# Patient Record
Sex: FEMALE | Race: WHITE | Employment: OTHER | URBAN - METROPOLITAN AREA
[De-identification: names, ages, dates, MRNs, and addresses within clinical notes are randomized per-mention and may not be internally consistent; named-entity substitution may affect disease eponyms.]

---

## 2017-01-05 ENCOUNTER — HOSPITAL ENCOUNTER (OUTPATIENT)
Dept: LAB | Age: 82
Discharge: HOME | End: 2017-01-05
Payer: MEDICARE

## 2017-01-05 ENCOUNTER — OFFICE VISIT (OUTPATIENT)
Dept: INTERNAL MEDICINE CLINIC | Age: 82
End: 2017-01-05

## 2017-01-05 VITALS
TEMPERATURE: 97.4 F | BODY MASS INDEX: 42.49 KG/M2 | DIASTOLIC BLOOD PRESSURE: 62 MMHG | WEIGHT: 255 LBS | RESPIRATION RATE: 20 BRPM | OXYGEN SATURATION: 98 % | HEART RATE: 60 BPM | HEIGHT: 65 IN | SYSTOLIC BLOOD PRESSURE: 144 MMHG

## 2017-01-05 DIAGNOSIS — E03.9 ACQUIRED HYPOTHYROIDISM: ICD-10-CM

## 2017-01-05 DIAGNOSIS — Z13.39 SCREENING FOR ALCOHOLISM: ICD-10-CM

## 2017-01-05 DIAGNOSIS — Z00.00 ROUTINE GENERAL MEDICAL EXAMINATION AT A HEALTH CARE FACILITY: ICD-10-CM

## 2017-01-05 DIAGNOSIS — G89.4 CHRONIC PAIN SYNDROME: ICD-10-CM

## 2017-01-05 LAB
ALBUMIN SERPL BCP-MCNC: 3.8 G/DL (ref 3.4–5)
ALBUMIN/GLOB SERPL: 1 {RATIO} (ref 0.8–1.7)
ALP SERPL-CCNC: 70 U/L (ref 45–117)
ALT SERPL-CCNC: 12 U/L (ref 13–56)
ANION GAP BLD CALC-SCNC: 7 MMOL/L (ref 3–18)
APPEARANCE UR: CLEAR
AST SERPL W P-5'-P-CCNC: 11 U/L (ref 15–37)
BACTERIA URNS QL MICRO: NEGATIVE /HPF
BASOPHILS # BLD AUTO: 0.1 K/UL (ref 0–0.06)
BASOPHILS # BLD: 1 % (ref 0–2)
BILIRUB SERPL-MCNC: 0.2 MG/DL (ref 0.2–1)
BILIRUB UR QL: NEGATIVE
BUN SERPL-MCNC: 19 MG/DL (ref 7–18)
BUN/CREAT SERPL: 20 (ref 12–20)
CALCIUM SERPL-MCNC: 9.1 MG/DL (ref 8.5–10.1)
CHLORIDE SERPL-SCNC: 103 MMOL/L (ref 100–108)
CO2 SERPL-SCNC: 29 MMOL/L (ref 21–32)
COLOR UR: YELLOW
CREAT SERPL-MCNC: 0.93 MG/DL (ref 0.6–1.3)
DIFFERENTIAL METHOD BLD: ABNORMAL
EOSINOPHIL # BLD: 0.3 K/UL (ref 0–0.4)
EOSINOPHIL NFR BLD: 3 % (ref 0–5)
EPITH CASTS URNS QL MICRO: ABNORMAL /LPF (ref 0–5)
ERYTHROCYTE [DISTWIDTH] IN BLOOD BY AUTOMATED COUNT: 14.3 % (ref 11.6–14.5)
GLOBULIN SER CALC-MCNC: 3.8 G/DL (ref 2–4)
GLUCOSE SERPL-MCNC: 225 MG/DL (ref 74–99)
GLUCOSE UR STRIP.AUTO-MCNC: >1000 MG/DL
HBA1C MFR BLD: 11.2 % (ref 4.2–5.6)
HCT VFR BLD AUTO: 41.8 % (ref 35–45)
HGB BLD-MCNC: 13 G/DL (ref 12–16)
HGB UR QL STRIP: NEGATIVE
KETONES UR QL STRIP.AUTO: NEGATIVE MG/DL
LEUKOCYTE ESTERASE UR QL STRIP.AUTO: NEGATIVE
LYMPHOCYTES # BLD AUTO: 30 % (ref 21–52)
LYMPHOCYTES # BLD: 3 K/UL (ref 0.9–3.6)
MCH RBC QN AUTO: 27 PG (ref 24–34)
MCHC RBC AUTO-ENTMCNC: 31.1 G/DL (ref 31–37)
MCV RBC AUTO: 86.7 FL (ref 74–97)
MONOCYTES # BLD: 0.6 K/UL (ref 0.05–1.2)
MONOCYTES NFR BLD AUTO: 6 % (ref 3–10)
NEUTS SEG # BLD: 5.9 K/UL (ref 1.8–8)
NEUTS SEG NFR BLD AUTO: 60 % (ref 40–73)
NITRITE UR QL STRIP.AUTO: NEGATIVE
PH UR STRIP: 6 [PH] (ref 5–8)
PLATELET # BLD AUTO: 237 K/UL (ref 135–420)
PMV BLD AUTO: 12.8 FL (ref 9.2–11.8)
POTASSIUM SERPL-SCNC: 5.1 MMOL/L (ref 3.5–5.5)
PROT SERPL-MCNC: 7.6 G/DL (ref 6.4–8.2)
PROT UR STRIP-MCNC: 100 MG/DL
RBC # BLD AUTO: 4.82 M/UL (ref 4.2–5.3)
RBC #/AREA URNS HPF: 0 /HPF (ref 0–5)
SODIUM SERPL-SCNC: 139 MMOL/L (ref 136–145)
SP GR UR REFRACTOMETRY: 1.02 (ref 1–1.03)
UROBILINOGEN UR QL STRIP.AUTO: 0.2 EU/DL (ref 0.2–1)
WBC # BLD AUTO: 9.9 K/UL (ref 4.6–13.2)
WBC URNS QL MICRO: ABNORMAL /HPF (ref 0–4)
YEAST URNS QL MICRO: ABNORMAL

## 2017-01-05 PROCEDURE — 85025 COMPLETE CBC W/AUTO DIFF WBC: CPT | Performed by: INTERNAL MEDICINE

## 2017-01-05 PROCEDURE — 81001 URINALYSIS AUTO W/SCOPE: CPT | Performed by: INTERNAL MEDICINE

## 2017-01-05 PROCEDURE — 82043 UR ALBUMIN QUANTITATIVE: CPT | Performed by: INTERNAL MEDICINE

## 2017-01-05 PROCEDURE — 80053 COMPREHEN METABOLIC PANEL: CPT | Performed by: INTERNAL MEDICINE

## 2017-01-05 PROCEDURE — 83036 HEMOGLOBIN GLYCOSYLATED A1C: CPT | Performed by: INTERNAL MEDICINE

## 2017-01-05 RX ORDER — BACLOFEN 20 MG
1 TABLET ORAL DAILY
COMMUNITY
End: 2021-11-09

## 2017-01-05 RX ORDER — ASCORBIC ACID 250 MG
100 TABLET ORAL DAILY
COMMUNITY
End: 2018-03-22 | Stop reason: DRUGHIGH

## 2017-01-05 RX ORDER — CEPHALEXIN 500 MG/1
CAPSULE ORAL
Qty: 30 CAP | Refills: 0 | Status: SHIPPED | OUTPATIENT
Start: 2017-01-05 | End: 2017-03-23 | Stop reason: SDUPTHER

## 2017-01-05 RX ORDER — LANOLIN ALCOHOL/MO/W.PET/CERES
500 CREAM (GRAM) TOPICAL DAILY
COMMUNITY
End: 2018-03-22

## 2017-01-05 NOTE — ACP (ADVANCE CARE PLANNING)
The patient was advised and counseled regarding advanced directives.   The patient was provided with an information packet

## 2017-01-05 NOTE — PATIENT INSTRUCTIONS
Health Maintenance Due   Topic Date Due    Cholesterol Test   1935    Diabetic Foot Care  03/19/1947    Albumin Urine Test  03/19/1947    Eye Exam  03/19/1947    DTaP/Tdap/Td  (1 - Tdap) 03/19/1958    Shingles Vaccine  03/19/1997    Glaucoma Screening   03/19/2002    Bone Density Screening  03/19/2002    Annual Well Visit  03/19/2002       Medicare Part B Preventive Services Limitations Recommendation Scheduled   Bone Mass Measurement  (age 72 & older, biennial) Requires diagnosis related to osteoporosis or estrogen deficiency. Biennial benefit unless patient has history of long-term glucocorticoid tx or baseline is needed because initial test was by other method     Cardiovascular Screening Blood Tests (every 5 years)  Total cholesterol, HDL, Triglycerides Order as a panel if possible     Colorectal Cancer Screening  -Fecal occult blood test (annual)  -Flexible sigmoidoscopy (5y)  -Screening colonoscopy (10y)  -Barium Enema      Counseling to Prevent Tobacco Use (up to 8 sessions per year)  - Counseling greater than 3 and up to 10 minutes  - Counseling greater than 10 minutes Patients must be asymptomatic of tobacco-related conditions to receive as preventive service     Diabetes Screening Tests (at least every 3 years, Medicare covers annually or at 6-month intervals for prediabetic patients)    Fasting blood sugar (FBS) or glucose tolerance test (GTT) Patient must be diagnosed with one of the following:  -Hypertension, Dyslipidemia, obesity, previous impaired FBS or GTT  Or any two of the following: overweight, FH of diabetes, age ? 72, history of gestational diabetes, birth of baby weighing more than 9 pounds     Diabetes Self-Management Training (DSMT) (no USPSTF recommendation) Requires referral by treating physician for patient with diabetes or renal disease. 10 hours of initial DSMT session of no less than 30 minutes each in a continuous 12-month period.   2 hours of follow-up DSMT in subsequent years. Glaucoma Screening (no USPSTF recommendation) Diabetes mellitus, family history, , age 48 or over,  American, age 72 or over     Human Immunodeficiency Virus (HIV) Screening (annually for increased risk patients)  HIV-1 and HIV-2 by EIA, ESPERANZA, rapid antibody test, or oral mucosa transudate Patient must be at increased risk for HIV infection per USPSTF guidelines or pregnant. Tests covered annually for patients at increased risk. Pregnant patients may receive up to 3 test during pregnancy. Medical Nutrition Therapy (MNT) (for diabetes or renal disease not recommended schedule) Requires referral by treating physician for patient with diabetes or renal disease. Can be provided in same year as diabetes self-management training (DSMT), and CMS recommends medical nutrition therapy take place after DSMT. Up to 3 hours for initial year and 2 hours in subsequent years. Shingles Vaccination A shingles vaccine is also recommended once in a lifetime after age 61     Seasonal Influenza Vaccination (annually)      Pneumococcal Vaccination (once after 72)      Hepatitis B Vaccinations (if medium/high risk) Medium/high risk factors:  End-stage renal disease,  Hemophiliacs who received Factor VIII or IX concentrates, Clients of institutions for the mentally retarded, Persons who live in the same house as a HepB virus carrier, Homosexual men, Illicit injectable drug abusers. Screening Mammography (biennial age 54-69) Annually (age 36 or over)     Screening Pap Tests and Pelvic Examination (up to age 79 and after 79 if unknown history or abnormal study last 10 years) Every 25 months except high risk     Ultrasound Screening for Abdominal Aortic Aneurysm (AAA) (once) Patient must be referred through [facility] and not have had a screening for abdominal aortic aneurysm before under Medicare.   Limited to patients who meet one of the following criteria:  - Men who are 65-75 years old and have smoked more than 100 cigarettes in their lifetime.  -Anyone with a FH of AAA  -Anyone recommended for screening by USPSTF

## 2017-01-05 NOTE — PROGRESS NOTES
1. Have you been to the ER, urgent care clinic since your last visit? Hospitalized since your last visit? No    2. Have you seen or consulted any other health care providers outside of the [address] [place] [address] since your last visit? Include any pap smears or colon screening.  No

## 2017-01-05 NOTE — MR AVS SNAPSHOT
Visit Information Date & Time Provider Department Dept. Phone Encounter #  
 1/5/2017  9:00 AM David Mirza MD [facility] INTERNAL MEDICINE OF [address] [place] [address] Drive [phone] [id number] Follow-up Instructions Return in about 3 months (around 4/17/2017). Your Appointments 1/23/2017 12:00 PM  
Office Visit with Alyx Walker MD  
[facility] [address] [place] [address]  
[phone] [facility] [id number] Upcoming Health Maintenance Date Due  
 LIPID PANEL Q1 1935 FOOT EXAM Q1 3/19/1947 MICROALBUMIN Q1 3/19/1947 EYE EXAM RETINAL OR DILATED Q1 3/19/1947 DTaP/Tdap/Td series (1 - Tdap) 3/19/1958 ZOSTER VACCINE AGE 60> 3/19/1997 GLAUCOMA SCREENING Q2Y 3/19/2002 OSTEOPOROSIS SCREENING (DEXA) 3/19/2002 MEDICARE YEARLY EXAM 3/19/2002 HEMOGLOBIN A1C Q6M 5/2/2017 Pneumococcal 65+ Low/Medium Risk (2 of 2 - PPSV23) 10/18/2017 Allergies as of 1/5/2017  Review Complete On: 1/5/2017 By: David Mirza MD  
 No Known Allergies Current Immunizations  Never Reviewed No immunizations on file. Not reviewed this visit You Were Diagnosed With   
  
 Codes Comments Uncontrolled type 2 diabetes mellitus without complication, with long-term current use of insulin ([facility]    -  Primary ICD-10-CM: E11.65, Z79.4 ICD-9-CM: 250.02, V58.67 Acquired hypothyroidism     ICD-10-CM: E03.9 ICD-9-CM: 523. 9 Chronic pain syndrome     ICD-10-CM: G89.4 ICD-9-CM: 338.4 Routine general medical examination at a health care facility     ICD-10-CM: Z00.00 ICD-9-CM: V70.0 Screening for alcoholism     ICD-10-CM: Z13.89 ICD-9-CM: V79.1 Vitals BP Pulse Temp Resp Height(growth percentile) 144/62 (BP 1 Location: Right arm, BP Patient Position: Sitting) 60 97.4 °F (36.3 °C) (Tympanic) 20 5' 5\" (1.651 m) Weight(growth percentile) SpO2 BMI OB Status Smoking Status 255 lb (115.7 kg) 98% 42.43 kg/m2 Hysterectomy Former Smoker BMI and BSA Data Body Mass Index Body Surface Area  
 42.43 kg/m 2 2.3 m 2 Preferred Pharmacy Pharmacy Name Phone [address], [facility] [address] [phone] Your Updated Medication List  
  
   
This list is accurate as of: 1/5/17 10:52 AM.  Always use your most recent med list.  
  
  
  
  
 acetaminophen 500 mg tablet Commonly known as:  TYLENOL Take 1 Tab by mouth every six (6) hours as needed for Pain. allopurinol 100 mg tablet Commonly known as:  Gonzella Cotta Take 1 Tab by mouth daily. amLODIPine 10 mg tablet Commonly known as:  Tad Sofy Take 0.5 Tabs by mouth daily. aspirin delayed-release 81 mg tablet Take  by mouth daily. atorvastatin 40 mg tablet Commonly known as:  LIPITOR Take  by mouth daily. bumetanide 0.5 mg tablet Commonly known as:  Royal Hutching Take 2 mg by mouth two (2) times a day. carvedilol 12.5 mg tablet Commonly known as:  Melvi Rufina Take  by mouth two (2) times daily (with meals). cephALEXin 500 mg capsule Commonly known as:  KEFLEX  
1 capsule three times per day  
  
 esomeprazole 40 mg capsule Commonly known as:  Allena Clas Take 1 Cap by mouth daily. gabapentin 300 mg capsule Commonly known as:  NEURONTIN Take 1 Cap by mouth three (3) times daily. glucose blood VI test strips strip Commonly known as:  FREESTYLE TEST Use to check blood glucose twice daily DX:E11.9  
  
 insulin aspart 100 unit/mL Inpn Commonly known as:  Libra Barrow 16 units before each meal  
  
 insulin glargine 100 unit/mL injection Commonly known as:  LANTUS  
82 units daily  
  
 levothyroxine 200 mcg tablet Commonly known as:  SYNTHROID  
1 tablet daily (take on an empty stomach) (stop \"old\" synthroid)  
  
 magnesium oxide 500 mg Tab Take  by mouth.  
  
 morphine IR 15 mg tablet Commonly known as:  MS IR Take 1 Tab by mouth every six (6) hours as needed for Pain for up to 30 days. Max Daily Amount: 60 mg.  
  
 triamcinolone acetonide 0.1 % topical cream  
Commonly known as:  KENALOG Apply twice per day as needed VITAMIN B-12 500 mcg tablet Generic drug:  cyanocobalamin Take 500 mcg by mouth daily. VITAMIN C 250 mg tablet Generic drug:  ascorbic acid (vitamin C) Take 100 mg by mouth daily. Prescriptions Sent to Pharmacy Refills  
 cephALEXin (KEFLEX) 500 mg capsule 0 Si capsule three times per day Class: Normal  
 Pharmacy: [address] [place] [address] Ph #: [phone] Follow-up Instructions Return in about 3 months (around 2017). Patient Instructions Health Maintenance Due Topic Date Due  Cholesterol Test   1935 [facility] Diabetic Foot Care  1947  Albumin Urine Test  1947  Eye Exam  1945  
 DTaP/Tdap/Td  (1 - Tdap) 1958  Shingles Vaccine  1997  Glaucoma Screening   2002  Bone Density Screening  2002 [facility] Annual Well Visit  2002 Medicare Part B Preventive Services Limitations Recommendation Scheduled Bone Mass Measurement 
(age 72 & older, biennial) Requires diagnosis related to osteoporosis or estrogen deficiency. Biennial benefit unless patient has history of long-term glucocorticoid tx or baseline is needed because initial test was by other method Cardiovascular Screening Blood Tests (every 5 years) Total cholesterol, HDL, Triglycerides Order as a panel if possible Colorectal Cancer Screening 
-Fecal occult blood test (annual) -Flexible sigmoidoscopy (5y) 
-Screening colonoscopy (10y) -Barium Enema Counseling to Prevent Tobacco Use (up to 8 sessions per year) - Counseling greater than 3 and up to 10 minutes - Counseling greater than 10 minutes Patients must be asymptomatic of tobacco-related conditions to receive as preventive service Diabetes Screening Tests (at least every 3 years, Medicare covers annually or at 6-month intervals for prediabetic patients) Fasting blood sugar (FBS) or glucose tolerance test (GTT) Patient must be diagnosed with one of the following: 
-Hypertension, Dyslipidemia, obesity, previous impaired FBS or GTT 
Or any two of the following: overweight, FH of diabetes, age ? 72, history of gestational diabetes, birth of baby weighing more than 9 pounds Diabetes Self-Management Training (DSMT) (no USPSTF recommendation) Requires referral by treating physician for patient with diabetes or renal disease. 10 hours of initial DSMT session of no less than 30 minutes each in a continuous 12-month period. 2 hours of follow-up DSMT in subsequent years. Glaucoma Screening (no USPSTF recommendation) Diabetes mellitus, family history, , age 48 or over,  American, age 72 or over Human Immunodeficiency Virus (HIV) Screening (annually for increased risk patients) HIV-1 and HIV-2 by EIA, ESPERANZA, rapid antibody test, or oral mucosa transudate Patient must be at increased risk for HIV infection per USPSTF guidelines or pregnant. Tests covered annually for patients at increased risk. Pregnant patients may receive up to 3 test during pregnancy. Medical Nutrition Therapy (MNT) (for diabetes or renal disease not recommended schedule) Requires referral by treating physician for patient with diabetes or renal disease. Can be provided in same year as diabetes self-management training (DSMT), and CMS recommends medical nutrition therapy take place after DSMT. Up to 3 hours for initial year and 2 hours in subsequent years. Shingles Vaccination A shingles vaccine is also recommended once in a lifetime after age 61 Seasonal Influenza Vaccination (annually) Pneumococcal Vaccination (once after 65) Hepatitis B Vaccinations (if medium/high risk) Medium/high risk factors:  End-stage renal disease, Hemophiliacs who received Factor VIII or IX concentrates, Clients of institutions for the mentally retarded, Persons who live in the same house as a HepB virus carrier, Homosexual men, Illicit injectable drug abusers. Screening Mammography (biennial age 54-69) Annually (age 36 or over) Screening Pap Tests and Pelvic Examination (up to age 79 and after 79 if unknown history or abnormal study last 10 years) Every 24 months except high risk Ultrasound Screening for Abdominal Aortic Aneurysm (AAA) (once) Patient must be referred through [facility] and not have had a screening for abdominal aortic aneurysm before under Medicare. Limited to patients who meet one of the following criteria: 
- Men who are 73-68 years old and have smoked more than 100 cigarettes in their lifetime. 
-Anyone with a FH of AAA 
-Anyone recommended for screening by USPSTF Introducing [facility]! [facility] introduces digitalbox patient portal. Now you can access parts of your medical record, email your doctor's office, and request medication refills online. 1. In your internet browser, go to [email or web address] 2. Click on the First Time User? Click Here link in the Sign In box. You will see the New Member Sign Up page. 3. Enter your digitalbox Access Code exactly as it appears below. You will not need to use this code after youve completed the sign-up process. If you do not sign up before the expiration date, you must request a new code. · digitalbox Access Code: [email or web address] Expires: 1/31/2017 10:54 AM 
 
4. Enter the last four digits of your Social Security Number (xxxx) and Date of Birth (mm/dd/yyyy) as indicated and click Submit. You will be taken to the next sign-up page. 5. Create a digitalbox ID.  This will be your digitalbox login ID and cannot be changed, so think of one that is secure and easy to remember. 6. Create a [employer] password. You can change your password at any time. 7. Enter your Password Reset Question and Answer. This can be used at a later time if you forget your password. 8. Enter your e-mail address. You will receive e-mail notification when new information is available in [address] 9. Click Sign Up. You can now view and download portions of your medical record. 10. Click the Download Summary menu link to download a portable copy of your medical information. If you have questions, please visit the Frequently Asked Questions section of the [employer] website. Remember, [employer] is NOT to be used for urgent needs. For medical emergencies, dial 911. Now available from your iPhone and Android! Please provide this summary of care documentation to your next provider. Your primary care clinician is listed as Ann Greenberg. If you have any questions after today's visit, please call [phone].

## 2017-01-05 NOTE — PROGRESS NOTES
This is an Initial Medicare Annual Wellness Exam (AWV) (Performed 12 months after IPPE or effective date of Medicare Part B enrollment, Once in a lifetime)    I have reviewed the patient's medical history in detail and updated the computerized patient record. History   The patient complains of swelling in her feet - the right foot worse than the left foot. Associated with erythema of her left lower extremity. The patient persists with chronic pain. She remains on Insulin for type II diabetes mellitus and hypertension. She states that her sugars are doing ok. Past Medical History   Diagnosis Date    Asthma     Chronic venous insufficiency     Diabetes (HCC)     Hypertension     Peripheral neuropathy (HCC)     Rheumatoid arthritis (HCC)     Vertigo       History reviewed. No pertinent past surgical history. Current Outpatient Prescriptions   Medication Sig Dispense Refill    magnesium oxide 500 mg tab Take  by mouth.  cyanocobalamin (VITAMIN B-12) 500 mcg tablet Take 500 mcg by mouth daily.  ascorbic acid, vitamin C, (VITAMIN C) 250 mg tablet Take 100 mg by mouth daily.  cephALEXin (KEFLEX) 500 mg capsule 1 capsule three times per day 30 Cap 0    morphine IR (MS IR) 15 mg tablet Take 1 Tab by mouth every six (6) hours as needed for Pain for up to 30 days. Max Daily Amount: 60 mg. 120 Tab 0    insulin aspart (NOVOLOG) 100 unit/mL inpn 16 units before each meal 30 mL 3    glucose blood VI test strips (FREESTYLE TEST) strip Use to check blood glucose twice daily DX:E11.9 300 Strip 3    esomeprazole (NEXIUM) 40 mg capsule Take 1 Cap by mouth daily. 90 Cap 3    levothyroxine (SYNTHROID) 200 mcg tablet 1 tablet daily (take on an empty stomach) (stop \"old\" synthroid) 90 Tab 3    amLODIPine (NORVASC) 10 mg tablet Take 0.5 Tabs by mouth daily. 90 Tab 3    acetaminophen (TYLENOL) 500 mg tablet Take 1 Tab by mouth every six (6) hours as needed for Pain.  270 Tab 3    triamcinolone acetonide (KENALOG) 0.1 % topical cream Apply twice per day as needed 120 g 3    insulin glargine (LANTUS) 100 unit/mL injection 82 units daily (Patient taking differently: 84 units daily) 1 Vial 3    aspirin delayed-release 81 mg tablet Take  by mouth daily.  gabapentin (NEURONTIN) 300 mg capsule Take 1 Cap by mouth three (3) times daily. 270 Cap 3    allopurinol (ZYLOPRIM) 100 mg tablet Take 1 Tab by mouth daily. 90 Tab 3    atorvastatin (LIPITOR) 40 mg tablet Take  by mouth daily.  carvedilol (COREG) 12.5 mg tablet Take  by mouth two (2) times daily (with meals).  bumetanide (BUMEX) 0.5 mg tablet Take 2 mg by mouth two (2) times a day. No Known Allergies  Family History   Problem Relation Age of Onset    Heart Attack Mother     Heart Attack Father      Social History   Substance Use Topics    Smoking status: Former Smoker    Smokeless tobacco: Never Used    Alcohol use No     Patient Active Problem List   Diagnosis Code    Chronic right shoulder pain M25.511, G89.29    Encounter for long-term (current) use of medications Z79.899    Chronic pain syndrome G89.4    Arthritis of right shoulder region M19.90    Shoulder impingement M75.40    Tear of right rotator cuff M75.101    Skin lesions, generalized L98.9    Type II diabetes mellitus, uncontrolled (HCC) E11.65    Acquired hypothyroidism E03.9    Hypoglycemia E16.2    Reflux esophagitis K21.0         Depression Risk Factor Screening:     PHQ 2 / 9, over the last two weeks 12/22/2015   Little interest or pleasure in doing things Not at all   Feeling down, depressed or hopeless Not at all   Total Score PHQ 2 0     Alcohol Risk Factor Screening: On any occasion during the past 3 months, have you had more than 3 drinks containing alcohol? No    Do you average more than 7 drinks per week? No    Functional Ability and Level of Safety:     Hearing Loss   moderate-to-severe    Activities of Daily Living   Self-care.    Requires assistance with: no ADLs    Fall Risk     Fall Risk Assessment, last 12 mths 7/11/2016   Able to walk? Yes   Fall in past 12 months? Yes   Fall with injury? Yes   Number of falls in past 12 months 1   Fall Risk Score 2     Abuse Screen   Patient is not abused    Review of Systems   A comprehensive review of systems was negative except for that written in the HPI. Physical Examination       Evaluation of Cognitive Function:  Mood/affect:  neutral  Appearance: age appropriate  Family member/caregiver input: None    Visit Vitals    /62 (BP 1 Location: Right arm, BP Patient Position: Sitting)    Pulse 60    Temp 97.4 °F (36.3 °C) (Tympanic)    Resp 20    Ht 5' 5\" (1.651 m)    Wt 255 lb (115.7 kg)    SpO2 98%    BMI 42.43 kg/m2     General appearance: alert  Neck: supple, symmetrical, trachea midline, no adenopathy, thyroid: not enlarged, symmetric, no tenderness/mass/nodules, no carotid bruit and no JVD  Back: symmetric, no curvature. ROM normal. No CVA tenderness. Lungs: clear to auscultation bilaterally  Heart: regular rate and rhythm, S1, S2 normal, no murmur, click, rub or gallop  Abdomen: soft, non-tender.  Bowel sounds normal. No masses,  no organomegaly  Extremities: 1+ woody edema in her right ankle  Pulses: 2+ and symmetric  Skin: Skin color, texture, turgor normal. No rashes or lesions  Lymph nodes: Cervical, supraclavicular, and axillary nodes normal.  Diabetic foot exam:     Left: Reflexes 2+     Vibratory sensation absent    Proprioception normal   Sharp/dull discrimination diminished    Filament test 1/6   Pulse DP: 2+ (normal)   Pulse PT: 2+ (normal)   Deformities: Mild - Abnormalities of chronic stasis  Right: Reflexes 2+   Vibratory sensation absent   Proprioception normal   Sharp/dull discrimination diminished   Filament test 1/6   Pulse DP: 2+ (normal)   Pulse PT: 2+ (normal)   Deformities: Mild - stasis dermatitis      Patient Care Team:  Edgardo Arias MD as PCP - General (Internal Medicine)    Advice/Referrals/Counseling   Education and counseling provided:  Are appropriate based on today's review and evaluation  The patient was advised and counseled regarding advanced directives. The patient was provided with an information packet    Assessment/Plan       ICD-10-CM ICD-9-CM    1. Uncontrolled type 2 diabetes mellitus without complication, with long-term current use of insulin ([facility]) E11.65 250.02 magnesium oxide 500 mg tab    Z79.4 V58.67 cyanocobalamin (VITAMIN B-12) 500 mcg tablet      ascorbic acid, vitamin C, (VITAMIN C) 250 mg tablet      CBC WITH AUTOMATED DIFF      METABOLIC PANEL, COMPREHENSIVE      HEMOGLOBIN A1C W/O EAG      MICROALBUMIN, UR, RAND W/ MICROALBUMIN/CREA RATIO      URINALYSIS W/MICROSCOPIC      HM DIABETES FOOT EXAM      GA COLLECTION VENOUS BLOOD,VENIPUNCTURE   2. Acquired hypothyroidism E03.9 244.9 magnesium oxide 500 mg tab      cyanocobalamin (VITAMIN B-12) 500 mcg tablet      ascorbic acid, vitamin C, (VITAMIN C) 250 mg tablet      CBC WITH AUTOMATED DIFF      METABOLIC PANEL, COMPREHENSIVE      HEMOGLOBIN A1C W/O EAG      MICROALBUMIN, UR, RAND W/ MICROALBUMIN/CREA RATIO      URINALYSIS W/MICROSCOPIC      GA COLLECTION VENOUS BLOOD,VENIPUNCTURE   3. Chronic pain syndrome G89.4 338.4 magnesium oxide 500 mg tab      cyanocobalamin (VITAMIN B-12) 500 mcg tablet      ascorbic acid, vitamin C, (VITAMIN C) 250 mg tablet      CBC WITH AUTOMATED DIFF      METABOLIC PANEL, COMPREHENSIVE      HEMOGLOBIN A1C W/O EAG      MICROALBUMIN, UR, RAND W/ MICROALBUMIN/CREA RATIO      URINALYSIS W/MICROSCOPIC      GA COLLECTION VENOUS BLOOD,VENIPUNCTURE   4. Routine general medical examination at a health care facility Z00.00 V70.0 GA COLLECTION VENOUS BLOOD,VENIPUNCTURE   5. Screening for alcoholism Z13.89 V79.1 GA COLLECTION VENOUS BLOOD,VENIPUNCTURE   .   Labs:  Ordered  Keflex  she was advised to continue her maintenance medications  Discussed the patient's above normal BMI with her.  I have recommended the following interventions: dietary management education, guidance, and counseling . The BMI follow up plan is as follows: BMI is out of normal parameters and plan is as follows: I have counseled this patient on diet and exercise regimens    I asked Mary Amado if she has any questions and I answered the questions.   Mary Amado states that she understands the treatment plan and agrees with the treatment plan

## 2017-01-06 LAB
CREAT UR-MCNC: 71.81 MG/DL (ref 30–125)
MICROALBUMIN UR-MCNC: 50.9 MG/DL (ref 0–3)
MICROALBUMIN/CREAT UR-RTO: 709 MG/G (ref 0–30)

## 2017-01-23 ENCOUNTER — OFFICE VISIT (OUTPATIENT)
Dept: PAIN MANAGEMENT | Age: 82
End: 2017-01-23

## 2017-01-23 VITALS
WEIGHT: 255 LBS | DIASTOLIC BLOOD PRESSURE: 74 MMHG | HEART RATE: 86 BPM | BODY MASS INDEX: 42.43 KG/M2 | SYSTOLIC BLOOD PRESSURE: 172 MMHG

## 2017-01-23 DIAGNOSIS — G89.4 CHRONIC PAIN SYNDROME: ICD-10-CM

## 2017-01-23 DIAGNOSIS — M19.011 ARTHRITIS OF RIGHT SHOULDER REGION: Primary | ICD-10-CM

## 2017-01-23 DIAGNOSIS — G89.29 CHRONIC RIGHT SHOULDER PAIN: ICD-10-CM

## 2017-01-23 DIAGNOSIS — M25.511 CHRONIC RIGHT SHOULDER PAIN: ICD-10-CM

## 2017-01-23 PROBLEM — Z86.59 HISTORY OF DEPRESSION: Status: ACTIVE | Noted: 2017-01-23

## 2017-01-23 RX ORDER — MORPHINE SULFATE 15 MG/1
15 TABLET ORAL
Qty: 120 TAB | Refills: 0 | Status: SHIPPED | OUTPATIENT
Start: 2017-02-18 | End: 2017-04-19 | Stop reason: SDUPTHER

## 2017-01-23 RX ORDER — MORPHINE SULFATE 15 MG/1
15 TABLET ORAL
Qty: 120 TAB | Refills: 0 | Status: SHIPPED | OUTPATIENT
Start: 2017-03-20 | End: 2017-04-05 | Stop reason: ALTCHOICE

## 2017-01-23 NOTE — PATIENT INSTRUCTIONS
1. Continue current plan with no evidence of addiction or diversion. Stable on current medication without adverse events. 2. Refill morphine IR 15 mg up to 4 times daily as needed. 3. Discussed risks of addiction, dependency, and opioid induced hyperalgesia.    4. Return to clinic in 2 months

## 2017-01-23 NOTE — MR AVS SNAPSHOT
Visit Information Date & Time Provider Department Dept. Phone Encounter #  
 1/23/2017 12:40 PM Elicia Miranda, [address] [place] [address] for Pain Management [phone] Follow-up Instructions Return in about 2 months (around 3/23/2017). Your Appointments 4/5/2017  9:15 AM  
Follow Up with Rose Thrasher MD  
[facility] INTERNAL MEDICINE OF [facility]) Appt Note: 3 MO F/U  
 [address], [address]  
  
   
 [address], [address] [place] [address] Upcoming Health Maintenance Date Due  
 LIPID PANEL Q1 1935 EYE EXAM RETINAL OR DILATED Q1 3/19/1947 DTaP/Tdap/Td series (1 - Tdap) 3/19/1958 ZOSTER VACCINE AGE 60> 3/19/1997 GLAUCOMA SCREENING Q2Y 3/19/2002 OSTEOPOROSIS SCREENING (DEXA) 3/19/2002 HEMOGLOBIN A1C Q6M 7/5/2017 Pneumococcal 65+ Low/Medium Risk (2 of 2 - PPSV23) 10/18/2017 FOOT EXAM Q1 1/5/2018 MICROALBUMIN Q1 1/5/2018 MEDICARE YEARLY EXAM 1/6/2018 Allergies as of 1/23/2017  Review Complete On: 1/23/2017 By: IGNACIO Delgado No Known Allergies Current Immunizations  Never Reviewed No immunizations on file. Not reviewed this visit You Were Diagnosed With   
  
 Codes Comments Arthritis of right shoulder region    -  Primary ICD-10-CM: M19.90 ICD-9-CM: 716.91 Chronic pain syndrome     ICD-10-CM: G89.4 ICD-9-CM: 338. 4 Chronic right shoulder pain     ICD-10-CM: M25.511, G89.29 ICD-9-CM: 719.41, 338.29 Vitals BP Pulse Weight(growth percentile) BMI OB Status Smoking Status 172/74 (BP 1 Location: Left arm, BP Patient Position: Sitting) 86 255 lb (115.7 kg) 42.43 kg/m2 Hysterectomy Former Smoker Vitals History BMI and BSA Data Body Mass Index Body Surface Area  
 42.43 kg/m 2 2.3 m 2 Preferred Pharmacy Pharmacy Name Phone [address] [place] [address], [facility]. [facility] [address] [phone] Your Updated Medication List  
  
   
This list is accurate as of: 1/23/17  1:31 PM.  Always use your most recent med list.  
  
  
  
  
 acetaminophen 500 mg tablet Commonly known as:  TYLENOL Take 1 Tab by mouth every six (6) hours as needed for Pain. allopurinol 100 mg tablet Commonly known as:  Zahra Bibber Take 1 Tab by mouth daily. amLODIPine 10 mg tablet Commonly known as:  Omega Ruths Take 0.5 Tabs by mouth daily. aspirin delayed-release 81 mg tablet Take  by mouth daily. atorvastatin 40 mg tablet Commonly known as:  LIPITOR Take  by mouth daily. bumetanide 0.5 mg tablet Commonly known as:  Underwood [place]an Take 2 mg by mouth two (2) times a day. carvedilol 12.5 mg tablet Commonly known as:  Geno Locket Take  by mouth two (2) times daily (with meals). cephALEXin 500 mg capsule Commonly known as:  KEFLEX  
1 capsule three times per day  
  
 esomeprazole 40 mg capsule Commonly known as:  Ekaterina Soria Take 1 Cap by mouth daily. gabapentin 300 mg capsule Commonly known as:  NEURONTIN Take 1 Cap by mouth three (3) times daily. glucose blood VI test strips strip Commonly known as:  FREESTYLE TEST Use to check blood glucose twice daily DX:E11.9  
  
 insulin aspart 100 unit/mL Inpn Commonly known as:  Genevive Sport 16 units before each meal  
  
 insulin glargine 100 unit/mL injection Commonly known as:  LANTUS  
82 units daily  
  
 levothyroxine 200 mcg tablet Commonly known as:  SYNTHROID  
1 tablet daily (take on an empty stomach) (stop \"old\" synthroid)  
  
 magnesium oxide 500 mg Tab Take  by mouth. * morphine IR 15 mg tablet Commonly known as:  MS IR Take 1 Tab by mouth four (4) times daily as needed for Pain for up to 30 days. Max Daily Amount: 60 mg. Start taking on:  2/18/2017 * morphine IR 15 mg tablet Commonly known as:  MS IR Take 1 Tab by mouth every six (6) hours as needed for Pain for up to 30 days. Max Daily Amount: 60 mg. Start taking on:  3/20/2017  
  
 triamcinolone acetonide 0.1 % topical cream  
Commonly known as:  KENALOG Apply twice per day as needed VITAMIN B-12 500 mcg tablet Generic drug:  cyanocobalamin Take 500 mcg by mouth daily. VITAMIN C 250 mg tablet Generic drug:  ascorbic acid (vitamin C) Take 100 mg by mouth daily. * Notice: This list has 2 medication(s) that are the same as other medications prescribed for you. Read the directions carefully, and ask your doctor or other care provider to review them with you. Prescriptions Printed Refills  
 morphine IR (MS IR) 15 mg tablet 0 Starting on: 2/18/2017 Sig: Take 1 Tab by mouth four (4) times daily as needed for Pain for up to 30 days. Max Daily Amount: 60 mg.  
 Class: Print Route: Oral  
 morphine IR (MS IR) 15 mg tablet 0 Starting on: 3/20/2017 Sig: Take 1 Tab by mouth every six (6) hours as needed for Pain for up to 30 days. Max Daily Amount: 60 mg.  
 Class: Print Route: Oral  
  
Follow-up Instructions Return in about 2 months (around 3/23/2017). Patient Instructions 1. Continue current plan with no evidence of addiction or diversion. Stable on current medication without adverse events. 2. Refill morphine IR 15 mg up to 4 times daily as needed. 3. Discussed risks of addiction, dependency, and opioid induced hyperalgesia. 4. Return to clinic in 2 months Introducing [facility]! Shasta Briscoe introduces [employer] patient portal. Now you can access parts of your medical record, email your doctor's office, and request medication refills online. 1. In your internet browser, go to [email or web address] 2. Click on the First Time User? Click Here link in the Sign In box. You will see the New Member Sign Up page. 3. Enter your ViClone Access Code exactly as it appears below. You will not need to use this code after youve completed the sign-up process. If you do not sign up before the expiration date, you must request a new code. · Vi[employer] Access Code: [email or web address] Expires: 1/31/2017 10:54 AM 
 
4. Enter the last four digits of your Social Security Number (xxxx) and Date of Birth (mm/dd/yyyy) as indicated and click Submit. You will be taken to the next sign-up page. 5. Create a ViClone ID. This will be your ViClone login ID and cannot be changed, so think of one that is secure and easy to remember. 6. Create a ViClone password. You can change your password at any time. 7. Enter your Password Reset Question and Answer. This can be used at a later time if you forget your password. 8. Enter your e-mail address. You will receive e-mail notification when new information is available in [address] 9. Click Sign Up. You can now view and download portions of your medical record. 10. Click the Download Summary menu link to download a portable copy of your medical information. If you have questions, please visit the Frequently Asked Questions section of the ViClone website. Remember, ViClone is NOT to be used for urgent needs. For medical emergencies, dial 911. Now available from your iPhone and Android! Please provide this summary of care documentation to your next provider. Your primary care clinician is listed as Lali De La Garza. If you have any questions after today's visit, please call [phone].

## 2017-01-23 NOTE — PROGRESS NOTES
Nursing Notes    Patient presents to the office today in follow-up. Patient rates her pain at 8/10 on the numerical pain scale. Reviewed medications with counts as follows:    Rx Date filled Qty Dispensed Pill Count Last Dose Short   Morphine sulfate 15mg 01/19/2017 120 108 This am  No                                             Comments:     POC UDS was not performed in office today    Any new labs or imaging since last appointment? YES; labwork with pcp     Have you been to an emergency room (ER) or urgent care clinic since your last visit? NO            Have you been hospitalized since your last visit? NO     If yes, where, when, and reason for visit? Have you seen or consulted any other health care providers outside of the [facility]  since your last visit? YES     If yes, where, when, and reason for visit? pcp       HM deferred to pcp.

## 2017-01-23 NOTE — PROGRESS NOTES
HISTORY OF PRESENT ILLNESS  Renee Angela is a 80 y.o. female    HPI: Ms. Roberth Chavez  returns today for f/u of chronic right shoulder pain. No h/o shoulder surgery. Surgery has been decline to to her heart condition. Prior injection with some improvement but temporary. PT with minimal imropvment. Today is my first visit with Ms. Roberth Chavez. She continues unchanged since last visit. We discussed her current condition and medications in detail today. She is doing well with her current treatment plan. We discussed her history of depression in detail. she denies any current or past suicidal ideations or plans. She reports that her depression is minimal associated mostly with her pain and condition. She is otherwise doing well with no other issues today. She does report that she is currently taking Tylenol 500 mg from another provider. I have strongly encouraged her to use this medication on an as-needed basis only. She also reports that she has been using her morphine IR on a regular schedule. I have encouraged her to use this medication on an as-needed basis as well. We did discuss possibly adding long-acting medication later if needed. Currently she reports that her pain is frequent but not constant. Current medication management consists of Morphine IR 15 mg 4 times a day as needed. Gabapentin by another provider. Medications are helping with pain control and quality of life. Her pain is 7/10 with medication and 10/10 without. Pt describes pain as aching, burning, and stabbing. Aggravating factors include most ROM activity. Relieved with rest, medication, and avoiding painful activities. Current treatment is helping to improve general activity, mood, walking, sleep, enjoyment of life    In the past 30 days, the patient reports approximately 30% pain relief with current treatment/medications. She  is otherwise doing well with no other complaints today.  She denies any adverse events including nausea, vomiting, dizziness, constipation, hallucinations, or seizures. No Known Allergies    History reviewed. No pertinent past surgical history. Review of Systems   Constitutional: Negative for chills, fever and weight loss. HENT: Negative for congestion and sore throat. Eyes: Negative for blurred vision and double vision. Respiratory: Negative for cough, shortness of breath and wheezing. Cardiovascular: Negative for chest pain and palpitations. Gastrointestinal: Positive for heartburn. Negative for constipation, diarrhea, nausea and vomiting. Genitourinary: Negative. Musculoskeletal: Positive for back pain, falls, joint pain and neck pain. Neurological: Negative for dizziness, seizures, loss of consciousness and headaches. Endo/Heme/Allergies: Does not bruise/bleed easily. Psychiatric/Behavioral: Positive for depression. Negative for suicidal ideas. The patient is not nervous/anxious and does not have insomnia. Physical Exam   Constitutional: She is oriented to person, place, and time and well-developed, well-nourished, and in no distress. No distress. HENT:   Head: Normocephalic and atraumatic. Eyes: EOM are normal.   Pulmonary/Chest: Effort normal.   Musculoskeletal:        Right shoulder: She exhibits decreased range of motion and tenderness. Neurological: She is alert and oriented to person, place, and time. Gait (using a walker) abnormal.   Skin: Skin is warm and dry. No rash noted. She is not diaphoretic. No erythema. Psychiatric: Mood, memory, affect and judgment normal.   Nursing note and vitals reviewed. ASSESSMENT:    1. Arthritis of right shoulder region    2. Chronic pain syndrome    3. Chronic right shoulder pain         [place] Prescription Monitoring Program was reviewed which does not demonstrate aberrancies and/or inconsistencies with regard to the historical prescribing of controlled medications to this patient by other providers.     PLAN / Pt Instructions:  1. Continue current plan with no evidence of addiction or diversion. Stable on current medication without adverse events. 2. Refill morphine IR 15 mg up to 4 times daily as needed. 3. Discussed risks of addiction, dependency, and opioid induced hyperalgesia. 4. Return to clinic in 2 months    Medications Ordered Today   Medications    morphine IR (MS IR) 15 mg tablet     Sig: Take 1 Tab by mouth four (4) times daily as needed for Pain for up to 30 days. Max Daily Amount: 60 mg. Dispense:  120 Tab     Refill:  0    morphine IR (MS IR) 15 mg tablet     Sig: Take 1 Tab by mouth every six (6) hours as needed for Pain for up to 30 days. Max Daily Amount: 60 mg. Dispense:  120 Tab     Refill:  0       Pain medications prescribed with the objective of pain relief and improved physical and psychosocial function in this patient. Spent 25 minutes with patient today reviewing the treatment plan, goals of treatment plan, and limitations of the treatment plan, to include the potential for side effects from medications and procedures. Carrington Ospina, [address] 1/23/2017      Note: Please excuse any typographical errors. Voice recognition software was used for this note and may cause mistakes.

## 2017-02-21 DIAGNOSIS — L98.9 SKIN LESIONS, GENERALIZED: ICD-10-CM

## 2017-02-21 RX ORDER — INSULIN GLARGINE 100 [IU]/ML
INJECTION, SOLUTION SUBCUTANEOUS
Qty: 3 VIAL | Refills: 3
Start: 2017-02-21 | End: 2017-02-27 | Stop reason: SDUPTHER

## 2017-02-21 RX ORDER — ALLOPURINOL 100 MG/1
100 TABLET ORAL DAILY
Qty: 90 TAB | Refills: 3 | Status: SHIPPED | OUTPATIENT
Start: 2017-02-21 | End: 2021-07-06

## 2017-02-21 RX ORDER — PEN NEEDLE, DIABETIC 30 GX3/16"
NEEDLE, DISPOSABLE MISCELLANEOUS
Qty: 3 PACKAGE | Refills: 3 | Status: SHIPPED | OUTPATIENT
Start: 2017-02-21 | End: 2017-07-05 | Stop reason: SDUPTHER

## 2017-02-21 RX ORDER — INSULIN ASPART 100 [IU]/ML
INJECTION, SOLUTION INTRAVENOUS; SUBCUTANEOUS
Qty: 30 ML | Refills: 3 | Status: SHIPPED | OUTPATIENT
Start: 2017-02-21 | End: 2018-03-14 | Stop reason: SDUPTHER

## 2017-02-21 RX ORDER — GABAPENTIN 300 MG/1
300 CAPSULE ORAL 3 TIMES DAILY
Qty: 270 CAP | Refills: 3 | Status: SHIPPED | OUTPATIENT
Start: 2017-02-21 | End: 2018-03-14 | Stop reason: SDUPTHER

## 2017-02-27 RX ORDER — INSULIN GLARGINE 100 [IU]/ML
INJECTION, SOLUTION SUBCUTANEOUS
Qty: 3 VIAL | Refills: 3 | Status: SHIPPED | OUTPATIENT
Start: 2017-02-27 | End: 2017-09-25 | Stop reason: SDUPTHER

## 2017-03-23 ENCOUNTER — OFFICE VISIT (OUTPATIENT)
Dept: PAIN MANAGEMENT | Age: 82
End: 2017-03-23

## 2017-03-23 VITALS
HEART RATE: 69 BPM | WEIGHT: 255 LBS | BODY MASS INDEX: 42.43 KG/M2 | DIASTOLIC BLOOD PRESSURE: 71 MMHG | SYSTOLIC BLOOD PRESSURE: 152 MMHG

## 2017-03-23 DIAGNOSIS — M25.511 CHRONIC RIGHT SHOULDER PAIN: ICD-10-CM

## 2017-03-23 DIAGNOSIS — G89.4 CHRONIC PAIN SYNDROME: ICD-10-CM

## 2017-03-23 DIAGNOSIS — G89.29 CHRONIC RIGHT SHOULDER PAIN: ICD-10-CM

## 2017-03-23 DIAGNOSIS — M75.41 SHOULDER IMPINGEMENT, RIGHT: ICD-10-CM

## 2017-03-23 RX ORDER — MORPHINE SULFATE 15 MG/1
15 TABLET, FILM COATED, EXTENDED RELEASE ORAL EVERY 12 HOURS
Qty: 60 TAB | Refills: 0 | Status: SHIPPED | OUTPATIENT
Start: 2017-03-23 | End: 2017-04-19 | Stop reason: SDUPTHER

## 2017-03-23 NOTE — MR AVS SNAPSHOT
Visit Information Date & Time Provider Department Dept. Phone Encounter #  
 3/23/2017  9:20 AM Nicole Fung, [address] [facility] for [facility] Management [phone] [id number] Follow-up Instructions Return in about 2 months (around 5/23/2017). Your Appointments 4/5/2017  9:15 AM  
Follow Up with David Mirza MD  
[facility] INTERNAL MEDICINE OF [place] [address] [place] [address] Road) Appt Note: 3 MO F/U  
 [address], [address]  
  
   
 [address] [facility], [address] [facility] [address] Upcoming Health Maintenance Date Due  
 LIPID PANEL Q1 1935 EYE EXAM RETINAL OR DILATED Q1 3/19/1945 DTaP/Tdap/Td series (1 - Tdap) 3/19/1958 ZOSTER VACCINE AGE 60> 3/19/1997 GLAUCOMA SCREENING Q2Y 3/19/2002 OSTEOPOROSIS SCREENING (DEXA) 3/19/2002 HEMOGLOBIN A1C Q6M 7/5/2017 Pneumococcal 65+ Low/Medium Risk (2 of 2 - PPSV23) 10/18/2017 FOOT EXAM Q1 1/5/2018 MICROALBUMIN Q1 1/5/2018 MEDICARE YEARLY EXAM 1/6/2018 Allergies as of 3/23/2017  Review Complete On: 3/23/2017 By: IGNACIO Diane No Known Allergies Current Immunizations  Never Reviewed No immunizations on file. Not reviewed this visit You Were Diagnosed With   
  
 Codes Comments Chronic right shoulder pain     ICD-10-CM: M25.511, G89.29 ICD-9-CM: 719.41, 338.29 Chronic pain syndrome     ICD-10-CM: G89.4 ICD-9-CM: 338. 4 Shoulder impingement, right     ICD-10-CM: M75.41 
ICD-9-CM: 726.2 Vitals BP Pulse Weight(growth percentile) BMI OB Status Smoking Status 152/71 69 255 lb (115.7 kg) 42.43 kg/m2 Hysterectomy Former Smoker BMI and BSA Data Body Mass Index Body Surface Area  
 42.43 kg/m 2 2.3 m 2 Preferred Pharmacy Pharmacy Name Phone [address] [place] [address], [address] [place] [address] Road [phone] Your Updated Medication List  
  
   
 This list is accurate as of: 3/23/17 10:01 AM.  Always use your most recent med list.  
  
  
  
  
 acetaminophen 500 mg tablet Commonly known as:  TYLENOL Take 1 Tab by mouth every six (6) hours as needed for Pain. allopurinol 100 mg tablet Commonly known as:  Jaunita Phenes Take 1 Tab by mouth daily. amLODIPine 10 mg tablet Commonly known as:  Villafana Fanti Take 0.5 Tabs by mouth daily. aspirin delayed-release 81 mg tablet Take  by mouth daily. bumetanide 0.5 mg tablet Commonly known as:  Mane Aziza Take 2 mg by mouth two (2) times a day. carvedilol 12.5 mg tablet Commonly known as:  Dat Kelly Take  by mouth two (2) times daily (with meals). gabapentin 300 mg capsule Commonly known as:  NEURONTIN Take 1 Cap by mouth three (3) times daily. glucose blood VI test strips strip Commonly known as:  FREESTYLE TEST Use to check blood glucose twice daily DX:E11.9  
  
 insulin aspart 100 unit/mL Inpn Commonly known as:  Hallowell Dub 16 units before each meal  
  
 insulin glargine 100 unit/mL injection Commonly known as:  LANTUS  
82 units daily Insulin Needles (Disposable) 31 gauge x 5/16\" Ndle Use to inject insulin once daily Dx:E11.9  
  
 levothyroxine 200 mcg tablet Commonly known as:  SYNTHROID  
1 tablet daily (take on an empty stomach) (stop \"old\" synthroid)  
  
 magnesium oxide 500 mg Tab Take  by mouth. * morphine IR 15 mg tablet Commonly known as:  MS IR Take 1 Tab by mouth every six (6) hours as needed for Pain for up to 30 days. Max Daily Amount: 60 mg.  
  
 * morphine CR 15 mg CR tablet Commonly known as:  MS CONTIN Take 1 Tab by mouth every twelve (12) hours for 30 days. Max Daily Amount: 30 mg.  
  
 triamcinolone acetonide 0.1 % topical cream  
Commonly known as:  KENALOG Apply twice per day as needed VITAMIN B-12 500 mcg tablet Generic drug:  cyanocobalamin Take 500 mcg by mouth daily. VITAMIN C 250 mg tablet Generic drug:  ascorbic acid (vitamin C) Take 100 mg by mouth daily. * Notice: This list has 2 medication(s) that are the same as other medications prescribed for you. Read the directions carefully, and ask your doctor or other care provider to review them with you. Prescriptions Printed Refills  
 morphine CR (MS CONTIN) 15 mg CR tablet 0 Sig: Take 1 Tab by mouth every twelve (12) hours for 30 days. Max Daily Amount: 30 mg.  
 Class: Print Route: Oral  
  
Follow-up Instructions Return in about 2 months (around 5/23/2017). Patient Instructions 1. Continue current plan with no evidence of addiction or diversion. Stable on current medication without adverse events. 2. Continue morphine IR 15 mg. Currently has full prescription left. We will discuss tapering down next visit depending on her progress with long-acting morphine 3. Add morphine ER 15 mg every 12 hours. 4. Discussed risks of addiction, dependency, and opioid induced hyperalgesia. 5. Return to clinic in 1 month Introducing [facility]! Yoana Bustillo introduces [email or web address] patient portal. Now you can access parts of your medical record, email your doctor's office, and request medication refills online. 1. In your internet browser, go to [email or web address] 2. Click on the First Time User? Click Here link in the Sign In box. You will see the New Member Sign Up page. 3. Enter your [email or web address] Access Code exactly as it appears below. You will not need to use this code after youve completed the sign-up process. If you do not sign up before the expiration date, you must request a new code. · [email or web address] Access Code: [email or web address] Expires: 6/21/2017 10:01 AM 
 
4. Enter the last four digits of your Social Security Number (xxxx) and Date of Birth (mm/dd/yyyy) as indicated and click Submit. You will be taken to the next sign-up page. 5. Create a Liftopia ID. This will be your Liftopia login ID and cannot be changed, so think of one that is secure and easy to remember. 6. Create a Liftopia password. You can change your password at any time. 7. Enter your Password Reset Question and Answer. This can be used at a later time if you forget your password. 8. Enter your e-mail address. You will receive e-mail notification when new information is available in [address] 9. Click Sign Up. You can now view and download portions of your medical record. 10. Click the Download Summary menu link to download a portable copy of your medical information. If you have questions, please visit the Frequently Asked Questions section of the Liftopia website. Remember, Liftopia is NOT to be used for urgent needs. For medical emergencies, dial 911. Now available from your iPhone and Android! Please provide this summary of care documentation to your next provider. Your primary care clinician is listed as Earl November. If you have any questions after today's visit, please call [phone].

## 2017-03-23 NOTE — PROGRESS NOTES
HISTORY OF PRESENT ILLNESS  Renee Galo is a [de-identified] y.o. female    HPI: Ms. Tariq Goff  returns today for f/u of chronic right shoulder pain. No h/o shoulder surgery. Surgery has been decline to to her heart condition. Prior injection with some improvement but temporary. PT with minimal imropvment. Ms. Tariq Goff continues with right shoulder pain unchanged since last visit. She has been using her morphine IR on an as-needed basis since her last visit. She has a full bottle left that was filled on 3/13/2017. She does note that the medication does not last long enough and does not seem to be as effective as it was in the past.  She does report that she was on high doses of morphine in the past and does not wish to go that route again. We had a very lengthy conversation today about long-acting versus short-acting medication. I have recommended that we avoid increasing short acting medication at this time. We will transition to long-acting medication. We will start with morphine ER 15 mg to use every 12 hours. She was counseled to continue with her morphine IR on an as-needed basis only. We will taper this medication down for her next refill. She does report some vertigo and low back pain. She has been following up with her PCP regarding this. I will have her follow-up in 1 month for reassessment. Current medication management consists of Morphine IR 15 mg 4 times a day as needed. Gabapentin by another provider. Medications are helping with pain control and quality of life. Her pain is 7/10 with medication and 10/10 without. Pt describes pain as aching, burning, and stabbing. Aggravating factors include most ROM activity. Relieved with rest, medication, and avoiding painful activities. Current treatment is helping to improve general activity, mood, walking, sleep, enjoyment of life    In the past 30 days, the patient reports approximately 30% pain relief with current treatment/medications.     She is otherwise doing well with no other complaints today. She denies any adverse events including nausea, vomiting, dizziness, constipation, hallucinations, or seizures. No Known Allergies    History reviewed. No pertinent surgical history. Review of Systems   Constitutional: Negative for chills, fever and weight loss. HENT: Negative for congestion and sore throat. Eyes: Negative for blurred vision and double vision. Respiratory: Negative for cough, shortness of breath and wheezing. Cardiovascular: Negative for chest pain and palpitations. Gastrointestinal: Positive for heartburn. Negative for constipation, diarrhea, nausea and vomiting. Genitourinary: Negative. Musculoskeletal: Positive for back pain, falls, joint pain and neck pain. Neurological: Negative for dizziness, seizures, loss of consciousness and headaches. Endo/Heme/Allergies: Does not bruise/bleed easily. Psychiatric/Behavioral: Positive for depression. Negative for suicidal ideas. The patient is not nervous/anxious and does not have insomnia. Physical Exam   Constitutional: She is oriented to person, place, and time and well-developed, well-nourished, and in no distress. No distress. HENT:   Head: Normocephalic and atraumatic. Eyes: EOM are normal.   Pulmonary/Chest: Effort normal.   Musculoskeletal:        Right shoulder: She exhibits decreased range of motion and tenderness. Neurological: She is alert and oriented to person, place, and time. Gait (using a walker) abnormal.   Skin: Skin is warm and dry. No rash noted. She is not diaphoretic. No erythema. Psychiatric: Mood, memory, affect and judgment normal.   Nursing note and vitals reviewed. ASSESSMENT:    1. Chronic right shoulder pain    2. Chronic pain syndrome    3. Shoulder impingement, right         [place] Prescription Monitoring Program was NOT reviewed today as the system was down. PLAN / Pt Instructions:  1.  Continue current plan with no evidence of addiction or diversion. Stable on current medication without adverse events. 2. Continue morphine IR 15 mg. Currently has full prescription left. We will discuss tapering down next visit depending on her progress with long-acting morphine  3. Add morphine ER 15 mg every 12 hours. 4. Discussed risks of addiction, dependency, and opioid induced hyperalgesia. 5. Return to clinic in 1 month    Medications Ordered Today   Medications    morphine CR (MS CONTIN) 15 mg CR tablet     Sig: Take 1 Tab by mouth every twelve (12) hours for 30 days. Max Daily Amount: 30 mg. Dispense:  60 Tab     Refill:  0       Pain medications prescribed with the objective of pain relief and improved physical and psychosocial function in this patient. Spent 25 minutes with patient today reviewing the treatment plan, goals of treatment plan, and limitations of the treatment plan, to include the potential for side effects from medications and procedures. Vivek Grider 3/23/2017      Note: Please excuse any typographical errors. Voice recognition software was used for this note and may cause mistakes.

## 2017-03-23 NOTE — PATIENT INSTRUCTIONS
1. Continue current plan with no evidence of addiction or diversion. Stable on current medication without adverse events. 2. Continue morphine IR 15 mg. Currently has full prescription left. We will discuss tapering down next visit depending on her progress with long-acting morphine  3. Add morphine ER 15 mg every 12 hours. 4. Discussed risks of addiction, dependency, and opioid induced hyperalgesia.    5. Return to clinic in 1 month

## 2017-03-23 NOTE — PROGRESS NOTES
Nursing Notes    Patient presents to the office today in follow-up. Patient rates her pain at 8/10 on the numerical pain scale. Reviewed medications with counts as follows:    Rx Date filled Qty Dispensed Pill Count Last Dose Short   MS IR 15 3/13/17 120 120 3/23/17 no         Comments:     POC UDS was not performed in office today    Any new labs or imaging since last appointment? NO    Have you been to an emergency room (ER) or urgent care clinic since your last visit? NO            Have you been hospitalized since your last visit? NO     If yes, where, when, and reason for visit? Have you seen or consulted any other health care providers outside of the [address] [place] [address]  since your last visit? NO     If yes, where, when, and reason for visit?

## 2017-04-04 ENCOUNTER — OFFICE VISIT (OUTPATIENT)
Dept: INTERNAL MEDICINE CLINIC | Age: 82
End: 2017-04-04

## 2017-04-04 ENCOUNTER — HOSPITAL ENCOUNTER (OUTPATIENT)
Dept: LAB | Age: 82
Discharge: HOME | End: 2017-04-04
Payer: MEDICARE

## 2017-04-04 VITALS
HEART RATE: 78 BPM | TEMPERATURE: 98 F | SYSTOLIC BLOOD PRESSURE: 138 MMHG | WEIGHT: 258.5 LBS | RESPIRATION RATE: 16 BRPM | HEIGHT: 65 IN | OXYGEN SATURATION: 99 % | DIASTOLIC BLOOD PRESSURE: 80 MMHG | BODY MASS INDEX: 43.07 KG/M2

## 2017-04-04 DIAGNOSIS — E78.00 PURE HYPERCHOLESTEROLEMIA: ICD-10-CM

## 2017-04-04 DIAGNOSIS — G89.4 CHRONIC PAIN SYNDROME: ICD-10-CM

## 2017-04-04 LAB
ALBUMIN SERPL BCP-MCNC: 3.5 G/DL (ref 3.4–5)
ALBUMIN/GLOB SERPL: 0.9 {RATIO} (ref 0.8–1.7)
ALP SERPL-CCNC: 76 U/L (ref 45–117)
ALT SERPL-CCNC: 11 U/L (ref 13–56)
ANION GAP BLD CALC-SCNC: 11 MMOL/L (ref 3–18)
AST SERPL W P-5'-P-CCNC: 19 U/L (ref 15–37)
BASOPHILS # BLD AUTO: 0.1 K/UL (ref 0–0.06)
BASOPHILS # BLD: 1 % (ref 0–2)
BILIRUB SERPL-MCNC: 0.2 MG/DL (ref 0.2–1)
BUN SERPL-MCNC: 20 MG/DL (ref 7–18)
BUN/CREAT SERPL: 19 (ref 12–20)
CALCIUM SERPL-MCNC: 8.6 MG/DL (ref 8.5–10.1)
CHLORIDE SERPL-SCNC: 103 MMOL/L (ref 100–108)
CO2 SERPL-SCNC: 25 MMOL/L (ref 21–32)
CREAT SERPL-MCNC: 1.03 MG/DL (ref 0.6–1.3)
DIFFERENTIAL METHOD BLD: ABNORMAL
EOSINOPHIL # BLD: 0.2 K/UL (ref 0–0.4)
EOSINOPHIL NFR BLD: 2 % (ref 0–5)
ERYTHROCYTE [DISTWIDTH] IN BLOOD BY AUTOMATED COUNT: 14 % (ref 11.6–14.5)
EST. AVERAGE GLUCOSE BLD GHB EST-MCNC: 200 MG/DL
GLOBULIN SER CALC-MCNC: 4.1 G/DL (ref 2–4)
GLUCOSE SERPL-MCNC: 284 MG/DL (ref 74–99)
HBA1C MFR BLD: 8.6 % (ref 4.2–5.6)
HCT VFR BLD AUTO: 41 % (ref 35–45)
HGB BLD-MCNC: 13.1 G/DL (ref 12–16)
LYMPHOCYTES # BLD AUTO: 28 % (ref 21–52)
LYMPHOCYTES # BLD: 3.3 K/UL (ref 0.9–3.6)
MCH RBC QN AUTO: 28.4 PG (ref 24–34)
MCHC RBC AUTO-ENTMCNC: 32 G/DL (ref 31–37)
MCV RBC AUTO: 88.7 FL (ref 74–97)
MONOCYTES # BLD: 0.7 K/UL (ref 0.05–1.2)
MONOCYTES NFR BLD AUTO: 6 % (ref 3–10)
NEUTS SEG # BLD: 7.4 K/UL (ref 1.8–8)
NEUTS SEG NFR BLD AUTO: 63 % (ref 40–73)
PLATELET # BLD AUTO: 244 K/UL (ref 135–420)
PLATELET COMMENTS,PCOM: ABNORMAL
PMV BLD AUTO: 12.9 FL (ref 9.2–11.8)
POTASSIUM SERPL-SCNC: 4.7 MMOL/L (ref 3.5–5.5)
PROT SERPL-MCNC: 7.6 G/DL (ref 6.4–8.2)
RBC # BLD AUTO: 4.62 M/UL (ref 4.2–5.3)
RBC MORPH BLD: ABNORMAL
SODIUM SERPL-SCNC: 139 MMOL/L (ref 136–145)
WBC # BLD AUTO: 11.7 K/UL (ref 4.6–13.2)

## 2017-04-04 PROCEDURE — 83036 HEMOGLOBIN GLYCOSYLATED A1C: CPT | Performed by: INTERNAL MEDICINE

## 2017-04-04 PROCEDURE — 82043 UR ALBUMIN QUANTITATIVE: CPT | Performed by: INTERNAL MEDICINE

## 2017-04-04 PROCEDURE — 80053 COMPREHEN METABOLIC PANEL: CPT | Performed by: INTERNAL MEDICINE

## 2017-04-04 PROCEDURE — 85025 COMPLETE CBC W/AUTO DIFF WBC: CPT | Performed by: INTERNAL MEDICINE

## 2017-04-04 RX ORDER — CELECOXIB 200 MG/1
CAPSULE ORAL
Qty: 180 CAP | Refills: 0 | Status: SHIPPED | OUTPATIENT
Start: 2017-04-04 | End: 2018-03-22

## 2017-04-04 NOTE — MR AVS SNAPSHOT
Visit Information Date & Time Provider Department Dept. Phone Encounter #  
 4/4/2017 11:45 AM Eddie Lara MD [facility] [phone] [id number] Follow-up Instructions Return in about 3 months (around 7/4/2017). Your Appointments 4/4/2017 11:45 AM  
Office Visit with Eddie Lara MD  
[facility] [place] [address]) Appt Note: ov  
 [address], [address] [facility] [address]  
  
   
 [address], [address] [facility] [address] 4/19/2017  9:20 AM  
Follow Up with Merline Maas, PA [facility] (JANET SCHEDULING) Appt Note: return in  1  
 [address] [facility] [id number]  
[phone] [facility] [phone] Upcoming Health Maintenance Date Due  
 LIPID PANEL Q1 1935 EYE EXAM RETINAL OR DILATED Q1 3/19/1945 DTaP/Tdap/Td series (1 - Tdap) 3/19/1958 ZOSTER VACCINE AGE 60> 3/19/1997 GLAUCOMA SCREENING Q2Y 3/19/2002 OSTEOPOROSIS SCREENING (DEXA) 3/19/2002 HEMOGLOBIN A1C Q6M 7/5/2017 Pneumococcal 65+ Low/Medium Risk (2 of 2 - PPSV23) 10/18/2017 FOOT EXAM Q1 1/5/2018 MICROALBUMIN Q1 1/5/2018 MEDICARE YEARLY EXAM 1/6/2018 Allergies as of 4/4/2017  Review Complete On: 4/4/2017 By: Eddie Lara MD  
 No Known Allergies Current Immunizations  Never Reviewed No immunizations on file. Not reviewed this visit You Were Diagnosed With   
  
 Codes Comments Uncontrolled type 2 diabetes mellitus without complication, with long-term current use of insulin ([facility] [address]    -  Primary ICD-10-CM: E11.65, Z79.4 ICD-9-CM: 250.02, V58.67 Pure hypercholesterolemia     ICD-10-CM: E78.00 ICD-9-CM: 272.0 Chronic pain syndrome     ICD-10-CM: G89.4 ICD-9-CM: 338. 4 Vitals BP Pulse Temp Resp Height(growth percentile) Weight(growth percentile) 138/80 (BP 1 Location: Right arm, BP Patient Position: Sitting) 78 98 °F (36.7 °C) (Tympanic) 16 5' 5\" (1.651 m) 258 lb 8 oz (117.3 kg) SpO2 BMI OB Status Smoking Status 99% 43.02 kg/m2 Hysterectomy Former Smoker Vitals History BMI and BSA Data Body Mass Index Body Surface Area 43.02 kg/m 2 2.32 m 2 Preferred Pharmacy Pharmacy Name Phone [address] [place] [address], [address] [place] [address] [phone] Your Updated Medication List  
  
   
This list is accurate as of: 4/4/17 10:23 AM.  Always use your most recent med list.  
  
  
  
  
 acetaminophen 500 mg tablet Commonly known as:  TYLENOL Take 1 Tab by mouth every six (6) hours as needed for Pain. allopurinol 100 mg tablet Commonly known as:  Chyrel Alken Take 1 Tab by mouth daily. amLODIPine 10 mg tablet Commonly known as:  Zelda Marsh Take 0.5 Tabs by mouth daily. aspirin delayed-release 81 mg tablet Take  by mouth daily. bumetanide 0.5 mg tablet Commonly known as:  Judi Hove Take 2 mg by mouth two (2) times a day. carvedilol 12.5 mg tablet Commonly known as:  Llana Milo Take  by mouth two (2) times daily (with meals). celecoxib 200 mg capsule Commonly known as:  CELEBREX  
1 capsule twice per day with food  
  
 gabapentin 300 mg capsule Commonly known as:  NEURONTIN Take 1 Cap by mouth three (3) times daily. glucose blood VI test strips strip Commonly known as:  FREESTYLE TEST Use to check blood glucose twice daily DX:E11.9  
  
 insulin aspart 100 unit/mL Inpn Commonly known as:  [place] 16 units before each meal  
  
 insulin glargine 100 unit/mL injection Commonly known as:  LANTUS  
82 units daily Insulin Needles (Disposable) 31 gauge x 5/16\" Ndle Use to inject insulin once daily Dx:E11.9  
  
 levothyroxine 200 mcg tablet Commonly known as:  SYNTHROID  
 1 tablet daily (take on an empty stomach) (stop \"old\" synthroid)  
  
 magnesium oxide 500 mg Tab Take  by mouth. * morphine IR 15 mg tablet Commonly known as:  MS IR Take 1 Tab by mouth every six (6) hours as needed for Pain for up to 30 days. Max Daily Amount: 60 mg.  
  
 * morphine CR 15 mg CR tablet Commonly known as:  MS CONTIN Take 1 Tab by mouth every twelve (12) hours for 30 days. Max Daily Amount: 30 mg.  
  
 triamcinolone acetonide 0.1 % topical cream  
Commonly known as:  KENALOG Apply twice per day as needed VITAMIN B-12 500 mcg tablet Generic drug:  cyanocobalamin Take 500 mcg by mouth daily. VITAMIN C 250 mg tablet Generic drug:  ascorbic acid (vitamin C) Take 100 mg by mouth daily. * Notice: This list has 2 medication(s) that are the same as other medications prescribed for you. Read the directions carefully, and ask your doctor or other care provider to review them with you. Prescriptions Sent to Pharmacy Refills  
 celecoxib (CELEBREX) 200 mg capsule 0 Si capsule twice per day with food Class: Normal  
 Pharmacy: [address] [place] [address] #: [phone] We Performed the Following  DIABETES FOOT EXAM [[facility]] Follow-up Instructions Return in about 3 months (around 2017). To-Do List   
 2017 Lab:  HEMOGLOBIN A1C WITH EAG   
  
 2017 Lab:  METABOLIC PANEL, COMPREHENSIVE   
  
 2017 Lab:  MICROALBUMIN, UR, RAND W/ MICROALBUMIN/CREA RATIO   
  
 2017 Lab:  CBC WITH AUTOMATED DIFF Patient Instructions Health Maintenance Due Topic Date Due  Cholesterol Test   1935  Eye Exam  1945  
 DTaP/Tdap/Td  (1 - Tdap) 1958  Shingles Vaccine  1997  Glaucoma Screening   2002  Bone Density Screening  2002 Introducing [facility]! Nir Valenzuela introduces [employer] patient portal. Now you can access parts of your medical record, email your doctor's office, and request medication refills online. 1. In your internet browser, go to [email or web address] 2. Click on the First Time User? Click Here link in the Sign In box. You will see the New Member Sign Up page. 3. Enter your Linio Access Code exactly as it appears below. You will not need to use this code after youve completed the sign-up process. If you do not sign up before the expiration date, you must request a new code. · Linio Access Code: [email or web address] Expires: 6/21/2017 10:01 AM 
 
4. Enter the last four digits of your Social Security Number (xxxx) and Date of Birth (mm/dd/yyyy) as indicated and click Submit. You will be taken to the next sign-up page. 5. Create a Linio ID. This will be your Linio login ID and cannot be changed, so think of one that is secure and easy to remember. 6. Create a Linio password. You can change your password at any time. 7. Enter your Password Reset Question and Answer. This can be used at a later time if you forget your password. 8. Enter your e-mail address. You will receive e-mail notification when new information is available in [address] 9. Click Sign Up. You can now view and download portions of your medical record. 10. Click the Download Summary menu link to download a portable copy of your medical information. If you have questions, please visit the Frequently Asked Questions section of the Linio website. Remember, Linio is NOT to be used for urgent needs. For medical emergencies, dial 911. Now available from your iPhone and Android! Please provide this summary of care documentation to your next provider. Your primary care clinician is listed as Kylee Vieira. If you have any questions after today's visit, please call [phone].

## 2017-04-04 NOTE — PROGRESS NOTES
The patient presents to the office today with the chief complaint of Diabetes Mellitus    HPI    The patient remains on Insulin for type II diabetes mellitus. she does not check blood sugars at home. The patient has not had any low sugars. The patient remains on medications for hypertension. she is tolerating the medications well. The patient remains on medications for chronic pain      Review of Systems   Respiratory: Negative for shortness of breath. Cardiovascular: Negative for chest pain and leg swelling. Musculoskeletal: Positive for joint pain. No Known Allergies    Current Outpatient Prescriptions   Medication Sig Dispense Refill    celecoxib (CELEBREX) 200 mg capsule 1 capsule twice per day with food 180 Cap 0    morphine CR (MS CONTIN) 15 mg CR tablet Take 1 Tab by mouth every twelve (12) hours for 30 days. Max Daily Amount: 30 mg. 60 Tab 0    insulin glargine (LANTUS) 100 unit/mL injection 82 units daily 3 Vial 3    gabapentin (NEURONTIN) 300 mg capsule Take 1 Cap by mouth three (3) times daily. 270 Cap 3    allopurinol (ZYLOPRIM) 100 mg tablet Take 1 Tab by mouth daily. 90 Tab 3    insulin aspart (NOVOLOG) 100 unit/mL inpn 16 units before each meal 30 mL 3    Insulin Needles, Disposable, 31 gauge x 5/16\" ndle Use to inject insulin once daily Dx:E11.9 3 Package 3    magnesium oxide 500 mg tab Take  by mouth.  cyanocobalamin (VITAMIN B-12) 500 mcg tablet Take 500 mcg by mouth daily.  ascorbic acid, vitamin C, (VITAMIN C) 250 mg tablet Take 100 mg by mouth daily.  glucose blood VI test strips (FREESTYLE TEST) strip Use to check blood glucose twice daily DX:E11.9 300 Strip 3    levothyroxine (SYNTHROID) 200 mcg tablet 1 tablet daily (take on an empty stomach) (stop \"old\" synthroid) 90 Tab 3    amLODIPine (NORVASC) 10 mg tablet Take 0.5 Tabs by mouth daily. 90 Tab 3    acetaminophen (TYLENOL) 500 mg tablet Take 1 Tab by mouth every six (6) hours as needed for Pain. 270 Tab 3    aspirin delayed-release 81 mg tablet Take  by mouth daily.  carvedilol (COREG) 12.5 mg tablet Take  by mouth two (2) times daily (with meals).  bumetanide (BUMEX) 0.5 mg tablet Take 2 mg by mouth two (2) times a day. Past Medical History:   Diagnosis Date    Asthma     Chronic venous insufficiency     Diabetes (HCC)     Hypertension     Peripheral neuropathy (HCC)     Rheumatoid arthritis (HCC)     Vertigo        History reviewed. No pertinent surgical history. Social History     Social History    Marital status: LEGALLY      Spouse name: N/A    Number of children: N/A    Years of education: N/A     Occupational History    Not on file. Social History Main Topics    Smoking status: Former Smoker    Smokeless tobacco: Never Used    Alcohol use No    Drug use: No    Sexual activity: No     Other Topics Concern    Not on file     Social History Narrative       Patient does not have an advanced directive on file    Visit Vitals    /80 (BP 1 Location: Right arm, BP Patient Position: Sitting)    Pulse 78    Temp 98 °F (36.7 °C) (Tympanic)    Resp 16    Ht 5' 5\" (1.651 m)    Wt 258 lb 8 oz (117.3 kg)    SpO2 99%    BMI 43.02 kg/m2       Physical Exam   No Cervical Lymphadenopathy  No Supraclavicular Lymphadenopathy  Thyroid is Normal  Lungs are clear to ausculation and percussion  Heart:  S1 S2 are normal, No gallops, No mummers  No Carotid Bruits  Abdomen:  Normal Bowel Sounds. No tenderness. No masses. No Hepatomegaly or Splenomegly  LE:  Strong Pedal Pulses.   No Edema    DM Foot:  Diabetic foot exam:     Left: Reflexes 2+     Vibratory sensation normal    Proprioception normal   Sharp/dull discrimination normal    Filament test normal sensation with micro filament   Pulse DP: 2+ (normal)   Pulse PT: 2+ (normal)   Deformities: None  Right: Reflexes 2+   Vibratory sensation normal   Proprioception normal   Sharp/dull discrimination normal   Filament test normal sensation with micro filament   Pulse DP: 2+ (normal)   Pulse PT: 2+ (normal)   Deformities: None      BMI:  I have reviewed/discussed the above normal BMI with the patient. I have recommended the following interventions: dietary management education, guidance, and counseling . [facility] Outpatient Visit on 04/04/2017   Component Date Value Ref Range Status    WBC 04/04/2017 11.7  4.6 - 13.2 K/uL Final    RBC 04/04/2017 4.62  4.20 - 5.30 M/uL Final    HGB 04/04/2017 13.1  12.0 - 16.0 g/dL Final    HCT 04/04/2017 41.0  35.0 - 45.0 % Final    MCV 04/04/2017 88.7  74.0 - 97.0 FL Final    MCH 04/04/2017 28.4  24.0 - 34.0 PG Final    MCHC 04/04/2017 32.0  31.0 - 37.0 g/dL Final    RDW 04/04/2017 14.0  11.6 - 14.5 % Final    PLATELET 98/91/1202 408  135 - 420 K/uL Final    MPV 04/04/2017 12.9* 9.2 - 11.8 FL Final    NEUTROPHILS 04/04/2017 63  40 - 73 % Final    LYMPHOCYTES 04/04/2017 28  21 - 52 % Final    MONOCYTES 04/04/2017 6  3 - 10 % Final    EOSINOPHILS 04/04/2017 2  0 - 5 % Final    BASOPHILS 04/04/2017 1  0 - 2 % Final    ABS. NEUTROPHILS 04/04/2017 7.4  1.8 - 8.0 K/UL Final    ABS. LYMPHOCYTES 04/04/2017 3.3  0.9 - 3.6 K/UL Final    ABS. MONOCYTES 04/04/2017 0.7  0.05 - 1.2 K/UL Final    ABS. EOSINOPHILS 04/04/2017 0.2  0.0 - 0.4 K/UL Final    ABS.  BASOPHILS 04/04/2017 0.1* 0.0 - 0.06 K/UL Final    PLATELET COMMENTS 62/86/7109 ADEQUATE PLATELETS    Final    RBC COMMENTS 04/04/2017 NORMOCYTIC, NORMOCHROMIC    Final    DF 04/04/2017 MANUAL    Final    Sodium 04/04/2017 139  136 - 145 mmol/L Final    Potassium 04/04/2017 4.7  3.5 - 5.5 mmol/L Final    Chloride 04/04/2017 103  100 - 108 mmol/L Final    CO2 04/04/2017 25  21 - 32 mmol/L Final    Anion gap 04/04/2017 11  3.0 - 18 mmol/L Final    Glucose 04/04/2017 284* 74 - 99 mg/dL Final    BUN 04/04/2017 20* 7.0 - 18 MG/DL Final    Creatinine 04/04/2017 1.03  0.6 - 1.3 MG/DL Final    BUN/Creatinine ratio 04/04/2017 19  12 - 20   Final    GFR est AA 04/04/2017 >60  >60 ml/min/1.73m2 Final    GFR est non-AA 04/04/2017 52* >60 ml/min/1.73m2 Final    Comment: (NOTE)  Estimated GFR is calculated using the Modification of Diet in Renal   Disease (MDRD) Study equation, reported for both  Americans   (GFRAA) and non- Americans (GFRNA), and normalized to 1.73m2   body surface area. The physician must decide which value applies to   the patient. The MDRD study equation should only be used in   individuals age 25 or older. It has not been validated for the   following: pregnant women, patients with serious comorbid conditions,   or on certain medications, or persons with extremes of body size,   muscle mass, or nutritional status.  Calcium 04/04/2017 8.6  8.5 - 10.1 MG/DL Final    Bilirubin, total 04/04/2017 0.2  0.2 - 1.0 MG/DL Final    ALT (SGPT) 04/04/2017 11* 13 - 56 U/L Final    AST (SGOT) 04/04/2017 19  15 - 37 U/L Final    Alk. phosphatase 04/04/2017 76  45 - 117 U/L Final    Protein, total 04/04/2017 7.6  6.4 - 8.2 g/dL Final    Albumin 04/04/2017 3.5  3.4 - 5.0 g/dL Final    Globulin 04/04/2017 4.1* 2.0 - 4.0 g/dL Final    A-G Ratio 04/04/2017 0.9  0.8 - 1.7   Final    Microalbumin,urine random 04/04/2017 30.50* 0 - 3.0 MG/DL Final    Creatinine, urine 04/04/2017 113.00  30 - 125 mg/dL Final    Microalbumin/Creat ratio (mg/g cre* 04/04/2017 270* 0 - 30 mg/g Final    Hemoglobin A1c 04/04/2017 8.6* 4.2 - 5.6 % Final    Comment: (NOTE)  HbA1C Interpretive Ranges  <5.7              Normal  5.7 - 6.4         Consider Prediabetes  >6.5              Consider Diabetes      Est. average glucose 04/04/2017 200  mg/dL Final    Comment: (NOTE)  The eAG should be interpreted with patient characteristics in mind   since ethnicity, interindividual differences, red cell lifespan,   variation in rates of glycation, etc. may affect the validity of the   calculation.      Hospital Outpatient Visit on 01/05/2017   Component Date Value Ref Range Status    WBC 01/05/2017 9.9  4.6 - 13.2 K/uL Final    RBC 01/05/2017 4.82  4.20 - 5.30 M/uL Final    HGB 01/05/2017 13.0  12.0 - 16.0 g/dL Final    HCT 01/05/2017 41.8  35.0 - 45.0 % Final    MCV 01/05/2017 86.7  74.0 - 97.0 FL Final    MCH 01/05/2017 27.0  24.0 - 34.0 PG Final    MCHC 01/05/2017 31.1  31.0 - 37.0 g/dL Final    RDW 01/05/2017 14.3  11.6 - 14.5 % Final    PLATELET 79/56/2805 766  135 - 420 K/uL Final    MPV 01/05/2017 12.8* 9.2 - 11.8 FL Final    NEUTROPHILS 01/05/2017 60  40 - 73 % Final    LYMPHOCYTES 01/05/2017 30  21 - 52 % Final    MONOCYTES 01/05/2017 6  3 - 10 % Final    EOSINOPHILS 01/05/2017 3  0 - 5 % Final    BASOPHILS 01/05/2017 1  0 - 2 % Final    ABS. NEUTROPHILS 01/05/2017 5.9  1.8 - 8.0 K/UL Final    ABS. LYMPHOCYTES 01/05/2017 3.0  0.9 - 3.6 K/UL Final    ABS. MONOCYTES 01/05/2017 0.6  0.05 - 1.2 K/UL Final    ABS. EOSINOPHILS 01/05/2017 0.3  0.0 - 0.4 K/UL Final    ABS. BASOPHILS 01/05/2017 0.1* 0.0 - 0.06 K/UL Final    DF 01/05/2017 AUTOMATED    Final    Sodium 01/05/2017 139  136 - 145 mmol/L Final    Potassium 01/05/2017 5.1  3.5 - 5.5 mmol/L Final    Chloride 01/05/2017 103  100 - 108 mmol/L Final    CO2 01/05/2017 29  21 - 32 mmol/L Final    Anion gap 01/05/2017 7  3.0 - 18 mmol/L Final    Glucose 01/05/2017 225* 74 - 99 mg/dL Final    BUN 01/05/2017 19* 7.0 - 18 MG/DL Final    Creatinine 01/05/2017 0.93  0.6 - 1.3 MG/DL Final    BUN/Creatinine ratio 01/05/2017 20  12 - 20   Final    GFR est AA 01/05/2017 >60  >60 ml/min/1.73m2 Final    GFR est non-AA 01/05/2017 58* >60 ml/min/1.73m2 Final    Comment: (NOTE)  Estimated GFR is calculated using the Modification of Diet in Renal   Disease (MDRD) Study equation, reported for both  Americans   (GFRAA) and non- Americans (GFRNA), and normalized to 1.73m2   body surface area.  The physician must decide which value applies to the patient. The MDRD study equation should only be used in   individuals age 25 or older. It has not been validated for the   following: pregnant women, patients with serious comorbid conditions,   or on certain medications, or persons with extremes of body size,   muscle mass, or nutritional status.  Calcium 01/05/2017 9.1  8.5 - 10.1 MG/DL Final    Bilirubin, total 01/05/2017 0.2  0.2 - 1.0 MG/DL Final    ALT (SGPT) 01/05/2017 12* 13 - 56 U/L Final    AST (SGOT) 01/05/2017 11* 15 - 37 U/L Final    Alk.  phosphatase 01/05/2017 70  45 - 117 U/L Final    Protein, total 01/05/2017 7.6  6.4 - 8.2 g/dL Final    Albumin 01/05/2017 3.8  3.4 - 5.0 g/dL Final    Globulin 01/05/2017 3.8  2.0 - 4.0 g/dL Final    A-G Ratio 01/05/2017 1.0  0.8 - 1.7   Final    Hemoglobin A1c 01/05/2017 11.2* 4.2 - 5.6 % Final    Comment: (NOTE)  HbA1C Interpretive Ranges  <5.7              Normal  5.7 - 6.4         Consider Prediabetes  >6.5              Consider Diabetes      Microalbumin,urine random 01/05/2017 50.90* 0 - 3.0 MG/DL Final    Creatinine, urine 01/05/2017 71.81  30 - 125 mg/dL Final    Microalbumin/Creat ratio (mg/g cre* 01/05/2017 709* 0 - 30 mg/g Final    Color 01/05/2017 YELLOW    Final    Appearance 01/05/2017 CLEAR    Final    Specific gravity 01/05/2017 1.019  1.005 - 1.030   Final    pH (UA) 01/05/2017 6.0  5.0 - 8.0   Final    Protein 01/05/2017 100* NEG mg/dL Final    Glucose 01/05/2017 >1000* NEG mg/dL Final    Ketone 01/05/2017 NEGATIVE   NEG mg/dL Final    Bilirubin 01/05/2017 NEGATIVE   NEG   Final    Blood 01/05/2017 NEGATIVE   NEG   Final    Urobilinogen 01/05/2017 0.2  0.2 - 1.0 EU/dL Final    Nitrites 01/05/2017 NEGATIVE   NEG   Final    Leukocyte Esterase 01/05/2017 NEGATIVE   NEG   Final    WBC 01/05/2017 0 to 3  0 - 4 /hpf Final    RBC 01/05/2017 0  0 - 5 /hpf Final    Epithelial cells 01/05/2017 FEW  0 - 5 /lpf Final    Bacteria 01/05/2017 NEGATIVE   NEG /hpf Final    Yeast 01/05/2017 FEW* NEG   Final       .  Results for orders placed or performed during the hospital encounter of 04/04/17   CBC WITH AUTOMATED DIFF   Result Value Ref Range    WBC 11.7 4.6 - 13.2 K/uL    RBC 4.62 4.20 - 5.30 M/uL    HGB 13.1 12.0 - 16.0 g/dL    HCT 41.0 35.0 - 45.0 %    MCV 88.7 74.0 - 97.0 FL    MCH 28.4 24.0 - 34.0 PG    MCHC 32.0 31.0 - 37.0 g/dL    RDW 14.0 11.6 - 14.5 %    PLATELET 134 754 - 649 K/uL    MPV 12.9 (H) 9.2 - 11.8 FL    NEUTROPHILS 63 40 - 73 %    LYMPHOCYTES 28 21 - 52 %    MONOCYTES 6 3 - 10 %    EOSINOPHILS 2 0 - 5 %    BASOPHILS 1 0 - 2 %    ABS. NEUTROPHILS 7.4 1.8 - 8.0 K/UL    ABS. LYMPHOCYTES 3.3 0.9 - 3.6 K/UL    ABS. MONOCYTES 0.7 0.05 - 1.2 K/UL    ABS. EOSINOPHILS 0.2 0.0 - 0.4 K/UL    ABS. BASOPHILS 0.1 (H) 0.0 - 0.06 K/UL    PLATELET COMMENTS ADEQUATE PLATELETS      RBC COMMENTS NORMOCYTIC, NORMOCHROMIC      DF MANUAL     METABOLIC PANEL, COMPREHENSIVE   Result Value Ref Range    Sodium 139 136 - 145 mmol/L    Potassium 4.7 3.5 - 5.5 mmol/L    Chloride 103 100 - 108 mmol/L    CO2 25 21 - 32 mmol/L    Anion gap 11 3.0 - 18 mmol/L    Glucose 284 (H) 74 - 99 mg/dL    BUN 20 (H) 7.0 - 18 MG/DL    Creatinine 1.03 0.6 - 1.3 MG/DL    BUN/Creatinine ratio 19 12 - 20      GFR est AA >60 >60 ml/min/1.73m2    GFR est non-AA 52 (L) >60 ml/min/1.73m2    Calcium 8.6 8.5 - 10.1 MG/DL    Bilirubin, total 0.2 0.2 - 1.0 MG/DL    ALT (SGPT) 11 (L) 13 - 56 U/L    AST (SGOT) 19 15 - 37 U/L    Alk.  phosphatase 76 45 - 117 U/L    Protein, total 7.6 6.4 - 8.2 g/dL    Albumin 3.5 3.4 - 5.0 g/dL    Globulin 4.1 (H) 2.0 - 4.0 g/dL    A-G Ratio 0.9 0.8 - 1.7     MICROALBUMIN, UR, RAND W/ MICROALBUMIN/CREA RATIO   Result Value Ref Range    Microalbumin,urine random 30.50 (H) 0 - 3.0 MG/DL    Creatinine, urine 113.00 30 - 125 mg/dL    Microalbumin/Creat ratio (mg/g creat) 270 (H) 0 - 30 mg/g   HEMOGLOBIN A1C WITH EAG   Result Value Ref Range    Hemoglobin A1c 8.6 (H) 4.2 - 5.6 %    Est. average glucose 200 mg/dL       Assessment / Plan      ICD-10-CM ICD-9-CM    1. Uncontrolled type 2 diabetes mellitus without complication, with long-term current use of insulin (HCC) E11.65 250.02 CBC WITH AUTOMATED DIFF    C02.6 M15.27 METABOLIC PANEL, COMPREHENSIVE      MICROALBUMIN, UR, RAND W/ MICROALBUMIN/CREA RATIO      HEMOGLOBIN A1C WITH EAG       DIABETES FOOT EXAM      RI COLLECTION VENOUS BLOOD,VENIPUNCTURE   2. Pure hypercholesterolemia E78.00 272.0 CBC WITH AUTOMATED DIFF      METABOLIC PANEL, COMPREHENSIVE      MICROALBUMIN, UR, RAND W/ MICROALBUMIN/CREA RATIO      HEMOGLOBIN A1C WITH EAG       DIABETES FOOT EXAM      RI COLLECTION VENOUS BLOOD,VENIPUNCTURE   3. Chronic pain syndrome G89.4 338.4 CBC WITH AUTOMATED DIFF      METABOLIC PANEL, COMPREHENSIVE      MICROALBUMIN, UR, RAND W/ MICROALBUMIN/CREA RATIO      HEMOGLOBIN A1C WITH EAG       DIABETES FOOT EXAM      RI COLLECTION VENOUS BLOOD,VENIPUNCTURE     Labs  Add Celebrex  she was advised to continue her maintenance medications    Follow-up Disposition:  Return in about 3 months (around 7/4/2017). I asked Marlin Carmona if she has any questions and I answered the questions.   Marlin Carmona states that she understands the treatment plan and agrees with the treatment plan

## 2017-04-04 NOTE — PROGRESS NOTES
1. Have you been to the ER, urgent care clinic since your last visit? Hospitalized since your last visit? No    2. Have you seen or consulted any other health care providers outside of the [address] [place] [address] since your last visit? Include any pap smears or colon screening.  No    Patient would also like to discuss the possibility of Voltaren gel

## 2017-04-04 NOTE — PATIENT INSTRUCTIONS
Health Maintenance Due   Topic Date Due    Cholesterol Test   1935    Eye Exam  03/19/1945    DTaP/Tdap/Td  (1 - Tdap) 03/19/1958    Shingles Vaccine  03/19/1997    Glaucoma Screening   03/19/2002    Bone Density Screening  03/19/2002

## 2017-04-05 LAB
CREAT UR-MCNC: 113 MG/DL (ref 30–125)
MICROALBUMIN UR-MCNC: 30.5 MG/DL (ref 0–3)
MICROALBUMIN/CREAT UR-RTO: 270 MG/G (ref 0–30)

## 2017-04-19 ENCOUNTER — OFFICE VISIT (OUTPATIENT)
Dept: PAIN MANAGEMENT | Age: 82
End: 2017-04-19

## 2017-04-19 VITALS
DIASTOLIC BLOOD PRESSURE: 79 MMHG | HEART RATE: 96 BPM | BODY MASS INDEX: 42.99 KG/M2 | HEIGHT: 65 IN | WEIGHT: 258 LBS | SYSTOLIC BLOOD PRESSURE: 156 MMHG

## 2017-04-19 DIAGNOSIS — M19.011 ARTHRITIS OF RIGHT SHOULDER REGION: ICD-10-CM

## 2017-04-19 DIAGNOSIS — M75.101 TEAR OF RIGHT ROTATOR CUFF, UNSPECIFIED TEAR EXTENT: ICD-10-CM

## 2017-04-19 DIAGNOSIS — M75.41 SHOULDER IMPINGEMENT, RIGHT: ICD-10-CM

## 2017-04-19 DIAGNOSIS — M25.511 CHRONIC RIGHT SHOULDER PAIN: ICD-10-CM

## 2017-04-19 DIAGNOSIS — G89.4 CHRONIC PAIN SYNDROME: ICD-10-CM

## 2017-04-19 DIAGNOSIS — Z79.899 ENCOUNTER FOR LONG-TERM (CURRENT) USE OF HIGH-RISK MEDICATION: Primary | ICD-10-CM

## 2017-04-19 DIAGNOSIS — G89.29 CHRONIC RIGHT SHOULDER PAIN: ICD-10-CM

## 2017-04-19 LAB
ALCOHOL UR POC: NORMAL
AMPHETAMINES UR POC: NORMAL
BARBITURATES UR POC: NORMAL
BENZODIAZEPINES UR POC: NORMAL
BUPRENORPHINE UR POC: NORMAL
CANNABINOIDS UR POC: NORMAL
CARISOPRODOL UR POC: NORMAL
COCAINE UR POC: NORMAL
FENTANYL UR POC: NORMAL
MDMA/ECSTASY UR POC: NORMAL
METHADONE UR POC: NORMAL
METHAMPHETAMINE UR POC: NORMAL
METHYLPHENIDATE UR POC: NORMAL
OPIATES UR POC: NORMAL
OXYCODONE UR POC: NORMAL
PHENCYCLIDINE UR POC: NORMAL
PROPOXYPHENE UR POC: NORMAL
TRAMADOL UR POC: NORMAL
TRICYCLICS UR POC: NORMAL

## 2017-04-19 RX ORDER — MORPHINE SULFATE 15 MG/1
15 TABLET, FILM COATED, EXTENDED RELEASE ORAL EVERY 8 HOURS
Qty: 90 TAB | Refills: 0 | Status: SHIPPED | OUTPATIENT
Start: 2017-05-21 | End: 2017-06-12 | Stop reason: SDUPTHER

## 2017-04-19 RX ORDER — NALOXONE HYDROCHLORIDE 4 MG/.1ML
4 SPRAY NASAL AS NEEDED
Qty: 1 PACKAGE | Refills: 0 | Status: SHIPPED | OUTPATIENT
Start: 2017-04-19 | End: 2018-01-13 | Stop reason: ALTCHOICE

## 2017-04-19 RX ORDER — MORPHINE SULFATE 15 MG/1
15 TABLET, FILM COATED, EXTENDED RELEASE ORAL EVERY 8 HOURS
Qty: 90 TAB | Refills: 0 | Status: SHIPPED | OUTPATIENT
Start: 2017-04-21 | End: 2017-06-12 | Stop reason: SDUPTHER

## 2017-04-19 RX ORDER — MORPHINE SULFATE 15 MG/1
15 TABLET ORAL
Qty: 90 TAB | Refills: 0 | Status: SHIPPED | OUTPATIENT
Start: 2017-05-19 | End: 2017-06-12 | Stop reason: SDUPTHER

## 2017-04-19 NOTE — PATIENT INSTRUCTIONS
1. Continue current plan with no evidence of addiction or diversion. Stable on current medication without adverse events. 2. Refill morphine IR 15 mg. Currently has plenty left over that should last her until next month. 3. Refill and adjust morphine ER 15 mg every 12 hours up to every 8 hours. 4. Add naloxone 4 mg nasal spray for opioid induced respiratory depression emergency only. Extensive counseling was provided regarding this medication. Instructions were given to take home  5. Discussed risks of addiction, dependency, and opioid induced hyperalgesia. 6. Return to clinic in 2 months or sooner if needed.

## 2017-04-19 NOTE — MR AVS SNAPSHOT
Visit Information Date & Time Provider Department Dept. Phone Encounter #  
 4/19/2017  9:20 AM Shelly Melvin [address] for [facility] [phone] [id number] Follow-up Instructions Return in about 2 months (around 6/19/2017). Your Appointments 4/19/2017  9:20 AM  
Follow Up with IGNACIO Melvin [address] for Pain [facility] (JANET SCHEDULING) Appt Note: return in  1  
 30 [facility] [address]  
[phone]  [facility] [id number] 6/12/2017  9:40 AM  
Follow Up with IGNACIO Melvin [address] for Pain Management (JANET SCHEDULING) Appt Note: 2 mon f/u per rc. Gevena Isaac Gevena Isaac Gevena Isaac 4/19/17. ..to  
 [address] [place] [phone]  
  
    
 7/5/2017  8:45 AM  
Follow Up with Benson Holbrook MD  
[facility] MEDICINE OF [place] [address] [facility]) Appt Note: 3 mo f/u  
 [address] [place], [address]  
  
   
 340 [facility], [address] [facility] [address] Upcoming Health Maintenance Date Due  
 LIPID PANEL Q1 1937 EYE EXAM RETINAL OR DILATED Q1 3/19/1947 DTaP/Tdap/Td series (1 - Tdap) 3/19/1958 ZOSTER VACCINE AGE 60> 3/19/1997 GLAUCOMA SCREENING Q2Y 3/19/2002 OSTEOPOROSIS SCREENING (DEXA) 3/19/2002 HEMOGLOBIN A1C Q6M 10/4/2017 Pneumococcal 65+ Low/Medium Risk (2 of 2 - PPSV23) 10/18/2017 MEDICARE YEARLY EXAM 1/6/2018 FOOT EXAM Q1 4/4/2018 MICROALBUMIN Q1 4/4/2018 Allergies as of 4/19/2017  Review Complete On: 4/19/2017 By: IGNACIO Melvin No Known Allergies Current Immunizations  Never Reviewed No immunizations on file. Not reviewed this visit You Were Diagnosed With   
  
 Codes Comments Encounter for long-term (current) use of high-risk medication    -  Primary ICD-10-CM: H84.068 ICD-9-CM: V58.69 Chronic right shoulder pain     ICD-10-CM: M25.511, G89.29 ICD-9-CM: 719.41, 338.29 Chronic pain syndrome     ICD-10-CM: G89.4 ICD-9-CM: 338.4 Arthritis of right shoulder region     ICD-10-CM: M19.011 
ICD-9-CM: 716.91 Shoulder impingement, right     ICD-10-CM: M75.41 
ICD-9-CM: 726.2 Tear of right rotator cuff, unspecified tear extent     ICD-10-CM: M75.101 ICD-9-CM: 840.4 Vitals BP Pulse Height(growth percentile) Weight(growth percentile) BMI OB Status 156/79 96 5' 5\" (1.651 m) 258 lb (117 kg) 42.93 kg/m2 Hysterectomy Smoking Status Former Smoker BMI and BSA Data Body Mass Index Body Surface Area 42.93 kg/m 2 2.32 m 2 Preferred Pharmacy Pharmacy Name Phone 7114  . [address] [place] [address], [address] [place] [address] Road [phone] Your Updated Medication List  
  
   
This list is accurate as of: 4/19/17  9:03 AM.  Always use your most recent med list.  
  
  
  
  
 acetaminophen 500 mg tablet Commonly known as:  TYLENOL Take 1 Tab by mouth every six (6) hours as needed for Pain. allopurinol 100 mg tablet Commonly known as:  Ritta Popper Take 1 Tab by mouth daily. amLODIPine 10 mg tablet Commonly known as:  [place] Take 0.5 Tabs by mouth daily. aspirin delayed-release 81 mg tablet Take  by mouth daily. bumetanide 0.5 mg tablet Commonly known as:  Velton Sandifer Take 2 mg by mouth two (2) times a day. carvedilol 12.5 mg tablet Commonly known as:  Johnnette  Take  by mouth two (2) times daily (with meals). celecoxib 200 mg capsule Commonly known as:  CELEBREX  
1 capsule twice per day with food  
  
 gabapentin 300 mg capsule Commonly known as:  NEURONTIN Take 1 Cap by mouth three (3) times daily. glucose blood VI test strips strip Commonly known as:  FREESTYLE TEST Use to check blood glucose twice daily DX:E11.9  
  
 insulin aspart 100 unit/mL Inpn Commonly known as:  Kellie Hall 16 units before each meal  
  
 insulin glargine 100 unit/mL injection Commonly known as:  LANTUS  
82 units daily Insulin Needles (Disposable) 31 gauge x 5/16\" Ndle Use to inject insulin once daily Dx:E11.9  
  
 levothyroxine 200 mcg tablet Commonly known as:  SYNTHROID  
1 tablet daily (take on an empty stomach) (stop \"old\" synthroid)  
  
 magnesium oxide 500 mg Tab Take  by mouth. * morphine CR 15 mg CR tablet Commonly known as:  MS CONTIN Take 1 Tab by mouth every eight (8) hours for 30 days. Max Daily Amount: 45 mg. Start taking on:  4/21/2017 * morphine IR 15 mg tablet Commonly known as:  MS IR Take 1 Tab by mouth three (3) times daily as needed for Pain for up to 30 days. Max Daily Amount: 45 mg. Start taking on:  5/19/2017 * morphine CR 15 mg CR tablet Commonly known as:  MS CONTIN Take 1 Tab by mouth every eight (8) hours. Max Daily Amount: 45 mg. Start taking on:  5/21/2017  
  
 naloxone 4 mg/actuation Spry 4 mg by Nasal route as needed. For emergency use only  Indications: OPIATE-INDUCED RESPIRATORY DEPRESSION  
  
 VITAMIN B-12 500 mcg tablet Generic drug:  cyanocobalamin Take 500 mcg by mouth daily. VITAMIN C 250 mg tablet Generic drug:  ascorbic acid (vitamin C) Take 100 mg by mouth daily. * Notice: This list has 3 medication(s) that are the same as other medications prescribed for you. Read the directions carefully, and ask your doctor or other care provider to review them with you. Prescriptions Printed Refills  
 morphine IR (MS IR) 15 mg tablet 0 Starting on: 5/19/2017 Sig: Take 1 Tab by mouth three (3) times daily as needed for Pain for up to 30 days. Max Daily Amount: 45 mg.  
 Class: Print Route: Oral  
 morphine CR (MS CONTIN) 15 mg CR tablet 0 Starting on: 4/21/2017 Sig: Take 1 Tab by mouth every eight (8) hours for 30 days. Max Daily Amount: 45 mg. Class: Print Route: Oral  
 morphine CR (MS CONTIN) 15 mg CR tablet 0 Starting on: 2017 Sig: Take 1 Tab by mouth every eight (8) hours. Max Daily Amount: 45 mg.  
 Class: Print Route: Oral  
  
Prescriptions Sent to Pharmacy Refills  
 naloxone 4 mg/actuation spry 0 Si mg by Nasal route as needed. For emergency use only  Indications: OPIATE-INDUCED RESPIRATORY DEPRESSION Class: Normal  
 Pharmacy: [address] [place] [address] [address] #: [phone] Route: Nasal  
  
We Performed the Following AMB POC DRUG SCREEN () [ [facility]] DRUG SCREEN [[id number] Custom] Follow-up Instructions Return in about 2 months (around 2017). Patient Instructions 1. Continue current plan with no evidence of addiction or diversion. Stable on current medication without adverse events. 2. Refill morphine IR 15 mg. Currently has plenty left over that should last her until next month. 3. Refill and adjust morphine ER 15 mg every 12 hours up to every 8 hours. 4. Add naloxone 4 mg nasal spray for opioid induced respiratory depression emergency only. Extensive counseling was provided regarding this medication. Instructions were given to take home 5. Discussed risks of addiction, dependency, and opioid induced hyperalgesia. 6. Return to clinic in 2 months or sooner if needed. Introducing \[facility]\"" & HEALTH SERVICES! Tanis Epley introduces [employer] patient portal. Now you can access parts of your medical record, email your doctor's office, and request medication refills online. 1. In your internet browser, go to [email or web address] [email or web address] 2. Click on the First Time User? Click Here link in the Sign In box. You will see the New Member Sign Up page. 3. Enter your [employer] Access Code exactly as it appears below. You will not need to use this code after youve completed the sign-up process.  If you do not sign up before the expiration date, you must request a new code. · [employer] Access Code: [email or web address] Expires: 6/21/2017 10:01 AM 
 
4. Enter the last four digits of your Social Security Number (xxxx) and Date of Birth (mm/dd/yyyy) as indicated and click Submit. You will be taken to the next sign-up page. 5. Create a [employer] ID. This will be your [employer] login ID and cannot be changed, so think of one that is secure and easy to remember. 6. Create a [employer] password. You can change your password at any time. 7. Enter your Password Reset Question and Answer. This can be used at a later time if you forget your password. 8. Enter your e-mail address. You will receive e-mail notification when new information is available in [address] 9. Click Sign Up. You can now view and download portions of your medical record. 10. Click the Download Summary menu link to download a portable copy of your medical information. If you have questions, please visit the Frequently Asked Questions section of the [employer] website. Remember, [employer] is NOT to be used for urgent needs. For medical emergencies, dial 911. Now available from your iPhone and Android! Please provide this summary of care documentation to your next provider. Your primary care clinician is listed as Matthieu Pate. If you have any questions after today's visit, please call [phone].

## 2017-04-19 NOTE — PROGRESS NOTES
Nursing Notes    Patient presents to the office today in follow-up. Patient rates her pain at 9/10 on the numerical pain scale. Reviewed medications with counts as follows:    Rx Date filled Qty Dispensed Pill Count Last Dose Short   Morphine sulfate IR 15 mg 03/13/17 120 101 yesterday no   Morphine sulfate ER 15 mg  03/23/17 60 8 This a.m. no                          POC UDS was performed in office today. Any new labs or imaging since last appointment? NO    Have you been to an emergency room (ER) or urgent care clinic since your last visit? NO            Have you been hospitalized since your last visit? NO     If yes, where, when, and reason for visit? Have you seen or consulted any other health care providers outside of the [address] [place] [address]  since your last visit? YES     If yes, where, when, and reason for visit? pcp    HM deferred to pcp.

## 2017-05-24 ENCOUNTER — OFFICE VISIT (OUTPATIENT)
Dept: ORTHOPEDIC SURGERY | Facility: CLINIC | Age: 82
End: 2017-05-24

## 2017-05-24 VITALS
HEART RATE: 73 BPM | HEIGHT: 65 IN | WEIGHT: 259 LBS | BODY MASS INDEX: 43.15 KG/M2 | SYSTOLIC BLOOD PRESSURE: 166 MMHG | DIASTOLIC BLOOD PRESSURE: 82 MMHG

## 2017-05-24 DIAGNOSIS — G89.29 CHRONIC LEFT SHOULDER PAIN: Primary | ICD-10-CM

## 2017-05-24 DIAGNOSIS — M19.012 PRIMARY OSTEOARTHRITIS OF LEFT SHOULDER: ICD-10-CM

## 2017-05-24 DIAGNOSIS — M25.512 CHRONIC LEFT SHOULDER PAIN: Primary | ICD-10-CM

## 2017-05-24 RX ORDER — BETAMETHASONE SODIUM PHOSPHATE AND BETAMETHASONE ACETATE 3; 3 MG/ML; MG/ML
6 INJECTION, SUSPENSION INTRA-ARTICULAR; INTRALESIONAL; INTRAMUSCULAR; SOFT TISSUE ONCE
Qty: 1 ML | Refills: 0
Start: 2017-05-24 | End: 2017-05-24

## 2017-05-24 NOTE — MR AVS SNAPSHOT
Visit Information Date & Time Provider Department Dept. Phone Encounter #  
 5/24/2017  9:45 AM Luis Alberto Jenkins MD [facility] [phone] [id number] Your Appointments 6/12/2017  9:40 AM  
Follow Up with IGNACIO Castro [facility] for Pain Management (JANET SCHEDULING) Appt Note: 2 mon f/u per rc. Lorenso Frankel Lorenso Frankel Lorenso Frankel 4/19/17. ..to  
 30 [facility] [address]  
[phone] [address]  
  
    
 7/5/2017  8:45 AM  
Follow Up with July Trent MD  
[facility] INTERNAL MEDICINE OF [facility]) Appt Note: 3 mo f/u  
 [address] [place], [address] [place] [address]  
  
   
 [address] [place], [address] [facility] [address] Upcoming Health Maintenance Date Due  
 LIPID PANEL Q1 1937 EYE EXAM RETINAL OR DILATED Q1 3/19/1947 DTaP/Tdap/Td series (1 - Tdap) 3/19/1958 ZOSTER VACCINE AGE 60> 3/19/1997 GLAUCOMA SCREENING Q2Y 3/19/2002 OSTEOPOROSIS SCREENING (DEXA) 3/19/2002 INFLUENZA AGE 9 TO ADULT 8/1/2017 HEMOGLOBIN A1C Q6M 10/4/2017 Pneumococcal 65+ Low/Medium Risk (2 of 2 - PPSV23) 10/18/2017 MEDICARE YEARLY EXAM 1/6/2018 FOOT EXAM Q1 4/4/2018 MICROALBUMIN Q1 4/4/2018 Allergies as of 5/24/2017  Review Complete On: 5/24/2017 By: Luis Alberto Jenkins MD  
 No Known Allergies Current Immunizations  Never Reviewed No immunizations on file. Not reviewed this visit You Were Diagnosed With   
  
 Codes Comments Chronic left shoulder pain    -  Primary ICD-10-CM: M25.512, I00.66 ICD-9-CM: 719.41, 338.29 Primary osteoarthritis of left shoulder     ICD-10-CM: M19.012 
ICD-9-CM: 715.11 Vitals BP Pulse Height(growth percentile) Weight(growth percentile) BMI OB Status 166/82 73 5' 5\" (1.651 m) 259 lb (117.5 kg) 43.1 kg/m2 Hysterectomy Smoking Status Former Smoker Vitals History BMI and BSA Data Body Mass Index Body Surface Area  
 43.1 kg/m 2 2.32 m 2 Preferred Pharmacy Pharmacy Name Phone [address] [facility] [address], [facility] [address] [facility] [address] [phone] Your Updated Medication List  
  
   
This list is accurate as of: 5/24/17 10:14 AM.  Always use your most recent med list.  
  
  
  
  
 acetaminophen 500 mg tablet Commonly known as:  TYLENOL Take 1 Tab by mouth every six (6) hours as needed for Pain. allopurinol 100 mg tablet Commonly known as:  Chyrel Alken Take 1 Tab by mouth daily. amLODIPine 10 mg tablet Commonly known as:  Zelda Marsh Take 0.5 Tabs by mouth daily. aspirin delayed-release 81 mg tablet Take  by mouth daily. bumetanide 0.5 mg tablet Commonly known as:  Judi Hove Take 2 mg by mouth two (2) times a day. carvedilol 12.5 mg tablet Commonly known as:  Llana Milo Take  by mouth two (2) times daily (with meals). celecoxib 200 mg capsule Commonly known as:  CELEBREX  
1 capsule twice per day with food  
  
 gabapentin 300 mg capsule Commonly known as:  NEURONTIN Take 1 Cap by mouth three (3) times daily. glucose blood VI test strips strip Commonly known as:  FREESTYLE TEST Use to check blood glucose twice daily DX:E11.9  
  
 insulin aspart 100 unit/mL Inpn Commonly known as:  Ashley [place] 16 units before each meal  
  
 insulin glargine 100 unit/mL injection Commonly known as:  LANTUS  
82 units daily Insulin Needles (Disposable) 31 gauge x 5/16\" Ndle Use to inject insulin once daily Dx:E11.9  
  
 levothyroxine 200 mcg tablet Commonly known as:  SYNTHROID  
1 tablet daily (take on an empty stomach) (stop \"old\" synthroid)  
  
 magnesium oxide 500 mg Tab Take  by mouth. * morphine IR 15 mg tablet Commonly known as:  MS IR Take 1 Tab by mouth three (3) times daily as needed for Pain for up to 30 days. Max Daily Amount: 45 mg. * morphine CR 15 mg CR tablet Commonly known as:  MS CONTIN Take 1 Tab by mouth every eight (8) hours. Max Daily Amount: 45 mg.  
  
 naloxone 4 mg/actuation Spry 4 mg by Nasal route as needed. For emergency use only  Indications: OPIATE-INDUCED RESPIRATORY DEPRESSION  
  
 VITAMIN B-12 500 mcg tablet Generic drug:  cyanocobalamin Take 500 mcg by mouth daily. VITAMIN C 250 mg tablet Generic drug:  ascorbic acid (vitamin C) Take 100 mg by mouth daily. * Notice: This list has 2 medication(s) that are the same as other medications prescribed for you. Read the directions carefully, and ask your doctor or other care provider to review them with you. We Performed the Following AMB POC XRAY, SHOULDER; COMPLETE, 2+ [57882 CPT(R)] Patient Instructions Joint Injections: Care Instructions Your Care Instructions Joint injections are shots into a joint, such as the knee. They may be used to put in medicines, such as pain relievers. Or they can be used to take out fluid. Sometimes the fluid is tested in a lab. This can help find the cause of a joint problem. A corticosteroid, or steroid, shot is used to reduce inflammation in tendons or joints. It is often used to treat problems such as arthritis, tendinitis, and bursitis. Steroids can be injected directly into a painful, inflamed joint. They can also help reduce inflammation of a bursa. A bursa is a sac of fluid. It cushions and lubricates areas where tendons, ligaments, skin, muscles, or bones rub against each other. A steroid shot can sometimes help with short-term pain relief when other treatments haven't worked. If steroid shots help, pain may improve for weeks or months. Follow-up care is a key part of your treatment and safety. Be sure to make and go to all appointments, and call your doctor if you are having problems. It's also a good idea to know your test results and keep a list of the medicines you take. How can you care for yourself at home? · Put ice or a cold pack on the area for 10 to 20 minutes at a time. Put a thin cloth between the ice and your skin. · Take anti-inflammatory medicines to reduce pain, swelling, or inflammation. These include ibuprofen (Advil, Motrin) and naproxen (Aleve). Read and follow all instructions on the label. · Avoid strenuous activities for several days, especially those that put stress on the area where you got the shot. · If you have dressings over the area, keep them clean and dry. You may remove them when your doctor tells you to. When should you call for help? Call your doctor now or seek immediate medical care if: 
· You have signs of infection, such as: 
¨ Increased pain, swelling, warmth, or redness. ¨ Red streaks leading from the site. ¨ Pus draining from the site. ¨ A fever. Watch closely for changes in your health, and be sure to contact your doctor if you have any problems. Where can you learn more? Go to http://braden-estephanie.info/. Enter N616 in the search box to learn more about \"Joint Injections: Care Instructions. \" Current as of: May 23, 2016 Content Version: 11.2 © 6979-2420 TinyTap. Care instructions adapted under license by [employer] (which disclaims liability or warranty for this information). If you have questions about a medical condition or this instruction, always ask your healthcare professional. [place] [address] any warranty or liability for your use of this information. MRI of the Shoulder: About This Test 
What is it? MRI (magnetic resonance imaging) is a test that uses a magnetic field and pulses of radio wave energy to make pictures of the organs and structures inside the body. An MRI can give your doctor information about your shoulder, the bones around it, and the tissues around it, such as cartilage, ligaments, and tendons. When you have an MRI, you lie on a table and the table moves into the MRI machine. Why is this test done? An MRI of the shoulder can find problems such as damage to the cartilage, tendons, and ligaments around the shoulder. It can also look for the cause of shoulder pain and find rotator cuff problems. How can you prepare for the test? 
Talk to your doctor about all your health conditions before the test. For example, tell your doctor if: 
· You are allergic to any medicines. · You are or might be pregnant. · You have a pacemaker, an artificial limb, any metal pins or metal parts in your body, metal heart valves, metal clips in your brain, metal implants in your ears, or any other implanted or prosthetic medical device. · You have an intrauterine device (IUD) in place. · You get nervous in confined spaces. You may need medicine to help you relax. · You wear a patch that contains medicine. · You have kidney disease. What happens before the test? 
· You will remove all metal objects, such as hearing aids, dentures, jewelry, watches, and hairpins. · You will take off all or most of your clothes and change into a gown. If you do leave some clothes on, make sure you take everything out of your pockets. · You may have contrast material (dye) put into your arm through a tube called an IV or directly into your shoulder joint. Contrast material helps doctors see specific organs, blood vessels, and most tumors. What happens during the test? 
· You will lie on your back on a table that is part of the MRI scanner. Your head, chest, and arms may be held with straps to help you remain still. · The table will slide into the space that contains the magnet. A device called a coil may be placed over or wrapped around your shoulder. · Inside the scanner you will hear a fan and feel air moving. You may hear tapping, thumping, or snapping noises. You may be given earplugs or headphones to reduce the noise. · You will be asked to hold still during the scan. You may be asked to hold your breath for short periods. · You may be alone in the scanning room, but a technologist will be watching you through a window and talking with you during the test. 
What else should you know about the test? 
· An MRI does not hurt. · If a dye is used, you may feel a quick sting or pinch and some coolness when the IV is started. The dye may give you a metallic taste in your mouth. Some people feel sick to their stomach or get a headache. · If you breastfeed and are concerned about whether the dye used in this test is safe, talk to your doctor. Most experts believe that very little dye passes into breast milk and even less is passed on to the baby. But if you prefer, you can store some of your breast milk ahead of time and use it for a day or two after the test. 
· You may feel warmth in the area being examined. This is normal. 
How long does the test take? · The test usually takes 30 to 60 minutes but can take as long as 2 hours. What happens after the test? 
· You will probably be able to go home right away, depending on the reason for the test. 
· You can go back to your usual activities right away. Follow-up care is a key part of your treatment and safety. Be sure to make and go to all appointments, and call your doctor if you are having problems. It's also a good idea to keep a list of the medicines you take. Ask your doctor when you can expect to have your test results. Where can you learn more? Go to http://braden-estephanie.info/. Enter U035 in the search box to learn more about \"MRI of the Shoulder: About This Test.\" Current as of: October 14, 2016 Content Version: 11.2 © 7486-8536 Clean Energy Systems. Care instructions adapted under license by [employer] (which disclaims liability or warranty for this information).  If you have questions about a medical condition or this instruction, always ask your healthcare professional. [address] any warranty or liability for your use of this information. Introducing [facility] & HEALTH SERVICES! Adolfo Cardenas introduces [employer] patient portal. Now you can access parts of your medical record, email your doctor's office, and request medication refills online. 1. In your internet browser, go to [email or web address] It [email or web address] Paymentst 2. Click on the First Time User? Click Here link in the Sign In box. You will see the New Member Sign Up page. 3. Enter your Br[employer] Access Code exactly as it appears below. You will not need to use this code after youve completed the sign-up process. If you do not sign up before the expiration date, you must request a new code. · [employer] Access Code: [email or web address] Expires: 6/21/2017 10:01 AM 
 
4. Enter the last four digits of your Social Security Number (xxxx) and Date of Birth (mm/dd/yyyy) as indicated and click Submit. You will be taken to the next sign-up page. 5. Create a Bragster ID. This will be your Br[employer]ster login ID and cannot be changed, so think of one that is secure and easy to remember. 6. Create a [employer] password. You can change your password at any time. 7. Enter your Password Reset Question and Answer. This can be used at a later time if you forget your password. 8. Enter your e-mail address. You will receive e-mail notification when new information is available in [address] 9. Click Sign Up. You can now view and download portions of your medical record. 10. Click the Download Summary menu link to download a portable copy of your medical information. If you have questions, please visit the Frequently Asked Questions section of the [employer] website. Remember, [employer] is NOT to be used for urgent needs. For medical emergencies, dial 911. Now available from your iPhone and Android! Please provide this summary of care documentation to your next provider. Your primary care clinician is listed as Katy Jane. If you have any questions after today's visit, please call [phone].

## 2017-05-24 NOTE — PROGRESS NOTES
Patient: Leticia Dumont                MRN: [id number]       SSN: [id number]  YOB: 1935        AGE: [de-identified] y.o. SEX: female  Body mass index is 43.1 kg/(m^2). PCP: July Trent MD  05/24/17    HISTORY: Ms. Fawn King was in the hospital for a couple of days with chest pain. They worked her up for cardiac problems and ruled out pulmonary emboli. She reports having fairly acute onset of lumbar spine pain. The pain can be deltoid and somewhat radicular. She denies fevers or chills. She has noticed that her biceps muscle is a little more prominent, i.e. a Krishna muscle. It hurts her to sleep at night or to roll over on it. She has difficulty moving the shoulder. She has known severe arthritis involving the right shoulder. She does not really recall any specific trauma for the left shoulder. PHYSICAL EXAMINATION:  On examination today, she is a very nice lady. She is 82. She appears her stated age. She moves the head and neck adequately. Sensation is grossly intact to the hand at C5-6. Good  strength. She can hitchhike. She has a prominent biceps muscle a little more distal on the left than on the right. The biceps tendon is a little tender proximally. Montes sign is positive. Her range of motion of the shoulder is somewhat restricted at about 90° of forward elevation and abduction, and the external rotation strength is mildly diminished as well. The shoulder is not hot or red. The skin is normal.  The AC joint is mildly tender but not prominent. There is no evidence for infection or DVT. She has good  strength. There is no significant lymphadenopathy. There is no scleral icterus or JVD. There is no chest pain or shortness of breath noted and no indrawing noted. RADIOGRAPHS:  Three views of the shoulder confirm a type 2.5-3 hooked acromion and some mild arthritis involving the AC joint. The glenohumeral joint has mild arthritis.   The shoulder is well reduced. PROCEDURE:  Under aseptic conditions and after informed, written consent with a time out, the left shoulder was injected with 1 cc of the Celestone preparation, i.e. 6 mg, which was well tolerated. PLAN:  At this point, I think she has had a biceps tendon rupture, proximal, and also a partial rotator cuff tear at the minimum if not more, along with some impingement, arthritis, and bursitis. Hopefully, the injection will settle things down. We can always obtain an MRI, but hopefully, we can manage her nonoperatively at this point. REVIEW OF SYSTEMS:      CON: negative for weight loss, fever  EYE: negative for double vision  ENT: negative for hoarseness  RS:   negative for Tb  GI:    negative for blood in stool  :  negative for blood in urine  Other systems reviewed and noted below. Past Medical History:   Diagnosis Date    Asthma     Chronic venous insufficiency     Diabetes (HCC)     Hypertension     Peripheral neuropathy (HCC)     Rheumatoid arthritis (HCC)     Vertigo        Family History   Problem Relation Age of Onset    Heart Attack Mother     Heart Attack Father        Current Outpatient Prescriptions   Medication Sig Dispense Refill    morphine IR (MS IR) 15 mg tablet Take 1 Tab by mouth three (3) times daily as needed for Pain for up to 30 days. Max Daily Amount: 45 mg. 90 Tab 0    morphine CR (MS CONTIN) 15 mg CR tablet Take 1 Tab by mouth every eight (8) hours. Max Daily Amount: 45 mg. 90 Tab 0    insulin glargine (LANTUS) 100 unit/mL injection 82 units daily 3 Vial 3    gabapentin (NEURONTIN) 300 mg capsule Take 1 Cap by mouth three (3) times daily. 270 Cap 3    allopurinol (ZYLOPRIM) 100 mg tablet Take 1 Tab by mouth daily.  90 Tab 3    insulin aspart (NOVOLOG) 100 unit/mL inpn 16 units before each meal 30 mL 3    Insulin Needles, Disposable, 31 gauge x 5/16\" ndle Use to inject insulin once daily Dx:E11.9 3 Package 3    magnesium oxide 500 mg tab Take  by mouth.  glucose blood VI test strips (FREESTYLE TEST) strip Use to check blood glucose twice daily DX:E11.9 300 Strip 3    levothyroxine (SYNTHROID) 200 mcg tablet 1 tablet daily (take on an empty stomach) (stop \"old\" synthroid) 90 Tab 3    amLODIPine (NORVASC) 10 mg tablet Take 0.5 Tabs by mouth daily. 90 Tab 3    acetaminophen (TYLENOL) 500 mg tablet Take 1 Tab by mouth every six (6) hours as needed for Pain. 270 Tab 3    aspirin delayed-release 81 mg tablet Take  by mouth daily.  carvedilol (COREG) 12.5 mg tablet Take  by mouth two (2) times daily (with meals).  bumetanide (BUMEX) 0.5 mg tablet Take 2 mg by mouth two (2) times a day.  naloxone 4 mg/actuation spry 4 mg by Nasal route as needed. For emergency use only  Indications: OPIATE-INDUCED RESPIRATORY DEPRESSION 1 Package 0    celecoxib (CELEBREX) 200 mg capsule 1 capsule twice per day with food 180 Cap 0    cyanocobalamin (VITAMIN B-12) 500 mcg tablet Take 500 mcg by mouth daily.  ascorbic acid, vitamin C, (VITAMIN C) 250 mg tablet Take 100 mg by mouth daily. No Known Allergies    History reviewed. No pertinent surgical history. Social History     Social History    Marital status: LEGALLY      Spouse name: N/A    Number of children: N/A    Years of education: N/A     Occupational History    Not on file. Social History Main Topics    Smoking status: Former Smoker    Smokeless tobacco: Never Used    Alcohol use No    Drug use: No    Sexual activity: No     Other Topics Concern    Not on file     Social History Narrative       Visit Vitals    /82    Pulse 73    Ht 5' 5\" (1.651 m)    Wt 259 lb (117.5 kg)    BMI 43.1 kg/m2         PHYSICAL EXAMINATION:  GENERAL: Alert and oriented x3, in no acute distress, well-developed, well-nourished, afebrile. HEART: No JVD.   EYES: No scleral icterus   NECK: No significant lymphadenopathy   LUNGS: No respiratory compromise or indrawing  ABDOMEN: Soft, non-tender, non-distended. Electronically signed by:  Janet Meehan MD

## 2017-05-24 NOTE — PATIENT INSTRUCTIONS
Joint Injections: Care Instructions  Your Care Instructions  Joint injections are shots into a joint, such as the knee. They may be used to put in medicines, such as pain relievers. Or they can be used to take out fluid. Sometimes the fluid is tested in a lab. This can help find the cause of a joint problem. A corticosteroid, or steroid, shot is used to reduce inflammation in tendons or joints. It is often used to treat problems such as arthritis, tendinitis, and bursitis. Steroids can be injected directly into a painful, inflamed joint. They can also help reduce inflammation of a bursa. A bursa is a sac of fluid. It cushions and lubricates areas where tendons, ligaments, skin, muscles, or bones rub against each other. A steroid shot can sometimes help with short-term pain relief when other treatments haven't worked. If steroid shots help, pain may improve for weeks or months. Follow-up care is a key part of your treatment and safety. Be sure to make and go to all appointments, and call your doctor if you are having problems. It's also a good idea to know your test results and keep a list of the medicines you take. How can you care for yourself at home? · Put ice or a cold pack on the area for 10 to 20 minutes at a time. Put a thin cloth between the ice and your skin. · Take anti-inflammatory medicines to reduce pain, swelling, or inflammation. These include ibuprofen (Advil, Motrin) and naproxen (Aleve). Read and follow all instructions on the label. · Avoid strenuous activities for several days, especially those that put stress on the area where you got the shot. · If you have dressings over the area, keep them clean and dry. You may remove them when your doctor tells you to. When should you call for help? Call your doctor now or seek immediate medical care if:  · You have signs of infection, such as:  ¨ Increased pain, swelling, warmth, or redness. ¨ Red streaks leading from the site.   ¨ Pus draining from the site. ¨ A fever. Watch closely for changes in your health, and be sure to contact your doctor if you have any problems. Where can you learn more? Go to http://braden-estephanie.info/. Enter N616 in the search box to learn more about \"Joint Injections: Care Instructions. \"  Current as of: May 23, 2016  Content Version: 11.2  © 6044-4431 [email or web address]. Care instructions adapted under license by [employer] (which disclaims liability or warranty for this information). If you have questions about a medical condition or this instruction, always ask your healthcare professional. [place] [address] any warranty or liability for your use of this information. MRI of the Shoulder: About This Test  What is it? MRI (magnetic resonance imaging) is a test that uses a magnetic field and pulses of radio wave energy to make pictures of the organs and structures inside the body. An MRI can give your doctor information about your shoulder, the bones around it, and the tissues around it, such as cartilage, ligaments, and tendons. When you have an MRI, you lie on a table and the table moves into the MRI machine. Why is this test done? An MRI of the shoulder can find problems such as damage to the cartilage, tendons, and ligaments around the shoulder. It can also look for the cause of shoulder pain and find rotator cuff problems. How can you prepare for the test?  Talk to your doctor about all your health conditions before the test. For example, tell your doctor if:  · You are allergic to any medicines. · You are or might be pregnant. · You have a pacemaker, an artificial limb, any metal pins or metal parts in your body, metal heart valves, metal clips in your brain, metal implants in your ears, or any other implanted or prosthetic medical device. · You have an intrauterine device (IUD) in place. · You get nervous in confined spaces.  You may need medicine to help you relax. · You wear a patch that contains medicine. · You have kidney disease. What happens before the test?  · You will remove all metal objects, such as hearing aids, dentures, jewelry, watches, and hairpins. · You will take off all or most of your clothes and change into a gown. If you do leave some clothes on, make sure you take everything out of your pockets. · You may have contrast material (dye) put into your arm through a tube called an IV or directly into your shoulder joint. Contrast material helps doctors see specific organs, blood vessels, and most tumors. What happens during the test?  · You will lie on your back on a table that is part of the MRI scanner. Your head, chest, and arms may be held with straps to help you remain still. · The table will slide into the space that contains the magnet. A device called a coil may be placed over or wrapped around your shoulder. · Inside the scanner you will hear a fan and feel air moving. You may hear tapping, thumping, or snapping noises. You may be given earplugs or headphones to reduce the noise. · You will be asked to hold still during the scan. You may be asked to hold your breath for short periods. · You may be alone in the scanning room, but a technologist will be watching you through a window and talking with you during the test.  What else should you know about the test?  · An MRI does not hurt. · If a dye is used, you may feel a quick sting or pinch and some coolness when the IV is started. The dye may give you a metallic taste in your mouth. Some people feel sick to their stomach or get a headache. · If you breastfeed and are concerned about whether the dye used in this test is safe, talk to your doctor. Most experts believe that very little dye passes into breast milk and even less is passed on to the baby.  But if you prefer, you can store some of your breast milk ahead of time and use it for a day or two after the test.  · You may feel warmth in the area being examined. This is normal.  How long does the test take? · The test usually takes 30 to 60 minutes but can take as long as 2 hours. What happens after the test?  · You will probably be able to go home right away, depending on the reason for the test.  · You can go back to your usual activities right away. Follow-up care is a key part of your treatment and safety. Be sure to make and go to all appointments, and call your doctor if you are having problems. It's also a good idea to keep a list of the medicines you take. Ask your doctor when you can expect to have your test results. Where can you learn more? Go to http://braden-estephanie.info/. Enter I257 in the search box to learn more about \"MRI of the Shoulder: About This Test.\"  Current as of: October 14, 2016  Content Version: 11.2  © 6994-8464 [employer]. Care instructions adapted under license by [employer] (which disclaims liability or warranty for this information). If you have questions about a medical condition or this instruction, always ask your healthcare professional. [place] [address] any warranty or liability for your use of this information.

## 2017-06-12 ENCOUNTER — OFFICE VISIT (OUTPATIENT)
Dept: PAIN MANAGEMENT | Age: 82
End: 2017-06-12

## 2017-06-12 VITALS
HEIGHT: 65 IN | DIASTOLIC BLOOD PRESSURE: 78 MMHG | HEART RATE: 86 BPM | SYSTOLIC BLOOD PRESSURE: 141 MMHG | WEIGHT: 259 LBS | BODY MASS INDEX: 43.15 KG/M2

## 2017-06-12 DIAGNOSIS — G89.29 CHRONIC LEFT SHOULDER PAIN: Primary | ICD-10-CM

## 2017-06-12 DIAGNOSIS — M75.101 TEAR OF RIGHT ROTATOR CUFF, UNSPECIFIED TEAR EXTENT: ICD-10-CM

## 2017-06-12 DIAGNOSIS — G89.29 CHRONIC RIGHT SHOULDER PAIN: ICD-10-CM

## 2017-06-12 DIAGNOSIS — M25.511 CHRONIC RIGHT SHOULDER PAIN: ICD-10-CM

## 2017-06-12 DIAGNOSIS — M19.011 ARTHRITIS OF RIGHT SHOULDER REGION: ICD-10-CM

## 2017-06-12 DIAGNOSIS — M25.512 CHRONIC LEFT SHOULDER PAIN: Primary | ICD-10-CM

## 2017-06-12 DIAGNOSIS — G89.4 CHRONIC PAIN SYNDROME: ICD-10-CM

## 2017-06-12 DIAGNOSIS — M75.41 SHOULDER IMPINGEMENT, RIGHT: ICD-10-CM

## 2017-06-12 RX ORDER — MORPHINE SULFATE 15 MG/1
15 TABLET ORAL
Qty: 90 TAB | Refills: 0 | Status: SHIPPED | OUTPATIENT
Start: 2017-07-31 | End: 2017-08-08 | Stop reason: SDUPTHER

## 2017-06-12 RX ORDER — MORPHINE SULFATE 15 MG/1
15 TABLET, FILM COATED, EXTENDED RELEASE ORAL EVERY 8 HOURS
Qty: 90 TAB | Refills: 0 | Status: SHIPPED | OUTPATIENT
Start: 2017-07-20 | End: 2017-07-05 | Stop reason: SDUPTHER

## 2017-06-12 RX ORDER — MORPHINE SULFATE 15 MG/1
15 TABLET, FILM COATED, EXTENDED RELEASE ORAL EVERY 8 HOURS
Qty: 90 TAB | Refills: 0 | Status: SHIPPED | OUTPATIENT
Start: 2017-06-21 | End: 2017-08-08 | Stop reason: SDUPTHER

## 2017-06-12 NOTE — MR AVS SNAPSHOT
Visit Information Date & Time Provider Department Dept. Phone Encounter #  
 6/12/2017  9:40 AM IGNACIO [facility] [phone] [id number] Follow-up Instructions Return in about 2 months (around 8/12/2017). Your Appointments 6/22/2017 10:00 AM  
Follow Up with Monae Esquivel PA-C  
[facility] [place] [address] [place] [address] MED [facility]) Appt Note: 1 M FU LT SHOULDER  
 [address] [facility], [address]  
[phone]  
  
   
 [facility] [address] [address]  
  
    
 7/5/2017  8:45 AM  
Follow Up with Marlene Murdock MD  
[facility]) Appt Note: 3 mo f/u  
 [address] [facility] [address]  
  
   
 [address] [facility], [address] [facility] [address] Upcoming Health Maintenance Date Due  
 LIPID PANEL Q1 1935 EYE EXAM RETINAL OR DILATED Q1 3/19/1945 DTaP/Tdap/Td series (1 - Tdap) 3/19/1958 ZOSTER VACCINE AGE 60> 3/19/1997 GLAUCOMA SCREENING Q2Y 3/19/2002 OSTEOPOROSIS SCREENING (DEXA) 3/19/2002 INFLUENZA AGE 9 TO ADULT 8/1/2017 HEMOGLOBIN A1C Q6M 10/4/2017 Pneumococcal 65+ Low/Medium Risk (2 of 2 - PPSV23) 10/18/2017 MEDICARE YEARLY EXAM 1/6/2018 FOOT EXAM Q1 4/4/2018 MICROALBUMIN Q1 4/4/2018 Allergies as of 6/12/2017  Review Complete On: 6/12/2017 By: IGNACIO Hughes No Known Allergies Current Immunizations  Never Reviewed No immunizations on file. Not reviewed this visit You Were Diagnosed With   
  
 Codes Comments Chronic left shoulder pain    -  Primary ICD-10-CM: M25.512, D61.78 ICD-9-CM: 719.41, 338.29 Chronic right shoulder pain     ICD-10-CM: M25.511, G89.29 ICD-9-CM: 719.41, 338.29 Chronic pain syndrome     ICD-10-CM: G89.4 ICD-9-CM: 338.4  Arthritis of right shoulder region     ICD-10-CM: M19.011 
 ICD-9-CM: 716.91 Shoulder impingement, right     ICD-10-CM: M75.41 
ICD-9-CM: 726.2 Tear of right rotator cuff, unspecified tear extent     ICD-10-CM: M75.101 ICD-9-CM: 840.4 Vitals BP Pulse Height(growth percentile) Weight(growth percentile) BMI OB Status 141/78 86 5' 5\" (1.651 m) 259 lb (117.5 kg) 43.1 kg/m2 Hysterectomy Smoking Status Former Smoker BMI and BSA Data Body Mass Index Body Surface Area  
 43.1 kg/m 2 2.32 m 2 Preferred Pharmacy Pharmacy Name Phone [address], [address] [place] [address] Road [phone] Your Updated Medication List  
  
   
This list is accurate as of: 6/12/17 10:18 AM.  Always use your most recent med list.  
  
  
  
  
 acetaminophen 500 mg tablet Commonly known as:  TYLENOL Take 1 Tab by mouth every six (6) hours as needed for Pain. allopurinol 100 mg tablet Commonly known as:  Lance Cons Take 1 Tab by mouth daily. amLODIPine 10 mg tablet Commonly known as:  Vamshi Monticello Take 0.5 Tabs by mouth daily. aspirin delayed-release 81 mg tablet Take  by mouth daily. bumetanide 0.5 mg tablet Commonly known as:  Mayra Last Take 2 mg by mouth two (2) times a day. carvedilol 12.5 mg tablet Commonly known as:  Darrick Gonzalez Take  by mouth two (2) times daily (with meals). celecoxib 200 mg capsule Commonly known as:  CELEBREX  
1 capsule twice per day with food  
  
 gabapentin 300 mg capsule Commonly known as:  NEURONTIN Take 1 Cap by mouth three (3) times daily. glucose blood VI test strips strip Commonly known as:  FREESTYLE TEST Use to check blood glucose twice daily DX:E11.9  
  
 insulin aspart 100 unit/mL Inpn Commonly known as:  Wonda Poor 16 units before each meal  
  
 insulin glargine 100 unit/mL injection Commonly known as:  LANTUS  
82 units daily Insulin Needles (Disposable) 31 gauge x 5/16\" Ndle Use to inject insulin once daily Dx:E11.9  
  
 levothyroxine 200 mcg tablet Commonly known as:  SYNTHROID  
1 tablet daily (take on an empty stomach) (stop \"old\" synthroid)  
  
 magnesium oxide 500 mg Tab Take  by mouth. * morphine CR 15 mg CR tablet Commonly known as:  MS CONTIN Take 1 Tab by mouth every eight (8) hours. Max Daily Amount: 45 mg. Start taking on:  6/21/2017 * morphine CR 15 mg CR tablet Commonly known as:  MS CONTIN Take 1 Tab by mouth every eight (8) hours for 30 days. Max Daily Amount: 45 mg. Start taking on:  7/20/2017 * morphine IR 15 mg tablet Commonly known as:  MS IR Take 1 Tab by mouth three (3) times daily as needed for Pain for up to 30 days. Max Daily Amount: 45 mg. Start taking on:  7/31/2017  
  
 naloxone 4 mg/actuation Spry 4 mg by Nasal route as needed. For emergency use only  Indications: OPIATE-INDUCED RESPIRATORY DEPRESSION  
  
 VITAMIN B-12 500 mcg tablet Generic drug:  cyanocobalamin Take 500 mcg by mouth daily. VITAMIN C 250 mg tablet Generic drug:  ascorbic acid (vitamin C) Take 100 mg by mouth daily. * Notice: This list has 3 medication(s) that are the same as other medications prescribed for you. Read the directions carefully, and ask your doctor or other care provider to review them with you. Prescriptions Printed Refills  
 morphine CR (MS CONTIN) 15 mg CR tablet 0 Starting on: 6/21/2017 Sig: Take 1 Tab by mouth every eight (8) hours. Max Daily Amount: 45 mg.  
 Class: Print Route: Oral  
 morphine CR (MS CONTIN) 15 mg CR tablet 0 Starting on: 7/20/2017 Sig: Take 1 Tab by mouth every eight (8) hours for 30 days. Max Daily Amount: 45 mg.  
 Class: Print Route: Oral  
 morphine IR (MS IR) 15 mg tablet 0 Starting on: 7/31/2017 Sig: Take 1 Tab by mouth three (3) times daily as needed for Pain for up to 30 days. Max Daily Amount: 45 mg.  
 Class: Print  Route: Oral  
  
 Follow-up Instructions Return in about 2 months (around 8/12/2017). Patient Instructions 1. Continue current plan with no evidence of addiction or diversion. Stable on current medication without adverse events. 2. Refill morphine IR 15 mg.  
3. Refill morphine ER 15 mg every 12 hours up to every 8 hours. 4. Naloxone 4 mg nasal spray for opioid induced respiratory depression emergency only. 5. Discussed risks of addiction, dependency, and opioid induced hyperalgesia. 6. Return to clinic in 2 months Introducing [facility]! Cornel Siu introduces [employer] patient portal. Now you can access parts of your medical record, email your doctor's office, and request medication refills online. 1. In your internet browser, go to [email or web address] 2. Click on the First Time User? Click Here link in the Sign In box. You will see the New Member Sign Up page. 3. Enter your [employer] Access Code exactly as it appears below. You will not need to use this code after youve completed the sign-up process. If you do not sign up before the expiration date, you must request a new code. · [employer] Access Code: [email or web address] Expires: 6/21/2017 10:01 AM 
 
4. Enter the last four digits of your Social Security Number (xxxx) and Date of Birth (mm/dd/yyyy) as indicated and click Submit. You will be taken to the next sign-up page. 5. Create a [employer] ID. This will be your [employer] login ID and cannot be changed, so think of one that is secure and easy to remember. 6. Create a [employer] password. You can change your password at any time. 7. Enter your Password Reset Question and Answer. This can be used at a later time if you forget your password. 8. Enter your e-mail address. You will receive e-mail notification when new information is available in [address] 9. Click Sign Up. You can now view and download portions of your medical record. 10. Click the Download Summary menu link to download a portable copy of your medical information. If you have questions, please visit the Frequently Asked Questions section of the [employer] website. Remember, [employer] is NOT to be used for urgent needs. For medical emergencies, dial 911. Now available from your iPhone and Android! Please provide this summary of care documentation to your next provider. Your primary care clinician is listed as Janelle Berumen. If you have any questions after today's visit, please call [phone].

## 2017-06-12 NOTE — PATIENT INSTRUCTIONS
1. Continue current plan with no evidence of addiction or diversion. Stable on current medication without adverse events. 2. Morphine IR 15 mg 3 times daily as needed. 3. Morphine ER 15 mg every 8 hours. 4. Naloxone 4 mg nasal spray for opioid induced respiratory depression emergency only. 5. Discussed risks of addiction, dependency, and opioid induced hyperalgesia.    6. Return to clinic in 2 months

## 2017-06-12 NOTE — PROGRESS NOTES
HISTORY OF PRESENT ILLNESS  Renee Leblanc is a [de-identified] y.o. female    HPI: Ms. Miryam Yee  returns today for f/u of chronic right shoulder pain. No h/o shoulder surgery. Surgery has been decline to to her heart condition. Prior injection with some improvement but temporary. PT with minimal imropvment. Ms. Miryam Yee reports new shoulder pain in her left shoulder. She went to the ER about 2-3 weeks ago. She reports that she woke up extreme pain. No h/o of acute injury or fall. She thought she may be having a heart attack. Full work up was done at the ER and unremarkable. She f/u with Dr. Nunu Molina who ordered left shoulder xray and told that she has a torn ligament in her left shoulder. Sugery is not an option for her due to her heart condition. She is doing well with her current treatment which is offering her moderate pain control. Current medication management consists of morphine ER 15 mg every 8 hours and morphine IR 15 mg 3 times a day as needed. Gabapentin by another provider. Medications are helping with pain control and quality of life. Her pain is 7/10 with medication and 10/10 without. Pt describes pain as aching, burning, and stabbing. Aggravating factors include most ROM activity. Relieved with rest, medication, and avoiding painful activities. Current treatment is helping to improve general activity, mood, walking, sleep, enjoyment of life    In the past 30 days, the patient reports approximately 30% pain relief with current treatment/medications. She  is otherwise doing well with no other complaints today. She denies any adverse events including nausea, vomiting, dizziness, constipation, hallucinations, or seizures. Because the patient's current regimen places him/her at increased risk for possible overdose, a prescription for naloxone nasal spray has been provided.   The patient understands that this medication is only to be used in the setting of a possible overdose and that inadvertent use of this medication could precipitate overt withdrawal.       No Known Allergies    History reviewed. No pertinent surgical history. Review of Systems   Constitutional: Negative for chills, fever and weight loss. HENT: Negative for congestion and sore throat. Eyes: Negative for blurred vision and double vision. Respiratory: Negative for cough, shortness of breath and wheezing. Cardiovascular: Negative for chest pain and palpitations. Gastrointestinal: Positive for heartburn. Negative for constipation, diarrhea, nausea and vomiting. Genitourinary: Negative. Musculoskeletal: Positive for back pain, falls, joint pain and neck pain. Neurological: Negative for dizziness, seizures, loss of consciousness and headaches. Endo/Heme/Allergies: Does not bruise/bleed easily. Psychiatric/Behavioral: Positive for depression. Negative for suicidal ideas. The patient is not nervous/anxious and does not have insomnia. Physical Exam   Constitutional: She is oriented to person, place, and time and well-developed, well-nourished, and in no distress. No distress. HENT:   Head: Normocephalic and atraumatic. Eyes: EOM are normal.   Pulmonary/Chest: Effort normal.   Musculoskeletal:        Right shoulder: She exhibits decreased range of motion and tenderness. Neurological: She is alert and oriented to person, place, and time. Gait (using a walker) abnormal.   Skin: Skin is warm and dry. No rash noted. She is not diaphoretic. No erythema. Psychiatric: Mood, memory, affect and judgment normal.   Nursing note and vitals reviewed. ASSESSMENT:    1. left shoulder pain    2. Chronic right shoulder pain    3. Chronic pain syndrome    4. Arthritis of right shoulder region    5. Shoulder impingement, right    6.  Tear of right rotator cuff, unspecified tear extent         [place] Prescription Monitoring Program was reviewed which does not demonstrate aberrancies and/or inconsistencies with regard to the historical prescribing of controlled medications to this patient by other providers. PLAN / Pt Instructions:  1. Continue current plan with no evidence of addiction or diversion. Stable on current medication without adverse events. 2. Morphine IR 15 mg 3 times daily as needed. 3. Morphine ER 15 mg every 8 hours. 4. Naloxone 4 mg nasal spray for opioid induced respiratory depression emergency only. 5. Discussed risks of addiction, dependency, and opioid induced hyperalgesia. 6. Return to clinic in 2 months       Pain medications prescribed with the objective of pain relief and improved physical and psychosocial function in this patient. Spent 25 minutes with patient today reviewing the treatment plan, goals of treatment plan, and limitations of the treatment plan, to include the potential for side effects from medications and procedures. Vivek Hunter 6/12/2017      Note: Please excuse any typographical errors. Voice recognition software was used for this note and may cause mistakes.

## 2017-06-12 NOTE — PROGRESS NOTES
Nursing Notes    Patient presents to the office today in follow-up. Patient rates her pain at 10/10 on the numerical pain scale. Reviewed medications with counts as follows:    Rx Date filled Qty Dispensed Pill Count Last Dose Short   morphone sulfate 15mg 03/13017 120 24 today no   Morphine EXT 15mg 05/22/17 90 90 0 no   Morphine IR 15mg  05/22/17 90 90 0 no                          Comments:     POC UDS was not performed in office today    Any new labs or imaging since last appointment? YES, CT scan [facility]      Have you been to an emergency room (ER) or urgent care clinic since your last visit? YES , [facility] for left shoulder           Have you been hospitalized since your last visit? NO     If yes, where, when, and reason for visit? Have you seen or consulted any other health care providers outside of the [address] [place] [address]  since your last visit? YES , orthopedic    If yes, where, when, and reason for visit? HM deferred to pcp.

## 2017-06-22 ENCOUNTER — OFFICE VISIT (OUTPATIENT)
Dept: ORTHOPEDIC SURGERY | Facility: CLINIC | Age: 82
End: 2017-06-22

## 2017-06-22 DIAGNOSIS — M75.101 TEAR OF RIGHT ROTATOR CUFF, UNSPECIFIED TEAR EXTENT: Primary | ICD-10-CM

## 2017-06-22 DIAGNOSIS — M75.102 TEAR OF LEFT ROTATOR CUFF, UNSPECIFIED TEAR EXTENT: ICD-10-CM

## 2017-06-22 NOTE — PROGRESS NOTES
[address] [facility], [address] [facility]  [phone]           Patient: Mark Hagan                MRN: [id number]       SSN: [id number]  YOB: 1935        AGE: [de-identified] y.o. SEX: female  There is no height or weight on file to calculate BMI. PCP: Yoel Sosa MD  06/22/17      This office note has been dictated. REVIEW OF SYSTEMS:  Constitutional: Negative for fever, chills, weight loss and malaise/fatigue. HENT: Negative. Eyes: Negative. Respiratory: Negative. Cardiovascular: Negative. Gastrointestinal: No bowel incontinence or constipation. Genitourinary: No bladder incontinence or saddle anesthesia. Skin: Negative. Neurological: Negative. Endo/Heme/Allergies: Negative. Psychiatric/Behavioral: Negative. Musculoskeletal: As per HPI above. Past Medical History:   Diagnosis Date    Asthma     Chronic venous insufficiency     Diabetes (HCC)     Hypertension     Peripheral neuropathy (HCC)     Rheumatoid arthritis (HCC)     Vertigo          Current Outpatient Prescriptions:     morphine CR (MS CONTIN) 15 mg CR tablet, Take 1 Tab by mouth every eight (8) hours. Max Daily Amount: 45 mg., Disp: 90 Tab, Rfl: 0    [START ON 7/20/2017] morphine CR (MS CONTIN) 15 mg CR tablet, Take 1 Tab by mouth every eight (8) hours for 30 days. Max Daily Amount: 45 mg., Disp: 90 Tab, Rfl: 0    [START ON 7/31/2017] morphine IR (MS IR) 15 mg tablet, Take 1 Tab by mouth three (3) times daily as needed for Pain for up to 30 days. Max Daily Amount: 45 mg., Disp: 90 Tab, Rfl: 0    naloxone 4 mg/actuation spry, 4 mg by Nasal route as needed.  For emergency use only  Indications: OPIATE-INDUCED RESPIRATORY DEPRESSION, Disp: 1 Package, Rfl: 0    celecoxib (CELEBREX) 200 mg capsule, 1 capsule twice per day with food, Disp: 180 Cap, Rfl: 0    insulin glargine (LANTUS) 100 unit/mL injection, 82 units daily, Disp: 3 Vial, Rfl: 3    gabapentin (NEURONTIN) 300 mg capsule, Take 1 Cap by mouth three (3) times daily. , Disp: 270 Cap, Rfl: 3    allopurinol (ZYLOPRIM) 100 mg tablet, Take 1 Tab by mouth daily. , Disp: 90 Tab, Rfl: 3    insulin aspart (NOVOLOG) 100 unit/mL inpn, 16 units before each meal, Disp: 30 mL, Rfl: 3    Insulin Needles, Disposable, 31 gauge x 5/16\" ndle, Use to inject insulin once daily Dx:E11.9, Disp: 3 Package, Rfl: 3    magnesium oxide 500 mg tab, Take  by mouth., Disp: , Rfl:     cyanocobalamin (VITAMIN B-12) 500 mcg tablet, Take 500 mcg by mouth daily. , Disp: , Rfl:     ascorbic acid, vitamin C, (VITAMIN C) 250 mg tablet, Take 100 mg by mouth daily. , Disp: , Rfl:     glucose blood VI test strips (FREESTYLE TEST) strip, Use to check blood glucose twice daily DX:E11.9, Disp: 300 Strip, Rfl: 3    levothyroxine (SYNTHROID) 200 mcg tablet, 1 tablet daily (take on an empty stomach) (stop \"old\" synthroid), Disp: 90 Tab, Rfl: 3    amLODIPine (NORVASC) 10 mg tablet, Take 0.5 Tabs by mouth daily. , Disp: 90 Tab, Rfl: 3    acetaminophen (TYLENOL) 500 mg tablet, Take 1 Tab by mouth every six (6) hours as needed for Pain., Disp: 270 Tab, Rfl: 3    aspirin delayed-release 81 mg tablet, Take  by mouth daily. , Disp: , Rfl:     carvedilol (COREG) 12.5 mg tablet, Take  by mouth two (2) times daily (with meals). , Disp: , Rfl:     bumetanide (BUMEX) 0.5 mg tablet, Take 2 mg by mouth two (2) times a day., Disp: , Rfl:     No Known Allergies    Social History     Social History    Marital status:      Spouse name: N/A    Number of children: N/A    Years of education: N/A     Occupational History    Not on file. Social History Main Topics    Smoking status: Former Smoker    Smokeless tobacco: Never Used    Alcohol use No    Drug use: No    Sexual activity: No     Other Topics Concern    Not on file     Social History Narrative       No past surgical history on file.           We did see MsGary Ledy Whalen for followup with regards to her bilateral shoulders. The patient does have known discomfort of each shoulder and had an injection by Dr. Leanne Jacobson about one month ago to the left shoulder, which helped her for a short period of time. She is still having discomfort in her shoulder, worse at night radiating into her deltoid. She denies any neck pain. She denies any chest pain or shortness of breath. She also has the same trouble with the right shoulder, which is not as severe. PHYSICAL EXAMINATION: In general, the patient is alert and oriented x 3 and is in no acute distress. The patient is well-developed and well-nourished with a normal affect. The patient is afebrile. HEENT:  Head is normocephalic and atraumatic. Pupils are equally round and reactive to light and accommodation. Extraocular eye movements are intact. Neck is supple. Trachea is midline. No JVD is present. Breathing is nonlabored. Examination of the neck reveals good range of motion of the cervical spine without reproduction of symptoms. Each shoulder, the skin is intact. There is no ecchymosis, no warmth, and no signs of infection or cellulitis present. Range of motion is about 80° of abduction, 80° of forward flexion of the left shoulder, and 90° forward flexion and 90° of abduction of the right shoulder. She has decreased strength with external rotation bilaterally. Radial pulse is 2+. Capillary refill is within normal limits. She has a positive Montes and negative drop arm, Speeds, and OTcs. She has decreased strength with empty can. ASSESSMENT:  Bilateral shoulder rotator cuff pathology. PLAN:  At this point, we are going to move forward with an MRI of each shoulders for further evaluation. We will see her back afterwards for review. She will continue with medicines from Pain Management.                   JR Alphonso DOSS, PAeBrthaC, ATC

## 2017-06-22 NOTE — COMMUNICATION BODY
[address] [facility], [address] [facility]  [phone]           Patient: Madison Raines                MRN: [id number]       SSN: [id number]  YOB: 1935        AGE: [de-identified] y.o. SEX: female  There is no height or weight on file to calculate BMI. PCP: Jamie Raines MD  06/22/17      This office note has been dictated. REVIEW OF SYSTEMS:  Constitutional: Negative for fever, chills, weight loss and malaise/fatigue. HENT: Negative. Eyes: Negative. Respiratory: Negative. Cardiovascular: Negative. Gastrointestinal: No bowel incontinence or constipation. Genitourinary: No bladder incontinence or saddle anesthesia. Skin: Negative. Neurological: Negative. Endo/Heme/Allergies: Negative. Psychiatric/Behavioral: Negative. Musculoskeletal: As per HPI above. Past Medical History:   Diagnosis Date    Asthma     Chronic venous insufficiency     Diabetes (HCC)     Hypertension     Peripheral neuropathy (HCC)     Rheumatoid arthritis (HCC)     Vertigo          Current Outpatient Prescriptions:     morphine CR (MS CONTIN) 15 mg CR tablet, Take 1 Tab by mouth every eight (8) hours. Max Daily Amount: 45 mg., Disp: 90 Tab, Rfl: 0    [START ON 7/20/2017] morphine CR (MS CONTIN) 15 mg CR tablet, Take 1 Tab by mouth every eight (8) hours for 30 days. Max Daily Amount: 45 mg., Disp: 90 Tab, Rfl: 0    [START ON 7/31/2017] morphine IR (MS IR) 15 mg tablet, Take 1 Tab by mouth three (3) times daily as needed for Pain for up to 30 days. Max Daily Amount: 45 mg., Disp: 90 Tab, Rfl: 0    naloxone 4 mg/actuation spry, 4 mg by Nasal route as needed.  For emergency use only  Indications: OPIATE-INDUCED RESPIRATORY DEPRESSION, Disp: 1 Package, Rfl: 0    celecoxib (CELEBREX) 200 mg capsule, 1 capsule twice per day with food, Disp: 180 Cap, Rfl: 0    insulin glargine (LANTUS) 100 unit/mL injection, 82 units daily, Disp: 3 Vial, Rfl: 3    gabapentin (NEURONTIN) 300 mg capsule, Take 1 Cap by mouth three (3) times daily. , Disp: 270 Cap, Rfl: 3    allopurinol (ZYLOPRIM) 100 mg tablet, Take 1 Tab by mouth daily. , Disp: 90 Tab, Rfl: 3    insulin aspart (NOVOLOG) 100 unit/mL inpn, 16 units before each meal, Disp: 30 mL, Rfl: 3    Insulin Needles, Disposable, 31 gauge x 5/16\" ndle, Use to inject insulin once daily Dx:E11.9, Disp: 3 Package, Rfl: 3    magnesium oxide 500 mg tab, Take  by mouth., Disp: , Rfl:     cyanocobalamin (VITAMIN B-12) 500 mcg tablet, Take 500 mcg by mouth daily. , Disp: , Rfl:     ascorbic acid, vitamin C, (VITAMIN C) 250 mg tablet, Take 100 mg by mouth daily. , Disp: , Rfl:     glucose blood VI test strips (FREESTYLE TEST) strip, Use to check blood glucose twice daily DX:E11.9, Disp: 300 Strip, Rfl: 3    levothyroxine (SYNTHROID) 200 mcg tablet, 1 tablet daily (take on an empty stomach) (stop \"old\" synthroid), Disp: 90 Tab, Rfl: 3    amLODIPine (NORVASC) 10 mg tablet, Take 0.5 Tabs by mouth daily. , Disp: 90 Tab, Rfl: 3    acetaminophen (TYLENOL) 500 mg tablet, Take 1 Tab by mouth every six (6) hours as needed for Pain., Disp: 270 Tab, Rfl: 3    aspirin delayed-release 81 mg tablet, Take  by mouth daily. , Disp: , Rfl:     carvedilol (COREG) 12.5 mg tablet, Take  by mouth two (2) times daily (with meals). , Disp: , Rfl:     bumetanide (BUMEX) 0.5 mg tablet, Take 2 mg by mouth two (2) times a day., Disp: , Rfl:     No Known Allergies    Social History     Social History    Marital status:      Spouse name: N/A    Number of children: N/A    Years of education: N/A     Occupational History    Not on file. Social History Main Topics    Smoking status: Former Smoker    Smokeless tobacco: Never Used    Alcohol use No    Drug use: No    Sexual activity: No     Other Topics Concern    Not on file     Social History Narrative       No past surgical history on file.                     Sonia Tobar, PA-C, ATC

## 2017-07-05 ENCOUNTER — OFFICE VISIT (OUTPATIENT)
Dept: INTERNAL MEDICINE CLINIC | Age: 82
End: 2017-07-05

## 2017-07-05 ENCOUNTER — HOSPITAL ENCOUNTER (OUTPATIENT)
Dept: LAB | Age: 82
Discharge: HOME | End: 2017-07-05
Payer: MEDICARE

## 2017-07-05 VITALS
WEIGHT: 261 LBS | RESPIRATION RATE: 16 BRPM | HEART RATE: 76 BPM | HEIGHT: 65 IN | OXYGEN SATURATION: 98 % | SYSTOLIC BLOOD PRESSURE: 168 MMHG | DIASTOLIC BLOOD PRESSURE: 80 MMHG | BODY MASS INDEX: 43.49 KG/M2 | TEMPERATURE: 98 F

## 2017-07-05 DIAGNOSIS — M54.2 NECK PAIN: ICD-10-CM

## 2017-07-05 DIAGNOSIS — M75.42 SHOULDER IMPINGEMENT, LEFT: ICD-10-CM

## 2017-07-05 PROCEDURE — 86140 C-REACTIVE PROTEIN: CPT | Performed by: INTERNAL MEDICINE

## 2017-07-05 PROCEDURE — 80053 COMPREHEN METABOLIC PANEL: CPT | Performed by: INTERNAL MEDICINE

## 2017-07-05 RX ORDER — PEN NEEDLE, DIABETIC 30 GX3/16"
NEEDLE, DISPOSABLE MISCELLANEOUS
Qty: 3 PACKAGE | Refills: 3 | Status: SHIPPED | OUTPATIENT
Start: 2017-07-05 | End: 2018-03-22 | Stop reason: SDUPTHER

## 2017-07-05 NOTE — LETTER
17 RE:  Bailey Doing : 1935 To whom it may concern: 
 
 I am Renee Ledesma's primary care physician. Ms. Nickie Landry has several arthritic and pain issues to which she needs more assistance in her daily activities of living. In my opinion it would be best for her to move in with her son who is more equipped to care for her. Due to these circumstances please release Ms. Nickie Landry from her lease. Sincerely, Devi Williamson M.D.   ELLEP

## 2017-07-05 NOTE — PROGRESS NOTES
1. Have you been to the ER, urgent care clinic since your last visit? Hospitalized since your last visit? [address] [facility] for blood clot - negative    2. Have you seen or consulted any other health care providers outside of the [address] [place] [address] since your last visit? Include any pap smears or colon screening.  No

## 2017-07-05 NOTE — ACP (ADVANCE CARE PLANNING)
The patient has made an advanced directive. she was advised to bring a copy into the office for us to scan into the system. Son has a copy. Next time she comes, she will bring a copy.

## 2017-07-05 NOTE — PATIENT INSTRUCTIONS
Health Maintenance Due   Topic    LIPID PANEL Q1     EYE EXAM RETINAL OR DILATED Q1     DTaP/Tdap/Td series (1 - Tdap)    ZOSTER VACCINE AGE 60>     GLAUCOMA SCREENING Q2Y     OSTEOPOROSIS SCREENING (DEXA)

## 2017-07-05 NOTE — MR AVS SNAPSHOT
Visit Information Date & Time Provider Department Dept. Phone Encounter #  
 7/5/2017  8:45 AM Farzana Holman MD [facility] INTERNAL MEDICINE OF [place]ana [place] [phone] [id number] Follow-up Instructions Return in about 4 weeks (around 8/2/2017). Your Appointments 8/8/2017 10:00 AM  
Follow Up with [facility]NA[facility] (JANET SCHEDULING) Appt Note: return in 2 months [address] [facility] [id number] [phone] Ila Lund [id number] Upcoming Health Maintenance Date Due  
 LIPID PANEL Q1 1935 EYE EXAM RETINAL OR DILATED Q1 3/19/1947 DTaP/Tdap/Td series (1 - Tdap) 3/19/1958 ZOSTER VACCINE AGE 60> 3/19/1997 GLAUCOMA SCREENING Q2Y 3/19/2002 OSTEOPOROSIS SCREENING (DEXA) 3/19/2002 INFLUENZA AGE 9 TO ADULT 8/1/2017 HEMOGLOBIN A1C Q6M 10/4/2017 Pneumococcal 65+ Low/Medium Risk (2 of 2 - PPSV23) 10/18/2017 MEDICARE YEARLY EXAM 1/6/2018 FOOT EXAM Q1 4/4/2018 MICROALBUMIN Q1 4/4/2018 Allergies as of 7/5/2017  Review Complete On: 7/5/2017 By: Farzana Holman MD  
 No Known Allergies Current Immunizations  Never Reviewed No immunizations on file. Not reviewed this visit You Were Diagnosed With   
  
 Codes Comments Uncontrolled type 2 diabetes mellitus without complication, with long-term current use of insulin ([facility] [place] [facility]    -  Primary ICD-10-CM: E11.65, Z79.4 ICD-9-CM: 250.02, V58.67 Shoulder impingement, left     ICD-10-CM: M75.42 
ICD-9-CM: 726.2 Vitals BP Pulse Temp Resp Height(growth percentile) Weight(growth percentile) 168/80 (BP 1 Location: Left arm, BP Patient Position: Sitting) 76 98 °F (36.7 °C) (Tympanic) 16 5' 5\" (1.651 m) 261 lb (118.4 kg) SpO2 BMI OB Status Smoking Status 98% 43.43 kg/m2 Hysterectomy Former Smoker Vitals History BMI and BSA Data Body Mass Index Body Surface Area  
 43.43 kg/m 2 2.33 m 2 Preferred Pharmacy Pharmacy Name Phone [facility] [address], [facility] [address] [phone] Your Updated Medication List  
  
   
This list is accurate as of: 7/5/17 11:02 AM.  Always use your most recent med list.  
  
  
  
  
 acetaminophen 500 mg tablet Commonly known as:  TYLENOL Take 1 Tab by mouth every six (6) hours as needed for Pain. allopurinol 100 mg tablet Commonly known as:  Lori Saman Take 1 Tab by mouth daily. amLODIPine 10 mg tablet Commonly known as:  [place] Nuha Take 0.5 Tabs by mouth daily. aspirin delayed-release 81 mg tablet Take  by mouth daily. bumetanide 0.5 mg tablet Commonly known as:  Shellia Goad Take 2 mg by mouth two (2) times a day. carvedilol 12.5 mg tablet Commonly known as:  Raynette Hy Take  by mouth two (2) times daily (with meals). celecoxib 200 mg capsule Commonly known as:  CELEBREX  
1 capsule twice per day with food  
  
 gabapentin 300 mg capsule Commonly known as:  NEURONTIN Take 1 Cap by mouth three (3) times daily. glucose blood VI test strips strip Commonly known as:  FREESTYLE TEST Use to check blood glucose twice daily DX:E11.9  
  
 insulin aspart 100 unit/mL Inpn Commonly known as:  Jennifer Kanu 16 units before each meal  
  
 insulin glargine 100 unit/mL injection Commonly known as:  LANTUS  
82 units daily Insulin Needles (Disposable) 31 gauge x 5/16\" Ndle Use to inject insulin once daily Dx:E11.9  
  
 levothyroxine 200 mcg tablet Commonly known as:  SYNTHROID  
1 tablet daily (take on an empty stomach) (stop \"old\" synthroid)  
  
 magnesium oxide 500 mg Tab Take  by mouth. * morphine CR 15 mg CR tablet Commonly known as:  MS CONTIN Take 1 Tab by mouth every eight (8) hours. Max Daily Amount: 45 mg.  
  
 * morphine IR 15 mg tablet Commonly known as:  MS IR  
 Take 1 Tab by mouth three (3) times daily as needed for Pain for up to 30 days. Max Daily Amount: 45 mg. Start taking on:  7/31/2017  
  
 naloxone 4 mg/actuation Spry 4 mg by Nasal route as needed. For emergency use only  Indications: OPIATE-INDUCED RESPIRATORY DEPRESSION  
  
 VITAMIN B-12 500 mcg tablet Generic drug:  cyanocobalamin Take 500 mcg by mouth daily. VITAMIN C 250 mg tablet Generic drug:  ascorbic acid (vitamin C) Take 100 mg by mouth daily. * Notice: This list has 2 medication(s) that are the same as other medications prescribed for you. Read the directions carefully, and ask your doctor or other care provider to review them with you. Prescriptions Sent to Pharmacy Refills Insulin Needles, Disposable, 31 gauge x 5/16\" ndle 3 Sig: Use to inject insulin once daily Dx:E11.9 Class: Normal  
 Pharmacy: [address] [place] [address] Ph #: [phone]  
 glucose blood VI test strips (FREESTYLE TEST) strip 3 Sig: Use to check blood glucose twice daily DX:E11.9 Class: Normal  
 Pharmacy: [address] [place] [address] Ph #: [phone] Follow-up Instructions Return in about 4 weeks (around 8/2/2017). To-Do List   
 07/06/2017 10:00 AM  
  Appointment with [facility] MRI RM 1 at [address] [place] [address] ([phone]) GENERAL INSTRUCTIONS  Bring information (ID card) if you have any medically implanted devices. You will be required to lie still while the procedure is being performed. Remove any jewelry (including body piercing, hairpins) prior to MRI. If you have had a creatinine level drawn within the past 30 days, please bring most recent results to your appt. Bring any films, CD's, and reports related to your study with you on the day of your exam.  This only includes studies done outside of [address] [place] [address], [place] [address], [place], and [facility].   Bring a complete list of all medications you are currently taking to include prescriptions, over-the-counter meds, herbals, vitamins & any dietary supplements. If you were given medications for claustrophobia or anxiety, please arrange to have someone drive you to your appointment. QUESTIONS  Notify the MRI Department if you have any questions concerning your study. Bill Diaz [phone]  
  
 07/06/2017 11:00 AM  
  Appointment with [facility] MRI RM 1 at [address] [place] [address] ([phone]) GENERAL INSTRUCTIONS  Bring information (ID card) if you have any medically implanted devices. You will be required to lie still while the procedure is being performed. Remove any jewelry (including body piercing, hairpins) prior to MRI. If you have had a creatinine level drawn within the past 30 days, please bring most recent results to your appt. Bring any films, CD's, and reports related to your study with you on the day of your exam.  This only includes studies done outside of [address] [place] [address], [facility], [facility], and [facility]. Bring a complete list of all medications you are currently taking to include prescriptions, over-the-counter meds, herbals, vitamins & any dietary supplements. If you were given medications for claustrophobia or anxiety, please arrange to have someone drive you to your appointment. QUESTIONS  Notify the MRI Department if you have any questions concerning your study. Bill Leung - [phone] [facility] [address] [place] [address] [place] - [phone] Patient Instructions Health Maintenance Due Topic  LIPID PANEL Q1   
 EYE EXAM RETINAL OR DILATED Q1   
 DTaP/Tdap/Td series (1 - Tdap)  ZOSTER VACCINE AGE 60>   
 GLAUCOMA SCREENING Q2Y   
 OSTEOPOROSIS SCREENING (DEXA) Introducing hospitals & HEALTH SERVICES!    
 Elizabeth Barahona introduces [employer] patient portal. Now you can access parts of your medical record, email your doctor's office, and request medication refills online. 1. In your internet browser, go to [email or web address] 2. Click on the First Time User? Click Here link in the Sign In box. You will see the New Member Sign Up page. 3. Enter your Picture[employer] Access Code exactly as it appears below. You will not need to use this code after youve completed the sign-up process. If you do not sign up before the expiration date, you must request a new code. · Picture[employer] Access Code: [email or web address] Expires: 9/20/2017  9:57 AM 
 
4. Enter the last four digits of your Social Security Number (xxxx) and Date of Birth (mm/dd/yyyy) as indicated and click Submit. You will be taken to the next sign-up page. 5. Create a PictureMenu ID. This will be your PictureMenu login ID and cannot be changed, so think of one that is secure and easy to remember. 6. Create a PictureMenu password. You can change your password at any time. 7. Enter your Password Reset Question and Answer. This can be used at a later time if you forget your password. 8. Enter your e-mail address. You will receive e-mail notification when new information is available in [address] 9. Click Sign Up. You can now view and download portions of your medical record. 10. Click the Download Summary menu link to download a portable copy of your medical information. If you have questions, please visit the Frequently Asked Questions section of the Picture[employer] website. Remember, PictureMenu is NOT to be used for urgent needs. For medical emergencies, dial 911. Now available from your iPhone and Android! Please provide this summary of care documentation to your next provider. Your primary care clinician is listed as Tigre Mckee. If you have any questions after today's visit, please call [phone].

## 2017-07-06 ENCOUNTER — HOSPITAL ENCOUNTER (OUTPATIENT)
Age: 82
Discharge: HOME | End: 2017-07-06
Attending: PHYSICIAN ASSISTANT
Payer: MEDICARE

## 2017-07-06 DIAGNOSIS — M75.101 TEAR OF RIGHT ROTATOR CUFF, UNSPECIFIED TEAR EXTENT: ICD-10-CM

## 2017-07-06 DIAGNOSIS — M75.102 TEAR OF LEFT ROTATOR CUFF, UNSPECIFIED TEAR EXTENT: ICD-10-CM

## 2017-07-06 LAB
ALBUMIN SERPL BCP-MCNC: 3.5 G/DL (ref 3.4–5)
ALBUMIN/GLOB SERPL: 0.9 {RATIO} (ref 0.8–1.7)
ALP SERPL-CCNC: 76 U/L (ref 45–117)
ALT SERPL-CCNC: 11 U/L (ref 13–56)
ANION GAP BLD CALC-SCNC: 9 MMOL/L (ref 3–18)
AST SERPL W P-5'-P-CCNC: 12 U/L (ref 15–37)
BILIRUB SERPL-MCNC: 0.3 MG/DL (ref 0.2–1)
BUN SERPL-MCNC: 19 MG/DL (ref 7–18)
BUN/CREAT SERPL: 18 (ref 12–20)
CALCIUM SERPL-MCNC: 8.8 MG/DL (ref 8.5–10.1)
CHLORIDE SERPL-SCNC: 101 MMOL/L (ref 100–108)
CO2 SERPL-SCNC: 29 MMOL/L (ref 21–32)
CREAT SERPL-MCNC: 1.06 MG/DL (ref 0.6–1.3)
CRP SERPL-MCNC: 2.4 MG/DL (ref 0–0.3)
GLOBULIN SER CALC-MCNC: 4.1 G/DL (ref 2–4)
GLUCOSE SERPL-MCNC: 250 MG/DL (ref 74–99)
POTASSIUM SERPL-SCNC: 4.6 MMOL/L (ref 3.5–5.5)
PROT SERPL-MCNC: 7.6 G/DL (ref 6.4–8.2)
SODIUM SERPL-SCNC: 139 MMOL/L (ref 136–145)

## 2017-07-06 PROCEDURE — 73221 MRI JOINT UPR EXTREM W/O DYE: CPT

## 2017-07-06 NOTE — PROGRESS NOTES
The patient presents to the office today with the chief complaint of left shoulder pain    HPI    The patient complains of severe left shoulder pain  The pain began one month ago. It began at night when she was in bed. The patient finds that she cannot raise her shoulder above her shoulder. The patient finds that the pain radiates down her left arm to her hand. The patient is status post an injection of her shoulder with no response. An MRI of her shoulder is ordered. The patient has neck pain and stiffness with movement. Review of Systems   Respiratory: Negative for shortness of breath. Cardiovascular: Negative for chest pain and leg swelling. Musculoskeletal: Positive for joint pain and neck pain. No Known Allergies    Current Outpatient Prescriptions   Medication Sig Dispense Refill    Insulin Needles, Disposable, 31 gauge x 5/16\" ndle Use to inject insulin once daily Dx:E11.9 3 Package 3    glucose blood VI test strips (FREESTYLE TEST) strip Use to check blood glucose twice daily DX:E11.9 300 Strip 3    morphine CR (MS CONTIN) 15 mg CR tablet Take 1 Tab by mouth every eight (8) hours. Max Daily Amount: 45 mg. 90 Tab 0    [START ON 7/31/2017] morphine IR (MS IR) 15 mg tablet Take 1 Tab by mouth three (3) times daily as needed for Pain for up to 30 days. Max Daily Amount: 45 mg. 90 Tab 0    naloxone 4 mg/actuation spry 4 mg by Nasal route as needed. For emergency use only  Indications: OPIATE-INDUCED RESPIRATORY DEPRESSION 1 Package 0    celecoxib (CELEBREX) 200 mg capsule 1 capsule twice per day with food 180 Cap 0    insulin glargine (LANTUS) 100 unit/mL injection 82 units daily 3 Vial 3    gabapentin (NEURONTIN) 300 mg capsule Take 1 Cap by mouth three (3) times daily. 270 Cap 3    allopurinol (ZYLOPRIM) 100 mg tablet Take 1 Tab by mouth daily. 90 Tab 3    insulin aspart (NOVOLOG) 100 unit/mL inpn 16 units before each meal 30 mL 3    magnesium oxide 500 mg tab Take  by mouth.  cyanocobalamin (VITAMIN B-12) 500 mcg tablet Take 500 mcg by mouth daily.  ascorbic acid, vitamin C, (VITAMIN C) 250 mg tablet Take 100 mg by mouth daily.  levothyroxine (SYNTHROID) 200 mcg tablet 1 tablet daily (take on an empty stomach) (stop \"old\" synthroid) 90 Tab 3    amLODIPine (NORVASC) 10 mg tablet Take 0.5 Tabs by mouth daily. 90 Tab 3    acetaminophen (TYLENOL) 500 mg tablet Take 1 Tab by mouth every six (6) hours as needed for Pain. 270 Tab 3    aspirin delayed-release 81 mg tablet Take  by mouth daily.  carvedilol (COREG) 12.5 mg tablet Take  by mouth two (2) times daily (with meals).  bumetanide (BUMEX) 0.5 mg tablet Take 2 mg by mouth two (2) times a day. Past Medical History:   Diagnosis Date    Asthma     Chronic venous insufficiency     Diabetes (HCC)     Hypertension     Peripheral neuropathy (HCC)     Rheumatoid arthritis (HCC)     Vertigo        History reviewed. No pertinent surgical history. Social History     Social History    Marital status:      Spouse name: N/A    Number of children: N/A    Years of education: N/A     Occupational History    Not on file. Social History Main Topics    Smoking status: Former Smoker    Smokeless tobacco: Never Used    Alcohol use No    Drug use: No    Sexual activity: No     Other Topics Concern    Not on file     Social History Narrative       Patient does not have an advanced directive on file    Visit Vitals    /80 (BP 1 Location: Left arm, BP Patient Position: Sitting)    Pulse 76    Temp 98 °F (36.7 °C) (Tympanic)    Resp 16    Ht 5' 5\" (1.651 m)    Wt 261 lb (118.4 kg)    SpO2 98%    BMI 43.43 kg/m2       Physical Exam    BMI:  I have reviewed/discussed the above normal BMI with the patient. I have recommended the following interventions: dietary management education, guidance, and counseling . Obed Stovall         Office Visit on 04/19/2017   Component Date Value Ref Range Status    OPIATES UR POC 04/19/2017 Presumptive Positive   Final       .No results found for any visits on 07/05/17. Assessment / Plan      ICD-10-CM ICD-9-CM    1. Uncontrolled type 2 diabetes mellitus without complication, with long-term current use of insulin ([facility]) X09.03 877.14 METABOLIC PANEL, COMPREHENSIVE    Z79.4 V58.67    2. Shoulder impingement, left M75.42 726.2 C REACTIVE PROTEIN, QT   3. Neck pain M54.2 723.1      Labs  Await MRI results  she was advised to continue her maintenance medications  Adjust insulin if labs warrent    Follow-up Disposition:  Return in about 4 weeks (around 8/2/2017). I asked Brandee López if she has any questions and I answered the questions.   Brandee López states that she understands the treatment plan and agrees with the treatment plan

## 2017-07-13 ENCOUNTER — OFFICE VISIT (OUTPATIENT)
Dept: ORTHOPEDIC SURGERY | Facility: CLINIC | Age: 82
End: 2017-07-13

## 2017-07-13 ENCOUNTER — TELEPHONE (OUTPATIENT)
Dept: ORTHOPEDIC SURGERY | Facility: CLINIC | Age: 82
End: 2017-07-13

## 2017-07-13 VITALS
WEIGHT: 265 LBS | RESPIRATION RATE: 15 BRPM | BODY MASS INDEX: 44.15 KG/M2 | TEMPERATURE: 97.4 F | SYSTOLIC BLOOD PRESSURE: 147 MMHG | HEART RATE: 86 BPM | DIASTOLIC BLOOD PRESSURE: 69 MMHG | HEIGHT: 65 IN

## 2017-07-13 DIAGNOSIS — R60.0 BILATERAL EDEMA OF LOWER EXTREMITY: ICD-10-CM

## 2017-07-13 DIAGNOSIS — L08.9 SUPERFICIAL SKIN INFECTION: Primary | ICD-10-CM

## 2017-07-13 RX ORDER — CEPHALEXIN 500 MG/1
500 CAPSULE ORAL 4 TIMES DAILY
Qty: 28 CAP | Refills: 0 | Status: SHIPPED | OUTPATIENT
Start: 2017-07-13 | End: 2017-08-23 | Stop reason: ALTCHOICE

## 2017-07-13 RX ORDER — CHOLECALCIFEROL (VITAMIN D3) 25 MCG
2000 TABLET ORAL DAILY
COMMUNITY
End: 2021-11-09

## 2017-07-13 NOTE — LETTER
NOTIFICATION RETURN TO WORK / SCHOOL 
 
7/13/2017 10:49 AM 
 
Ms. Andrade Hoffmann [address] [place] [phone] To Whom It May Concern: 
 
Andrade Hoffmann is currently under the care of [address] [place] [address]. Patient is under our care. Patient has severe osteoarthritis of several joints. She needs assistance in her active daily living and must move in with her son for safety reasons. If there are questions or concerns please have the patient contact our office.  
 
 
 
Sincerely, 
 
 
Kathleen Scanlon PA-C

## 2017-07-13 NOTE — TELEPHONE ENCOUNTER
Pt called in states that she has a missed call from our office from today right after her appt time and was here today 07/13/17. Pt is not sure if it is because she left her Rx or something else.  Please advise pt at [phone]

## 2017-07-13 NOTE — TELEPHONE ENCOUNTER
Tried calling patient but no answer and her voicemail isnt set up.  If she calls back, please let her know that she forgot her order for the compression stockings at  but she can have the order re printed from any of our offices

## 2017-07-13 NOTE — PROGRESS NOTES
[address] [facility], [address] [facility]  [phone]           Patient: Malik Simon                MRN: [id number]       SSN: [id number]  YOB: 1935        AGE: [de-identified] y.o. SEX: female  Body mass index is 44.1 kg/(m^2). PCP: Farzana Holman MD  07/13/17      This office note has been dictated. REVIEW OF SYSTEMS:  Constitutional: Negative for fever, chills, weight loss and malaise/fatigue. HENT: Negative. Eyes: Negative. Respiratory: Negative. Cardiovascular: Negative. Gastrointestinal: No bowel incontinence or constipation. Genitourinary: No bladder incontinence or saddle anesthesia. Skin: Negative. Neurological: Negative. Endo/Heme/Allergies: Negative. Psychiatric/Behavioral: Negative. Musculoskeletal: As per HPI above. Past Medical History:   Diagnosis Date    Asthma     Chronic venous insufficiency     Diabetes (HCC)     Hypertension     Peripheral neuropathy (HCC)     Rheumatoid arthritis (HCC)     Vertigo          Current Outpatient Prescriptions:     cholecalciferol (VITAMIN D3) 1,000 unit tablet, Take  by mouth daily. , Disp: , Rfl:     Insulin Needles, Disposable, 31 gauge x 5/16\" ndle, Use to inject insulin once daily Dx:E11.9, Disp: 3 Package, Rfl: 3    glucose blood VI test strips (FREESTYLE TEST) strip, Use to check blood glucose twice daily DX:E11.9, Disp: 300 Strip, Rfl: 3    morphine CR (MS CONTIN) 15 mg CR tablet, Take 1 Tab by mouth every eight (8) hours. Max Daily Amount: 45 mg., Disp: 90 Tab, Rfl: 0    [START ON 7/31/2017] morphine IR (MS IR) 15 mg tablet, Take 1 Tab by mouth three (3) times daily as needed for Pain for up to 30 days. Max Daily Amount: 45 mg., Disp: 90 Tab, Rfl: 0    naloxone 4 mg/actuation spry, 4 mg by Nasal route as needed.  For emergency use only  Indications: OPIATE-INDUCED RESPIRATORY DEPRESSION, Disp: 1 Package, Rfl: 0    insulin glargine (LANTUS) 100 unit/mL injection, 82 units daily, Disp: 3 Vial, Rfl: 3    gabapentin (NEURONTIN) 300 mg capsule, Take 1 Cap by mouth three (3) times daily. , Disp: 270 Cap, Rfl: 3    allopurinol (ZYLOPRIM) 100 mg tablet, Take 1 Tab by mouth daily. , Disp: 90 Tab, Rfl: 3    insulin aspart (NOVOLOG) 100 unit/mL inpn, 16 units before each meal, Disp: 30 mL, Rfl: 3    magnesium oxide 500 mg tab, Take  by mouth., Disp: , Rfl:     cyanocobalamin (VITAMIN B-12) 500 mcg tablet, Take 500 mcg by mouth daily. , Disp: , Rfl:     ascorbic acid, vitamin C, (VITAMIN C) 250 mg tablet, Take 100 mg by mouth daily. , Disp: , Rfl:     levothyroxine (SYNTHROID) 200 mcg tablet, 1 tablet daily (take on an empty stomach) (stop \"old\" synthroid), Disp: 90 Tab, Rfl: 3    amLODIPine (NORVASC) 10 mg tablet, Take 0.5 Tabs by mouth daily. , Disp: 90 Tab, Rfl: 3    acetaminophen (TYLENOL) 500 mg tablet, Take 1 Tab by mouth every six (6) hours as needed for Pain., Disp: 270 Tab, Rfl: 3    aspirin delayed-release 81 mg tablet, Take  by mouth daily. , Disp: , Rfl:     carvedilol (COREG) 12.5 mg tablet, Take  by mouth two (2) times daily (with meals). , Disp: , Rfl:     bumetanide (BUMEX) 0.5 mg tablet, Take 2 mg by mouth two (2) times a day., Disp: , Rfl:     celecoxib (CELEBREX) 200 mg capsule, 1 capsule twice per day with food, Disp: 180 Cap, Rfl: 0    No Known Allergies    Social History     Social History    Marital status:      Spouse name: N/A    Number of children: N/A    Years of education: N/A     Occupational History    Not on file. Social History Main Topics    Smoking status: Former Smoker    Smokeless tobacco: Never Used    Alcohol use No    Drug use: No    Sexual activity: No     Other Topics Concern    Not on file     Social History Narrative       History reviewed. No pertinent surgical history. We did see Ms. Charisse Crockett for followup with regards to her bilateral shoulders.   She was sent for an MRI of her shoulders. She returns today for reevaluation. The right is the worse of the two shoulders, however, each of them bother her. She has had ___:12 which has helped slightly. However, recently, it has not done much for her. The patient states she is having trouble in her activities of daily living due to her shoulder discomfort, as well as her lower extremities. She does continue to develop some swelling at times. She does continue on her fluid pill. She denies any chest pain or shortness of breath. PHYSICAL EXAMINATION: In general, the patient is alert and oriented x 3 and is in no acute distress. The patient is well-developed and well-nourished with a normal affect. The patient is afebrile. HEENT:  Head is normocephalic and atraumatic. Pupils are equally round and reactive to light and accommodation. Extraocular eye movements are intact. Neck is supple. Trachea is midline. No JVD is present. Breathing is nonlabored. Examination of the neck reveals good range of motion of the cervical spine without reproduction of symptoms. She has a negative Spurlings. Each shoulder reveals the skin is intact. There is no erythema, ecchymosis, no warmth, and no signs of infection or cellulitis present. Range of motion of the shoulder is about 80° forward flexion, 60° abduction, and 20° external rotation. She has decreased strength with external rotation bilaterally. She has a positive Montes and negative drop arm, Speeds, and OTcs. Radial pulses are 2+ and capillary refill is within normal limits. Examination of the lower extremities, reveals pain-free range of motion of the hips. She has negative straight leg raise, negative calf tenderness. There is mild edema to the bilateral lower extremities. There are some slight skin changes to the bilateral lower extremities without warmth noted. There is a negative calf tenderness, and negative Robertas sign.   There are no signs of DVT present. RADIOGRAPHS:  Review of the MRI of the right shoulder shows a full-thickness tear to the subscapularis, as well as a complete rupture of the supraspinatus and infraspinatus tendons. There are severe degenerative changes of the [facility] joint, as well as the glenohumeral joint. MRI of the lumbar spine shows a full-thickness rupture of the supraspinatus tendon and a superior labral tear. ASSESSMENT:      1. Bilateral shoulder rotator cuff pathology. 2. 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 3. Questionable mild cellulitis of the bilateral lower extremities. 4. Edema of the lower extremities. PLAN:  At this point, we are going to move forward with starting her on Keflex 500 mg qid times seven days. A prescription for BEN stockings was given, 30 mg of Mercury of compression. We did discuss treatment for the shoulders, including a reverse shoulder, as well as rotator cuff repair. The patient will continue with pain management. We did provide her letter today stating that she is unable to live on her own doing her activities of daily living. She needs to move in with family for safety reasons. We will see her back as-needed.                    JR Alphonso DOSS, PABerthaC, ATC

## 2017-07-14 NOTE — TELEPHONE ENCOUNTER
Called patient back and made her aware that the rx was sent to her pharmacy already.  She understands and will pick it up later today
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
## 2017-08-07 RX ORDER — PEN NEEDLE, DIABETIC 30 GX3/16"
NEEDLE, DISPOSABLE MISCELLANEOUS
Qty: 3 PACKAGE | Refills: 3 | Status: CANCELLED | OUTPATIENT
Start: 2017-08-07

## 2017-08-07 RX ORDER — CARVEDILOL 12.5 MG/1
12.5 TABLET ORAL 2 TIMES DAILY WITH MEALS
Qty: 180 TAB | Refills: 3 | Status: SHIPPED | OUTPATIENT
Start: 2017-08-07 | End: 2021-07-06 | Stop reason: SDUPTHER

## 2017-08-08 ENCOUNTER — OFFICE VISIT (OUTPATIENT)
Dept: PAIN MANAGEMENT | Age: 82
End: 2017-08-08

## 2017-08-08 VITALS
TEMPERATURE: 97 F | HEART RATE: 75 BPM | BODY MASS INDEX: 44.1 KG/M2 | DIASTOLIC BLOOD PRESSURE: 69 MMHG | WEIGHT: 265 LBS | SYSTOLIC BLOOD PRESSURE: 148 MMHG

## 2017-08-08 DIAGNOSIS — G89.29 CHRONIC LEFT SHOULDER PAIN: ICD-10-CM

## 2017-08-08 DIAGNOSIS — G89.29 CHRONIC RIGHT SHOULDER PAIN: ICD-10-CM

## 2017-08-08 DIAGNOSIS — M25.511 CHRONIC RIGHT SHOULDER PAIN: ICD-10-CM

## 2017-08-08 DIAGNOSIS — G89.4 CHRONIC PAIN SYNDROME: Primary | ICD-10-CM

## 2017-08-08 DIAGNOSIS — M75.42 SHOULDER IMPINGEMENT, LEFT: ICD-10-CM

## 2017-08-08 DIAGNOSIS — M19.011 ARTHRITIS OF RIGHT SHOULDER REGION: ICD-10-CM

## 2017-08-08 DIAGNOSIS — M75.101 TEAR OF RIGHT ROTATOR CUFF, UNSPECIFIED TEAR EXTENT: ICD-10-CM

## 2017-08-08 DIAGNOSIS — M25.512 CHRONIC LEFT SHOULDER PAIN: ICD-10-CM

## 2017-08-08 RX ORDER — MORPHINE SULFATE 30 MG/1
30 TABLET, FILM COATED, EXTENDED RELEASE ORAL EVERY 12 HOURS
Qty: 60 TAB | Refills: 0 | Status: SHIPPED | OUTPATIENT
Start: 2017-08-19 | End: 2017-08-08 | Stop reason: SDUPTHER

## 2017-08-08 RX ORDER — MORPHINE SULFATE 15 MG/1
15 TABLET ORAL
Qty: 90 TAB | Refills: 0 | Status: SHIPPED | OUTPATIENT
Start: 2017-09-26 | End: 2017-08-08 | Stop reason: SDUPTHER

## 2017-08-08 RX ORDER — MORPHINE SULFATE 30 MG/1
30 TABLET, FILM COATED, EXTENDED RELEASE ORAL EVERY 12 HOURS
Qty: 60 TAB | Refills: 0 | Status: SHIPPED | OUTPATIENT
Start: 2017-08-19 | End: 2017-09-20 | Stop reason: SDUPTHER

## 2017-08-08 RX ORDER — DICLOFENAC SODIUM 10 MG/G
4 GEL TOPICAL 4 TIMES DAILY
Qty: 5 EACH | Refills: 2 | Status: SHIPPED | OUTPATIENT
Start: 2017-08-08 | End: 2017-09-07

## 2017-08-08 RX ORDER — MORPHINE SULFATE 15 MG/1
15 TABLET ORAL
Qty: 90 TAB | Refills: 0 | Status: SHIPPED | OUTPATIENT
Start: 2017-08-27 | End: 2017-11-01 | Stop reason: SDUPTHER

## 2017-08-08 RX ORDER — MORPHINE SULFATE 30 MG/1
30 TABLET, FILM COATED, EXTENDED RELEASE ORAL EVERY 12 HOURS
Qty: 90 TAB | Refills: 0 | Status: SHIPPED | OUTPATIENT
Start: 2017-09-18 | End: 2017-08-08 | Stop reason: SDUPTHER

## 2017-08-08 RX ORDER — MORPHINE SULFATE 15 MG/1
15 TABLET ORAL
Qty: 90 TAB | Refills: 0 | Status: SHIPPED | OUTPATIENT
Start: 2017-09-26 | End: 2017-11-01 | Stop reason: SDUPTHER

## 2017-08-08 RX ORDER — MORPHINE SULFATE 30 MG/1
30 TABLET, FILM COATED, EXTENDED RELEASE ORAL EVERY 12 HOURS
Qty: 90 TAB | Refills: 0 | Status: SHIPPED | OUTPATIENT
Start: 2017-09-18 | End: 2017-11-01 | Stop reason: SDUPTHER

## 2017-08-08 RX ORDER — MORPHINE SULFATE 15 MG/1
15 TABLET ORAL
Qty: 90 TAB | Refills: 0 | Status: SHIPPED | OUTPATIENT
Start: 2017-08-27 | End: 2017-08-08 | Stop reason: SDUPTHER

## 2017-08-08 NOTE — PROGRESS NOTES
HISTORY OF PRESENT ILLNESS  Renee Monet is a [de-identified] y.o. female    HPI: Ms. Nickie Landry  returns today for f/u of chronic right shoulder pain. No h/o shoulder surgery. Surgery has been decline to to her heart condition. Prior injection with some improvement but temporary. PT with minimal imropvment. Ms. Nickie Landry reports mildly worsening pain since her last visit. During her last visit she reported new pain in her left shoulder. She has followed up with her orthopedic specialist who has told her that her left shoulder is just as bad if not worse than her right shoulder. Any surgery has been declined at this time due to her heart condition. She was given a shoulder injection during her visit with minimal to no improvement. She continues to complain of pain minimally controlled with her current treatment plan. We discussed several different options today. I have agreed to adjust her morphine ER up to 30 mg but would like to back this back down to every 12 hours. I encouraged her to use her morphine IR only as needed and this will remain up to 3 times daily. We will also start her back on Voltaren gel as well. She reports minimal improvement with this in the past but does not recall using the medication regularly. I have encouraged her to use this medication 3-4 times daily and she will also discuss this medication with her PCP as well. I will have her follow-up in 2 months for reassessment. Current medication management consists of morphine ER 15 mg every 12 hours and morphine IR 15 mg 4 times a day as needed. Gabapentin by another provider. Medications are helping with pain control and quality of life. Her pain is 7/10 with medication and 10/10 without. Pt describes pain as aching, burning, and stabbing. Aggravating factors include most ROM activity. Relieved with rest, medication, and avoiding painful activities.  Current treatment is helping to improve general activity, mood, walking, sleep, enjoyment of life    In the past 30 days, the patient reports approximately 30% pain relief with current treatment/medications. She  is otherwise doing well with no other complaints today. She denies any adverse events including nausea, vomiting, dizziness, constipation, hallucinations, or seizures. Because the patient's current regimen places him/her at increased risk for possible overdose, a prescription for naloxone nasal spray has been provided. The patient understands that this medication is only to be used in the setting of a possible overdose and that inadvertent use of this medication could precipitate overt withdrawal.       No Known Allergies    History reviewed. No pertinent surgical history. Review of Systems   Constitutional: Negative for chills, fever and weight loss. HENT: Negative for congestion and sore throat. Eyes: Negative for blurred vision and double vision. Respiratory: Negative for cough, shortness of breath and wheezing. Cardiovascular: Negative for chest pain and palpitations. Gastrointestinal: Positive for heartburn. Negative for constipation, diarrhea, nausea and vomiting. Genitourinary: Negative. Musculoskeletal: Positive for back pain, falls, joint pain and neck pain. Neurological: Negative for dizziness, seizures, loss of consciousness and headaches. Endo/Heme/Allergies: Does not bruise/bleed easily. Psychiatric/Behavioral: Positive for depression. Negative for suicidal ideas. The patient is not nervous/anxious and does not have insomnia. Physical Exam   Constitutional: She is oriented to person, place, and time and well-developed, well-nourished, and in no distress. No distress. HENT:   Head: Normocephalic and atraumatic. Eyes: EOM are normal.   Pulmonary/Chest: Effort normal.   Musculoskeletal:        Right shoulder: She exhibits decreased range of motion and tenderness.    Neurological: She is alert and oriented to person, place, and time. Gait (using a walker) abnormal.   Skin: Skin is warm and dry. No rash noted. She is not diaphoretic. No erythema. Psychiatric: Mood, memory, affect and judgment normal.   Nursing note and vitals reviewed. ASSESSMENT:    1. Chronic pain syndrome    2. Chronic right shoulder pain    3. Arthritis of right shoulder region    4. Shoulder impingement, left    5. Tear of right rotator cuff, unspecified tear extent    6. Chronic left shoulder pain         [place] Prescription Monitoring Program was reviewed which does not demonstrate aberrancies and/or inconsistencies with regard to the historical prescribing of controlled medications to this patient by other providers. PLAN / Pt Instructions:  1. Continue current plan with no evidence of addiction or diversion. Stable on current medication without adverse events. 2. Refill morphine IR 15 mg up to 3 times daily as needed. Plan to taper down next visit depending on her progress  3. Refill and adjust morphine ER 15 mg up to 30 mg and adjust back down to every 12 hours. 4. Add Voltaren gel 1%. Apply 3-4 times daily to each shoulder  5. Naloxone 4 mg nasal spray for opioid induced respiratory depression emergency only. 6. Discussed risks of addiction, dependency, and opioid induced hyperalgesia. 7. Return to clinic in 2 months     Medications Ordered Today   Medications    morphine CR (MS CONTIN) 30 mg CR tablet     Sig: Take 1 Tab by mouth every twelve (12) hours for 30 days. Max Daily Amount: 60 mg. Dispense:  90 Tab     Refill:  0    morphine CR (MS CONTIN) 30 mg CR tablet     Sig: Take 1 Tab by mouth every twelve (12) hours for 30 days. Max Daily Amount: 60 mg. Dispense:  60 Tab     Refill:  0    morphine IR (MS IR) 15 mg tablet     Sig: Take 1 Tab by mouth three (3) times daily as needed for Pain for up to 30 days. Max Daily Amount: 45 mg.      Dispense:  90 Tab     Refill:  0    morphine IR (MS IR) 15 mg tablet     Sig: Take 1 Tab by mouth three (3) times daily as needed for Pain for up to 30 days. Max Daily Amount: 45 mg. Dispense:  90 Tab     Refill:  0       Pain medications prescribed with the objective of pain relief and improved physical and psychosocial function in this patient. Spent 25 minutes with patient today reviewing the treatment plan, goals of treatment plan, and limitations of the treatment plan, to include the potential for side effects from medications and procedures. Vivek Leonard 8/8/2017      Note: Please excuse any typographical errors. Voice recognition software was used for this note and may cause mistakes.

## 2017-08-08 NOTE — PROGRESS NOTES
Nursing Notes    Patient presents to the office today in follow-up. Patient rates her pain at 9/10 on the numerical pain scale. Reviewed medications with counts as follows:    Rx Date filled Qty Dispensed Pill Count Last Dose Short     Morphine sulfate 15mg ER 07/20/17 90 59 This am  No    Morphine sulfate 15mg IR 07/28/17 90 65 This am  No                                     Comments:     POC UDS was not performed in office today    Any new labs or imaging since last appointment? YES; MRI of left shoulder     Have you been to an emergency room (ER) or urgent care clinic since your last visit? NO            Have you been hospitalized since your last visit? NO     If yes, where, when, and reason for visit? Have you seen or consulted any other health care providers outside of the [address] [place] [address]  since your last visit? YES     If yes, where, when, and reason for visit? HM deferred to pcp.

## 2017-08-08 NOTE — PATIENT INSTRUCTIONS
1. Continue current plan with no evidence of addiction or diversion. Stable on current medication without adverse events. 2. Refill morphine IR 15 mg up to 3 times daily as needed. Plan to taper down next visit depending on her progress  3. Refill and adjust morphine ER 15 mg up to 30 mg and adjust back down to every 12 hours. 4. Add Voltaren gel 1%. Apply 3-4 times daily to each shoulder  5. Naloxone 4 mg nasal spray for opioid induced respiratory depression emergency only. 6. Discussed risks of addiction, dependency, and opioid induced hyperalgesia. 7. Return to clinic in 2 months   8.

## 2017-08-08 NOTE — MR AVS SNAPSHOT
Visit Information Date & Time Provider Department Dept. Phone Encounter #  
 8/8/2017 10:00 AM [facility] [phone] [id number] Follow-up Instructions Return in about 2 months (around 10/8/2017). Upcoming Health Maintenance Date Due  
 LIPID PANEL Q1 1937 EYE EXAM RETINAL OR DILATED Q1 3/19/1947 DTaP/Tdap/Td series (1 - Tdap) 3/19/1958 ZOSTER VACCINE AGE 60> 1/19/1997 GLAUCOMA SCREENING Q2Y 3/19/2002 OSTEOPOROSIS SCREENING (DEXA) 3/19/2002 INFLUENZA AGE 9 TO ADULT 8/1/2017 HEMOGLOBIN A1C Q6M 10/4/2017 Pneumococcal 65+ Low/Medium Risk (2 of 2 - PPSV23) 10/18/2017 MEDICARE YEARLY EXAM 1/6/2018 FOOT EXAM Q1 4/4/2018 MICROALBUMIN Q1 4/4/2018 Allergies as of 8/8/2017  Review Complete On: 8/8/2017 By: IGNACIO Caruso No Known Allergies Current Immunizations  Never Reviewed No immunizations on file. Not reviewed this visit Vitals BP Pulse Temp Weight(growth percentile) BMI OB Status 148/69 (BP 1 Location: Left arm, BP Patient Position: Sitting) 75 97 °F (36.1 °C) (Oral) 265 lb (120.2 kg) 44.1 kg/m2 Hysterectomy Smoking Status Former Smoker BMI and BSA Data Body Mass Index Body Surface Area  
 44.1 kg/m 2 2.35 m 2 Preferred Pharmacy Pharmacy Name Phone [address] [place] [address], [address] [place] [address] Road [phone] Your Updated Medication List  
  
   
This list is accurate as of: 8/8/17 10:30 AM.  Always use your most recent med list.  
  
  
  
  
 acetaminophen 500 mg tablet Commonly known as:  TYLENOL Take 1 Tab by mouth every six (6) hours as needed for Pain. allopurinol 100 mg tablet Commonly known as:  Merry Kahn Take 1 Tab by mouth daily. amLODIPine 10 mg tablet Commonly known as:  Anuj Henriquez Take 0.5 Tabs by mouth daily. aspirin delayed-release 81 mg tablet Take  by mouth daily. bumetanide 0.5 mg tablet Commonly known as:  Devin Michaelmalawrence Take 2 mg by mouth two (2) times a day. carvedilol 12.5 mg tablet Commonly known as:  Karley Finder Take 1 Tab by mouth two (2) times daily (with meals). celecoxib 200 mg capsule Commonly known as:  CELEBREX  
1 capsule twice per day with food  
  
 cephALEXin 500 mg capsule Commonly known as:  Junius Alpers Take 1 Cap by mouth four (4) times daily. diclofenac 1 % Gel Commonly known as:  VOLTAREN Apply 4 g to affected area four (4) times daily for 30 days. gabapentin 300 mg capsule Commonly known as:  NEURONTIN Take 1 Cap by mouth three (3) times daily. glucose blood VI test strips strip Commonly known as:  FREESTYLE TEST Use to check blood glucose twice daily DX:E11.9  
  
 insulin aspart 100 unit/mL Inpn Commonly known as:  Suhas Piles 16 units before each meal  
  
 insulin glargine 100 unit/mL injection Commonly known as:  LANTUS  
82 units daily Insulin Needles (Disposable) 31 gauge x 5/16\" Ndle Use to inject insulin once daily Dx:E11.9  
  
 insulin syringe,safetyneedle 1 mL 31 gauge x 5/16\" Syrg 1 Each by Does Not Apply route daily. levothyroxine 200 mcg tablet Commonly known as:  SYNTHROID  
1 tablet daily (take on an empty stomach) (stop \"old\" synthroid)  
  
 magnesium oxide 500 mg Tab Take  by mouth. * morphine CR 30 mg CR tablet Commonly known as:  MS CONTIN Take 1 Tab by mouth every twelve (12) hours for 30 days. Max Daily Amount: 60 mg. Start taking on:  8/19/2017 * morphine IR 15 mg tablet Commonly known as:  MS IR Take 1 Tab by mouth three (3) times daily as needed for Pain for up to 30 days. Max Daily Amount: 45 mg. Start taking on:  8/27/2017 * morphine CR 30 mg CR tablet Commonly known as:  MS CONTIN Take 1 Tab by mouth every twelve (12) hours for 30 days. Max Daily Amount: 60 mg. Start taking on:  9/18/2017 * morphine IR 15 mg tablet Commonly known as:  MS IR Take 1 Tab by mouth three (3) times daily as needed for Pain for up to 30 days. Max Daily Amount: 45 mg. Start taking on:  9/26/2017  
  
 naloxone 4 mg/actuation Spry 4 mg by Nasal route as needed. For emergency use only  Indications: OPIATE-INDUCED RESPIRATORY DEPRESSION  
  
 VITAMIN B-12 500 mcg tablet Generic drug:  cyanocobalamin Take 500 mcg by mouth daily. VITAMIN C 250 mg tablet Generic drug:  ascorbic acid (vitamin C) Take 100 mg by mouth daily. VITAMIN D3 1,000 unit tablet Generic drug:  cholecalciferol Take  by mouth daily. * Notice: This list has 4 medication(s) that are the same as other medications prescribed for you. Read the directions carefully, and ask your doctor or other care provider to review them with you. Prescriptions Printed Refills  
 morphine CR (MS CONTIN) 30 mg CR tablet 0 Starting on: 9/18/2017 Sig: Take 1 Tab by mouth every twelve (12) hours for 30 days. Max Daily Amount: 60 mg.  
 Class: Print Route: Oral  
 morphine CR (MS CONTIN) 30 mg CR tablet 0 Starting on: 8/19/2017 Sig: Take 1 Tab by mouth every twelve (12) hours for 30 days. Max Daily Amount: 60 mg.  
 Class: Print Route: Oral  
 morphine IR (MS IR) 15 mg tablet 0 Starting on: 9/26/2017 Sig: Take 1 Tab by mouth three (3) times daily as needed for Pain for up to 30 days. Max Daily Amount: 45 mg.  
 Class: Print Route: Oral  
 morphine IR (MS IR) 15 mg tablet 0 Starting on: 8/27/2017 Sig: Take 1 Tab by mouth three (3) times daily as needed for Pain for up to 30 days. Max Daily Amount: 45 mg.  
 Class: Print Route: Oral  
  
Follow-up Instructions Return in about 2 months (around 10/8/2017). Patient Instructions 1. Continue current plan with no evidence of addiction or diversion. Stable on current medication without adverse events. 2. Refill morphine IR 15 mg up to 3 times daily as needed. Plan to taper down next visit depending on her progress 3. Refill and adjust morphine ER 15 mg up to 30 mg and adjust back down to every 12 hours. 4. Naloxone 4 mg nasal spray for opioid induced respiratory depression emergency only. 5. Discussed risks of addiction, dependency, and opioid induced hyperalgesia. 6. Return to clinic in 2 months Introducing [facility]! Dear Suzanne Calero: Thank you for requesting a [employer]ure account. Our records indicate that you already have an active amcure account. You can access your account anytime at [email or web address] Did you know that you can access your hospital and ER discharge instructions at any time in amcure? You can also review all of your test results from your hospital stay or ER visit. Additional Information If you have questions, please visit the Frequently Asked Questions section of the amcure website at [email or web address] Remember, amcure is NOT to be used for urgent needs. For medical emergencies, dial 911. Now available from your iPhone and Android! Please provide this summary of care documentation to your next provider. Your primary care clinician is listed as Rigo Coburn. If you have any questions after today's visit, please call [phone].

## 2017-08-23 ENCOUNTER — OFFICE VISIT (OUTPATIENT)
Dept: INTERNAL MEDICINE CLINIC | Age: 82
End: 2017-08-23

## 2017-08-23 VITALS
SYSTOLIC BLOOD PRESSURE: 138 MMHG | OXYGEN SATURATION: 97 % | TEMPERATURE: 97.2 F | HEART RATE: 92 BPM | HEIGHT: 65 IN | DIASTOLIC BLOOD PRESSURE: 60 MMHG | RESPIRATION RATE: 16 BRPM

## 2017-08-23 DIAGNOSIS — M25.512 CHRONIC LEFT SHOULDER PAIN: ICD-10-CM

## 2017-08-23 DIAGNOSIS — L30.9 DERMATITIS: ICD-10-CM

## 2017-08-23 DIAGNOSIS — G89.29 CHRONIC LEFT SHOULDER PAIN: ICD-10-CM

## 2017-08-23 NOTE — PROGRESS NOTES
1. Have you been to the ER, urgent care clinic since your last visit? Hospitalized since your last visit? No    2. Have you seen or consulted any other health care providers outside of the [address] [place] [address] since your last visit? Include any pap smears or colon screening.  No

## 2017-08-23 NOTE — PATIENT INSTRUCTIONS
Health Maintenance Due   Topic Date Due    Cholesterol Test   1935    Eye Exam  03/19/1947    DTaP/Tdap/Td  (1 - Tdap) 03/19/1958    Shingles Vaccine  01/19/1997    Glaucoma Screening   03/19/2002    Bone Density Screening  03/19/2002    Flu Vaccine  08/01/2017

## 2017-08-23 NOTE — MR AVS SNAPSHOT
Visit Information Date & Time Provider Department Dept. Phone Encounter #  
 8/23/2017  9:15 AM Sana Disla MD [facility] Giovanna Purvis [phone] [id number] Follow-up Instructions Return in about 6 months (around 2/23/2018). Your Appointments 9/13/2017  8:30 AM  
Follow Up with Lashonda Vazquez PA-C  
[facility]) Appt Note: lt shoulder f/u  
 [address], [address] Liliana Gallardo  
[phone]  
  
   
 [address], [address] [facility] [address]  
  
    
 10/4/2017 10:20 AM  
Follow Up with IGNACIO [facility] (JANET SCHEDULING) Appt Note: return in 2 months [address] [facility] [address] [phone] [facility] [id number] Upcoming Health Maintenance Date Due  
 LIPID PANEL Q1 1937 EYE EXAM RETINAL OR DILATED Q1 3/19/1947 DTaP/Tdap/Td series (1 - Tdap) 3/19/1958 ZOSTER VACCINE AGE 60> 1/19/1997 GLAUCOMA SCREENING Q2Y 3/19/2002 OSTEOPOROSIS SCREENING (DEXA) 3/19/2002 INFLUENZA AGE 9 TO ADULT 8/1/2017 HEMOGLOBIN A1C Q6M 10/4/2017 Pneumococcal 65+ Low/Medium Risk (2 of 2 - PPSV23) 10/18/2017 MEDICARE YEARLY EXAM 1/6/2018 FOOT EXAM Q1 4/4/2018 MICROALBUMIN Q1 4/4/2018 Allergies as of 8/23/2017  Review Complete On: 8/23/2017 By: Sana Disla MD  
 No Known Allergies Current Immunizations  Never Reviewed No immunizations on file. Not reviewed this visit You Were Diagnosed With   
  
 Codes Comments Uncontrolled type 2 diabetes mellitus without complication, with long-term current use of insulin ([facility]    -  Primary ICD-10-CM: E11.65, Z79.4 ICD-9-CM: 250.02, V58.67 Chronic left shoulder pain     ICD-10-CM: M25.512, G89.29 ICD-9-CM: 719.41, 338.29 Dermatitis     ICD-10-CM: L30.9 ICD-9-CM: 692.9 Vitals BP Pulse Temp Resp Height(growth percentile) SpO2  
 138/60 (BP 1 Location: Right arm, BP Patient Position: Sitting) 92 97.2 °F (36.2 °C) (Tympanic) 16 5' 5\" (1.651 m) 97% OB Status Smoking Status Hysterectomy Former Smoker Preferred Pharmacy Pharmacy Name Phone [address] [place] [address], [address] [place] [address] Road [phone] Your Updated Medication List  
  
   
This list is accurate as of: 8/23/17 10:57 AM.  Always use your most recent med list.  
  
  
  
  
 acetaminophen 500 mg tablet Commonly known as:  TYLENOL Take 1 Tab by mouth every six (6) hours as needed for Pain. allopurinol 100 mg tablet Commonly known as:  Deanna Carmen Take 1 Tab by mouth daily. amLODIPine 10 mg tablet Commonly known as:  Erenest Joon Take 0.5 Tabs by mouth daily. aspirin delayed-release 81 mg tablet Take  by mouth daily. bumetanide 0.5 mg tablet Commonly known as:  Ruthie Noun Take 2 mg by mouth two (2) times a day. carvedilol 12.5 mg tablet Commonly known as:  [place] Wilye Take 1 Tab by mouth two (2) times daily (with meals). celecoxib 200 mg capsule Commonly known as:  CELEBREX  
1 capsule twice per day with food  
  
 diclofenac 1 % Gel Commonly known as:  VOLTAREN Apply 4 g to affected area four (4) times daily for 30 days. gabapentin 300 mg capsule Commonly known as:  NEURONTIN Take 1 Cap by mouth three (3) times daily. glucose blood VI test strips strip Commonly known as:  FREESTYLE TEST Use to check blood glucose twice daily DX:E11.9  
  
 insulin aspart 100 unit/mL Inpn Commonly known as:  Alradha Will 16 units before each meal  
  
 insulin glargine 100 unit/mL injection Commonly known as:  LANTUS  
82 units daily Insulin Needles (Disposable) 31 gauge x 5/16\" Ndle Use to inject insulin once daily Dx:E11.9  
  
 insulin syringe,safetyneedle 1 mL 31 gauge x 5/16\" Syrg 1 Each by Does Not Apply route daily. levothyroxine 200 mcg tablet Commonly known as:  SYNTHROID  
1 tablet daily (take on an empty stomach) (stop \"old\" synthroid)  
  
 magnesium oxide 500 mg Tab Take  by mouth. * morphine CR 30 mg CR tablet Commonly known as:  MS CONTIN Take 1 Tab by mouth every twelve (12) hours for 30 days. Max Daily Amount: 60 mg.  
  
 * morphine IR 15 mg tablet Commonly known as:  MS IR Take 1 Tab by mouth three (3) times daily as needed for Pain for up to 30 days. Max Daily Amount: 45 mg. Start taking on:  8/27/2017 * morphine CR 30 mg CR tablet Commonly known as:  MS CONTIN Take 1 Tab by mouth every twelve (12) hours for 30 days. Max Daily Amount: 60 mg. Start taking on:  9/18/2017 * morphine IR 15 mg tablet Commonly known as:  MS IR Take 1 Tab by mouth three (3) times daily as needed for Pain for up to 30 days. Max Daily Amount: 45 mg. Start taking on:  9/26/2017  
  
 naloxone 4 mg/actuation Spry 4 mg by Nasal route as needed. For emergency use only  Indications: OPIATE-INDUCED RESPIRATORY DEPRESSION  
  
 VITAMIN B-12 500 mcg tablet Generic drug:  cyanocobalamin Take 500 mcg by mouth daily. VITAMIN C 250 mg tablet Generic drug:  ascorbic acid (vitamin C) Take 100 mg by mouth daily. VITAMIN D3 1,000 unit tablet Generic drug:  cholecalciferol Take  by mouth daily. * Notice: This list has 4 medication(s) that are the same as other medications prescribed for you. Read the directions carefully, and ask your doctor or other care provider to review them with you. We Performed the Following  DIABETES FOOT EXAM [7 Custom] Follow-up Instructions Return in about 6 months (around 2/23/2018). Patient Instructions Health Maintenance Due Topic Date Due  Cholesterol Test   1935  Eye Exam  03[birth year]19/1945  
 DTaP/Tdap/Td  (1 - Tdap) 03/19/1958  Shingles Vaccine  01/19/1997  Glaucoma Screening   03/19/2002  Bone Density Screening  03/19/2002  Flu Vaccine  08/01/2017 Introducing [address]! Dear Catia Sutton: Thank you for requesting a [employer] account. Our records indicate that you already have an active [employer] account. You can access your account anytime at [email or web address] Did you know that you can access your hospital and ER discharge instructions at any time in [employer]? You can also review all of your test results from your hospital stay or ER visit. Additional Information If you have questions, please visit the Frequently Asked Questions section of the [employer] website at [email or web address] Remember, [employer] is NOT to be used for urgent needs. For medical emergencies, dial 911. Now available from your iPhone and Android! Please provide this summary of care documentation to your next provider. Your primary care clinician is listed as Colton Stevens. If you have any questions after today's visit, please call [phone].

## 2017-08-23 NOTE — PROGRESS NOTES
The patient presents to the office today with the chief complaint of Diabetes Mellitus    HPI    The patient remains on insulin for type II diabetes mellitus. she checks blood sugars at home daily. Her sugars are still running a bit high. The patient has not had any low sugars. The patient remains on medications for coronary artery disease. she is tolerating the medications well. The patient has chronic pain in multiple joints. The worse area is her left shoulder where she has severe pain with greatly limited range of motion. A shoulder replacement has been considered but it felt that the patient cannot physically get through the surgery and necessary physical therapy. The patient persists with sores on her abdomen that she states are from bug bites. There is also an open are at the top of the incision where the patient had a hysterectomy      Review of Systems   Respiratory: Negative for shortness of breath. Cardiovascular: Negative for chest pain and leg swelling. Musculoskeletal: Positive for joint pain. No Known Allergies    Current Outpatient Prescriptions   Medication Sig Dispense Refill    diclofenac (VOLTAREN) 1 % gel Apply 4 g to affected area four (4) times daily for 30 days. 5 Each 2    [START ON 9/18/2017] morphine CR (MS CONTIN) 30 mg CR tablet Take 1 Tab by mouth every twelve (12) hours for 30 days. Max Daily Amount: 60 mg. 90 Tab 0    morphine CR (MS CONTIN) 30 mg CR tablet Take 1 Tab by mouth every twelve (12) hours for 30 days. Max Daily Amount: 60 mg. 60 Tab 0    [START ON 9/26/2017] morphine IR (MS IR) 15 mg tablet Take 1 Tab by mouth three (3) times daily as needed for Pain for up to 30 days. Max Daily Amount: 45 mg. 90 Tab 0    [START ON 8/27/2017] morphine IR (MS IR) 15 mg tablet Take 1 Tab by mouth three (3) times daily as needed for Pain for up to 30 days.  Max Daily Amount: 45 mg. 90 Tab 0    carvedilol (COREG) 12.5 mg tablet Take 1 Tab by mouth two (2) times daily (with meals). 180 Tab 3    insulin syringe,safetyneedle 1 mL 31 gauge x 5/16\" syrg 1 Each by Does Not Apply route daily. 300 Each 3    cholecalciferol (VITAMIN D3) 1,000 unit tablet Take  by mouth daily.  Insulin Needles, Disposable, 31 gauge x 5/16\" ndle Use to inject insulin once daily Dx:E11.9 3 Package 3    glucose blood VI test strips (FREESTYLE TEST) strip Use to check blood glucose twice daily DX:E11.9 300 Strip 3    naloxone 4 mg/actuation spry 4 mg by Nasal route as needed. For emergency use only  Indications: OPIATE-INDUCED RESPIRATORY DEPRESSION 1 Package 0    celecoxib (CELEBREX) 200 mg capsule 1 capsule twice per day with food 180 Cap 0    insulin glargine (LANTUS) 100 unit/mL injection 82 units daily 3 Vial 3    gabapentin (NEURONTIN) 300 mg capsule Take 1 Cap by mouth three (3) times daily. 270 Cap 3    allopurinol (ZYLOPRIM) 100 mg tablet Take 1 Tab by mouth daily. 90 Tab 3    insulin aspart (NOVOLOG) 100 unit/mL inpn 16 units before each meal 30 mL 3    magnesium oxide 500 mg tab Take  by mouth.  cyanocobalamin (VITAMIN B-12) 500 mcg tablet Take 500 mcg by mouth daily.  ascorbic acid, vitamin C, (VITAMIN C) 250 mg tablet Take 100 mg by mouth daily.  levothyroxine (SYNTHROID) 200 mcg tablet 1 tablet daily (take on an empty stomach) (stop \"old\" synthroid) 90 Tab 3    amLODIPine (NORVASC) 10 mg tablet Take 0.5 Tabs by mouth daily. 90 Tab 3    acetaminophen (TYLENOL) 500 mg tablet Take 1 Tab by mouth every six (6) hours as needed for Pain. 270 Tab 3    aspirin delayed-release 81 mg tablet Take  by mouth daily.  bumetanide (BUMEX) 0.5 mg tablet Take 2 mg by mouth two (2) times a day. Past Medical History:   Diagnosis Date    Asthma     Chronic venous insufficiency     Diabetes (HCC)     Hypertension     Peripheral neuropathy (HCC)     Rheumatoid arthritis (HCC)     Vertigo        History reviewed. No pertinent surgical history.     Social History Social History    Marital status:      Spouse name: N/A    Number of children: N/A    Years of education: N/A     Occupational History    Not on file. Social History Main Topics    Smoking status: Former Smoker    Smokeless tobacco: Never Used    Alcohol use No    Drug use: No    Sexual activity: No     Other Topics Concern    Not on file     Social History Narrative       Patient does not have an advanced directive on file    Visit Vitals    /60 (BP 1 Location: Right arm, BP Patient Position: Sitting)    Pulse 92    Temp 97.2 °F (36.2 °C) (Tympanic)    Resp 16    Ht 5' 5\" (1.651 m)    SpO2 97%       Physical Exam   Neck: Carotid bruit is not present. No thyromegaly present. Cardiovascular: Normal rate and regular rhythm. Exam reveals no gallop. No murmur heard. Pulmonary/Chest: No respiratory distress. She has no wheezes. She exhibits no tenderness. Abdominal: Soft. Bowel sounds are normal. She exhibits no distension and no mass. There is no tenderness. Musculoskeletal: She exhibits no edema. Lymphadenopathy:     She has no cervical adenopathy. Skin:   Several small shallow ulcer on her abdomen with an open area at the top of the scar from a pervious hysterectomy       DM Foot:  Diabetic foot exam:     Left: Reflexes 2+     Vibratory sensation diminished    Proprioception normal   Sharp/dull discrimination normal    Filament test 3/6   Pulse DP: 2+ (normal)   Pulse PT: 2+ (normal)   Deformities: None  Right: Reflexes 2+   Vibratory sensation absent   Proprioception normal   Sharp/dull discrimination normal   Filament test 3/6   Pulse DP: 2+ (normal)   Pulse PT: 2+ (normal)   Deformities: None      BMI:  I have reviewed/discussed the above normal BMI with the patient. I have recommended the following interventions: dietary management education, guidance, and counseling . [facility] Outpatient Visit on 07/05/2017   Component Date Value Ref Range Status    Sodium 07/05/2017 139  136 - 145 mmol/L Final    Potassium 07/05/2017 4.6  3.5 - 5.5 mmol/L Final    Chloride 07/05/2017 101  100 - 108 mmol/L Final    CO2 07/05/2017 29  21 - 32 mmol/L Final    Anion gap 07/05/2017 9  3.0 - 18 mmol/L Final    Glucose 07/05/2017 250* 74 - 99 mg/dL Final    BUN 07/05/2017 19* 7.0 - 18 MG/DL Final    Creatinine 07/05/2017 1.06  0.6 - 1.3 MG/DL Final    BUN/Creatinine ratio 07/05/2017 18  12 - 20   Final    GFR est AA 07/05/2017 >60  >60 ml/min/1.73m2 Final    GFR est non-AA 07/05/2017 50* >60 ml/min/1.73m2 Final    Comment: (NOTE)  Estimated GFR is calculated using the Modification of Diet in Renal   Disease (MDRD) Study equation, reported for both  Americans   (GFRAA) and non- Americans (GFRNA), and normalized to 1.73m2   body surface area. The physician must decide which value applies to   the patient. The MDRD study equation should only be used in   individuals age 25 or older. It has not been validated for the   following: pregnant women, patients with serious comorbid conditions,   or on certain medications, or persons with extremes of body size,   muscle mass, or nutritional status.  Calcium 07/05/2017 8.8  8.5 - 10.1 MG/DL Final    Bilirubin, total 07/05/2017 0.3  0.2 - 1.0 MG/DL Final    ALT (SGPT) 07/05/2017 11* 13 - 56 U/L Final    AST (SGOT) 07/05/2017 12* 15 - 37 U/L Final    Alk. phosphatase 07/05/2017 76  45 - 117 U/L Final    Protein, total 07/05/2017 7.6  6.4 - 8.2 g/dL Final    Albumin 07/05/2017 3.5  3.4 - 5.0 g/dL Final    Globulin 07/05/2017 4.1* 2.0 - 4.0 g/dL Final    A-G Ratio 07/05/2017 0.9  0.8 - 1.7   Final    C-Reactive protein 07/05/2017 2.4* 0 - 0.3 mg/dL Final       .No results found for any visits on 08/23/17. Assessment / Plan      ICD-10-CM ICD-9-CM    1.  Uncontrolled type 2 diabetes mellitus without complication, with long-term current use of insulin ([facility]) E11.65 250.02  DIABETES FOOT EXAM    Z79.4 V58.67 AMB POC GLUCOSE BLOOD, BY GLUCOSE MONITORING DEVICE   2. Chronic left shoulder pain M25.512 719.41 AMB POC GLUCOSE BLOOD, BY GLUCOSE MONITORING DEVICE    G89.29 338.29    3. Dermatitis L30.9 692.9 AMB POC GLUCOSE BLOOD, BY GLUCOSE MONITORING DEVICE     Open area at incision was cleaned and dressed with Betadine  Increase Novolog to 24 units AC  she was advised to continue her maintenance medications    Follow-up Disposition:  Return in about 6 months (around 2/23/2018). I asked Bernard Longo if she has any questions and I answered the questions.   Bernard Longo states that she understands the treatment plan and agrees with the treatment plan

## 2017-09-13 ENCOUNTER — OFFICE VISIT (OUTPATIENT)
Dept: ORTHOPEDIC SURGERY | Facility: CLINIC | Age: 82
End: 2017-09-13

## 2017-09-13 VITALS
WEIGHT: 263.6 LBS | RESPIRATION RATE: 20 BRPM | SYSTOLIC BLOOD PRESSURE: 155 MMHG | OXYGEN SATURATION: 94 % | BODY MASS INDEX: 43.92 KG/M2 | HEIGHT: 65 IN | TEMPERATURE: 97.8 F | DIASTOLIC BLOOD PRESSURE: 55 MMHG | HEART RATE: 80 BPM

## 2017-09-13 DIAGNOSIS — G89.29 CHRONIC LEFT SHOULDER PAIN: Primary | ICD-10-CM

## 2017-09-13 DIAGNOSIS — M19.012 PRIMARY OSTEOARTHRITIS, LEFT SHOULDER: ICD-10-CM

## 2017-09-13 DIAGNOSIS — M25.512 CHRONIC LEFT SHOULDER PAIN: Primary | ICD-10-CM

## 2017-09-13 RX ORDER — TRIAMCINOLONE ACETONIDE 40 MG/ML
80 INJECTION, SUSPENSION INTRA-ARTICULAR; INTRAMUSCULAR ONCE
Qty: 2 ML | Refills: 0
Start: 2017-09-13 | End: 2017-09-13

## 2017-09-13 RX ORDER — LIDOCAINE HYDROCHLORIDE 10 MG/ML
6 INJECTION INFILTRATION; PERINEURAL ONCE
Qty: 6 ML | Refills: 0
Start: 2017-09-13 | End: 2017-09-13

## 2017-09-13 NOTE — PROGRESS NOTES
[address] [facility], [address] [facility]  [phone]           Patient: Frances Escudero                MRN: [id number]       SSN: [id number]  YOB: 1935        AGE: [de-identified] y.o. SEX: female  Body mass index is 43.87 kg/(m^2). PCP: Jimbo Rivera MD  09/13/17      This office note has been dictated. REVIEW OF SYSTEMS:  Constitutional: Negative for fever, chills, weight loss and malaise/fatigue. HENT: Negative. Eyes: Negative. Respiratory: Negative. Cardiovascular: Negative. Gastrointestinal: No bowel incontinence or constipation. Genitourinary: No bladder incontinence or saddle anesthesia. Skin: Negative. Neurological: Negative. Endo/Heme/Allergies: Negative. Psychiatric/Behavioral: Negative. Musculoskeletal: As per HPI above. Past Medical History:   Diagnosis Date    Asthma     Chronic venous insufficiency     Diabetes (HCC)     Hypertension     Peripheral neuropathy (HCC)     Rheumatoid arthritis (HCC)     Vertigo          Current Outpatient Prescriptions:     triamcinolone acetonide (KENALOG) 40 mg/mL injection, 2 mL by Intra artICUlar route once for 1 dose., Disp: 2 mL, Rfl: 0    lidocaine (XYLOCAINE) 10 mg/mL (1 %) injection, 6 mL by Intra artICUlar route once for 1 dose., Disp: 6 mL, Rfl: 0    [START ON 9/18/2017] morphine CR (MS CONTIN) 30 mg CR tablet, Take 1 Tab by mouth every twelve (12) hours for 30 days. Max Daily Amount: 60 mg., Disp: 90 Tab, Rfl: 0    [START ON 9/26/2017] morphine IR (MS IR) 15 mg tablet, Take 1 Tab by mouth three (3) times daily as needed for Pain for up to 30 days. Max Daily Amount: 45 mg., Disp: 90 Tab, Rfl: 0    carvedilol (COREG) 12.5 mg tablet, Take 1 Tab by mouth two (2) times daily (with meals). , Disp: 180 Tab, Rfl: 3    insulin syringe,safetyneedle 1 mL 31 gauge x 5/16\" syrg, 1 Each by Does Not Apply route daily. , Disp: 300 Each, Rfl: 3   cholecalciferol (VITAMIN D3) 1,000 unit tablet, Take  by mouth daily. , Disp: , Rfl:     Insulin Needles, Disposable, 31 gauge x 5/16\" ndle, Use to inject insulin once daily Dx:E11.9, Disp: 3 Package, Rfl: 3    glucose blood VI test strips (FREESTYLE TEST) strip, Use to check blood glucose twice daily DX:E11.9, Disp: 300 Strip, Rfl: 3    naloxone 4 mg/actuation spry, 4 mg by Nasal route as needed. For emergency use only  Indications: OPIATE-INDUCED RESPIRATORY DEPRESSION, Disp: 1 Package, Rfl: 0    insulin glargine (LANTUS) 100 unit/mL injection, 82 units daily, Disp: 3 Vial, Rfl: 3    gabapentin (NEURONTIN) 300 mg capsule, Take 1 Cap by mouth three (3) times daily. , Disp: 270 Cap, Rfl: 3    allopurinol (ZYLOPRIM) 100 mg tablet, Take 1 Tab by mouth daily. , Disp: 90 Tab, Rfl: 3    insulin aspart (NOVOLOG) 100 unit/mL inpn, 16 units before each meal, Disp: 30 mL, Rfl: 3    magnesium oxide 500 mg tab, Take  by mouth., Disp: , Rfl:     cyanocobalamin (VITAMIN B-12) 500 mcg tablet, Take 500 mcg by mouth daily. , Disp: , Rfl:     ascorbic acid, vitamin C, (VITAMIN C) 250 mg tablet, Take 100 mg by mouth daily. , Disp: , Rfl:     levothyroxine (SYNTHROID) 200 mcg tablet, 1 tablet daily (take on an empty stomach) (stop \"old\" synthroid), Disp: 90 Tab, Rfl: 3    amLODIPine (NORVASC) 10 mg tablet, Take 0.5 Tabs by mouth daily. , Disp: 90 Tab, Rfl: 3    acetaminophen (TYLENOL) 500 mg tablet, Take 1 Tab by mouth every six (6) hours as needed for Pain., Disp: 270 Tab, Rfl: 3    aspirin delayed-release 81 mg tablet, Take  by mouth daily. , Disp: , Rfl:     bumetanide (BUMEX) 0.5 mg tablet, Take 2 mg by mouth two (2) times a day., Disp: , Rfl:     morphine CR (MS CONTIN) 30 mg CR tablet, Take 1 Tab by mouth every twelve (12) hours for 30 days. Max Daily Amount: 60 mg., Disp: 60 Tab, Rfl: 0    morphine IR (MS IR) 15 mg tablet, Take 1 Tab by mouth three (3) times daily as needed for Pain for up to 30 days.  Max Daily Amount: 45 mg., Disp: 90 Tab, Rfl: 0    celecoxib (CELEBREX) 200 mg capsule, 1 capsule twice per day with food, Disp: 180 Cap, Rfl: 0    No Known Allergies    Social History     Social History    Marital status:      Spouse name: N/A    Number of children: N/A    Years of education: N/A     Occupational History    Not on file. Social History Main Topics    Smoking status: Former Smoker    Smokeless tobacco: Never Used    Alcohol use No    Drug use: No    Sexual activity: No     Other Topics Concern    Not on file     Social History Narrative       History reviewed. No pertinent surgical history. * Patient was identified by name and date of birth   * Agreement on procedure being performed was verified  * Risks and Benefits explained to the patient  * Procedure site verified and marked as necessary  * Patient was positioned for comfort  * Consent was signed and verified  8:51 AM    The patient was instructed on post injection care. We did see Ms. Maria Isabel Young for followup with regards to left shoulder. The patient does have known advanced arthritis of the left shoulder with rotator cuff pathology. She is unable to have surgical intervention for the shoulder. It does continue to cause her troubles, especially at night. She gets radiating pain in her upper extremity. She has decreased range of motion. She states she is doing exercises on her own at home. She has had no recent fevers, chills, systemic changes, and no injuries to report. The patient does report continuation of pain management. She is currently on Morphine 30 mg q 12 hours extended release. PHYSICAL EXAMINATION:  ________ positive Montes, negative drop arm, Speeds, and OTcs. Radial pulse is 2+ and capillary refill if within normal limits. ASSESSMENT:  Left shoulder subacromial bursitis, impingement syndrome, rotator cuff pathology, osteoarthritis.      PLAN:  We will continue conservative treatments. I did discuss physical therapy. She declines. She will continue on a home exercise program.  We are going to move forward with a cortisone injection for the left shoulder today. Under aseptic conditions, and after informed and written consent, the left shoulder was prepped with Betadine and 6 mg of Celestone was injected without complications. The patient tolerated the injection well. The patient was instructed on post injection care. We will see her back in the office in about three months time for evaluation. She will continue with pain management.                     JR Alphonso DOSS, PABerthaC, ATC

## 2017-09-20 RX ORDER — MORPHINE SULFATE 30 MG/1
30 TABLET, FILM COATED, EXTENDED RELEASE ORAL EVERY 12 HOURS
Qty: 60 TAB | Refills: 0 | Status: SHIPPED | OUTPATIENT
Start: 2017-09-20 | End: 2017-10-04

## 2017-09-20 NOTE — TELEPHONE ENCOUNTER
The pt brought back her September morphine sulfate ER prescription. The directions say to take 1 tab every 12 hrs but the quantity is for 90. The pt's August prescription was done for 60 and the correct instructions. The incorrect prescription will be voided and placed in the shred box and she will be issued a new one per a discussion with the issuing provider.

## 2017-09-25 RX ORDER — INSULIN GLARGINE 100 [IU]/ML
INJECTION, SOLUTION SUBCUTANEOUS
Qty: 6 VIAL | Refills: 3
Start: 2017-09-25 | End: 2017-10-04 | Stop reason: SDUPTHER

## 2017-09-25 RX ORDER — LEVOTHYROXINE SODIUM 200 UG/1
TABLET ORAL
Qty: 90 TAB | Refills: 3 | Status: SHIPPED | OUTPATIENT
Start: 2017-09-25 | End: 2021-07-06

## 2017-09-25 NOTE — TELEPHONE ENCOUNTER
Requested Prescriptions     Pending Prescriptions Disp Refills    insulin glargine (LANTUS) 100 unit/mL injection 6 Vial 3     Si units daily    levothyroxine (SYNTHROID) 200 mcg tablet 90 Tab 3     Si tablet daily (take on an empty stomach) (stop \"old\" synthroid)

## 2017-10-04 ENCOUNTER — OFFICE VISIT (OUTPATIENT)
Dept: PAIN MANAGEMENT | Age: 82
End: 2017-10-04

## 2017-10-04 VITALS
HEIGHT: 65 IN | TEMPERATURE: 97.3 F | HEART RATE: 78 BPM | SYSTOLIC BLOOD PRESSURE: 157 MMHG | DIASTOLIC BLOOD PRESSURE: 80 MMHG | WEIGHT: 256 LBS | BODY MASS INDEX: 42.65 KG/M2 | RESPIRATION RATE: 14 BRPM

## 2017-10-04 DIAGNOSIS — G89.29 CHRONIC LEFT SHOULDER PAIN: ICD-10-CM

## 2017-10-04 DIAGNOSIS — G89.29 CHRONIC RIGHT SHOULDER PAIN: ICD-10-CM

## 2017-10-04 DIAGNOSIS — Z79.899 ENCOUNTER FOR LONG-TERM (CURRENT) USE OF HIGH-RISK MEDICATION: Primary | ICD-10-CM

## 2017-10-04 DIAGNOSIS — M25.512 CHRONIC LEFT SHOULDER PAIN: ICD-10-CM

## 2017-10-04 DIAGNOSIS — M25.511 CHRONIC RIGHT SHOULDER PAIN: ICD-10-CM

## 2017-10-04 DIAGNOSIS — M19.011 ARTHRITIS OF RIGHT SHOULDER REGION: ICD-10-CM

## 2017-10-04 DIAGNOSIS — G89.4 CHRONIC PAIN SYNDROME: ICD-10-CM

## 2017-10-04 DIAGNOSIS — M75.101 TEAR OF RIGHT ROTATOR CUFF, UNSPECIFIED TEAR EXTENT: ICD-10-CM

## 2017-10-04 RX ORDER — INSULIN GLARGINE 100 [IU]/ML
INJECTION, SOLUTION SUBCUTANEOUS
Qty: 6 VIAL | Refills: 3 | Status: SHIPPED | OUTPATIENT
Start: 2017-10-04 | End: 2018-03-22 | Stop reason: DRUGHIGH

## 2017-10-04 RX ORDER — MORPHINE SULFATE 30 MG/1
30 TABLET, FILM COATED, EXTENDED RELEASE ORAL EVERY 8 HOURS
Qty: 90 TAB | Refills: 0 | Status: SHIPPED | OUTPATIENT
Start: 2017-10-25 | End: 2017-11-01 | Stop reason: SDUPTHER

## 2017-10-04 NOTE — PROGRESS NOTES
Nursing Notes    Patient presents to the office today in follow-up. Patient rates her pain at 10/10 on the numerical pain scale. Reviewed medications with counts as follows:    Rx Date filled Qty Dispensed Pill Count Last Dose Short   Morphine IR 15mg 09/05/17 30 28+rx today no   MS Contin CR30mg 09/20/17 60 46 today no                                  Comments:     POC UDS was performed in office today    Any new labs or imaging since last appointment? NO    Have you been to an emergency room (ER) or urgent care clinic since your last visit? NO            Have you been hospitalized since your last visit? NO     If yes, where, when, and reason for visit? Have you seen or consulted any other health care providers outside of the [address] [place] [address]  since your last visit? YES     If yes, where, when, and reason for visit? HM deferred to pcp.

## 2017-10-04 NOTE — PATIENT INSTRUCTIONS
1. Continue current plan with no evidence of addiction or diversion. Stable on current medication without adverse events. 2. Continue morphine IR 15 mg up to 3 times daily as needed. Currently has unfilled prescription and plenty of her medication left over. Plan to taper down next visit depending on her progress  3. Refill and adjust morphine ER 30 mg up to every 8 hours. 4. Continue Voltaren gel 1%. Apply 3-4 times daily to each shoulder  5. Naloxone 4 mg nasal spray for opioid induced respiratory depression emergency only. 6. Discussed risks of addiction, dependency, and opioid induced hyperalgesia.    7. Return to clinic in  1 month

## 2017-10-04 NOTE — MR AVS SNAPSHOT
Visit Information Date & Time Provider Department Dept. Phone Encounter #  
 10/4/2017 10:20 AM [facility] [phone] Follow-up Instructions Return in about 1 month (around 11/4/2017). Your Appointments 10/25/2017  9:45 AM  
Follow Up with Jamari Concepcion MD  
[facility]) Appt Note: 2 mo f/u  
 [address] [facility] [address] [facility], [address] [place] [id number]  
  
   
 [address], [address] [place] [address]  
  
    
 11/1/2017 12:40 PM  
Follow Up with IGNACIO [facility] (JANET SCHEDULING) Appt Note: return in 1 month [address] [facility] [id number] [phone]  [place] [phone] Upcoming Health Maintenance Date Due  
 LIPID PANEL Q1 1935 EYE EXAM RETINAL OR DILATED Q1 3/19/1947 DTaP/Tdap/Td series (1 - Tdap) 3/19/1958 ZOSTER VACCINE AGE 60> 1/19/1997 GLAUCOMA SCREENING Q2Y 3/19/2002 OSTEOPOROSIS SCREENING (DEXA) 3/19/2002 INFLUENZA AGE 9 TO ADULT 8/1/2017 HEMOGLOBIN A1C Q6M 10/4/2017 Pneumococcal 65+ Low/Medium Risk (2 of 2 - PPSV23) 10/18/2017 MEDICARE YEARLY EXAM 1/6/2018 MICROALBUMIN Q1 4/4/2018 FOOT EXAM Q1 8/23/2018 Allergies as of 10/4/2017  Review Complete On: 10/4/2017 By: IGNACIO Encinas No Known Allergies Current Immunizations  Never Reviewed No immunizations on file. Not reviewed this visit You Were Diagnosed With   
  
 Codes Comments Encounter for long-term (current) use of high-risk medication    -  Primary ICD-10-CM: G52.483 ICD-9-CM: V58.69 Chronic right shoulder pain     ICD-10-CM: M25.511, G89.29 ICD-9-CM: 719.41, 338.29 Chronic pain syndrome     ICD-10-CM: G89.4 ICD-9-CM: 338.4  Arthritis of right shoulder region     ICD-10-CM: M19.011 
 ICD-9-CM: 716.91 Tear of right rotator cuff, unspecified tear extent     ICD-10-CM: M75.101 ICD-9-CM: 154. 4 Chronic left shoulder pain     ICD-10-CM: M25.512, G89.29 ICD-9-CM: 719.41, 338.29 Vitals BP Pulse Temp Resp Height(growth percentile) Weight(growth percentile) 157/80 78 97.3 °F (36.3 °C) 14 5' 5\" (1.651 m) 256 lb (116.1 kg) BMI OB Status Smoking Status 42.6 kg/m2 Hysterectomy Former Smoker BMI and BSA Data Body Mass Index Body Surface Area  
 42.6 kg/m 2 2.31 m 2 Preferred Pharmacy Pharmacy Name Phone [address] [address] [facility] [address], [facility] [address] [facility] [facility] [address] [phone] Your Updated Medication List  
  
   
This list is accurate as of: 10/4/17 11:01 AM.  Always use your most recent med list.  
  
  
  
  
 acetaminophen 500 mg tablet Commonly known as:  TYLENOL Take 1 Tab by mouth every six (6) hours as needed for Pain. allopurinol 100 mg tablet Commonly known as:  Marin Richardson Take 1 Tab by mouth daily. amLODIPine 10 mg tablet Commonly known as:  Cali Jameson Take 0.5 Tabs by mouth daily. aspirin delayed-release 81 mg tablet Take  by mouth daily. bumetanide 0.5 mg tablet Commonly known as:  Travon Hy Take 2 mg by mouth two (2) times a day. carvedilol 12.5 mg tablet Commonly known as:  Jammie Dome Take 1 Tab by mouth two (2) times daily (with meals). celecoxib 200 mg capsule Commonly known as:  CELEBREX  
1 capsule twice per day with food  
  
 gabapentin 300 mg capsule Commonly known as:  NEURONTIN Take 1 Cap by mouth three (3) times daily. glucose blood VI test strips strip Commonly known as:  FREESTYLE TEST Use to check blood glucose twice daily DX:E11.9  
  
 insulin aspart 100 unit/mL Inpn Commonly known as:  Akila Mena 16 units before each meal  
  
 insulin glargine 100 unit/mL injection Commonly known as:  LANTUS  
82 units daily Insulin Needles (Disposable) 31 gauge x 5/16\" Ndle Use to inject insulin once daily Dx:E11.9  
  
 insulin syringe,safetyneedle 1 mL 31 gauge x 5/16\" Syrg 1 Each by Does Not Apply route daily. levothyroxine 200 mcg tablet Commonly known as:  SYNTHROID  
1 tablet daily (take on an empty stomach) (stop \"old\" synthroid)  
  
 magnesium oxide 500 mg Tab Take  by mouth. * morphine CR 30 mg CR tablet Commonly known as:  MS CONTIN Take 1 Tab by mouth every twelve (12) hours for 30 days. Max Daily Amount: 60 mg.  
  
 * morphine IR 15 mg tablet Commonly known as:  MS IR Take 1 Tab by mouth three (3) times daily as needed for Pain for up to 30 days. Max Daily Amount: 45 mg.  
  
 * morphine CR 30 mg CR tablet Commonly known as:  MS CONTIN Take 1 Tab by mouth every eight (8) hours for 30 days. Max Daily Amount: 90 mg. Start taking on:  10/25/2017  
  
 naloxone 4 mg/actuation nasal spray Commonly known as:  NARCAN  
4 mg by Nasal route as needed. For emergency use only  Indications: OPIATE-INDUCED RESPIRATORY DEPRESSION  
  
 VITAMIN B-12 500 mcg tablet Generic drug:  cyanocobalamin Take 500 mcg by mouth daily. VITAMIN C 250 mg tablet Generic drug:  ascorbic acid (vitamin C) Take 100 mg by mouth daily. VITAMIN D3 1,000 unit tablet Generic drug:  cholecalciferol Take  by mouth daily. * Notice: This list has 3 medication(s) that are the same as other medications prescribed for you. Read the directions carefully, and ask your doctor or other care provider to review them with you. Prescriptions Printed Refills  
 morphine CR (MS CONTIN) 30 mg CR tablet 0 Starting on: 10/25/2017 Sig: Take 1 Tab by mouth every eight (8) hours for 30 days. Max Daily Amount: 90 mg.  
 Class: Print Route: Oral  
  
We Performed the Following AMB POC DRUG SCREEN () [ [facility]] DRUG SCREEN [[id number] Custom] Follow-up Instructions Return in about 1 month (around 11/4/2017). Patient Instructions 1. Continue current plan with no evidence of addiction or diversion. Stable on current medication without adverse events. 2. Continue morphine IR 15 mg up to 3 times daily as needed. Currently has unfilled prescription and plenty of her medication left over. Plan to taper down next visit depending on her progress 3. Refill and adjust morphine ER 30 mg up to every 8 hours. 4. Continue Voltaren gel 1%. Apply 3-4 times daily to each shoulder 5. Naloxone 4 mg nasal spray for opioid induced respiratory depression emergency only. 6. Discussed risks of addiction, dependency, and opioid induced hyperalgesia. 7. Return to clinic in  1 month Introducing [facility]! Dear Randy Elias: Thank you for requesting a [employer] account. Our records indicate that you already have an active Savv[employer] account. You can access your account anytime at [email or web address] Did you know that you can access your hospital and ER discharge instructions at any time in [employer]? You can also review all of your test results from your hospital stay or ER visit. Additional Information If you have questions, please visit the Frequently Asked Questions section of the [employer] website at [email or web address] Remember, Sav[employer] is NOT to be used for urgent needs. For medical emergencies, dial 911. Now available from your iPhone and Android! Please provide this summary of care documentation to your next provider. Your primary care clinician is listed as Garcia Lopez. If you have any questions after today's visit, please call [phone].

## 2017-10-04 NOTE — PROGRESS NOTES
HISTORY OF PRESENT ILLNESS  Renee Del Angel July is a [de-identified] y.o. female    HPI: Ms. Corey Brown  returns today for f/u of chronic right shoulder pain. No h/o shoulder surgery. Surgery has been decline to to her heart condition. Prior injection with some improvement but temporary. PT with minimal imropvment. Ms. Corey Brown continues unchanged since last visit. We increased her morphine up to 30 mg and back down to every 12 hours. She does report some improvement with the adjustment but says the medication just does not last more than 8 hours. She has decreased her morphine IR quite significantly and has only used 2 pills from her last prescription. She has an unfilled prescription with her today that she will have filled when needed. I have agreed to adjust her morphine ER up to every 8 hours. She will continue with her morphine IR. We will plan to taper this down on her next refill. I will have her follow-up in 1 month for further evaluation and recommendation. Current medication management consists of morphine ER 30 mg every 12 hours and morphine IR 15 mg 4 times a day as needed. Gabapentin by another provider. Medications are helping with pain control and quality of life. Her pain is 7/10 with medication and 10/10 without. Pt describes pain as aching, burning, and stabbing. Aggravating factors include most ROM activity. Relieved with rest, medication, and avoiding painful activities. Current treatment is helping to improve general activity, mood, walking, sleep, enjoyment of life    In the past 30 days, the patient reports approximately 30% pain relief with current treatment/medications. She  is otherwise doing well with no other complaints today. She denies any adverse events including nausea, vomiting, dizziness, constipation, hallucinations, or seizures.      Because the patient's current regimen places him/her at increased risk for possible overdose, a prescription for naloxone nasal spray has been provided. The patient understands that this medication is only to be used in the setting of a possible overdose and that inadvertent use of this medication could precipitate overt withdrawal.    POC UDS today. Confirmation pending. No Known Allergies    History reviewed. No pertinent surgical history. Review of Systems   Constitutional: Negative for chills, fever and weight loss. HENT: Negative for congestion and sore throat. Eyes: Negative for blurred vision and double vision. Respiratory: Negative for cough, shortness of breath and wheezing. Cardiovascular: Negative for chest pain and palpitations. Gastrointestinal: Positive for heartburn. Negative for constipation, diarrhea, nausea and vomiting. Genitourinary: Negative. Musculoskeletal: Positive for back pain, falls, joint pain and neck pain. Neurological: Negative for dizziness, seizures, loss of consciousness and headaches. Endo/Heme/Allergies: Does not bruise/bleed easily. Psychiatric/Behavioral: Positive for depression. Negative for suicidal ideas. The patient is not nervous/anxious and does not have insomnia. Physical Exam   Constitutional: She is oriented to person, place, and time and well-developed, well-nourished, and in no distress. No distress. HENT:   Head: Normocephalic and atraumatic. Eyes: EOM are normal.   Pulmonary/Chest: Effort normal.   Musculoskeletal:        Right shoulder: She exhibits decreased range of motion and tenderness. Neurological: She is alert and oriented to person, place, and time. Gait abnormal.   Skin: Skin is warm and dry. No rash noted. She is not diaphoretic. No erythema. Psychiatric: Mood, memory, affect and judgment normal.   Nursing note and vitals reviewed. ASSESSMENT:    1. Encounter for long-term (current) use of high-risk medication    2. Chronic right shoulder pain    3. Chronic pain syndrome    4. Arthritis of right shoulder region    5.  Tear of right rotator cuff, unspecified tear extent    6. Chronic left shoulder pain         [place] Prescription Monitoring Program was reviewed which does not demonstrate aberrancies and/or inconsistencies with regard to the historical prescribing of controlled medications to this patient by other providers. PLAN / Pt Instructions:  1. Continue current plan with no evidence of addiction or diversion. Stable on current medication without adverse events. 2. Continue morphine IR 15 mg up to 3 times daily as needed. Currently has unfilled prescription and plenty of her medication left over. Plan to taper down next visit depending on her progress  3. Refill and adjust morphine ER 30 mg up to every 8 hours. 4. Continue Voltaren gel 1%. Apply 3-4 times daily to each shoulder  5. Naloxone 4 mg nasal spray for opioid induced respiratory depression emergency only. 6. Discussed risks of addiction, dependency, and opioid induced hyperalgesia. 7. Return to clinic in  1 month     Medications Ordered Today   Medications    morphine CR (MS CONTIN) 30 mg CR tablet     Sig: Take 1 Tab by mouth every eight (8) hours for 30 days. Max Daily Amount: 90 mg. Dispense:  90 Tab     Refill:  0       Pain medications prescribed with the objective of pain relief and improved physical and psychosocial function in this patient. Spent 25 minutes with patient today reviewing the treatment plan, goals of treatment plan, and limitations of the treatment plan, to include the potential for side effects from medications and procedures. Vivek Barnes 10/4/2017      Note: Please excuse any typographical errors. Voice recognition software was used for this note and may cause mistakes.

## 2017-11-01 ENCOUNTER — OFFICE VISIT (OUTPATIENT)
Dept: PAIN MANAGEMENT | Age: 82
End: 2017-11-01

## 2017-11-01 VITALS
SYSTOLIC BLOOD PRESSURE: 141 MMHG | BODY MASS INDEX: 42.32 KG/M2 | TEMPERATURE: 97 F | RESPIRATION RATE: 14 BRPM | WEIGHT: 254 LBS | HEART RATE: 78 BPM | DIASTOLIC BLOOD PRESSURE: 74 MMHG | HEIGHT: 65 IN

## 2017-11-01 DIAGNOSIS — G89.29 CHRONIC RIGHT SHOULDER PAIN: ICD-10-CM

## 2017-11-01 DIAGNOSIS — M75.101 TEAR OF RIGHT ROTATOR CUFF, UNSPECIFIED TEAR EXTENT: ICD-10-CM

## 2017-11-01 DIAGNOSIS — M19.011 ARTHRITIS OF RIGHT SHOULDER REGION: ICD-10-CM

## 2017-11-01 DIAGNOSIS — M75.42 IMPINGEMENT SYNDROME OF LEFT SHOULDER: ICD-10-CM

## 2017-11-01 DIAGNOSIS — M25.511 CHRONIC RIGHT SHOULDER PAIN: ICD-10-CM

## 2017-11-01 DIAGNOSIS — G89.4 CHRONIC PAIN SYNDROME: ICD-10-CM

## 2017-11-01 RX ORDER — MORPHINE SULFATE 15 MG/1
15 TABLET ORAL
Qty: 60 TAB | Refills: 0 | Status: SHIPPED | OUTPATIENT
Start: 2017-11-04 | End: 2018-01-25 | Stop reason: SDUPTHER

## 2017-11-01 RX ORDER — MORPHINE SULFATE 30 MG/1
30 TABLET, FILM COATED, EXTENDED RELEASE ORAL EVERY 8 HOURS
Qty: 90 TAB | Refills: 0 | Status: SHIPPED | OUTPATIENT
Start: 2017-11-25 | End: 2017-12-25

## 2017-11-01 RX ORDER — MORPHINE SULFATE 15 MG/1
15 TABLET ORAL
Qty: 60 TAB | Refills: 0 | Status: SHIPPED | OUTPATIENT
Start: 2018-01-02 | End: 2018-01-25 | Stop reason: SDUPTHER

## 2017-11-01 RX ORDER — MORPHINE SULFATE 30 MG/1
30 TABLET, FILM COATED, EXTENDED RELEASE ORAL EVERY 12 HOURS
Qty: 60 TAB | Refills: 0 | Status: SHIPPED | OUTPATIENT
Start: 2017-12-24 | End: 2017-11-02 | Stop reason: CLARIF

## 2017-11-01 RX ORDER — MORPHINE SULFATE 15 MG/1
15 TABLET ORAL
Qty: 60 TAB | Refills: 0 | Status: SHIPPED | OUTPATIENT
Start: 2017-12-03 | End: 2018-01-25 | Stop reason: SDUPTHER

## 2017-11-01 RX ORDER — MORPHINE SULFATE 30 MG/1
30 TABLET, FILM COATED, EXTENDED RELEASE ORAL EVERY 12 HOURS
Qty: 90 TAB | Refills: 0 | Status: SHIPPED | OUTPATIENT
Start: 2018-01-23 | End: 2017-11-02 | Stop reason: CLARIF

## 2017-11-01 NOTE — PROGRESS NOTES
Nursing Notes    Patient presents to the office today in follow-up. Patient rates her pain at 9/10 on the numerical pain scale. Reviewed medications with counts as follows:    Rx Date filled Qty Dispensed Pill Count Last Dose Short   Morphine sulfate ER 30 mg 10/26/17 90 76 today no   Morphine sulfate IR 15 mg 10/05/17 90 46 today no                           POC UDS was not performed in office today    Any new labs or imaging since last appointment? NO    Have you been to an emergency room (ER) or urgent care clinic since your last visit? NO            Have you been hospitalized since your last visit? NO     If yes, where, when, and reason for visit? Have you seen or consulted any other health care providers outside of the [address] [place] [address]  since your last visit? NO     If yes, where, when, and reason for visit? HM deferred to pcp.

## 2017-11-01 NOTE — PROGRESS NOTES
HISTORY OF PRESENT ILLNESS  Lida Adolphus Gottron is a [de-identified] y.o. female    HPI: Ms. Mateusz Guaman  returns today for f/u of chronic right shoulder pain. No h/o shoulder surgery. Surgery has been decline to to her heart condition. Prior injection with some improvement but temporary. PT with minimal imropvment. Ms. Mateusz Guaman continues unchanged since last visit. We recently adjusted her morphine up to 30 mg but adjusted back down to every 12 hours. She reported at that time that her medication was not lasting more than 8 hours. During her last visit we adjust her morphine back up to every 8 hours. She reports that this has been an improvement. She has been using much less of her morphine IR and still has medication left over today. I will adjust her morphine IR prescription down to 2 times daily as needed. She is in agreement with this plan. I will have her follow-up in 3 months or sooner if needed. Current medication management consists of morphine ER 30 mg every 8 hours and morphine IR 15 mg 4 times a day as needed. Gabapentin by another provider. Medications are helping with pain control and quality of life. Her pain is 7/10 with medication and 10/10 without. Pt describes pain as aching, burning, and stabbing. Aggravating factors include most ROM activity. Relieved with rest, medication, and avoiding painful activities. Current treatment is helping to improve general activity, mood, walking, sleep, enjoyment of life    In the past 30 days, the patient reports approximately 30% pain relief with current treatment/medications. She  is otherwise doing well with no other complaints today. She denies any adverse events including nausea, vomiting, dizziness, constipation, hallucinations, or seizures. Because the patient's current regimen places him/her at increased risk for possible overdose, a prescription for naloxone nasal spray has been provided.   The patient understands that this medication is only to be used in the setting of a possible overdose and that inadvertent use of this medication could precipitate overt withdrawal.    POC UDS today. Confirmation pending. No Known Allergies    History reviewed. No pertinent surgical history. Review of Systems   Constitutional: Negative for chills, fever and weight loss. HENT: Negative for congestion and sore throat. Eyes: Negative for blurred vision and double vision. Respiratory: Negative for cough, shortness of breath and wheezing. Cardiovascular: Negative for chest pain and palpitations. Gastrointestinal: Positive for heartburn. Negative for constipation, diarrhea, nausea and vomiting. Genitourinary: Negative. Musculoskeletal: Positive for back pain, falls, joint pain and neck pain. Neurological: Negative for dizziness, seizures, loss of consciousness and headaches. Endo/Heme/Allergies: Does not bruise/bleed easily. Psychiatric/Behavioral: Positive for depression. Negative for suicidal ideas. The patient is not nervous/anxious and does not have insomnia. Physical Exam   Constitutional: She is oriented to person, place, and time and well-developed, well-nourished, and in no distress. No distress. HENT:   Head: Normocephalic and atraumatic. Eyes: EOM are normal.   Pulmonary/Chest: Effort normal.   Musculoskeletal:        Right shoulder: She exhibits decreased range of motion and tenderness. Neurological: She is alert and oriented to person, place, and time. Gait abnormal.   Skin: Skin is warm and dry. No rash noted. She is not diaphoretic. No erythema. Psychiatric: Mood, memory, affect and judgment normal.   Nursing note and vitals reviewed. ASSESSMENT:    1. Chronic right shoulder pain    2. Chronic pain syndrome    3. Arthritis of right shoulder region    4. Impingement syndrome of left shoulder    5.  Tear of right rotator cuff, unspecified tear extent         [place] Prescription Monitoring Program was reviewed which does not demonstrate aberrancies and/or inconsistencies with regard to the historical prescribing of controlled medications to this patient by other providers. PLAN / Pt Instructions:  1. Continue current plan with no evidence of addiction or diversion. Stable on current medication without adverse events. 2. Refill and adjust morphine IR 15 minute down to 2 times daily as needed. 3. Refill  morphine ER 30 mg  every 8 hours. 4. Continue Voltaren gel 1%. Apply 3-4 times daily to each shoulder  5. Naloxone 4 mg nasal spray for opioid induced respiratory depression emergency only. 6. Discussed risks of addiction, dependency, and opioid induced hyperalgesia. 7. Return to clinic in 3 months    Medications Ordered Today   Medications    morphine CR (MS CONTIN) 30 mg CR tablet     Sig: Take 1 Tab by mouth every eight (8) hours for 30 days. Max Daily Amount: 90 mg. Dispense:  90 Tab     Refill:  0    DISCONTD: morphine CR (MS CONTIN) 30 mg CR tablet     Sig: Take 1 Tab by mouth every twelve (12) hours for 30 days. Max Daily Amount: 60 mg. Dispense:  60 Tab     Refill:  0    DISCONTD: morphine CR (MS CONTIN) 30 mg CR tablet     Sig: Take 1 Tab by mouth every twelve (12) hours for 30 days. Max Daily Amount: 60 mg. Dispense:  90 Tab     Refill:  0    morphine IR (MS IR) 15 mg tablet     Sig: Take 1 Tab by mouth two (2) times daily as needed for Pain for up to 30 days. Max Daily Amount: 30 mg. Dispense:  60 Tab     Refill:  0    morphine IR (MS IR) 15 mg tablet     Sig: Take 1 Tab by mouth two (2) times daily as needed for Pain for up to 30 days. Max Daily Amount: 30 mg. Dispense:  60 Tab     Refill:  0    morphine IR (MS IR) 15 mg tablet     Sig: Take 1 Tab by mouth two (2) times daily as needed for Pain for up to 30 days. Max Daily Amount: 30 mg.      Dispense:  60 Tab     Refill:  0    morphine CR (MS CONTIN) 30 mg CR tablet     Sig: Take 1 Tab by mouth every eight (8) hours for 30 days. Max Daily Amount: 90 mg. Dispense:  90 Tab     Refill:  0    morphine CR (MS CONTIN) 30 mg CR tablet     Sig: Take 1 Tab by mouth every eight (8) hours for 30 days. Max Daily Amount: 90 mg. Dispense:  90 Tab     Refill:  0       Pain medications prescribed with the objective of pain relief and improved physical and psychosocial function in this patient. Spent 25 minutes with patient today reviewing the treatment plan, goals of treatment plan, and limitations of the treatment plan, to include the potential for side effects from medications and procedures. Stacy Phillips, [address] 11/1/2017      Note: Please excuse any typographical errors. Voice recognition software was used for this note and may cause mistakes.

## 2017-11-01 NOTE — MR AVS SNAPSHOT
Visit Information Date & Time Provider Department Dept. Phone Encounter #  
 11/1/2017 12:40 PM Magdi Dickson, [facility] [phone] Upcoming Health Maintenance Date Due  
 LIPID PANEL Q1 1937 EYE EXAM RETINAL OR DILATED Q1 3/19/1947 DTaP/Tdap/Td series (1 - Tdap) 3/19/1958 ZOSTER VACCINE AGE 60> 1/19/1997 GLAUCOMA SCREENING Q2Y 3/19/2002 OSTEOPOROSIS SCREENING (DEXA) 3/19/2002 INFLUENZA AGE 9 TO ADULT 8/1/2017 HEMOGLOBIN A1C Q6M 10/4/2017 Pneumococcal 65+ Low/Medium Risk (2 of 2 - PPSV23) 10/18/2017 MEDICARE YEARLY EXAM 1/6/2018 MICROALBUMIN Q1 4/4/2018 FOOT EXAM Q1 8/23/2018 Allergies as of 11/1/2017  Review Complete On: 11/1/2017 By: IGNACIO Monsalve No Known Allergies Current Immunizations  Never Reviewed No immunizations on file. Not reviewed this visit You Were Diagnosed With   
  
 Codes Comments Chronic right shoulder pain     ICD-10-CM: M25.511, G89.29 ICD-9-CM: 719.41, 338.29 Chronic pain syndrome     ICD-10-CM: G89.4 ICD-9-CM: 338.4 Arthritis of right shoulder region     ICD-10-CM: M19.011 
ICD-9-CM: 716.91 Impingement syndrome of left shoulder     ICD-10-CM: M75.42 
ICD-9-CM: 726.2 Tear of right rotator cuff, unspecified tear extent     ICD-10-CM: M75.101 ICD-9-CM: 840.4 Vitals BP Pulse Temp Resp Height(growth percentile) Weight(growth percentile) 141/74 78 97 °F (36.1 °C) 14 5' 5\" (1.651 m) 254 lb (115.2 kg) BMI OB Status Smoking Status 42.27 kg/m2 Hysterectomy Former Smoker Vitals History BMI and BSA Data Body Mass Index Body Surface Area  
 42.27 kg/m 2 2.3 m 2 Preferred Pharmacy Pharmacy Name Phone [address] [place] [address], [address] [place] [address] Road [phone] Your Updated Medication List  
  
   
 This list is accurate as of: 11/1/17 12:46 PM.  Always use your most recent med list.  
  
  
  
  
 acetaminophen 500 mg tablet Commonly known as:  TYLENOL Take 1 Tab by mouth every six (6) hours as needed for Pain. allopurinol 100 mg tablet Commonly known as:  Gonzella Cotta Take 1 Tab by mouth daily. amLODIPine 10 mg tablet Commonly known as:  Harsha Goetz Take 0.5 Tabs by mouth daily. aspirin delayed-release 81 mg tablet Take  by mouth daily. bumetanide 0.5 mg tablet Commonly known as:  Royal Hutching Take 2 mg by mouth two (2) times a day. carvedilol 12.5 mg tablet Commonly known as:  Melvi Rufina Take 1 Tab by mouth two (2) times daily (with meals). celecoxib 200 mg capsule Commonly known as:  CELEBREX  
1 capsule twice per day with food  
  
 gabapentin 300 mg capsule Commonly known as:  NEURONTIN Take 1 Cap by mouth three (3) times daily. glucose blood VI test strips strip Commonly known as:  FREESTYLE TEST Use to check blood glucose twice daily DX:E11.9  
  
 insulin aspart 100 unit/mL Inpn Commonly known as:  Curvin Husky 16 units before each meal  
  
 insulin glargine 100 unit/mL injection Commonly known as:  LANTUS  
82 units daily Insulin Needles (Disposable) 31 gauge x 5/16\" Ndle Use to inject insulin once daily Dx:E11.9  
  
 insulin syringe,safetyneedle 1 mL 31 gauge x 5/16\" Syrg 1 Each by Does Not Apply route daily. levothyroxine 200 mcg tablet Commonly known as:  SYNTHROID  
1 tablet daily (take on an empty stomach) (stop \"old\" synthroid)  
  
 magnesium oxide 500 mg Tab Take  by mouth. * morphine IR 15 mg tablet Commonly known as:  MS IR Take 1 Tab by mouth two (2) times daily as needed for Pain for up to 30 days. Max Daily Amount: 30 mg. Start taking on:  11/4/2017 * morphine CR 30 mg CR tablet Commonly known as:  MS CONTIN Take 1 Tab by mouth every eight (8) hours for 30 days. Max Daily Amount: 90 mg. Start taking on:  11/25/2017 * morphine IR 15 mg tablet Commonly known as:  MS IR Take 1 Tab by mouth two (2) times daily as needed for Pain for up to 30 days. Max Daily Amount: 30 mg. Start taking on:  12/3/2017 * morphine CR 30 mg CR tablet Commonly known as:  MS CONTIN Take 1 Tab by mouth every twelve (12) hours for 30 days. Max Daily Amount: 60 mg. Start taking on:  12/24/2017 * morphine IR 15 mg tablet Commonly known as:  MS IR Take 1 Tab by mouth two (2) times daily as needed for Pain for up to 30 days. Max Daily Amount: 30 mg. Start taking on:  1/2/2018 * morphine CR 30 mg CR tablet Commonly known as:  MS CONTIN Take 1 Tab by mouth every twelve (12) hours for 30 days. Max Daily Amount: 60 mg. Start taking on:  1/23/2018  
  
 naloxone 4 mg/actuation nasal spray Commonly known as:  NARCAN  
4 mg by Nasal route as needed. For emergency use only  Indications: OPIATE-INDUCED RESPIRATORY DEPRESSION  
  
 VITAMIN B-12 500 mcg tablet Generic drug:  cyanocobalamin Take 500 mcg by mouth daily. VITAMIN C 250 mg tablet Generic drug:  ascorbic acid (vitamin C) Take 100 mg by mouth daily. VITAMIN D3 1,000 unit tablet Generic drug:  cholecalciferol Take  by mouth daily. * Notice: This list has 6 medication(s) that are the same as other medications prescribed for you. Read the directions carefully, and ask your doctor or other care provider to review them with you. Prescriptions Printed Refills  
 morphine CR (MS CONTIN) 30 mg CR tablet 0 Starting on: 11/25/2017 Sig: Take 1 Tab by mouth every eight (8) hours for 30 days. Max Daily Amount: 90 mg.  
 Class: Print Route: Oral  
 morphine CR (MS CONTIN) 30 mg CR tablet 0 Starting on: 12/24/2017 Sig: Take 1 Tab by mouth every twelve (12) hours for 30 days. Max Daily Amount: 60 mg.  
 Class: Print  Route: Oral  
 morphine CR (MS CONTIN) 30 mg CR tablet 0  
 Starting on: 1/23/2018 Sig: Take 1 Tab by mouth every twelve (12) hours for 30 days. Max Daily Amount: 60 mg.  
 Class: Print Route: Oral  
 morphine IR (MS IR) 15 mg tablet 0 Starting on: 11/4/2017 Sig: Take 1 Tab by mouth two (2) times daily as needed for Pain for up to 30 days. Max Daily Amount: 30 mg.  
 Class: Print Route: Oral  
 morphine IR (MS IR) 15 mg tablet 0 Starting on: 12/3/2017 Sig: Take 1 Tab by mouth two (2) times daily as needed for Pain for up to 30 days. Max Daily Amount: 30 mg.  
 Class: Print Route: Oral  
 morphine IR (MS IR) 15 mg tablet 0 Starting on: 1/2/2018 Sig: Take 1 Tab by mouth two (2) times daily as needed for Pain for up to 30 days. Max Daily Amount: 30 mg.  
 Class: Print Route: Oral  
  
Introducing [facility] SERVICES! Dear Xiomara Campos: Thank you for requesting a [employer] account. Our records indicate that you already have an active Big [employer] account. You can access your account anytime at [email or web address] Did you know that you can access your hospital and ER discharge instructions at any time in Big Screen [employer]? You can also review all of your test results from your hospital stay or ER visit. Additional Information If you have questions, please visit the Frequently Asked Questions section of the Big [employer] website at [email or web address] Remember, Big Screen Tools is NOT to be used for urgent needs. For medical emergencies, dial 911. Now available from your iPhone and Android! Please provide this summary of care documentation to your next provider. Your primary care clinician is listed as Luis Alberto Nichols. If you have any questions after today's visit, please call [phone].

## 2017-11-01 NOTE — PATIENT INSTRUCTIONS
1. Continue current plan with no evidence of addiction or diversion. Stable on current medication without adverse events. 2. Refill and adjust morphine IR 15 minute down to 2 times daily as needed. 3. Refill  morphine ER 30 mg  every 8 hours. 4. Continue Voltaren gel 1%. Apply 3-4 times daily to each shoulder  5. Naloxone 4 mg nasal spray for opioid induced respiratory depression emergency only. 6. Discussed risks of addiction, dependency, and opioid induced hyperalgesia.    7. Return to clinic in 3 months

## 2017-11-02 RX ORDER — AMLODIPINE BESYLATE 10 MG/1
5 TABLET ORAL DAILY
Qty: 90 TAB | Refills: 3 | Status: SHIPPED | OUTPATIENT
Start: 2017-11-02 | End: 2021-07-06 | Stop reason: SDUPTHER

## 2017-11-02 RX ORDER — MORPHINE SULFATE 30 MG/1
30 TABLET, FILM COATED, EXTENDED RELEASE ORAL EVERY 8 HOURS
Qty: 90 TAB | Refills: 0 | Status: SHIPPED | OUTPATIENT
Start: 2017-12-24 | End: 2018-01-25 | Stop reason: SDUPTHER

## 2017-11-02 RX ORDER — MORPHINE SULFATE 30 MG/1
30 TABLET, FILM COATED, EXTENDED RELEASE ORAL EVERY 8 HOURS
Qty: 90 TAB | Refills: 0 | Status: SHIPPED | OUTPATIENT
Start: 2018-01-23 | End: 2018-01-12 | Stop reason: SDUPTHER

## 2017-11-02 NOTE — TELEPHONE ENCOUNTER
Requested Prescriptions     Pending Prescriptions Disp Refills    amLODIPine (NORVASC) 10 mg tablet 90 Tab 3     Sig: Take 0.5 Tabs by mouth daily.

## 2017-12-15 ENCOUNTER — HOSPITAL ENCOUNTER (OUTPATIENT)
Dept: LAB | Age: 82
Discharge: HOME | End: 2017-12-15
Payer: MEDICARE

## 2017-12-15 ENCOUNTER — OFFICE VISIT (OUTPATIENT)
Dept: INTERNAL MEDICINE CLINIC | Age: 82
End: 2017-12-15

## 2017-12-15 VITALS
RESPIRATION RATE: 20 BRPM | HEIGHT: 65 IN | SYSTOLIC BLOOD PRESSURE: 132 MMHG | OXYGEN SATURATION: 97 % | DIASTOLIC BLOOD PRESSURE: 60 MMHG | TEMPERATURE: 98.2 F | HEART RATE: 89 BPM

## 2017-12-15 DIAGNOSIS — L97.919 ULCER OF RIGHT LOWER LEG, WITH UNSPECIFIED SEVERITY (HCC): ICD-10-CM

## 2017-12-15 DIAGNOSIS — E11.21 TYPE 2 DIABETES MELLITUS WITH NEPHROPATHY (HCC): ICD-10-CM

## 2017-12-15 DIAGNOSIS — L03.115 CELLULITIS OF RIGHT LOWER EXTREMITY: ICD-10-CM

## 2017-12-15 DIAGNOSIS — L97.919 ULCER OF RIGHT LOWER LEG, WITH UNSPECIFIED SEVERITY (HCC): Primary | ICD-10-CM

## 2017-12-15 PROBLEM — E66.01 OBESITY, MORBID (HCC): Status: ACTIVE | Noted: 2017-12-15

## 2017-12-15 PROCEDURE — 87186 SC STD MICRODIL/AGAR DIL: CPT | Performed by: NURSE PRACTITIONER

## 2017-12-15 PROCEDURE — 87077 CULTURE AEROBIC IDENTIFY: CPT | Performed by: NURSE PRACTITIONER

## 2017-12-15 PROCEDURE — 87070 CULTURE OTHR SPECIMN AEROBIC: CPT | Performed by: NURSE PRACTITIONER

## 2017-12-15 RX ORDER — INSULIN PUMP SYRINGE, 3 ML
EACH MISCELLANEOUS
Qty: 1 KIT | Refills: 0 | Status: SHIPPED | OUTPATIENT
Start: 2017-12-15

## 2017-12-15 NOTE — PROGRESS NOTES
Bradley Dutta is a [de-identified] y.o. female presenting today for Hospital Follow Up (cellulitis right leg)  . HPI:  Bradley Dutta presents to the office today for hospital follow-up. Patient was admitted to the hospital for cellulitis and r/lo DVT right lower extremity. Patient noted the duplex of the RLE was negative but she has superficial thrombophlebitis. She continues to complain of pain at 10/10. Review of Systems   Constitutional: Negative for fever. Respiratory: Negative for cough. Cardiovascular: Positive for leg swelling (trace). Negative for chest pain and palpitations. No Known Allergies    Current Outpatient Prescriptions   Medication Sig Dispense Refill    Blood-Glucose Meter (FREESTYLE LITE METER) monitoring kit Test twice blood glucose daily; ICD-10 E11.9; Quantity 1 1 Kit 0    [START ON 1/23/2018] morphine CR (MS CONTIN) 30 mg CR tablet Take 1 Tab by mouth every eight (8) hours for 30 days. Max Daily Amount: 90 mg. (Patient taking differently: Take 15 mg by mouth every eight (8) hours.) 90 Tab 0    amLODIPine (NORVASC) 10 mg tablet Take 0.5 Tabs by mouth daily. 90 Tab 3    [START ON 1/2/2018] morphine IR (MS IR) 15 mg tablet Take 1 Tab by mouth two (2) times daily as needed for Pain for up to 30 days. Max Daily Amount: 30 mg. 60 Tab 0    insulin glargine (LANTUS) 100 unit/mL injection 82 units daily (Patient taking differently: 50 units daily) 6 Vial 3    levothyroxine (SYNTHROID) 200 mcg tablet 1 tablet daily (take on an empty stomach) (stop \"old\" synthroid) 90 Tab 3    carvedilol (COREG) 12.5 mg tablet Take 1 Tab by mouth two (2) times daily (with meals). 180 Tab 3    insulin syringe,safetyneedle 1 mL 31 gauge x 5/16\" syrg 1 Each by Does Not Apply route daily. 300 Each 3    cholecalciferol (VITAMIN D3) 1,000 unit tablet Take  by mouth daily.       Insulin Needles, Disposable, 31 gauge x 5/16\" ndle Use to inject insulin once daily Dx:E11.9 3 Package 3    glucose blood VI test strips (FREESTYLE TEST) strip Use to check blood glucose twice daily DX:E11.9 300 Strip 3    naloxone 4 mg/actuation spry 4 mg by Nasal route as needed. For emergency use only  Indications: OPIATE-INDUCED RESPIRATORY DEPRESSION 1 Package 0    gabapentin (NEURONTIN) 300 mg capsule Take 1 Cap by mouth three (3) times daily. 270 Cap 3    allopurinol (ZYLOPRIM) 100 mg tablet Take 1 Tab by mouth daily. 90 Tab 3    insulin aspart (NOVOLOG) 100 unit/mL inpn 16 units before each meal 30 mL 3    magnesium oxide 500 mg tab Take  by mouth.  cyanocobalamin (VITAMIN B-12) 500 mcg tablet Take 500 mcg by mouth daily.  ascorbic acid, vitamin C, (VITAMIN C) 250 mg tablet Take 100 mg by mouth daily.  acetaminophen (TYLENOL) 500 mg tablet Take 1 Tab by mouth every six (6) hours as needed for Pain. 270 Tab 3    aspirin delayed-release 81 mg tablet Take  by mouth daily.  bumetanide (BUMEX) 0.5 mg tablet Take 2 mg by mouth two (2) times a day.  [START ON 12/24/2017] morphine CR (MS CONTIN) 30 mg CR tablet Take 1 Tab by mouth every eight (8) hours for 30 days. Max Daily Amount: 90 mg. 90 Tab 0    morphine CR (MS CONTIN) 30 mg CR tablet Take 1 Tab by mouth every eight (8) hours for 30 days. Max Daily Amount: 90 mg. 90 Tab 0    morphine IR (MS IR) 15 mg tablet Take 1 Tab by mouth two (2) times daily as needed for Pain for up to 30 days. Max Daily Amount: 30 mg. 60 Tab 0    celecoxib (CELEBREX) 200 mg capsule 1 capsule twice per day with food 180 Cap 0       Past Medical History:   Diagnosis Date    Asthma     Chronic venous insufficiency     Diabetes (HCC)     Hypertension     Peripheral neuropathy     Rheumatoid arthritis (HCC)     Vertigo        History reviewed. No pertinent surgical history. Social History     Social History    Marital status:      Spouse name: N/A    Number of children: N/A    Years of education: N/A     Occupational History    Not on file.      Social History Main Topics    Smoking status: Former Smoker    Smokeless tobacco: Never Used    Alcohol use No    Drug use: No    Sexual activity: No     Other Topics Concern    Not on file     Social History Narrative       Patient does not have an advanced directive on file    Vitals:    12/15/17 0943   BP: 132/60   Pulse: 89   Resp: 20   Temp: 98.2 °F (36.8 °C)   TempSrc: Tympanic   SpO2: 97%   Height: 5' 5\" (1.651 m)   PainSc:  10 - Worst pain ever   PainLoc: Leg       Physical Exam   Constitutional: No distress. Cardiovascular: Normal rate and regular rhythm. Pulmonary/Chest: Effort normal.   Musculoskeletal: She exhibits edema (trace RLE) and tenderness (lateral RLE tenderness). Neurological: She is alert. Skin:        Nursing note and vitals reviewed.       Hospital Outpatient Visit on 12/15/2017   Component Date Value Ref Range Status    Special Requests: 12/15/2017 NO SPECIAL REQUESTS    Final    GRAM STAIN 12/15/2017 RARE WBC'S    Final    GRAM STAIN 12/15/2017 FEW GRAM NEGATIVE RODS    Final    Culture result: 12/15/2017 MANY KLEBSIELLA PNEUMONIAE*   Final   Office Visit on 10/04/2017   Component Date Value Ref Range Status    OPIATES UR POC 10/04/2017 Presumptive Positive   Final       .  Results for orders placed or performed during the hospital encounter of 12/15/17   CULTURE, WOUND W GRAM STAIN   Result Value Ref Range    Special Requests: NO SPECIAL REQUESTS      GRAM STAIN RARE WBC'S      GRAM STAIN FEW GRAM NEGATIVE RODS      Culture result: MANY KLEBSIELLA PNEUMONIAE (A)         Susceptibility    Klebsiella pneumoniae - [place]     Ampicillin ($) >=32 Resistant ug/mL     Ampicillin/sulbactam ($) 4 Susceptible ug/mL     Cefazolin ($) <=4 Susceptible ug/mL     Cefepime ($$) <=1 Susceptible ug/mL     Ceftazidime ($) <=1 Susceptible ug/mL     Ceftriaxone ($) <=1 Susceptible ug/mL     Ciprofloxacin ($) <=0.25 Susceptible ug/mL     Gentamicin ($) <=1 Susceptible ug/mL     Imipenem <=0.25 Susceptible ug/mL     Levofloxacin ($) <=0.12 Susceptible ug/mL     Piperacillin/Tazobac ($) <=4 Susceptible ug/mL     Tobramycin ($) <=1 Susceptible ug/mL     Trimeth-Sulfamethoxa <=20 Susceptible ug/mL       Assessment / Plan:      ICD-10-CM ICD-9-CM    1. Ulcer of right lower leg, with unspecified severity ([facility] L97.919 707.19 CULTURE, WOUND W GRAM STAIN      REFERRAL TO WOUND CARE   2. Type 2 diabetes mellitus with nephropathy (HCC) E11.21 250.40 Blood-Glucose Meter (FREESTYLE LITE METER) monitoring kit     583.81 REFERRAL TO WOUND CARE   3. Cellulitis of right lower extremity L03.115 682.6      Continue current treatment plan  Right lower extremity ulcer  Referral to wound care  F/u prn      Follow-up Disposition:  Return if symptoms worsen or fail to improve. I asked the patient if she  had any questions and answered her  questions.   The patient stated that she understands the treatment plan and agrees with the treatment plan

## 2017-12-15 NOTE — PROGRESS NOTES
Patient presents for   Chief Complaint   Patient presents with   [facility] Follow Up     cellulitis right leg     Fall risk assessment was not indicated. Depression screening was not indicated Follow up questions were not indicated. 1. Have you been to the ER, urgent care clinic since your last visit? Hospitalized since your last visit? Yes Where: [facility] ED Reason for visit: cellulitis     2. Have you seen or consulted any other health care providers outside of the [address] [place] [address] since your last visit? Include any pap smears or colon screening. No    Right shin wound dressed with betadine and nonadherent dressing per provider verbal order order read back and confirmed.

## 2017-12-15 NOTE — MR AVS SNAPSHOT
Visit Information Date & Time Provider Department Dept. Phone Encounter #  
 12/15/2017 10:30 AM Samantha Donohue NP [facility] INTER[facility]ICINE OF [place] [phone] [id number] Your Appointments 12/22/2017  9:50 AM  
PROBLEM VISIT with Eulalia Collazo PA-C  
[facility] ([address]) Appt Note: R HIP NOX INS  [facility], [address] [facility]  
[phone] [address], [address]  
  
    
 1/2/2018 10:30 AM  
Follow Up with Samantha Donohue NP  
[facility] INTERNAL MEDICINE OF [place] ([address]) Appt Note: 2 weeks [address] [facility], [address] [facility] [address]  
  
   
 [address] [facility], [address] [facility] [address]  
  
    
 1/25/2018 10:00 AM  
Follow Up with IGNACIO Low [address] [facility] [address] for Pain Management (JANET SCHEDULING) Appt Note: 3 mon f/u per rc. ..to  
 [address] [facility] [address] [phone] [facility] [phone] [id number] Upcoming Health Maintenance Date Due  
 LIPID PANEL Q1 1937 EYE EXAM RETINAL OR DILATED Q1 3/19/1947 DTaP/Tdap/Td series (1 - Tdap) 3/19/1958 ZOSTER VACCINE AGE 60> 1/19/1997 GLAUCOMA SCREENING Q2Y 3/19/2002 OSTEOPOROSIS SCREENING (DEXA) 3/19/2002 Influenza Age 5 to Adult 8/1/2017 HEMOGLOBIN A1C Q6M 10/4/2017 Pneumococcal 65+ Low/Medium Risk (2 of 2 - PPSV23) 10/18/2017 MEDICARE YEARLY EXAM 1/6/2018 MICROALBUMIN Q1 4/4/2018 FOOT EXAM Q1 8/23/2018 Allergies as of 12/15/2017  Review Complete On: 12/15/2017 By: Samantha Donohue NP No Known Allergies Current Immunizations  Never Reviewed No immunizations on file. Not reviewed this visit You Were Diagnosed With   
  
 Codes Comments  Ulcer of right lower leg, with unspecified severity ([facility] [address]    -  Primary ICD-10-CM: Y24.702 ICD-9-CM: 707.19 Type 2 diabetes mellitus with nephropathy (HCC)     ICD-10-CM: E11.21 
ICD-9-CM: 250.40, 583.81 Cellulitis of right lower extremity     ICD-10-CM: L03.115 ICD-9-CM: 295. 6 Vitals BP Pulse Temp Resp Height(growth percentile) SpO2  
 132/60 (BP 1 Location: Left arm, BP Patient Position: Sitting) 89 98.2 °F (36.8 °C) (Tympanic) 20 5' 5\" (1.651 m) 97% OB Status Smoking Status Hysterectomy Former Smoker Preferred Pharmacy Pharmacy Name Phone [facility] [address] [phone] Your Updated Medication List  
  
   
This list is accurate as of: 12/15/17 11:06 AM.  Always use your most recent med list.  
  
  
  
  
 acetaminophen 500 mg tablet Commonly known as:  TYLENOL Take 1 Tab by mouth every six (6) hours as needed for Pain. allopurinol 100 mg tablet Commonly known as:  Cyntha Dicker Take 1 Tab by mouth daily. amLODIPine 10 mg tablet Commonly known as:  Juan Alstrom Take 0.5 Tabs by mouth daily. aspirin delayed-release 81 mg tablet Take  by mouth daily. Blood-Glucose Meter monitoring kit Commonly known as:  FREESTYLE LITE METER Test twice blood glucose daily; ICD-10 E11.9; Quantity 1  
  
 bumetanide 0.5 mg tablet Commonly known as:  Merla Goods Take 2 mg by mouth two (2) times a day. carvedilol 12.5 mg tablet Commonly known as:  Jestine Pellet Take 1 Tab by mouth two (2) times daily (with meals). celecoxib 200 mg capsule Commonly known as:  CELEBREX  
1 capsule twice per day with food  
  
 gabapentin 300 mg capsule Commonly known as:  NEURONTIN Take 1 Cap by mouth three (3) times daily. glucose blood VI test strips strip Commonly known as:  FREESTYLE TEST Use to check blood glucose twice daily DX:E11.9  
  
 insulin aspart 100 unit/mL Inpn Commonly known as:  Cherelle Faribault 16 units before each meal  
  
 insulin glargine 100 unit/mL injection Commonly known as:  LANTUS  
82 units daily Insulin Needles (Disposable) 31 gauge x 5/16\" Ndle Use to inject insulin once daily Dx:E11.9  
  
 insulin syringe,safetyneedle 1 mL 31 gauge x 5/16\" Syrg 1 Each by Does Not Apply route daily. levothyroxine 200 mcg tablet Commonly known as:  SYNTHROID  
1 tablet daily (take on an empty stomach) (stop \"old\" synthroid)  
  
 magnesium oxide 500 mg Tab Take  by mouth. * morphine CR 30 mg CR tablet Commonly known as:  MS CONTIN Take 1 Tab by mouth every eight (8) hours for 30 days. Max Daily Amount: 90 mg.  
  
 * morphine IR 15 mg tablet Commonly known as:  MS IR Take 1 Tab by mouth two (2) times daily as needed for Pain for up to 30 days. Max Daily Amount: 30 mg.  
  
 * morphine CR 30 mg CR tablet Commonly known as:  MS CONTIN Take 1 Tab by mouth every eight (8) hours for 30 days. Max Daily Amount: 90 mg. Start taking on:  12/24/2017 * morphine IR 15 mg tablet Commonly known as:  MS IR Take 1 Tab by mouth two (2) times daily as needed for Pain for up to 30 days. Max Daily Amount: 30 mg. Start taking on:  1/2/2018 * morphine CR 30 mg CR tablet Commonly known as:  MS CONTIN Take 1 Tab by mouth every eight (8) hours for 30 days. Max Daily Amount: 90 mg. Start taking on:  1/23/2018  
  
 naloxone 4 mg/actuation nasal spray Commonly known as:  NARCAN  
4 mg by Nasal route as needed. For emergency use only  Indications: OPIATE-INDUCED RESPIRATORY DEPRESSION  
  
 VITAMIN B-12 500 mcg tablet Generic drug:  cyanocobalamin Take 500 mcg by mouth daily. VITAMIN C 250 mg tablet Generic drug:  ascorbic acid (vitamin C) Take 100 mg by mouth daily. VITAMIN D3 1,000 unit tablet Generic drug:  cholecalciferol Take  by mouth daily. * Notice:   This list has 5 medication(s) that are the same as other medications prescribed for you. Read the directions carefully, and ask your doctor or other care provider to review them with you. Prescriptions Sent to Pharmacy Refills Blood-Glucose Meter (FREESTYLE LITE METER) monitoring kit 0 Sig: Test twice blood glucose daily; ICD-10 E11.9; Quantity 1 Class: Normal  
 Pharmacy: [address] Liss[address].  #: [phone] We Performed the Following REFERRAL TO WOUND CARE [ZLD954 Custom] To-Do List   
 12/15/2017 Microbiology:  CULTURE, WOUND W GRAM STAIN Referral Information Referral ID Referred By Referred To  
  
 [id number] Blair DESAI Not Available Visits Status Start Date End Date 1 New Request 12/15/17 12/15/18 If your referral has a status of pending review or denied, additional information will be sent to support the outcome of this decision. Introducing [facility] & HEALTH SERVICES! Dear Kailey Lugo: Thank you for requesting a GOOM account. Our records indicate that you already have an active GOOM account. You can access your account anytime at [email or web address] Did you know that you can access your hospital and ER discharge instructions at any time in GOOM? You can also review all of your test results from your hospital stay or ER visit. Additional Information If you have questions, please visit the Frequently Asked Questions section of the GOOM website at [email or web address] Remember, GOOM is NOT to be used for urgent needs. For medical emergencies, dial 911. Now available from your iPhone and Android! Please provide this summary of care documentation to your next provider. Your primary care clinician is listed as Harleen Bell. If you have any questions after today's visit, please call [phone].

## 2017-12-17 LAB
BACTERIA SPEC CULT: ABNORMAL
GRAM STN SPEC: ABNORMAL
GRAM STN SPEC: ABNORMAL
SERVICE CMNT-IMP: ABNORMAL

## 2018-01-12 ENCOUNTER — OFFICE VISIT (OUTPATIENT)
Dept: INTERNAL MEDICINE CLINIC | Age: 83
End: 2018-01-12

## 2018-01-12 ENCOUNTER — HOSPITAL ENCOUNTER (OUTPATIENT)
Dept: LAB | Age: 83
Discharge: HOME | End: 2018-01-12
Payer: MEDICARE

## 2018-01-12 ENCOUNTER — TELEPHONE (OUTPATIENT)
Dept: INTERNAL MEDICINE CLINIC | Age: 83
End: 2018-01-12

## 2018-01-12 VITALS
TEMPERATURE: 98 F | HEART RATE: 75 BPM | BODY MASS INDEX: 39.32 KG/M2 | WEIGHT: 236 LBS | OXYGEN SATURATION: 97 % | HEIGHT: 65 IN | SYSTOLIC BLOOD PRESSURE: 118 MMHG | RESPIRATION RATE: 16 BRPM | DIASTOLIC BLOOD PRESSURE: 52 MMHG

## 2018-01-12 DIAGNOSIS — G89.4 CHRONIC PAIN SYNDROME: ICD-10-CM

## 2018-01-12 DIAGNOSIS — E11.21 TYPE 2 DIABETES MELLITUS WITH NEPHROPATHY (HCC): Primary | ICD-10-CM

## 2018-01-12 DIAGNOSIS — E03.9 ACQUIRED HYPOTHYROIDISM: ICD-10-CM

## 2018-01-12 DIAGNOSIS — E11.21 TYPE 2 DIABETES MELLITUS WITH NEPHROPATHY (HCC): ICD-10-CM

## 2018-01-12 LAB
ALBUMIN SERPL-MCNC: 3.6 G/DL (ref 3.4–5)
ALBUMIN/GLOB SERPL: 0.9 {RATIO} (ref 0.8–1.7)
ALP SERPL-CCNC: 72 U/L (ref 45–117)
ALT SERPL-CCNC: 13 U/L (ref 13–56)
ANION GAP SERPL CALC-SCNC: 10 MMOL/L (ref 3–18)
AST SERPL-CCNC: 15 U/L (ref 15–37)
BASOPHILS # BLD: 0.1 K/UL (ref 0–0.06)
BASOPHILS NFR BLD: 1 % (ref 0–2)
BILIRUB SERPL-MCNC: 0.5 MG/DL (ref 0.2–1)
BUN SERPL-MCNC: 21 MG/DL (ref 7–18)
BUN/CREAT SERPL: 18 (ref 12–20)
CALCIUM SERPL-MCNC: 8.8 MG/DL (ref 8.5–10.1)
CHLORIDE SERPL-SCNC: 97 MMOL/L (ref 100–108)
CO2 SERPL-SCNC: 28 MMOL/L (ref 21–32)
CREAT SERPL-MCNC: 1.16 MG/DL (ref 0.6–1.3)
DIFFERENTIAL METHOD BLD: ABNORMAL
EOSINOPHIL # BLD: 0.3 K/UL (ref 0–0.4)
EOSINOPHIL NFR BLD: 3 % (ref 0–5)
ERYTHROCYTE [DISTWIDTH] IN BLOOD BY AUTOMATED COUNT: 13.7 % (ref 11.6–14.5)
EST. AVERAGE GLUCOSE BLD GHB EST-MCNC: 232 MG/DL
GLOBULIN SER CALC-MCNC: 4 G/DL (ref 2–4)
GLUCOSE SERPL-MCNC: 427 MG/DL (ref 74–99)
HBA1C MFR BLD: 9.7 % (ref 4.2–5.6)
HCT VFR BLD AUTO: 42 % (ref 35–45)
HGB BLD-MCNC: 13.3 G/DL (ref 12–16)
LYMPHOCYTES # BLD: 2.4 K/UL (ref 0.9–3.6)
LYMPHOCYTES NFR BLD: 25 % (ref 21–52)
MCH RBC QN AUTO: 28.4 PG (ref 24–34)
MCHC RBC AUTO-ENTMCNC: 31.7 G/DL (ref 31–37)
MCV RBC AUTO: 89.6 FL (ref 74–97)
MONOCYTES # BLD: 0.4 K/UL (ref 0.05–1.2)
MONOCYTES NFR BLD: 5 % (ref 3–10)
NEUTS SEG # BLD: 6.5 K/UL (ref 1.8–8)
NEUTS SEG NFR BLD: 66 % (ref 40–73)
PLATELET # BLD AUTO: 221 K/UL (ref 135–420)
PMV BLD AUTO: 13.4 FL (ref 9.2–11.8)
POTASSIUM SERPL-SCNC: 4.9 MMOL/L (ref 3.5–5.5)
PROT SERPL-MCNC: 7.6 G/DL (ref 6.4–8.2)
RBC # BLD AUTO: 4.69 M/UL (ref 4.2–5.3)
SODIUM SERPL-SCNC: 135 MMOL/L (ref 136–145)
TSH SERPL DL<=0.05 MIU/L-ACNC: 0.3 UIU/ML (ref 0.36–3.74)
WBC # BLD AUTO: 9.6 K/UL (ref 4.6–13.2)

## 2018-01-12 PROCEDURE — 84443 ASSAY THYROID STIM HORMONE: CPT | Performed by: INTERNAL MEDICINE

## 2018-01-12 PROCEDURE — 80053 COMPREHEN METABOLIC PANEL: CPT | Performed by: INTERNAL MEDICINE

## 2018-01-12 PROCEDURE — 83036 HEMOGLOBIN GLYCOSYLATED A1C: CPT | Performed by: INTERNAL MEDICINE

## 2018-01-12 PROCEDURE — 85025 COMPLETE CBC W/AUTO DIFF WBC: CPT | Performed by: INTERNAL MEDICINE

## 2018-01-12 RX ORDER — CEFUROXIME AXETIL 500 MG/1
TABLET ORAL
Qty: 14 TAB | Refills: 0 | Status: SHIPPED | OUTPATIENT
Start: 2018-01-12 | End: 2018-03-18 | Stop reason: ALTCHOICE

## 2018-01-12 NOTE — MR AVS SNAPSHOT
Visit Information Date & Time Provider Department Dept. Phone Encounter #  
 1/12/2018  9:00 AM Bandar Ramirez MD [facility] [place] [phone] [id number] Follow-up Instructions Return in about 7 weeks (around 3/1/2018). Your Appointments 1/25/2018 10:00 AM  
Follow Up with IGNACIO Rhodes [address] [place] [address] for Pain Management (JANET SCHEDULING) Appt Note: 3 mon f/u per rc. ..to  
 [address] [place] [address]  
[phone]  [id number] Upcoming Health Maintenance Date Due  
 LIPID PANEL Q1 1935 EYE EXAM RETINAL OR DILATED Q1 3/19/1945 DTaP/Tdap/Td series (1 - Tdap) 3/19/1958 ZOSTER VACCINE AGE 60> 1/19/1997 GLAUCOMA SCREENING Q2Y 3/19/2002 OSTEOPOROSIS SCREENING (DEXA) 3/19/2002 Influenza Age 5 to Adult 8/1/2017 HEMOGLOBIN A1C Q6M 10/4/2017 Pneumococcal 65+ Low/Medium Risk (2 of 2 - PPSV23) 10/18/2017 MEDICARE YEARLY EXAM 1/6/2018 MICROALBUMIN Q1 4/4/2018 FOOT EXAM Q1 8/23/2018 Allergies as of 1/12/2018  Review Complete On: 1/12/2018 By: Bandar Ramirez MD  
 No Known Allergies Current Immunizations  Never Reviewed No immunizations on file. Not reviewed this visit You Were Diagnosed With   
  
 Codes Comments Type 2 diabetes mellitus with nephropathy ([facility] [address]    -  Primary ICD-10-CM: E11.21 
ICD-9-CM: 250.40, 583.81 Chronic pain syndrome     ICD-10-CM: G89.4 ICD-9-CM: 338.4 Acquired hypothyroidism     ICD-10-CM: E03.9 ICD-9-CM: 771. 9 Vitals BP Pulse Temp Resp Height(growth percentile) Weight(growth percentile) 118/52 (BP 1 Location: Left arm, BP Patient Position: Sitting) 75 98 °F (36.7 °C) (Tympanic) 16 5' 5\" (1.651 m) 236 lb (107 kg) SpO2 BMI OB Status Smoking Status 97% 39.27 kg/m2 Hysterectomy Former Smoker Vitals History BMI and BSA Data Body Mass Index Body Surface Area  
 39.27 kg/m 2 2.22 m 2 Preferred Pharmacy Pharmacy Name Phone [address] [place] [address]. [phone] Your Updated Medication List  
  
   
This list is accurate as of: 1/12/18 10:18 AM.  Always use your most recent med list.  
  
  
  
  
 acetaminophen 500 mg tablet Commonly known as:  TYLENOL Take 1 Tab by mouth every six (6) hours as needed for Pain. allopurinol 100 mg tablet Commonly known as:  Darroll Amanda Take 1 Tab by mouth daily. amLODIPine 10 mg tablet Commonly known as:  Alvarez Nay Take 0.5 Tabs by mouth daily. aspirin delayed-release 81 mg tablet Take  by mouth daily. Blood-Glucose Meter monitoring kit Commonly known as:  FREESTYLE LITE METER Test twice blood glucose daily; ICD-10 E11.9; Quantity 1  
  
 bumetanide 0.5 mg tablet Commonly known as:  Raúl Mendez Take 2 mg by mouth two (2) times a day. carvedilol 12.5 mg tablet Commonly known as:  [place] Sloop Take 1 Tab by mouth two (2) times daily (with meals). cefUROXime 500 mg tablet Commonly known as:  CEFTIN  
1 tablet twice per day  
  
 celecoxib 200 mg capsule Commonly known as:  CELEBREX  
1 capsule twice per day with food  
  
 gabapentin 300 mg capsule Commonly known as:  NEURONTIN Take 1 Cap by mouth three (3) times daily. glucose blood VI test strips strip Commonly known as:  FREESTYLE TEST Use to check blood glucose twice daily DX:E11.9  
  
 insulin aspart 100 unit/mL Inpn Commonly known as:  Mitchell Caller 16 units before each meal  
  
 insulin glargine 100 unit/mL injection Commonly known as:  LANTUS  
82 units daily Insulin Needles (Disposable) 31 gauge x 5/16\" Ndle Use to inject insulin once daily Dx:E11.9  
  
 insulin syringe,safetyneedle 1 mL 31 gauge x 5/16\" Syrg 1 Each by Does Not Apply route daily. levothyroxine 200 mcg tablet Commonly known as:  SYNTHROID  
 1 tablet daily (take on an empty stomach) (stop \"old\" synthroid)  
  
 magnesium oxide 500 mg Tab Take  by mouth. * morphine CR 30 mg CR tablet Commonly known as:  MS CONTIN Take 1 Tab by mouth every eight (8) hours for 30 days. Max Daily Amount: 90 mg.  
  
 * morphine IR 15 mg tablet Commonly known as:  MS IR Take 1 Tab by mouth two (2) times daily as needed for Pain for up to 30 days. Max Daily Amount: 30 mg.  
  
 naloxone 4 mg/actuation nasal spray Commonly known as:  NARCAN  
4 mg by Nasal route as needed. For emergency use only  Indications: OPIATE-INDUCED RESPIRATORY DEPRESSION  
  
 VITAMIN B-12 500 mcg tablet Generic drug:  cyanocobalamin Take 500 mcg by mouth daily. VITAMIN C 250 mg tablet Generic drug:  ascorbic acid (vitamin C) Take 100 mg by mouth daily. VITAMIN D3 1,000 unit tablet Generic drug:  cholecalciferol Take  by mouth daily. * Notice: This list has 2 medication(s) that are the same as other medications prescribed for you. Read the directions carefully, and ask your doctor or other care provider to review them with you. Prescriptions Printed Refills  
 cefUROXime (CEFTIN) 500 mg tablet 0 Si tablet twice per day Class: Print Follow-up Instructions Return in about 7 weeks (around 3/1/2018). Patient Instructions Health Maintenance Due Topic Date Due  Cholesterol Test   1935  Eye Exam  1945  
 DTaP/Tdap/Td  (1 - Tdap) 1958  Shingles Vaccine  1997  Glaucoma Screening   2002  Bone Density Screening  2002  Flu Vaccine  2017  Hemoglobin A1C    10/04/2017  Pneumococcal Vaccine (2 of 2 - PPSV23) 10/18/2017 [address] [place] [address] Annual Well Visit  2018 Introducing [facility]! Dear Huma Ramires: Thank you for requesting a [employer] account. Our records indicate that you already have an active [employer] account.   You can access your account anytime at [email or web address] "[email or web address] Did you know that you can access your hospital and ER discharge instructions at any time in Codesion? You can also review all of your test results from your hospital stay or ER visit. Additional Information If you have questions, please visit the Frequently Asked Questions section of the Codesion website at [email or web address] Remember, Codesion is NOT to be used for urgent needs. For medical emergencies, dial 911. Now available from your iPhone and Android! Please provide this summary of care documentation to your next provider. Your primary care clinician is listed as Celia Quinones. If you have any questions after today's visit, please call [phone].

## 2018-01-12 NOTE — PATIENT INSTRUCTIONS
Health Maintenance Due   Topic Date Due    Cholesterol Test   1935    Eye Exam  [birth year]19/1945    DTaP/Tdap/Td  (1 - Tdap) 03/19/1958    Shingles Vaccine  01/19/1997    Glaucoma Screening   03/19/2002    Bone Density Screening  03/19/2002    Flu Vaccine  08/01/2017    Hemoglobin A1C    10/04/2017    Pneumococcal Vaccine (2 of 2 - PPSV23) 10/18/2017    Annual Well Visit  01/06/2018

## 2018-01-12 NOTE — PROGRESS NOTES
1. Have you been to the ER, urgent care clinic since your last visit? Hospitalized since your last visit? No    2. Have you seen or consulted any other health care providers outside of the [address] [place] [address] since your last visit? Include any pap smears or colon screening.  [facility] clinic

## 2018-01-13 NOTE — TELEPHONE ENCOUNTER
Received a TC call from the lab at [phone] regarding a critical glucose of 427. Patient answered the phone, I introduced myself and told her iI was calling from Dr. Mejia Ear office regarding her lab work. Patient stated, \"i cant talk right now, you woke me up! Then she abruptly hung the phone up.

## 2018-01-14 NOTE — PROGRESS NOTES
The patient presents to the office today with the chief complaint of Sinus Congestion    HPI    The patient complains of sinus congestion. she denies fever. The patient remains on medications for a chronic pain syndrome. The patient remains on thyroid replacement      Review of Systems   Respiratory: Negative for shortness of breath. Cardiovascular: Negative for chest pain and leg swelling. No Known Allergies    Current Outpatient Prescriptions   Medication Sig Dispense Refill    cefUROXime (CEFTIN) 500 mg tablet 1 tablet twice per day 14 Tab 0    Blood-Glucose Meter (FREESTYLE LITE METER) monitoring kit Test twice blood glucose daily; ICD-10 E11.9; Quantity 1 1 Kit 0    morphine CR (MS CONTIN) 30 mg CR tablet Take 1 Tab by mouth every eight (8) hours for 30 days. Max Daily Amount: 90 mg. 90 Tab 0    amLODIPine (NORVASC) 10 mg tablet Take 0.5 Tabs by mouth daily. 90 Tab 3    morphine IR (MS IR) 15 mg tablet Take 1 Tab by mouth two (2) times daily as needed for Pain for up to 30 days. Max Daily Amount: 30 mg. 60 Tab 0    insulin glargine (LANTUS) 100 unit/mL injection 82 units daily (Patient taking differently: 50 units daily) 6 Vial 3    levothyroxine (SYNTHROID) 200 mcg tablet 1 tablet daily (take on an empty stomach) (stop \"old\" synthroid) 90 Tab 3    carvedilol (COREG) 12.5 mg tablet Take 1 Tab by mouth two (2) times daily (with meals). 180 Tab 3    insulin syringe,safetyneedle 1 mL 31 gauge x 5/16\" syrg 1 Each by Does Not Apply route daily. 300 Each 3    cholecalciferol (VITAMIN D3) 1,000 unit tablet Take  by mouth daily.       Insulin Needles, Disposable, 31 gauge x 5/16\" ndle Use to inject insulin once daily Dx:E11.9 3 Package 3    glucose blood VI test strips (FREESTYLE TEST) strip Use to check blood glucose twice daily DX:E11.9 300 Strip 3    celecoxib (CELEBREX) 200 mg capsule 1 capsule twice per day with food 180 Cap 0    gabapentin (NEURONTIN) 300 mg capsule Take 1 Cap by mouth three (3) times daily. 270 Cap 3    allopurinol (ZYLOPRIM) 100 mg tablet Take 1 Tab by mouth daily. 90 Tab 3    insulin aspart (NOVOLOG) 100 unit/mL inpn 16 units before each meal 30 mL 3    magnesium oxide 500 mg tab Take  by mouth.  cyanocobalamin (VITAMIN B-12) 500 mcg tablet Take 500 mcg by mouth daily.  ascorbic acid, vitamin C, (VITAMIN C) 250 mg tablet Take 100 mg by mouth daily.  acetaminophen (TYLENOL) 500 mg tablet Take 1 Tab by mouth every six (6) hours as needed for Pain. 270 Tab 3    aspirin delayed-release 81 mg tablet Take  by mouth daily.  bumetanide (BUMEX) 0.5 mg tablet Take 2 mg by mouth two (2) times a day. Past Medical History:   Diagnosis Date    Asthma     Chronic venous insufficiency     Diabetes (HCC)     Hypertension     Peripheral neuropathy     Rheumatoid arthritis ([facility] [address]     Vertigo        History reviewed. No pertinent surgical history. Social History     Social History    Marital status:      Spouse name: N/A    Number of children: N/A    Years of education: N/A     Occupational History    Not on file. Social History Main Topics    Smoking status: Former Smoker    Smokeless tobacco: Never Used    Alcohol use No    Drug use: No    Sexual activity: No     Other Topics Concern    Not on file     Social History Narrative       Patient does not have an advanced directive on file    Visit Vitals    /52 (BP 1 Location: Left arm, BP Patient Position: Sitting)    Pulse 75    Temp 98 °F (36.7 °C) (Tympanic)    Resp 16    Ht 5' 5\" (1.651 m)    Wt 236 lb (107 kg)    SpO2 97%    BMI 39.27 kg/m2       Physical Exam   HENT:   Mouth/Throat: No oropharyngeal exudate. Ears:  Normal bilaterally   Cardiovascular: Exam reveals no gallop. No murmur heard. Pulmonary/Chest: She has no wheezes. She has no rales. Lymphadenopathy:     She has no cervical adenopathy.        BMI:  BMI is high but it was not addressed during this visit due to an acute illness      Hospital Outpatient Visit on 01/12/2018   Component Date Value Ref Range Status    WBC 01/12/2018 9.6  4.6 - 13.2 K/uL Final    RBC 01/12/2018 4.69  4.20 - 5.30 M/uL Final    HGB 01/12/2018 13.3  12.0 - 16.0 g/dL Final    HCT 01/12/2018 42.0  35.0 - 45.0 % Final    MCV 01/12/2018 89.6  74.0 - 97.0 FL Final    MCH 01/12/2018 28.4  24.0 - 34.0 PG Final    MCHC 01/12/2018 31.7  31.0 - 37.0 g/dL Final    RDW 01/12/2018 13.7  11.6 - 14.5 % Final    PLATELET 82/24/7226 076  135 - 420 K/uL Final    MPV 01/12/2018 13.4* 9.2 - 11.8 FL Final    NEUTROPHILS 01/12/2018 66  40 - 73 % Final    LYMPHOCYTES 01/12/2018 25  21 - 52 % Final    MONOCYTES 01/12/2018 5  3 - 10 % Final    EOSINOPHILS 01/12/2018 3  0 - 5 % Final    BASOPHILS 01/12/2018 1  0 - 2 % Final    ABS. NEUTROPHILS 01/12/2018 6.5  1.8 - 8.0 K/UL Final    ABS. LYMPHOCYTES 01/12/2018 2.4  0.9 - 3.6 K/UL Final    ABS. MONOCYTES 01/12/2018 0.4  0.05 - 1.2 K/UL Final    ABS. EOSINOPHILS 01/12/2018 0.3  0.0 - 0.4 K/UL Final    ABS.  BASOPHILS 01/12/2018 0.1* 0.0 - 0.06 K/UL Final    DF 01/12/2018 AUTOMATED    Final    Sodium 01/12/2018 135* 136 - 145 mmol/L Final    Potassium 01/12/2018 4.9  3.5 - 5.5 mmol/L Final    Chloride 01/12/2018 97* 100 - 108 mmol/L Final    CO2 01/12/2018 28  21 - 32 mmol/L Final    Anion gap 01/12/2018 10  3.0 - 18 mmol/L Final    Glucose 01/12/2018 427* 74 - 99 mg/dL Final    Comment: CALLED TO AND CORRECTLY REPEATED BY:  DR. Ruby Kanner. DONNA ACEVEDO (ON CALL) 1/12/18 AT 2237 TO [facility]      BUN 01/12/2018 21* 7.0 - 18 MG/DL Final    Creatinine 01/12/2018 1.16  0.6 - 1.3 MG/DL Final    BUN/Creatinine ratio 01/12/2018 18  12 - 20   Final    GFR est AA 01/12/2018 54* >60 ml/min/1.73m2 Final    GFR est non-AA 01/12/2018 45* >60 ml/min/1.73m2 Final    Comment: (NOTE)  Estimated GFR is calculated using the Modification of Diet in Renal   Disease (MDRD) Study equation, reported for both  Americans   (GFRAA) and non- Americans (GFRNA), and normalized to 1.73m2   body surface area. The physician must decide which value applies to   the patient. The MDRD study equation should only be used in   individuals age 25 or older. It has not been validated for the   following: pregnant women, patients with serious comorbid conditions,   or on certain medications, or persons with extremes of body size,   muscle mass, or nutritional status.  Calcium 01/12/2018 8.8  8.5 - 10.1 MG/DL Final    Bilirubin, total 01/12/2018 0.5  0.2 - 1.0 MG/DL Final    ALT (SGPT) 01/12/2018 13  13 - 56 U/L Final    AST (SGOT) 01/12/2018 15  15 - 37 U/L Final    Alk. phosphatase 01/12/2018 72  45 - 117 U/L Final    Protein, total 01/12/2018 7.6  6.4 - 8.2 g/dL Final    Albumin 01/12/2018 3.6  3.4 - 5.0 g/dL Final    Globulin 01/12/2018 4.0  2.0 - 4.0 g/dL Final    A-G Ratio 01/12/2018 0.9  0.8 - 1.7   Final    Hemoglobin A1c 01/12/2018 9.7* 4.2 - 5.6 % Final    Comment: (NOTE)  HbA1C Interpretive Ranges  <5.7              Normal  5.7 - 6.4         Consider Prediabetes  >6.5              Consider Diabetes      Est. average glucose 01/12/2018 232  mg/dL Final    Comment: (NOTE)  The eAG should be interpreted with patient characteristics in mind   since ethnicity, interindividual differences, red cell lifespan,   variation in rates of glycation, etc. may affect the validity of the   calculation.       TSH 01/12/2018 0.30* 0.36 - 3.74 uIU/mL Final   Hospital Outpatient Visit on 12/15/2017   Component Date Value Ref Range Status    Special Requests: 12/15/2017 NO SPECIAL REQUESTS    Final    GRAM STAIN 12/15/2017 RARE WBC'S    Final    GRAM STAIN 12/15/2017 FEW GRAM NEGATIVE RODS    Final    Culture result: 12/15/2017 MANY KLEBSIELLA PNEUMONIAE*   Final       .  Results for orders placed or performed during the hospital encounter of 01/12/18   CBC WITH AUTOMATED DIFF   Result Value Ref Range    WBC 9.6 4.6 - 13.2 K/uL    RBC 4.69 4.20 - 5.30 M/uL    HGB 13.3 12.0 - 16.0 g/dL    HCT 42.0 35.0 - 45.0 %    MCV 89.6 74.0 - 97.0 FL    MCH 28.4 24.0 - 34.0 PG    MCHC 31.7 31.0 - 37.0 g/dL    RDW 13.7 11.6 - 14.5 %    PLATELET 713 877 - 732 K/uL    MPV 13.4 (H) 9.2 - 11.8 FL    NEUTROPHILS 66 40 - 73 %    LYMPHOCYTES 25 21 - 52 %    MONOCYTES 5 3 - 10 %    EOSINOPHILS 3 0 - 5 %    BASOPHILS 1 0 - 2 %    ABS. NEUTROPHILS 6.5 1.8 - 8.0 K/UL    ABS. LYMPHOCYTES 2.4 0.9 - 3.6 K/UL    ABS. MONOCYTES 0.4 0.05 - 1.2 K/UL    ABS. EOSINOPHILS 0.3 0.0 - 0.4 K/UL    ABS. BASOPHILS 0.1 (H) 0.0 - 0.06 K/UL    DF AUTOMATED     METABOLIC PANEL, COMPREHENSIVE   Result Value Ref Range    Sodium 135 (L) 136 - 145 mmol/L    Potassium 4.9 3.5 - 5.5 mmol/L    Chloride 97 (L) 100 - 108 mmol/L    CO2 28 21 - 32 mmol/L    Anion gap 10 3.0 - 18 mmol/L    Glucose 427 (HH) 74 - 99 mg/dL    BUN 21 (H) 7.0 - 18 MG/DL    Creatinine 1.16 0.6 - 1.3 MG/DL    BUN/Creatinine ratio 18 12 - 20      GFR est AA 54 (L) >60 ml/min/1.73m2    GFR est non-AA 45 (L) >60 ml/min/1.73m2    Calcium 8.8 8.5 - 10.1 MG/DL    Bilirubin, total 0.5 0.2 - 1.0 MG/DL    ALT (SGPT) 13 13 - 56 U/L    AST (SGOT) 15 15 - 37 U/L    Alk. phosphatase 72 45 - 117 U/L    Protein, total 7.6 6.4 - 8.2 g/dL    Albumin 3.6 3.4 - 5.0 g/dL    Globulin 4.0 2.0 - 4.0 g/dL    A-G Ratio 0.9 0.8 - 1.7     HEMOGLOBIN A1C WITH EAG   Result Value Ref Range    Hemoglobin A1c 9.7 (H) 4.2 - 5.6 %    Est. average glucose 232 mg/dL   TSH 3RD GENERATION   Result Value Ref Range    TSH 0.30 (L) 0.36 - 3.74 uIU/mL       Assessment / Plan      ICD-10-CM ICD-9-CM    1. Type 2 diabetes mellitus with nephropathy (HCC) E11.21 250.40 CBC WITH AUTOMATED DIFF     961.64 METABOLIC PANEL, COMPREHENSIVE      HEMOGLOBIN A1C WITH EAG      TSH 3RD GENERATION   2. Chronic pain syndrome G89.4 338.4 CBC WITH AUTOMATED DIFF      METABOLIC PANEL, COMPREHENSIVE      HEMOGLOBIN A1C WITH EAG      TSH 3RD GENERATION   3.  Acquired hypothyroidism E03.9 244.9 CBC WITH AUTOMATED DIFF      METABOLIC PANEL, COMPREHENSIVE      HEMOGLOBIN A1C WITH EAG      TSH 3RD GENERATION       Ceftin  she was advised to continue her maintenance medications  Labs ordered    Follow-up Disposition:  Return in about 6 months (around 7/12/2018). I asked Brent Johnson if she has any questions and I answered the questions.   Brent Johnson states that she understands the treatment plan and agrees with the treatment plan

## 2018-01-25 ENCOUNTER — OFFICE VISIT (OUTPATIENT)
Dept: PAIN MANAGEMENT | Age: 83
End: 2018-01-25

## 2018-01-25 VITALS
SYSTOLIC BLOOD PRESSURE: 126 MMHG | WEIGHT: 236 LBS | BODY MASS INDEX: 39.27 KG/M2 | TEMPERATURE: 97.5 F | DIASTOLIC BLOOD PRESSURE: 63 MMHG | HEART RATE: 85 BPM | RESPIRATION RATE: 14 BRPM

## 2018-01-25 DIAGNOSIS — G89.29 CHRONIC RIGHT SHOULDER PAIN: ICD-10-CM

## 2018-01-25 DIAGNOSIS — G89.4 CHRONIC PAIN SYNDROME: ICD-10-CM

## 2018-01-25 DIAGNOSIS — M75.42 IMPINGEMENT SYNDROME OF LEFT SHOULDER: ICD-10-CM

## 2018-01-25 DIAGNOSIS — G89.29 CHRONIC LEFT SHOULDER PAIN: ICD-10-CM

## 2018-01-25 DIAGNOSIS — M54.2 NECK PAIN: ICD-10-CM

## 2018-01-25 DIAGNOSIS — M25.512 CHRONIC LEFT SHOULDER PAIN: ICD-10-CM

## 2018-01-25 DIAGNOSIS — M25.511 CHRONIC RIGHT SHOULDER PAIN: ICD-10-CM

## 2018-01-25 DIAGNOSIS — M19.011 ARTHRITIS OF RIGHT SHOULDER REGION: ICD-10-CM

## 2018-01-25 RX ORDER — MORPHINE SULFATE 15 MG/1
15 TABLET ORAL
Qty: 60 TAB | Refills: 0 | Status: SHIPPED | OUTPATIENT
Start: 2018-03-18 | End: 2018-03-18 | Stop reason: ALTCHOICE

## 2018-01-25 RX ORDER — MORPHINE SULFATE 30 MG/1
30 TABLET, FILM COATED, EXTENDED RELEASE ORAL EVERY 8 HOURS
Qty: 90 TAB | Refills: 0 | Status: SHIPPED | OUTPATIENT
Start: 2018-04-22 | End: 2018-04-26 | Stop reason: SDUPTHER

## 2018-01-25 RX ORDER — MORPHINE SULFATE 30 MG/1
30 TABLET, FILM COATED, EXTENDED RELEASE ORAL EVERY 8 HOURS
Qty: 90 TAB | Refills: 0 | Status: SHIPPED | OUTPATIENT
Start: 2018-03-23 | End: 2018-03-18 | Stop reason: ALTCHOICE

## 2018-01-25 RX ORDER — MORPHINE SULFATE 15 MG/1
15 TABLET ORAL
Qty: 60 TAB | Refills: 0 | Status: SHIPPED | OUTPATIENT
Start: 2018-02-17 | End: 2018-03-18 | Stop reason: ALTCHOICE

## 2018-01-25 RX ORDER — MORPHINE SULFATE 15 MG/1
15 TABLET ORAL
Qty: 60 TAB | Refills: 0 | Status: SHIPPED | OUTPATIENT
Start: 2018-04-17 | End: 2018-04-26 | Stop reason: SDUPTHER

## 2018-01-25 RX ORDER — MORPHINE SULFATE 30 MG/1
30 TABLET, FILM COATED, EXTENDED RELEASE ORAL EVERY 8 HOURS
Qty: 90 TAB | Refills: 0 | Status: SHIPPED | OUTPATIENT
Start: 2018-02-22 | End: 2018-03-18 | Stop reason: ALTCHOICE

## 2018-01-25 NOTE — PATIENT INSTRUCTIONS
1. Continue current plan with no evidence of addiction or diversion. Stable on current medication without adverse events. 2. Refill  morphine IR 15 mg 2 times daily as needed. 3. Refill  morphine ER 30 mg  every 8 hours. 4. Continue Voltaren gel 1%. Apply 3-4 times daily to each shoulder  5. Naloxone 4 mg nasal spray for opioid induced respiratory depression emergency only. 6. Discussed risks of addiction, dependency, and opioid induced hyperalgesia.    7. Return to clinic in 3 months

## 2018-01-25 NOTE — PROGRESS NOTES
HISTORY OF PRESENT ILLNESS  Renee Fernandes is a [de-identified] y.o. female    HPI: Ms. Matt Sow  returns today for f/u of chronic right shoulder pain. No h/o shoulder surgery. Surgery has been decline to to her heart condition. Prior injection with some improvement but temporary. PT with minimal imropvment. Ms. Matt Sow has been doing very well with her current treatment plan which has been offering moderate pain control. Unfortunately she reports that she has been very ill recently. She has followed up with her PCP who prescribed her antibiotics. She says that she became even more ill after starting the antibiotics. She followed up with another doctor who has diagnosed her with the flu. She will continue to follow-up with her PCP regarding her current illness. She is otherwise doing well with no other complaints today. I will have her follow-up in 3 months or sooner if needed    Current medication management consists of morphine ER 30 mg every 8 hours and morphine IR 15 mg 4 times a day as needed. Gabapentin by another provider. Medications are helping with pain control and quality of life. Her pain is 7/10 with medication and 10/10 without. Pt describes pain as aching, burning, and stabbing. Aggravating factors include most ROM activity. Relieved with rest, medication, and avoiding painful activities. Current treatment is helping to improve general activity, mood, walking, sleep, enjoyment of life    In the past 30 days, the patient reports approximately 30% pain relief with current treatment/medications. She  is otherwise doing well with no other complaints today. She denies any adverse events including nausea, vomiting, dizziness, constipation, hallucinations, or seizures. Because the patient's current regimen places him/her at increased risk for possible overdose, a prescription for naloxone nasal spray has been provided.   The patient understands that this medication is only to be used in the setting of a possible overdose and that inadvertent use of this medication could precipitate overt withdrawal.         No Known Allergies    History reviewed. No pertinent surgical history. Review of Systems   Constitutional: Positive for chills and fever. HENT: Positive for congestion. Negative for sore throat. Eyes: Negative for blurred vision and double vision. Respiratory: Negative for cough, shortness of breath and wheezing. Cardiovascular: Negative for chest pain and palpitations. Gastrointestinal: Positive for heartburn. Negative for constipation, diarrhea, nausea and vomiting. Genitourinary: Negative. Musculoskeletal: Positive for back pain, falls, joint pain and neck pain. Neurological: Negative for dizziness, seizures, loss of consciousness and headaches. Endo/Heme/Allergies: Does not bruise/bleed easily. Psychiatric/Behavioral: Positive for depression. Negative for suicidal ideas. The patient is not nervous/anxious and does not have insomnia. Physical Exam   Constitutional: She is oriented to person, place, and time and well-developed, well-nourished, and in no distress. No distress. HENT:   Head: Normocephalic and atraumatic. Eyes: EOM are normal.   Pulmonary/Chest: Effort normal.   Musculoskeletal:        Right shoulder: She exhibits decreased range of motion and tenderness. Neurological: She is alert and oriented to person, place, and time. Gait abnormal.   Skin: Skin is warm and dry. No rash noted. She is not diaphoretic. No erythema. Psychiatric: Mood, memory, affect and judgment normal.   Nursing note and vitals reviewed. ASSESSMENT:    1. Chronic left shoulder pain    2. Neck pain    3. Chronic right shoulder pain    4. Chronic pain syndrome    5. Arthritis of right shoulder region    6.  Impingement syndrome of left shoulder         [place] Prescription Monitoring Program was reviewed which does not demonstrate aberrancies and/or inconsistencies with regard to the historical prescribing of controlled medications to this patient by other providers. PLAN / Pt Instructions:  1. Continue current plan with no evidence of addiction or diversion. Stable on current medication without adverse events. 2. Refill  morphine IR 15 mg 2 times daily as needed. 3. Refill  morphine ER 30 mg  every 8 hours. 4. Continue Voltaren gel 1%. Apply 3-4 times daily to each shoulder  5. Naloxone 4 mg nasal spray for opioid induced respiratory depression emergency only. 6. Discussed risks of addiction, dependency, and opioid induced hyperalgesia. 7. Return to clinic in 3 months    Medications Ordered Today   Medications    morphine CR (MS CONTIN) 30 mg CR tablet     Sig: Take 1 Tab by mouth every eight (8) hours for 30 days. Max Daily Amount: 90 mg. Dispense:  90 Tab     Refill:  0    morphine CR (MS CONTIN) 30 mg CR tablet     Sig: Take 1 Tab by mouth every eight (8) hours for 30 days. Max Daily Amount: 90 mg. Dispense:  90 Tab     Refill:  0    morphine CR (MS CONTIN) 30 mg CR tablet     Sig: Take 1 Tab by mouth every eight (8) hours for 30 days. Max Daily Amount: 90 mg. Dispense:  90 Tab     Refill:  0    morphine IR (MS IR) 15 mg tablet     Sig: Take 1 Tab by mouth two (2) times daily as needed for Pain for up to 30 days. Max Daily Amount: 30 mg. Dispense:  60 Tab     Refill:  0    morphine IR (MS IR) 15 mg tablet     Sig: Take 1 Tab by mouth two (2) times daily as needed for Pain for up to 30 days. Max Daily Amount: 30 mg. Dispense:  60 Tab     Refill:  0    morphine IR (MS IR) 15 mg tablet     Sig: Take 1 Tab by mouth two (2) times daily as needed for Pain for up to 30 days. Max Daily Amount: 30 mg. Dispense:  60 Tab     Refill:  0       Pain medications prescribed with the objective of pain relief and improved physical and psychosocial function in this patient.     Spent 25 minutes with patient today reviewing the treatment plan, goals of treatment plan, and limitations of the treatment plan, to include the potential for side effects from medications and procedures. Vivek Lai 1/25/2018      Note: Please excuse any typographical errors. Voice recognition software was used for this note and may cause mistakes.

## 2018-01-25 NOTE — PROGRESS NOTES
Nursing Notes    Patient presents to the office today in follow-up. Patient rates her pain at 8/10 on the numerical pain scale. Reviewed medications with counts as follows:    Rx Date filled Qty Dispensed Pill Count Last Dose Short   Morphine 30 mg ER 01/23/18 90 85 This am no   Morphine 15 mgER 01/18/18 60 55 This am  no       Comments: Patient is here today for a follow up appt toady she states her pain level today is a 8  She states she thinks she was seen by her PCM and another MD she states she has the flu. She states she has chest congestion   She was given abx and realized it did not work she was later given mucinex DM    POC UDS was not performed in office today    Any new labs or imaging since last appointment? YES labs taken at [facility]  Have you been to an emergency room (ER) or urgent care clinic since your last visit? YES            Have you been hospitalized since your last visit? NO     If yes, where, when, and reason for visit? Have you seen or consulted any other health care providers outside of the [address] [place] [address]  since your last visit? YES PCM     If yes, where, when, and reason for visit? HM deferred to pcp. Ms. Albert Wade has a reminder for a \"due or due soon\" health maintenance. I have asked that she contact her primary care provider for follow-up on this health maintenance.

## 2018-01-25 NOTE — MR AVS SNAPSHOT
[address] [address] [phone] Patient: Feng Lora MRN: [id number] DOA:[birth year] Visit Information Date & Time Provider Department Dept. Phone Encounter #  
 1/25/2018 10:00 AM Jed Mansfield, [address] [place] [address] for Pain Management [id number] Follow-up Instructions Return in about 3 months (around 4/25/2018). Follow-up and Disposition History Your Appointments 3/14/2018 12:30 PM  
Follow Up with Dominique Banks MD  
[address] [place] [address]) Appt Note: 2mo  
 340 [facility], [address] [place] [address]  
[phone]  
  
   
 340 [facility] [address]  
  
    
 4/26/2018 12:20 PM  
Follow Up with IGNACIO Berkowitz [address] [place] [address] for Pain Management (JAENT SCHEDULING) Appt Note: return in 3 months 30 [facility] [address] [phone] [facility] [phone] Upcoming Health Maintenance Date Due  
 LIPID PANEL Q1 1937 EYE EXAM RETINAL OR DILATED Q1 3/19/1947 DTaP/Tdap/Td series (1 - Tdap) 3/19/1958 ZOSTER VACCINE AGE 60> 1/19/1997 GLAUCOMA SCREENING Q2Y 3/19/2002 OSTEOPOROSIS SCREENING (DEXA) 3/19/2002 Influenza Age 5 to Adult 8/1/2017 Pneumococcal 65+ Low/Medium Risk (2 of 2 - PPSV23) 10/18/2017 MEDICARE YEARLY EXAM 1/6/2018 MICROALBUMIN Q1 4/4/2018 HEMOGLOBIN A1C Q6M 7/12/2018 FOOT EXAM Q1 8/23/2018 Allergies as of 1/25/2018  Review Complete On: 1/25/2018 By: IGNACIO Berkowitz No Known Allergies Current Immunizations  Never Reviewed No immunizations on file. Not reviewed this visit You Were Diagnosed With   
  
 Codes Comments Chronic left shoulder pain     ICD-10-CM: M25.512, G89.29 ICD-9-CM: 719.41, 338.29 Neck pain     ICD-10-CM: M54.2 ICD-9-CM: 723.1 Chronic right shoulder pain     ICD-10-CM: M25.511, G89.29 ICD-9-CM: 719.41, 338.29 Chronic pain syndrome     ICD-10-CM: G89.4 ICD-9-CM: 338.4 Arthritis of right shoulder region     ICD-10-CM: M19.011 
ICD-9-CM: 716.91 Impingement syndrome of left shoulder     ICD-10-CM: M75.42 
ICD-9-CM: 726.2 Vitals BP Pulse Temp Resp Weight(growth percentile) BMI  
 126/63 (BP 1 Location: Right arm, BP Patient Position: Sitting) 85 97.5 °F (36.4 °C) 14 236 lb (107 kg) 39.27 kg/m2 OB Status Smoking Status Hysterectomy Former Smoker BMI and BSA Data Body Mass Index Body Surface Area  
 39.27 kg/m 2 2.22 m 2 Preferred Pharmacy Pharmacy Name Phone [address] [phone] Your Updated Medication List  
  
   
This list is accurate as of: 1/25/18 10:34 AM.  Always use your most recent med list.  
  
  
  
  
 acetaminophen 500 mg tablet Commonly known as:  TYLENOL Take 1 Tab by mouth every six (6) hours as needed for Pain. allopurinol 100 mg tablet Commonly known as:  Alison Kalama Take 1 Tab by mouth daily. amLODIPine 10 mg tablet Commonly known as:  Allena Parul Take 0.5 Tabs by mouth daily. aspirin delayed-release 81 mg tablet Take  by mouth daily. Blood-Glucose Meter monitoring kit Commonly known as:  FREESTYLE LITE METER Test twice blood glucose daily; ICD-10 E11.9; Quantity 1  
  
 bumetanide 0.5 mg tablet Commonly known as:  Epimenio Hominy Take 2 mg by mouth two (2) times a day. carvedilol 12.5 mg tablet Commonly known as:  Jing Abad Take 1 Tab by mouth two (2) times daily (with meals). cefUROXime 500 mg tablet Commonly known as:  CEFTIN  
1 tablet twice per day  
  
 celecoxib 200 mg capsule Commonly known as:  CELEBREX  
1 capsule twice per day with food  
  
 gabapentin 300 mg capsule Commonly known as:  NEURONTIN  
 Take 1 Cap by mouth three (3) times daily. glucose blood VI test strips strip Commonly known as:  FREESTYLE TEST Use to check blood glucose twice daily DX:E11.9  
  
 insulin aspart 100 unit/mL Inpn Commonly known as:  Lin Skelton 16 units before each meal  
  
 insulin glargine 100 unit/mL injection Commonly known as:  LANTUS  
82 units daily Insulin Needles (Disposable) 31 gauge x 5/16\" Ndle Use to inject insulin once daily Dx:E11.9  
  
 insulin syringe,safetyneedle 1 mL 31 gauge x 5/16\" Syrg 1 Each by Does Not Apply route daily. levothyroxine 200 mcg tablet Commonly known as:  SYNTHROID  
1 tablet daily (take on an empty stomach) (stop \"old\" synthroid)  
  
 magnesium oxide 500 mg Tab Take  by mouth. * morphine IR 15 mg tablet Commonly known as:  MS IR Take 1 Tab by mouth two (2) times daily as needed for Pain for up to 30 days. Max Daily Amount: 30 mg. Start taking on:  2/17/2018 * morphine CR 30 mg CR tablet Commonly known as:  MS CONTIN Take 1 Tab by mouth every eight (8) hours for 30 days. Max Daily Amount: 90 mg. Start taking on:  2/22/2018 * morphine IR 15 mg tablet Commonly known as:  MS IR Take 1 Tab by mouth two (2) times daily as needed for Pain for up to 30 days. Max Daily Amount: 30 mg. Start taking on:  3/18/2018 * morphine CR 30 mg CR tablet Commonly known as:  MS CONTIN Take 1 Tab by mouth every eight (8) hours for 30 days. Max Daily Amount: 90 mg. Start taking on:  3/23/2018 * morphine IR 15 mg tablet Commonly known as:  MS IR Take 1 Tab by mouth two (2) times daily as needed for Pain for up to 30 days. Max Daily Amount: 30 mg. Start taking on:  4/17/2018 * morphine CR 30 mg CR tablet Commonly known as:  MS CONTIN Take 1 Tab by mouth every eight (8) hours for 30 days. Max Daily Amount: 90 mg. Start taking on:  4/22/2018 VITAMIN B-12 500 mcg tablet Generic drug:  cyanocobalamin Take 500 mcg by mouth daily. VITAMIN C 250 mg tablet Generic drug:  ascorbic acid (vitamin C) Take 100 mg by mouth daily. VITAMIN D3 1,000 unit tablet Generic drug:  cholecalciferol Take  by mouth daily. * Notice: This list has 6 medication(s) that are the same as other medications prescribed for you. Read the directions carefully, and ask your doctor or other care provider to review them with you. Prescriptions Printed Refills  
 morphine CR (MS CONTIN) 30 mg CR tablet 0 Starting on: 2/22/2018 Sig: Take 1 Tab by mouth every eight (8) hours for 30 days. Max Daily Amount: 90 mg.  
 Class: Print Route: Oral  
 morphine CR (MS CONTIN) 30 mg CR tablet 0 Starting on: 3/23/2018 Sig: Take 1 Tab by mouth every eight (8) hours for 30 days. Max Daily Amount: 90 mg.  
 Class: Print Route: Oral  
 morphine CR (MS CONTIN) 30 mg CR tablet 0 Starting on: 4/22/2018 Sig: Take 1 Tab by mouth every eight (8) hours for 30 days. Max Daily Amount: 90 mg.  
 Class: Print Route: Oral  
 morphine IR (MS IR) 15 mg tablet 0 Starting on: 2/17/2018 Sig: Take 1 Tab by mouth two (2) times daily as needed for Pain for up to 30 days. Max Daily Amount: 30 mg.  
 Class: Print Route: Oral  
 morphine IR (MS IR) 15 mg tablet 0 Starting on: 3/18/2018 Sig: Take 1 Tab by mouth two (2) times daily as needed for Pain for up to 30 days. Max Daily Amount: 30 mg.  
 Class: Print Route: Oral  
 morphine IR (MS IR) 15 mg tablet 0 Starting on: 4/17/2018 Sig: Take 1 Tab by mouth two (2) times daily as needed for Pain for up to 30 days. Max Daily Amount: 30 mg.  
 Class: Print Route: Oral  
  
Follow-up Instructions Return in about 3 months (around 4/25/2018). Patient Instructions 1. Continue current plan with no evidence of addiction or diversion. Stable on current medication without adverse events. 2. Refill  morphine IR 15 mg 2 times daily as needed. 3. Refill  morphine ER 30 mg  every 8 hours. 4. Continue Voltaren gel 1%. Apply 3-4 times daily to each shoulder 5. Naloxone 4 mg nasal spray for opioid induced respiratory depression emergency only. 6. Discussed risks of addiction, dependency, and opioid induced hyperalgesia. 7. Return to clinic in 3 months Introducing [facility]! Dear Marlena Oglesby: Thank you for requesting a [employer] account. Our records indicate that you already have an active [facility] account. You can access your account anytime at [email or web address] Did you know that you can access your hospital and ER discharge instructions at any time in [employer] [facility]? You can also review all of your test results from your hospital stay or ER visit. Additional Information If you have questions, please visit the Frequently Asked Questions section of the [employer] website at [email or web address] Remember, [facility] is NOT to be used for urgent needs. For medical emergencies, dial 911. Now available from your iPhone and Android! Please provide this summary of care documentation to your next provider. Your primary care clinician is listed as Daren Henning. If you have any questions after today's visit, please call [phone].

## 2018-03-14 ENCOUNTER — OFFICE VISIT (OUTPATIENT)
Dept: INTERNAL MEDICINE CLINIC | Age: 83
End: 2018-03-14

## 2018-03-14 VITALS
TEMPERATURE: 98.3 F | SYSTOLIC BLOOD PRESSURE: 134 MMHG | RESPIRATION RATE: 18 BRPM | BODY MASS INDEX: 37.99 KG/M2 | OXYGEN SATURATION: 98 % | HEART RATE: 83 BPM | DIASTOLIC BLOOD PRESSURE: 74 MMHG | WEIGHT: 228 LBS | HEIGHT: 65 IN

## 2018-03-14 DIAGNOSIS — L98.9 SKIN LESIONS, GENERALIZED: ICD-10-CM

## 2018-03-14 DIAGNOSIS — E11.21 TYPE 2 DIABETES MELLITUS WITH NEPHROPATHY (HCC): Primary | ICD-10-CM

## 2018-03-14 DIAGNOSIS — E03.9 ACQUIRED HYPOTHYROIDISM: ICD-10-CM

## 2018-03-14 RX ORDER — METFORMIN HYDROCHLORIDE 500 MG/1
TABLET, EXTENDED RELEASE ORAL
Qty: 30 TAB | Refills: 3 | Status: SHIPPED | OUTPATIENT
Start: 2018-03-14 | End: 2018-03-22

## 2018-03-14 RX ORDER — GABAPENTIN 300 MG/1
300 CAPSULE ORAL 3 TIMES DAILY
Qty: 270 CAP | Refills: 3 | Status: SHIPPED | OUTPATIENT
Start: 2018-03-14 | End: 2021-07-06 | Stop reason: DRUGHIGH

## 2018-03-14 RX ORDER — GABAPENTIN 300 MG/1
300 CAPSULE ORAL 3 TIMES DAILY
Qty: 270 CAP | Refills: 3 | Status: SHIPPED | OUTPATIENT
Start: 2018-03-14 | End: 2018-03-14 | Stop reason: SDUPTHER

## 2018-03-14 RX ORDER — INSULIN ASPART 100 [IU]/ML
INJECTION, SOLUTION INTRAVENOUS; SUBCUTANEOUS
Qty: 30 ML | Refills: 3 | Status: SHIPPED | OUTPATIENT
Start: 2018-03-14 | End: 2019-08-07

## 2018-03-14 RX ORDER — METFORMIN HYDROCHLORIDE 500 MG/1
TABLET, EXTENDED RELEASE ORAL
Qty: 30 TAB | Refills: 3 | Status: SHIPPED | OUTPATIENT
Start: 2018-03-14 | End: 2018-03-14 | Stop reason: SDUPTHER

## 2018-03-14 RX ORDER — INSULIN ASPART 100 [IU]/ML
INJECTION, SOLUTION INTRAVENOUS; SUBCUTANEOUS
Qty: 30 ML | Refills: 3 | Status: SHIPPED | OUTPATIENT
Start: 2018-03-14 | End: 2018-03-14 | Stop reason: SDUPTHER

## 2018-03-14 NOTE — MR AVS SNAPSHOT
[address], [address] [facility] [address] [phone] Patient: Tess Sharma MRN: [id number] XSV:8/09/1948 Visit Information Date & Time Provider Department Dept. Phone Encounter #  
 3/14/2018 12:30 PM Cesar Johnston MD [facility] INTERNAL MEDICINE OF [place] [phone] Follow-up Instructions Return in about 3 weeks (around 4/5/2018). Your Appointments 4/26/2018 12:20 PM  
Follow Up with IGNACIO [place] [address] for Pain Management (JANET SCHEDULING) Appt Note: return in 3 months 30 [facility] [address]  
[phone] [facility] [phone] Upcoming Health Maintenance Date Due  
 LIPID PANEL Q1 1937 EYE EXAM RETINAL OR DILATED Q1 3/19/1947 DTaP/Tdap/Td series (1 - Tdap) 3/19/1958 ZOSTER VACCINE AGE 60> 1/19/1997 GLAUCOMA SCREENING Q2Y 3/19/2002 Bone Densitometry (Dexa) Screening 3/19/2002 Influenza Age 5 to Adult 8/1/2017 Pneumococcal 65+ Low/Medium Risk (2 of 2 - PPSV23) 10/18/2017 MEDICARE YEARLY EXAM 1/6/2018 MICROALBUMIN Q1 4/4/2018 HEMOGLOBIN A1C Q6M 7/12/2018 FOOT EXAM Q1 8/23/2018 Allergies as of 3/14/2018  Review Complete On: 3/14/2018 By: Mikayla Bruce LPN No Known Allergies Current Immunizations  Never Reviewed No immunizations on file. Not reviewed this visit You Were Diagnosed With   
  
 Codes Comments Skin lesions, generalized     ICD-10-CM: L98.9 ICD-9-CM: 709.9 Vitals BP Pulse Temp Resp Height(growth percentile) 134/74 (BP 1 Location: Left arm, BP Patient Position: Sitting) 83 98.3 °F (36.8 °C) (Tympanic) 18 5' 5\" (1.651 m) Weight(growth percentile) SpO2 BMI OB Status Smoking Status 228 lb (103.4 kg) 98% 37.94 kg/m2 Hysterectomy Former Smoker BMI and BSA Data Body Mass Index Body Surface Area 37.94 kg/m 2 2.18 m 2 Preferred Pharmacy Pharmacy Name Phone [address] [place] [address]. [phone] Your Updated Medication List  
  
   
This list is accurate as of 3/14/18  1:46 PM.  Always use your most recent med list.  
  
  
  
  
 acetaminophen 500 mg tablet Commonly known as:  TYLENOL Take 1 Tab by mouth every six (6) hours as needed for Pain. allopurinol 100 mg tablet Commonly known as:  Delona Gauze Take 1 Tab by mouth daily. amLODIPine 10 mg tablet Commonly known as:  Mica Peach Take 0.5 Tabs by mouth daily. aspirin delayed-release 81 mg tablet Take  by mouth daily. Blood-Glucose Meter monitoring kit Commonly known as:  FREESTYLE LITE METER Test twice blood glucose daily; ICD-10 E11.9; Quantity 1  
  
 bumetanide 0.5 mg tablet Commonly known as:  Charan Barnes Take 2 mg by mouth two (2) times a day. carvedilol 12.5 mg tablet Commonly known as:  Feng Si Take 1 Tab by mouth two (2) times daily (with meals). cefUROXime 500 mg tablet Commonly known as:  CEFTIN  
1 tablet twice per day  
  
 celecoxib 200 mg capsule Commonly known as:  CELEBREX  
1 capsule twice per day with food  
  
 gabapentin 300 mg capsule Commonly known as:  NEURONTIN Take 1 Cap by mouth three (3) times daily. glucose blood VI test strips strip Commonly known as:  FREESTYLE TEST Use to check blood glucose twice daily DX:E11.9  
  
 insulin aspart U-100 100 unit/mL Inpn Commonly known as:  Marko Going 16 units before each meal  
  
 insulin glargine 100 unit/mL injection Commonly known as:  LANTUS  
82 units daily Insulin Needles (Disposable) 31 gauge x 5/16\" Ndle Use to inject insulin once daily Dx:E11.9  
  
 insulin syringe,safetyneedle 1 mL 31 gauge x 5/16\" Syrg 1 Each by Does Not Apply route daily. levothyroxine 200 mcg tablet Commonly known as:  SYNTHROID  
 1 tablet daily (take on an empty stomach) (stop \"old\" synthroid)  
  
 magnesium oxide 500 mg Tab Take  by mouth.  
  
 metFORMIN  mg tablet Commonly known as:  GLUCOPHAGE XR  
1 tablet daily with food * morphine IR 15 mg tablet Commonly known as:  MS IR Take 1 Tab by mouth two (2) times daily as needed for Pain for up to 30 days. Max Daily Amount: 30 mg.  
  
 * morphine CR 30 mg CR tablet Commonly known as:  MS CONTIN Take 1 Tab by mouth every eight (8) hours for 30 days. Max Daily Amount: 90 mg.  
  
 * morphine IR 15 mg tablet Commonly known as:  MS IR Take 1 Tab by mouth two (2) times daily as needed for Pain for up to 30 days. Max Daily Amount: 30 mg. Start taking on:  3/18/2018 * morphine CR 30 mg CR tablet Commonly known as:  MS CONTIN Take 1 Tab by mouth every eight (8) hours for 30 days. Max Daily Amount: 90 mg. Start taking on:  3/23/2018 * morphine IR 15 mg tablet Commonly known as:  MS IR Take 1 Tab by mouth two (2) times daily as needed for Pain for up to 30 days. Max Daily Amount: 30 mg. Start taking on:  2018 * morphine CR 30 mg CR tablet Commonly known as:  MS CONTIN Take 1 Tab by mouth every eight (8) hours for 30 days. Max Daily Amount: 90 mg. Start taking on:  2018 VITAMIN B-12 500 mcg tablet Generic drug:  cyanocobalamin Take 500 mcg by mouth daily. VITAMIN C 250 mg tablet Generic drug:  ascorbic acid (vitamin C) Take 100 mg by mouth daily. VITAMIN D3 1,000 unit tablet Generic drug:  cholecalciferol Take  by mouth daily. * Notice: This list has 6 medication(s) that are the same as other medications prescribed for you. Read the directions carefully, and ask your doctor or other care provider to review them with you. Prescriptions Sent to Pharmacy Refills  
 insulin aspart U-100 (NOVOLOG) 100 unit/mL inpn 3  Si units before each meal  
 Class: Normal  
 Pharmacy: [facility] [address] CHERYL FARIAS [address] [facility] 23. Ph #: [phone]  
 gabapentin (NEURONTIN) 300 mg capsule 3 Sig: Take 1 Cap by mouth three (3) times daily. Class: Normal  
 Pharmacy: [facility] [address] CHERYL FARIAS [address] [facility] 23. Ph #: [phone] Route: Oral  
 metFORMIN ER (GLUCOPHAGE XR) 500 mg tablet 3 Si tablet daily with food Class: Normal  
 Pharmacy: [facility] [address] CHERYL FARIAS [address] [facility] 23. Ph #: [phone] Follow-up Instructions Return in about 3 weeks (around 2018). Introducing [facility]! Dear Pushpa Daniels: Thank you for requesting a [employer] account. Our records indicate that you already have an active [employer] account. You can access your account anytime at [email or web address] [email or web address]/"[email or web address] Did you know that you can access your hospital and ER discharge instructions at any time in [employer]? You can also review all of your test results from your hospital stay or ER visit. Additional Information If you have questions, please visit the Frequently Asked Questions section of the [employer] website at [email or web address]. [employer]/"[employer]/. Remember, [employer] is NOT to be used for urgent needs. For medical emergencies, dial 911. Now available from your iPhone and Android! Please provide this summary of care documentation to your next provider. Your primary care clinician is listed as [email or web address]. If you have any questions after today's visit, please call [phone].

## 2018-03-14 NOTE — TELEPHONE ENCOUNTER
Requested Prescriptions     Pending Prescriptions Disp Refills    gabapentin (NEURONTIN) 300 mg capsule 270 Cap 3     Sig: Take 1 Cap by mouth three (3) times daily.     insulin aspart U-100 (NOVOLOG) 100 unit/mL inpn 30 mL 3     Si units before each meal    metFORMIN ER (GLUCOPHAGE XR) 500 mg tablet 30 Tab 3     Si tablet daily with food

## 2018-03-19 ENCOUNTER — TELEPHONE (OUTPATIENT)
Dept: INTERNAL MEDICINE CLINIC | Age: 83
End: 2018-03-19

## 2018-03-19 NOTE — TELEPHONE ENCOUNTER
Patient called regarding the Metformin Dr Gonzalo Lazaro just prescribed her. She took the 1 pill the day she left appointment with him and it made her so dizzy. She fell in her bedroom and she hasn't taken another one since. Please call her back at [phone].

## 2018-03-19 NOTE — PROGRESS NOTES
The patient presents to the office today with the chief complaint of diabetes mellitus    HPI    The patient remains on Insulin for type II diabetes mellitus. Her sugars are running high. The patient remains on thyroid replacement. She remains on medications for thyroid replacement. The patient remains medications for chronic pain. The patient has questions of several skin lesions      Review of Systems   Respiratory: Negative for shortness of breath. Cardiovascular: Negative for chest pain and leg swelling. No Known Allergies    Current Outpatient Prescriptions   Medication Sig Dispense Refill    insulin aspart U-100 (NOVOLOG) 100 unit/mL inpn 16 units before each meal 30 mL 3    metFORMIN ER (GLUCOPHAGE XR) 500 mg tablet 1 tablet daily with food 30 Tab 3    [START ON 4/22/2018] morphine CR (MS CONTIN) 30 mg CR tablet Take 1 Tab by mouth every eight (8) hours for 30 days. Max Daily Amount: 90 mg. 90 Tab 0    [START ON 4/17/2018] morphine IR (MS IR) 15 mg tablet Take 1 Tab by mouth two (2) times daily as needed for Pain for up to 30 days. Max Daily Amount: 30 mg. 60 Tab 0    Blood-Glucose Meter (FREESTYLE LITE METER) monitoring kit Test twice blood glucose daily; ICD-10 E11.9; Quantity 1 1 Kit 0    amLODIPine (NORVASC) 10 mg tablet Take 0.5 Tabs by mouth daily. 90 Tab 3    insulin glargine (LANTUS) 100 unit/mL injection 82 units daily (Patient taking differently: 50 units daily) 6 Vial 3    levothyroxine (SYNTHROID) 200 mcg tablet 1 tablet daily (take on an empty stomach) (stop \"old\" synthroid) 90 Tab 3    carvedilol (COREG) 12.5 mg tablet Take 1 Tab by mouth two (2) times daily (with meals). 180 Tab 3    insulin syringe,safetyneedle 1 mL 31 gauge x 5/16\" syrg 1 Each by Does Not Apply route daily. 300 Each 3    cholecalciferol (VITAMIN D3) 1,000 unit tablet Take  by mouth daily.       Insulin Needles, Disposable, 31 gauge x 5/16\" ndle Use to inject insulin once daily Dx:E11.9 3 Package 3  glucose blood VI test strips (FREESTYLE TEST) strip Use to check blood glucose twice daily DX:E11.9 300 Strip 3    celecoxib (CELEBREX) 200 mg capsule 1 capsule twice per day with food 180 Cap 0    allopurinol (ZYLOPRIM) 100 mg tablet Take 1 Tab by mouth daily. 90 Tab 3    magnesium oxide 500 mg tab Take  by mouth.  cyanocobalamin (VITAMIN B-12) 500 mcg tablet Take 500 mcg by mouth daily.  ascorbic acid, vitamin C, (VITAMIN C) 250 mg tablet Take 100 mg by mouth daily.  acetaminophen (TYLENOL) 500 mg tablet Take 1 Tab by mouth every six (6) hours as needed for Pain. 270 Tab 3    aspirin delayed-release 81 mg tablet Take  by mouth daily.  bumetanide (BUMEX) 0.5 mg tablet Take 2 mg by mouth two (2) times a day.  gabapentin (NEURONTIN) 300 mg capsule Take 1 Cap by mouth three (3) times daily. 270 Cap 3       Past Medical History:   Diagnosis Date    Asthma     Chronic venous insufficiency     Diabetes (HCC)     Hypertension     Peripheral neuropathy     Rheumatoid arthritis ([facility] [address]     Vertigo        No past surgical history on file. Social History     Social History    Marital status:      Spouse name: N/A    Number of children: N/A    Years of education: N/A     Occupational History    Not on file. Social History Main Topics    Smoking status: Former Smoker    Smokeless tobacco: Never Used    Alcohol use No    Drug use: No    Sexual activity: No     Other Topics Concern    Not on file     Social History Narrative       Patient does not have an advanced directive on file    Visit Vitals    /74 (BP 1 Location: Left arm, BP Patient Position: Sitting)    Pulse 83    Temp 98.3 °F (36.8 °C) (Tympanic)    Resp 18    Ht 5' 5\" (1.651 m)    Wt 228 lb (103.4 kg)    SpO2 98%    BMI 37.94 kg/m2       Physical Exam   Neck: Carotid bruit is not present. No thyromegaly present. Cardiovascular: Normal rate and regular rhythm. Exam reveals no gallop.     No murmur heard. Pulmonary/Chest: She has no wheezes. She has no rales. Abdominal: Soft. Bowel sounds are normal. She exhibits no distension and no mass. There is no tenderness. Musculoskeletal: She exhibits no edema. Skin:   Seborrheic keratosis numerous areas       BMI:  The patient was advised to limit calories to 1800 ricardo and carbohydrates to 100 grams daily      Hospital Outpatient Visit on 01/12/2018   Component Date Value Ref Range Status    WBC 01/12/2018 9.6  4.6 - 13.2 K/uL Final    RBC 01/12/2018 4.69  4.20 - 5.30 M/uL Final    HGB 01/12/2018 13.3  12.0 - 16.0 g/dL Final    HCT 01/12/2018 42.0  35.0 - 45.0 % Final    MCV 01/12/2018 89.6  74.0 - 97.0 FL Final    MCH 01/12/2018 28.4  24.0 - 34.0 PG Final    MCHC 01/12/2018 31.7  31.0 - 37.0 g/dL Final    RDW 01/12/2018 13.7  11.6 - 14.5 % Final    PLATELET 62/75/2754 257  135 - 420 K/uL Final    MPV 01/12/2018 13.4* 9.2 - 11.8 FL Final    NEUTROPHILS 01/12/2018 66  40 - 73 % Final    LYMPHOCYTES 01/12/2018 25  21 - 52 % Final    MONOCYTES 01/12/2018 5  3 - 10 % Final    EOSINOPHILS 01/12/2018 3  0 - 5 % Final    BASOPHILS 01/12/2018 1  0 - 2 % Final    ABS. NEUTROPHILS 01/12/2018 6.5  1.8 - 8.0 K/UL Final    ABS. LYMPHOCYTES 01/12/2018 2.4  0.9 - 3.6 K/UL Final    ABS. MONOCYTES 01/12/2018 0.4  0.05 - 1.2 K/UL Final    ABS. EOSINOPHILS 01/12/2018 0.3  0.0 - 0.4 K/UL Final    ABS.  BASOPHILS 01/12/2018 0.1* 0.0 - 0.06 K/UL Final    DF 01/12/2018 AUTOMATED    Final    Sodium 01/12/2018 135* 136 - 145 mmol/L Final    Potassium 01/12/2018 4.9  3.5 - 5.5 mmol/L Final    Chloride 01/12/2018 97* 100 - 108 mmol/L Final    CO2 01/12/2018 28  21 - 32 mmol/L Final    Anion gap 01/12/2018 10  3.0 - 18 mmol/L Final    Glucose 01/12/2018 427* 74 - 99 mg/dL Final    Comment: CALLED TO AND CORRECTLY REPEATED BY:  DR. Delbert Arambula. DONNA ACEVEDO (ON CALL) 1/12/18 AT 2237 TO [facility]      BUN 01/12/2018 21* 7.0 - 18 MG/DL Final    Creatinine 01/12/2018 1.16 0.6 - 1.3 MG/DL Final    BUN/Creatinine ratio 01/12/2018 18  12 - 20   Final    GFR est AA 01/12/2018 54* >60 ml/min/1.73m2 Final    GFR est non-AA 01/12/2018 45* >60 ml/min/1.73m2 Final    Comment: (NOTE)  Estimated GFR is calculated using the Modification of Diet in Renal   Disease (MDRD) Study equation, reported for both  Americans   (GFRAA) and non- Americans (GFRNA), and normalized to 1.73m2   body surface area. The physician must decide which value applies to   the patient. The MDRD study equation should only be used in   individuals age 25 or older. It has not been validated for the   following: pregnant women, patients with serious comorbid conditions,   or on certain medications, or persons with extremes of body size,   muscle mass, or nutritional status.  Calcium 01/12/2018 8.8  8.5 - 10.1 MG/DL Final    Bilirubin, total 01/12/2018 0.5  0.2 - 1.0 MG/DL Final    ALT (SGPT) 01/12/2018 13  13 - 56 U/L Final    AST (SGOT) 01/12/2018 15  15 - 37 U/L Final    Alk. phosphatase 01/12/2018 72  45 - 117 U/L Final    Protein, total 01/12/2018 7.6  6.4 - 8.2 g/dL Final    Albumin 01/12/2018 3.6  3.4 - 5.0 g/dL Final    Globulin 01/12/2018 4.0  2.0 - 4.0 g/dL Final    A-G Ratio 01/12/2018 0.9  0.8 - 1.7   Final    Hemoglobin A1c 01/12/2018 9.7* 4.2 - 5.6 % Final    Comment: (NOTE)  HbA1C Interpretive Ranges  <5.7              Normal  5.7 - 6.4         Consider Prediabetes  >6.5              Consider Diabetes      Est. average glucose 01/12/2018 232  mg/dL Final    Comment: (NOTE)  The eAG should be interpreted with patient characteristics in mind   since ethnicity, interindividual differences, red cell lifespan,   variation in rates of glycation, etc. may affect the validity of the   calculation.       TSH 01/12/2018 0.30* 0.36 - 3.74 uIU/mL Final   Hospital Outpatient Visit on 12/15/2017   Component Date Value Ref Range Status    Special Requests: 12/15/2017 NO SPECIAL REQUESTS Final   [place] Gabrieleery STAIN 12/15/2017 RARE WBC'S    Final    GRAM STAIN 12/15/2017 FEW GRAM NEGATIVE RODS    Final    Culture result: 12/15/2017 MANY KLEBSIELLA PNEUMONIAE*   Final       .No results found for any visits on 03/14/18. Assessment / Plan      ICD-10-CM ICD-9-CM    1. Type 2 diabetes mellitus with nephropathy (HCC) E11.21 250.40      583.81    2. Skin lesions, generalized L98.9 709.9 DISCONTINUED: gabapentin (NEURONTIN) 300 mg capsule   3. Acquired hypothyroidism E03.9 244.9        she was advised to continue her maintenance medications  Due to the complexities of the patient's medications and question of compliance - I have asked the patient to return next Thursday to see our clinical pharmist Dr. Renata Drew    Follow-up Disposition:  Return in about 6 months (around 9/14/2018). I asked Jeannetteeusebio Keo if she has any questions and I answered the questions.   Aubrey Crowley states that she understands the treatment plan and agrees with the treatment plan

## 2018-03-20 NOTE — TELEPHONE ENCOUNTER
Dr Leon Reddy want her to continue to hold until thursdays appointment, Ms Kayli Campbell expressed understanding

## 2018-03-22 ENCOUNTER — OFFICE VISIT (OUTPATIENT)
Dept: INTERNAL MEDICINE CLINIC | Age: 83
End: 2018-03-22

## 2018-03-22 VITALS
RESPIRATION RATE: 16 BRPM | DIASTOLIC BLOOD PRESSURE: 78 MMHG | OXYGEN SATURATION: 99 % | SYSTOLIC BLOOD PRESSURE: 140 MMHG | BODY MASS INDEX: 37.99 KG/M2 | WEIGHT: 228 LBS | HEART RATE: 88 BPM | TEMPERATURE: 98.3 F | HEIGHT: 65 IN

## 2018-03-22 DIAGNOSIS — Z13.31 SCREENING FOR DEPRESSION: ICD-10-CM

## 2018-03-22 DIAGNOSIS — Z00.00 MEDICARE ANNUAL WELLNESS VISIT, SUBSEQUENT: ICD-10-CM

## 2018-03-22 DIAGNOSIS — E11.21 TYPE 2 DIABETES MELLITUS WITH NEPHROPATHY (HCC): Primary | ICD-10-CM

## 2018-03-22 LAB — GLUCOSE POC: 157 MG/DL

## 2018-03-22 RX ORDER — INSULIN GLARGINE 100 [IU]/ML
40 INJECTION, SOLUTION SUBCUTANEOUS
COMMUNITY
End: 2021-07-06

## 2018-03-22 RX ORDER — ASCORBIC ACID 500 MG
500 TABLET ORAL DAILY
COMMUNITY
End: 2021-11-09

## 2018-03-22 RX ORDER — BUMETANIDE 2 MG/1
1 TABLET ORAL
COMMUNITY
End: 2021-07-06 | Stop reason: SDUPTHER

## 2018-03-22 NOTE — PROGRESS NOTES
Dr. Huma Huff  referred [facility] (1935) a 82 y.o. female for a Raymond Visit (Vipul Segura). This is a Subsequent Medicare Annual Wellness Visit providing Personalized Prevention Plan Services (PPPS) (Performed 12 months after initial AWV and PPPS )    I have reviewed the patient's medical history in detail and updated the computerized patient record. History     Past Medical History:   Diagnosis Date    Asthma     Chronic venous insufficiency     Diabetes (HCC)     Hypertension     Peripheral neuropathy     Rheumatoid arthritis ([facility] [address]     Vertigo       History reviewed. No pertinent surgical history. Current Outpatient Prescriptions   Medication Sig Dispense Refill    insulin glargine (LANTUS U-100 INSULIN) 100 unit/mL injection 82 Units by SubCUTAneous route nightly.  ascorbic acid, vitamin C, (VITAMIN C) 500 mg tablet Take 500 mg by mouth daily.  bumetanide (BUMEX) 2 mg tablet Take 2 mg by mouth two (2) times daily as needed.  gabapentin (NEURONTIN) 300 mg capsule Take 1 Cap by mouth three (3) times daily. 270 Cap 3    insulin aspart U-100 (NOVOLOG) 100 unit/mL inpn 16 units before each meal 30 mL 3    [START ON 4/22/2018] morphine CR (MS CONTIN) 30 mg CR tablet Take 1 Tab by mouth every eight (8) hours for 30 days. Max Daily Amount: 90 mg. 90 Tab 0    [START ON 4/17/2018] morphine IR (MS IR) 15 mg tablet Take 1 Tab by mouth two (2) times daily as needed for Pain for up to 30 days. Max Daily Amount: 30 mg. 60 Tab 0    Blood-Glucose Meter (FREESTYLE LITE METER) monitoring kit Test twice blood glucose daily; ICD-10 E11.9; Quantity 1 1 Kit 0    amLODIPine (NORVASC) 10 mg tablet Take 0.5 Tabs by mouth daily. 90 Tab 3    levothyroxine (SYNTHROID) 200 mcg tablet 1 tablet daily (take on an empty stomach) (stop \"old\" synthroid) 90 Tab 3    carvedilol (COREG) 12.5 mg tablet Take 1 Tab by mouth two (2) times daily (with meals).  180 Tab 3    insulin syringe,safetyneedle 1 mL 31 gauge x 5/16\" syrg 1 Each by Does Not Apply route daily. 300 Each 3    cholecalciferol (VITAMIN D3) 1,000 unit tablet Take 1,000 Units by mouth daily.  glucose blood VI test strips (FREESTYLE TEST) strip Use to check blood glucose twice daily DX:E11.9 300 Strip 3    allopurinol (ZYLOPRIM) 100 mg tablet Take 1 Tab by mouth daily. 90 Tab 3    magnesium oxide 500 mg tab Take 1 Tab by mouth daily.  acetaminophen (TYLENOL) 500 mg tablet Take 1 Tab by mouth every six (6) hours as needed for Pain. 270 Tab 3    aspirin delayed-release 81 mg tablet Take 81 mg by mouth daily.        No Known Allergies  Family History   Problem Relation Age of Onset    Heart Attack Mother     Heart Attack Father      Social History   Substance Use Topics    Smoking status: Former Smoker    Smokeless tobacco: Never Used    Alcohol use No     Patient Active Problem List   Diagnosis Code    Chronic right shoulder pain M25.511, G89.29    Encounter for long-term (current) use of medications Z79.899    Chronic pain syndrome G89.4    Arthritis of right shoulder region M19.011    Shoulder impingement M75.40    Tear of right rotator cuff M75.101    Skin lesions, generalized L98.9    Acquired hypothyroidism E03.9    Reflux esophagitis K21.0    History of depression Z86.59    Neck pain M54.2    Chronic left shoulder pain M25.512, G89.29    Dermatitis L30.9    Obesity, morbid (HCC) E66.01    Type 2 diabetes mellitus with nephropathy (HCC) E11.21       Depression Risk Factor Screening:     PHQ over the last two weeks 3/22/2018   Little interest or pleasure in doing things Not at all   Feeling down, depressed or hopeless Not at all   Total Score PHQ 2 0   Trouble falling or staying asleep, or sleeping too much -   Feeling tired or having little energy -   Poor appetite or overeating -   Feeling bad about yourself - or that you are a failure or have let yourself or your family down -   Trouble concentrating on things such as school, work, reading or watching TV -   Moving or speaking so slowly that other people could have noticed; or the opposite being so fidgety that others notice -   Thoughts of being better off dead, or hurting yourself in some way -   PHQ 9 Score -   How difficult have these problems made it for you to do your work, take care of your home and get along with others -       Alcohol Risk Factor Screening: You do not drink alcohol or very rarely. Functional Ability and Level of Safety:     Hearing Loss   The patient usually wears a hearing aid in left ear but she lost it at her son's house and hasn't had a chance to replace it. Activities of Daily Living   The home contains: handrails and grab bars, patient recently moved to senior living - loves her new apartment, all handicapped accessible. Uses canes when needed but is otherwise self sufficient/independent. ADL Assessment 3/22/2018   Feeding yourself No Help Needed   Getting from bed to chair No Help Needed   Getting dressed No Help Needed   Bathing or showering No Help Needed   Walk across the room (includes cane/walker) Help Needed   Using the telphone No Help Needed   Taking your medications No Help Needed   Preparing meals No Help Needed   Managing money (expenses/bills) No Help Needed   Moderately strenuous housework (laundry) No Help Needed   Shopping for personal items (toiletries/medicines) No Help Needed   Shopping for groceries No Help Needed   Driving No Help Needed   Climbing a flight of stairs Help Needed   Getting to places beyond walking distances No Help Needed       Fall Risk     Fall Risk Assessment, last 12 mths 3/22/2018   Able to walk? Yes   Fall in past 12 months? No   Fall with injury? -   Number of falls in past 12 months -   Fall Risk Score -       Abuse Screen   Patient is not abused    Abuse Screening Questionnaire 3/22/2018   Do you ever feel afraid of your partner?  N   Are you in a relationship with someone who physically or mentally threatens you? N   Is it safe for you to go home? Y         Cognitive Screening   Evaluation of Cognitive Function:  Has your family/caregiver stated any concerns about your memory: no  Abnormal, Mini Cog test , scored 3/5 - able to draw clock/time but only able to recall 1 word - Dr. Cody Callejas notified and will assess during his visit with patient  Mood/affect:  Happy, likes new senior living apartment  Appearance: age appropriate, overweight and well dressed  Family member/caregiver input: N/A    Physical Examination     No exam data present  Visit Vitals    /78 (BP 1 Location: Right arm, BP Patient Position: Sitting)    Pulse 88    Temp 98.3 °F (36.8 °C) (Oral)    Resp 16    Ht 5' 5\" (1.651 m)    Wt 228 lb (103.4 kg)    SpO2 99%    BMI 37.94 kg/m2       No exam performed by pharmacist today, not required for Medicare Annual Wellness Visit. Patient to be seen by Dr. Ramirez separately. Patient Care Team     Patient Care Team:  Cesar Johnston MD as PCP - General (Internal Medicine)  IGNACIO York as Physician Assistant (Physician Assistant)    Assessment/Plan     Education and counseling provided:  Are appropriate based on today's review and evaluation  End-of-Life planning (with patient's consent)  Pneumococcal Vaccine  Cardiovascular screening blood test  Bone mass measurement (DEXA)  Screening for glaucoma  Tdap / Zoster vaccination recommendations    Diagnoses and all orders for this visit:    1. Type 2 diabetes mellitus with nephropathy (HCC)  -     AMB POC GLUCOSE BLOOD, BY GLUCOSE MONITORING DEVICE    2. Medicare annual wellness visit, subsequent  -     Depression Screen Annual    3. Screening for depression  -     Depression Screen Annual       4. Medication Reconciliation: Performed today. Patient  did not bring her medications to the visit.   During the patient interview, the following changes were made:    Discontinued: celecoxib, duplicate needles, E55, metformin XR (discontine per Flakito Croft due to intolerance)   Additions: none   Changes / other discrepancies: clarified dosing of Lantus, vitamin C, Bumex, vitamin D3, magnesium, aspirin     Patient reports adherence to Lantus insulin although thinks dose might be 81 units daily (uses vials and odd numbers are difficult to measure accurately on an insulin syringe so confirmed 82 units daily as correct dose), patient reports holding Novolog at mealtime for BG < 120 mg/dL - notified Dr. Dina Croft as patient reports not routinely using Novolog TID AC meals (she held today for BG of 149 mg/dL). Of note, BG on labs back in January was 427 mg/dL. Patient reported being in the middle of moving - was living with her son and not always eating well/taking medications. She moved to senior living last month and \"loves\" the place and her neighbors. Reports increased activity, better diet, adherence to medications other than holding Novolog as noted above. Dr. Dina Croft notified. She reports being able to previously tolerate metformin but took one dose of metformin XR earlier this week and reported significant dizziness lasting over an hour and does not want to take until talking with PCP (as noted above Dr. Dina Croft deciding to dc metformin XR for now). Medications Discontinued During This Encounter   Medication Reason    celecoxib (CELEBREX) 200 mg capsule Not A Current Medication    insulin glargine (LANTUS) 100 unit/mL injection Dose Adjustment    Insulin Needles, Disposable, 31 gauge x 0/91\" ndle Duplicate Order    ascorbic acid, vitamin C, (VITAMIN C) 250 mg tablet Dose Adjustment    bumetanide (BUMEX) 0.5 mg tablet Dose Adjustment    cyanocobalamin (VITAMIN B-12) 500 mcg tablet Not A Current Medication    metFORMIN ER (GLUCOPHAGE XR) 500 mg tablet Paradoxical Response       5.  Screenings and Immunizations (see patient instructions for chart/information):  Mammogram: no longer indicated/desired by patient  Pap: No longer routinely indicated by age   DEXA scan: per patient - never done, advised patient to discuss with Dr. Teresa Ramesh   Colonoscopy: No longer routinely indicated by age  Glaucoma screening/Eye exam: Up to date per patient at [facility], due for repeat annually - requested patient obtain [facility] records  Lipid panel: None on file but patient believes it is drawn at [facility] - requested patient obtain [facility] recordsl, due for repeat   Diabetes monitoring: Last A1C 1/12/2018 9.7%, due for repeat in April  SMBG: patient reports AM fasting rages 140's to 200 mg/dL, 157 mg/dL in clinic today random     Immunizations: Patient confirmed the following records of vaccinations are correct and current. Immunization History   Administered Date(s) Administered    Influenza High Dose Vaccine PF 10/06/2017    Td 11/21/2017       Pneumococcal:  Recommended that patient receive [id number] first and /PPSV23 one year later if not previously done at [facility] - patient to obtain records. Influenza:  Complete fro this season. Zoster:  Patient states Zostavax given previously. Discussed new recommendations for Shingrix revaccination once available. Recommended that patient receive at her pharmacy or [facility] and have records faxed to the office. Tdap:  Recommended that patient receive at the office with a signed waiver, at the [facility], at her pharmacy or at the [facility] if nt previously done - patient to obtain [employer] records. 6. Advanced Care Planning: The patient has advanced directives completed at home. Advised patient to bring a copy to the office to be scanned into the chart. Patient verbalized understanding of information presented. Answered all of the patient's questions. AVS information was reviewed with patient and will be printed on checkout.     José Antonio Avila, PharmD, BCACP    Health Maintenance Due   Topic Date Due  LIPID PANEL Q1  1935    EYE EXAM RETINAL OR DILATED Q1  03/19/1947    ZOSTER VACCINE AGE 60>  01/19/1997    GLAUCOMA SCREENING Q2Y  03/19/2002    Bone Densitometry (Dexa) Screening  03/19/2002    Pneumococcal 65+ Low/Medium Risk (2 of 2 - PPSV23) 10/18/2017    DTaP/Tdap/Td series (1 - Tdap) 11/22/2017    MICROALBUMIN Q1  04/04/2018

## 2018-03-22 NOTE — PROGRESS NOTES
Identified pt with two pt identifiers(name and ). Reviewed record in preparation for visit and have obtained necessary documentation. Chief Complaint   Patient presents with    Medication Evaluation    Annual Wellness Visit        Health Maintenance Due   Topic    LIPID PANEL Q1     EYE EXAM RETINAL OR DILATED Q1     ZOSTER VACCINE AGE 60>     GLAUCOMA SCREENING Q2Y     Bone Densitometry (Dexa) Screening     Pneumococcal 65+ Low/Medium Risk (2 of 2 - PPSV23)    DTaP/Tdap/Td series (1 - Tdap)    MEDICARE YEARLY EXAM     MICROALBUMIN Q1    HM reviewed w/patient.     Depression Screening:  PHQ over the last two weeks 3/14/2018 2018 10/4/2017 2017 2017 2015   Little interest or pleasure in doing things Not at all Not at all Not at all Not at all Not at all Not at all   Feeling down, depressed or hopeless Not at all Not at all Not at all Several days Nearly every day Not at all   Total Score PHQ 2 0 0 0 1 3 0   Trouble falling or staying asleep, or sleeping too much - - - - More than half the days -   Feeling tired or having little energy - - - - Several days -   Poor appetite or overeating - - - - Not at all -   Feeling bad about yourself - or that you are a failure or have let yourself or your family down - - - - Not at all -   Trouble concentrating on things such as school, work, reading or watching TV - - - - Not at all -   Moving or speaking so slowly that other people could have noticed; or the opposite being so fidgety that others notice - - - - Not at all -   Thoughts of being better off dead, or hurting yourself in some way - - - - Not at all -   PHQ 9 Score - - - - 6 -   How difficult have these problems made it for you to do your work, take care of your home and get along with others - - - - Not difficult at all -       Learning Assessment:  Learning Assessment 10/4/2017 2016 2016   PRIMARY LEARNER Patient Patient Patient   HIGHEST LEVEL OF EDUCATION - PRIMARY LEARNER  - GRADUATED HIGH SCHOOL OR GED GRADUATED HIGH SCHOOL OR GED   PRIMARY LANGUAGE ENGLISH ENGLISH ENGLISH   LEARNER PREFERENCE PRIMARY READING DEMONSTRATION OTHER (COMMENT)   ANSWERED BY patient Patient patient   RELATIONSHIP SELF SELF SELF       Abuse Screening:  Abuse Screening Questionnaire 10/4/2017 8/1/2016 7/11/2016   Do you ever feel afraid of your partner? N N N   Are you in a relationship with someone who physically or mentally threatens you? N N N   Is it safe for you to go home? Aleksandar Guadalupe       Fall Risk  Fall Risk Assessment, last 12 mths 3/14/2018 1/25/2018 10/4/2017 9/13/2017 5/24/2017 7/11/2016 12/22/2015   Able to walk? Yes Yes Yes Yes Yes Yes Yes   Fall in past 12 months? No Yes Yes Yes Yes Yes No   Fall with injury? - Yes No Yes Yes Yes -   Number of falls in past 12 months - 3 5 4 3 1 -   Fall Risk Score - 4 5 5 4 2 -       Coordination of Care Questionnaire:  :   1) Have you been to an emergency room, urgent care clinic since your last visit? no   Hospitalized since your last visit? no             2. Have seen or consulted any other health care provider since your last visit? NO  If yes, where when, and reason for visit? 3) Do you have an Advanced Directive/ Living Will in place?  Yes  If yes, do we have a copy on file NO      Patient is accompanied by self

## 2018-03-22 NOTE — PATIENT INSTRUCTIONS
Medicare Wellness Visit, Female    The best way to improve and maintain good health is to have a healthy lifestyle by eating a well-balanced diet, exercising regularly, limiting alcohol and stopping smoking. Regular physical exams and screening tests are another way to keep healthy. Preventive exams provided by your health care provider can find health problems before they become diseases or illnesses. Preventive services including immunizations, screening tests, monitoring and exams can help you take care of your own health. Preventive services such as immunizations prevent serious infections. All people over age 72 should have a Pneumovax and a Prevnar-13 shot to prevent potentially life threatening infections with the pneumococcus bacteria, a common cause of pneumonia. These are once in a lifetime unless you and your provider decide differently. Next due: Prevnar-13 and then Pneumovax one year later - may have been [facility] at [facility] (please ask the [facility] for your vaccine/immunization records to bring to Dr. Pepper Ballard so we can update our records)    All people over 72 should have a yearly influenza vaccine or \"flu\" shot. This does not prevent infection with cold viruses but has been proven to prevent hospitalization and death from influenza. Next due: in Fall    Although Medicare part B \"regular Medicare\" currently only covers tetanus vaccination in the context of an injury, a tetanus vaccine (Tdap or Td) is recommended every 10 years. Tdap is generally given once in a lifetime for older adults. Next due: Tdap (may have been given at [facility] - please get copy of records)    A shingles vaccine is recommended as you get older. Zostavax is a once in a lifetime vaccine given over age 61years of age. There is also a new shingles vaccine, Shingrix, that is now preferred over Zostavax.   Shingrix (a 2-dose series) is recommended if you are over age 48years of age and you've never received a shingles vaccine. It is also recommended for revaccination if you've previously received Zostavax. The Shingles vaccines are not covered by Medicare part B. Next due: Shingrix once available - please check with [facility] or your pharmacy    Note, however, that both the Shingles vaccine and Tdap/Td are generally covered by secondary carriers. Please check your coverage and out of pocket expenses. Your pharmacy benefits may cover these vaccines so please check with your pharmacist.  Also consider contacting your local health department because it may stock these vaccines for a reasonable charge. We currently have documentation of the following immunization history for you:  Immunization History   Administered Date(s) Administered    Influenza High Dose Vaccine PF 10/06/2017    Td 11/21/2017       A bone mass density test (DEXA) to screen for osteoporosis or thinning of the bones should be done at least once after age 72 and may be done up to every 2 years as determined by you and your health care provider. The most recent DEXA we have on file for you is:  DEXA Results (most recent):  none  Next due: please discuss screening with Dr. Caterina Wynne  No results found for this or any previous visit. Screening for diabetes mellitus with a blood sugar test (glucose) should be done every year. If you have diabetes, this monitoring will be done more frequently (usually every 3-6 months) and will include A1C testing. The most recent blood glucose we have on file for you is:   Lab Results   Component Value Date/Time    Glucose 427 (HH) 01/12/2018 10:03 AM    Glucose (POC) 114 (H) 11/06/2009 07:44 AM   Dr. Caterina Wynne is monitoring your A1C for diabetes control    Glaucoma is a disease of the eye due to increased ocular pressure that can lead to blindness.  People with risk factors for glaucoma ( race,  American race, diabetes, family history) should be screened every year by an eye professional.   Last done: 2017 Next due: 2018, please follow-up with your eye doctor at the [facility] and please bring a copy of the report to Dr. Filipe Del Angel    Cardiovascular screening tests that check for elevated lipids or cholesterol (fatty part of blood) which can lead to heart disease and strokes should be done every 5 years. The most recent lipid panel we have on file for you is:   No results found for: CHOL, CHOLPOCT, CHOLX, CHLST, CHOLV, HDL, HDLPOC, LDL, LDLCPOC, LDLC, DLDLP, VLDLC, VLDL, TGLX, TRIGL, TRIGP, TGLPOCT, CHHD, CHHDX  Next due: can be done with fasting labs    Colorectal cancer screening that evaluates for blood or polyps in your colon for people with average risk should be done yearly as a stool test, every five years as a flexible sigmoidoscope or every 10 years as a colonoscopy up to age 76. You and your health care provider may decide whether to continue screening after age 76 or if you need to be screened more frequently. Routine screening no longer routinely recommended by age    Breast cancer screening with a mammogram is recommended at least once every 2 years  for women age 54-69. You and your health care provider may decide whether to continue screening after age 76. The most recent mammogram we have on file for you is:   Routine screening no longer routinely recommended by age  No results found for this or any previous visit. Screening for cervical cancer with a pap smear and pelvic exam is recommended for all women with a cervix until age 72. The frequency of this test is based on the details of your prior pap smear testing (usually every 1 or 2 years - more often with increased risk of cervical cancer or positive family history). You and your health care provider may decide whether to continue screening after age 72. Next due: Routine screening no longer routinely recommended by age    Screening for infection with Hepatitis C is recommended for anyone born between [birth year] through [address].     People ages 54 to [de-identified] years who have smoked the equivalent of 1 pack per day for 30 years or more may benefit from screening for lung cancer with a yearly low dose CT scan until they have been non smokers for 15 years. Please ask your health care provider if you have any questions. Your Medicare Wellness Exam is recommended annually. If you do not have Advanced Directives on file with our office and you either have the completed document at home or you fill out the forms provided, please bring a copy to the office to be scanned into your record.     Here is a list of your current Health Maintenance items with a due date:  Health Maintenance Due   Topic Date Due    Cholesterol Test   1935    Eye Exam  03/19/1945    Shingles Vaccine  01/19/1997    Glaucoma Screening   03/19/2002    Bone Mineral Density   03/19/2002    Pneumococcal Vaccine (2 of 2 - PPSV23) 10/18/2017    DTaP/Tdap/Td  (1 - Tdap) 11/22/2017    Annual Well Visit  03/14/2018    Albumin Urine Test  04/04/2018

## 2018-03-22 NOTE — MR AVS SNAPSHOT
[facility] 
 
 
 [address], [address] [facility] [address] [phone] Patient: Liana Virgen MRN: [id number] CBC:4/12/8693 Visit Information Date & Time Provider Department Dept. Phone Encounter #  
 3/22/2018  1:00 PM Christopher Weathers MD [facility]INE OF [place] [address] [phone] [id number] Your Appointments 4/24/2018 12:15 PM  
Follow Up with Christopher Weathers MD  
[address] [facility] [address] [facility]) Appt Note: 1 mo f/u; 1 mo f/u  
 [address] [facility] [facility]  
[phone]  
  
   
 [address] [facility], [address] [facility] [address]  
  
    
 4/26/2018 12:20 PM  
Follow Up with IGNACIO Wiggins [address] for Pain Management (JANET SCHEDULING) Appt Note: return in 3 months [address] [facility] [id number] [phone] [facility] [id number] Upcoming Health Maintenance Date Due  
 LIPID PANEL Q1 1935 EYE EXAM RETINAL OR DILATED Q1 3/19/1945 ZOSTER VACCINE AGE 60> 1/19/1997 GLAUCOMA SCREENING Q2Y 3/19/2002 Bone Densitometry (Dexa) Screening 3/19/2002 Pneumococcal 65+ Low/Medium Risk (2 of 2 - PPSV23) 10/18/2017 DTaP/Tdap/Td series (1 - Tdap) 11/22/2017 MEDICARE YEARLY EXAM 3/14/2018 MICROALBUMIN Q1 4/4/2018 HEMOGLOBIN A1C Q6M 7/12/2018 FOOT EXAM Q1 8/23/2018 Allergies as of 3/22/2018  Review Complete On: 3/22/2018 By: Love Dubin, PHARMD  
 No Known Allergies Current Immunizations  Reviewed on 3/22/2018 Name Date Influenza High Dose Vaccine PF 10/6/2017 Td 11/21/2017 Reviewed by Kimberly Ayala LPN on 6/27/9231 at  1:05 PM  
You Were Diagnosed With   
  
 Codes Comments Type 2 diabetes mellitus with nephropathy ([facility] [address]    -  Primary ICD-10-CM: E11.21 
ICD-9-CM: 250.40, 583.81 Medicare annual wellness visit, subsequent     ICD-10-CM: Z00.00 ICD-9-CM: V70.0 Screening for depression     ICD-10-CM: Z13.89 ICD-9-CM: V79.0 Vitals BP Pulse Temp Resp Height(growth percentile) Weight(growth percentile) 140/78 (BP 1 Location: Right arm, BP Patient Position: Sitting) 88 98.3 °F (36.8 °C) (Oral) 16 5' 5\" (1.651 m) 228 lb (103.4 kg) SpO2 BMI OB Status Smoking Status 99% 37.94 kg/m2 Hysterectomy Former Smoker Vitals History BMI and BSA Data Body Mass Index Body Surface Area  
 37.94 kg/m 2 2.18 m 2 Preferred Pharmacy Pharmacy Name Phone [address] [facility] [phone] Your Updated Medication List  
  
   
This list is accurate as of 3/22/18  2:40 PM.  Always use your most recent med list.  
  
  
  
  
 acetaminophen 500 mg tablet Commonly known as:  TYLENOL Take 1 Tab by mouth every six (6) hours as needed for Pain. allopurinol 100 mg tablet Commonly known as:  Pleasant Ruffini Take 1 Tab by mouth daily. amLODIPine 10 mg tablet Commonly known as:  Huong Prow Take 0.5 Tabs by mouth daily. ascorbic acid (vitamin C) 500 mg tablet Commonly known as:  VITAMIN C Take 500 mg by mouth daily. aspirin delayed-release 81 mg tablet Take 81 mg by mouth daily. Blood-Glucose Meter monitoring kit Commonly known as:  FREESTYLE LITE METER Test twice blood glucose daily; ICD-10 E11.9; Quantity 1  
  
 bumetanide 2 mg tablet Commonly known as:  Melody Cranker Take 2 mg by mouth two (2) times daily as needed. carvedilol 12.5 mg tablet Commonly known as:  Mozelle Shaggy Take 1 Tab by mouth two (2) times daily (with meals). gabapentin 300 mg capsule Commonly known as:  NEURONTIN Take 1 Cap by mouth three (3) times daily. glucose blood VI test strips strip Commonly known as:  FREESTYLE TEST Use to check blood glucose twice daily DX:E11.9  
  
 insulin aspart U-100 100 unit/mL Inpn Commonly known as:  Osman Carey 16 units before each meal  
  
 insulin syringe,safetyneedle 1 mL 31 gauge x 5/16\" Syrg 1 Each by Does Not Apply route daily. LANTUS U-100 INSULIN 100 unit/mL injection Generic drug:  insulin glargine 82 Units by SubCUTAneous route nightly. levothyroxine 200 mcg tablet Commonly known as:  SYNTHROID  
1 tablet daily (take on an empty stomach) (stop \"old\" synthroid)  
  
 magnesium oxide 500 mg Tab Take 1 Tab by mouth daily. metFORMIN  mg tablet Commonly known as:  GLUCOPHAGE XR  
1 tablet daily with food * morphine IR 15 mg tablet Commonly known as:  MS IR Take 1 Tab by mouth two (2) times daily as needed for Pain for up to 30 days. Max Daily Amount: 30 mg. Start taking on:  4/17/2018 * morphine CR 30 mg CR tablet Commonly known as:  MS CONTIN Take 1 Tab by mouth every eight (8) hours for 30 days. Max Daily Amount: 90 mg. Start taking on:  4/22/2018 VITAMIN D3 1,000 unit tablet Generic drug:  cholecalciferol Take 1,000 Units by mouth daily. * Notice: This list has 2 medication(s) that are the same as other medications prescribed for you. Read the directions carefully, and ask your doctor or other care provider to review them with you. We Performed the Following AMB POC GLUCOSE BLOOD, BY GLUCOSE MONITORING DEVICE [24799 CPT(R)] [address] [[id number] [facility] [place]] Patient Instructions Medicare Wellness Visit, Female The best way to improve and maintain good health is to have a healthy lifestyle by eating a well-balanced diet, exercising regularly, limiting alcohol and stopping smoking. Regular physical exams and screening tests are another way to keep healthy. Preventive exams provided by your health care provider can find health problems before they become diseases or illnesses. Preventive services including immunizations, screening tests, monitoring and exams can help you take care of your own health. Preventive services such as immunizations prevent serious infections. All people over age 72 should have a Pneumovax and a Prevnar-13 shot to prevent potentially life threatening infections with the pneumococcus bacteria, a common cause of pneumonia. These are once in a lifetime unless you and your provider decide differently. Next due: Prevnar-13 and then Pneumovax one year later - may have been given at the [facility] (please ask the [facility] for your vaccine/immunization records to bring to Dr. Leandra Gandhi so we can update our records) All people over 65 should have a yearly influenza vaccine or \"flu\" shot. This does not prevent infection with cold viruses but has been proven to prevent hospitalization and death from influenza. Next due: in Fall Although Medicare part B \"regular Medicare\" currently only covers tetanus vaccination in the context of an injury, a tetanus vaccine (Tdap or Td) is recommended every 10 years. Tdap is generally given once in a lifetime for older adults. Next due: Tdap (may have been given at [facility] - please get copy of records) A shingles vaccine is recommended as you get older. Zostavax is a once in a lifetime vaccine given over age 61years of age. There is also a new shingles vaccine, Shingrix, that is now preferred over Zostavax. Shingrix (a 2-dose series) is recommended if you are over age 48years of age and you've never received a shingles vaccine. It is also recommended for revaccination if you've previously received Zostavax. The Shingles vaccines are not covered by Medicare part B. Next due: Shingrix once available - please check with [facility] or your pharmacy Note, however, that both the Shingles vaccine and Tdap/Td are generally covered by secondary carriers. Please check your coverage and out of pocket expenses.  Your pharmacy benefits may cover these vaccines so please check with your pharmacist.  Also consider contacting your local health department because it may stock these vaccines for a reasonable charge. We currently have documentation of the following immunization history for you: 
Immunization History Administered Date(s) Administered  Influenza High Dose Vaccine PF 10/06/2017  Td 11/21/2017 A bone mass density test (DEXA) to screen for osteoporosis or thinning of the bones should be done at least once after age 72 and may be done up to every 2 years as determined by you and your health care provider. The most recent DEXA we have on file for you is: DEXA Results (most recent):  none  Next due: please discuss screening with Dr. Heidi Khan No results found for this or any previous visit. Screening for diabetes mellitus with a blood sugar test (glucose) should be done every year. If you have diabetes, this monitoring will be done more frequently (usually every 3-6 months) and will include A1C testing. The most recent blood glucose we have on file for you is:  
Lab Results Component Value Date/Time Glucose 427 (HH) 01/12/2018 10:03 AM  
 Glucose (POC) 114 (H) 11/06/2009 07:44 AM  
Dr. Heidi Khan is monitoring your A1C for diabetes control Glaucoma is a disease of the eye due to increased ocular pressure that can lead to blindness. People with risk factors for glaucoma ( race,  American race, diabetes, family history) should be screened every year by an eye professional.  
Last done: 2017 Next due: 2018, please follow-up with your eye doctor at the [facility] and please bring a copy of the report to Dr. Heidi Khan Cardiovascular screening tests that check for elevated lipids or cholesterol (fatty part of blood) which can lead to heart disease and strokes should be done every 5 years. The most recent lipid panel we have on file for you is: No results found for: CHOL, CHOLPOCT, CHOLX, CHLST, CHOLV, HDL, HDLPOC, LDL, LDLCPOC, LDLC, DLDLP, VLDLC, VLDL, TGLX, TRIGL, TRIGP, TGLPOCT, CHHD, CHHDX Next due: can be done with fasting labs Colorectal cancer screening that evaluates for blood or polyps in your colon for people with average risk should be done yearly as a stool test, every five years as a flexible sigmoidoscope or every 10 years as a colonoscopy up to age 76. You and your health care provider may decide whether to continue screening after age 76 or if you need to be screened more frequently. Routine screening no longer routinely recommended by age Breast cancer screening with a mammogram is recommended at least once every 2 years  for women age 54-69. You and your health care provider may decide whether to continue screening after age 76. The most recent mammogram we have on file for you is:  
Routine screening no longer routinely recommended by age No results found for this or any previous visit. Screening for cervical cancer with a pap smear and pelvic exam is recommended for all women with a cervix until age 72. The frequency of this test is based on the details of your prior pap smear testing (usually every 1 or 2 years - more often with increased risk of cervical cancer or positive family history). You and your health care provider may decide whether to continue screening after age 72. Next due: Routine screening no longer routinely recommended by age Screening for infection with Hepatitis C is recommended for anyone born between [birth year] through [address]. People ages 54 to [de-identified] years who have smoked the equivalent of 1 pack per day for 30 years or more may benefit from screening for lung cancer with a yearly low dose CT scan until they have been non smokers for 15 years. Please ask your health care provider if you have any questions. Your Medicare Wellness Exam is recommended annually.  
 
If you do not have Advanced Directives on file with our office and you either have the completed document at home or you fill out the forms provided, please bring a copy to the office to be scanned into your record. Here is a list of your current Health Maintenance items with a due date: 
Health Maintenance Due Topic Date Due  Cholesterol Test   1935 [facility] Eye Exam  03/19/1945  Shingles Vaccine  01/19/1997  Glaucoma Screening   03/19/2002  Bone Mineral Density   03/19/2002  Pneumococcal Vaccine (2 of 2 - PPSV23) 10/18/2017  
 DTaP/Tdap/Td  (1 - Tdap) 11/22/2017 [facility] Annual Well Visit  03/14/2018  Albumin Urine Test  04/04/2018 [facility]! Dear Paulette Lopes: Thank you for requesting a [employer] account. Our records indicate that you already have an active [employer] account. You can access your account anytime at [email or web address] Did you know that you can access your hospital and ER discharge instructions at any time in [employer]? You can also review all of your test results from your hospital stay or ER visit. Additional Information If you have questions, please visit the Frequently Asked Questions section of the [employer] website at [email or web address] Remember, [employer] is NOT to be used for urgent needs. For medical emergencies, dial 911. Now available from your iPhone and Android! Please provide this summary of care documentation to your next provider. Your primary care clinician is listed as Sarah Post. If you have any questions after today's visit, please call [phone].

## 2018-03-23 NOTE — ACP (ADVANCE CARE PLANNING)
The patient was advised and counseled regarding advanced directives during Medicare AWV. Patient has completed at home - was advised to bring a copy to the office to be scanned into chart.

## 2018-04-18 ENCOUNTER — APPOINTMENT (OUTPATIENT)
Dept: GENERAL RADIOLOGY | Age: 83
End: 2018-04-18
Attending: EMERGENCY MEDICINE
Payer: MEDICARE

## 2018-04-18 ENCOUNTER — HOSPITAL ENCOUNTER (EMERGENCY)
Age: 83
Discharge: HOME | End: 2018-04-18
Attending: EMERGENCY MEDICINE
Payer: MEDICARE

## 2018-04-18 ENCOUNTER — APPOINTMENT (OUTPATIENT)
Dept: CT IMAGING | Age: 83
End: 2018-04-18
Attending: EMERGENCY MEDICINE
Payer: MEDICARE

## 2018-04-18 VITALS
WEIGHT: 247 LBS | SYSTOLIC BLOOD PRESSURE: 154 MMHG | RESPIRATION RATE: 21 BRPM | HEART RATE: 88 BPM | OXYGEN SATURATION: 98 % | HEIGHT: 64 IN | DIASTOLIC BLOOD PRESSURE: 119 MMHG | BODY MASS INDEX: 42.17 KG/M2 | TEMPERATURE: 97.3 F

## 2018-04-18 DIAGNOSIS — E16.2 HYPOGLYCEMIA: Primary | ICD-10-CM

## 2018-04-18 LAB
ALBUMIN SERPL-MCNC: 3.6 G/DL (ref 3.4–5)
ALBUMIN/GLOB SERPL: 0.7 {RATIO} (ref 0.8–1.7)
ALP SERPL-CCNC: 76 U/L (ref 45–117)
ALT SERPL-CCNC: 15 U/L (ref 13–56)
ANION GAP SERPL CALC-SCNC: 6 MMOL/L (ref 3–18)
APPEARANCE UR: CLEAR
AST SERPL-CCNC: 20 U/L (ref 15–37)
BASOPHILS # BLD: 0.1 K/UL (ref 0–0.1)
BASOPHILS NFR BLD: 1 % (ref 0–2)
BILIRUB SERPL-MCNC: 0.3 MG/DL (ref 0.2–1)
BILIRUB UR QL: NEGATIVE
BNP SERPL-MCNC: 482 PG/ML (ref 0–1800)
BUN SERPL-MCNC: 24 MG/DL (ref 7–18)
BUN/CREAT SERPL: 22 (ref 12–20)
CALCIUM SERPL-MCNC: 9.3 MG/DL (ref 8.5–10.1)
CHLORIDE SERPL-SCNC: 102 MMOL/L (ref 100–108)
CK MB CFR SERPL CALC: 2.5 % (ref 0–4)
CK MB SERPL-MCNC: 2.2 NG/ML (ref 5–25)
CK SERPL-CCNC: 89 U/L (ref 26–192)
CO2 SERPL-SCNC: 33 MMOL/L (ref 21–32)
COLOR UR: YELLOW
CREAT SERPL-MCNC: 1.07 MG/DL (ref 0.6–1.3)
DIFFERENTIAL METHOD BLD: NORMAL
EOSINOPHIL # BLD: 0.4 K/UL (ref 0–0.4)
EOSINOPHIL NFR BLD: 4 % (ref 0–5)
ERYTHROCYTE [DISTWIDTH] IN BLOOD BY AUTOMATED COUNT: 13.3 % (ref 11.6–14.5)
GLOBULIN SER CALC-MCNC: 5.1 G/DL (ref 2–4)
GLUCOSE BLD STRIP.AUTO-MCNC: 70 MG/DL (ref 70–110)
GLUCOSE SERPL-MCNC: 44 MG/DL (ref 74–99)
GLUCOSE UR STRIP.AUTO-MCNC: NEGATIVE MG/DL
HCT VFR BLD AUTO: 40.3 % (ref 35–45)
HGB BLD-MCNC: 13.4 G/DL (ref 12–16)
HGB UR QL STRIP: NEGATIVE
KETONES UR QL STRIP.AUTO: NEGATIVE MG/DL
LEUKOCYTE ESTERASE UR QL STRIP.AUTO: NEGATIVE
LYMPHOCYTES # BLD: 3.5 K/UL (ref 0.9–3.6)
LYMPHOCYTES NFR BLD: 31 % (ref 21–52)
MCH RBC QN AUTO: 28.5 PG (ref 24–34)
MCHC RBC AUTO-ENTMCNC: 33.3 G/DL (ref 31–37)
MCV RBC AUTO: 85.7 FL (ref 74–97)
MONOCYTES # BLD: 0.8 K/UL (ref 0.05–1.2)
MONOCYTES NFR BLD: 7 % (ref 3–10)
NEUTS SEG # BLD: 6.6 K/UL (ref 1.8–8)
NEUTS SEG NFR BLD: 57 % (ref 40–73)
NITRITE UR QL STRIP.AUTO: NEGATIVE
PH UR STRIP: 7 [PH] (ref 5–8)
PLATELET # BLD AUTO: 269 K/UL (ref 135–420)
PMV BLD AUTO: 11.8 FL (ref 9.2–11.8)
POTASSIUM SERPL-SCNC: 3.6 MMOL/L (ref 3.5–5.5)
PROT SERPL-MCNC: 8.7 G/DL (ref 6.4–8.2)
PROT UR STRIP-MCNC: NEGATIVE MG/DL
RBC # BLD AUTO: 4.7 M/UL (ref 4.2–5.3)
SODIUM SERPL-SCNC: 141 MMOL/L (ref 136–145)
SP GR UR REFRACTOMETRY: 1.01 (ref 1–1.03)
TROPONIN I SERPL-MCNC: <0.02 NG/ML (ref 0–0.04)
UROBILINOGEN UR QL STRIP.AUTO: 0.2 EU/DL (ref 0.2–1)
WBC # BLD AUTO: 11.3 K/UL (ref 4.6–13.2)

## 2018-04-18 PROCEDURE — 82550 ASSAY OF CK (CPK): CPT | Performed by: EMERGENCY MEDICINE

## 2018-04-18 PROCEDURE — 82962 GLUCOSE BLOOD TEST: CPT

## 2018-04-18 PROCEDURE — 83880 ASSAY OF NATRIURETIC PEPTIDE: CPT | Performed by: EMERGENCY MEDICINE

## 2018-04-18 PROCEDURE — 80053 COMPREHEN METABOLIC PANEL: CPT | Performed by: EMERGENCY MEDICINE

## 2018-04-18 PROCEDURE — 71045 X-RAY EXAM CHEST 1 VIEW: CPT

## 2018-04-18 PROCEDURE — 70450 CT HEAD/BRAIN W/O DYE: CPT

## 2018-04-18 PROCEDURE — 81003 URINALYSIS AUTO W/O SCOPE: CPT | Performed by: EMERGENCY MEDICINE

## 2018-04-18 PROCEDURE — 99285 EMERGENCY DEPT VISIT HI MDM: CPT

## 2018-04-18 PROCEDURE — 85025 COMPLETE CBC W/AUTO DIFF WBC: CPT | Performed by: EMERGENCY MEDICINE

## 2018-04-18 PROCEDURE — 74011000250 HC RX REV CODE- 250

## 2018-04-18 RX ORDER — DEXTROSE 50 % IN WATER (D50W) INTRAVENOUS SYRINGE
Status: COMPLETED
Start: 2018-04-18 | End: 2018-04-18

## 2018-04-18 RX ADMIN — DEXTROSE MONOHYDRATE 12.5 G: 25 INJECTION, SOLUTION INTRAVENOUS at 16:29

## 2018-04-18 NOTE — ED NOTES
After eating patient has shown improvement in status She is alert and oriented x 4.  Patient has no additional wants or needs noted call bell within reach, Patient currently on the phone speaking with family

## 2018-04-18 NOTE — DISCHARGE INSTRUCTIONS
Hypoglycemia: Care Instructions  Your Care Instructions    Hypoglycemia means that your blood sugar is low and your body is not getting enough fuel. Some people get low blood sugar from not eating often enough. Some medicines to treat diabetes can cause low blood sugar. People who have had surgery on their stomachs or intestines may get hypoglycemia. Problems with the pancreas, kidneys, or liver also can cause low blood sugar. A snack or drink with sugar in it will raise your blood sugar and should ease your symptoms right away. Your doctor may recommend that you change or stop your medicines until you can get your blood sugar levels under control. In the long run, you may need to change your diet and eating habits so that you get enough fuel for your body throughout the day. Follow-up care is a key part of your treatment and safety. Be sure to make and go to all appointments, and call your doctor if you are having problems. It's also a good idea to know your test results and keep a list of the medicines you take. How can you care for yourself at home? · Learn to recognize the early signs of low blood sugar. Signs include:  ¨ Nausea. ¨ Hunger. ¨ Feeling nervous, irritable, or shaky. ¨ Cold, clammy, wet skin. ¨ Sweating (when you are not exercising). ¨ A fast heartbeat. ¨ Numbness or tingling of the fingertips or lips. · If you feel an episode of low blood sugar coming on, drink fruit juice or sugared (not diet) soda, or eat sugar in the form of candy, cubes, or tablets. [employer] are another [employer]. · Eat small, frequent meals so that you do not get too hungry between meals. · Balance extra exercise with eating more. · Keep a written record of your low blood sugar episodes, including when you last ate and what you ate, so that you can learn what causes your blood sugar to drop.   · Make sure your family, friends, and coworkers know the symptoms of low blood sugar and know what to do to get your sugar level up. · Wear medical alert jewelry that lists your condition. You can buy this at most drugstores. When should you call for help? Call 911 anytime you think you may need emergency care. For example, call if:  ? · You passed out (lost consciousness). ? · You are confused or cannot think clearly. ? · Your blood sugar is very high or very low. ? Watch closely for changes in your health, and be sure to contact your doctor if:  ? · Your blood sugar stays outside the level your doctor set for you. ? · You have any problems. Where can you learn more? Go to http://braden-estephanie.info/. Enter J886 in the search box to learn more about \"Hypoglycemia: Care Instructions. \"  Current as of: March 13, 2017  Content Version: 11.4  © 1354-9444 [employer]. Care instructions adapted under license by [employer] (which disclaims liability or warranty for this information). If you have questions about a medical condition or this instruction, always ask your healthcare professional. [place] [address] any warranty or liability for your use of this information. Learning About Low Blood Sugar (Hypoglycemia) in Diabetes  What is low blood sugar (hypoglycemia)? Hypoglycemia means that your blood sugar is low and your body (especially your brain) is not getting enough fuel. If you have diabetes, your blood sugar can go too low if you take too much of some diabetes medicines. It can also go too low if you miss a meal. And it can happen if you exercise too hard without eating enough food. Some medicines used to treat other health problems can cause low blood sugar too. What are the symptoms? Symptoms of low blood sugar can start quickly. It may take just 10 to 15 minutes. If you have had diabetes for many years, you may not realize that your blood sugar is low until it drops very low.   · If your blood sugar level drops below 70 (mild low blood sugar), you may feel tired, anxious, dizzy, weak, shaky, or sweaty. You may have a fast heartbeat or blurry vision. · If your blood sugar level continues to drop (usually below 40), your behavior may change. You may feel more irritable. You may find it hard to concentrate or talk. And you may feel unsteady when you stand or walk. You may become too weak or confused to eat something with sugar to raise your blood sugar level. · If your blood sugar level drops very low (usually below 20), you may pass out (lose consciousness). Or you may have a seizure or stroke. If you have symptoms of severe low blood sugar, you need to get medical care right away. If you had a low blood sugar level during the night, you may wake up tired or with a headache. Or you may sweat so much during the night that your pajamas or sheets are damp when you wake up. How is low blood sugar treated? You can treat low blood sugar by eating or drinking something that has 15 grams of carbohydrate. These should be quick-sugar foods. Check your blood sugar level again 15 minutes after having a quick-sugar food to make sure your level is getting back to your target range. Here are examples of quick-sugar foods that have 15 grams of carbohydrate:  · 3 to 4 glucose tablets  · 1 tube of glucose gel  · Hard candy (such as 3 Jolly Ranchers or 5 to 7 Life Savers)  · 1 tablespoon honey  · 2 tablespoons of raisins  · ½ cup to ¾ cup (4 to 6 ounces) of fruit juice or regular (not diet) soda  · 1 tablespoon of sugar  · 1 cup of fat-free milk  If you have problems with severe low blood sugar, someone else may have to give you a shot of glucagon. This is a hormone that raises blood sugar levels quickly. How can you prevent low blood sugar? You can take steps to prevent low blood sugar. · Follow your treatment plan. Take your insulin or other diabetes medicine exactly as your doctor prescribed it. Talk with your doctor if you're having low blood sugar often. Your medicine may need to be adjusted if it's causing your low blood sugar. · Check your blood sugar levels often. This helps you find early changes before an emergency happens. · Keep a quick-sugar food with you in case your blood sugar level drops low. · Eat small meals more often so that you don't get too hungry between meals. Don't skip meals. · Balance extra exercise with eating more. Check your blood sugar and learn how it changes after exercise. If your blood sugar stays at a normal level, you may not need to eat after you exercise. · Limit how much alcohol you drink. Alcohol can make low blood sugar go even lower. Don't drink alcohol if you have problems recognizing the early signs of low blood sugar. · Keep a diary of your symptoms. This helps you learn when changes in your body may signal low blood sugar. And keep track of how often you have low blood sugar, including when you last ate and what you ate. This will help you learn what causes your blood sugar to drop. · Learn about diabetes and low blood sugar. Support groups or a diabetes education center can help you understand how medicines, diet, and exercise affect your blood sugar levels. Since low blood sugar levels can quickly become an emergency, be sure to wear medical alert jewelry, such as a medical alert bracelet. This is to let people know you have diabetes so they can get help for you. You can buy this at most drugstores. And make sure your family, friends, and coworkers know the symptoms of low blood sugar. Teach them what to do to get your sugar level up. Follow-up care is a key part of your treatment and safety. Be sure to make and go to all appointments, and call your doctor if you are having problems. It's also a good idea to know your test results and keep a list of the medicines you take. Where can you learn more? Go to http://braden-estephanie.info/.   Enter U530 in the search box to learn more about \"Learning About Low Blood Sugar (Hypoglycemia) in Diabetes. \"  Current as of: March 13, 2017  Content Version: 11.4  © 5807-6177 [employer]. Care instructions adapted under license by [employer] (which disclaims liability or warranty for this information). If you have questions about a medical condition or this instruction, always ask your healthcare professional. [address] any warranty or liability for your use of this information.

## 2018-04-18 NOTE — ED NOTES
patient over in ct patient critical result noted will return patient to ER 1/2 AMP of d50 provided per mrs macias

## 2018-04-18 NOTE — ED PROVIDER NOTES
EMERGENCY DEPARTMENT HISTORY AND PHYSICAL EXAM    3:28 PM      Date: 4/18/2018  Patient Name: Judi Sharpe    History of Presenting Illness     Chief Complaint   Patient presents with    Low Blood Sugar         History Provided By: Patient and EMS    Chief Complaint: Low blood sugar, accidental OD on DM medication  Duration: Hours  Timing:  Acute  Location: N/A  Quality: EMS reports a blood sugar of 73 on scene  Severity: N/A  Modifying Factors: No alleviating or exacerbating factors reported  Associated Symptoms: HA, mild SOB      Additional History (Context):     Judi Sharpe is a 82 y.o. female with a pertinent history of DM, HTN, arthritis, asthma, presenting to the ED via EMS from home c/o for low blood sugar today. Pt states her blood sugar was 189 when she woke up this morning. She started feeling \"weird\" as the day progressed, checked her blood sugar, and it was 114. Says \"then it went down in the 70's. \" So, she took another dose of insulin, attempting to elevate her blood sugar. Per EMS, pt \"took 18 on top of the 16 she normally takes. \" Pt called EMS because \"I couldn't get it back up. \" EMS reports a blood sugar of 73 on scene. States pt c/o HA and \"face felt like it was on fire\" on scene. Pt also reports mild SOB now in the ED. Pt denies CP, abd pain, dysuria, fever, cough. Pt reports she had a similar episode yesterday where she felt like her blood sugar was low. Explains she went to  her meds, but she ended up somewhere she did not intend to be. States she felt \"weird\" and must've had a syncopal episode because she only remembers \"3 men picking me up off the floor and gave me cookies and orange juice,\" which made her feel better. Notes she had blurred vision last night. Pt notes she was bloated yesterday and took Bumex, also took it this AM. Also notes she was evaluated in the ED about 3-4 weeks ago for bronchitis, but she has been fine since then.  No other acute symptoms or complaints were noted. PCP: Chrystal Hay MD    Current Outpatient Prescriptions   Medication Sig Dispense Refill    insulin glargine (LANTUS U-100 INSULIN) 100 unit/mL injection 82 Units by SubCUTAneous route nightly.  ascorbic acid, vitamin C, (VITAMIN C) 500 mg tablet Take 500 mg by mouth daily.  bumetanide (BUMEX) 2 mg tablet Take 2 mg by mouth two (2) times daily as needed.  gabapentin (NEURONTIN) 300 mg capsule Take 1 Cap by mouth three (3) times daily. 270 Cap 3    insulin aspart U-100 (NOVOLOG) 100 unit/mL inpn 16 units before each meal 30 mL 3    [START ON 4/22/2018] morphine CR (MS CONTIN) 30 mg CR tablet Take 1 Tab by mouth every eight (8) hours for 30 days. Max Daily Amount: 90 mg. 90 Tab 0    morphine IR (MS IR) 15 mg tablet Take 1 Tab by mouth two (2) times daily as needed for Pain for up to 30 days. Max Daily Amount: 30 mg. 60 Tab 0    Blood-Glucose Meter (FREESTYLE LITE METER) monitoring kit Test twice blood glucose daily; ICD-10 E11.9; Quantity 1 1 Kit 0    amLODIPine (NORVASC) 10 mg tablet Take 0.5 Tabs by mouth daily. 90 Tab 3    levothyroxine (SYNTHROID) 200 mcg tablet 1 tablet daily (take on an empty stomach) (stop \"old\" synthroid) 90 Tab 3    carvedilol (COREG) 12.5 mg tablet Take 1 Tab by mouth two (2) times daily (with meals). 180 Tab 3    insulin syringe,safetyneedle 1 mL 31 gauge x 5/16\" syrg 1 Each by Does Not Apply route daily. 300 Each 3    cholecalciferol (VITAMIN D3) 1,000 unit tablet Take 1,000 Units by mouth daily.  glucose blood VI test strips (FREESTYLE TEST) strip Use to check blood glucose twice daily DX:E11.9 300 Strip 3    allopurinol (ZYLOPRIM) 100 mg tablet Take 1 Tab by mouth daily. 90 Tab 3    magnesium oxide 500 mg tab Take 1 Tab by mouth daily.  acetaminophen (TYLENOL) 500 mg tablet Take 1 Tab by mouth every six (6) hours as needed for Pain. 270 Tab 3    aspirin delayed-release 81 mg tablet Take 81 mg by mouth daily. Past History     Past Medical History:  Past Medical History:   Diagnosis Date    Asthma     Chronic venous insufficiency     Diabetes (HCC)     Hypertension     Peripheral neuropathy     Rheumatoid arthritis ([address]     Vertigo        Past Surgical History:  No past surgical history on file. Family History:  Family History   Problem Relation Age of Onset    Heart Attack Mother     Heart Attack Father        Social History:  Social History   Substance Use Topics    Smoking status: Former Smoker    Smokeless tobacco: Never Used    Alcohol use No       Allergies:  No Known Allergies      Review of Systems       Review of Systems   Constitutional: Negative for chills and fever. Low blood sugar   Eyes: Positive for visual disturbance. Respiratory: Positive for shortness of breath (mild). Negative for cough. Cardiovascular: Negative for chest pain. Gastrointestinal: Negative for diarrhea, nausea and vomiting. Genitourinary: Negative for dysuria. Neurological: Positive for headaches. All other systems reviewed and are negative. Physical Exam     Visit Vitals    /81 (BP 1 Location: Right arm, BP Patient Position: At rest)    Pulse 87    Temp 97.3 °F (36.3 °C)    Resp 18    Ht 5' 4\" (1.626 m)    Wt 112 kg (247 lb)    SpO2 99%    BMI 42.4 kg/m2         Physical Exam   Constitutional: She is oriented to person, place, and time. She appears well-developed and well-nourished. No distress. HENT:   Head: Normocephalic and atraumatic. Eyes: Conjunctivae and EOM are normal. Right eye exhibits no discharge. Left eye exhibits no discharge. No scleral icterus. Neck: Normal range of motion. Neck supple. No tracheal deviation present. Cardiovascular: Normal rate, regular rhythm and normal heart sounds. No murmur heard. Pulmonary/Chest: Effort normal and breath sounds normal. No respiratory distress. She has no wheezes. She has no rales. Abdominal: Soft.  She exhibits no distension. There is no tenderness. There is no rebound and no guarding. Musculoskeletal: Normal range of motion. She exhibits no deformity. Neurological: She is alert and oriented to person, place, and time. No cranial nerve deficit. Skin: Skin is warm and dry. She is not diaphoretic. Psychiatric: She has a normal mood and affect. Her behavior is normal. Judgment and thought content normal.         Diagnostic Study Results     Labs -  Recent Results (from the past 12 hour(s))   CBC WITH AUTOMATED DIFF    Collection Time: 04/18/18  3:48 PM   Result Value Ref Range    WBC 11.3 4.6 - 13.2 K/uL    RBC 4.70 4.20 - 5.30 M/uL    HGB 13.4 12.0 - 16.0 g/dL    HCT 40.3 35.0 - 45.0 %    MCV 85.7 74.0 - 97.0 FL    MCH 28.5 24.0 - 34.0 PG    MCHC 33.3 31.0 - 37.0 g/dL    RDW 13.3 11.6 - 14.5 %    PLATELET 185 378 - 623 K/uL    MPV 11.8 9.2 - 11.8 FL    NEUTROPHILS 57 40 - 73 %    LYMPHOCYTES 31 21 - 52 %    MONOCYTES 7 3 - 10 %    EOSINOPHILS 4 0 - 5 %    BASOPHILS 1 0 - 2 %    ABS. NEUTROPHILS 6.6 1.8 - 8.0 K/UL    ABS. LYMPHOCYTES 3.5 0.9 - 3.6 K/UL    ABS. MONOCYTES 0.8 0.05 - 1.2 K/UL    ABS. EOSINOPHILS 0.4 0.0 - 0.4 K/UL    ABS. BASOPHILS 0.1 0.0 - 0.1 K/UL    DF AUTOMATED     METABOLIC PANEL, COMPREHENSIVE    Collection Time: 04/18/18  3:48 PM   Result Value Ref Range    Sodium 141 136 - 145 mmol/L    Potassium 3.6 3.5 - 5.5 mmol/L    Chloride 102 100 - 108 mmol/L    CO2 33 (H) 21 - 32 mmol/L    Anion gap 6 3.0 - 18 mmol/L    Glucose 44 (LL) 74 - 99 mg/dL    BUN 24 (H) 7.0 - 18 MG/DL    Creatinine 1.07 0.6 - 1.3 MG/DL    BUN/Creatinine ratio 22 (H) 12 - 20      GFR est AA 60 (L) >60 ml/min/1.73m2    GFR est non-AA 49 (L) >60 ml/min/1.73m2    Calcium 9.3 8.5 - 10.1 MG/DL    Bilirubin, total 0.3 0.2 - 1.0 MG/DL    ALT (SGPT) 15 13 - 56 U/L    AST (SGOT) 20 15 - 37 U/L    Alk.  phosphatase 76 45 - 117 U/L    Protein, total 8.7 (H) 6.4 - 8.2 g/dL    Albumin 3.6 3.4 - 5.0 g/dL    Globulin 5.1 (H) 2.0 - 4.0 g/dL    A-G Ratio 0.7 (L) 0.8 - 1.7     URINALYSIS W/ RFLX MICROSCOPIC    Collection Time: 04/18/18  3:48 PM   Result Value Ref Range    Color YELLOW      Appearance CLEAR      Specific gravity 1.007 1.005 - 1.030      pH (UA) 7.0 5.0 - 8.0      Protein NEGATIVE  NEG mg/dL    Glucose NEGATIVE  NEG mg/dL    Ketone NEGATIVE  NEG mg/dL    Bilirubin NEGATIVE  NEG      Blood NEGATIVE  NEG      Urobilinogen 0.2 0.2 - 1.0 EU/dL    Nitrites NEGATIVE  NEG      Leukocyte Esterase NEGATIVE  NEG     CARDIAC PANEL,(CK, CKMB & TROPONIN)    Collection Time: 04/18/18  3:48 PM   Result Value Ref Range    CK 89 26 - 192 U/L    CK - MB 2.2 <3.6 ng/ml    CK-MB Index 2.5 0.0 - 4.0 %    Troponin-I, Qt. <0.02 0.0 - 0.045 NG/ML   NT-PRO BNP    Collection Time: 04/18/18  3:48 PM   Result Value Ref Range    NT pro- 0 - 1800 PG/ML   GLUCOSE, POC    Collection Time: 04/18/18  5:06 PM   Result Value Ref Range    Glucose (POC) 70 70 - 110 mg/dL       Radiologic Studies -   CT HEAD WO CONT   Final Result      XR CHEST SNGL V    (Results Pending)     CT HEAD WO CONT   IMPRESSION:  No convincing CT evidence for acute intracranial process. Susana Hernandez Postsurgical changes at the right calvarium with underlying encephalomalacia in  the right temporal lobe and right frontal lobe. Mild white matter disease, presumed chronic microvascular ischemic changes. As read by the radiologist.    Medical Decision Making   I am the first provider for this patient. I reviewed the vital signs, available nursing notes, past medical history, past surgical history, family history and social history. Vital Signs-Reviewed the patient's vital signs. Pulse Oximetry Analysis -  99% on room air (Interpretation)    Records Reviewed: Nursing Notes (Time of Review: 3:28 PM)    Provider Notes (Medical Decision Making): Pt with low blood sugar due to extra insulin use. Now feeling better. Labs with no evidence of acute precipitation of hypoglycemia.  Educated pt on proper use of insulin and gave her resources to enroll in [place] diabetic education class. Pt discharged with PCP f/u. Diagnosis     Clinical Impression:   1. Hypoglycemia        Disposition: Discharged     Follow-up Information     Follow up With Details Comments Contact Info    Sherri Westfall MD Schedule an appointment as soon as possible for a visit  [address]  Nora Rao [facility]. [address] [facility] EMERGENCY DEPT  If symptoms worsen [address] [facility] [address]  [phone]           Patient's Medications   Start Taking    No medications on file   Continue Taking    ACETAMINOPHEN (TYLENOL) 500 MG TABLET    Take 1 Tab by mouth every six (6) hours as needed for Pain. ALLOPURINOL (ZYLOPRIM) 100 MG TABLET    Take 1 Tab by mouth daily. AMLODIPINE (NORVASC) 10 MG TABLET    Take 0.5 Tabs by mouth daily. ASCORBIC ACID, VITAMIN C, (VITAMIN C) 500 MG TABLET    Take 500 mg by mouth daily. ASPIRIN DELAYED-RELEASE 81 MG TABLET    Take 81 mg by mouth daily. BLOOD-GLUCOSE METER (FREESTYLE LITE METER) MONITORING KIT    Test twice blood glucose daily; ICD-10 E11.9; Quantity 1    BUMETANIDE (BUMEX) 2 MG TABLET    Take 2 mg by mouth two (2) times daily as needed. CARVEDILOL (COREG) 12.5 MG TABLET    Take 1 Tab by mouth two (2) times daily (with meals). CHOLECALCIFEROL (VITAMIN D3) 1,000 UNIT TABLET    Take 1,000 Units by mouth daily. GABAPENTIN (NEURONTIN) 300 MG CAPSULE    Take 1 Cap by mouth three (3) times daily. GLUCOSE BLOOD VI TEST STRIPS (FREESTYLE TEST) STRIP    Use to check blood glucose twice daily DX:E11.9    INSULIN ASPART U-100 (NOVOLOG) 100 UNIT/ML INPN    16 units before each meal    INSULIN GLARGINE (LANTUS U-100 INSULIN) 100 UNIT/ML INJECTION    82 Units by SubCUTAneous route nightly. INSULIN SYRINGE,SAFETYNEEDLE 1 ML 31 GAUGE X 5/16\" SYRG    1 Each by Does Not Apply route daily.     LEVOTHYROXINE (SYNTHROID) 200 MCG TABLET    1 tablet daily (take on an empty stomach) (stop \"old\" synthroid)    MAGNESIUM OXIDE 500 MG TAB    Take 1 Tab by mouth daily. MORPHINE CR (MS CONTIN) 30 MG CR TABLET    Take 1 Tab by mouth every eight (8) hours for 30 days. Max Daily Amount: 90 mg. MORPHINE IR (MS IR) 15 MG TABLET    Take 1 Tab by mouth two (2) times daily as needed for Pain for up to 30 days. Max Daily Amount: 30 mg. These Medications have changed    No medications on file   Stop Taking    No medications on file     _______________________________    Attestations:  Scribe Attestation     Enoc Fletcher acting as a scribe for and in the presence of Renee Casey MD      April 18, 2018 at 3:28 PM       Provider Attestation:      I personally performed the services described in the documentation, reviewed the documentation, as recorded by the scribe in my presence, and it accurately and completely records my words and actions.  April 18, 2018 at 3:28 PM - Renee Casey MD    _______________________________

## 2018-04-18 NOTE — ED TRIAGE NOTES
Patient arrived via medic with c/o low blood sugar. Per patient she noticed that her BGL was trending down so she gave herself another dose of insulin. She states that she has not been feeling right. She c/o head pain and confusion. Patient is alert and oriented X 4 at this time call bel within reach no additional wants or needs noted at this time.

## 2018-04-19 ENCOUNTER — PATIENT OUTREACH (OUTPATIENT)
Dept: INTERNAL MEDICINE CLINIC | Age: 83
End: 2018-04-19

## 2018-04-19 NOTE — PROGRESS NOTES
ED Discharge Follow-Up    Date/Time:  2018 4:05 PM    Patient was admitted to [facility] ED ED on 18 and discharged on 18 for hypoglycemia. Presenting symptoms: hypoglycemia    Nurse Navigator(NN) contacted the patient  by telephone to perform post ED discharge assessment. Verified name and  with patient as identifiers. Provided introduction to self, and explanation of the Nurse Navigator role. Patient contacted within one business day(s) of discharge. Subjective data: Stated she noticed her blood sugar was going down so she gave herself more insulin to bring her blood sugar up. Complaining of HA and feeling \"weird. \"    Medication:   New medications at discharge include:  none  Taking medication(s) prescribed at discharge: Yes. Medication reconciliation was performed with patient, who verbalizes understanding of administration of home medications. There were no barriers to obtaining medications identified at this time. Does the patient have new prescription(s) to last until follow up with prescribing provider: NA  Pharmacy consult for polypharm needed?: no   Medication changes (dose adjustments or discontinued meds): patient advised to not take extra insulin after meals    Reviewed discharge instructions and red flags with  patient who provided teach back. Patient given an opportunity to ask questions and does not have any further questions or concerns at this time. The patient agrees to contact the PCP office for questions related to their healthcare. Education done re effect of insulin to lower blood sugar. Insulin lowers blood sugar and does not raise blood sugar. Patient agreed to not take any extra insulin and would like to discuss with Dr. Emilie Baker during appointment on 18. Patient reminded that there are physicians on call 24 hours a day / 7 days a week (M-F 5pm to 8am and from Friday 5pm until Monday 8a for the weekend) should the patient have questions or concerns.  [facility] provided contact information for future reference. Offered follow up appointment with PCP: no.  Appointment already scheduled.     Future Appointments  Date Time Provider Srinivas Wandy   4/24/2018 12:15 PM Derian Davison  [address] [place]   4/26/2018 12:20 PM IGNACIO Lee [facility] JANET SCHED        Goals        Diabetes     Knowledge and adherence of medication (ie. action, side effects, missed dose, etc.)            Plan:  Educate patient to take no extra insulin after meals to prevent hypoglycemia

## 2018-04-23 ENCOUNTER — TELEPHONE (OUTPATIENT)
Dept: INTERNAL MEDICINE CLINIC | Age: 83
End: 2018-04-23

## 2018-04-23 NOTE — TELEPHONE ENCOUNTER
Dr. Snehal Olivo requested patient to come in today for evaluation, bring all of her medications with her. Patient declined appointment. She also cancelled her appointment for Tuesday, 4/24. She says she is tired and has been to ER and the [facility] over the weekend and will not come in to see Dr. Snehal Olivo this week. I told her to call if she changed her mind and we would give her an appointment that would be convenient for her.

## 2018-04-24 ENCOUNTER — PATIENT OUTREACH (OUTPATIENT)
Dept: INTERNAL MEDICINE CLINIC | Age: 83
End: 2018-04-24

## 2018-04-26 ENCOUNTER — OFFICE VISIT (OUTPATIENT)
Dept: PAIN MANAGEMENT | Age: 83
End: 2018-04-26

## 2018-04-26 VITALS
BODY MASS INDEX: 40.12 KG/M2 | WEIGHT: 235 LBS | DIASTOLIC BLOOD PRESSURE: 70 MMHG | SYSTOLIC BLOOD PRESSURE: 155 MMHG | RESPIRATION RATE: 16 BRPM | HEIGHT: 64 IN | HEART RATE: 74 BPM | TEMPERATURE: 97 F

## 2018-04-26 DIAGNOSIS — M25.512 CHRONIC LEFT SHOULDER PAIN: Primary | ICD-10-CM

## 2018-04-26 DIAGNOSIS — M75.42 IMPINGEMENT SYNDROME OF LEFT SHOULDER: ICD-10-CM

## 2018-04-26 DIAGNOSIS — G89.29 CHRONIC RIGHT SHOULDER PAIN: ICD-10-CM

## 2018-04-26 DIAGNOSIS — M25.511 CHRONIC RIGHT SHOULDER PAIN: ICD-10-CM

## 2018-04-26 DIAGNOSIS — G89.29 CHRONIC LEFT SHOULDER PAIN: Primary | ICD-10-CM

## 2018-04-26 DIAGNOSIS — Z79.899 ENCOUNTER FOR LONG-TERM (CURRENT) USE OF HIGH-RISK MEDICATION: ICD-10-CM

## 2018-04-26 DIAGNOSIS — M75.101 TEAR OF RIGHT ROTATOR CUFF, UNSPECIFIED TEAR EXTENT: ICD-10-CM

## 2018-04-26 DIAGNOSIS — M19.011 ARTHRITIS OF RIGHT SHOULDER REGION: ICD-10-CM

## 2018-04-26 DIAGNOSIS — G89.4 CHRONIC PAIN SYNDROME: ICD-10-CM

## 2018-04-26 LAB
ALCOHOL UR POC: NORMAL
AMPHETAMINES UR POC: NEGATIVE
BARBITURATES UR POC: NORMAL
BENZODIAZEPINES UR POC: NEGATIVE
BUPRENORPHINE UR POC: NEGATIVE
CANNABINOIDS UR POC: NEGATIVE
CARISOPRODOL UR POC: NORMAL
COCAINE UR POC: NEGATIVE
FENTANYL UR POC: NORMAL
MDMA/ECSTASY UR POC: NORMAL
METHADONE UR POC: NEGATIVE
METHAMPHETAMINE UR POC: NORMAL
METHYLPHENIDATE UR POC: NORMAL
OPIATES UR POC: NORMAL
OXYCODONE UR POC: NEGATIVE
PHENCYCLIDINE UR POC: NORMAL
PROPOXYPHENE UR POC: NORMAL
TRAMADOL UR POC: NORMAL
TRICYCLICS UR POC: NORMAL

## 2018-04-26 RX ORDER — MORPHINE SULFATE 30 MG/1
30 TABLET, FILM COATED, EXTENDED RELEASE ORAL EVERY 8 HOURS
Qty: 90 TAB | Refills: 0 | Status: SHIPPED | OUTPATIENT
Start: 2018-05-24 | End: 2018-06-23

## 2018-04-26 RX ORDER — MORPHINE SULFATE 15 MG/1
15 TABLET ORAL
Qty: 60 TAB | Refills: 0 | Status: SHIPPED | OUTPATIENT
Start: 2018-05-16 | End: 2018-06-26 | Stop reason: SDUPTHER

## 2018-04-26 NOTE — PROGRESS NOTES
Nursing Notes    Patient presents to the office today in follow-up. Patient rates her pain at 7/10 on the numerical pain scale. Reviewed medications with counts as follows:    Rx Date filled Qty Dispensed Pill Count Last Dose Short   Morphine IR 15 mg 04/17/18 60 46 This am  no   Morphine  30 MG ER 04/25/18 90 151 This am  no         Comments: Patient is here today for a follow up appt today she states her pain level today is a 7  She states she was in the store and had an episode of low blood sugar. She was at [facility] and imaging was taken. They found nothing   It was all the low blood sugar     POC UDS was performed in office today per verbal order per [facility]    Any new labs or imaging since last appointment? YES labs taken at     Have you been to an emergency room (ER) or urgent care clinic since your last visit? YES        [facility]     Have you been hospitalized since your last visit? YES   2 days   If yes, where, when, and reason for visit? Have you seen or consulted any other health care providers outside of the [facility]  since your last visit? YES [facility]     If yes, where, when, and reason for visit? Ms. Diogenes Warren has a reminder for a \"due or due soon\" health maintenance. I have asked that she contact her primary care provider for follow-up on this health maintenance.

## 2018-04-26 NOTE — MR AVS SNAPSHOT
[address] [facility] [address] [phone] Patient: Arias Ibanez MRN: [id number] EKL:[id number] Visit Information Date & Time Provider Department Dept. Phone Encounter #  
 4/26/2018 12:20 PM Jackson Escobar, [address] [facility] [phone] [id number] Follow-up Instructions Return in about 3 months (around 7/26/2018). Follow-up and Disposition History Upcoming Health Maintenance Date Due  
 LIPID PANEL Q1 1935 EYE EXAM RETINAL OR DILATED Q1 3/19/1947 ZOSTER VACCINE AGE 60> 1/19/1997 GLAUCOMA SCREENING Q2Y 3/19/2002 Bone Densitometry (Dexa) Screening 3/19/2002 Pneumococcal 65+ Low/Medium Risk (2 of 2 - PPSV23) 10/18/2017 DTaP/Tdap/Td series (1 - Tdap) 11/22/2017 MICROALBUMIN Q1 4/4/2018 HEMOGLOBIN A1C Q6M 7/12/2018 FOOT EXAM Q1 8/23/2018 MEDICARE YEARLY EXAM 3/23/2019 Allergies as of 4/26/2018  Review Complete On: 4/26/2018 By: IGNACIO Becker No Known Allergies Current Immunizations  Reviewed on 3/22/2018 Name Date Influenza High Dose Vaccine PF 10/6/2017 Td 11/21/2017 Not reviewed this visit You Were Diagnosed With   
  
 Codes Comments Chronic left shoulder pain    -  Primary ICD-10-CM: M25.512, F00.88 ICD-9-CM: 719.41, 338.29 Tear of right rotator cuff, unspecified tear extent     ICD-10-CM: M75.101 ICD-9-CM: 918. 4 Chronic right shoulder pain     ICD-10-CM: M25.511, G89.29 ICD-9-CM: 719.41, 338.29 Chronic pain syndrome     ICD-10-CM: G89.4 ICD-9-CM: 338.4 Encounter for long-term (current) use of high-risk medication     ICD-10-CM: Z79.899 ICD-9-CM: V58.69 Arthritis of right shoulder region     ICD-10-CM: M19.011 
ICD-9-CM: 716.91 Impingement syndrome of left shoulder     ICD-10-CM: M75.42 
ICD-9-CM: 726.2 Vitals BP Pulse Temp Resp Height(growth percentile) Weight(growth percentile) 155/70 (BP 1 Location: Left arm, BP Patient Position: Sitting) 74 97 °F (36.1 °C) 16 5' 4\" (1.626 m) 235 lb (106.6 kg) BMI OB Status Smoking Status 40.34 kg/m2 Hysterectomy Former Smoker BMI and BSA Data Body Mass Index Body Surface Area  
 40.34 kg/m 2 2.19 m 2 Preferred Pharmacy Pharmacy Name Phone [address] [facility] [phone] Your Updated Medication List  
  
   
This list is accurate as of 4/26/18  1:15 PM.  Always use your most recent med list.  
  
  
  
  
 acetaminophen 500 mg tablet Commonly known as:  TYLENOL Take 1 Tab by mouth every six (6) hours as needed for Pain. allopurinol 100 mg tablet Commonly known as:  Shelvmargarita Beulah Take 1 Tab by mouth daily. amLODIPine 10 mg tablet Commonly known as:  Sherrye Acron Take 0.5 Tabs by mouth daily. ascorbic acid (vitamin C) 500 mg tablet Commonly known as:  VITAMIN C Take 500 mg by mouth daily. aspirin delayed-release 81 mg tablet Take 81 mg by mouth daily. Blood-Glucose Meter monitoring kit Commonly known as:  FREESTYLE LITE METER Test twice blood glucose daily; ICD-10 E11.9; Quantity 1  
  
 bumetanide 2 mg tablet Commonly known as:  [place] Graves Take 2 mg by mouth two (2) times daily as needed. carvedilol 12.5 mg tablet Commonly known as:  Mark Arizmendi Take 1 Tab by mouth two (2) times daily (with meals). gabapentin 300 mg capsule Commonly known as:  NEURONTIN Take 1 Cap by mouth three (3) times daily. glucose blood VI test strips strip Commonly known as:  FREESTYLE TEST Use to check blood glucose twice daily DX:E11.9  
  
 insulin aspart U-100 100 unit/mL Inpn Commonly known as:  Alvin Arriaza 16 units before each meal  
  
 insulin syringe,safetyneedle 1 mL 31 gauge x 5/16\" Syrg 1 Each by Does Not Apply route daily. LANTUS U-100 INSULIN 100 unit/mL injection Generic drug:  insulin glargine 82 Units by SubCUTAneous route nightly. levothyroxine 200 mcg tablet Commonly known as:  SYNTHROID  
1 tablet daily (take on an empty stomach) (stop \"old\" synthroid)  
  
 magnesium oxide 500 mg Tab Take 1 Tab by mouth daily. * morphine IR 15 mg tablet Commonly known as:  MS IR Take 1 Tab by mouth two (2) times daily as needed for Pain for up to 30 days. Max Daily Amount: 30 mg. Start taking on:  5/16/2018 * morphine CR 30 mg CR tablet Commonly known as:  MS CONTIN Take 1 Tab by mouth every eight (8) hours for 30 days. Max Daily Amount: 90 mg. Start taking on:  5/24/2018 VITAMIN D3 1,000 unit tablet Generic drug:  cholecalciferol Take 1,000 Units by mouth daily. * Notice: This list has 2 medication(s) that are the same as other medications prescribed for you. Read the directions carefully, and ask your doctor or other care provider to review them with you. Prescriptions Printed Refills  
 morphine CR (MS CONTIN) 30 mg CR tablet 0 Starting on: 5/24/2018 Sig: Take 1 Tab by mouth every eight (8) hours for 30 days. Max Daily Amount: 90 mg.  
 Class: Print Route: Oral  
 morphine IR (MS IR) 15 mg tablet 0 Starting on: 5/16/2018 Sig: Take 1 Tab by mouth two (2) times daily as needed for Pain for up to 30 days. Max Daily Amount: 30 mg.  
 Class: Print Route: Oral  
  
We Performed the Following AMB POC DRUG SCREEN () [ [facility]] DRUG SCREEN [BDF70781 Custom] Follow-up Instructions Return in about 3 months (around 7/26/2018). Patient Instructions 1. Continue current plan with no evidence of addiction or diversion. Stable on current medication without adverse events. 2. Refill  morphine IR 15 mg 2 times daily as needed. 3. Refill  morphine ER 30 mg  every 8 hours. 4. Continue Voltaren gel 1%. Apply 3-4 times daily to each shoulder 5. Naloxone 4 mg nasal spray for opioid induced respiratory depression emergency only. 6. Discussed risks of addiction, dependency, and opioid induced hyperalgesia. 7. Please remember to call at least 3-4 days prior to medication refill. 8. Return to clinic in 3 months. Please remember to call and cancel your appointment and your pain management agreement if you do decide to transition her care. Introducing [facility]! Dear Noe Banks: Thank you for requesting a cdream network account. Our records indicate that you already have an active cdream network account. You can access your account anytime at [email or web address] Did you know that you can access your hospital and ER discharge instructions at any time in cdream network? You can also review all of your test results from your hospital stay or ER visit. Additional Information If you have questions, please visit the Frequently Asked Questions section of the cdream network website at [email or web address] Remember, cdream network is NOT to be used for urgent needs. For medical emergencies, dial 911. Now available from your iPhone and Android! Please provide this summary of care documentation to your next provider. Your primary care clinician is listed as Bekah Meadows. If you have any questions after today's visit, please call [phone].

## 2018-04-26 NOTE — PROGRESS NOTES
HISTORY OF PRESENT ILLNESS  Renee Lima is a 82 y.o. female    HPI: Ms. Brian Sharma  returns today for f/u of chronic right shoulder pain. No h/o shoulder surgery. Surgery has been decline to to her heart condition. Prior injection with some improvement but temporary. Further injections were not recommended. PT with minimal imropvment. Ms. Brian Sharma continues unchanged since last visit. She continues to do well with her current treatment plan which has been offering moderate pain control. We had a lengthy conversation about the departure of her previous providers are no longer with our practice. I explained to her that moving forward our practice will be focusing on a more conservative and non-opioid plan of care. She is very worried about these changes as her pain is been under fair control and she is worried about worsening pain. We discussed options at length today. Previous injections were helpful but only temporary and no further injections were recommended by orthopedic specialist.  We will discuss further options moving forward. I have agreed to make no changes today but will plan just begin reducing her medications next visit. She is quite concerned and has expressed interest in transitioning her care. I have asked her to follow-up with Dr. Liberty Ortiz in 2 months for further evaluation and recommendation. If she still decides to transition her care I have asked her to call and cancel her appointment and pain management agreement. She verbally agrees. Current medication management consists of morphine ER 30 mg every 8 hours and morphine IR 15 mg 4 times a day as needed. No concurrent benzodiazepines. gabapentin by another provider. Medications are helping with pain control and quality of life. Her pain is 7/10 with medication and 10/10 without. Pt describes pain as aching, burning, and stabbing. Aggravating factors include most ROM activity.  Relieved with rest, medication, and avoiding painful activities. Current treatment is helping to improve general activity, mood, walking, sleep, enjoyment of life    In the past 30 days, the patient reports approximately 30% pain relief with current treatment/medications. She  is otherwise doing well with no other complaints today. She denies any adverse events including nausea, vomiting, dizziness, constipation, hallucinations, or seizures. Because the patient's current regimen places him/her at increased risk for possible overdose, a prescription for naloxone nasal spray has been provided. The patient understands that this medication is only to be used in the setting of a possible overdose and that inadvertent use of this medication could precipitate overt withdrawal.    POC UDS today. Confirmation pending. No Known Allergies    History reviewed. No pertinent surgical history. Review of Systems   Constitutional: Positive for chills and fever. HENT: Positive for congestion. Negative for sore throat. Eyes: Negative for blurred vision and double vision. Respiratory: Negative for cough, shortness of breath and wheezing. Cardiovascular: Negative for chest pain and palpitations. Gastrointestinal: Positive for heartburn. Negative for constipation, diarrhea, nausea and vomiting. Genitourinary: Negative. Musculoskeletal: Positive for back pain, falls, joint pain and neck pain. Neurological: Negative for dizziness, seizures, loss of consciousness and headaches. Endo/Heme/Allergies: Does not bruise/bleed easily. Psychiatric/Behavioral: Positive for depression. Negative for suicidal ideas. The patient is not nervous/anxious and does not have insomnia. Physical Exam   Constitutional: She is oriented to person, place, and time and well-developed, well-nourished, and in no distress. No distress. HENT:   Head: Normocephalic and atraumatic.    Eyes: EOM are normal.   Pulmonary/Chest: Effort normal.   Musculoskeletal: Right shoulder: She exhibits decreased range of motion and tenderness. Neurological: She is alert and oriented to person, place, and time. Gait abnormal.   Skin: Skin is warm and dry. No rash noted. She is not diaphoretic. No erythema. Psychiatric: Mood, memory, affect and judgment normal.   Nursing note and vitals reviewed. ASSESSMENT:    1. Chronic left shoulder pain    2. Tear of right rotator cuff, unspecified tear extent    3. Chronic right shoulder pain    4. Chronic pain syndrome    5. Encounter for long-term (current) use of high-risk medication    6. Arthritis of right shoulder region    7. Impingement syndrome of left shoulder      COMM: [facility] Prescription Monitoring Program was reviewed which does not demonstrate aberrancies and/or inconsistencies with regard to the historical prescribing of controlled medications to this patient by other providers. PLAN / Pt Instructions:  1. Continue current plan with no evidence of addiction or diversion. Stable on current medication without adverse events. 2. Refill  morphine IR 15 mg 2 times daily as needed. 3. Refill  morphine ER 30 mg  every 8 hours. 4. Continue Voltaren gel 1%. Apply 3-4 times daily to each shoulder  5. Naloxone 4 mg nasal spray for opioid induced respiratory depression emergency only. 6. Discussed risks of addiction, dependency, and opioid induced hyperalgesia. 7. Please remember to call at least 3-4 days prior to medication refill. 8. Return to clinic in 2 months with Dr. Oliverio Vázquez. Please remember to call and cancel your appointment and your pain management agreement if you do decide to transition her care. Medications Ordered Today   Medications    morphine CR (MS CONTIN) 30 mg CR tablet     Sig: Take 1 Tab by mouth every eight (8) hours for 30 days. Max Daily Amount: 90 mg.      Dispense:  90 Tab     Refill:  0    morphine IR (MS IR) 15 mg tablet     Sig: Take 1 Tab by mouth two (2) times daily as needed for Pain for up to 30 days. Max Daily Amount: 30 mg. Dispense:  60 Tab     Refill:  0       Pain medications prescribed with the objective of pain relief and improved physical and psychosocial function in this patient. Spent 25 minutes with patient today reviewing the treatment plan, goals of treatment plan, and limitations of the treatment plan, to include the potential for side effects from medications and procedures. Vivek Clements 4/26/2018      Note: Please excuse any typographical errors. Voice recognition software was used for this note and may cause mistakes.

## 2018-05-21 ENCOUNTER — PATIENT OUTREACH (OUTPATIENT)
Dept: INTERNAL MEDICINE CLINIC | Age: 83
End: 2018-05-21

## 2018-05-21 NOTE — PROGRESS NOTES
Hospital Discharge Follow Up    Patient was admitted to [facility] ED on 4/18/18 and discharged on 4/18/18 for hypoglycemia.      Patient has graduated from the Transitions of Care Coordination  program on 5/21/18. Patient's symptoms are stable at this time. Patient/family has the ability to self-manage. Care management goals have been completed at this time. No further nurse navigator follow up scheduled. Pt has nurse navigator's contact information for any further questions, concerns, or needs. Patients upcoming visits:  No future appointments.

## 2018-06-05 ENCOUNTER — HOSPITAL ENCOUNTER (OUTPATIENT)
Dept: GENERAL RADIOLOGY | Age: 83
Discharge: HOME | End: 2018-06-05
Payer: MEDICARE

## 2018-06-05 DIAGNOSIS — R50.9 FEVER: ICD-10-CM

## 2018-06-05 PROCEDURE — 71046 X-RAY EXAM CHEST 2 VIEWS: CPT

## 2018-06-11 ENCOUNTER — HOSPITAL ENCOUNTER (OUTPATIENT)
Dept: VASCULAR SURGERY | Age: 83
Discharge: HOME | End: 2018-06-11
Attending: FAMILY MEDICINE
Payer: MEDICARE

## 2018-06-11 DIAGNOSIS — I87.2 CHRONIC VENOUS STASIS DERMATITIS: ICD-10-CM

## 2018-06-11 PROCEDURE — 93970 EXTREMITY STUDY: CPT

## 2018-06-11 NOTE — PROCEDURES
[facility]  *** FINAL REPORT ***    Name: Anu Mena  MRN: [id number]    Outpatient  : 19 Mar 1935  HIS Order #: [id number]  [address] [facility] Visit #: [id number]  Date: 2018    TYPE OF TEST: Peripheral Venous Testing    REASON FOR TEST    Right Leg:-  Deep venous thrombosis:           No  Superficial venous thrombosis:    No  Deep venous insufficiency:        Not examined  Superficial venous insufficiency: Not examined    Left Leg:-  Deep venous thrombosis:           No  Superficial venous thrombosis:    Not examined  Deep venous insufficiency:        Not examined  Superficial venous insufficiency: Not examined      INTERPRETATION/FINDINGS  Duplex images were obtained using 2-D gray scale, color flow, and  spectral Doppler analysis. Right le. No evidence of deep venous thrombosis detected in the veins  visualized. 2. Deep veins visualized include the common femoral, femoral,  popliteal, posterior tibial and peroneal veins. 3. Acute non-occlusive superficial thrombosis identified in a  varicosity of the great saphenous vein. 4. Superficial veins visualized include the great saphenous at the  sapheno-femoral junction and the proximal calf levels. 1. No evidence of deep venous thrombosis detected in the veins  visualized. 2. Deep veins visualized include the common femoral, femoral,  popliteal, posterior tibial and peroneal veins. 3. No evidence of superficial thrombosis detected. 4. Superficial veins visualized include the proximal great saphenous  vein. ADDITIONAL COMMENTS  Limitations: Body habitus  Unable to perform adequate compression analysis of the left peroneal  veins. Patency of these vessels is demonstrated by color flow and  Doppler signal. Cannot rule out non-occlusive thrombus in this  segments. Compared to the previous exams on 10/13/09 and 09 the superficial   thrombus is a new finding.     I have personally reviewed the data relevant to the interpretation of  this  study. TECHNOLOGIST: Ymuiko Vasquez RVT  Signed: 06/11/2018 04:07 PM    PHYSICIAN: John Valerio MD  Signed: 06/13/2018 11:49 AM

## 2018-06-21 DIAGNOSIS — G89.4 CHRONIC PAIN SYNDROME: Primary | ICD-10-CM

## 2018-06-21 NOTE — TELEPHONE ENCOUNTER
Francesco Powell has called requesting a refill of their controlled medication Morphine  for the management of Chronic shoulder pain . Last office visit date: 04/26/18    Date last  was pulled and reviewed : 06/21/18    Was the patient compliant when the above report was pulled? yes    Analgesia: The patient states she has 70% pain relief from the medications     Aberrancies: The patient does not have any aberrancies     ADL's: The patient states she does what she can. She says she is able to cook and get around . She also states she has a nurse coming to her house to take her vitals and and listen to her lungs     Adverse Reaction: The patient states that she does not have any adverse reactions from the medications     Provider's last note and plan of care reviewed? yes  Request forwarded to provider for review.

## 2018-06-22 ENCOUNTER — TELEPHONE (OUTPATIENT)
Dept: PAIN MANAGEMENT | Age: 83
End: 2018-06-22

## 2018-06-22 RX ORDER — MORPHINE SULFATE 15 MG/1
15 TABLET, FILM COATED, EXTENDED RELEASE ORAL EVERY 12 HOURS
Qty: 60 TAB | Refills: 0 | Status: SHIPPED | OUTPATIENT
Start: 2018-06-22 | End: 2018-09-21

## 2018-06-22 NOTE — TELEPHONE ENCOUNTER
Attempted to call patient back regarding refill request. Provider, Neli Albarran PA-C stated he needed to see the patient first before providing a Rx refill, since she does not have a f/u appointment scheduled and was last seen on 4/26/18. I was trying to offer her an appoint on 6/27/18 at 0730 with . Patient did not answer the phone and voicemail was not set up and could not leave a message.

## 2018-06-25 ENCOUNTER — TELEPHONE (OUTPATIENT)
Dept: PAIN MANAGEMENT | Age: 83
End: 2018-06-25

## 2018-06-26 DIAGNOSIS — M25.512 CHRONIC LEFT SHOULDER PAIN: ICD-10-CM

## 2018-06-26 DIAGNOSIS — M25.511 CHRONIC RIGHT SHOULDER PAIN: ICD-10-CM

## 2018-06-26 DIAGNOSIS — G89.29 CHRONIC RIGHT SHOULDER PAIN: ICD-10-CM

## 2018-06-26 DIAGNOSIS — G89.29 CHRONIC LEFT SHOULDER PAIN: ICD-10-CM

## 2018-06-26 RX ORDER — MORPHINE SULFATE 15 MG/1
15 TABLET ORAL
Qty: 60 TAB | Refills: 0 | Status: SHIPPED | OUTPATIENT
Start: 2018-06-26 | End: 2018-07-24 | Stop reason: SDUPTHER

## 2018-07-24 ENCOUNTER — OFFICE VISIT (OUTPATIENT)
Dept: PAIN MANAGEMENT | Age: 83
End: 2018-07-24

## 2018-07-24 VITALS
BODY MASS INDEX: 40.12 KG/M2 | RESPIRATION RATE: 16 BRPM | HEIGHT: 64 IN | SYSTOLIC BLOOD PRESSURE: 175 MMHG | WEIGHT: 235 LBS | TEMPERATURE: 97.9 F | DIASTOLIC BLOOD PRESSURE: 72 MMHG | HEART RATE: 51 BPM

## 2018-07-24 DIAGNOSIS — G89.4 CHRONIC PAIN SYNDROME: ICD-10-CM

## 2018-07-24 DIAGNOSIS — G89.29 CHRONIC LEFT SHOULDER PAIN: ICD-10-CM

## 2018-07-24 DIAGNOSIS — M54.2 NECK PAIN: ICD-10-CM

## 2018-07-24 DIAGNOSIS — M75.101 TEAR OF RIGHT ROTATOR CUFF, UNSPECIFIED TEAR EXTENT: ICD-10-CM

## 2018-07-24 DIAGNOSIS — M25.512 CHRONIC LEFT SHOULDER PAIN: ICD-10-CM

## 2018-07-24 DIAGNOSIS — M25.511 CHRONIC RIGHT SHOULDER PAIN: ICD-10-CM

## 2018-07-24 DIAGNOSIS — G89.29 CHRONIC RIGHT SHOULDER PAIN: ICD-10-CM

## 2018-07-24 DIAGNOSIS — M19.011 ARTHRITIS OF RIGHT SHOULDER REGION: ICD-10-CM

## 2018-07-24 RX ORDER — MORPHINE SULFATE 15 MG/1
15 TABLET ORAL
Qty: 90 TAB | Refills: 0 | Status: SHIPPED | OUTPATIENT
Start: 2018-08-23 | End: 2018-09-21 | Stop reason: SDUPTHER

## 2018-07-24 RX ORDER — MORPHINE SULFATE 15 MG/1
15 TABLET ORAL
Qty: 90 TAB | Refills: 0 | Status: SHIPPED | OUTPATIENT
Start: 2018-07-24 | End: 2018-08-23

## 2018-07-24 RX ORDER — ATORVASTATIN CALCIUM 40 MG/1
40 TABLET, FILM COATED ORAL DAILY
COMMUNITY
End: 2019-08-07

## 2018-07-24 NOTE — MR AVS SNAPSHOT
[address] [id number] 
[phone] Patient: Toni Andrews MRN: [id number] QHQ:[id number] Visit Information Date & Time Provider Department Dept. Phone Encounter #  
 7/24/2018  3:20 PM Mily Umana [facility] [phone] [id number] Follow-up Instructions Return in about 2 months (around 9/24/2018). Upcoming Health Maintenance Date Due  
 LIPID PANEL Q1 1935 EYE EXAM RETINAL OR DILATED Q1 3/19/1945 ZOSTER VACCINE AGE 60> 1/19/1997 GLAUCOMA SCREENING Q2Y 3/19/2002 Bone Densitometry (Dexa) Screening 3/19/2002 Pneumococcal 65+ Low/Medium Risk (2 of 2 - PPSV23) 10/18/2017 DTaP/Tdap/Td series (1 - Tdap) 11/22/2017 MICROALBUMIN Q1 4/4/2018 HEMOGLOBIN A1C Q6M 7/12/2018 FOOT EXAM Q1 8/23/2018 Influenza Age 5 to Adult 8/1/2018 MEDICARE YEARLY EXAM 3/23/2019 Allergies as of 7/24/2018  Review Complete On: 7/24/2018 By: IGNACIO Curtis No Known Allergies Current Immunizations  Reviewed on 3/22/2018 Name Date Influenza High Dose Vaccine PF 10/6/2017 Td 11/21/2017 Not reviewed this visit You Were Diagnosed With   
  
 Codes Comments Chronic left shoulder pain     ICD-10-CM: M25.512, G89.29 ICD-9-CM: 719.41, 338.29 Chronic right shoulder pain     ICD-10-CM: M25.511, G89.29 ICD-9-CM: 719.41, 338.29 Chronic pain syndrome     ICD-10-CM: G89.4 ICD-9-CM: 338.4 Arthritis of right shoulder region     ICD-10-CM: M19.011 
ICD-9-CM: 716.91 Tear of right rotator cuff, unspecified tear extent     ICD-10-CM: M75.101 ICD-9-CM: 840.4 Neck pain     ICD-10-CM: M54.2 ICD-9-CM: 723.1 Vitals BP Pulse Temp Resp Height(growth percentile) Weight(growth percentile) 175/72 (BP 1 Location: Left arm, BP Patient Position: Sitting) (!) 51 97.9 °F (36.6 °C) 16 5' 4\" (1.626 m) 235 lb (106.6 kg) BMI OB Status Smoking Status 40.34 kg/m2 Hysterectomy Former Smoker Vitals History BMI and BSA Data Body Mass Index Body Surface Area  
 40.34 kg/m 2 2.19 m 2 Preferred Pharmacy Pharmacy Name Phone [address] [facility] [phone] Your Updated Medication List  
  
   
This list is accurate as of 7/24/18  5:11 PM.  Always use your most recent med list.  
  
  
  
  
 acetaminophen 500 mg tablet Commonly known as:  TYLENOL Take 1 Tab by mouth every six (6) hours as needed for Pain. allopurinol 100 mg tablet Commonly known as:  Helane Sizer Take 1 Tab by mouth daily. amLODIPine 10 mg tablet Commonly known as:  [place] Blade Take 0.5 Tabs by mouth daily. ascorbic acid (vitamin C) 500 mg tablet Commonly known as:  VITAMIN C Take 500 mg by mouth daily. aspirin delayed-release 81 mg tablet Take 81 mg by mouth daily. Blood-Glucose Meter monitoring kit Commonly known as:  FREESTYLE LITE METER Test twice blood glucose daily; ICD-10 E11.9; Quantity 1  
  
 bumetanide 2 mg tablet Commonly known as:  Shila Oas Take 2 mg by mouth two (2) times daily as needed. carvedilol 12.5 mg tablet Commonly known as:  Sharolyn Rojas Take 1 Tab by mouth two (2) times daily (with meals). gabapentin 300 mg capsule Commonly known as:  NEURONTIN Take 1 Cap by mouth three (3) times daily. glucose blood VI test strips strip Commonly known as:  FREESTYLE TEST Use to check blood glucose twice daily DX:E11.9  
  
 insulin aspart U-100 100 unit/mL Inpn Commonly known as:  Jesus Calvillo 16 units before each meal  
  
 insulin syringe,safetyneedle 1 mL 31 gauge x 5/16\" Syrg 1 Each by Does Not Apply route daily. LANTUS U-100 INSULIN 100 unit/mL injection Generic drug:  insulin glargine 82 Units by SubCUTAneous route nightly. levothyroxine 200 mcg tablet Commonly known as:  SYNTHROID  
 1 tablet daily (take on an empty stomach) (stop \"old\" synthroid) LIPITOR 40 mg tablet Generic drug:  atorvastatin Take  by mouth daily. magnesium oxide 500 mg Tab Take 1 Tab by mouth daily. * morphine CR 15 mg CR tablet Commonly known as:  MS CONTIN Take 1 Tab by mouth every twelve (12) hours. Max Daily Amount: 30 mg.  
  
 * morphine IR 15 mg tablet Commonly known as:  MS IR Take 1 Tab by mouth three (3) times daily as needed for Pain for up to 30 days. Max Daily Amount: 45 mg.  
  
 * morphine IR 15 mg tablet Commonly known as:  MS IR Take 1 Tab by mouth three (3) times daily as needed for Pain for up to 30 days. Max Daily Amount: 45 mg. Start taking on:  8/23/2018 VITAMIN D3 1,000 unit tablet Generic drug:  cholecalciferol Take 1,000 Units by mouth daily. * Notice: This list has 3 medication(s) that are the same as other medications prescribed for you. Read the directions carefully, and ask your doctor or other care provider to review them with you. Prescriptions Printed Refills  
 morphine IR (MS IR) 15 mg tablet 0 Sig: Take 1 Tab by mouth three (3) times daily as needed for Pain for up to 30 days. Max Daily Amount: 45 mg.  
 Class: Print Route: Oral  
 morphine IR (MS IR) 15 mg tablet 0 Starting on: 8/23/2018 Sig: Take 1 Tab by mouth three (3) times daily as needed for Pain for up to 30 days. Max Daily Amount: 45 mg.  
 Class: Print Route: Oral  
  
Follow-up Instructions Return in about 2 months (around 9/24/2018). Patient Instructions 1. Modify current plan with no evidence of addiction or diversion. Stable on current medication without adverse events. 2. Refill and adjust  morphine IR 15 mg up to 3 times daily as needed. 3. Discontinue morphine ER 30 mg   
4. Continue Voltaren gel 1%. Apply 3-4 times daily to each shoulder 5.  Naloxone 4 mg nasal spray for opioid induced respiratory depression emergency only. 6. Discussed risks of addiction, dependency, and opioid induced hyperalgesia. 7. Please remember to call at least 7 days prior to medication refill. 8. Return to clinic in 2 months with Dr. Lisa Nova. Please remember to call and cancel your appointment and your pain management agreement if you do decide to transition her care. Introducing [facility] SERVICES! Dear Richard Stein: Thank you for requesting a "[employer] account. Our records indicate that you already have an active "[employer] account. You can access your account anytime at [email or web address] Did you know that you can access your hospital and ER discharge instructions at any time in "[employer]? You can also review all of your test results from your hospital stay or ER visit. Additional Information If you have questions, please visit the Frequently Asked Questions section of the "[employer] website at [email or web address] Remember, "[employer] is NOT to be used for urgent needs. For medical emergencies, dial 911. Now available from your iPhone and Android! Please provide this summary of care documentation to your next provider. Your primary care clinician is listed as Maco Hernandez. If you have any questions after today's visit, please call [phone].

## 2018-07-24 NOTE — PATIENT INSTRUCTIONS
1. Modify current plan with no evidence of addiction or diversion. Stable on current medication without adverse events. 2. Refill and adjust  morphine IR 15 mg up to 3 times daily as needed. 3. Discontinue morphine ER 30 mg    4. Continue Voltaren gel 1%. Apply 3-4 times daily to each shoulder  5. Naloxone 4 mg nasal spray for opioid induced respiratory depression emergency only. 6. Discussed risks of addiction, dependency, and opioid induced hyperalgesia. 7. Please remember to call at least 7 days prior to medication refill. 8. Return to clinic in 2 months with Dr. Simon Almaraz. Please remember to call and cancel your appointment and your pain management agreement if you do decide to transition her care.

## 2018-07-24 NOTE — PROGRESS NOTES
HISTORY OF PRESENT ILLNESS  Renee Del Angel July is a 82 y.o. female    HPI: Ms. oCrey Brown  returns today for f/u of chronic right shoulder pain. No h/o shoulder surgery. Surgery has been decline to to her heart condition. Prior injection with some improvement but temporary. Further injections were not recommended. PT with minimal imropvment. Ms. Corey Brown reports mild worsening pain since her last visit. We had a lengthy conversation last visit about changes to our practice. We are now transitioning to a more conservative non-opioid plan of care. She has been using morphine ER 30 mg every 8 hours for quite some time. She reports that she has been out of this medication for over a week due to issues with scheduling and medication refill. After lengthy conversation we have decided to go ahead and discontinue her morphine ER. She will continue with her morphine IR 15 mg and I will adjust this up to 3 times daily as needed. She was previously using morphine IR up to 4 times daily before adjusting down to 2 times daily when she was established on long-acting morphine. I have strongly recommended that she follow with Dr. Maria Esther Arthur next visit for further evaluation and recommendation. I will plan to see her following her visit with Dr. Maria Esther Arthur. She has expressed interest in transferring her care as well. I have asked her to call and cancel her appointment and pain management agreement if she does decide to transfer her care      Current medication management consists of morphine ER 30 mg every 8 hours and morphine IR 15 mg 4 times a day as needed. No concurrent benzodiazepines. gabapentin by another provider. Medications are helping with pain control and quality of life. Her pain is 7/10 with medication and 10/10 without. Pt describes pain as aching, burning, and stabbing. Aggravating factors include most ROM activity. Relieved with rest, medication, and avoiding painful activities.  Current treatment is helping to improve general activity, mood, walking, sleep, enjoyment of life    Ms. Oren Grey is tolerating medications well, with no side effects noted. She is able to stay more active with less discomfort with these current doses. In the past 30 days, the patient reports an average of 30% pain relief with current treatment/medications. She is informed of side effects, risks, and benefits of this regimen, and emphasizes that she derives a significant improvement in functionality and quality of life, and notes that non-opioid medications and therapies in the past have not offered significant benefit. She  is otherwise doing well with no other complaints today. She denies any adverse events including nausea, vomiting, dizziness, constipation, hallucinations, or seizures. Because the patient's current regimen places him/her at increased risk for possible overdose, a prescription for naloxone nasal spray has been provided. The patient understands that this medication is only to be used in the setting of a possible overdose and that inadvertent use of this medication could precipitate overt withdrawal.    MME: 30  COMM: 1  OSWESTRY: 32 %  Last UDS reviewed         No Known Allergies    History reviewed. No pertinent surgical history. Review of Systems   Constitutional: Positive for chills and fever. HENT: Positive for congestion. Negative for sore throat. Eyes: Negative for blurred vision and double vision. Respiratory: Negative for cough, shortness of breath and wheezing. Cardiovascular: Negative for chest pain and palpitations. Gastrointestinal: Positive for heartburn. Negative for constipation, diarrhea, nausea and vomiting. Genitourinary: Negative. Musculoskeletal: Positive for back pain, falls, joint pain and neck pain. Neurological: Negative for dizziness, seizures, loss of consciousness and headaches. Endo/Heme/Allergies: Does not bruise/bleed easily.    Psychiatric/Behavioral: Positive for depression. Negative for suicidal ideas. The patient is not nervous/anxious and does not have insomnia. Physical Exam   Constitutional: She is oriented to person, place, and time and well-developed, well-nourished, and in no distress. No distress. HENT:   Head: Normocephalic and atraumatic. Eyes: EOM are normal.   Pulmonary/Chest: Effort normal.   Musculoskeletal:        Right shoulder: She exhibits decreased range of motion and tenderness. Neurological: She is alert and oriented to person, place, and time. Gait abnormal.   Skin: Skin is warm and dry. No rash noted. She is not diaphoretic. No erythema. Psychiatric: Mood, memory, affect and judgment normal.   Nursing note and vitals reviewed. ASSESSMENT:    1. Chronic left shoulder pain    2. Chronic right shoulder pain    3. Chronic pain syndrome    4. Arthritis of right shoulder region    5. Tear of right rotator cuff, unspecified tear extent    6. Neck pain         [place] Prescription Monitoring Program was reviewed which does not demonstrate aberrancies and/or inconsistencies with regard to the historical prescribing of controlled medications to this patient by other providers. PLAN / Pt Instructions:  1. Modify current plan with no evidence of addiction or diversion. Stable on current medication without adverse events. 2. Refill and adjust  morphine IR 15 mg up to 3 times daily as needed. 3. Discontinue morphine ER 30 mg    4. Continue Voltaren gel 1%. Apply 3-4 times daily to each shoulder  5. Naloxone 4 mg nasal spray for opioid induced respiratory depression emergency only. 6. Discussed risks of addiction, dependency, and opioid induced hyperalgesia. 7. Please remember to call at least 7 days prior to medication refill. 8. Return to clinic in 2 months with Dr. Jocelyn Guidry. Please remember to call and cancel your appointment and your pain management agreement if you do decide to transition her care.        Medications Ordered Today   Medications    morphine IR (MS IR) 15 mg tablet     Sig: Take 1 Tab by mouth three (3) times daily as needed for Pain for up to 30 days. Max Daily Amount: 45 mg. Dispense:  90 Tab     Refill:  0    morphine IR (MS IR) 15 mg tablet     Sig: Take 1 Tab by mouth three (3) times daily as needed for Pain for up to 30 days. Max Daily Amount: 45 mg. Dispense:  90 Tab     Refill:  0       DISPOSITION   Pain medications are prescribed with the objective of pain relief and improved physical and psychosocial function in this patient.  Patient has been counseled on proper use of prescribed medications.  Patient has been counseled about chronic medical conditions and their relationship to anxiety and depression and recommended mental health support as needed.  Reviewed with patient self-help tools, home exercise, and lifestyle changes to assist the patient in self-management of symptoms.  Reviewed with patient the treatment plan, goals of treatment plan, and limitations of treatment plan, to include the potential for side effects from medications and procedures. If side effects occur, it is the responsibility of the patient to inform the clinic so that a change in the treatment plan can be made in a safe manner. The patient is advised that stopping prescribed medication may cause an increase in symptoms and possible medication withdrawal symptoms. The patient is informed an emergency room evaluation may be necessary if this occurs. Spent 25 minutes with patient today which more than 50% of that time was spent on counseling and coordination of care. Carrington Ospina, [address] 7/24/2018      Note: Please excuse any typographical errors. Voice recognition software was used for this note and may cause mistakes.

## 2018-07-24 NOTE — PROGRESS NOTES
Nursing Notes    Patient presents to the office today in follow-up. Patient rates her pain at 9/10 on the numerical pain scale. Reviewed medications with counts as follows:    Rx Date filled Qty Dispensed Pill Count Last Dose Short   Morphine 30 mg ER 05/29/18 90 0 Week ago no   Morphine IR 15 mg 06/27/18 60 2 This am  no         Comments: Patient is here today for a follow up appt today she states she has a pain level today is a 9  PHQ 9 was done she states she is not depressed. She states she has been to the ER since her last. She states she was at [facility] and the [facility]   Patient has increased bp today she states her pain level today is a 9 that is the cause of her bp issues       POC UDS was not performed in office today    Any new labs or imaging since last appointment? YES    Have you been to an emergency room (ER) or urgent care clinic since your last visit? [facility]        Have you been hospitalized since your last visit? YES     If yes, where, when, and reason for visit? Have you seen or consulted any other health care providers outside of the [address] [place] [address]  since your last visit? YES [facility]     If yes, where, when, and reason for visit? Ms. Kennedy Ma has a reminder for a \"due or due soon\" health maintenance. I have asked that she contact her primary care provider for follow-up on this health maintenance.

## 2018-08-11 NOTE — PROGRESS NOTES
HISTORY OF PRESENT ILLNESS  Renee Navarrete is a [de-identified] y.o. female    HPI: Ms. Whitney Garcia  returns today for f/u of chronic right shoulder pain. No h/o shoulder surgery. Surgery has been decline to to her heart condition. Prior injection with some improvement but temporary. PT with minimal imropvment. Ms. Whitney Garcia continues with right shoulder pain unchanged since last visit. She also complains of new left shoulder pain times approximately 1 week. She says that she has radiating pain into her left shoulder. She is planning to follow-up with Dr. Rhonda Hernandez who has been treating her for her right shoulder as well. During her last visit we started transitioning her to long-acting morphine. She reports better improvement and has been using much less of her morphine IR but does report that the morphine ER is not lasting more than 8 hours. She reports that it has been a very difficult month trying to stretch during that time and so she can take another one of her morphine ER. I have recommended that we adjust this up to every 8 hours. She will continue with her morphine IR on an as-needed basis only. We will also adjust the morphine IR down to no more than 3 times daily as needed. She currently has enough of this medication to last her for 1 month. I will give her a prescription to fill on 5/19. I will have her follow-up in 2 months or sooner if needed. Because the patient's current regimen places him/her at increased risk for possible overdose, a prescription for naloxone nasal spray is being provided. The patient understands that this medication is only to be used in the setting of a possible overdose and that inadvertent use of this medication could precipitate overt withdrawal.    Current medication management consists of morphine ER 15 mg every 12 hours and morphine IR 15 mg 4 times a day as needed. Gabapentin by another provider. Medications are helping with pain control and quality of life.  Her pain is 7/10 with medication and 10/10 without. Pt describes pain as aching, burning, and stabbing. Aggravating factors include most ROM activity. Relieved with rest, medication, and avoiding painful activities. Current treatment is helping to improve general activity, mood, walking, sleep, enjoyment of life    In the past 30 days, the patient reports approximately 30% pain relief with current treatment/medications. She  is otherwise doing well with no other complaints today. She denies any adverse events including nausea, vomiting, dizziness, constipation, hallucinations, or seizures. No Known Allergies    History reviewed. No pertinent surgical history. Review of Systems   Constitutional: Negative for chills, fever and weight loss. HENT: Negative for congestion and sore throat. Eyes: Negative for blurred vision and double vision. Respiratory: Negative for cough, shortness of breath and wheezing. Cardiovascular: Negative for chest pain and palpitations. Gastrointestinal: Positive for heartburn. Negative for constipation, diarrhea, nausea and vomiting. Genitourinary: Negative. Musculoskeletal: Positive for back pain, falls, joint pain and neck pain. Neurological: Negative for dizziness, seizures, loss of consciousness and headaches. Endo/Heme/Allergies: Does not bruise/bleed easily. Psychiatric/Behavioral: Positive for depression. Negative for suicidal ideas. The patient is not nervous/anxious and does not have insomnia. Physical Exam   Constitutional: She is oriented to person, place, and time and well-developed, well-nourished, and in no distress. No distress. HENT:   Head: Normocephalic and atraumatic. Eyes: EOM are normal.   Pulmonary/Chest: Effort normal.   Musculoskeletal:        Right shoulder: She exhibits decreased range of motion and tenderness. Neurological: She is alert and oriented to person, place, and time.  Gait (using a walker) abnormal.   Skin: Skin is warm and dry. No rash noted. She is not diaphoretic. No erythema. Psychiatric: Mood, memory, affect and judgment normal.   Nursing note and vitals reviewed. ASSESSMENT:    1. Encounter for long-term (current) use of high-risk medication    2. Chronic right shoulder pain    3. Chronic pain syndrome    4. Arthritis of right shoulder region    5. Shoulder impingement, right    6. Tear of right rotator cuff, unspecified tear extent         [place] Prescription Monitoring Program was reviewed which does not demonstrate aberrancies and/or inconsistencies with regard to the historical prescribing of controlled medications to this patient by other providers. PLAN / Pt Instructions:  1. Continue current plan with no evidence of addiction or diversion. Stable on current medication without adverse events. 2. Refill morphine IR 15 mg. Currently has plenty left over that should last her until next month. 3. Refill and adjust morphine ER 15 mg every 12 hours up to every 8 hours. 4. Add naloxone 4 mg nasal spray for opioid induced respiratory depression emergency only. Extensive counseling was provided regarding this medication. Instructions were given to take home  5. Discussed risks of addiction, dependency, and opioid induced hyperalgesia. 6. Return to clinic in 2 months or sooner if needed. Medications Ordered Today   Medications    morphine IR (MS IR) 15 mg tablet     Sig: Take 1 Tab by mouth three (3) times daily as needed for Pain for up to 30 days. Max Daily Amount: 45 mg. Dispense:  90 Tab     Refill:  0    morphine CR (MS CONTIN) 15 mg CR tablet     Sig: Take 1 Tab by mouth every eight (8) hours for 30 days. Max Daily Amount: 45 mg. Dispense:  90 Tab     Refill:  0    morphine CR (MS CONTIN) 15 mg CR tablet     Sig: Take 1 Tab by mouth every eight (8) hours. Max Daily Amount: 45 mg.      Dispense:  90 Tab     Refill:  0    naloxone 4 mg/actuation spry     Si mg by Nasal route as needed. For emergency use only  Indications: OPIATE-INDUCED RESPIRATORY DEPRESSION     Dispense:  1 Package     Refill:  0       Pain medications prescribed with the objective of pain relief and improved physical and psychosocial function in this patient. Spent 25 minutes with patient today reviewing the treatment plan, goals of treatment plan, and limitations of the treatment plan, to include the potential for side effects from medications and procedures. Vivek Shepherd 4/19/2017      Note: Please excuse any typographical errors. Voice recognition software was used for this note and may cause mistakes. pt A&OX4. no SOB. no c/o of pain or distress Pt asymptomatic, BP 92/62 Pt asymptomatic, BP 95/61, HR 98, oral temp 98.9F Pt asymptomatic, BP 96/63, HR 92 pt asymptomatic, BP 92/60, HR 95

## 2018-08-16 ENCOUNTER — DOCUMENTATION ONLY (OUTPATIENT)
Dept: PAIN MANAGEMENT | Age: 83
End: 2018-08-16

## 2018-08-16 ENCOUNTER — TELEPHONE (OUTPATIENT)
Dept: PAIN MANAGEMENT | Age: 83
End: 2018-08-16

## 2018-08-16 NOTE — TELEPHONE ENCOUNTER
Jayshree Malin has called requesting a refill of their controlled medication, Morphine 15 mg, for the management of Chronic left shoulder pain. Last office visit date: 7/24/18    Date last  was pulled and reviewed : 8/16/18 and compliant. Last filled 7/27/18    Was the patient compliant when the above report was pulled? yes    Analgesia: Patient report 70% of pain relief with current medication regimen    Aberrancies: No aberrancies in the last 30 days. ADL's: Patient report she is able to do basic ADL's at home. Adverse Reaction:Patient report no adverse reaction. Provider's last note and plan of care reviewed? yes  Request forwarded to provider for review. Provider was made aware.

## 2018-08-21 ENCOUNTER — TELEPHONE (OUTPATIENT)
Dept: PAIN MANAGEMENT | Age: 83
End: 2018-08-21

## 2018-08-22 ENCOUNTER — TELEPHONE (OUTPATIENT)
Dept: PAIN MANAGEMENT | Age: 83
End: 2018-08-22

## 2018-09-17 ENCOUNTER — TELEPHONE (OUTPATIENT)
Dept: PAIN MANAGEMENT | Age: 83
End: 2018-09-17

## 2018-09-17 NOTE — TELEPHONE ENCOUNTER
Received message from patient requesting medication refill; upon chart and  review , it is noted that patient filled prescription on 08/23/18, and therefore have enough medications to last until appt scheduled for 09/21/18 ; attempted to contact patient to advise of same; unable to contact patient at number given due to no voicemail.

## 2018-09-21 ENCOUNTER — OFFICE VISIT (OUTPATIENT)
Dept: PAIN MANAGEMENT | Age: 83
End: 2018-09-21

## 2018-09-21 VITALS
BODY MASS INDEX: 43.54 KG/M2 | OXYGEN SATURATION: 96 % | RESPIRATION RATE: 14 BRPM | HEIGHT: 64 IN | DIASTOLIC BLOOD PRESSURE: 66 MMHG | HEART RATE: 87 BPM | TEMPERATURE: 98.2 F | SYSTOLIC BLOOD PRESSURE: 152 MMHG | WEIGHT: 255 LBS

## 2018-09-21 DIAGNOSIS — G89.29 CHRONIC LEFT SHOULDER PAIN: ICD-10-CM

## 2018-09-21 DIAGNOSIS — M75.101 TEAR OF RIGHT ROTATOR CUFF, UNSPECIFIED TEAR EXTENT: Primary | ICD-10-CM

## 2018-09-21 DIAGNOSIS — M19.011 ARTHRITIS OF RIGHT ACROMIOCLAVICULAR JOINT: ICD-10-CM

## 2018-09-21 DIAGNOSIS — Z79.899 ENCOUNTER FOR LONG-TERM (CURRENT) USE OF MEDICATIONS: ICD-10-CM

## 2018-09-21 DIAGNOSIS — R29.3 POSTURE ABNORMALITY: ICD-10-CM

## 2018-09-21 DIAGNOSIS — M62.511 ATROPHY OF MUSCLE OF RIGHT SHOULDER: ICD-10-CM

## 2018-09-21 DIAGNOSIS — M25.511 CHRONIC RIGHT SHOULDER PAIN: ICD-10-CM

## 2018-09-21 DIAGNOSIS — M25.512 CHRONIC LEFT SHOULDER PAIN: ICD-10-CM

## 2018-09-21 DIAGNOSIS — G89.29 CHRONIC RIGHT SHOULDER PAIN: ICD-10-CM

## 2018-09-21 RX ORDER — MORPHINE SULFATE 15 MG/1
15 TABLET ORAL
Qty: 90 TAB | Refills: 0 | Status: SHIPPED | OUTPATIENT
Start: 2018-09-27 | End: 2018-10-19 | Stop reason: SDUPTHER

## 2018-09-21 NOTE — PATIENT INSTRUCTIONS
Safe Use of Opioid Pain Medicine: Care Instructions Your Care Instructions Pain is your body's way of warning you that something is wrong. Pain feels different for everybody. Only you can describe your pain. A doctor can suggest or prescribe many types of medicines for pain. These range from over-the-counter medicines like acetaminophen (Tylenol) to powerful medicines called opioids. Examples of opioids are fentanyl, hydrocodone, morphine, and oxycodone. Heroin is an illegal opioid Opioids are strong medicines. They can help you manage pain when you use them the right way. But if you misuse them, they can cause serious harm and even death. For these reasons, doctors are very careful about how they prescribe opioids. If you decide to take opioids, here are some things to remember. · Keep your doctor informed. You can get addicted to opioids. The risk is higher if you have a history of substance use. Your doctor will monitor you closely for signs of misuse and addiction and to figure out when you no longer need to take opioids. · Make a treatment plan. The goal of your plan is to be able to function and do the things you need to do, even if you still have some pain. You might be able to manage your pain with other non-opioid options like physical therapy, relaxation, or over-the-counter pain medicines. · Be aware of the side effects. Opioids can cause serious side effects, such as constipation, dry mouth, and nausea. And over time, you may need a higher dose to get pain relief. This is called tolerance. Your body also gets used to opioids. This is called physical dependence. If you suddenly stop taking them, you may have withdrawal symptoms. The doctor carefully considered what pain medicine is right for you. You may not have received opioids if your doctor was concerned about drug interactions or your safety, or if he or she had other concerns. It is best to have one doctor or clinic treat your pain. This way you will get the pain medicine that will help you the most. And a doctor will be able to watch for any problems that the medicine might cause. The doctor has checked you carefully, but problems can develop later. If you notice any problems or new symptoms,  get medical treatment right away. Follow-up care is a key part of your treatment and safety. Be sure to make and go to all appointments, and call your doctor if you are having problems. It's also a good idea to know your test results and keep a list of the medicines you take. How can you care for yourself at home? · If you need to take opioids to manage your pain, remember these safety tips. ¨ Follow directions carefully. It's easy to misuse opioids if you take a dose other than what's prescribed by your doctor. This can lead to overdose and even death. Even sharing them with someone they weren't meant for is misuse. ¨ Be cautious. Opioids may affect your judgment and decision making. Do not drive or operate machinery until you can think clearly. Talk with your doctor about when it is safe to drive. ¨ Reduce the risk of drug interactions. Opioids can be dangerous if you take them with alcohol or with certain drugs like sleeping pills and muscle relaxers. Make sure your doctor knows about all the other medicines you take, including over-the-counter medicines. Don't start any new medicines before you talk to your doctor or pharmacist. 
[facility] Keep others safe. Store opioids in a safe and secure place. Make sure that pets, children, friends, and family can't get to them. When you're done using opioids, make sure to properly dispose of them. You can either use a community drug take-back program or your drugstore's mail-back program. If one of these programs isn't available, you can flush opioid skin patches and unused opioid pills down the toilet. ¨ Reduce the risk of overdose. Misuse of opioids can be very dangerous. Protect yourself by asking your doctor about a naloxone rescue kit. It can help you-and even save your life-if you take too much of an opioid. · Try other ways to reduce pain. ¨ Relax, and reduce stress. Relaxation techniques such as deep breathing or meditation can help. ¨ Keep moving. Gentle, daily exercise can help reduce pain over the long run. Try low- or no-impact exercises such as walking, swimming, and stationary biking. Do stretches to stay flexible. ¨ Try heat, cold packs, and massage. ¨ Get enough sleep. Pain can make you tired and drain your energy. Talk with your doctor if you have trouble sleeping because of pain. ¨ Think positive. Your thoughts can affect your pain level. Do things that you enjoy to distract yourself when you have pain instead of focusing on the pain. See a movie, read a book, listen to music, or spend time with a friend. · If you are not taking a prescription pain medicine, ask your doctor if you can take an over-the-counter medicine. When should you call for help? Call your doctor now or seek immediate medical care if: 
  · You have a new kind of pain.  
  · You have new symptoms, such as a fever or rash, along with the pain.  
 Watch closely for changes in your health, and be sure to contact your doctor if: 
  · You think you might be using too much pain medicine, and you need help to use less or stop.  
  · Your pain gets worse.  
  · You would like a referral to a doctor or clinic that specializes in pain management. Where can you learn more? Go to http://braden-estephanie.info/. Enter R108 in the search box to learn more about \"Safe Use of Opioid Pain Medicine: Care Instructions. \" Current as of: September 10, 2017 Content Version: 11.7 © 2447-6477 [employer].  Care instructions adapted under license by [employer] (which disclaims liability or warranty for this information). If you have questions about a medical condition or this instruction, always ask your healthcare professional. [place] [address] any warranty or liability for your use of this information.

## 2018-09-21 NOTE — MR AVS SNAPSHOT
[address] [facility] [address] [phone] Patient: Misty Flor MRN: [id number] EWD:[birth year] Visit Information Date & Time Provider Department Dept. Phone Encounter #  
 9/21/2018  9:30 AM Kaushal Valdes [address] [place] [address] for Pain Management [phone] Follow-up Instructions Return for 30 min. Your Appointments 11/16/2018  9:00 AM  
Follow Up with IGNACIO Mercado [address] [place] [address] for Pain Management (JANET SCHEDULING) Appt Note: F/u with anyone for the office visit in 60 days [address] [place] [id number] [phone] [facility] [id number] Upcoming Health Maintenance Date Due  
 LIPID PANEL Q1 1935 EYE EXAM RETINAL OR DILATED Q1 3/19/1945 ZOSTER VACCINE AGE 60> 1/19/1997 GLAUCOMA SCREENING Q2Y 3/19/2002 Bone Densitometry (Dexa) Screening 3/19/2002 Pneumococcal 65+ Low/Medium Risk (2 of 2 - PPSV23) 10/18/2017 DTaP/Tdap/Td series (1 - Tdap) 11/22/2017 MICROALBUMIN Q1 4/4/2018 HEMOGLOBIN A1C Q6M 7/12/2018 Influenza Age 5 to Adult 8/1/2018 FOOT EXAM Q1 8/23/2018 MEDICARE YEARLY EXAM 3/23/2019 Allergies as of 9/21/2018  Review Complete On: 9/21/2018 By: Kaushal Valdes, DO No Known Allergies Current Immunizations  Reviewed on 3/22/2018 Name Date Influenza High Dose Vaccine PF 10/6/2017 Td 11/21/2017 Not reviewed this visit You Were Diagnosed With   
  
 Codes Comments Tear of right rotator cuff, unspecified tear extent    -  Primary ICD-10-CM: M75.101 ICD-9-CM: 840.4 Encounter for long-term (current) use of medications     ICD-10-CM: Z79.899 ICD-9-CM: V58.69 Arthritis of right acromioclavicular joint     ICD-10-CM: M19.011 
ICD-9-CM: 716.91 Posture abnormality     ICD-10-CM: R29.3 ICD-9-CM: 781.92   
 Chronic left shoulder pain     ICD-10-CM: M25.512, G89.29 ICD-9-CM: 719.41, 338.29 Chronic right shoulder pain     ICD-10-CM: M25.511, G89.29 ICD-9-CM: 719.41, 338.29 Atrophy of muscle of right shoulder     ICD-10-CM: M62.511 ICD-9-CM: 728.2 Vitals BP Pulse Temp Resp Height(growth percentile) Weight(growth percentile) 152/66 (BP 1 Location: Left arm, BP Patient Position: Sitting) 87 98.2 °F (36.8 °C) (Oral) 14 5' 4\" (1.626 m) 255 lb (115.7 kg) SpO2 BMI OB Status Smoking Status 96% 43.77 kg/m2 Hysterectomy Former Smoker Vitals History BMI and BSA Data Body Mass Index Body Surface Area 43.77 kg/m 2 2.29 m 2 Preferred Pharmacy Pharmacy Name Phone [address] [facility] [phone] Your Updated Medication List  
  
   
This list is accurate as of 9/21/18 10:40 AM.  Always use your most recent med list.  
  
  
  
  
 acetaminophen 500 mg tablet Commonly known as:  TYLENOL Take 1 Tab by mouth every six (6) hours as needed for Pain. allopurinol 100 mg tablet Commonly known as:  Ann Abdalla Take 1 Tab by mouth daily. amLODIPine 10 mg tablet Commonly known as:  Arron Lo Take 0.5 Tabs by mouth daily. ascorbic acid (vitamin C) 500 mg tablet Commonly known as:  VITAMIN C Take 500 mg by mouth daily. aspirin delayed-release 81 mg tablet Take 81 mg by mouth daily. Blood-Glucose Meter monitoring kit Commonly known as:  FREESTYLE LITE METER Test twice blood glucose daily; ICD-10 E11.9; Quantity 1  
  
 bumetanide 2 mg tablet Commonly known as:  Ledon Lights Take 2 mg by mouth two (2) times daily as needed. carvedilol 12.5 mg tablet Commonly known as:  Vergia Kenton Take 1 Tab by mouth two (2) times daily (with meals). gabapentin 300 mg capsule Commonly known as:  NEURONTIN Take 1 Cap by mouth three (3) times daily. glucose blood VI test strips strip Commonly known as:  FREESTYLE TEST Use to check blood glucose twice daily DX:E11.9  
  
 insulin aspart U-100 100 unit/mL Inpn Commonly known as:  Alicia Ramos 16 units before each meal  
  
 insulin syringe,safetyneedle 1 mL 31 gauge x 5/16\" Syrg 1 Each by Does Not Apply route daily. LANTUS U-100 INSULIN 100 unit/mL injection Generic drug:  insulin glargine 20 Units by SubCUTAneous route nightly. levothyroxine 200 mcg tablet Commonly known as:  SYNTHROID  
1 tablet daily (take on an empty stomach) (stop \"old\" synthroid) LIPITOR 40 mg tablet Generic drug:  atorvastatin Take 40 mg by mouth daily. magnesium oxide 500 mg Tab Take 1 Tab by mouth daily. morphine IR 15 mg tablet Commonly known as:  MS IR Take 1 Tab by mouth three (3) times daily as needed for Pain for up to 30 days. Max Daily Amount: 45 mg. Start taking on:  9/27/2018 TENS Units Brittany Morning Commonly known as:  TENS 504 Please provide patient with a TENS unit to help improve function, improve quality of life, reduce medication use, improve ROM. 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 VITAMIN D3 1,000 unit tablet Generic drug:  cholecalciferol Take 1,000 Units by mouth daily. Prescriptions Printed Refills  
 morphine IR (MS IR) 15 mg tablet 0 Starting on: 9/27/2018 Sig: Take 1 Tab by mouth three (3) times daily as needed for Pain for up to 30 days. Max Daily Amount: 45 mg.  
 Class: Print Route: Oral  
 TENS Units (TENS 504) mikal 0 Sig: Please provide patient with a TENS unit to help improve function, improve quality of life, reduce medication use, improve ROM. 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 Class: Print We Performed the Following AMB POC DRUG SCREEN () [ \[facility]\""] DRUG SCREEN [[id number] Custom] Follow-up Instructions Return for 30 min. Patient Instructions Safe Use of Opioid Pain Medicine: Care Instructions Your Care Instructions Pain is your body's way of warning you that something is wrong. Pain feels different for everybody. Only you can describe your pain. A doctor can suggest or prescribe many types of medicines for pain. These range from over-the-counter medicines like acetaminophen (Tylenol) to powerful medicines called opioids. Examples of opioids are fentanyl, hydrocodone, morphine, and oxycodone. Heroin is an illegal opioid Opioids are strong medicines. They can help you manage pain when you use them the right way. But if you misuse them, they can cause serious harm and even death. For these reasons, doctors are very careful about how they prescribe opioids. If you decide to take opioids, here are some things to remember. · Keep your doctor informed. You can get addicted to opioids. The risk is higher if you have a history of substance use. Your doctor will monitor you closely for signs of misuse and addiction and to figure out when you no longer need to take opioids. · Make a treatment plan. The goal of your plan is to be able to function and do the things you need to do, even if you still have some pain. You might be able to manage your pain with other non-opioid options like physical therapy, relaxation, or over-the-counter pain medicines. · Be aware of the side effects. Opioids can cause serious side effects, such as constipation, dry mouth, and nausea. And over time, you may need a higher dose to get pain relief. This is called tolerance. Your body also gets used to opioids. This is called physical dependence. If you suddenly stop taking them, you may have withdrawal symptoms. The doctor carefully considered what pain medicine is right for you. You may not have received opioids if your doctor was concerned about drug interactions or your safety, or if he or she had other concerns. It is best to have one doctor or clinic treat your pain. This way you will get the pain medicine that will help you the most. And a doctor will be able to watch for any problems that the medicine might cause. The doctor has checked you carefully, but problems can develop later. If you notice any problems or new symptoms,  get medical treatment right away. Follow-up care is a key part of your treatment and safety. Be sure to make and go to all appointments, and call your doctor if you are having problems. It's also a good idea to know your test results and keep a list of the medicines you take. How can you care for yourself at home? · If you need to take opioids to manage your pain, remember these safety tips. ¨ Follow directions carefully. It's easy to misuse opioids if you take a dose other than what's prescribed by your doctor. This can lead to overdose and even death. Even sharing them with someone they weren't meant for is misuse. ¨ Be cautious. Opioids may affect your judgment and decision making. Do not drive or operate machinery until you can think clearly. Talk with your doctor about when it is safe to drive. ¨ Reduce the risk of drug interactions. Opioids can be dangerous if you take them with alcohol or with certain drugs like sleeping pills and muscle relaxers. Make sure your doctor knows about all the other medicines you take, including over-the-counter medicines. Don't start any new medicines before you talk to your doctor or pharmacist. 
Shellie Rios Keep others safe. Store opioids in a safe and secure place. Make sure that pets, children, friends, and family can't get to them. When you're done using opioids, make sure to properly dispose of them.  You can either use a community drug take-back program or your drugstore's mail-back program. If one of these programs isn't available, you can flush opioid skin patches and unused opioid pills down the toilet. ¨ Reduce the risk of overdose. Misuse of opioids can be very dangerous. Protect yourself by asking your doctor about a naloxone rescue kit. It can help you-and even save your life-if you take too much of an opioid. · Try other ways to reduce pain. ¨ Relax, and reduce stress. Relaxation techniques such as deep breathing or meditation can help. ¨ Keep moving. Gentle, daily exercise can help reduce pain over the long run. Try low- or no-impact exercises such as walking, swimming, and stationary biking. Do stretches to stay flexible. ¨ Try heat, cold packs, and massage. ¨ Get enough sleep. Pain can make you tired and drain your energy. Talk with your doctor if you have trouble sleeping because of pain. ¨ Think positive. Your thoughts can affect your pain level. Do things that you enjoy to distract yourself when you have pain instead of focusing on the pain. See a movie, read a book, listen to music, or spend time with a friend. · If you are not taking a prescription pain medicine, ask your doctor if you can take an over-the-counter medicine. When should you call for help? Call your doctor now or seek immediate medical care if: 
  · You have a new kind of pain.  
  · You have new symptoms, such as a fever or rash, along with the pain.  
 Watch closely for changes in your health, and be sure to contact your doctor if: 
  · You think you might be using too much pain medicine, and you need help to use less or stop.  
  · Your pain gets worse.  
  · You would like a referral to a doctor or clinic that specializes in pain management. Where can you learn more? Go to http://braden-estephanie.info/. Enter R108 in the search box to learn more about \"Safe Use of Opioid Pain Medicine: Care Instructions. \" 
 Current as of: September 10, 2017 Content Version: 11.7 © 5620-4965 [employer]. Care instructions adapted under license by [employer] (which disclaims liability or warranty for this information). If you have questions about a medical condition or this instruction, always ask your healthcare professional. [address] any warranty or liability for your use of this information. Introducing [facility]! Dear Myron Handy: Thank you for requesting a Movatu account. Our records indicate that you already have an active Movatu account. You can access your account anytime at [email or web address] Did you know that you can access your hospital and ER discharge instructions at any time in Movatu? You can also review all of your test results from your hospital stay or ER visit. Additional Information If you have questions, please visit the Frequently Asked Questions section of the Movatu website at [email or web address] Remember, Movatu is NOT to be used for urgent needs. For medical emergencies, dial 911. Now available from your iPhone and Android! Please provide this summary of care documentation to your next provider. Your primary care clinician is listed as Robert Bring. If you have any questions after today's visit, please call [phone].

## 2018-09-21 NOTE — PROGRESS NOTES
Nursing Notes Patient presents to the office today in follow-up. Patient rates her pain at 8/10 on the numerical pain scale. Reviewed medications with counts as follows:   
Rx Date filled Qty Dispensed Pill Count Last Dose Short Morphine sulfate IR 15 mg  08/27/18 90 20 This a.m. no  
       
       
       
          
Last opioid agreement 01/25/18 Last urine drug screen 04/26/18 Comments: POC UDS was performed in office today Any new labs or imaging since last appointment? YES. Pt states she had multiple labs and imaging done while she was in the ER Have you been to an emergency room (ER) or urgent care clinic since your last visit? YES. Pt went to the ER for chest pain at the [facility]         
 
Have you been hospitalized since your last visit? NO If yes, where, when, and reason for visit? Have you seen or consulted any other health care providers outside of the [facility]  since your last visit? NO If yes, where, when, and reason for visit? Ms. Tariq Goff has a reminder for a \"due or due soon\" health maintenance. I have asked that she contact her primary care provider for follow-up on this health maintenance. PHQ over the last two weeks 9/21/2018 Little interest or pleasure in doing things Not at all Feeling down, depressed, irritable, or hopeless Not at all Total Score PHQ 2 0 Trouble falling or staying asleep, or sleeping too much - Feeling tired or having little energy - Poor appetite, weight loss, or overeating - Feeling bad about yourself - or that you are a failure or have let yourself or your family down - Trouble concentrating on things such as school, work, reading, or watching TV - Moving or speaking so slowly that other people could have noticed; or the opposite being so fidgety that others notice - Thoughts of being better off dead, or hurting yourself in some way -  
PHQ 9 Score -  
 How difficult have these problems made it for you to do your work, take care of your home and get along with others -

## 2018-09-21 NOTE — PROGRESS NOTES
Referral date around 2016, source orthopedics for shoulder pain. Social History Social History  Marital status:  Spouse name: N/A  
 Number of children: N/A  
 Years of education: N/A Occupational History  Not on file. Social History Main Topics  Smoking status: Former Smoker  Smokeless tobacco: Never Used  Alcohol use No  
 Drug use: No  
 Sexual activity: No  
 
Other Topics Concern  Not on file Social History Narrative Family History Problem Relation Age of Onset  Heart Attack Mother  Heart Attack Father No Known Allergies Past Medical History:  
Diagnosis Date  Asthma  Chronic venous insufficiency  Diabetes ([address]  Hypertension  Peripheral neuropathy  Rheumatoid arthritis ([address]  Vertigo Past Surgical History:  
Procedure Laterality Date  HX CHOLECYSTECTOMY  HX GYN    
 TAHOophorectomy due to infection  HX HEENT    
 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  HX ORTHOPAEDIC    
 bilat TKA Current Outpatient Prescriptions on File Prior to Visit Medication Sig  
 atorvastatin (LIPITOR) 40 mg tablet Take 40 mg by mouth daily.  insulin glargine (LANTUS U-100 INSULIN) 100 unit/mL injection 20 Units by SubCUTAneous route nightly.  ascorbic acid, vitamin C, (VITAMIN C) 500 mg tablet Take 500 mg by mouth daily.  bumetanide (BUMEX) 2 mg tablet Take 2 mg by mouth two (2) times daily as needed.  gabapentin (NEURONTIN) 300 mg capsule Take 1 Cap by mouth three (3) times daily.  insulin aspart U-100 (NOVOLOG) 100 unit/mL inpn 16 units before each meal  
 Blood-Glucose Meter (FREESTYLE LITE METER) monitoring kit Test twice blood glucose daily; ICD-10 E11.9; Quantity 1  
 amLODIPine (NORVASC) 10 mg tablet Take 0.5 Tabs by mouth daily. (Patient taking differently: Take 10 mg by mouth daily.)  levothyroxine (SYNTHROID) 200 mcg tablet 1 tablet daily (take on an empty stomach) (stop \"old\" synthroid)  carvedilol (COREG) 12.5 mg tablet Take 1 Tab by mouth two (2) times daily (with meals).  insulin syringe,safetyneedle 1 mL 31 gauge x 5/16\" syrg 1 Each by Does Not Apply route daily.  cholecalciferol (VITAMIN D3) 1,000 unit tablet Take 1,000 Units by mouth daily.  glucose blood VI test strips (FREESTYLE TEST) strip Use to check blood glucose twice daily DX:E11.9  
 allopurinol (ZYLOPRIM) 100 mg tablet Take 1 Tab by mouth daily.  magnesium oxide 500 mg tab Take 1 Tab by mouth daily.  acetaminophen (TYLENOL) 500 mg tablet Take 1 Tab by mouth every six (6) hours as needed for Pain.  aspirin delayed-release 81 mg tablet Take 81 mg by mouth daily. No current facility-administered medications on file prior to visit. HPI: 
Lawanda Koyanagi is a 82 y.o. female here for f/u visit for ongoing evaluation of right shoulder pain. Pt was last seen here on July 24, 2018. Pt denies interval changes in the character or distribution of pain. Right shoulder pain with acute onset from a fall. Pain is located wrapping around from anteriorly to posteriorly at the right shoulder around the humeral head and also some point tenderness at the right acromioclavicular joint. The pain is described as sharp and burning pain that ranges from 7-9/10. She is also reporting left shoulder pain from a rotator cuff tear that occurred during another fall about a year ago for which she treats with orthopedics. Right shoulder Worse with GH flex/abd, overhead activity, combing hair. Right shoulder pain improves with Morphine, Pulleys, wall walk, GH injections, ice pack. She has not had R AC joint injections. No help from heat, Physical Therapy. She complains of persistent decreased of range of motion of the right shoulder with increased pain with range of motion. --Never had TENS unit for her shoulders. --Tylenol provides mild relief of pain. --Gabapentin 300 mg twice daily-She is unsure if this is helpful for pain. MS Contin 15 mg every 12 hours from that she reports partial but incomplete analgesia with improvement in her activity tolerance. She denies adverse effects or aberrant behaviors with her opioid regimen. ROS: Negative for fever, chills, nausea, vomiting, diarrhea, constipation, abdominal pain, weakness, difficulty swallowing, acute changes in vision, acute changes in hearing, falls, dizziness, bladder incontinence, bowel incontinence, depression, anxiety, suicidal ideation, homicidal ideation, alcohol use. Review of systems is positive for recent chest pain and shortness of breath which has since resolved and for which she initiated a workup in the emergency department. Opioid specific risk: Diabetes, advanced age, polypharmacy, asthma, vertigo, GERD, history of depression, obesity, hx of falls but none in the last 8 months. Vitals:  
 09/21/18 0850 BP: 152/66 Pulse: 87 Resp: 14 Temp: 98.2 °F (36.8 °C) TempSrc: Oral  
SpO2: 96% Weight: 115.7 kg (255 lb) Height: 5' 4\" (1.626 m) PainSc:   8 PainLoc: Shoulder Imaging: Mri report for right shoulder done 7/6/17,\"\"\"\"\"\"\"\"\"Hypertrophy at the acromioclavicular joint as before with mild inflammation but 
no fluid collection or significant erosion. 
  
IMPRESSION IMPRESSION: Limited study due to motion. As previously described, complete 
rupture of the supraspinatus, infraspinatus tendons with more muscular atrophy 
of the supraspinatus muscle since last study. Full-thickness tear of the 
subscapularis tendon suspected. Severe degenerative joint disease. AC joint 
hypertrophy and inflammation \"\"\"\"\"\"\"\"\"\"\" PE: 
AFVSS except elevated blood pressure. , no acute distress, endomorphic body habitus. A&OXs 3. Depressed right frontal sinus and multiple craniotomy portholes palpated on the right side of the calvarium. Conjugate gaze, clear sclerae. Speech is clear and appropriate. Mood is appropriate and pleasant. Patient is cooperative. There is tenderness to palpation along the entire right deltoid region and over the right humeral head which seems to be riding quite high obliterating subacromial space. There is also exquisite tenderness to palpation over the right acromioclavicular joint. She has bilateral rounded shoulders worse on the right than on the left. Decreased active range of motion for right glenohumeral joint in all planes but patient is able to actively reach the contralateral shoulder with the contralateral hand. Calculated MEQ -45 Naloxone rescue -no Prophylactic bowel program -yes Date of last OCA January 2018. Last UDS April 2018 UDS repeated today, point-of-care result was consistent , date checked today, findings consistent Primary Care Physician Karen Partida 
[address] [facility] [address] [phone] GIC-2 and 4 
 
MACKENZIE -58% COMM- 6 
 
PHQ -- . PHQ over the last two weeks 9/21/2018 Little interest or pleasure in doing things Not at all Feeling down, depressed, irritable, or hopeless Not at all Total Score PHQ 2 0 Trouble falling or staying asleep, or sleeping too much - Feeling tired or having little energy - Poor appetite, weight loss, or overeating - Feeling bad about yourself - or that you are a failure or have let yourself or your family down - Trouble concentrating on things such as school, work, reading, or watching TV - Moving or speaking so slowly that other people could have noticed; or the opposite being so fidgety that others notice - Thoughts of being better off dead, or hurting yourself in some way -  
PHQ 9 Score - How difficult have these problems made it for you to do your work, take care of your home and get along with others -  
 
 
 
DSM V-OUD Screen--negative to mild Assessment/Plan: ICD-10-CM ICD-9-CM 1. Tear of right rotator cuff, unspecified tear extent M75.101 840.4 morphine IR (MS IR) 15 mg tablet TENS Units (TENS 504) mikal 2. Encounter for long-term (current) use of medications Z79.899 V58.69 DRUG SCREEN  
   AMB POC DRUG SCREEN () 3. Arthritis of right acromioclavicular joint M19.011 716.91 morphine IR (MS IR) 15 mg tablet TENS Units (TENS 504) mikal 4. Posture abnormality R29.3 781.92   
5. Chronic left shoulder pain M25.512 719.41   
 G89.29 338.29   
6. Chronic right shoulder pain M25.511 719.41   
 G89.29 338.29   
7. Atrophy of muscle of right shoulder M62.511 728.2 TENS Units (TENS 504) mikal  
  
 
--MRI of right shoulder showed worsening right supraspinatus muscle atrophy since previous study. --We will obtain a TENS unit for her use over the right shoulder pain, muscle atrophy to help reduce pain and improve function and improve range of motion and strength and decrease pharmaceutical intake. --Patient was shown scapular adduction exercises to help with postural correction 
--Patient was shown pendulum exercises for the right shoulder and also glenohumeral distraction exercises. She should continue her existing home exercise activities such as the door pulleys and the wall finger walking. --I do not recommend long-term. However we will maintain her current opioid regimen as she has been poorly tolerating the recent reduction in opioids. We will refill morphine immediate release 15 mg to be taken up to 3 times daily as needed with 90 tablets given for 30 days. --Patient may benefit from a right acromioclavicular joint injection. He can try this at next available appointment. GOALS: 
To establish complementary and integrative plan of care to address chronic pain issues while minimizing pharmaceuticals to maximize patient's function improve quality of life.  
 
Education: 
Patient again educated on the importance of strict compliance with the opioid care agreement while on opioid therapy. Patient also again educated that they should avoid driving while on chronic opioid therapy. Also advised to avoid alcohol and to avoid benzodiazepines while on opioid therapy. Patient Homework: 
Pendulum exercises and glenohumeral distraction exercises and postural correction exercises. F/u:. Follow-up Disposition: 
Return for 30 min.

## 2018-10-19 ENCOUNTER — TELEPHONE (OUTPATIENT)
Dept: PAIN MANAGEMENT | Age: 83
End: 2018-10-19

## 2018-10-19 DIAGNOSIS — M19.011 ARTHRITIS OF RIGHT ACROMIOCLAVICULAR JOINT: ICD-10-CM

## 2018-10-19 DIAGNOSIS — M75.101 TEAR OF RIGHT ROTATOR CUFF, UNSPECIFIED TEAR EXTENT: ICD-10-CM

## 2018-10-19 RX ORDER — MORPHINE SULFATE 15 MG/1
15 TABLET ORAL
Qty: 90 TAB | Refills: 0 | Status: SHIPPED | OUTPATIENT
Start: 2018-10-28 | End: 2018-11-16 | Stop reason: SDUPTHER

## 2018-10-19 NOTE — TELEPHONE ENCOUNTER
Medicine refill requested [id number]  9:01am    1. Verified  2. Medicine - morphine IR  3.    4. Analgesia - 60-70%  5. ADL - yes  6.   Adverse reaction to medicine - none

## 2018-10-23 ENCOUNTER — OFFICE VISIT (OUTPATIENT)
Dept: PAIN MANAGEMENT | Age: 83
End: 2018-10-23

## 2018-10-23 VITALS
SYSTOLIC BLOOD PRESSURE: 190 MMHG | HEART RATE: 91 BPM | TEMPERATURE: 99.1 F | RESPIRATION RATE: 14 BRPM | HEIGHT: 64 IN | BODY MASS INDEX: 43.54 KG/M2 | DIASTOLIC BLOOD PRESSURE: 75 MMHG | WEIGHT: 255 LBS

## 2018-10-23 DIAGNOSIS — M19.011 ARTHRITIS OF RIGHT ACROMIOCLAVICULAR JOINT: Primary | ICD-10-CM

## 2018-10-23 RX ORDER — TRIAMCINOLONE ACETONIDE 40 MG/ML
20 INJECTION, SUSPENSION INTRA-ARTICULAR; INTRAMUSCULAR ONCE
Qty: 1 VIAL | Refills: 0
Start: 2018-10-23 | End: 2018-10-23

## 2018-10-23 NOTE — TELEPHONE ENCOUNTER
Attempted to contact patient regarding prescriptions ready for pick-up ; unable to leave vm due to no mailbox set up.

## 2018-10-23 NOTE — PROGRESS NOTES
Nursing Notes Patient presents to the office today in follow-up. Pt's pre-pain score is 9/10 Patient rates her pain at 9/10 on the numerical pain scale. Reviewed medications with counts as follows:   
Rx Date filled Qty Dispensed Pill Count Last Dose Short Pt is here today to have a right shoulder injection. Last opioid agreement 01/25/18 Last urine drug screen 09/21/18 Comments: POC UDS was not performed in office today Any new labs or imaging since last appointment? NO Have you been to an emergency room (ER) or urgent care clinic since your last visit? NO Have you been hospitalized since your last visit? NO If yes, where, when, and reason for visit? Have you seen or consulted any other health care providers outside of the [address] [place] [address]  since your last visit? NO If yes, where, when, and reason for visit? Ms. Haley Boles has a reminder for a \"due or due soon\" health maintenance. I have asked that she contact her primary care provider for follow-up on this health maintenance. PHQ over the last two weeks 10/23/2018 Little interest or pleasure in doing things Not at all Feeling down, depressed, irritable, or hopeless Not at all Total Score PHQ 2 0 Trouble falling or staying asleep, or sleeping too much - Feeling tired or having little energy - Poor appetite, weight loss, or overeating - Feeling bad about yourself - or that you are a failure or have let yourself or your family down - Trouble concentrating on things such as school, work, reading, or watching TV - Moving or speaking so slowly that other people could have noticed; or the opposite being so fidgety that others notice - Thoughts of being better off dead, or hurting yourself in some way -  
PHQ 9 Score - How difficult have these problems made it for you to do your work, take care of your home and get along with others -

## 2018-10-23 NOTE — PROGRESS NOTES
HPI: 
Nayeli White is a 82 y.o. female here for right intra-articular acromioclavicular joint injection. She denies any changes in the character or distribution of her pain since last visit. Pain still described as a burning and stabbing pain along the top of the shoulder that ranges from 6-8/10. No Known Allergies Current Outpatient Medications on File Prior to Visit Medication Sig  [START ON 10/28/2018] morphine IR (MS IR) 15 mg tablet Take 1 Tab by mouth three (3) times daily as needed for Pain for up to 30 days. Max Daily Amount: 45 mg.  
 atorvastatin (LIPITOR) 40 mg tablet Take 40 mg by mouth daily.  insulin glargine (LANTUS U-100 INSULIN) 100 unit/mL injection 20 Units by SubCUTAneous route nightly.  ascorbic acid, vitamin C, (VITAMIN C) 500 mg tablet Take 500 mg by mouth daily.  bumetanide (BUMEX) 2 mg tablet Take 2 mg by mouth two (2) times daily as needed.  gabapentin (NEURONTIN) 300 mg capsule Take 1 Cap by mouth three (3) times daily.  insulin aspart U-100 (NOVOLOG) 100 unit/mL inpn 16 units before each meal  
 Blood-Glucose Meter (FREESTYLE LITE METER) monitoring kit Test twice blood glucose daily; ICD-10 E11.9; Quantity 1  
 amLODIPine (NORVASC) 10 mg tablet Take 0.5 Tabs by mouth daily. (Patient taking differently: Take 10 mg by mouth daily.)  levothyroxine (SYNTHROID) 200 mcg tablet 1 tablet daily (take on an empty stomach) (stop \"old\" synthroid)  carvedilol (COREG) 12.5 mg tablet Take 1 Tab by mouth two (2) times daily (with meals).  insulin syringe,safetyneedle 1 mL 31 gauge x 5/16\" syrg 1 Each by Does Not Apply route daily.  cholecalciferol (VITAMIN D3) 1,000 unit tablet Take 1,000 Units by mouth daily.  glucose blood VI test strips (FREESTYLE TEST) strip Use to check blood glucose twice daily DX:E11.9  
 magnesium oxide 500 mg tab Take 1 Tab by mouth daily.   
 acetaminophen (TYLENOL) 500 mg tablet Take 1 Tab by mouth every six (6) hours as needed for Pain.  aspirin delayed-release 81 mg tablet Take 81 mg by mouth daily.  TENS Units (TENS 504) mikal Please provide patient with a TENS unit to help improve function, improve quality of life, reduce medication use, improve ROM. 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  allopurinol (ZYLOPRIM) 100 mg tablet Take 1 Tab by mouth daily. No current facility-administered medications on file prior to visit. ROS: 
Review of systems is negative for fever, chills, nausea, vomiting, diarrhea, constipation, chest pain, shortness of breath, abdominal pain, weakness, trouble swallowing, acute changes in vision, acute changes in hearing, falls, dizziness, bladder incontinence, bowel incontinence, depression, anxiety, suicidal ideation, homicidal ideation, alcohol use. Review of systems positive for right shoulder pain Vitals:  
 10/23/18 1123 BP: 190/75 Pulse: 91  
Resp: 14 Temp: 99.1 °F (37.3 °C) TempSrc: Oral  
Weight: 115.7 kg (255 lb) Height: 5' 4\" (1.626 m) PainSc:   9 PainLoc: Shoulder PE: 
Alert and oriented x3. Elevated blood pressure but otherwise afebrile with vital signs stable. Speech is clear and appropriate, mood is appropriate, patient is cooperative. There are no signs of infection, erythema, edema or increased temperature over the right acromioclavicular joint. There is nodularity palpated at the right acromioclavicular joint. Tenderness to palpation over the right AC joint. Primary Care Physician 
Bart Lomax [address] [facility] [address] [phone] Assessment/Plan: ICD-10-CM ICD-9-CM 1. Arthritis of right acromioclavicular joint M19.011 716.91 Patient presents today for right acromioclavicular corticosteroid injection.   This injection will be diagnostic and therapeutic to assist in determining how much of her overall right shoulder pain is originating at the [facility] joint versus subacromial space versus the glenohumeral joint. Risks, benefits, alternatives were discussed and all questions were answered. Patient agreed to proceed with the above-stated intervention. F/u:. Follow-up Disposition: Not on File Procedure Note [id number] [facility] PROCEDURE PROGRESS NOTE Chart reviewed for the following: 
 Jose FULLER DO, have reviewed the History, Physical and updated the Allergic reactions for [employer] TIME OUT performed immediately prior to start of procedure: 
 Jose FULLER DO, have performed the following reviews on Renee Ledesma prior to the start of the procedure: 
         
* Patient was identified by name and date of birth * Agreement on procedure being performed was verified * Risks and Benefits explained to the patient * Procedure site verified and marked as necessary * Patient was positioned for comfort * Consent was signed and verified Time: [id number] Date of procedure: 10/23/2018 Procedure performed by:  Jose Heaton DO 
 
Provider assisted by: Darnell Nuno LPN Patient assisted by: self How tolerated by patient: tolerated the procedure well with no complications Post Procedural Pain Scale: See procedure note. Comments: none, See note for details. Procedure Note Right acromioclavicular joint injection Area was marked and cleaned in a sterile fashion. Timeout was performed. Injection site was pretreated with vapo coolant spray. Following negative aspiration 20 mg of Kenalog with 1.5 mL of 1% lidocaine was injected u so so for these things sing a 25-gauge 1 inch needle. Needle was withdrawn and there was minimal to no bleeding. Sterile bandage was applied. There were no immediate complications and patient tolerated the procedure without complaints. Postprocedure pain relief with provocation was 100%   over the acromioclavicular joint. The patient was also reporting dramatically improved ease with range of motion of the entire shoulder complex.

## 2018-10-23 NOTE — PATIENT INSTRUCTIONS
High Blood Pressure: Care Instructions Your Care Instructions If your blood pressure is usually above 130/80, you have high blood pressure, or hypertension. That means the top number is 130 or higher or the bottom number is 80 or higher, or both. Despite what a lot of people think, high blood pressure usually doesn't cause headaches or make you feel dizzy or lightheaded. It usually has no symptoms. But it does increase your risk for heart attack, stroke, and kidney or eye damage. The higher your blood pressure, the more your risk increases. Your doctor will give you a goal for your blood pressure. Your goal will be based on your health and your age. Lifestyle changes, such as eating healthy and being active, are always important to help lower blood pressure. You might also take medicine to reach your blood pressure goal. 
Follow-up care is a key part of your treatment and safety. Be sure to make and go to all appointments, and call your doctor if you are having problems. It's also a good idea to know your test results and keep a list of the medicines you take. How can you care for yourself at home? Medical treatment · If you stop taking your medicine, your blood pressure will go back up. You may take one or more types of medicine to lower your blood pressure. Be safe with medicines. Take your medicine exactly as prescribed. Call your doctor if you think you are having a problem with your medicine. · Talk to your doctor before you start taking aspirin every day. Aspirin can help certain people lower their risk of a heart attack or stroke. But taking aspirin isn't right for everyone, because it can cause serious bleeding. · See your doctor regularly. You may need to see the doctor more often at first or until your blood pressure comes down. · If you are taking blood pressure medicine, talk to your doctor before you take decongestants or anti-inflammatory medicine, such as ibuprofen. Some of these medicines can raise blood pressure. · Learn how to check your blood pressure at home. Lifestyle changes · Stay at a healthy weight. This is especially important if you put on weight around the waist. Losing even 10 pounds can help you lower your blood pressure. · If your doctor recommends it, get more exercise. Walking is a good choice. Bit by bit, increase the amount you walk every day. Try for at least 30 minutes on most days of the week. You also may want to swim, bike, or do other activities. · Avoid or limit alcohol. Talk to your doctor about whether you can drink any alcohol. · Try to limit how much sodium you eat to less than 2,300 milligrams (mg) a day. Your doctor may ask you to try to eat less than 1,500 mg a day. · Eat plenty of fruits (such as bananas and oranges), vegetables, legumes, whole grains, and low-fat dairy products. · Lower the amount of saturated fat in your diet. Saturated fat is found in animal products such as milk, cheese, and meat. Limiting these foods may help you lose weight and also lower your risk for heart disease. · Do not smoke. Smoking increases your risk for heart attack and stroke. If you need help quitting, talk to your doctor about stop-smoking programs and medicines. These can increase your chances of quitting for good. When should you call for help? Call 911 anytime you think you may need emergency care. This may mean having symptoms that suggest that your blood pressure is causing a serious heart or blood vessel problem. Your blood pressure may be over 180/120. 
 For example, call 911 if: 
  · You have symptoms of a heart attack. These may include: 
? Chest pain or pressure, or a strange feeling in the chest. 
? Sweating. ? Shortness of breath. ? Nausea or vomiting. ? Pain, pressure, or a strange feeling in the back, neck, jaw, or upper belly or in one or both shoulders or arms. ? Lightheadedness or sudden weakness. ? A fast or irregular heartbeat.  
  · You have symptoms of a stroke. These may include: 
? Sudden numbness, tingling, weakness, or loss of movement in your face, arm, or leg, especially on only one side of your body. ? Sudden vision changes. ? Sudden trouble speaking. ? Sudden confusion or trouble understanding simple statements. ? Sudden problems with walking or balance. ? A sudden, severe headache that is different from past headaches.  
  · You have severe back or belly pain.  
 Do not wait until your blood pressure comes down on its own. Get help right away. 
 Call your doctor now or seek immediate care if: 
  · Your blood pressure is much higher than normal (such as 180/120 or higher), but you don't have symptoms.  
  · You think high blood pressure is causing symptoms, such as: 
? Severe headache. 
? Blurry vision.  
 Watch closely for changes in your health, and be sure to contact your doctor if: 
  · Your blood pressure measures higher than your doctor recommends at least 2 times. That means the top number is higher or the bottom number is higher, or both.  
  · You think you may be having side effects from your blood pressure medicine. Where can you learn more? Go to http://braden-estephanie.info/. Enter X620 in the search box to learn more about \"High Blood Pressure: Care Instructions. \" Current as of: December 6, 2017 Content Version: 11.8 © 1463-0787 [employer]. Care instructions adapted under license by [employer] (which disclaims liability or warranty for this information). If you have questions about a medical condition or this instruction, always ask your healthcare professional. [place] [address] any warranty or liability for your use of this information. Post Injection Instructions If you develop any abnormal symptoms, such as itching, swelling, redness, rash, or shortness of breath, please call our office at [phone]. Normally, these are temporary symptoms, which resolve within several hours to a day, but our office is more than happy to answer any questions you may have. The provider would like you to observe the injection area for redness, swelling, or increased heat. If any or all of these reactions occur, please call our office as soon as possible. This reaction may indicate the first signs of infection. Although very rare, it is  best if caught early. I have reviewed these instructions and the patient verbalizes understanding. Vishnu Glass

## 2018-11-16 ENCOUNTER — OFFICE VISIT (OUTPATIENT)
Dept: PAIN MANAGEMENT | Age: 83
End: 2018-11-16

## 2018-11-16 VITALS
HEIGHT: 64 IN | HEART RATE: 71 BPM | DIASTOLIC BLOOD PRESSURE: 64 MMHG | WEIGHT: 255 LBS | SYSTOLIC BLOOD PRESSURE: 166 MMHG | TEMPERATURE: 97 F | OXYGEN SATURATION: 96 % | BODY MASS INDEX: 43.54 KG/M2 | RESPIRATION RATE: 16 BRPM

## 2018-11-16 DIAGNOSIS — Z79.899 ENCOUNTER FOR LONG-TERM (CURRENT) USE OF MEDICATIONS: ICD-10-CM

## 2018-11-16 DIAGNOSIS — M62.511 ATROPHY OF MUSCLE OF RIGHT SHOULDER: ICD-10-CM

## 2018-11-16 DIAGNOSIS — M19.011 ARTHRITIS OF RIGHT SHOULDER REGION: ICD-10-CM

## 2018-11-16 DIAGNOSIS — M75.101 TEAR OF RIGHT ROTATOR CUFF, UNSPECIFIED TEAR EXTENT: ICD-10-CM

## 2018-11-16 DIAGNOSIS — M25.512 CHRONIC LEFT SHOULDER PAIN: ICD-10-CM

## 2018-11-16 DIAGNOSIS — G89.29 CHRONIC LEFT SHOULDER PAIN: ICD-10-CM

## 2018-11-16 DIAGNOSIS — M25.511 CHRONIC RIGHT SHOULDER PAIN: ICD-10-CM

## 2018-11-16 DIAGNOSIS — M19.011 ARTHRITIS OF RIGHT ACROMIOCLAVICULAR JOINT: Primary | ICD-10-CM

## 2018-11-16 DIAGNOSIS — G89.29 CHRONIC RIGHT SHOULDER PAIN: ICD-10-CM

## 2018-11-16 DIAGNOSIS — G89.4 CHRONIC PAIN SYNDROME: ICD-10-CM

## 2018-11-16 RX ORDER — CAPSAICIN 0.1 %
CREAM (GRAM) TOPICAL
Qty: 42.5 G | Refills: 2 | Status: SHIPPED | OUTPATIENT
Start: 2018-11-16 | End: 2019-08-07 | Stop reason: SDUPTHER

## 2018-11-16 RX ORDER — MORPHINE SULFATE 15 MG/1
15 TABLET ORAL
Qty: 90 TAB | Refills: 0 | Status: SHIPPED | OUTPATIENT
Start: 2018-11-28 | End: 2018-12-21 | Stop reason: SDUPTHER

## 2018-11-16 NOTE — PATIENT INSTRUCTIONS
Safe Use of Opioid Pain Medicine: Care Instructions  Your Care Instructions  Pain is your body's way of warning you that something is wrong. Pain feels different for everybody. Only you can describe your pain. A doctor can suggest or prescribe many types of medicines for pain. These range from over-the-counter medicines like acetaminophen (Tylenol) to powerful medicines called opioids. Examples of opioids are fentanyl, hydrocodone, morphine, and oxycodone. Heroin is an illegal opioid  Opioids are strong medicines. They can help you manage pain when you use them the right way. But if you misuse them, they can cause serious harm and even death. For these reasons, doctors are very careful about how they prescribe opioids. If you decide to take opioids, here are some things to remember. · Keep your doctor informed. You can get addicted to opioids. The risk is higher if you have a history of substance use. Your doctor will monitor you closely for signs of misuse and addiction and to figure out when you no longer need to take opioids. · Make a treatment plan. The goal of your plan is to be able to function and do the things you need to do, even if you still have some pain. You might be able to manage your pain with other non-opioid options like physical therapy, relaxation, or over-the-counter pain medicines. · Be aware of the side effects. Opioids can cause serious side effects, such as constipation, dry mouth, and nausea. And over time, you may need a higher dose to get pain relief. This is called tolerance. Your body also gets used to opioids. This is called physical dependence. If you suddenly stop taking them, you may have withdrawal symptoms. The doctor carefully considered what pain medicine is right for you. You may not have received opioids if your doctor was concerned about drug interactions or your safety, or if he or she had other concerns. It is best to have one doctor or clinic treat your pain. This way you will get the pain medicine that will help you the most. And a doctor will be able to watch for any problems that the medicine might cause. The doctor has checked you carefully, but problems can develop later. If you notice any problems or new symptoms,  get medical treatment right away. Follow-up care is a key part of your treatment and safety. Be sure to make and go to all appointments, and call your doctor if you are having problems. It's also a good idea to know your test results and keep a list of the medicines you take. How can you care for yourself at home? · If you need to take opioids to manage your pain, remember these safety tips. ? Follow directions carefully. It's easy to misuse opioids if you take a dose other than what's prescribed by your doctor. This can lead to overdose and even death. Even sharing them with someone they weren't meant for is misuse. ? Be cautious. Opioids may affect your judgment and decision making. Do not drive or operate machinery until you can think clearly. Talk with your doctor about when it is safe to drive. ? Reduce the risk of drug interactions. Opioids can be dangerous if you take them with alcohol or with certain drugs like sleeping pills and muscle relaxers. Make sure your doctor knows about all the other medicines you take, including over-the-counter medicines. Don't start any new medicines before you talk to your doctor or pharmacist.  ? Keep others safe. Store opioids in a safe and secure place. Make sure that pets, children, friends, and family can't get to them. When you're done using opioids, make sure to properly dispose of them. You can either use a community drug take-back program or your drugstore's mail-back program. If one of these programs isn't available, you can flush opioid skin patches and unused opioid pills down the toilet. ? Reduce the risk of overdose. Misuse of opioids can be very dangerous.  Protect yourself by asking your doctor about a naloxone rescue kit. It can help you--and even save your life--if you take too much of an opioid. · Try other ways to reduce pain. ? Relax, and reduce stress. Relaxation techniques such as deep breathing or meditation can help. ? Keep moving. Gentle, daily exercise can help reduce pain over the long run. Try low- or no-impact exercises such as walking, swimming, and stationary biking. Do stretches to stay flexible. ? Try heat, cold packs, and massage. ? Get enough sleep. Pain can make you tired and drain your energy. Talk with your doctor if you have trouble sleeping because of pain. ? Think positive. Your thoughts can affect your pain level. Do things that you enjoy to distract yourself when you have pain instead of focusing on the pain. See a movie, read a book, listen to music, or spend time with a friend. · If you are not taking a prescription pain medicine, ask your doctor if you can take an over-the-counter medicine. When should you call for help? Call your doctor now or seek immediate medical care if:    · You have a new kind of pain.     · You have new symptoms, such as a fever or rash, along with the pain.    Watch closely for changes in your health, and be sure to contact your doctor if:    · You think you might be using too much pain medicine, and you need help to use less or stop.     · Your pain gets worse.     · You would like a referral to a doctor or clinic that specializes in pain management. Where can you learn more? Go to http://braden-estephanie.info/. Enter R108 in the search box to learn more about \"Safe Use of Opioid Pain Medicine: Care Instructions. \"  Current as of: September 10, 2017  Content Version: 11.8  © 5703-1094 [employer]. Care instructions adapted under license by [employer] (which disclaims liability or warranty for this information).  If you have questions about a medical condition or this instruction, always ask your healthcare professional. [place] [address] any warranty or liability for your use of this information.

## 2018-11-16 NOTE — PROGRESS NOTES
Referral date around 2016, source orthopedics for shoulder pain. HPI:  Danii Thornton is a 82 y.o. female here for f/u visit for ongoing evaluation of right shoulder pain. Pt was last seen here on 9/21/18. Pt denies interval changes on the character or distribution of pain. Pain is located wrapping around from anteriorly to posteriorly at the right shoulder around humeral head with some point tenderness at the right [facility] joint. The pain is described as sharp and burning. Pain at its best is 6/10. Pain at its worse is 8/10. The pain is worsened by glenohumeral flexion and abduction, overhead activity and combing her hair. Symptoms are improved by Morphine, Pulleys, wall walk, glenohumeral injections and ice pack. Since last visit, she reports significant improvement from the [facility] [place] joint injection she received with Dr Tamia Segal on 10/23/18. Her range of motion has improved and her pain is much better. She never received the TENS unit as ordered but was able to order one on her own; she states this is helping some. She has been doing the home exercises as previously recommended which are helping. She is also reporting left shoulder pain from a rotator cuff tear that occurred during another fall about a year ago for which she treats with orthopedics. She is interested in getting an injection with Dr Tamia Segal. She also reports lower back pain; discussed with patient that new pain or pain that has changed distribution or character needs full workup by her PCP or orthopedics. Pt states there is a new spine doctor at [facility] that she is planning on making an appointment with.        Social History     Socioeconomic History    Marital status:      Spouse name: Not on file    Number of children: Not on file    Years of education: Not on file    Highest education level: Not on file   Social Needs    Financial resource strain: Not on file    Food insecurity - worry: Not on file    Food insecurity - inability: Not on file   atVenu needs - medical: Not on file   at[employer]enu needs - non-medical: Not on file   Occupational History    Not on file   Tobacco Use    Smoking status: Former Smoker    Smokeless tobacco: Never Used   Substance and Sexual Activity    Alcohol use: No     Alcohol/week: 0.0 oz    Drug use: No    Sexual activity: No   Other Topics Concern    Not on file   Social History Narrative    Not on file     Family History   Problem Relation Age of Onset    Heart Attack Mother     Heart Attack Father      No Known Allergies  Past Medical History:   Diagnosis Date    Asthma     Chronic venous insufficiency     Diabetes [facility] [address]     Hypertension     Peripheral neuropathy     Rheumatoid arthritis ([facility] [address] [facility]     Vertigo      Past Surgical History:   Procedure Laterality Date    HX CHOLECYSTECTOMY      HX GYN      TAHOophorectomy due to infection    HX HEENT      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  HX ORTHOPAEDIC      bilat TKA     Current Outpatient Medications on File Prior to Visit   Medication Sig    TENS Units (TENS 504) mikal Please provide patient with a TENS unit to help improve function, improve quality of life, reduce medication use, improve ROM. 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  insulin glargine (LANTUS U-100 INSULIN) 100 unit/mL injection 20 Units by SubCUTAneous route nightly.  ascorbic acid, vitamin C, (VITAMIN C) 500 mg tablet Take 500 mg by mouth daily.  bumetanide (BUMEX) 2 mg tablet Take 2 mg by mouth two (2) times daily as needed.  gabapentin (NEURONTIN) 300 mg capsule Take 1 Cap by mouth three (3) times daily.     insulin aspart U-100 (NOVOLOG) 100 unit/mL inpn 16 units before each meal    Blood-Glucose Meter (FREESTYLE LITE METER) monitoring kit Test twice blood glucose daily; ICD-10 E11.9; Quantity 1    amLODIPine (NORVASC) 10 mg tablet Take 0.5 Tabs by mouth daily. (Patient taking differently: Take 10 mg by mouth daily.)    levothyroxine (SYNTHROID) 200 mcg tablet 1 tablet daily (take on an empty stomach) (stop \"old\" synthroid)    carvedilol (COREG) 12.5 mg tablet Take 1 Tab by mouth two (2) times daily (with meals).  insulin syringe,safetyneedle 1 mL 31 gauge x 5/16\" syrg 1 Each by Does Not Apply route daily.  cholecalciferol (VITAMIN D3) 1,000 unit tablet Take 1,000 Units by mouth daily.  glucose blood VI test strips (FREESTYLE TEST) strip Use to check blood glucose twice daily DX:E11.9    allopurinol (ZYLOPRIM) 100 mg tablet Take 1 Tab by mouth daily.  magnesium oxide 500 mg tab Take 1 Tab by mouth daily.  acetaminophen (TYLENOL) 500 mg tablet Take 1 Tab by mouth every six (6) hours as needed for Pain.  aspirin delayed-release 81 mg tablet Take 81 mg by mouth daily.  atorvastatin (LIPITOR) 40 mg tablet Take 40 mg by mouth daily. No current facility-administered medications on file prior to visit. ROS:  Denies fever, chills, nausea, vomiting, diarrhea, constipation, abdominal pain, chest pain, shortness or breath/trouble breathing, weakness, trouble swallowing, changes in vision, changes in hearing, falls, dizziness, bladder incontinence, bowel incontinence, depression, anxiety, suicidal ideations, homicidal ideations or alcohol use. Opioid specific risk: advanced age, polypharmacy, asthma, vertigo, GERD, history of depression, obesity, hx of falls but none in the last 8 months. Vitals:    11/16/18 0905 11/16/18 0909   BP: 165/66 166/64   Pulse: 72 71   Resp: 16    Temp: 97 °F (36.1 °C)    SpO2: 96%    Weight: 115.7 kg (255 lb)    Height: 5' 4\" (1.626 m)    PainSc:   7    PainLoc: Shoulder         Physical exam:  AFVSS with elevated blood pressure, no acute distress, normal body habitus. A&OXs 3. Normocephalic, atraumatic. Conjugate gaze, clear sclerae. Speech is clear and appropriate. Mood is appropriate and patient is cooperative. Gait and balance are within functional limits. Non-labored breathing. Bilateral rounded shoulders. Right shoulder AROM decreased by 25% and left shoulder AROM decreased by 50% with reproduction of primary pain noted. Calculated MEQ - 45  Naloxone rescue - yes  Prophylactic bowel program - yese  Date of last OCA 1/2018  Last UDS 9/21/18, consistent POC, pending confirmatory testing   date checked today, findings consistent    Primary Care Physician  Matilda Alcocerbetzaida Nelson [address] [place] [id number]  [phone]    Today   Last Visit  PGIC - 5 & 5  2 & 4  MACKENZIE - 53%  58%  COMM - 0  6    PHQ -- . PHQ over the last two weeks 11/16/2018   Little interest or pleasure in doing things Not at all   Feeling down, depressed, irritable, or hopeless Not at all   Total Score PHQ 2 0   Trouble falling or staying asleep, or sleeping too much -   Feeling tired or having little energy -   Poor appetite, weight loss, or overeating -   Feeling bad about yourself - or that you are a failure or have let yourself or your family down -   Trouble concentrating on things such as school, work, reading, or watching TV -   Moving or speaking so slowly that other people could have noticed; or the opposite being so fidgety that others notice -   Thoughts of being better off dead, or hurting yourself in some way -   PHQ 9 Score -   How difficult have these problems made it for you to do your work, take care of your home and get along with others -       DSM V-OUD Screen - negative to mild    Assessment/Plan:     ICD-10-CM ICD-9-CM    1. Arthritis of right acromioclavicular joint M19.011 716.91 morphine IR (MS IR) 15 mg tablet      capsaicin (CAPZASIN-HP) 0.1 % topical cream   2. Tear of right rotator cuff, unspecified tear extent M75.101 840.4 morphine IR (MS IR) 15 mg tablet      capsaicin (CAPZASIN-HP) 0.1 % topical cream   3.  Encounter for long-term (current) use of medications Z79.899 V58.69    4. Chronic left shoulder pain M25.512 719.41 capsaicin (CAPZASIN-HP) 0.1 % topical cream    G89.29 338.29    5. Chronic right shoulder pain M25.511 719.41 capsaicin (CAPZASIN-HP) 0.1 % topical cream    G89.29 338.29    6. Atrophy of muscle of right shoulder M62.511 728.2 capsaicin (CAPZASIN-HP) 0.1 % topical cream   7. Chronic pain syndrome G89.4 338.4 capsaicin (CAPZASIN-HP) 0.1 % topical cream   8. Arthritis of right shoulder region M19.011 716.91 capsaicin (CAPZASIN-HP) 0.1 % topical cream        Do not recommend long term opioid therapy for this patient at this time. Pt currently taking morphine IR 15mg up to 3 times a day as needed with a total of 90 tabs to be used over 30 days. Their MME is 39. Today, we will continue with her current dosing and continue the weaning of patients opioid medication with a goal of being opioid free, pending safety and compliance at next office visit. Pt instructed to call if they experience any signs of withdrawal (diarrhea, nausea, vomiting, sweating or chills, agitation, itching). Pt instructed to call 5-7 days before they run out of their medications for refill. At next office visit, the plan is to provide patient with Morphine IR 15mg up to 3 times a day as needed with a total of 80 tabs to be budgeted over 30 days. If patient has difficulty with the wean or difficulty with cravings we will consider referral to mental health for ongoing assessment and treatment for opioid use disorder. Continue using your TENS unit as previously recommended. Continue home exercises as previously recommended. Continue OTC Tylenol as previously recommended. Pt may benefit from left shoulder injection with Dr Libia Tena; plan for her to follow up with him in 2 months to discuss this. Topical capsaicin cream ordered today. Pt may benefit from aquatic PT in the future.      Follow up ongoing assessment and ongoing development of integrative and comprehensive plan of care for chronic pain. Goals: To establish complementary and integrative plan of care to address chronic pain issues while minimizing pharmaceuticals to maximize patient's function improve quality of life. Education:  Patient again educated on the importance of strict compliance with the opioid care agreement while on opioid therapy. Patient also again educated that they should avoid driving while on chronic opioid therapy. Also advised to avoid alcohol and to avoid benzodiazepines while on opioid therapy. Handouts given regarding opioid safety. Follow-up Disposition:  Return in about 2 months (around 1/16/2019) for 30 min. [address] was used for portions of this report. Unintended errors may occur.

## 2018-11-16 NOTE — PROGRESS NOTES
Nursing Notes    Patient presents to the office today in follow-up. Patient rates her pain at 7/10 on the numerical pain scale. Reviewed medications with counts as follows:    Rx Date filled Qty Dispensed Pill Count Last Dose Short   Morphine 15 mg  10/29/18 90 41 This am  no       Last opioid agreement 01/25/18  Last urine drug screen 09/21/18    Comments:  Patient is here today for a follow up appt today she states her pain level today is a 7  PHQ 2 was done patient denies any depression. She states her right shoulder is feeling better since the injection with Harland Grow     POC UDS was not performed in office today    Any new labs or imaging since last appointment? NO    Have you been to an emergency room (ER) or urgent care clinic since your last visit? NO            Have you been hospitalized since your last visit? NO     If yes, where, when, and reason for visit? Have you seen or consulted any other health care providers outside of the [address] [place] [address]  since your last visit? NO     If yes, where, when, and reason for visit? Ms. Petty Souza has a reminder for a \"due or due soon\" health maintenance. I have asked that she contact her primary care provider for follow-up on this health maintenance.

## 2018-12-21 DIAGNOSIS — M75.101 TEAR OF RIGHT ROTATOR CUFF, UNSPECIFIED TEAR EXTENT: ICD-10-CM

## 2018-12-21 DIAGNOSIS — M19.011 ARTHRITIS OF RIGHT ACROMIOCLAVICULAR JOINT: ICD-10-CM

## 2018-12-21 NOTE — TELEPHONE ENCOUNTER
[facility] has called requesting a refill of their controlled medication Morphine  for the management of right shoulder pain     Last office visit date: 11/16/18  Last opioid care agreement 01/25/18  Last UDS was done 09/21/18    Date last  was pulled and reviewed : 12/21/18    Was the patient compliant when the above report was pulled? yes    Analgesia: The patient states that she receives 60% of pain relief from the medication    Aberrancies: There are no recent aberrancies noted at this time     ADL's: The  Maximino Abts states that she is able to perform her adls while on the medication     Adverse Reaction: There are no adverse reactions noted at this time     Provider's last note and plan of care reviewed? yes  Request forwarded to provider for review.

## 2018-12-21 NOTE — TELEPHONE ENCOUNTER
Patient LVM on nurse triage line requesting refill of Morphire IR, which provides 60% relief of pain, allows her to perform her daily activities, and does not cause any side effects. Phone number confirmed. Last OV 11/16/18, Last UDS 09/21/18,   Last OCA 01/2018. No recent FYIs   Note: Patient will be in office for procedure consult with Libia Tena on 12/26/18. Next Med OV 01/15/19.

## 2018-12-24 RX ORDER — MORPHINE SULFATE 15 MG/1
15 TABLET ORAL
Qty: 90 TAB | Refills: 0 | Status: SHIPPED | OUTPATIENT
Start: 2018-12-29 | End: 2018-12-26 | Stop reason: SDUPTHER

## 2018-12-24 NOTE — TELEPHONE ENCOUNTER
Attempted to contact patient regarding prescription pick-up ; no answer noted at number given; unable to leave a message due to her vm not being set up

## 2018-12-26 ENCOUNTER — OFFICE VISIT (OUTPATIENT)
Dept: PAIN MANAGEMENT | Age: 83
End: 2018-12-26

## 2018-12-26 VITALS
RESPIRATION RATE: 22 BRPM | OXYGEN SATURATION: 96 % | BODY MASS INDEX: 43.54 KG/M2 | WEIGHT: 255 LBS | HEART RATE: 98 BPM | SYSTOLIC BLOOD PRESSURE: 189 MMHG | DIASTOLIC BLOOD PRESSURE: 74 MMHG | TEMPERATURE: 96.5 F | HEIGHT: 64 IN

## 2018-12-26 DIAGNOSIS — M75.101 TEAR OF RIGHT ROTATOR CUFF, UNSPECIFIED TEAR EXTENT: ICD-10-CM

## 2018-12-26 DIAGNOSIS — Z79.899 ENCOUNTER FOR LONG-TERM (CURRENT) USE OF MEDICATIONS: ICD-10-CM

## 2018-12-26 DIAGNOSIS — M25.511 CHRONIC RIGHT SHOULDER PAIN: ICD-10-CM

## 2018-12-26 DIAGNOSIS — M79.10 MYALGIA: ICD-10-CM

## 2018-12-26 DIAGNOSIS — M19.011 ARTHRITIS OF RIGHT ACROMIOCLAVICULAR JOINT: Primary | ICD-10-CM

## 2018-12-26 DIAGNOSIS — G89.29 CHRONIC RIGHT SHOULDER PAIN: ICD-10-CM

## 2018-12-26 RX ORDER — MORPHINE SULFATE 15 MG/1
15 TABLET ORAL
Qty: 90 TAB | Refills: 0 | Status: SHIPPED | OUTPATIENT
Start: 2018-12-29 | End: 2019-01-22 | Stop reason: SDUPTHER

## 2018-12-26 RX ORDER — NALOXONE HYDROCHLORIDE 4 MG/.1ML
SPRAY NASAL
Qty: 1 EACH | Refills: 0 | Status: SHIPPED | OUTPATIENT
Start: 2018-12-26 | End: 2021-07-06

## 2018-12-26 NOTE — PROGRESS NOTES
Nursing Notes    Patient presents to the office today in follow-up. Patient rates her pain at 7/10 on the numerical pain scale. Patient in office today for left shoulder injection consult. Last opioid agreement today   Last urine drug screen 09/2018    Comments: b/p elevated; patient asymptomatic; education given; provider aware. POC UDS was not performed in office today    Any new labs or imaging since last appointment? NO    Have you been to an emergency room (ER) or urgent care clinic since your last visit? NO            Have you been hospitalized since your last visit? NO     If yes, where, when, and reason for visit? Have you seen or consulted any other health care providers outside of the [address] [place] [address]  since your last visit? NO     If yes, where, when, and reason for visit? Ms. Marly Juarez has a reminder for a \"due or due soon\" health maintenance. I have asked that she contact her primary care provider for follow-up on this health maintenance.     PHQ over the last two weeks 12/26/2018   Little interest or pleasure in doing things Not at all   Feeling down, depressed, irritable, or hopeless Not at all   Total Score PHQ 2 0   Trouble falling or staying asleep, or sleeping too much -   Feeling tired or having little energy -   Poor appetite, weight loss, or overeating -   Feeling bad about yourself - or that you are a failure or have let yourself or your family down -   Trouble concentrating on things such as school, work, reading, or watching TV -   Moving or speaking so slowly that other people could have noticed; or the opposite being so fidgety that others notice -   Thoughts of being better off dead, or hurting yourself in some way -   PHQ 9 Score -   How difficult have these problems made it for you to do your work, take care of your home and get along with others -

## 2018-12-26 NOTE — PROGRESS NOTES
Social History     Socioeconomic History    Marital status:      Spouse name: Not on file    Number of children: Not on file    Years of education: Not on file    Highest education level: Not on file   Social Needs    Financial resource strain: Not on file    Food insecurity - worry: Not on file    Food insecurity - inability: Not on file    Transportation needs - medical: Not on file   [employer] needs - non-medical: Not on file   Occupational History    Not on file   Tobacco Use    Smoking status: Former Smoker    Smokeless tobacco: Never Used   Substance and Sexual Activity    Alcohol use: No     Alcohol/week: 0.0 oz    Drug use: No    Sexual activity: No   Other Topics Concern    Not on file   Social History Narrative    Not on file     Family History   Problem Relation Age of Onset    Heart Attack Mother     Heart Attack Father      No Known Allergies  Past Medical History:   Diagnosis Date    Asthma     Chronic venous insufficiency     Diabetes ([facility] [address]     Hypertension     Peripheral neuropathy     Rheumatoid arthritis ([facility] [address]     Vertigo      Past Surgical History:   Procedure Laterality Date    HX CHOLECYSTECTOMY      HX GYN      TAHOophorectomy due to infection    HX HEENT      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  HX ORTHOPAEDIC      bilat TKA     Current Outpatient Medications on File Prior to Visit   Medication Sig    capsaicin (CAPZASIN-HP) 0.1 % topical cream Apply cream to bilateral shoulders TID. Avoid use on damaged, broken or irritated skin. Avoid occlusive dressings or heat while using this medication.  TENS Units (TENS 504) mikal Please provide patient with a TENS unit to help improve function, improve quality of life, reduce medication use, improve ROM. 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     atorvastatin (LIPITOR) 40 mg tablet Take 40 mg by mouth daily.  insulin glargine (LANTUS U-100 INSULIN) 100 unit/mL injection 40 Units by SubCUTAneous route nightly.  ascorbic acid, vitamin C, (VITAMIN C) 500 mg tablet Take 500 mg by mouth daily.  bumetanide (BUMEX) 2 mg tablet Take 2 mg by mouth two (2) times daily as needed.  gabapentin (NEURONTIN) 300 mg capsule Take 1 Cap by mouth three (3) times daily.  insulin aspart U-100 (NOVOLOG) 100 unit/mL inpn 16 units before each meal    Blood-Glucose Meter (FREESTYLE LITE METER) monitoring kit Test twice blood glucose daily; ICD-10 E11.9; Quantity 1    amLODIPine (NORVASC) 10 mg tablet Take 0.5 Tabs by mouth daily. (Patient taking differently: Take 10 mg by mouth daily.)    levothyroxine (SYNTHROID) 200 mcg tablet 1 tablet daily (take on an empty stomach) (stop \"old\" synthroid)    carvedilol (COREG) 12.5 mg tablet Take 1 Tab by mouth two (2) times daily (with meals).  insulin syringe,safetyneedle 1 mL 31 gauge x 5/16\" syrg 1 Each by Does Not Apply route daily.  cholecalciferol (VITAMIN D3) 1,000 unit tablet Take 1,000 Units by mouth daily.  glucose blood VI test strips (FREESTYLE TEST) strip Use to check blood glucose twice daily DX:E11.9    allopurinol (ZYLOPRIM) 100 mg tablet Take 1 Tab by mouth daily.  magnesium oxide 500 mg tab Take 1 Tab by mouth daily.  acetaminophen (TYLENOL) 500 mg tablet Take 1 Tab by mouth every six (6) hours as needed for Pain.  aspirin delayed-release 81 mg tablet Take 81 mg by mouth daily. No current facility-administered medications on file prior to visit. Referred 2016 from orthopedics for right shoulder pain   Pt was last seen here on November 16, 2018  Calculated MEQ -45  Naloxone rescue -yes  Prophylactic bowel program -yes  Date of last OCA January 2018, renewed today.    Last UDS September 21, 2018  , date checked today, findings consistent    GIC-4 and 4  MACKENZIE -6%  COMM-0    HPI:  Tereso Barr is a 82 y.o. female here for f/u visit for ongoing evaluation of left shoulder pain. Pt denies interval changes in the character or distribution of pain. She had right intra-articular acromioclavicular joint injection on Oct 23, 2018. She reports significant benefit from the [facility] corticosteroid injection which was done in October with more than 4 weeks of reduced pain and improved function. She states that around December 10 she woke up with an exacerbation of aching pain beginning in the upper trapezius region that traveled to the deep scapular region on the right side. She describes that as a aching pain that ranges from 6-8/10. She also correlates a return of the right acromioclavicular joint pain to this muscular pain. The pain worsens with cervical extension, cervical rotation to the right, shoulder abduction. Pain improves with moist heat and TENS unit. ROS:Review of systems is negative for fever, chills, nausea, vomiting, diarrhea, constipation, chest pain, shortness of breath, abdominal pain, weakness, trouble swallowing, acute changes in vision, acute changes in hearing, falls, dizziness, bladder incontinence, bowel incontinence, depression, anxiety, suicidal ideation, homicidal ideation, alcohol use. Review of systems positive for right shoulder pain    Opioid specific risk: advanced age, polypharmacy, asthma, vertigo, GERD, history of depression, obesity, hx of falls but none in the last 8 months. Vitals:    12/26/18 1445   BP: 189/74   Pulse: 98   Resp: 22   Temp: 96.5 °F (35.8 °C)   TempSrc: Oral   SpO2: 96%   Weight: 115.7 kg (255 lb)   Height: 5' 4\" (1.626 m)   PainSc:   7   PainLoc: Back        PE:  AFVSS, no acute distress, normal body habitus. A&OXs 3. Conjugate gaze, clear sclerae. Speech is clear and appropriate. Mood is pleasant and appropriate. Patient is cooperative. Decreased active range of motion for right glenohumeral abduction by at least 60 degrees.   Tender to palpation at the right [facility] joint. Tender to palpation at the left upper trapezius and levator scapula with active trigger points palpated in each of these muscles which refer pain to her primary complaints. Gait is modified independent with use of a 4 wheeled rolling walker with decreased [place]. .    Balance is within functional limits with use of a walker. Primary Care Physician  [address], [address] [facility] [address]  [phone]      PHQ -- . PHQ over the last two weeks 12/26/2018   Little interest or pleasure in doing things Not at all   Feeling down, depressed, irritable, or hopeless Not at all   Total Score PHQ 2 0   Trouble falling or staying asleep, or sleeping too much -   Feeling tired or having little energy -   Poor appetite, weight loss, or overeating -   Feeling bad about yourself - or that you are a failure or have let yourself or your family down -   Trouble concentrating on things such as school, work, reading, or watching TV -   Moving or speaking so slowly that other people could have noticed; or the opposite being so fidgety that others notice -   Thoughts of being better off dead, or hurting yourself in some way -   PHQ 9 Score -   How difficult have these problems made it for you to do your work, take care of your home and get along with others -       Assessment/Plan:     ICD-10-CM ICD-9-CM    1. Arthritis of right acromioclavicular joint M19.011 716.91 morphine IR (MS IR) 15 mg tablet   2. Myalgia M79.10 729.1    3. Encounter for long-term (current) use of medications Z79.899 V58.69 naloxone (NARCAN) 4 mg/actuation nasal spray   4. Chronic right shoulder pain M25.511 719.41 morphine IR (MS IR) 15 mg tablet    G89.29 338.29    5. Tear of right rotator cuff, unspecified tear extent M75.101 840.4         --I do not recommend long-term opioid therapy for this person's chronic pain. I believe the risks outweigh any potential benefits.   We will progress with a gradual opioid wean. Patient was educated on signs and symptoms of opioid withdrawal and advised to call the clinic should these symptoms arise so that we may provide support as needed. --Will provide a refill at the current rate of morphine immediate release 15 mg up to 3 times daily with  90 tablets for 30 days. At time of refill consider reduction of the total number of tablets to 75 tablets budgeted over 30 days. At this time she will hopefully have improved pain control around the right shoulder.  -Patient will return for next available trigger point injections to the right upper trapezius and right levator left. We will also offer patient repeat right intra-articular acromioclavicular joint injection at that time if needed. Capsaicin,continue TENS unit, Tylenol. GOALS:  To establish complementary and integrative plan of care to address chronic pain issues while minimizing pharmaceuticals to maximize patient's function improve quality of life. Education:  Patient again educated on the importance of strict compliance with the opioid care agreement while on opioid therapy. Patient also again educated that they should avoid driving while on chronic opioid therapy. Also advised to avoid alcohol and to avoid benzodiazepines while on opioid therapy. F/u:. Follow-up Disposition:  Return in about 2 months (around 2/26/2019) for 30 min.

## 2018-12-26 NOTE — PATIENT INSTRUCTIONS
High Blood Pressure: Care Instructions  Your Care Instructions    If your blood pressure is usually above 130/80, you have high blood pressure, or hypertension. That means the top number is 130 or higher or the bottom number is 80 or higher, or both. Despite what a lot of people think, high blood pressure usually doesn't cause headaches or make you feel dizzy or lightheaded. It usually has no symptoms. But it does increase your risk for heart attack, stroke, and kidney or eye damage. The higher your blood pressure, the more your risk increases. Your doctor will give you a goal for your blood pressure. Your goal will be based on your health and your age. Lifestyle changes, such as eating healthy and being active, are always important to help lower blood pressure. You might also take medicine to reach your blood pressure goal.  Follow-up care is a key part of your treatment and safety. Be sure to make and go to all appointments, and call your doctor if you are having problems. It's also a good idea to know your test results and keep a list of the medicines you take. How can you care for yourself at home? Medical treatment  · If you stop taking your medicine, your blood pressure will go back up. You may take one or more types of medicine to lower your blood pressure. Be safe with medicines. Take your medicine exactly as prescribed. Call your doctor if you think you are having a problem with your medicine. · Talk to your doctor before you start taking aspirin every day. Aspirin can help certain people lower their risk of a heart attack or stroke. But taking aspirin isn't right for everyone, because it can cause serious bleeding. · See your doctor regularly. You may need to see the doctor more often at first or until your blood pressure comes down. · If you are taking blood pressure medicine, talk to your doctor before you take decongestants or anti-inflammatory medicine, such as ibuprofen.  Some of these medicines can raise blood pressure. · Learn how to check your blood pressure at home. Lifestyle changes  · Stay at a healthy weight. This is especially important if you put on weight around the waist. Losing even 10 pounds can help you lower your blood pressure. · If your doctor recommends it, get more exercise. Walking is a good choice. Bit by bit, increase the amount you walk every day. Try for at least 30 minutes on most days of the week. You also may want to swim, bike, or do other activities. · Avoid or limit alcohol. Talk to your doctor about whether you can drink any alcohol. · Try to limit how much sodium you eat to less than 2,300 milligrams (mg) a day. Your doctor may ask you to try to eat less than 1,500 mg a day. · Eat plenty of fruits (such as bananas and oranges), vegetables, legumes, whole grains, and low-fat dairy products. · Lower the amount of saturated fat in your diet. Saturated fat is found in animal products such as milk, cheese, and meat. Limiting these foods may help you lose weight and also lower your risk for heart disease. · Do not smoke. Smoking increases your risk for heart attack and stroke. If you need help quitting, talk to your doctor about stop-smoking programs and medicines. These can increase your chances of quitting for good. When should you call for help? Call 911 anytime you think you may need emergency care. This may mean having symptoms that suggest that your blood pressure is causing a serious heart or blood vessel problem. Your blood pressure may be over 180/120.   For example, call 911 if:    · You have symptoms of a heart attack. These may include:  ? Chest pain or pressure, or a strange feeling in the chest.  ? Sweating. ? Shortness of breath. ? Nausea or vomiting. ? Pain, pressure, or a strange feeling in the back, neck, jaw, or upper belly or in one or both shoulders or arms. ? Lightheadedness or sudden weakness.   ? A fast or irregular heartbeat.     · You have symptoms of a stroke. These may include:  ? Sudden numbness, tingling, weakness, or loss of movement in your face, arm, or leg, especially on only one side of your body. ? Sudden vision changes. ? Sudden trouble speaking. ? Sudden confusion or trouble understanding simple statements. ? Sudden problems with walking or balance. ? A sudden, severe headache that is different from past headaches.     · You have severe back or belly pain.    Do not wait until your blood pressure comes down on its own. Get help right away.   Call your doctor now or seek immediate care if:    · Your blood pressure is much higher than normal (such as 180/120 or higher), but you don't have symptoms.     · You think high blood pressure is causing symptoms, such as:  ? Severe headache.  ? Blurry vision.    Watch closely for changes in your health, and be sure to contact your doctor if:    · Your blood pressure measures higher than your doctor recommends at least 2 times. That means the top number is higher or the bottom number is higher, or both.     · You think you may be having side effects from your blood pressure medicine. Where can you learn more? Go to http://braden-estephanie.info/. Enter F582 in the search box to learn more about \"High Blood Pressure: Care Instructions. \"  Current as of: December 6, 2017  Content Version: 11.8  © 9871-6310 [employer]. Care instructions adapted under license by [employer] (which disclaims liability or warranty for this information). If you have questions about a medical condition or this instruction, always ask your healthcare professional. [place] [address] any warranty or liability for your use of this information. Safe Use of Opioid Pain Medicine: Care Instructions  Your Care Instructions  Pain is your body's way of warning you that something is wrong. Pain feels different for everybody.  Only you can describe your pain.  A doctor can suggest or prescribe many types of medicines for pain. These range from over-the-counter medicines like acetaminophen (Tylenol) to powerful medicines called opioids. Examples of opioids are fentanyl, hydrocodone, morphine, and oxycodone. Heroin is an illegal opioid  Opioids are strong medicines. They can help you manage pain when you use them the right way. But if you misuse them, they can cause serious harm and even death. For these reasons, doctors are very careful about how they prescribe opioids. If you decide to take opioids, here are some things to remember. · Keep your doctor informed. You can get addicted to opioids. The risk is higher if you have a history of substance use. Your doctor will monitor you closely for signs of misuse and addiction and to figure out when you no longer need to take opioids. · Make a treatment plan. The goal of your plan is to be able to function and do the things you need to do, even if you still have some pain. You might be able to manage your pain with other non-opioid options like physical therapy, relaxation, or over-the-counter pain medicines. · Be aware of the side effects. Opioids can cause serious side effects, such as constipation, dry mouth, and nausea. And over time, you may need a higher dose to get pain relief. This is called tolerance. Your body also gets used to opioids. This is called physical dependence. If you suddenly stop taking them, you may have withdrawal symptoms. The doctor carefully considered what pain medicine is right for you. You may not have received opioids if your doctor was concerned about drug interactions or your safety, or if he or she had other concerns. It is best to have one doctor or clinic treat your pain. This way you will get the pain medicine that will help you the most. And a doctor will be able to watch for any problems that the medicine might cause.   The doctor has checked you carefully, but problems can develop later. If you notice any problems or new symptoms,  get medical treatment right away. Follow-up care is a key part of your treatment and safety. Be sure to make and go to all appointments, and call your doctor if you are having problems. It's also a good idea to know your test results and keep a list of the medicines you take. How can you care for yourself at home? · If you need to take opioids to manage your pain, remember these safety tips. ? Follow directions carefully. It's easy to misuse opioids if you take a dose other than what's prescribed by your doctor. This can lead to overdose and even death. Even sharing them with someone they weren't meant for is misuse. ? Be cautious. Opioids may affect your judgment and decision making. Do not drive or operate machinery until you can think clearly. Talk with your doctor about when it is safe to drive. ? Reduce the risk of drug interactions. Opioids can be dangerous if you take them with alcohol or with certain drugs like sleeping pills and muscle relaxers. Make sure your doctor knows about all the other medicines you take, including over-the-counter medicines. Don't start any new medicines before you talk to your doctor or pharmacist.  ? Keep others safe. Store opioids in a safe and secure place. Make sure that pets, children, friends, and family can't get to them. When you're done using opioids, make sure to properly dispose of them. You can either use a community drug take-back program or your drugstore's mail-back program. If one of these programs isn't available, you can flush opioid skin patches and unused opioid pills down the toilet. ? Reduce the risk of overdose. Misuse of opioids can be very dangerous. Protect yourself by asking your doctor about a naloxone rescue kit. It can help youand even save your lifeif you take too much of an opioid. · Try other ways to reduce pain. ? Relax, and reduce stress.  Relaxation techniques such as deep breathing or meditation can help. ? Keep moving. Gentle, daily exercise can help reduce pain over the long run. Try low- or no-impact exercises such as walking, swimming, and stationary biking. Do stretches to stay flexible. ? Try heat, cold packs, and massage. ? Get enough sleep. Pain can make you tired and drain your energy. Talk with your doctor if you have trouble sleeping because of pain. ? Think positive. Your thoughts can affect your pain level. Do things that you enjoy to distract yourself when you have pain instead of focusing on the pain. See a movie, read a book, listen to music, or spend time with a friend. · If you are not taking a prescription pain medicine, ask your doctor if you can take an over-the-counter medicine. When should you call for help? Call your doctor now or seek immediate medical care if:    · You have a new kind of pain.     · You have new symptoms, such as a fever or rash, along with the pain.    Watch closely for changes in your health, and be sure to contact your doctor if:    · You think you might be using too much pain medicine, and you need help to use less or stop.     · Your pain gets worse.     · You would like a referral to a doctor or clinic that specializes in pain management. Where can you learn more? Go to http://braden-estephanie.info/. Enter R108 in the search box to learn more about \"Safe Use of Opioid Pain Medicine: Care Instructions. \"  Current as of: September 10, 2017  Content Version: 11.8  © 9618-2252 [employer]. Care instructions adapted under license by [employer] (which disclaims liability or warranty for this information). If you have questions about a medical condition or this instruction, always ask your healthcare professional. [address] any warranty or liability for your use of this information.

## 2019-01-22 DIAGNOSIS — G89.29 CHRONIC RIGHT SHOULDER PAIN: ICD-10-CM

## 2019-01-22 DIAGNOSIS — M25.511 CHRONIC RIGHT SHOULDER PAIN: ICD-10-CM

## 2019-01-22 DIAGNOSIS — M19.011 ARTHRITIS OF RIGHT ACROMIOCLAVICULAR JOINT: ICD-10-CM

## 2019-01-23 RX ORDER — MORPHINE SULFATE 15 MG/1
15 TABLET ORAL
Qty: 75 TAB | Refills: 0 | Status: SHIPPED | OUTPATIENT
Start: 2019-01-27 | End: 2019-02-19 | Stop reason: SDUPTHER

## 2019-01-29 ENCOUNTER — TELEPHONE (OUTPATIENT)
Dept: PAIN MANAGEMENT | Age: 84
End: 2019-01-29

## 2019-01-29 NOTE — TELEPHONE ENCOUNTER
The pt called the office to ask about the status of her refill request. She states that she has not heard anything. A chart review was done and the prescription is ready. Someone has tried to contact the pt already and she was not able to be reached. A message could not be left because the voicemail has not been set up. I attempted to contact the pt and was not able to reach her. No voicemail has been set up. Not able to leave message.

## 2019-02-19 ENCOUNTER — OFFICE VISIT (OUTPATIENT)
Dept: PAIN MANAGEMENT | Age: 84
End: 2019-02-19

## 2019-02-19 VITALS
HEIGHT: 64 IN | WEIGHT: 255 LBS | TEMPERATURE: 97.1 F | OXYGEN SATURATION: 97 % | SYSTOLIC BLOOD PRESSURE: 146 MMHG | HEART RATE: 72 BPM | RESPIRATION RATE: 14 BRPM | BODY MASS INDEX: 43.54 KG/M2 | DIASTOLIC BLOOD PRESSURE: 65 MMHG

## 2019-02-19 DIAGNOSIS — M25.511 CHRONIC RIGHT SHOULDER PAIN: ICD-10-CM

## 2019-02-19 DIAGNOSIS — Z79.899 ENCOUNTER FOR LONG-TERM (CURRENT) USE OF HIGH-RISK MEDICATION: ICD-10-CM

## 2019-02-19 DIAGNOSIS — M62.511 ATROPHY OF MUSCLE OF RIGHT SHOULDER: ICD-10-CM

## 2019-02-19 DIAGNOSIS — G89.29 CHRONIC RIGHT SHOULDER PAIN: ICD-10-CM

## 2019-02-19 DIAGNOSIS — M75.101 TEAR OF RIGHT ROTATOR CUFF, UNSPECIFIED TEAR EXTENT: ICD-10-CM

## 2019-02-19 DIAGNOSIS — M79.10 MYALGIA: ICD-10-CM

## 2019-02-19 DIAGNOSIS — M19.011 ARTHRITIS OF RIGHT ACROMIOCLAVICULAR JOINT: ICD-10-CM

## 2019-02-19 DIAGNOSIS — G89.4 CHRONIC PAIN SYNDROME: Primary | ICD-10-CM

## 2019-02-19 RX ORDER — MORPHINE SULFATE 15 MG/1
15 TABLET ORAL
Qty: 75 TAB | Refills: 0 | Status: SHIPPED | OUTPATIENT
Start: 2019-02-27 | End: 2019-03-25 | Stop reason: SDUPTHER

## 2019-02-19 NOTE — PROGRESS NOTES
Nursing Notes    Patient presents to the office today in follow-up. Patient rates her pain at 8/10 on the numerical pain scale. Reviewed medications with counts as follows:    Rx Date filled Qty Dispensed Pill Count Last Dose Short   Morphine 15 mg 01/29/19 75 14 This am  no       Last opioid agreement 12/26/18  Last urine drug screen 02/19/19    Comments:  Patient is here today for a follow up appt today for her chronic pain. She states her pain level today is an 8  PHQ 2 was done patient denies any depression. POC UDS was performed in office today per verbal order per [place]    Any new labs or imaging since last appointment? NO    Have you been to an emergency room (ER) or urgent care clinic since your last visit? NO            Have you been hospitalized since your last visit? NO     If yes, where, when, and reason for visit? Have you seen or consulted any other health care providers outside of the [address] [place] [address]  since your last visit? NO     If yes, where, when, and reason for visit? Ms. Delroy Koyanagi has a reminder for a \"due or due soon\" health maintenance. I have asked that she contact her primary care provider for follow-up on this health maintenance.

## 2019-02-19 NOTE — PATIENT INSTRUCTIONS

## 2019-02-21 NOTE — PROGRESS NOTES
Referred 2016 from orthopedics for right shoulder pain        HPI:  Magnolia Nicholas is a 82 y.o. female here for f/u visit for ongoing evaluation of chronic left shoulder pain. Pt was last seen here on 12/26/18. Pt denies interval changes on the character or distribution of pain. Pain is located to bilateral shoulders and described as aching and burning. Pain at its best is 6/10. Pain at its worse is 10/10. The pain is worsened by cervical extension, cervical rotation to the right and shoulder abduction. Symptoms are improved by moist heat and TENS unit use. Pt has tried PT and massage ~1 year ago with no perceived benefit. Pt has never tried aquatic PT, acupuncture or Cymbalta. Per Dr Juan C Weathers note on 12/26/18 \"\"she reports significant benefit from the right [facility] joint corticosteroid injection which was done in October with more than 4 weeks of reduced pain and improved function. She states that around December 10 she woke up with an exacerbation of aching pain beginning in the upper trapezius region that traveled to the deep scapular region on the right side\"\". Since last visit, pt reports her back pain is worse and she has been taking more morphine for this; discussed with her that we are not treating her back pain and she needs to have this pain or any new pain evaluated by her PCP. Pt states she is frustrated because she doesn't see the same doctor at her PCP at [facility] and she \"sees students\". She has not discussed increasing Gabapentin with them as previously recommended.       Social History     Socioeconomic History    Marital status:      Spouse name: Not on file    Number of children: Not on file    Years of education: Not on file    Highest education level: Not on file   Social Needs    Financial resource strain: Not on file    Food insecurity - worry: Not on file    Food insecurity - inability: Not on file    Transportation needs - medical: Not on file   [employer] needs - non-medical: Not on file   Occupational History    Not on file   Tobacco Use    Smoking status: Former Smoker    Smokeless tobacco: Never Used   Substance and Sexual Activity    Alcohol use: No     Alcohol/week: 0.0 oz    Drug use: No    Sexual activity: No   Other Topics Concern    Not on file   Social History Narrative    Not on file     Family History   Problem Relation Age of Onset    Heart Attack Mother     Heart Attack Father      No Known Allergies  Past Medical History:   Diagnosis Date    Asthma     Chronic venous insufficiency     [facility] [address]     Hypertension     Peripheral neuropathy     Rheumatoid arthritis ([facility] [address]     Vertigo      Past Surgical History:   Procedure Laterality Date    HX CHOLECYSTECTOMY      HX GYN      TAHOophorectomy due to infection    HX HEENT      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  HX ORTHOPAEDIC      bilat TKA     Current Outpatient Medications on File Prior to Visit   Medication Sig    naloxone (NARCAN) 4 mg/actuation nasal spray Use 1 spray intranasally, then discard. Repeat with new spray every 2 min as needed for opioid overdose symptoms, alternating nostrils.  capsaicin (CAPZASIN-HP) 0.1 % topical cream Apply cream to bilateral shoulders TID. Avoid use on damaged, broken or irritated skin. Avoid occlusive dressings or heat while using this medication.  TENS Units (TENS 504) mikal Please provide patient with a TENS unit to help improve function, improve quality of life, reduce medication use, improve ROM. 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  atorvastatin (LIPITOR) 40 mg tablet Take 40 mg by mouth daily.  insulin glargine (LANTUS U-100 INSULIN) 100 unit/mL injection 40 Units by SubCUTAneous route nightly.  ascorbic acid, vitamin C, (VITAMIN C) 500 mg tablet Take 500 mg by mouth daily.     bumetanide (BUMEX) 2 mg tablet Take 2 mg by mouth two (2) times daily as needed.  gabapentin (NEURONTIN) 300 mg capsule Take 1 Cap by mouth three (3) times daily.  insulin aspart U-100 (NOVOLOG) 100 unit/mL inpn 16 units before each meal    Blood-Glucose Meter (FREESTYLE LITE METER) monitoring kit Test twice blood glucose daily; ICD-10 E11.9; Quantity 1    amLODIPine (NORVASC) 10 mg tablet Take 0.5 Tabs by mouth daily. (Patient taking differently: Take 10 mg by mouth daily.)    levothyroxine (SYNTHROID) 200 mcg tablet 1 tablet daily (take on an empty stomach) (stop \"old\" synthroid)    carvedilol (COREG) 12.5 mg tablet Take 1 Tab by mouth two (2) times daily (with meals).  insulin syringe,safetyneedle 1 mL 31 gauge x 5/16\" syrg 1 Each by Does Not Apply route daily.  cholecalciferol (VITAMIN D3) 1,000 unit tablet Take 1,000 Units by mouth daily.  glucose blood VI test strips (FREESTYLE TEST) strip Use to check blood glucose twice daily DX:E11.9    allopurinol (ZYLOPRIM) 100 mg tablet Take 1 Tab by mouth daily.  magnesium oxide 500 mg tab Take 1 Tab by mouth daily.  acetaminophen (TYLENOL) 500 mg tablet Take 1 Tab by mouth every six (6) hours as needed for Pain.  aspirin delayed-release 81 mg tablet Take 81 mg by mouth daily. No current facility-administered medications on file prior to visit. ROS:  Reports shortness of breath and changes in hearing. Denies fever, chills, nausea, vomiting, diarrhea, constipation, abdominal pain, chest pain, weakness, trouble swallowing, changes in vision, falls, dizziness, bladder incontinence, bowel incontinence, depression, anxiety, suicidal ideations, homicidal ideations or alcohol use. Opioid specific risk: advanced age, polypharmacy, asthma, vertigo, GERD, diabetes, history of depression, obesity, hx of falls but none in the last 8 months.       Vitals:    02/19/19 1058   BP: 146/65   Pulse: 72   Resp: 14   Temp: 97.1 °F (36.2 °C)   SpO2: 97%   Weight: 115.7 kg (255 lb)   Height: 5' 4\" (1.626 m)   PainSc:   8   PainLoc: Shoulder          Physical exam:  AFVSS, no acute distress, normal body habitus. A&OXs 3. Normocephalic, atraumatic. Conjugate gaze, clear sclerae. Speech is clear and appropriate. Mood is appropriate and patient is cooperative. Gait is modified independent with use of a 4 wheeled rolling walker with decreased modesta. Balance is within functional limits with use of a 4 wheeled rolling walker. Non-labored breathing. Decreased AROM right shoulder abduction by at least 50% with reproduction of primary pain.         Calculated MEQ - 45  Naloxone rescue - yes  Prophylactic bowel program - yes  Date of last OCA 12/26/18  Last UDS today, consistent POC, pending confirmatory testing  Prior UDS 9/21/18, consistent   date checked today, findings consistent    Primary Care Physician  [address], [address]  [phone]    Today   Last Visit  Prior Visit  [facility] - 2 & 2  6 & 4   5 & 5  MACKENZIE - 49%  unknown  53%  COMM - 1  0   0    PHQ -- .  3 most recent PHQ Screens 2/19/2019   Little interest or pleasure in doing things Not at all   Feeling down, depressed, irritable, or hopeless Not at all   Total Score PHQ 2 0   Trouble falling or staying asleep, or sleeping too much -   Feeling tired or having little energy -   Poor appetite, weight loss, or overeating -   Feeling bad about yourself - or that you are a failure or have let yourself or your family down -   Trouble concentrating on things such as school, work, reading, or watching TV -   Moving or speaking so slowly that other people could have noticed; or the opposite being so fidgety that others notice -   Thoughts of being better off dead, or hurting yourself in some way -   PHQ 9 Score -   How difficult have these problems made it for you to do your work, take care of your home and get along with others -       DSM V-OUD Screen - negative to mild    Assessment/Plan:     ICD-10-CM ICD-9-CM    1. Chronic pain syndrome G89.4 338.4    2. Arthritis of right acromioclavicular joint M19.011 716.91 morphine IR (MS IR) 15 mg tablet   3. Chronic right shoulder pain M25.511 719.41 morphine IR (MS IR) 15 mg tablet    G89.29 338.29    4. Myalgia M79.10 729.1    5. Tear of right rotator cuff, unspecified tear extent M75.101 840.4    6. Atrophy of muscle of right shoulder M62.511 728.2    7. Encounter for long-term (current) use of high-risk medication Z79.899 V58.69 DRUG SCREEN      AMB POC DRUG SCREEN ()        Do not recommend long term opioid therapy for this patient at this time for their chronic pain; the risks outweigh the potential benefits. Pt currently taking Morphine 15mg up to 3 times a day as needed with a total of 75 tabs to be budgeted over 30 days. Their MME is 39. Today, we will continue with this dosing and continue the weaning of patients opioid medication with a goal of being opioid free, pending safety and compliance at next office visit. Pt instructed to call if they experience any signs of withdrawal (diarrhea, nausea, vomiting, sweating or chills, agitation, itching). Pt instructed to call 5-7 days before they run out of their medications for refill. At next office visit, the plan is to provide patient with Morphine 15mg up to 2 times a day as needed with a total of 60 tabs to be used over 30 days. Their new MME will be 30. If patient has difficulty with the wean or difficulty with cravings we will consider referral to mental health for ongoing assessment and treatment for opioid use disorder. Pt has appointment scheduled with Dr Luisa Triplett on 3/7/19 for trigger point injections to her right upper trapezius and right levator muscles. Repeat right intra-articular acromioclavicular joint injection when needed. Continue using TENS unit, tylenol and capsaicin as needed.   Continue Gabapentin as previously recommended; discuss with your PCP regarding slow titration of this mediation as tolerated in the future. Follow up ongoing assessment and ongoing development of integrative and comprehensive plan of care for chronic pain. Goals: To establish complementary and integrative plan of care to address chronic pain issues while minimizing pharmaceuticals to maximize patient's function improve quality of life. Education:  Patient again educated on the importance of strict compliance with the opioid care agreement while on opioid therapy. Patient also again educated that they should avoid driving while on chronic opioid therapy. Also advised to avoid alcohol and to avoid benzodiazepines while on opioid therapy. Handouts given regarding opioid safety. Follow-up Disposition:  Return in about 3 months (around 5/19/2019) for 30 min. With Dr Hailey Morfin was used for portions of this report. Unintended errors may occur.

## 2019-03-07 ENCOUNTER — OFFICE VISIT (OUTPATIENT)
Dept: PAIN MANAGEMENT | Age: 84
End: 2019-03-07

## 2019-03-07 VITALS
BODY MASS INDEX: 43.54 KG/M2 | WEIGHT: 255 LBS | HEIGHT: 64 IN | OXYGEN SATURATION: 97 % | HEART RATE: 84 BPM | RESPIRATION RATE: 24 BRPM | TEMPERATURE: 97.8 F | DIASTOLIC BLOOD PRESSURE: 89 MMHG | SYSTOLIC BLOOD PRESSURE: 171 MMHG

## 2019-03-07 DIAGNOSIS — M19.042 LOCALIZED OSTEOARTHRITIS OF HAND, LEFT: ICD-10-CM

## 2019-03-07 DIAGNOSIS — M19.011 ARTHRITIS OF RIGHT ACROMIOCLAVICULAR JOINT: Primary | ICD-10-CM

## 2019-03-07 DIAGNOSIS — M79.10 MYALGIA: ICD-10-CM

## 2019-03-07 RX ORDER — LIDOCAINE HYDROCHLORIDE 10 MG/ML
2 INJECTION INFILTRATION; PERINEURAL ONCE
Qty: 3 ML | Refills: 0
Start: 2019-03-07 | End: 2019-03-07

## 2019-03-07 NOTE — PROGRESS NOTES
Nursing Notes    Patient presents to the office today for an injection in her right shoulder for her chronic shoulder pain. Patient rates her pre-injection pain score at 9/10 on the numerical pain scale.  reviewed NO  Any aberrancies noted on  NO  Last opioid agreement 12/26/18  Last urine drug screen 02/19/19    Comments:     POC UDS was not performed in office today    Any new labs or imaging since last appointment? NO    Have you been to an emergency room (ER) or urgent care clinic since your last visit? NO            Have you been hospitalized since your last visit? NO     If yes, where, when, and reason for visit? Have you seen or consulted any other health care providers outside of the [address] [place] [address]  since your last visit? YES     If yes, where, when, and reason for visit? pcp  Ms. Ledesma has a reminder for a \"due or due soon\" health maintenance. I have asked that she contact her primary care provider for follow-up on this health maintenance.     3 most recent PHQ Screens 3/7/2019   Little interest or pleasure in doing things Not at all   Feeling down, depressed, irritable, or hopeless Several days   Total Score PHQ 2 1   Trouble falling or staying asleep, or sleeping too much -   Feeling tired or having little energy -   Poor appetite, weight loss, or overeating -   Feeling bad about yourself - or that you are a failure or have let yourself or your family down -   Trouble concentrating on things such as school, work, reading, or watching TV -   Moving or speaking so slowly that other people could have noticed; or the opposite being so fidgety that others notice -   Thoughts of being better off dead, or hurting yourself in some way -   PHQ 9 Score -   How difficult have these problems made it for you to do your work, take care of your home and get along with others -

## 2019-03-07 NOTE — PATIENT INSTRUCTIONS
Post Injection Instructions    If you develop any abnormal symptoms, such as itching, swelling, redness, rash, or shortness of breath, please call our office at [phone]. Normally, these are temporary symptoms, which resolve within several hours to a day, but our office is more than happy to answer any questions you may have. The provider would like you to observe the injection area for redness, swelling, or increased heat. If any or all of these reactions occur, please call our office as soon as possible. This reaction may indicate the first signs of infection. Although very rare, it is  best if caught early. I have reviewed these instructions and the patient verbalizes understanding. High Blood Pressure: Care Instructions  Overview    It's normal for blood pressure to go up and down throughout the day. But if it stays up, you have high blood pressure. Another name for high blood pressure is hypertension. Despite what a lot of people think, high blood pressure usually doesn't cause headaches or make you feel dizzy or lightheaded. It usually has no symptoms. But it does increase your risk of stroke, heart attack, and other problems. You and your doctor will talk about your risks of these problems based on your blood pressure. Your doctor will give you a goal for your blood pressure. Your goal will be based on your health and your age. Lifestyle changes, such as eating healthy and being active, are always important to help lower blood pressure. You might also take medicine to reach your blood pressure goal.  Follow-up care is a key part of your treatment and safety. Be sure to make and go to all appointments, and call your doctor if you are having problems. It's also a good idea to know your test results and keep a list of the medicines you take. How can you care for yourself at home? Medical treatment  · If you stop taking your medicine, your blood pressure will go back up.  You may take one or more types of medicine to lower your blood pressure. Be safe with medicines. Take your medicine exactly as prescribed. Call your doctor if you think you are having a problem with your medicine. · Talk to your doctor before you start taking aspirin every day. Aspirin can help certain people lower their risk of a heart attack or stroke. But taking aspirin isn't right for everyone, because it can cause serious bleeding. · See your doctor regularly. You may need to see the doctor more often at first or until your blood pressure comes down. · If you are taking blood pressure medicine, talk to your doctor before you take decongestants or anti-inflammatory medicine, such as ibuprofen. Some of these medicines can raise blood pressure. · Learn how to check your blood pressure at home. Lifestyle changes  · Stay at a healthy weight. This is especially important if you put on weight around the waist. Losing even 10 pounds can help you lower your blood pressure. · If your doctor recommends it, get more exercise. Walking is a good choice. Bit by bit, increase the amount you walk every day. Try for at least 30 minutes on most days of the week. You also may want to swim, bike, or do other activities. · Avoid or limit alcohol. Talk to your doctor about whether you can drink any alcohol. · Try to limit how much sodium you eat to less than 2,300 milligrams (mg) a day. Your doctor may ask you to try to eat less than 1,500 mg a day. · Eat plenty of fruits (such as bananas and oranges), vegetables, legumes, whole grains, and low-fat dairy products. · Lower the amount of saturated fat in your diet. Saturated fat is found in animal products such as milk, cheese, and meat. Limiting these foods may help you lose weight and also lower your risk for heart disease. · Do not smoke. Smoking increases your risk for heart attack and stroke. If you need help quitting, talk to your doctor about stop-smoking programs and medicines.  These can increase your chances of quitting for good. When should you call for help? Call 911 anytime you think you may need emergency care. This may mean having symptoms that suggest that your blood pressure is causing a serious heart or blood vessel problem. Your blood pressure may be over 180/120.   For example, call 911 if:    · You have symptoms of a heart attack. These may include:  ? Chest pain or pressure, or a strange feeling in the chest.  ? Sweating. ? Shortness of breath. ? Nausea or vomiting. ? Pain, pressure, or a strange feeling in the back, neck, jaw, or upper belly or in one or both shoulders or arms. ? Lightheadedness or sudden weakness. ? A fast or irregular heartbeat.     · You have symptoms of a stroke. These may include:  ? Sudden numbness, tingling, weakness, or loss of movement in your face, arm, or leg, especially on only one side of your body. ? Sudden vision changes. ? Sudden trouble speaking. ? Sudden confusion or trouble understanding simple statements. ? Sudden problems with walking or balance. ? A sudden, severe headache that is different from past headaches.     · You have severe back or belly pain.    Do not wait until your blood pressure comes down on its own. Get help right away.   Call your doctor now or seek immediate care if:    · Your blood pressure is much higher than normal (such as 180/120 or higher), but you don't have symptoms.     · You think high blood pressure is causing symptoms, such as:  ? Severe headache.  ? Blurry vision.    Watch closely for changes in your health, and be sure to contact your doctor if:    · Your blood pressure measures higher than your doctor recommends at least 2 times. That means the top number is higher or the bottom number is higher, or both.     · You think you may be having side effects from your blood pressure medicine. Where can you learn more? Go to http://braden-estephanie.info/.   Enter L316 in the search box to learn more about \"High Blood Pressure: Care Instructions. \"  Current as of: July 22, 2018  Content Version: 11.9  © 3418-2051 [employer]. Care instructions adapted under license by [employer] (which disclaims liability or warranty for this information). If you have questions about a medical condition or this instruction, always ask your healthcare professional. [address] any warranty or liability for your use of this information.

## 2019-03-07 NOTE — PROGRESS NOTES
HPI:  Krysten Nazario is a 82 y.o. female here for trigger point injections and acromioclavicular joint injection. She denies interval changes in the character or distribution of her symptoms. She continues to have aching to stabbing pain in the left upper trapezius region that ranges from 79/10 and is worse with any left upper extremity activity. She had previous right AC joint injection from which she reported 75% relief for 8-12 weeks. This helped to improve her functioning with all ADLs as well as decreasing her pain. The pain has returned to baseline. [facility] joint pain is described as aching to stabbing pain that ranges from 7-9/10. GIC-2 and 6  MACKENZIE-46% (8 questions answered)    No Known Allergies  Current Outpatient Medications on File Prior to Visit   Medication Sig    morphine IR (MS IR) 15 mg tablet Take 1 Tab by mouth three (3) times daily as needed for Pain for up to 30 days. Max Daily Amount: 45 mg. #75 tablets to be budgeted over 30 days.  capsaicin (CAPZASIN-HP) 0.1 % topical cream Apply cream to bilateral shoulders TID. Avoid use on damaged, broken or irritated skin. Avoid occlusive dressings or heat while using this medication.  atorvastatin (LIPITOR) 40 mg tablet Take 40 mg by mouth daily.  insulin glargine (LANTUS U-100 INSULIN) 100 unit/mL injection 40 Units by SubCUTAneous route nightly.  ascorbic acid, vitamin C, (VITAMIN C) 500 mg tablet Take 500 mg by mouth daily.  bumetanide (BUMEX) 2 mg tablet Take 2 mg by mouth two (2) times daily as needed.  gabapentin (NEURONTIN) 300 mg capsule Take 1 Cap by mouth three (3) times daily.  insulin aspart U-100 (NOVOLOG) 100 unit/mL inpn 16 units before each meal    Blood-Glucose Meter (FREESTYLE LITE METER) monitoring kit Test twice blood glucose daily; ICD-10 E11.9; Quantity 1    amLODIPine (NORVASC) 10 mg tablet Take 0.5 Tabs by mouth daily.  (Patient taking differently: Take 10 mg by mouth daily.)    levothyroxine (SYNTHROID) 200 mcg tablet 1 tablet daily (take on an empty stomach) (stop \"old\" synthroid)    carvedilol (COREG) 12.5 mg tablet Take 1 Tab by mouth two (2) times daily (with meals).  insulin syringe,safetyneedle 1 mL 31 gauge x 5/16\" syrg 1 Each by Does Not Apply route daily.  cholecalciferol (VITAMIN D3) 1,000 unit tablet Take 1,000 Units by mouth daily.  glucose blood VI test strips (FREESTYLE TEST) strip Use to check blood glucose twice daily DX:E11.9    allopurinol (ZYLOPRIM) 100 mg tablet Take 1 Tab by mouth daily.  magnesium oxide 500 mg tab Take 1 Tab by mouth daily.  acetaminophen (TYLENOL) 500 mg tablet Take 1 Tab by mouth every six (6) hours as needed for Pain.  aspirin delayed-release 81 mg tablet Take 81 mg by mouth daily.  naloxone (NARCAN) 4 mg/actuation nasal spray Use 1 spray intranasally, then discard. Repeat with new spray every 2 min as needed for opioid overdose symptoms, alternating nostrils.  TENS Units (TENS 504) mikal Please provide patient with a TENS unit to help improve function, improve quality of life, reduce medication use, improve ROM. 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 No current facility-administered medications on file prior to visit. ROS:Review of systems is negative for fever, chills, nausea, vomiting, diarrhea, constipation, chest pain, abdominal pain, weakness, trouble swallowing, acute changes in vision, acute changes in hearing, falls, dizziness, bladder incontinence, bowel incontinence, depression, anxiety, suicidal ideation, homicidal ideation, alcohol use.   Review of systems positive for chronic shortness of breath    Vitals:    03/07/19 0953 03/07/19 1044   BP: 198/71 171/89   Pulse: 90 84   Resp: 24    Temp: 97.8 °F (36.6 °C)    TempSrc: Oral    SpO2: 97%    Weight: 115.7 kg (255 lb)    Height: 5' 4\" (1.626 m)    PainSc:   9           PE:  AFVSSwith elevated blood pressure, no acute distress, normal body habitus. A&OXs 3.  normocephalic, atraumatic. Conjugate gaze, clear sclerae. Lungs are clear to auscultation bilaterally and respirations are equal.  Tenderness to palpation at the right acromioclavicular joint. Active trigger points palpated at the left upper trapezius, left levator scapula. Primary Care Physician  Other, Phys  Patient can only remember the practice name and not the physician  None    Assessment/Plan:     ICD-10-CM ICD-9-CM    1. Arthritis of right acromioclavicular joint M19.011 716.91 NC DRAIN/INJECT INTERMEDIATE JOINT/BURSA      TRIAMCINOLONE ACETONIDE INJ      triamcinolone acetonide (KENALOG) 10 mg/mL injection      lidocaine (XYLOCAINE) 10 mg/mL (1 %) injection      LIDOCAINE INJECTION   2. Myalgia M79.10 729.1 NC INJECT TRIGGER POINT, 1 OR 2      lidocaine (XYLOCAINE) 10 mg/mL (1 %) injection      LIDOCAINE INJECTION   3. Localized osteoarthritis of hand, left M19.042 715.34 REFERRAL TO ORTHOPEDICS      Patient presents today for 2 procedures, right acromioclavicular intra-articular injection and left-sided myofascial trigger point injections. Risks, benefits, alternatives were discussed and all questions were answered. Patient agrees to proceed with both interventions. She tolerated the procedures without complaints. There were no immediate complications. Follow-up as needed for procedure related concerns. Maintain regularly scheduled office visit. F/u:. Follow-up Disposition:  Return if symptoms worsen or fail to improve.      Procedure Note  [facility]  OFFICE PROCEDURE PROGRESS NOTE        Chart reviewed for the following:   Kendra FULLER DO, have reviewed the History, Physical and updated the Allergic reactions for Renee Kaya Ledesma     TIME OUT performed immediately prior to start of procedure:   Kendra FULLER DO, have performed the following reviews on Renee Ledesma prior to the start of the procedure:            * Patient was identified by name and date of birth   * Agreement on procedure being performed was verified  * Risks and Benefits explained to the patient  * Procedure site verified and marked as necessary  * Patient was positioned for comfort  * Consent was signed and verified     Time: 1050    Date of procedure: 3/7/2019    Procedure performed by:  James Green DO    Provider assisted by: Leah Lesch LPN    Patient assisted by: self    How tolerated by patient: tolerated the procedure well with no complications    Post Procedural Pain Scale: See procedure note. Comments: none, See note for details. Procedure Note  Trigger point injections    Left upper trapezius, levator scapulae. Consent was obtained. Timeout was performed. Patient was positioned seated with her head resting on the exam table. Sites were marked. Each injection site was pretreated with vapo coolant spray. Each injection site was injected with approximately 0.5 mL of 1% lidocaine following negative aspiration using a 25-gauge 1 inch needle. Patient tolerated procedure without complaints. There were no immediate complications. Postprocedure pain relief was about 50% with palpation of the previously tender muscles. Lungs were clear bilaterally to auscultation post procedure. Procedure note:  Right intra-articular acromioclavicular injection. Patient was observed for 10 minutes post procedure and discharged home in stable condition. Procedure note  Right acromioclavicular intra-articular corticosteroid injection. Consent was obtained. Timeout was performed. Patient was positioned in a seated position. Site was marked and cleaned in the usual sterile fashion. Injection site was pretreated with vapo coolant spray for anesthesia.   The right acromioclavicular joint was injected with 20 mg of Kenalog and 1 mL of 1% lidocaine following negative aspiration using a 25-gauge 1 inch needle. Patient tolerated the procedure without complaints. Needle was removed intact. Injection site was thoroughly cleaned and covered with a sterile bandage. There were no immediate complications. Post procedure pain relief was greater than 50% with active range of motion and with palpation over the joint.

## 2019-03-25 DIAGNOSIS — M19.011 ARTHRITIS OF RIGHT ACROMIOCLAVICULAR JOINT: ICD-10-CM

## 2019-03-25 DIAGNOSIS — M25.511 CHRONIC RIGHT SHOULDER PAIN: ICD-10-CM

## 2019-03-25 DIAGNOSIS — G89.29 CHRONIC RIGHT SHOULDER PAIN: ICD-10-CM

## 2019-03-25 NOTE — TELEPHONE ENCOUNTER
Tiffany Dunham has called requesting a refill of their controlled medication, Morphine, for the management of chronic pain. Last office visit date: 2/19/19  Last opioid care agreement 12/26/18  Last UDS was done 2/19/19    Date last  was pulled and reviewed : 3/25/19  Last fill date for medication was 2/28/19    Was the patient compliant when the above report was pulled? yes    Analgesia: Patient reports 60% pain relief on current regimen. Aberrancies: No aberrancies noted in the last 30 days. ADL's: Patient states they are able to perform ADL's on current regimen. Adverse Reaction: Patient reports no adverse reactions at this time. Provider's last note and plan of care reviewed? yes  Request forwarded to provider for review.

## 2019-03-26 RX ORDER — MORPHINE SULFATE 15 MG/1
15 TABLET ORAL
Qty: 75 TAB | Refills: 0 | Status: SHIPPED | OUTPATIENT
Start: 2019-03-29 | End: 2019-04-24 | Stop reason: SDUPTHER

## 2019-03-27 ENCOUNTER — OFFICE VISIT (OUTPATIENT)
Dept: ORTHOPEDIC SURGERY | Facility: CLINIC | Age: 84
End: 2019-03-27

## 2019-03-27 VITALS
HEART RATE: 94 BPM | OXYGEN SATURATION: 95 % | SYSTOLIC BLOOD PRESSURE: 160 MMHG | WEIGHT: 269.2 LBS | HEIGHT: 64 IN | DIASTOLIC BLOOD PRESSURE: 72 MMHG | RESPIRATION RATE: 16 BRPM | BODY MASS INDEX: 45.96 KG/M2 | TEMPERATURE: 95.7 F

## 2019-03-27 DIAGNOSIS — M05.742 RHEUMATOID ARTHRITIS INVOLVING LEFT HAND WITH POSITIVE RHEUMATOID FACTOR (HCC): ICD-10-CM

## 2019-03-27 DIAGNOSIS — M18.12 ARTHRITIS OF CARPOMETACARPAL (CMC) JOINT OF LEFT THUMB: ICD-10-CM

## 2019-03-27 DIAGNOSIS — M79.642 LEFT HAND PAIN: ICD-10-CM

## 2019-03-27 DIAGNOSIS — M18.12 DEGENERATIVE ARTHRITIS OF THUMB, LEFT: Primary | ICD-10-CM

## 2019-03-27 NOTE — PATIENT INSTRUCTIONS
Learning About Arthritis at the [facility] of the Thumb What is it? Arthritis at the base of the thumb joint is wear and tear on the cartilage. Cartilage is a firm, thick, slippery tissue. It covers and protects the ends of bones where they meet to form a joint. When you have arthritis, there are changes in the cartilage that cause it to break down. The bones rub together and cause joint damage and pain. What causes it? Experts don't know what causes arthritis at the base of the thumb. But aging, a lot of use, an injury, or family history may play a part. What are the symptoms? Symptoms of arthritis at the base of the thumb include aching in your joint. Or the pain may feel burning or sharp. You may feel clicking, creaking, or catching in the joint. It may get stiff. You may have more pain and less strength when you pinch or  things. Symptoms may come and go, stay the same, or get worse over time. How is it diagnosed? Your doctor can often diagnose arthritis by asking you questions about your joint pain and other symptoms and examining you. You may also have X-rays and blood tests. Blood tests can help make sure another disease isn't causing your symptoms. How is it treated? Arthritis at the base of your thumb may be treated with rest, pain relievers, steroid medicines, using a brace or splint, andin some casessurgery. To help relieve pain in the joint, rest your sore hand. Switch hands for some activities. You can try heat and cold therapy, such as hot compresses, paraffin wax, cold packs, or ice massage. Your doctor may give you a splint to wear during some activities or when pain flares up. You can often manage mild or moderate arthritis pain with over-the-counter pain relievers. These include medicines that reduce swelling, such as ibuprofen or naproxen. You can also use acetaminophen. Sometimes these medicines are in creams that you can rub on your thumb and hand.  Your doctor may also prescribe other medicine for your pain. For some people, steroid shots may be an option. If none of the treatments work, your doctor may discuss surgery with you. Follow-up care is a key part of your treatment and safety. Be sure to make and go to all appointments, and call your doctor if you are having problems. It's also a good idea to know your test results and keep a list of the medicines you take. Where can you learn more? Go to http://braden-estephanie.info/. Enter T110 in the search box to learn more about \"Learning About Arthritis at the [facility] of the Thumb. \" Current as of: Roxy 10, 2018 Content Version: 11.9 © 2299-7952 [employer]. Care instructions adapted under license by [employer] (which disclaims liability or warranty for this information). If you have questions about a medical condition or this instruction, always ask your healthcare professional. [address] any warranty or liability for your use of this information.

## 2019-03-27 NOTE — PROGRESS NOTES
Krysten Nazario is a 82 y.o. female right handed retiree. Worker's Compensation and legal considerations: not known. Vitals:  
 03/27/19 1200 BP: 160/72 Pulse: 94 Resp: 16 Temp: 95.7 °F (35.4 °C) TempSrc: Oral  
SpO2: 95% Weight: 269 lb 3.2 oz (122.1 kg) Height: 5' 4\" (1.626 m) PainSc:   8 Chief Complaint Patient presents with  
 Hand Pain Left hand pain; Pt states trunk closed on hand 1 year ago HPI: Patient comes in today with complaints of left thumb pain ever since she slammed her hand in a trunk in February 2018. She reports initial bruising that went away but there is still some redness around the back of the hand. She denies any new injuries since the original one. She reports she has had achiness in the hand ever since this original injury. She also reports a history of rheumatoid arthritis and was referred to a rheumatologist however she did not go to this rheumatologist due to scheduling issues. Date of onset: February 2019 Injury: Yes: Comment: Slammed thumb in car trunk in 2018 Prior Treatment:  No 
 
Numbness/ Tingling: No 
 
ROS: Review of Systems - General ROS: negative Respiratory ROS: no cough, shortness of breath, or wheezing Cardiovascular ROS: no chest pain or dyspnea on exertion Musculoskeletal ROS: positive for - pain in thumb - left and wrist - left Neurological ROS: negative Dermatological ROS: negative Past Medical History:  
Diagnosis Date  Asthma  Chronic venous insufficiency  Diabetes ([address]  Hypertension  Peripheral neuropathy  Rheumatoid arthritis ([address]  Vertigo Past Surgical History:  
Procedure Laterality Date  HX CHOLECYSTECTOMY  HX GYN    
 TAHOophorectomy due to infection  HX HEENT    
 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  HX ORTHOPAEDIC    
 bilat TKA Current Outpatient Medications Medication Sig Dispense Refill  triamcinolone acetonide (KENALOG) 10 mg/mL injection 1 mL by IntraMUSCular route once for 1 dose. 1 Vial 0  
 triamcinolone acetonide (KENALOG) 10 mg/mL injection 1 mL by IntraMUSCular route once for 1 dose. 1 Vial 0  
 [START ON 3/29/2019] morphine IR (MS IR) 15 mg tablet Take 1 Tab by mouth every eight to twelve (8-12) hours as needed for Pain for up to 30 days. Max Daily Amount: 45 mg. #75 tablets to be budgeted over 30 days. 75 Tab 0  
 naloxone (NARCAN) 4 mg/actuation nasal spray Use 1 spray intranasally, then discard. Repeat with new spray every 2 min as needed for opioid overdose symptoms, alternating nostrils. 1 Each 0  
 capsaicin (CAPZASIN-HP) 0.1 % topical cream Apply cream to bilateral shoulders TID. Avoid use on damaged, broken or irritated skin. Avoid occlusive dressings or heat while using this medication. 42.5 g 2  
 TENS Units (TENS 504) mikal Please provide patient with a TENS unit to help improve function, improve quality of life, reduce medication use, improve ROM. 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 1 Device 0  
 atorvastatin (LIPITOR) 40 mg tablet Take 40 mg by mouth daily.  insulin glargine (LANTUS U-100 INSULIN) 100 unit/mL injection 40 Units by SubCUTAneous route nightly.  ascorbic acid, vitamin C, (VITAMIN C) 500 mg tablet Take 500 mg by mouth daily.  bumetanide (BUMEX) 2 mg tablet Take 2 mg by mouth two (2) times daily as needed.  gabapentin (NEURONTIN) 300 mg capsule Take 1 Cap by mouth three (3) times daily. 270 Cap 3  
 insulin aspart U-100 (NOVOLOG) 100 unit/mL inpn 16 units before each meal 30 mL 3  Blood-Glucose Meter (FREESTYLE LITE METER) monitoring kit Test twice blood glucose daily; ICD-10 E11.9; Quantity 1 1 Kit 0  
 amLODIPine (NORVASC) 10 mg tablet Take 0.5 Tabs by mouth daily.  (Patient taking differently: Take 10 mg by mouth daily.) 90 Tab 3  
 levothyroxine (SYNTHROID) 200 mcg tablet 1 tablet daily (take on an empty stomach) (stop \"old\" synthroid) 90 Tab 3  carvedilol (COREG) 12.5 mg tablet Take 1 Tab by mouth two (2) times daily (with meals). 180 Tab 3  
 insulin syringe,safetyneedle 1 mL 31 gauge x 5/16\" syrg 1 Each by Does Not Apply route daily. 300 Each 3  
 cholecalciferol (VITAMIN D3) 1,000 unit tablet Take 1,000 Units by mouth daily.  glucose blood VI test strips (FREESTYLE TEST) strip Use to check blood glucose twice daily DX:E11.9 300 Strip 3  
 allopurinol (ZYLOPRIM) 100 mg tablet Take 1 Tab by mouth daily. 90 Tab 3  
 magnesium oxide 500 mg tab Take 1 Tab by mouth daily.  acetaminophen (TYLENOL) 500 mg tablet Take 1 Tab by mouth every six (6) hours as needed for Pain. 270 Tab 3  
 aspirin delayed-release 81 mg tablet Take 81 mg by mouth daily. No Known Allergies PE:  
 
Hand: Left-sided pain is localized to the thumb at the level of the MCP joint as well as the [facility] joint. There is significant tenderness to palpation of the joints as well as pain with range of motion. There is a prominence of the MCP joint however it is similar to the contralateral side. There is no gross instability about the MCP joint or the [facility] joint. Examination L Digit(s) R Digit(s) 1st CMC Tenderness +  -   
1st CMC Grind +  - Rajani Nodes -  -   
[place] Nodes -  -   
A1 Pulley Tenderness -  - Triggering -  -   
UCL Instability -  -   
RCL Instability -  - Lateral Stress Pain -  -   
Palmar Cords -  - Tabletop test -  -   
Garrod's Pads -  -   
 Strength Pinch Strength      
 
ROM: Full Imaging: Plain films of the left hand shows moderate degenerative changes of the thumb CMC joint [place] stage II-III as well as moderate degenerative changes of the thumb MCP joint with MCP subluxation. ICD-10-CM ICD-9-CM 1. Degenerative arthritis of thumb, left M18.12 715.34 TRIAMCINOLONE ACETONIDE INJ  
   triamcinolone acetonide (KENALOG) 10 mg/mL injection DRAIN/INJECT SMALL JOINT/BURSA 2. Arthritis of carpometacarpal [place]) joint of left thumb M18.12 716.94 TRIAMCINOLONE ACETONIDE INJ  
   triamcinolone acetonide (KENALOG) 10 mg/mL injection DRAIN/INJECT SMALL JOINT/BURSA 3. Left hand pain M79.642 729.5 AMB POC XRAY, HAND; 3+ VIEWS  
   REFERRAL TO RHEUMATOLOGY 4. Rheumatoid arthritis involving left hand with positive rheumatoid factor (HCC) M05.742 714.0 REFERRAL TO RHEUMATOLOGY Plan:  
 
Left thumb MCP and CMC joint injections today Left cool comfort brace Referral to rheumatology for known rheumatoid arthritis not on medications Follow-up PRN Plan was reviewed with patient, who verbalized agreement and understanding of the plan [facility] PROCEDURE PROGRESS NOTE Chart reviewed for the following: 
 Bernard FULLER DO, have reviewed the History, Physical and updated the Allergic reactions for [employer] TIME OUT performed immediately prior to start of procedure: 
 Bernard FULLER DO, have performed the following reviews on Renee Ledesma prior to the start of the procedure: 
         
* Patient was identified by name and date of birth * Agreement on procedure being performed was verified * Risks and Benefits explained to the patient * Procedure site verified and marked as necessary * Patient was positioned for comfort * Consent was signed and verified Time: 08:40 AM 
 
 
Date of procedure: 3/27/2019 Procedure performed by: Gia Salazar DO 
 
Provider assisted by: Liliya Hermosillo LPN Patient assisted by: self How tolerated by patient: tolerated the procedure well with no complications Post Procedural Pain Scale: 0 - No Hurt Comments: none Procedure: After consent was obtained, using sterile technique the joint was prepped. Local anesthetic used: 1% lidocaine. Kenalog 5 mg X2 and was then injected and the needle withdrawn. The procedure was well tolerated. The patient is asked to continue to rest the area for a few more days before resuming regular activities. It may be more painful for the first 1-2 days. Watch for fever, or increased swelling or persistent pain in the joint. Call or return to clinic prn if such symptoms occur or there is failure to improve as anticipated.

## 2019-04-24 DIAGNOSIS — G89.29 CHRONIC RIGHT SHOULDER PAIN: ICD-10-CM

## 2019-04-24 DIAGNOSIS — M19.011 ARTHRITIS OF RIGHT ACROMIOCLAVICULAR JOINT: ICD-10-CM

## 2019-04-24 DIAGNOSIS — M25.511 CHRONIC RIGHT SHOULDER PAIN: ICD-10-CM

## 2019-04-24 NOTE — TELEPHONE ENCOUNTER
Misty Flor has called requesting a refill of their controlled medication, MS IR 15 mg tab, for the management of chronic shoulder pain. Last office visit date: 2/19/19 with Jose Luis, and has a f/u appt on 5/21/19 with Macey Dumont. Last opioid care agreement 12/26/18  Last UDS was done 2/19/19    Date last  was pulled and reviewed : 4/24/19  Last fill date for medication was 3/29/19    Was the patient compliant when the above report was pulled? yes    Analgesia: Patient reports 60% pain relief on current regimen. Aberrancies: No aberrancies noted in the last 30 days. ADL's: Patient states they are able to perform ADL's on current regimen. Adverse Reaction: Patient reports no adverse reactions at this time. Provider's last note and plan of care reviewed? yes  Request forwarded to provider for review.

## 2019-04-25 RX ORDER — MORPHINE SULFATE 15 MG/1
15 TABLET ORAL
Qty: 75 TAB | Refills: 0 | Status: SHIPPED | OUTPATIENT
Start: 2019-04-27 | End: 2019-05-27

## 2019-05-21 ENCOUNTER — OFFICE VISIT (OUTPATIENT)
Dept: PAIN MANAGEMENT | Age: 84
End: 2019-05-21

## 2019-05-21 VITALS
RESPIRATION RATE: 16 BRPM | OXYGEN SATURATION: 97 % | DIASTOLIC BLOOD PRESSURE: 78 MMHG | TEMPERATURE: 97.1 F | HEART RATE: 89 BPM | HEIGHT: 64 IN | SYSTOLIC BLOOD PRESSURE: 181 MMHG | BODY MASS INDEX: 45.93 KG/M2 | WEIGHT: 269 LBS

## 2019-05-21 DIAGNOSIS — Z79.899 ENCOUNTER FOR LONG-TERM (CURRENT) USE OF HIGH-RISK MEDICATION: ICD-10-CM

## 2019-05-21 DIAGNOSIS — M79.10 MYALGIA: ICD-10-CM

## 2019-05-21 DIAGNOSIS — M25.511 CHRONIC RIGHT SHOULDER PAIN: ICD-10-CM

## 2019-05-21 DIAGNOSIS — G89.29 CHRONIC RIGHT SHOULDER PAIN: ICD-10-CM

## 2019-05-21 DIAGNOSIS — S46.311A TRICEPS STRAIN, RIGHT, INITIAL ENCOUNTER: ICD-10-CM

## 2019-05-21 DIAGNOSIS — M19.011 ARTHRITIS OF RIGHT ACROMIOCLAVICULAR JOINT: Primary | ICD-10-CM

## 2019-05-21 RX ORDER — HYDROCODONE BITARTRATE AND ACETAMINOPHEN 10; 325 MG/1; MG/1
1 TABLET ORAL
Qty: 90 TAB | Refills: 0 | Status: SHIPPED | OUTPATIENT
Start: 2019-05-29 | End: 2019-06-11 | Stop reason: SINTOL

## 2019-05-21 RX ORDER — DOCUSATE SODIUM 100 MG/1
100 CAPSULE, LIQUID FILLED ORAL
Qty: 60 CAP | Refills: 2 | Status: SHIPPED | OUTPATIENT
Start: 2019-05-21 | End: 2019-08-19

## 2019-05-21 NOTE — PROGRESS NOTES
Referred 2016 from orthopedics for right shoulder pain  Pt was last seen here on  March 7, 2019  Calculated MEQ - up to 45  Naloxone rescue -yes  Prophylactic bowel program -yes  Date of last OCA  December 2018  Last UDS  February 19, 2019,findings consistent   checked today and findings were consistent. GIC-3 and 7  MACKENZIE -52%  COMM- 0     HPI:  Gay Lazo  Is a 82 y.o. female here for f/u visit for ongoing evaluation of bilateral shoulder pain, . Pt denies interval changes in the character or distribution of pain. Pain is located at bilateral shoulders overlying the acromion and lateral and anterior to the acromion. The pain is described as aching and burning and ranges from 7 to 10/10. She had trigger point injections and right AC joint injection injection done on March 7, 2019. The [facility] joint injection gave some relief for a couple of weeks but the duration was not as long as the one done in October. From the left-sided trigger point injections done last visit she reports ongoing benefit with reduce pain and improve range of motion of the shoulder. She had a right AC joint injection done in October 2018 with significant prolonged relief. She is reporting acute onset of some stabbing pain near the lateral head of the right triceps which is been present for 2 to 3 weeks. She does not recall a mechanism but notes tenderness in the area with some increased muscle mass just posterior to the deltoid tubercle. --Tylenol 500 mg tablets as needed continues to be helpful  --Capsaicin topical cream mildly helpful.  Gabapentin 300 mg 3 times daily, hleps for the feet but not for the shoulders.  TENS unit was obtained and this is helpful in many locations. --Morphine IR 15 mg up to 3 times daily with 75 tablets budgeted for 30 days  Pt reports partial but incomplete analgesia with the current opioid regimen which helps to improve activity tolerance and function.  Pt denies aberrant behaviors or any adverse effects. ROS:Review of systems is negative for fever, chills, nausea, vomiting, diarrhea, constipation, chest pain, shortness of breath, abdominal pain, focal weakness, trouble swallowing, acute changes in vision, acute changes in hearing, dizziness, depression, anxiety, suicidal ideation, homicidal ideation, alcohol use. Review of systems positive for falls. Opioid specific risk:advanced age, polypharmacy, asthma, vertigo, GERD, history of depression, obesity, hx of falls    Visit Vitals  /78 (BP 1 Location: Left arm, BP Patient Position: Sitting)   Pulse 89   Temp 97.1 °F (36.2 °C) (Oral)   Resp 16   Ht 5' 4\" (1.626 m)   Wt 122 kg (269 lb)   SpO2 97%   BMI 46.17 kg/m²        PE:  AFVSS, no acute distress, endomorphic body habitus. A&OXs 3. Speech is clear and appropriate. Mood is pleasant and appropriate. Patient is cooperative. Breathing is nonlabored. Tenderness to palpation at the [facility]. No significant tenderness to palpation at the left shoulder. No tenderness to palpation directly over the right deltoid bursa. Tenderness to palpation with increased soft tissue mass near the right lateral head of triceps. Right triceps strength is 5/5 but with tenderness upon testing. Gait is within functional limits. Balance is within functional limits on smooth even surfaces.           Primary Care Physician  Other, MD Octavio  Patient can only remember the practice name and not the physician  None        PHQ -- .  3 most recent PHQ Screens 5/21/2019   Little interest or pleasure in doing things Not at all   Feeling down, depressed, irritable, or hopeless Not at all   Total Score PHQ 2 0   Trouble falling or staying asleep, or sleeping too much -   Feeling tired or having little energy -   Poor appetite, weight loss, or overeating -   Feeling bad about yourself - or that you are a failure or have let yourself or your family down -   Trouble concentrating on things such as school, work, reading, or watching TV -   Moving or speaking so slowly that other people could have noticed; or the opposite being so fidgety that others notice -   Thoughts of being better off dead, or hurting yourself in some way -   PHQ 9 Score -   How difficult have these problems made it for you to do your work, take care of your home and get along with others -            Assessment/Plan:   Encounter Diagnoses     ICD-10-CM ICD-9-CM   1. Arthritis of right acromioclavicular joint M19.011 716.91   2. Chronic right shoulder pain M25.511 719.41    G89.29 338.29   3. Myalgia M79.10 729.1   4. Triceps strain, right, initial encounter S46.311A 840.8   5. Encounter for long-term (current) use of high-risk medication Z79.899 V58.69        Is     --I do not recommend long-term opioid therapy for this person's chronic pain. I believe the risks outweigh any potential benefits. We will progress with a gradual opioid wean. Patient was educated on signs and symptoms of opioid withdrawal and advised to call the clinic should these symptoms arise so that we may provide support as needed. --We will provide the patient with an opioid rotation from morphine to [address] [place] [address],[address].   Patient provided with Norco 10/325 up to 3 times daily with 90 tablets provided for 30 days. No morphine equivalent now at 30 maximum per day. Consider maintaining this rate until next office visit. --Patient may call as needed to reschedule the left shoulder region trigger point injections or the right [facility] joint injection. --We will continue to monitor the apparent right triceps strain. Patient advised to seek care with primary care provider or emergency department if symptoms in the right upper extremity suddenly worsen. She was advised to continue intermittent use of ice over the painful triceps region.     --Follow-up  in 3 months with Jun/Ac    GOALS:  To establish complementary and integrative plan of care to address chronic pain issues while minimizing pharmaceuticals to maximize patient's function improve quality of life. Education:  Patient again educated on the importance of strict compliance with the opioid care agreement while on opioid therapy. Patient also again educated that they should avoid driving while on chronic opioid therapy. Also advised to avoid alcohol and to avoid benzodiazepines while on opioid therapy. A total of 24 minutes were spent with the patient, of which more than half of the time was spent counseling and/or coordinating care. F/u:. Follow-up Disposition:  Follow-up and Dispositions    · Return in about 3 months (around 8/21/2019) for 30 min.

## 2019-05-21 NOTE — PATIENT INSTRUCTIONS
Safe Use of Opioid Pain Medicine: Care Instructions Your Care Instructions Pain is your body's way of warning you that something is wrong. Pain feels different for everybody. Only you can describe your pain. A doctor can suggest or prescribe many types of medicines for pain. These range from over-the-counter medicines like acetaminophen (Tylenol) to powerful medicines called opioids. Examples of opioids are fentanyl, hydrocodone, morphine, and oxycodone. Heroin is an illegal opioid Opioids are strong medicines. They can help you manage pain when you use them the right way. But if you misuse them, they can cause serious harm and even death. For these reasons, doctors are very careful about how they prescribe opioids. If you decide to take opioids, here are some things to remember. · Keep your doctor informed. You can get addicted to opioids. The risk is higher if you have a history of substance use. Your doctor will monitor you closely for signs of misuse and addiction and to figure out when you no longer need to take opioids. · Make a treatment plan. The goal of your plan is to be able to function and do the things you need to do, even if you still have some pain. You might be able to manage your pain with other non-opioid options like physical therapy, relaxation, or over-the-counter pain medicines. · Be aware of the side effects. Opioids can cause serious side effects, such as constipation, dry mouth, and nausea. And over time, you may need a higher dose to get pain relief. This is called tolerance. Your body also gets used to opioids. This is called physical dependence. If you suddenly stop taking them, you may have withdrawal symptoms. The doctor carefully considered what pain medicine is right for you. You may not have received opioids if your doctor was concerned about drug interactions or your safety, or if he or she had other concerns. It is best to have one doctor or clinic treat your pain. This way you will get the pain medicine that will help you the most. And a doctor will be able to watch for any problems that the medicine might cause. The doctor has checked you carefully, but problems can develop later. If you notice any problems or new symptoms,  get medical treatment right away. Follow-up care is a key part of your treatment and safety. Be sure to make and go to all appointments, and call your doctor if you are having problems. It's also a good idea to know your test results and keep a list of the medicines you take. How can you care for yourself at home? If you need to take opioids to manage your pain, remember these safety tips. · Follow directions carefully. It's easy to misuse opioids if you take a dose other than what's prescribed by your doctor. This can lead to overdose and even death. Even sharing them with someone they weren't meant for is misuse. · Be cautious. Opioids may affect your judgment and decision making. Do not drive or operate machinery until you can think clearly. Talk with your doctor about when it is safe to drive. · Reduce the risk of drug interactions. Opioids can be dangerous if you take them with alcohol or with certain drugs like sleeping pills and muscle relaxers. Make sure your doctor knows about all the other medicines you take, including over-the-counter medicines. Don't start any new medicines before you talk to your doctor or pharmacist. 
· Safely store and dispose of opioids. Store opioids in a safe and secure place. Make sure that pets, children, friends, and family can't get to them. When you're done using opioids, make sure to dispose of them safely and as quickly as possible. The U.S. Food and Drug Administration (FDA) recommends these disposal options.  
? The best option is to take your medicine to a drop-off box or take-back program that is authorized by the [employer] [address] [employer] ([employer]). ? If these programs aren't available in your area and your medicine doesn't have specific disposal instructions (such as flushing), you can throw them into your household trash if you follow the FDA's instructions. Visit fda.gov and search for \"unused medicine disposal.\" 
? If you have opioid patches (used or unused), your options are to take them to a [employer]-authorized site or flush them down the toilet. Do not throw them in the trash. ? Only flush your medicine down the toilet if you can't get to a [employer]-approved site or your medicine instructions state clearly to flush them. · Reduce the risk of overdose. Misuse of opioids can be very dangerous. Protect yourself by asking your doctor about a naloxone rescue kit. It can help youand even save your lifeif you take too much of an opioid. Try other ways to reduce pain. · Relax, and reduce stress. Relaxation techniques such as deep breathing or meditation can help. · Keep moving. Gentle, daily exercise can help reduce pain over the long run. Try low- or no-impact exercises such as walking, swimming, and stationary biking. Do stretches to stay flexible. · Try heat, cold packs, and massage. · Get enough sleep. Pain can make you tired and drain your energy. Talk with your doctor if you have trouble sleeping because of pain. · Think positive. Your thoughts can affect your pain level. Do things that you enjoy to distract yourself when you have pain instead of focusing on the pain. See a movie, read a book, listen to music, or spend time with a friend. If you are not taking a prescription pain medicine, ask your doctor if you can take an over-the-counter medicine. When should you call for help? Call your doctor now or seek immediate medical care if: 
  · You have a new kind of pain.  
  · You have new symptoms, such as a fever or rash, along with the pain.  Watch closely for changes in your health, and be sure to contact your doctor if: 
  · You think you might be using too much pain medicine, and you need help to use less or stop.  
  · Your pain gets worse.  
  · You would like a referral to a doctor or clinic that specializes in pain management. Where can you learn more? Go to http://braden-estephanie.info/. Enter R108 in the search box to learn more about \"Safe Use of Opioid Pain Medicine: Care Instructions. \" Current as of: Roxy 3, 2018 Content Version: 11.9 © 1741-7917 [employer]. Care instructions adapted under license by [employer] (which disclaims liability or warranty for this information). If you have questions about a medical condition or this instruction, always ask your healthcare professional. [address] any warranty or liability for your use of this information. Learning About Sleeping Well What does sleeping well mean? Sleeping well means getting enough sleep. How much sleep is enough varies among people. The number of hours you sleep is not as important as how you feel when you wake up. If you do not feel refreshed, you probably need more sleep. Another sign of not getting enough sleep is feeling tired during the day. The average total nightly sleep time is 7½ to 8 hours. Healthy adults may need a little more or a little less than this. Why is getting enough sleep important? Getting enough quality sleep is a basic part of good health. When your sleep suffers, your mood and your thoughts can suffer too. You may find yourself feeling more grumpy or stressed. Not getting enough sleep also can lead to serious problems, including injury, accidents, anxiety, and depression. What might cause poor sleeping? Many things can cause sleep problems, including: · Stress.  Stress can be caused by fear about a single event, such as giving a speech. Or you may have ongoing stress, such as worry about work or school. · Depression, anxiety, and other mental or emotional conditions. · Changes in your sleep habits or surroundings. This includes changes that happen where you sleep, such as noise, light, or sleeping in a different bed. It also includes changes in your sleep pattern, such as having jet lag or working a late shift. · Health problems, such as pain, breathing problems, and restless legs syndrome. · Lack of regular exercise. How can you help yourself? Here are some tips that may help you sleep more soundly and wake up feeling more refreshed. Your sleeping area · Use your bedroom only for sleeping and sex. A bit of light reading may help you fall asleep. But if it doesn't, do your reading elsewhere in the house. Don't watch TV in bed. · Be sure your bed is big enough to stretch out comfortably, especially if you have a sleep partner. · Keep your bedroom quiet, dark, and cool. Use curtains, blinds, or a sleep mask to block out light. To block out noise, use earplugs, soothing music, or a \"white noise\" machine. Your evening and bedtime routine · Create a relaxing bedtime routine. You might want to take a warm shower or bath, listen to soothing music, or drink a cup of noncaffeinated tea. · Go to bed at the same time every night. And get up at the same time every morning, even if you feel tired. What to avoid · Limit caffeine (coffee, tea, caffeinated sodas) during the day, and don't have any for at least 4 to 6 hours before bedtime. · Don't drink alcohol before bedtime. Alcohol can cause you to wake up more often during the night. · Don't smoke or use tobacco, especially in the evening. Nicotine can keep you awake. · Don't take naps during the day, especially close to bedtime. · Don't lie in bed awake for too long.  If you can't fall asleep, or if you wake up in the middle of the night and can't get back to sleep within 15 minutes or so, get out of bed and go to another room until you feel sleepy. · Don't take medicine right before bed that may keep you awake or make you feel hyper or energized. Your doctor can tell you if your medicine may do this and if you can take it earlier in the day. If you can't sleep · Imagine yourself in a peaceful, pleasant scene. Focus on the details and feelings of being in a place that is relaxing. · Get up and do a quiet or boring activity until you feel sleepy. · Don't drink any liquids after 6 p.m. if you wake up often because you have to go to the bathroom. Where can you learn more? Go to http://braden-estephanie.info/. Enter P079 in the search box to learn more about \"Learning About Sleeping Well. \" Current as of: September 11, 2018 Content Version: 11.9 © 4909-0072 [employer]. Care instructions adapted under license by [employer] (which disclaims liability or warranty for this information). If you have questions about a medical condition or this instruction, always ask your healthcare professional. [place] [address] any warranty or liability for your use of this information.

## 2019-05-21 NOTE — PROGRESS NOTES
Nursing Notes    Patient presents to the office today in follow-up. Patient rates her pain at 10/10 on the numerical pain scale. Reviewed medications with counts as follows:    Rx Date filled Qty Dispensed Pill Count Last Dose Short   Morphine sulfate IR 15 mg 04/29/19 75 25 yesterday no                                       reviewed YES  Any aberrancies noted on  NO  Last opioid agreement 12/26/18  Last urine drug screen 02/19/19    Comments:     POC UDS was not performed in office today. Any new labs or imaging since last appointment? NO    Have you been to an emergency room (ER) or urgent care clinic since your last visit? NO            Have you been hospitalized since your last visit? NO     If yes, where, when, and reason for visit? Have you seen or consulted any other health care providers outside of the [address] [place] [address]  since your last visit? YES  If yes, where, when, and reason for visit? orthopedic  Ms. Ledesma has a reminder for a \"due or due soon\" health maintenance. I have asked that she contact her primary care provider for follow-up on this health maintenance.     3 most recent PHQ Screens 5/21/2019   Little interest or pleasure in doing things Not at all   Feeling down, depressed, irritable, or hopeless Not at all   Total Score PHQ 2 0   Trouble falling or staying asleep, or sleeping too much -   Feeling tired or having little energy -   Poor appetite, weight loss, or overeating -   Feeling bad about yourself - or that you are a failure or have let yourself or your family down -   Trouble concentrating on things such as school, work, reading, or watching TV -   Moving or speaking so slowly that other people could have noticed; or the opposite being so fidgety that others notice -   Thoughts of being better off dead, or hurting yourself in some way -   PHQ 9 Score -   How difficult have these problems made it for you to do your work, take care of your home and get along with others -     Abuse Screening Questionnaire 5/21/2019   Do you ever feel afraid of your partner? N   Are you in a relationship with someone who physically or mentally threatens you? N   Is it safe for you to go home? Y     The pt does not have an advanced medical directive, POA, or living will.

## 2019-06-11 DIAGNOSIS — M25.511 CHRONIC RIGHT SHOULDER PAIN: ICD-10-CM

## 2019-06-11 DIAGNOSIS — G89.29 CHRONIC RIGHT SHOULDER PAIN: ICD-10-CM

## 2019-06-11 DIAGNOSIS — M19.011 ARTHRITIS OF RIGHT ACROMIOCLAVICULAR JOINT: Primary | ICD-10-CM

## 2019-06-11 RX ORDER — MORPHINE SULFATE 15 MG/1
15 TABLET ORAL
Qty: 60 TAB | Refills: 0 | Status: SHIPPED | OUTPATIENT
Start: 2019-06-12 | End: 2019-07-08 | Stop reason: SDUPTHER

## 2019-06-28 ENCOUNTER — HOSPITAL ENCOUNTER (OUTPATIENT)
Dept: PHYSICAL THERAPY | Age: 84
Discharge: HOME | End: 2019-06-28
Payer: MEDICARE

## 2019-06-28 PROCEDURE — 97110 THERAPEUTIC EXERCISES: CPT

## 2019-06-28 PROCEDURE — 97162 PT EVAL MOD COMPLEX 30 MIN: CPT

## 2019-06-28 NOTE — PROGRESS NOTES
[facility]  [place]  [address] [place] [address]  [phone] fax    Plan of Care/ Statement of Necessity for Physical Therapy Services    Patient name: Amee Everett Start of Care: 2019   Referral source: Deshawn Mora MD : 1935    Medical Diagnosis: Ataxia, unspecified [R27.0]  Weakness [R53.1]  Unspecified osteoarthritis, unspecified site [M19.90]  Payor: [place] MED[place]ARE / Plan: [place] MEDICARE PART A & B / Product Type: Medicare /  Onset Date: years ago, most recent fall was one month ago    Treatment Diagnosis: difficulty with ambulation    Prior Hospitalization: see medical history Provider#: [id number]   Medications: Verified on Patient summary List    Comorbidities:  CHF, diabetes, HTN, arthritis, thyroid, back pain, right ankle pain, B shoulder pain   Prior Level of Function: ambulation with SPC or walker, lives alone, Ind with ADLs     The Plan of Care and following information is based on the information from the initial evaluation. Assessment/ key information:  Pt. Is an 82year old female c/o decreased strength and balance. She reports most recent fall was a month ago when she fell backwards carrying laundry. She denies feeling dizzy or light headed. She also reports having difficulty getting back up after falling. She presents with decreased B hip strength. She has decreased balance with Rhomberg stance with eyes open and LOB with eyes closed. TUG test was 23 seconds with SPC. 10m walk test was 0.48m/s with SPC. Skilled PT is medically necessary in order to improve LE strength and balance for increased ease of ambulation and improved safety at home.      Evaluation Complexity History HIGH Complexity :3+ comorbidities / personal factors will impact the outcome/ POC ; Examination MEDIUM Complexity : 3 Standardized tests and measures addressing body structure, function, activity limitation and / or participation in recreation  ;Presentation MEDIUM Complexity : Evolving with changing characteristics  ; Clinical Decision Making MEDIUM Complexity : FOTO score of 26-74  Overall Complexity Rating: MEDIUM  Problem List: pain affecting function, decrease ROM, decrease strength, edema affecting function, impaired gait/ balance, decrease ADL/ functional abilitiies, decrease activity tolerance, decrease flexibility/ joint mobility and decrease transfer abilities   Treatment Plan may include any combination of the following: Therapeutic exercise, Therapeutic activities, Neuromuscular re-education, Physical agent/modality, Gait/balance training, Manual therapy, Patient education, Self Care training, Functional mobility training and Home safety training  Patient / Family readiness to learn indicated by: asking questions  Persons(s) to be included in education: patient (P)  Barriers to Learning/Limitations: None  Patient Goal (s): to walk better  Patient Self Reported Health Status: fair  Rehabilitation Potential: good    Short Term Goals: To be accomplished in 1 weeks:  1. Patient will demonstrate compliance with HEP in order to improve LE strength for increased ease of ambulation     Long Term Goals: To be accomplished in 8 weeks:  1. Patient will improve FOTO score by 43 points in order to demonstrate a significant improvement in function. 2. Patient will improve TUG test time to 15 seconds in order to demonstrate improving functional mobility at home. 3. Patient will improve gait speed during 10m walk test to 0.6m/s in order to increase ease of ambulation in community. 4. Patient will be Ind with floor to stand transfer in order to increase safety at home. Frequency / Duration: Patient to be seen 2 times per week for 8 weeks.     Patient/ CarPatient/ Caregiver education and instruction: Diagnosis, prognosis, exercises   [x]  Plan of care has been reviewed with BRANDI Dnig, PT 6/28/2019 02:69 AM    Re-certification period: 6/28/19-8/27/19    ________________________________________________________________________    I certify that the above Therapy Services are being furnished while the patient is under my care. I agree with the treatment plan and certify that this therapy is necessary.     Physician's Signature:____________Date:_________TIME:________    ** Signature, Date and Time must be completed for valid certification **    Please sign and return to [facility]  [address] [place] [address]  [phone] fax

## 2019-06-28 NOTE — PROGRESS NOTES
PT DAILY TREATMENT NOTE/NEURO EVAL 10-18    Patient Name: Angelika Hall  Date:2019  : 1935  [x]  Patient  Verified  Payor: Mounika Hummel / Plan: [place] MEDICARE PART A & B / Product Type: Medicare /    In time: 9:30  Out time: 10:18  Total Treatment Time (min): 48  Visit #: 1 of 16    Medicare/BCBS Only   Total Timed Codes (min):  8 1:1 Treatment Time:  48     Treatment Area: Ataxia, unspecified [R27.0]  Weakness [R53.1]  Unspecified osteoarthritis, unspecified site [M19.90]    SUBJECTIVE  Pain Level (0-10 scale):  8/10  []constant []intermittent []improving []worsening []no change since onset    Any medication changes, allergies to medications, adverse drug reactions, diagnosis change, or new procedure performed?: [x] No    [] Yes (see summary sheet for update)  Subjective functional status/changes:     Mechanism of Injury:  Reports difficulty with walking and balance. Reports having bad back and shoulder problems. About a month ago had a fall. Was coming in with laundry and groceries, lost balance falling backwards. Has been falling for years. Ind with showering and getting dressed. Denies dizziness and light headedness. Reports right leg and foot gets swollen. Reports she inspects her feet regulator. Uses cane and walker. Living Situation:  Lives by herself.    Pt Goals: to get to walking better    OBJECTIVE/EXAMINATION    8 min Therapeutic Exercise:  [] See flow sheet : HEP   Rationale: increase ROM, increase strength and improve coordination to improve the patients ability to increase ease of ADLs          With   [x] TE   [] TA   [] neuro   [] other: Patient Education: [x] Review HEP    [] Progressed/Changed HEP based on:   [] positioning   [] body mechanics   [] transfers   [] heat/ice application    [] other:      Physical Therapy Evaluation  Neurologic    Gait: [] Normal    [x] Abnormal    Device: SPC      Describe: wide CAITIE, decreased step length    ROM: Strength (MMT):                                          Hip L (1-5) R (1-5)   Hip Flexion 3 3   Hip Ext     Hip ABD     Hip ADD     Hip ER     Hip IR       Knee L (1-5) R (1-5)   Knee Flexion 4+ 4+   Knee Extension 4+ 4+   Ankle PF 4 4   Ankle DF 4 4   Other       Sensation: poor sensation in B feet up to knees    Balance/ Equilibrium:                    Sitting Balance: Static:  [x] Good    [] Fair    [] Poor     Dynamic:   [] Good    [] Fair    [] Poor        Standing Balance: Static:   [] Good    [x] Fair    [] Poor     Dynamic:   [] Good    [] Fair    [x] Poor        Protective Extension:  [] Present    [] Delayed    [x] Absent         Behavior: [x] Cooperative    [] Impulsive    [] Agitated    [] Perseverative    [] Confused   Oriented x:    Cognition: [] One Step Commands   [x] Multiple Commands   [] Displays Neglect [] R  [] L    Other test /comments:  TUG test: 23 seconds  10m walk test: 0.48m/s       Pain Level (0-10 scale) post treatment:  8/10    ASSESSMENT/Changes in Function:      [x]  See Plan of Care  []  See progress note/recertification  []  See Discharge Summary         Progress towards goals / Updated goals:  See POC    PLAN  []  Upgrade activities as tolerated     [x]  Continue plan of care  []  Update interventions per flow sheet       []  Discharge due to:_  []  Other:_      Hiren Trammell PT 6/28/2019  9:37 AM

## 2019-07-01 ENCOUNTER — HOSPITAL ENCOUNTER (OUTPATIENT)
Dept: PHYSICAL THERAPY | Age: 84
Discharge: HOME | End: 2019-07-01
Payer: MEDICARE

## 2019-07-01 PROCEDURE — 97110 THERAPEUTIC EXERCISES: CPT

## 2019-07-01 PROCEDURE — 97112 NEUROMUSCULAR REEDUCATION: CPT

## 2019-07-01 NOTE — PROGRESS NOTES
PT DAILY TREATMENT NOTE 10-18    Patient Name: Tessy Malave  Date:2019  : 1935  [x]  Patient  Verified  Payor: [place] MEDICARE / Plan: [place] MEDICARE PART A & B / Product Type: Medicare /    In time:1:23  Out time:1:57  Total Treatment Time (min): 34  Visit #: 2 of 16    Medicare/BCBS Only   Total Timed Codes (min):  34 1:1 Treatment Time:  34       Treatment Area: Weakness [R53.1]  Unspecified osteoarthritis, unspecified site [M19.90]  Difficulty in walking, not elsewhere classified [R26.2]    SUBJECTIVE  Pain Level (0-10 scale): 0/10  Any medication changes, allergies to medications, adverse drug reactions, diagnosis change, or new procedure performed?: [x] No    [] Yes (see summary sheet for update)  Subjective functional status/changes:   [] No changes reported  Pt reports she has neuropathy in both of her feet and she just found out she has macular degeneration. Pt reports she woke up with a headache and diarrhea this morning. She had a piece of pizza last night for the first time in a long time, and she's wondering if that's what upset her stomach. She insists on staying for therapy today. She is going to call her MD after her therapy session to inform him of her sx. Pt reports her lower legs have been red since she was a little girl. Her MD is aware of the swelling in both legs and is monitoring. She has had swelling in her lower legs as long as she can remember. She was just fitted for compression stockings recently.       OBJECTIVE      24 min Therapeutic Exercise:  [x] See flow sheet :   Rationale: increase strength, improve coordination, improve balance and increase proprioception to improve the patients ability to improve ease/safety with ADLs and daily tasks      10 min Neuromuscular Re-education:  [x]  See flow sheet :   Rationale: increase strength, improve coordination, improve balance and increase proprioception  to improve the patients ability to reduce fall risk and improve ease/safety with daily tasks        With   [] TE   [] TA   [] neuro   [] other: Patient Education: [x] Review HEP    [] Progressed/Changed HEP based on:   [] positioning   [] body mechanics   [] transfers   [] heat/ice application    [] other:      Other Objective/Functional Measures:     Cues to kick slightly posterior with standing hip abduction to increase glute activation    Hands hovering over parallel bars with intermittent hand-held assist with Romberg EC on floor and Romberg EO on foam    Girth 6\" distal to patella: Right 46.9cm, Left 46.2cm  Redness B shins  Mild tenderness B calves     Well's Clinical Prediction Rules  (+) Alternative diagnosis as likely as or more likely than DVT. (Points: -2 )   Total points: -2 POINTS  Analysis:  Category: Low Risk (3%). Risk score interpretation: Low probability of DVT. /88, , OT 98% bpm taken electronically following session when pt informed therapist she had a headache. Pain Level (0-10 scale) post treatment: 0/10    ASSESSMENT/Changes in Function:     Initiated treatment per POC. Pt was motivated in therapy and put forth good effort with interventions. Performed light interventions secondary to patient's upset stomach this visit, and pt was advised to cancel therapy sessions in the future if she was sick. Pt reported her stomach felt better following session, but she is going to call MD today to inform him of her sx. Will continue to address strength and static/dynamic balance deficits in order to reduce fall risk and improve ease/safety with daily tasks.       Patient will continue to benefit from skilled PT services to modify and progress therapeutic interventions, address functional mobility deficits, address ROM deficits, address strength deficits, analyze and address soft tissue restrictions, analyze and cue movement patterns, assess and modify postural abnormalities, address imbalance/dizziness and instruct in home and community integration to attain remaining goals. Progress towards goals / Updated goals:  Short Term Goals: To be accomplished in 1 weeks:  1. Patient will demonstrate compliance with HEP in order to improve LE strength for increased ease of ambulation      Long Term Goals: To be accomplished in 8 weeks:  1. Patient will improve FOTO score by 43 points in order to demonstrate a significant improvement in function. 2. Patient will improve TUG test time to 15 seconds in order to demonstrate improving functional mobility at home. 3. Patient will improve gait speed during 10m walk test to 0.6m/s in order to increase ease of ambulation in community.    4. Patient will be Ind with floor to stand transfer in order to increase safety at home.      PLAN  []  Upgrade activities as tolerated     [x]  Continue plan of care  []  Update interventions per flow sheet       []  Discharge due to:_  []  Other:_      Young Donovan, ADRIANA 7/1/2019  1:26 PM    Future Appointments   Date Time Provider Srinivas Saba   7/1/2019  1:30 PM Teri Jones, PT [facility] [address]   7/8/2019 12:30 PM Gisell Tran, PT [facility] [address]   7/11/2019 10:30 AM Dima MCKEON [facility] [address]   7/15/2019  2:00 PM Leigh Ojeda, PT [facility] [address]   7/18/2019 11:30 AM Durand Closs, PT [facility] [address]   7/24/2019  2:00 PM Oneda Rusty, PT [facility] [address]   7/26/2019 10:30 AM Gisell Tran PT [facility] [address]   7/31/2019 12:00 PM Durand Closs, PT [facility] [address]   8/2/2019 11:00 AM Kenada Rusty, PT [facility] [address]   8/5/2019 11:30 AM Leigh Ojeda, PT [facility] [address]   8/7/2019  9:30 AM Kunal Barahona PA [facility]   8/7/2019  2:00 PM Leigh Ojeda, PT [facility] [address]

## 2019-07-08 ENCOUNTER — APPOINTMENT (OUTPATIENT)
Dept: PHYSICAL THERAPY | Age: 84
End: 2019-07-08
Payer: MEDICARE

## 2019-07-08 DIAGNOSIS — G89.29 CHRONIC RIGHT SHOULDER PAIN: ICD-10-CM

## 2019-07-08 DIAGNOSIS — M25.511 CHRONIC RIGHT SHOULDER PAIN: ICD-10-CM

## 2019-07-08 DIAGNOSIS — M19.011 ARTHRITIS OF RIGHT ACROMIOCLAVICULAR JOINT: ICD-10-CM

## 2019-07-08 NOTE — TELEPHONE ENCOUNTER
[facility] has called requesting a refill of their controlled medication, MS IR 15 mg tab, for the management of chronic shoulder pain.     Last office visit date: 5/21/19 with Justino Cai, and has a f/u appt with FLAKO Amaya on 8/7/19.     Last opioid care agreement 12/26/18  Last UDS was done 2/19/19     Date last  was pulled and reviewed : 7/8/19  Last fill date for medication was 6/12/19     Was the patient compliant when the above report was pulled? yes     Analgesia: Patient reports 50-60% pain relief on current regimen.     Aberrancies: No aberrancies noted in the last 30 days.     ADL's: Patient states they are able to perform ADL's on current regimen.     Adverse Reaction: Patient reports no adverse reactions at this time.     Provider's last note and plan of care reviewed? yes  Request forwarded to provider for review. D/t provider, Justino Cai being out of the office, will route refill request to AMARILIS Barahona PA-C for review.

## 2019-07-09 RX ORDER — MORPHINE SULFATE 15 MG/1
15 TABLET ORAL
Qty: 60 TAB | Refills: 0 | Status: SHIPPED | OUTPATIENT
Start: 2019-07-12 | End: 2019-08-07 | Stop reason: SDUPTHER

## 2019-07-09 NOTE — TELEPHONE ENCOUNTER
Spoke with patient after getting 2 points of identity and informed her that we have a prescription ready to pickup at the office, she needs to be here no later than 3:45pm Monday thru Friday and bring a valid ID. Patient stated due to having PT this morning, she will come tomorrow.

## 2019-07-11 ENCOUNTER — HOSPITAL ENCOUNTER (OUTPATIENT)
Dept: PHYSICAL THERAPY | Age: 84
Discharge: HOME | End: 2019-07-11
Payer: MEDICARE

## 2019-07-11 PROCEDURE — 97112 NEUROMUSCULAR REEDUCATION: CPT

## 2019-07-11 PROCEDURE — 97110 THERAPEUTIC EXERCISES: CPT

## 2019-07-11 NOTE — PROGRESS NOTES
PT DAILY TREATMENT NOTE 10-18    Patient Name: Kaur Ledesma  Date:2019  : 1935  [x]  Patient  Verified  Payor: Magda Giraldo / Plan: [place] MEDICARE PART A & B / Product Type: Medicare /    In time: 10:35  Out time:11:05  Total Treatment Time (min): 30  Visit #: 3 of 16    Medicare/BCBS Only   Total Timed Codes (min):  30 1:1 Treatment Time:  25       Treatment Area: Weakness [R53.1]  Unspecified osteoarthritis, unspecified site [M19.90]  Difficulty in walking, not elsewhere classified [R26.2]    SUBJECTIVE  Pain Level (0-10 scale): 6/10  Any medication changes, allergies to medications, adverse drug reactions, diagnosis change, or new procedure performed?: [x] No    [] Yes (see summary sheet for update)  Subjective functional status/changes:   [] No changes reported  Pt reports having pain along her back and B legs today; she didn't notices any change in activity    OBJECTIVE    20 min Therapeutic Exercise:  [x] See flow sheet :   Rationale: increase strength, improve coordination, improve balance and increase proprioception to improve the patients ability to improve ease/safety with ADLs and daily tasks     10 min Neuromuscular Re-education:  [x]  See flow sheet :   Rationale: increase strength, improve coordination, improve balance and increase proprioception  to improve the patients ability to reduce fall risk and improve ease/safety with daily tasks         With   [] TE   [] TA   [] neuro   [] other: Patient Education: [x] Review HEP    [] Progressed/Changed HEP based on:   [] positioning   [] body mechanics   [] transfers   [] heat/ice application    [] other:      Other Objective/Functional Measures:    LOB multiple times during standing with EC on firm floor, fairly good balance with standing on foam   No significant fatigued with therex    Poor form with mini squat, required mod VCs      Pain Level (0-10 scale) post treatment:-5/10    ASSESSMENT/Changes in Function: pt present with mod pain of back but denied modalities. She tolerated therex and balance training with no significant fatigue or pain. Pt demonstrates good motivation; will progress to dynamic balance activities and increase reps for therex as tolerated. Patient will continue to benefit from skilled PT services to modify and progress therapeutic interventions, address functional mobility deficits, address ROM deficits, address strength deficits, analyze and address soft tissue restrictions, analyze and cue movement patterns, assess and modify postural abnormalities, address imbalance/dizziness and instruct in home and community integration to attain remaining goals.     Progress towards goals / Updated goals:  Short Term Goals: To be accomplished in 1 weeks:  1. Patient will demonstrate compliance with HEP in order to improve LE strength for increased ease of ambulation      Long Term Goals: To be accomplished in 8 weeks:  1. Patient will improve FOTO score by 43 points in order to demonstrate a significant improvement in function. 2. Patient will improve TUG test time to 15 seconds in order to demonstrate improving functional mobility at home. 3. Patient will improve gait speed during 10m walk test to 0.6m/s in order to increase ease of ambulation in community.    4. Patient will be Ind with floor to stand transfer in order to increase safety at home. max challenged with mini lunges due to weakness and coordination 7-11-19     PLAN  []  Upgrade activities as tolerated     [x]  Continue plan of care  []  Update interventions per flow sheet       []  Discharge due to:_  []  Other:_      Fanny Jansen PT 7/11/2019  9:48 AM    Future Appointments   Date Time Provider Srinivas Saba   7/11/2019 10:30 AM Abbie Solid [facility]   7/15/2019  2:00 PM Leigh Ojeda PT [facility]   7/18/2019 11:30 AM Durand Closs, PT [facility]   7/24/2019  2:00 PM Leigh Ojeda PT [facility]   7/26/2019 10:30 AM Kaiden Samaniego Deisi Galvez, PT [facility]   7/31/2019 12:00 PM Karly Calix [facility]   8/2/2019 11:00 AM Dorota Norman, PT [facility]   8/5/2019 11:30 AM Dorota Norman, PT [facility]   8/7/2019  9:30 AM Souder, Katina Cranker, [facility]   8/7/2019  2:00 PM Dorota Norman, PT [facility]

## 2019-07-15 ENCOUNTER — HOSPITAL ENCOUNTER (OUTPATIENT)
Dept: PHYSICAL THERAPY | Age: 84
Discharge: HOME | End: 2019-07-15
Payer: MEDICARE

## 2019-07-15 PROCEDURE — 97112 NEUROMUSCULAR REEDUCATION: CPT

## 2019-07-15 PROCEDURE — 97110 THERAPEUTIC EXERCISES: CPT

## 2019-07-15 NOTE — PROGRESS NOTES
PT DAILY TREATMENT NOTE 10-18    Patient Name: Tyshawn Ledesma  Date:7/15/2019  : 1935  [x]  Patient  Verified  Payor: Chinedu Moore / Plan: [place] MEDICARE PART A & B / Product Type: Medicare /    In time: 1:55  Out time: 2:30  Total Treatment Time (min): 35  Visit #: 4 of 16    Medicare/BCBS Only   Total Timed Codes (min):  35 1:1 Treatment Time:  35       Treatment Area: Weakness [R53.1]  Unspecified osteoarthritis, unspecified site [M19.90]  Difficulty in walking, not elsewhere classified [R26.2]    SUBJECTIVE  Pain Level (0-10 scale):  10/10  Any medication changes, allergies to medications, adverse drug reactions, diagnosis change, or new procedure performed?: [x] No    [] Yes (see summary sheet for update)  Subjective functional status/changes:   [] No changes reported  Pt. Reports having a lot of left foot pain today. She reports she has seen the doctor about it already and infection was ruled out.      OBJECTIVE    15 min Therapeutic Exercise:  [x] See flow sheet :   Rationale: increase ROM and increase strength to improve the patients ability to increase ease of ADLs     20 min Neuromuscular Re-education:  []  See flow sheet : step response training, fwd/backward ambulation in parallel bars, standing balance activities   Rationale: increase strength, improve coordination and improve balance  to improve the patients ability to increase ease of ambulation           With   [x] TE   [] TA   [] neuro   [] other: Patient Education: [x] Review HEP    [] Progressed/Changed HEP based on:   [] positioning   [] body mechanics   [] transfers   [] heat/ice application    [] other:      Other Objective/Functional Measures:   Multiple LOB with eyes closed Rhomberg  Excessive use of UE response for LOB  She was challenged with take a step with step response training  Difficulty with step taps without UE support with more difficulty with left compared to right     Pain Level (0-10 scale) post treatment: 6/10    ASSESSMENT/Changes in Function:  Pt. Is progressing slowly towards goals. She continues to have decreased balance, especially with eyes closed. She also continues to have poor step response to LOB. She continues to be compliant with her HEP. Patient will continue to benefit from skilled PT services to modify and progress therapeutic interventions, address functional mobility deficits, address ROM deficits, address strength deficits, analyze and address soft tissue restrictions, analyze and cue movement patterns, analyze and modify body mechanics/ergonomics and assess and modify postural abnormalities to attain remaining goals. Progress towards goals / Updated goals:  Short Term Goals: To be accomplished in 1 weeks:  1. Patient will demonstrate compliance with HEP in order to improve LE strength for increased ease of ambulation   Met (7/15/19)     Long Term Goals: To be accomplished in 8 weeks:  1. Patient will improve FOTO score by 43 points in order to demonstrate a significant improvement in function. 2. Patient will improve TUG test time to 15 seconds in order to demonstrate improving functional mobility at home. 3. Patient will improve gait speed during 10m walk test to 0.6m/s in order to increase ease of ambulation in community.    4. Patient will be Ind with floor to stand transfer in order to increase safety at home. max challenged with mini lunges due to weakness and coordination 7-11-19      PLAN  []  Upgrade activities as tolerated     [x]  Continue plan of care  []  Update interventions per flow sheet       []  Discharge due to:_  []  Other:_      Nai Cho, PT 7/15/2019  1:15 PM    Future Appointments   Date Time Provider Srinivas Saba   7/15/2019  2:00 PM Osei Jama, PT [facility]   7/18/2019 11:30 AM Elijah Barbour, PT [facility]   7/24/2019  2:00 PM Osei Jama, PT [facility]   7/26/2019 10:30 AM Kinsey Presley, PT [id number]   7/31/2019 12:00 PM Ban Erlinda [facility]   8/2/2019 11:00 AM Roberto Mccain, PT [facility]   8/5/2019 11:30 AM Roberto Mccain, PT [facility]   8/7/2019  9:30 AM [facility]   8/7/2019  2:00 PM Roberto Mccain, PT [facility]

## 2019-07-18 ENCOUNTER — HOSPITAL ENCOUNTER (OUTPATIENT)
Dept: PHYSICAL THERAPY | Age: 84
Discharge: HOME | End: 2019-07-18
Payer: MEDICARE

## 2019-07-18 PROCEDURE — 97110 THERAPEUTIC EXERCISES: CPT

## 2019-07-18 NOTE — PROGRESS NOTES
PT DAILY TREATMENT NOTE 10-18    Patient Name: Ryne Ledesma  Date:2019  : 1935  [x]  Patient  Verified  Payor: [place] MEDICARE / Plan: [place] MEDICARE PART A & B / Product Type: Medicare /    In [id number]  Out time:1210  Total Treatment Time (min): 38  Visit #: 5 of 16    Medicare/BCBS Only   Total Timed Codes (min):  38 1:1 Treatment Time:  38       Treatment Area: Weakness [R53.1]  Unspecified osteoarthritis, unspecified site [M19.90]  Difficulty in walking, not elsewhere classified [R26.2]    SUBJECTIVE  Pain Level (0-10 scale): 5/10  Any medication changes, allergies to medications, adverse drug reactions, diagnosis change, or new procedure performed?: [x] No    [] Yes (see summary sheet for update)  Subjective functional status/changes:   [] No changes reported  Reports she is getting a new adjustable bed today. She has been up since 3 am this morning moving furniture. OBJECTIVE      38 min Therapeutic Exercise:  [] See flow sheet :   Rationale: increase ROM, increase strength, improve coordination and improve balance to improve the patients ability to ease with ADL's          With   [] TE   [] TA   [] neuro   [] other: Patient Education: [x] Review HEP    [] Progressed/Changed HEP based on:   [] positioning   [] body mechanics   [] transfers   [] heat/ice application    [] other:      Other Objective/Functional Measures: Step up on 6 inch with finger touch on parallel bars. TM x 5 mins, . 7mph   Static balance with EO on foam  improved today>1 min. Worked on trying to decrease use of UE in parallel bars. Pain Level (0-10 scale) post treatment: 5/10    ASSESSMENT/Changes in Function: Progressing well. Pt requested to ambulate on TM every visit.   ~~ Pt requires min assist/CGA to get onto TM.  ~~    Patient will continue to benefit from skilled PT services to modify and progress therapeutic interventions, address functional mobility deficits, address ROM deficits, address strength deficits, analyze and address soft tissue restrictions, analyze and cue movement patterns, analyze and modify body mechanics/ergonomics, assess and modify postural abnormalities and address imbalance/dizziness to attain remaining goals. []  See Plan of Care  [x]  See progress note/recertification  []  See Discharge Summary         Progress towards goals / Updated goals:  1. Patient will demonstrate compliance with HEP in order to improve LE strength for increased ease of ambulation   Met (7/15/19)     Long Term Goals: To be accomplished in 8 weeks:  1. Patient will improve FOTO score by 43 points in order to demonstrate a significant improvement in function. 2. Patient will improve TUG test time to 15 seconds in order to demonstrate improving functional mobility at home. 3. Patient will improve gait speed during 10m walk test to 0.6m/s in order to increase ease of ambulation in community.    4. Patient will be Ind with floor to stand transfer in order to increase safety at home. max challenged with mini lunges due to weakness and coordination 7-11-19    PLAN  [x]  Upgrade activities as tolerated     [x]  Continue plan of care  []  Update interventions per flow sheet       []  Discharge due to:_  []  Other:_      Anabel Neville PT 7/18/2019  11:26 AM    Future Appointments   Date Time Provider Srinivas Saba   7/18/2019 11:30 AM Twanna Goldmann, PT [facility]   7/24/2019  2:00 PM Estephania Ohara PT [facility]   7/26/2019 10:30 AM Cameron Henson PT [facility]   7/31/2019 12:00 PM Merlinda Clare HPBUYLS [facility]   8/2/2019 11:00 AM Estephania Ohara PT [facility]   8/5/2019 11:30 AM Estephania Ohara PT [facility]   8/7/2019  9:30 AM Allison Barahona PA [facility]   8/7/2019  2:00 PM Estephania Ohara PT [facility]

## 2019-07-24 ENCOUNTER — HOSPITAL ENCOUNTER (OUTPATIENT)
Dept: PHYSICAL THERAPY | Age: 84
Discharge: HOME | End: 2019-07-24
Payer: MEDICARE

## 2019-07-24 PROCEDURE — 97110 THERAPEUTIC EXERCISES: CPT

## 2019-07-24 PROCEDURE — 97112 NEUROMUSCULAR REEDUCATION: CPT

## 2019-07-24 NOTE — PROGRESS NOTES
PT DAILY TREATMENT NOTE 10-18    Patient Name: Evelin Rapp  Date:2019  : 1935  [x]  Patient  Verified  Payor: Jermaine Muhammad / Plan: [place] MED[place]ARE PART A & B / Product Type: Medicare /    In time: 1:56  Out time:2:34  Total Treatment Time (min): 38  Visit #: 6 of 16    Medicare/BCBS Only   Total Timed Codes (min):  38 1:1 Treatment Time:  38       Treatment Area: Weakness [R53.1]  Unspecified osteoarthritis, unspecified site [M19.90]  Difficulty in walking, not elsewhere classified [R26.2]    SUBJECTIVE  Pain Level (0-10 scale): 4/10  Any medication changes, allergies to medications, adverse drug reactions, diagnosis change, or new procedure performed?: [x] No    [] Yes (see summary sheet for update)  Subjective functional status/changes:   [] No changes reported  Pt reports less pain than usual. Pt reports increased fatigue being on feet all day yesterday. Pt under a lot of mental stress due to med changes and stressful dr. visit yesterday. OBJECTIVE    23 min Therapeutic Exercise:  [x] See flow sheet :   Rationale: increase ROM, increase strength and improve coordination to improve the patients ability to perform ADL's with ease and increase functional mobility. 8 min Neuromuscular Re-education:  []  See flow sheet : Romberg on foam EO/EC, MSR dynamic reaching/catching   Rationale: improve coordination, improve balance and increase proprioception  to improve the patients ability to perform ADL's with ease, increase functional mobility, and decrease falls risk.           With   [x] TE   [] TA   [] neuro   [] other: Patient Education: [x] Review HEP    [] Progressed/Changed HEP based on:   [] positioning   [] body mechanics   [] transfers   [] heat/ice application    [] other:      Other Objective/Functional Measures:   TU sec  10 m walk test: 9 seconds, speed 0.67 m/s  3x loss of balance with romberg EC on foam between 2 sets x 30 seconds  Pt displays trunk lean during toe taps  Displayed good sit to stand today  Needed 3 breaks during the session today. Pain Level (0-10 scale) post treatment: 2/10    ASSESSMENT/Changes in Function: Pt progressing well with therapy and making strides towards established goals. Pt is increasing leg strength and endurance however overall activity tolerance remains decreased requiring breaks during therapy. Pt overall balance is improving with eyes closed and dynamic balance still remaining an impairment. Pt has increased gait speed and stability during ambulation but continues to need increased endurance for more functional distances. Patient will continue to benefit from skilled PT services to modify and progress therapeutic interventions, address functional mobility deficits, address ROM deficits, address strength deficits, analyze and address soft tissue restrictions, analyze and cue movement patterns and analyze and modify body mechanics/ergonomics to attain remaining goals. Progress towards goals / Updated goals:  1. Patient will demonstrate compliance with HEP in order to improve LE strength for increased ease of ambulation   Met (7/15/19)     Long Term Goals: To be accomplished in 8 weeks:  1. Patient will improve FOTO score by 43 points in order to demonstrate a significant improvement in function. 2. Patient will improve TUG test time to 15 seconds in order to demonstrate improving functional mobility at home. Progressing 17 seconds (7/24/19)  3. Patient will improve gait speed during 10m walk test to 0.6m/s in order to increase ease of ambulation in community. Met: 0.67m/s  (7/24/19)  4.  Patient will be Ind with floor to stand transfer in order to increase safety at home. max challenged with mini lunges due to weakness and coordination 7-11-19    PLAN  []  Upgrade activities as tolerated     [x]  Continue plan of care  []  Update interventions per flow sheet       []  Discharge due to:_  []  Other:_      Chayo Flannery Gregg 7/24/2019  1:59 PM    I was present during the entire treatment, directing and participating in the treatment.    Vinny Jerry DPT      Future Appointments   Date Time Provider Srinivas Wandy   7/24/2019  2:00 PM Ragini Edwards [facility]   7/26/2019 10:30 AM Alessandra Pope PT [facility]   7/31/2019 12:00 PM Alina Hernandez [facility]   8/2/2019 11:00 AM April Samuels PT [facility]   8/5/2019 11:30 AM April Samuels PT [facility]   8/7/2019  9:30 AM Ivy Barahona PA [facility]   8/7/2019  2:00 PM April Samuels PT [facility]

## 2019-07-26 ENCOUNTER — HOSPITAL ENCOUNTER (OUTPATIENT)
Dept: PHYSICAL THERAPY | Age: 84
Discharge: HOME | End: 2019-07-26
Payer: MEDICARE

## 2019-07-26 NOTE — PROGRESS NOTES
PT DAILY TREATMENT NOTE 10-18    Patient Name: Debbi Ledesma  Date:2019  : 1935  [x]  Patient  Verified  Payor: [place] MEDICARE / Plan: [place] MEDICARE PART A & B / Product Type: Medicare /    In time:***  Out time:***  Total Treatment Time (min): ***  Visit #: *** of ***    Medicare/BCBS Only   Total Timed Codes (min):  *** 1:1 Treatment Time:  ***       Treatment Area: Weakness [R53.1]  Unspecified osteoarthritis, unspecified site [M19.90]  Difficulty in walking, not elsewhere classified [R26.2]    SUBJECTIVE  Pain Level (0-10 scale): ***  Any medication changes, allergies to medications, adverse drug reactions, diagnosis change, or new procedure performed?: [x] No    [] Yes (see summary sheet for update)  Subjective functional status/changes:   [] No changes reported  ***    OBJECTIVE    Modality rationale: {BS[facility]SI INMOTION MODALITIES:03324} to improve the patients ability to ***   Min Type Additional Details    [] Estim:  []Unatt       []IFC  []Premod                        []Other:  []w/ice   []w/heat  Position:  Location:    [] Estim: []Att    []TENS instruct  []NMES                    []Other:  []w/US   []w/ice   []w/heat  Position:  Location:    []  Traction: [] Cervical       []Lumbar                       [] Prone          []Supine                       []Intermittent   []Continuous Lbs:  [] before manual  [] after manual    []  Ultrasound: []Continuous   [] Pulsed                           []1MHz   []3MHz W/cm2:  Location:    []  Iontophoresis with dexamethasone         Location: [] Take home patch   [] In clinic    []  Ice     []  heat  []  Ice massage  []  Laser   []  Anodyne Position:  Location:    []  Laser with stim  []  Other:  Position:  Location:    []  Vasopneumatic Device Pressure:       [] lo [] med [] hi   Temperature: [] lo [] med [] hi   [] Skin assessment post-treatment:  []intact []redness- no adverse reaction    []redness  adverse reaction:     *** min []Eval []Re-Eval       *** min Therapeutic Exercise:  [] See flow sheet :   Rationale: {BSHSI IMMOTION THER EX:89369} to improve the patients ability to ***    *** min Therapeutic Activity:  []  See flow sheet :   Rationale: {BSHSI IMMOTION THER EX:64445}  to improve the patients ability to ***     *** min Neuromuscular Re-education:  []  See flow sheet :   Rationale: {BSHSI IMMOTION THER EX:72707}  to improve the patients ability to ***    *** min Manual Therapy:  ***   Rationale: {BSHSI IMMOTION MANUAL THERAPY:96183} to ***    *** min Gait Training:  ___ feet with ___ device on level surfaces with ___ level of assist   Rationale: With   [] TE   [] TA   [] neuro   [] other: Patient Education: [x] Review HEP    [] Progressed/Changed HEP based on:   [] positioning   [] body mechanics   [] transfers   [] heat/ice application    [] other:      Other Objective/Functional Measures: ***     Pain Level (0-10 scale) post treatment: ***    ASSESSMENT/Changes in Function: ***    Patient will continue to benefit from skilled PT services to {[facility] INMOTION ASSESSMENT STATEMENTS:20159} to attain remaining goals. []  See Plan of Care  []  See progress note/recertification  []  See Discharge Summary         Progress towards goals / Updated goals:  1. Patient will demonstrate compliance with HEP in order to improve LE strength for increased ease of ambulation   Met (7/15/19)     Long Term Goals: To be accomplished in 8 weeks:  1. Patient will improve FOTO score by 43 points in order to demonstrate a significant improvement in function. 2. Patient will improve TUG test time to 15 seconds in order to demonstrate improving functional mobility at home. Progressing 17 seconds (7/24/19)  3. Patient will improve gait speed during 10m walk test to 0.6m/s in order to increase ease of ambulation in community. Met: 0.67m/s  (7/24/19)  4.  Patient will be Ind with floor to stand transfer in order to increase safety at [facility] challenged with mini lunges due to weakness and coordination 19       PLAN  []  Upgrade activities as tolerated     []  Continue plan of care  []  Update interventions per flow sheet       []  Discharge due to:_  []  Other:_      Golden Cash, PT 2019  7:57 AM    Future Appointments   Date Time Provider Srinivas Saba   2019 10:30 AM Hector Moralez, PT [id number]   2019 12:00 PM Richard Hamm [id number]   2019 11:00 AM Ting Bueno, PT [facility]   2019 11:30 AM Ting Bueno, PT [facility]   2019  9:30 AM [facility]   2019  2:00 PM Ting Bueno, PT [facility]

## 2019-07-31 ENCOUNTER — HOSPITAL ENCOUNTER (OUTPATIENT)
Dept: PHYSICAL THERAPY | Age: 84
Discharge: HOME | End: 2019-07-31
Payer: MEDICARE

## 2019-07-31 PROCEDURE — 97110 THERAPEUTIC EXERCISES: CPT

## 2019-07-31 NOTE — PROGRESS NOTES
PT DAILY TREATMENT NOTE 10-18    Patient Name: Michael Ledesma  Date:2019  : 1935  [x]  Patient  Verified  Payor: Samara Tan / Plan: [place] MEDICARE PART A & B / Product Type: Medicare /    In time: 12:00  Out time: 12:38  Total Treatment Time (min): 38  Visit #: 7 of 16    Medicare/BCBS Only   Total Timed Codes (min):  38 1:1 Treatment Time: 33       Treatment Area: Weakness [R53.1]  Unspecified osteoarthritis, unspecified site [M19.90]  Difficulty in walking, not elsewhere classified [R26.2]    SUBJECTIVE  Pain Level (0-10 scale): 6/10 lower back and left foot  Any medication changes, allergies to medications, adverse drug reactions, diagnosis change, or new procedure performed?: [x] No    [] Yes (see summary sheet for update)  Subjective functional status/changes:   [] No changes reported  Pt reports 75-80% overall improvement with functional ADL's since beginning PT. Pt's pain range 0-10/10, some days she cannot walk, the pain is mostly in the feet and sometimes her lower back. Patient reports the following functional improvements in therapy: improved mobility getting in and out of chair and car. Patient continues to need work on: decreasing swelling in LEs, decreasing pain, improving standing and walking tolerance, improving stair negotiation, squatting. Patient goals: continue to strength hips and knees, decrease back pain    OBJECTIVE    38/33 min Therapeutic Exercise:  [x] See flow sheet :   Rationale: increase ROM, increase strength and improve coordination to improve the patients ability to perform ADL's with ease and increase functional mobility.           With   [x] TE   [] TA   [] neuro   [] other: Patient Education: [x] Review HEP    [] Progressed/Changed HEP based on:   [] positioning   [] body mechanics   [] transfers   [] heat/ice application    [] other:      Other Objective/Functional Measures:   TU sec with BUEs use and walking stick  10 m walk test: 9 seconds, speed 0.67 m/s (on 7/24/19)    Romberg EO: 30 sec minimal sway  Romberg EC: 30 sec moderate sway   MSR EO: 30 sec bilaterally min-mod sway      Strength (MMT):                                          Hip L (1-5) R (1-5)   Hip Flexion 4 4   Hip Ext       Hip ABD       Hip ADD       Hip ER       Hip IR          Knee L (1-5) R (1-5)   Knee Flexion 5 5   Knee Extension 5 5   Ankle PF 4+ 4+   Ankle DF 4+ 4+   Other           Pain Level (0-10 scale) post treatment: 7/10 lower back and (B) feet    ASSESSMENT/Changes in Function: SEE PROGRESS NOTE    Patient will continue to benefit from skilled PT services to modify and progress therapeutic interventions, address functional mobility deficits, address ROM deficits, address strength deficits, analyze and address soft tissue restrictions, analyze and cue movement patterns and analyze and modify body mechanics/ergonomics to attain remaining goals. Progress towards goals / Updated goals:  1. Patient will demonstrate compliance with HEP in order to improve LE strength for increased ease of ambulation   Met (7/15/19)     Long Term Goals: To be accomplished in 8 weeks:  1. Patient will improve FOTO score by 43 points in order to demonstrate a significant improvement in function. NOT MET, 42/100  2. Patient will improve TUG test time to 15 seconds in order to demonstrate improving functional mobility at home. NOT MET, 22 sec  3. Patient will improve gait speed during 10m walk test to 0.6m/s in order to increase ease of ambulation in community. Met: 0.67m/s  (7/24/19)   4.  Patient will be Ind with floor to stand transfer in order to increase safety at home.   Progressing, 10 reps on mini lunges with about 50% ROM    PLAN  []  Upgrade activities as tolerated     [x]  Continue plan of care  []  Update interventions per flow sheet       []  Discharge due to:_  []  Other:_      CHRISTIANO Hayward 7/31/2019  12:38 PM      Future Appointments   Date Time Provider Department [place]   7/31/2019 12:00 PM Cephus Mean [facility]   8/2/2019 11:00 AM Marla Muro, PT [facility]   8/5/2019 11:30 AM Marla Muro, PT [facility]   8/7/2019  9:30 AM [facility]   8/7/2019  2:00 PM Rachael Ramsey, ADRIANA [facility]

## 2019-07-31 NOTE — PROGRESS NOTES
[facility] David Moreno  [address]  [phone] fax    Medicare Progress Report    Patient name: John Mccain Start of Care:  19   Referral source: Angel Ding MD : 1935   Medical/Treatment Diagnosis: Weakness [R53.1]  Unspecified osteoarthritis, unspecified site [M19.90]  Difficulty in walking, not elsewhere classified [R26.2]  Payor: Jesús Milian / Plan: VA MEDICARE PART A & B / Product Type: Medicare /  Onset Date: years ago, most recent fall was one month ago                  Prior Hospitalization: see medical history Provider#: [id number]   Medications: Verified on Patient Summary List    Comorbidities:  CHF, diabetes, HTN, arthritis, thyroid, back pain, right ankle pain, B shoulder pain   Prior Level of Function: ambulation with SPC or walker, lives alone, Ind with ADLs             Visits from Start of Care: 7    Missed Visits: 2    Reporting Period: 19 to 19    Subjective Reports:  Pt. Reports she continues to have good and bad days but is having an easier time performing her daily activities. Current Status/ treatment goals Objective measures   1. Patient will improve FOTO score by 43 points in order to demonstrate a significant improvement in function. [] met                 [x] not met  [] progressing  eval: 43    Current: 42   2. Patient will improve TUG test time to 15 seconds in order to demonstrate improving functional mobility at home. [] met                 [] not met  [x] progressing Eval: 23 seconds     Current: 17 seconds   3. Patient will improve gait speed during 10m walk test to 0.6m/s in order to increase ease of ambulation in community. [x] met                 [] not met  [] progressing Eval: 0.48m/s    Current: 0.67m/s   4. Patient will be Ind with floor to stand transfer in order to increase safety at home.     [] met                 [] not met  [x] progressing Eval: unable Current: can perform 10 reps of mini lunges     Key functional changes:  Improving gait speed      Problems/ barriers to goal attainment: none     Assessment / Recommendations: pt. Is progressing well with physical therapy. She reports a 75-80% improvement in symptoms since [facility] despite no significant change in her FOTO score. Gait speed improved to 0.67m/s. TUG test also improved to 17 seconds. She continues to have difficulty lunging down to floor. She also continues to have decreased ambulation tolerance. Skilled PT is medically necessary in order to improve balance and LE strength for increased ease of transfers and ambulation for improved quality of life. Problem List: pain affecting function, decrease ROM, decrease strength, impaired gait/ balance, decrease ADL/ functional abilitiies, decrease activity tolerance, decrease flexibility/ joint mobility and decrease transfer abilities   Treatment Plan: Therapeutic exercise, Therapeutic activities, Neuromuscular re-education, Physical agent/modality, Gait/balance training, Manual therapy, Patient education, Self Care training and Functional mobility training    Patient Goal (s) has been updated and includes: to have less pain     Updated Goals to be accomplished in 9 treatments:  1. Patient will improve FOTO score by 43 points in order to demonstrate a significant improvement in function. 2. Patient will improve TUG test time to 15 seconds in order to demonstrate improving functional mobility at home. 3. Patient will improve gait speed during 10m walk test to 0.8m/s in order to increase ease of ambulation in community. 4. Patient will be Ind with floor to stand transfer in order to increase safety at home.      Frequency / Duration: Patient to be seen 2 times per week for 9 treatments      Reina Ding, PT 7/31/2019 2:24 PM

## 2019-08-02 ENCOUNTER — HOSPITAL ENCOUNTER (OUTPATIENT)
Dept: PHYSICAL THERAPY | Age: 84
Discharge: HOME | End: 2019-08-02
Payer: MEDICARE

## 2019-08-02 PROCEDURE — 97110 THERAPEUTIC EXERCISES: CPT

## 2019-08-02 NOTE — PROGRESS NOTES
PT DAILY TREATMENT NOTE 10-18    Patient Name: Armando Garcia  Date:2019  : 1935  [x]  Patient  Verified  Payor: VA MEDICARE / Plan: VA MEDICARE PART A & B / Product Type: Medicare /    In time: 11:00  Out time: 11:31  Total Treatment Time (min): 31  Visit #: 8 of 16    Medicare/BCBS Only   Total Timed Codes (min):  31 1:1 Treatment Time:  31       Treatment Area: Weakness [R53.1]  Unspecified osteoarthritis, unspecified site [M19.90]  Difficulty in walking, not elsewhere classified [R26.2]    SUBJECTIVE  Pain Level (0-10 scale): 6/10  Any medication changes, allergies to medications, adverse drug reactions, diagnosis change, or new procedure performed?: [x] No    [] Yes (see summary sheet for update)  Subjective functional status/changes:   [] No changes reported  Pt reports increased dizziness and imbalance today. Pt reports lack of sleep waking up at 3:30 AM and feeling restless. Pt is having symptoms including a headache and minor light headedness that has been constant for the last few days. Pt reports increased watery of right eye. Pt is extremely frustrated with PCP and it is causing her a lot of stress. OBJECTIVE    31 min Therapeutic Exercise:  [x] See flow sheet :   Rationale: increase ROM and increase strength to improve the patients ability to perform ADL's with ease. With   [x] TE   [] TA   [] neuro   [] other: Patient Education: [x] Review HEP    [] Progressed/Changed HEP based on:   [] positioning   [] body mechanics   [] transfers   [] heat/ice application    [] other:      Other Objective/Functional Measures:  Pt has increased difficulty with 3-way hip on right  Loss of balance x1 romberg on foam EO  Loss of balance x3 romberg on foam EC    Pain Level (0-10 scale) post treatment: 5/10    ASSESSMENT/Changes in Function: Pt is progressing slowly towards goals and advancing therapeutic exercises. Pt continues to have deficits with LE strength and balance.  Pt displays LE deficits with proximal hip musculature and continues to need strengthening for increased stabilization of the pelvis during ambulation and steps. Pt overall balance is progressing but was decreased today continuing to need increased ability to maintain CAITIE on unstable surfaces and without visual feedback. Patient will continue to benefit from skilled PT services to modify and progress therapeutic interventions, address functional mobility deficits, address ROM deficits, address strength deficits, analyze and cue movement patterns, analyze and modify body mechanics/ergonomics and address imbalance/dizziness to attain remaining goals. [x]  See Plan of Care  []  See progress note/recertification  []  See Discharge Summary         Progress towards goals / Updated goals:  1. Patient will improve FOTO score by 43 points in order to demonstrate a significant improvement in function. 2. Patient will improve TUG test time to 15 seconds in order to demonstrate improving functional mobility at home. 3. Patient will improve gait speed during 10m walk test to 0.8m/s in order to increase ease of ambulation in community.    4. Patient will be Ind with floor to stand transfer in order to increase safety at home.     PLAN  [x]  Upgrade activities as tolerated     [x]  Continue plan of care  []  Update interventions per flow sheet       []  Discharge due to:_  []  Other:_      Stefano Carter 8/2/2019  9:46 AM    Future Appointments   Date Time Provider Srinivas Saba   8/2/2019 11:00 AM Jeannine Bowens PT [facility]   8/5/2019 11:30 AM Jeannine Bowens PT [facility]   8/7/2019  9:30 AM Marcellus Barahona PA [facility]   8/7/2019  2:00 PM Rupinder Palomo PT [facility]

## 2019-08-05 ENCOUNTER — HOSPITAL ENCOUNTER (OUTPATIENT)
Dept: PHYSICAL THERAPY | Age: 84
Discharge: HOME | End: 2019-08-05
Payer: MEDICARE

## 2019-08-05 PROCEDURE — 97140 MANUAL THERAPY 1/> REGIONS: CPT

## 2019-08-05 PROCEDURE — 97110 THERAPEUTIC EXERCISES: CPT

## 2019-08-05 NOTE — PROGRESS NOTES
PT DAILY TREATMENT NOTE 10-18    Patient Name: Mikie Matias  Date:2019  : 1935  [x]  Patient  Verified  Payor: [place] MED[place]ARE / Plan: [place] MEDICARE PART A & B / Product Type: Medicare /    In time: 11:25  Out time: 12:05  Total Treatment Time (min): 40  Visit #: 9 of 16    Medicare/BCBS Only   Total Timed Codes (min):  40 1:1 Treatment Time:  40       Treatment Area: Weakness [R53.1]  Unspecified osteoarthritis, unspecified site [M19.90]  Difficulty in walking, not elsewhere classified [R26.2]    SUBJECTIVE  Pain Level (0-10 scale):  4-510  Any medication changes, allergies to medications, adverse drug reactions, diagnosis change, or new procedure performed?: [x] No    [] Yes (see summary sheet for update)  Subjective functional status/changes:   [] No changes reported  Pt. Reports she is stumbling less at home. OBJECTIVE    30 min Therapeutic Exercise:  [x] See flow sheet :   Rationale: increase ROM and increase strength to improve the patients ability to increase ease of ADLs    10 min Neuromuscular Re-education:  [x]  See flow sheet : standing balance activities, side stepping, backward ambulation    Rationale: increase strength, improve coordination and improve balance  to improve the patients ability to increase ease of ADLs          With   [x] TE   [] TA   [] neuro   [] other: Patient Education: [x] Review HEP    [] Progressed/Changed HEP based on:   [] positioning   [] body mechanics   [] transfers   [] heat/ice application    [] other:      Other Objective/Functional Measures:   After cues for correct form pt. Performed lunges with improved depth  She was challenged with taking bigger steps during backward ambulation  Pt. Was challenged with performing head turns with ambulation     Pain Level (0-10 scale) post treatment: 4-510    ASSESSMENT/Changes in Function:  Pt. Is progressing slowly towards goals. She demonstrates improving LE strength/control during lunges.  She also demonstrates improving balance. Patient will continue to benefit from skilled PT services to modify and progress therapeutic interventions, address functional mobility deficits, address ROM deficits, address strength deficits, analyze and address soft tissue restrictions, analyze and cue movement patterns, analyze and modify body mechanics/ergonomics and assess and modify postural abnormalities to attain remaining goals. Progress towards goals / Updated goals:  1. Patient will improve FOTO score by 43 points in order to demonstrate a significant improvement in function. 2. Patient will improve TUG test time to 15 seconds in order to demonstrate improving functional mobility at home. 3. Patient will improve gait speed during 10m walk test to 0.8m/s in order to increase ease of ambulation in community.    4. Patient will be Ind with floor to stand transfer in order to increase safety at home.   Progressing: improving depth with lunges (8/5/19)      PLAN  []  Upgrade activities as tolerated     [x]  Continue plan of care  []  Update interventions per flow sheet       []  Discharge due to:_  []  Other:_      Carol Cabrera PT 8/5/2019  7:41 AM    Future Appointments   Date Time Provider Srinivas Saba   8/5/2019 11:30 AM Charan Plata, PT [facility]   8/7/2019  9:30 AM Brittney Barahona PA [facility] SCH[facility]   8/7/2019  2:00 PM Salty Vasquez, PT [facility]

## 2019-08-07 ENCOUNTER — OFFICE VISIT (OUTPATIENT)
Dept: PAIN MANAGEMENT | Age: 84
End: 2019-08-07

## 2019-08-07 ENCOUNTER — HOSPITAL ENCOUNTER (OUTPATIENT)
Dept: PHYSICAL THERAPY | Age: 84
Discharge: HOME | End: 2019-08-07
Payer: MEDICARE

## 2019-08-07 VITALS
RESPIRATION RATE: 16 BRPM | TEMPERATURE: 98.1 F | BODY MASS INDEX: 45.93 KG/M2 | HEIGHT: 64 IN | WEIGHT: 269 LBS | OXYGEN SATURATION: 98 % | DIASTOLIC BLOOD PRESSURE: 75 MMHG | SYSTOLIC BLOOD PRESSURE: 150 MMHG | HEART RATE: 95 BPM

## 2019-08-07 DIAGNOSIS — G89.29 CHRONIC LEFT SHOULDER PAIN: ICD-10-CM

## 2019-08-07 DIAGNOSIS — G89.4 CHRONIC PAIN SYNDROME: ICD-10-CM

## 2019-08-07 DIAGNOSIS — M25.512 CHRONIC LEFT SHOULDER PAIN: ICD-10-CM

## 2019-08-07 DIAGNOSIS — Z79.899 ENCOUNTER FOR LONG-TERM (CURRENT) USE OF HIGH-RISK MEDICATION: ICD-10-CM

## 2019-08-07 DIAGNOSIS — M19.011 ARTHRITIS OF RIGHT ACROMIOCLAVICULAR JOINT: ICD-10-CM

## 2019-08-07 DIAGNOSIS — M25.511 CHRONIC RIGHT SHOULDER PAIN: Primary | ICD-10-CM

## 2019-08-07 DIAGNOSIS — M19.011 ARTHRITIS OF RIGHT SHOULDER REGION: ICD-10-CM

## 2019-08-07 DIAGNOSIS — M62.511 ATROPHY OF MUSCLE OF RIGHT SHOULDER: ICD-10-CM

## 2019-08-07 DIAGNOSIS — M75.101 TEAR OF RIGHT ROTATOR CUFF: ICD-10-CM

## 2019-08-07 DIAGNOSIS — G89.29 CHRONIC RIGHT SHOULDER PAIN: Primary | ICD-10-CM

## 2019-08-07 PROCEDURE — 97112 NEUROMUSCULAR REEDUCATION: CPT

## 2019-08-07 PROCEDURE — 97110 THERAPEUTIC EXERCISES: CPT

## 2019-08-07 RX ORDER — CAPSAICIN 0.1 %
CREAM (GRAM) TOPICAL
Qty: 42.5 G | Refills: 2 | Status: SHIPPED | OUTPATIENT
Start: 2019-08-07 | End: 2019-11-01

## 2019-08-07 RX ORDER — EXENATIDE 2 MG/.85ML
2 INJECTION, SUSPENSION, EXTENDED RELEASE SUBCUTANEOUS
COMMUNITY
Start: 2019-07-08 | End: 2021-03-10

## 2019-08-07 RX ORDER — MORPHINE SULFATE 15 MG/1
15 TABLET ORAL
Qty: 60 TAB | Refills: 0 | Status: SHIPPED | OUTPATIENT
Start: 2019-08-11 | End: 2019-09-05 | Stop reason: SDUPTHER

## 2019-08-07 NOTE — PROGRESS NOTES
Referred 2016 from orthopedics for right shoulder pain      Calculated MEQ - 30  Naloxone rescue - yes  Prophylactic bowel program - yes  Date of last OCA 12/26/18  Last UDS today, consistent POC, pending confirmatory testing  Prior UDS 2/19/19, consistent   date checked today, findings consistent      Today   Last Visit  Prior Visit(s)  [facility] - 2 & 4  3 & 7   2 & 6  [facility] - 51%  52%   at least 46%  [facility] - 3  0   n/a      HPI:  Astrid Putnam is a 82 y.o. female here for f/u visit for ongoing evaluation of bilateral shoulder pain. Pt was last seen here on 5/21/19. Pt denies interval changes on the character or distribution of pain. Pain is located bilateral shoulders described as stabbing to aching with burning. Pain at its best is 5/10. Pain at its worse is 9/10. The pain is worsened by  cervical extension, cervical rotation to the right and shoulder abduction. Symptoms are improved by Tylenol, Capsaicin cream, TENS unit and Morphine. Gabapentin helps feet but not shoulders. Pt has tried PT and massage therapy with no perceived benefit. Since last visit, she has been put back on Morphine after experiencing burping, nausea, hot feeling and restless when prescribed Norco for her opiate rotation. She continues to report right acute triceps muscle pain. She is not interested in any procedures at this time and stating \"I only want cortisone from Dr Anthony Segura at orthopedics\". She is currently in PT ordered by her POP for her LEs. Interventional Pain Procedures:  3/7/19 - right AC intraarticular injection with Dr Damon May  3/7/19 - left myofascial TPI with Dr Damon May    ROS:  Reports chronic shortness of breath. Denies fever, chills, nausea, vomiting, diarrhea, constipation, abdominal pain, chest pain, trouble swallowing, changes in vision, changes in hearing, falls, dizziness, bladder incontinence, bowel incontinence, depression, anxiety, suicidal ideations, homicidal ideations or alcohol use.     Opioid specific risk: advanced age, polypharmacy, asthma, vertigo, GERD, history of depression, obesity, hx of falls. Vitals:    08/07/19 0952   BP: 150/75   Pulse: 95   Resp: 16   Temp: 98.1 °F (36.7 °C)   SpO2: 98%   Weight: 122 kg (269 lb)   Height: 5' 4\" (1.626 m)   PainSc:   5   PainLoc: Shoulder        Physical exam:  Constitutional  -  AFVSS, no acute distress, endomoprhic body habitus. A&OXs 3. Poor and difficult historian. Speech is clear and appropriate. HEENT -  Normocephalic, atraumatic. Conjugate gaze, clear sclerae. EOM intact. Neuro/Psych -  Mood is appropriate and patient is cooperative. Gait is within functional limits. Balance is within functional limits. Respiratory -  Non-labored breathing. Even rise of chest wall. Primary Care Physician  Other, Phys  Patient can only remember the practice name and not the physician      PHQ -- .  3 most recent PHQ Screens 8/7/2019   Little interest or pleasure in doing things Not at all   Feeling down, depressed, irritable, or hopeless Not at all   Total Score PHQ 2 0   Trouble falling or staying asleep, or sleeping too much -   Feeling tired or having little energy -   Poor appetite, weight loss, or overeating -   Feeling bad about yourself - or that you are a failure or have let yourself or your family down -   Trouble concentrating on things such as school, work, reading, or watching TV -   Moving or speaking so slowly that other people could have noticed; or the opposite being so fidgety that others notice -   Thoughts of being better off dead, or hurting yourself in some way -   PHQ 9 Score -   How difficult have these problems made it for you to do your work, take care of your home and get along with others -         Assessment/Plan:     ICD-10-CM ICD-9-CM    1. Chronic right shoulder pain M25.511 719.41 morphine IR (MS IR) 15 mg tablet    G89.29 338.29 capsaicin (CAPZASIN-HP) 0.1 % topical cream   2.  Arthritis of right acromioclavicular joint M19.011 716.91 morphine IR (MS IR) 15 mg tablet      capsaicin (CAPZASIN-HP) 0.1 % topical cream   3. Tear of right rotator cuff M75.101 840.4 capsaicin (CAPZASIN-HP) 0.1 % topical cream   4. Atrophy of muscle of right shoulder M62.511 728.2 capsaicin (CAPZASIN-HP) 0.1 % topical cream   5. Arthritis of right shoulder region M19.011 716.91 capsaicin (CAPZASIN-HP) 0.1 % topical cream   6. Chronic left shoulder pain M25.512 719.41 capsaicin (CAPZASIN-HP) 0.1 % topical cream    G89.29 338.29    7. Chronic pain syndrome G89.4 338.4 capsaicin (CAPZASIN-HP) 0.1 % topical cream   8. Encounter for long-term (current) use of high-risk medication Z79.899 V58.69 DRUG SCREEN      AMB POC DRUG SCREEN ()        1) Medications (opiod and non-opiod)  --I do not recommend long term opioid therapy for this patient at this time for their chronic pain; the risks outweigh the potential benefits. Pt currently taking Morphine IR 15mg up to 2 times a day as needed with a total of 60 tabs to be used over 30 days. Their MME is 30. --Today, we will pause the weaning of patients opioid medication with a goal of being opioid free, pending safety and compliance. Pt was educated on signs and symptoms of opioid withdrawal and advised to call the clinic should these symptoms arise so that we may provide support as needed. --Pt instructed to call 5-7 days before they run out of their medications for refill. --At next office visit, the plan is to provide patient with Morphine IR 15mg up to 2 times a day as needed with at total of 55 tabs to be budgeted over 30 days If patient has difficulty with the wean or difficulty with cravings we will consider referral to mental health for ongoing assessment and treatment for opioid use disorder. --Continue Gabapentin. --Continue capsaicin cream; refill provided today. --Continue Tylenol prn; she may take a total of 3000mg a day as needed from all sources.     2) Restorative Therapies  --Continue using TENS unit as needed. 3) Interventional Procedures  --Pt would benefit from repeat left shoulder region TPI and right AC joint injection with Dr Ena Marcano but she declines this at this time. 4) Behavorial Health Approaches  --Pt would benefit from referral to pain psychology for training and cognitive behavorial therapy, acceptance commitment therapy, biofeedback and mindfulness mediation and other modalities as indicated for treatment of chronic pain once this service is available at our clinic or with referral with Dr July Teran. Consider ordering this at her next office visit. 5) Complementary & Integrative Health  --Close follow up with your PCP. Per Dr Shyam Steward care with primary care provider or emergency department if symptoms in the right upper extremity suddenly worsen. She was advised to continue intermittent use of ice over the painful triceps region. \"\" This was discussed with her today. --Follow up ongoing assessment and ongoing development of integrative and comprehensive plan of care for chronic pain. Goals: To establish complementary and integrative plan of care to address chronic pain issues while minimizing pharmaceuticals to maximize patient's function improve quality of life. Education:  Patient again educated on the importance of strict compliance with the opioid care agreement while on opioid therapy. Patient also again educated that they should avoid driving while on chronic opioid therapy. Also advised to avoid alcohol and to avoid benzodiazepines while on opioid therapy. Handouts given regarding opioid safety. Follow-up and Dispositions    · Return in about 3 months (around 11/7/2019) for 30 min.               Social History     Socioeconomic History    Marital status:      Spouse name: Not on file    Number of children: Not on file    Years of education: Not on file    Highest education level: Not on file   Occupational History    Not on file   Social Needs    Financial resource strain: Not on file    Food insecurity:     Worry: Not on file     Inability: Not on file    Transportation needs:     Medical: Not on file     Non-medical: Not on file   Tobacco Use    Smoking status: Former Smoker    Smokeless tobacco: Never Used   Substance and Sexual Activity    Alcohol use: No     Alcohol/week: 0.0 standard drinks    Drug use: No    Sexual activity: Never   Lifestyle    Physical activity:     Days per week: Not on file     Minutes per session: Not on file    Stress: Not on file   Relationships    Social connections:     Talks on phone: Not on file     Gets together: Not on file     Attends Yarsani service: Not on file     Active member of club or organization: Not on file     Attends meetings of clubs or organizations: Not on file     Relationship status: Not on file    Intimate partner violence:     Fear of current or ex partner: Not on file     Emotionally abused: Not on file     Physically abused: Not on file     Forced sexual activity: Not on file   Other Topics Concern    Not on file   Social History Narrative    Not on file     Family History   Problem Relation Age of Onset    Heart Attack Mother     Heart Attack Father      No Known Allergies  Past Medical History:   Diagnosis Date    Asthma     Chronic venous insufficiency     Diabetes ([facility] [address] [facility]     Hypertension     Peripheral neuropathy     Rheumatoid arthritis ([facility] [facility]     Vertigo      Past Surgical History:   Procedure Laterality Date    HX CHOLECYSTECTOMY      HX GYN      TAHOophorectomy due to infection    HX HEENT      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  HX ORTHOPAEDIC      bilat TKA     Current Outpatient Medications on File Prior to Visit   Medication Sig    docusate sodium (COLACE) 100 mg capsule Take 1 Cap by mouth two (2) times daily as needed for Constipation for up to 90 days.  naloxone (NARCAN) 4 mg/actuation nasal spray Use 1 spray intranasally, then discard.  Repeat with new spray every 2 min as needed for opioid overdose symptoms, alternating nostrils.  TENS Units (TENS 504) mikal Please provide patient with a TENS unit to help improve function, improve quality of life, reduce medication use, improve ROM. 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  insulin glargine (LANTUS U-100 INSULIN) 100 unit/mL injection 40 Units by SubCUTAneous route nightly.  ascorbic acid, vitamin C, (VITAMIN C) 500 mg tablet Take 500 mg by mouth daily.  bumetanide (BUMEX) 2 mg tablet Take 2 mg by mouth two (2) times daily as needed.  gabapentin (NEURONTIN) 300 mg capsule Take 1 Cap by mouth three (3) times daily.  Blood-Glucose Meter (FREESTYLE LITE METER) monitoring kit Test twice blood glucose daily; ICD-10 E11.9; Quantity 1    amLODIPine (NORVASC) 10 mg tablet Take 0.5 Tabs by mouth daily. (Patient taking differently: Take 10 mg by mouth daily.)    levothyroxine (SYNTHROID) 200 mcg tablet 1 tablet daily (take on an empty stomach) (stop \"old\" synthroid)    carvedilol (COREG) 12.5 mg tablet Take 1 Tab by mouth two (2) times daily (with meals).  insulin syringe,safetyneedle 1 mL 31 gauge x 5/16\" syrg 1 Each by Does Not Apply route daily.  cholecalciferol (VITAMIN D3) 1,000 unit tablet Take 1,000 Units by mouth daily.  glucose blood VI test strips (FREESTYLE TEST) strip Use to check blood glucose twice daily DX:E11.9    allopurinol (ZYLOPRIM) 100 mg tablet Take 1 Tab by mouth daily.  magnesium oxide 500 mg tab Take 1 Tab by mouth daily.  acetaminophen (TYLENOL) 500 mg tablet Take 1 Tab by mouth every six (6) hours as needed for Pain.  aspirin delayed-release 81 mg tablet Take 81 mg by mouth daily.  BYDUREON BCISE 2 mg/0.85 mL atIn 2 mg by SubCUTAneous route every seven (7) days.      No current facility-administered medications on file prior to visit.             [address] was used for portions of this report. Unintended errors may occur.

## 2019-08-07 NOTE — PROGRESS NOTES
Nursing Notes    Patient presents to the office today in follow-up. Patient rates her pain at 5/10 on the numerical pain scale. Reviewed medications with counts as follows:    Rx Date filled Qty Dispensed Pill Count Last Dose Short   Morphine 15 mg IR 07/12/19 60 11 This am  no        reviewed YES  Any aberrancies noted on  NO  Last opioid agreement 12/26/18  Last urine drug screen 02/19/19    Comments:  Patient is here today for a follow up appt today for her chronic shoulder pain. She states her pain level today is a 5  PHQ 2 was done patient denies any depression. She states she was seen in the ER at [facility] for a bug bite and had it drained. She has seen her Endocrine MD for her Diabetes     POC UDS was performed in office today per verbal order per Per [place]    Any new labs or imaging since last appointment? YES ER and Endocrine MD     Have you been to an emergency room (ER) or urgent care clinic since your last visit? YES            Have you been hospitalized since your last visit? NO     If yes, where, when, and reason for visit? Have you seen or consulted any other health care providers outside of the [address] [place] [address]  since your last visit? YES   [facility] and Endocrine MD  If yes, where, when, and reason for visit? Ms. Luba Enrique has a reminder for a \"due or due soon\" health maintenance. I have asked that she contact her primary care provider for follow-up on this health maintenance.

## 2019-08-07 NOTE — PROGRESS NOTES
PT DAILY TREATMENT NOTE 10-18    Patient Name: Brandon Wilder  Date:2019  : 1935  [x]  Patient  Verified  Payor: [place] MEDICARE / Plan: [place] MEDICARE PART A & B / Product Type: Medicare /    In time: 200  Out time: 240  Total Treatment Time (min): 40  Visit #: 9 of 16    Medicare/BCBS Only   Total Timed Codes (min):  40 1:1 Treatment Time:  38       Treatment Area: Weakness [R53.1]  Unspecified osteoarthritis, unspecified site [M19.90]  Difficulty in walking, not elsewhere classified [R26.2]    SUBJECTIVE  Pain Level (0-10 scale): 5/10  Any medication changes, allergies to medications, adverse drug reactions, diagnosis change, or new procedure performed?: [x] No    [] Yes (see summary sheet for update)  Subjective functional status/changes:   [] No changes reported  Pt without c/o dizziness this session or c/o BP issues. She said she can lift her leg into the tub and car easier    OBJECTIVE    10 min Therapeutic Exercise:  [x] See flow sheet :   Rationale: increase ROM and increase strength to improve the patients ability to perform ADL's with ease. 28 min NM Re-ed:  [x] See flow sheet : braiding; lateral high knees; for/cack ambulation with eyes closed and unilateral UE support; REO/REC on/off foam   Rationale: increase ROM and increase strength to improve the patients ability to perform ADL's with ease. With   [x] TE   [] TA   [] neuro   [] other: Patient Education: [x] Review HEP    [] Progressed/Changed HEP based on:   [] positioning   [] body mechanics   [] transfers   [] heat/ice application    [] other:      Other Objective/Functional Measures:  Pt with LOB x 3 with for/back ambulation with EC  Unable to hold REC on foam for >10sec  BP at start of session 140/74mmHg Large cuff    Pain Level (0-10 scale) post treatment: 5/10    ASSESSMENT/Changes in Function:   Pt with LOB with any activity with visual field occlusion.  Pt highly challenged with ambulating with EC and required CGA throughout and min A on 3 occasions to prevent LOB. Pt with + dependent rubor Bilaterally and said she sees a cardiologist every 3 months when asked. Patient will continue to benefit from skilled PT services to modify and progress therapeutic interventions, address functional mobility deficits, address ROM deficits, address strength deficits, analyze and cue movement patterns, analyze and modify body mechanics/ergonomics and address imbalance/dizziness to attain remaining goals. [x]  See Plan of Care  []  See progress note/recertification  []  See Discharge Summary         Progress towards goals / Updated goals:  1. Patient will improve FOTO score by 43 points in order to demonstrate a significant improvement in function. 2. Patient will improve TUG test time to 15 seconds in order to demonstrate improving functional mobility at home. 3. Patient will improve gait speed during 10m walk test to 0.8m/s in order to increase ease of ambulation in community.    4. Patient will be Ind with floor to stand transfer in order to increase safety at home.     PLAN  [x]  Upgrade activities as tolerated     [x]  Continue plan of care  []  Update interventions per flow sheet       []  Discharge due to:_  [x]  Other:_  Assess  TUG NV    Daisy Angelucci, PT 8/7/2019  9:46 AM    Future Appointments   Date Time Provider Srinivas Saba   8/12/2019  9:00 AM Feliz Aguillon, PT [facility]   8/14/2019  2:30 PM David Reddy, PT [facility]   8/19/2019  8:00 AM Yamilex Gilbert, PT [facility]   8/22/2019  4:00 PM Yamilex Gilbert, PT [facility]   11/1/2019  9:30 AM Danyelle Barahona [facility]

## 2019-08-12 ENCOUNTER — HOSPITAL ENCOUNTER (OUTPATIENT)
Dept: PHYSICAL THERAPY | Age: 84
Discharge: HOME | End: 2019-08-12
Payer: MEDICARE

## 2019-08-12 PROCEDURE — 97112 NEUROMUSCULAR REEDUCATION: CPT

## 2019-08-12 PROCEDURE — 97110 THERAPEUTIC EXERCISES: CPT

## 2019-08-12 NOTE — PROGRESS NOTES
PT DAILY TREATMENT NOTE 10-18    Patient Name: Grace James  Date:2019  : 1935  [x]  Patient  Verified  Payor: Shaun Members / Plan: [place] MED[place] & B / Product Type: Medicare /    In time:903  Out time:944  Total Treatment Time (min): 41  Visit #: 10 of 16    Medicare/BCBS Only   Total Timed Codes (min):  41 1:1 Treatment Time:  41       Treatment Area: Weakness [R53.1]  Unspecified osteoarthritis, unspecified site [M19.90]  Difficulty in walking, not elsewhere classified [R26.2]    SUBJECTIVE  Pain Level (0-10 scale): 6/10  Any medication changes, allergies to medications, adverse drug reactions, diagnosis change, or new procedure performed?: [x] No    [] Yes (see summary sheet for update)  Subjective functional status/changes:   [] No changes reported  Reports her back hurts a lot today. She is still trying to get used to her new bed. OBJECTIVE      13 min Therapeutic Exercise:  [] See flow sheet :   Rationale: increase ROM, increase strength, improve coordination and improve balance to improve the patients ability to ease with ADL's             28 min NM Re-ed:  [x] See flow sheet  lateral high knees; for/back ambulation with eyes closed and bilateral UE finger support; EO/EC on/off foam >30 sec   Rationale: increase ROM and increase strength to improve the patients ability to perform ADL's with ease. With   [] TE   [] TA   [] neuro   [] other: Patient Education: [x] Review HEP    [] Progressed/Changed HEP based on:   [] positioning   [] body mechanics   [] transfers   [] heat/ice application    [] other:      Other Objective/Functional Measures: 2 fingers on parallel bars during EC x 45 secAmbulated in parallel bars with EC x 2 fingers. Romberg on foam EC x 45 sec with intermittent finger touch and CGA.     TM x 6 mins @ 1.0 mph  Pain Level (0-10 scale) post treatment: 6/10    ASSESSMENT/Changes in Function: Pt continues with instability with EC on a compliant surface. She initially declined due to mostly fear of falling but was able to accomplish task with finger cueing. Patient will continue to benefit from skilled PT services to modify and progress therapeutic interventions, address functional mobility deficits, address ROM deficits, address strength deficits, analyze and address soft tissue restrictions, analyze and cue movement patterns, analyze and modify body mechanics/ergonomics, assess and modify postural abnormalities and address imbalance/dizziness to attain remaining goals. [x]  See Plan of Care  []  See progress note/recertification  []  See Discharge Summary         Progress towards goals / Updated goals:  1. Patient will improve FOTO score by 43 points in order to demonstrate a significant improvement in function. 2. Patient will improve TUG test time to 15 seconds in order to demonstrate improving functional mobility at home. 3. Patient will improve gait speed during 10m walk test to 0.8m/s in order to increase ease of ambulation in community.    4. Patient will be Ind with floor to stand transfer in order to increase safety at home.        PLAN  [x]  Upgrade activities as tolerated     [x]  Continue plan of care  []  Update interventions per flow sheet       []  Discharge due to:_  []  Other:_      Tammie Butler, PT 8/12/2019  9:19 AM    Future Appointments   Date Time Provider Srinivas Saba   8/14/2019  2:30 PM [facility] [address]   8/19/2019  8:00 AM Pop Arnold, PT [facility] [address]   8/22/2019  4:00 PM [facility] [address]   11/1/2019  9:30 AM Sarwat Barahona [place]

## 2019-08-14 ENCOUNTER — HOSPITAL ENCOUNTER (OUTPATIENT)
Dept: PHYSICAL THERAPY | Age: 84
Discharge: HOME | End: 2019-08-14
Payer: MEDICARE

## 2019-08-14 PROCEDURE — 97110 THERAPEUTIC EXERCISES: CPT

## 2019-08-14 NOTE — PROGRESS NOTES
PT DAILY TREATMENT NOTE 10-18    Patient Name: Haley Mcdaniel  Date:2019  : 1935  [x]  Patient  Verified  Payor: Luciano Garcia / Plan: [place] MEDICARE PART A & B / Product Type: Medicare /    In time:2:30  Out time:3:12  Total Treatment Time (min): 42  Visit #: 11 of 16    Medicare/BCBS Only   Total Timed Codes (min):  42 1:1 Treatment Time:  22       Treatment Area: Weakness [R53.1]  Unspecified osteoarthritis, unspecified site [M19.90]  Difficulty in walking, not elsewhere classified [R26.2]    SUBJECTIVE  Pain Level (0-10 scale): 6  Any medication changes, allergies to medications, adverse drug reactions, diagnosis change, or new procedure performed?: [x] No    [] Yes (see summary sheet for update)  Subjective functional status/changes:   [] No changes reported  Pt reports feeling tired after last visit    OBJECTIVE    32 min Therapeutic Exercise:  [x] See flow sheet :   Rationale: increase ROM, increase strength and improve coordination to improve the patients ability to perform ADLs    10 min Neuromuscular Re-education:  [x]  See flow sheet :   Rationale: increase strength, improve balance and increase proprioception  to improve the patients ability to perform functional tasks            With   [] TE   [] TA   [] neuro   [] other: Patient Education: [x] Review HEP    [] Progressed/Changed HEP based on:   [] positioning   [] body mechanics   [] transfers   [] heat/ice application    [] other:      Other Objective/Functional Measures: Added leg press per flow sheet     Pain Level (0-10 scale) post treatment: 5    ASSESSMENT/Changes in Function: Pt performed well with added leg press today, notes fatigue but no incr'd LBP. Pt tolerated 3:30 min on TM before requiring seated rest break. Cont progressing strength and activity tolerance as tolerated.      Patient will continue to benefit from skilled PT services to modify and progress therapeutic interventions, address functional mobility deficits, address ROM deficits, address strength deficits, analyze and address soft tissue restrictions, analyze and cue movement patterns and analyze and modify body mechanics/ergonomics to attain remaining goals. []  See Plan of Care  []  See progress note/recertification  []  See Discharge Summary         Progress towards goals / Updated goals:  1. Patient will improve FOTO score by 43 points in order to demonstrate a significant improvement in function. 2. Patient will improve TUG test time to 15 seconds in order to demonstrate improving functional mobility at home. 3. Patient will improve gait speed during 10m walk test to 0.8m/s in order to increase ease of ambulation in community.    4. Patient will be Ind with floor to stand transfer in order to increase safety at home.     PLAN  []  Upgrade activities as tolerated     [x]  Continue plan of care  []  Update interventions per flow sheet       []  Discharge due to:_  []  Other:_      Ivanna Farrell, BRANDI 8/14/2019  2:43 PM    Future Appointments   Date Time Provider Srinivas Saba   8/19/2019  8:00 AM Estephania Ohara PT [facility]   8/22/2019  4:00 PM Estephania Ohara [facility]   11/1/2019  9:30 AM Allison Barahona [place]

## 2019-08-19 ENCOUNTER — HOSPITAL ENCOUNTER (OUTPATIENT)
Dept: PHYSICAL THERAPY | Age: 84
Discharge: HOME | End: 2019-08-19
Payer: MEDICARE

## 2019-08-19 PROCEDURE — 97110 THERAPEUTIC EXERCISES: CPT

## 2019-08-19 PROCEDURE — 97112 NEUROMUSCULAR REEDUCATION: CPT

## 2019-08-19 NOTE — PROGRESS NOTES
PT DAILY TREATMENT NOTE 10-18    Patient Name: Josey Ledesma  Date:2019  : 1935  [x]  Patient  Verified  Payor: Tamar Gustafson / Plan: [place] MEDICARE PART A & B / Product Type: Medicare /    In time: 8:00  Out time: 8:30  Total Treatment Time (min): 30  Visit #: 12 of 16    Medicare/BCBS Only   Total Timed Codes (min):  30 1:1 Treatment Time:  30       Treatment Area: Weakness [R53.1]  Unspecified osteoarthritis, unspecified site [M19.90]  Difficulty in walking, not elsewhere classified [R26.2]    SUBJECTIVE  Pain Level (0-10 scale): 6/10 LBP  Any medication changes, allergies to medications, adverse drug reactions, diagnosis change, or new procedure performed?: [x] No    [] Yes (see summary sheet for update)  Subjective functional status/changes:   [] No changes reported  Pt reports she had a busy weekend moving things in her house and was very active cleaning. Reports she had increased fatigue and all of the activity increased symptoms. OBJECTIVE    15 min Therapeutic Exercise:  [x] See flow sheet :   Rationale: increase ROM, increase strength and improve coordination to improve the patients ability to perform ADL's with ease and increase mobility. 15 min Neuromuscular Re-education:  []  See flow sheet : romberg, MSR, foam, EC ambulating, tandem walking, karaoke steps   Rationale: improve coordination, improve balance and increase proprioception  to improve the patients ability to perform ADL's with ease and increase mobility.           With   [x] TE   [] TA   [] neuro   [] other: Patient Education: [x] Review HEP    [] Progressed/Changed HEP based on:   [] positioning   [] body mechanics   [] transfers   [] heat/ice application    [] other:      Other Objective/Functional Measures:   TU seconds  10 m walk 8 seconds : 0.75 m/s  Pt tolerated all ther-ex     Pain Level (0-10 scale) post treatment: 5/10    ASSESSMENT/Changes in Function: Pt progressing well with therapy and progressing towards goals. Pt gait speed increased and pt has met TUG time goal. Pt continues to improve LE strength and displays increased activity tolerance. Pt continues to difficulty with ambulating with narrow CAITIE and demonstrates decreased balance with eyes closed activities. Patient will continue to benefit from skilled PT services to modify and progress therapeutic interventions, address functional mobility deficits, address ROM deficits, address strength deficits, analyze and address soft tissue restrictions, analyze and cue movement patterns, analyze and modify body mechanics/ergonomics, assess and modify postural abnormalities and address imbalance/dizziness to attain remaining goals. [x]  See Plan of Care  []  See progress note/recertification  []  See Discharge Summary         Progress towards goals / Updated goals:  1. Patient will improve FOTO score by 43 points in order to demonstrate a significant improvement in function. 2. Patient will improve TUG test time to 15 seconds in order to demonstrate improving functional mobility at home. Met (8/19/19)  3. Patient will improve gait speed during 10m walk test to 0.8m/s in order to increase ease of ambulation in community. Progressing (8/19/19)  4. Patient will be Ind with floor to stand transfer in order to increase safety at home. PLAN  []  Upgrade activities as tolerated     []  Continue plan of care  []  Update interventions per flow sheet       [x]  Discharge due to: NEXT VISIT  []  Other:_      [facility] [place] 8/19/2019  7:56 AM    I was present during the entire treatment, directing and participating in the treatment.    Phylis Carrel DPT      Future Appointments   Date Time Provider Srinivas Saba   8/19/2019  8:00 AM Trang Sanchez PT [facility]   8/22/2019  4:00 PM Trang Sanchez PT [facility]   11/1/2019  9:30 AM Chris Barahona [place]

## 2019-08-22 ENCOUNTER — HOSPITAL ENCOUNTER (OUTPATIENT)
Dept: PHYSICAL THERAPY | Age: 84
Discharge: HOME | End: 2019-08-22
Payer: MEDICARE

## 2019-08-22 PROCEDURE — 97112 NEUROMUSCULAR REEDUCATION: CPT

## 2019-08-22 PROCEDURE — 97110 THERAPEUTIC EXERCISES: CPT

## 2019-08-22 PROCEDURE — 97140 MANUAL THERAPY 1/> REGIONS: CPT

## 2019-08-22 NOTE — PROGRESS NOTES
[facility] [address]  [address]  [phone] fax    Physical Therapy Discharge Summary    Patient name: Nimco Diop Start of Care: 19   Referral source: Toi Chung MD : 1935   Medical/Treatment Diagnosis: Weakness [R53.1]  Unspecified osteoarthritis, unspecified site [M19.90]  Difficulty in walking, not elsewhere classified [R26.2]  Payor: [place] MEDICARE / Plan: [place] MEDICARE PART A & B / Product Type: Medicare /  Onset Date:Years ago, most recent fall was one month ago     Prior Hospitalization: see medical history Provider#: [id number]   Medications: Verified on Patient Summary List    Comorbidities: CHF, diabetes, HTN, arthritis, thyroid, back pain, right ankle pain, B shoulder pain  Prior Level of Function:ambulation with SPC or walker, lives alone, Ind with ADLs    Visits from Start of Care: 14    Missed Visits: 2    Reporting Period : 19 to 19    Summary of Care:  Goal: Patient will improve FOTO score to 52 points in order to demonstrate a significant improvement in function. Status at last note/certification: Not Met  Status at discharge: not met    Goal: Patient will improve TUG test time to 15 seconds in order to demonstrate improving functional mobility at home.   Status at last note/certification: Not Met  Status at discharge: met    Goal: Patient will improve gait speed during 10m walk test to 0.8m/s in order to increase ease of ambulation in community. Status at last note/certification: Not Met  Status at discharge: not met    Goal: Patient will be Ind with floor to stand transfer in order to increase safety at home. Status at last note/certification: Not Met  Status at discharge: not met      ASSESSMENT/RECOMMENDATIONS: Pt has progressed well with therapy. Reported symptoms and imbalance has improved consistently.  Pt has improved by decreased TUG time to 13 seconds, increasing gait speed to 0.75 m/s, and increasing FOTO score to 50/100 demonstrating increased perceived level of function. Pt has increased balance on compliant surfaces and continues to progress with balance activities without visual feedback. Pt activity tolerance is increased from initial evaluation as well. Pt was issued instruction on continuing balance and conditioning HEP and given TB for continuation of strength exercises at home.     [x]Discontinue therapy: [x]Patient has reached or is progressing toward set goals      []Patient is non-compliant or has abdicated      []Due to lack of appreciable progress towards set goals    Jose Blair 8/22/2019 4:44 PM

## 2019-08-22 NOTE — PROGRESS NOTES
Physical Therapy Discharge Instructions      [facility] [address]  [address] [facility] ([place], [place])  [phone] fax    Patient: John Mccain  : 1935      Continue Home Exercise Program 1-2 times per day for 6 weeks, then decrease to 3-5 times per week      Continue with    [] Ice  as needed      [] Heat         Follow up with MD:     [] Upon completion of therapy     [] As needed    Recommendations:     [x]   Return to activity with home program    []   Return to activity with the following modifications:       []Post Rehab Program    []Join Independent aquatic program     []Return to/join local gym    Additional Comments: Keep up the great work at home!     Bozena Cortes, PT 2019 4:00 PM

## 2019-08-22 NOTE — PROGRESS NOTES
PT DAILY TREATMENT NOTE 10-18    Patient Name: Ralph Salazar  Date:2019  : 1935  [x]  Patient  Verified  Payor: Magda Giraldo / Plan: [place] MEDICARE PART A & B / Product Type: Medicare /    In time: 4:00  Out time: 4:30  Total Treatment Time (min): 30  Visit #: 14 of 16    Medicare/BCBS Only   Total Timed Codes (min):  30 1:1 Treatment Time:  30       Treatment Area: Weakness [R53.1]  Unspecified osteoarthritis, unspecified site [M19.90]  Difficulty in walking, not elsewhere classified [R26.2]    SUBJECTIVE  Pain Level (0-10 scale): 5/10 feet  Any medication changes, allergies to medications, adverse drug reactions, diagnosis change, or new procedure performed?: [x] No    [] Yes (see summary sheet for update)  Subjective functional status/changes:   [] No changes reported  Pt reports feet are hurting her this morning causing her to wake up at 1:00 AM and she couldn't fall back asleep. OBJECTIVE    15 min Therapeutic Exercise:  [x] See flow sheet :   Rationale: increase ROM and increase strength to improve the patients ability to perform ADL's with ease and increase mobility. 15 min Neuromuscular Re-education:  []  See flow sheet : romberg, MSR, foam, EC/EO, tandem walk, phuong step-over   Rationale: improve coordination, improve balance and increase proprioception  to improve the patients ability to perform ADL's with ease and increase safety with ambulation.           With   [x] TE   [] TA   [] neuro   [] other: Patient Education: [x] Review HEP    [] Progressed/Changed HEP based on:   [] positioning   [] body mechanics   [] transfers   [] heat/ice application    [] other:      Other Objective/Functional Measures:   LOB x2 EC ambulation  Tolerated all the-ex and balance activities     Pain Level (0-10 scale) post treatment: 4/10    ASSESSMENT/Changes in Function: See discharge note    Patient will continue to benefit from skilled PT services to modify and progress therapeutic interventions, address functional mobility deficits, address ROM deficits, address strength deficits, analyze and address soft tissue restrictions, analyze and cue movement patterns, analyze and modify body mechanics/ergonomics and address imbalance/dizziness to attain remaining goals. []  See Plan of Care  []  See progress note/recertification  [x]  See Discharge Summary         Progress towards goals / Updated goals:  1. Patient will improve FOTO score by 43 points in order to demonstrate a significant improvement in function. Progressing (8/22/19)  2. Patient will improve TUG test time to 15 seconds in order to demonstrate improving functional mobility at home. Met (8/19/19)  3. Patient will improve gait speed during 10m walk test to 0.8m/s in order to increase ease of ambulation in community. Progressing (8/19/19)  4. Patient will be Ind with floor to stand transfer in order to increase safety at home. PLAN  []  Upgrade activities as tolerated     []  Continue plan of care  []  Update interventions per flow sheet       [x]  Discharge due to: completion and progression towards goals. []  Other:_      Ky Norman 8/22/2019  3:55 PM  I was present during the entire treatment, directing and participating in the treatment.    Chapincito Garcia DPT      Future Appointments   Date Time Provider Srinivas Saba   8/22/2019  4:00 PM Jarrett Oakes [place] [facility]   11/1/2019  9:30 AM Regina Barahona PA [place]

## 2019-09-05 DIAGNOSIS — M25.511 CHRONIC RIGHT SHOULDER PAIN: ICD-10-CM

## 2019-09-05 DIAGNOSIS — G89.29 CHRONIC RIGHT SHOULDER PAIN: ICD-10-CM

## 2019-09-05 DIAGNOSIS — M19.011 ARTHRITIS OF RIGHT ACROMIOCLAVICULAR JOINT: ICD-10-CM

## 2019-09-05 NOTE — TELEPHONE ENCOUNTER
Denice Samuel has called requesting a refill of their controlled medication Morphine  for the management of chronic pain syndrome . Last office visit date: 08/07/19  Last opioid care agreement 12/26/18  Last UDS was done 08/07/19    Date last  was pulled and reviewed : 09/05/19  Last fill date for medication was 08/12/19    Was the patient compliant when the above report was pulled? yes    Analgesia: 60%    Aberrancies: no    ADL's: yes    Adverse Reaction: no    Provider's last note and plan of care reviewed? yes  Request forwarded to provider for review.

## 2019-09-06 RX ORDER — MORPHINE SULFATE 15 MG/1
15 TABLET ORAL
Qty: 60 TAB | Refills: 0 | Status: SHIPPED | OUTPATIENT
Start: 2019-09-11 | End: 2019-10-08 | Stop reason: SDUPTHER
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
## 2019-10-08 DIAGNOSIS — G89.29 CHRONIC RIGHT SHOULDER PAIN: ICD-10-CM

## 2019-10-08 DIAGNOSIS — M25.511 CHRONIC RIGHT SHOULDER PAIN: ICD-10-CM

## 2019-10-08 DIAGNOSIS — M19.011 ARTHRITIS OF RIGHT ACROMIOCLAVICULAR JOINT: ICD-10-CM

## 2019-10-08 RX ORDER — MORPHINE SULFATE 15 MG/1
15 TABLET ORAL
Qty: 60 TAB | Refills: 0 | Status: SHIPPED | OUTPATIENT
Start: 2019-10-12 | End: 2019-11-01 | Stop reason: SDUPTHER

## 2019-10-08 NOTE — TELEPHONE ENCOUNTER
Oscar Meyer has called requesting a refill of their controlled medication Morphine for the management of chronic pain . Last office visit date: 08/07/19  Last opioid care agreement 12/16/18  Last UDS was done 08/07/19    Date last  was pulled and reviewed : 10/08/19  Last fill date for medication was 09/12/19    Was the patient compliant when the above report was pulled? yes    Analgesia: 60%    Aberrancies: no    ADL's: yes    Adverse Reaction: no    Provider's last note and plan of care reviewed? yes  Request forwarded to provider for review.

## 2019-10-09 NOTE — TELEPHONE ENCOUNTER
Spoke with patient after getting 2 points of identity informing patient she has a script at the  ready to pickup at the office. She needs to be here by 3:45pm with a valid picture ID>  Patient stated she will come tomorrow.

## 2019-11-01 ENCOUNTER — OFFICE VISIT (OUTPATIENT)
Dept: PAIN MANAGEMENT | Age: 84
End: 2019-11-01

## 2019-11-01 ENCOUNTER — DOCUMENTATION ONLY (OUTPATIENT)
Dept: PAIN MANAGEMENT | Age: 84
End: 2019-11-01

## 2019-11-01 VITALS
SYSTOLIC BLOOD PRESSURE: 148 MMHG | BODY MASS INDEX: 45.93 KG/M2 | WEIGHT: 269 LBS | OXYGEN SATURATION: 96 % | TEMPERATURE: 97 F | DIASTOLIC BLOOD PRESSURE: 72 MMHG | HEART RATE: 80 BPM | HEIGHT: 64 IN | RESPIRATION RATE: 16 BRPM

## 2019-11-01 DIAGNOSIS — M25.511 CHRONIC RIGHT SHOULDER PAIN: ICD-10-CM

## 2019-11-01 DIAGNOSIS — M19.011 ARTHRITIS OF RIGHT ACROMIOCLAVICULAR JOINT: Primary | ICD-10-CM

## 2019-11-01 DIAGNOSIS — M19.011 ARTHRITIS OF RIGHT SHOULDER REGION: ICD-10-CM

## 2019-11-01 DIAGNOSIS — G89.29 CHRONIC RIGHT SHOULDER PAIN: ICD-10-CM

## 2019-11-01 DIAGNOSIS — M62.511 ATROPHY OF MUSCLE OF RIGHT SHOULDER: ICD-10-CM

## 2019-11-01 DIAGNOSIS — G89.4 CHRONIC PAIN SYNDROME: ICD-10-CM

## 2019-11-01 DIAGNOSIS — M25.512 CHRONIC LEFT SHOULDER PAIN: ICD-10-CM

## 2019-11-01 DIAGNOSIS — Z79.899 ENCOUNTER FOR LONG-TERM (CURRENT) USE OF HIGH-RISK MEDICATION: ICD-10-CM

## 2019-11-01 DIAGNOSIS — G89.29 CHRONIC LEFT SHOULDER PAIN: ICD-10-CM

## 2019-11-01 DIAGNOSIS — M75.101 TEAR OF RIGHT ROTATOR CUFF, UNSPECIFIED TEAR EXTENT, UNSPECIFIED WHETHER TRAUMATIC: ICD-10-CM

## 2019-11-01 RX ORDER — ATORVASTATIN CALCIUM 40 MG/1
40 TABLET, FILM COATED ORAL DAILY
COMMUNITY
Start: 2019-10-30 | End: 2020-09-02

## 2019-11-01 RX ORDER — MORPHINE SULFATE 15 MG/1
7.5-15 TABLET ORAL
Qty: 55 TAB | Refills: 0 | Status: SHIPPED | OUTPATIENT
Start: 2019-11-11 | End: 2019-12-11

## 2019-11-01 RX ORDER — LISINOPRIL 2.5 MG/1
40 TABLET ORAL DAILY
COMMUNITY
Start: 2019-10-30 | End: 2021-07-06 | Stop reason: DRUGHIGH

## 2019-11-01 NOTE — PROGRESS NOTES
Referred 2016 from orthopedics for right shoulder pain      Calculated MEQ - 30  Naloxone rescue - yes  Prophylactic bowel program - yes  Date of last OCA 12/26/18  Last UDS 8/7/19, consistent  Prior UDS 2/19/19, consistent   date checked today, findings consistent      Today   Last Visit Prior Visit(s)  [facility] - 2 & 8  2 & 4  3 & 7  2 & 6  [facility] - 58%  51%  52%  at least 46%  [facility] - 0  3  0  n/a      HPI:  Yulissa Rivera is a 82 y.o. female here for f/u visit for ongoing evaluation of bilateral shoulder pain. Pt was last seen here on 8/7/19. Pt denies interval changes on the character or distribution of pain but reports increased intensity reporting due to age. Pain is located bilateral shoulders described as stabbing to aching with burning. Pain at its best is 6/10. Pain at its worse is 8/10. The pain is worsened by cervical extension, cervical rotation to the right and shoulder abduction. Symptoms are improved by Tylenol, TENS unit and Morphine. Gabapentin helps feet but not shoulders. Pt has tried PT and massage therapy with no perceived benefit. Pt reports today no perceived benefit from Capsaicin cream.    Since last visit, she has finished 8 week session of PT for LE ordered by her PCP. She continues to perform HEP learned from PT. She is not interested in any procedure to either shoulder at this time. Interventional Pain Procedures:  3/7/19 - right AC intraarticular injection with Dr Miky Menjivar  3/7/19 - left myofascial TPI with Dr Miky Menjivar    ROS:  Reports chronic shortness of breath. Denies fever, chills, nausea, vomiting, diarrhea, constipation, abdominal pain, chest pain, trouble swallowing, changes in vision, changes in hearing, falls, dizziness, bladder incontinence, bowel incontinence, depression, anxiety, suicidal ideations, homicidal ideations or alcohol use. Opioid specific risk: advanced age, polypharmacy, asthma, vertigo, GERD, history of depression, obesity, hx of falls.     Vitals:    11/01/19 7695 11/01/19 0910   BP: 161/72 148/72   Pulse: 86 80   Resp: 16    Temp: 97 °F (36.1 °C)    SpO2: 96%    Weight: 122 kg (269 lb)    Height: 5' 4\" (1.626 m)    PainSc:   8    PainLoc: Shoulder         Physical exam:  Constitutional  -  AFVSS with elevated blood pressure, no acute distress, endomoprhic body habitus. A&OXs 3. Poor and difficult historian. Speech is clear and appropriate. HEENT -  Normocephalic, atraumatic. Conjugate gaze, clear sclerae. EOM intact. Neuro/Psych -  Mood is appropriate and patient is cooperative. Gait is within functional limits. Balance is within functional limits. Respiratory -  Non-labored breathing. Even rise of chest wall. Primary Care Physician  Edwar Orta      PHQ -- .  3 most recent PHQ Screens 11/1/2019   Little interest or pleasure in doing things Not at all   Feeling down, depressed, irritable, or hopeless Not at all   Total Score PHQ 2 0   Trouble falling or staying asleep, or sleeping too much -   Feeling tired or having little energy -   Poor appetite, weight loss, or overeating -   Feeling bad about yourself - or that you are a failure or have let yourself or your family down -   Trouble concentrating on things such as school, work, reading, or watching TV -   Moving or speaking so slowly that other people could have noticed; or the opposite being so fidgety that others notice -   Thoughts of being better off dead, or hurting yourself in some way -   PHQ 9 Score -   How difficult have these problems made it for you to do your work, take care of your home and get along with others -         Assessment/Plan:     ICD-10-CM ICD-9-CM    1. Arthritis of right acromioclavicular joint M19.011 716.91 AMB SUPPLY ORDER      morphine IR (MS IR) 15 mg tablet   2. Chronic right shoulder pain M25.511 719.41 AMB SUPPLY ORDER    G89.29 338.29 morphine IR (MS IR) 15 mg tablet   3.  Tear of right rotator cuff, unspecified tear extent, unspecified whether traumatic M75.101 840.4 AMB SUPPLY ORDER   4. Atrophy of muscle of right shoulder M62.511 728.2 AMB SUPPLY ORDER   5. Arthritis of right shoulder region M19.011 716.91 AMB SUPPLY ORDER   6. Chronic left shoulder pain M25.512 719.41 AMB SUPPLY ORDER    G89.29 338.29    7. Chronic pain syndrome G89.4 338.4 AMB SUPPLY ORDER   8. Encounter for long-term (current) use of high-risk medication Z79.899 V58.69         1) Medications (opiod and non-opiod)  --I do not recommend long term opioid therapy for this patient at this time for their chronic pain; the risks outweigh the potential benefits. Pt currently taking Morphine IR 15mg up to 2 times a day as needed with a total of 60 tabs to be used over 30 days. Their MME is 30. --Today, we will continue the weaning of patients opioid medication with a goal of being opioid free, pending safety and compliance. Pt was educated on signs and symptoms of opioid withdrawal and advised to call the clinic should these symptoms arise so that we may provide support as needed. She is provided with Morphine IR 15mg, 0.5-1 tab up to 2 times a day as needed with a total of 55 tabs to be budgeted over 30 days. --Pt instructed to call 5-7 days before they run out of their medications for refill. At December refill, she will be provided with Morphine IR 15mg, 0.5-1 tab up to 2 times a day as needed with a total of 55 tabs to be budgeted over 30 days, pending safety and compliance. --At January refill, she will be provided with Morphine IR 15mg, 0.5-1 tab up to 2 times a day as needed with a total of 50 tabs to be budgeted over 30 days, pending safety and compliance.   --At next office visit, the plan is to provide patient with Morphine IR 15mg up to 2 times a day as needed with at total of 45 tabs to be budgeted over 30 days If patient has difficulty with the wean or difficulty with cravings we will consider referral to mental health for ongoing assessment and treatment for opioid use disorder. --Continue Gabapentin from outside provider. --Has stopped using capsaicin due to no perceived benefit. Will order topical compounded cream (10% ketoprofen, 2% baclofen, 6% gabapentin and 5% lidocaine) to be used as needed from [facility]. --Continue Tylenol prn; she may take a total of 3000mg a day as needed from all sources. 2) Restorative Therapies  --Continue using TENS unit as needed. --Continue HEP.  --Continue use of ice and heat as needed. --Trial of H-wave therapy ordered today as I think she would significantly benefit from this. --I recommend aquatic PT for her shoulder as she has never had aquatic PT before but she declines referral stating she is unable to afford it at this time. 3) Interventional Procedures  --Pt would benefit from repeat left shoulder region TPI and right AC joint injection with Dr Estrellita Dewitt but she declines this at this time. 4) Behavorial Health Approaches  --Pt would benefit from referral to pain psychology for training and cognitive behavorial therapy, acceptance commitment therapy, biofeedback and mindfulness mediation and other modalities as indicated for treatment of chronic pain once this service is available at our clinic or with referral with Dr Flako Grant. She declines referral today. 5) Complementary & Integrative Health  --Close follow up with your PCP. --Follow up ongoing assessment and ongoing development of integrative and comprehensive plan of care for chronic pain. Goals: To establish complementary and integrative plan of care to address chronic pain issues while minimizing pharmaceuticals to maximize patient's function improve quality of life. Education:  Patient again educated on the importance of strict compliance with the opioid care agreement while on opioid therapy. Patient also again educated that they should avoid driving while on chronic opioid therapy.   Also advised to avoid alcohol and to avoid benzodiazepines while on opioid therapy. Handouts given regarding opioid safety. Follow-up and Dispositions    · Return in about 3 months (around 2/1/2020) for 30 min with Dr Judah Hallman.               Social History     Socioeconomic History    Marital status:      Spouse name: Not on file    Number of children: Not on file    Years of education: Not on file    Highest education level: Not on file   Occupational History    Not on file   Social Needs    Financial resource strain: Not on file    Food insecurity:     Worry: Not on file     Inability: Not on file    Transportation needs:     Medical: Not on file     Non-medical: Not on file   Tobacco Use    Smoking status: Former Smoker    Smokeless tobacco: Never Used   Substance and Sexual Activity    Alcohol use: No     Alcohol/week: 0.0 standard drinks    Drug use: No    Sexual activity: Never   Lifestyle    Physical activity:     Days per week: Not on file     Minutes per session: Not on file    Stress: Not on file   Relationships    Social connections:     Talks on phone: Not on file     Gets together: Not on file     Attends Muslim service: Not on file     Active member of club or organization: Not on file     Attends meetings of clubs or organizations: Not on file     Relationship status: Not on file    Intimate partner violence:     Fear of current or ex partner: Not on file     Emotionally abused: Not on file     Physically abused: Not on file     Forced sexual activity: Not on file   Other Topics Concern    Not on file   Social History Narrative    Not on file     Family History   Problem Relation Age of Onset    Heart Attack Mother     Heart Attack Father      No Known Allergies  Past Medical History:   Diagnosis Date    Asthma     Chronic venous insufficiency     [facility] [address]     Hypertension     Peripheral neuropathy     Rheumatoid arthritis ([facility] [address]     Vertigo      Past Surgical History:   Procedure Laterality Date    HX CHOLECYSTECTOMY      HX GYN      TAHOophorectomy due to infection    HX HEENT      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  HX ORTHOPAEDIC      bilat TKA     Current Outpatient Medications on File Prior to Visit   Medication Sig    atorvastatin (LIPITOR) 40 mg tablet Take 40 mg by mouth daily.  lisinopril (PRINIVIL, ZESTRIL) 2.5 mg tablet Take 2.5 mg by mouth daily.  BYDUREON BCISE 2 mg/0.85 mL atIn 2 mg by SubCUTAneous route every seven (7) days.  naloxone (NARCAN) 4 mg/actuation nasal spray Use 1 spray intranasally, then discard. Repeat with new spray every 2 min as needed for opioid overdose symptoms, alternating nostrils.  insulin glargine (LANTUS U-100 INSULIN) 100 unit/mL injection 40 Units by SubCUTAneous route nightly.  ascorbic acid, vitamin C, (VITAMIN C) 500 mg tablet Take 500 mg by mouth daily.  bumetanide (BUMEX) 2 mg tablet Take 2 mg by mouth two (2) times daily as needed.  gabapentin (NEURONTIN) 300 mg capsule Take 1 Cap by mouth three (3) times daily.  Blood-Glucose Meter (FREESTYLE LITE METER) monitoring kit Test twice blood glucose daily; ICD-10 E11.9; Quantity 1    amLODIPine (NORVASC) 10 mg tablet Take 0.5 Tabs by mouth daily. (Patient taking differently: Take 10 mg by mouth daily.)    levothyroxine (SYNTHROID) 200 mcg tablet 1 tablet daily (take on an empty stomach) (stop \"old\" synthroid)    carvedilol (COREG) 12.5 mg tablet Take 1 Tab by mouth two (2) times daily (with meals).  insulin syringe,safetyneedle 1 mL 31 gauge x 5/16\" syrg 1 Each by Does Not Apply route daily.  cholecalciferol (VITAMIN D3) 1,000 unit tablet Take 1,000 Units by mouth daily.  glucose blood VI test strips (FREESTYLE TEST) strip Use to check blood glucose twice daily DX:E11.9    allopurinol (ZYLOPRIM) 100 mg tablet Take 1 Tab by mouth daily.  magnesium oxide 500 mg tab Take 1 Tab by mouth daily.     acetaminophen (TYLENOL) 500 mg tablet Take 1 Tab by mouth every six (6) hours as needed for Pain.  [DISCONTINUED] morphine IR (MS IR) 15 mg tablet Take 1 Tab by mouth two (2) times daily as needed for Pain for up to 30 days. Max Daily Amount: 30 mg. Indications: pain    [DISCONTINUED] capsaicin (CAPZASIN-HP) 0.1 % topical cream Apply cream to bilateral shoulders TID. Avoid use on damaged, broken or irritated skin. Avoid occlusive dressings or heat while using this medication.  TENS Units (TENS 504) mikal Please provide patient with a TENS unit to help improve function, improve quality of life, reduce medication use, improve ROM. 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  aspirin delayed-release 81 mg tablet Take 81 mg by mouth daily. No current facility-administered medications on file prior to visit. [address] was used for portions of this report. Unintended errors may occur.

## 2019-11-01 NOTE — PATIENT INSTRUCTIONS
Safe Use of Opioid Pain Medicine: Care Instructions  Your Care Instructions  Pain is your body's way of warning you that something is wrong. Pain feels different for everybody. Only you can describe your pain. A doctor can suggest or prescribe many types of medicines for pain. These range from over-the-counter medicines like acetaminophen (Tylenol) to powerful medicines called opioids. Examples of opioids are fentanyl, hydrocodone, morphine, and oxycodone. Heroin is an illegal opioid  Opioids are strong medicines. They can help you manage pain when you use them the right way. But if you misuse them, they can cause serious harm and even death. For these reasons, doctors are very careful about how they prescribe opioids. If you decide to take opioids, here are some things to remember. · Keep your doctor informed. You can develop opioid use disorder. Moderate to severe opioid use disorder is sometimes called addiction. The risk is higher if you have a history of substance use. Your doctor will monitor you closely for signs of opioid use disorder and to figure out when you no longer need to take opioids. · Make a treatment plan. The goal of your plan is to be able to function and do the things you need to do, even if you still have some pain. You might be able to manage your pain with other non-opioid options like physical therapy, relaxation, or over-the-counter pain medicines. · Be aware of the side effects. Opioids can cause serious side effects, such as constipation, dry mouth, and nausea. And over time, you may need a higher dose to get pain relief. This is called tolerance. Your body also gets used to opioids. This is called physical dependence. If you suddenly stop taking them, you may have withdrawal symptoms. The doctor carefully considered what pain medicine is right for you.  You may not have received opioids if your doctor was concerned about drug interactions or your safety, or if he or she had other concerns. It is best to have one doctor or clinic treat your pain. This way you will get the pain medicine that will help you the most. And a doctor will be able to watch for any problems that the medicine might cause. The doctor has checked you carefully, but problems can develop later. If you notice any problems or new symptoms,  get medical treatment right away. Follow-up care is a key part of your treatment and safety. Be sure to make and go to all appointments, and call your doctor if you are having problems. It's also a good idea to know your test results and keep a list of the medicines you take. How can you care for yourself at home? If you need to take opioids to manage your pain, remember these safety tips. · Follow directions carefully. It's easy to misuse opioids if you take a dose other than what's prescribed by your doctor. This can lead to overdose and even death. Even sharing them with someone they weren't meant for is misuse. · Be cautious. Opioids may affect your judgment and decision making. Do not drive or operate machinery until you can think clearly. Talk with your doctor about when it is safe to drive. · Reduce the risk of drug interactions. Opioids can be dangerous if you take them with alcohol or with certain drugs like sleeping pills and muscle relaxers. Make sure your doctor knows about all the other medicines you take, including over-the-counter medicines. Don't start any new medicines before you talk to your doctor or pharmacist.  · Safely store and dispose of opioids. Store opioids in a safe and secure place. Make sure that pets, children, friends, and family can't get to them. When you're done using opioids, make sure to dispose of them safely and as quickly as possible. The U.S. Food and Drug Administration (FDA) recommends these disposal options.   ? The best option is to take your medicine to a drop-off box or take-back program that is authorized by the U.S. Drug Enforcement Administration (TOREY). ? If these programs aren't available in your area and your medicine doesn't have specific disposal instructions (such as flushing), you can throw them into your household trash if you follow the FDA's instructions. Visit fda.gov and search for \"unused medicine disposal.\"  ? If you have opioid patches (used or unused), your options are to take them to a TOREY-authorized site or flush them down the toilet. Do not throw them in the trash. ? Only flush your medicine down the toilet if you can't get to a TOREY-approved site or your medicine instructions state clearly to flush them. · Reduce the risk of overdose. Misuse of opioids can be very dangerous. Protect yourself by asking your doctor about a naloxone rescue kit. It can help you--and even save your life--if you take too much of an opioid. Try other ways to reduce pain. · Relax, and reduce stress. Relaxation techniques such as deep breathing or meditation can help. · Keep moving. Gentle, daily exercise can help reduce pain over the long run. Try low- or no-impact exercises such as walking, swimming, and stationary biking. Do stretches to stay flexible. · Try heat, cold packs, and massage. · Get enough sleep. Pain can make you tired and drain your energy. Talk with your doctor if you have trouble sleeping because of pain. · Think positive. Your thoughts can affect your pain level. Do things that you enjoy to distract yourself when you have pain instead of focusing on the pain. See a movie, read a book, listen to music, or spend time with a friend. If you are not taking a prescription pain medicine, ask your doctor if you can take an over-the-counter medicine. When should you call for help?   Call your doctor now or seek immediate medical care if:    · You have a new kind of pain.     · You have new symptoms, such as a fever or rash, along with the pain.    Watch closely for changes in your health, and be sure to contact your doctor if:    · You think you might be using too much pain medicine, and you need help to use less or stop.     · Your pain gets worse.     · You would like a referral to a doctor or clinic that specializes in pain management. Where can you learn more? Go to http://braden-estephanie.info/. Enter R108 in the search box to learn more about \"Safe Use of Opioid Pain Medicine: Care Instructions. \"  Current as of: March 28, 2019  Content Version: 12.2  © 3847-1521 [employer]. Care instructions adapted under license by [employer] (which disclaims liability or warranty for this information). If you have questions about a medical condition or this instruction, always ask your healthcare professional. [address] any warranty or liability for your use of this information.

## 2019-12-09 ENCOUNTER — OFFICE VISIT (OUTPATIENT)
Dept: PAIN MANAGEMENT | Age: 84
End: 2019-12-09

## 2019-12-09 VITALS
HEART RATE: 97 BPM | RESPIRATION RATE: 20 BRPM | HEIGHT: 64 IN | SYSTOLIC BLOOD PRESSURE: 187 MMHG | WEIGHT: 269 LBS | TEMPERATURE: 96.6 F | BODY MASS INDEX: 45.93 KG/M2 | DIASTOLIC BLOOD PRESSURE: 65 MMHG | OXYGEN SATURATION: 96 %

## 2019-12-09 DIAGNOSIS — M25.511 CHRONIC RIGHT SHOULDER PAIN: Primary | ICD-10-CM

## 2019-12-09 DIAGNOSIS — M19.011 ARTHRITIS OF RIGHT ACROMIOCLAVICULAR JOINT: ICD-10-CM

## 2019-12-09 DIAGNOSIS — G89.29 CHRONIC RIGHT SHOULDER PAIN: Primary | ICD-10-CM

## 2019-12-09 DIAGNOSIS — M75.101 TEAR OF RIGHT ROTATOR CUFF, UNSPECIFIED TEAR EXTENT, UNSPECIFIED WHETHER TRAUMATIC: ICD-10-CM

## 2019-12-09 DIAGNOSIS — Z79.899 ENCOUNTER FOR LONG-TERM (CURRENT) USE OF HIGH-RISK MEDICATION: ICD-10-CM

## 2019-12-09 RX ORDER — MORPHINE SULFATE 15 MG/1
15 TABLET ORAL
Qty: 60 TAB | Refills: 0 | Status: SHIPPED | OUTPATIENT
Start: 2019-12-13 | End: 2020-01-12

## 2019-12-09 RX ORDER — DOCUSATE SODIUM 100 MG/1
100 CAPSULE, LIQUID FILLED ORAL
Qty: 60 CAP | Refills: 2 | Status: SHIPPED | OUTPATIENT
Start: 2019-12-09 | End: 2020-03-08

## 2019-12-09 RX ORDER — MUPIROCIN 20 MG/G
OINTMENT TOPICAL
COMMUNITY
Start: 2019-11-01 | End: 2020-07-10

## 2019-12-09 RX ORDER — PROMETHAZINE HYDROCHLORIDE 12.5 MG/1
12.5 TABLET ORAL
Qty: 12 TAB | Refills: 0 | Status: SHIPPED | OUTPATIENT
Start: 2019-12-09 | End: 2020-09-02

## 2019-12-09 RX ORDER — HYDROXYZINE 25 MG/1
25 TABLET, FILM COATED ORAL
Qty: 12 TAB | Refills: 0 | Status: SHIPPED | OUTPATIENT
Start: 2019-12-09 | End: 2020-07-10

## 2019-12-09 RX ORDER — CLONIDINE HYDROCHLORIDE 0.1 MG/1
0.1 TABLET ORAL
Qty: 12 TAB | Refills: 0 | Status: SHIPPED | OUTPATIENT
Start: 2019-12-09 | End: 2020-07-10

## 2019-12-09 NOTE — PROGRESS NOTES
Nursing Notes    Patient presents to the office today in follow-up. Patient rates her pain at 7/10 on the numerical pain scale. Reviewed medications with counts as follows:    Rx Date filled Qty Dispensed Pill Count Last Dose Short   Morphine sulfate IR 15 mg 11/13/19 55 9 today no                                       reviewed YES  Any aberrancies noted on  NO  Last opioid agreement 12/27/18  Last urine drug screen 08/07/19    Comments:     POC UDS was not performed in office today. Any new labs or imaging since last appointment? NO    Have you been to an emergency room (ER) or urgent care clinic since your last visit? NO            Have you been hospitalized since your last visit? NO     If yes, where, when, and reason for visit? Have you seen or consulted any other health care providers outside of the [address] [place] [address]  since your last visit? NO     If yes, where, when, and reason for visit? Ms. Adrian Montaño has a reminder for a \"due or due soon\" health maintenance. I have asked that she contact her primary care provider for follow-up on this health maintenance.

## 2019-12-09 NOTE — PROGRESS NOTES
Referred 2016 from orthopedics for right shoulder pain  Pt was last seen here on  11/1/2019  Calculated MEQ - 30  Naloxone rescue -yes  Prophylactic bowel program -yes  Date of last OCA  12/27/2018  Last UDS  8/7/2019,findings consistent.  checked today and findings were consistent. GIC-2 and 5  MACKENZIE -66%  COMM- 0     HPI:  Kel Mejia  Is a 82 y.o. female here for f/u visit for ongoing evaluation of bilateral shoulder pain. Pt denies interval changes in the character or distribution of pain. The pain is currently 7/10. Pain is located at bilateral shoulders, left arm, bilateral feet. The pain is described as aching burning and stabbing in bilateral shoulders and stabbing pain in the left thumb. The ranges from 6-9/10. She continues to treat with podiatry for bilateral foot pain. Current/recent pain related medications include:  Tylenol 500 mg up to 4 times daily as needed  Gabapentin 300 mg 3 times daily   Morphine IR 15 mg tablets up to twice daily with 55 tablets budgeted for 30 days. Pt reports partial but incomplete analgesia with the current opioid regimen which helps to improve activity tolerance and function. Pt denies aberrant behaviors or any adverse effects. ROS: Review of systems is negative for fever, chills, nausea, vomiting, diarrhea, chest pain,  abdominal pain, focal weakness, trouble swallowing, acute changes in vision, acute changes in hearing, dizziness, falls, depression, anxiety, suicidal ideation, homicidal ideation, alcohol use, bowel incontinence, bladder incontinence. Review of systems positive for constipation, shortness of breath     Opioid specific risk: advanced age, polypharmacy, asthma, vertigo, GERD, history of depression, obesity, hx of falls.     Visit Vitals  /65 (BP 1 Location: Right arm, BP Patient Position: Sitting)   Pulse 97   Temp 96.6 °F (35.9 °C) (Oral)   Resp 20   Ht 5' 4\" (1.626 m)   Wt 122 kg (269 lb)   SpO2 96%   BMI 46.17 kg/m² PE: AFVSS with elevated blood pressure, no acute distress,   endomorphic body habitus. A&OXs 3. Speech is clear and appropriate. Mood is pleasant and appropriate. Patient is cooperative. Breathing is nonlabored. Conjugate gaze. Gait is within functional limits with antalgia and use of AD. Balance is within functional limits with use of AD. Primary Care Physician  Shanon Lawrence MD  No address on file  None          Assessment/Plan:   Encounter Diagnoses     ICD-10-CM ICD-9-CM   1. Chronic right shoulder pain M25.511 719.41    G89.29 338.29   2. Arthritis of right acromioclavicular joint M19.011 716.91   3. Tear of right rotator cuff, unspecified tear extent, unspecified whether traumatic M75.101 840.4   4. Encounter for long-term (current) use of high-risk medication Z79.899 V58.69       Today patient is provided with MorphineIR 15mg up to twice daily with 60 tabs for 30 days. They were informed of the planned clinic closure of 12/12/19 and that this will be the last prescription of narcotic provided from this clinic. Patient was educated on signs and symptoms of opioid withdrawal and a withdrawal cocktail was provided to be used if needed. They were also advised they can go to the nearest ED for further withdrawal support if needed. They were referred to [address] [place] [address] and pain for continuing pain care. They were advised to call their primary care provider today to inform them of the planned clinic closure so that they may anticipate providing support for pain while awaiting establishment at the new clinic. Withdrawal cocktail:  Phenergan 25mg, one PO Q8hrs PRN nausea, vomiting #12  Atarax 25mg, one PO Q8hrs PRN anxiety, pruritus, irritation #12  Clonidine 0.1mg, one PO Q12hrs PRN sweating, restlessness, hot flashes #8    Continue colace as needed. Pt has Narcan rescue spray available if needed. Patient expresses understanding and agreement with the plan.        --Follow-up  With PCP.    GOALS:  To establish complementary and integrative plan of care to address chronic pain issues while minimizing pharmaceuticals to maximize patient's function improve quality of life. Education:  Patient again educated on the importance of strict compliance with the opioid care agreement while on opioid therapy. Patient also again educated that they should avoid driving while on chronic opioid therapy. Also advised to avoid alcohol and to avoid benzodiazepines while on opioid therapy. A total of 32 minutes were spent with the patient, of which more than half of the time was spent counseling and/or coordinating care.

## 2019-12-23 ENCOUNTER — HOSPITAL ENCOUNTER (OUTPATIENT)
Dept: VASCULAR SURGERY | Age: 84
Discharge: HOME | End: 2019-12-23
Attending: PODIATRIST
Payer: MEDICARE

## 2019-12-23 DIAGNOSIS — E11.52 DIABETIC GANGRENE (HCC): ICD-10-CM

## 2019-12-23 LAB
LEFT ABI: 1.05
LEFT ANTERIOR TIBIAL: 150 MMHG
LEFT ARM BP: 154 MMHG
LEFT CALF PRESSURE: 163 MMHG
LEFT POSTERIOR TIBIAL: 162 MMHG
RIGHT ABI: 1.01
RIGHT ANTERIOR TIBIAL: 147 MMHG
RIGHT ARM BP: 154 MMHG
RIGHT CALF PRESSURE: 151 MMHG
RIGHT POSTERIOR TIBIAL: 156 MMHG

## 2019-12-23 PROCEDURE — 93923 UPR/LXTR ART STDY 3+ LVLS: CPT

## 2020-07-10 ENCOUNTER — OFFICE VISIT (OUTPATIENT)
Dept: CARDIOLOGY CLINIC | Age: 85
End: 2020-07-10

## 2020-07-10 VITALS
HEIGHT: 64 IN | WEIGHT: 226.4 LBS | BODY MASS INDEX: 38.65 KG/M2 | TEMPERATURE: 98.1 F | HEART RATE: 78 BPM | SYSTOLIC BLOOD PRESSURE: 143 MMHG | DIASTOLIC BLOOD PRESSURE: 59 MMHG

## 2020-07-10 DIAGNOSIS — I10 ESSENTIAL HYPERTENSION: ICD-10-CM

## 2020-07-10 DIAGNOSIS — I70.0 ATHEROSCLEROSIS OF AORTA (HCC): ICD-10-CM

## 2020-07-10 DIAGNOSIS — E78.5 HYPERLIPIDEMIA, UNSPECIFIED HYPERLIPIDEMIA TYPE: ICD-10-CM

## 2020-07-10 DIAGNOSIS — I50.9 CONGESTIVE HEART FAILURE, UNSPECIFIED HF CHRONICITY, UNSPECIFIED HEART FAILURE TYPE (HCC): ICD-10-CM

## 2020-07-10 DIAGNOSIS — Z79.4 TYPE 2 DIABETES MELLITUS WITHOUT COMPLICATION, WITH LONG-TERM CURRENT USE OF INSULIN (HCC): ICD-10-CM

## 2020-07-10 DIAGNOSIS — E11.9 TYPE 2 DIABETES MELLITUS WITHOUT COMPLICATION, WITH LONG-TERM CURRENT USE OF INSULIN (HCC): ICD-10-CM

## 2020-07-10 RX ORDER — MORPHINE SULFATE 15 MG/1
TABLET ORAL
COMMUNITY
Start: 2020-06-05 | End: 2020-12-10 | Stop reason: CLARIF

## 2020-07-10 NOTE — PROGRESS NOTES
HISTORY OF PRESENT ILLNESS  Renee Garcia is a 82 y.o. female. 7/10/2020  Patient seen today for new patient evaluation. She is referred here for evaluation of CHF. She has been previously followed at [facility] cardiology. Patient does not have any significant complaint of chest pain, shortness of breath orthopnea. She has chronic peripheral edema she uses Bumex. They are slowly improving. She denies any history of prior cardiac issues including MI. New Patient   The history is provided by the patient and medical records. Pertinent negatives include no chest pain, no abdominal pain, no headaches and no shortness of breath. Hypertension   The history is provided by the patient and medical records. This is a chronic problem. The problem occurs constantly. The problem has not changed since onset. Pertinent negatives include no chest pain, no abdominal pain, no headaches and no shortness of breath. Review of Systems   Constitutional: Negative for chills and fever. HENT: Negative for nosebleeds. Eyes: Negative for blurred vision and double vision. Respiratory: Negative for cough, hemoptysis, sputum production, shortness of breath and wheezing. Cardiovascular: Positive for leg swelling. Negative for chest pain, palpitations, orthopnea, claudication and PND. Gastrointestinal: Negative for abdominal pain, heartburn, nausea and vomiting. Musculoskeletal: Negative for myalgias. Skin: Negative for rash. Neurological: Negative for dizziness, weakness and headaches. Endo/Heme/Allergies: Does not bruise/bleed easily.      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  HX ORTHOPAEDIC      bilat TKA       Social History     Tobacco Use    Smoking status: Former Smoker    Smokeless tobacco: Never Used   Substance Use Topics    Alcohol use: No     Alcohol/week: 0.0 standard drinks       No Known Allergies    Prior to Admission medications    Medication Sig Start Date End Date Taking? Authorizing Provider   morphine IR (MS IR) 15 mg tablet  6/5/20  Yes Provider, Historical   beta-carotene,A,-vits C,E/mins (OCUVITE PO) Take 1 Tab by mouth daily. Yes Provider, Historical   promethazine (PHENERGAN) 12.5 mg tablet Take 1 Tab by mouth every eight (8) hours as needed for Nausea (nausea, vomiting - opiate withdrawl PRN) for up to 12 doses. 12/9/19  Yes Sherry SANTOS, DO   atorvastatin (LIPITOR) 40 mg tablet Take 40 mg by mouth daily. 10/30/19  Yes Provider, Historical   lisinopril (PRINIVIL, ZESTRIL) 2.5 mg tablet Take 2.5 mg by mouth daily. 10/30/19  Yes Provider, Historical   BYDUREON BCISE 2 mg/0.85 mL atIn 2 mg by SubCUTAneous route every seven (7) days. 7/8/19  Yes Provider, Historical   naloxone (NARCAN) 4 mg/actuation nasal spray Use 1 spray intranasally, then discard. Repeat with new spray every 2 min as needed for opioid overdose symptoms, alternating nostrils. 12/26/18  Yes hSerry SANTOS, DO   TENS Units (TENS 504) mikal Please provide patient with a TENS unit to help improve function, improve quality of life, reduce medication use, improve ROM. 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 9/21/18  Yes Sherry SANTOS, DO   insulin glargine (LANTUS U-100 INSULIN) 100 unit/mL injection 40 Units by SubCUTAneous route nightly. Yes Provider, Historical   ascorbic acid, vitamin C, (VITAMIN C) 500 mg tablet Take 500 mg by mouth daily. Yes Provider, Historical   bumetanide (BUMEX) 2 mg tablet Take 1 mg by mouth two (2) times daily as needed. Yes Provider, Historical   gabapentin (NEURONTIN) 300 mg capsule Take 1 Cap by mouth three (3) times daily. 3/14/18  Yes Bridgett Stewart MD   Blood-Glucose Meter (FREESTYLE LITE METER) monitoring kit Test twice blood glucose daily; ICD-10 E11.9; Quantity 1 12/15/17  Yes Kenia Montemayor NP   amLODIPine (NORVASC) 10 mg tablet Take 0.5 Tabs by mouth daily. Patient taking differently: Take 10 mg by mouth daily. 11/2/17  Yes Bridgett Stewatr MD   levothyroxine (SYNTHROID) 200 mcg tablet 1 tablet daily (take on an empty stomach) (stop \"old\" synthroid)  Patient taking differently: 175 mcg. 1 tablet daily (take on an empty stomach) (stop \"old\" synthroid) 9/25/17  Yes Bridgett Stewart MD   carvedilol (COREG) 12.5 mg tablet Take 1 Tab by mouth two (2) times daily (with meals). 8/7/17  Yes Bridgett Stewart MD   insulin syringe,safetyneedle 1 mL 31 gauge x 5/16\" syrg 1 Each by Does Not Apply route daily. 8/7/17  Yes Bridgett Stewart MD   cholecalciferol (VITAMIN D3) 1,000 unit tablet Take 2,000 Units by mouth daily. Yes Provider, Historical   glucose blood VI test strips (FREESTYLE TEST) strip Use to check blood glucose twice daily DX:E11.9 7/5/17  Yes Bridgett Stewart MD   allopurinol (ZYLOPRIM) 100 mg tablet Take 1 Tab by mouth daily. 2/21/17  Yes Bridgett Stewart MD   magnesium oxide 500 mg tab Take 1 Tab by mouth daily. Yes Provider, Historical   acetaminophen (TYLENOL) 500 mg tablet Take 1 Tab by mouth every six (6) hours as needed for Pain. 11/2/16  Yes Bridgett Stewart MD   aspirin delayed-release 81 mg tablet Take 81 mg by mouth daily.    Yes Provider, Historical         Visit Vitals  /59 (BP 1 Location: Left arm, BP Patient Position: Sitting)   Pulse 78   Temp 98.1 °F (36.7 °C) (Temporal)   Ht 5' 4\" (1.626 m)   Wt 102.7 kg (226 lb 6.4 oz)   BMI 38.86 kg/m²       Physical Exam   Constitutional: She is oriented to person, place, and time. She appears well-developed and well-nourished. HENT:   Head: Normocephalic and atraumatic. Eyes: Conjunctivae are normal.   Neck: Neck supple. No JVD present. No tracheal deviation present. No thyromegaly present. Cardiovascular: Normal rate and regular rhythm. PMI is not displaced. Exam reveals no gallop, no S3 and no decreased pulses. No murmur heard. Pulmonary/Chest: No respiratory distress. She has no wheezes. She has no rales. She exhibits no tenderness. Abdominal: Soft. There is no abdominal tenderness. Musculoskeletal:         General: Edema (1+ BLE edema) present. Neurological: She is alert and oriented to person, place, and time. Skin: Skin is warm. Psychiatric: She has a normal mood and affect. Ms. Devora Appiah has a reminder for a \"due or due soon\" health maintenance. I have asked that she contact her primary care provider for follow-up on this health maintenance. No flowsheet data found. I have personally reviewed patient's records available from hospital and other providers and incorporated findings in patient care. Notes, labs, vascular study, chest x-ray    Assessment         ICD-10-CM ICD-9-CM    1. Congestive heart failure, unspecified HF chronicity, unspecified heart failure type ([facility])  I50.9 428.0 AMB POC EKG ROUTINE W/ 12 LEADS, INTER & REP      NUCLEAR CARDIAC STRESS TEST      ECHO ADULT COMPLETE    Will evaluate with stress test and echo   2. Essential hypertension  I10 401.9     Controlled with current medications   3. Hyperlipidemia, unspecified hyperlipidemia type  E78.5 272.4     Continue statin, labs with PCP   4. Type 2 diabetes mellitus without complication, with long-term current use of insulin ([facility])  E11.9 250.00     Z79.4 V58.67     Continue treatment, labs with PCP   5. Atherosclerosis of aorta ([facility])  I70.0 440.0     Evaluate for CAD with stress test     7/2020 Patient referred for CHF.   She has been previously followed with [facility]. She denies chest pain or SOB. She reports chronic BLE edema and takes Bumex PRN. Will evaluate CHF with echo. EKG SR with inferior Q wave. Suggestive of inferior wall infarct, will evaluate for CAD with stress test. Continue current treatment - f/u post testing. Medications Discontinued During This Encounter   Medication Reason    cloNIDine HCl (CATAPRES) 0.1 mg tablet     hydrOXYzine HCl (ATARAX) 25 mg tablet     mupirocin (BACTROBAN) 2 % ointment        Orders Placed This Encounter    AMB POC EKG ROUTINE W/ 12 LEADS, INTER & REP     Order Specific Question:   Reason for Exam:     Answer:   New patient, CHF    ECHO ADULT COMPLETE     Standing Status:   Future     Standing Expiration Date:   7/10/2021     Order Specific Question:   Contrast Enhancement (Bubble Study, Definity, Optison) may be used if criteria listed in established evidence-based protocol has been identified. Answer:   Yes     Order Specific Question:   Enhanced Imaging (Myocardial Strain, 3D post-processing) may be used if criteria listed in established evidence-based protocol has been identified. Answer:   Yes       Follow-up and Dispositions    · Return in about 1 month (around 8/10/2020), or if symptoms worsen or fail to improve, for Post testing. I have independently evaluated taken history and examined the patient. All relevant labs and testing data's are reviewed. Care plan discussed and updated after review.   Jennifer Dougherty MD

## 2020-07-10 NOTE — PATIENT INSTRUCTIONS
Heart-Healthy Diet: Care Instructions Your Care Instructions A heart-healthy diet has lots of vegetables, fruits, nuts, beans, and whole grains, and is low in salt. It limits foods that are high in saturated fat, such as meats, cheeses, and fried foods. It may be hard to change your diet, but even small changes can lower your risk of heart attack and heart disease. Follow-up care is a key part of your treatment and safety. Be sure to make and go to all appointments, and call your doctor if you are having problems. It's also a good idea to know your test results and keep a list of the medicines you take. How can you care for yourself at home? Watch your portions · Learn what a serving is. A \"serving\" and a \"portion\" are not always the same thing. Make sure that you are not eating larger portions than are recommended. For example, a serving of pasta is ½ cup. A serving size of meat is 2 to 3 ounces. A 3-ounce serving is about the size of a deck of cards. Measure serving sizes until you are good at [place]" them. Keep in mind that restaurants often serve portions that are 2 or 3 times the size of one serving. · To keep your energy level up and keep you from feeling hungry, eat often but in smaller portions. · Eat only the number of calories you need to stay at a healthy weight. If you need to lose weight, eat fewer calories than your body burns (through exercise and other physical activity). Eat more fruits and vegetables · Eat a variety of fruit and vegetables every day. Dark green, deep orange, red, or yellow fruits and vegetables are especially good for you. Examples include spinach, carrots, peaches, and berries. · Keep carrots, celery, and other veggies handy for snacks. Buy fruit that is in season and store it where you can see it so that you will be tempted to eat it. · Cook dishes that have a lot of veggies in them, such as stir-fries and soups. Limit saturated and trans fat · Read food labels, and try to avoid saturated and trans fats. They increase your risk of heart disease. · Use olive or canola oil when you cook. · Bake, broil, grill, or steam foods instead of frying them. · Choose lean meats instead of high-fat meats such as hot dogs and sausages. Cut off all visible fat when you prepare meat. · Eat fish, skinless poultry, and meat alternatives such as soy products instead of high-fat meats. Soy products, such as tofu, may be especially good for your heart. · Choose low-fat or fat-free milk and dairy products. Eat foods high in fiber · Eat a variety of grain products every day. Include whole-grain foods that have lots of fiber and nutrients. Examples of whole-grain foods include oats, whole wheat bread, and brown rice. · Buy whole-grain breads and cereals, instead of white bread or pastries. Limit salt and sodium · Limit how much salt and sodium you eat to help lower your blood pressure. · Taste food before you salt it. Add only a little salt when you think you need it. With time, your taste buds will adjust to less salt. · Eat fewer snack items, fast foods, and other high-salt, processed foods. Check food labels for the amount of sodium in packaged foods. · Choose low-sodium versions of canned goods (such as soups, vegetables, and beans). Limit sugar · Limit drinks and foods with added sugar. These include candy, desserts, and soda pop. Limit alcohol · Limit alcohol to no more than 2 drinks a day for men and 1 drink a day for women. Too much alcohol can cause health problems. When should you call for help? Watch closely for changes in your health, and be sure to contact your doctor if: 
· You would like help planning heart-healthy meals. Where can you learn more? Go to http://braden-estephanie.info/ Enter V137 in the search box to learn more about \"Heart-Healthy Diet: Care Instructions. \" 
 Current as of: August 22, 2019               Content Version: 12.5 © 0856-7566 [employer]. Care instructions adapted under license by [employer] (which disclaims liability or warranty for this information). If you have questions about a medical condition or this instruction, always ask your healthcare professional. [address] any warranty or liability for your use of this information.

## 2020-09-02 ENCOUNTER — OFFICE VISIT (OUTPATIENT)
Dept: CARDIOLOGY CLINIC | Age: 85
End: 2020-09-02

## 2020-09-02 VITALS
DIASTOLIC BLOOD PRESSURE: 54 MMHG | WEIGHT: 233.6 LBS | HEIGHT: 64 IN | HEART RATE: 85 BPM | TEMPERATURE: 98.1 F | BODY MASS INDEX: 39.88 KG/M2 | OXYGEN SATURATION: 97 % | SYSTOLIC BLOOD PRESSURE: 133 MMHG

## 2020-09-02 DIAGNOSIS — I50.9 CONGESTIVE HEART FAILURE, UNSPECIFIED HF CHRONICITY, UNSPECIFIED HEART FAILURE TYPE (HCC): Primary | ICD-10-CM

## 2020-09-02 DIAGNOSIS — Z79.4 TYPE 2 DIABETES MELLITUS WITHOUT COMPLICATION, WITH LONG-TERM CURRENT USE OF INSULIN (HCC): ICD-10-CM

## 2020-09-02 DIAGNOSIS — E78.5 HYPERLIPIDEMIA, UNSPECIFIED HYPERLIPIDEMIA TYPE: ICD-10-CM

## 2020-09-02 DIAGNOSIS — E11.9 TYPE 2 DIABETES MELLITUS WITHOUT COMPLICATION, WITH LONG-TERM CURRENT USE OF INSULIN (HCC): ICD-10-CM

## 2020-09-02 DIAGNOSIS — I10 ESSENTIAL HYPERTENSION: ICD-10-CM

## 2020-09-02 RX ORDER — SERTRALINE HYDROCHLORIDE 50 MG/1
TABLET, FILM COATED ORAL DAILY
COMMUNITY
End: 2020-12-15

## 2020-09-02 NOTE — PROGRESS NOTES
1. Have you been to the ER, urgent care clinic since your last visit? Hospitalized since your last visit? No    2. Have you seen or consulted any other health care providers outside of the [address] [place] [address] since your last visit? Include any pap smears or colon screening.  No

## 2020-09-02 NOTE — PROGRESS NOTES
HISTORY OF PRESENT ILLNESS  Renee Leblanc is a 82 y.o. female. 7/10/2020  Patient seen today for new patient evaluation. She is referred here for evaluation of CHF. She has been previously followed at [facility] cardiology. Patient does not have any significant complaint of chest pain, shortness of breath orthopnea. She has chronic peripheral edema she uses Bumex. They are slowly improving. She denies any history of prior cardiac issues including MI. New Patient   The history is provided by the patient and medical records. Pertinent negatives include no chest pain, no abdominal pain, no headaches and no shortness of breath. Hypertension   The history is provided by the patient and medical records. This is a chronic problem. The problem occurs constantly. The problem has not changed since onset. Pertinent negatives include no chest pain, no abdominal pain, no headaches and no shortness of breath. CHF   The history is provided by the patient and medical records. Pertinent negatives include no chest pain, no abdominal pain, no headaches and no shortness of breath. Review of Systems   Constitutional: Negative for chills and fever. HENT: Negative for nosebleeds. Eyes: Negative for blurred vision and double vision. Respiratory: Negative for cough, hemoptysis, sputum production, shortness of breath and wheezing. Cardiovascular: Positive for leg swelling. Negative for chest pain, palpitations, orthopnea, claudication and PND. Gastrointestinal: Negative for abdominal pain, heartburn, nausea and vomiting. Musculoskeletal: Negative for myalgias. Skin: Negative for rash. Neurological: Negative for dizziness, weakness and headaches. Endo/Heme/Allergies: Does not bruise/bleed easily.      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  HX ORTHOPAEDIC      bilat TKA       Social History     Tobacco Use    Smoking status: Former Smoker    Smokeless tobacco: Never Used   Substance Use Topics    Alcohol use: No     Alcohol/week: 0.0 standard drinks       No Known Allergies    Prior to Admission medications    Medication Sig Start Date End Date Taking? Authorizing Provider   sertraline (ZOLOFT) 50 mg tablet Take  by mouth daily. Yes Provider, Historical   CYANOCOBALAMIN, VITAMIN B-12, PO Take  by mouth. Yes Provider, Historical   vit A/vit C/vit E/zinc/copper (PRESERVISION AREDS PO) Take  by mouth. Yes Provider, Historical   morphine IR (MS IR) 15 mg tablet  6/5/20  Yes Provider, Historical   beta-carotene,A,-vits C,E/mins (OCUVITE PO) Take 1 Tab by mouth daily. Yes Provider, Historical   lisinopril (PRINIVIL, ZESTRIL) 2.5 mg tablet Take 20 mg by mouth daily. 10/30/19  Yes Provider, Historical   BYDUREON BCISE 2 mg/0.85 mL atIn 2 mg by SubCUTAneous route every seven (7) days. 7/8/19  Yes Provider, Historical   naloxone (NARCAN) 4 mg/actuation nasal spray Use 1 spray intranasally, then discard. Repeat with new spray every 2 min as needed for opioid overdose symptoms, alternating nostrils. 12/26/18  Yes Tamera Lefort D, DO   insulin glargine (LANTUS U-100 INSULIN) 100 unit/mL injection 40 Units by SubCUTAneous route nightly. Yes Provider, Historical   ascorbic acid, vitamin C, (VITAMIN C) 500 mg tablet Take 500 mg by mouth daily. Yes Provider, Historical   bumetanide (BUMEX) 2 mg tablet Take 1 mg by mouth two (2) times daily as needed. Yes Provider, Historical   gabapentin (NEURONTIN) 300 mg capsule Take 1 Cap by mouth three (3) times daily. 3/14/18  Yes Jeffrey De La Cruz MD   Blood-Glucose Meter (FREESTYLE LITE METER) monitoring kit Test twice blood glucose daily; ICD-10 E11.9; Quantity 1 12/15/17  Yes Kenya Johnson NP   amLODIPine (NORVASC) 10 mg tablet Take 0.5 Tabs by mouth daily. Patient taking differently: Take 10 mg by mouth daily. 11/2/17  Yes Jeffrey De La Cruz MD   levothyroxine (SYNTHROID) 200 mcg tablet 1 tablet daily (take on an empty stomach) (stop \"old\" synthroid)  Patient taking differently: 150 mcg. 1 tablet daily (take on an empty stomach) (stop \"old\" synthroid) 9/25/17  Yes Jeffrey De La Cruz MD   carvedilol (COREG) 12.5 mg tablet Take 1 Tab by mouth two (2) times daily (with meals). 8/7/17  Yes Jeffrey De La Cruz MD   insulin syringe,safetyneedle 1 mL 31 gauge x 5/16\" syrg 1 Each by Does Not Apply route daily. 8/7/17  Yes Jeffrey De La Cruz MD   cholecalciferol (VITAMIN D3) 1,000 unit tablet Take 2,000 Units by mouth daily. Yes Provider, Historical   glucose blood VI test strips (FREESTYLE TEST) strip Use to check blood glucose twice daily DX:E11.9 7/5/17  Yes Jeffrey De La Cruz MD   allopurinol (ZYLOPRIM) 100 mg tablet Take 1 Tab by mouth daily. 2/21/17  Yes Jeffrey De La Cruz MD   magnesium oxide 500 mg tab Take 1 Tab by mouth daily. Yes Provider, Historical   acetaminophen (TYLENOL) 500 mg tablet Take 1 Tab by mouth every six (6) hours as needed for Pain. 11/2/16  Yes Jeffrey De La Cruz MD   promethazine (PHENERGAN) 12.5 mg tablet Take 1 Tab by mouth every eight (8) hours as needed for Nausea (nausea, vomiting - opiate withdrawl PRN) for up to 12 doses. 12/9/19 9/2/20  Calista SANTOS, DO   atorvastatin (LIPITOR) 40 mg tablet Take 40 mg by mouth daily. 10/30/19 9/2/20  Provider, Historical   TENS Units (TENS 504) mikal Please provide patient with a TENS unit to help improve function, improve quality of life, reduce medication use, improve ROM.  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 9/21/18 9/2/20  Jesús Abad TOM, DO   aspirin delayed-release 81 mg tablet Take 81 mg by mouth daily. 9/2/20  Provider, Historical         Visit Vitals  /54 (BP 1 Location: Left arm, BP Patient Position: Sitting)   Pulse 85   Temp 98.1 °F (36.7 °C) (Temporal)   Ht 5' 4\" (1.626 m)   Wt 106 kg (233 lb 9.6 oz)   SpO2 97%   BMI 40.10 kg/m²       Physical Exam   Constitutional: She is oriented to person, place, and time. She appears well-developed and well-nourished. HENT:   Head: Normocephalic and atraumatic. Eyes: Conjunctivae are normal.   Neck: Neck supple. No JVD present. No tracheal deviation present. No thyromegaly present. Cardiovascular: Normal rate and regular rhythm. PMI is not displaced. Exam reveals no gallop, no S3 and no decreased pulses. No murmur heard. Pulmonary/Chest: No respiratory distress. She has no wheezes. She has no rales. She exhibits no tenderness. Abdominal: Soft. There is no abdominal tenderness. Musculoskeletal:         General: Edema (2+ BLE edema Right > left) present. Neurological: She is alert and oriented to person, place, and time. Skin: Skin is warm. Psychiatric: She has a normal mood and affect. Ms. Christophe Barraza has a reminder for a \"due or due soon\" health maintenance. I have asked that she contact her primary care provider for follow-up on this health maintenance. No flowsheet data found. I have personally reviewed patient's records available from hospital and other providers and incorporated findings in patient care. Notes, labs, vascular study, chest x-ray    Echo 8/21/2020  · LV: Estimated LVEF is 60 - 65%. Normal cavity size and systolic function (ejection fraction normal). Moderate concentric hypertrophy. Wall motion: normal. Moderate (grade 2) left ventricular diastolic dysfunction. · LA: Mildly dilated left atrium. Left Atrium volume index is 37.31 mL/m2.   · RV: Mildly dilated right ventricle. · RA: Mildly dilated right atrium. · AV: Mild aortic valve regurgitation is present. · MV: Mitral valve thickening. Mild mitral valve regurgitation is present. · PA: Pulmonary arterial systolic pressure is 36 mmHg. 8/21/2020  Nuclear Stress Test   Nuclear Cardiac Spect Rest then Gated Stress study. Xenia Cadet was used as the stressing method and agent. (Xenia Cadet given via a 10 - 20 sec injection.)   One day myocardial perfusion study. Date: 8/21/2020. Negative myocardial perfusion imaging. Myocardial perfusion imaging supports a low risk stress test.   There is no prior study available for comparison. .     Assessment         ICD-10-CM ICD-9-CM    1. Congestive heart failure, unspecified HF chronicity, unspecified heart failure type ([facility] [address]  I50.9 428.0    2. Essential hypertension  I10 401.9     Controlled with current medications   3. Hyperlipidemia, unspecified hyperlipidemia type  E78.5 272.4     Continue statin, labs with PCP   4. Type 2 diabetes mellitus without complication, with long-term current use of insulin ([facility])  E11.9 250.00     Z79.4 V58.67     Continue treatment, labs with PCP     7/2020 Patient referred for CHF. She has been previously followed with [facility]. She denies chest pain or SOB. She reports chronic BLE edema and takes Bumex PRN. Will evaluate CHF with echo. EKG SR with inferior Q wave. Suggestive of inferior wall infarct, will evaluate for CAD with stress test. Continue current treatment - f/u post testing.    9/2020  Patient seen for CHF f/u. Echo with 60-65%, with grade II DD, negative Stress test. She has BLE edema and rarely takes Bumex. Encouraged to take regularly. Discussed low sodium diet. She reports PCP advised her to stop taking Atorvastatin. Advised to f/u with PCP tomorrow regarding Atorvastatin.     Medications Discontinued During This Encounter   Medication Reason    promethazine (PHENERGAN) 12.5 mg tablet  TENS Units (TENS 504) mikal     aspirin delayed-release 81 mg tablet     atorvastatin (LIPITOR) 40 mg tablet        No orders of the defined types were placed in this encounter. Follow-up and Dispositions    · Return in about 6 months (around 3/2/2021), or if symptoms worsen or fail to improve. I have independently evaluated taken history and examined the patient. All relevant labs and testing data's are reviewed. Care plan discussed and updated after review.   Angeles Aponte MD

## 2020-09-02 NOTE — PATIENT INSTRUCTIONS
Follow up with PCP regarding Atorvastatin Low Sodium Diet (2,000 Milligram): Care Instructions Your Care Instructions Too much sodium causes your body to hold on to extra water. This can raise your blood pressure and force your heart and kidneys to work harder. In very serious cases, this could cause you to be put in the hospital. It might even be life-threatening. By limiting sodium, you will feel better and lower your risk of serious problems. The most common source of sodium is salt. People get most of the salt in their diet from canned, prepared, and packaged foods. Fast food and restaurant meals also are very high in sodium. Your doctor will probably limit your sodium to less than 2,000 milligrams (mg) a day. This limit counts all the sodium in prepared and packaged foods and any salt you add to your food. Follow-up care is a key part of your treatment and safety. Be sure to make and go to all appointments, and call your doctor if you are having problems. It's also a good idea to know your test results and keep a list of the medicines you take. How can you care for yourself at home? Read food labels · Read labels on cans and food packages. The labels tell you how much sodium is in each serving. Make sure that you look at the serving size. If you eat more than the serving size, you have eaten more sodium. · Food labels also tell you the Percent Daily Value for sodium. Choose products with low Percent Daily Values for sodium. · Be aware that sodium can come in forms other than salt, including monosodium glutamate (MSG), sodium citrate, and sodium bicarbonate (baking soda). MSG is often added to Asian food. When you eat out, you can sometimes ask for food without MSG or added salt. Buy low-sodium foods · Buy foods that are labeled \"unsalted\" (no salt added), \"sodium-free\" (less than 5 mg of sodium per serving), or \"low-sodium\" (less than 140 mg of sodium per serving). Foods labeled \"reduced-sodium\" and \"light sodium\" may still have too much sodium. Be sure to read the label to see how much sodium you are getting. · Buy fresh vegetables, or frozen vegetables without added sauces. Buy low-sodium versions of canned vegetables, soups, and other canned goods. Prepare low-sodium meals · Cut back on the amount of salt you use in cooking. This will help you adjust to the taste. Do not add salt after cooking. One teaspoon of salt has about 2,300 mg of sodium. · Take the salt shaker off the table. · Flavor your food with garlic, lemon juice, onion, vinegar, herbs, and spices. Do not use soy sauce, lite soy sauce, steak sauce, onion salt, garlic salt, celery salt, mustard, or ketchup on your food. · Use low-sodium salad dressings, sauces, and ketchup. Or make your own salad dressings and sauces without adding salt. · Use less salt (or none) when recipes call for it. You can often use half the salt a recipe calls for without losing flavor. Other foods such as rice, pasta, and grains do not need added salt. · Rinse canned vegetables, and cook them in fresh water. This removes somebut not allof the salt. · Avoid water that is naturally high in sodium or that has been treated with water softeners, which add sodium. Call your local water company to find out the sodium content of your water supply. If you buy bottled water, read the label and choose a sodium-free brand. Avoid high-sodium foods · Avoid eating: 
? Smoked, cured, salted, and canned meat, fish, and poultry. ? Ham, bernal, hot dogs, and luncheon meats. ? Regular, hard, and processed cheese and regular peanut butter. ? Crackers with salted tops, and other salted snack foods such as pretzels, chips, and salted popcorn. ? Frozen prepared meals, unless labeled low-sodium. ? Canned and dried soups, broths, and bouillon, unless labeled sodium-free or low-sodium. ? Canned vegetables, unless labeled sodium-free or low-sodium. ? Western Karyn fries, pizza, tacos, and other fast foods. ? Pickles, olives, ketchup, and other condiments, especially soy sauce, unless labeled sodium-free or low-sodium. Where can you learn more? Go to http://braden-estephanie.info/ Enter X610 in the search box to learn more about \"Low Sodium Diet (2,000 Milligram): Care Instructions. \" Current as of: August 22, 2019               Content Version: 12.5 © 3180-5155 [employer]. Care instructions adapted under license by [employer] (which disclaims liability or warranty for this information). If you have questions about a medical condition or this instruction, always ask your healthcare professional. [address] any warranty or liability for your use of this information.

## 2020-11-05 ENCOUNTER — TRANSCRIBE ORDER (OUTPATIENT)
Dept: SCHEDULING | Age: 85
End: 2020-11-05

## 2020-11-05 DIAGNOSIS — M86.171 ACUTE OSTEOMYELITIS OF RIGHT ANKLE OR FOOT (HCC): Primary | ICD-10-CM

## 2020-11-19 ENCOUNTER — HOSPITAL ENCOUNTER (OUTPATIENT)
Age: 85
Discharge: HOME | End: 2020-11-19
Attending: PODIATRIST
Payer: MEDICARE

## 2020-11-19 ENCOUNTER — HOSPITAL ENCOUNTER (OUTPATIENT)
Dept: GENERAL RADIOLOGY | Age: 85
Discharge: HOME | End: 2020-11-19
Attending: PODIATRIST
Payer: MEDICARE

## 2020-11-19 DIAGNOSIS — M86.171 ACUTE OSTEOMYELITIS OF RIGHT ANKLE OR FOOT (HCC): ICD-10-CM

## 2020-11-19 DIAGNOSIS — M86.171 ACUTE OSTEOMYELITIS INVOLVING ANKLE AND FOOT, RIGHT (HCC): ICD-10-CM

## 2020-11-19 PROCEDURE — 73630 X-RAY EXAM OF FOOT: CPT

## 2020-11-19 PROCEDURE — 73718 MRI LOWER EXTREMITY W/O DYE: CPT

## 2020-12-07 ENCOUNTER — HOSPITAL ENCOUNTER (OUTPATIENT)
Dept: GENERAL RADIOLOGY | Age: 85
Discharge: HOME | End: 2020-12-07
Payer: MEDICARE

## 2020-12-07 ENCOUNTER — TRANSCRIBE ORDER (OUTPATIENT)
Dept: REGISTRATION | Age: 85
End: 2020-12-07

## 2020-12-07 DIAGNOSIS — Z01.818 OTHER SPECIFIED PRE-OPERATIVE EXAMINATION: ICD-10-CM

## 2020-12-07 DIAGNOSIS — Z01.818 OTHER SPECIFIED PRE-OPERATIVE EXAMINATION: Primary | ICD-10-CM

## 2020-12-07 PROCEDURE — 71046 X-RAY EXAM CHEST 2 VIEWS: CPT

## 2020-12-09 ENCOUNTER — HOSPITAL ENCOUNTER (OUTPATIENT)
Dept: PREADMISSION TESTING | Age: 85
Discharge: HOME | End: 2020-12-09
Payer: MEDICARE

## 2020-12-09 ENCOUNTER — TRANSCRIBE ORDER (OUTPATIENT)
Dept: REGISTRATION | Age: 85
End: 2020-12-09

## 2020-12-09 DIAGNOSIS — Z01.818 PRE-OP TESTING: ICD-10-CM

## 2020-12-09 DIAGNOSIS — Z01.818 PRE-OP TESTING: Primary | ICD-10-CM

## 2020-12-09 PROCEDURE — 87635 SARS-COV-2 COVID-19 AMP PRB: CPT

## 2020-12-10 LAB — SARS-COV-2, COV2NT: NOT DETECTED

## 2020-12-12 ENCOUNTER — ANESTHESIA EVENT (OUTPATIENT)
Dept: SURGERY | Age: 85
DRG: 638 | End: 2020-12-12
Payer: MEDICARE

## 2020-12-14 ENCOUNTER — ANESTHESIA (OUTPATIENT)
Dept: SURGERY | Age: 85
DRG: 638 | End: 2020-12-14
Payer: MEDICARE

## 2020-12-14 ENCOUNTER — HOSPITAL ENCOUNTER (INPATIENT)
Age: 85
LOS: 1 days | Discharge: HOME | DRG: 638 | End: 2020-12-15
Attending: PODIATRIST | Admitting: FAMILY MEDICINE
Payer: MEDICARE

## 2020-12-14 DIAGNOSIS — L97.512 CHRONIC TOE ULCER, RIGHT, WITH FAT LAYER EXPOSED (HCC): Primary | ICD-10-CM

## 2020-12-14 LAB
BASOPHILS # BLD: 0.1 K/UL (ref 0–0.1)
BASOPHILS NFR BLD: 1 % (ref 0–2)
DIFFERENTIAL METHOD BLD: ABNORMAL
EOSINOPHIL # BLD: 0.3 K/UL (ref 0–0.4)
EOSINOPHIL NFR BLD: 2 % (ref 0–5)
ERYTHROCYTE [DISTWIDTH] IN BLOOD BY AUTOMATED COUNT: 12.8 % (ref 11.6–14.5)
GLUCOSE BLD STRIP.AUTO-MCNC: 107 MG/DL (ref 70–110)
GLUCOSE BLD STRIP.AUTO-MCNC: 203 MG/DL (ref 70–110)
GLUCOSE BLD STRIP.AUTO-MCNC: 230 MG/DL (ref 70–110)
GLUCOSE BLD STRIP.AUTO-MCNC: 303 MG/DL (ref 70–110)
GLUCOSE BLD STRIP.AUTO-MCNC: 396 MG/DL (ref 70–110)
GLUCOSE BLD STRIP.AUTO-MCNC: 410 MG/DL (ref 70–110)
GLUCOSE BLD STRIP.AUTO-MCNC: 422 MG/DL (ref 70–110)
HCT VFR BLD AUTO: 37.9 % (ref 35–45)
HGB BLD-MCNC: 12.6 G/DL (ref 12–16)
LYMPHOCYTES # BLD: 3.3 K/UL (ref 0.9–3.6)
LYMPHOCYTES NFR BLD: 25 % (ref 21–52)
MCH RBC QN AUTO: 29.5 PG (ref 24–34)
MCHC RBC AUTO-ENTMCNC: 33.2 G/DL (ref 31–37)
MCV RBC AUTO: 88.8 FL (ref 74–97)
MONOCYTES # BLD: 1 K/UL (ref 0.05–1.2)
MONOCYTES NFR BLD: 7 % (ref 3–10)
NEUTS SEG # BLD: 8.5 K/UL (ref 1.8–8)
NEUTS SEG NFR BLD: 65 % (ref 40–73)
PLATELET # BLD AUTO: 191 K/UL (ref 135–420)
PMV BLD AUTO: 12.9 FL (ref 9.2–11.8)
RBC # BLD AUTO: 4.27 M/UL (ref 4.2–5.3)
WBC # BLD AUTO: 13.1 K/UL (ref 4.6–13.2)

## 2020-12-14 PROCEDURE — 85025 COMPLETE CBC W/AUTO DIFF WBC: CPT

## 2020-12-14 PROCEDURE — 74011250636 HC RX REV CODE- 250/636: Performed by: STUDENT IN AN ORGANIZED HEALTH CARE EDUCATION/TRAINING PROGRAM

## 2020-12-14 PROCEDURE — 2709999900 HC NON-CHARGEABLE SUPPLY

## 2020-12-14 PROCEDURE — 74011250636 HC RX REV CODE- 250/636: Performed by: NURSE ANESTHETIST, CERTIFIED REGISTERED

## 2020-12-14 PROCEDURE — 74011250637 HC RX REV CODE- 250/637: Performed by: STUDENT IN AN ORGANIZED HEALTH CARE EDUCATION/TRAINING PROGRAM

## 2020-12-14 PROCEDURE — 74011636637 HC RX REV CODE- 636/637: Performed by: STUDENT IN AN ORGANIZED HEALTH CARE EDUCATION/TRAINING PROGRAM

## 2020-12-14 PROCEDURE — 82962 GLUCOSE BLOOD TEST: CPT

## 2020-12-14 PROCEDURE — 65660000000 HC RM CCU STEPDOWN

## 2020-12-14 PROCEDURE — 36415 COLL VENOUS BLD VENIPUNCTURE: CPT

## 2020-12-14 PROCEDURE — 74011636637 HC RX REV CODE- 636/637: Performed by: NURSE ANESTHETIST, CERTIFIED REGISTERED

## 2020-12-14 RX ORDER — LISINOPRIL 20 MG/1
20 TABLET ORAL DAILY
Status: DISCONTINUED | OUTPATIENT
Start: 2020-12-15 | End: 2020-12-15 | Stop reason: HOSPADM

## 2020-12-14 RX ORDER — INSULIN LISPRO 100 [IU]/ML
INJECTION, SOLUTION INTRAVENOUS; SUBCUTANEOUS
Status: DISCONTINUED | OUTPATIENT
Start: 2020-12-14 | End: 2020-12-15 | Stop reason: HOSPADM

## 2020-12-14 RX ORDER — ACETAMINOPHEN 325 MG/1
650 TABLET ORAL
Status: DISCONTINUED | OUTPATIENT
Start: 2020-12-14 | End: 2020-12-15 | Stop reason: HOSPADM

## 2020-12-14 RX ORDER — GABAPENTIN 300 MG/1
300 CAPSULE ORAL 3 TIMES DAILY
Status: DISCONTINUED | OUTPATIENT
Start: 2020-12-14 | End: 2020-12-14

## 2020-12-14 RX ORDER — DEXTROSE 50 % IN WATER (D50W) INTRAVENOUS SYRINGE
25-50 AS NEEDED
Status: DISCONTINUED | OUTPATIENT
Start: 2020-12-14 | End: 2020-12-15 | Stop reason: HOSPADM

## 2020-12-14 RX ORDER — BUMETANIDE 1 MG/1
1 TABLET ORAL 2 TIMES DAILY
Status: DISCONTINUED | OUTPATIENT
Start: 2020-12-14 | End: 2020-12-15 | Stop reason: HOSPADM

## 2020-12-14 RX ORDER — SODIUM CHLORIDE 0.9 % (FLUSH) 0.9 %
5-40 SYRINGE (ML) INJECTION EVERY 8 HOURS
Status: DISCONTINUED | OUTPATIENT
Start: 2020-12-14 | End: 2020-12-15 | Stop reason: HOSPADM

## 2020-12-14 RX ORDER — MULTIVITAMIN/IRON/FOLIC ACID 18MG-0.4MG
1 TABLET ORAL DAILY
Status: COMPLETED | OUTPATIENT
Start: 2020-12-14 | End: 2020-12-14

## 2020-12-14 RX ORDER — INSULIN LISPRO 100 [IU]/ML
INJECTION, SOLUTION INTRAVENOUS; SUBCUTANEOUS ONCE
Status: COMPLETED | OUTPATIENT
Start: 2020-12-14 | End: 2020-12-14

## 2020-12-14 RX ORDER — SODIUM CHLORIDE 0.9 % (FLUSH) 0.9 %
5-40 SYRINGE (ML) INJECTION AS NEEDED
Status: DISCONTINUED | OUTPATIENT
Start: 2020-12-14 | End: 2020-12-15 | Stop reason: HOSPADM

## 2020-12-14 RX ORDER — INSULIN GLARGINE 100 [IU]/ML
40 INJECTION, SOLUTION SUBCUTANEOUS
Status: DISCONTINUED | OUTPATIENT
Start: 2020-12-14 | End: 2020-12-14

## 2020-12-14 RX ORDER — PROMETHAZINE HYDROCHLORIDE 25 MG/1
12.5 TABLET ORAL
Status: DISCONTINUED | OUTPATIENT
Start: 2020-12-14 | End: 2020-12-15 | Stop reason: HOSPADM

## 2020-12-14 RX ORDER — POLYETHYLENE GLYCOL 3350 17 G/17G
17 POWDER, FOR SOLUTION ORAL DAILY PRN
Status: DISCONTINUED | OUTPATIENT
Start: 2020-12-14 | End: 2020-12-15 | Stop reason: HOSPADM

## 2020-12-14 RX ORDER — ACETAMINOPHEN 650 MG/1
650 SUPPOSITORY RECTAL
Status: DISCONTINUED | OUTPATIENT
Start: 2020-12-14 | End: 2020-12-15 | Stop reason: HOSPADM

## 2020-12-14 RX ORDER — LEVOTHYROXINE SODIUM 75 UG/1
150 TABLET ORAL
Status: DISCONTINUED | OUTPATIENT
Start: 2020-12-15 | End: 2020-12-15 | Stop reason: HOSPADM

## 2020-12-14 RX ORDER — GABAPENTIN 300 MG/1
300 CAPSULE ORAL 2 TIMES DAILY
Status: DISCONTINUED | OUTPATIENT
Start: 2020-12-14 | End: 2020-12-15 | Stop reason: HOSPADM

## 2020-12-14 RX ORDER — METFORMIN HYDROCHLORIDE 500 MG/1
TABLET ORAL
COMMUNITY
End: 2021-07-06

## 2020-12-14 RX ORDER — ALLOPURINOL 100 MG/1
100 TABLET ORAL DAILY
Status: DISCONTINUED | OUTPATIENT
Start: 2020-12-15 | End: 2020-12-15 | Stop reason: HOSPADM

## 2020-12-14 RX ORDER — GABAPENTIN 300 MG/1
600 CAPSULE ORAL
Status: DISCONTINUED | OUTPATIENT
Start: 2020-12-14 | End: 2020-12-15 | Stop reason: HOSPADM

## 2020-12-14 RX ORDER — ONDANSETRON 2 MG/ML
4 INJECTION INTRAMUSCULAR; INTRAVENOUS
Status: DISCONTINUED | OUTPATIENT
Start: 2020-12-14 | End: 2020-12-15 | Stop reason: HOSPADM

## 2020-12-14 RX ORDER — MAGNESIUM SULFATE 100 %
4 CRYSTALS MISCELLANEOUS AS NEEDED
Status: DISCONTINUED | OUTPATIENT
Start: 2020-12-14 | End: 2020-12-15 | Stop reason: HOSPADM

## 2020-12-14 RX ORDER — INSULIN GLARGINE 100 [IU]/ML
30 INJECTION, SOLUTION SUBCUTANEOUS
Status: DISCONTINUED | OUTPATIENT
Start: 2020-12-14 | End: 2020-12-15 | Stop reason: HOSPADM

## 2020-12-14 RX ORDER — HEPARIN SODIUM 5000 [USP'U]/ML
5000 INJECTION, SOLUTION INTRAVENOUS; SUBCUTANEOUS EVERY 12 HOURS
Status: DISCONTINUED | OUTPATIENT
Start: 2020-12-14 | End: 2020-12-15 | Stop reason: HOSPADM

## 2020-12-14 RX ORDER — CARVEDILOL 12.5 MG/1
12.5 TABLET ORAL 2 TIMES DAILY WITH MEALS
Status: DISCONTINUED | OUTPATIENT
Start: 2020-12-14 | End: 2020-12-15 | Stop reason: HOSPADM

## 2020-12-14 RX ORDER — CEFAZOLIN SODIUM 2 G/50ML
2 SOLUTION INTRAVENOUS ONCE
Status: ACTIVE | OUTPATIENT
Start: 2020-12-14 | End: 2020-12-14

## 2020-12-14 RX ORDER — INSULIN GLARGINE 100 [IU]/ML
35 INJECTION, SOLUTION SUBCUTANEOUS
Status: DISCONTINUED | OUTPATIENT
Start: 2020-12-14 | End: 2020-12-14 | Stop reason: SDUPTHER

## 2020-12-14 RX ORDER — MELATONIN
2000 DAILY
Status: DISCONTINUED | OUTPATIENT
Start: 2020-12-15 | End: 2020-12-15 | Stop reason: HOSPADM

## 2020-12-14 RX ORDER — ASCORBIC ACID 250 MG
500 TABLET ORAL DAILY
Status: DISCONTINUED | OUTPATIENT
Start: 2020-12-15 | End: 2020-12-15 | Stop reason: HOSPADM

## 2020-12-14 RX ORDER — SODIUM CHLORIDE, SODIUM LACTATE, POTASSIUM CHLORIDE, CALCIUM CHLORIDE 600; 310; 30; 20 MG/100ML; MG/100ML; MG/100ML; MG/100ML
75 INJECTION, SOLUTION INTRAVENOUS CONTINUOUS
Status: DISPENSED | OUTPATIENT
Start: 2020-12-14 | End: 2020-12-15

## 2020-12-14 RX ORDER — AMLODIPINE BESYLATE 10 MG/1
10 TABLET ORAL DAILY
Status: DISCONTINUED | OUTPATIENT
Start: 2020-12-15 | End: 2020-12-15 | Stop reason: HOSPADM

## 2020-12-14 RX ADMIN — SODIUM CHLORIDE, SODIUM LACTATE, POTASSIUM CHLORIDE, AND CALCIUM CHLORIDE 75 ML/HR: 600; 310; 30; 20 INJECTION, SOLUTION INTRAVENOUS at 11:49

## 2020-12-14 RX ADMIN — GABAPENTIN 300 MG: 300 CAPSULE ORAL at 19:32

## 2020-12-14 RX ADMIN — HEPARIN SODIUM 5000 UNITS: 5000 INJECTION INTRAVENOUS; SUBCUTANEOUS at 14:48

## 2020-12-14 RX ADMIN — INSULIN LISPRO 15 UNITS: 100 INJECTION, SOLUTION INTRAVENOUS; SUBCUTANEOUS at 08:48

## 2020-12-14 RX ADMIN — SODIUM CHLORIDE, SODIUM LACTATE, POTASSIUM CHLORIDE, AND CALCIUM CHLORIDE 75 ML/HR: 600; 310; 30; 20 INJECTION, SOLUTION INTRAVENOUS at 17:51

## 2020-12-14 RX ADMIN — GABAPENTIN 600 MG: 300 CAPSULE ORAL at 20:43

## 2020-12-14 RX ADMIN — INSULIN GLARGINE 30 UNITS: 100 INJECTION, SOLUTION SUBCUTANEOUS at 22:21

## 2020-12-14 RX ADMIN — Medication 1 TABLET: at 19:32

## 2020-12-14 RX ADMIN — Medication 10 ML: at 22:22

## 2020-12-14 RX ADMIN — ACETAMINOPHEN 650 MG: 325 TABLET ORAL at 15:20

## 2020-12-14 RX ADMIN — HEPARIN SODIUM 5000 UNITS: 5000 INJECTION INTRAVENOUS; SUBCUTANEOUS at 22:21

## 2020-12-14 RX ADMIN — INSULIN LISPRO 12 UNITS: 100 INJECTION, SOLUTION INTRAVENOUS; SUBCUTANEOUS at 22:20

## 2020-12-14 RX ADMIN — INSULIN LISPRO 6 UNITS: 100 INJECTION, SOLUTION INTRAVENOUS; SUBCUTANEOUS at 14:48

## 2020-12-14 RX ADMIN — Medication 10 ML: at 22:24

## 2020-12-14 RX ADMIN — CARVEDILOL 12.5 MG: 12.5 TABLET, FILM COATED ORAL at 17:51

## 2020-12-14 RX ADMIN — BUMETANIDE 1 MG: 1 TABLET ORAL at 17:51

## 2020-12-14 NOTE — PROGRESS NOTES
PT orders received, chart reviewed. Pt unable to participate with PT due to:    Pt is scheduled for surgery possible tomorrow. Pt will need bedrest removed and WB status after surgery prior to PT>     Will f/u as patient's schedule allows.  Thank you for this referral.  Ruth Brittle, PT, DPT

## 2020-12-14 NOTE — DIABETES MGMT
Glycemic Control Plan of Care    Recommend lower dose of lantus, 80% = 32 units tonight   Confirmed with patient she takes 40 units lantus nightly at home     Assessment: admitted with right chronic toe ulcer - scheduled for OR  She confirms her home lantus dose, no longer takes bydureon and is on a daily dose of metformin 500 mg. She monitors BG twice daily -  States her last A1c with PCP was 8%.   New A1c pending  States her appetite is good - consumes 3 meals/day at home and \" I like to bake a lot\"   Will continue to monitor     Most recent blood glucose values:    Current A1C: pending     Current hospital diabetes medications:  Lantus 35 units nightly -   Corrective lispro, very insulin resistant, 4 times daily     TTD previous day: admitted today - BG on admission 422 mg/dl     Home diabetes medications:  Lantus 40 units nightly - last dose evening 12/13/2020  Metformin 500 mg daily     Goals:  Blood glucose will be within target range of  mg/dL by 12/16/2020    Education: will continue to monitor and f/u after surgery       Joanna Lange MPH RN CDE  Pager [phone]

## 2020-12-14 NOTE — PROGRESS NOTES
Reason for Admission:  Chronic toe ulcer, right, with fat layer exposed ([facility] [address] [L97.512]                 RUR Score:   10%           Plan for utilizing home health:   Patient has no home health orders, in place, at this time. This writer will continue to monitor for potential home health needs and orders. Likelihood of Readmission:   LOW                         Transition of Care Plan:    Patient will return home with help from her family and her friend. Her family will transport her home, upon discharge. Initial assessment completed with patient. Cognitive status of patient: Alert and oriented. Face sheet information confirmed:  yes. The patient designates her son/POA Lore Hutchison to participate in her discharge plan and to receive any needed information. This patient lives in an apartment, with her close friend. Patient is not able to navigate steps as needed. Prior to hospitalization, patient was considered to be independent with ADLs/IADLS : yes. Patient has a current ACP document on file: no. Patient reports that she has a POA Lore Hutchison, already in place. This writer informed patient that [facility] would need a copy of that POA. Patient's family will be available to transport patient home upon discharge. The patient already has a walker, a cane, a transport chair and a transfer bench, for her shower; available in the home. Patient is not currently active with home health. Patient has stayed in a skilled nursing facility or rehab. Was  stay within last 60 days : no. This patient is on dialysis :no    Currently, the discharge plan is for patient to return home with help from her family and her friend. Her family will transport her home, upon discharge. Patient's son/POA is (NIGEL Moulton#[phone]). Patient's PCP is Dr. Pierre Lucas. Patient is insured through Medicare [employer] and she also has [employer].      The patient states that she can obtain her medications from the [facility] [facility]), and take her medications as directed. This writer will continue to monitor for discharge planning to ensure a safe discharge home from [place]. Care Management Interventions  PCP Verified by CM: Yes(Dr. rGey Toledo)  Palliative Care Criteria Met (RRAT>21 & CHF Dx)?: No  Mode of Transport at Discharge: Other (see comment)(Family will transport her home.)  Transition of Care Consult (CM Consult): Discharge Planning  Current Support Network: Own Home, Family Lives Nearby(A close friend lives with her.)  Confirm Follow Up Transport: Family  Discharge Location  Discharge Placement: Home with family assistance      Robert Reno MSW  Care Manager  Pager#: [phone]

## 2020-12-14 NOTE — ACP (ADVANCE CARE PLANNING)
Advance Care Planning     Advance Care Planning Clinical Specialist  Conversation Note      Date of ACP Conversation: 12/14/20    Conversation Conducted with:  Patient with Decision Making Capacity    ACP Clinical Specialist: Araceli Arreaga, [address]: YES    Current Designated Health Care Decision Maker: POA (Not on file), Livia Zafar, [phone]. A copy of the POA was requested. Care Preferences    Hospitalization: \"If your health worsens and it becomes clear that your chance of recovery is unlikely, what would your preference be regarding hospitalization? \"    Choice:  [x]  The patient wants hospitalization  []  The patient prefers comfort-focused treatment without hospitalization. [] Yes  [x] No   Educated Patient / Bridgeton Hides regarding differences between Advance Directives and portable DNR orders. Length of ACP Conversation in minutes:      Conversation Outcomes:  [x] ACP discussion completed, but patient declined due to having a POA already. [] Existing advance directive reviewed with patient; no changes to patient's previously recorded wishes   [] New Advance Directive completed   [] Portable Do Not Resuscitate prepared for Provider review and signature  [] POLST/POST/MOLST/MOST prepared for Provider review and signature      Follow-up plan:    [] Schedule follow-up conversation to continue planning  [] Referred individual to Provider for additional questions/concerns   [] Advised patient/agent/surrogate to review completed ACP document and update if needed with changes in condition, patient preferences or care setting     [x] This note routed to one or more involved healthcare providers      Robert Bravo MSW  Care Manager  Pager#: [phone]

## 2020-12-14 NOTE — PROGRESS NOTES
OT orders received, chart reviewed. Pt unable to participate with OT as Pt is scheduled for surgery possible tomorrow. Pt will need bedrest removed and WB status after surgery prior to OT. Will f/u as patient's schedule allows.        Thank you for this referral  Shimon Fan MS, OTR/L

## 2020-12-14 NOTE — H&P
Admission History and Physical  [facility]      Patient: Supriya Velasquez MRN: [id number]  CSN: [id number]    YOB: 1935  Age: 82 y.o. Sex: female      Admission Date: 12/14/2020       HPI:     Supriya Velasquez is a 82 y.o. female with PMH T2DM c/b peripheral neuropathy, HTN, HLD, gout, hypothyroidism, admitted for a planned R great toe partial amputation by podiatry for nonhealing ulcer. Podiatry noted severe hyperglycemia in preop and asked that PFM manage pt's hyperglycemia. POC  at 0800. Dr. Alison Gates requested BG < 200 if possible before proceeding with surgery. Saw pt in preop. She reported excellent compliance with home DM medications; held her meds yesterday. She takes fastidious logs of AM BG which usually are ; latest  yesterday morning. Regarding her R great toe, she has had a deep ulcer on the planter aspect x June. Denies TTP, redness, swelling, drainage, odor. She does have neuropathic pain and numbness in both feet and hands. Denies F/C, NVD, HA, lightheadedness, dizziness, SOB, cough, CP, palpitations, ABD pain, bowel complaints, urinary complaints, edema. Past Medical History:   Diagnosis Date    Asthma     Chronic venous insufficiency     Diabetes ([address]     Hypertension     MI (myocardial infarction) ([address]     Peripheral neuropathy     Rheumatoid arthritis ([address]     Vertigo        Past Surgical History:   Procedure Laterality Date    HX BACK SURGERY      HX CHOLECYSTECTOMY      HX GYN      TAHOophorectomy due to infection    HX HEENT      resection of memegioma in the 1980's.     HX KNEE REPLACEMENT Bilateral        Family History   Problem Relation Age of Onset    Heart Attack Mother     Heart Attack Father        Social History     Socioeconomic History    Marital status:      Spouse name: Not on file    Number of children: Not on file    Years of education: Not on file    Highest education level: Not on file   Tobacco Use    Smoking status: Former Smoker    Smokeless tobacco: Never Used   Substance and Sexual Activity    Alcohol use: No     Alcohol/week: 0.0 standard drinks    Drug use: No    Sexual activity: Not Currently       No Known Allergies    Prior to Admission Medications   Prescriptions Last Dose Informant Patient Reported? Taking? BYDUREON BCISE 2 mg/0.85 mL atIn 2020  Yes Yes   Si mg by SubCUTAneous route every seven (7) days. Blood-Glucose Meter (FREESTYLE LITE METER) monitoring kit 2020  No Yes   Sig: Test twice blood glucose daily; ICD-10 E11.9; Quantity 1   CYANOCOBALAMIN, VITAMIN B-12, PO 2020  Yes Yes   Sig: Take  by mouth. acetaminophen (TYLENOL) 500 mg tablet 2020  No Yes   Sig: Take 1 Tab by mouth every six (6) hours as needed for Pain. allopurinol (ZYLOPRIM) 100 mg tablet 2020  No Yes   Sig: Take 1 Tab by mouth daily. amLODIPine (NORVASC) 10 mg tablet 2020  No Yes   Sig: Take 0.5 Tabs by mouth daily. Patient taking differently: Take 10 mg by mouth daily. ascorbic acid, vitamin C, (VITAMIN C) 500 mg tablet 2020  Yes Yes   Sig: Take 500 mg by mouth daily. beta-carotene,A,-vits C,E/mins (OCUVITE PO) 2020  Yes Yes   Sig: Take 1 Tab by mouth daily. bumetanide (BUMEX) 2 mg tablet 2020  Yes Yes   Sig: Take 1 mg by mouth two (2) times daily as needed. carvedilol (COREG) 12.5 mg tablet 2020  No Yes   Sig: Take 1 Tab by mouth two (2) times daily (with meals). cholecalciferol (VITAMIN D3) 1,000 unit tablet 2020  Yes Yes   Sig: Take 2,000 Units by mouth daily. gabapentin (NEURONTIN) 300 mg capsule 2020  No Yes   Sig: Take 1 Cap by mouth three (3) times daily.    glucose blood VI test strips (FREESTYLE TEST) strip 2020  No Yes   Sig: Use to check blood glucose twice daily DX:E11.9   insulin glargine (LANTUS U-100 INSULIN) 100 unit/mL injection 2020  Yes Yes   Si Units by SubCUTAneous route nightly. insulin syringe,safetyneedle 1 mL 31 gauge x /16\" syrg 2020  No Yes   Si Each by Does Not Apply route daily. levothyroxine (SYNTHROID) 200 mcg tablet 2020  No Yes   Si tablet daily (take on an empty stomach) (stop \"old\" synthroid)   Patient taking differently: 150 mcg. 1 tablet daily (take on an empty stomach) (stop \"old\" synthroid)   lisinopril (PRINIVIL, ZESTRIL) 2.5 mg tablet 2020  Yes Yes   Sig: Take 20 mg by mouth daily. magnesium oxide 500 mg tab 2020  Yes Yes   Sig: Take 1 Tab by mouth daily. naloxone (NARCAN) 4 mg/actuation nasal spray Not Taking at Unknown time  No No   Sig: Use 1 spray intranasally, then discard. Repeat with new spray every 2 min as needed for opioid overdose symptoms, alternating nostrils. sertraline (ZOLOFT) 50 mg tablet 2020  Yes Yes   Sig: Take  by mouth daily. vit A/vit C/vit E/zinc/copper (PRESERVISION AREDS PO) 2020  Yes Yes   Sig: Take  by mouth. Facility-Administered Medications: None       Physical Exam:     Patient Vitals for the past 24 hrs:   Temp Pulse Resp BP SpO2   20 1228 99.7 °F (37.6 °C) 86 17 (!) 162/65 98 %   20 0835 97.8 °F (36.6 °C) 78 20 (!) 149/61 97 %       Physical Exam:   General:  AAOx3, NAD   HEENT: Conjunctiva pink, sclera anicteric. Moist mucous membranes. Thyroid not enlarged, no nodules. No cervical, supraclavicular, occipital or submandibular lymphadenopathy. CV:  RRR, no murmurs. No visible pulsations or thrills. RESP:  Unlabored breathing. Lungs clear to auscultation without adventitious breath sounds. Equal expansion bilaterally. ABD:  Soft, nontender, nondistended. BS (+). No hepatosplenomegaly. No suprapubic tenderness. MS:  No joint deformity or instability. No atrophy. Neuro:  CN II-XII grossly intact. 5/5 strength bilateral upper extremities and lower extremities. Decreased sensation in bilateral hands and feet. Ext:  No edema. 2+ radial and dp pulses bilaterally. Skin:  No rashes, lesions, or ulcers. Good turgor. Chemistry No results for input(s): GLU, NA, K, CL, CO2, BUN, CREA, CA, MG, PHOS, AGAP, BUCR, TBIL, AP, TP, ALB, GLOB, AGRAT in the last 72 hours. No lab exists for component: GPT     CBC w/Diff No results for input(s): WBC, RBC, HGB, HCT, PLT, GRANS, LYMPH, EOS, HGBEXT, HCTEXT, PLTEXT in the last 72 hours. Liver Enzymes Protein, total   Date Value Ref Range Status   04/18/2018 8.7 (H) 6.4 - 8.2 g/dL Final     Albumin   Date Value Ref Range Status   04/18/2018 3.6 3.4 - 5.0 g/dL Final     Globulin   Date Value Ref Range Status   04/18/2018 5.1 (H) 2.0 - 4.0 g/dL Final     A-G Ratio   Date Value Ref Range Status   04/18/2018 0.7 (L) 0.8 - 1.7   Final     Alk. phosphatase   Date Value Ref Range Status   04/18/2018 76 45 - 117 U/L Final     No results for input(s): TP, ALB, GLOB, AGRAT, AP, TBIL in the last 72 hours. No lab exists for component: SGOT, GPT, DBIL     Lactic Acid No results found for: LAC  No results for input(s): LAC in the last 72 hours. BNP No results found for: BNP, BNPP, XBNPT     Cardiac Enzymes No results found for: CPK, CK, CKMMB, CKMB, RCK3, CKMBT, CKNDX, CKND1, GABRIEL, TROPT, TROIQ, KRUPA, TROPT, TNIPOC, BNP, BNPP     Coagulation No results for input(s): PTP, INR, APTT, INREXT in the last 72 hours. Thyroid  Lab Results   Component Value Date/Time    TSH 0.30 (L) 01/12/2018 10:03 AM          Lipid Panel No results found for: CHOL, CHOLPOCT, CHOLX, CHLST, CHOLV, 277784, HDL, HDLP, LDL, LDLC, DLDLP, 043924, VLDLC, VLDL, TGLX, TRIGL, TRIGP, TGLPOCT, CHHD, CHHDX     ABG No results for input(s): PHI, PHI, POC2, PCO2I, PO2, PO2I, HCO3, HCO3I, FIO2, FIO2I in the last 72 hours.      Urinalysis Lab Results   Component Value Date/Time    Color YELLOW 04/18/2018 03:48 PM    Appearance CLEAR 04/18/2018 03:48 PM    Specific gravity 1.007 04/18/2018 03:48 PM    pH (UA) 7.0 04/18/2018 03:48 PM    Protein NEGATIVE  04/18/2018 03:48 PM    Glucose NEGATIVE  04/18/2018 03:48 PM    Ketone NEGATIVE  04/18/2018 03:48 PM    Bilirubin NEGATIVE  04/18/2018 03:48 PM    Urobilinogen 0.2 04/18/2018 03:48 PM    Nitrites NEGATIVE  04/18/2018 03:48 PM    Leukocyte Esterase NEGATIVE  04/18/2018 03:48 PM    Epithelial cells FEW 01/05/2017 10:34 AM    Bacteria NEGATIVE  01/05/2017 10:34 AM    WBC 0 to 3 01/05/2017 10:34 AM    RBC 0 01/05/2017 10:34 AM        Micro No results for input(s): SDES, CULT in the last 72 hours. No results for input(s): CULT in the last 72 hours. Imaging:  XR (Most Recent). Results from [place] encounter on 12/07/20   XR CHEST PA LAT    Narrative PA and lateral chest:    2 views submitted    COMPARISON: 4/18/2018    FINDINGS:    Lung fields: Clear    Cardiac silhouette and mediastinum: Negative    Bone and soft tissues: Mild scoliotic curvature thoracolumbar junction convexity  to the left stable    Tubes and lines, none inserted    Additional findings:    Degenerative changes of the right shoulder suggesting impingement syndrome      Impression IMPRESSION:    Heart size normal, lungs are clear        CT (Most Recent) Results from Hospital Encounter encounter on 04/18/18   CT HEAD WO CONT    Narrative CT HEAD WITHOUT IV CONTRAST    INDICATION: Confusion/delirium; headache. Accidental overdose. Hypoglycemia. .    COMPARISON: None. TECHNIQUE: Serial axial CT images were obtained from the skull vertex to foramen  magnum without IV contrast. All CT scans at this facility are performed using  dose optimization technique as appropriate to a performed exam, to include  automated exposure control, adjustment of the mA and/or kV according to  patient's size (including appropriate matching for site-specific examinations),  or use of iterative reconstruction technique. FINDINGS:  Prior right pterional craniectomy/craniotomy. Underlying encephalomalacia at the  right temporal lobe.  Lesser degree of mild encephalomalacia at the right frontal  lobe. Prominent streak artifact limits evaluation of the wendi. Gray-white matter  differentiation is otherwise preserved. Mild periventricular/cerebral white  matter hypoattenuation. Mild cerebral atrophy. .No evidence for acute  intracranial hemorrhage, acute infarct, or mass lesion. No evidence for  hydrocephalus. . The calvarium appears intact. Visualized paranasal sinuses are  free from significant mucosal disease. Impression IMPRESSION:  No convincing CT evidence for acute intracranial process. Othelia Carlos Postsurgical changes at the right calvarium with underlying encephalomalacia in  the right temporal lobe and right frontal lobe. Mild white matter disease, presumed chronic microvascular ischemic changes. ECHO No results found for this or any previous visit. EKG No results found for this or any previous visit. Recent Results (from the past 12 hour(s))   GLUCOSE, POC    Collection Time: 12/14/20  8:29 AM   Result Value Ref Range    Glucose (POC) 422 (HH) 70 - 110 mg/dL   GLUCOSE, POC    Collection Time: 12/14/20  8:32 AM   Result Value Ref Range    Glucose (POC) 410 (HH) 70 - 110 mg/dL   GLUCOSE, POC    Collection Time: 12/14/20  9:36 AM   Result Value Ref Range    Glucose (POC) 396 (H) 70 - 110 mg/dL   GLUCOSE, POC    Collection Time: 12/14/20 11:43 AM   Result Value Ref Range    Glucose (POC) 230 (H) 70 - 110 mg/dL   GLUCOSE, POC    Collection Time: 12/14/20 12:22 PM   Result Value Ref Range    Glucose (POC) 203 (H) 70 - 110 mg/dL       Assessment/Plan:   82 y.o. female with PMH T2DM c/b peripheral neuropathy, HTN, HLD, gout, hypothyroidism, admitted for a planned R great toe partial amputation delayed by severe hyperglycemia. BG 400s. Pt seems to be very compliant with medications and accurate/consistent with home BG measurements. Her home regimen should control her BG for a procedure tomorrow.     T2DM/ diabetic neuropathy  - admit to tele  - CBC, CMP now  - daily CBC, BMP  - VS per unit routine  - home lantus 30u QHS  - SSI  - accuchecks ACHS  - hypoglycemia protocol  - home gabapentin 300mg BID and 600mg QHS  - b12 and thiamine  - B supplement x 1  - hgb A1C    R great toe ulcer  - partial amputation by podiatry when BG under control  - NPO at MN    HTN/HLD  - home amlodipine 10mg every day, carvedilol 12.5mg BID,  bumetanide 1mg BID, lisinopril 20mg every day   - home atorvastatin 10mg QD    Hypothyroidism  - home levothyroxine 150mcg QD    Gout  - home allopurinol 100mg QD    Diet diabetic   DVT Prophylaxis SQH   GI Prophylaxis none   Code status Full   Disposition >2MN     Point of Contact EMILIE  Relationship: son  [phone]      Catherine Araiza MD , PGY-1   [address]   Intern Pager: [phone]   December 14, 2020, 2:20 PM         Senior Resident History and Physical  [facility]    HPI:     [place] is a 82 y.o. female with PMH of T2DM w/Diabetic Neuropathy, CHF, CKD, HTN, Hypothyroidism, Osteoporosis, now with complaint of right great toe pain. Patient was brought in for partial right toe amputation by Dr. José Gallardo today, but was found to be hyperglycemic 422. So, the surgery was postponed. Patient states that she has had pain in her right great toe since June 2020 after her love seat landed on it. She did not feel it at first given her neuropathy. Since then, she has had worsening pain, but it is relieved by Tylenol. She previously used Morphine for this pain and her back pain but was able to wean herself off of it. She also reports small amounts of bloody drainage but no purulent discharge. She keeps her toe wrapped most of the time and has not been on any antibiotics outpatient for an infection. For her diabetes, she states that she is compliant with her medications and that she checks her sugars a few times a day.  Her morning sugars range from  and her meal time sugars are all less than 200. She states that she is upset that her sugars are elevated now because she has never had this issue before. ED course, ROS, PMH, medications, labs and imaging per intern note above. PMH, PSH, Family Hx, Social Hx, Home medications, and allergies as above in intern H&P. Physical Exam:      Visit Vitals  BP (!) 149/61   Pulse 78   Temp 97.8 °F (36.6 °C)   Resp 20   Ht 5' 5\" (1.651 m)   Wt 108.4 kg (239 lb)   SpO2 97%   BMI 39.77 kg/m²       Physical Exam:  Physical Exam  Constitutional:       General: She is not in acute distress. Appearance: She is not ill-appearing, toxic-appearing or diaphoretic. HENT:      Head: Atraumatic. Cardiovascular:      Rate and Rhythm: Regular rhythm. Heart sounds: No murmur. No gallop. Pulmonary:      Effort: Pulmonary effort is normal. No respiratory distress. Breath sounds: Normal breath sounds. No stridor. No wheezing, rhonchi or rales. Chest:      Chest wall: No tenderness. Abdominal:      General: There is no distension. Palpations: Abdomen is soft. Tenderness: There is no abdominal tenderness. There is no guarding or rebound. Musculoskeletal:      Right lower leg: Edema present. Left lower leg: Edema present. Skin:     General: Skin is warm and dry. Findings: Lesion present. Comments: [id number] stage 3 ulcer on plantar surface with no active drainage, bleeding, or foul smell but TTP. Red bug bites over right and left abdomen without signs of infection   Neurological:      Mental Status: She is alert. Sensory: Sensory deficit present. Labs Reviewed    Assessment/Plan:   82 y.o. female with PMH of T2DM w/Diabetic Neuropathy, CHF, CKD, HTN, Hypothyroidism, Osteoporosis, now admitted with Hyperglycemia and Chronic Right Toe Ulceration. Poor Healing Chronic Right Great Toe Ulceration: No signs of infection. Afebrile, no tachycardia awaiting cbc to check for leukocytosis.  Patient looks well overall.    - Admit to telemetry with cardiac monitoring   - Podiatry following    - Tentative partial right great toe amputation on 12/15 pending glucose trend    - No antibiotics recommended by podiatry at this time    - Plans to culture during procedure   - Continue Tylenol 650mg q6h prn   - NPO at midnight   - Check CBC and CMP today   - Daily CBC and BMP   - CM/PT/OT Consult     Hyperglycemia in Setting of T2DM w/Neuropathy: Improving. Initially at 9416 5007, now 203. A1c of 8.8 in 9/2020. Blood sugars usually well controlled. Ulceration and possible infection likely increasing sugars. - Continue home Lantus 30U qhs   - SSI   - Check A1c   - Check B12 and thiamine levels   - Start on Vitamin B Supplements    HTN/CHF: Elevated on admission to high 140s-160s/60s.    - Continue home Amlodipine 10mg daily, Coreg 12.5mg BID, Lisinopril 20mg daily      Remainder of plan and management of chronic conditions per intern note above     Meaghan Ahumada.  Zack Vaca MD, PGY-3  [facility]  12/14/20 9:48 AM

## 2020-12-14 NOTE — PROGRESS NOTES
Problem: Hypertension  Goal: *Blood pressure within specified parameters  Outcome: Not Progressing Towards Goal  Goal: *Fluid volume balance  Outcome: Not Progressing Towards Goal  Goal: *Labs within defined limits  Outcome: Not Progressing Towards Goal     Problem: General Medical Care Plan  Goal: *Absence of infection signs and symptoms  Outcome: Not Progressing Towards Goal  Goal: *Optimal pain control at patient's stated goal  Outcome: Not Progressing Towards Goal  Goal: *Skin integrity maintained  Outcome: Not Progressing Towards Goal  Goal: *Fluid volume balance  Outcome: Not Progressing Towards Goal  Goal: *Optimize nutritional status  Outcome: Not Progressing Towards Goal  Goal: *Anxiety reduced or absent  Outcome: Not Progressing Towards Goal  Goal: *Progressive mobility and function (eg: ADL's)  Outcome: Not Progressing Towards Goal     Problem: Hypertension  Goal: *Blood pressure within specified parameters  Outcome: Not Progressing Towards Goal  Goal: *Fluid volume balance  Outcome: Not Progressing Towards Goal  Goal: *Labs within defined limits  Outcome: Not Progressing Towards Goal     Problem: General Medical Care Plan  Goal: *Absence of infection signs and symptoms  Outcome: Not Progressing Towards Goal  Goal: *Optimal pain control at patient's stated goal  Outcome: Not Progressing Towards Goal  Goal: *Skin integrity maintained  Outcome: Not Progressing Towards Goal  Goal: *Fluid volume balance  Outcome: Not Progressing Towards Goal  Goal: *Optimize nutritional status  Outcome: Not Progressing Towards Goal  Goal: *Anxiety reduced or absent  Outcome: Not Progressing Towards Goal  Goal: *Progressive mobility and function (eg: ADL's)  Outcome: Not Progressing Towards Goal

## 2020-12-14 NOTE — ROUTINE PROCESS
TRANSFER - OUT REPORT:    Verbal report given to [facility] RN(name) on [employer]  being transferred to [address] [place] [address](unit) for ordered procedure       Report consisted of patients Situation, Background, Assessment and   Recommendations(SBAR). Information from the following report(s) SBAR, Kardex and MAR was reviewed with the receiving nurse. Lines:       Opportunity for questions and clarification was provided.       Patient transported with:   [employer]

## 2020-12-14 NOTE — ANESTHESIA PREPROCEDURE EVALUATION
Relevant Problems   No relevant active problems       Anesthetic History   No history of anesthetic complications            Review of Systems / Medical History  Patient summary reviewed and pertinent labs reviewed    Pulmonary            Asthma : well controlled       Neuro/Psych             Comments: neuropathy Cardiovascular    Hypertension          Past MI and CAD         GI/Hepatic/Renal     GERD           Endo/Other    Diabetes: type 2, using insulin    Morbid obesity and arthritis     Other Findings   Comments: Vertigo           Physical Exam    Airway    TM Distance: 4 - 6 cm  Neck ROM: normal range of motion   Mouth opening: Normal     Cardiovascular    Rhythm: regular  Rate: normal         Dental    Dentition: Edentulous and Full upper dentures     Pulmonary                Comments: Non labored Abdominal  GI exam deferred       Other Findings            Anesthetic Plan    ASA: 3  Anesthesia type: MAC            Anesthetic plan and risks discussed with: Patient Abdominal Pain, N/V/D

## 2020-12-15 VITALS
DIASTOLIC BLOOD PRESSURE: 58 MMHG | HEART RATE: 74 BPM | TEMPERATURE: 98 F | RESPIRATION RATE: 21 BRPM | OXYGEN SATURATION: 97 % | BODY MASS INDEX: 39.82 KG/M2 | HEIGHT: 65 IN | WEIGHT: 239 LBS | SYSTOLIC BLOOD PRESSURE: 137 MMHG

## 2020-12-15 LAB
ANION GAP SERPL CALC-SCNC: 5 MMOL/L (ref 3–18)
BASOPHILS # BLD: 0.1 K/UL (ref 0–0.1)
BASOPHILS NFR BLD: 1 % (ref 0–2)
BUN SERPL-MCNC: 23 MG/DL (ref 7–18)
BUN/CREAT SERPL: 27 (ref 12–20)
CALCIUM SERPL-MCNC: 9.2 MG/DL (ref 8.5–10.1)
CHLORIDE SERPL-SCNC: 108 MMOL/L (ref 100–111)
CO2 SERPL-SCNC: 30 MMOL/L (ref 21–32)
CREAT SERPL-MCNC: 0.85 MG/DL (ref 0.6–1.3)
DIFFERENTIAL METHOD BLD: ABNORMAL
EOSINOPHIL # BLD: 0.3 K/UL (ref 0–0.4)
EOSINOPHIL NFR BLD: 2 % (ref 0–5)
ERYTHROCYTE [DISTWIDTH] IN BLOOD BY AUTOMATED COUNT: 12.9 % (ref 11.6–14.5)
EST. AVERAGE GLUCOSE BLD GHB EST-MCNC: 240 MG/DL
FOLATE SERPL-MCNC: >20 NG/ML (ref 3.1–17.5)
GLUCOSE BLD STRIP.AUTO-MCNC: 164 MG/DL (ref 70–110)
GLUCOSE SERPL-MCNC: 132 MG/DL (ref 74–99)
HBA1C MFR BLD: 10 % (ref 4.2–5.6)
HCT VFR BLD AUTO: 36.5 % (ref 35–45)
HGB BLD-MCNC: 12.2 G/DL (ref 12–16)
LYMPHOCYTES # BLD: 3.3 K/UL (ref 0.9–3.6)
LYMPHOCYTES NFR BLD: 30 % (ref 21–52)
MAGNESIUM SERPL-MCNC: 1.5 MG/DL (ref 1.6–2.6)
MCH RBC QN AUTO: 29.7 PG (ref 24–34)
MCHC RBC AUTO-ENTMCNC: 33.4 G/DL (ref 31–37)
MCV RBC AUTO: 88.8 FL (ref 74–97)
MONOCYTES # BLD: 0.9 K/UL (ref 0.05–1.2)
MONOCYTES NFR BLD: 8 % (ref 3–10)
NEUTS SEG # BLD: 6.4 K/UL (ref 1.8–8)
NEUTS SEG NFR BLD: 59 % (ref 40–73)
PLATELET # BLD AUTO: 170 K/UL (ref 135–420)
PMV BLD AUTO: 12.4 FL (ref 9.2–11.8)
POTASSIUM SERPL-SCNC: 3.9 MMOL/L (ref 3.5–5.5)
RBC # BLD AUTO: 4.11 M/UL (ref 4.2–5.3)
SODIUM SERPL-SCNC: 143 MMOL/L (ref 136–145)
VIT B12 SERPL-MCNC: 1399 PG/ML (ref 211–911)
WBC # BLD AUTO: 10.8 K/UL (ref 4.6–13.2)

## 2020-12-15 PROCEDURE — 82607 VITAMIN B-12: CPT

## 2020-12-15 PROCEDURE — 84425 ASSAY OF VITAMIN B-1: CPT

## 2020-12-15 PROCEDURE — 74011250636 HC RX REV CODE- 250/636: Performed by: STUDENT IN AN ORGANIZED HEALTH CARE EDUCATION/TRAINING PROGRAM

## 2020-12-15 PROCEDURE — 83735 ASSAY OF MAGNESIUM: CPT

## 2020-12-15 PROCEDURE — 2709999900 HC NON-CHARGEABLE SUPPLY

## 2020-12-15 PROCEDURE — 36415 COLL VENOUS BLD VENIPUNCTURE: CPT

## 2020-12-15 PROCEDURE — 97165 OT EVAL LOW COMPLEX 30 MIN: CPT

## 2020-12-15 PROCEDURE — 97161 PT EVAL LOW COMPLEX 20 MIN: CPT

## 2020-12-15 PROCEDURE — 85025 COMPLETE CBC W/AUTO DIFF WBC: CPT

## 2020-12-15 PROCEDURE — 82962 GLUCOSE BLOOD TEST: CPT

## 2020-12-15 PROCEDURE — 74011636637 HC RX REV CODE- 636/637: Performed by: STUDENT IN AN ORGANIZED HEALTH CARE EDUCATION/TRAINING PROGRAM

## 2020-12-15 PROCEDURE — 74011250636 HC RX REV CODE- 250/636: Performed by: NURSE ANESTHETIST, CERTIFIED REGISTERED

## 2020-12-15 PROCEDURE — 80048 BASIC METABOLIC PNL TOTAL CA: CPT

## 2020-12-15 PROCEDURE — 74011250637 HC RX REV CODE- 250/637: Performed by: STUDENT IN AN ORGANIZED HEALTH CARE EDUCATION/TRAINING PROGRAM

## 2020-12-15 PROCEDURE — 97535 SELF CARE MNGMENT TRAINING: CPT

## 2020-12-15 PROCEDURE — 97116 GAIT TRAINING THERAPY: CPT

## 2020-12-15 PROCEDURE — 83036 HEMOGLOBIN GLYCOSYLATED A1C: CPT

## 2020-12-15 RX ORDER — MAGNESIUM SULFATE HEPTAHYDRATE 40 MG/ML
2 INJECTION, SOLUTION INTRAVENOUS ONCE
Status: COMPLETED | OUTPATIENT
Start: 2020-12-15 | End: 2020-12-15

## 2020-12-15 RX ORDER — AMOXICILLIN AND CLAVULANATE POTASSIUM 875; 125 MG/1; MG/1
1 TABLET, FILM COATED ORAL EVERY 12 HOURS
Qty: 28 TAB | Refills: 0 | Status: SHIPPED | OUTPATIENT
Start: 2020-12-15 | End: 2020-12-29

## 2020-12-15 RX ORDER — TRAMADOL HYDROCHLORIDE 50 MG/1
50 TABLET ORAL
Qty: 12 TAB | Refills: 0 | Status: SHIPPED | OUTPATIENT
Start: 2020-12-15 | End: 2020-12-28

## 2020-12-15 RX ADMIN — CARVEDILOL 12.5 MG: 12.5 TABLET, FILM COATED ORAL at 09:55

## 2020-12-15 RX ADMIN — LEVOTHYROXINE SODIUM 150 MCG: 75 TABLET ORAL at 06:21

## 2020-12-15 RX ADMIN — BUMETANIDE 1 MG: 1 TABLET ORAL at 09:55

## 2020-12-15 RX ADMIN — Medication 10 ML: at 06:23

## 2020-12-15 RX ADMIN — ALLOPURINOL 100 MG: 100 TABLET ORAL at 09:00

## 2020-12-15 RX ADMIN — GABAPENTIN 300 MG: 300 CAPSULE ORAL at 09:54

## 2020-12-15 RX ADMIN — INSULIN LISPRO 3 UNITS: 100 INJECTION, SOLUTION INTRAVENOUS; SUBCUTANEOUS at 09:54

## 2020-12-15 RX ADMIN — Medication 2 TABLET: at 09:54

## 2020-12-15 RX ADMIN — Medication 10 ML: at 06:22

## 2020-12-15 RX ADMIN — SODIUM CHLORIDE, SODIUM LACTATE, POTASSIUM CHLORIDE, AND CALCIUM CHLORIDE 75 ML/HR: 600; 310; 30; 20 INJECTION, SOLUTION INTRAVENOUS at 06:22

## 2020-12-15 RX ADMIN — LISINOPRIL 20 MG: 20 TABLET ORAL at 09:55

## 2020-12-15 RX ADMIN — AMLODIPINE BESYLATE 10 MG: 10 TABLET ORAL at 09:54

## 2020-12-15 RX ADMIN — Medication 500 MG: at 09:54

## 2020-12-15 RX ADMIN — HEPARIN SODIUM 5000 UNITS: 5000 INJECTION INTRAVENOUS; SUBCUTANEOUS at 12:48

## 2020-12-15 RX ADMIN — MAGNESIUM SULFATE HEPTAHYDRATE 2 G: 40 INJECTION, SOLUTION INTRAVENOUS at 12:47

## 2020-12-15 NOTE — PROGRESS NOTES
Problem: Hypertension  Goal: *Blood pressure within specified parameters  12/15/2020 1504 by Penelope Curran RN  Outcome: Resolved/Met  12/15/2020 1503 by Penelope Curran RN  Outcome: Progressing Towards Goal  Goal: *Fluid volume balance  12/15/2020 1504 by Penelope Curran RN  Outcome: Resolved/Met  12/15/2020 1503 by Penelope Curran RN  Outcome: Progressing Towards Goal  Goal: *Labs within defined limits  12/15/2020 1504 by Penelope Curran RN  Outcome: Resolved/Met  12/15/2020 1503 by Penelope Curran RN  Outcome: Progressing Towards Goal     Problem: General Medical Care Plan  Goal: *Absence of infection signs and symptoms  12/15/2020 1504 by Penelope Curran RN  Outcome: Resolved/Met  12/15/2020 1503 by Penelope Curran RN  Outcome: Progressing Towards Goal  Goal: *Optimal pain control at patient's stated goal  12/15/2020 1504 by Penelope Curran RN  Outcome: Resolved/Met  12/15/2020 1503 by Penelope Curran RN  Outcome: Progressing Towards Goal  Goal: *Skin integrity maintained  12/15/2020 1504 by Peneolpe Curran, RN  Outcome: Resolved/Met  12/15/2020 1503 by Penelope Curran RN  Outcome: Progressing Towards Goal  Goal: *Fluid volume balance  12/15/2020 1504 by Penelope Curran RN  Outcome: Resolved/Met  12/15/2020 1503 by Penelope Curran RN  Outcome: Progressing Towards Goal  Goal: *Optimize nutritional status  12/15/2020 1504 by Penelope Curran RN  Outcome: Resolved/Met  12/15/2020 1503 by Penelope Curran RN  Outcome: Progressing Towards Goal  Goal: *Anxiety reduced or absent  12/15/2020 1504 by Penelope Curran, RN  Outcome: Resolved/Met  12/15/2020 1503 by Penelope Curran RN  Outcome: Progressing Towards Goal  Goal: *Progressive mobility and function (eg: ADL's)  12/15/2020 1504 by Penelope Curran, RN  Outcome: Resolved/Met  12/15/2020 1503 by Penelope Curran RN  Outcome: Progressing Towards Goal     Problem: Falls - Risk of  Goal: *Absence of Falls  Description: Document Timmy Reaves Fall Risk and appropriate interventions in the flowsheet.   12/15/2020 1504 by Stacey Agee RN  Outcome: Resolved/Met  12/15/2020 1503 by Stacey Agee RN  Outcome: Progressing Towards Goal  Note: Fall Risk Interventions:  Mobility Interventions: Bed/chair exit alarm, Patient to call before getting OOB, Utilize walker, cane, or other assistive device    Mentation Interventions: Bed/chair exit alarm, Door open when patient unattended, Toileting rounds, Update white board    Medication Interventions: Bed/chair exit alarm, Patient to call before getting OOB, Teach patient to arise slowly                   Problem: Patient Education: Go to Patient Education Activity  Goal: Patient/Family Education  12/15/2020 1504 by Stacey Agee RN  Outcome: Resolved/Met  12/15/2020 1503 by Stacey Agee RN  Outcome: Progressing Towards Goal     Problem: Pain  Goal: *Control of Pain  12/15/2020 1504 by Stacey Agee RN  Outcome: Resolved/Met  12/15/2020 1503 by Stacey Agee RN  Outcome: Progressing Towards Goal  Goal: *PALLIATIVE CARE:  Alleviation of Pain  12/15/2020 1504 by Stacey Agee RN  Outcome: Resolved/Met  12/15/2020 1503 by Stacey Agee RN  Outcome: Progressing Towards Goal     Problem: Patient Education: Go to Patient Education Activity  Goal: Patient/Family Education  12/15/2020 1504 by Stacey Agee RN  Outcome: Resolved/Met  12/15/2020 1503 by Stacey Agee RN  Outcome: Progressing Towards Goal     Problem: Depressed Mood (Adult/Pediatric)  Goal: *STG: Participates in treatment plan  12/15/2020 1504 by Stacey Agee RN  Outcome: Resolved/Met  12/15/2020 1503 by Stacey Agee RN  Outcome: Progressing Towards Goal  Goal: *STG: Participates in 1:1 therapy sessions  12/15/2020 1504 by Stacey Agee RN  Outcome: Resolved/Met  12/15/2020 1503 by Stacey Agee RN  Outcome: Progressing Towards Goal  Goal: *Kindra Conde anger, guilt, and other feelings in a constructive manor  12/15/2020 1504 by Ricardo So RN  Outcome: Resolved/Met  12/15/2020 1503 by Ricardo So RN  Outcome: Progressing Towards Goal  Goal: *STG: Attends activities and groups  12/15/2020 1504 by Ricardo So RN  Outcome: Resolved/Met  12/15/2020 1503 by Ricardo So RN  Outcome: Progressing Towards Goal  Goal: *STG: Demonstrates reduction in symptoms and increase in insight into coping skills/future focused  12/15/2020 1504 by Ricardo So RN  Outcome: Resolved/Met  12/15/2020 1503 by Ricardo So RN  Outcome: Progressing Towards Goal  Goal: *STG: Remains safe in hospital  12/15/2020 1504 by Ricardo So RN  Outcome: Resolved/Met  12/15/2020 1503 by Ricardo So RN  Outcome: Progressing Towards Goal  Goal: *STG: Complies with medication therapy  12/15/2020 1504 by Ricardo So RN  Outcome: Resolved/Met  12/15/2020 1503 by Ricardo So RN  Outcome: Progressing Towards Goal  Goal: *LTG: Returns to previous level of functioning and participates with after care plan  12/15/2020 1504 by Ricardo So RN  Outcome: Resolved/Met  12/15/2020 1503 by Ricardo So RN  Outcome: Progressing Towards Goal  Goal: *LTG: Understands illness and can identify signs of relapse  12/15/2020 1504 by Ricardo So RN  Outcome: Resolved/Met  12/15/2020 1503 by Ricardo So RN  Outcome: Progressing Towards Goal  Goal: Interventions  12/15/2020 1504 by Ricardo So RN  Outcome: Resolved/Met  12/15/2020 1503 by Ricardo So RN  Outcome: Progressing Towards Goal     Problem: Patient Education: Go to Patient Education Activity  Goal: Patient/Family Education  12/15/2020 1504 by Ricardo So RN  Outcome: Resolved/Met  12/15/2020 1503 by Ricardo So RN  Outcome: Progressing Towards Goal     Problem: Diabetes Self-Management  Goal: *Disease process and treatment process  Description: Define diabetes and identify own type of diabetes; list 3 options for treating diabetes. 12/15/2020 1504 by Janes Moss RN  Outcome: Resolved/Met  12/15/2020 1503 by Janes Moss RN  Outcome: Progressing Towards Goal  Goal: *Incorporating nutritional management into lifestyle  Description: Describe effect of type, amount and timing of food on blood glucose; list 3 methods for planning meals. 12/15/2020 1504 by Janes Moss RN  Outcome: Resolved/Met  12/15/2020 1503 by Janes Moss RN  Outcome: Progressing Towards Goal  Goal: *Incorporating physical activity into lifestyle  Description: State effect of exercise on blood glucose levels. 12/15/2020 1504 by Janes Moss RN  Outcome: Resolved/Met  12/15/2020 1503 by Janes Moss RN  Outcome: Progressing Towards Goal  Goal: *Developing strategies to promote health/change behavior  Description: Define the ABC's of diabetes; identify appropriate screenings, schedule and personal plan for screenings. 12/15/2020 1504 by Janes Moss RN  Outcome: Resolved/Met  12/15/2020 1503 by Janes Moss RN  Outcome: Progressing Towards Goal  Goal: *Using medications safely  Description: State effect of diabetes medications on diabetes; name diabetes medication taking, action and side effects. 12/15/2020 1504 by Janes Moss RN  Outcome: Resolved/Met  12/15/2020 1503 by Janes Moss RN  Outcome: Progressing Towards Goal  Goal: *Monitoring blood glucose, interpreting and using results  Description: Identify recommended blood glucose targets  and personal targets. 12/15/2020 1504 by Janes Moss RN  Outcome: Resolved/Met  12/15/2020 1503 by Janes Moss RN  Outcome: Progressing Towards Goal  Goal: *Prevention, detection, treatment of acute complications  Description: List symptoms of hyper- and hypoglycemia; describe how to treat low blood sugar and actions for lowering  high blood glucose level.   12/15/2020 1504 by Swapna Li RN  Outcome: Resolved/Met  12/15/2020 1503 by Swapna Li RN  Outcome: Progressing Towards Goal  Goal: *Prevention, detection and treatment of chronic complications  Description: Define the natural course of diabetes and describe the relationship of blood glucose levels to long term complications of diabetes.   12/15/2020 1504 by Swapna Li RN  Outcome: Resolved/Met  12/15/2020 1503 by Swapna Li RN  Outcome: Progressing Towards Goal  Goal: *Developing strategies to address psychosocial issues  Description: Describe feelings about living with diabetes; identify support needed and support network  12/15/2020 1504 by Swapna Li RN  Outcome: Resolved/Met  12/15/2020 1503 by Swapna Li RN  Outcome: Progressing Towards Goal  Goal: *Insulin pump training  12/15/2020 1504 by Swapna Li RN  Outcome: Resolved/Met  12/15/2020 1503 by Swapna Li RN  Outcome: Progressing Towards Goal  Goal: *Sick day guidelines  12/15/2020 1504 by Swapna Li RN  Outcome: Resolved/Met  12/15/2020 1503 by Swapna Li RN  Outcome: Progressing Towards Goal  Goal: *Patient Specific Goal (EDIT GOAL, INSERT TEXT)  12/15/2020 1504 by Swapna Li RN  Outcome: Resolved/Met  12/15/2020 1503 by Swapna Li RN  Outcome: Progressing Towards Goal     Problem: Patient Education: Go to Patient Education Activity  Goal: Patient/Family Education  12/15/2020 1504 by Swapna Li RN  Outcome: Resolved/Met  12/15/2020 1503 by Swapna Li RN  Outcome: Progressing Towards Goal     Problem: Patient Education: Go to Patient Education Activity  Goal: Patient/Family Education  12/15/2020 1504 by Swapna Li RN  Outcome: Resolved/Met  12/15/2020 1503 by Swapna Li RN  Outcome: Progressing Towards Goal     Problem: Patient Education: Go to Patient Education Activity  Goal: Patient/Family Education  12/15/2020 1504 by Swapna Li RN  Outcome: Resolved/Met  12/15/2020 1503 by Tara Beard RN  Outcome: Progressing Towards Goal

## 2020-12-15 NOTE — CONSULTS
Seen at bedside awake and alert. Blood sugar stabilized. Agree with discharge on wound care and oral antibiotic  Declines surgery on 12/21. Will follow in my clinic. Kelvin Jefferson

## 2020-12-15 NOTE — PROGRESS NOTES
conducted an initial consultation and Spiritual Assessment for Mattie, who is a 82 y.o.,female. Patient's Primary Language is: [place]. According to the patient's EMR Alevism Affiliation is: [facility].     The reason the Patient came to the hospital is:   Patient Active Problem List    Diagnosis Date Noted    Chronic toe ulcer, right, with fat layer exposed ([address] 12/14/2020    Obesity, morbid ([address] 12/15/2017    Type 2 diabetes mellitus with nephropathy ([facility] 12/15/2017    Dermatitis 08/23/2017    Chronic left shoulder pain 08/08/2017    Neck pain 07/05/2017    History of depression 01/23/2017    Reflux esophagitis 11/06/2016    Acquired hypothyroidism 08/01/2016    Skin lesions, generalized 07/11/2016    Tear of right rotator cuff 05/16/2016    Chronic right shoulder pain 04/21/2016    Encounter for long-term (current) use of medications 04/21/2016    Chronic pain syndrome 04/21/2016    Arthritis of right shoulder region 04/21/2016    Shoulder impingement 04/21/2016        The  provided the following Interventions:  Visited Ms. Lynch during my daily rounds. Initiated a relationship of care and support. Explored issues of trinity, belief, spirituality and Holiness/ritual needs while hospitalized. Listened empathically as she shared some family history and legacy. Provided information about Spiritual Care Services. Offered prayer and assurance of continued prayers on patient's behalf. Chart reviewed. The following outcomes where achieved:  Patient shared limited information about both their medical narrative and spiritual journey/beliefs. Patient processed feeling about current hospitalization. Patient expressed gratitude for 's visit. Assessment:  Patient does not have any Holiness/cultural needs that will affect patient's preferences in health care. There are no spiritual or Holiness issues which require intervention at this time. Plan:  Chaplains will continue to follow and will provide pastoral care on an as needed/requested basis.  recommends bedside caregivers page  on duty if patient shows signs of acute spiritual or emotional distress.     [address] [place] [address]   [phone]

## 2020-12-15 NOTE — PROGRESS NOTES
Problem: Mobility Impaired (Adult and Pediatric)  Goal: *Acute Goals and Plan of Care (Insert Text)  Outcome: Resolved/Met     PHYSICAL THERAPY EVALUATION AND DISCHARGE    Patient: Jesus Robins (82 y.o. female)  Date: 12/15/2020  Primary Diagnosis: Chronic toe ulcer, right, with fat layer exposed ([facility] [address] [L97.512]  Procedure(s) (LRB):  AMPUTATION RIGHT GREAT TOE (N/A) 1 Day Post-Op   Precautions:   (standard)  WBAT  PLOF: Pt was ind PTA, uses a cane and has a walker, transport chair at home. Lives with a friend in an apartment with 0 SAIGE. ASSESSMENT :  Based on the objective data described below, the patient presents with baseline functional mobility level. Pt is limited by R great toe pain but is still able to mobilize safely. Pt is able to perform bed mobility, transfers, and gait with her SPC at mod ind level. She demonstrates good standing balance and was able to get dressed in the bathroom with no assistance, increased time taken for safety. Per pt she will be discharging and returning for a rescheduled amputation surgery. Pt was left sitting up in chair with all needs met and call bell within reach. Pt does not warrant skilled PT at the acute level and will sign-off at this time. Patient does not require further skilled intervention at this level of care. PLAN :  Recommendations and Planned Interventions:   No formal PT needs identified at this time. Discharge Recommendations: None  Further Equipment Recommendations for Discharge: N/A     SUBJECTIVE:   Patient stated I am disappointed I couldn't have my surgery. 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 HX KNEE REPLACEMENT Bilateral      Barriers to Learning/Limitations: None  Compensate with: N/A  Home Situation:   Home Situation  Home Environment: Apartment  # Steps to Enter: 0  One/Two Story Residence: One story  Living Alone: No  Support Systems: Friends \ neighbors  Patient Expects to be Discharged to[de-identified] Apartment  Current DME Used/Available at Home: [place], straight, Walker, rolling, Wheelchair, Transfer bench  Tub or Shower Type: Tub/Shower combination  Critical Behavior:  Neurologic State: Alert  Orientation Level: Oriented X4  Cognition: Follows commands  Safety/Judgement: Fall prevention  Psychosocial  Patient Behaviors: Calm; Cooperative                 Strength:    WFL     Tone & Sensation:   Tone: Normal    Sensation: Impaired    Range Of Motion:  AROM: Within functional limits    PROM: Within functional limits    Functional Mobility:  Bed Mobility:     Supine to Sit: Modified independent  Sit to Supine: Modified independent  Scooting: Modified independent  Transfers:  Sit to Stand: Modified independent  Stand to Sit: Modified independent    Balance:   Sitting: Intact  Standing: Intact; With support    Ambulation/Gait Training:    Gait Description (WDL): Within defined limits(x30 ft within room with SPC and mod ind)      Pain:  Pain level pre-treatment: not rated, R great pain/10   Pain level post-treatment: \"   \" /10  Pain Intervention(s): Medication (see MAR); Rest, Repositioning   Response to intervention: Nurse notified    Activity Tolerance:   Good    Please refer to the flowsheet for vital signs taken during this treatment.   After treatment:   [x]         Patient left in no apparent distress sitting up in chair  []         Patient left in no apparent distress in bed  [x]         Call bell left within reach  [x]         Nursing notified  []         Caregiver present  []         Bed alarm activated  [] SCDs applied    COMMUNICATION/EDUCATION:   [x]         Role of Physical Therapy in the acute care setting. [x]         Fall prevention education was provided and the patient/caregiver indicated understanding. [x]         Patient/family have participated as able in goal setting and plan of care. [x]         Patient/family agree to work toward stated goals and plan of care. []         Patient understands intent and goals of therapy, but is neutral about his/her participation. []         Patient is unable to participate in goal setting/plan of care: ongoing with therapy staff.  []         Other:     Thank you for this referral.  Myranda Phillipss   Time Calculation: 23 mins      Eval Complexity: History: MEDIUM  Complexity : 1-2 comorbidities / personal factors will impact the outcome/ POC Exam:MEDIUM Complexity : 3 Standardized tests and measures addressing body structure, function, activity limitation and / or participation in recreation  Presentation: LOW Complexity : Stable, uncomplicated  Clinical Decision Making:Low Complexity    Overall Complexity:LOW

## 2020-12-15 NOTE — PROGRESS NOTES
OT order received and chart reviewed. Patient currently has an active bedrest order. Please discontinue bedrest order for full participation in skilled OT evaluation/treatment.           Thank you for this referral,    Doug Herron MS, OTR/L

## 2020-12-15 NOTE — PROGRESS NOTES
PT order received and chart reviewed. Patient currently has an active bedrest order. Please discontinue bedrest order for full participation in skilled PT evaluation/treatment. Will follow up as patient schedule allows.      Thank you for the referral.    Jeana Rx, PT, DPT

## 2020-12-15 NOTE — PROGRESS NOTES
Problem: Self Care Deficits Care Plan (Adult)  Goal: *Acute Goals and Plan of Care (Insert Text)  Outcome: Resolved/Met     OCCUPATIONAL THERAPY EVALUATION/DISCHARGE    Patient: Jb Hanley (82 y.o. female)  Date: 12/15/2020  Primary Diagnosis: Chronic toe ulcer, right, with fat layer exposed ([facility] [address] [L97.512]  Procedure(s) (LRB):  AMPUTATION RIGHT GREAT TOE (N/A) 1 Day Post-Op (Cancelled)  Precautions: (standard)  PLOF: Patient was independent with self-care and functional mobility PTA. ASSESSMENT AND RECOMMENDATIONS:  Upon entering the room, patient was supine in bed, alert, and agreeable to participate in OT evaluation. Patient educated on the role of OT, evaluation process, and safety during this admission with patient verbalizing understanding. Patient was seen with PT to maximize patient safety, participation, and functional mobility in preparation for self-care tasks. Patient expressing disappointment regarding cancelled surgery but reports no self-care concerns for home. Patient able to dress self for d/c home. Patient is independent - modified independent with basic self-care, modified independent with bed mobility and modified independent with functional transfers using straight cane. Based on the objective data described below, the patient presents with no deficits that impede pt function with ADLs, functional transfers, and functional mobility. OT to d/c from caseload at this time. Skilled occupational therapy is not indicated at this time.   Discharge Recommendations: Home   Further Equipment Recommendations for Discharge: Patient has all DME     SUBJECTIVE:   Patient stated I have everything that you can think of from the raised toilet to the tub extender    OBJECTIVE DATA SUMMARY:     Past Medical History:   Diagnosis Date    Asthma     Chronic venous insufficiency     Diabetes ([facility] [address]     Hypertension     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 HX KNEE REPLACEMENT Bilateral      Barriers to Learning/Limitations: None  Compensate with: visual, verbal, tactile, kinesthetic cues/model    Home Situation:   Home Situation  Home Environment: Apartment  # Steps to Enter: 0  One/Two Story Residence: One story  Living Alone: No  Support Systems: Friends \ neighbors  Patient Expects to be Discharged to[de-identified] Apartment  Current DME Used/Available at Home: Grab bars, Walker, rolling, Tub transfer bench, Safety frame toliet, Cane, straight  Tub or Shower Type: Tub/Shower combination  [x]     Right hand dominant   []     Left hand dominant    Cognitive/Behavioral Status:  Neurologic State: Alert  Orientation Level: Oriented X4  Cognition: Follows commands  Safety/Judgement: Fall prevention    Skin: Intact  Edema: None noted    Vision/Perceptual:    Acuity: Within Defined Limits      Coordination: BUE  Fine Motor Skills-Upper: Left Intact; Right Intact    Gross Motor Skills-Upper: Left Intact; Right Intact    Balance:  Sitting: Intact  Standing: Intact; With support    Strength: BUE  Strength:  Within Functional Limits    Tone & Sensation: BUE  Tone: Normal  Sensation: Intact    Range of Motion: BUE  AROM: Generally decreased, functional(R shoulder 2/2 PMHx shots in arm)  PROM: Within functional limits    Functional Mobility and Transfers for ADLs:  Bed Mobility:  Supine to Sit: Modified independent  Sit to Supine: Modified independent  Scooting: Modified independent    Transfers:  Sit to Stand: Modified independent  Stand to Sit: Modified independent   Toilet Transfer : Modified independent    ADL Assessment:  Feeding: Independent    Oral Facial Hygiene/Grooming: Independent    Bathing: Modified independent    Upper Body Dressing: Independent    Lower Body Dressing: Modified independent    Toileting: Modified independent    ADL Intervention:  Upper Body Dressing Assistance  Dressing Assistance: Independent  Bra: Independent  Pullover Shirt: Independent    Lower Body Dressing Assistance  Dressing Assistance: Modified independent  Underpants: Modified independent  Pants With Elastic Waist: Modified independent  Socks: Modified independent  Slip on Shoes with Back: Modified independent  Position Performed: Seated in chair;Standing    Cognitive Retraining  Safety/Judgement: Fall prevention    Pain:  Pain level pre-treatment: 0/10, at rest   Pain level post-treatment: -/10  Pain Intervention(s): Medication (see MAR); Response to intervention: Nurse notified, See doc flow    Activity Tolerance:   Good    Please refer to the flowsheet for vital signs taken during this treatment. After treatment:   [x]  Patient left in no apparent distress sitting up in chair  []  Patient left in no apparent distress in bed  [x]  Call bell left within reach  [x]  Nursing notified  []  Caregiver present  []  Bed alarm activated    COMMUNICATION/EDUCATION:   [x]      Role of Occupational Therapy in the acute care setting  [x]      Home safety education was provided and the patient/caregiver indicated understanding. [x]      Patient/family have participated as able and agree with findings and recommendations. []      Patient is unable to participate in plan of care at this time. Thank you for this referral.  Erin Joe OTR/L  Time Calculation: 26 mins      Eval Complexity: History: MEDIUM Complexity : Expanded review of history including physical, cognitive and psychosocial  history ; Examination: LOW Complexity : 1-3 performance deficits relating to physical, cognitive , or psychosocial skils that result in activity limitations and / or participation restrictions ;    Decision Making:LOW Complexity : No comorbidities that affect functional and no verbal or physical assistance needed to complete eval tasks

## 2020-12-15 NOTE — DISCHARGE SUMMARY
[address] Medicine  Discharge Summary    Patient: Andrews Wright MRN: [id number]  CSN: [id number]    YOB: 1935  Age: 82 y.o. Sex: female      Admission Date: 12/14/2020 Discharge Date: 12/15/20   Attending: No att. providers found PCP: Sundeep Dunham DO     ===================================================================    Reason for Admission: [facility] toe ulcer, right, with [facility] [L97.512]    Discharge Diagnoses:   T2DM  Diabetic ulcer, R great toe  Daibetic neuropathy  HTN/HLD  Hypothyroidism  Gout    Important notes to PCP/ follow-up studies and evaluations   Ms. Tammie Costello seems unclear about her DM medications. HgbA1C 10.0. Please address any confusion she may have and review DM meds. Mag required repletion. Please recheck. She was admitted for a R great toe partial amputation, but the postponement of the procedure meant that it would be rescheduled for Monday 12/21. Pt refused procedure and planned to f/u with Dr. Boston Mitchell in his clinic on 12/23. She was discharged with Augmentin 875 BID x 2 weeks. Tramadol was offered for pain, but she declined. We discussed ER precautions for s/s of infected foot ulcer. Pending labs and studies:  none    Operative Procedures:   none    Discharge Medications:     Discharge Medication List as of 12/15/2020  3:34 PM      START taking these medications    Details   amoxicillin-clavulanate (Augmentin) 875-125 mg per tablet Take 1 Tab by mouth every twelve (12) hours for 14 days. Indications: diabetic foot infection, Normal, Disp-28 Tab,R-0      traMADoL (Ultram) 50 mg tablet Take 1 Tab by mouth every six (6) hours as needed for Pain for up to 12 days. Max Daily Amount: 200 mg. Indications: pain, Print, Disp-12 Tab,R-0         CONTINUE these medications which have NOT CHANGED    Details   metFORMIN (GLUCOPHAGE) 500 mg tablet Take  by mouth daily (with breakfast).  Indications: type 2 diabetes mellitus, Historical Med      CYANOCOBALAMIN, VITAMIN B-12, PO Take  by mouth., Historical Med      vit A/vit C/vit E/zinc/copper (PRESERVISION AREDS PO) Take  by mouth., Historical Med      beta-carotene,A,-vits C,E/mins (OCUVITE PO) Take 1 Tab by mouth daily. , Historical Med      lisinopril (PRINIVIL, ZESTRIL) 2.5 mg tablet Take 20 mg by mouth daily. , Historical Med      insulin glargine (LANTUS U-100 INSULIN) 100 unit/mL injection 40 Units by SubCUTAneous route nightly., Historical Med      ascorbic acid, vitamin C, (VITAMIN C) 500 mg tablet Take 500 mg by mouth daily. , Historical Med      bumetanide (BUMEX) 2 mg tablet Take 1 mg by mouth two (2) times daily as needed., Historical Med      gabapentin (NEURONTIN) 300 mg capsule Take 1 Cap by mouth three (3) times daily. , Print, Disp-270 Cap, R-3      Blood-Glucose Meter (FREESTYLE LITE METER) monitoring kit Test twice blood glucose daily; ICD-10 E11.9; Quantity 1, Normal, Disp-1 Kit, R-0      amLODIPine (NORVASC) 10 mg tablet Take 0.5 Tabs by mouth daily. , Normal, Disp-90 Tab, R-3      levothyroxine (SYNTHROID) 200 mcg tablet 1 tablet daily (take on an empty stomach) (stop \"old\" synthroid), Normal, Disp-90 Tab, R-3      carvedilol (COREG) 12.5 mg tablet Take 1 Tab by mouth two (2) times daily (with meals). , Normal, Disp-180 Tab, R-3      insulin syringe,safetyneedle 1 mL 31 gauge x 5/16\" syrg 1 Each by Does Not Apply route daily. , NormalDx e11.9Disp-300 Each, R-3      cholecalciferol (VITAMIN D3) 1,000 unit tablet Take 2,000 Units by mouth daily. , Historical Med      glucose blood VI test strips (FREESTYLE TEST) strip Use to check blood glucose twice daily DX:E11.9, Normal, Disp-300 Strip, R-3      allopurinol (ZYLOPRIM) 100 mg tablet Take 1 Tab by mouth daily. , Normal, Disp-90 Tab, R-3      magnesium oxide 500 mg tab Take 1 Tab by mouth daily. , Historical Med      acetaminophen (TYLENOL) 500 mg tablet Take 1 Tab by mouth every six (6) hours as needed for Pain., Print, Disp-270 Tab, R-3      BYDUREON BCISE 2 mg/0.85 mL atIn 2 mg by SubCUTAneous route every seven (7) days. , Historical Med, GRUPO      naloxone (NARCAN) 4 mg/actuation nasal spray Use 1 spray intranasally, then discard. Repeat with new spray every 2 min as needed for opioid overdose symptoms, alternating nostrils. , Print, Disp-1 Each, R-0         STOP taking these medications       sertraline (ZOLOFT) 50 mg tablet Comments:   Reason for Stopping:               Disposition: Home    Consultants:    podiatry    [address] Course (including pertinent history and physical findings)  T2DM c/b diabetic neuropathy  Initial  @ 0830. BG quickly stabilized with SSI;  @ 1630. Continued home lantus 40u QHS and SSI. Home gabapentin 300mg BID and 600mg QHS continued.     R great toe ulcer  She was admitted for a R great toe partial amputation, but the postponement of the procedure meant that it would be rescheduled for Monday 12/21. Pt refused procedure and planned to f/u with Dr. Amandeep Sanon in his clinic on 12/23. She was discharged with Augmentin 875 BID x 2 weeks. Tramadol was offered for pain, but she declined. We discussed ER precautions for s/s of infected foot ulcer.       HTN/HLD  Continued home amlodipine 10mg every day, carvedilol 12.5mg BID,  bumetanide 1mg BID, lisinopril 20mg every day. Continued home atorvastatin 10mg every day. No issues encountered.     Hypothyroidism  Continued home levothyroxine 150mcg every day. No issues encountered.     Gout  Continued home allopurinol 100mg every day. No issues encountered. CURRENT ADMISSION IMAGING RESULTS   No results found. Cardiology Procedures/Testing:  MODALITY RESULTS   EKG No results found for this or any previous visit. ECHO 08/21/20   ECHO ADULT COMPLETE 08/21/2020 8/21/2020    Narrative · LV: Estimated LVEF is 60 - 65%. Normal cavity size and systolic function   (ejection fraction normal). Moderate concentric hypertrophy.  Wall motion: normal. Moderate (grade 2) left ventricular diastolic dysfunction. · LA: Mildly dilated left atrium. Left Atrium volume index is 37.31 mL/m2. · RV: Mildly dilated right ventricle. · RA: Mildly dilated right atrium. · AV: Mild aortic valve regurgitation is present. · MV: Mitral valve thickening. Mild mitral valve regurgitation is present. · PA: Pulmonary arterial systolic pressure is 36 mmHg. Signed by: Ananda Montes MD      Nuclear Medicine No results found for this or any previous visit. IR No results found for this or any previous visit. CATH       Special Testing/Procedures:  MODALITY RESULTS   MICRO All Micro Results     None         ABG No results found for: PH, PHI, PCO2, PCO2I, PO2, PO2I, HCO3, HCO3I, FIO2, FIO2I   UA No results found for this or any previous visit. ENDO [unfilled]   [unfilled]    PATH none     Laboratory Results:  LABORATORY RESULTS   HEMATOLOGY Lab Results   Component Value Date/Time    WBC 10.8 12/15/2020 04:26 AM    HGB 12.2 12/15/2020 04:26 AM    HCT 36.5 12/15/2020 04:26 AM    PLATELET 059 78/81/1766 04:26 AM    MCV 88.8 12/15/2020 04:26 AM       CHEMISTRIES Lab Results   Component Value Date/Time    Sodium 143 12/15/2020 04:26 AM    Potassium 3.9 12/15/2020 04:26 AM    Chloride 108 12/15/2020 04:26 AM    CO2 30 12/15/2020 04:26 AM    Anion gap 5 12/15/2020 04:26 AM    Glucose 132 (H) 12/15/2020 04:26 AM    BUN 23 (H) 12/15/2020 04:26 AM    Creatinine 0.85 12/15/2020 04:26 AM    BUN/Creatinine ratio 27 (H) 12/15/2020 04:26 AM    GFR est AA >60 12/15/2020 04:26 AM    GFR est non-AA >60 12/15/2020 04:26 AM    Calcium 9.2 12/15/2020 04:26 AM      HEPATIC FUNCTION Lab Results   Component Value Date/Time    Albumin 3.6 04/18/2018 03:48 PM    Bilirubin, total 0.3 04/18/2018 03:48 PM    Protein, total 8.7 (H) 04/18/2018 03:48 PM    Globulin 5.1 (H) 04/18/2018 03:48 PM    A-G Ratio 0.7 (L) 04/18/2018 03:48 PM    ALT (SGPT) 15 04/18/2018 03:48 PM    Alk.  phosphatase 76 04/18/2018 03:48 PM       LACTIC ACID No results found for: [facility]   CARDIAC PANEL Lab Results   Component Value Date/Time    CK 89 04/18/2018 03:48 PM    CK - MB 2.2 04/18/2018 03:48 PM    CK-MB Index 2.5 04/18/2018 03:48 PM    Troponin-I, QT <0.02 04/18/2018 03:48 PM      NT-proBNP Lab Results   Component Value Date/Time    NT pro- 04/18/2018 03:48 PM      THYROID Lab Results   Component Value Date/Time    TSH 0.30 (L) 01/12/2018 10:03 AM      LIPID PANEL No results found for: CHOL, CHOLPOCT, CHOLX, CHLST, CHOLV, HDL, HDLPOC, HDLP, LDL, LDLCPOC, LDLC, DLDLP, VLDLC, VLDL, TGLX, TRIGL, TRIGP, TGLPOCT, CHHD, CHHDX      RISK CALCULATORS:  SCORE RESULT   ASCVD The ASCVD Risk score (Araceli Dunn, et al., 2013) failed to calculate for the following reasons:    ASCVD risk score not calculated    [id number]     HAS-BLED    READMISSION RISK SCORE Low Risk            5       Total Score        5 Pt. Coverage (Medicare=5 , Medicaid, or Self-Pay=4)        Criteria that do not apply:    Has Seen PCP in Last 6 Months (Yes=3, No=0)    . Living with Significant Other. Assisted Living. LTAC. SNF. or   Rehab    Patient Length of Stay (>5 days = 3)    IP Visits Last 12 Months (1-3=4, 4=9, >4=11)    Charlson Comorbidity Score (Age + Comorbid Conditions)           Functional status and cognitive function:    Ambulates with: Cane  Status: alert, cooperative, no distress, appears stated age  Condition: STABLE  Disposition: home    Diet: Diabetic    Code status and advanced care plan: Full    Point of Contact Margerman Romouise  Relationship: son  [phone]     Patient Education:  Patient was educated on the following topics prior to discharge: DM diet guidelines. Diabetic foot care.     Follow-up:   Follow-up Information     Follow up With Specialties Details Why Contact Ivone Turner DO  On 1/6/2021 @ 9:00am [id number]      Petra Arevalo DPM Podiatry On 12/23/2020 @10:00am [address]  [phone]            =================================================================  Myranda Dove MD, PGY-1   [address] Medicine   Intern Pager: [phone]   December 16, 2020, 2:02 PM

## 2020-12-15 NOTE — DIABETES MGMT
Diabetes Patient/Family Education Record  Factors That  May Influence Patients Ability  to Learn or  Comply with Recommendations   []   Language barrier    []   Cultural needs   [x]   Motivation - with diet    []   Cognitive limitation    []   Physical   []   Education    []   Physiological factors   []   Hearing/vision/speaking impairment   []   Confucianism beliefs    []   Financial factors   []  Other:   []  No factors identified at this time.      Person Instructed:   [x]   Patient   []   Family   []  Other     Preference for Learning:   [x]   Verbal   [x]   Written   []  Demonstration     Level of Comprehension & Competence:    [x]  Good                                      [] Fair                                     []  Poor                             [x]  Needs Reinforcement - with diet and more frequent BG monitoring   [x]  Teachback completed    Education Component:   See DM note    [x]  Medication management - states compliance with DM medications   [x]  Nutritional management - - 3 meals/day - likes to bake - not following DM meal planning  Provided printed materials for DM meal planning and portions    [x]  Exercise   [x]  Signs, symptoms, and treatment of hyperglycemia and hypoglycemia   [x] Prevention, recognition and treatment of hyperglycemia and hypoglycemia   [x]  Importance of blood glucose monitoring - instructed in more frequent BG monitoring    []  Instruction on use of the blood glucose meter   [x]  Discuss the importance of HbA1C monitoring   10% for average  mg/dl   []  Sick day guidelines   []  Proper use and disposal of lancets, needles, syringes or insulin pens (if appropriate)   [x]  Potential long-term complications (retinopathy, kidney disease, neuropathy, foot care) - reviewed BG control for wound healing    [x] Information about whom to contact in case of emergency or for more information   To f/u with podiatry and PCP   []  Goal:  Patient/family will demonstrate understanding of Diabetes Self Management Skills by: (date) _______  Plan for post-discharge education or self-management support:    [] Outpatient class schedule provided            [] Patient Declined    [] Scheduled for outpatient classes (date) _______  Verify:  Does patient understand how diabetes medications work? ___yes_________________________  Does patient know what their most recent A1c is? _____yes 10%_____________________________  Does patient monitor glucose at home? _____yes______________________________________  Does patient have difficulty obtaining diabetes medications or testing supplies? __no_______________       Lincoln Perkins MPH RN CDE  Pager [phone]

## 2020-12-15 NOTE — PROGRESS NOTES
Problem: Hypertension  Goal: *Blood pressure within specified parameters  Outcome: Progressing Towards Goal  Goal: *Fluid volume balance  Outcome: Progressing Towards Goal  Goal: *Labs within defined limits  Outcome: Progressing Towards Goal     Problem: General Medical Care Plan  Goal: *Absence of infection signs and symptoms  Outcome: Progressing Towards Goal  Goal: *Optimal pain control at patient's stated goal  Outcome: Progressing Towards Goal  Goal: *Skin integrity maintained  Outcome: Progressing Towards Goal  Goal: *Fluid volume balance  Outcome: Progressing Towards Goal  Goal: *Optimize nutritional status  Outcome: Progressing Towards Goal  Goal: *Anxiety reduced or absent  Outcome: Progressing Towards Goal  Goal: *Progressive mobility and function (eg: ADL's)  Outcome: Progressing Towards Goal     Problem: Falls - Risk of  Goal: *Absence of Falls  Description: Document Obdulio Fall Risk and appropriate interventions in the flowsheet.   Outcome: Progressing Towards Goal  Note: Fall Risk Interventions:  Mobility Interventions: Bed/chair exit alarm, Patient to call before getting OOB, Utilize walker, cane, or other assistive device    Mentation Interventions: Bed/chair exit alarm, Door open when patient unattended, Toileting rounds, Update white board    Medication Interventions: Bed/chair exit alarm, Patient to call before getting OOB, Teach patient to arise slowly                   Problem: Patient Education: Go to Patient Education Activity  Goal: Patient/Family Education  Outcome: Progressing Towards Goal     Problem: Pain  Goal: *Control of Pain  Outcome: Progressing Towards Goal  Goal: *PALLIATIVE CARE:  Alleviation of Pain  Outcome: Progressing Towards Goal     Problem: Patient Education: Go to Patient Education Activity  Goal: Patient/Family Education  Outcome: Progressing Towards Goal     Problem: Depressed Mood (Adult/Pediatric)  Goal: *STG: Participates in treatment plan  Outcome: Progressing Towards Goal  Goal: *STG: Participates in 1:1 therapy sessions  Outcome: Progressing Towards Goal  Goal: *STG: Verbalizes anger, guilt, and other feelings in a constructive manor  Outcome: Progressing Towards Goal  Goal: *STG: Attends activities and groups  Outcome: Progressing Towards Goal  Goal: *STG: Demonstrates reduction in symptoms and increase in insight into coping skills/future focused  Outcome: Progressing Towards Goal  Goal: *STG: Remains safe in hospital  Outcome: Progressing Towards Goal  Goal: *STG: Complies with medication therapy  Outcome: Progressing Towards Goal  Goal: *LTG: Returns to previous level of functioning and participates with after care plan  Outcome: Progressing Towards Goal  Goal: *LTG: Understands illness and can identify signs of relapse  Outcome: Progressing Towards Goal  Goal: Interventions  Outcome: Progressing Towards Goal     Problem: Patient Education: Go to Patient Education Activity  Goal: Patient/Family Education  Outcome: Progressing Towards Goal

## 2020-12-15 NOTE — PROGRESS NOTES
[facility]  Progress Note    Patient: Janet Franco MRN: [id number]   SSN: [id number]  YOB: 1935   Age: 82 y.o. Sex: female      Admit Date: 12/14/2020    LOS: 1 day   No chief complaint on file. Subjective:     \"I feel great\". Toe pain improved. Asking about procedure today. Denies F/C, NVD, HA, lightheadedness, dizziness, SOB, cough, CP, palpitations, ABD pain, bowel complaints, urinary complaints, edema, numbness, or tingling. ROS    Objective:     Visit Vitals  BP (!) 137/58 (BP 1 Location: Right arm, BP Patient Position: At rest)   Pulse 74   Temp 98 °F (36.7 °C)   Resp 21   Ht 5' 5\" (1.651 m)   Wt 108.4 kg (239 lb)   SpO2 97%   Breastfeeding No   BMI 39.77 kg/m²       Physical Exam:  General:  AAOx3, NAD   HEENT: Conjunctiva pink, sclera anicteric. Moist mucous membranes. CV:  RRR, no murmurs. No visible pulsations or thrills. RESP:  Unlabored breathing. Lungs clear to auscultation without adventitious breath sounds. Equal expansion bilaterally. ABD:  Soft, nontender, nondistended. BS (+). No hepatosplenomegaly. No suprapubic tenderness. MS:  No joint deformity or instability. No atrophy. Neuro:   5/5 strength bilateral upper extremities and lower extremities. Decreased sensation in bilateral hands and feet. Ext:  No edema. 2+ radial and dp pulses bilaterally. Skin:  2cm circular ulcer on plantar surface of R great toe down to subcutaneous fat, no surrounding erythema, edema, odor, or drainage    Intake and Output:  Current Shift: No intake/output data recorded.   Last three shifts: 12/13 1901 - 12/15 0700  In: 1437.5 [I.V.:1437.5]  Out: -     Lab/Data Review:  Recent Results (from the past 12 hour(s))   GLUCOSE, POC    Collection Time: 12/14/20 10:17 PM   Result Value Ref Range    Glucose (POC) 303 (H) 70 - 110 mg/dL   HEMOGLOBIN A1C WITH EAG    Collection Time: 12/15/20  4:26 AM   Result Value Ref Range    Hemoglobin A1c 10.0 (H) 4.2 - 5.6 % Est. average glucose 240 mg/dL   MAGNESIUM    Collection Time: 12/15/20  4:26 AM   Result Value Ref Range    Magnesium 1.5 (L) 1.6 - 2.6 mg/dL   CBC WITH AUTOMATED DIFF    Collection Time: 12/15/20  4:26 AM   Result Value Ref Range    WBC 10.8 4.6 - 13.2 K/uL    RBC 4.11 (L) 4.20 - 5.30 M/uL    HGB 12.2 12.0 - 16.0 g/dL    HCT 36.5 35.0 - 45.0 %    MCV 88.8 74.0 - 97.0 FL    MCH 29.7 24.0 - 34.0 PG    MCHC 33.4 31.0 - 37.0 g/dL    RDW 12.9 11.6 - 14.5 %    PLATELET 621 163 - 180 K/uL    MPV 12.4 (H) 9.2 - 11.8 FL    NEUTROPHILS 59 40 - 73 %    LYMPHOCYTES 30 21 - 52 %    MONOCYTES 8 3 - 10 %    EOSINOPHILS 2 0 - 5 %    BASOPHILS 1 0 - 2 %    ABS. NEUTROPHILS 6.4 1.8 - 8.0 K/UL    ABS. LYMPHOCYTES 3.3 0.9 - 3.6 K/UL    ABS. MONOCYTES 0.9 0.05 - 1.2 K/UL    ABS. EOSINOPHILS 0.3 0.0 - 0.4 K/UL    ABS.  BASOPHILS 0.1 0.0 - 0.1 K/UL    DF AUTOMATED     METABOLIC PANEL, BASIC    Collection Time: 12/15/20  4:26 AM   Result Value Ref Range    Sodium 143 136 - 145 mmol/L    Potassium 3.9 3.5 - 5.5 mmol/L    Chloride 108 100 - 111 mmol/L    CO2 30 21 - 32 mmol/L    Anion gap 5 3.0 - 18 mmol/L    Glucose 132 (H) 74 - 99 mg/dL    BUN 23 (H) 7.0 - 18 MG/DL    Creatinine 0.85 0.6 - 1.3 MG/DL    BUN/Creatinine ratio 27 (H) 12 - 20      GFR est AA >60 >60 ml/min/1.73m2    GFR est non-AA >60 >60 ml/min/1.73m2    Calcium 9.2 8.5 - 10.1 MG/DL   VITAMIN B12 & FOLATE    Collection Time: 12/15/20  4:26 AM   Result Value Ref Range    Vitamin B12 1,399 (H) 211 - 911 pg/mL    Folate >20.0 (H) 3.10 - 17.50 ng/mL   GLUCOSE, POC    Collection Time: 12/15/20  7:49 AM   Result Value Ref Range    Glucose (POC) 164 (H) 70 - 110 mg/dL       RECENT RESULTS  MODALITY IMPRESSION   XR Results from [place] encounter on 12/07/20   XR CHEST PA LAT    Narrative PA and lateral chest:    2 views submitted    COMPARISON: 4/18/2018    FINDINGS:    Lung fields: Clear    Cardiac silhouette and mediastinum: Negative    Bone and soft tissues: Mild scoliotic curvature thoracolumbar junction convexity  to the left stable    Tubes and lines, none inserted    Additional findings:    Degenerative changes of the right shoulder suggesting impingement syndrome      Impression IMPRESSION:    Heart size normal, lungs are clear      CT Results from Hospital Encounter encounter on 04/18/18   CT HEAD WO CONT    Narrative CT HEAD WITHOUT IV CONTRAST    INDICATION: Confusion/delirium; headache. Accidental overdose. Hypoglycemia. .    COMPARISON: None. TECHNIQUE: Serial axial CT images were obtained from the skull vertex to foramen  magnum without IV contrast. All CT scans at this facility are performed using  dose optimization technique as appropriate to a performed exam, to include  automated exposure control, adjustment of the mA and/or kV according to  patient's size (including appropriate matching for site-specific examinations),  or use of iterative reconstruction technique. FINDINGS:  Prior right pterional craniectomy/craniotomy. Underlying encephalomalacia at the  right temporal lobe. Lesser degree of mild encephalomalacia at the right frontal  lobe. Prominent streak artifact limits evaluation of the wendi. Gray-white matter  differentiation is otherwise preserved. Mild periventricular/cerebral white  matter hypoattenuation. Mild cerebral atrophy. .No evidence for acute  intracranial hemorrhage, acute infarct, or mass lesion. No evidence for  hydrocephalus. . The calvarium appears intact. Visualized paranasal sinuses are  free from significant mucosal disease. Impression IMPRESSION:  No convincing CT evidence for acute intracranial process. Delwyn Scarce Postsurgical changes at the right calvarium with underlying encephalomalacia in  the right temporal lobe and right frontal lobe. Mild white matter disease, presumed chronic microvascular ischemic changes.       MRI Results from [place] encounter on 11/19/20   MRI FOOT RT WO CONT    Narrative Multisequence multiplanar MR images of the right foot were obtained. HISTORY: Osteomyelitis. Peripheral neuropathy. Venous insufficiency. Diabetes. Very limited study due to severe motion artifacts throughout. No acute fracture. Soft tissue inflammation, with ulcer at the medial and plantar aspect of the  first toe. Questionable minimal edema with mild cortical fenestration at the  distal phalanx of the first toe. Interphalangeal joint is intact. No joint  effusion. Proximal phalanx is intact. First MTP joint shows hypertrophy,  corticated erosion. No joint effusion. Remaining toes show no fracture or dislocation. Erosion at the tarso-metatarsal articulation of the third toe. Fairly corticated  erosion. Mild erosion in the medial cuneiform and navicula. Hyperflexion at the MTP joints throughout. Mild subcutaneous edema along the  midfoot dorsum. Impression IMPRESSION:  1. Cellulitis or ulcer at the first toe, with potential early or mild  osteomyelitis of the distal phalanx. Interphalangeal joint remains intact with  no erosion or joint effusion. 2. Erosion at the first MTP joint, third tarsometatarsal joint and in the other  smaller bones can be compatible with gout or treated rheumatoid arthritis. No  fracture. ULTRASOUND No results found for this or any previous visit. Cardiology Procedures/Testing:  MODALITY RESULTS   EKG No results found for this or any previous visit. ECHO 08/21/20   ECHO ADULT COMPLETE 08/21/2020 8/21/2020    Narrative · LV: Estimated LVEF is 60 - 65%. Normal cavity size and systolic function   (ejection fraction normal). Moderate concentric hypertrophy. Wall motion:   normal. Moderate (grade 2) left ventricular diastolic dysfunction. · LA: Mildly dilated left atrium. Left Atrium volume index is 37.31 mL/m2. · RV: Mildly dilated right ventricle. · RA: Mildly dilated right atrium. · AV: Mild aortic valve regurgitation is present. · MV: Mitral valve thickening. Mild mitral valve regurgitation is present. · PA: Pulmonary arterial systolic pressure is 36 mmHg. Signed by: Rosa Maria Aviles MD        Special Testing/Procedures:  MODALITY RESULTS   MICRO All Micro Results     None         UA No results found for this or any previous visit. PATH none     Telemetry yes   Oxygen NONE     Assessment and Plan:     82 y.o. female with PMH T2DM c/b peripheral neuropathy, HTN, HLD, gout, hypothyroidism, admitted for a planned R great toe partial amputation delayed by severe hyperglycemia. BG 400s. BG under control. Per podiatry, no OR availability until Monday, ok for discharge with ABX, pain control, and planned procedure for Monday, 12/21. Will likely discharge today.     T2DM/ diabetic neuropathy:  HgbA1C increased to 10.0  - continue home lantus 40u QHS  - SSI  - accuchecks ACHS  - hypoglycemia protocol  - home gabapentin 300mg BID and 600mg QHS  - b12 and thiamine levels pending     R great toe ulcer: Per podiatry, no OR availability until Monday, ok for discharge with ABX (bactrim vs augmentin), pain control (tramadol if pt requests), and planned procedure for Monday, 12/21.   Requested pt arrive at 0930.  - Bactrim vs Augmentin  - Tramadol if pt requests     HTN/HLD  - home amlodipine 10mg every day, carvedilol 12.5mg BID,  bumetanide 1mg BID, lisinopril 20mg every day   - home atorvastatin 10mg QD     Hypothyroidism  - home levothyroxine 150mcg QD     Gout  - home allopurinol 100mg QD     Diet diabetic   DVT Prophylaxis SQH   GI Prophylaxis none   Code status Full   Disposition >2MN      Point of Contact EMILIE  Relationship: son  [phone]       Elliot Galarza MD, PGY1   [address]   Intern Pager: [phone]   December 15, 2020, 8:28 AM

## 2020-12-15 NOTE — DIABETES MGMT
Glycemic Control Plan of Care    Follow up - reviewed with patient her A1c of 10% and target of 7%  She understands relationship b/w hyperglycemia and wound healing. Provided education and printed material for DM meal planning and portions. Reviewed target BG and provided printed material for BG monitoring  Instructed to continue FBG daily and add post-prandial BG monitoring daily and utilize these results to adjust meals/portions  TDD previous day = 63 units  30 units lantus  33 units lispro   mg/dl.         Your A1C  was for average blood glucose of 240 mg/dl for previous 2-3 months  Lab Results   Component Value Date/Time    Hemoglobin A1c 10.0 (H) 12/15/2020 04:26 AM     Current hospital diabetes medications:  Lantus 30 units nightly -   Corrective lispro, very insulin resistant, 4 times daily   Home diabetes medications:  Lantus 40 units nightly -   Metformin 500 mg daily       Vadim Castle MPH RN CDE  Pager [phone]

## 2020-12-15 NOTE — ROUTINE PROCESS
Bedside and Verbal shift change report given to Elise Bain (oncoming nurse) by Katty Odonnell (offgoing nurse). Report included the following information SBAR, Kardex, MAR and Recent Results.     SITUATION:  Code Status: Full Code  Reason for Admission: Chronic toe ulcer, right, with fat layer exposed ([facility] [address] day: 1  Problem List:       Hospital Problems  Date Reviewed: 7/10/2020          Codes Class Noted POA    * (Principal) Chronic toe ulcer, right, with fat layer exposed [facility]) ICD-10-CM: O46.543  ICD-9-CM: 707.15  12/14/2020 Unknown              BACKGROUND:   Past Medical History:   Past Medical History:   Diagnosis Date    Asthma     Chronic venous insufficiency     Diabetes ([facility] [address]     Hypertension     MI (myocardial infarction) ([facility] [address]     Peripheral neuropathy     Rheumatoid arthritis ([facility] [address]     Vertigo       Patient taking anticoagulants yes    Patient has a defibrillator: no    History of shots YES for example, flu, pneumonia, tetanus   Isolation History NO for example, MRSA, CDiff    ASSESSMENT:  Changes in Assessment Throughout Shift: None  Significant Changes in 24 hours (for example, RR/code, fall)  Patient has Central Line: yes   Patient has Ramirez Cath: no   Mobility Issues  PT  IV Patency  OR Checklist  Pending Tests    Last Vitals:  Vitals w/ MEWS Score (last day)     Date/Time MEWS Score Pulse Resp Temp BP Level of Consciousness SpO2    12/15/20 0412  1  75  19  97.5 °F (36.4 °C)  (!) 156/76  Alert  96 %    12/14/20 2346  1  78  19  98 °F (36.7 °C)  (!) 157/74  Alert  95 %    12/14/20 1943  1  84  19  96.9 °F (36.1 °C)  (!) 140/66  Alert  97 %    12/14/20 1542  1  72  19  99.2 °F (37.3 °C)  135/67  Alert  98 %    12/14/20 1228  1  86  17  99.7 °F (37.6 °C)  (!) 162/65  Alert  98 %    12/14/20 0835  1  78  20  97.8 °F (36.6 °C)  (!) 149/61  Alert  97 %            PAIN    Pain Assessment    Pain Intensity 1: 0 (12/15/20 0413)    Pain Location 1: Foot    Pain Intervention(s) 1: Medication (see MAR)    Patient Stated Pain Goal: 0  Intervention effective: N/A  Time of last intervention: N/A Reassessment Completed: yes   Other actions taken for pain: Distraction    Last 3 Weights:  Last 3 Recorded Weights in this Encounter    12/10/20 1440 12/14/20 0835   Weight: 105.7 kg (233 lb) 108.4 kg (239 lb)   Weight change:     INTAKE/OUPUT    Current Shift: No intake/output data recorded. Last three shifts: 12/13 1901 - 12/15 0700  In: 1437.5 [I.V.:1437.5]  Out: -     RECOMMENDATIONS AND DISCHARGE PLANNING  Patient needs and requests: Educational    Pending tests/procedures: Labs     Discharge plan for patient: Home    Discharge planning Needs or Barriers: None    Estimated Discharge Date: 12/18 Posted on Whiteboard in Patients Room: yes       \"HEALS\" SAFETY CHECK  A safety check occurred in the patient's room between off going nurse and oncoming nurse listed above. The safety check included the below items:    H  High Alert Medications Verify all high alert medication drips (heparin, PCA, etc.)  E  Equipment Suction is set up for ALL patients (with jonathan)  Red plugs utilized for all equipment (IV pumps, etc.)  WOWs wiped down at end of shift. Room stocked with oxygen, suction, and other unit-specific supplies  A  Alarms Bed alarm is set for fall risk patients  Ensure chair alarm is in place and activated if patient is up in a chair  L  Lines Check IV for any infiltration  Ramirez bag is empty if patient has a Ramirez   Tubing and IV bags are labeled  S  Safety  Room is clean, patient is clean, and equipment is clean. Hallways are clear from equipment besides carts. Fall bracelet on for fall risk patients  Ensure room is clear and free of clutter  Suction is set up for ALL patients (with jonathan)  Hallways are clear from equipment besides carts.    Isolation precautions followed, supplies available outside room, sign posted    Lockie Idol

## 2020-12-15 NOTE — PROGRESS NOTES
Problem: Hypertension  Goal: *Blood pressure within specified parameters  Outcome: Progressing Towards Goal  Goal: *Fluid volume balance  Outcome: Progressing Towards Goal  Goal: *Labs within defined limits  Outcome: Progressing Towards Goal     Problem: General Medical Care Plan  Goal: *Absence of infection signs and symptoms  Outcome: Progressing Towards Goal  Goal: *Optimal pain control at patient's stated goal  Outcome: Progressing Towards Goal  Goal: *Skin integrity maintained  Outcome: Progressing Towards Goal  Goal: *Fluid volume balance  Outcome: Progressing Towards Goal  Goal: *Optimize nutritional status  Outcome: Progressing Towards Goal  Goal: *Anxiety reduced or absent  Outcome: Progressing Towards Goal  Goal: *Progressive mobility and function (eg: ADL's)  Outcome: Progressing Towards Goal     Problem: Falls - Risk of  Goal: *Absence of Falls  Description: Document Obdulio Fall Risk and appropriate interventions in the flowsheet.   Outcome: Progressing Towards Goal  Note: Fall Risk Interventions:  Mobility Interventions: Bed/chair exit alarm, Patient to call before getting OOB, Utilize walker, cane, or other assistive device    Mentation Interventions: Bed/chair exit alarm, Door open when patient unattended, Toileting rounds, Update white board    Medication Interventions: Bed/chair exit alarm, Patient to call before getting OOB, Teach patient to arise slowly                   Problem: Patient Education: Go to Patient Education Activity  Goal: Patient/Family Education  Outcome: Progressing Towards Goal     Problem: Pain  Goal: *Control of Pain  Outcome: Progressing Towards Goal  Goal: *PALLIATIVE CARE:  Alleviation of Pain  Outcome: Progressing Towards Goal     Problem: Patient Education: Go to Patient Education Activity  Goal: Patient/Family Education  Outcome: Progressing Towards Goal     Problem: Depressed Mood (Adult/Pediatric)  Goal: *STG: Participates in treatment plan  Outcome: Progressing Towards Goal  Goal: *STG: Participates in 1:1 therapy sessions  Outcome: Progressing Towards Goal  Goal: *STG: Verbalizes anger, guilt, and other feelings in a constructive manor  Outcome: Progressing Towards Goal  Goal: *STG: Attends activities and groups  Outcome: Progressing Towards Goal  Goal: *STG: Demonstrates reduction in symptoms and increase in insight into coping skills/future focused  Outcome: Progressing Towards Goal  Goal: *STG: Remains safe in hospital  Outcome: Progressing Towards Goal  Goal: *STG: Complies with medication therapy  Outcome: Progressing Towards Goal  Goal: *LTG: Returns to previous level of functioning and participates with after care plan  Outcome: Progressing Towards Goal  Goal: *LTG: Understands illness and can identify signs of relapse  Outcome: Progressing Towards Goal  Goal: Interventions  Outcome: Progressing Towards Goal     Problem: Patient Education: Go to Patient Education Activity  Goal: Patient/Family Education  Outcome: Progressing Towards Goal     Problem: Diabetes Self-Management  Goal: *Disease process and treatment process  Description: Define diabetes and identify own type of diabetes; list 3 options for treating diabetes. Outcome: Progressing Towards Goal  Goal: *Incorporating nutritional management into lifestyle  Description: Describe effect of type, amount and timing of food on blood glucose; list 3 methods for planning meals. Outcome: Progressing Towards Goal  Goal: *Incorporating physical activity into lifestyle  Description: State effect of exercise on blood glucose levels. Outcome: Progressing Towards Goal  Goal: *Developing strategies to promote health/change behavior  Description: Define the ABC's of diabetes; identify appropriate screenings, schedule and personal plan for screenings.   Outcome: Progressing Towards Goal  Goal: *Using medications safely  Description: State effect of diabetes medications on diabetes; name diabetes medication taking, action and side effects. Outcome: Progressing Towards Goal  Goal: *Monitoring blood glucose, interpreting and using results  Description: Identify recommended blood glucose targets  and personal targets. Outcome: Progressing Towards Goal  Goal: *Prevention, detection, treatment of acute complications  Description: List symptoms of hyper- and hypoglycemia; describe how to treat low blood sugar and actions for lowering  high blood glucose level. Outcome: Progressing Towards Goal  Goal: *Prevention, detection and treatment of chronic complications  Description: Define the natural course of diabetes and describe the relationship of blood glucose levels to long term complications of diabetes.   Outcome: Progressing Towards Goal  Goal: *Developing strategies to address psychosocial issues  Description: Describe feelings about living with diabetes; identify support needed and support network  Outcome: Progressing Towards Goal  Goal: *Insulin pump training  Outcome: Progressing Towards Goal  Goal: *Sick day guidelines  Outcome: Progressing Towards Goal  Goal: *Patient Specific Goal (EDIT GOAL, INSERT TEXT)  Outcome: Progressing Towards Goal     Problem: Patient Education: Go to Patient Education Activity  Goal: Patient/Family Education  Outcome: Progressing Towards Goal     Problem: Patient Education: Go to Patient Education Activity  Goal: Patient/Family Education  Outcome: Progressing Towards Goal     Problem: Patient Education: Go to Patient Education Activity  Goal: Patient/Family Education  Outcome: Progressing Towards Goal

## 2020-12-16 ENCOUNTER — PATIENT OUTREACH (OUTPATIENT)
Dept: CASE MANAGEMENT | Age: 85
End: 2020-12-16

## 2020-12-16 NOTE — PROGRESS NOTES
Care Transitions Nurse ( CTN) attempted to contact patient via telephone call regarding recent hospital discharge and Covid-19 risk education. There was no response. A voicemail message was left requesting a non-emergency return telephone call. CTN  contact information provided.

## 2020-12-17 NOTE — DISCHARGE INSTRUCTIONS
DISCHARGE SUMMARY from Nurse    PATIENT INSTRUCTIONS:    After general anesthesia or intravenous sedation, for 24 hours or while taking prescription Narcotics:  · Limit your activities  · Do not drive and operate hazardous machinery  · Do not make important personal or business decisions  · Do  not drink alcoholic beverages  · If you have not urinated within 8 hours after discharge, please contact your surgeon on call. Report the following to your surgeon:  · Excessive pain, swelling, redness or odor of or around the surgical area  · Temperature over 100.5  · Nausea and vomiting lasting longer than 4 hours or if unable to take medications  · Any signs of decreased circulation or nerve impairment to extremity: change in color, persistent  numbness, tingling, coldness or increase pain  · Any questions    What to do at Home:  Recommended activity: Activity as tolerated    If you experience any of the following symptoms  Nausea, vomiting, diarrhea, shortness of breath, dizziness, fainting, fever over 101, abnormal bleeding, or any other issues or concerns, please follow up with Primary Care Physician                                                                      . *  Please give a list of your current medications to your Primary Care Provider. *  Please update this list whenever your medications are discontinued, doses are      changed, or new medications (including over-the-counter products) are added. *  Please carry medication information at all times in case of emergency situations. These are general instructions for a healthy lifestyle:    No smoking/ No tobacco products/ Avoid exposure to second hand smoke  Surgeon General's Warning:  Quitting smoking now greatly reduces serious risk to your health.     Obesity, smoking, and sedentary lifestyle greatly increases your risk for illness    A healthy diet, regular physical exercise & weight monitoring are important for maintaining a healthy lifestyle    You may be retaining fluid if you have a history of heart failure or if you experience any of the following symptoms:  Weight gain of 3 pounds or more overnight or 5 pounds in a week, increased swelling in our hands or feet or shortness of breath while lying flat in bed. Please call your doctor as soon as you notice any of these symptoms; do not wait until your next office visit. The discharge information has been reviewed with the {PATIENT PARENT GUARDIAN:02444}. The {PATIENT PARENT GUARDIAN:09940} verbalized understanding. Discharge medications reviewed with the {Dishcarge meds reviewed XNXQ:45661} and appropriate educational materials and side effects teaching were provided. ___________________________________________________________________________________________________________________________________    Patient {BNYIKWKD:08023}    Patient Education        Diabetic Foot Ulcer: Care Instructions  Your Care Instructions  Diabetes can damage the nerve endings and blood vessels in your feet. That means you are less likely to notice when your feet are injured. A small skin problem like a callus, blister, or cracked skin can turn into a larger sore, called a foot ulcer. Foot ulcers form most often on the pad (ball) of the foot or the bottom of the big toe. You can also get them on the top and bottom of each toe. Foot ulcers can get infected. If the infection is severe, then tissue in the foot can die. This is called gangrene. In that case, one or more of the toes, part or all of the foot, and sometimes part of the leg may have to be removed (amputated). Your doctor may have removed the dead tissue and cleaned the ulcer. Your foot wound may be wrapped in a protective bandage. It is very important to keep your weight off your injured foot. After a foot ulcer has formed, it will not heal as long as you keep putting weight on the area. Always get early treatment for foot problems.  A minor irritation can lead to a major problem if it's not taken care of soon. Follow-up care is a key part of your treatment and safety. Be sure to make and go to all appointments, and call your doctor if you are having problems. It's also a good idea to know your test results and keep a list of the medicines you take. How can you care for yourself at home? · Follow your doctor's instructions about keeping pressure off the foot ulcer. You may need to use crutches or a wheelchair. Or you may wear a cast or a walking boot. · Follow your doctor's instructions on how to clean the ulcer and change the bandage. · If your doctor prescribed antibiotics, take them as directed. Do not stop taking them just because you feel better. You need to take the full course of antibiotics. To prevent foot ulcers  · Keep your blood sugar close to normal by watching what and how much you eat. Track your blood sugar, take medicines if prescribed, and get regular exercise. · Do not smoke. Smoking affects blood flow and can make foot problems worse. If you need help quitting, talk to your doctor about stop-smoking programs and medicines. These can increase your chances of quitting for good. · Do not go barefoot. Protect your feet by wearing shoes that fit well. Choose shoes that are made of materials that are flexible and breathable, such as leather or cloth. · Inspect your feet daily for blisters, cuts, cracks, or sores. If you can't see well, use a mirror or have someone help you. · Have your doctor check your feet during each visit. If you have a foot problem, see your doctor. Do not try to treat your foot problem on your own. Home remedies or treatments that you can buy without a prescription (such as corn removers) can be harmful. When should you call for help? Call your doctor now or seek immediate medical care if:    · You have symptoms of infection, such as:  ? Increased pain, swelling, warmth, or redness.   ? Red streaks leading from the area. ? Pus draining from the area. ? A fever. Watch closely for changes in your health, and be sure to contact your doctor if:    · You have a new problem with your feet, such as:  ? A new sore or ulcer. ? A break in the skin that is not healing after several days. ? Bleeding corns or calluses. ? An ingrown toenail.     · You do not get better as expected. Where can you learn more? Go to http://www.gray.com/  Enter T131 in the search box to learn more about \"Diabetic Foot Ulcer: Care Instructions. \"  Current as of: December 20, 2019               Content Version: 12.6  © 2654-1709 [employer]. Care instructions adapted under license by [employer] (which disclaims liability or warranty for this information). If you have questions about a medical condition or this instruction, always ask your healthcare professional. [place] [address] any warranty or liability for your use of this information. Patient Education      Amoxicillin/Clavulanate Potassium (Augmentin, Augmentin ES-600, Augmentin XR) - (By mouth)   Why this medicine is used:   Treats infections. This medicine is a penicillin antibiotic. Contact a nurse or doctor right away if you have:  · Blistering, peeling, red skin rash  · Dark urine or pale stools, nausea, vomiting, loss of appetite, stomach pain, yellow eyes or skin  · Severe diarrhea, especially if bloody or ongoing     Common side effects:  · Diarrhea, nausea, vomiting  · Diaper rash  · Tooth discoloration (in children)  © 2017 [address] Information is for End User's use only and may not be sold, redistributed or otherwise used for commercial purposes. Patient Education      Tramadol (Ultram, Ultram ER, [place], Theratramadol-60) - (By mouth)   Why this medicine is used:   Treats moderate to severe pain. This medicine is a narcotic pain reliever.   Contact a nurse or doctor right away if you have:  · Extreme weakness, shallow breathing  · Seizures  · Seeing or hearing things that are not there  · Anxiety, restlessness, muscle spasms, fever, sweating, diarrhea  · Slow or fast heartbeat     Common side effects:  · Nausea, vomiting, constipation  · Headache, drowsiness  · Itching, feeling of warmth, redness of the face, neck, arms, and upper chest  © 2017 [facility] Information is for End User's use only and may not be sold, redistributed or otherwise used for commercial purposes.

## 2020-12-22 LAB — VIT B1 BLD-SCNC: 194 NMOL/L (ref 66.5–200)

## 2020-12-23 ENCOUNTER — HOSPITAL ENCOUNTER (OUTPATIENT)
Dept: PREADMISSION TESTING | Age: 85
Discharge: HOME | End: 2020-12-23
Payer: MEDICARE

## 2020-12-23 ENCOUNTER — TRANSCRIBE ORDER (OUTPATIENT)
Dept: REGISTRATION | Age: 85
End: 2020-12-23

## 2020-12-23 DIAGNOSIS — Z01.818 PREOP TESTING: Primary | ICD-10-CM

## 2020-12-23 DIAGNOSIS — Z01.818 PREOP TESTING: ICD-10-CM

## 2020-12-23 PROCEDURE — 87635 SARS-COV-2 COVID-19 AMP PRB: CPT

## 2020-12-24 LAB — SARS-COV-2, COV2NT: NOT DETECTED

## 2020-12-26 ENCOUNTER — ANESTHESIA EVENT (OUTPATIENT)
Dept: SURGERY | Age: 85
End: 2020-12-26
Payer: MEDICARE

## 2020-12-28 ENCOUNTER — HOSPITAL ENCOUNTER (OUTPATIENT)
Age: 85
Setting detail: OUTPATIENT SURGERY
Discharge: HOME | End: 2020-12-28
Attending: PODIATRIST | Admitting: PODIATRIST
Payer: MEDICARE

## 2020-12-28 ENCOUNTER — ANESTHESIA (OUTPATIENT)
Dept: SURGERY | Age: 85
End: 2020-12-28
Payer: MEDICARE

## 2020-12-28 VITALS
TEMPERATURE: 96.9 F | OXYGEN SATURATION: 98 % | DIASTOLIC BLOOD PRESSURE: 63 MMHG | HEIGHT: 65 IN | BODY MASS INDEX: 40.15 KG/M2 | RESPIRATION RATE: 16 BRPM | SYSTOLIC BLOOD PRESSURE: 142 MMHG | WEIGHT: 241 LBS | HEART RATE: 65 BPM

## 2020-12-28 DIAGNOSIS — L97.512 CHRONIC TOE ULCER, RIGHT, WITH FAT LAYER EXPOSED (HCC): Primary | ICD-10-CM

## 2020-12-28 LAB
GLUCOSE BLD STRIP.AUTO-MCNC: 64 MG/DL (ref 70–110)
GLUCOSE BLD STRIP.AUTO-MCNC: 75 MG/DL (ref 70–110)

## 2020-12-28 PROCEDURE — 2709999900 HC NON-CHARGEABLE SUPPLY: Performed by: PODIATRIST

## 2020-12-28 PROCEDURE — 88305 TISSUE EXAM BY PATHOLOGIST: CPT

## 2020-12-28 PROCEDURE — 76010000138 HC OR TIME 0.5 TO 1 HR: Performed by: PODIATRIST

## 2020-12-28 PROCEDURE — 77030040922 HC BLNKT HYPOTHRM STRY -A: Performed by: PODIATRIST

## 2020-12-28 PROCEDURE — 87077 CULTURE AEROBIC IDENTIFY: CPT

## 2020-12-28 PROCEDURE — 87075 CULTR BACTERIA EXCEPT BLOOD: CPT

## 2020-12-28 PROCEDURE — 74011250636 HC RX REV CODE- 250/636: Performed by: NURSE ANESTHETIST, CERTIFIED REGISTERED

## 2020-12-28 PROCEDURE — 87186 SC STD MICRODIL/AGAR DIL: CPT

## 2020-12-28 PROCEDURE — 87205 SMEAR GRAM STAIN: CPT

## 2020-12-28 PROCEDURE — 87176 TISSUE HOMOGENIZATION CULTR: CPT

## 2020-12-28 PROCEDURE — 76210000006 HC OR PH I REC 0.5 TO 1 HR: Performed by: PODIATRIST

## 2020-12-28 PROCEDURE — 74011250636 HC RX REV CODE- 250/636: Performed by: PODIATRIST

## 2020-12-28 PROCEDURE — 76060000032 HC ANESTHESIA 0.5 TO 1 HR: Performed by: PODIATRIST

## 2020-12-28 PROCEDURE — 01480 ANES OPEN PX LOWER L/A/F NOS: CPT | Performed by: ANESTHESIOLOGY

## 2020-12-28 PROCEDURE — 77030040361 HC SLV COMPR DVT MDII -B: Performed by: PODIATRIST

## 2020-12-28 PROCEDURE — 88311 DECALCIFY TISSUE: CPT

## 2020-12-28 PROCEDURE — 76210000021 HC REC RM PH II 0.5 TO 1 HR: Performed by: PODIATRIST

## 2020-12-28 PROCEDURE — 74011000272 HC RX REV CODE- 272: Performed by: PODIATRIST

## 2020-12-28 PROCEDURE — 99100 ANES PT EXTEME AGE<1 YR&>70: CPT | Performed by: ANESTHESIOLOGY

## 2020-12-28 PROCEDURE — 82962 GLUCOSE BLOOD TEST: CPT

## 2020-12-28 PROCEDURE — 74011000250 HC RX REV CODE- 250: Performed by: PODIATRIST

## 2020-12-28 PROCEDURE — 74011250637 HC RX REV CODE- 250/637: Performed by: NURSE ANESTHETIST, CERTIFIED REGISTERED

## 2020-12-28 RX ORDER — SODIUM CHLORIDE 0.9 % (FLUSH) 0.9 %
5-40 SYRINGE (ML) INJECTION EVERY 8 HOURS
Status: DISCONTINUED | OUTPATIENT
Start: 2020-12-28 | End: 2020-12-28 | Stop reason: HOSPADM

## 2020-12-28 RX ORDER — INSULIN LISPRO 100 [IU]/ML
INJECTION, SOLUTION INTRAVENOUS; SUBCUTANEOUS ONCE
Status: DISCONTINUED | OUTPATIENT
Start: 2020-12-28 | End: 2020-12-28 | Stop reason: HOSPADM

## 2020-12-28 RX ORDER — TRAMADOL HYDROCHLORIDE 50 MG/1
50 TABLET ORAL
Qty: 15 TAB | Refills: 0 | Status: SHIPPED | OUTPATIENT
Start: 2020-12-28 | End: 2020-12-31

## 2020-12-28 RX ORDER — SODIUM CHLORIDE, SODIUM LACTATE, POTASSIUM CHLORIDE, CALCIUM CHLORIDE 600; 310; 30; 20 MG/100ML; MG/100ML; MG/100ML; MG/100ML
75 INJECTION, SOLUTION INTRAVENOUS CONTINUOUS
Status: DISCONTINUED | OUTPATIENT
Start: 2020-12-28 | End: 2020-12-28 | Stop reason: HOSPADM

## 2020-12-28 RX ORDER — FENTANYL CITRATE 50 UG/ML
INJECTION, SOLUTION INTRAMUSCULAR; INTRAVENOUS AS NEEDED
Status: DISCONTINUED | OUTPATIENT
Start: 2020-12-28 | End: 2020-12-28 | Stop reason: HOSPADM

## 2020-12-28 RX ORDER — MAGNESIUM SULFATE 100 %
4 CRYSTALS MISCELLANEOUS AS NEEDED
Status: DISCONTINUED | OUTPATIENT
Start: 2020-12-28 | End: 2020-12-28 | Stop reason: HOSPADM

## 2020-12-28 RX ORDER — SODIUM CHLORIDE 0.9 % (FLUSH) 0.9 %
5-40 SYRINGE (ML) INJECTION AS NEEDED
Status: DISCONTINUED | OUTPATIENT
Start: 2020-12-28 | End: 2020-12-28 | Stop reason: HOSPADM

## 2020-12-28 RX ORDER — CEFAZOLIN SODIUM 2 G/50ML
2 SOLUTION INTRAVENOUS ONCE
Status: COMPLETED | OUTPATIENT
Start: 2020-12-28 | End: 2020-12-28

## 2020-12-28 RX ORDER — LIDOCAINE HYDROCHLORIDE 20 MG/ML
INJECTION, SOLUTION EPIDURAL; INFILTRATION; INTRACAUDAL; PERINEURAL AS NEEDED
Status: DISCONTINUED | OUTPATIENT
Start: 2020-12-28 | End: 2020-12-28 | Stop reason: HOSPADM

## 2020-12-28 RX ORDER — DEXTROSE 50 % IN WATER (D50W) INTRAVENOUS SYRINGE
25-50 AS NEEDED
Status: DISCONTINUED | OUTPATIENT
Start: 2020-12-28 | End: 2020-12-28 | Stop reason: HOSPADM

## 2020-12-28 RX ORDER — FAMOTIDINE 20 MG/1
20 TABLET, FILM COATED ORAL ONCE
Status: COMPLETED | OUTPATIENT
Start: 2020-12-28 | End: 2020-12-28

## 2020-12-28 RX ORDER — ONDANSETRON 2 MG/ML
4 INJECTION INTRAMUSCULAR; INTRAVENOUS ONCE
Status: DISCONTINUED | OUTPATIENT
Start: 2020-12-28 | End: 2020-12-28 | Stop reason: HOSPADM

## 2020-12-28 RX ORDER — HYDROMORPHONE HYDROCHLORIDE 2 MG/ML
0.5 INJECTION, SOLUTION INTRAMUSCULAR; INTRAVENOUS; SUBCUTANEOUS AS NEEDED
Status: DISCONTINUED | OUTPATIENT
Start: 2020-12-28 | End: 2020-12-28 | Stop reason: HOSPADM

## 2020-12-28 RX ORDER — PROPOFOL 10 MG/ML
VIAL (ML) INTRAVENOUS
Status: DISCONTINUED | OUTPATIENT
Start: 2020-12-28 | End: 2020-12-28 | Stop reason: HOSPADM

## 2020-12-28 RX ADMIN — CEFAZOLIN SODIUM 2 G: 2 SOLUTION INTRAVENOUS at 09:27

## 2020-12-28 RX ADMIN — SODIUM CHLORIDE, SODIUM LACTATE, POTASSIUM CHLORIDE, AND CALCIUM CHLORIDE 75 ML/HR: 600; 310; 30; 20 INJECTION, SOLUTION INTRAVENOUS at 08:20

## 2020-12-28 RX ADMIN — FAMOTIDINE 20 MG: 20 TABLET, FILM COATED ORAL at 08:35

## 2020-12-28 RX ADMIN — FENTANYL CITRATE 25 MCG: 50 INJECTION, SOLUTION INTRAMUSCULAR; INTRAVENOUS at 09:33

## 2020-12-28 RX ADMIN — PROPOFOL 50 MCG/KG/MIN: 10 INJECTION, EMULSION INTRAVENOUS at 09:30

## 2020-12-28 RX ADMIN — FENTANYL CITRATE 50 MCG: 50 INJECTION, SOLUTION INTRAMUSCULAR; INTRAVENOUS at 09:56

## 2020-12-28 RX ADMIN — FENTANYL CITRATE 25 MCG: 50 INJECTION, SOLUTION INTRAMUSCULAR; INTRAVENOUS at 09:31

## 2020-12-28 NOTE — ANESTHESIA PREPROCEDURE EVALUATION
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 No medical treatment for sz. Cardiovascular    Hypertension: well controlled          CAD    Exercise tolerance: <4 METS  Comments: ECG finding of MI 3-4 months ago, work up inc ETT negative for ischemia. Nl EF, cardiac clearance on chart. Low risk. GI/Hepatic/Renal     GERD: well controlled           Endo/Other    Diabetes: well controlled, using insulin  Hypothyroidism: well controlled  Morbid obesity and arthritis     Other Findings   Comments: Diabetic neuropathy, both feet.           Physical Exam    Airway  Mallampati: II  TM Distance: 4 - 6 cm  Neck ROM: normal range of motion   Mouth opening: Normal     Cardiovascular    Rhythm: regular  Rate: normal         Dental    Dentition: Full upper dentures  Comments: Edentulous of native teeth, has upper denture in place   Pulmonary  Breath sounds clear to auscultation               Abdominal  GI exam deferred       Other Findings            Anesthetic Plan    ASA: 3  Anesthesia type: MAC          Induction: Intravenous  Anesthetic plan and risks discussed with: Patient

## 2020-12-28 NOTE — ANESTHESIA POSTPROCEDURE EVALUATION
Procedure(s):  AMPUTATION RIGHT GREAT TOE. MAC    Anesthesia Post Evaluation      Multimodal analgesia: multimodal analgesia used between 6 hours prior to anesthesia start to PACU discharge  Patient location during evaluation: PACU  Patient participation: complete - patient participated  Level of consciousness: awake and alert  Pain management: adequate  Airway patency: patent  Anesthetic complications: no  Cardiovascular status: acceptable  Respiratory status: acceptable  Hydration status: acceptable  Post anesthesia nausea and vomiting:  controlled  Final Post Anesthesia Temperature Assessment:  Normothermia (36.0-37.5 degrees C)      INITIAL Post-op Vital signs:   Vitals Value Taken Time   /54 12/28/20 1030   Temp 36.4 °C (97.6 °F) 12/28/20 1015   Pulse 60 12/28/20 1032   Resp 15 12/28/20 1032   SpO2 98 % 12/28/20 1032   Vitals shown include unvalidated device data.

## 2020-12-28 NOTE — H&P
Update History & Physical    The Patient's History and Physical of December 2020,   surgery was reviewed with the patient and I examined the patient. There was no change. The surgical site was confirmed by the patient and me. Plan:  The risk, benefits, expected outcome, and alternative to the recommended procedure have been discussed with the patient. Patient understands and wants to proceed with the procedure.     Electronically signed by Aliya Garcia DPM on 12/28/2020 at 9:08 AM

## 2020-12-28 NOTE — BRIEF OP NOTE
Brief Postoperative Note    Patient: Taya Sinha  YOB: 1935  MRN: [id number]    Date of Procedure: 12/28/2020     Pre-Op Diagnosis: L97.524, E13.621    Post-Op Diagnosis: Same as preoperative diagnosis.       Procedure(s):  AMPUTATION RIGHT GREAT TOE    Surgeon(s):  Wing Erik DPM    Surgical Assistant: Surg Asst-1: Tommy Do    Anesthesia: MAC     Estimated Blood Loss (mL): Minimal    Complications: None    Specimens:   ID Type Source Tests Collected by Time Destination   1 : right great toe Preservative   Wing Erik DPM 12/28/2020 0399 Pathology   2 : clean margin Preservative Bone  Wing Erik DPM 12/28/2020 3814 Pathology   1 : clean margin Wound Foot, Right CULTURE, ANAEROBIC, CULTURE, WOUND [facility] Wing Erik DPM 12/28/2020 2873 Microbiology   2 : bone Wound Foot, Right CULTURE, ANAEROBIC, CULTURE, WOUND [facility] 12/28/2020 0747 Microbiology        Implants: * No implants in log *    Drains: * No LDAs found *    Findings: neuropathic ulcer,osteitis    Electronically Signed by Leopoldo Dimitri, DPM on 12/28/2020 at 9:59 AM

## 2020-12-28 NOTE — DISCHARGE INSTRUCTIONS
DISCHARGE SUMMARY from Nurse    PATIENT INSTRUCTIONS:    After general anesthesia or intravenous sedation, for 24 hours or while taking prescription Narcotics:  · Limit your activities  · Do not drive and operate hazardous machinery  · Do not make important personal or business decisions  · Do  not drink alcoholic beverages  · If you have not urinated within 8 hours after discharge, please contact your surgeon on call. Report the following to your surgeon:  · Excessive pain, swelling, redness or odor of or around the surgical area  · Temperature over 100.5  · Nausea and vomiting lasting longer than 4 hours or if unable to take medications  · Any signs of decreased circulation or nerve impairment to extremity: change in color, persistent  numbness, tingling, coldness or increase pain  · Any questions    What to do at Home:  Recommended activity: Activity as tolerated and no driving for today. *  Please give a list of your current medications to your Primary Care Provider. *  Please update this list whenever your medications are discontinued, doses are      changed, or new medications (including over-the-counter products) are added. *  Please carry medication information at all times in case of emergency situations. These are general instructions for a healthy lifestyle:    No smoking/ No tobacco products/ Avoid exposure to second hand smoke  Surgeon General's Warning:  Quitting smoking now greatly reduces serious risk to your health. Obesity, smoking, and sedentary lifestyle greatly increases your risk for illness    A healthy diet, regular physical exercise & weight monitoring are important for maintaining a healthy lifestyle    You may be retaining fluid if you have a history of heart failure or if you experience any of the following symptoms:  Weight gain of 3 pounds or more overnight or 5 pounds in a week, increased swelling in our hands or feet or shortness of breath while lying flat in bed. Please call your doctor as soon as you notice any of these symptoms; do not wait until your next office visit. The discharge information has been reviewed with the patient. The patient verbalized understanding. Discharge medications reviewed with the patient and appropriate educational materials and side effects teaching were provided. ___________________________________________________________________________________________________________________________________    Patient Education        Toe Amputation: What to Expect at [address] had amputation surgery to remove one or more of your toes. For most people, pain improves within a week after surgery. You may have stitches or sutures. The doctor will probably take these out about 10 days after the surgery. You may need to wear a cast or a special type of shoe for about 2 to 4 weeks. You may think you have feeling or pain where your toe had been. This is called phantom pain. It is common, and it may come and go for a year or longer. If you have this kind of pain, your doctor may prescribe medicine to treat it. This care sheet gives you a general idea about how long it will take for you to recover. But each person recovers at a different pace. Follow the steps below to get better as quickly as possible. How can you care for yourself at home? Activity    · Rest when you feel tired. Getting enough sleep will help you recover.     · Follow your doctor's instructions about how much weight you can put on your foot and when you can go back to your usual activities. If you were given crutches, use them as directed.     · Try to walk each day if you are able. Start by walking a little more than you did the day before. Bit by bit, increase the amount you walk. Walking boosts blood flow and helps prevent blood clots.     · You may notice some changes in your balance when you walk.  Your balance will improve over time.     · Prop up your foot and leg on a pillow when you ice it or anytime you sit or lie down during the next 3 days. Try to keep it above the level of your heart. This will help reduce swelling.     · Ask your doctor when you can drive again.     · You may shower, unless your doctor tells you not to. Keep the bandage dry. If the bandage has been removed, you can wash the area with warm water and soap. Pat the area dry.     · You will probably need to take about 4 weeks off from work or your normal routine. How much time you need to take off depends on the type of work you do and your overall health. Diet    · You can eat your normal diet. If your stomach is upset, try bland, low-fat foods like plain rice, broiled chicken, toast, and yogurt.     · You may notice that your bowel movements are not regular right after your surgery. This is common. Try to avoid constipation and straining with bowel movements. You may want to take a fiber supplement every day. If you have not had a bowel movement after a couple of days, ask your doctor about taking a mild laxative. Medicines    · Your doctor will tell you if and when you can restart your medicines. He or she will also give you instructions about taking any new medicines.     · If you take aspirin or some other blood thinner, ask your doctor if and when to start taking it again. Make sure that you understand exactly what your doctor wants you to do.     · Take pain medicines exactly as directed. ? If the doctor gave you a prescription medicine for pain, take it as prescribed. ? If you are not taking a prescription pain medicine, ask your doctor if you can take an over-the-counter medicine.     · If your doctor prescribed antibiotics, take them as directed. Do not stop taking them just because you feel better.  You need to take the full course of antibiotics.     · If you think your pain medicine is making you sick to your stomach:  ? Take your medicine after meals (unless your doctor has told you not to).  ? Ask your doctor for a different pain medicine. Incision care    · Your doctor will probably remove the bandages after several days. Or your doctor may have you remove your bandages at home. Do not touch the surgery area. Keep it dry.     · Do not soak your foot until your doctor says it is okay. Follow-up care is a key part of your treatment and safety. Be sure to make and go to all appointments, and call your doctor if you are having problems. It's also a good idea to know your test results and keep a list of the medicines you take. When should you call for help? Call 911 anytime you think you may need emergency care. For example, call if:    · You passed out (lost consciousness).     · You have sudden chest pain, are short of breath, or you cough up blood. Call your doctor now or seek immediate medical care if:    · You have pain that does not get better after you take pain medicine.     · You are sick to your stomach or cannot drink fluids.     · You have loose stitches, or your incision comes open.     · You have signs of a blood clot in your leg (called a deep vein thrombosis), such as:  ? Pain in your calf, back of the knee, thigh, or groin. ? Redness or swelling in your leg.     · You have signs of infection, such as:  ? Increased pain, swelling, warmth, or redness. ? Red streaks leading from the incision. ? Pus draining from the incision. ? A fever.     · You bleed through your bandage. Watch closely for any changes in your health, and be sure to contact your doctor if you have any problems. Where can you learn more? Go to http://www.gray.com/  Enter X742 in the search box to learn more about \"Toe Amputation: What to Expect at Home. \"  Current as of: March 2, 2020               Content Version: 12.6  © 2694-9634 [employer].    Care instructions adapted under license by [employer] (which disclaims liability or warranty for this information). If you have questions about a medical condition or this instruction, always ask your healthcare professional. [place] [address] any warranty or liability for your use of this information.

## 2020-12-28 NOTE — OP NOTES
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 PROCEDURE PERFORMED:  Amputation of right great toe. SURGEON:  Sabrina Crockett DPM    ASSISTANT:  VASQUEZ    ANESTHESIA:  MAC.    COMPLICATIONS:  0    SPECIMENS REMOVED:  TOE    IMPLANTS:  0    ESTIMATED BLOOD LOSS:  0    PROCEDURE:  On 2020, the patient was placed on the operating room table in supine position. After adequate induction of MAC anesthesia, right lower extremity was prepped and draped in usual sterile fashion. Attention was directed to the right great toe. The patient's toe was swollen. There was some discoloration distally. Base of the toe was healthy and appeared noninfected. There was a penetrating plantar ulcer at the interphalangeal joint to the bone. Two semi-elliptical incisions were accomplished ellipsing out down to the bone and carefully disarticulated toes sent for culture and pathology. Good perfusion noted. Small blood vessels bovied as necessary. Significant gouty erosions noted at the joint. The wound was thoroughly irrigated with antibiotic solution, closed with Prolene suture. Betadine soft dressing was applied. The patient tolerated the procedure and anesthesia well with vital signs stable throughout. The patient was transported to the recovery room in stable condition.       Diana Flannery DPM [id number]  D:  2020 10:12  T:  2020 11:26  JOB #:  [id number]  CC:  Sabrina Crockett DPM

## 2020-12-30 LAB
BACTERIA SPEC CULT: NORMAL
BACTERIA SPEC CULT: NORMAL
SERVICE CMNT-IMP: NORMAL
SERVICE CMNT-IMP: NORMAL

## 2021-01-01 LAB
BACTERIA SPEC CULT: ABNORMAL
BACTERIA SPEC CULT: ABNORMAL
GRAM STN SPEC: ABNORMAL
GRAM STN SPEC: ABNORMAL
SERVICE CMNT-IMP: ABNORMAL

## 2021-01-06 ENCOUNTER — PATIENT OUTREACH (OUTPATIENT)
Dept: CASE MANAGEMENT | Age: 86
End: 2021-01-06

## 2021-01-06 NOTE — PROGRESS NOTES
Care Transitions Nurse ( CTN) spoke with patient briefly via telephone call for follow up. Patient states that she is at the doctor's office currently. CTN to call back on another day.

## 2021-01-25 ENCOUNTER — PATIENT OUTREACH (OUTPATIENT)
Dept: CASE MANAGEMENT | Age: 86
End: 2021-01-25

## 2021-01-25 NOTE — PROGRESS NOTES
Patient contacted regarding recent discharge and COVID-19 risk. Discussed COVID-19 related testing which was available at this time. Test results were negative. Patient informed of results, if available? No, as results were not pending at time of hospital dicharge. Outreach made within 2 business days of discharge: Yes    Care Transition Nurse/ Ambulatory Care Manager/ LPN Care Coordinator contacted the patient by telephone to perform post discharge assessment. Verified name and  with patient as identifiers. Patient has following risk factors of: asthma and diabetes. CTN/ACM/LPN reviewed discharge instructions, medical action plan and red flags related to discharge diagnosis. Reviewed and educated them on any new and changed medications related to discharge diagnosis. Advance Care Planning:   Does patient have an Advance Directive: not noted to be on file at this time . Education provided regarding  when to seek medical attention with patient who verbalized understanding. Patient declined review of Covid-19 educational information. Patient resolved from episode of care  on   21. Patient/family has been provided the following resources and education related to COVID-19:                         Signs, symptoms and red flags related to COVID-19                  Contact for their local Department of Health or PCP for follow up as needed. Patient currently reports:    No new or worsening symptoms. No further outreach scheduled with this CTN/ACM. Episode of Care resolved. Patient has this CTN/ACM contact information if future needs arise.

## 2021-03-10 ENCOUNTER — OFFICE VISIT (OUTPATIENT)
Dept: CARDIOLOGY CLINIC | Age: 86
End: 2021-03-10
Payer: MEDICARE

## 2021-03-10 VITALS
WEIGHT: 254.4 LBS | HEART RATE: 80 BPM | SYSTOLIC BLOOD PRESSURE: 136 MMHG | DIASTOLIC BLOOD PRESSURE: 52 MMHG | OXYGEN SATURATION: 95 % | HEIGHT: 65 IN | TEMPERATURE: 98.8 F | BODY MASS INDEX: 42.38 KG/M2

## 2021-03-10 DIAGNOSIS — I50.32 CHRONIC DIASTOLIC CONGESTIVE HEART FAILURE (HCC): Primary | ICD-10-CM

## 2021-03-10 DIAGNOSIS — I70.0 ATHEROSCLEROSIS OF AORTA (HCC): ICD-10-CM

## 2021-03-10 DIAGNOSIS — I10 ESSENTIAL HYPERTENSION: ICD-10-CM

## 2021-03-10 DIAGNOSIS — E78.5 HYPERLIPIDEMIA, UNSPECIFIED HYPERLIPIDEMIA TYPE: ICD-10-CM

## 2021-03-10 PROCEDURE — G8510 SCR DEP NEG, NO PLAN REQD: HCPCS | Performed by: INTERNAL MEDICINE

## 2021-03-10 PROCEDURE — G8536 NO DOC ELDER MAL SCRN: HCPCS | Performed by: INTERNAL MEDICINE

## 2021-03-10 PROCEDURE — G8427 DOCREV CUR MEDS BY ELIG CLIN: HCPCS | Performed by: INTERNAL MEDICINE

## 2021-03-10 PROCEDURE — 1101F PT FALLS ASSESS-DOCD LE1/YR: CPT | Performed by: INTERNAL MEDICINE

## 2021-03-10 PROCEDURE — G8754 DIAS BP LESS 90: HCPCS | Performed by: INTERNAL MEDICINE

## 2021-03-10 PROCEDURE — G8400 PT W/DXA NO RESULTS DOC: HCPCS | Performed by: INTERNAL MEDICINE

## 2021-03-10 PROCEDURE — 99214 OFFICE O/P EST MOD 30 MIN: CPT | Performed by: INTERNAL MEDICINE

## 2021-03-10 PROCEDURE — 1090F PRES/ABSN URINE INCON ASSESS: CPT | Performed by: INTERNAL MEDICINE

## 2021-03-10 PROCEDURE — G8417 CALC BMI ABV UP PARAM F/U: HCPCS | Performed by: INTERNAL MEDICINE

## 2021-03-10 PROCEDURE — G8752 SYS BP LESS 140: HCPCS | Performed by: INTERNAL MEDICINE

## 2021-03-10 RX ORDER — SERTRALINE HYDROCHLORIDE 50 MG/1
50 TABLET, FILM COATED ORAL DAILY
COMMUNITY
End: 2021-11-09 | Stop reason: SDUPTHER

## 2021-03-10 RX ORDER — ATORVASTATIN CALCIUM 40 MG/1
TABLET, FILM COATED ORAL DAILY
COMMUNITY
End: 2021-07-06 | Stop reason: SDUPTHER

## 2021-03-10 RX ORDER — GLIMEPIRIDE 2 MG/1
2 TABLET ORAL
COMMUNITY
End: 2021-07-06 | Stop reason: SDUPTHER

## 2021-03-10 RX ORDER — TRIAMCINOLONE ACETONIDE 1 MG/G
OINTMENT TOPICAL 2 TIMES DAILY
COMMUNITY
End: 2021-11-09

## 2021-03-10 NOTE — PROGRESS NOTES
1. Have you been to the ER, urgent care clinic since your last visit? Hospitalized since your last visit? Yes When: 12/28/20 Where: [facility] Reason for visit: Amputation    2. Have you seen or consulted any other health care providers outside of the [address] [place] [address] since your last visit? Include any pap smears or colon screening.  No

## 2021-03-10 NOTE — PROGRESS NOTES
HISTORY OF PRESENT ILLNESS  Renee Dumont is a 82 y.o. female. 7/10/2020  Patient seen today for new patient evaluation. She is referred here for evaluation of CHF. She has been previously followed at [facility] cardiology. Patient does not have any significant complaint of chest pain, shortness of breath orthopnea. She has chronic peripheral edema she uses Bumex. They are slowly improving. She denies any history of prior cardiac issues including MI.  3/2021  Patient seen today for follow-up. She feels better shortness of breath is better. Occasional mild edema. Since last evaluation had to amputation. Clinically improving    Hypertension  The history is provided by the patient and medical records. This is a chronic problem. The problem occurs constantly. The problem has not changed since onset. Associated symptoms include shortness of breath. Pertinent negatives include no chest pain, no abdominal pain and no headaches. Review of Systems   Constitutional: Negative for chills and fever. HENT: Negative for nosebleeds. Eyes: Negative for blurred vision and double vision. Respiratory: Positive for shortness of breath. Negative for cough, hemoptysis, sputum production and wheezing. Cardiovascular: Positive for leg swelling. Negative for chest pain, palpitations, orthopnea, claudication and PND. Gastrointestinal: Negative for abdominal pain, heartburn, nausea and vomiting. Musculoskeletal: Negative for myalgias. Skin: Negative for rash. Neurological: Negative for dizziness, weakness and headaches. Endo/Heme/Allergies: Does not bruise/bleed easily.      Family History   Problem Relation Age of Onset    Heart Attack Mother     Heart Attack Father        Past Medical History:   Diagnosis Date    Asthma     Chronic venous insufficiency     Diabetes ([address]     Hypertension     MI (myocardial infarction) ([address]     Peripheral neuropathy     Rheumatoid arthritis ([address]     Vertigo        Past Surgical History:   Procedure Laterality Date    HX BACK SURGERY      HX CHOLECYSTECTOMY      HX GYN      TAHOophorectomy due to infection    HX HEENT      resection of memegioma in the 1980's.  HX KNEE REPLACEMENT Bilateral        Social History     Tobacco Use    Smoking status: Former Smoker    Smokeless tobacco: Never Used   Substance Use Topics    Alcohol use: No     Alcohol/week: 0.0 standard drinks       No Known Allergies    Prior to Admission medications    Medication Sig Start Date End Date Taking? Authorizing Provider   sertraline (ZOLOFT) 50 mg tablet Take 50 mg by mouth daily. Yes Provider, Historical   glimepiride (AMARYL) 2 mg tablet Take 2 mg by mouth every morning. Yes Provider, Historical   triamcinolone acetonide (KENALOG) 0.1 % ointment Apply  to affected area two (2) times a day. use thin layer   Yes Provider, Historical   atorvastatin (LIPITOR) 40 mg tablet Take  by mouth daily. Yes Provider, Historical   metFORMIN (GLUCOPHAGE) 500 mg tablet Take  by mouth daily (with breakfast). Indications: type 2 diabetes mellitus   Yes Provider, Historical   CYANOCOBALAMIN, VITAMIN B-12, PO Take  by mouth. Yes Provider, Historical   vit A/vit C/vit E/zinc/copper (PRESERVISION AREDS PO) Take  by mouth. Yes Provider, Historical   lisinopril (PRINIVIL, ZESTRIL) 2.5 mg tablet Take 40 mg by mouth daily. 10/30/19  Yes Provider, Historical   naloxone (NARCAN) 4 mg/actuation nasal spray Use 1 spray intranasally, then discard. Repeat with new spray every 2 min as needed for opioid overdose symptoms, alternating nostrils. 12/26/18  Yes Nancy Render D, DO   insulin glargine (LANTUS U-100 INSULIN) 100 unit/mL injection 40 Units by SubCUTAneous route nightly. Yes Provider, Historical   ascorbic acid, vitamin C, (VITAMIN C) 500 mg tablet Take 500 mg by mouth daily. Yes Provider, Historical   bumetanide (BUMEX) 2 mg tablet Take 1 mg by mouth two (2) times daily as needed.    Yes Provider, Historical   gabapentin (NEURONTIN) 300 mg capsule Take 1 Cap by mouth three (3) times daily. 3/14/18  Yes Jane Mijares MD   Blood-Glucose Meter (FREESTYLE LITE METER) monitoring kit Test twice blood glucose daily; ICD-10 E11.9; Quantity 1 12/15/17  Yes Jacob Rhoades NP   amLODIPine (NORVASC) 10 mg tablet Take 0.5 Tabs by mouth daily. Patient taking differently: Take 10 mg by mouth daily. 11/2/17  Yes Jane Mijares MD   levothyroxine (SYNTHROID) 200 mcg tablet 1 tablet daily (take on an empty stomach) (stop \"old\" synthroid)  Patient taking differently: 150 mcg. 1 tablet daily (take on an empty stomach) (stop \"old\" synthroid) 9/25/17  Yes Jane Mijares MD   carvedilol (COREG) 12.5 mg tablet Take 1 Tab by mouth two (2) times daily (with meals). 8/7/17  Yes Jane Mijares MD   insulin syringe,safetyneedle 1 mL 31 gauge x 5/16\" syrg 1 Each by Does Not Apply route daily. 8/7/17  Yes Jane Mijares MD   cholecalciferol (VITAMIN D3) 1,000 unit tablet Take 2,000 Units by mouth daily. Yes Provider, Historical   glucose blood VI test strips (FREESTYLE TEST) strip Use to check blood glucose twice daily DX:E11.9 7/5/17  Yes Jane Mijares MD   allopurinol (ZYLOPRIM) 100 mg tablet Take 1 Tab by mouth daily. 2/21/17  Yes Jane Mijares MD   magnesium oxide 500 mg tab Take 1 Tab by mouth daily. Yes Provider, Historical   acetaminophen (TYLENOL) 500 mg tablet Take 1 Tab by mouth every six (6) hours as needed for Pain. 11/2/16  Yes Jane Mijares MD         Visit Vitals  BP (!) 136/52 (BP 1 Location: Right arm, BP Patient Position: Sitting, BP Cuff Size: Large adult)   Pulse 80   Temp 98.8 °F (37.1 °C) (Temporal)   Ht 5' 5\" (1.651 m)   Wt 115.4 kg (254 lb 6.4 oz)   SpO2 95%   BMI 42.33 kg/m²       Physical Exam   Constitutional: She is oriented to person, place, and time. She appears well-developed and well-nourished. HENT:   Head: Normocephalic and atraumatic.    Eyes: Conjunctivae are normal.   Neck: Neck supple. No JVD present. No tracheal deviation present. No thyromegaly present. Cardiovascular: Normal rate and regular rhythm. PMI is not displaced. Exam reveals no gallop, no S3 and no decreased pulses. No murmur heard. Pulmonary/Chest: No respiratory distress. She has no wheezes. She has no rales. She exhibits no tenderness. Abdominal: Soft. There is no abdominal tenderness. Musculoskeletal:         General: Edema (1+ BLE edema) present. Neurological: She is alert and oriented to person, place, and time. Skin: Skin is warm. Psychiatric: She has a normal mood and affect. Ms. Nakul Smith has a reminder for a \"due or due soon\" health maintenance. I have asked that she contact her primary care provider for follow-up on this health maintenance. No flowsheet data found. I have personally reviewed patient's records available from hospital and other providers and incorporated findings in patient care. Notes, labs, vascular study, chest x-ray    Assessment         ICD-10-CM ICD-9-CM    1. Chronic diastolic congestive heart failure (HCC)  I50.32 428.32      428.0     Stable compensated continue treatment monitor   2. Essential hypertension  I10 401.9     Controlled continue current treatment and monitor   3. Hyperlipidemia, unspecified hyperlipidemia type  E78.5 272.4 HEPATIC FUNCTION PANEL      LIPID PANEL    Continue therapy lab with PCP   4. Atherosclerosis of aorta (HCC)  I70.0 440.0     Continue current treatment     7/2020 Patient referred for CHF. She has been previously followed with [facility]. She denies chest pain or SOB. She reports chronic BLE edema and takes Bumex PRN. Will evaluate CHF with echo. EKG SR with inferior Q wave. Suggestive of inferior wall infarct, will evaluate for CAD with stress test. Continue current treatment - f/u post testing.  9/2020  Patient seen for CHF f/u.   Echo with 60-65%, with grade II DD, negative Stress test. She has BLE edema and rarely takes Bumex. Encouraged to take regularly. Discussed low sodium diet. She reports PCP advised her to stop taking Atorvastatin. Advised to f/u with PCP tomorrow regarding Atorvastatin. 3/2021  CHF compensated continue current treatment blood pressure controlled. Follow-up lipid and LFT      Medications Discontinued During This Encounter   Medication Reason    beta-carotene,A,-vits C,E/mins (OCUVITE PO) Not A Current Medication    BYDUREON BCISE 2 mg/0.85 mL atIn Not A Current Medication       Orders Placed This Encounter    HEPATIC FUNCTION PANEL     Standing Status:   Future     Standing Expiration Date:   9/8/2021    LIPID PANEL     Standing Status:   Future     Standing Expiration Date:   9/8/2021     Follow-up and Dispositions    · Return in about 6 months (around 9/10/2021). Patti Wheatley MD

## 2021-05-12 ENCOUNTER — HOSPITAL ENCOUNTER (OUTPATIENT)
Dept: GENERAL RADIOLOGY | Age: 86
Discharge: HOME | End: 2021-05-12
Payer: MEDICARE

## 2021-05-12 ENCOUNTER — TRANSCRIBE ORDER (OUTPATIENT)
Dept: REGISTRATION | Age: 86
End: 2021-05-12

## 2021-05-12 DIAGNOSIS — M79.606 LEG PAIN: Primary | ICD-10-CM

## 2021-05-12 DIAGNOSIS — M79.606 LEG PAIN: ICD-10-CM

## 2021-05-12 PROCEDURE — 73552 X-RAY EXAM OF FEMUR 2/>: CPT

## 2021-05-12 PROCEDURE — 73560 X-RAY EXAM OF KNEE 1 OR 2: CPT

## 2021-05-12 PROCEDURE — 73590 X-RAY EXAM OF LOWER LEG: CPT

## 2021-07-06 ENCOUNTER — HOSPITAL ENCOUNTER (OUTPATIENT)
Dept: LAB | Age: 86
Discharge: HOME | End: 2021-07-06
Payer: MEDICARE

## 2021-07-06 ENCOUNTER — OFFICE VISIT (OUTPATIENT)
Dept: INTERNAL MEDICINE CLINIC | Age: 86
End: 2021-07-06
Payer: MEDICARE

## 2021-07-06 VITALS
TEMPERATURE: 97.5 F | DIASTOLIC BLOOD PRESSURE: 49 MMHG | SYSTOLIC BLOOD PRESSURE: 136 MMHG | OXYGEN SATURATION: 96 % | BODY MASS INDEX: 42.92 KG/M2 | HEART RATE: 76 BPM | RESPIRATION RATE: 14 BRPM | WEIGHT: 257.6 LBS | HEIGHT: 65 IN

## 2021-07-06 DIAGNOSIS — M54.50 CHRONIC BILATERAL LOW BACK PAIN WITHOUT SCIATICA: ICD-10-CM

## 2021-07-06 DIAGNOSIS — E11.40 DIABETIC NEUROPATHY, PAINFUL (HCC): ICD-10-CM

## 2021-07-06 DIAGNOSIS — E03.9 ACQUIRED HYPOTHYROIDISM: ICD-10-CM

## 2021-07-06 DIAGNOSIS — N32.81 OAB (OVERACTIVE BLADDER): ICD-10-CM

## 2021-07-06 DIAGNOSIS — I50.32 CHRONIC DIASTOLIC CONGESTIVE HEART FAILURE (HCC): ICD-10-CM

## 2021-07-06 DIAGNOSIS — E78.00 HYPERCHOLESTEREMIA: ICD-10-CM

## 2021-07-06 DIAGNOSIS — E11.40 TYPE 2 DIABETES MELLITUS WITH DIABETIC NEUROPATHY, WITH LONG-TERM CURRENT USE OF INSULIN (HCC): ICD-10-CM

## 2021-07-06 DIAGNOSIS — Z79.4 TYPE 2 DIABETES MELLITUS WITH DIABETIC NEUROPATHY, WITH LONG-TERM CURRENT USE OF INSULIN (HCC): ICD-10-CM

## 2021-07-06 DIAGNOSIS — Z13.6 SCREENING FOR ISCHEMIC HEART DISEASE: ICD-10-CM

## 2021-07-06 DIAGNOSIS — Z76.89 ENCOUNTER TO ESTABLISH CARE: ICD-10-CM

## 2021-07-06 DIAGNOSIS — R32 URINARY INCONTINENCE, UNSPECIFIED TYPE: ICD-10-CM

## 2021-07-06 DIAGNOSIS — Z71.89 ADVANCED DIRECTIVES, COUNSELING/DISCUSSION: ICD-10-CM

## 2021-07-06 DIAGNOSIS — Z79.4 TYPE 2 DIABETES MELLITUS WITH DIABETIC NEPHROPATHY, WITH LONG-TERM CURRENT USE OF INSULIN (HCC): ICD-10-CM

## 2021-07-06 DIAGNOSIS — I10 ESSENTIAL HYPERTENSION: ICD-10-CM

## 2021-07-06 DIAGNOSIS — Z00.00 MEDICARE ANNUAL WELLNESS VISIT, SUBSEQUENT: Primary | ICD-10-CM

## 2021-07-06 DIAGNOSIS — R60.0 BILATERAL LOWER EXTREMITY EDEMA: ICD-10-CM

## 2021-07-06 DIAGNOSIS — E66.01 OBESITY, CLASS III, BMI 40-49.9 (MORBID OBESITY) (HCC): ICD-10-CM

## 2021-07-06 DIAGNOSIS — E11.21 TYPE 2 DIABETES MELLITUS WITH DIABETIC NEPHROPATHY, WITH LONG-TERM CURRENT USE OF INSULIN (HCC): ICD-10-CM

## 2021-07-06 DIAGNOSIS — Z12.31 ENCOUNTER FOR SCREENING MAMMOGRAM FOR MALIGNANT NEOPLASM OF BREAST: ICD-10-CM

## 2021-07-06 DIAGNOSIS — Z91.81 HISTORY OF FALL: ICD-10-CM

## 2021-07-06 DIAGNOSIS — G89.29 CHRONIC BILATERAL LOW BACK PAIN WITHOUT SCIATICA: ICD-10-CM

## 2021-07-06 PROBLEM — Z86.59 HISTORY OF DEPRESSION: Status: RESOLVED | Noted: 2017-01-23 | Resolved: 2021-07-06

## 2021-07-06 PROBLEM — L97.512 CHRONIC TOE ULCER, RIGHT, WITH FAT LAYER EXPOSED (HCC): Status: RESOLVED | Noted: 2020-12-14 | Resolved: 2021-07-06

## 2021-07-06 PROBLEM — M54.2 NECK PAIN: Status: RESOLVED | Noted: 2017-07-05 | Resolved: 2021-07-06

## 2021-07-06 PROBLEM — M25.512 CHRONIC LEFT SHOULDER PAIN: Status: RESOLVED | Noted: 2017-08-08 | Resolved: 2021-07-06

## 2021-07-06 LAB
ALBUMIN SERPL-MCNC: 3.6 G/DL (ref 3.4–5)
ALBUMIN/GLOB SERPL: 0.9 {RATIO} (ref 0.8–1.7)
ALP SERPL-CCNC: 78 U/L (ref 45–117)
ALT SERPL-CCNC: 17 U/L (ref 13–56)
ANION GAP SERPL CALC-SCNC: 4 MMOL/L (ref 3–18)
AST SERPL-CCNC: 19 U/L (ref 10–38)
BILIRUB SERPL-MCNC: 0.3 MG/DL (ref 0.2–1)
BUN SERPL-MCNC: 28 MG/DL (ref 7–18)
BUN/CREAT SERPL: 28 (ref 12–20)
CALCIUM SERPL-MCNC: 8.8 MG/DL (ref 8.5–10.1)
CHLORIDE SERPL-SCNC: 106 MMOL/L (ref 100–111)
CHOLEST SERPL-MCNC: 250 MG/DL
CO2 SERPL-SCNC: 28 MMOL/L (ref 21–32)
CREAT SERPL-MCNC: 1.01 MG/DL (ref 0.6–1.3)
CREAT UR-MCNC: 56 MG/DL (ref 30–125)
EST. AVERAGE GLUCOSE BLD GHB EST-MCNC: 249 MG/DL
GLOBULIN SER CALC-MCNC: 3.9 G/DL (ref 2–4)
GLUCOSE SERPL-MCNC: 193 MG/DL (ref 74–99)
HBA1C MFR BLD: 10.3 % (ref 4.2–5.6)
HDLC SERPL-MCNC: 45 MG/DL (ref 40–60)
HDLC SERPL: 5.6 {RATIO} (ref 0–5)
LDLC SERPL CALC-MCNC: 160 MG/DL (ref 0–100)
LIPID PROFILE,FLP: ABNORMAL
MICROALBUMIN UR-MCNC: 24.5 MG/DL (ref 0–3)
MICROALBUMIN/CREAT UR-RTO: 438 MG/G (ref 0–30)
POTASSIUM SERPL-SCNC: 5.2 MMOL/L (ref 3.5–5.5)
PROT SERPL-MCNC: 7.5 G/DL (ref 6.4–8.2)
SODIUM SERPL-SCNC: 138 MMOL/L (ref 136–145)
T4 FREE SERPL-MCNC: 0.5 NG/DL (ref 0.7–1.5)
TRIGL SERPL-MCNC: 225 MG/DL (ref ?–150)
TSH SERPL DL<=0.05 MIU/L-ACNC: 32.9 UIU/ML (ref 0.36–3.74)
VLDLC SERPL CALC-MCNC: 45 MG/DL

## 2021-07-06 PROCEDURE — 99497 ADVNCD CARE PLAN 30 MIN: CPT | Performed by: NURSE PRACTITIONER

## 2021-07-06 PROCEDURE — 84439 ASSAY OF FREE THYROXINE: CPT

## 2021-07-06 PROCEDURE — 36415 COLL VENOUS BLD VENIPUNCTURE: CPT

## 2021-07-06 PROCEDURE — G8432 DEP SCR NOT DOC, RNG: HCPCS | Performed by: NURSE PRACTITIONER

## 2021-07-06 PROCEDURE — 83036 HEMOGLOBIN GLYCOSYLATED A1C: CPT

## 2021-07-06 PROCEDURE — 84443 ASSAY THYROID STIM HORMONE: CPT

## 2021-07-06 PROCEDURE — 80053 COMPREHEN METABOLIC PANEL: CPT

## 2021-07-06 PROCEDURE — G8536 NO DOC ELDER MAL SCRN: HCPCS | Performed by: NURSE PRACTITIONER

## 2021-07-06 PROCEDURE — 99215 OFFICE O/P EST HI 40 MIN: CPT | Performed by: NURSE PRACTITIONER

## 2021-07-06 PROCEDURE — G8400 PT W/DXA NO RESULTS DOC: HCPCS | Performed by: NURSE PRACTITIONER

## 2021-07-06 PROCEDURE — 1101F PT FALLS ASSESS-DOCD LE1/YR: CPT | Performed by: NURSE PRACTITIONER

## 2021-07-06 PROCEDURE — G8752 SYS BP LESS 140: HCPCS | Performed by: NURSE PRACTITIONER

## 2021-07-06 PROCEDURE — 1090F PRES/ABSN URINE INCON ASSESS: CPT | Performed by: NURSE PRACTITIONER

## 2021-07-06 PROCEDURE — G0439 PPPS, SUBSEQ VISIT: HCPCS | Performed by: NURSE PRACTITIONER

## 2021-07-06 PROCEDURE — G8754 DIAS BP LESS 90: HCPCS | Performed by: NURSE PRACTITIONER

## 2021-07-06 PROCEDURE — 82043 UR ALBUMIN QUANTITATIVE: CPT

## 2021-07-06 PROCEDURE — 80061 LIPID PANEL: CPT

## 2021-07-06 PROCEDURE — G8427 DOCREV CUR MEDS BY ELIG CLIN: HCPCS | Performed by: NURSE PRACTITIONER

## 2021-07-06 PROCEDURE — G8417 CALC BMI ABV UP PARAM F/U: HCPCS | Performed by: NURSE PRACTITIONER

## 2021-07-06 PROCEDURE — G0463 HOSPITAL OUTPT CLINIC VISIT: HCPCS | Performed by: NURSE PRACTITIONER

## 2021-07-06 RX ORDER — ATORVASTATIN CALCIUM 40 MG/1
40 TABLET, FILM COATED ORAL DAILY
Qty: 90 TABLET | Refills: 0 | Status: SHIPPED | OUTPATIENT
Start: 2021-07-06 | End: 2021-07-11 | Stop reason: DRUGHIGH

## 2021-07-06 RX ORDER — CARVEDILOL 12.5 MG/1
12.5 TABLET ORAL 2 TIMES DAILY WITH MEALS
Qty: 180 TABLET | Refills: 0 | Status: SHIPPED | OUTPATIENT
Start: 2021-07-06 | End: 2021-11-09 | Stop reason: SDUPTHER

## 2021-07-06 RX ORDER — LISINOPRIL 40 MG/1
40 TABLET ORAL DAILY
Qty: 90 TABLET | Refills: 0 | Status: SHIPPED | OUTPATIENT
Start: 2021-07-06 | End: 2021-11-09 | Stop reason: SDUPTHER

## 2021-07-06 RX ORDER — LEVOTHYROXINE SODIUM 150 UG/1
150 TABLET ORAL
Qty: 90 TABLET | Refills: 0 | Status: SHIPPED | OUTPATIENT
Start: 2021-07-06 | End: 2021-07-11 | Stop reason: DRUGHIGH

## 2021-07-06 RX ORDER — GABAPENTIN 400 MG/1
CAPSULE ORAL
Qty: 360 CAPSULE | Refills: 0 | Status: SHIPPED | OUTPATIENT
Start: 2021-07-06 | End: 2021-11-09 | Stop reason: SDUPTHER

## 2021-07-06 RX ORDER — BUMETANIDE 2 MG/1
1 TABLET ORAL
Qty: 180 TABLET | Refills: 0 | Status: SHIPPED | OUTPATIENT
Start: 2021-07-06 | End: 2021-11-09 | Stop reason: SDUPTHER

## 2021-07-06 RX ORDER — GLIMEPIRIDE 2 MG/1
2 TABLET ORAL
Qty: 90 TABLET | Refills: 0 | Status: SHIPPED | OUTPATIENT
Start: 2021-07-06 | End: 2021-11-09 | Stop reason: SDUPTHER

## 2021-07-06 RX ORDER — AMLODIPINE BESYLATE 10 MG/1
10 TABLET ORAL DAILY
Qty: 90 TABLET | Refills: 0 | Status: SHIPPED | OUTPATIENT
Start: 2021-07-06 | End: 2021-11-09 | Stop reason: SDUPTHER

## 2021-07-06 NOTE — PROGRESS NOTES
[facility] [address] [facility], [address]  [phone] [id number]/[phone] fax    7/6/2021    HPI:   Yamil Mcgee 1935 is a pleasant WHITE/NON- female who presents today to establish care and for routine physical exam.  Patient rents a room to a gentleman who assists her as needed. She is hard of hearing and ambulates with a cane. She is out of all meds except Zoloft. She is seeking refills at this time. Hypertension/diastolic heart failure/cholesterol: Patient is seeing Dr. Jesse aDvies, cardiology. She is currently taking atorvastatin, amlodipine, Coreg, lisinopril. She has ran out of all of her medications. She denies headaches, shortness of breath, chest pain, fatigue, weight gain. Bilateral lower extremity edema: She continues to take Bumex as prescribed. This has been a very successful treatment for her. She does elevate her lower extremities at times. She does not wear compression hose. Type 2 diabetes with neuropathy: Blood sugars between 203 100. When she was taking Lantus this was effective but the cost was more than what she could afford. She was prescribed NovoLog N 32 units in the morning and NovoLog are 40 units at night. She is unable to take NovoLog N as this plummets her blood sugar. She is currently taking NovoLog R 40 units every night and she bases her dose off of her blood sugar readings. She is also taking Amaryl. She had her great toe on her right foot amputated in December 2020. She was taking gabapentin 300 mg three times a day for the neuropathy but this dose is not effective. She is seeking an increase in dose. Pain to her feet is 8 out of 10. Falls/neuropathy: She has had multiple falls due to severe neuropathy to her feet. Most the time her feet are numb but she suffers severe sharp pains as well.     Hypothyroidism: She is currently taking Synthroid 150 mcg daily tolerating therapy well.    Depression: taking zoloft therapy. This is effective for her. She does not require refills at this time. Chronic bilateral low back pain without sciatica: Pain is worse when active and can get to a pain score of 10 out of 10. Was seeing pain management until sometime in 2020 when she decided to wean herself off her morphine treatment. Her pain in her low back began to get worse so she called her pain management and had to wait 2 months for her appointment. 16/28/2021 she arrived at her appointment and they told her she did not have an appointment and could no longer make an appointment. Patient is unsure why they are not wanting to allow her back in to pain management. She is seeking a referral to a different pain management. OAB/urinary incontinence: She is seeing urology. Past Medical History:   Diagnosis Date    Acquired hypothyroidism 8/1/2016    Arthritis of right shoulder region 4/21/2016    Asthma     Bilateral lower extremity edema 7/7/2021    Chronic bilateral low back pain without sciatica 7/7/2021    Chronic diastolic congestive heart failure ([address] 3/10/2021    Chronic venous insufficiency     Diabetes ([address]     Essential hypertension 7/7/2021    Gout     History of depression 1/23/2017    History of fall 7/7/2021    Hypercholesteremia 7/7/2021    Hypertension     MI (myocardial infarction) ([address]     OAB (overactive bladder) 7/7/2021    Peripheral neuropathy     Rheumatoid arthritis (HCC)     Tear of right rotator cuff 5/16/2016    Thyroid pain     Urinary incontinence 7/7/2021    Vertigo      Past Surgical History:   Procedure Laterality Date    HX AMPUTATION TOE Right 2020    Great toe Dr. 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     HX KNEE REPLACEMENT Bilateral      Current Outpatient Medications   Medication Sig    lansoprazole (PREVACID PO) Take  by mouth.  amLODIPine (NORVASC) 10 mg tablet Take 1 Tablet by mouth daily.  atorvastatin (LIPITOR) 40 mg tablet Take 1 Tablet by mouth daily.  bumetanide (BUMEX) 2 mg tablet Take 0.5 Tablets by mouth two (2) times daily as needed (swelling).  carvediloL (COREG) 12.5 mg tablet Take 1 Tablet by mouth two (2) times daily (with meals).  glimepiride (AMARYL) 2 mg tablet Take 1 Tablet by mouth every morning.  gabapentin (NEURONTIN) 400 mg capsule Take 1 cap in morning, 1 cap at 2pm, 2 caps at bedtime    lisinopriL (PRINIVIL, ZESTRIL) 40 mg tablet Take 1 Tablet by mouth daily.  levothyroxine (SYNTHROID) 150 mcg tablet Take 1 Tablet by mouth Daily (before breakfast).  sertraline (ZOLOFT) 50 mg tablet Take 50 mg by mouth daily.  triamcinolone acetonide (KENALOG) 0.1 % ointment Apply  to affected area two (2) times a day. use thin layer     CYANOCOBALAMIN, VITAMIN B-12, PO Take  by mouth.  vit A/vit C/vit E/zinc/copper (PRESERVISION AREDS PO) Take  by mouth.  ascorbic acid, vitamin C, (VITAMIN C) 500 mg tablet Take 500 mg by mouth daily.  Blood-Glucose Meter (FREESTYLE LITE METER) monitoring kit Test twice blood glucose daily; ICD-10 E11.9; Quantity 1    insulin syringe,safetyneedle 1 mL 31 gauge x 5/16\" syrg 1 Each by Does Not Apply route daily.  cholecalciferol (VITAMIN D3) 1,000 unit tablet Take 2,000 Units by mouth daily.  glucose blood VI test strips (FREESTYLE TEST) strip Use to check blood glucose twice daily DX:E11.9    magnesium oxide 500 mg tab Take 1 Tab by mouth daily.  acetaminophen (TYLENOL) 500 mg tablet Take 1 Tab by mouth every six (6) hours as needed for Pain. No current facility-administered medications for this visit.      Allergies and Intolerances:   No Known Allergies  Family History:   Family History   Problem Relation Age of Onset    Heart Attack Mother     Heart Attack Father      Social History:   She  reports that she has quit smoking. She has never used smokeless tobacco.   Social History     Substance and Sexual Activity   Alcohol Use No    Alcohol/week: 0.0 standard drinks     Immunization History:  Immunization History   Administered Date(s) Administered    COVID-19, PFIZER, MRNA, LNP-S, PF, 30MCG/0.3ML DOSE 03/06/2021, 03/27/2021    Influenza High Dose Vaccine PF 10/06/2017, 10/29/2018    Influenza, High-dose, Quadrivalent (>65 Yrs Fluzone High Dose Quad 00604) 12/10/2020    Td 11/21/2017       Review of Systems:   As above included in HPI. Otherwise 11 point review of systems negative including constitutional, skin, HENT, eyes, respiratory, cardiovascular, gastrointestinal, genitourinary, musculoskeletal, endocrine, hematologic, allergy, and neurologic. Physical:   Visit Vitals  BP (!) 136/49   Pulse 76   Temp 97.5 °F (36.4 °C) (Temporal)   Resp 14   Ht 5' 5\" (1.651 m)   Wt 257 lb 9.6 oz (116.8 kg)   SpO2 96%   BMI 42.87 kg/m²      Wt Readings from Last 3 Encounters:   07/06/21 257 lb 9.6 oz (116.8 kg)   06/14/21 220 lb (99.8 kg)   03/10/21 254 lb 6.4 oz (115.4 kg)         Exam:   Physical Exam  Vitals and nursing note reviewed. Constitutional:       Appearance: Normal appearance. She is obese. Comments: Morbidly    HENT:      Head: Normocephalic and atraumatic. Right Ear: Tympanic membrane, ear canal and external ear normal.      Left Ear: Tympanic membrane, ear canal and external ear normal.      Nose: Nose normal.      Mouth/Throat:      Mouth: Mucous membranes are moist.   Eyes:      Extraocular Movements: Extraocular movements intact. Pupils: Pupils are equal, round, and reactive to light. Neck:      Vascular: No carotid bruit. Cardiovascular:      Rate and Rhythm: Normal rate and regular rhythm. Heart sounds: Normal heart sounds. Comments: 1+ edema noted to Brent LEs  Pulmonary:      Effort: Pulmonary effort is normal. No respiratory distress.       Breath sounds: Normal breath sounds. No wheezing. Abdominal:      General: Abdomen is flat. Bowel sounds are normal. There is no distension. Palpations: Abdomen is soft. Tenderness: There is no abdominal tenderness. Musculoskeletal:         General: Normal range of motion. Cervical back: Normal range of motion and neck supple. Comments: Gait stable. Amb with cane   Skin:     General: Skin is warm and dry. Findings: Erythema (Brent LEs from mid shin to ankle.) present. Neurological:      General: No focal deficit present. Mental Status: She is alert and oriented to person, place, and time. Psychiatric:         Mood and Affect: Mood normal.         Behavior: Behavior normal.         Thought Content: Thought content normal.         Judgment: Judgment normal.         Review of Data:  Labs reviewed: N/A  Will obtain today    Plan:    ICD-10-CM ICD-9-CM    1. Medicare annual wellness visit, subsequent  Z00.00 V70.0    2. Advanced directives, counseling/discussion  Z71.89 V65.49 ADVANCE CARE PLANNING FIRST 30 MINS      FULL CODE   3. Screening for ischemic heart disease  Z13.6 V81.0 LIPID PANEL   4. Body mass index 40.0-44.9, adult ([facility])  Z68.41 V85.41    5. Encounter for screening mammogram for malignant neoplasm of breast  Z12.31 V76.12 [facility] MAMMO BI SCREENING INCL CAD   6. Encounter to establish care  Z76.89 V65.8    7. Essential hypertension  I10 401.9 amLODIPine (NORVASC) 10 mg tablet      carvediloL (COREG) 12.5 mg tablet      lisinopriL (PRINIVIL, ZESTRIL) 40 mg tablet      METABOLIC PANEL, COMPREHENSIVE   8. Chronic diastolic congestive heart failure (HCC)  I50.32 428.32      428.0    9. Bilateral lower extremity edema  R60.0 782.3 bumetanide (BUMEX) 2 mg tablet   10. Hypercholesteremia  E78.00 272.0 atorvastatin (LIPITOR) 40 mg tablet      LIPID PANEL   11.  Type 2 diabetes mellitus with diabetic neuropathy, with long-term current use of insulin (HCC)  E11.40 250.60 glimepiride (AMARYL) 2 mg tablet    Z79.4 357.2 gabapentin (NEURONTIN) 400 mg capsule     V58.67 MICROALBUMIN, UR, RAND W/ MICROALB/CREAT RATIO      HEMOGLOBIN A1C WITH EAG      REFERRAL TO PHARMACIST   12. Diabetic neuropathy, painful ([facility])  E11.40 250.60      357.2    13. History of fall  Z91.81 V15.88    14. Acquired hypothyroidism  E03.9 244.9 levothyroxine (SYNTHROID) 150 mcg tablet      TSH 3RD GENERATION      T4, FREE   15. Chronic bilateral low back pain without sciatica  M54.5 724.2 REFERRAL TO PAIN MANAGEMENT    G89.29 338.29    16. OAB (overactive bladder)  N32.81 596.51    17. Urinary incontinence, unspecified type  R32 788.30    18. Obesity, Class III, BMI 40-49.9 (morbid obesity) ([facility])  E66.01 278.01      -MCR/ACP  MCR Wellness: Reviewed all HM and ordered tests/imaging appropriately. Reviewed care teams and medications with updates. Advanced Care Planning: Patient educated on the importance of an advanced care plan and paperwork was given to the patient today. Asked patient to read over the information and bring back at next appointment.    -Encounter to establish care  Patient is transferring to me from their previous provider. I spent no less than 35 minutes reviewing and updating the chart to include previous labs, diagnostics, and specialty notes. Reviewed medication and completed the medication reconciliation with the patient. Reviewed side effects of medications with the patient. Questions were answered and patient verb understanding. Informed patient chronic medication refills will not be given between their scheduled appointments; all refills will be given at the time of their scheduled follow-up appointments. Patient verbalized understanding    -Obesity  Weight management:  BMI is out of normal parameters and plan is as follows: Discussed in great detail on diet, portion control, exercise, avoiding foods high in sugar, carbs and starches, fatty/greasy foods and to eat until satisfied not til full.  I have counseled this patient on diet and exercise regimens as well. Patient verbalized understanding. Discussion about modifying behaviors regarding diet and exercise undertaken. Increase activity as tolerated   -Pt is not motivated due to issues with neuropathy and chronic  back pain  Cut back on carbs and fatty foods  Calorie counting would be beneficial  Weight loss options: Weight Watchers, exercise joanie  Discussed referral to nutritionist for further counseling              -Pt declined  Discussed weight loss target of 5% over 6-12 months being a realistic goal .  Will reevaluate maritza loss and goals along with management at subsequent visits. Total time: 15 minutes     Hypertension/CHF  Refilled all medications due to being out. Amlodipine, Coreg, lisinopril  Patient has a follow-up appointment with Dr. Elizabeth Almaraz, cardiology, 9/15/2021    Bilateral lower extremity edema  Refill Bumex  Elevate lower extremities    Hypercholesteremia  Refilled atorvastatin    Type 2 diabetes  Referral placed to Pharm. D.   #Seeking assistance with obtaining insulin through pharmaceutical  company along with DM education and management. Continue current therapy of NovoLog R nightly    Diabetic neuropathy  Increase gabapentin to 400 mg twice daily and 800 mg bedtime  Discussed importance of managing diabetes  Follow-up with Dr. Luli Scott as scheduled    History of falls    Hypothyroidism  Refilled Synthroid 150 mcg    Chronic bilateral low back pain without sciatica  Informed patient I would not manage chronic pain  Referral placed to pain management    OAB/urinary incontinence  Follow-up with urology as scheduled    Patient to return in 4 weeks for follow-up on today's visit     Follow up 1 month  Labs needed 1 week prior to appt: No    Dr. Joaquim Bowers, AGNP-C, DNP  [facility]       Time does not include AWV or ACP time.

## 2021-07-07 ENCOUNTER — TELEPHONE (OUTPATIENT)
Dept: INTERNAL MEDICINE CLINIC | Age: 86
End: 2021-07-07

## 2021-07-07 PROBLEM — R60.0 BILATERAL LOWER EXTREMITY EDEMA: Status: ACTIVE | Noted: 2021-07-07

## 2021-07-07 PROBLEM — E78.00 HYPERCHOLESTEREMIA: Status: ACTIVE | Noted: 2021-07-07

## 2021-07-07 PROBLEM — N32.81 OAB (OVERACTIVE BLADDER): Status: ACTIVE | Noted: 2021-07-07

## 2021-07-07 PROBLEM — M54.50 CHRONIC BILATERAL LOW BACK PAIN WITHOUT SCIATICA: Status: ACTIVE | Noted: 2021-07-07

## 2021-07-07 PROBLEM — Z91.81 HISTORY OF FALL: Status: ACTIVE | Noted: 2021-07-07

## 2021-07-07 PROBLEM — I10 ESSENTIAL HYPERTENSION: Status: ACTIVE | Noted: 2021-07-07

## 2021-07-07 PROBLEM — G89.29 CHRONIC BILATERAL LOW BACK PAIN WITHOUT SCIATICA: Status: ACTIVE | Noted: 2021-07-07

## 2021-07-07 PROBLEM — R32 URINARY INCONTINENCE: Status: ACTIVE | Noted: 2021-07-07

## 2021-07-07 NOTE — PATIENT INSTRUCTIONS
Medicare Wellness Visit, Female     The best way to live healthy is to have a lifestyle where you eat a well-balanced diet, exercise regularly, limit alcohol use, and quit all forms of tobacco/nicotine, if applicable. Regular preventive services are another way to keep healthy. Preventive services (vaccines, screening tests, monitoring & exams) can help personalize your care plan, which helps you manage your own care. Screening tests can find health problems at the earliest stages, when they are easiest to treat. [facility] follows the current, evidence-based guidelines published by the [facility] Sean Mcnulty ([facility]) when recommending preventive services for our patients. Because we follow these guidelines, sometimes recommendations change over time as research supports it. (For example, mammograms used to be recommended annually. Even though Medicare will still pay for an annual mammogram, the newer guidelines recommend a mammogram every two years for women of average risk). Of course, you and your doctor may decide to screen more often for some diseases, based on your risk and your co-morbidities (chronic disease you are already diagnosed with). Preventive services for you include:  - Medicare offers their members a free annual wellness visit, which is time for you and your primary care provider to discuss and plan for your preventive service needs. Take advantage of this benefit every year!  -All adults over the age of 72 should receive the recommended pneumonia vaccines. Current USPSTF guidelines recommend a series of two vaccines for the best pneumonia protection.   -All adults should have a flu vaccine yearly and a tetanus vaccine every 10 years.   -All adults age 48 and older should receive the shingles vaccines (series of two vaccines).       -All adults age 38-68 who are overweight should have a diabetes screening test once every three years.   -All adults born between [birth year] and 1963 should be screened once for Hepatitis C.  -Other screening tests and preventive services for persons with diabetes include: an eye exam to screen for diabetic retinopathy, a kidney function test, a foot exam, and stricter control over your cholesterol.   -Cardiovascular screening for adults with routine risk involves an electrocardiogram (ECG) at intervals determined by your doctor.   -Colorectal cancer screenings should be done for adults age 54-65 with no increased risk factors for colorectal cancer. There are a number of acceptable methods of screening for this type of cancer. Each test has its own benefits and drawbacks. Discuss with your doctor what is most appropriate for you during your annual wellness visit. The different tests include: colonoscopy (considered the best screening method), a fecal occult blood test, a fecal DNA test, and sigmoidoscopy.    -A bone mass density test is recommended when a woman turns 65 to screen for osteoporosis. This test is only recommended one time, as a screening. Some providers will use this same test as a disease monitoring tool if you already have osteoporosis. -Breast cancer screenings are recommended every other year for women of normal risk, age 54-69.  -Cervical cancer screenings for women over age 72 are only recommended with certain risk factors.      Here is a list of your current Health Maintenance items (your personalized list of preventive services) with a due date:  Health Maintenance Due   Topic Date Due    Eye Exam  Never done    Shingles Vaccine (1 of 2) Never done    Bone Mineral Density   Never done    Pneumococcal Vaccine (1 of 1 - PPSV23) Never done    DTaP/Tdap/Td  (1 - Tdap) 11/22/2017    Diabetic Foot Care  08/23/2018

## 2021-07-07 NOTE — PROGRESS NOTES
This is the Subsequent Medicare Annual Wellness Exam, performed 12 months or more after the Initial AWV or the last Subsequent AWV    I have reviewed the patient's medical history in detail and updated the computerized patient record. Assessment/Plan   Education and counseling provided:  Are appropriate based on today's review and evaluation  Pneumococcal Vaccine  Screening Mammography  Cardiovascular screening blood test  Screening for glaucoma  Overall pt is doing well. 1. Medicare annual wellness visit, subsequent  2. Advanced directives, counseling/discussion  -     ADVANCE CARE PLANNING FIRST [id number] [address]  -     FULL CODE  3. Screening for ischemic heart disease  -     LIPID PANEL; Future  4. Body mass index 40.0-44.9, adult ([facility] [address]  5. Encounter for screening mammogram for malignant neoplasm of breast  -     JEB MAMMO BI SCREENING INCL CAD; Future  6. Encounter to establish care  7. Essential hypertension  -     amLODIPine (NORVASC) 10 mg tablet; Take 1 Tablet by mouth daily. , Normal, Disp-90 Tablet, R-0  -     carvediloL (COREG) 12.5 mg tablet; Take 1 Tablet by mouth two (2) times daily (with meals). , Normal, Disp-180 Tablet, R-0  -     lisinopriL (PRINIVIL, ZESTRIL) 40 mg tablet; Take 1 Tablet by mouth daily. , Normal, Disp-90 Tablet, R-0  -     METABOLIC PANEL, COMPREHENSIVE; Future  8. Chronic diastolic congestive heart failure ([facility]  9. Bilateral lower extremity edema  -     bumetanide (BUMEX) 2 mg tablet; Take 0.5 Tablets by mouth two (2) times daily as needed (swelling). , Normal, Disp-180 Tablet, R-0  10. Hypercholesteremia  -     atorvastatin (LIPITOR) 40 mg tablet; Take 1 Tablet by mouth daily. , Normal, Disp-90 Tablet, R-0  -     LIPID PANEL; Future  11. Type 2 diabetes mellitus with diabetic neuropathy, with long-term current use of insulin (HCC)  -     glimepiride (AMARYL) 2 mg tablet;  Take 1 Tablet by mouth every morning., Normal, Disp-90 Tablet, R-0  -     gabapentin (NEURONTIN) 400 mg capsule; Take 1 cap in morning, 1 cap at 2pm, 2 caps at bedtime, Normal, Disp-360 Capsule, R-0  -     MICROALBUMIN, UR, RAND W/ MICROALB/CREAT RATIO; Future  -     HEMOGLOBIN A1C WITH EAG; Future  -     REFERRAL TO PHARMACIST  12. Diabetic neuropathy, painful ([address]  13. History of fall  14. Acquired hypothyroidism  -     levothyroxine (SYNTHROID) 150 mcg tablet; Take 1 Tablet by mouth Daily (before breakfast). , Normal, Disp-90 Tablet, R-0  -     TSH 3RD GENERATION; Future  -     T4, FREE; Future  15. Chronic bilateral low back pain without sciatica  -     REFERRAL TO PAIN MANAGEMENT  16. OAB (overactive bladder)  17. Urinary incontinence, unspecified type  18. Obesity, Class III, BMI 40-49.9 (morbid obesity) ([facility])       Depression Risk Factor Screening     3 most recent PHQ Screens 7/6/2021   Little interest or pleasure in doing things Not at all   Feeling down, depressed, irritable, or hopeless Not at all   Total Score PHQ 2 0   Trouble falling or staying asleep, or sleeping too much -   Feeling tired or having little energy -   Poor appetite, weight loss, or overeating -   Feeling bad about yourself - or that you are a failure or have let yourself or your family down -   Trouble concentrating on things such as school, work, reading, or watching TV -   Moving or speaking so slowly that other people could have noticed; or the opposite being so fidgety that others notice -   Thoughts of being better off dead, or hurting yourself in some way -   PHQ 9 Score -   How difficult have these problems made it for you to do your work, take care of your home and get along with others -       Alcohol Risk Screen    Do you average more than 1 drink per night or more than 7 drinks a week:  No    On any one occasion in the past three months have you have had more than 3 drinks containing alcohol:  No        Functional Ability and Level of Safety    Hearing: The patient needs further evaluation. Activities of Daily Living:   The home contains: no safety equipment. Patient does total self care      Ambulation: with difficulty, uses a cane     Fall Risk:  Fall Risk Assessment, last 12 mths 3/10/2021   Able to walk? Yes   Fall in past 12 months? 1   Do you feel unsteady? 1   Are you worried about falling 1   Number of falls in past 12 months 1   Fall with injury?  0      Abuse Screen:  Patient is not abused       Cognitive Screening    Has your family/caregiver stated any concerns about your memory: no     Cognitive Screening: Normal - Clock Drawing Test    Health Maintenance Due     Health Maintenance Due   Topic Date Due    Eye Exam Retinal or Dilated  Never done    Shingrix Vaccine Age 50> (1 of 2) Never done    Bone Densitometry (Dexa) Screening  Never done    Pneumococcal 65+ years (1 of 1 - PPSV23) Never done    DTaP/Tdap/Td series (1 - Tdap) 11/22/2017    Foot Exam Q1  08/23/2018       Patient Care Team   Patient Care Team:  Jc Ortiz as PCP - General (Nurse Practitioner)  Deanne Dumont DPM (Melisa Lick)  Abimael Arias MD (Cardiology)  [facility], [facility] Criselda Marlow (Urology)    History     Patient Active Problem List   Diagnosis Code    Acquired hypothyroidism E03.9    Dermatitis L30.9    Chronic diastolic congestive heart failure ([address] I50.32    Essential hypertension I10    Bilateral lower extremity edema R60.0    Hypercholesteremia E78.00    History of fall Z91.81    Chronic bilateral low back pain without sciatica M54.5, G89.29    OAB (overactive bladder) N32.81    Urinary incontinence R32     Past Medical History:   Diagnosis Date    Acquired hypothyroidism 8/1/2016    Arthritis of right shoulder region 4/21/2016    Asthma     Bilateral lower extremity edema 7/7/2021    Chronic bilateral low back pain without sciatica 7/7/2021    Chronic diastolic congestive heart failure ([address] 3/10/2021    Chronic venous insufficiency     Diabetes ([address]     Essential hypertension 7/7/2021    Gout     History of depression 1/23/2017    History of fall 7/7/2021    Hypercholesteremia 7/7/2021    Hypertension     MI (myocardial infarction) ([facility]     OAB (overactive bladder) 7/7/2021    Peripheral neuropathy     Rheumatoid arthritis (HCC)     Tear of right rotator cuff 5/16/2016    Thyroid pain     Urinary incontinence 7/7/2021    Vertigo       Past Surgical History:   Procedure Laterality Date    HX AMPUTATION TOE Right 2020    Great toe Dr. Shaun Bailey HX CHOLECYSTECTOMY      HX GYN      TAHOophorectomy due to infection    HX HEENT      resection of memegioma in the 1980's.  HX KNEE REPLACEMENT Bilateral      Current Outpatient Medications   Medication Sig Dispense Refill    lansoprazole (PREVACID PO) Take  by mouth.  amLODIPine (NORVASC) 10 mg tablet Take 1 Tablet by mouth daily. 90 Tablet 0    atorvastatin (LIPITOR) 40 mg tablet Take 1 Tablet by mouth daily. 90 Tablet 0    bumetanide (BUMEX) 2 mg tablet Take 0.5 Tablets by mouth two (2) times daily as needed (swelling). 180 Tablet 0    carvediloL (COREG) 12.5 mg tablet Take 1 Tablet by mouth two (2) times daily (with meals). 180 Tablet 0    glimepiride (AMARYL) 2 mg tablet Take 1 Tablet by mouth every morning. 90 Tablet 0    gabapentin (NEURONTIN) 400 mg capsule Take 1 cap in morning, 1 cap at 2pm, 2 caps at bedtime 360 Capsule 0    lisinopriL (PRINIVIL, ZESTRIL) 40 mg tablet Take 1 Tablet by mouth daily. 90 Tablet 0    levothyroxine (SYNTHROID) 150 mcg tablet Take 1 Tablet by mouth Daily (before breakfast). 90 Tablet 0    sertraline (ZOLOFT) 50 mg tablet Take 50 mg by mouth daily.  triamcinolone acetonide (KENALOG) 0.1 % ointment Apply  to affected area two (2) times a day. use thin layer       CYANOCOBALAMIN, VITAMIN B-12, PO Take  by mouth.  vit A/vit C/vit E/zinc/copper (PRESERVISION AREDS PO) Take  by mouth.  ascorbic acid, vitamin C, (VITAMIN C) 500 mg tablet Take 500 mg by mouth daily.       Blood-Glucose Meter (FREESTYLE LITE METER) monitoring kit Test twice blood glucose daily; ICD-10 E11.9; Quantity 1 1 Kit 0    insulin syringe,safetyneedle 1 mL 31 gauge x 5/16\" syrg 1 Each by Does Not Apply route daily. 300 Each 3    cholecalciferol (VITAMIN D3) 1,000 unit tablet Take 2,000 Units by mouth daily.  glucose blood VI test strips (FREESTYLE TEST) strip Use to check blood glucose twice daily DX:E11.9 300 Strip 3    magnesium oxide 500 mg tab Take 1 Tab by mouth daily.  acetaminophen (TYLENOL) 500 mg tablet Take 1 Tab by mouth every six (6) hours as needed for Pain.  270 Tab 3     No Known Allergies    Family History   Problem Relation Age of Onset    Heart Attack Mother     Heart Attack Father      Social History     Tobacco Use    Smoking status: Former Smoker    Smokeless tobacco: Never Used    Tobacco comment: quit 45 years ago   Substance Use Topics    Alcohol use: No     Alcohol/week: 0.0 standard drinks         Bridget Gonzalez DNP

## 2021-07-07 NOTE — ACP (ADVANCE CARE PLANNING)
Advance Care Planning     General Advance Care Planning (ACP) Conversation      Date of Conversation: 7/6/2021  Conducted with: Patient with Decision Making Capacity    Healthcare Decision Maker:     Supplemental (Other) Decision Maker (Active): Iva Mclaughlin [facility]/ All permissions - Child - [phone]  Click here to complete [place] including selection of the Healthcare Decision Maker Relationship (ie \"Primary\")  Today we documented Decision Maker(s) consistent with Legal Next of Kin hierarchy. Content/Action Overview:    Has ACP document(s) NOT on file - requested patient to provide  Reviewed DNR/DNI and patient elects Full Code (Attempt Resuscitation)  Topics discussed: ventilation preferences, hospitalization preferences and resuscitation preferences       Length of Voluntary ACP Conversation in minutes:  16 minutes    Eddy Simon DNP

## 2021-07-09 ENCOUNTER — TELEPHONE (OUTPATIENT)
Dept: INTERNAL MEDICINE CLINIC | Age: 86
End: 2021-07-09

## 2021-07-10 DIAGNOSIS — Z12.31 ENCOUNTER FOR SCREENING MAMMOGRAM FOR MALIGNANT NEOPLASM OF BREAST: ICD-10-CM

## 2021-07-11 PROBLEM — Z79.4 TYPE 2 DIABETES MELLITUS WITH DIABETIC NEPHROPATHY, WITH LONG-TERM CURRENT USE OF INSULIN (HCC): Status: ACTIVE | Noted: 2017-12-15

## 2021-07-11 RX ORDER — LEVOTHYROXINE SODIUM 175 UG/1
175 TABLET ORAL
Qty: 90 TABLET | Refills: 1 | Status: SHIPPED | OUTPATIENT
Start: 2021-07-11 | End: 2021-11-09 | Stop reason: SDUPTHER

## 2021-07-11 RX ORDER — ATORVASTATIN CALCIUM 80 MG/1
80 TABLET, FILM COATED ORAL DAILY
Qty: 90 TABLET | Refills: 3 | Status: SHIPPED | OUTPATIENT
Start: 2021-07-11 | End: 2021-09-15

## 2021-07-11 NOTE — PROGRESS NOTES
Update 7/11/21:  [address] [facility] nurses,  please notify pt of the following results:   Microabuminuria- on ACE therapy  A1c 10. 3. referral was placed to Pharm D for DM mgt. Appears  has been trying to reach her to schedule appt with PharmD but pt not returning phone calls. TSH elevated 32.90; Low T4. Increase Synthroid to 175mcg every day. ;  - increase Atorvastatin to 80mg. Take Atorvastatin 40mg 2 tabs every day til all gone then start Lipitor 80mg daily. GFR 52 - hydrate. Sent new prescriptions to [address] [place] [address].    Thank you

## 2021-07-11 NOTE — PROGRESS NOTES
[address] [facility] nurses,  please notify pt of the following results:   Microabuminuria- on ACE therapy  A1c 10. 3. referral was placed to Pharm D for DM mgt. Appears  has been trying to reach her to schedule appt with PharmD but pt not returning phone calls. TSH elevated 32.90; Low T4. Increase Synthroid to 175mcg every day. ;  - increase Atorvastatin to 80mg. Take Atorvastatin 40mg 2 tabs every day til all gone then start Lipitor 80mg daily. GFR 52 - hydrate. Sent new prescriptions to [facility]    Thank you

## 2021-07-12 ENCOUNTER — TELEPHONE (OUTPATIENT)
Dept: INTERNAL MEDICINE CLINIC | Age: 86
End: 2021-07-12

## 2021-07-12 NOTE — TELEPHONE ENCOUNTER
Patients son returned call and was informed of results and message per Dr. Akosua Rojo. Patients son verbalized understanding.

## 2021-07-12 NOTE — TELEPHONE ENCOUNTER
----- Message from Trev Johnson DNP sent at 7/11/2021  7:48 PM EDT -----  [address] [facility] nurses,  please notify pt of the following results:   Microabuminuria- on ACE therapy  A1c 10. 3. referral was placed to Pharm D for DM mgt. Appears  has been trying to reach her to schedule appt with PharmD but pt not returning phone calls. TSH elevated 32.90; Low T4. Increase Synthroid to 175mcg every day. ;  - increase Atorvastatin to 80mg. Take Atorvastatin 40mg 2 tabs every day til all gone then start Lipitor 80mg daily. GFR 52 - hydrate. Sent new prescriptions to [facility].    Thank you

## 2021-07-12 NOTE — TELEPHONE ENCOUNTER
Patient called requesting her pain mgmt referral be faxed to Dr. Lupe Willis ([facility]) and also requesting referral to Ortho for her shoulder pain. Please advise. Pain mgmt referral already faxed.

## 2021-07-13 NOTE — TELEPHONE ENCOUNTER
Patient called and informed [facility] pain mgmt is not accepting new patients so she can call Dr. Kylee Szymanski office to scheduele appointment as originally planned.

## 2021-07-15 ENCOUNTER — TELEPHONE (OUTPATIENT)
Dept: INTERNAL MEDICINE CLINIC | Age: 86
End: 2021-07-15

## 2021-07-15 NOTE — TELEPHONE ENCOUNTER
Pt's rep asking for return call/ from nurse Willis Ross pt right there beside him. Stated the phone # information given for [place] pain [facility] is wrong. Sil Woodard they have been given the wrong # twice.

## 2021-07-15 NOTE — TELEPHONE ENCOUNTER
Patients rep reached and informed number was correct. I informed Mrs Omari Page that [facility] pain [facility] is not currently accepting new patients so she would have to schedule with Dr. Donavan Osborn office instead.     # [phone]

## 2021-08-10 ENCOUNTER — OFFICE VISIT (OUTPATIENT)
Dept: INTERNAL MEDICINE CLINIC | Age: 86
End: 2021-08-10
Payer: MEDICARE

## 2021-08-10 ENCOUNTER — TELEPHONE (OUTPATIENT)
Dept: INTERNAL MEDICINE CLINIC | Age: 86
End: 2021-08-10

## 2021-08-10 VITALS
OXYGEN SATURATION: 97 % | WEIGHT: 251.4 LBS | HEART RATE: 90 BPM | SYSTOLIC BLOOD PRESSURE: 138 MMHG | RESPIRATION RATE: 14 BRPM | BODY MASS INDEX: 41.88 KG/M2 | DIASTOLIC BLOOD PRESSURE: 43 MMHG | HEIGHT: 65 IN | TEMPERATURE: 97.1 F

## 2021-08-10 DIAGNOSIS — E03.9 ACQUIRED HYPOTHYROIDISM: ICD-10-CM

## 2021-08-10 DIAGNOSIS — M54.50 CHRONIC BILATERAL LOW BACK PAIN WITHOUT SCIATICA: ICD-10-CM

## 2021-08-10 DIAGNOSIS — N32.81 OAB (OVERACTIVE BLADDER): ICD-10-CM

## 2021-08-10 DIAGNOSIS — Z23 ENCOUNTER FOR IMMUNIZATION: ICD-10-CM

## 2021-08-10 DIAGNOSIS — G89.29 CHRONIC BILATERAL LOW BACK PAIN WITHOUT SCIATICA: ICD-10-CM

## 2021-08-10 DIAGNOSIS — E11.21 TYPE 2 DIABETES MELLITUS WITH DIABETIC NEPHROPATHY, WITH LONG-TERM CURRENT USE OF INSULIN (HCC): Primary | ICD-10-CM

## 2021-08-10 DIAGNOSIS — I10 ESSENTIAL HYPERTENSION: ICD-10-CM

## 2021-08-10 DIAGNOSIS — Z79.4 TYPE 2 DIABETES MELLITUS WITH DIABETIC NEPHROPATHY, WITH LONG-TERM CURRENT USE OF INSULIN (HCC): Primary | ICD-10-CM

## 2021-08-10 DIAGNOSIS — G62.9 NEUROPATHY: ICD-10-CM

## 2021-08-10 DIAGNOSIS — E78.00 HYPERCHOLESTEREMIA: ICD-10-CM

## 2021-08-10 PROCEDURE — G8417 CALC BMI ABV UP PARAM F/U: HCPCS | Performed by: NURSE PRACTITIONER

## 2021-08-10 PROCEDURE — 1101F PT FALLS ASSESS-DOCD LE1/YR: CPT | Performed by: NURSE PRACTITIONER

## 2021-08-10 PROCEDURE — G8536 NO DOC ELDER MAL SCRN: HCPCS | Performed by: NURSE PRACTITIONER

## 2021-08-10 PROCEDURE — G0009 ADMIN PNEUMOCOCCAL VACCINE: HCPCS | Performed by: NURSE PRACTITIONER

## 2021-08-10 PROCEDURE — G8754 DIAS BP LESS 90: HCPCS | Performed by: NURSE PRACTITIONER

## 2021-08-10 PROCEDURE — 99214 OFFICE O/P EST MOD 30 MIN: CPT | Performed by: NURSE PRACTITIONER

## 2021-08-10 PROCEDURE — 90732 PPSV23 VACC 2 YRS+ SUBQ/IM: CPT | Performed by: NURSE PRACTITIONER

## 2021-08-10 PROCEDURE — 1090F PRES/ABSN URINE INCON ASSESS: CPT | Performed by: NURSE PRACTITIONER

## 2021-08-10 PROCEDURE — G8400 PT W/DXA NO RESULTS DOC: HCPCS | Performed by: NURSE PRACTITIONER

## 2021-08-10 PROCEDURE — G0463 HOSPITAL OUTPT CLINIC VISIT: HCPCS | Performed by: NURSE PRACTITIONER

## 2021-08-10 PROCEDURE — G8752 SYS BP LESS 140: HCPCS | Performed by: NURSE PRACTITIONER

## 2021-08-10 PROCEDURE — G8432 DEP SCR NOT DOC, RNG: HCPCS | Performed by: NURSE PRACTITIONER

## 2021-08-10 PROCEDURE — G8427 DOCREV CUR MEDS BY ELIG CLIN: HCPCS | Performed by: NURSE PRACTITIONER

## 2021-08-10 RX ORDER — INSULIN ASPART 100 [IU]/ML
INJECTION, SOLUTION INTRAVENOUS; SUBCUTANEOUS
Qty: 5 PEN | Refills: 11
Start: 2021-08-10 | End: 2021-09-15

## 2021-08-10 NOTE — TELEPHONE ENCOUNTER
I have rescheduled patient to Monday, 8/16 at 11 AM. Please notify patient     Thank you,  Tay Barros.  Flores Dotson, FELIPES

## 2021-08-10 NOTE — PROGRESS NOTES
Chief Complaint   Patient presents with    Follow-up     1 mo f/u       1. Have you been to the ER, urgent care clinic since your last visit? Hospitalized since your last visit? No    2. Have you seen or consulted any other health care providers outside of the [address] [place] [address] since your last visit? Include any pap smears or colon screening.  No

## 2021-08-10 NOTE — TELEPHONE ENCOUNTER
Pt scheduled with Dr Mague Pedraza for 08/30/2021 that is her first available for 60 minutes     Per Dr Levy's check out note today she wanted her to see Dr Mague Pedraza as soon as possible. Please advise if sooner appt needed and date/time to schedule?  Thank you

## 2021-08-10 NOTE — PROGRESS NOTES
[facility], [facility]  [phone] GKCUGV/[phone] fax    8/10/2021    HPI:   Eze Singh 1935 is a pleasant WHITE/NON- female who presents today follow up from previous appt. Patient rents a room to a gentleman who assists her as needed. She is hard of hearing and ambulates with a cane. Hypertension/diastolic heart failure/cholesterol: Patient is seeing Dr. Montrell Lockhart, cardiology. She is taking atorvastatin 80 mg, amlodipine, Coreg, lisinopril. She denies headaches, shortness of breath, chest pain, fatigue, weight gain. Bilateral lower extremity edema: She continues to take Bumex as prescribed. This has been a very successful treatment for her. She does elevate her lower extremities at times. She does not wear compression hose. Type 2 diabetes with neuropathy: Blood sugars between in the a.m average 150-160. When she was taking Lantus this was effective but the cost was more than what she could afford. She was prescribed NovoLog N 32 units in the morning and NovoLog are 40 units at night. She is unable to take NovoLog N as this plummets her blood sugar. She is currently taking NovoLog R 20 units every night and she bases her dose off of her blood sugar readings. She is also taking Amaryl. She had her right great toe amputated December 2020 under Dr Nilsa Galvez. She is taking her gabapentin therapy which helps relieve neuropathy pains to her feet. Unfortunately she did not schedule an appointment with Ajith Nascimento, for DM management. She will schedule this appointment today. Dr Nilsa Galvez removed a large corn from her left foot posterior great toe on 8/10/2021. Bandage was applied. Patient was instructed how to care for her foot after procedure was completed. Falls/neuropathy: She has had multiple falls due to severe neuropathy to her feet.   Most the time her feet are numb but she suffers severe sharp pains as well. Hypothyroidism: She is currently taking Synthroid 175 mcg daily tolerating therapy well. Depression: taking zoloft therapy. This is effective for her. She does not require refills at this time. Chronic bilateral low back pain without sciatica: Pain is worse when active and can get to a pain score of 10 out of 10. Was seeing pain management until sometime in 2020 when she decided to wean herself off her morphine treatment. Her pain in her low back began to get worse so she called her pain management and had to wait 2 months for her appointment. On 6/28/2021 she arrived at her appointment and they told her she did not have an appointment and could no longer make an appointment. Patient is unsure why they are not wanting to allow her back in to pain management. She has not heard from Dr. Mitchell Huff pain management. She has a number and will be calling to inquire about scheduling an appointment. OAB/urinary incontinence: She is seeing urology. She was recently treated for UTI with Macrobid. She was also prescribed Myrbetriq but her insurance will not cover it.     Past Medical History:   Diagnosis Date    Acquired hypothyroidism 8/1/2016    Arthritis of right shoulder region 4/21/2016    Asthma     Bilateral lower extremity edema 7/7/2021    Chronic bilateral low back pain without sciatica 7/7/2021    Chronic diastolic congestive heart failure ([address] 3/10/2021    Chronic venous insufficiency     Diabetes ([address]     neuropathy    Essential hypertension 7/7/2021    Gout     History of depression 1/23/2017    History of fall 7/7/2021    Hypercholesteremia 7/7/2021    Hypertension     MI (myocardial infarction) ([address]     OAB (overactive bladder) 7/7/2021    Peripheral neuropathy     Rheumatoid arthritis ([address]     Tear of right rotator cuff 5/16/2016    Thyroid pain     Urinary incontinence 7/7/2021    Vertigo      Past Surgical History:   Procedure Laterality Date    HX AMPUTATION TOE Right 2020    Great toe Dr. 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  HX KNEE REPLACEMENT Bilateral      Current Outpatient Medications   Medication Sig    insulin aspart U-100 (NOVOLOG) 100 unit/mL (3 mL) inpn Inject 25 units daily. Indications: type 2 diabetes mellitus    atorvastatin (LIPITOR) 80 mg tablet Take 1 Tablet by mouth daily.  levothyroxine (SYNTHROID) 175 mcg tablet Take 1 Tablet by mouth Daily (before breakfast).  lansoprazole (PREVACID PO) Take  by mouth.  amLODIPine (NORVASC) 10 mg tablet Take 1 Tablet by mouth daily.  bumetanide (BUMEX) 2 mg tablet Take 0.5 Tablets by mouth two (2) times daily as needed (swelling).  carvediloL (COREG) 12.5 mg tablet Take 1 Tablet by mouth two (2) times daily (with meals).  glimepiride (AMARYL) 2 mg tablet Take 1 Tablet by mouth every morning.  gabapentin (NEURONTIN) 400 mg capsule Take 1 cap in morning, 1 cap at 2pm, 2 caps at bedtime    lisinopriL (PRINIVIL, ZESTRIL) 40 mg tablet Take 1 Tablet by mouth daily.  sertraline (ZOLOFT) 50 mg tablet Take 50 mg by mouth daily.  triamcinolone acetonide (KENALOG) 0.1 % ointment Apply  to affected area two (2) times a day. use thin layer     CYANOCOBALAMIN, VITAMIN B-12, PO Take  by mouth.  vit A/vit C/vit E/zinc/copper (PRESERVISION AREDS PO) Take  by mouth.  ascorbic acid, vitamin C, (VITAMIN C) 500 mg tablet Take 500 mg by mouth daily.  Blood-Glucose Meter (FREESTYLE LITE METER) monitoring kit Test twice blood glucose daily; ICD-10 E11.9; Quantity 1    insulin syringe,safetyneedle 1 mL 31 gauge x 5/16\" syrg 1 Each by Does Not Apply route daily.  cholecalciferol (VITAMIN D3) 1,000 unit tablet Take 2,000 Units by mouth daily.     glucose blood VI test strips (FREESTYLE TEST) strip Use to check blood glucose twice daily DX:E11.9  magnesium oxide 500 mg tab Take 1 Tab by mouth daily.  acetaminophen (TYLENOL) 500 mg tablet Take 1 Tab by mouth every six (6) hours as needed for Pain. No current facility-administered medications for this visit. Allergies and Intolerances:   No Known Allergies  Family History:   Family History   Problem Relation Age of Onset    Heart Attack Mother     Heart Attack Father      Social History:   She  reports that she has quit smoking. She has never used smokeless tobacco.   Social History     Substance and Sexual Activity   Alcohol Use No    Alcohol/week: 0.0 standard drinks     Immunization History:  Immunization History   Administered Date(s) Administered    COVID-19, PFIZER, MRNA, LNP-S, PF, 30MCG/0.3ML DOSE 03/06/2021, 03/27/2021    Influenza High Dose Vaccine PF 10/06/2017, 10/29/2018    Influenza, High-dose, Quadrivalent (>65 Yrs Fluzone High Dose Quad 19797) 12/10/2020    Td 11/21/2017       Review of Systems:   As above included in HPI. Otherwise 11 point review of systems negative including constitutional, skin, HENT, eyes, respiratory, cardiovascular, gastrointestinal, genitourinary, musculoskeletal, endocrine, hematologic, allergy, and neurologic. Physical:   Visit Vitals  BP (!) 138/43   Pulse 90   Temp 97.1 °F (36.2 °C) (Temporal)   Resp 14   Ht 5' 5\" (1.651 m)   Wt 251 lb 6.4 oz (114 kg)   SpO2 97%   BMI 41.84 kg/m²      Wt Readings from Last 3 Encounters:   08/10/21 251 lb 6.4 oz (114 kg)   07/06/21 257 lb 9.6 oz (116.8 kg)   06/14/21 220 lb (99.8 kg)         Exam:   Physical Exam  Vitals and nursing note reviewed. Constitutional:       Appearance: Normal appearance. She is obese. Comments: Morbidly    HENT:      Head: Normocephalic and atraumatic. Right Ear: External ear normal.      Left Ear: External ear normal.      Ears:      Comments: Hard of hearing  Eyes:      Extraocular Movements: Extraocular movements intact.       Conjunctiva/sclera: Conjunctivae normal.   Cardiovascular:      Rate and Rhythm: Normal rate and regular rhythm. Heart sounds: Normal heart sounds. Comments: No edema noted  Pulmonary:      Effort: Pulmonary effort is normal.   Musculoskeletal:         General: Normal range of motion. Cervical back: Normal range of motion and neck supple. Comments: Gait stable. Amb with cane   Skin:     General: Skin is warm and dry. Neurological:      General: No focal deficit present. Mental Status: She is alert and oriented to person, place, and time. Psychiatric:         Mood and Affect: Mood normal.         Behavior: Behavior normal.         Thought Content: Thought content normal.         Judgment: Judgment normal.         Review of Data:  Labs reviewed: N/A  Reviewed labs from 7/6/2021 with patient    Plan:    ICD-10-CM ICD-9-CM    1. Type 2 diabetes mellitus with diabetic nephropathy, with long-term current use of insulin ([facility])  E11.21 250.40 insulin aspart U-100 (NOVOLOG) 100 unit/mL (3 mL) inpn    Z79.4 583.81 HEMOGLOBIN A1C WITH EAG     X88.28 METABOLIC PANEL, COMPREHENSIVE   2. Neuropathy  G62.9 355.9    3. Acquired hypothyroidism  E03.9 244.9 TSH 3RD GENERATION      T4, FREE   4. Essential hypertension  F99 835.2 METABOLIC PANEL, COMPREHENSIVE   5. Hypercholesteremia  E78.00 272.0 LIPID PANEL   6. OAB (overactive bladder)  N32.81 596.51    7. Chronic bilateral low back pain without sciatica  M54.5 724.2     G89.29 338.29      Hypertension/CHF  Continue amlodipine, Coreg, lisinopril  Patient has a follow-up appointment with Dr. Rd Lam, cardiology, 9/15/2021    Bilateral lower extremity edema  Continue Bumex  Elevate lower extremities    Hypercholesteremia  7/6/2021  and triglyceride 225  Continue atorvastatin 80mgthis was increased from 40 to 80 mg in July 2021    Type 2 diabetes  7/6/2021 A1c 10.3  Strongly encourage patient to schedule an appointment with Pharm. D.   Seeking assistance with obtaining Lantus insulin through pharmaceutical company needs DM education and management. Continue current therapy of NovoLog R nightly-patient reduced dose from 40 units to 20 units. Blood sugars in the a.m. 150s to 160s. Instructed patient to increase insulin to 25 units nightly    Diabetic neuropathy  Continue gabapentin therapy   Discussed importance of managing diabetes  Follow-up with Dr. Jennifer Schmitt as scheduled    Hypothyroidism  On 7/6/2021 TSH 32.9  Continue Synthroid 175 mcgthis was increased from 152 175 in July 2021    Chronic bilateral low back pain without sciatica  Informed patient I would not manage chronic pain  Referral placed to pain management. Encourage patient to contact Dr. Maikel Andrea office for update in obtain an appointment. OAB/urinary incontinence  Insurance would not cover Myrbetriq prescribed by urologist  Follow-up with urology as scheduled      Follow up 3 months  Labs needed 1 week prior to appt:  Yes  A1c, CMP, lipid, TSH/T4    Dr. Nick Kelsey, AGNP-C, DNP  [facility] [address] [place] [address]

## 2021-08-10 NOTE — PATIENT INSTRUCTIONS
Vaccine Information Statement    Pneumococcal Polysaccharide Vaccine (PPSV23): What You Need to Know    Many Vaccine Information Statements are available in [place] and other languages. See www.immunize.org/vis  Hojas de información sobre vacunas están disponibles en español y en muchos otros idiomas. Visite www.immunize.org/vis    1. Why get vaccinated? Pneumococcal polysaccharide vaccine (PPSV23) can prevent pneumococcal disease. Pneumococcal disease refers to any illness caused by pneumococcal bacteria. These bacteria can cause many types of illnesses, including pneumonia, which is an infection of the lungs. Pneumococcal bacteria are one of the most common causes of pneumonia. Besides pneumonia, pneumococcal bacteria can also cause:   Ear infections   Sinus infections   Meningitis (infection of the tissue covering the brain and spinal cord)   Bacteremia (bloodstream infection)    Anyone can get pneumococcal disease, but children under 3years of age, people with certain medical conditions, adults 72 years or older, and cigarette smokers are at the highest risk. Most pneumococcal infections are mild. However, some can result in long-term problems, such as brain damage or hearing loss. Meningitis, bacteremia, and pneumonia caused by pneumococcal disease can be fatal.     2. PPSV23     PPSV23 protects against 23 types of bacteria that cause pneumococcal disease. PPSV23 is recommended for:   All adults 72 years or older,   Anyone 2 years or older with certain medical conditions that can lead to an increased risk for pneumococcal disease. Most people need only one dose of PPSV23. A second dose of PPSV23, and another type of pneumococcal vaccine called PCV13, are recommended for certain high-risk groups. Your health care provider can give you more information.     People 65 years or older should get a dose of PPSV23 even if they have already gotten one or more doses of the vaccine before they turned 72.    3. Talk with your health care provider    Tell your vaccine provider if the person getting the vaccine:   Has had an allergic reaction after a previous dose of PPSV23, or has any severe, life-threatening allergies. In some cases, your health care provider may decide to postpone PPSV23 vaccination to a future visit. People with minor illnesses, such as a cold, may be vaccinated. People who are moderately or severely ill should usually wait until they recover before getting PPSV23. Your health care provider can give you more information. 4. Risks of a vaccine reaction     Redness or pain where the shot is given, feeling tired, fever, or muscle aches can happen after PPSV23. People sometimes faint after medical procedures, including vaccination. Tell your provider if you feel dizzy or have vision changes or ringing in the ears. As with any medicine, there is a very remote chance of a vaccine causing a severe allergic reaction, other serious injury, or death. 5. What if there is a serious problem? An allergic reaction could occur after the vaccinated person leaves the clinic. If you see signs of a severe allergic reaction (hives, swelling of the face and throat, difficulty breathing, a fast heartbeat, dizziness, or weakness), call 9-1-1 and get the person to the nearest hospital.    For other signs that concern you, call your health care provider. Adverse reactions should be reported to the Vaccine Adverse Event Reporting System (VAERS). Your health care provider will usually file this report, or you can do it yourself. Visit the VAERS website at www.vaers. hhs.gov or call 2-152.502.6770. VAERS is only for reporting reactions, and VAERS staff do not give medical advice. 6. How can I learn more?  Ask your health care provider.  Call your local or state health department.    Contact the Centers for Disease Control and Prevention (CDC):  - Call 6-777.323.4213 (7-011-PZM-INFO) or  - Visit CDCs website at www.cdc.gov/vaccines    Vaccine Information Statement   PPSV23   10/30/2019    Pending sale to [facility] and [facility] for Disease Control and Prevention    Office Use Only

## 2021-08-16 ENCOUNTER — TELEPHONE (OUTPATIENT)
Dept: INTERNAL MEDICINE CLINIC | Age: 86
End: 2021-08-16

## 2021-08-16 NOTE — TELEPHONE ENCOUNTER
Pharmacy Progress Note - Telephone Call    Ms. Jacklyn Ledesma 82 y. o. was contacted via an outbound telephone call regarding scheduling appointment for diabetes management today. Patient cancelled appointment that was scheduled for this morning. A voicemail was left for patient to return my call. Thank you,  Milton Hall. ISIDRO Atkins    For Pharmacy Admin Tracking Only     CPA in place:  Yes   Time Spent (min): 5

## 2021-08-16 NOTE — TELEPHONE ENCOUNTER
Son returned call from Kenn Starkey. Said doesn't want to reschedule appt right now, he is having to have some teeth pulled and he has to bring her to appts.

## 2021-09-07 ENCOUNTER — HOSPITAL ENCOUNTER (OUTPATIENT)
Dept: MAMMOGRAPHY | Age: 86
Discharge: HOME | End: 2021-09-07
Attending: NURSE PRACTITIONER
Payer: MEDICARE

## 2021-09-07 DIAGNOSIS — Z12.31 ENCOUNTER FOR SCREENING MAMMOGRAM FOR MALIGNANT NEOPLASM OF BREAST: ICD-10-CM

## 2021-09-07 PROCEDURE — 77063 BREAST TOMOSYNTHESIS BI: CPT

## 2021-09-08 NOTE — PROGRESS NOTES
[facility] nurses: Please notify pt that her mammogram was normal. Pt will need yearly mammograms.  Thank you

## 2021-09-15 ENCOUNTER — OFFICE VISIT (OUTPATIENT)
Dept: CARDIOLOGY CLINIC | Age: 86
End: 2021-09-15
Payer: MEDICARE

## 2021-09-15 VITALS
SYSTOLIC BLOOD PRESSURE: 138 MMHG | HEIGHT: 65 IN | BODY MASS INDEX: 41.15 KG/M2 | WEIGHT: 247 LBS | OXYGEN SATURATION: 97 % | HEART RATE: 67 BPM | DIASTOLIC BLOOD PRESSURE: 49 MMHG

## 2021-09-15 DIAGNOSIS — I70.0 ATHEROSCLEROSIS OF AORTA (HCC): ICD-10-CM

## 2021-09-15 DIAGNOSIS — E78.00 HYPERCHOLESTEREMIA: ICD-10-CM

## 2021-09-15 DIAGNOSIS — I10 ESSENTIAL HYPERTENSION: ICD-10-CM

## 2021-09-15 DIAGNOSIS — I50.32 CHRONIC DIASTOLIC CONGESTIVE HEART FAILURE (HCC): Primary | ICD-10-CM

## 2021-09-15 DIAGNOSIS — E78.5 HYPERLIPIDEMIA, UNSPECIFIED HYPERLIPIDEMIA TYPE: ICD-10-CM

## 2021-09-15 PROCEDURE — G8536 NO DOC ELDER MAL SCRN: HCPCS | Performed by: INTERNAL MEDICINE

## 2021-09-15 PROCEDURE — G8427 DOCREV CUR MEDS BY ELIG CLIN: HCPCS | Performed by: INTERNAL MEDICINE

## 2021-09-15 PROCEDURE — G8417 CALC BMI ABV UP PARAM F/U: HCPCS | Performed by: INTERNAL MEDICINE

## 2021-09-15 PROCEDURE — G8752 SYS BP LESS 140: HCPCS | Performed by: INTERNAL MEDICINE

## 2021-09-15 PROCEDURE — G8400 PT W/DXA NO RESULTS DOC: HCPCS | Performed by: INTERNAL MEDICINE

## 2021-09-15 PROCEDURE — G8432 DEP SCR NOT DOC, RNG: HCPCS | Performed by: INTERNAL MEDICINE

## 2021-09-15 PROCEDURE — 1090F PRES/ABSN URINE INCON ASSESS: CPT | Performed by: INTERNAL MEDICINE

## 2021-09-15 PROCEDURE — 99214 OFFICE O/P EST MOD 30 MIN: CPT | Performed by: INTERNAL MEDICINE

## 2021-09-15 PROCEDURE — G8754 DIAS BP LESS 90: HCPCS | Performed by: INTERNAL MEDICINE

## 2021-09-15 PROCEDURE — 1101F PT FALLS ASSESS-DOCD LE1/YR: CPT | Performed by: INTERNAL MEDICINE

## 2021-09-15 RX ORDER — ATORVASTATIN CALCIUM 80 MG/1
80 TABLET, FILM COATED ORAL DAILY
Qty: 90 TABLET | Refills: 3 | Status: SHIPPED | OUTPATIENT
Start: 2021-09-15

## 2021-09-15 NOTE — PROGRESS NOTES
Patient brought medications list    1. Have you been to the ER, urgent care clinic since your last visit? Hospitalized since your last visit? No    2. Have you seen or consulted any other health care providers outside of the [address] [place] [address] since your last visit? Include any pap smears or colon screening. Yes Where: PCP Routine     3. Do you need any refills today?   No

## 2021-09-15 NOTE — PROGRESS NOTES
HISTORY OF PRESENT ILLNESS  Renee Bond is a 82 y.o. female. 7/10/2020  Patient seen today for new patient evaluation. She is referred here for evaluation of CHF. She has been previously followed at [facility] cardiology. Patient does not have any significant complaint of chest pain, shortness of breath orthopnea. She has chronic peripheral edema she uses Bumex. They are slowly improving. She denies any history of prior cardiac issues including MI.  3/2021  Patient seen today for follow-up. She feels better shortness of breath is better. Occasional mild edema. Since last evaluation had to amputation. Clinically improving    Hypertension  The history is provided by the patient and medical records. This is a chronic problem. The problem occurs constantly. The problem has not changed since onset. Associated symptoms include shortness of breath. Pertinent negatives include no chest pain, no abdominal pain and no headaches. Review of Systems   Constitutional: Negative for chills and fever. HENT: Negative for nosebleeds. Eyes: Negative for blurred vision and double vision. Respiratory: Positive for shortness of breath. Negative for cough, hemoptysis, sputum production and wheezing. Cardiovascular: Positive for leg swelling. Negative for chest pain, palpitations, orthopnea, claudication and PND. Gastrointestinal: Negative for abdominal pain, heartburn, nausea and vomiting. Musculoskeletal: Negative for myalgias. Skin: Negative for rash. Neurological: Negative for dizziness, weakness and headaches. Endo/Heme/Allergies: Does not bruise/bleed easily.      Family History   Problem Relation Age of Onset    Heart Attack Mother     Heart Attack Father        Past Medical History:   Diagnosis Date    Acquired hypothyroidism 8/1/2016    Arthritis of right shoulder region 4/21/2016    Asthma     Bilateral lower extremity edema 7/7/2021    Chronic bilateral low back pain without sciatica 2021    Chronic diastolic congestive heart failure ([facility] [address] 3/10/2021    Chronic venous insufficiency     Diabetes ([facility] [address]     neuropathy    Essential hypertension 2021    Gout     History of depression 2017    History of fall 2021    Hypercholesteremia 2021    Hypertension     MI (myocardial infarction) ([facility] [address]     OAB (overactive bladder) 2021    Peripheral neuropathy     Rheumatoid arthritis (HCC)     Tear of right rotator cuff 2016    Thyroid pain     Urinary incontinence 2021    Vertigo        Past Surgical History:   Procedure Laterality Date    HX AMPUTATION TOE Right     Great toe Dr. Andres Valerio HX CHOLECYSTECTOMY      HX GYN      TAHOophorectomy due to infection    HX HEENT      resection of memegioma in the .  HX KNEE REPLACEMENT Bilateral        Social History     Tobacco Use    Smoking status: Former Smoker     Quit date:      Years since quittin.7    Smokeless tobacco: Never Used    Tobacco comment: quit 45 years ago   Substance Use Topics    Alcohol use: No     Alcohol/week: 0.0 standard drinks       No Known Allergies    Prior to Admission medications    Medication Sig Start Date End Date Taking? Authorizing Provider   atorvastatin (LIPITOR) 80 mg tablet Take 1 Tablet by mouth daily. 9/15/21  Yes Lupe Braun MD   levothyroxine (SYNTHROID) 175 mcg tablet Take 1 Tablet by mouth Daily (before breakfast). Patient taking differently: Take 150 mcg by mouth Daily (before breakfast). 21  Yes Hui Levy DNP   lansoprazole (PREVACID PO) Take  by mouth. Yes Provider, Historical   amLODIPine (NORVASC) 10 mg tablet Take 1 Tablet by mouth daily. 21  Yes Jackie Levy DNP   bumetanide (BUMEX) 2 mg tablet Take 0.5 Tablets by mouth two (2) times daily as needed (swelling).  21  Yes Jackie Levy DNP   carvediloL (COREG) 12.5 mg tablet Take 1 Tablet by mouth two (2) times daily (with meals). 7/6/21  Yes Jackie Levy DNP   glimepiride (AMARYL) 2 mg tablet Take 1 Tablet by mouth every morning. 7/6/21  Yes Jackie Levy DNP   gabapentin (NEURONTIN) 400 mg capsule Take 1 cap in morning, 1 cap at 2pm, 2 caps at bedtime  Patient taking differently: 300 mg four (4) times daily. Take 1 cap in morning, 1 cap at 2pm, 2 caps at bedtime 7/6/21  Yes Jackie Levy DNP   lisinopriL (PRINIVIL, ZESTRIL) 40 mg tablet Take 1 Tablet by mouth daily. Patient taking differently: Take 20 mg by mouth daily. 7/6/21  Yes Sathya Levy DNP   sertraline (ZOLOFT) 50 mg tablet Take 50 mg by mouth daily. Yes Provider, Historical   triamcinolone acetonide (KENALOG) 0.1 % ointment Apply  to affected area two (2) times a day. use thin layer    Yes Provider, Historical   CYANOCOBALAMIN, VITAMIN B-12, PO Take  by mouth. Yes Provider, Historical   vit A/vit C/vit E/zinc/copper (PRESERVISION AREDS PO) Take  by mouth. Yes Provider, Historical   ascorbic acid, vitamin C, (VITAMIN C) 500 mg tablet Take 500 mg by mouth daily. Yes Provider, Historical   Blood-Glucose Meter (FREESTYLE LITE METER) monitoring kit Test twice blood glucose daily; ICD-10 E11.9; Quantity 1 12/15/17  Yes Nyla Pittman NP   insulin syringe,safetyneedle 1 mL 31 gauge x 5/16\" syrg 1 Each by Does Not Apply route daily. 8/7/17  Yes Rosalva Judge MD   cholecalciferol (VITAMIN D3) 1,000 unit tablet Take 2,000 Units by mouth daily. Yes Provider, Historical   glucose blood VI test strips (FREESTYLE TEST) strip Use to check blood glucose twice daily DX:E11.9 7/5/17  Yes Rosalva Judge MD   magnesium oxide 500 mg tab Take 1 Tab by mouth daily. Yes Provider, Historical   acetaminophen (TYLENOL) 500 mg tablet Take 1 Tab by mouth every six (6) hours as needed for Pain.  11/2/16  Yes Rosalva Judge MD         Visit Vitals  BP (!) 138/49 (BP 1 Location: Left upper arm, BP Patient Position: Sitting, BP Cuff Size: Large adult)   Pulse 67   Ht 5' 5\" (1.651 m)   Wt 112 kg (247 lb)   SpO2 97%   BMI 41.10 kg/m²       Physical Exam  Constitutional:       Appearance: She is well-developed. HENT:      Head: Normocephalic and atraumatic. Eyes:      Conjunctiva/sclera: Conjunctivae normal.   Neck:      Thyroid: No thyromegaly. Vascular: No JVD. Trachea: No tracheal deviation. Cardiovascular:      Rate and Rhythm: Normal rate and regular rhythm. Chest Wall: PMI is not displaced. Pulses: No decreased pulses. Heart sounds: No murmur heard. No gallop. No S3 sounds. Pulmonary:      Effort: No respiratory distress. Breath sounds: No wheezing or rales. Chest:      Chest wall: No tenderness. Abdominal:      Palpations: Abdomen is soft. Tenderness: There is no abdominal tenderness. Musculoskeletal:      Cervical back: Neck supple. Skin:     General: Skin is warm. Neurological:      Mental Status: She is alert and oriented to person, place, and time. Ms. Rip Bucio has a reminder for a \"due or due soon\" health maintenance. I have asked that she contact her primary care provider for follow-up on this health maintenance. No flowsheet data found. I have personally reviewed patient's records available from hospital and other providers and incorporated findings in patient care. Notes, labs, vascular study, chest x-ray    Assessment         ICD-10-CM ICD-9-CM    1. Chronic diastolic congestive heart failure (HCC)  I50.32 428.32      428.0     Stable compensated class II continue treatment   2. Essential hypertension  I10 401.9     Stable monitor   3. Hyperlipidemia, unspecified hyperlipidemia type  E78.5 272.4 LIPID PANEL      HEPATIC FUNCTION PANEL    Continue treatment lab with PCP   4. Atherosclerosis of aorta (HCC)  I70.0 440.0     Asymptomatic stable monitor continue treatment   5. Hypercholesteremia  E78.00 272.0 atorvastatin (LIPITOR) 80 mg tablet    LDL elevated.   Increase atorvastatin to 80 mg a day. Lab in about a month     7/2020 Patient referred for CHF. She has been previously followed with [facility]. She denies chest pain or SOB. She reports chronic BLE edema and takes Bumex PRN. Will evaluate CHF with echo. EKG SR with inferior Q wave. Suggestive of inferior wall infarct, will evaluate for CAD with stress test. Continue current treatment - f/u post testing.  9/2020  Patient seen for CHF f/u. Echo with 60-65%, with grade II DD, negative Stress test. She has BLE edema and rarely takes Bumex. Encouraged to take regularly. Discussed low sodium diet. She reports PCP advised her to stop taking Atorvastatin. Advised to f/u with PCP tomorrow regarding Atorvastatin. 3/2021  CHF compensated continue current treatment blood pressure controlled. Follow-up lipid and LFT  9/2021  CHF compensated elevated LDL. Increase atorvastatin to 80 mg a day. Lab in about a month    Medications Discontinued During This Encounter   Medication Reason    insulin aspart U-100 (NOVOLOG) 100 unit/mL (3 mL) inpn Not A Current Medication    atorvastatin (LIPITOR) 80 mg tablet        Orders Placed This Encounter    LIPID PANEL     Standing Status:   Future     Standing Expiration Date:   3/16/2022    HEPATIC FUNCTION PANEL     Standing Status:   Future     Standing Expiration Date:   3/16/2022    atorvastatin (LIPITOR) 80 mg tablet     Sig: Take 1 Tablet by mouth daily. Dispense:  90 Tablet     Refill:  3     Follow-up and Dispositions    · Return in about 6 months (around 3/15/2022). Ruby Esquivel MD

## 2021-11-09 ENCOUNTER — HOSPITAL ENCOUNTER (OUTPATIENT)
Dept: LAB | Age: 86
Discharge: HOME | End: 2021-11-09
Payer: MEDICARE

## 2021-11-09 ENCOUNTER — OFFICE VISIT (OUTPATIENT)
Dept: INTERNAL MEDICINE CLINIC | Age: 86
End: 2021-11-09
Payer: MEDICARE

## 2021-11-09 VITALS
DIASTOLIC BLOOD PRESSURE: 74 MMHG | WEIGHT: 240.2 LBS | HEART RATE: 96 BPM | HEIGHT: 65 IN | BODY MASS INDEX: 40.02 KG/M2 | TEMPERATURE: 98.3 F | OXYGEN SATURATION: 94 % | SYSTOLIC BLOOD PRESSURE: 138 MMHG | RESPIRATION RATE: 14 BRPM

## 2021-11-09 DIAGNOSIS — Z79.899 MEDICATION MANAGEMENT: ICD-10-CM

## 2021-11-09 DIAGNOSIS — F41.8 DEPRESSION WITH ANXIETY: ICD-10-CM

## 2021-11-09 DIAGNOSIS — E03.9 ACQUIRED HYPOTHYROIDISM: ICD-10-CM

## 2021-11-09 DIAGNOSIS — E11.40 TYPE 2 DIABETES MELLITUS WITH DIABETIC NEUROPATHY, WITH LONG-TERM CURRENT USE OF INSULIN (HCC): ICD-10-CM

## 2021-11-09 DIAGNOSIS — R60.0 BILATERAL LOWER EXTREMITY EDEMA: ICD-10-CM

## 2021-11-09 DIAGNOSIS — Z79.4 TYPE 2 DIABETES MELLITUS WITH DIABETIC NEPHROPATHY, WITH LONG-TERM CURRENT USE OF INSULIN (HCC): ICD-10-CM

## 2021-11-09 DIAGNOSIS — Z79.4 TYPE 2 DIABETES MELLITUS WITH DIABETIC NEUROPATHY, WITH LONG-TERM CURRENT USE OF INSULIN (HCC): ICD-10-CM

## 2021-11-09 DIAGNOSIS — I50.32 CHRONIC DIASTOLIC CONGESTIVE HEART FAILURE (HCC): ICD-10-CM

## 2021-11-09 DIAGNOSIS — E11.21 TYPE 2 DIABETES MELLITUS WITH DIABETIC NEPHROPATHY, WITH LONG-TERM CURRENT USE OF INSULIN (HCC): ICD-10-CM

## 2021-11-09 DIAGNOSIS — Z23 NEEDS FLU SHOT: ICD-10-CM

## 2021-11-09 DIAGNOSIS — I10 ESSENTIAL HYPERTENSION: ICD-10-CM

## 2021-11-09 DIAGNOSIS — E78.00 HYPERCHOLESTEREMIA: ICD-10-CM

## 2021-11-09 DIAGNOSIS — I10 ESSENTIAL HYPERTENSION: Primary | ICD-10-CM

## 2021-11-09 DIAGNOSIS — N32.81 OAB (OVERACTIVE BLADDER): ICD-10-CM

## 2021-11-09 DIAGNOSIS — G62.9 NEUROPATHY: ICD-10-CM

## 2021-11-09 LAB
ALBUMIN SERPL-MCNC: 3.8 G/DL (ref 3.4–5)
ALBUMIN/GLOB SERPL: 0.9 {RATIO} (ref 0.8–1.7)
ALP SERPL-CCNC: 104 U/L (ref 45–117)
ALT SERPL-CCNC: 26 U/L (ref 13–56)
ANION GAP SERPL CALC-SCNC: 5 MMOL/L (ref 3–18)
AST SERPL-CCNC: 15 U/L (ref 10–38)
BILIRUB SERPL-MCNC: 0.5 MG/DL (ref 0.2–1)
BUN SERPL-MCNC: 31 MG/DL (ref 7–18)
BUN/CREAT SERPL: 24 (ref 12–20)
CALCIUM SERPL-MCNC: 9.5 MG/DL (ref 8.5–10.1)
CHLORIDE SERPL-SCNC: 99 MMOL/L (ref 100–111)
CHOLEST SERPL-MCNC: 191 MG/DL
CO2 SERPL-SCNC: 26 MMOL/L (ref 21–32)
CREAT SERPL-MCNC: 1.27 MG/DL (ref 0.6–1.3)
EST. AVERAGE GLUCOSE BLD GHB EST-MCNC: 223 MG/DL
GLOBULIN SER CALC-MCNC: 4.3 G/DL (ref 2–4)
GLUCOSE SERPL-MCNC: 429 MG/DL (ref 74–99)
HBA1C MFR BLD HPLC: 9.4 %
HBA1C MFR BLD: 9.4 % (ref 4.2–5.6)
HDLC SERPL-MCNC: 44 MG/DL (ref 40–60)
HDLC SERPL: 4.3 {RATIO} (ref 0–5)
LDLC SERPL CALC-MCNC: 96.8 MG/DL (ref 0–100)
LIPID PROFILE,FLP: ABNORMAL
POTASSIUM SERPL-SCNC: 5.1 MMOL/L (ref 3.5–5.5)
PROT SERPL-MCNC: 8.1 G/DL (ref 6.4–8.2)
SODIUM SERPL-SCNC: 130 MMOL/L (ref 136–145)
T4 FREE SERPL-MCNC: 1.3 NG/DL (ref 0.7–1.5)
TRIGL SERPL-MCNC: 251 MG/DL (ref ?–150)
TSH SERPL DL<=0.05 MIU/L-ACNC: 0.31 UIU/ML (ref 0.36–3.74)
VLDLC SERPL CALC-MCNC: 50.2 MG/DL

## 2021-11-09 PROCEDURE — G8536 NO DOC ELDER MAL SCRN: HCPCS | Performed by: NURSE PRACTITIONER

## 2021-11-09 PROCEDURE — 80061 LIPID PANEL: CPT

## 2021-11-09 PROCEDURE — 1101F PT FALLS ASSESS-DOCD LE1/YR: CPT | Performed by: NURSE PRACTITIONER

## 2021-11-09 PROCEDURE — G8754 DIAS BP LESS 90: HCPCS | Performed by: NURSE PRACTITIONER

## 2021-11-09 PROCEDURE — 84443 ASSAY THYROID STIM HORMONE: CPT

## 2021-11-09 PROCEDURE — G8432 DEP SCR NOT DOC, RNG: HCPCS | Performed by: NURSE PRACTITIONER

## 2021-11-09 PROCEDURE — 99214 OFFICE O/P EST MOD 30 MIN: CPT | Performed by: NURSE PRACTITIONER

## 2021-11-09 PROCEDURE — 36415 COLL VENOUS BLD VENIPUNCTURE: CPT

## 2021-11-09 PROCEDURE — G8417 CALC BMI ABV UP PARAM F/U: HCPCS | Performed by: NURSE PRACTITIONER

## 2021-11-09 PROCEDURE — G0463 HOSPITAL OUTPT CLINIC VISIT: HCPCS | Performed by: NURSE PRACTITIONER

## 2021-11-09 PROCEDURE — 83036 HEMOGLOBIN GLYCOSYLATED A1C: CPT | Performed by: NURSE PRACTITIONER

## 2021-11-09 PROCEDURE — 84439 ASSAY OF FREE THYROXINE: CPT

## 2021-11-09 PROCEDURE — G8427 DOCREV CUR MEDS BY ELIG CLIN: HCPCS | Performed by: NURSE PRACTITIONER

## 2021-11-09 PROCEDURE — G8752 SYS BP LESS 140: HCPCS | Performed by: NURSE PRACTITIONER

## 2021-11-09 PROCEDURE — 1090F PRES/ABSN URINE INCON ASSESS: CPT | Performed by: NURSE PRACTITIONER

## 2021-11-09 PROCEDURE — 83036 HEMOGLOBIN GLYCOSYLATED A1C: CPT

## 2021-11-09 PROCEDURE — 80053 COMPREHEN METABOLIC PANEL: CPT

## 2021-11-09 PROCEDURE — G8400 PT W/DXA NO RESULTS DOC: HCPCS | Performed by: NURSE PRACTITIONER

## 2021-11-09 PROCEDURE — 90694 VACC AIIV4 NO PRSRV 0.5ML IM: CPT | Performed by: NURSE PRACTITIONER

## 2021-11-09 RX ORDER — AMLODIPINE BESYLATE 10 MG/1
10 TABLET ORAL DAILY
Qty: 90 TABLET | Refills: 0 | Status: SHIPPED | OUTPATIENT
Start: 2021-11-09

## 2021-11-09 RX ORDER — BUMETANIDE 2 MG/1
1 TABLET ORAL
Qty: 180 TABLET | Refills: 1 | Status: SHIPPED | OUTPATIENT
Start: 2021-11-09 | End: 2022-10-14

## 2021-11-09 RX ORDER — SERTRALINE HYDROCHLORIDE 50 MG/1
50 TABLET, FILM COATED ORAL DAILY
Qty: 90 TABLET | Refills: 3 | Status: SHIPPED | OUTPATIENT
Start: 2021-11-09 | End: 2022-10-14

## 2021-11-09 RX ORDER — GLIMEPIRIDE 2 MG/1
2 TABLET ORAL
Qty: 90 TABLET | Refills: 0 | Status: SHIPPED | OUTPATIENT
Start: 2021-11-09 | End: 2022-05-29

## 2021-11-09 RX ORDER — CARVEDILOL 12.5 MG/1
12.5 TABLET ORAL 2 TIMES DAILY WITH MEALS
Qty: 180 TABLET | Refills: 0 | Status: SHIPPED | OUTPATIENT
Start: 2021-11-09 | End: 2022-10-14

## 2021-11-09 RX ORDER — LEVOTHYROXINE SODIUM 175 UG/1
175 TABLET ORAL
Qty: 90 TABLET | Refills: 0 | Status: SHIPPED | OUTPATIENT
Start: 2021-11-09 | End: 2022-05-26

## 2021-11-09 RX ORDER — GABAPENTIN 400 MG/1
CAPSULE ORAL
Qty: 360 CAPSULE | Refills: 0 | Status: SHIPPED | OUTPATIENT
Start: 2021-11-09

## 2021-11-09 RX ORDER — INSULIN ASPART 100 [IU]/ML
20 INJECTION, SOLUTION INTRAVENOUS; SUBCUTANEOUS
Qty: 5 ADJUSTABLE DOSE PRE-FILLED PEN SYRINGE | Refills: 3
Start: 2021-11-09 | End: 2022-05-29

## 2021-11-09 RX ORDER — LISINOPRIL 40 MG/1
40 TABLET ORAL DAILY
Qty: 90 TABLET | Refills: 0 | Status: SHIPPED | OUTPATIENT
Start: 2021-11-09 | End: 2022-02-17

## 2021-11-09 NOTE — PROGRESS NOTES
Chief Complaint   Patient presents with    Diabetes     3 mo f/u    Hypertension    Cholesterol Problem       1. \"Have you been to the ER, urgent care clinic since your last visit? Hospitalized since your last visit? \" No    2. \"Have you seen or consulted any other health care providers outside of the [address] [place] [address] since your last visit? Yes, Pain Management    3. For patients aged 39-70: Has the patient had a colonoscopy? Yes, HM satisfied with blue hyperlink     If the patient is female:    4. For patients aged 41-77: Has the patient had a mammogram within the past 2 years? Yes, HM satisfied with blue hyperlink    5. For patients aged 21-65: Has the patient had a pap smear?  Yes, HM satisfied with blue hyperlink

## 2021-11-09 NOTE — Clinical Note
This patient was referred back to you in August 2021. Unfortunately due to transportation issues she was unable to keep her appointment with you. In the past she was on Lantus which worked very well for her diabetes but now she is unable to afford this medication and is utilizing Novolog R which she obtains through [facility]. Seeking help with patient assistance in order to obtain Lantus through the pharmaceutical company in hopes this will help to reduce her A1c. Please reach out so we can discuss her case tomorrow when you are available in the office.   Thank you

## 2021-11-09 NOTE — PATIENT INSTRUCTIONS
Vaccine Information Statement    Influenza (Flu) Vaccine (Inactivated or Recombinant): What You Need to Know    Many vaccine information statements are available in [place] and other languages. See www.immunize.org/vis. Hojas de información sobre vacunas están disponibles en español y en muchos otros idiomas. Visite www.immunize.org/vis. 1. Why get vaccinated? Influenza vaccine can prevent influenza (flu). Flu is a contagious disease that spreads around the [place] [facility] every year, usually between October and May. Anyone can get the flu, but it is more dangerous for some people. Infants and young children, people 72 years and older, pregnant people, and people with certain health conditions or a weakened immune system are at greatest risk of flu complications. Pneumonia, bronchitis, sinus infections, and ear infections are examples of flu-related complications. If you have a medical condition, such as heart disease, cancer, or diabetes, flu can make it worse. Flu can cause fever and chills, sore throat, muscle aches, fatigue, cough, headache, and runny or stuffy nose. Some people may have vomiting and diarrhea, though this is more common in children than adults. In an average year, thousands of people in the [facility] die from flu, and many more are hospitalized. Flu vaccine prevents millions of illnesses and flu-related visits to the doctor each year. 2. Influenza vaccines     CDC recommends everyone 6 months and older get vaccinated every flu season. Children 6 months through 6years of age may need 2 doses during a single flu season. Everyone else needs only 1 dose each flu season. It takes about 2 weeks for protection to develop after vaccination. There are many flu viruses, and they are always changing. Each year a new flu vaccine is made to protect against the influenza viruses believed to be likely to cause disease in the upcoming flu season.  Even when the vaccine doesnt exactly match these viruses, it may still provide some protection. Influenza vaccine does not cause flu. Influenza vaccine may be given at the same time as other vaccines. 3. Talk with your health care provider    Tell your vaccination provider if the person getting the vaccine:   Has had an allergic reaction after a previous dose of influenza vaccine, or has any severe, life-threatening allergies    Has ever had Guillain-Barré Syndrome (also called GBS)    In some cases, your health care provider may decide to postpone influenza vaccination until a future visit. Influenza vaccine can be administered at any time during pregnancy. People who are or will be pregnant during influenza season should receive inactivated influenza vaccine. People with minor illnesses, such as a cold, may be vaccinated. People who are moderately or severely ill should usually wait until they recover before getting influenza vaccine. Your health care provider can give you more information. 4. Risks of a vaccine reaction     Soreness, redness, and swelling where the shot is given, fever, muscle aches, and headache can happen after influenza vaccination.  There may be a very small increased risk of Guillain-Barré Syndrome (GBS) after inactivated influenza vaccine (the flu shot). Stefanie Treviño children who get the flu shot along with pneumococcal vaccine (PCV13) and/or DTaP vaccine at the same time might be slightly more likely to have a seizure caused by fever. Tell your health care provider if a child who is getting flu vaccine has ever had a seizure. People sometimes faint after medical procedures, including vaccination. Tell your provider if you feel dizzy or have vision changes or ringing in the ears. As with any medicine, there is a very remote chance of a vaccine causing a severe allergic reaction, other serious injury, or death. 5. What if there is a serious problem?     An allergic reaction could occur after the vaccinated person leaves the clinic. If you see signs of a severe allergic reaction (hives, swelling of the face and throat, difficulty breathing, a fast heartbeat, dizziness, or weakness), call 9-1-1 and get the person to the nearest hospital.    For other signs that concern you, call your health care provider. Adverse reactions should be reported to the Vaccine Adverse Event Reporting System (VAERS). Your health care provider will usually file this report, or you can do it yourself. Visit the VAERS website at www.vaers. [facility].gov or call 6-278.153.1569. VAERS is only for reporting reactions, and VAERS staff members do not give medical advice. 6. The National Vaccine Injury Compensation Program    The [facility] Vaccine Injury Compensation Program (VICP) is a federal program that was created to compensate people who may have been injured by certain vaccines. Claims regarding alleged injury or death due to vaccination have a time limit for filing, which may be as short as two years. Visit the VICP website at www.[facility].gov/vaccinecompensation or call 6-313.893.9972 to learn about the program and about filing a claim. 7. How can I learn more?  Ask your health care provider.  Call your local or state health department.  Visit the website of the Food and Drug Administration (FDA) for vaccine package inserts and additional information at www.fda.gov/vaccines-blood-biologics/vaccines.  Contact the Centers for Disease Control and Prevention (CDC):  - Call 9-563.160.2582 (1-800-CDC-INFO) or  - Visit CDCs influenza website at www.cdc.gov/flu. Vaccine Information Statement   Inactivated Influenza Vaccine   8/6/2021  42 DUANE Rojas [id number]   Department of Health and Human Services  Centers for Disease Control and Prevention    Office Use Only

## 2021-11-09 NOTE — PROGRESS NOTES
[facility] [id number] [facility], [address]  [phone] fax    11/9/2021    HPI:   Mariya Wright 1935 is a pleasant WHITE/NON- female who presents today follow up from previous appt. Patient rents a room to a gentleman who assists her as needed. She is hard of hearing and ambulates with a cane. Of Note: pt has been out of all meds x1 week due to transportation issues. Hypertension/diastolic heart failure/cholesterol: Patient is seeing Dr. Hortencia Thorne, cardiology. She is taking atorvastatin 80 mg, amlodipine, Coreg, lisinopril. She denies headaches, shortness of breath, chest pain, fatigue, weight gain. Bilateral lower extremity edema: She continues to take Bumex as prescribed. This has been a very successful treatment for her. She does elevate her lower extremities at times. She does not wear compression hose. Type 2 diabetes with neuropathy: Blood sugars between in the a.m average 150-160. When she was taking Lantus this was effective but the cost was more than what she could afford. She was prescribed NovoLog N 32 units in the morning and NovoLog R 40 units at night. She is unable to take NovoLog N as this plummets her blood sugar. She is currently taking NovoLog R 20 units every night and she bases her dose off of her blood sugar readings. She is also taking Amaryl. She had her right great toe amputated December 2020 under Dr Nancy Giraldo. She is taking her gabapentin therapy which helps relieve neuropathy pains to her feet. Unfortunately she did not schedule an appointment with Ajith Pillai, for DM management d/t transport issues. Dr Nancy Giraldo removed a large corn from her left foot posterior great toe on 8/10/2021. Falls/neuropathy: She has had multiple falls due to severe neuropathy to her feet. Most the time her feet are numb but she suffers severe sharp pains as well.     Hypothyroidism: She is currently taking Synthroid 175 mcg daily tolerating therapy well. Depression: taking zoloft therapy. This is effective for her. She does not require refills at this time. Chronic bilateral low back pain without sciatica: Pain is worse when active and can get to a pain score of 10 out of 10. Was seeing pain management until sometime in 2020 when she decided to wean herself off her morphine treatment. Her pain in her low back began to get worse so she called her pain management and had to wait 2 months for her appointment. On 6/28/2021 she arrived at her appointment and they told her she did not have an appointment and could no longer make an appointment. Patient is unsure why they are not wanting to allow her back in to pain management. She has not heard from Dr. Ayesha Hernandez pain management. OAB/urinary incontinence: She is seeing urology. She was treated for UTI with Macrobid in the past.  Insurance will not cover Myrbetriq.     Past Medical History:   Diagnosis Date    Acquired hypothyroidism 8/1/2016    Arthritis of right shoulder region 4/21/2016    Asthma     Bilateral lower extremity edema 7/7/2021    Chronic bilateral low back pain without sciatica 7/7/2021    Chronic diastolic congestive heart failure ([address] 3/10/2021    Chronic venous insufficiency     Diabetes ([address]     neuropathy    Essential hypertension 7/7/2021    Gout     History of depression 1/23/2017    History of fall 7/7/2021    Hypercholesteremia 7/7/2021    Hypertension     MI (myocardial infarction) ([address]     OAB (overactive bladder) 7/7/2021    Peripheral neuropathy     Rheumatoid arthritis (HCC)     Tear of right rotator cuff 5/16/2016    Thyroid pain     Urinary incontinence 7/7/2021    Vertigo      Past Surgical History:   Procedure Laterality Date    HX AMPUTATION TOE Right 2020    Great toe Dr. 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  HX KNEE REPLACEMENT Bilateral      Current Outpatient Medications   Medication Sig    sertraline (ZOLOFT) 50 mg tablet Take 1 Tablet by mouth daily.  lisinopriL (PRINIVIL, ZESTRIL) 40 mg tablet Take 1 Tablet by mouth daily.  gabapentin (NEURONTIN) 400 mg capsule Take 1 cap in morning, 1 cap at 2pm, 2 caps at bedtime    carvediloL (COREG) 12.5 mg tablet Take 1 Tablet by mouth two (2) times daily (with meals).  bumetanide (BUMEX) 2 mg tablet Take 0.5 Tablets by mouth two (2) times daily as needed (swelling).  amLODIPine (NORVASC) 10 mg tablet Take 1 Tablet by mouth daily.  glimepiride (AMARYL) 2 mg tablet Take 1 Tablet by mouth every morning.  insulin aspart U-100 (NovoLOG Flexpen U-100 Insulin) 100 unit/mL (3 mL) inpn 20 Units by SubCUTAneous route nightly. Indications: type 2 diabetes mellitus    levothyroxine (SYNTHROID) 175 mcg tablet Take 1 Tablet by mouth Daily (before breakfast).  acetaminophen (TYLENOL) 500 mg tablet Take 1 Tab by mouth every six (6) hours as needed for Pain.  atorvastatin (LIPITOR) 80 mg tablet Take 1 Tablet by mouth daily. (Patient not taking: Reported on 11/9/2021)    Blood-Glucose Meter (FREESTYLE LITE METER) monitoring kit Test twice blood glucose daily; ICD-10 E11.9; Quantity 1 (Patient not taking: Reported on 11/9/2021)    insulin syringe,safetyneedle 1 mL 31 gauge x 5/16\" syrg 1 Each by Does Not Apply route daily. (Patient not taking: Reported on 11/9/2021)    glucose blood VI test strips (FREESTYLE TEST) strip Use to check blood glucose twice daily DX:E11.9 (Patient not taking: Reported on 11/9/2021)     No current facility-administered medications for this visit.      Allergies and Intolerances:   No Known Allergies  Family History:   Family History   Problem Relation Age of Onset    Heart Attack Mother     Heart Attack Father      Social History:   She  reports that she quit smoking about 45 years ago. She has never used smokeless tobacco.   Social History     Substance and Sexual Activity   Alcohol Use No    Alcohol/week: 0.0 standard drinks     Immunization History:  Immunization History   Administered Date(s) Administered    COVID-19, PFIZER, MRNA, LNP-S, PF, 30MCG/0.3ML DOSE 03/06/2021, 03/27/2021, 10/14/2021    Influenza High Dose Vaccine PF 10/06/2017, 10/29/2018    Influenza, High-dose, Quadrivalent (>65 Yrs Fluzone High Dose Quad [id number]) 12/10/2020    Influenza, Quadrivalent, Adjuvanted (>65 Yrs FLUAD QUAD [id number]) 11/09/2021    Pneumococcal Polysaccharide (PPSV-23) 08/10/2021    Td 11/21/2017       Review of Systems:   As above included in HPI. Otherwise 11 point review of systems negative including constitutional, skin, HENT, eyes, respiratory, cardiovascular, gastrointestinal, genitourinary, musculoskeletal, endocrine, hematologic, allergy, and neurologic. Physical:   Visit Vitals  /74   Pulse 96   Temp 98.3 °F (36.8 °C) (Temporal)   Resp 14   Ht 5' 5\" (1.651 m)   Wt 240 lb 3.2 oz (109 kg)   SpO2 94%   BMI 39.97 kg/m²      Wt Readings from Last 3 Encounters:   11/09/21 240 lb 3.2 oz (109 kg)   10/21/21 242 lb (109.8 kg)   09/15/21 247 lb (112 kg)         Exam:   Physical Exam  Vitals and nursing note reviewed. Constitutional:       Appearance: Normal appearance. She is obese. Comments: Morbidly    HENT:      Head: Normocephalic and atraumatic. Right Ear: External ear normal.      Left Ear: External ear normal.      Ears:      Comments: Hard of hearing  Eyes:      Extraocular Movements: Extraocular movements intact. Conjunctiva/sclera: Conjunctivae normal.   Cardiovascular:      Rate and Rhythm: Normal rate and regular rhythm. Heart sounds: Normal heart sounds. Comments: No edema noted  Pulmonary:      Effort: Pulmonary effort is normal. No respiratory distress. Breath sounds: Normal breath sounds. No wheezing.    Musculoskeletal:         General: Normal range of motion. Cervical back: Normal range of motion and neck supple. Comments: Gait stable. Amb with cane   Skin:     General: Skin is warm and dry. Neurological:      General: No focal deficit present. Mental Status: She is alert and oriented to person, place, and time. Psychiatric:         Mood and Affect: Mood normal.         Behavior: Behavior normal.         Thought Content: Thought content normal.         Judgment: Judgment normal.       Review of Data:  Labs reviewed: yes  A1c POC 9.4  Will obtain labs today that were due prior to today's appointment    Plan:    ICD-10-CM ICD-9-CM    1. Essential hypertension  I10 401.9 lisinopriL (PRINIVIL, ZESTRIL) 40 mg tablet      carvediloL (COREG) 12.5 mg tablet      amLODIPine (NORVASC) 10 mg tablet   2. Chronic diastolic congestive heart failure (HCC)  I50.32 428.32      428.0    3. Bilateral lower extremity edema  R60.0 782.3 bumetanide (BUMEX) 2 mg tablet   4. Type 2 diabetes mellitus with diabetic neuropathy, with long-term current use of insulin (HCC)  E11.40 250.60 gabapentin (NEURONTIN) 400 mg capsule    Z79.4 357.2 glimepiride (AMARYL) 2 mg tablet     V58.67 insulin aspart U-100 (NovoLOG Flexpen U-100 Insulin) 100 unit/mL (3 mL) inpn      AMB POC HEMOGLOBIN A1C   5. Neuropathy  G62.9 355.9 gabapentin (NEURONTIN) 400 mg capsule   6. Acquired hypothyroidism  E03.9 244.9 levothyroxine (SYNTHROID) 175 mcg tablet   7. OAB (overactive bladder)  N32.81 596.51    8. Depression with anxiety  F41.8 300.4 sertraline (ZOLOFT) 50 mg tablet   9. Needs flu shot  Z23 V04.81 FLU (FLUAD QUAD INFLUENZA VACCINE,QUAD,ADJUVANTED)   10.  Medication management  Z79.899 V58.69        -Hypertension/CHF  Continue amlodipine, Coreg, lisinopril  Follow-up with Dr. Manisha Cunningham, cardiology    -Bilateral lower extremity edema  Continue Bumex  Elevate lower extremities above heart  Avoid sitting with legs dependent more than 30 minutes at a time  Increase walking exercise  Wear compression socks while awake (15 to 30 mmHg)  Limit sodium in diet  Avoid pork  Increase water intake    -Hypercholesteremia  7/6/2021  and triglyceride 225  Continue atorvastatin 80mg-this was increased from 40 to 80 mg in July 2021    -Type 2 diabetes  7/6/2021 A1c 10.3. Today A1c 9.4  Strongly encourage patient to schedule an appointment with Pharm. D. Seeking assistance with obtaining Lantus insulin through pharmaceutical company. She needs DM education and management. Continue NovoLog R 20 units nightly. (this insulin is purchased OTC via [employer]-insurance wont cover)  Continue Amaryl 2 mg daily  Sent message to Dr. Kaur Dolan, Pharm. D., requesting assistance with obtaining proper paperwork through [employer] in order to obtain Lantus.    -Diabetic neuropathy  Continue gabapentin therapy   Discussed importance of managing diabetes  Follow-up with Dr. Nury Wright as scheduled    -Depression with anxiety  Continue Zoloft therapy  Declines psychiatry referral and counseling    -Hypothyroidism  On 7/6/2021 TSH 32.9  Continue Synthroid 175 mcg-this was increased from 150 to 175 in July 2021  Will obtain level today    -Chronic bilateral low back pain without sciatica  I will not manage chronic pain  Patient to contact Dr. Fortino Antoine office for update for obtaining an appointment. -OAB/urinary incontinence  Insurance would not cover Myrbetriq prescribed by urologist  Noted in Oct note that Meliza [place] and Arjun was sent to pharm. This info was given to pt. She is to contact [phone] with urology as scheduled    -Obtain flu vaccine today    -Medication management  Strongly encouraged patient to find a way to obtain her medications so not to miss her treatments. Suggested [employer] or [employer] services or locate pharmacy that delivers. Of Note:  She did not have her labs drawn prior to her appt today. Will obtain today and review when available.        Lab update 11/14/21  With sodium correction d/t elevated glucose- sodium level is 135. Please notify pt of the following:  A1c 9.4. Encourage pt to follow up with A Kaur Dolan, Pharm for DM mgt. GFR declining to 40-most likely related to uncontrolled DM   to 251. Continue lipitor 80mg daily. Cut back on fried, fatty, oily foods.  to 96  TSH 32.9 to 0.31 (low). Decrease Synthroid to 150mcg. New RX sent to pharmacy. Thank you    Follow up 3 months  Labs needed 1 week prior to appt: No  A1c POC d/t transportation issues    Dr. Lolita Mcconnell, AGNP-C, DNP  [facility]       The total face time was 35 minutes. Greater than 50% of that time was spent in counseling and/or coordination of care. My summary of patient counseling and coordination of care includes Reviewing medical record, assessing patient, placing orders, and discussing plan of care with patient.

## 2021-11-09 NOTE — PROGRESS NOTES
Reneeollie Blackburnjane Navarro 1935 female who presents for routine immunizations. Patient denies any symptoms , reactions or allergies that would exclude them from being immunized today. Risks and adverse reactions were discussed and the VIS was given to them. All questions were addressed. Order placed for HD FLU VACCINE,  per Verbal Order from [facility] with read back. Patient was observed for 15 min post injection. There were no reactions observed.     Daisy Mcfadden LPN

## 2021-11-15 ENCOUNTER — TELEPHONE (OUTPATIENT)
Dept: INTERNAL MEDICINE CLINIC | Age: 86
End: 2021-11-15

## 2021-11-15 NOTE — PROGRESS NOTES
Please notify pt of the following:  A1c 9.4. Encourage pt to follow up with A Gabi Santos for DM mgt. GFR declining to 40-most likely related to uncontrolled DM   to 251. Continue lipitor 80mg daily. Cut back on fried, fatty, oily foods.  to 96  TSH 32.9 to 0.31 (low). Decrease Synthroid to 150mcg. New RX sent to pharmacy.   Thank you PAST MEDICAL HISTORY:  Alcohol dependence     Anemia     Vertigo

## 2021-11-15 NOTE — TELEPHONE ENCOUNTER
----- Message from Ashly Putnam DNP sent at 11/14/2021  7:36 PM EST -----  Please notify pt of the following:  A1c 9.4. Encourage pt to follow up with A Pura Gosselin, Pharm for DM mgt. GFR declining to 40-most likely related to uncontrolled DM   to 251. Continue lipitor 80mg daily. Cut back on fried, fatty, oily foods.  to 96  TSH 32.9 to 0.31 (low). Decrease Synthroid to 150mcg. New RX sent to pharmacy.   Thank you

## 2021-11-15 NOTE — TELEPHONE ENCOUNTER
Patient reached and made aware of lab results and message per Dr. Emerita Hernandez. Patient verbalized understanding with no further questions.

## 2021-11-19 ENCOUNTER — TELEPHONE (OUTPATIENT)
Dept: INTERNAL MEDICINE CLINIC | Age: 86
End: 2021-11-19

## 2021-11-23 NOTE — TELEPHONE ENCOUNTER
Pharmacy Progress Note - Telephone Call    Ms. Jah Ledesma 82 y. o. was contacted via an outbound telephone call regarding diabetes management today. A voicemail was left for patient to return my call. Thank you,  Janene Gutierrez.  Gavi Tapia, FELIPES

## 2022-02-17 DIAGNOSIS — I10 ESSENTIAL HYPERTENSION: ICD-10-CM

## 2022-02-17 RX ORDER — LISINOPRIL 40 MG/1
TABLET ORAL
Qty: 90 TABLET | Refills: 0 | Status: SHIPPED | OUTPATIENT
Start: 2022-02-17 | End: 2022-10-14

## 2022-02-17 NOTE — TELEPHONE ENCOUNTER
Patient canceled her 2/3/2022 appointment. I will refill her lisinopril for 90 days which she will need to come in for an appointment for refills after that 90 days. Please help get her scheduled for an appointment.   Thank you    Requested Prescriptions     Signed Prescriptions Disp Refills    lisinopriL (PRINIVIL, ZESTRIL) 40 mg tablet 90 Tablet 0     Sig: Take 1 tablet by mouth once daily     Authorizing Provider: Khoa Parra

## 2022-05-26 ENCOUNTER — HOSPITAL ENCOUNTER (INPATIENT)
Age: 87
LOS: 3 days | Discharge: HOME HEALTH | DRG: 872 | End: 2022-05-29
Attending: EMERGENCY MEDICINE | Admitting: STUDENT IN AN ORGANIZED HEALTH CARE EDUCATION/TRAINING PROGRAM
Payer: MEDICARE

## 2022-05-26 ENCOUNTER — APPOINTMENT (OUTPATIENT)
Dept: MRI IMAGING | Age: 87
DRG: 872 | End: 2022-05-26
Attending: STUDENT IN AN ORGANIZED HEALTH CARE EDUCATION/TRAINING PROGRAM
Payer: MEDICARE

## 2022-05-26 ENCOUNTER — APPOINTMENT (OUTPATIENT)
Dept: GENERAL RADIOLOGY | Age: 87
DRG: 872 | End: 2022-05-26
Attending: PHYSICIAN ASSISTANT
Payer: MEDICARE

## 2022-05-26 DIAGNOSIS — M00.9 SEPTIC ARTHRITIS OF LEFT FOOT, DUE TO UNSPECIFIED ORGANISM (HCC): Primary | ICD-10-CM

## 2022-05-26 DIAGNOSIS — Z79.4 TYPE 2 DIABETES MELLITUS WITH DIABETIC NEUROPATHY, WITH LONG-TERM CURRENT USE OF INSULIN (HCC): ICD-10-CM

## 2022-05-26 DIAGNOSIS — R73.9 HYPERGLYCEMIA: ICD-10-CM

## 2022-05-26 DIAGNOSIS — N17.9 AKI (ACUTE KIDNEY INJURY) (HCC): ICD-10-CM

## 2022-05-26 DIAGNOSIS — E11.40 TYPE 2 DIABETES MELLITUS WITH DIABETIC NEUROPATHY, WITH LONG-TERM CURRENT USE OF INSULIN (HCC): ICD-10-CM

## 2022-05-26 PROBLEM — E11.621 DIABETIC FOOT ULCER (HCC): Status: ACTIVE | Noted: 2022-05-26

## 2022-05-26 PROBLEM — L97.509 DIABETIC FOOT ULCER (HCC): Status: ACTIVE | Noted: 2022-05-26

## 2022-05-26 LAB
ALBUMIN SERPL-MCNC: 3.8 G/DL (ref 3.4–5)
ALBUMIN/GLOB SERPL: 0.8 {RATIO} (ref 0.8–1.7)
ALP SERPL-CCNC: 67 U/L (ref 45–117)
ALT SERPL-CCNC: 12 U/L (ref 13–56)
ANION GAP SERPL CALC-SCNC: 8 MMOL/L (ref 3–18)
AST SERPL-CCNC: 22 U/L (ref 10–38)
BASOPHILS # BLD: 0.1 K/UL (ref 0–0.1)
BASOPHILS NFR BLD: 1 % (ref 0–2)
BILIRUB SERPL-MCNC: 0.5 MG/DL (ref 0.2–1)
BUN SERPL-MCNC: 24 MG/DL (ref 7–18)
BUN/CREAT SERPL: 16 (ref 12–20)
CALCIUM SERPL-MCNC: 9.6 MG/DL (ref 8.5–10.1)
CHLORIDE SERPL-SCNC: 103 MMOL/L (ref 100–111)
CO2 SERPL-SCNC: 24 MMOL/L (ref 21–32)
CREAT SERPL-MCNC: 1.5 MG/DL (ref 0.6–1.3)
DIFFERENTIAL METHOD BLD: ABNORMAL
EOSINOPHIL # BLD: 0.3 K/UL (ref 0–0.4)
EOSINOPHIL NFR BLD: 2 % (ref 0–5)
ERYTHROCYTE [DISTWIDTH] IN BLOOD BY AUTOMATED COUNT: 13 % (ref 11.6–14.5)
GLOBULIN SER CALC-MCNC: 4.6 G/DL (ref 2–4)
GLUCOSE BLD STRIP.AUTO-MCNC: 306 MG/DL (ref 70–110)
GLUCOSE BLD STRIP.AUTO-MCNC: 318 MG/DL (ref 70–110)
GLUCOSE SERPL-MCNC: 266 MG/DL (ref 74–99)
HCT VFR BLD AUTO: 38.4 % (ref 35–45)
HGB BLD-MCNC: 12.3 G/DL (ref 12–16)
IMM GRANULOCYTES # BLD AUTO: 0 K/UL (ref 0–0.04)
IMM GRANULOCYTES NFR BLD AUTO: 0 % (ref 0–0.5)
LACTATE BLD-SCNC: 1.13 MMOL/L (ref 0.4–2)
LYMPHOCYTES # BLD: 3.4 K/UL (ref 0.9–3.6)
LYMPHOCYTES NFR BLD: 26 % (ref 21–52)
MCH RBC QN AUTO: 28.9 PG (ref 24–34)
MCHC RBC AUTO-ENTMCNC: 32 G/DL (ref 31–37)
MCV RBC AUTO: 90.1 FL (ref 78–100)
MONOCYTES # BLD: 1.2 K/UL (ref 0.05–1.2)
MONOCYTES NFR BLD: 9 % (ref 3–10)
NEUTS SEG # BLD: 8.1 K/UL (ref 1.8–8)
NEUTS SEG NFR BLD: 62 % (ref 40–73)
NRBC # BLD: 0 K/UL (ref 0–0.01)
NRBC BLD-RTO: 0 PER 100 WBC
PLATELET # BLD AUTO: ABNORMAL K/UL (ref 135–420)
PLATELET COMMENTS,PCOM: ABNORMAL
PMV BLD AUTO: 13.6 FL (ref 9.2–11.8)
POTASSIUM SERPL-SCNC: 4.9 MMOL/L (ref 3.5–5.5)
PROT SERPL-MCNC: 8.4 G/DL (ref 6.4–8.2)
RBC # BLD AUTO: 4.26 M/UL (ref 4.2–5.3)
RBC MORPH BLD: ABNORMAL
SODIUM SERPL-SCNC: 135 MMOL/L (ref 136–145)
WBC # BLD AUTO: 13.1 K/UL (ref 4.6–13.2)

## 2022-05-26 PROCEDURE — 87040 BLOOD CULTURE FOR BACTERIA: CPT

## 2022-05-26 PROCEDURE — 74011000250 HC RX REV CODE- 250: Performed by: STUDENT IN AN ORGANIZED HEALTH CARE EDUCATION/TRAINING PROGRAM

## 2022-05-26 PROCEDURE — A9576 INJ PROHANCE MULTIPACK: HCPCS | Performed by: STUDENT IN AN ORGANIZED HEALTH CARE EDUCATION/TRAINING PROGRAM

## 2022-05-26 PROCEDURE — 74011250636 HC RX REV CODE- 250/636: Performed by: STUDENT IN AN ORGANIZED HEALTH CARE EDUCATION/TRAINING PROGRAM

## 2022-05-26 PROCEDURE — 73630 X-RAY EXAM OF FOOT: CPT

## 2022-05-26 PROCEDURE — 74011000250 HC RX REV CODE- 250: Performed by: PHYSICIAN ASSISTANT

## 2022-05-26 PROCEDURE — 85025 COMPLETE CBC W/AUTO DIFF WBC: CPT

## 2022-05-26 PROCEDURE — 82962 GLUCOSE BLOOD TEST: CPT

## 2022-05-26 PROCEDURE — 80053 COMPREHEN METABOLIC PANEL: CPT

## 2022-05-26 PROCEDURE — 65270000029 HC RM PRIVATE

## 2022-05-26 PROCEDURE — 74011250637 HC RX REV CODE- 250/637

## 2022-05-26 PROCEDURE — 96365 THER/PROPH/DIAG IV INF INIT: CPT

## 2022-05-26 PROCEDURE — 73720 MRI LWR EXTREMITY W/O&W/DYE: CPT

## 2022-05-26 PROCEDURE — 74011636637 HC RX REV CODE- 636/637

## 2022-05-26 PROCEDURE — 83605 ASSAY OF LACTIC ACID: CPT

## 2022-05-26 PROCEDURE — 99285 EMERGENCY DEPT VISIT HI MDM: CPT

## 2022-05-26 PROCEDURE — 74011250637 HC RX REV CODE- 250/637: Performed by: STUDENT IN AN ORGANIZED HEALTH CARE EDUCATION/TRAINING PROGRAM

## 2022-05-26 PROCEDURE — 96375 TX/PRO/DX INJ NEW DRUG ADDON: CPT

## 2022-05-26 PROCEDURE — 74011250636 HC RX REV CODE- 250/636: Performed by: PHYSICIAN ASSISTANT

## 2022-05-26 RX ORDER — INSULIN GLARGINE 100 [IU]/ML
10 INJECTION, SOLUTION SUBCUTANEOUS EVERY MORNING
Status: ON HOLD | COMMUNITY
Start: 2022-05-18 | End: 2022-05-29 | Stop reason: SDUPTHER

## 2022-05-26 RX ORDER — MAGNESIUM SULFATE 100 %
16 CRYSTALS MISCELLANEOUS AS NEEDED
Status: DISCONTINUED | OUTPATIENT
Start: 2022-05-26 | End: 2022-05-29 | Stop reason: HOSPADM

## 2022-05-26 RX ORDER — ALLOPURINOL 100 MG/1
100 TABLET ORAL DAILY
COMMUNITY
Start: 2022-02-25

## 2022-05-26 RX ORDER — LISINOPRIL 40 MG/1
40 TABLET ORAL DAILY
Status: DISCONTINUED | OUTPATIENT
Start: 2022-05-27 | End: 2022-05-29 | Stop reason: HOSPADM

## 2022-05-26 RX ORDER — DEXTROSE MONOHYDRATE 100 MG/ML
0-250 INJECTION, SOLUTION INTRAVENOUS AS NEEDED
Status: DISCONTINUED | OUTPATIENT
Start: 2022-05-26 | End: 2022-05-29 | Stop reason: HOSPADM

## 2022-05-26 RX ORDER — ATORVASTATIN CALCIUM 40 MG/1
80 TABLET, FILM COATED ORAL DAILY
Status: DISCONTINUED | OUTPATIENT
Start: 2022-05-27 | End: 2022-05-29 | Stop reason: HOSPADM

## 2022-05-26 RX ORDER — INSULIN GLARGINE 100 [IU]/ML
10 INJECTION, SOLUTION SUBCUTANEOUS DAILY
Status: DISCONTINUED | OUTPATIENT
Start: 2022-05-26 | End: 2022-05-28

## 2022-05-26 RX ORDER — METFORMIN HYDROCHLORIDE 500 MG/1
500 TABLET ORAL DAILY
COMMUNITY
Start: 2022-02-25

## 2022-05-26 RX ORDER — ACETAMINOPHEN 325 MG/1
650 TABLET ORAL
Status: DISCONTINUED | OUTPATIENT
Start: 2022-05-26 | End: 2022-05-29 | Stop reason: HOSPADM

## 2022-05-26 RX ORDER — INSULIN LISPRO 100 [IU]/ML
INJECTION, SOLUTION INTRAVENOUS; SUBCUTANEOUS
Status: DISCONTINUED | OUTPATIENT
Start: 2022-05-26 | End: 2022-05-29 | Stop reason: HOSPADM

## 2022-05-26 RX ORDER — MORPHINE SULFATE 15 MG/1
15 TABLET ORAL 2 TIMES DAILY
Status: DISCONTINUED | OUTPATIENT
Start: 2022-05-26 | End: 2022-05-29 | Stop reason: HOSPADM

## 2022-05-26 RX ORDER — FAMOTIDINE 20 MG/1
20 TABLET, FILM COATED ORAL DAILY
Status: DISCONTINUED | OUTPATIENT
Start: 2022-05-27 | End: 2022-05-29 | Stop reason: HOSPADM

## 2022-05-26 RX ORDER — SODIUM CHLORIDE 0.9 % (FLUSH) 0.9 %
5-10 SYRINGE (ML) INJECTION AS NEEDED
Status: DISCONTINUED | OUTPATIENT
Start: 2022-05-26 | End: 2022-05-29 | Stop reason: HOSPADM

## 2022-05-26 RX ORDER — EMPAGLIFLOZIN 10 MG/1
10 TABLET, FILM COATED ORAL DAILY
COMMUNITY
Start: 2022-04-18 | End: 2022-05-29

## 2022-05-26 RX ORDER — MORPHINE SULFATE 15 MG/1
15 TABLET ORAL 2 TIMES DAILY
COMMUNITY
Start: 2022-05-02

## 2022-05-26 RX ORDER — HYDROXYZINE HYDROCHLORIDE 10 MG/1
1 TABLET, FILM COATED ORAL
COMMUNITY
Start: 2022-02-25

## 2022-05-26 RX ORDER — BUMETANIDE 1 MG/1
1 TABLET ORAL
Status: DISCONTINUED | OUTPATIENT
Start: 2022-05-26 | End: 2022-05-29 | Stop reason: HOSPADM

## 2022-05-26 RX ORDER — LEVOTHYROXINE SODIUM 150 UG/1
150 TABLET ORAL DAILY
Status: DISCONTINUED | OUTPATIENT
Start: 2022-05-27 | End: 2022-05-29 | Stop reason: HOSPADM

## 2022-05-26 RX ORDER — SODIUM CHLORIDE 9 MG/ML
75 INJECTION, SOLUTION INTRAVENOUS CONTINUOUS
Status: DISCONTINUED | OUTPATIENT
Start: 2022-05-26 | End: 2022-05-26

## 2022-05-26 RX ORDER — SODIUM CHLORIDE 0.9 % (FLUSH) 0.9 %
5-40 SYRINGE (ML) INJECTION EVERY 8 HOURS
Status: DISCONTINUED | OUTPATIENT
Start: 2022-05-26 | End: 2022-05-29 | Stop reason: HOSPADM

## 2022-05-26 RX ORDER — GABAPENTIN 400 MG/1
400 CAPSULE ORAL 3 TIMES DAILY
Status: DISCONTINUED | OUTPATIENT
Start: 2022-05-26 | End: 2022-05-26 | Stop reason: DRUGHIGH

## 2022-05-26 RX ORDER — ACETAMINOPHEN 650 MG/1
650 SUPPOSITORY RECTAL
Status: DISCONTINUED | OUTPATIENT
Start: 2022-05-26 | End: 2022-05-29 | Stop reason: HOSPADM

## 2022-05-26 RX ORDER — POLYETHYLENE GLYCOL 3350 17 G/17G
17 POWDER, FOR SOLUTION ORAL DAILY PRN
Status: DISCONTINUED | OUTPATIENT
Start: 2022-05-26 | End: 2022-05-29 | Stop reason: HOSPADM

## 2022-05-26 RX ORDER — AMLODIPINE BESYLATE 10 MG/1
10 TABLET ORAL DAILY
Status: DISCONTINUED | OUTPATIENT
Start: 2022-05-27 | End: 2022-05-26

## 2022-05-26 RX ORDER — SODIUM CHLORIDE 0.9 % (FLUSH) 0.9 %
5-40 SYRINGE (ML) INJECTION AS NEEDED
Status: DISCONTINUED | OUTPATIENT
Start: 2022-05-26 | End: 2022-05-29 | Stop reason: HOSPADM

## 2022-05-26 RX ORDER — CARVEDILOL 12.5 MG/1
12.5 TABLET ORAL 2 TIMES DAILY WITH MEALS
Status: DISCONTINUED | OUTPATIENT
Start: 2022-05-26 | End: 2022-05-29 | Stop reason: HOSPADM

## 2022-05-26 RX ORDER — GABAPENTIN 300 MG/1
300 CAPSULE ORAL 3 TIMES DAILY
Status: DISCONTINUED | OUTPATIENT
Start: 2022-05-26 | End: 2022-05-29 | Stop reason: HOSPADM

## 2022-05-26 RX ORDER — HEPARIN SODIUM 5000 [USP'U]/ML
5000 INJECTION, SOLUTION INTRAVENOUS; SUBCUTANEOUS EVERY 8 HOURS
Status: DISCONTINUED | OUTPATIENT
Start: 2022-05-26 | End: 2022-05-29 | Stop reason: HOSPADM

## 2022-05-26 RX ORDER — LEVOTHYROXINE SODIUM 150 UG/1
150 TABLET ORAL DAILY
COMMUNITY
Start: 2022-02-25

## 2022-05-26 RX ORDER — VANCOMYCIN 2 GRAM/500 ML IN 0.9 % SODIUM CHLORIDE INTRAVENOUS
2000 ONCE
Status: COMPLETED | OUTPATIENT
Start: 2022-05-26 | End: 2022-05-26

## 2022-05-26 RX ORDER — SODIUM CHLORIDE, SODIUM LACTATE, POTASSIUM CHLORIDE, CALCIUM CHLORIDE 600; 310; 30; 20 MG/100ML; MG/100ML; MG/100ML; MG/100ML
75 INJECTION, SOLUTION INTRAVENOUS CONTINUOUS
Status: DISCONTINUED | OUTPATIENT
Start: 2022-05-26 | End: 2022-05-27

## 2022-05-26 RX ORDER — AMLODIPINE BESYLATE 10 MG/1
10 TABLET ORAL DAILY
Status: DISCONTINUED | OUTPATIENT
Start: 2022-05-26 | End: 2022-05-29 | Stop reason: HOSPADM

## 2022-05-26 RX ADMIN — WATER 2 G: 1 INJECTION INTRAMUSCULAR; INTRAVENOUS; SUBCUTANEOUS at 16:18

## 2022-05-26 RX ADMIN — Medication 8 UNITS: at 22:55

## 2022-05-26 RX ADMIN — AMLODIPINE BESYLATE 10 MG: 10 TABLET ORAL at 22:55

## 2022-05-26 RX ADMIN — Medication 10 UNITS: at 18:15

## 2022-05-26 RX ADMIN — GABAPENTIN 300 MG: 300 CAPSULE ORAL at 18:09

## 2022-05-26 RX ADMIN — GABAPENTIN 300 MG: 300 CAPSULE ORAL at 22:55

## 2022-05-26 RX ADMIN — WATER 1 G: 1 INJECTION INTRAMUSCULAR; INTRAVENOUS; SUBCUTANEOUS at 13:45

## 2022-05-26 RX ADMIN — MORPHINE SULFATE 15 MG: 15 TABLET ORAL at 18:11

## 2022-05-26 RX ADMIN — SODIUM CHLORIDE, PRESERVATIVE FREE 10 ML: 5 INJECTION INTRAVENOUS at 22:00

## 2022-05-26 RX ADMIN — SODIUM CHLORIDE, PRESERVATIVE FREE 10 ML: 5 INJECTION INTRAVENOUS at 15:50

## 2022-05-26 RX ADMIN — GADOTERIDOL 18 ML: 279.3 INJECTION, SOLUTION INTRAVENOUS at 22:15

## 2022-05-26 RX ADMIN — SODIUM CHLORIDE 75 ML/HR: 9 INJECTION, SOLUTION INTRAVENOUS at 16:13

## 2022-05-26 RX ADMIN — HEPARIN SODIUM 5000 UNITS: 5000 INJECTION INTRAVENOUS; SUBCUTANEOUS at 22:55

## 2022-05-26 RX ADMIN — VANCOMYCIN HYDROCHLORIDE 2000 MG: 10 INJECTION, POWDER, LYOPHILIZED, FOR SOLUTION INTRAVENOUS at 13:48

## 2022-05-26 RX ADMIN — SODIUM CHLORIDE, SODIUM LACTATE, POTASSIUM CHLORIDE, AND CALCIUM CHLORIDE 75 ML/HR: 600; 310; 30; 20 INJECTION, SOLUTION INTRAVENOUS at 18:16

## 2022-05-26 NOTE — PROGRESS NOTES
[facility] Pharmacokinetic Monitoring Service - Vancomycin     Renee Llanos is a 82 y.o. female starting on vancomycin therapy for Skin and Soft Tissue Infection. Pharmacy consulted by Dr. Santos Perez for monitoring and adjustment. Target Concentration: Dosing by levels     Additional Antimicrobials: Cefepime    Pertinent Laboratory Values:   Temp: 97.6 °F (36.4 °C)  Weight: 108.9 kg (240 lb)  Recent Labs     05/26/22  1245   CREA 1.50*   BUN 24*   WBC 13.1     Estimated Creatinine Clearance: 33.7 mL/min (A) (based on SCr of 1.5 mg/dL (H)). Pertinent Cultures:  Culture Date Source Results   5/26 Blood  Pending   MRSA Nasal Swab: N/A. Non-respiratory infection    Plan:  1. Concentration-guided dosing due to renal impairment/insufficiency  2. Loading dose of Vancomycin 2000mg x 1 given  a. Will dose according to vancomycin concentration levels at this time due to renal insufficiency  3. Renal labs as indicated   4. Vancomycin concentration ordered for 5/27 @ 0400  5. Pharmacy will continue to monitor patient and adjust therapy as indicated    Thank you for the consult,  Denisse Silverio.  BARNEY Solis  5/26/2022

## 2022-05-26 NOTE — CONSULTS
Seen in Clinic today. Has infected Diabetic ulcer left first metatarsal with septic bunion joint. Plan surgery in AM  Full Consult to follow.

## 2022-05-26 NOTE — PROGRESS NOTES
MRI Screening form needs to be filled out and faxed to [phone] MRI can be scheduled. If unable to obtain information from pt, [facility] needs to be contacted.  If pt is claustro or will need pain meds, please have ordered in advance in order to facilitate exam.

## 2022-05-26 NOTE — Clinical Note
Status[de-identified] INPATIENT [101]   Type of Bed: Medical [8]   Inpatient Hospitalization Certified Necessary for the Following Reasons: 3.  Patient receiving treatment that can only be provided in an inpatient setting (further clarification in H&P documentation)   Admitting Diagnosis: Septic arthritis of foot [facility]) [[id number]]   Admitting Physician: Daniel Flores [[id number]]   Attending Physician: Daniel Flores [[id number]]   Estimated Length of Stay: 3-4 Midnights   Discharge Plan[de-identified] Home with Office Follow-up

## 2022-05-26 NOTE — ED PROVIDER NOTES
[facility] EMERGENCY DEPT    Patient Name: Chelsea Jones    History of Presenting Illness     Chief Complaint   Patient presents with    Foot Pain    Skin Problem     82 y.o. female with a PMH of HTN, DM with neuropathy, CHF, chronic venous insufficiency presents to the ED c/o left foot pain/infection onset 1 week ago. She notes a constant moderate burning pain. Pt states she has had a corn a long time but it has seen become infected. Pt was seen by Dr. Eva Nance today and was directed to come here for possible septic arthritis vs early osteo. Denies fever, chills, or other symptoms. Patient denies any other associated signs or symptoms. Patient denies any other complaints. Nursing notes regarding the HPI and triage nursing notes were reviewed. Prior medical records were reviewed.      Current Facility-Administered Medications   Medication Dose Route Frequency Provider Last Rate Last Admin    sodium chloride (NS) flush 5-10 mL  5-10 mL IntraVENous PRN Anuj Ridley MD        VANCOMYCIN INFORMATION NOTE   Other Rx Dosing/Monitoring Anuj Ridley MD        0.9% sodium chloride infusion  75 mL/hr IntraVENous CONTINUOUS Anuj Ridley MD        cefepime (MAXIPIME) 2 g in sterile water (preservative free) 10 mL IV syringe  2 g IntraVENous ONCE Anuj Ridley MD        Amarilis Holiday ON 5/27/2022] cefepime (MAXIPIME) 2 g in 0.9% sodium chloride (MBP/ADV) 100 mL MBP  2 g IntraVENous Q12H Anuj Ridley MD        sodium chloride (NS) flush 5-40 mL  5-40 mL IntraVENous Q8H Anuj Ridley MD        sodium chloride (NS) flush 5-40 mL  5-40 mL IntraVENous PRN Anuj Ridley MD        acetaminophen (TYLENOL) tablet 650 mg  650 mg Oral Q6H PRN Anuj Ridley MD        Or    acetaminophen (TYLENOL) suppository 650 mg  650 mg Rectal Q6H PRN Anuj Ridley MD        polyethylene glycol [facility]) packet 17 g  17 g Oral DAILY PRN Galilea Holliday MD        [START ON 5/27/2022] famotidine (PEPCID) tablet 20 mg  20 mg Oral DAILY Galilea Holliday MD        heparin (porcine) injection 5,000 Units  5,000 Units SubCUTAneous Q8H Galilea Holliday MD         Current Outpatient Medications   Medication Sig Dispense Refill    levothyroxine (SYNTHROID) 150 mcg tablet Take 150 mcg by mouth daily.  morphine IR (MS IR) 15 mg tablet Take 15 mg by mouth two (2) times a day.  metFORMIN (GLUCOPHAGE) 500 mg tablet Take 1,000 mg by mouth two (2) times a day.  Lantus Solostar U-100 Insulin 100 unit/mL (3 mL) inpn 10 Units by SubCUTAneous route Every morning.  hydrOXYzine HCL (ATARAX) 10 mg tablet Take 1 Tablet by mouth two (2) times daily as needed.  Jardiance 10 mg tablet Take 10 mg by mouth daily.  allopurinoL (ZYLOPRIM) 100 mg tablet Take 100 mg by mouth daily.  lisinopriL (PRINIVIL, ZESTRIL) 40 mg tablet Take 1 tablet by mouth once daily 90 Tablet 0    sertraline (ZOLOFT) 50 mg tablet Take 1 Tablet by mouth daily. 90 Tablet 3    gabapentin (NEURONTIN) 400 mg capsule Take 1 cap in morning, 1 cap at 2pm, 2 caps at bedtime 360 Capsule 0    carvediloL (COREG) 12.5 mg tablet Take 1 Tablet by mouth two (2) times daily (with meals). 180 Tablet 0    bumetanide (BUMEX) 2 mg tablet Take 0.5 Tablets by mouth two (2) times daily as needed (swelling). 180 Tablet 1    amLODIPine (NORVASC) 10 mg tablet Take 1 Tablet by mouth daily. 90 Tablet 0    glimepiride (AMARYL) 2 mg tablet Take 1 Tablet by mouth every morning. 90 Tablet 0    insulin aspart U-100 (NovoLOG Flexpen U-100 Insulin) 100 unit/mL (3 mL) inpn 20 Units by SubCUTAneous route nightly. Indications: type 2 diabetes mellitus 5 Adjustable Dose Pre-filled Pen Syringe 3    atorvastatin (LIPITOR) 80 mg tablet Take 1 Tablet by mouth daily.  (Patient not taking: Reported on 11/9/2021) 90 Tablet 3    Blood-Glucose Meter (FREESTYLE LITE METER) monitoring kit Test twice blood glucose daily; ICD-10 E11.9; Quantity 1 (Patient not taking: Reported on 11/9/2021) 1 Kit 0    insulin syringe,safetyneedle 1 mL 31 gauge x 5/16\" syrg 1 Each by Does Not Apply route daily. (Patient not taking: Reported on 11/9/2021) 300 Each 3    glucose blood VI test strips (FREESTYLE TEST) strip Use to check blood glucose twice daily DX:E11.9 (Patient not taking: Reported on 11/9/2021) 300 Strip 3    acetaminophen (TYLENOL) 500 mg tablet Take 1 Tab by mouth every six (6) hours as needed for Pain. 270 Tab 3       Past History     Past Medical History:  Past Medical History:   Diagnosis Date    Acquired hypothyroidism 8/1/2016    Arthritis of right shoulder region 4/21/2016    Asthma     Bilateral lower extremity edema 7/7/2021    Chronic bilateral low back pain without sciatica 7/7/2021    Chronic diastolic congestive heart failure ([address] 3/10/2021    Chronic venous insufficiency     Diabetes ([address]     neuropathy    Essential hypertension 7/7/2021    Gout     History of depression 1/23/2017    History of fall 7/7/2021    Hypercholesteremia 7/7/2021    Hypertension     MI (myocardial infarction) ([address]     OAB (overactive bladder) 7/7/2021    Peripheral neuropathy     Rheumatoid arthritis (HCC)     Tear of right rotator cuff 5/16/2016    Thyroid pain     Urinary incontinence 7/7/2021    Vertigo        Past Surgical History:  Past Surgical History:   Procedure Laterality Date    HX AMPUTATION TOE Right 2020    Great toe Dr. 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     HX KNEE REPLACEMENT Bilateral        Family History:  Family History   Problem Relation Age of Onset    Heart Attack Mother     Heart Attack Father        Social History:  Social History     Tobacco Use    Smoking status: Former Smoker     Quit date: 1976     Years since quittin.2    Smokeless tobacco: Never Used    Tobacco comment: quit 45 years ago   Vaping Use    Vaping Use: Never used   Substance Use Topics    Alcohol use: No     Alcohol/week: 0.0 standard drinks    Drug use: No       Allergies:  No Known Allergies    Patient's primary care provider (as noted in EPIC):  Kaylie Montgomery DNP    Review of Systems   Constitutional:  Denies malaise, fever, chills. Head:  Denies injury. Respiratory:  Denies shortness of breath. GI/ABD:  Denies injury, pain, distention, nausea, vomiting, diarrhea. Extremity/MS: + left foot pain/infection. Neuro:  Denies headache, LOC, dizziness, neurologic symptoms/deficits/paresthesias. Skin: Denies injury, rash, itching or skin changes. All other systems negative as reviewed. Visit Vitals  /76 (BP 1 Location: Right arm, BP Patient Position: At rest)   Pulse 76   Temp 97.6 °F (36.4 °C)   Resp 19   Ht 5' 5\" (1.651 m)   Wt 108.9 kg (240 lb)   SpO2 95%   BMI 39.94 kg/m²       PHYSICAL EXAM:    CONSTITUTIONAL:  Alert, in no apparent distress;  well developed;  well nourished. HEAD:  Normocephalic, atraumatic. EYES:  EOMI. Non-icteric sclera. Normal conjunctiva. ENTM:  Nose:  no rhinorrhea. Throat:  no erythema or exudate, mucous membranes moist.  NECK:  Supple  RESPIRATORY:  Chest clear, equal breath sounds, good air movement. CARDIOVASCULAR:  Regular rate and rhythm. No murmurs, rubs, or gallops. GI:  Normal bowel sounds, abdomen soft and non-tender. No rebound or guarding. BACK:  Non-tender. UPPER EXT:  Normal inspection. LOWER EXT: Left foot, 1st MTP plantar aspect with corn with underlying purulent collection and surrounding warmth/erythema; NVI distally. NEURO:  Moves all four extremities, and grossly normal motor exam.  SKIN:  No rashes;  Normal for age. PSYCH:  Alert and normal affect.     IMPRESSION AND MEDICAL DECISION MAKING:    Recent Results (from the past 12 hour(s)) METABOLIC PANEL, COMPREHENSIVE    Collection Time: 05/26/22 12:45 PM   Result Value Ref Range    Sodium 135 (L) 136 - 145 mmol/L    Potassium 4.9 3.5 - 5.5 mmol/L    Chloride 103 100 - 111 mmol/L    CO2 24 21 - 32 mmol/L    Anion gap 8 3.0 - 18 mmol/L    Glucose 266 (H) 74 - 99 mg/dL    BUN 24 (H) 7.0 - 18 MG/DL    Creatinine 1.50 (H) 0.6 - 1.3 MG/DL    BUN/Creatinine ratio 16 12 - 20      GFR est AA 40 (L) >60 ml/min/1.73m2    GFR est non-AA 33 (L) >60 ml/min/1.73m2    Calcium 9.6 8.5 - 10.1 MG/DL    Bilirubin, total 0.5 0.2 - 1.0 MG/DL    ALT (SGPT) 12 (L) 13 - 56 U/L    AST (SGOT) 22 10 - 38 U/L    Alk. phosphatase 67 45 - 117 U/L    Protein, total 8.4 (H) 6.4 - 8.2 g/dL    Albumin 3.8 3.4 - 5.0 g/dL    Globulin 4.6 (H) 2.0 - 4.0 g/dL    A-G Ratio 0.8 0.8 - 1.7     CBC WITH AUTOMATED DIFF    Collection Time: 05/26/22 12:45 PM   Result Value Ref Range    WBC 13.1 4.6 - 13.2 K/uL    RBC 4.26 4.20 - 5.30 M/uL    HGB 12.3 12.0 - 16.0 g/dL    HCT 38.4 35.0 - 45.0 %    MCV 90.1 78.0 - 100.0 FL    MCH 28.9 24.0 - 34.0 PG    MCHC 32.0 31.0 - 37.0 g/dL    RDW 13.0 11.6 - 14.5 %    PLATELET  709 - 551 K/uL     UNABLE TO REPORT ACCURATE COUNT DUE TO PLATELET AGGREGATION, HOWEVER, PLATELETS APPEAR NORMAL IN NUMBER ON SMEAR. PLEASE RESUBMIT SODIUM CITRATE (BLUE) AND EDTA (LAVENDER) TUBES FOR HEMATOLOGICAL TESTING. MPV 13.6 (H) 9.2 - 11.8 FL    NRBC 0.0 0  WBC    ABSOLUTE NRBC 0.00 0.00 - 0.01 K/uL    NEUTROPHILS 62 40 - 73 %    LYMPHOCYTES 26 21 - 52 %    MONOCYTES 9 3 - 10 %    EOSINOPHILS 2 0 - 5 %    BASOPHILS 1 0 - 2 %    IMMATURE GRANULOCYTES 0 0.0 - 0.5 %    ABS. NEUTROPHILS 8.1 (H) 1.8 - 8.0 K/UL    ABS. LYMPHOCYTES 3.4 0.9 - 3.6 K/UL    ABS. MONOCYTES 1.2 0.05 - 1.2 K/UL    ABS. EOSINOPHILS 0.3 0.0 - 0.4 K/UL    ABS. BASOPHILS 0.1 0.0 - 0.1 K/UL    ABS. IMM.  GRANS. 0.0 0.00 - 0.04 K/UL    DF SMEAR SCANNED      PLATELET COMMENTS ADEQUATE PLATELETS      RBC COMMENTS NORMOCYTIC, NORMOCHROMIC     POC LACTIC ACID    Collection Time: 05/26/22  1:01 PM   Result Value Ref Range    Lactic Acid (POC) 1.13 0.40 - 2.00 mmol/L      XR FOOT LT MIN 3 V    Result Date: 5/26/2022  HISTORY: Left foot pain. Exam: Left foot. Technique: 3 views of left foot were obtained. No prior studies were performed for comparison. FINDINGS: No acute fracture, dislocation or radiopaque foreign body seen. Moderate osteoarthritis most pronounced in the midfoot and forefoot. Visualized soft tissues are within normal limits. 1. No acute fracture or dislocation. 2. 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 Patient does not meet sepsis criteria, blood cultures ordered prior to being given Vanco and Rocephin. Fluids held initially due to patient's history of CHF. X-ray unremarkable for acute process. Consult with Dr. Snehal Craig, he is stating he will perform surgery on the patient tomorrow morning, requesting an MRI. Consult with Dr. Carol Kitchen family medicine resident. She states they will accept the patient under Dr. Tk Bernal service. States okay to order fluids at 75cc/hr. Diagnosis:   1. Septic arthritis of left foot, due to unspecified organism ([facility]    2. Hyperglycemia    3. AQUILES (acute kidney injury) ([facility]      Disposition: Admission    Patient's Medications   Start Taking    No medications on file   Continue Taking    ACETAMINOPHEN (TYLENOL) 500 MG TABLET    Take 1 Tab by mouth every six (6) hours as needed for Pain. ALLOPURINOL (ZYLOPRIM) 100 MG TABLET    Take 100 mg by mouth daily. AMLODIPINE (NORVASC) 10 MG TABLET    Take 1 Tablet by mouth daily. ATORVASTATIN (LIPITOR) 80 MG TABLET    Take 1 Tablet by mouth daily. BLOOD-GLUCOSE METER (FREESTYLE LITE METER) MONITORING KIT    Test twice blood glucose daily; ICD-10 E11.9; Quantity 1    BUMETANIDE (BUMEX) 2 MG TABLET    Take 0.5 Tablets by mouth two (2) times daily as needed (swelling).     CARVEDILOL (COREG) 12.5 MG TABLET Take 1 Tablet by mouth two (2) times daily (with meals). GABAPENTIN (NEURONTIN) 400 MG CAPSULE    Take 1 cap in morning, 1 cap at 2pm, 2 caps at bedtime    GLIMEPIRIDE (AMARYL) 2 MG TABLET    Take 1 Tablet by mouth every morning. GLUCOSE BLOOD VI TEST STRIPS (FREESTYLE TEST) STRIP    Use to check blood glucose twice daily DX:E11.9    HYDROXYZINE HCL (ATARAX) 10 MG TABLET    Take 1 Tablet by mouth two (2) times daily as needed. INSULIN ASPART U-100 (NOVOLOG FLEXPEN U-100 INSULIN) 100 UNIT/ML (3 ML) INPN    20 Units by SubCUTAneous route nightly. Indications: type 2 diabetes mellitus    INSULIN SYRINGE,SAFETYNEEDLE 1 ML 31 GAUGE X 5/16\" SYRG    1 Each by Does Not Apply route daily. JARDIANCE 10 MG TABLET    Take 10 mg by mouth daily. LANTUS SOLOSTAR U-100 INSULIN 100 UNIT/ML (3 ML) INPN    10 Units by SubCUTAneous route Every morning. LEVOTHYROXINE (SYNTHROID) 150 MCG TABLET    Take 150 mcg by mouth daily. LISINOPRIL (PRINIVIL, ZESTRIL) 40 MG TABLET    Take 1 tablet by mouth once daily    METFORMIN (GLUCOPHAGE) 500 MG TABLET    Take 1,000 mg by mouth two (2) times a day. MORPHINE IR (MS IR) 15 MG TABLET    Take 15 mg by mouth two (2) times a day. SERTRALINE (ZOLOFT) 50 MG TABLET    Take 1 Tablet by mouth daily. These Medications have changed    No medications on file   Stop Taking    LEVOTHYROXINE (SYNTHROID) 175 MCG TABLET    Take 1 Tablet by mouth Daily (before breakfast).      IGNACIO Trejo

## 2022-05-26 NOTE — CONSULTS
Infectious Disease Consultation Note  Name of requesting provider: dr. Nury Wright      Reason: Left foot infection    Current abx Prior abx   Ceftriaxone, vancomycin since 5/26/2022      Lines:       Assessment :  82 lady with past medical history significant for uncontrolled type 2 diabetes, diabetic neuropathy, hypercholesterolemia, hypertension sent to ED on 5/26/2022 from podiatrist office due to increasing pain and swelling left foot. Clinical presentation consistent with diabetic left foot infection, left foot abscess, probable left first metatarsal osteomyelitis    No evidence of osteomyelitis noted on x-ray left foot 5/26/2022. Plans for MRI noted. Uncontrolled type 2 diabetes likely contributed to current infection    Recommendations:    1. Discontinue ceftriaxone. Start cefepime. Continue vancomycin for now  2. Follow-up MRI left foot  3. Agree with plans for I&D left foot in a.m.  4.  Management of hyperglycemia per primary team  5. F/u podiatry recommendations    Thank you for consultation request. Above plan was discussed in details with patient, friend at bedside, IGNACIO Boles and dr Nury Wright. Please call me if any further questions or concerns. Will continue to participate in the care of this patient. HPI:    82 lady with past medical history significant for uncontrolled type 2 diabetes, diabetic neuropathy, hypercholesterolemia, hypertension sent to ED on 5/26/2022 from podiatrist office due to increasing pain and swelling left foot. Patient states that she has had a callus on the left foot for a long time. She followed up with the podiatrist for this. About a week ago she started noticing increasing pain/swelling/redness left foot close to the left great toe. She was seen by Dr. Telly Santo today. Concerns for severe infection. Sent to ed with plans for surgery tomorrow. MRI foot ordered. I have been consulted for antibiotic recommendations.     Patient denies any fever or chills throughout this time. She denies any recent trauma to the left foot. She states that she has numbness in her left foot extending to her left leg. No prior history of MRSA colonization or infection. Past Medical History:   Diagnosis Date    Acquired hypothyroidism 8/1/2016    Arthritis of right shoulder region 4/21/2016    Asthma     Bilateral lower extremity edema 7/7/2021    Chronic bilateral low back pain without sciatica 7/7/2021    Chronic diastolic congestive heart failure ([address] 3/10/2021    Chronic venous insufficiency     Diabetes [facility]     neuropathy    Essential hypertension 7/7/2021    Gout     History of depression 1/23/2017    History of fall 7/7/2021    Hypercholesteremia 7/7/2021    Hypertension     MI (myocardial infarction) ([facility]     OAB (overactive bladder) 7/7/2021    Peripheral neuropathy     Rheumatoid arthritis (HCC)     Tear of right rotator cuff 5/16/2016    Thyroid pain     Urinary incontinence 7/7/2021    Vertigo        Past Surgical History:   Procedure Laterality Date    HX AMPUTATION TOE Right 2020    Great toe Dr. 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  HX KNEE REPLACEMENT Bilateral        Patient's Medications   Start Taking    No medications on file   Continue Taking    ACETAMINOPHEN (TYLENOL) 500 MG TABLET    Take 1 Tab by mouth every six (6) hours as needed for Pain. AMLODIPINE (NORVASC) 10 MG TABLET    Take 1 Tablet by mouth daily. ATORVASTATIN (LIPITOR) 80 MG TABLET    Take 1 Tablet by mouth daily. BLOOD-GLUCOSE METER (FREESTYLE LITE METER) MONITORING KIT    Test twice blood glucose daily; ICD-10 E11.9; Quantity 1    BUMETANIDE (BUMEX) 2 MG TABLET    Take 0.5 Tablets by mouth two (2) times daily as needed (swelling). CARVEDILOL (COREG) 12.5 MG TABLET    Take 1 Tablet by mouth two (2) times daily (with meals). GABAPENTIN (NEURONTIN) 400 MG CAPSULE    Take 1 cap in morning, 1 cap at 2pm, 2 caps at bedtime    GLIMEPIRIDE (AMARYL) 2 MG TABLET    Take 1 Tablet by mouth every morning. GLUCOSE BLOOD VI TEST STRIPS (FREESTYLE TEST) STRIP    Use to check blood glucose twice daily DX:E11.9    INSULIN ASPART U-100 (NOVOLOG FLEXPEN U-100 INSULIN) 100 UNIT/ML (3 ML) INPN    20 Units by SubCUTAneous route nightly. Indications: type 2 diabetes mellitus    INSULIN SYRINGE,SAFETYNEEDLE 1 ML 31 GAUGE X 5/16\" SYRG    1 Each by Does Not Apply route daily. LEVOTHYROXINE (SYNTHROID) 175 MCG TABLET    Take 1 Tablet by mouth Daily (before breakfast). LISINOPRIL (PRINIVIL, ZESTRIL) 40 MG TABLET    Take 1 tablet by mouth once daily    SERTRALINE (ZOLOFT) 50 MG TABLET    Take 1 Tablet by mouth daily. These Medications have changed    No medications on file   Stop Taking    No medications on file       Current Facility-Administered Medications   Medication Dose Route Frequency    sodium chloride (NS) flush 5-10 mL  5-10 mL IntraVENous PRN    vancomycin (VANCOCIN) 2000 mg in  ml infusion  2,000 mg IntraVENous ONCE    VANCOMYCIN INFORMATION NOTE   Other Rx Dosing/Monitoring    0.9% sodium chloride infusion  75 mL/hr IntraVENous CONTINUOUS    cefepime (MAXIPIME) 2 g in sterile water (preservative free) 10 mL IV syringe  2 g IntraVENous Q12H     Current Outpatient Medications   Medication Sig    lisinopriL (PRINIVIL, ZESTRIL) 40 mg tablet Take 1 tablet by mouth once daily    sertraline (ZOLOFT) 50 mg tablet Take 1 Tablet by mouth daily.  gabapentin (NEURONTIN) 400 mg capsule Take 1 cap in morning, 1 cap at 2pm, 2 caps at bedtime    carvediloL (COREG) 12.5 mg tablet Take 1 Tablet by mouth two (2) times daily (with meals).  bumetanide (BUMEX) 2 mg tablet Take 0.5 Tablets by mouth two (2) times daily as needed (swelling).  amLODIPine (NORVASC) 10 mg tablet Take 1 Tablet by mouth daily.     glimepiride (AMARYL) 2 mg tablet Take 1 Tablet by mouth every morning.  insulin aspart U-100 (NovoLOG Flexpen U-100 Insulin) 100 unit/mL (3 mL) inpn 20 Units by SubCUTAneous route nightly. Indications: type 2 diabetes mellitus    levothyroxine (SYNTHROID) 175 mcg tablet Take 1 Tablet by mouth Daily (before breakfast).  atorvastatin (LIPITOR) 80 mg tablet Take 1 Tablet by mouth daily. (Patient not taking: Reported on 2021)    Blood-Glucose Meter (FREESTYLE LITE METER) monitoring kit Test twice blood glucose daily; ICD-10 E11.9; Quantity 1 (Patient not taking: Reported on 2021)    insulin syringe,safetyneedle 1 mL 31 gauge x \" syrg 1 Each by Does Not Apply route daily. (Patient not taking: Reported on 2021)    glucose blood VI test strips (FREESTYLE TEST) strip Use to check blood glucose twice daily DX:E11.9 (Patient not taking: Reported on 2021)    acetaminophen (TYLENOL) 500 mg tablet Take 1 Tab by mouth every six (6) hours as needed for Pain. Allergies: Patient has no known allergies.     Family History   Problem Relation Age of Onset    Heart Attack Mother     Heart Attack Father      Social History     Socioeconomic History    Marital status:      Spouse name: Not on file    Number of children: Not on file    Years of education: Not on file    Highest education level: Not on file   Occupational History    Not on file   Tobacco Use    Smoking status: Former Smoker     Quit date:      Years since quittin.4    Smokeless tobacco: Never Used    Tobacco comment: quit 45 years ago   Vaping Use    Vaping Use: Never used   Substance and Sexual Activity    Alcohol use: No     Alcohol/week: 0.0 standard drinks    Drug use: No    Sexual activity: Not Currently   Other Topics Concern    Not on file   Social History Narrative    Not on file     Social Determinants of Health     Financial Resource Strain:     Difficulty of Paying Living Expenses: Not on file   Food Insecurity:     Worried About [facility] in the Last Year: Not on file    Arlene of Food in the Last Year: Not on file   Transportation Needs:     Lack of Transportation (Medical): Not on file    Lack of Transportation (Non-Medical): Not on file   Physical Activity:     Days of Exercise per Week: Not on file    Minutes of Exercise per Session: Not on file   Stress:     Feeling of Stress : Not on file   Social Connections:     Frequency of Communication with Friends and Family: Not on file    Frequency of Social Gatherings with Friends and Family: Not on file    Attends Faith Services: Not on file    Active Member of [address] [place] [address] or Organizations: Not on file    Attends Club or Organization Meetings: Not on file    Marital Status: Not on file   Intimate Partner Violence:     Fear of Current or Ex-Partner: Not on file    Emotionally Abused: Not on file    Physically Abused: Not on file    Sexually Abused: Not on file   Housing Stability:     Unable to Pay for Housing in the Last Year: Not on file    Number of [place] in the Last Year: Not on file    Unstable Housing in the Last Year: Not on file     Social History     Tobacco Use   Smoking Status Former Smoker    Quit date:  [facility] Years since quittin.4   Smokeless Tobacco Never Used   Tobacco Comment    quit 45 years ago        Temp (24hrs), Av.2 °F (36.8 °C), Min:98.2 °F (36.8 °C), Max:98.2 °F (36.8 °C)    Visit Vitals  BP (!) 137/45   Pulse 69   Temp 98.2 °F (36.8 °C)   Resp 18   Ht 5' 5\" (1.651 m)   Wt 108.9 kg (240 lb)   SpO2 97%   BMI 39.94 kg/m²       ROS: 12 point ROS obtained in details. Pertinent positives as mentioned in HPI,   otherwise negative    Physical Exam:    General: Well developed, well nourished female sitting on the  bed AAOx3 in no acute distress.     General:   awake alert and oriented   HEENT:  Normocephalic, atraumatic, EOMI, no scleral icterus or pallor; no conjunctival hemmohage;  nasal and oral mucous are moist and without evidence of lesions. Neck supple, no bruits. Lymph Nodes:   not examined   Lungs:   non-labored, bilateral chest movements equal, no audible wheezing   Heart:  RRR, s1 and s2; no  rubs or gallops, no edema, + pedal pulses   Abdomen:  soft, non-distended, active bowel sounds, no hepatomegaly, no splenomegaly. Non-tender   Genitourinary:  deferred   Extremities:   no clubbing, cyanosis; callus on the plantar aspect of the left foot overlying the left first metatarsal with underlying swelling/redness/fluctuance , no significant pain on active/passive movements of the left great toe no erythema left foot/left leg, unable to palpate dorsalis pedis pulsation bilaterally full ROM of all large joints to the upper and lower extremities; muscle mass appropriate for age   Neurologic:   Decree sensation to light touch plantar aspect of both feet; 5/5 muscle strength to upper and lower extremities. Speech appropriate. Cranial nerves intact                        Skin:  No rash or ulcers noted   Back:  no spinal or paraspinal muscle tenderness or rigidity, no CVA tenderness     Psychiatric:  No suicidal or homicidal ideations, appropriate mood and affect         Labs: Results:   Chemistry Recent Labs     05/26/22  1245   *   *   K 4.9      CO2 24   BUN 24*   CREA 1.50*   CA 9.6   AGAP 8   BUCR 16   AP 67   TP 8.4*   ALB 3.8   GLOB 4.6*   AGRAT 0.8      CBC w/Diff Recent Labs     05/26/22  1245   WBC 13.1   RBC 4.26   HGB 12.3   HCT 38.4   PLT UNABLE TO REPORT ACCURATE COUNT DUE TO PLATELET AGGREGATION, HOWEVER, PLATELETS APPEAR NORMAL IN NUMBER ON SMEAR. PLEASE RESUBMIT SODIUM CITRATE (BLUE) AND EDTA (LAVENDER) TUBES FOR HEMATOLOGICAL TESTING. GRANS 62   LYMPH 26   EOS 2      Microbiology No results for input(s): CULT in the last 72 hours.        RADIOLOGY:    All available imaging studies/reports in [facility] care for this admission were reviewed      Disclaimer: Sections of this note are dictated utilizing voice recognition software, which may have resulted in some phonetic based errors in grammar and contents. Even though attempts were made to correct all the mistakes, some may have been missed, and remained in the body of the document. If questions arise, please contact our department.     Dr. Kt Daniel, Infectious Disease Specialist  [phone]  May 26, 2022  3:16 PM

## 2022-05-26 NOTE — H&P
Admission History and Physical  [facility]    Patient: Whitley Fortune MRN: [id number]  [facility]: [id number]    YOB: 1935  Age: 82 y.o. Sex: female      Admission Date: 5/26/2022       HPI:     Whitley Fortune is a 82 y.o. female with PMH T2DM c/b peripheral neuropathy, s/p R great toe amputation, HTN, HLD, HFpEF, gout, hypothyroidism, chronic pain now presenting with infection of left diabetic foot ulcer. Patient states she has had corn on her left dorsal foot (bottom aspect of MCP) for months and her podiatrist, Dr. Joshua Arcos, has been following. Per patient has been treated by \"shaving down. \" More recently however has become increasingly more painful, to the point she avoids stepping with that foot. She saw her podiatrist today and he sent her to the ED for concern of infection likely requiring I&D. ED Course (See objective for values/interpretations):  - VSS: afebrile, /45, MAP 76, HR 69, RR 18, SpO2 97% RA    Labs obtained: CBC w WBC 13.1; CMP with Na 135, Gluc 266, BUN 24, Cr 1.5. + Blood cultures pending. Medications administered: rocephin x1, vanc x1, cefepime x1    Imaging obtained: XRAY L Foot: No acute fracture or dislocation. 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       Past Medical History:   Diagnosis Date    Acquired hypothyroidism 8/1/2016    Arthritis of right shoulder region 4/21/2016    Asthma     Bilateral lower extremity edema 7/7/2021    Chronic bilateral low back pain without sciatica 7/7/2021    Chronic diastolic congestive heart failure ([address] 3/10/2021    Chronic venous insufficiency     Diabetes ([facility] [address]     neuropathy    Essential hypertension 7/7/2021    Gout     History of depression 1/23/2017    History of fall 7/7/2021    Hypercholesteremia 7/7/2021    Hypertension     MI (myocardial infarction) ([address]     OAB (overactive bladder) 7/7/2021    Peripheral neuropathy     [facility]     Tear of right rotator cuff 2016    Thyroid pain     Urinary incontinence 2021    Vertigo        Past Surgical History:   Procedure Laterality Date    HX AMPUTATION TOE Right     Great toe Dr. Shayna Barriga HX CHOLECYSTECTOMY      HX GYN      TAHOophorectomy due to infection    HX HEENT      resection of memegioma in the .  HX KNEE REPLACEMENT Bilateral        Family History   Problem Relation Age of Onset    Heart Attack Mother     Heart Attack Father        Social History     Socioeconomic History    Marital status:    Tobacco Use    Smoking status: Former Smoker     Quit date:      Years since quittin.4    Smokeless tobacco: Never Used    Tobacco comment: quit 45 years ago   Vaping Use    Vaping Use: Never used   Substance and Sexual Activity    Alcohol use: No     Alcohol/week: 0.0 standard drinks    Drug use: No    Sexual activity: Not Currently       No Known Allergies    Prior to Admission Medications   Prescriptions Last Dose Informant Patient Reported? Taking? Blood-Glucose Meter (FREESTYLE LITE METER) monitoring kit   No No   Sig: Test twice blood glucose daily; ICD-10 E11.9; Quantity 1   Patient not taking: Reported on 2021   acetaminophen (TYLENOL) 500 mg tablet   No No   Sig: Take 1 Tab by mouth every six (6) hours as needed for Pain. amLODIPine (NORVASC) 10 mg tablet   No No   Sig: Take 1 Tablet by mouth daily. atorvastatin (LIPITOR) 80 mg tablet   No No   Sig: Take 1 Tablet by mouth daily. Patient not taking: Reported on 2021   bumetanide (BUMEX) 2 mg tablet   No No   Sig: Take 0.5 Tablets by mouth two (2) times daily as needed (swelling). carvediloL (COREG) 12.5 mg tablet   No No   Sig: Take 1 Tablet by mouth two (2) times daily (with meals).    gabapentin (NEURONTIN) 400 mg capsule   No No   Sig: Take 1 cap in morning, 1 cap at 2pm, 2 caps at bedtime   glimepiride (AMARYL) 2 mg tablet   No No Sig: Take 1 Tablet by mouth every morning. glucose blood VI test strips (FREESTYLE TEST) strip   No No   Sig: Use to check blood glucose twice daily DX:E11.9   Patient not taking: Reported on 2021   insulin aspart U-100 (NovoLOG Flexpen U-100 Insulin) 100 unit/mL (3 mL) inpn   No No   Si Units by SubCUTAneous route nightly. Indications: type 2 diabetes mellitus   insulin syringe,safetyneedle 1 mL 31 gauge x 5/16\" syrg   No No   Si Each by Does Not Apply route daily. Patient not taking: Reported on 2021   levothyroxine (SYNTHROID) 175 mcg tablet   No No   Sig: Take 1 Tablet by mouth Daily (before breakfast). lisinopriL (PRINIVIL, ZESTRIL) 40 mg tablet   No No   Sig: Take 1 tablet by mouth once daily   sertraline (ZOLOFT) 50 mg tablet   No No   Sig: Take 1 Tablet by mouth daily. Facility-Administered Medications: None       Objective:     Patient Vitals for the past 24 hrs:   Temp Pulse Resp BP SpO2   22 1247 -- -- -- (!) 137/45 --   22 1243 98.2 °F (36.8 °C) 69 18 -- 97 %       Physical Exam:   General:  AAOx3, NAD   HEENT: Conjunctiva pink, sclera anicteric. EOMI. No other gross abnormalities present. CV:  RRR. RESP:  Unlabored breathing. Lungs CTAB. Equal expansion bilaterally. ABD:  Soft, nontender, nondistended. MS:  No joint deformity or instability. No atrophy. Neuro:  CN II-XII grossly intact. No focal deficits. Ext:  B/l moderate LE edema. Tenderness to touch/palpation left lower extremity starting mid lower leg. B/l DP pulses diminished, L worst than right. L foot with infected ulcer on dorsal aspect overlaying MCP. Bulbous lesion 1.5 in x 1in raised with black eschar, filled with what appears to be purulent material.   Skin:  No rashes, lesions, or ulcers. Good turgor. Psych: Normal mood and affect.     Labs & Imaging:   Recent Results (from the past 48 hour(s))   METABOLIC PANEL, COMPREHENSIVE    Collection Time: 22 12:45 PM   Result Value Ref Range    Sodium 135 (L) 136 - 145 mmol/L    Potassium 4.9 3.5 - 5.5 mmol/L    Chloride 103 100 - 111 mmol/L    CO2 24 21 - 32 mmol/L    Anion gap 8 3.0 - 18 mmol/L    Glucose 266 (H) 74 - 99 mg/dL    BUN 24 (H) 7.0 - 18 MG/DL    Creatinine 1.50 (H) 0.6 - 1.3 MG/DL    BUN/Creatinine ratio 16 12 - 20      GFR est AA 40 (L) >60 ml/min/1.73m2    GFR est non-AA 33 (L) >60 ml/min/1.73m2    Calcium 9.6 8.5 - 10.1 MG/DL    Bilirubin, total 0.5 0.2 - 1.0 MG/DL    ALT (SGPT) 12 (L) 13 - 56 U/L    AST (SGOT) 22 10 - 38 U/L    Alk. phosphatase 67 45 - 117 U/L    Protein, total 8.4 (H) 6.4 - 8.2 g/dL    Albumin 3.8 3.4 - 5.0 g/dL    Globulin 4.6 (H) 2.0 - 4.0 g/dL    A-G Ratio 0.8 0.8 - 1.7     CBC WITH AUTOMATED DIFF    Collection Time: 05/26/22 12:45 PM   Result Value Ref Range    WBC 13.1 4.6 - 13.2 K/uL    RBC 4.26 4.20 - 5.30 M/uL    HGB 12.3 12.0 - 16.0 g/dL    HCT 38.4 35.0 - 45.0 %    MCV 90.1 78.0 - 100.0 FL    MCH 28.9 24.0 - 34.0 PG    MCHC 32.0 31.0 - 37.0 g/dL    RDW 13.0 11.6 - 14.5 %    PLATELET  614 - 002 K/uL     UNABLE TO REPORT ACCURATE COUNT DUE TO PLATELET AGGREGATION, HOWEVER, PLATELETS APPEAR NORMAL IN NUMBER ON SMEAR. PLEASE RESUBMIT SODIUM CITRATE (BLUE) AND EDTA (LAVENDER) TUBES FOR HEMATOLOGICAL TESTING. MPV 13.6 (H) 9.2 - 11.8 FL    NRBC 0.0 0  WBC    ABSOLUTE NRBC 0.00 0.00 - 0.01 K/uL    NEUTROPHILS 62 40 - 73 %    LYMPHOCYTES 26 21 - 52 %    MONOCYTES 9 3 - 10 %    EOSINOPHILS 2 0 - 5 %    BASOPHILS 1 0 - 2 %    IMMATURE GRANULOCYTES 0 0.0 - 0.5 %    ABS. NEUTROPHILS 8.1 (H) 1.8 - 8.0 K/UL    ABS. LYMPHOCYTES 3.4 0.9 - 3.6 K/UL    ABS. MONOCYTES 1.2 0.05 - 1.2 K/UL    ABS. EOSINOPHILS 0.3 0.0 - 0.4 K/UL    ABS. BASOPHILS 0.1 0.0 - 0.1 K/UL    ABS. IMM.  GRANS. 0.0 0.00 - 0.04 K/UL    DF SMEAR SCANNED      PLATELET COMMENTS ADEQUATE PLATELETS      RBC COMMENTS NORMOCYTIC, NORMOCHROMIC     POC LACTIC ACID    Collection Time: 05/26/22  1:01 PM   Result Value Ref Range    Lactic Acid (POC) 1. 13 0.40 - 2.00 mmol/L       Assessment & Plan:   82 y.o. female with PMH T2DM c/b peripheral neuropathy, s/p R great toe amputation, HTN, HLD, CHF, gout, hypothyroidism, chronic pain now presenting with infection of left diabetic foot ulcer. Diabetic (L) Foot Ulcer vs Septic Joint   in setting of T2DM with chronic neuropathy and hx R Great Toe amputation  VSS. Afebrile, not toxic appearing or septic. WBC 13.   Patient is overall stable, will focus on controlling BGs for better healing post op. Home Meds: Lantus 10u every day in AM, metformin 500mg 1 tablet daily; gabapentin 400mg TID. - admit to medicine  - daily CBC, BMP  - OR tomorrow AM with Dr Patrizia Cortes  - NPO at midnight for procedure tomorrow  - ID consulted - recommend continuing vanc + adding cefepime.  - Pending MRI L foot  - Lantus 10u now & resume in AM tomorrow as normal + Correctional   - Cont gabapentin, reduce to 300mg TID given AQUILES  - Patient noted to have diminished DP pulses b/l - will order arterial duplexes for concern PAD  - pre op EKG    AQUILES  Cr on admission 1.5, baseline seems to be 1-1.2  - 75cc/hr LR, if good fluid intake can consider discontinuing  - Hold home allopurinol. Reduce gabapentin from 400 TID to 300 TID. HTN, HLD, HFpEF  Home Meds: norvasc 10mg daily, lisinopril 40mg daily, coreg 12.5mg BID, bumex 2mg 1 - 2x daily, Lipitor 80mg daily. Last ECHO in system 8/2020: EF 60-65%  - Patient has not taken any meds today and Bps normotensive / diastolic soft in 862P/05-01A with HR in 60-70s. Hold for now Restart if Bps increase. Monitor overnight.   - Hold bumex for AQUILES.    - May need repeat ECHO or follow up with patient's Cardiology records    Hypothyroidism  TSH 1.71 Dec 2021  - Continue home synthroid 150mcg     Gout  Hold allopurinol     Chronic Pain  Continue morphine IR 15mg BID     Global Care:  - VS per unit routine  - supplemental O2 for sats < 92%  - incentive spirometer  - PT/OT/CM    Diet Diabetic diet, NPO at midnight   DVT Prophylaxis  SQH   GI Prophylaxis  Pepcid   Code status  DNR/DNI   Disposition  [facility] [address], MD , PGY-1   Terence Miller   May 26, 2022, 3:22 PM

## 2022-05-26 NOTE — ED NOTES
TRANSFER - OUT REPORT:    Verbal report given to dulce álvarez(name) on Renee Ledesma  being transferred to [facility](unit) for routine progression of care       Report consisted of patients Situation, Background, Assessment and   Recommendations(SBAR). Information from the following report(s) SBAR, OR Summary, Intake/Output and MAR was reviewed with the receiving nurse. Lines:   Peripheral IV 05/26/22 Left Antecubital (Active)        Opportunity for questions and clarification was provided.       Patient transported with:   [employer]

## 2022-05-26 NOTE — PROGRESS NOTES
Problem: Falls - Risk of  Goal: *Absence of Falls  Description: Document Santosh Raman Fall Risk and appropriate interventions in the flowsheet.   Outcome: Progressing Towards Goal  Note: Fall Risk Interventions:  Mobility Interventions: Bed/chair exit alarm,Utilize walker, cane, or other assistive device,Patient to call before getting OOB         Medication Interventions: Teach patient to arise slowly,Patient to call before getting OOB    Elimination Interventions: Bed/chair exit alarm,Call light in reach,Patient to call for help with toileting needs    History of Falls Interventions: Bed/chair exit alarm,Door open when patient unattended         Problem: Patient Education: Go to Patient Education Activity  Goal: Patient/Family Education  Outcome: Progressing Towards Goal     Problem: Pain  Goal: *Control of Pain  Outcome: Progressing Towards Goal  Goal: *PALLIATIVE CARE:  Alleviation of Pain  Outcome: Progressing Towards Goal     Problem: Patient Education: Go to Patient Education Activity  Goal: Patient/Family Education  Outcome: Progressing Towards Goal     Problem: Diabetes Maintenance:Admission  Goal: Activity/Safety  Outcome: Progressing Towards Goal  Goal: Diagnostic Tests/Procedures  Outcome: Progressing Towards Goal  Goal: Nutrition  Outcome: Progressing Towards Goal  Goal: Medications  Outcome: Progressing Towards Goal  Goal: Treatments/Interventions/Procedures  Outcome: Progressing Towards Goal     Problem: Diabetes Maintenance:Ongoing  Goal: Activity/Safety  Outcome: Progressing Towards Goal  Goal: Nutrition  Outcome: Progressing Towards Goal  Goal: Medications  Outcome: Progressing Towards Goal  Goal: Treatments/Interventsions/Procedures  Outcome: Progressing Towards Goal  Goal: *Blood Glucose 80 to 180 md/dl  Outcome: Progressing Towards Goal     Problem: Diabetes Maintenance:Discharge Outcomes  Goal: *Describes follow-up/return visits to physicians  Outcome: Progressing Towards Goal  Goal: *Blood glucose at patient's target range or approaching  Outcome: Progressing Towards Goal  Goal: *Aware of nutrition guidelines  Outcome: Progressing Towards Goal  Goal: *Verbalizes information about medication  Description: Verbalizes name, dosage, time, side effects, and number of days to  continue medications. Outcome: Progressing Towards Goal  Goal: *Describes goals, rules, symptoms, and treatments  Description: Describes blood glucose goals, monitoring, sick day rules,  hypo/hyperglycemia prevention, symptoms, and treatment  Outcome: Progressing Towards Goal  Goal: *Describes available outpatient diabetes resources and support systems  Outcome: Progressing Towards Goal     Problem: Impaired Skin Integrity/Pressure Injury Treatment  Goal: *Improvement of Existing Pressure Injury  Outcome: Progressing Towards Goal  Goal: *Prevention of pressure injury  Description: Document Ravi Scale and appropriate interventions in the flowsheet. Outcome: Progressing Towards Goal  Note: Pressure Injury Interventions:  Sensory Interventions: Assess changes in LOC,Avoid rigorous massage over bony prominences,Check visual cues for pain,Float heels,Keep linens dry and wrinkle-free,Maintain/enhance activity level,Minimize linen layers,Monitor skin under medical devices,Turn and reposition approx.  every two hours (pillows and wedges if needed)         Activity Interventions: Increase time out of bed,PT/OT evaluation    Mobility Interventions: PT/OT evaluation,Float heels    Nutrition Interventions: Document food/fluid/supplement intake                     Problem: Patient Education: Go to Patient Education Activity  Goal: Patient/Family Education  Outcome: Progressing Towards Goal     Problem: Diabetic Ulcer-Treatment  Goal: *Improvement of existing diabetic ulcer  Outcome: Progressing Towards Goal     Problem: Patient Education: Go to Patient Education Activity  Goal: Patient/Family Education  Outcome: Progressing Towards Goal

## 2022-05-26 NOTE — ED TRIAGE NOTES
Pt states she was sent by the foot doctor for a possible infection to the bottom of her left foot. Pt states the \"corn\" has been there for a while and over the pat 1 week she noticed it looks worse.

## 2022-05-27 ENCOUNTER — ANESTHESIA EVENT (OUTPATIENT)
Dept: SURGERY | Age: 87
DRG: 872 | End: 2022-05-27
Payer: MEDICARE

## 2022-05-27 ENCOUNTER — ANESTHESIA (OUTPATIENT)
Dept: SURGERY | Age: 87
DRG: 872 | End: 2022-05-27
Payer: MEDICARE

## 2022-05-27 ENCOUNTER — APPOINTMENT (OUTPATIENT)
Dept: VASCULAR SURGERY | Age: 87
DRG: 872 | End: 2022-05-27
Payer: MEDICARE

## 2022-05-27 LAB
ANION GAP SERPL CALC-SCNC: 5 MMOL/L (ref 3–18)
ATRIAL RATE: 82 BPM
BASOPHILS # BLD: 0.1 K/UL (ref 0–0.1)
BASOPHILS NFR BLD: 1 % (ref 0–2)
BUN SERPL-MCNC: 22 MG/DL (ref 7–18)
BUN/CREAT SERPL: 20 (ref 12–20)
CALCIUM SERPL-MCNC: 9 MG/DL (ref 8.5–10.1)
CALCULATED P AXIS, ECG09: 75 DEGREES
CALCULATED R AXIS, ECG10: 24 DEGREES
CALCULATED T AXIS, ECG11: 68 DEGREES
CHLORIDE SERPL-SCNC: 108 MMOL/L (ref 100–111)
CO2 SERPL-SCNC: 27 MMOL/L (ref 21–32)
CREAT SERPL-MCNC: 1.12 MG/DL (ref 0.6–1.3)
DIAGNOSIS, 93000: NORMAL
DIFFERENTIAL METHOD BLD: ABNORMAL
EOSINOPHIL # BLD: 0.3 K/UL (ref 0–0.4)
EOSINOPHIL NFR BLD: 4 % (ref 0–5)
ERYTHROCYTE [DISTWIDTH] IN BLOOD BY AUTOMATED COUNT: 12.9 % (ref 11.6–14.5)
EST. AVERAGE GLUCOSE BLD GHB EST-MCNC: 292 MG/DL
GLUCOSE BLD STRIP.AUTO-MCNC: 106 MG/DL (ref 70–110)
GLUCOSE BLD STRIP.AUTO-MCNC: 112 MG/DL (ref 70–110)
GLUCOSE BLD STRIP.AUTO-MCNC: 201 MG/DL (ref 70–110)
GLUCOSE BLD STRIP.AUTO-MCNC: 208 MG/DL (ref 70–110)
GLUCOSE SERPL-MCNC: 131 MG/DL (ref 74–99)
HBA1C MFR BLD: 11.8 % (ref 4.2–5.6)
HCT VFR BLD AUTO: 36.2 % (ref 35–45)
HGB BLD-MCNC: 11.5 G/DL (ref 12–16)
IMM GRANULOCYTES # BLD AUTO: 0 K/UL (ref 0–0.04)
IMM GRANULOCYTES NFR BLD AUTO: 0 % (ref 0–0.5)
LEFT ABI: 0.96
LEFT ARM BP: 152 MMHG
LEFT CALF PRESSURE: 172 MMHG
LEFT POSTERIOR TIBIAL: 149 MMHG
LYMPHOCYTES # BLD: 1.4 K/UL (ref 0.9–3.6)
LYMPHOCYTES NFR BLD: 15 % (ref 21–52)
MAGNESIUM SERPL-MCNC: 2 MG/DL (ref 1.6–2.6)
MCH RBC QN AUTO: 28.6 PG (ref 24–34)
MCHC RBC AUTO-ENTMCNC: 31.8 G/DL (ref 31–37)
MCV RBC AUTO: 90 FL (ref 78–100)
MONOCYTES # BLD: 1 K/UL (ref 0.05–1.2)
MONOCYTES NFR BLD: 11 % (ref 3–10)
NEUTS SEG # BLD: 6.4 K/UL (ref 1.8–8)
NEUTS SEG NFR BLD: 69 % (ref 40–73)
NRBC # BLD: 0 K/UL (ref 0–0.01)
NRBC BLD-RTO: 0 PER 100 WBC
P-R INTERVAL, ECG05: 186 MS
PLATELET # BLD AUTO: 190 K/UL (ref 135–420)
PMV BLD AUTO: 13 FL (ref 9.2–11.8)
POTASSIUM SERPL-SCNC: 4.4 MMOL/L (ref 3.5–5.5)
Q-T INTERVAL, ECG07: 386 MS
QRS DURATION, ECG06: 88 MS
QTC CALCULATION (BEZET), ECG08: 450 MS
RBC # BLD AUTO: 4.02 M/UL (ref 4.2–5.3)
RIGHT ABI: 1.05
RIGHT ARM BP: 156 MMHG
RIGHT CALF PRESSURE: 185 MMHG
RIGHT POSTERIOR TIBIAL: 160 MMHG
SODIUM SERPL-SCNC: 140 MMOL/L (ref 136–145)
VANCOMYCIN SERPL-MCNC: 15.4 UG/ML (ref 5–40)
VAS LEFT DORSALIS PEDIS BP: 145 MMHG
VAS RIGHT DORSALIS PEDIS BP: 164 MMHG
VENTRICULAR RATE, ECG03: 82 BPM
WBC # BLD AUTO: 9.2 K/UL (ref 4.6–13.2)

## 2022-05-27 PROCEDURE — 00400 ANES INTEGUMENTARY SYS NOS: CPT | Performed by: ANESTHESIOLOGY

## 2022-05-27 PROCEDURE — 00400 ANES INTEGUMENTARY SYS NOS: CPT | Performed by: NURSE ANESTHETIST, CERTIFIED REGISTERED

## 2022-05-27 PROCEDURE — 77030018836 HC SOL IRR NACL ICUM -A: Performed by: PODIATRIST

## 2022-05-27 PROCEDURE — 74011000250 HC RX REV CODE- 250: Performed by: STUDENT IN AN ORGANIZED HEALTH CARE EDUCATION/TRAINING PROGRAM

## 2022-05-27 PROCEDURE — 83735 ASSAY OF MAGNESIUM: CPT

## 2022-05-27 PROCEDURE — 65270000029 HC RM PRIVATE

## 2022-05-27 PROCEDURE — 87205 SMEAR GRAM STAIN: CPT

## 2022-05-27 PROCEDURE — 80202 ASSAY OF VANCOMYCIN: CPT

## 2022-05-27 PROCEDURE — 2709999900 HC NON-CHARGEABLE SUPPLY: Performed by: PODIATRIST

## 2022-05-27 PROCEDURE — 93923 UPR/LXTR ART STDY 3+ LVLS: CPT

## 2022-05-27 PROCEDURE — 99100 ANES PT EXTEME AGE<1 YR&>70: CPT | Performed by: ANESTHESIOLOGY

## 2022-05-27 PROCEDURE — 87077 CULTURE AEROBIC IDENTIFY: CPT

## 2022-05-27 PROCEDURE — 77030013179 HC SHOE PSTOP OPN DJOR -A: Performed by: PODIATRIST

## 2022-05-27 PROCEDURE — 80048 BASIC METABOLIC PNL TOTAL CA: CPT

## 2022-05-27 PROCEDURE — 99100 ANES PT EXTEME AGE<1 YR&>70: CPT | Performed by: NURSE ANESTHETIST, CERTIFIED REGISTERED

## 2022-05-27 PROCEDURE — 74011636637 HC RX REV CODE- 636/637

## 2022-05-27 PROCEDURE — 74011636637 HC RX REV CODE- 636/637: Performed by: STUDENT IN AN ORGANIZED HEALTH CARE EDUCATION/TRAINING PROGRAM

## 2022-05-27 PROCEDURE — 76060000031 HC ANESTHESIA FIRST 0.5 HR: Performed by: PODIATRIST

## 2022-05-27 PROCEDURE — 82962 GLUCOSE BLOOD TEST: CPT

## 2022-05-27 PROCEDURE — 0J9R0ZZ DRAINAGE OF LEFT FOOT SUBCUTANEOUS TISSUE AND FASCIA, OPEN APPROACH: ICD-10-PCS | Performed by: PODIATRIST

## 2022-05-27 PROCEDURE — 83036 HEMOGLOBIN GLYCOSYLATED A1C: CPT

## 2022-05-27 PROCEDURE — 74011250636 HC RX REV CODE- 250/636: Performed by: INTERNAL MEDICINE

## 2022-05-27 PROCEDURE — 36415 COLL VENOUS BLD VENIPUNCTURE: CPT

## 2022-05-27 PROCEDURE — 74011250636 HC RX REV CODE- 250/636: Performed by: STUDENT IN AN ORGANIZED HEALTH CARE EDUCATION/TRAINING PROGRAM

## 2022-05-27 PROCEDURE — 77030040361 HC SLV COMPR DVT MDII -B: Performed by: PODIATRIST

## 2022-05-27 PROCEDURE — 87186 SC STD MICRODIL/AGAR DIL: CPT

## 2022-05-27 PROCEDURE — 74011250636 HC RX REV CODE- 250/636: Performed by: NURSE ANESTHETIST, CERTIFIED REGISTERED

## 2022-05-27 PROCEDURE — 85025 COMPLETE CBC W/AUTO DIFF WBC: CPT

## 2022-05-27 PROCEDURE — 76210000006 HC OR PH I REC 0.5 TO 1 HR: Performed by: PODIATRIST

## 2022-05-27 PROCEDURE — 87075 CULTR BACTERIA EXCEPT BLOOD: CPT

## 2022-05-27 PROCEDURE — 77030040922 HC BLNKT HYPOTHRM STRY -A: Performed by: PODIATRIST

## 2022-05-27 PROCEDURE — 74011250637 HC RX REV CODE- 250/637

## 2022-05-27 PROCEDURE — 74011000258 HC RX REV CODE- 258: Performed by: STUDENT IN AN ORGANIZED HEALTH CARE EDUCATION/TRAINING PROGRAM

## 2022-05-27 PROCEDURE — 74011250637 HC RX REV CODE- 250/637: Performed by: STUDENT IN AN ORGANIZED HEALTH CARE EDUCATION/TRAINING PROGRAM

## 2022-05-27 PROCEDURE — 76010000154 HC OR TIME FIRST 0.5 HR: Performed by: PODIATRIST

## 2022-05-27 PROCEDURE — 93005 ELECTROCARDIOGRAM TRACING: CPT

## 2022-05-27 PROCEDURE — 74011000250 HC RX REV CODE- 250: Performed by: PODIATRIST

## 2022-05-27 PROCEDURE — 93922 UPR/L XTREMITY ART 2 LEVELS: CPT

## 2022-05-27 RX ORDER — LIDOCAINE HYDROCHLORIDE 20 MG/ML
INJECTION, SOLUTION EPIDURAL; INFILTRATION; INTRACAUDAL; PERINEURAL AS NEEDED
Status: DISCONTINUED | OUTPATIENT
Start: 2022-05-27 | End: 2022-05-27 | Stop reason: HOSPADM

## 2022-05-27 RX ORDER — SODIUM CHLORIDE 0.9 % (FLUSH) 0.9 %
5-40 SYRINGE (ML) INJECTION AS NEEDED
Status: DISCONTINUED | OUTPATIENT
Start: 2022-05-27 | End: 2022-05-27 | Stop reason: HOSPADM

## 2022-05-27 RX ORDER — HYDROMORPHONE HYDROCHLORIDE 1 MG/ML
0.5 INJECTION, SOLUTION INTRAMUSCULAR; INTRAVENOUS; SUBCUTANEOUS
Status: DISCONTINUED | OUTPATIENT
Start: 2022-05-27 | End: 2022-05-27 | Stop reason: HOSPADM

## 2022-05-27 RX ORDER — ONDANSETRON 2 MG/ML
4 INJECTION INTRAMUSCULAR; INTRAVENOUS ONCE
Status: DISCONTINUED | OUTPATIENT
Start: 2022-05-27 | End: 2022-05-27 | Stop reason: HOSPADM

## 2022-05-27 RX ORDER — FENTANYL CITRATE 50 UG/ML
50 INJECTION, SOLUTION INTRAMUSCULAR; INTRAVENOUS AS NEEDED
Status: DISCONTINUED | OUTPATIENT
Start: 2022-05-27 | End: 2022-05-27 | Stop reason: HOSPADM

## 2022-05-27 RX ORDER — PROPOFOL 10 MG/ML
VIAL (ML) INTRAVENOUS
Status: DISCONTINUED | OUTPATIENT
Start: 2022-05-27 | End: 2022-05-27 | Stop reason: HOSPADM

## 2022-05-27 RX ORDER — VANCOMYCIN HYDROCHLORIDE
1250 EVERY 24 HOURS
Status: DISCONTINUED | OUTPATIENT
Start: 2022-05-27 | End: 2022-05-29

## 2022-05-27 RX ORDER — MAGNESIUM SULFATE 100 %
4 CRYSTALS MISCELLANEOUS AS NEEDED
Status: DISCONTINUED | OUTPATIENT
Start: 2022-05-27 | End: 2022-05-27 | Stop reason: HOSPADM

## 2022-05-27 RX ORDER — FENTANYL CITRATE 50 UG/ML
INJECTION, SOLUTION INTRAMUSCULAR; INTRAVENOUS AS NEEDED
Status: DISCONTINUED | OUTPATIENT
Start: 2022-05-27 | End: 2022-05-27 | Stop reason: HOSPADM

## 2022-05-27 RX ORDER — INSULIN LISPRO 100 [IU]/ML
INJECTION, SOLUTION INTRAVENOUS; SUBCUTANEOUS ONCE
Status: DISCONTINUED | OUTPATIENT
Start: 2022-05-27 | End: 2022-05-27 | Stop reason: HOSPADM

## 2022-05-27 RX ORDER — SODIUM CHLORIDE 0.9 % (FLUSH) 0.9 %
5-40 SYRINGE (ML) INJECTION EVERY 8 HOURS
Status: DISCONTINUED | OUTPATIENT
Start: 2022-05-27 | End: 2022-05-27 | Stop reason: HOSPADM

## 2022-05-27 RX ADMIN — SODIUM CHLORIDE, PRESERVATIVE FREE 10 ML: 5 INJECTION INTRAVENOUS at 23:07

## 2022-05-27 RX ADMIN — GABAPENTIN 300 MG: 300 CAPSULE ORAL at 17:27

## 2022-05-27 RX ADMIN — GABAPENTIN 300 MG: 300 CAPSULE ORAL at 23:06

## 2022-05-27 RX ADMIN — Medication 10 UNITS: at 10:34

## 2022-05-27 RX ADMIN — CEFEPIME HYDROCHLORIDE 2 G: 2 INJECTION, POWDER, FOR SOLUTION INTRAVENOUS at 17:30

## 2022-05-27 RX ADMIN — FENTANYL CITRATE 50 MCG: 50 INJECTION, SOLUTION INTRAMUSCULAR; INTRAVENOUS at 14:55

## 2022-05-27 RX ADMIN — SODIUM CHLORIDE, PRESERVATIVE FREE 10 ML: 5 INJECTION INTRAVENOUS at 06:30

## 2022-05-27 RX ADMIN — MORPHINE SULFATE 15 MG: 15 TABLET ORAL at 10:33

## 2022-05-27 RX ADMIN — VANCOMYCIN HYDROCHLORIDE 1250 MG: 10 INJECTION, POWDER, LYOPHILIZED, FOR SOLUTION INTRAVENOUS at 17:30

## 2022-05-27 RX ADMIN — GABAPENTIN 300 MG: 300 CAPSULE ORAL at 10:33

## 2022-05-27 RX ADMIN — Medication 6 UNITS: at 12:51

## 2022-05-27 RX ADMIN — CARVEDILOL 12.5 MG: 12.5 TABLET, FILM COATED ORAL at 17:27

## 2022-05-27 RX ADMIN — MORPHINE SULFATE 15 MG: 15 TABLET ORAL at 17:27

## 2022-05-27 RX ADMIN — PROPOFOL 25 MCG/KG/MIN: 10 INJECTION, EMULSION INTRAVENOUS at 14:55

## 2022-05-27 RX ADMIN — CEFEPIME HYDROCHLORIDE 2 G: 2 INJECTION, POWDER, FOR SOLUTION INTRAVENOUS at 03:39

## 2022-05-27 RX ADMIN — FENTANYL CITRATE 50 MCG: 50 INJECTION, SOLUTION INTRAMUSCULAR; INTRAVENOUS at 14:51

## 2022-05-27 RX ADMIN — LEVOTHYROXINE SODIUM 150 MCG: 150 TABLET ORAL at 06:29

## 2022-05-27 RX ADMIN — Medication 6 UNITS: at 23:07

## 2022-05-27 RX ADMIN — SODIUM CHLORIDE, PRESERVATIVE FREE 10 ML: 5 INJECTION INTRAVENOUS at 17:28

## 2022-05-27 RX ADMIN — FAMOTIDINE 20 MG: 20 TABLET ORAL at 10:33

## 2022-05-27 RX ADMIN — CARVEDILOL 12.5 MG: 12.5 TABLET, FILM COATED ORAL at 10:33

## 2022-05-27 NOTE — PROGRESS NOTES
Intern Progress Note  [address] [facility]       Patient: Nadia Schilling MRN: [id number]  CSN: [id number]    YOB: 1935  Age: 82 y.o. Sex: female    DOA: 5/26/2022 LOS:  LOS: 1 day                    Subjective:        Acute events: NAEON    Patient states she feels well, slept well last night. Denies fevers, chest pain, sob, abd pain/n/v, PHILL. Foot pain is stable. Objective:     Patient Vitals for the past 24 hrs:   Temp Pulse Resp BP SpO2   05/27/22 0840 98.4 °F (36.9 °C) 81 17 127/73 97 %   05/27/22 0400 -- 81 -- -- --   05/27/22 0338 97.8 °F (36.6 °C) 83 15 (!) 143/75 95 %   05/27/22 0000 -- 79 -- -- --   05/26/22 2245 97.7 °F (36.5 °C) 98 18 (!) 152/76 96 %   05/26/22 2000 -- (!) 107 -- -- --   05/26/22 1945 98 °F (36.7 °C) (!) 110 18 (!) 189/81 95 %   05/26/22 1745 97.8 °F (36.6 °C) 86 20 (!) 147/53 98 %   05/26/22 1558 97.6 °F (36.4 °C) 76 19 137/76 95 %   05/26/22 1247 -- -- -- (!) 137/45 --   05/26/22 1243 98.2 °F (36.8 °C) 69 18 -- 97 %       No intake or output data in the 24 hours ending 05/27/22 0854    Physical Exam:   General: well-appearing, NAD  HEENT: Conjunctiva pink, sclera anicteric. EOMI   CV:  RRR  RESP: Unlabored breathing. Lungs CTAB, no wheezes, rales or rhonchi appreciated. ABD:  Soft, nontender, nondistended. No hepatosplenomegaly. MS:  No joint deformity or instability. No atrophy. Neuro:  5/5 strength bilateral upper extremities and lower extremities. A+Ox3. Ext:  No edema. 2+ radial and dp pulses bilaterally. Skin: Warm & dry. No rashes, lesions, or ulcers. Good turgor.    Psych: normal mood and affect     Lab/Data Reviewed:  Recent Results (from the past 24 hour(s))   CULTURE, BLOOD    Collection Time: 05/26/22 12:45 PM    Specimen: Blood   Result Value Ref Range    Special Requests: NO SPECIAL REQUESTS      Culture result: NO GROWTH AFTER 16 HOURS     METABOLIC PANEL, COMPREHENSIVE    Collection Time: 05/26/22 12:45 PM   Result Value Ref Range    Sodium 135 (L) 136 - 145 mmol/L    Potassium 4.9 3.5 - 5.5 mmol/L    Chloride 103 100 - 111 mmol/L    CO2 24 21 - 32 mmol/L    Anion gap 8 3.0 - 18 mmol/L    Glucose 266 (H) 74 - 99 mg/dL    BUN 24 (H) 7.0 - 18 MG/DL    Creatinine 1.50 (H) 0.6 - 1.3 MG/DL    BUN/Creatinine ratio 16 12 - 20      GFR est AA 40 (L) >60 ml/min/1.73m2    GFR est non-AA 33 (L) >60 ml/min/1.73m2    Calcium 9.6 8.5 - 10.1 MG/DL    Bilirubin, total 0.5 0.2 - 1.0 MG/DL    ALT (SGPT) 12 (L) 13 - 56 U/L    AST (SGOT) 22 10 - 38 U/L    Alk. phosphatase 67 45 - 117 U/L    Protein, total 8.4 (H) 6.4 - 8.2 g/dL    Albumin 3.8 3.4 - 5.0 g/dL    Globulin 4.6 (H) 2.0 - 4.0 g/dL    A-G Ratio 0.8 0.8 - 1.7     CBC WITH AUTOMATED DIFF    Collection Time: 05/26/22 12:45 PM   Result Value Ref Range    WBC 13.1 4.6 - 13.2 K/uL    RBC 4.26 4.20 - 5.30 M/uL    HGB 12.3 12.0 - 16.0 g/dL    HCT 38.4 35.0 - 45.0 %    MCV 90.1 78.0 - 100.0 FL    MCH 28.9 24.0 - 34.0 PG    MCHC 32.0 31.0 - 37.0 g/dL    RDW 13.0 11.6 - 14.5 %    PLATELET  962 - 080 K/uL     UNABLE TO REPORT ACCURATE COUNT DUE TO PLATELET AGGREGATION, HOWEVER, PLATELETS APPEAR NORMAL IN NUMBER ON SMEAR. PLEASE RESUBMIT SODIUM CITRATE (BLUE) AND EDTA (LAVENDER) TUBES FOR HEMATOLOGICAL TESTING. MPV 13.6 (H) 9.2 - 11.8 FL    NRBC 0.0 0  WBC    ABSOLUTE NRBC 0.00 0.00 - 0.01 K/uL    NEUTROPHILS 62 40 - 73 %    LYMPHOCYTES 26 21 - 52 %    MONOCYTES 9 3 - 10 %    EOSINOPHILS 2 0 - 5 %    BASOPHILS 1 0 - 2 %    IMMATURE GRANULOCYTES 0 0.0 - 0.5 %    ABS. NEUTROPHILS 8.1 (H) 1.8 - 8.0 K/UL    ABS. LYMPHOCYTES 3.4 0.9 - 3.6 K/UL    ABS. MONOCYTES 1.2 0.05 - 1.2 K/UL    ABS. EOSINOPHILS 0.3 0.0 - 0.4 K/UL    ABS. BASOPHILS 0.1 0.0 - 0.1 K/UL    ABS. IMM.  GRANS. 0.0 0.00 - 0.04 K/UL    DF SMEAR SCANNED      PLATELET COMMENTS ADEQUATE PLATELETS      RBC COMMENTS NORMOCYTIC, NORMOCHROMIC     CULTURE, BLOOD    Collection Time: 05/26/22  1:00 PM    Specimen: Blood   Result Value Ref Range Special Requests: NO SPECIAL REQUESTS      Culture result: NO GROWTH AFTER 16 HOURS     POC LACTIC ACID    Collection Time: 05/26/22  1:01 PM   Result Value Ref Range    Lactic Acid (POC) 1.13 0.40 - 2.00 mmol/L   GLUCOSE, POC    Collection Time: 05/26/22  5:37 PM   Result Value Ref Range    Glucose (POC) 306 (H) 70 - 110 mg/dL   GLUCOSE, POC    Collection Time: 05/26/22 10:38 PM   Result Value Ref Range    Glucose (POC) 318 (H) 70 - 110 mg/dL   MAGNESIUM    Collection Time: 05/27/22  2:36 AM   Result Value Ref Range    Magnesium 2.0 1.6 - 2.6 mg/dL   CBC WITH AUTOMATED DIFF    Collection Time: 05/27/22  2:36 AM   Result Value Ref Range    WBC 9.2 4.6 - 13.2 K/uL    RBC 4.02 (L) 4.20 - 5.30 M/uL    HGB 11.5 (L) 12.0 - 16.0 g/dL    HCT 36.2 35.0 - 45.0 %    MCV 90.0 78.0 - 100.0 FL    MCH 28.6 24.0 - 34.0 PG    MCHC 31.8 31.0 - 37.0 g/dL    RDW 12.9 11.6 - 14.5 %    PLATELET 356 378 - 946 K/uL    MPV 13.0 (H) 9.2 - 11.8 FL    NRBC 0.0 0  WBC    ABSOLUTE NRBC 0.00 0.00 - 0.01 K/uL    NEUTROPHILS 69 40 - 73 %    LYMPHOCYTES 15 (L) 21 - 52 %    MONOCYTES 11 (H) 3 - 10 %    EOSINOPHILS 4 0 - 5 %    BASOPHILS 1 0 - 2 %    IMMATURE GRANULOCYTES 0 0.0 - 0.5 %    ABS. NEUTROPHILS 6.4 1.8 - 8.0 K/UL    ABS. LYMPHOCYTES 1.4 0.9 - 3.6 K/UL    ABS. MONOCYTES 1.0 0.05 - 1.2 K/UL    ABS. EOSINOPHILS 0.3 0.0 - 0.4 K/UL    ABS. BASOPHILS 0.1 0.0 - 0.1 K/UL    ABS. IMM.  GRANS. 0.0 0.00 - 0.04 K/UL    DF AUTOMATED     METABOLIC PANEL, BASIC    Collection Time: 05/27/22  2:36 AM   Result Value Ref Range    Sodium 140 136 - 145 mmol/L    Potassium 4.4 3.5 - 5.5 mmol/L    Chloride 108 100 - 111 mmol/L    CO2 27 21 - 32 mmol/L    Anion gap 5 3.0 - 18 mmol/L    Glucose 131 (H) 74 - 99 mg/dL    BUN 22 (H) 7.0 - 18 MG/DL    Creatinine 1.12 0.6 - 1.3 MG/DL    BUN/Creatinine ratio 20 12 - 20      GFR est AA 56 (L) >60 ml/min/1.73m2    GFR est non-AA 46 (L) >60 ml/min/1.73m2    Calcium 9.0 8.5 - 10.1 MG/DL   VANCOMYCIN, RANDOM Collection Time: 05/27/22  2:36 AM   Result Value Ref Range    Vancomycin, random 15.4 5.0 - 40.0 UG/ML   LOWER EXT ART PVR MULT LEVEL SEG PRESSURES    Collection Time: 05/27/22  8:23 AM   Result Value Ref Range    Left arm  mmHg    Left calf pressure 172 mmHg    Left posterior tibial 149 mmHg    Left dorsalis pedis  mmHg    Right arm  mmHg    Right calf pressure 185 mmHg    Right posterior tibial 160 mmHg    Right dorsalis pedis  mmHg    Left JAYLEN 0.96     Right JAYLEN 1.05        Assessment & Plan:     82 y.o. female with PMH T2DM c/b peripheral neuropathy, s/p R great toe amputation, HTN, HLD, CHF, gout, hypothyroidism, chronic pain now presenting with infection of left diabetic foot ulcer. Diabetic (L) Foot Ulcer vs Septic Joint   in setting of T2DM with chronic neuropathy and hx R Great Toe amputation  Patient to OR for I&D today. She is overall stable, will focus on controlling BGs for better healing post op. Home Meds: Lantus 10u every day in AM, metformin 500mg 1 tablet daily; gabapentin 400mg TID. - admit to medicine  - daily CBC, BMP  - OR today with Dr Young Jensen around 2pm  - ID consulted - recommend continuing vanc + adding cefepime.  - Pending MRI L foot results, discussed with Radiology - they will ask for dictation to be done this morning   - Lantus 10u AM + Correctional   - Cont gabapentin, reduce to 300mg TID given AQUILES  - Pending arterial duplexes, patient went to vascular lab this AM  - Cont 75cc/hr LR given NPO til at least 2pm today     AQUILES - Resolved  Cr on admission 1.5, baseline seems to be 1-1.2 -> now 1.12  - Hold home allopurinol. Reduce gabapentin from 400 TID to 300 TID. Can consider restarting home doses post procedure/tomorrow.     HTN, HLD, HFpEF  Home Meds: norvasc 10mg daily, lisinopril 40mg daily, coreg 12.5mg BID, bumex 2mg 1 - 2x daily, Lipitor 80mg daily.    Last ECHO in system 8/2020: EF 60-65%  -  Lisinopril restarted overnight for elevated Bps.    - Hold bumex for AQUILES.    - May need repeat ECHO or follow up with patient's Cardiology records     Hypothyroidism  TSH 1.71 Dec 2021  - Continue home synthroid 150mcg      Gout  Hold allopurinol      Chronic Pain  Continue morphine IR 15mg BID      Global Care:  - VS per unit routine  - supplemental O2 for sats < 92%  - incentive spirometer  - PT/OT/CM     Diet  NPO for procedures    DVT Prophylaxis  SQH   GI Prophylaxis  Pepcid   Code status  DNR/DNI   Disposition  Admit      [address] Son, Chayo Up Percy White MD , PGY-1   [address]   May 27, 2022, 8:54 AM

## 2022-05-27 NOTE — PROGRESS NOTES
Brief Progress Note  [facility]      Subjective:    s/p I&D of L foot, with finding of soft tissue abscess skin and subcutaneous tissue, no penetration to joint. Pt is seen back in her room on the floor, sitting up and eating dinner. In good spirits, said she has no pain. Asking when she can go home. Denies CP, SOB. Objective:     Visit Vitals  BP (!) 154/73 (BP 1 Location: Right upper arm, BP Patient Position: At rest)   Pulse 77   Temp 98.4 °F (36.9 °C)   Resp 22   Ht 5' 5\" (1.651 m)   Wt 106.4 kg (234 lb 9.6 oz)   SpO2 95%   BMI 39.04 kg/m²       Physical Exam:   General: Well-appearing, NAD. HEENT: Conjunctiva pink, sclera anicteric. EOMI   CV:  RRR, no M/G/R.  RESP: Unlabored breathing. Lungs CTAB, no wheezes, rales or rhonchi appreciated. Equal expansion bilaterally. MS:  L foot in bandage and boot, with toes exposed. Wiggles toes. Neuro: A+Ox3. Ext:  No edema. 2+ radial and dp pulses bilaterally. Skin: Warm & dry. No rashes, lesions, or ulcers. Good turgor. Psych: Appropriate mood and affect. Assessment & Plan:    L diabetic foot ulcer s/p  I&D    -FU podiatry recs  -Diet resumed  -pain control: morphine 15mg bid  -DVT ppx: SCD's  -FU wound cultures  -Abx: Vanc, cepefime (5/27-)  -FU ID recs  -blood glucose control, increase Lantus 10U based on 24hr SSI requirements  -PT/OT    For full assessment and plan, please see daily progress note.      Nancy Ruvalcaba MD, PGY-1   [facility]   May 27, 2022, 5:45 PM

## 2022-05-27 NOTE — PROGRESS NOTES
Problem: Falls - Risk of  Goal: *Absence of Falls  Description: Document [place] Fall Risk and appropriate interventions in the flowsheet. Outcome: Progressing Towards Goal  Note: Fall Risk Interventions:  Mobility Interventions: Utilize walker, cane, or other assistive device,PT Consult for mobility concerns,OT consult for ADLs,Bed/chair exit alarm         Medication Interventions: Teach patient to arise slowly,Bed/chair exit alarm,Patient to call before getting OOB    Elimination Interventions: Bed/chair exit alarm,Call light in reach,Patient to call for help with toileting needs,Stay With Me (per policy)    History of Falls Interventions: Bed/chair exit alarm,Door open when patient unattended,Room close to nurse's station         Problem: Pain  Goal: *Control of Pain  Outcome: Progressing Towards Goal     Problem: Diabetes Maintenance:Admission  Goal: Medications  Outcome: Progressing Towards Goal     Problem: Impaired Skin Integrity/Pressure Injury Treatment  Goal: *Improvement of Existing Pressure Injury  Outcome: Progressing Towards Goal  Goal: *Prevention of pressure injury  Description: Document Ravi Scale and appropriate interventions in the flowsheet.   Outcome: Progressing Towards Goal  Note: Pressure Injury Interventions:  Sensory Interventions: Assess changes in LOC,Minimize linen layers         Activity Interventions: Increase time out of bed,Pressure redistribution bed/mattress(bed type),PT/OT evaluation    Mobility Interventions: Pressure redistribution bed/mattress (bed type),PT/OT evaluation    Nutrition Interventions: Document food/fluid/supplement intake

## 2022-05-27 NOTE — DIABETES MGMT
Diabetes Patient/Family Education Record    Factors That May Influence Patients Ability to Learn or Comply with Recommendations   []   Language barrier    []   Cultural needs   []   Motivation    []   Cognitive limitation    []   Physical   [x]   Education    []   Physiological factors   []   Hearing/vision/speaking impairment   []   Latter-day beliefs    []   Financial factors   []  Other:   []  No factors identified at this time. Person Instructed:   [x]   Patient   []   Family   []  Other     Preference for Learning:   [x]   Verbal   [x]   Written: diab educ packet; contents reviewed and explained to patient     []  Demonstration     Level of Comprehension & Competence:    [x]  Good                                      [] Fair                                     []  Poor                             []  Needs Reinforcement   [x]  Teach back completed    Education Component:   [x]  Medication management, including how to administer insulin (if appropriate) and potential medication interactions:  Patient reported history of diabetes and was on lantus and humalog insulin in the past until it was discontinued due to hypoglycemia. Patient stated that she was recently placed back on lantus insulin and Metformin because her blood sugar was elevated. She was placed on the following diabetes medications approximately two weeks ago:  Lantus insulin 10 units daily every morning  Metformin 500 mg daily every morning with breakfast    Patient reported that her blood sugar were still running high between 200-300 and feels that her lantus insulin dose will need to be increased and add sliding scale humalog insulin. [x]  Nutritional management - [x] Obtained usual meal pattern: Patient reported that she has been paying attention to what type of food and portion control.  Patient receptive to the following nutrition education:    [x]   Basic carbohydrate counting  [x] Plate method  [x]  Limit concentrated sweets and avoid sweetened beverages  [x]  Portion control  [x]    Avoid skipping meals     [x]  Exercise: Walk 30 minutes daily 5 days a week as tolerated. Take rest periods as needed to prevent exhaustion. [x]  Signs, symptoms, and treatment of hyperglycemia and hypoglycemia: Discussed high blood sugar in detail with patient. Seen notes above under medications. Patient verbalized understanding to notify her doctor if her blood sugar is 300 and above that she cannot explain. .     [x] Prevention, recognition and treatment of hyperglycemia and hypoglycemia: Discussed low blood sugar less than 70 in detail with patient. Patient is able to recognize signs and symptoms and know how to treat to above 70. Informed and educated patient about glucose tablets and how to use them. She verbalized understanding. [x]  Importance of blood glucose monitoring  [x] Fasting Blood Glucose range is between    []   Provided patient with blood glucose meter  [x]  Has glucometer and supplies at home     []  Instruction on use of the blood glucose meter and recommended monitoring schedule   [x]  Discuss the importance of HbA1C monitoring. Patient self reported an A1c is 9%.  This is equivalent to average glucose of 212 mg/dl for the past 2-3 months.     []  Sick day guidelines   []  Proper use and disposal of lancets, needles, syringes or insulin pens (if appropriate)   [x]  Potential long-term complications (retinopathy, kidney disease, neuropathy, foot care)   [x] Information about whom to contact in case of emergency or for more information    [x]  Goal:  Patient/family will demonstrate understanding of Diabetes Self- Management Skills by: 6/03/2022  Plan for post-discharge education or self-management support:    [x] Outpatient class schedule provided            [] Patient Declined    [] Scheduled for outpatient classes (date) _______    [x] Written information provided  Verify: [x] Prior to admission Diabetes medications    Does patient understand how diabetes medications work? Yes  Does patient have difficulty obtaining diabetes medications  testing supplies?  No.    Allison Goode RN [facility]  Pager: [phone]

## 2022-05-27 NOTE — BRIEF OP NOTE
Brief Postoperative Note    Patient: Monalisa Whittington  YOB: 1935  MRN: [id number]    Date of Procedure: 5/27/2022     Pre-Op Diagnosis: DX    Post-Op Diagnosis: Same as preoperative diagnosis.       Procedure(s):  INCISION AND DRAINAGE LEFT FOOT    Surgeon(s):  Tej Slater DPM    Surgical Assistant: Surg Asst-1: Hudson Burton    Anesthesia: MAC     Estimated Blood Loss (mL): Minimal    Complications: None    Specimens:   ID Type Source Tests Collected by Time Destination   1 : Left Foot Wound Cultures Wound Foot, left CULTURE, ANAEROBIC, CULTURE, WOUND W Sukhdev Julissa Narvaez 5/27/2022 1503 Microbiology        Implants: * No implants in log *    Drains: * No LDAs found *    Findings: soft tissue abscess skin and subcutaneous tissue, no penetration to joint    Electronically Signed by Cecil Mead DPM on 5/27/2022 at 3:14 PM

## 2022-05-27 NOTE — DIABETES MGMT
Diabetes/ Glycemic Control Plan of Care      Recommendations:   1.) discussed POC BG monitoring with nursing for patient on correctional insulin. 2.) cont monitoring of fasting BG and increase lantus dose to 15 units if  BG remain above target range. The recommended BG upper limit is less than 180.    5/27/2022: Seen patient in [address] [facility]. She reported taking high dose of lantus insulin and sliding scale humalog insulin until it was stopped. Then she was placed back of lantus insulin 10 units daily and Metformin 500 mg once daily with meal.   Completed assessment of home diabetes management and education. Patient actively participated and expressed adequate knowledge of diabetes and types of insulin: lantus and humalog. Patient reported checking her BG at home T[facility] and bedtime and it is consistently high between 200-330. Patient stated that she's probably not getting enough lantus insulin therefore she plan to discuss with her primary care provider. POC BG 5/26/2022: 306, 318  POC BG 5/27/2022: still pending. Assigned nurse reported missed BG this morning because the patient was taken for vascular testing. She's currently NPO noted plan for debridement and drainage. Assessment:   DX:   1. Septic arthritis of left foot, due to unspecified organism ([address]     2. Hyperglycemia     3. AQUILES (acute kidney injury) ([facility]        Fasting/ Morning blood glucose:   Lab Results   Component Value Date/Time    Glucose 131 (H) 05/27/2022 02:36 AM    Glucose (POC) 318 (H) 05/26/2022 10:38 PM     IV Fluids containing dextrose:  None    Steroids:  None    Blood glucose values: Within target range (70-180mg/dL): No    Current insulin orders:   Basal lantus insulin 10 units daily  Correctional lispro insulin.  Modified to very resistant dose    Total Daily Dose previous 24 hours: 18 units  Lantus: 10 units  Lispro: 8 units    Current A1c:   Lab Results   Component Value Date/Time    Hemoglobin A1c 9.4 (H) 11/09/2021 12:24 PM    Hemoglobin A1c (POC) 9.4 11/09/2021 11:58 AM      equivalent  to ave Blood Glucose of (result dated 11/09/2021) mg/dl for 2-3 months prior to admission  Pending A1c lab at time of this review    Adequate glycemic control PTA: No.    Nutrition/Diet:   Active Orders   Diet    DIET NPO      Meal Intake:  No data found. Supplement Intake:  No data found. Home diabetes medications:  Patient reported that she's taking the following diabetes medications at home as prescribed. Lantus insulin 10 units daily every moring  Metformin 500 mg daily with meal    Key Antihyperglycemic Medications             metFORMIN (GLUCOPHAGE) 500 mg tablet Take 500 mg by mouth daily. Lantus Solostar U-100 Insulin 100 unit/mL (3 mL) inpn 10 Units by SubCUTAneous route Every morning. Jardiance 10 mg tablet Take 10 mg by mouth daily. glimepiride (AMARYL) 2 mg tablet Take 1 Tablet by mouth every morning. insulin aspart U-100 (NovoLOG Flexpen U-100 Insulin) 100 unit/mL (3 mL) inpn 20 Units by SubCUTAneous route nightly. Indications: type 2 diabetes mellitus          Plan/Goals:   Blood glucose will be within target of 70 - 180 mg/dl within 72 hours  Reinforce dietary and medication compliance at home.         Education:  [x] Refer to Diabetes Education Record: 5/27/2022                       [] Education not indicated at this time     Jaleesa Stockton RN [facility]  Pager: [phone]

## 2022-05-27 NOTE — ANESTHESIA PREPROCEDURE EVALUATION
Relevant Problems   No relevant active problems       Anesthetic History   No history of anesthetic complications            Review of Systems / Medical History  Patient summary reviewed and pertinent labs reviewed    Pulmonary            Asthma        Neuro/Psych              Cardiovascular    Hypertension          CAD      Comments: Acquired hypothyroidism 8/1/2016    Arthritis of right shoulder region 4/21/2016    Asthma     Bilateral lower extremity edema 7/7/2021    Chronic bilateral low back pain without sciatica 7/7/2021    Chronic diastolic congestive heart failure ([address] 3/10/2021    Chronic venous insufficiency     Diabetes (HCC)  neuropathy   Essential hypertension 7/7/2021    Gout     History of depression 1/23/2017    History of fall 7/7/2021    Hypercholesteremia 7/7/2021    Hypertension     MI (myocardial infarction) ([facility]     OAB (overactive bladder) 7/7/2021    Peripheral neuropathy     Rheumatoid arthritis ([facility]     Tear of right rotator cuff 5/16/2016    Thyroid pain     Urinary incontinence 7/7/2021    Vertigo        Anesthesia History      No specialty history recorded            GI/Hepatic/Renal                Endo/Other    Diabetes: type 2  Hypothyroidism  Morbid obesity and arthritis     Other Findings              Physical Exam    Airway  Mallampati: II  TM Distance: 4 - 6 cm  Neck ROM: decreased range of motion   Mouth opening: Diminished (comment)     Cardiovascular    Rhythm: regular  Rate: normal         Dental    Dentition: Poor dentition     Pulmonary      Decreased breath sounds: bilateral           Abdominal  GI exam deferred       Other Findings            Anesthetic Plan    ASA: 3  Anesthesia type: MAC            Anesthetic plan and risks discussed with: Patient

## 2022-05-27 NOTE — H&P
Update History & Physical    The Patient's History and Physical of May N/A,   surgery was reviewed with the patient and I examined the patient. There was no change. The surgical site was confirmed by the patient and me. Plan:  The risk, benefits, expected outcome, and alternative to the recommended procedure have been discussed with the patient. Patient understands and wants to proceed with the procedure.     Electronically signed by Hudson Blackwell DPM on 5/27/2022 at 2:48 PM

## 2022-05-27 NOTE — PROGRESS NOTES
[facility] Pharmacokinetic Monitoring Service - Vancomycin    Consulting Provider:    Indication: Skin and Soft Tissue Infection  Target Concentration: Goal AUC/TRINIDAD 400-600 mg*hr/L  Day of Therapy: 2  Additional Antimicrobials: Cefepime    Pertinent Laboratory Values:   Temp: 97.8 °F (36.6 °C)  Weight: 108.9 kg (240 lb)  Recent Labs     05/27/22  0236 05/26/22  1245   CREA 1.12 1.50*   BUN 22* 24*   WBC 9.2 13.1       Estimated Creatinine Clearance: 45.1 mL/min (based on SCr of 1.12 mg/dL). Pertinent Cultures:  Culture Date Source Results   5/26 blood ngtd   MRSA Nasal Swab: N/A.  Non-respiratory infection    Assessment:  Date/Time Current Dose Concentration Timing of Concentration (h) AUC   5/27 Begin 1250mg q24h - - -   Note: Serum concentrations collected for AUC dosing may appear elevated if collected in close proximity to the dose administered, this is not necessarily an indication of toxicity    Plan:  Begin scheduled dosing, 1250mg q24h  Projected T/AUCxx 16.4/523  Renal labs as indicated   Vancomycin concentration ordered for  5/29 @ 0400  Pharmacy will continue to monitor patient and adjust therapy as indicated    Thank you for the consult,  Laya Dockery, [facility]  5/27/2022

## 2022-05-27 NOTE — CONSULTS
Consult    Patient: Moraima Can MRN: [id number]  SSN: [id number]    YOB: 1935  Age: 82 y.o. Sex: female      Subjective:      Moraima Can is a 82 y.o. female who is being seen for asked to consult and treat  Infected Diabetic ulcer and joint left foot. .    Past Medical History:   Diagnosis Date    Acquired hypothyroidism 2016    Arthritis of right shoulder region 2016    Asthma     Bilateral lower extremity edema 2021    Chronic bilateral low back pain without sciatica 2021    Chronic diastolic congestive heart failure ([address] 3/10/2021    Chronic venous insufficiency     Diabetes ([facility]     neuropathy    Essential hypertension 2021    Gout     History of depression 2017    History of fall 2021    Hypercholesteremia 2021    Hypertension     MI (myocardial infarction) ([facility]     OAB (overactive bladder) 2021    Peripheral neuropathy     Rheumatoid arthritis (HCC)     Tear of right rotator cuff 2016    Thyroid pain     Urinary incontinence 2021    Vertigo      Past Surgical History:   Procedure Laterality Date    HX AMPUTATION TOE Right     Great toe Dr. Minna Amador HX CHOLECYSTECTOMY      HX GYN      TAHOophorectomy due to infection    HX HEENT      resection of memegioma in the .     HX KNEE REPLACEMENT Bilateral       Family History   Problem Relation Age of Onset    Heart Attack Mother     Heart Attack Father      Social History     Tobacco Use    Smoking status: Former Smoker     Quit date:      Years since quittin.4    Smokeless tobacco: Never Used    Tobacco comment: quit 45 years ago   Substance Use Topics    Alcohol use: No     Alcohol/week: 0.0 standard drinks      Current Facility-Administered Medications   Medication Dose Route Frequency Provider Last Rate Last Admin    vancomycin (VANCOCIN) 1250 mg in  ml infusion  1,250 mg IntraVENous Q24H Chris Cr MD        sodium chloride (NS) flush 5-10 mL  5-10 mL IntraVENous PRN Maxine Raines MD        cefepime (MAXIPIME) 2 g in 0.9% sodium chloride (MBP/ADV) 100 mL MBP  2 g IntraVENous Q12H Maxine Raines MD 25 mL/hr at 05/27/22 0339 2 g at 05/27/22 0339    sodium chloride (NS) flush 5-40 mL  5-40 mL IntraVENous Q8H Maxine Raines MD   10 mL at 05/27/22 0630    sodium chloride (NS) flush 5-40 mL  5-40 mL IntraVENous PRN Maxine Raines MD        acetaminophen (TYLENOL) tablet 650 mg  650 mg Oral Q6H PRN Maxine Raines MD        Or    acetaminophen (TYLENOL) suppository 650 mg  650 mg Rectal Q6H PRN Maxine Raines MD        polyethylene glycol (MIRALAX) packet 17 g  17 g Oral DAILY PRN Maxine Raines MD        famotidine (PEPCID) tablet 20 mg  20 mg Oral DAILY Maxine Raines MD        [facility] by provider] heparin (porcine) injection 5,000 Units  5,000 Units SubCUTAneous Q8H Maxine Raines MD   5,000 Units at 05/26/22 2255    [Held by provider] bumetanide (BUMEX) tablet 1 mg  1 mg Oral BID PRN Andrey Chandler MD        atorvastatin (LIPITOR) tablet 80 mg  80 mg Oral DAILY Brandon Treviño MD        carvediloL (COREG) tablet 12.5 mg  12.5 mg Oral BID WITH MEALS Andrey Chandler MD        levothyroxine (SYNTHROID) tablet 150 mcg  150 mcg Oral DAILY Andrey Chandler MD   150 mcg at 05/27/22 2046    [Held by provider] lisinopriL (PRINIVIL, ZESTRIL) tablet 40 mg  40 mg Oral DAILY Brandon Treviño MD        morphine IR (MS IR) tablet 15 mg  15 mg Oral BID Andrey Chandler MD   15 mg at 05/26/22 1811    insulin glargine (LANTUS) injection 10 Units  10 Units SubCUTAneous DAILY Andrey Chandler MD   10 Units at 05/26/22 1815    insulin lispro (HUMALOG) injection   SubCUTAneous AC&HS Andrey Chandler MD   8 Units at 05/26/22 6240    glucose chewable tablet 16 g  16 g Oral PRN Andrey Chandler MD  glucagon (GLUCAGEN) injection 1 mg  1 mg IntraMUSCular PRN Lisa Treviño MD        dextrose 10% infusion 0-250 mL  0-250 mL IntraVENous PRN Lisa Treviño MD        gabapentin (NEURONTIN) capsule 300 mg  300 mg Oral TID Caroline Tolentino MD   300 mg at 05/26/22 2255    lactated Ringers infusion  75 mL/hr IntraVENous CONTINUOUS aCroline Tolentino MD 75 mL/hr at 05/26/22 1816 75 mL/hr at 05/26/22 1816    amLODIPine (NORVASC) tablet 10 mg  10 mg Oral DAILY Trisha Lyons MD   10 mg at 05/26/22 2255        No Known Allergies    Review of Systems:  A comprehensive review of systems was negative except for that written in the History of Present Illness. Objective:     Vitals:    05/26/22 2245 05/27/22 0000 05/27/22 0338 05/27/22 0400   BP: (!) 152/76  (!) 143/75    Pulse: 98 79 83 81   Resp: 18  15    Temp: 97.7 °F (36.5 °C)  97.8 °F (36.6 °C)    SpO2: 96%  95%    Weight:       Height:            Physical Exam:  Seen in clinic. Has neuropathic ulcer under first metatarsal head left foot  Covered by callus and necrotic tissue. Abscess and cellulitis  Left great toe joint . Charcot joint. X-ray shows possible  lytic process medial first metatarsal head. MRI pending    Assessment:     Hospital Problems  Date Reviewed: 10/24/2021          Codes Class Noted POA    Septic arthritis of foot ([facility] ICD-10-CM: M00.9  ICD-9-CM: 711.07  5/26/2022 Unknown        * (Principal) Diabetic foot ulcer ([facility] ICD-10-CM: E11.621, L97.509  ICD-9-CM: 250.80, 707.15  5/26/2022 Unknown        Septic joint ([facility] ICD-10-CM: M00.9  ICD-9-CM: 711.00  5/26/2022 Unknown              Plan:     Plan debridement and  Drainage with bone path and culture needed.     Signed By: Jenise Mosley DPM     May 27, 2022

## 2022-05-27 NOTE — PROGRESS NOTES
Reason for Admission:  Septic arthritis of foot ([facility] [M00.9]  Diabetic foot ulcer ([facility] [Y15.185, L97.509]  Septic joint ([facility] [M00.9]                 RUR Score:  9%           Plan for utilizing home health:    TBD                    Likelihood of Readmission:   LOW                         Transition of Care Plan:              Initial assessment completed with patient. Cognitive status of patient: oriented to time, place, person and situation. Face sheet information confirmed:  yes. The patient designates Maria R Gar son ([phone]) to participate in her discharge plan and to receive any needed information. This patient lives in a apartment with roommate. Patient was able to navigate steps as needed. Prior to hospitalization, patient was considered to be independent with ADLs/IADLS : yes . Patient has a current ACP document on file: no      The patient's friend Tone Caceres, [phone]) be available to transport patient home upon discharge. The patient already has Bridget Oka, and rollator medical equipment available in the home. Patient is not currently active with home health. Patient has not stayed in a skilled nursing facility or rehab. This patient is on dialysis :no     Freedom of choice signed: no.     Currently, the discharge plan is Home. CM will continue to monitor and assist with transition of care needs. The patient states that she can obtain her medications from the pharmacy, and take her medications as directed. Patient's current insurance is Medicare Part A & B and        Care Management Interventions  PCP Verified by CM: Yes  Mode of Transport at Discharge: Self  Transition of Care Consult (CM Consult): Discharge Planning  Physical Therapy Consult: Yes  Occupational Therapy Consult: Yes  Speech Therapy Consult: No  Support Systems: Child(jace),Friend/Neighbor  Discharge Location  Patient Expects to be Discharged to[de-identified] Home      ANTONINO Sheth, RN  Pager # [phone]  Care Manager

## 2022-05-27 NOTE — PROGRESS NOTES
Problem: Falls - Risk of  Goal: *Absence of Falls  Description: Document Giovanny Led Fall Risk and appropriate interventions in the flowsheet.   Outcome: Progressing Towards Goal  Note: Fall Risk Interventions:  Mobility Interventions: Utilize walker, cane, or other assistive device,Patient to call before getting OOB,Bed/chair exit alarm         Medication Interventions: Teach patient to arise slowly,Patient to call before getting OOB,Bed/chair exit alarm    Elimination Interventions: Bed/chair exit alarm,Call light in reach    History of Falls Interventions: Bed/chair exit alarm,Room close to nurse's station         Problem: Patient Education: Go to Patient Education Activity  Goal: Patient/Family Education  Outcome: Progressing Towards Goal     Problem: Pain  Goal: *Control of Pain  Outcome: Progressing Towards Goal  Goal: *PALLIATIVE CARE:  Alleviation of Pain  Outcome: Progressing Towards Goal     Problem: Patient Education: Go to Patient Education Activity  Goal: Patient/Family Education  Outcome: Progressing Towards Goal     Problem: Diabetes Maintenance:Admission  Goal: Activity/Safety  Outcome: Progressing Towards Goal  Goal: Diagnostic Tests/Procedures  Outcome: Progressing Towards Goal  Goal: Nutrition  Outcome: Progressing Towards Goal  Goal: Medications  Outcome: Progressing Towards Goal  Goal: Treatments/Interventions/Procedures  Outcome: Progressing Towards Goal     Problem: Diabetes Maintenance:Ongoing  Goal: Activity/Safety  Outcome: Progressing Towards Goal  Goal: Nutrition  Outcome: Progressing Towards Goal  Goal: Medications  Outcome: Progressing Towards Goal  Goal: Treatments/Interventsions/Procedures  Outcome: Progressing Towards Goal  Goal: *Blood Glucose 80 to 180 md/dl  Outcome: Progressing Towards Goal     Problem: Diabetes Maintenance:Discharge Outcomes  Goal: *Describes follow-up/return visits to physicians  Outcome: Progressing Towards Goal  Goal: *Blood glucose at patient's target range or approaching  Outcome: Progressing Towards Goal  Goal: *Aware of nutrition guidelines  Outcome: Progressing Towards Goal  Goal: *Verbalizes information about medication  Description: Verbalizes name, dosage, time, side effects, and number of days to  continue medications. Outcome: Progressing Towards Goal  Goal: *Describes goals, rules, symptoms, and treatments  Description: Describes blood glucose goals, monitoring, sick day rules,  hypo/hyperglycemia prevention, symptoms, and treatment  Outcome: Progressing Towards Goal  Goal: *Describes available outpatient diabetes resources and support systems  Outcome: Progressing Towards Goal     Problem: Impaired Skin Integrity/Pressure Injury Treatment  Goal: *Improvement of Existing Pressure Injury  Outcome: Progressing Towards Goal  Goal: *Prevention of pressure injury  Description: Document Ravi Scale and appropriate interventions in the flowsheet.   Outcome: Progressing Towards Goal     Problem: Patient Education: Go to Patient Education Activity  Goal: Patient/Family Education  Outcome: Progressing Towards Goal     Problem: Diabetic Ulcer-Treatment  Goal: *Improvement of existing diabetic ulcer  Outcome: Progressing Towards Goal     Problem: Patient Education: Go to Patient Education Activity  Goal: Patient/Family Education  Outcome: Progressing Towards Goal

## 2022-05-27 NOTE — ROUTINE PROCESS
TRANSFER - IN REPORT:    Verbal report received from [place], RN  on [employer]  being received from [id number] [place] [address]  for ordered procedure      Report consisted of patients Situation, Background, Assessment and   Recommendations(SBAR). Information from the following report(s) SBAR was reviewed with the receiving nurse. Opportunity for questions and clarification was provided. Assessment completed upon patients arrival to unit and care assumed.

## 2022-05-27 NOTE — PROGRESS NOTES
Infectious Disease progress Note        Reason: Left foot infection    Current abx Prior abx    vancomycin since 5/26/2022  Cefepime since 5/26 Ceftriaxone 5/26     Lines:       Assessment :  82 lady with past medical history significant for uncontrolled type 2 diabetes, diabetic neuropathy, hypercholesterolemia, hypertension sent to ED on 5/26/2022 from podiatrist office due to increasing pain and swelling left foot. Clinical presentation consistent with diabetic left foot infection, probable left foot abscess, probable left first metatarsal osteomyelitis    No evidence of osteomyelitis noted on x-ray left foot 5/26/2022. No definitive evidence of abscess/osteomyelitis noted on MRI 5/26 or 22. Will await Intra-Op findings. Plans for surgery today noted    Uncontrolled type 2 diabetes likely contributed to current infection    Improved left foot erythema compared to prior exam    Erythema right leg with overlying tenderness-consistent with cellulitis    Recommendations:    1. Continue cefepime, vancomycin for now  2. Follow-up podiatry recommendations regarding I&D left foot   3. Management of hyperglycemia per primary team  4. Follow-up Intra-Op cultures, findings to determine duration and type of outpatient antibiotics. 5. Monitor right LE erythema/tenderness       Above plan was discussed in details with patient,  and dr Calixto Styles. Please call me if any further questions or concerns. Will continue to participate in the care of this patient. HPI:      Patient denies any fever or chills.   No new complaints    Past Medical History:   Diagnosis Date    Acquired hypothyroidism 8/1/2016    Arthritis of right shoulder region 4/21/2016    Asthma     Bilateral lower extremity edema 7/7/2021    Chronic bilateral low back pain without sciatica 7/7/2021    Chronic diastolic congestive heart failure ([address] 3/10/2021    Chronic venous insufficiency     Diabetes ([address]     neuropathy    Essential hypertension 7/7/2021    Gout     History of depression 1/23/2017    History of fall 7/7/2021    Hypercholesteremia 7/7/2021    Hypertension     MI (myocardial infarction) (HCC)     OAB (overactive bladder) 7/7/2021    Peripheral neuropathy     Rheumatoid arthritis (HCC)     Tear of right rotator cuff 5/16/2016    Thyroid pain     Urinary incontinence 7/7/2021    Vertigo        Past Surgical History:   Procedure Laterality Date    HX AMPUTATION TOE Right 2020    Great toe Dr. Benson Sox HX CHOLECYSTECTOMY      HX GYN      TAHOophorectomy due to infection    HX HEENT      resection of memegioma in the 1980's.  HX KNEE REPLACEMENT Bilateral        Current Discharge Medication List      CONTINUE these medications which have NOT CHANGED    Details   levothyroxine (SYNTHROID) 150 mcg tablet Take 150 mcg by mouth daily. morphine IR (MS IR) 15 mg tablet Take 15 mg by mouth two (2) times a day. metFORMIN (GLUCOPHAGE) 500 mg tablet Take 500 mg by mouth daily. Lantus Solostar U-100 Insulin 100 unit/mL (3 mL) inpn 10 Units by SubCUTAneous route Every morning.      hydrOXYzine HCL (ATARAX) 10 mg tablet Take 1 Tablet by mouth two (2) times daily as needed. Jardiance 10 mg tablet Take 10 mg by mouth daily. allopurinoL (ZYLOPRIM) 100 mg tablet Take 100 mg by mouth daily. lisinopriL (PRINIVIL, ZESTRIL) 40 mg tablet Take 1 tablet by mouth once daily  Qty: 90 Tablet, Refills: 0    Associated Diagnoses: Essential hypertension      sertraline (ZOLOFT) 50 mg tablet Take 1 Tablet by mouth daily. Qty: 90 Tablet, Refills: 3    Associated Diagnoses: Depression with anxiety      gabapentin (NEURONTIN) 400 mg capsule Take 1 cap in morning, 1 cap at 2pm, 2 caps at bedtime  Qty: 360 Capsule, Refills: 0    Associated Diagnoses: Type 2 diabetes mellitus with diabetic neuropathy, with long-term current use of insulin ([address]  Neuropathy      carvediloL (COREG) 12.5 mg tablet Take 1 Tablet by mouth two (2) times daily (with meals). Qty: 180 Tablet, Refills: 0    Associated Diagnoses: Essential hypertension      bumetanide (BUMEX) 2 mg tablet Take 0.5 Tablets by mouth two (2) times daily as needed (swelling). Qty: 180 Tablet, Refills: 1    Associated Diagnoses: Bilateral lower extremity edema      amLODIPine (NORVASC) 10 mg tablet Take 1 Tablet by mouth daily. Qty: 90 Tablet, Refills: 0    Associated Diagnoses: Essential hypertension      glimepiride (AMARYL) 2 mg tablet Take 1 Tablet by mouth every morning. Qty: 90 Tablet, Refills: 0    Associated Diagnoses: Type 2 diabetes mellitus with diabetic neuropathy, with long-term current use of insulin ([facility])      insulin aspart U-100 (NovoLOG Flexpen U-100 Insulin) 100 unit/mL (3 mL) inpn 20 Units by SubCUTAneous route nightly. Indications: type 2 diabetes mellitus  Qty: 5 Adjustable Dose Pre-filled Pen Syringe, Refills: 3    Comments: Obtain OTC insulin due to cost  Associated Diagnoses: Type 2 diabetes mellitus with diabetic neuropathy, with long-term current use of insulin ([facility])      atorvastatin (LIPITOR) 80 mg tablet Take 1 Tablet by mouth daily. Qty: 90 Tablet, Refills: 3    Associated Diagnoses: Hypercholesteremia      Blood-Glucose Meter (FREESTYLE LITE METER) monitoring kit Test twice blood glucose daily; ICD-10 E11.9; Quantity 1  Qty: 1 Kit, Refills: 0    Associated Diagnoses: Type 2 diabetes mellitus with nephropathy ([facility])      insulin syringe,safetyneedle 1 mL 31 gauge x 5/16\" syrg 1 Each by Does Not Apply route daily. Qty: 300 Each, Refills: 3    Comments: Dx e11.9      glucose blood VI test strips (FREESTYLE TEST) strip Use to check blood glucose twice daily DX:E11.9  Qty: 300 Strip, Refills: 3      acetaminophen (TYLENOL) 500 mg tablet Take 1 Tab by mouth every six (6) hours as needed for Pain.   Qty: 270 Tab, Refills: 3    Associated Diagnoses: Controlled type 2 diabetes mellitus without complication, with long-term current use of insulin (HCC)             Current Facility-Administered Medications   Medication Dose Route Frequency    sodium chloride (NS) flush 5-10 mL  5-10 mL IntraVENous PRN    VANCOMYCIN INFORMATION NOTE   Other Rx Dosing/Monitoring    cefepime (MAXIPIME) 2 g in 0.9% sodium chloride (MBP/ADV) 100 mL MBP  2 g IntraVENous Q12H    sodium chloride (NS) flush 5-40 mL  5-40 mL IntraVENous Q8H    sodium chloride (NS) flush 5-40 mL  5-40 mL IntraVENous PRN    acetaminophen (TYLENOL) tablet 650 mg  650 mg Oral Q6H PRN    Or    acetaminophen (TYLENOL) suppository 650 mg  650 mg Rectal Q6H PRN    polyethylene glycol (MIRALAX) packet 17 g  17 g Oral DAILY PRN    famotidine (PEPCID) tablet 20 mg  20 mg Oral DAILY    [Held by provider] heparin (porcine) injection 5,000 Units  5,000 Units SubCUTAneous Q8H    [Held by provider] bumetanide (BUMEX) tablet 1 mg  1 mg Oral BID PRN    atorvastatin (LIPITOR) tablet 80 mg  80 mg Oral DAILY    carvediloL (COREG) tablet 12.5 mg  12.5 mg Oral BID WITH MEALS    levothyroxine (SYNTHROID) tablet 150 mcg  150 mcg Oral DAILY    [Held by provider] lisinopriL (PRINIVIL, ZESTRIL) tablet 40 mg  40 mg Oral DAILY    morphine IR (MS IR) tablet 15 mg  15 mg Oral BID    insulin glargine (LANTUS) injection 10 Units  10 Units SubCUTAneous DAILY    insulin lispro (HUMALOG) injection   SubCUTAneous AC&HS    glucose chewable tablet 16 g  16 g Oral PRN    glucagon (GLUCAGEN) injection 1 mg  1 mg IntraMUSCular PRN    dextrose 10% infusion 0-250 mL  0-250 mL IntraVENous PRN    gabapentin (NEURONTIN) capsule 300 mg  300 mg Oral TID    lactated Ringers infusion  75 mL/hr IntraVENous CONTINUOUS    amLODIPine (NORVASC) tablet 10 mg  10 mg Oral DAILY       Allergies: Patient has no known allergies.     Family History   Problem Relation Age of Onset    Heart Attack Mother     Heart Attack Father      Social History     Socioeconomic History    Marital status:      Spouse name: Not on file    Number of children: Not on file    Years of education: Not on file    Highest education level: Not on file   Occupational History    Not on file   Tobacco Use    Smoking status: Former Smoker     Quit date:      Years since quittin.4    Smokeless tobacco: Never Used    Tobacco comment: quit 45 years ago   Vaping Use    Vaping Use: Never used   Substance and Sexual Activity    Alcohol use: No     Alcohol/week: 0.0 standard drinks    Drug use: No    Sexual activity: Not Currently   Other Topics Concern    Not on file   Social History Narrative    Not on file     Social Determinants of Health     Financial Resource Strain:     Difficulty of Paying Living Expenses: Not on file   Food Insecurity:     Worried About [employer] in the Last Year: Not on file    [address] [employer] in the Last Year: Not on file   Transportation Needs:     Lack of Transportation (Medical): Not on file    Lack of Transportation (Non-Medical):  Not on file   Physical Activity:     Days of Exercise per Week: Not on file    Minutes of Exercise per Session: Not on file   Stress:     Feeling of Stress : Not on file   Social Connections:     Frequency of Communication with Friends and Family: Not on file    Frequency of Social Gatherings with Friends and Family: Not on file    Attends [employer] Services: Not on file    Active Member of [address] [place] [address] or Organizations: Not on file    Attends Club or Organization Meetings: Not on file    Marital Status: Not on file   Intimate Partner Violence:     Fear of Current or Ex-Partner: Not on file    Emotionally Abused: Not on file    Physically Abused: Not on file    Sexually Abused: Not on file   Housing Stability:     Unable to Pay for Housing in the Last Year: Not on file    Number of [place] in the Last Year: Not on file    Unstable Housing in the Last Year: Not on file     Social History     Tobacco Use   Smoking Status Former Smoker    Quit date: 12    Years since quittin.4   Smokeless Tobacco Never Used   Tobacco Comment    quit 45 years ago        Temp (24hrs), Av.9 °F (36.6 °C), Min:97.6 °F (36.4 °C), Max:98.2 °F (36.8 °C)    Visit Vitals  BP (!) 143/75   Pulse 81   Temp 97.8 °F (36.6 °C)   Resp 15   Ht 5' 5\" (1.651 m)   Wt 108.9 kg (240 lb)   SpO2 95%   BMI 39.94 kg/m²       ROS: 12 point ROS obtained in details. Pertinent positives as mentioned in HPI,   otherwise negative    Physical Exam:    General: Well developed, well nourished female sitting on the  bed AAOx3 in no acute distress. General:   awake alert and oriented   HEENT:  Normocephalic, atraumatic, EOMI, no scleral icterus or pallor; no conjunctival hemmohage;  nasal and oral mucous are moist and without evidence of lesions. Neck supple, no bruits. Lymph Nodes:   not examined   Lungs:   non-labored, bilateral chest movements equal, no audible wheezing   Heart:  RRR, s1 and s2; no  rubs or gallops, no edema, + pedal pulses   Abdomen:  soft, non-distended, active bowel sounds, no hepatomegaly, no splenomegaly. Non-tender   Genitourinary:  deferred   Extremities:   erythema/tenderness right leg,  callus on the plantar aspect of the left foot overlying the left first metatarsal with underlying swelling/redness/fluctuance , no significant pain on active/passive movements of the left great toe no erythema left foot/left leg, unable to palpate dorsalis pedis pulsation bilaterally full ROM of all large joints to the upper and lower extremities; muscle mass appropriate for age   Neurologic:   Decree sensation to light touch plantar aspect of both feet; 5/5 muscle strength to upper and lower extremities. Speech appropriate.  Cranial nerves intact                        Skin:  No rash or ulcers noted   Back:  no spinal or paraspinal muscle tenderness or rigidity, no CVA tenderness     Psychiatric:  No suicidal or homicidal ideations, appropriate mood and affect         Labs: Results:   Chemistry Recent Labs     05/27/22  0236 05/26/22  1245   * 266*    135*   K 4.4 4.9    103   CO2 27 24   BUN 22* 24*   CREA 1.12 1.50*   CA 9.0 9.6   AGAP 5 8   BUCR 20 16   AP  --  67   TP  --  8.4*   ALB  --  3.8   GLOB  --  4.6*   AGRAT  --  0.8      CBC w/Diff Recent Labs     05/27/22  0236 05/26/22  1245   WBC 9.2 13.1   RBC 4.02* 4.26   HGB 11.5* 12.3   HCT 36.2 38.4    UNABLE TO REPORT ACCURATE COUNT DUE TO PLATELET AGGREGATION, HOWEVER, PLATELETS APPEAR NORMAL IN NUMBER ON SMEAR. PLEASE RESUBMIT SODIUM CITRATE (BLUE) AND EDTA (LAVENDER) TUBES FOR HEMATOLOGICAL TESTING. GRANS 69 62   LYMPH 15* 26   EOS 4 2      Microbiology Recent Labs     05/26/22  1300 05/26/22  1245   CULT NO GROWTH AFTER 16 HOURS NO GROWTH AFTER 16 HOURS          RADIOLOGY:    All available imaging studies/reports in [facility] for this admission were reviewed      Disclaimer: Sections of this note are dictated utilizing voice recognition software, which may have resulted in some phonetic based errors in grammar and contents. Even though attempts were made to correct all the mistakes, some may have been missed, and remained in the body of the document. If questions arise, please contact our department.     Dr. Ann Osuna, Infectious Disease Specialist  [phone]  May 27, 2022  3:16 PM

## 2022-05-27 NOTE — PROGRESS NOTES
conducted a pre-surgery visit with Osmani Segal, who is a 82 y.o.,female. The  provided the following Interventions:  Initiated a relationship of care and support. Offered prayer and assurance of continued prayers on patient's behalf. There is an advance directive but it is not on file here and patient said she will have family provide us a copy. Plan:  Chaplains will continue to follow and will provide pastoral care on an as needed/requested basis.  recommends bedside caregivers page  on duty if patient shows signs of acute spiritual or emotional distress.   Reiseñor 3   Board Certified [address] [place] [address]   [phone]

## 2022-05-27 NOTE — ANESTHESIA POSTPROCEDURE EVALUATION
Procedure(s):  debridement and drainage left great toe joint.     MAC    Anesthesia Post Evaluation      Multimodal analgesia: multimodal analgesia used between 6 hours prior to anesthesia start to PACU discharge  Patient location during evaluation: bedside  Patient participation: complete - patient participated  Level of consciousness: awake  Pain management: adequate  Airway patency: patent  Anesthetic complications: no  Cardiovascular status: stable  Respiratory status: acceptable  Hydration status: acceptable  Post anesthesia nausea and vomiting:  controlled      INITIAL Post-op Vital signs:   Vitals Value Taken Time   /57 05/27/22 1607   Temp 36.5 °C (97.7 °F) 05/27/22 1519   Pulse 78 05/27/22 1613   Resp 24 05/27/22 1613   SpO2 96 % 05/27/22 1613

## 2022-05-27 NOTE — PROGRESS NOTES
Comprehensive Nutrition Assessment    Type and Reason for Visit: Initial,Positive nutrition screen    Nutrition Recommendations/Plan:   1. Decrease oral nutrition supplement, Glucerna Shake, to once daily. Malnutrition Assessment:  Malnutrition Status:  No malnutrition (05/27/22 1405)      Nutrition History and Allergies:   Pertinent PMH: hypothyroidism, CHF, DM with peripheral neuropathy, HTN, depression, hypercholesterolemia, MI, gout. Presented with foot pain, admitted with diabetic foot ulcer vs septic joint. Denies changes in appetite PTA. Slight wt loss from UBW of 240 lb, unable to determine time frame. Nutrition Assessment:    NPO for I&D of foot ulcer today. No meal intake since admission. C/o being hungry. Some weight loss noted, however does not appear to be significant. Nutrition Related Findings:    BM: 5/26 Pertinent Meds: pepcid, lantus, SSI, synthroid, LR at 75 mL/hr. Wound Type: Diabetic ulcer    Current Nutrition Intake & Therapies:  Average Meal Intake: NPO  Average Supplement Intake: NPO  DIET NPO  ADULT ORAL NUTRITION SUPPLEMENT Breakfast, Lunch, Dinner; Diabetic Supplement    Anthropometric Measures:  Height: 5' 5\" (165.1 cm)  Ideal Body Weight (IBW): 125 lbs (57 kg)  Admission Body Weight: 240 lb  Current Body Wt:  106.4 kg (234 lb 9.1 oz), 187.7 % IBW. Bed scale  Current BMI (kg/m2): 39  Usual Body Weight: 108.9 kg (240 lb)  % Weight Change (Calculated): -2.3  Weight Adjustment: No adjustment                 BMI Category: Obese class 2 (BMI 35.0-39. 9)    Estimated Daily Nutrient Needs:  Energy Requirements Based On: Formula  Weight Used for Energy Requirements: Current  Energy (kcal/day): 6754-5825  Weight Used for Protein Requirements: Current  Protein (g/day): 106-127  Method Used for Fluid Requirements: 1 ml/kcal  Fluid (ml/day): 8896-6427    Nutrition Diagnosis:   · Predicted inadequate energy intake related to acute injury/trauma as evidenced by wounds      Nutrition Interventions:   Food and/or Nutrient Delivery: Continue current diet,Modify oral nutrition supplement     Coordination of Nutrition Care: Continue to monitor while inpatient  Plan of Care discussed with: patient    Goals:     Goals: Meet at least 75% of estimated needs,by next RD assessment       Nutrition Monitoring and Evaluation:      Food/Nutrient Intake Outcomes: Food and nutrient intake  Physical Signs/Symptoms Outcomes: Biochemical data,Meal time behavior,Nutrition focused physical findings,Skin    Discharge Planning:    No discharge needs at this time    Isabela Douglas, [address]: [phone]

## 2022-05-27 NOTE — PROGRESS NOTES
PT order received and chart reviewed. Pt is due for L foot procedure in the OR this date, will follow up with pt following surgery for evaluation. Please update orders with any activity/weight baring restrictions following surgery.  Thank you for this referral. Otilia Brown, PT, DPT

## 2022-05-28 LAB
ANION GAP SERPL CALC-SCNC: 4 MMOL/L (ref 3–18)
BASOPHILS # BLD: 0.1 K/UL (ref 0–0.1)
BASOPHILS NFR BLD: 2 % (ref 0–2)
BUN SERPL-MCNC: 17 MG/DL (ref 7–18)
BUN/CREAT SERPL: 17 (ref 12–20)
CALCIUM SERPL-MCNC: 8.7 MG/DL (ref 8.5–10.1)
CHLORIDE SERPL-SCNC: 109 MMOL/L (ref 100–111)
CO2 SERPL-SCNC: 27 MMOL/L (ref 21–32)
CREAT SERPL-MCNC: 1.02 MG/DL (ref 0.6–1.3)
DIFFERENTIAL METHOD BLD: ABNORMAL
EOSINOPHIL # BLD: 0.4 K/UL (ref 0–0.4)
EOSINOPHIL NFR BLD: 5 % (ref 0–5)
ERYTHROCYTE [DISTWIDTH] IN BLOOD BY AUTOMATED COUNT: 12.9 % (ref 11.6–14.5)
GLUCOSE BLD STRIP.AUTO-MCNC: 150 MG/DL (ref 70–110)
GLUCOSE BLD STRIP.AUTO-MCNC: 170 MG/DL (ref 70–110)
GLUCOSE BLD STRIP.AUTO-MCNC: 204 MG/DL (ref 70–110)
GLUCOSE BLD STRIP.AUTO-MCNC: 225 MG/DL (ref 70–110)
GLUCOSE SERPL-MCNC: 200 MG/DL (ref 74–99)
HCT VFR BLD AUTO: 35.9 % (ref 35–45)
HGB BLD-MCNC: 11.4 G/DL (ref 12–16)
IMM GRANULOCYTES # BLD AUTO: 0 K/UL (ref 0–0.04)
IMM GRANULOCYTES NFR BLD AUTO: 0 % (ref 0–0.5)
LYMPHOCYTES # BLD: 2.5 K/UL (ref 0.9–3.6)
LYMPHOCYTES NFR BLD: 30 % (ref 21–52)
MCH RBC QN AUTO: 28.8 PG (ref 24–34)
MCHC RBC AUTO-ENTMCNC: 31.8 G/DL (ref 31–37)
MCV RBC AUTO: 90.7 FL (ref 78–100)
MONOCYTES # BLD: 0.9 K/UL (ref 0.05–1.2)
MONOCYTES NFR BLD: 11 % (ref 3–10)
NEUTS SEG # BLD: 4.5 K/UL (ref 1.8–8)
NEUTS SEG NFR BLD: 53 % (ref 40–73)
NRBC # BLD: 0 K/UL (ref 0–0.01)
NRBC BLD-RTO: 0 PER 100 WBC
PLATELET # BLD AUTO: 189 K/UL (ref 135–420)
PMV BLD AUTO: 12.7 FL (ref 9.2–11.8)
POTASSIUM SERPL-SCNC: 4 MMOL/L (ref 3.5–5.5)
RBC # BLD AUTO: 3.96 M/UL (ref 4.2–5.3)
SODIUM SERPL-SCNC: 140 MMOL/L (ref 136–145)
WBC # BLD AUTO: 8.5 K/UL (ref 4.6–13.2)

## 2022-05-28 PROCEDURE — 74011000250 HC RX REV CODE- 250

## 2022-05-28 PROCEDURE — 74011000258 HC RX REV CODE- 258

## 2022-05-28 PROCEDURE — 74011000250 HC RX REV CODE- 250: Performed by: STUDENT IN AN ORGANIZED HEALTH CARE EDUCATION/TRAINING PROGRAM

## 2022-05-28 PROCEDURE — 36415 COLL VENOUS BLD VENIPUNCTURE: CPT

## 2022-05-28 PROCEDURE — 74011250636 HC RX REV CODE- 250/636: Performed by: PODIATRIST

## 2022-05-28 PROCEDURE — 74011250637 HC RX REV CODE- 250/637: Performed by: PODIATRIST

## 2022-05-28 PROCEDURE — 97530 THERAPEUTIC ACTIVITIES: CPT

## 2022-05-28 PROCEDURE — 74011250637 HC RX REV CODE- 250/637

## 2022-05-28 PROCEDURE — 85025 COMPLETE CBC W/AUTO DIFF WBC: CPT

## 2022-05-28 PROCEDURE — 97535 SELF CARE MNGMENT TRAINING: CPT

## 2022-05-28 PROCEDURE — 97165 OT EVAL LOW COMPLEX 30 MIN: CPT

## 2022-05-28 PROCEDURE — 74011636637 HC RX REV CODE- 636/637

## 2022-05-28 PROCEDURE — 2709999900 HC NON-CHARGEABLE SUPPLY

## 2022-05-28 PROCEDURE — 82962 GLUCOSE BLOOD TEST: CPT

## 2022-05-28 PROCEDURE — 74011636637 HC RX REV CODE- 636/637: Performed by: PODIATRIST

## 2022-05-28 PROCEDURE — 74011250636 HC RX REV CODE- 250/636

## 2022-05-28 PROCEDURE — 74011250636 HC RX REV CODE- 250/636: Performed by: STUDENT IN AN ORGANIZED HEALTH CARE EDUCATION/TRAINING PROGRAM

## 2022-05-28 PROCEDURE — 80048 BASIC METABOLIC PNL TOTAL CA: CPT

## 2022-05-28 PROCEDURE — 65270000029 HC RM PRIVATE

## 2022-05-28 PROCEDURE — 74011000258 HC RX REV CODE- 258: Performed by: STUDENT IN AN ORGANIZED HEALTH CARE EDUCATION/TRAINING PROGRAM

## 2022-05-28 RX ORDER — INSULIN GLARGINE 100 [IU]/ML
15 INJECTION, SOLUTION SUBCUTANEOUS DAILY
Status: DISCONTINUED | OUTPATIENT
Start: 2022-05-28 | End: 2022-05-29 | Stop reason: HOSPADM

## 2022-05-28 RX ADMIN — SODIUM CHLORIDE, PRESERVATIVE FREE 10 ML: 5 INJECTION INTRAVENOUS at 17:16

## 2022-05-28 RX ADMIN — Medication 6 UNITS: at 09:10

## 2022-05-28 RX ADMIN — SODIUM CHLORIDE, PRESERVATIVE FREE 10 ML: 5 INJECTION INTRAVENOUS at 22:42

## 2022-05-28 RX ADMIN — Medication 3 UNITS: at 17:13

## 2022-05-28 RX ADMIN — CEFEPIME HYDROCHLORIDE 2 G: 2 INJECTION, POWDER, FOR SOLUTION INTRAVENOUS at 16:14

## 2022-05-28 RX ADMIN — LEVOTHYROXINE SODIUM 150 MCG: 150 TABLET ORAL at 06:10

## 2022-05-28 RX ADMIN — GABAPENTIN 300 MG: 300 CAPSULE ORAL at 22:42

## 2022-05-28 RX ADMIN — GABAPENTIN 300 MG: 300 CAPSULE ORAL at 16:13

## 2022-05-28 RX ADMIN — HEPARIN SODIUM 5000 UNITS: 5000 INJECTION INTRAVENOUS; SUBCUTANEOUS at 22:42

## 2022-05-28 RX ADMIN — Medication 15 UNITS: at 09:10

## 2022-05-28 RX ADMIN — CARVEDILOL 12.5 MG: 12.5 TABLET, FILM COATED ORAL at 09:08

## 2022-05-28 RX ADMIN — AMLODIPINE BESYLATE 10 MG: 10 TABLET ORAL at 09:08

## 2022-05-28 RX ADMIN — CARVEDILOL 12.5 MG: 12.5 TABLET, FILM COATED ORAL at 17:13

## 2022-05-28 RX ADMIN — FAMOTIDINE 20 MG: 20 TABLET ORAL at 09:08

## 2022-05-28 RX ADMIN — VANCOMYCIN HYDROCHLORIDE 1250 MG: 10 INJECTION, POWDER, LYOPHILIZED, FOR SOLUTION INTRAVENOUS at 18:54

## 2022-05-28 RX ADMIN — Medication 3 UNITS: at 23:04

## 2022-05-28 RX ADMIN — GABAPENTIN 300 MG: 300 CAPSULE ORAL at 09:08

## 2022-05-28 RX ADMIN — MORPHINE SULFATE 15 MG: 15 TABLET ORAL at 09:08

## 2022-05-28 RX ADMIN — HEPARIN SODIUM 5000 UNITS: 5000 INJECTION INTRAVENOUS; SUBCUTANEOUS at 13:28

## 2022-05-28 RX ADMIN — CEFEPIME HYDROCHLORIDE 2 G: 2 INJECTION, POWDER, FOR SOLUTION INTRAVENOUS at 06:09

## 2022-05-28 RX ADMIN — MORPHINE SULFATE 15 MG: 15 TABLET ORAL at 17:13

## 2022-05-28 RX ADMIN — SODIUM CHLORIDE, PRESERVATIVE FREE 10 ML: 5 INJECTION INTRAVENOUS at 06:10

## 2022-05-28 RX ADMIN — Medication 6 UNITS: at 12:39

## 2022-05-28 RX ADMIN — ATORVASTATIN CALCIUM 80 MG: 40 TABLET, FILM COATED ORAL at 09:08

## 2022-05-28 NOTE — PROGRESS NOTES
Bedside and Verbal shift change report given to Olaf Carter (oncoming nurse) by Elisabeth Malhotra RN (offgoing nurse). Report included the following information SBAR, Kardex, Intake/Output, MAR and Recent Results.

## 2022-05-28 NOTE — PROGRESS NOTES
Problem: Self Care Deficits Care Plan (Adult)  Goal: *Acute Goals and Plan of Care (Insert Text)  Description: Occupational Therapy Goals  Initiated 5/28/2022 within 7 day(s). 1.  Patient will perform grooming with contact guard assist in standing, while adhering to her WB restrictions for LLE. 2.  Patient will perform bathing with supervision/set-up. 3.  Patient will perform lower body dressing with supervision/set-up including donning pants/underwear. 4.  Patient will perform toilet transfers with supervision/set-up following WB restrictions for LLE. 5.  Patient will perform all aspects of toileting with supervision/set-up. 6.  Patient will participate in upper extremity therapeutic exercise/activities with supervision/set-up for 8 minutes to improve endurance and UB strength needed for ADLs    7. Patient will utilize energy conservation techniques during functional activities with verbal cues. Prior Level of Function: Pt lives with roommate in 1st floor apartment. Pt uses FWW or rollator for functional mobility. Per pt her roommate also uses walker and they \"help each other out\"  Outcome: Progressing Towards Goal  OCCUPATIONAL THERAPY EVALUATION    Patient: Zelda Samuels [de-identified]82 y.o. female)  Date: 5/28/2022  Primary Diagnosis: Septic arthritis of foot ([facility] [address] [M00.9]  Diabetic foot ulcer ([facility] [address] [C98.636, L97.509]  Septic joint ([facility] [address] [M00.9]  Procedure(s) (LRB):  debridement and drainage left great toe joint (Left) 1 Day Post-Op   Precautions:   Fall (NWB LLE per podiatry)    ASSESSMENT :  Based on the objective data described below, the patient presents with decreased endurance and functional activity tolerance, generalized weakness, decreased safety awareness, decreased functional mobility and standing balance, NWB restrictions for LLE limiting pt's participation and independence with ADLs. Messaged podiatry for WB clarifications at 09:06.  Passing by pt's room in the hallway pt is walking from the BR with nursing upon entry w/o AD, noted WB through LLE with surgical shoe. Pt reports she normally uses FWW for functional mobility. Issued FWW for pt. Pt performed all seated aspects of ADLs with Supervision, educated on awaiting on WB orders from MD to resume functional mobility. 2nd session. Received message back from Dr. Brianna Munoz via [employer]. Pt to be non weight bearing on LLE at this time. Educated pt on WB orders, pt is unable to safely perform functional mobility to the BR with FWW while maintaining NWB restrictions for LLE. Pt performed SPT to the [facility] with FWW, able to pivot on RLE with Min A. Pt appears frustrated with this information. Educated pt on safety during ADLs to promote safe healing. Pt returned back to bed and positioned for comfort. Bed alarm activated for pt's safety due to decreased safety awareness. Patient will benefit from skilled intervention to address the above impairments.   Patient's rehabilitation potential is considered to be Fair  Factors which may influence rehabilitation potential include:   []             None noted  []             Mental ability/status  [x]             Medical condition  [x]             Home/family situation and support systems  [x]             Safety awareness  []             Pain tolerance/management  []             Other:      PLAN :  Recommendations and Planned Interventions:   [x]               Self Care Training                  [x]      Therapeutic Activities  [x]               Functional Mobility Training   []      Cognitive Retraining  [x]               Therapeutic Exercises           [x]      Endurance Activities  [x]               Balance Training                    [x]      Neuromuscular Re-Education  []               Visual/Perceptual Training     [x]      Home Safety Training  [x]               Patient Education                   [x]      Family Training/Education  []               Other (comment):    Frequency/Duration: Patient will be followed by occupational therapy 1-2 times/day, 3-5 days/week to address goals. Discharge Recommendations: Rehab as pt does not have assistance available at home (pt reports her roommate is unable to physically help her)  Further Equipment Recommendations for Discharge: bedside commode and wheelchair      SUBJECTIVE:   Patient stated I am not going to do that!  pt regarding her NWB restrictions    OBJECTIVE DATA SUMMARY:     Past Medical History:   Diagnosis Date    Acquired hypothyroidism 8/1/2016    Arthritis of right shoulder region 4/21/2016    Asthma     Bilateral lower extremity edema 7/7/2021    Chronic bilateral low back pain without sciatica 7/7/2021    Chronic diastolic congestive heart failure ([address] 3/10/2021    Chronic venous insufficiency     Diabetes (HCC)     neuropathy    Essential hypertension 7/7/2021    Gout     History of depression 1/23/2017    History of fall 7/7/2021    Hypercholesteremia 7/7/2021    Hypertension     MI (myocardial infarction) ([address]     OAB (overactive bladder) 7/7/2021    Peripheral neuropathy     Rheumatoid arthritis ([facility] [address]     Tear of right rotator cuff 5/16/2016    Thyroid pain     Urinary incontinence 7/7/2021    Vertigo      Past Surgical History:   Procedure Laterality Date    HX AMPUTATION TOE Right 2020    Great toe Dr. Casey Saliva [address]      HX CHOLECYSTECTOMY      HX GYN      TAHOophorectomy due to infection    HX HEENT      resection of memegioma in the 1980's.     HX KNEE REPLACEMENT Bilateral      Barriers to Learning/Limitations: None  Compensate with: visual, verbal, tactile, kinesthetic cues/model    Home Situation:   Home Situation  Home Environment: Apartment  # Steps to Enter: 0  One/Two Story Residence: One story  Living Alone: No  Support Systems: Child(jace),Friend/Neighbor  Patient Expects to be Discharged to[de-identified] Home  Current DME Used/Available at Home: Walker,Wheelchair  Tub or Shower Type: Tub/Shower combination  [x]  Right hand dominant   []  Left hand dominant    Cognitive/Behavioral Status:  Neurologic State: Alert  Orientation Level: Oriented to person;Oriented to place  Cognition: Follows commands; Impaired decision making;Poor safety awareness  Safety/Judgement: Awareness of environment    Skin: min bloody drainage on dressing on L  Edema: min BLE    Vision/Perceptual:       WFL  Coordination: BUE  Coordination: Generally decreased, functional  Fine Motor Skills-Upper: Left Impaired;Right Impaired    Gross Motor Skills-Upper: Left Intact; Right Intact    Balance:  Sitting: Intact  Standing: Impaired; With support  Standing - Static: Fair  Standing - Dynamic : Fair    Strength: BUE  Strength: Generally decreased, functional   Tone & Sensation: BUE  Tone: Normal  Sensation: Intact     Range of Motion: BUE  AROM: Generally decreased, functional  PROM: Generally decreased, functional   Functional Mobility and Transfers for ADLs:  Bed Mobility:     Supine to Sit: Modified independent  Sit to Supine: Modified independent     Transfers:  Sit to Stand: Contact guard assistance;Minimum assistance (Min A to std with NWB)  Stand to Sit: Contact guard assistance   Toilet Transfer : Minimum assistance (SPT on RLE with FWW)    ADL Assessment:   Feeding: Setup  Oral Facial Hygiene/Grooming: Setup  Bathing: Moderate assistance  Upper Body Dressing: Contact guard assistance  Lower Body Dressing: Minimum assistance  Toileting: Minimum assistance   ADL Intervention:   Cognitive Retraining  Safety/Judgement: Awareness of environment    Pain:  Pain level pre-treatment: not rated  Pain level post-treatment: not rated    Activity Tolerance:   Fair  Please refer to the flowsheet for vital signs taken during this treatment.   After treatment:   [] Patient left in no apparent distress sitting up in chair  [x] Patient left in no apparent distress in bed  [x] Call bell left within reach  [x] Nursing notified  [] Caregiver present  [x] Bed alarm activated    COMMUNICATION/EDUCATION:   [x] Role of Occupational Therapy in the acute care setting  [x] Home safety education was provided and the patient/caregiver indicated understanding. [x] Patient/family have participated as able in goal setting and plan of care. [] Patient/family agree to work toward stated goals and plan of care. [] Patient understands intent and goals of therapy, but is neutral about his/her participation. [] Patient is unable to participate in goal setting and plan of care. Thank you for this referral.  Prince Godinez OTR/L  Time Calculation: 18 mins    Eval Complexity: History: LOW Complexity : Brief history review ; Examination: MEDIUM Complexity : 3-5 performance deficits relating to physical, cognitive , or psychosocial skils that result in activity limitations and / or participation restrictions;    Decision Making:LOW Complexity : No comorbidities that affect functional and no verbal or physical assistance needed to complete eval tasks

## 2022-05-28 NOTE — PROGRESS NOTES
Problem: Falls - Risk of  Goal: *Absence of Falls  Description: Document Ryley Pratt Fall Risk and appropriate interventions in the flowsheet.   Outcome: Progressing Towards Goal  Note: Fall Risk Interventions:  Mobility Interventions: Utilize walker, cane, or other assistive device         Medication Interventions: Teach patient to arise slowly    Elimination Interventions: Bed/chair exit alarm    History of Falls Interventions: Bed/chair exit alarm,Door open when patient unattended,Room close to nurse's station         Problem: Patient Education: Go to Patient Education Activity  Goal: Patient/Family Education  Outcome: Progressing Towards Goal     Problem: Pain  Goal: *Control of Pain  Outcome: Progressing Towards Goal  Goal: *PALLIATIVE CARE:  Alleviation of Pain  Outcome: Progressing Towards Goal     Problem: Patient Education: Go to Patient Education Activity  Goal: Patient/Family Education  Outcome: Progressing Towards Goal     Problem: Diabetes Maintenance:Admission  Goal: Activity/Safety  Outcome: Progressing Towards Goal  Goal: Diagnostic Tests/Procedures  Outcome: Progressing Towards Goal  Goal: Nutrition  Outcome: Progressing Towards Goal  Goal: Medications  Outcome: Progressing Towards Goal  Goal: Treatments/Interventions/Procedures  Outcome: Progressing Towards Goal     Problem: Diabetes Maintenance:Ongoing  Goal: Activity/Safety  Outcome: Progressing Towards Goal  Goal: Nutrition  Outcome: Progressing Towards Goal  Goal: Medications  Outcome: Progressing Towards Goal  Goal: Treatments/Interventsions/Procedures  Outcome: Progressing Towards Goal  Goal: *Blood Glucose 80 to 180 md/dl  Outcome: Progressing Towards Goal     Problem: Diabetes Maintenance:Discharge Outcomes  Goal: *Describes follow-up/return visits to physicians  Outcome: Progressing Towards Goal  Goal: *Blood glucose at patient's target range or approaching  Outcome: Progressing Towards Goal  Goal: *Aware of nutrition guidelines  Outcome: Progressing Towards Goal  Goal: *Verbalizes information about medication  Description: Verbalizes name, dosage, time, side effects, and number of days to  continue medications. Outcome: Progressing Towards Goal  Goal: *Describes goals, rules, symptoms, and treatments  Description: Describes blood glucose goals, monitoring, sick day rules,  hypo/hyperglycemia prevention, symptoms, and treatment  Outcome: Progressing Towards Goal  Goal: *Describes available outpatient diabetes resources and support systems  Outcome: Progressing Towards Goal     Problem: Impaired Skin Integrity/Pressure Injury Treatment  Goal: *Improvement of Existing Pressure Injury  Outcome: Progressing Towards Goal  Goal: *Prevention of pressure injury  Description: Document Ravi Scale and appropriate interventions in the flowsheet. Outcome: Progressing Towards Goal  Note: Pressure Injury Interventions:  Sensory Interventions: Assess changes in LOC,Minimize linen layers         Activity Interventions: Increase time out of bed,Pressure redistribution bed/mattress(bed type),PT/OT evaluation    Mobility Interventions: Pressure redistribution bed/mattress (bed type),PT/OT evaluation    Nutrition Interventions: Document food/fluid/supplement intake                     Problem: Patient Education: Go to Patient Education Activity  Goal: Patient/Family Education  Outcome: Progressing Towards Goal     Problem: Diabetic Ulcer-Treatment  Goal: *Improvement of existing diabetic ulcer  Outcome: Progressing Towards Goal     Problem: Patient Education: Go to Patient Education Activity  Goal: Patient/Family Education  Outcome: Progressing Towards Goal     Problem: Diabetes Self-Management  Goal: *Disease process and treatment process  Description: Define diabetes and identify own type of diabetes; list 3 options for treating diabetes.   Outcome: Progressing Towards Goal  Goal: *Incorporating nutritional management into lifestyle  Description: Describe effect of type, amount and timing of food on blood glucose; list 3 methods for planning meals. Outcome: Progressing Towards Goal  Goal: *Incorporating physical activity into lifestyle  Description: State effect of exercise on blood glucose levels. Outcome: Progressing Towards Goal  Goal: *Developing strategies to promote health/change behavior  Description: Define the ABC's of diabetes; identify appropriate screenings, schedule and personal plan for screenings. Outcome: Progressing Towards Goal  Goal: *Using medications safely  Description: State effect of diabetes medications on diabetes; name diabetes medication taking, action and side effects. Outcome: Progressing Towards Goal  Goal: *Monitoring blood glucose, interpreting and using results  Description: Identify recommended blood glucose targets  and personal targets. Outcome: Progressing Towards Goal  Goal: *Prevention, detection, treatment of acute complications  Description: List symptoms of hyper- and hypoglycemia; describe how to treat low blood sugar and actions for lowering  high blood glucose level. Outcome: Progressing Towards Goal  Goal: *Prevention, detection and treatment of chronic complications  Description: Define the natural course of diabetes and describe the relationship of blood glucose levels to long term complications of diabetes.   Outcome: Progressing Towards Goal  Goal: *Developing strategies to address psychosocial issues  Description: Describe feelings about living with diabetes; identify support needed and support network  Outcome: Progressing Towards Goal  Goal: *Insulin pump training  Outcome: Progressing Towards Goal  Goal: *Sick day guidelines  Outcome: Progressing Towards Goal  Goal: *Patient Specific Goal (EDIT GOAL, INSERT TEXT)  Outcome: Progressing Towards Goal     Problem: Patient Education: Go to Patient Education Activity  Goal: Patient/Family Education  Outcome: Progressing Towards Goal     Problem: Nutrition Deficit  Goal: *Optimize nutritional status  Outcome: Progressing Towards Goal

## 2022-05-28 NOTE — PROGRESS NOTES
Physician Progress Note      PATIENT:               Michael Jones  CSN #:                  [id number]  :                       1935  ADMIT DATE:       2022 12:48 PM  [address] [place] DATE:  RESPONDING  PROVIDER #:        Julisa Jane MD          QUERY TEXT:    [facility]    Pt admitted with infected diabetic foot ulcer. Pt noted to have WBC 13.1 and HR >90. If possible, please document in the progress notes and discharge summary if you are evaluating and /or treating any of the following: The medical record reflects the following:  Risk Factors: DM, Infected foot ulcer, Poor controlled BG, 81 y/o    Clinical Indicators:  WBC 13.1 -> 9.2  HR >90 up to 110    Treatment: IV ABx, ID consult, I&D scheduled, serial CBCs, Lactic acid, blood cultures ordered. Thank you,  Nestor GALLEGOSN, RN, CRCR  Please contact me for any questions or concerns regarding this query at [email or web address]  Options provided:  -- Sepsis, present on admission  -- Sepsis, present on admission now resolved  -- Infected diabetic foot ulcer without Sepsis  -- Other - I will add my own diagnosis  -- Disagree - Not applicable / Not valid  -- Disagree - Clinically unable to determine / Unknown  -- Refer to Clinical Documentation Reviewer    PROVIDER RESPONSE TEXT:    This patient was treated for sepsis this admission which was present on admission and is now currently resolved.     Query created by: Debbie Arroyo on 2022 2:50 PM      Electronically signed by:  Julisa Jane MD 2022 12:59 PM

## 2022-05-28 NOTE — PROGRESS NOTES
Problem: Falls - Risk of  Goal: *Absence of Falls  Description: Document [place] Flow Fall Risk and appropriate interventions in the flowsheet.   Outcome: Progressing Towards Goal  Note: Fall Risk Interventions:  Mobility Interventions: Patient to call before getting OOB,Bed/chair exit alarm    Medication Interventions: Bed/chair exit alarm,Patient to call before getting OOB,Teach patient to arise slowly    Elimination Interventions: Call light in reach,Bed/chair exit alarm,Patient to call for help with toileting needs    History of Falls Interventions: Door open when patient unattended,Bed/chair exit alarm,Room close to nurse's station         Problem: Impaired Skin Integrity/Pressure Injury Treatment  Goal: *Improvement of Existing Pressure Injury  Outcome: Progressing Towards Goal

## 2022-05-28 NOTE — OP NOTES
[address] [place] [address]   OPERATIVE REPORT    Name:  Christianne Mcdowell  MR#:   [id number]  :  1935  ACCOUNT #:  [de-identified]  DATE OF SERVICE:  2022    PREOPERATIVE DIAGNOSES:  Abscess, possible osteomyelitis, left great toe joint. POSTOPERATIVE DIAGNOSES:  Abscess, possible osteomyelitis, left great toe joint, without evidence of osteomyelitis. PROCEDURE PERFORMED:  Incision and drainage of skin and subcutaneous abscess, left foot. SURGEON:  Bishop Maher DPM.    ASSISTANT:  shauna    ANESTHESIA:  MAC.    COMPLICATIONS:  none. SPECIMENS REMOVED:  tissue. IMPLANTS:  0.    ESTIMATED BLOOD LOSS:  0.    DESCRIPTION OF PROCEDURE:  On 2022, the patient was placed on the operating room table in the supine position. After adequate induction of MAC anesthesia, the left lower extremity was prepped and draped in the usual sterile fashion. Attention was directed to the left great toe joint. There was necrotic and calloused tissue about 2-3 cm in diameter  the first metatarsal head with fluctuance and local cellulitis. This tissue was excised and there was exuberant amount of pus, which was cultured and sent. Wound was debrided and subcutaneous tissue expressing off the pus. There was no deeper penetration or sinus tract noted. It was thoroughly irrigated with antibiotic solution. There was good perfusion. Small blood vessels were bovied as necessary. It was packed open with a compressive dressing. The patient tolerated the procedure and anesthesia well with vital signs stable throughout. The patient was transported to the recovery room in stable condition.         Soha Quach DPM      [id number]  D:  2022 15:30  T:  2022 3:49  JOB #:  [id number]  CC:  Bishop Maher DPM

## 2022-05-29 ENCOUNTER — HOME HEALTH ADMISSION (OUTPATIENT)
Dept: HOME HEALTH SERVICES | Facility: HOME HEALTH | Age: 87
End: 2022-05-29
Payer: MEDICARE

## 2022-05-29 VITALS
OXYGEN SATURATION: 98 % | HEART RATE: 89 BPM | RESPIRATION RATE: 20 BRPM | DIASTOLIC BLOOD PRESSURE: 85 MMHG | SYSTOLIC BLOOD PRESSURE: 145 MMHG | WEIGHT: 234.6 LBS | HEIGHT: 65 IN | BODY MASS INDEX: 39.09 KG/M2 | TEMPERATURE: 97.7 F

## 2022-05-29 LAB
ANION GAP SERPL CALC-SCNC: 5 MMOL/L (ref 3–18)
BACTERIA SPEC CULT: NORMAL
BASOPHILS # BLD: 0.1 K/UL (ref 0–0.1)
BASOPHILS NFR BLD: 1 % (ref 0–2)
BUN SERPL-MCNC: 19 MG/DL (ref 7–18)
BUN/CREAT SERPL: 22 (ref 12–20)
CALCIUM SERPL-MCNC: 8.8 MG/DL (ref 8.5–10.1)
CHLORIDE SERPL-SCNC: 110 MMOL/L (ref 100–111)
CO2 SERPL-SCNC: 25 MMOL/L (ref 21–32)
CREAT SERPL-MCNC: 0.87 MG/DL (ref 0.6–1.3)
DIFFERENTIAL METHOD BLD: ABNORMAL
EOSINOPHIL # BLD: 0.4 K/UL (ref 0–0.4)
EOSINOPHIL NFR BLD: 5 % (ref 0–5)
ERYTHROCYTE [DISTWIDTH] IN BLOOD BY AUTOMATED COUNT: 13.1 % (ref 11.6–14.5)
GLUCOSE BLD STRIP.AUTO-MCNC: 196 MG/DL (ref 70–110)
GLUCOSE BLD STRIP.AUTO-MCNC: 330 MG/DL (ref 70–110)
GLUCOSE SERPL-MCNC: 211 MG/DL (ref 74–99)
HCT VFR BLD AUTO: 38.2 % (ref 35–45)
HGB BLD-MCNC: 12.1 G/DL (ref 12–16)
IMM GRANULOCYTES # BLD AUTO: 0.1 K/UL (ref 0–0.04)
IMM GRANULOCYTES NFR BLD AUTO: 1 % (ref 0–0.5)
LYMPHOCYTES # BLD: 2.7 K/UL (ref 0.9–3.6)
LYMPHOCYTES NFR BLD: 32 % (ref 21–52)
MCH RBC QN AUTO: 29 PG (ref 24–34)
MCHC RBC AUTO-ENTMCNC: 31.7 G/DL (ref 31–37)
MCV RBC AUTO: 91.6 FL (ref 78–100)
MONOCYTES # BLD: 0.8 K/UL (ref 0.05–1.2)
MONOCYTES NFR BLD: 10 % (ref 3–10)
NEUTS SEG # BLD: 4.2 K/UL (ref 1.8–8)
NEUTS SEG NFR BLD: 51 % (ref 40–73)
NRBC # BLD: 0 K/UL (ref 0–0.01)
NRBC BLD-RTO: 0 PER 100 WBC
PLATELET # BLD AUTO: 182 K/UL (ref 135–420)
PMV BLD AUTO: 12.4 FL (ref 9.2–11.8)
POTASSIUM SERPL-SCNC: 4.3 MMOL/L (ref 3.5–5.5)
RBC # BLD AUTO: 4.17 M/UL (ref 4.2–5.3)
SERVICE CMNT-IMP: NORMAL
SODIUM SERPL-SCNC: 140 MMOL/L (ref 136–145)
VANCOMYCIN SERPL-MCNC: 20 UG/ML (ref 5–40)
WBC # BLD AUTO: 8.3 K/UL (ref 4.6–13.2)

## 2022-05-29 PROCEDURE — 80048 BASIC METABOLIC PNL TOTAL CA: CPT

## 2022-05-29 PROCEDURE — 82962 GLUCOSE BLOOD TEST: CPT

## 2022-05-29 PROCEDURE — 97162 PT EVAL MOD COMPLEX 30 MIN: CPT

## 2022-05-29 PROCEDURE — 74011636637 HC RX REV CODE- 636/637: Performed by: PODIATRIST

## 2022-05-29 PROCEDURE — 36415 COLL VENOUS BLD VENIPUNCTURE: CPT

## 2022-05-29 PROCEDURE — 74011636637 HC RX REV CODE- 636/637

## 2022-05-29 PROCEDURE — 74011250637 HC RX REV CODE- 250/637: Performed by: PODIATRIST

## 2022-05-29 PROCEDURE — 85025 COMPLETE CBC W/AUTO DIFF WBC: CPT

## 2022-05-29 PROCEDURE — 74011000258 HC RX REV CODE- 258

## 2022-05-29 PROCEDURE — 74011250637 HC RX REV CODE- 250/637

## 2022-05-29 PROCEDURE — 2709999900 HC NON-CHARGEABLE SUPPLY

## 2022-05-29 PROCEDURE — 74011250636 HC RX REV CODE- 250/636

## 2022-05-29 PROCEDURE — 80202 ASSAY OF VANCOMYCIN: CPT

## 2022-05-29 RX ORDER — DOXYCYCLINE 100 MG/1
100 TABLET ORAL 2 TIMES DAILY
Qty: 32 TABLET | Refills: 0 | Status: SHIPPED | OUTPATIENT
Start: 2022-05-29 | End: 2022-06-14

## 2022-05-29 RX ORDER — INSULIN ASPART 100 [IU]/ML
5 INJECTION, SOLUTION INTRAVENOUS; SUBCUTANEOUS
Qty: 1 PEN | Refills: 0 | Status: SHIPPED | OUTPATIENT
Start: 2022-05-29

## 2022-05-29 RX ORDER — INSULIN GLARGINE 100 [IU]/ML
15 INJECTION, SOLUTION SUBCUTANEOUS
Qty: 1 PEN | Refills: 0 | Status: SHIPPED | OUTPATIENT
Start: 2022-05-29 | End: 2022-08-13

## 2022-05-29 RX ADMIN — LEVOTHYROXINE SODIUM 150 MCG: 150 TABLET ORAL at 05:37

## 2022-05-29 RX ADMIN — AMLODIPINE BESYLATE 10 MG: 10 TABLET ORAL at 10:40

## 2022-05-29 RX ADMIN — GABAPENTIN 300 MG: 300 CAPSULE ORAL at 10:40

## 2022-05-29 RX ADMIN — Medication 12 UNITS: at 10:43

## 2022-05-29 RX ADMIN — HEPARIN SODIUM 5000 UNITS: 5000 INJECTION INTRAVENOUS; SUBCUTANEOUS at 05:37

## 2022-05-29 RX ADMIN — MORPHINE SULFATE 15 MG: 15 TABLET ORAL at 10:40

## 2022-05-29 RX ADMIN — ATORVASTATIN CALCIUM 80 MG: 40 TABLET, FILM COATED ORAL at 10:40

## 2022-05-29 RX ADMIN — Medication 15 UNITS: at 10:43

## 2022-05-29 RX ADMIN — FAMOTIDINE 20 MG: 20 TABLET ORAL at 10:40

## 2022-05-29 RX ADMIN — CARVEDILOL 12.5 MG: 12.5 TABLET, FILM COATED ORAL at 10:40

## 2022-05-29 NOTE — PROGRESS NOTES
Discharge instruction was explained to patient and patient's significant other. Patient was sent home via private car.  [facility]

## 2022-05-29 NOTE — PROGRESS NOTES
Daily Progress Note  [facility]       Patient: Jayy Timmons MRN: [id number]  CSN: [id number]    YOB: 1935  Age: 82 y.o. Sex: female    DOA: 5/26/2022 LOS:  LOS: 3 days                    Assessment & Plan:   82 y. o. female with PMH T2DM c/b peripheral neuropathy, s/p R great toe amputation, HTN, HLD, CHF, gout, hypothyroidism, chronic pain now presenting with infection of left diabetic foot ulcer.     Diabetic (L) Foot Ulcer   in setting of T2DM with chronic neuropathy and hx R Great Toe amputation  I&D (5/27)-Post Op day 2 - Infection limited to superficial foot, not found in joint. Arterial Duplexes (5/27) Lower Extrem b/l normal  Home Meds: Lantus 10u every day in AM, metformin 500mg 1 tablet daily; gabapentin 400mg TID. - admit to medicine  - daily CBC, BMP  - ID consulted - recommend continuing vanc + adding cefepime.  - F/U wound cultures & ID recs for PO abx to go home on  - Lantus 10u AM + Correctional -> switch to Lantus 15u given patient required 12u correctional yesterday    - Cont gabapentin, reduce to 300mg TID given AQUILES  - Anticipate home today following ABX decision     AQUILES - Resolved  Cr on admission 1.5, baseline seems to be 1-1.2 -> now 1.12 > 1.02  - Hold home allopurinol.   - Reduce gabapentin from 400 TID to 300 TID.   - Can consider restarting home doses today or at discharge.     HTN, HLD, HFpEF  Home Meds: norvasc 10mg daily, lisinopril 40mg daily, coreg 12.5mg BID, bumex 2mg 1 - 2x daily, Lipitor 80mg daily.    Last ECHO in system 8/2020: EF 60-65%  - Cont lisinopril   - Hold bumex for AQUILES, consider restarting  - May need repeat ECHO or follow up with patient's Cardiology records     Hypothyroidism  TSH 1.71 Dec 2021  - Continue home synthroid 150mcg      Gout  Hold allopurinol      Chronic Pain  Continue morphine IR 15mg BID      Global Care:  - VS per unit routine  - supplemental O2 for sats < 92%  - incentive spirometer  - PT/OT/CM     Diet  Diabetic DVT Prophylaxis  SQH   GI Prophylaxis  Pepcid   Code status  DNR/DNI   Disposition Margoj Pauline Theodore  [phone]        Subjective:      Acute events: None. Wound cx growing light staph. Blood glucose remains elevated, currently at lantus 15U. Pt is seen all dressed up with purse ready to go. States she is in no pain. Asking if the gauze on surgical site can be changed before discharge. Also inquiring about home antibiotics. Review of Systems   Constitutional: Negative for chills and fever. Respiratory: Negative for shortness of breath. Cardiovascular: Negative for chest pain and leg swelling. Gastrointestinal: Negative for abdominal pain. Objective:     Patient Vitals for the past 24 hrs:   Temp Pulse Resp BP SpO2   05/29/22 0816 97.7 °F (36.5 °C) 89 20 (!) 145/85 98 %   05/29/22 0502 97.6 °F (36.4 °C) 72 20 (!) 156/79 96 %   05/29/22 0050 97.3 °F (36.3 °C) 65 20 (!) 144/67 95 %   05/28/22 2052 98.2 °F (36.8 °C) 71 20 134/81 95 %   05/28/22 1653 98.1 °F (36.7 °C) 91 20 (!) 142/99 96 %   05/28/22 1144 98.4 °F (36.9 °C) 94 18 (!) 170/82 95 %         Intake/Output Summary (Last 24 hours) at 5/29/2022 0930  Last data filed at 5/29/2022 0248  Gross per 24 hour   Intake 480 ml   Output --   Net 480 ml       Physical Exam:   General: well-appearing, NAD  HEENT: Conjunctiva pink, sclera anicteric. EOMI   CV:  RRR, no M/G/R  RESP: Unlabored breathing. Lungs CTAB, no wheezes, rales or rhonchi appreciated. Equal expansion bilaterally. ABD:  Soft, nontender, nondistended. MS:  L foot wrapped in guaze with foot in boot, Toes exposed, able to wiggle. Neuro:  5/5 strength bilateral upper extremities and lower extremities. A+Ox3. Ext:  No edema. 2+ radial and dp pulses bilaterally. Skin: Warm & dry. No rashes, lesions, or ulcers. Good turgor. Psych: Normal mood and affect.      Telemetry Yes   Oxygen NONE     Lab/Data Reviewed:  Recent Results (from the past 24 hour(s)) GLUCOSE, POC    Collection Time: 05/28/22 11:18 AM   Result Value Ref Range    Glucose (POC) 225 (H) 70 - 110 mg/dL   GLUCOSE, POC    Collection Time: 05/28/22  4:51 PM   Result Value Ref Range    Glucose (POC) 150 (H) 70 - 110 mg/dL   GLUCOSE, POC    Collection Time: 05/28/22 10:35 PM   Result Value Ref Range    Glucose (POC) 170 (H) 70 - 110 mg/dL   CBC WITH AUTOMATED DIFF    Collection Time: 05/29/22  1:15 AM   Result Value Ref Range    WBC 8.3 4.6 - 13.2 K/uL    RBC 4.17 (L) 4.20 - 5.30 M/uL    HGB 12.1 12.0 - 16.0 g/dL    HCT 38.2 35.0 - 45.0 %    MCV 91.6 78.0 - 100.0 FL    MCH 29.0 24.0 - 34.0 PG    MCHC 31.7 31.0 - 37.0 g/dL    RDW 13.1 11.6 - 14.5 %    PLATELET 755 051 - 062 K/uL    MPV 12.4 (H) 9.2 - 11.8 FL    NRBC 0.0 0  WBC    ABSOLUTE NRBC 0.00 0.00 - 0.01 K/uL    NEUTROPHILS 51 40 - 73 %    LYMPHOCYTES 32 21 - 52 %    MONOCYTES 10 3 - 10 %    EOSINOPHILS 5 0 - 5 %    BASOPHILS 1 0 - 2 %    IMMATURE GRANULOCYTES 1 (H) 0.0 - 0.5 %    ABS. NEUTROPHILS 4.2 1.8 - 8.0 K/UL    ABS. LYMPHOCYTES 2.7 0.9 - 3.6 K/UL    ABS. MONOCYTES 0.8 0.05 - 1.2 K/UL    ABS. EOSINOPHILS 0.4 0.0 - 0.4 K/UL    ABS. BASOPHILS 0.1 0.0 - 0.1 K/UL    ABS. IMM.  GRANS. 0.1 (H) 0.00 - 0.04 K/UL    DF AUTOMATED     METABOLIC PANEL, BASIC    Collection Time: 05/29/22  1:15 AM   Result Value Ref Range    Sodium 140 136 - 145 mmol/L    Potassium 4.3 3.5 - 5.5 mmol/L    Chloride 110 100 - 111 mmol/L    CO2 25 21 - 32 mmol/L    Anion gap 5 3.0 - 18 mmol/L    Glucose 211 (H) 74 - 99 mg/dL    BUN 19 (H) 7.0 - 18 MG/DL    Creatinine 0.87 0.6 - 1.3 MG/DL    BUN/Creatinine ratio 22 (H) 12 - 20      GFR est AA >60 >60 ml/min/1.73m2    GFR est non-AA >60 >60 ml/min/1.73m2    Calcium 8.8 8.5 - 10.1 MG/DL   VANCOMYCIN, RANDOM    Collection Time: 05/29/22  1:15 AM   Result Value Ref Range    Vancomycin, random 20.0 5.0 - 40.0 UG/ML   GLUCOSE, POC    Collection Time: 05/29/22  8:13 AM   Result Value Ref Range    Glucose (POC) 196 (H) 70 - 110 mg/dL     Jacob Quintanilla MD , PGY-1   [facility] [address]   May 29, 2022, 9:05 AM

## 2022-05-29 NOTE — PROGRESS NOTES
[facility] Pharmacokinetic Monitoring Service - Vancomycin    Consulting Provider: Dr. Waldo Guillaume   Indication: Skin and Soft Tissue Infection  Target Concentration: Goal AUC/TRINIDAD 400-600 mg*hr/L  Day of Therapy: 4  Additional Antimicrobials: Cefepime    Pertinent Laboratory Values:   Temp: 97.6 °F (36.4 °C)  Weight: 106.4 kg (234 lb 9.6 oz)  Recent Labs     05/29/22  0115 05/28/22  0058   CREA 0.87 1.02   BUN 19* 17   WBC 8.3 8.5       Estimated Creatinine Clearance: 57.3 mL/min (based on SCr of 0.87 mg/dL). Pertinent Cultures:  Culture Date Source Results   5/26 blood ngtd   5/27 wound Staph   MRSA Nasal Swab: N/A.  Non-respiratory infection    Assessment:  Date/Time Current Dose Concentration Timing of Concentration (h) AUC   5/28 1250mg q24h - - -   5/29 1250mg q24h 20mg/ml 1.5h post dose 393   Note: Serum concentrations collected for AUC dosing may appear elevated if collected in close proximity to the dose administered, this is not necessarily an indication of toxicity    Plan:  Current dose is subtherapeutic  Adjust to 750mg q12h, projected AUCss/T = 462/15.6  Renal labs as indicated   Vancomycin concentration ordered for 0600, 5/31/22  Pharmacy will continue to monitor patient and adjust therapy as indicated    Thank you for the consult,  BELINDA Grimes TOM [facility]  5/29/2022

## 2022-05-29 NOTE — ROUTINE PROCESS
Bedside and Verbal shift change report given to SHA Gilman (oncoming nurse) by Wood Moyer RN   (offgoing nurse). Report included the following information SBAR, Kardex, Intake/Output, MAR and Recent Results.

## 2022-05-29 NOTE — PROGRESS NOTES
Discharge order noted for today. Pt has been accepted to [facility] agency. Spoke with patient and she is agreeable to the transition plan today. Transport has been arranged through patient's friend. Patient's  home health  orders have been forwarded to [address] [place] [address] home health  agency via [address]  Discharge information has been documented on the AVS.         Ravin Blevins RN  Case Management [phone]

## 2022-05-29 NOTE — DISCHARGE SUMMARY
Discharge Summary  [facility]      Patient: Philly Farley MRN: [id number]  CSN: [id number]    YOB: 1935  Age: 82 y.o. Sex: female      Admission Date: 5/26/2022 Discharge Date: 5/29/22   Attending: No att. providers found PCP: Trista Islas MD     ===================================================================    Reason for Admission:   Septic arthritis of foot [facility]) [M00.9]  Diabetic foot ulcer ([facility] [G37.323, L97.509]  [facility] [M00.9]    Discharge Diagnoses:   Diabetic Ulcer of Foot  Acute Kidney Injruy  Poorly Controlled Diabetes Mellitus    Important notes to PCP/ follow-up studies and evaluations   Diabetic med regimen review, poorly controlled  Consider resuming bumex and allopurinol (held for AQUILES)    Pending labs and studies:  none    Operative Procedures: I&D Left foot infection    Discharge Medications:     Discharge Medication List as of 5/29/2022 12:39 PM      START taking these medications    Details   doxycycline (ADOXA) 100 mg tablet Take 1 Tablet by mouth two (2) times a day for 16 days. , Normal, Disp-32 Tablet, R-0         CONTINUE these medications which have CHANGED    Details   Lantus Solostar U-100 Insulin 100 unit/mL (3 mL) inpn 15 Units by SubCUTAneous route nightly., Normal, Disp-1 Pen, R-0, GRUPO      insulin aspart U-100 (NovoLOG Flexpen U-100 Insulin) 100 unit/mL (3 mL) inpn 5 Units by SubCUTAneous route Before breakfast, lunch, and dinner., Normal, Disp-1 Pen, R-0         CONTINUE these medications which have NOT CHANGED    Details   levothyroxine (SYNTHROID) 150 mcg tablet Take 150 mcg by mouth daily. , Historical Med      morphine IR (MS IR) 15 mg tablet Take 15 mg by mouth two (2) times a day., Historical Med      metFORMIN (GLUCOPHAGE) 500 mg tablet Take 500 mg by mouth daily. , Historical Med      hydrOXYzine HCL (ATARAX) 10 mg tablet Take 1 Tablet by mouth two (2) times daily as needed., Historical Med      allopurinoL (ZYLOPRIM) 100 mg tablet Take 100 mg by mouth daily. , Historical Med      lisinopriL (PRINIVIL, ZESTRIL) 40 mg tablet Take 1 tablet by mouth once daily, Normal, Disp-90 Tablet, R-0      sertraline (ZOLOFT) 50 mg tablet Take 1 Tablet by mouth daily. , Normal, Disp-90 Tablet, R-3      gabapentin (NEURONTIN) 400 mg capsule Take 1 cap in morning, 1 cap at 2pm, 2 caps at bedtime, Normal, Disp-360 Capsule, R-0      carvediloL (COREG) 12.5 mg tablet Take 1 Tablet by mouth two (2) times daily (with meals). , Normal, Disp-180 Tablet, R-0      bumetanide (BUMEX) 2 mg tablet Take 0.5 Tablets by mouth two (2) times daily as needed (swelling). , Normal, Disp-180 Tablet, R-1      amLODIPine (NORVASC) 10 mg tablet Take 1 Tablet by mouth daily. , Normal, Disp-90 Tablet, R-0      atorvastatin (LIPITOR) 80 mg tablet Take 1 Tablet by mouth daily. , Normal, Disp-90 Tablet, R-3      Blood-Glucose Meter (FREESTYLE LITE METER) monitoring kit Test twice blood glucose daily; ICD-10 E11.9; Quantity 1, Normal, Disp-1 Kit, R-0      insulin syringe,safetyneedle 1 mL 31 gauge x 5/16\" syrg 1 Each by Does Not Apply route daily. , NormalDx e11.9Disp-300 Each, R-3      glucose blood VI test strips (FREESTYLE TEST) strip Use to check blood glucose twice daily DX:E11.9, Normal, Disp-300 Strip, R-3      acetaminophen (TYLENOL) 500 mg tablet Take 1 Tab by mouth every six (6) hours as needed for Pain., Print, Disp-270 Tab, R-3         STOP taking these medications       Jardiance 10 mg tablet Comments:   Reason for Stopping:         glimepiride (AMARYL) 2 mg tablet Comments:   Reason for Stopping:               Disposition: Home    Consultants:    [address] Course (including pertinent history and physical findings)  Jayy Timmons is a 82 y.o. female with PMH T2DM c/b peripheral neuropathy, s/p R great toe amputation, HTN, HLD, HFpEF, gout, hypothyroidism, chronic pain presented on 5/26/22 with infection of left diabetic foot ulcer. She was seen by podiatry outpatient who recommended hospitalization for I&D.      I&D done on 5/27 with Podiatry, without complications and showed no joint involvement. Wound culture obtained and showed light staph epidermidis. Patient initially treated with vanc & cefepime, transitioned to doxy at discharge with plans to continue course at home until 6/14. Additionally discharged on 15u lantus, novolog 5u TID, and metformin. Course complicated by AQUILES, resolved with fluid resuscitation and renally dosed medications. Management of patient's other health issues during this hospitalization are described in more detail below:    AQUILES   Cr on admission 1.5, baseline seems to be 1-1.2 -> now 1.12 > 1.02  - Hold home allopurinol.   - Reduce gabapentin from 400 TID to 300 TID.   - Can consider restarting home doses today or at discharge.     HTN, HLD, HFpEF  Home Meds: norvasc 10mg daily, lisinopril 40mg daily, coreg 12.5mg BID, bumex 2mg 1 - 2x daily, Lipitor 80mg daily. Last ECHO in system 8/2020: EF 60-65%  - Cont lisinopril   - Hold bumex for AQUILES, consider restarting  - May need repeat ECHO or follow up with patient's Cardiology records     Hypothyroidism  TSH 1.71 Dec 2021  - Continue home synthroid 150mcg      Gout  Hold allopurinol      Chronic Pain  Continue morphine IR 15mg BID     CURRENT ADMISSION IMAGING RESULTS   XR FOOT LT MIN 3 V    Result Date: 5/26/2022  1. No acute fracture or dislocation. 2. 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 MRI FOOT LT W WO CONT    Result Date: 5/27/2022  1. Plantar great toe soft tissue ulceration with regional soft tissue inflammation and forefoot cellulitis. No abscess. No associated osteomyelitis. 2. Polyarticular moderate to severe arthritic changes in the midfoot and first MTP joint.  Preliminary report provided by Dr. Manasa Miranda        Cardiology Procedures/Testing:  MODALITY RESULTS   EKG Results for orders placed or performed during the hospital encounter of 05/26/22   EKG, 12 LEAD, INITIAL   Result Value Ref Range    Ventricular Rate 82 BPM    Atrial Rate 82 BPM    P-R Interval 186 ms    QRS Duration 88 ms    Q-T Interval 386 ms    QTC Calculation (Bezet) 450 ms    Calculated P Axis 75 degrees    Calculated R Axis 24 degrees    Calculated T Axis 68 degrees    Diagnosis       Normal sinus rhythm  Normal ECG  When compared with ECG of 05-NOV-2009 07:20,  No significant change was found  Confirmed by Tonya Galarza ([id number]) on 5/27/2022 3:13:32 PM         ECHO 08/21/20    ECHO ADULT COMPLETE 08/21/2020 8/21/2020    Interpretation Summary  · LV: Estimated LVEF is 60 - 65%. Normal cavity size and systolic function (ejection fraction normal). Moderate concentric hypertrophy. Wall motion: normal. Moderate (grade 2) left ventricular diastolic dysfunction. · LA: Mildly dilated left atrium. Left Atrium volume index is 37.31 mL/m2. · RV: Mildly dilated right ventricle. · RA: Mildly dilated right atrium. · AV: Mild aortic valve regurgitation is present. · MV: Mitral valve thickening. Mild mitral valve regurgitation is present. · PA: Pulmonary arterial systolic pressure is 36 mmHg. Signed by: Dustin Ortiz MD on 8/21/2020 12:40 PM     IR No results found for this or any previous visit.      CATH      Special Testing/Procedures:  MODALITY RESULTS   MICRO All Micro Results     Procedure Component Value Units Date/Time    CULTURE, [employer] STAIN [[id number]]  (Abnormal)  (Susceptibility) Collected: 05/27/22 1503    Order Status: Completed Specimen: Foot Updated: 05/30/22 1100     Special Requests: LEFT FOOT        GRAM STAIN RARE WBCS SEEN         NO ORGANISMS SEEN        Culture result:       LIGHT STAPHYLOCOCCUS EPIDERMIDIS          CULTURE, BLOOD [[id number]] Collected: 05/26/22 1245    Order Status: Completed Specimen: Blood Updated: 05/30/22 0602     Special Requests: NO SPECIAL REQUESTS        Culture result: NO GROWTH 4 DAYS       CULTURE, BLOOD [[id number]] Collected: 05/26/22 1300    Order Status: Completed Specimen: Blood Updated: 05/30/22 0602     Special Requests: NO SPECIAL REQUESTS        Culture result: NO GROWTH 4 DAYS       CULTURE, ANAEROBIC [[id number]] Collected: 05/27/22 1503    Order Status: Completed Specimen: Foot Updated: 05/29/22 1253     Special Requests: LEFT FOOT        Culture result: NO ANAEROBES ISOLATED            ABG No results found for: PH, PHI, PCO2, PCO2I, PO2, PO2I, HCO3, HCO3I, FIO2, FIO2I   UA Results for orders placed or performed in visit on 10/21/21   AMB POC URINALYSIS DIP STICK AUTO W/O MICRO     Status: None   Result Value Ref Range Status    Color (UA POC) Yellow  Final    Clarity (UA POC) Clear  Final    Glucose (UA POC) Negative Negative Final    Bilirubin (UA POC) Negative Negative Final    Ketones (UA POC) Negative Negative Final    Specific gravity (UA POC) 1.030 1.001 - 1.035 Final    Blood (UA POC) Negative Negative Final    pH (UA POC) 5.5 4.6 - 8.0 Final    Protein (UA POC) 1+ Negative Final    Urobilinogen (UA POC) 0.2 mg/dL 0.2 - 1 Final    Nitrites (UA POC) Negative Negative Final    Leukocyte esterase (UA POC) Negative Negative Final        Laboratory Results:  LABORATORY RESULTS   HEMATOLOGY Lab Results   Component Value Date/Time    WBC 8.3 05/29/2022 01:15 AM    HGB 12.1 05/29/2022 01:15 AM    HCT 38.2 05/29/2022 01:15 AM    PLATELET 560 31/28/9391 01:15 AM    MCV 91.6 05/29/2022 01:15 AM       CHEMISTRIES Lab Results   Component Value Date/Time    Sodium 140 05/29/2022 01:15 AM    Potassium 4.3 05/29/2022 01:15 AM    Chloride 110 05/29/2022 01:15 AM    CO2 25 05/29/2022 01:15 AM    Anion gap 5 05/29/2022 01:15 AM    Glucose 211 (H) 05/29/2022 01:15 AM    BUN 19 (H) 05/29/2022 01:15 AM    Creatinine 0.87 05/29/2022 01:15 AM    BUN/Creatinine ratio 22 (H) 05/29/2022 01:15 AM    GFR est AA >60 05/29/2022 01:15 AM    GFR est non-AA >60 05/29/2022 01:15 AM    Calcium 8.8 05/29/2022 01:15 AM HEPATIC FUNCTION Lab Results   Component Value Date/Time    Albumin 3.8 05/26/2022 12:45 PM    Bilirubin, total 0.5 05/26/2022 12:45 PM    Protein, total 8.4 (H) 05/26/2022 12:45 PM    Globulin 4.6 (H) 05/26/2022 12:45 PM    A-G Ratio 0.8 05/26/2022 12:45 PM    ALT (SGPT) 12 (L) 05/26/2022 12:45 PM    Alk. phosphatase 67 05/26/2022 12:45 PM       LACTIC ACID No results found for: [facility]   CARDIAC PANEL Lab Results   Component Value Date/Time    CK 89 04/18/2018 03:48 PM    CK - MB 2.2 04/18/2018 03:48 PM    CK-MB Index 2.5 04/18/2018 03:48 PM    Troponin-I, QT <0.02 04/18/2018 03:48 PM      NT-proBNP Lab Results   Component Value Date/Time    NT pro- 04/18/2018 03:48 PM      THYROID Lab Results   Component Value Date/Time    TSH 0.31 (L) 11/09/2021 12:24 PM    T4, Free 1.3 11/09/2021 12:24 PM        Functional status and cognitive function:    Ambulates with assistance  Status: alert, cooperative, NAD  Condition: STABLE    Physical exam on day of discharge:    General:  AAOx3, NAD   HEENT: Conjunctiva pink, sclera anicteric. EOMI. No other gross abnormalities present. CV:  RRR. RESP:  Unlabored breathing. Lungs CTAB. Equal expansion bilaterally. ABD:  Soft, nontender, nondistended. MS:  No joint deformity or instability. No atrophy. Neuro:  CN II-XII grossly intact. No focal deficits. Ext:  B/l moderate LE edema. Tenderness to touch/palpation left lower extremity starting mid lower leg (neuropathic pain). B/l DP pulses diminished, L worst than right. L foot with ulcer s/p I&D on dorsal aspect overlaying MCP, bandaged c/d/i. Skin:  No rashes, lesions, or ulcers. Good turgor. Psych: Normal mood and affect.     Code status and advanced care plan: DNR    [place] [address] [place] [address] [phone]     Patient Education:  Patient was educated on the following topics prior to discharge: diabetic medication regimen, antibiotics, diabetic foot care    RISK CALCULATORS:  SCORE RESULT   READMISSION RISK SCORE Low Risk            5 Total Score    5 Pt. Coverage (Medicare=5 , Medicaid, or Self-Pay=4)        Criteria that do not apply:    Has Seen PCP in Last 6 Months (Yes=3, No=0)    . Living with Significant Other. Assisted Living. LTAC. SNF.  or   Rehab    Patient Length of Stay (>5 days = 3)    IP Visits Last 12 Months (1-3=4, 4=9, >4=11)    Charlson Comorbidity Score (Age + Comorbid Conditions)         Follow-up:   Follow-up Information     Follow up With Specialties Details Why Contact Info    Tonya Hong MD Family Medicine   [address]  [phone]      [facility]  Go on 5/31/2022 At 4pm at [address] with [address] [place] [address]    [address]  Your preferred agency, Chosen to continue managing healthcare needs [place] [address]  [address] [facility]  [phone]          =================================================================    Enedina Lund MD, PGY-1   [facility]   May 29, 2022, 12:07 PM

## 2022-05-29 NOTE — PROGRESS NOTES
LUL spoke with Myra with [address], they can accept patient. LUL put patient in the queue for [address].            Shanthi Swartz, RN  Case Management [phone]

## 2022-05-29 NOTE — PROGRESS NOTES
Seen at chairside awake and alert. Dressing soiled  Inspected wound. Granular and no further pus or redness. No further pain. Stable for discharge. Home health oders and follow up 7-10 days.

## 2022-05-29 NOTE — PROGRESS NOTES
Problem: Mobility Impaired (Adult and Pediatric)  Goal: *Acute Goals and Plan of Care (Insert Text)  Description: Physical Therapy Goals  Initiated 5/29/2022 and to be accomplished within 7 day(s)  1. Patient will move from supine to sit and sit to supine  in bed with modified independence. 2.  Patient will transfer from bed to chair and chair to bed with modified independence using the least restrictive device. 3.  Patient will perform sit to stand with modified independence. 4.  Patient will ambulate with modified independence for 25 feet with the least restrictive device. 5.  Patient will comply with LLE WB precautions 90% of mobility. PLOF: Lives with \"partner\". One story apartment. Has ww, canes, and transport chair. Outcome: Progressing Towards Goal   PHYSICAL THERAPY EVALUATION    Patient: Kleber Franco [de-identified]82 y.o. female)  Date: 5/29/2022  Primary Diagnosis: Septic arthritis of foot ([facility] [address] [M00.9]  Diabetic foot ulcer ([address] [I10.581, L97.509]  Septic joint ([facility] [address] [M00.9]  Procedure(s) (LRB):  debridement and drainage left great toe joint (Left) 2 Days Post-Op   Precautions: Fall,NWB (LLE)  ASSESSMENT :  Per podiatry, NWB LLE. Reviewed precautions with patient. Unable to comply with NWB LLE in static standing or amb. Recommend re-assessment of LLE weightbearing recommendations. If maintain NWB LLE, recommend patient defer to wheelchair for amb to ensure compliance. Patient educated on need for compliance with NWB LLE; verbalized understanding however unable to demonstrate. Agreeable to wheelchair mobility if needed; has transport chair. Fully dressed and anticipating discharge to home; no discharge orders. Seated EOB with LLE elevated on mattress. Supervision for sit to stand. Demonstrates NWB LLE in static standing at ww less than 10 seconds to verbal cues. Unable to hop on RLE for amb. Poor safety with attempt to amb with NWB LLE. Returned to seated at EOB.  Educated on importance of elevated LLE and compliance with NWB. Declines seated in recliner at this time. Educated on need for RN assistance with mobility; verbalized understanding. Call bell in reach. Patient will benefit from skilled intervention to address the above impairments. Patient's rehabilitation potential is considered to be Fair  Factors which may influence rehabilitation potential include:   []         None noted  []         Mental ability/status  [x]         Medical condition  []         Home/family situation and support systems  []         Safety awareness  []         Pain tolerance/management  []         Other:      PLAN :  Recommendations and Planned Interventions:   [x]           Bed Mobility Training             [x]    Neuromuscular Re-Education  [x]           Transfer Training                   []    Orthotic/Prosthetic Training  [x]           Gait Training                          []    Modalities  [x]           Therapeutic Exercises           []    Edema Management/Control  [x]           Therapeutic Activities            [x]    Family Training/Education  [x]           Patient Education  []           Other (comment):    Frequency/Duration: Patient will be followed by physical therapy 3-5 times a week to address goals. Discharge Recommendations: Home Health  Further Equipment Recommendations for Discharge: rolling walker and wheelchair; has     SUBJECTIVE:   Patient stated It's not going to work. I can't hop. I weigh 230 pounds.     OBJECTIVE DATA SUMMARY:     Past Medical History:   Diagnosis Date    Acquired hypothyroidism 8/1/2016    Arthritis of right shoulder region 4/21/2016    Asthma     Bilateral lower extremity edema 7/7/2021    Chronic bilateral low back pain without sciatica 7/7/2021    Chronic diastolic congestive heart failure ([address] 3/10/2021    Chronic venous insufficiency     Diabetes ([facility] [address]     neuropathy    Essential hypertension 7/7/2021    Gout     History of depression 1/23/2017    History of fall 7/7/2021    Hypercholesteremia 7/7/2021    Hypertension     [facility]     OAB (overactive bladder) 7/7/2021    Peripheral neuropathy     Rheumatoid arthritis (HCC)     Tear of right rotator cuff 5/16/2016    Thyroid pain     Urinary incontinence 7/7/2021    Vertigo      Past Surgical History:   Procedure Laterality Date    HX AMPUTATION TOE Right 2020    Great toe Dr. 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     HX KNEE REPLACEMENT Bilateral      Barriers to Learning/Limitations: yes;  sensory deficits-vision/hearing/speech  Compensate with: Visual Cues, Verbal Cues, Tactile Cues and Kinesthetic Cues    Home Situation:  Home Situation  Home Environment: Apartment  # Steps to Enter: 0  One/Two Story Residence: One story  Living Alone: No  Support Systems: Child(jace),Friend/Neighbor  Patient Expects to be Discharged to[de-identified] Home  Current DME Used/Available at Home: Walker,Wheelchair  Tub or Shower Type: Tub/Shower combination    Critical Behavior:  Neurologic State: Alert  Orientation Level: Oriented to person;Oriented to place  Cognition: Follows commands;Poor safety awareness     Psychosocial  Patient Behaviors: Cooperative    Strength:    Manual Muscle Testing (LE)         R     L    Hip Flexion:   4+/5  4+/5  Knee EXT:   4+/5  4+/5  Knee FLEX:   4+/5  4+/5  Ankle DF:   4+/5    _________________________________________________   Range Of Motion:  BLE AROM WFL  Functional Mobility:  Transfers:  Sit to Stand: Supervision  Stand to Sit: Supervision  Balance:   Sitting: Intact  Standing: Impaired  Standing - Static: Good  Standing - Dynamic : Fair  Ambulation/Gait Training:  Distance (ft): 2 Feet (ft)   Assistive Device: Walker, rolling  Ambulation - Level of Assistance: Supervision  Left Side Weight Bearing: Non-weight bearing  Neuro Re-education:  Seated balance 8 minutes  Standing balance 2 minutes    Pain:  Pain level pre-treatment: 0/10   Pain level post-treatment: 0/10     Activity Tolerance:   Fair    After treatment:   []         Patient left in no apparent distress sitting up in chair  [x]         Patient left in no apparent distress in bed; seated EOB  [x]         Call bell left within reach  [x]         Nursing notified  []         Caregiver present  []         Bed alarm activated  []         SCDs applied    COMMUNICATION/EDUCATION:   [x]         Role of physical therapy and plan of care in the acute care setting. [x]         Fall prevention education was provided and the patient/caregiver indicated understanding. [x]         Patient/family have participated as able in goal setting and plan of care. []         Patient/family agree to work toward stated goals and plan of care. []         Patient understands intent and goals of therapy, but is neutral about his/her participation. []         Patient is unable to participate in goal setting/plan of care: ongoing with therapy staff.     Thank you for this referral.  Myles Gar, PT   Time Calculation: 12 mins    Eval Complexity: History: MEDIUM  Complexity : 1-2 comorbidities / personal factors will impact the outcome/ POC Exam:MEDIUM Complexity : 3 Standardized tests and measures addressing body structure, function, activity limitation and / or participation in recreation  Presentation: MEDIUM Complexity : Evolving with changing characteristics  Clinical Decision Making:Medium Complexity   Clinical judgement; ROM, MMT, functional mobility Overall Complexity:MEDIUM

## 2022-05-31 ENCOUNTER — HOME CARE VISIT (OUTPATIENT)
Dept: HOME HEALTH SERVICES | Facility: HOME HEALTH | Age: 87
End: 2022-05-31
Payer: MEDICARE

## 2022-06-01 ENCOUNTER — HOME CARE VISIT (OUTPATIENT)
Dept: HOME HEALTH SERVICES | Facility: HOME HEALTH | Age: 87
End: 2022-06-01
Payer: MEDICARE

## 2022-06-01 ENCOUNTER — HOME CARE VISIT (OUTPATIENT)
Dept: SCHEDULING | Facility: HOME HEALTH | Age: 87
End: 2022-06-01
Payer: MEDICARE

## 2022-06-01 VITALS
DIASTOLIC BLOOD PRESSURE: 74 MMHG | HEART RATE: 78 BPM | OXYGEN SATURATION: 100 % | TEMPERATURE: 98.4 F | RESPIRATION RATE: 16 BRPM | SYSTOLIC BLOOD PRESSURE: 126 MMHG

## 2022-06-01 PROCEDURE — A6449 LT COMPRES BAND >=3" <5"/YD: HCPCS

## 2022-06-01 PROCEDURE — A6222 GAUZE <=16 IN NO W/SAL W/O B: HCPCS

## 2022-06-01 PROCEDURE — A6402 STERILE GAUZE <= 16 SQ IN: HCPCS

## 2022-06-01 PROCEDURE — 400018 HH-NO PAY CLAIM PROCEDURE

## 2022-06-01 PROCEDURE — A6446 CONFORM BAND S W>=3" <5"/YD: HCPCS

## 2022-06-01 PROCEDURE — MED12556 CAN,SPRAY,7.1-OZ,SALINE,WOUND WASH,STRL

## 2022-06-01 PROCEDURE — 400013 HH SOC

## 2022-06-01 PROCEDURE — G0299 HHS/HOSPICE OF RN EA 15 MIN: HCPCS

## 2022-06-01 PROCEDURE — 3331090001 HH PPS REVENUE CREDIT

## 2022-06-01 PROCEDURE — MED11426

## 2022-06-01 PROCEDURE — 3331090002 HH PPS REVENUE DEBIT

## 2022-06-02 PROCEDURE — 3331090001 HH PPS REVENUE CREDIT

## 2022-06-02 PROCEDURE — 3331090002 HH PPS REVENUE DEBIT

## 2022-06-03 PROCEDURE — 3331090002 HH PPS REVENUE DEBIT

## 2022-06-03 PROCEDURE — 3331090001 HH PPS REVENUE CREDIT

## 2022-06-04 ENCOUNTER — HOME CARE VISIT (OUTPATIENT)
Dept: HOME HEALTH SERVICES | Facility: HOME HEALTH | Age: 87
End: 2022-06-04
Payer: MEDICARE

## 2022-06-04 PROCEDURE — 3331090001 HH PPS REVENUE CREDIT

## 2022-06-04 PROCEDURE — 3331090002 HH PPS REVENUE DEBIT

## 2022-06-04 NOTE — PROGRESS NOTES
Physician Progress Note      PATIENT:               Julien Hoffmann  CSN #:                  [id number]  :                       1935  ADMIT DATE:       2022 12:48 PM  [address] [place] DATE:        2022 1:14 PM  RESPONDING  PROVIDER #:        Kiko Jerry DPM          QUERY TEXT:    Dear Podiatry,    Patient admitted with infected diabetic foot ulcer. Per I&D Op note dated  documentation of debridement. To accurately reflect the procedure performed please document which of the following was performed: The medical record reflects the following:  Risk Factors: 81 y/o, DM, Infected diabetic foot ulcer,    Clinical Indicators: Per Op note: There was necrotic and calloused tissue about 2-3 cm in diameter  the first metatarsal head with fluctuance and local cellulitis. This tissue was excised and there was exuberant amount of pus, which was cultured and sent. Wound was debrided and subcutaneous tissue expressing off the pus. Treatment: removal of necrotic and calloused tissue, culture collected, irrigated with ABX. Thank you,  Danielle MUÑIZ, RN, CRCR  Please contact me for any questions or concerns regarding this query at [email or web address]  Options provided:  -- Excisional debridement of subcutaneous tissue  -- Only Incision and Drainage to depth of Subcutaneous tissue  -- Other - I will add my own diagnosis  -- Disagree - Not applicable / Not valid  -- Disagree - Clinically unable to determine / Unknown  -- Refer to Clinical Documentation Reviewer    PROVIDER RESPONSE TEXT:    Excisional debridement of subcutaneous tissue of left foot was performed during procedure on 22.     Query created by: Jamari Elmore on 6/3/2022 12:15 PM      Electronically signed by:  Kiko Jerry DPM 2022 1:57 PM

## 2022-06-05 ENCOUNTER — HOME CARE VISIT (OUTPATIENT)
Dept: SCHEDULING | Facility: HOME HEALTH | Age: 87
End: 2022-06-05
Payer: MEDICARE

## 2022-06-05 PROCEDURE — G0299 HHS/HOSPICE OF RN EA 15 MIN: HCPCS

## 2022-06-05 PROCEDURE — 3331090001 HH PPS REVENUE CREDIT

## 2022-06-05 PROCEDURE — 3331090002 HH PPS REVENUE DEBIT

## 2022-06-06 VITALS
DIASTOLIC BLOOD PRESSURE: 72 MMHG | HEART RATE: 78 BPM | SYSTOLIC BLOOD PRESSURE: 122 MMHG | RESPIRATION RATE: 16 BRPM | TEMPERATURE: 98 F | OXYGEN SATURATION: 98 %

## 2022-06-06 PROCEDURE — 3331090002 HH PPS REVENUE DEBIT

## 2022-06-06 PROCEDURE — 3331090001 HH PPS REVENUE CREDIT

## 2022-06-06 NOTE — HOME HEALTH
Caregiver involvement:  and son are caregivers, they  Assist with ADLs, Medication management, Transportation to appointments, Meal prep and assist with ambulation. Medications reconciled and all medications are available in the home this visit. Medications  are effective at this time. Home health supplies by type and quantity ordered/delivered this visit include: : dressing supplies are in the home. Patient education provided this visit:  I reviewed the importance of regular blood sugar monitoring for good blood sugar control. Patient instructed to follow with diabetic diet- monitoring sugar intake, limiting foods with high sugar content. MEDICATION ADHERENCE IS AN IMPORTANT COMPONENT OF HTN MANAGEMENT. PATIENT STATES THE IMPORTANCE TO TAKE HTN MEDS SAME TIME EACH DAY. Continue a heart Healthy ADA diet. Watch out for high sodium foods, read labels. Observe for signs of infection. Monitor B/P, take meds as ordered and f/u with PCP. Read labels of foods, stay away from high sodium canned foods and processed meats. We discussed the need for daily weights to observe for increase in fluid retention and to call MD if > 2 lb weight gain in 24 hours. Patient is a fall risk, went over the need of having someone with her when ambulating, keep hallways and living areas free of clutter and throw rugs. Went over the importance of keeping dressing dry clean and intact. Went over the signs of infection and when to call the Doctor. I went over the importance of  a high protein diet to promote healing. We discussed the importance of frequent ambulation to prevent DVTs and increase strength and flexibility. Progress toward goals: wound with no signs of infection. Home exercise program: ENCOURAGED PATIENT TO HAVE PROTEIN WITH EACH MEAL TO PROMOTE WOUND HEALING. Discussed s/s of infection to monitor for, s/s of UTI, who to report to/when.  Instructed cg to notify staff/md/seek tx if complications occur. Patient instructed to maintain clear pathways in home and to minimize clutter to prevent falls from occurring/minimize fall potential.   Patient needing a well balanced diet with the 5 food groups, patient to increase fiber in diet, fresh fruits and vegetables, whole grains and increase water intake. Maintain healthy low sodium ADA diet, Continue to monitor B/P and Blood sugars, Observe for signs of infection. Be alert for signs of stroke. Work with PT to increase strength and f/u with PCP. :I went over the importance of taking all prescriptions as ordered. I discussed how to avoid extra sodium in his diet. We discussed the signs of infection and when to call MD.  We discussed the high risk for falls and ways to prevent falls in the future. We discussed taking B/P daily and keeping a log. We also discussed the need of a heart healthy diet, and the need to change positions frequently. Gaining strength with PT for increased mobility. Continued need for the following skills: Nursing    The following discharge planning was discussed with the pt/caregiver: Will discharge patient when medically stable and education is completed. Will discharge from nursing when dressing is no longer needed or can be managed by caregivers. Elizabeth Nava

## 2022-06-07 PROCEDURE — 3331090001 HH PPS REVENUE CREDIT

## 2022-06-07 PROCEDURE — 3331090002 HH PPS REVENUE DEBIT

## 2022-06-08 ENCOUNTER — HOME CARE VISIT (OUTPATIENT)
Dept: SCHEDULING | Facility: HOME HEALTH | Age: 87
End: 2022-06-08
Payer: MEDICARE

## 2022-06-08 PROCEDURE — G0300 HHS/HOSPICE OF LPN EA 15 MIN: HCPCS

## 2022-06-08 PROCEDURE — 3331090001 HH PPS REVENUE CREDIT

## 2022-06-08 PROCEDURE — 3331090002 HH PPS REVENUE DEBIT

## 2022-06-09 VITALS
TEMPERATURE: 96.2 F | HEART RATE: 80 BPM | SYSTOLIC BLOOD PRESSURE: 132 MMHG | OXYGEN SATURATION: 98 % | DIASTOLIC BLOOD PRESSURE: 60 MMHG | RESPIRATION RATE: 18 BRPM

## 2022-06-09 PROCEDURE — 3331090001 HH PPS REVENUE CREDIT

## 2022-06-09 PROCEDURE — 3331090002 HH PPS REVENUE DEBIT

## 2022-06-10 ENCOUNTER — HOME CARE VISIT (OUTPATIENT)
Dept: SCHEDULING | Facility: HOME HEALTH | Age: 87
End: 2022-06-10
Payer: MEDICARE

## 2022-06-10 VITALS
OXYGEN SATURATION: 96 % | SYSTOLIC BLOOD PRESSURE: 136 MMHG | RESPIRATION RATE: 18 BRPM | TEMPERATURE: 98.7 F | DIASTOLIC BLOOD PRESSURE: 64 MMHG | HEART RATE: 72 BPM

## 2022-06-10 PROCEDURE — G0300 HHS/HOSPICE OF LPN EA 15 MIN: HCPCS

## 2022-06-10 PROCEDURE — 3331090001 HH PPS REVENUE CREDIT

## 2022-06-10 PROCEDURE — 3331090002 HH PPS REVENUE DEBIT

## 2022-06-11 PROCEDURE — 3331090001 HH PPS REVENUE CREDIT

## 2022-06-11 PROCEDURE — 3331090002 HH PPS REVENUE DEBIT

## 2022-06-12 PROCEDURE — 3331090001 HH PPS REVENUE CREDIT

## 2022-06-12 PROCEDURE — 3331090002 HH PPS REVENUE DEBIT

## 2022-06-13 ENCOUNTER — HOME CARE VISIT (OUTPATIENT)
Dept: SCHEDULING | Facility: HOME HEALTH | Age: 87
End: 2022-06-13
Payer: MEDICARE

## 2022-06-13 VITALS
DIASTOLIC BLOOD PRESSURE: 60 MMHG | OXYGEN SATURATION: 98 % | RESPIRATION RATE: 18 BRPM | SYSTOLIC BLOOD PRESSURE: 132 MMHG | TEMPERATURE: 98.1 F | HEART RATE: 62 BPM

## 2022-06-13 PROCEDURE — 3331090002 HH PPS REVENUE DEBIT

## 2022-06-13 PROCEDURE — 3331090001 HH PPS REVENUE CREDIT

## 2022-06-13 PROCEDURE — G0300 HHS/HOSPICE OF LPN EA 15 MIN: HCPCS

## 2022-06-14 PROCEDURE — 3331090002 HH PPS REVENUE DEBIT

## 2022-06-14 PROCEDURE — 3331090001 HH PPS REVENUE CREDIT

## 2022-06-15 ENCOUNTER — HOME CARE VISIT (OUTPATIENT)
Dept: SCHEDULING | Facility: HOME HEALTH | Age: 87
End: 2022-06-15
Payer: MEDICARE

## 2022-06-15 PROCEDURE — 3331090001 HH PPS REVENUE CREDIT

## 2022-06-15 PROCEDURE — G0300 HHS/HOSPICE OF LPN EA 15 MIN: HCPCS

## 2022-06-15 PROCEDURE — 3331090002 HH PPS REVENUE DEBIT

## 2022-06-16 ENCOUNTER — TRANSCRIBE ORDER (OUTPATIENT)
Dept: SCHEDULING | Age: 87
End: 2022-06-16

## 2022-06-16 ENCOUNTER — HOSPITAL ENCOUNTER (OUTPATIENT)
Dept: VASCULAR SURGERY | Age: 87
Discharge: HOME | End: 2022-06-16
Attending: STUDENT IN AN ORGANIZED HEALTH CARE EDUCATION/TRAINING PROGRAM
Payer: MEDICARE

## 2022-06-16 VITALS
TEMPERATURE: 98.7 F | RESPIRATION RATE: 18 BRPM | HEART RATE: 78 BPM | DIASTOLIC BLOOD PRESSURE: 80 MMHG | SYSTOLIC BLOOD PRESSURE: 146 MMHG | OXYGEN SATURATION: 99 %

## 2022-06-16 DIAGNOSIS — M79.89 LEG SWELLING: ICD-10-CM

## 2022-06-16 DIAGNOSIS — M79.89 LEG SWELLING: Primary | ICD-10-CM

## 2022-06-16 PROCEDURE — 93971 EXTREMITY STUDY: CPT

## 2022-06-16 PROCEDURE — 3331090001 HH PPS REVENUE CREDIT

## 2022-06-16 PROCEDURE — 3331090002 HH PPS REVENUE DEBIT

## 2022-06-17 ENCOUNTER — HOME CARE VISIT (OUTPATIENT)
Dept: SCHEDULING | Facility: HOME HEALTH | Age: 87
End: 2022-06-17
Payer: MEDICARE

## 2022-06-17 VITALS
RESPIRATION RATE: 18 BRPM | SYSTOLIC BLOOD PRESSURE: 122 MMHG | OXYGEN SATURATION: 98 % | TEMPERATURE: 97.6 F | DIASTOLIC BLOOD PRESSURE: 72 MMHG | HEART RATE: 82 BPM

## 2022-06-17 PROCEDURE — MED12556 CAN,SPRAY,7.1-OZ,SALINE,WOUND WASH,STRL

## 2022-06-17 PROCEDURE — A6199 ALGINATE DRSG WOUND FILLER: HCPCS

## 2022-06-17 PROCEDURE — A6209 FOAM DRSG <=16 SQ IN W/O BDR: HCPCS

## 2022-06-17 PROCEDURE — A6402 STERILE GAUZE <= 16 SQ IN: HCPCS

## 2022-06-17 PROCEDURE — G0300 HHS/HOSPICE OF LPN EA 15 MIN: HCPCS

## 2022-06-17 PROCEDURE — 3331090001 HH PPS REVENUE CREDIT

## 2022-06-17 PROCEDURE — 3331090002 HH PPS REVENUE DEBIT

## 2022-06-17 PROCEDURE — A6446 CONFORM BAND S W>=3" <5"/YD: HCPCS

## 2022-06-17 PROCEDURE — A6449 LT COMPRES BAND >=3" <5"/YD: HCPCS

## 2022-06-18 PROCEDURE — 3331090001 HH PPS REVENUE CREDIT

## 2022-06-18 PROCEDURE — 3331090002 HH PPS REVENUE DEBIT

## 2022-06-19 PROCEDURE — 3331090001 HH PPS REVENUE CREDIT

## 2022-06-19 PROCEDURE — 3331090002 HH PPS REVENUE DEBIT

## 2022-06-20 ENCOUNTER — HOME CARE VISIT (OUTPATIENT)
Dept: SCHEDULING | Facility: HOME HEALTH | Age: 87
End: 2022-06-20
Payer: MEDICARE

## 2022-06-20 PROCEDURE — G0300 HHS/HOSPICE OF LPN EA 15 MIN: HCPCS

## 2022-06-20 PROCEDURE — 3331090002 HH PPS REVENUE DEBIT

## 2022-06-20 PROCEDURE — 3331090001 HH PPS REVENUE CREDIT

## 2022-06-21 VITALS
TEMPERATURE: 97.2 F | DIASTOLIC BLOOD PRESSURE: 60 MMHG | OXYGEN SATURATION: 98 % | SYSTOLIC BLOOD PRESSURE: 128 MMHG | RESPIRATION RATE: 18 BRPM | HEART RATE: 80 BPM

## 2022-06-21 PROCEDURE — 3331090002 HH PPS REVENUE DEBIT

## 2022-06-21 PROCEDURE — 3331090001 HH PPS REVENUE CREDIT

## 2022-06-22 ENCOUNTER — HOME CARE VISIT (OUTPATIENT)
Dept: SCHEDULING | Facility: HOME HEALTH | Age: 87
End: 2022-06-22
Payer: MEDICARE

## 2022-06-22 PROCEDURE — G0300 HHS/HOSPICE OF LPN EA 15 MIN: HCPCS

## 2022-06-22 PROCEDURE — 3331090002 HH PPS REVENUE DEBIT

## 2022-06-22 PROCEDURE — 3331090001 HH PPS REVENUE CREDIT

## 2022-06-23 VITALS
HEART RATE: 75 BPM | SYSTOLIC BLOOD PRESSURE: 122 MMHG | RESPIRATION RATE: 18 BRPM | DIASTOLIC BLOOD PRESSURE: 60 MMHG | TEMPERATURE: 97 F | OXYGEN SATURATION: 98 %

## 2022-06-23 PROCEDURE — 3331090002 HH PPS REVENUE DEBIT

## 2022-06-23 PROCEDURE — 3331090001 HH PPS REVENUE CREDIT

## 2022-06-24 ENCOUNTER — HOME CARE VISIT (OUTPATIENT)
Dept: SCHEDULING | Facility: HOME HEALTH | Age: 87
End: 2022-06-24
Payer: MEDICARE

## 2022-06-24 PROCEDURE — 3331090001 HH PPS REVENUE CREDIT

## 2022-06-24 PROCEDURE — 3331090002 HH PPS REVENUE DEBIT

## 2022-06-24 PROCEDURE — G0300 HHS/HOSPICE OF LPN EA 15 MIN: HCPCS

## 2022-06-25 VITALS
HEART RATE: 98 BPM | DIASTOLIC BLOOD PRESSURE: 65 MMHG | TEMPERATURE: 98.2 F | SYSTOLIC BLOOD PRESSURE: 122 MMHG | OXYGEN SATURATION: 97 % | RESPIRATION RATE: 18 BRPM

## 2022-06-25 PROCEDURE — 3331090002 HH PPS REVENUE DEBIT

## 2022-06-25 PROCEDURE — 3331090001 HH PPS REVENUE CREDIT

## 2022-06-26 PROCEDURE — 3331090001 HH PPS REVENUE CREDIT

## 2022-06-26 PROCEDURE — 3331090002 HH PPS REVENUE DEBIT

## 2022-06-27 ENCOUNTER — HOME CARE VISIT (OUTPATIENT)
Dept: SCHEDULING | Facility: HOME HEALTH | Age: 87
End: 2022-06-27
Payer: MEDICARE

## 2022-06-27 VITALS
RESPIRATION RATE: 18 BRPM | OXYGEN SATURATION: 98 % | HEART RATE: 71 BPM | DIASTOLIC BLOOD PRESSURE: 78 MMHG | SYSTOLIC BLOOD PRESSURE: 134 MMHG | TEMPERATURE: 97.9 F

## 2022-06-27 PROCEDURE — G0300 HHS/HOSPICE OF LPN EA 15 MIN: HCPCS

## 2022-06-27 PROCEDURE — 3331090001 HH PPS REVENUE CREDIT

## 2022-06-27 PROCEDURE — 3331090002 HH PPS REVENUE DEBIT

## 2022-06-28 PROCEDURE — 3331090002 HH PPS REVENUE DEBIT

## 2022-06-28 PROCEDURE — 3331090001 HH PPS REVENUE CREDIT

## 2022-06-29 PROCEDURE — 3331090002 HH PPS REVENUE DEBIT

## 2022-06-29 PROCEDURE — 3331090001 HH PPS REVENUE CREDIT

## 2022-06-30 ENCOUNTER — HOME CARE VISIT (OUTPATIENT)
Dept: SCHEDULING | Facility: HOME HEALTH | Age: 87
End: 2022-06-30
Payer: MEDICARE

## 2022-06-30 PROCEDURE — 3331090002 HH PPS REVENUE DEBIT

## 2022-06-30 PROCEDURE — 3331090001 HH PPS REVENUE CREDIT

## 2022-06-30 PROCEDURE — G0300 HHS/HOSPICE OF LPN EA 15 MIN: HCPCS

## 2022-07-01 PROCEDURE — 3331090001 HH PPS REVENUE CREDIT

## 2022-07-01 PROCEDURE — 3331090002 HH PPS REVENUE DEBIT

## 2022-07-01 PROCEDURE — 400018 HH-NO PAY CLAIM PROCEDURE

## 2022-07-02 VITALS
TEMPERATURE: 97.8 F | SYSTOLIC BLOOD PRESSURE: 134 MMHG | OXYGEN SATURATION: 97 % | HEART RATE: 76 BPM | DIASTOLIC BLOOD PRESSURE: 70 MMHG | RESPIRATION RATE: 18 BRPM

## 2022-07-02 PROCEDURE — 3331090001 HH PPS REVENUE CREDIT

## 2022-07-02 PROCEDURE — 3331090002 HH PPS REVENUE DEBIT

## 2022-07-03 PROCEDURE — 3331090001 HH PPS REVENUE CREDIT

## 2022-07-03 PROCEDURE — 3331090002 HH PPS REVENUE DEBIT

## 2022-07-04 PROCEDURE — 3331090001 HH PPS REVENUE CREDIT

## 2022-07-04 PROCEDURE — 3331090002 HH PPS REVENUE DEBIT

## 2022-07-05 ENCOUNTER — HOME CARE VISIT (OUTPATIENT)
Dept: SCHEDULING | Facility: HOME HEALTH | Age: 87
End: 2022-07-05
Payer: MEDICARE

## 2022-07-05 VITALS
RESPIRATION RATE: 18 BRPM | HEART RATE: 78 BPM | DIASTOLIC BLOOD PRESSURE: 70 MMHG | TEMPERATURE: 97.8 F | SYSTOLIC BLOOD PRESSURE: 140 MMHG

## 2022-07-05 PROCEDURE — 3331090001 HH PPS REVENUE CREDIT

## 2022-07-05 PROCEDURE — 3331090002 HH PPS REVENUE DEBIT

## 2022-07-05 PROCEDURE — 400013 HH SOC

## 2022-07-05 PROCEDURE — G0300 HHS/HOSPICE OF LPN EA 15 MIN: HCPCS

## 2022-07-06 PROCEDURE — 3331090001 HH PPS REVENUE CREDIT

## 2022-07-06 PROCEDURE — 3331090002 HH PPS REVENUE DEBIT

## 2022-07-07 PROCEDURE — 3331090001 HH PPS REVENUE CREDIT

## 2022-07-07 PROCEDURE — 3331090002 HH PPS REVENUE DEBIT

## 2022-07-08 ENCOUNTER — HOME CARE VISIT (OUTPATIENT)
Dept: SCHEDULING | Facility: HOME HEALTH | Age: 87
End: 2022-07-08
Payer: MEDICARE

## 2022-07-08 VITALS
SYSTOLIC BLOOD PRESSURE: 148 MMHG | TEMPERATURE: 97.9 F | RESPIRATION RATE: 18 BRPM | DIASTOLIC BLOOD PRESSURE: 76 MMHG | OXYGEN SATURATION: 97 % | HEART RATE: 73 BPM

## 2022-07-08 PROCEDURE — A6199 ALGINATE DRSG WOUND FILLER: HCPCS

## 2022-07-08 PROCEDURE — 3331090002 HH PPS REVENUE DEBIT

## 2022-07-08 PROCEDURE — 3331090001 HH PPS REVENUE CREDIT

## 2022-07-08 PROCEDURE — A6402 STERILE GAUZE <= 16 SQ IN: HCPCS

## 2022-07-08 PROCEDURE — G0300 HHS/HOSPICE OF LPN EA 15 MIN: HCPCS

## 2022-07-08 PROCEDURE — A6222 GAUZE <=16 IN NO W/SAL W/O B: HCPCS

## 2022-07-08 PROCEDURE — MED12556 CAN,SPRAY,7.1-OZ,SALINE,WOUND WASH,STRL

## 2022-07-08 PROCEDURE — A6449 LT COMPRES BAND >=3" <5"/YD: HCPCS

## 2022-07-09 PROCEDURE — 3331090001 HH PPS REVENUE CREDIT

## 2022-07-09 PROCEDURE — 3331090002 HH PPS REVENUE DEBIT

## 2022-07-10 ENCOUNTER — HOME CARE VISIT (OUTPATIENT)
Dept: SCHEDULING | Facility: HOME HEALTH | Age: 87
End: 2022-07-10
Payer: MEDICARE

## 2022-07-10 PROCEDURE — 3331090002 HH PPS REVENUE DEBIT

## 2022-07-10 PROCEDURE — A6197 ALGINATE DRSG >16 <=48 SQ IN: HCPCS

## 2022-07-10 PROCEDURE — A6449 LT COMPRES BAND >=3" <5"/YD: HCPCS

## 2022-07-10 PROCEDURE — A6446 CONFORM BAND S W>=3" <5"/YD: HCPCS

## 2022-07-10 PROCEDURE — A9270 NON-COVERED ITEM OR SERVICE: HCPCS

## 2022-07-10 PROCEDURE — A6223 GAUZE >16<=48 NO W/SAL W/O B: HCPCS

## 2022-07-10 PROCEDURE — 3331090001 HH PPS REVENUE CREDIT

## 2022-07-10 PROCEDURE — G0299 HHS/HOSPICE OF RN EA 15 MIN: HCPCS

## 2022-07-10 PROCEDURE — A6216 NON-STERILE GAUZE<=16 SQ IN: HCPCS

## 2022-07-11 VITALS
OXYGEN SATURATION: 97 % | RESPIRATION RATE: 18 BRPM | DIASTOLIC BLOOD PRESSURE: 70 MMHG | SYSTOLIC BLOOD PRESSURE: 138 MMHG | HEART RATE: 77 BPM | TEMPERATURE: 98.1 F

## 2022-07-11 PROCEDURE — 3331090002 HH PPS REVENUE DEBIT

## 2022-07-11 PROCEDURE — 3331090001 HH PPS REVENUE CREDIT

## 2022-07-12 ENCOUNTER — HOME CARE VISIT (OUTPATIENT)
Dept: SCHEDULING | Facility: HOME HEALTH | Age: 87
End: 2022-07-12
Payer: MEDICARE

## 2022-07-12 PROCEDURE — 3331090001 HH PPS REVENUE CREDIT

## 2022-07-12 PROCEDURE — 3331090002 HH PPS REVENUE DEBIT

## 2022-07-12 PROCEDURE — G0300 HHS/HOSPICE OF LPN EA 15 MIN: HCPCS

## 2022-07-13 PROCEDURE — 3331090002 HH PPS REVENUE DEBIT

## 2022-07-13 PROCEDURE — 3331090001 HH PPS REVENUE CREDIT

## 2022-07-14 ENCOUNTER — HOME CARE VISIT (OUTPATIENT)
Dept: SCHEDULING | Facility: HOME HEALTH | Age: 87
End: 2022-07-14
Payer: MEDICARE

## 2022-07-14 VITALS
TEMPERATURE: 98.8 F | RESPIRATION RATE: 18 BRPM | OXYGEN SATURATION: 100 % | HEART RATE: 78 BPM | SYSTOLIC BLOOD PRESSURE: 120 MMHG | DIASTOLIC BLOOD PRESSURE: 65 MMHG

## 2022-07-14 PROCEDURE — G0300 HHS/HOSPICE OF LPN EA 15 MIN: HCPCS

## 2022-07-14 PROCEDURE — 3331090001 HH PPS REVENUE CREDIT

## 2022-07-14 PROCEDURE — 3331090002 HH PPS REVENUE DEBIT

## 2022-07-14 NOTE — HOME HEALTH
Skilled Reason for visit: medication and disease management, wound care          Caregiver: Family member, is available as needed or assistance    Medications reconciled and all medications are available in the home this visit. The following education was provided regarding medications, medication interactions, and look alike medications Coreg treatment for HTN and heart failure, instructed to monitor BP while taking this medication common side effect hypotension. Medications are effective at this time. no discrepancies/medication interactions noted         Sharps education: Clinician instructed patient/cg on proper disposal of sharps as follows: Containers should be made of hard plastic, be puncture-resistant and leakproof, such as a laundry detergent or bleach bottle. When the container is ¾ full, it should be sealed with tape and labeled. DO NOT RECYCLE prior to discarding in the regular trash. Home health supplies by type and quantity ordered/delivered this visit include: N/A         Patient education provided this visit to include: nstruct patient in [address] Day Guidelines including: checking blood sugars per physician's orders, increasing fluid intake  of sugar-free liquids within dietary guidelines, resting, continuing diabetic medication, and if unable to take solid food replacing it with juice, regular jello, regular soda pop or Popsicles. Notify physician/RN if levels continue over 240 or as otherwise specified by physician. Patient level of understanding of education provided: Patient and CG verbalized complete understanding of education provided              Patient response to procedure performed: Patient tolerated well no c/o pain voiced         Home exercise program/Homework provided: Pt/Caregiver is to keep a daily log of VS readings. Pt/Caregiver is to keep a daily log of healthy fluid intake to meet hydration needs.          Discharge planning discussed with patient and caregiver. Discharge planning as follows: Patient will be discharged once education has completed, patient is medically stable and pt/cg are able to independently manage medications and disease process.

## 2022-07-15 PROCEDURE — 3331090002 HH PPS REVENUE DEBIT

## 2022-07-15 PROCEDURE — 3331090001 HH PPS REVENUE CREDIT

## 2022-07-16 PROCEDURE — 3331090001 HH PPS REVENUE CREDIT

## 2022-07-16 PROCEDURE — 3331090002 HH PPS REVENUE DEBIT

## 2022-07-17 PROCEDURE — 3331090001 HH PPS REVENUE CREDIT

## 2022-07-17 PROCEDURE — 3331090002 HH PPS REVENUE DEBIT

## 2022-07-18 VITALS
RESPIRATION RATE: 17 BRPM | HEART RATE: 78 BPM | DIASTOLIC BLOOD PRESSURE: 76 MMHG | SYSTOLIC BLOOD PRESSURE: 126 MMHG | TEMPERATURE: 98.7 F | OXYGEN SATURATION: 100 %

## 2022-07-18 PROCEDURE — 3331090002 HH PPS REVENUE DEBIT

## 2022-07-18 PROCEDURE — 3331090001 HH PPS REVENUE CREDIT

## 2022-07-18 NOTE — HOME HEALTH
Skilled Reason for visit: medication and disease management, wound care              Caregiver: Family member, is available as needed or assistance      Medications reconciled and all medications are available in the home this visit. The following education was provided regarding medications, medication interactions, and look alike medications sn instructed patient and cg on bumex, use as a diuretic and potential for dizziness, drops in bp and falls             Medications are effective at this time. no discrepancies/medication interactions noted             Sharps education: Clinician instructed patient/cg on proper disposal of sharps as follows: Containers should be made of hard plastic, be puncture-resistant and leakproof, such as a laundry detergent or bleach bottle. When the container is ¾ full, it should be sealed with tape and labeled. DO NOT RECYCLE prior to discarding in the regular trash. Home health supplies by type and quantity ordered/delivered this visit include: N/A             Patient education provided this visit to include:instruct patient/caregiver on importance of adequate nutrition and hydration for wound healing. Healthy foods give your body the nutrients it needs to heal wounds. Protein foods like meat, fish, nuts, and soy products are important to wound healing. In addition to protein, calories, vitamin C, and zinc help wounds heal. Liquids prevent dehydration that can decrease the blood supply to wounds. Always follow physician recommended diet in regards to patient condition. Instruct on other factors that affect wound healing such as: maintaining general hygiene, not smoking or using tobacco products, offloading pressure and pressure relieving devices. .          Patient level of understanding of education provided: Patient and CG verbalized complete understanding of education provided            Patient response to procedure performed: Patient tolerated well no c/o pain voiced             Home exercise program/Homework provided: Pt/Caregiver is to keep a daily log of VS readings. Pt/Caregiver is to keep a daily log of healthy fluid intake to meet hydration needs. Discharge planning discussed with patient and caregiver. Discharge planning as follows: Patient will be discharged once education has completed, patient is medically stable and pt/cg are able to independently manage medications and disease process.

## 2022-07-19 ENCOUNTER — HOME CARE VISIT (OUTPATIENT)
Dept: SCHEDULING | Facility: HOME HEALTH | Age: 87
End: 2022-07-19
Payer: MEDICARE

## 2022-07-19 PROCEDURE — G0300 HHS/HOSPICE OF LPN EA 15 MIN: HCPCS

## 2022-07-19 PROCEDURE — 3331090001 HH PPS REVENUE CREDIT

## 2022-07-19 PROCEDURE — 3331090002 HH PPS REVENUE DEBIT

## 2022-07-20 PROCEDURE — 3331090002 HH PPS REVENUE DEBIT

## 2022-07-20 PROCEDURE — 3331090001 HH PPS REVENUE CREDIT

## 2022-07-21 ENCOUNTER — HOME CARE VISIT (OUTPATIENT)
Dept: SCHEDULING | Facility: HOME HEALTH | Age: 87
End: 2022-07-21
Payer: MEDICARE

## 2022-07-21 VITALS
HEART RATE: 70 BPM | RESPIRATION RATE: 18 BRPM | TEMPERATURE: 97.6 F | DIASTOLIC BLOOD PRESSURE: 82 MMHG | SYSTOLIC BLOOD PRESSURE: 132 MMHG | OXYGEN SATURATION: 98 %

## 2022-07-21 PROCEDURE — 3331090002 HH PPS REVENUE DEBIT

## 2022-07-21 PROCEDURE — G0300 HHS/HOSPICE OF LPN EA 15 MIN: HCPCS

## 2022-07-21 PROCEDURE — 3331090001 HH PPS REVENUE CREDIT

## 2022-07-21 NOTE — HOME HEALTH
Skilled Reason for visit: medication and disease management, wound care    Wound healing is progressing well. Vital signs from this visit lost when computer hard-drive crashed and no longer functions. Caregiver: Family member, is available as needed or assistance with IADL's, ADL's, meal prep, medication management, and taking patient to all doctor's appointments. Medications reconciled and all medications are available in the home this visit. The following education was provided regarding medications, medication interactions, and look alike medications (specify): Pt to take daily medications on timely basis following proper dosage and freq. Patient/cg instructed to continue to take medications as prescribed. patient aware to monitor for effectiveness and to notify staff of any adverse reactions to medications/any changes to medication regimen. Medications are effective at this time. High risk medication teaching regarding anticoagulants, hyperglycemic agents or opiod narcotics performed. no discrepancies/medication interactions noted      Sharps education: Clinician instructed patient/cg on proper disposal of sharps as follows: Containers should be made of hard plastic, be puncture-resistant and leakproof, such as a laundry detergent or bleach bottle. When the container is ¾ full, it should be sealed with tape and labeled. DO NOT RECYCLE prior to discarding in the regular trash. Home health supplies by type and quantity ordered/delivered this visit include: N/A     Patient education provided this visit to include: See interventions      Patient/caregiver degree of understanding: Patient and caregiver verbalized full understanding.      Patient level of understanding of education provided: patient has a good understanding of education at this time        Patient response to procedure performed: Patient tolerated assessment, education, review of POC very well     Home exercise program/Homework provided: Pt/Caregiver is to keep a daily log of VS readings. Pt/Caregiver is to keep a daily log of healthy fluid intake to meet hydration needs. Discharge planning discussed with patient and caregiver. Discharge planning as follows: Patient will be discharged once education has completed, patient is medically stable and pt/cg are able to independently manage medications and disease process. Pt/Caregiver verbalized understanding of discharge planning. Pt/Caregiver advised to contact us with any questions or concerns. Pt/Caregiver advised to call 911 in a life-threatening emergency. Pt/Caregiver given the opportunity to ask questions and do not have any further questions or concerns at this time.

## 2022-07-22 PROCEDURE — 3331090002 HH PPS REVENUE DEBIT

## 2022-07-22 PROCEDURE — 3331090001 HH PPS REVENUE CREDIT

## 2022-07-22 NOTE — HOME HEALTH
SCAB PRESENT OVER AREA WHERE WOUND HAS BEEN. PATIENT INSTRUCTED ON PROTECTING AREA, MONITORING FOR WORSENING CONDITION, AND REPORTING ANY ABNORMAL S/S.    [address] SIGNED. PT AND CAREGIVER VERBALIZED/REPEATED BACK UNDERSTANDING THAT WITH WOUND ALMOST COMPLETELY HEALED DISCHARGE FROM SN IS POSSIBLE NEXT WEEK. Skilled Reason for visit: medication and disease management, WC      Caregiver: Family member, is available as needed or assistance with IADL's, ADL's, meal prep, medication management, and taking patient to all doctor's appointments. Medications reconciled and all medications are available in the home this visit. The following education was provided regarding medications, medication interactions, and look alike medications (specify): Pt to take daily medications on timely basis following proper dosage and freq. Patient/cg instructed to continue to take medications as prescribed. patient aware to monitor for effectiveness and to notify staff of any adverse reactions to medications/any changes to medication regimen. Medications are effective at this time. High risk medication teaching regarding anticoagulants, hyperglycemic agents or opiod narcotics performed. no discrepancies/medication interactions noted      Sharps education: Clinician instructed patient/cg on proper disposal of sharps as follows: Containers should be made of hard plastic, be puncture-resistant and leakproof, such as a laundry detergent or bleach bottle. When the container is ¾ full, it should be sealed with tape and labeled. DO NOT RECYCLE prior to discarding in the regular trash. Home health supplies by type and quantity ordered/delivered this visit include: N/A     Patient education provided this visit to include: See interventions      Patient/caregiver degree of understanding: Patient and caregiver verbalized full understanding.      Patient level of understanding of education provided: patient has a good understanding of education at this time        Patient response to procedure performed: Patient tolerated assessment, education, review of POC very well     Home exercise program/Homework provided: Pt/Caregiver is to keep a daily log of VS readings. Pt/Caregiver is to keep a daily log of healthy fluid intake to meet hydration needs. Discharge planning discussed with patient and caregiver. Discharge planning as follows: Patient will be discharged once education has completed, patient is medically stable and pt/cg are able to independently manage medications and disease process. Pt/Caregiver verbalized understanding of discharge planning. Pt/Caregiver advised to contact us with any questions or concerns. Pt/Caregiver advised to call 911 in a life-threatening emergency. Pt/Caregiver given the opportunity to ask questions and do not have any further questions or concerns at this time.

## 2022-07-23 PROCEDURE — 3331090002 HH PPS REVENUE DEBIT

## 2022-07-23 PROCEDURE — 3331090001 HH PPS REVENUE CREDIT

## 2022-07-24 PROCEDURE — 3331090002 HH PPS REVENUE DEBIT

## 2022-07-24 PROCEDURE — 3331090001 HH PPS REVENUE CREDIT

## 2022-07-25 ENCOUNTER — HOME CARE VISIT (OUTPATIENT)
Dept: SCHEDULING | Facility: HOME HEALTH | Age: 87
End: 2022-07-25
Payer: MEDICARE

## 2022-07-25 PROCEDURE — G0300 HHS/HOSPICE OF LPN EA 15 MIN: HCPCS

## 2022-07-25 PROCEDURE — 3331090001 HH PPS REVENUE CREDIT

## 2022-07-25 PROCEDURE — 3331090002 HH PPS REVENUE DEBIT

## 2022-07-26 VITALS
SYSTOLIC BLOOD PRESSURE: 132 MMHG | OXYGEN SATURATION: 97 % | RESPIRATION RATE: 18 BRPM | DIASTOLIC BLOOD PRESSURE: 68 MMHG | TEMPERATURE: 97.1 F | HEART RATE: 84 BPM

## 2022-07-26 PROCEDURE — 3331090001 HH PPS REVENUE CREDIT

## 2022-07-26 PROCEDURE — 3331090002 HH PPS REVENUE DEBIT

## 2022-07-26 NOTE — HOME HEALTH
Skilled Reason for visit: medication and disease management, wound care    WOUND HAS HARDENED/SCAB/DRAINAGE OVER PINPOINT AREA THAT WAS REMAINING OPEN. UNKNOWN IF COMPLETELY INTACT NOW. NO NEW DRAINAGE OBSERVED THIS SNV. WOUND AREA IS THE SAME AS LAST SNV. NO C/O PAIN OR DISCOMFORT, DIME SIZE HARDENED AREA WHERE WOUND WAS LOCATED. NO REDNESS, NO DRAINAGE, NO SWELLING. PT AND CAREGIVER CONTINUING TO PROTECT FROM FRICTION/PRESSURE AND ARE MONITORING FOR ANY ABNORMAL S/S OR REOPENING OF AREA. SN PROVIDED EDUCATION ABOUT HOW TO KEEP AREA CLEAN/DRY/INTACT AND HOW TO PREVENT TRAUMA TO AREAS PRONE TO RUBBING WITH ACTIVITY. PT AND CAREGIVER DEMONSTRATE UNDERSTANDING AND HAVE SHOWN ABILITY TO APPLY DRESSING PRN. Caregiver: Family member, is available as needed or assistance with IADL's, ADL's, meal prep, medication management, and taking patient to all doctor's appointments. Medications reconciled and all medications are available in the home this visit. The following education was provided regarding medications, medication interactions, and look alike medications (specify): Pt to take daily medications on timely basis following proper dosage and freq. Patient/cg instructed to continue to take medications as prescribed. patient aware to monitor for effectiveness and to notify staff of any adverse reactions to medications/any changes to medication regimen. Medications are effective at this time. High risk medication teaching regarding anticoagulants, hyperglycemic agents or opiod narcotics performed. no discrepancies/medication interactions noted      Sharps education: Clinician instructed patient/cg on proper disposal of sharps as follows: Containers should be made of hard plastic, be puncture-resistant and leakproof, such as a laundry detergent or bleach bottle. When the container is ¾ full, it should be sealed with tape and labeled. DO NOT RECYCLE prior to discarding in the regular samantha.    Home health supplies by type and quantity ordered/delivered this visit include: N/A     Patient education provided this visit to include: See interventions      Patient/caregiver degree of understanding: Patient and caregiver verbalized full understanding. Patient level of understanding of education provided: patient has a good understanding of education at this time        Patient response to procedure performed: Patient tolerated assessment, education, review of POC very well     Home exercise program/Homework provided: Pt/Caregiver is to keep a daily log of VS readings. Pt/Caregiver is to keep a daily log of healthy fluid intake to meet hydration needs. Discharge planning discussed with patient and caregiver. Discharge planning as follows: Patient will be discharged once education has completed, patient is medically stable and pt/cg are able to independently manage medications and disease process. Pt/Caregiver verbalized understanding of discharge planning. Pt/Caregiver advised to contact us with any questions or concerns. Pt/Caregiver advised to call 911 in a life-threatening emergency. Pt/Caregiver given the opportunity to ask questions and do not have any further questions or concerns at this time.

## 2022-07-27 PROCEDURE — 3331090002 HH PPS REVENUE DEBIT

## 2022-07-27 PROCEDURE — 3331090001 HH PPS REVENUE CREDIT

## 2022-07-28 PROCEDURE — 3331090001 HH PPS REVENUE CREDIT

## 2022-07-28 PROCEDURE — 3331090002 HH PPS REVENUE DEBIT

## 2022-07-29 ENCOUNTER — HOME CARE VISIT (OUTPATIENT)
Dept: HOME HEALTH SERVICES | Facility: HOME HEALTH | Age: 87
End: 2022-07-29
Payer: MEDICARE

## 2022-07-29 ENCOUNTER — HOME CARE VISIT (OUTPATIENT)
Dept: SCHEDULING | Facility: HOME HEALTH | Age: 87
End: 2022-07-29
Payer: MEDICARE

## 2022-07-29 PROCEDURE — 3331090001 HH PPS REVENUE CREDIT

## 2022-07-29 PROCEDURE — G0299 HHS/HOSPICE OF RN EA 15 MIN: HCPCS

## 2022-07-29 PROCEDURE — 3331090002 HH PPS REVENUE DEBIT

## 2022-07-30 PROCEDURE — 3331090001 HH PPS REVENUE CREDIT

## 2022-07-30 PROCEDURE — 3331090002 HH PPS REVENUE DEBIT

## 2022-07-31 VITALS
HEART RATE: 68 BPM | OXYGEN SATURATION: 97 % | TEMPERATURE: 98 F | DIASTOLIC BLOOD PRESSURE: 74 MMHG | RESPIRATION RATE: 16 BRPM | SYSTOLIC BLOOD PRESSURE: 134 MMHG

## 2022-08-01 NOTE — HOME HEALTH
Skilled reason for visit: Disease and medication management, assessment, [facility] D/C    Prmary Caregiver: friend is available to assist with daily meals, run errands, groceries, provide reminders with daily medications and accompany to MD appt prn. Medications reviewed and all medications are available in the home this visit. The following education was provided regarding medications, medication interactions, and look alike medications (specify): N/A. Medications  are effective at this time. No added new medication. Home health supplies by type and quantity ordered/delivered this visit include: N/A  Skilled Care Performed this visit:  Completed assessment, R foot (plantar) wound completely closed. V/S WNR to pt, denies pain, no SOB and no S/S of infection. Discussed no further SNV needed. Completed D/C instructions with good understanding. Patient education provided this visit: Intervention  to prevent infection, continue prescribed diet; ADA and  heart healthy diet, avoid skipping meals and good hydration. Reviewed S/S when/where to call  with MD or emergent care assistance prn. Patient level of understanding of education provided: Pt has good understanding of the teaching provided during visit. Patient response to procedure performed:  Pt tolerated well during the assessment procedure with no C/O. Home exercise program/Homework provided: Ambulation, HEP deep breathing exercises 10x when having SOB, pain and anxiety. Goals met. Pt/CG able to manage disease and medication independently, wound is healed and health condition stable. COVID - 23 Screening completed before visit:       Denies and no family member  has any of these S/S:    Fever, dry cough, sore throat diarrhea, chills, body aches, not feeling well and lost of taste.

## 2022-08-10 ENCOUNTER — APPOINTMENT (OUTPATIENT)
Dept: GENERAL RADIOLOGY | Age: 87
DRG: 871 | End: 2022-08-10
Attending: STUDENT IN AN ORGANIZED HEALTH CARE EDUCATION/TRAINING PROGRAM
Payer: MEDICARE

## 2022-08-10 ENCOUNTER — APPOINTMENT (OUTPATIENT)
Dept: NON INVASIVE DIAGNOSTICS | Age: 87
DRG: 871 | End: 2022-08-10
Attending: PHYSICIAN ASSISTANT
Payer: MEDICARE

## 2022-08-10 ENCOUNTER — APPOINTMENT (OUTPATIENT)
Dept: CT IMAGING | Age: 87
DRG: 871 | End: 2022-08-10
Attending: STUDENT IN AN ORGANIZED HEALTH CARE EDUCATION/TRAINING PROGRAM
Payer: MEDICARE

## 2022-08-10 ENCOUNTER — HOSPITAL ENCOUNTER (INPATIENT)
Age: 87
LOS: 3 days | Discharge: HOME | DRG: 871 | End: 2022-08-13
Attending: STUDENT IN AN ORGANIZED HEALTH CARE EDUCATION/TRAINING PROGRAM | Admitting: FAMILY MEDICINE
Payer: MEDICARE

## 2022-08-10 DIAGNOSIS — A41.9 SEPSIS, DUE TO UNSPECIFIED ORGANISM, UNSPECIFIED WHETHER ACUTE ORGAN DYSFUNCTION PRESENT (HCC): Primary | ICD-10-CM

## 2022-08-10 DIAGNOSIS — R94.31 ABNORMAL EKG: ICD-10-CM

## 2022-08-10 DIAGNOSIS — R41.82 ALTERED MENTAL STATUS, UNSPECIFIED ALTERED MENTAL STATUS TYPE: ICD-10-CM

## 2022-08-10 DIAGNOSIS — I47.29 NSVT (NONSUSTAINED VENTRICULAR TACHYCARDIA): ICD-10-CM

## 2022-08-10 LAB
ALBUMIN SERPL-MCNC: 3.7 G/DL (ref 3.4–5)
ALBUMIN/GLOB SERPL: 0.8 {RATIO} (ref 0.8–1.7)
ALP SERPL-CCNC: 66 U/L (ref 45–117)
ALT SERPL-CCNC: 13 U/L (ref 13–56)
AMMONIA PLAS-SCNC: 18 UMOL/L (ref 11–32)
AMPHET UR QL SCN: NEGATIVE
ANION GAP SERPL CALC-SCNC: 8 MMOL/L (ref 3–18)
ANION GAP SERPL CALC-SCNC: 9 MMOL/L (ref 3–18)
APPEARANCE UR: CLEAR
AST SERPL-CCNC: 45 U/L (ref 10–38)
ATRIAL RATE: 114 BPM
ATRIAL RATE: 128 BPM
B PERT DNA SPEC QL NAA+PROBE: NOT DETECTED
BACTERIA URNS QL MICRO: NEGATIVE /HPF
BARBITURATES UR QL SCN: NEGATIVE
BASOPHILS # BLD: 0 K/UL (ref 0–0.1)
BASOPHILS NFR BLD: 0 % (ref 0–2)
BENZODIAZ UR QL: NEGATIVE
BILIRUB SERPL-MCNC: 0.6 MG/DL (ref 0.2–1)
BILIRUB UR QL: NEGATIVE
BNP SERPL-MCNC: 1164 PG/ML (ref 0–1800)
BORDETELLA PARAPERTUSSIS PCR, BORPAR: NOT DETECTED
BUN SERPL-MCNC: 21 MG/DL (ref 7–18)
BUN SERPL-MCNC: 24 MG/DL (ref 7–18)
BUN/CREAT SERPL: 16 (ref 12–20)
BUN/CREAT SERPL: 18 (ref 12–20)
C PNEUM DNA SPEC QL NAA+PROBE: NOT DETECTED
CALCIUM SERPL-MCNC: 8.9 MG/DL (ref 8.5–10.1)
CALCIUM SERPL-MCNC: 9.1 MG/DL (ref 8.5–10.1)
CALCULATED P AXIS, ECG09: 80 DEGREES
CALCULATED P AXIS, ECG09: 81 DEGREES
CALCULATED R AXIS, ECG10: 15 DEGREES
CALCULATED R AXIS, ECG10: 16 DEGREES
CALCULATED T AXIS, ECG11: 106 DEGREES
CALCULATED T AXIS, ECG11: 116 DEGREES
CANNABINOIDS UR QL SCN: NEGATIVE
CHLORIDE SERPL-SCNC: 103 MMOL/L (ref 100–111)
CHLORIDE SERPL-SCNC: 104 MMOL/L (ref 100–111)
CK SERPL-CCNC: 264 U/L (ref 26–192)
CO2 SERPL-SCNC: 26 MMOL/L (ref 21–32)
CO2 SERPL-SCNC: 28 MMOL/L (ref 21–32)
COCAINE UR QL SCN: NEGATIVE
COLOR UR: YELLOW
CREAT SERPL-MCNC: 1.35 MG/DL (ref 0.6–1.3)
CREAT SERPL-MCNC: 1.36 MG/DL (ref 0.6–1.3)
CRP SERPL HS-MCNC: >9.5 MG/L
DIAGNOSIS, 93000: NORMAL
DIAGNOSIS, 93000: NORMAL
DIFFERENTIAL METHOD BLD: ABNORMAL
EOSINOPHIL # BLD: 0 K/UL (ref 0–0.4)
EOSINOPHIL NFR BLD: 0 % (ref 0–5)
EPITH CASTS URNS QL MICRO: NORMAL /LPF (ref 0–5)
ERYTHROCYTE [DISTWIDTH] IN BLOOD BY AUTOMATED COUNT: 12.9 % (ref 11.6–14.5)
ERYTHROCYTE [DISTWIDTH] IN BLOOD BY AUTOMATED COUNT: 13.2 % (ref 11.6–14.5)
ERYTHROCYTE [DISTWIDTH] IN BLOOD BY AUTOMATED COUNT: 13.2 % (ref 11.6–14.5)
ERYTHROCYTE [SEDIMENTATION RATE] IN BLOOD: 68 MM/HR (ref 0–30)
FLUAV H1 2009 PAND RNA SPEC QL NAA+PROBE: NOT DETECTED
FLUAV H1 RNA SPEC QL NAA+PROBE: NOT DETECTED
FLUAV H3 RNA SPEC QL NAA+PROBE: NOT DETECTED
FLUAV RNA SPEC QL NAA+PROBE: NOT DETECTED
FLUAV SUBTYP SPEC NAA+PROBE: NOT DETECTED
FLUBV RNA SPEC QL NAA+PROBE: NOT DETECTED
FLUBV RNA SPEC QL NAA+PROBE: NOT DETECTED
GLOBULIN SER CALC-MCNC: 4.6 G/DL (ref 2–4)
GLUCOSE BLD STRIP.AUTO-MCNC: 200 MG/DL (ref 70–110)
GLUCOSE BLD STRIP.AUTO-MCNC: 237 MG/DL (ref 70–110)
GLUCOSE BLD STRIP.AUTO-MCNC: 277 MG/DL (ref 70–110)
GLUCOSE SERPL-MCNC: 226 MG/DL (ref 74–99)
GLUCOSE SERPL-MCNC: 243 MG/DL (ref 74–99)
GLUCOSE UR STRIP.AUTO-MCNC: >1000 MG/DL
HADV DNA SPEC QL NAA+PROBE: NOT DETECTED
HCOV 229E RNA SPEC QL NAA+PROBE: NOT DETECTED
HCOV HKU1 RNA SPEC QL NAA+PROBE: NOT DETECTED
HCOV NL63 RNA SPEC QL NAA+PROBE: NOT DETECTED
HCOV OC43 RNA SPEC QL NAA+PROBE: NOT DETECTED
HCT VFR BLD AUTO: 38.3 % (ref 35–45)
HCT VFR BLD AUTO: 39.2 % (ref 35–45)
HCT VFR BLD AUTO: 41.7 % (ref 35–45)
HDSCOM,HDSCOM: ABNORMAL
HGB BLD-MCNC: 12.6 G/DL (ref 12–16)
HGB BLD-MCNC: 12.6 G/DL (ref 12–16)
HGB BLD-MCNC: 13.5 G/DL (ref 12–16)
HGB UR QL STRIP: ABNORMAL
HMPV RNA SPEC QL NAA+PROBE: NOT DETECTED
HPIV1 RNA SPEC QL NAA+PROBE: NOT DETECTED
HPIV2 RNA SPEC QL NAA+PROBE: NOT DETECTED
HPIV3 RNA SPEC QL NAA+PROBE: NOT DETECTED
HPIV4 RNA SPEC QL NAA+PROBE: NOT DETECTED
IMM GRANULOCYTES # BLD AUTO: 0 K/UL
IMM GRANULOCYTES # BLD AUTO: 0 K/UL (ref 0–0.04)
IMM GRANULOCYTES # BLD AUTO: 0 K/UL (ref 0–0.04)
IMM GRANULOCYTES NFR BLD AUTO: 0 %
IMM GRANULOCYTES NFR BLD AUTO: 0 % (ref 0–0.5)
IMM GRANULOCYTES NFR BLD AUTO: 0 % (ref 0–0.5)
KETONES UR QL STRIP.AUTO: ABNORMAL MG/DL
L PNEUMO AG UR QL IA: NEGATIVE
LACTATE BLD-SCNC: 1.53 MMOL/L (ref 0.4–2)
LACTATE SERPL-SCNC: 1.5 MMOL/L (ref 0.4–2)
LEUKOCYTE ESTERASE UR QL STRIP.AUTO: NEGATIVE
LIPASE SERPL-CCNC: 60 U/L (ref 73–393)
LYMPHOCYTES # BLD: 1 K/UL (ref 0.9–3.6)
LYMPHOCYTES # BLD: 1 K/UL (ref 0.9–3.6)
LYMPHOCYTES # BLD: 1.1 K/UL (ref 0.9–3.6)
LYMPHOCYTES NFR BLD: 3 % (ref 21–52)
LYMPHOCYTES NFR BLD: 3 % (ref 21–52)
LYMPHOCYTES NFR BLD: 4 % (ref 21–52)
M PNEUMO DNA SPEC QL NAA+PROBE: NOT DETECTED
MAGNESIUM SERPL-MCNC: 1.8 MG/DL (ref 1.6–2.6)
MAGNESIUM SERPL-MCNC: 2.2 MG/DL (ref 1.6–2.6)
MCH RBC QN AUTO: 28.4 PG (ref 24–34)
MCH RBC QN AUTO: 28.4 PG (ref 24–34)
MCH RBC QN AUTO: 28.5 PG (ref 24–34)
MCHC RBC AUTO-ENTMCNC: 32.1 G/DL (ref 31–37)
MCHC RBC AUTO-ENTMCNC: 32.4 G/DL (ref 31–37)
MCHC RBC AUTO-ENTMCNC: 32.9 G/DL (ref 31–37)
MCV RBC AUTO: 86.7 FL (ref 78–100)
MCV RBC AUTO: 87.8 FL (ref 78–100)
MCV RBC AUTO: 88.5 FL (ref 78–100)
METHADONE UR QL: NEGATIVE
MONOCYTES # BLD: 1.3 K/UL (ref 0.05–1.2)
MONOCYTES # BLD: 2.5 K/UL (ref 0.05–1.2)
MONOCYTES # BLD: 3.4 K/UL (ref 0.05–1.2)
MONOCYTES NFR BLD: 10 % (ref 3–10)
MONOCYTES NFR BLD: 4 % (ref 3–10)
MONOCYTES NFR BLD: 9 % (ref 3–10)
NEUTS BAND NFR BLD MANUAL: 3 %
NEUTS BAND NFR BLD MANUAL: 4 %
NEUTS BAND NFR BLD MANUAL: 5 % (ref 0–5)
NEUTS SEG # BLD: 21.8 K/UL (ref 1.8–8)
NEUTS SEG # BLD: 30.4 K/UL (ref 1.8–8)
NEUTS SEG # BLD: 33.4 K/UL (ref 1.8–8)
NEUTS SEG NFR BLD: 81 % (ref 40–73)
NEUTS SEG NFR BLD: 84 % (ref 40–73)
NEUTS SEG NFR BLD: 90 % (ref 40–73)
NITRITE UR QL STRIP.AUTO: NEGATIVE
NRBC # BLD: 0 K/UL (ref 0–0.01)
NRBC BLD-RTO: 0 PER 100 WBC
OPIATES UR QL: POSITIVE
P-R INTERVAL, ECG05: 184 MS
P-R INTERVAL, ECG05: 186 MS
PCP UR QL: NEGATIVE
PH UR STRIP: 7 [PH] (ref 5–8)
PLATELET # BLD AUTO: 174 K/UL (ref 135–420)
PLATELET # BLD AUTO: 184 K/UL (ref 135–420)
PLATELET # BLD AUTO: 195 K/UL (ref 135–420)
PLATELET COMMENTS,PCOM: ABNORMAL
PMV BLD AUTO: 12.6 FL (ref 9.2–11.8)
PMV BLD AUTO: 12.9 FL (ref 9.2–11.8)
PMV BLD AUTO: 13.2 FL (ref 9.2–11.8)
POTASSIUM SERPL-SCNC: 3.5 MMOL/L (ref 3.5–5.5)
POTASSIUM SERPL-SCNC: 5.2 MMOL/L (ref 3.5–5.5)
PROCALCITONIN SERPL-MCNC: 31.74 NG/ML
PROT SERPL-MCNC: 8.3 G/DL (ref 6.4–8.2)
PROT UR STRIP-MCNC: 100 MG/DL
Q-T INTERVAL, ECG07: 300 MS
Q-T INTERVAL, ECG07: 312 MS
QRS DURATION, ECG06: 100 MS
QRS DURATION, ECG06: 96 MS
QTC CALCULATION (BEZET), ECG08: 430 MS
QTC CALCULATION (BEZET), ECG08: 438 MS
RBC # BLD AUTO: 4.42 M/UL (ref 4.2–5.3)
RBC # BLD AUTO: 4.43 M/UL (ref 4.2–5.3)
RBC # BLD AUTO: 4.75 M/UL (ref 4.2–5.3)
RBC #/AREA URNS HPF: NORMAL /HPF (ref 0–5)
RBC MORPH BLD: ABNORMAL
RSV RNA SPEC QL NAA+PROBE: NOT DETECTED
RV+EV RNA SPEC QL NAA+PROBE: NOT DETECTED
S PNEUM AG UR QL: NEGATIVE
SARS-COV-2 PCR, COVPCR: NOT DETECTED
SARS-COV-2, COV2: NOT DETECTED
SODIUM SERPL-SCNC: 137 MMOL/L (ref 136–145)
SODIUM SERPL-SCNC: 141 MMOL/L (ref 136–145)
SP GR UR REFRACTOMETRY: 1.02 (ref 1–1.03)
TROPONIN-HIGH SENSITIVITY: 16 NG/L (ref 0–54)
TROPONIN-HIGH SENSITIVITY: 32 NG/L (ref 0–54)
TROPONIN-HIGH SENSITIVITY: 40 NG/L (ref 0–54)
TROPONIN-HIGH SENSITIVITY: 43 NG/L (ref 0–54)
TSH SERPL DL<=0.05 MIU/L-ACNC: 2.84 UIU/ML (ref 0.36–3.74)
UROBILINOGEN UR QL STRIP.AUTO: 0.2 EU/DL (ref 0.2–1)
VENTRICULAR RATE, ECG03: 114 BPM
VENTRICULAR RATE, ECG03: 128 BPM
WBC # BLD AUTO: 25.3 K/UL (ref 4.6–13.2)
WBC # BLD AUTO: 32.7 K/UL (ref 4.6–13.2)
WBC # BLD AUTO: 37.9 K/UL (ref 4.6–13.2)
WBC URNS QL MICRO: NORMAL /HPF (ref 0–4)

## 2022-08-10 PROCEDURE — 83690 ASSAY OF LIPASE: CPT

## 2022-08-10 PROCEDURE — 83605 ASSAY OF LACTIC ACID: CPT

## 2022-08-10 PROCEDURE — 74011000250 HC RX REV CODE- 250: Performed by: STUDENT IN AN ORGANIZED HEALTH CARE EDUCATION/TRAINING PROGRAM

## 2022-08-10 PROCEDURE — 74011000636 HC RX REV CODE- 636: Performed by: FAMILY MEDICINE

## 2022-08-10 PROCEDURE — 84443 ASSAY THYROID STIM HORMONE: CPT

## 2022-08-10 PROCEDURE — 93005 ELECTROCARDIOGRAM TRACING: CPT

## 2022-08-10 PROCEDURE — 77030038269 HC DRN EXT URIN PURWCK BARD -A

## 2022-08-10 PROCEDURE — 74011250637 HC RX REV CODE- 250/637: Performed by: STUDENT IN AN ORGANIZED HEALTH CARE EDUCATION/TRAINING PROGRAM

## 2022-08-10 PROCEDURE — 0202U NFCT DS 22 TRGT SARS-COV-2: CPT

## 2022-08-10 PROCEDURE — 74011250637 HC RX REV CODE- 250/637

## 2022-08-10 PROCEDURE — 99223 1ST HOSP IP/OBS HIGH 75: CPT | Performed by: INTERNAL MEDICINE

## 2022-08-10 PROCEDURE — 73620 X-RAY EXAM OF FOOT: CPT

## 2022-08-10 PROCEDURE — 83735 ASSAY OF MAGNESIUM: CPT

## 2022-08-10 PROCEDURE — 80053 COMPREHEN METABOLIC PANEL: CPT

## 2022-08-10 PROCEDURE — 65270000046 HC RM TELEMETRY

## 2022-08-10 PROCEDURE — 99285 EMERGENCY DEPT VISIT HI MDM: CPT

## 2022-08-10 PROCEDURE — 51798 US URINE CAPACITY MEASURE: CPT

## 2022-08-10 PROCEDURE — 83880 ASSAY OF NATRIURETIC PEPTIDE: CPT

## 2022-08-10 PROCEDURE — 36415 COLL VENOUS BLD VENIPUNCTURE: CPT

## 2022-08-10 PROCEDURE — 74011250636 HC RX REV CODE- 250/636: Performed by: STUDENT IN AN ORGANIZED HEALTH CARE EDUCATION/TRAINING PROGRAM

## 2022-08-10 PROCEDURE — 71275 CT ANGIOGRAPHY CHEST: CPT

## 2022-08-10 PROCEDURE — 84145 PROCALCITONIN (PCT): CPT

## 2022-08-10 PROCEDURE — 96365 THER/PROPH/DIAG IV INF INIT: CPT

## 2022-08-10 PROCEDURE — 81001 URINALYSIS AUTO W/SCOPE: CPT

## 2022-08-10 PROCEDURE — 70450 CT HEAD/BRAIN W/O DYE: CPT

## 2022-08-10 PROCEDURE — 85652 RBC SED RATE AUTOMATED: CPT

## 2022-08-10 PROCEDURE — 97530 THERAPEUTIC ACTIVITIES: CPT

## 2022-08-10 PROCEDURE — 96375 TX/PRO/DX INJ NEW DRUG ADDON: CPT

## 2022-08-10 PROCEDURE — 87086 URINE CULTURE/COLONY COUNT: CPT

## 2022-08-10 PROCEDURE — 87449 NOS EACH ORGANISM AG IA: CPT

## 2022-08-10 PROCEDURE — 87636 SARSCOV2 & INF A&B AMP PRB: CPT

## 2022-08-10 PROCEDURE — 74011636637 HC RX REV CODE- 636/637

## 2022-08-10 PROCEDURE — 85025 COMPLETE CBC W/AUTO DIFF WBC: CPT

## 2022-08-10 PROCEDURE — 2709999900 HC NON-CHARGEABLE SUPPLY

## 2022-08-10 PROCEDURE — 87040 BLOOD CULTURE FOR BACTERIA: CPT

## 2022-08-10 PROCEDURE — 84484 ASSAY OF TROPONIN QUANT: CPT

## 2022-08-10 PROCEDURE — 74011000258 HC RX REV CODE- 258: Performed by: STUDENT IN AN ORGANIZED HEALTH CARE EDUCATION/TRAINING PROGRAM

## 2022-08-10 PROCEDURE — 94760 N-INVAS EAR/PLS OXIMETRY 1: CPT

## 2022-08-10 PROCEDURE — 77030040830 HC CATH URETH FOL MDII -A

## 2022-08-10 PROCEDURE — 97166 OT EVAL MOD COMPLEX 45 MIN: CPT

## 2022-08-10 PROCEDURE — 82550 ASSAY OF CK (CPK): CPT

## 2022-08-10 PROCEDURE — 96361 HYDRATE IV INFUSION ADD-ON: CPT

## 2022-08-10 PROCEDURE — 74011636637 HC RX REV CODE- 636/637: Performed by: FAMILY MEDICINE

## 2022-08-10 PROCEDURE — 71045 X-RAY EXAM CHEST 1 VIEW: CPT

## 2022-08-10 PROCEDURE — 74177 CT ABD & PELVIS W/CONTRAST: CPT

## 2022-08-10 PROCEDURE — 77010033678 HC OXYGEN DAILY

## 2022-08-10 PROCEDURE — 80307 DRUG TEST PRSMV CHEM ANLYZR: CPT

## 2022-08-10 PROCEDURE — 86141 C-REACTIVE PROTEIN HS: CPT

## 2022-08-10 PROCEDURE — 74011250636 HC RX REV CODE- 250/636

## 2022-08-10 PROCEDURE — 82140 ASSAY OF AMMONIA: CPT

## 2022-08-10 PROCEDURE — 82962 GLUCOSE BLOOD TEST: CPT

## 2022-08-10 RX ORDER — POLYETHYLENE GLYCOL 3350 17 G/17G
17 POWDER, FOR SOLUTION ORAL DAILY
Status: DISCONTINUED | OUTPATIENT
Start: 2022-08-10 | End: 2022-08-12

## 2022-08-10 RX ORDER — MAGNESIUM SULFATE 100 %
16 CRYSTALS MISCELLANEOUS AS NEEDED
Status: DISCONTINUED | OUTPATIENT
Start: 2022-08-10 | End: 2022-08-13 | Stop reason: HOSPADM

## 2022-08-10 RX ORDER — AMIODARONE HYDROCHLORIDE 150 MG/3ML
150 INJECTION, SOLUTION INTRAVENOUS
Status: COMPLETED | OUTPATIENT
Start: 2022-08-10 | End: 2022-08-10

## 2022-08-10 RX ORDER — ATORVASTATIN CALCIUM 40 MG/1
80 TABLET, FILM COATED ORAL DAILY
Status: DISCONTINUED | OUTPATIENT
Start: 2022-08-10 | End: 2022-08-13 | Stop reason: HOSPADM

## 2022-08-10 RX ORDER — PROMETHAZINE HYDROCHLORIDE 25 MG/1
12.5 SUPPOSITORY RECTAL
Status: DISCONTINUED | OUTPATIENT
Start: 2022-08-10 | End: 2022-08-13 | Stop reason: HOSPADM

## 2022-08-10 RX ORDER — BUMETANIDE 1 MG/1
1 TABLET ORAL
Status: DISCONTINUED | OUTPATIENT
Start: 2022-08-10 | End: 2022-08-12

## 2022-08-10 RX ORDER — SODIUM CHLORIDE 0.9 % (FLUSH) 0.9 %
5-10 SYRINGE (ML) INJECTION AS NEEDED
Status: DISCONTINUED | OUTPATIENT
Start: 2022-08-10 | End: 2022-08-13 | Stop reason: HOSPADM

## 2022-08-10 RX ORDER — SODIUM CHLORIDE 0.9 % (FLUSH) 0.9 %
5-40 SYRINGE (ML) INJECTION EVERY 8 HOURS
Status: DISCONTINUED | OUTPATIENT
Start: 2022-08-10 | End: 2022-08-13 | Stop reason: HOSPADM

## 2022-08-10 RX ORDER — LISINOPRIL 40 MG/1
40 TABLET ORAL DAILY
Status: DISCONTINUED | OUTPATIENT
Start: 2022-08-10 | End: 2022-08-13

## 2022-08-10 RX ORDER — MAGNESIUM SULFATE HEPTAHYDRATE 40 MG/ML
INJECTION, SOLUTION INTRAVENOUS
Status: COMPLETED
Start: 2022-08-10 | End: 2022-08-10

## 2022-08-10 RX ORDER — SERTRALINE HYDROCHLORIDE 50 MG/1
50 TABLET, FILM COATED ORAL DAILY
Status: DISCONTINUED | OUTPATIENT
Start: 2022-08-10 | End: 2022-08-13 | Stop reason: HOSPADM

## 2022-08-10 RX ORDER — AMLODIPINE BESYLATE 10 MG/1
10 TABLET ORAL DAILY
Status: DISCONTINUED | OUTPATIENT
Start: 2022-08-10 | End: 2022-08-13 | Stop reason: HOSPADM

## 2022-08-10 RX ORDER — FUROSEMIDE 10 MG/ML
20 INJECTION INTRAMUSCULAR; INTRAVENOUS ONCE
Status: COMPLETED | OUTPATIENT
Start: 2022-08-10 | End: 2022-08-10

## 2022-08-10 RX ORDER — BUMETANIDE 0.25 MG/ML
1 INJECTION INTRAMUSCULAR; INTRAVENOUS 2 TIMES DAILY
Status: DISCONTINUED | OUTPATIENT
Start: 2022-08-11 | End: 2022-08-12

## 2022-08-10 RX ORDER — ONDANSETRON 2 MG/ML
8 INJECTION INTRAMUSCULAR; INTRAVENOUS ONCE
Status: COMPLETED | OUTPATIENT
Start: 2022-08-10 | End: 2022-08-10

## 2022-08-10 RX ORDER — ACETAMINOPHEN 650 MG/1
650 SUPPOSITORY RECTAL
Status: COMPLETED | OUTPATIENT
Start: 2022-08-10 | End: 2022-08-10

## 2022-08-10 RX ORDER — CARVEDILOL 12.5 MG/1
12.5 TABLET ORAL 2 TIMES DAILY WITH MEALS
Status: DISCONTINUED | OUTPATIENT
Start: 2022-08-10 | End: 2022-08-10

## 2022-08-10 RX ORDER — METFORMIN HYDROCHLORIDE 500 MG/1
500 TABLET ORAL DAILY
Status: DISCONTINUED | OUTPATIENT
Start: 2022-08-10 | End: 2022-08-10

## 2022-08-10 RX ORDER — CARVEDILOL 3.12 MG/1
3.12 TABLET ORAL 2 TIMES DAILY WITH MEALS
Status: DISCONTINUED | OUTPATIENT
Start: 2022-08-10 | End: 2022-08-11

## 2022-08-10 RX ORDER — MAGNESIUM SULFATE HEPTAHYDRATE 40 MG/ML
2 INJECTION, SOLUTION INTRAVENOUS ONCE
Status: COMPLETED | OUTPATIENT
Start: 2022-08-10 | End: 2022-08-10

## 2022-08-10 RX ORDER — INSULIN LISPRO 100 [IU]/ML
INJECTION, SOLUTION INTRAVENOUS; SUBCUTANEOUS
Status: DISCONTINUED | OUTPATIENT
Start: 2022-08-10 | End: 2022-08-13 | Stop reason: HOSPADM

## 2022-08-10 RX ORDER — ACETAMINOPHEN 500 MG
500 TABLET ORAL
Status: DISCONTINUED | OUTPATIENT
Start: 2022-08-10 | End: 2022-08-13 | Stop reason: HOSPADM

## 2022-08-10 RX ORDER — INSULIN GLARGINE 100 [IU]/ML
15 INJECTION, SOLUTION SUBCUTANEOUS
Status: DISCONTINUED | OUTPATIENT
Start: 2022-08-10 | End: 2022-08-12

## 2022-08-10 RX ORDER — HYDROXYZINE HYDROCHLORIDE 10 MG/1
10 TABLET, FILM COATED ORAL
Status: DISCONTINUED | OUTPATIENT
Start: 2022-08-10 | End: 2022-08-13 | Stop reason: HOSPADM

## 2022-08-10 RX ORDER — ALLOPURINOL 100 MG/1
100 TABLET ORAL DAILY
Status: DISCONTINUED | OUTPATIENT
Start: 2022-08-10 | End: 2022-08-13 | Stop reason: HOSPADM

## 2022-08-10 RX ORDER — LEVOTHYROXINE SODIUM 150 UG/1
150 TABLET ORAL
Status: DISCONTINUED | OUTPATIENT
Start: 2022-08-10 | End: 2022-08-13 | Stop reason: HOSPADM

## 2022-08-10 RX ORDER — DEXTROSE MONOHYDRATE 100 MG/ML
0-250 INJECTION, SOLUTION INTRAVENOUS AS NEEDED
Status: DISCONTINUED | OUTPATIENT
Start: 2022-08-10 | End: 2022-08-13 | Stop reason: HOSPADM

## 2022-08-10 RX ORDER — SODIUM CHLORIDE 0.9 % (FLUSH) 0.9 %
5-40 SYRINGE (ML) INJECTION AS NEEDED
Status: DISCONTINUED | OUTPATIENT
Start: 2022-08-10 | End: 2022-08-13 | Stop reason: HOSPADM

## 2022-08-10 RX ADMIN — Medication 15 UNITS: at 21:10

## 2022-08-10 RX ADMIN — CARVEDILOL 3.12 MG: 3.12 TABLET, FILM COATED ORAL at 16:56

## 2022-08-10 RX ADMIN — POLYETHYLENE GLYCOL 3350 17 G: 17 POWDER, FOR SOLUTION ORAL at 19:00

## 2022-08-10 RX ADMIN — PIPERACILLIN AND TAZOBACTAM 3.38 G: 3; .375 INJECTION, POWDER, FOR SOLUTION INTRAVENOUS at 13:28

## 2022-08-10 RX ADMIN — IOPAMIDOL 72 ML: 755 INJECTION, SOLUTION INTRAVENOUS at 06:11

## 2022-08-10 RX ADMIN — VANCOMYCIN HYDROCHLORIDE 1000 MG: 1 INJECTION, POWDER, LYOPHILIZED, FOR SOLUTION INTRAVENOUS at 06:33

## 2022-08-10 RX ADMIN — VANCOMYCIN HYDROCHLORIDE 750 MG: 750 INJECTION, POWDER, LYOPHILIZED, FOR SOLUTION INTRAVENOUS at 20:59

## 2022-08-10 RX ADMIN — PIPERACILLIN SODIUM AND TAZOBACTAM SODIUM 3.38 G: 3; .375 INJECTION, POWDER, LYOPHILIZED, FOR SOLUTION INTRAVENOUS at 04:47

## 2022-08-10 RX ADMIN — SODIUM CHLORIDE 1000 ML: 9 INJECTION, SOLUTION INTRAVENOUS at 03:20

## 2022-08-10 RX ADMIN — MAGNESIUM SULFATE HEPTAHYDRATE 2 G: 40 INJECTION, SOLUTION INTRAVENOUS at 04:33

## 2022-08-10 RX ADMIN — Medication 0.06 UNITS: at 13:28

## 2022-08-10 RX ADMIN — Medication 6 UNITS: at 21:09

## 2022-08-10 RX ADMIN — ONDANSETRON 8 MG: 2 INJECTION INTRAMUSCULAR; INTRAVENOUS at 10:22

## 2022-08-10 RX ADMIN — AMIODARONE HYDROCHLORIDE 150 MG: 50 INJECTION, SOLUTION INTRAVENOUS at 05:34

## 2022-08-10 RX ADMIN — PROMETHAZINE HYDROCHLORIDE 12.5 MG: 25 SUPPOSITORY RECTAL at 12:28

## 2022-08-10 RX ADMIN — SODIUM CHLORIDE, PRESERVATIVE FREE 10 ML: 5 INJECTION INTRAVENOUS at 13:30

## 2022-08-10 RX ADMIN — ACETAMINOPHEN 650 MG: 650 SUPPOSITORY RECTAL at 03:19

## 2022-08-10 RX ADMIN — Medication 0.06 UNITS: at 16:56

## 2022-08-10 RX ADMIN — PIPERACILLIN AND TAZOBACTAM 3.38 G: 3; .375 INJECTION, POWDER, FOR SOLUTION INTRAVENOUS at 22:21

## 2022-08-10 RX ADMIN — SODIUM CHLORIDE, PRESERVATIVE FREE 10 ML: 5 INJECTION INTRAVENOUS at 21:03

## 2022-08-10 RX ADMIN — FUROSEMIDE 20 MG: 10 INJECTION, SOLUTION INTRAMUSCULAR; INTRAVENOUS at 10:21

## 2022-08-10 NOTE — PROGRESS NOTES
Contacted RN, notified that patient had an episode of emesis despite receiving Zofran this morning. Given aspiration risk, plans to hold off on bedside swallow test and keep patient NPO at this time. Encouraged RN to contact the PFM team if patient has another episode of emesis. Update, 12:15pm  Notified by RN that patient had another emesis, noted to be slightly brown in color. Will administer 1 time dose of phenergan     Update, 1:00pm   Based on bladder scan findings, decision made to place a cruz.      I contacted inpatient pharmacy today, they indicate based on what they can see from 01/2022 to present, patient has not filled her Vesicare which was previously mentioned in [facility] consult note around 1 year ago    Update, 4:36pm  Bedside swallow eval passed, diabetic diet has been started       Becca Curran MD, PGY-2   [address]   [facility] Senior Pager: [phone]   August 10, 2022, 12:11 PM

## 2022-08-10 NOTE — ROUTINE PROCESS
TRANSFER - OUT REPORT:    Verbal report given to Melonie(name) on Renee Ledesma  being transferred to [facility](unit) for routine progression of care       Report consisted of patients Situation, Background, Assessment and   Recommendations(SBAR). Information from the following report(s) SBAR was reviewed with the receiving nurse. Lines:   Peripheral IV 08/10/22 Anterior;Right Other(comment) (Active)       Peripheral IV 08/10/22 Left Wrist (Active)       Peripheral IV 08/10/22 Right (Active)       Peripheral IV 30/63/33 Left Basilic (Active)        Opportunity for questions and clarification was provided.       Patient transported with:   [employer]

## 2022-08-10 NOTE — PROGRESS NOTES
Evaluated Ms. Ofe Hollins at bedside in the Emergency Department. She is stable at this time but had repeated runs of tachycardia every 3-5 minutes during my examination. Cardiac monitoring and defibrillator pads are in place. Per nurse, patient is now more alert and less agitated than when she came in. Physical Exam  Constitutional:       Appearance: She is obese. She is ill-appearing. HENT:      Head: Normocephalic and atraumatic. Mouth/Throat:      Mouth: Mucous membranes are dry. Cardiovascular:      Rate and Rhythm: Tachycardia present. Pulses: Normal pulses. Pulmonary:      Effort: No respiratory distress. Breath sounds: No stridor. No wheezing or rhonchi. Abdominal:      General: Abdomen is flat. Palpations: Abdomen is soft. Tenderness: There is abdominal tenderness. There is guarding. There is no rebound. Skin:     General: Skin is warm and dry. Neurological:      Mental Status: She is disoriented.      Plan  -CT broadened to include chest and abdomen  -Transfer care to day team with thorough handoff at 0600    Олег Rodriguez MD  8/10/22 5:44 AM

## 2022-08-10 NOTE — PROGRESS NOTES
[facility] Pharmacokinetic Monitoring Service - Vancomycin     Renee Arana is a 82 y.o. female starting on vancomycin therapy for Urinary Tract Infection. Pharmacy consulted by Dr. Marcus Arciniega for monitoring and adjustment. Target Concentration: Goal AUC/TRINIDAD 400-600 mg*hr/L    Additional Antimicrobials: Piperacillin/Tazobactam    Pertinent Laboratory Values:   Temp: 99.7 °F (37.6 °C)  Weight: 106.6 kg (235 lb)  Recent Labs     08/10/22  1023 08/10/22  0340   CREA 1.35* 1.36*   BUN 21* 24*   WBC 37.9* 25.3*     Estimated Creatinine Clearance: 36.3 mL/min (A) (based on SCr of 1.35 mg/dL (H)). Pertinent Cultures:  Culture Date Source Results   8/10 blood pending   8/10 urine pending   MRSA Nasal Swab: N/A.  Non-respiratory infection    Plan:  Dosing recommendations based on Bayesian software  Start vancomycin 1gm x1 (given), then 750mg q24h  Anticipated AUC of 500 and trough concentration of 15.9 at steady state  Renal labs as indicated   Vancomycin concentration ordered for AM labs tomorrow  Pharmacy will continue to monitor patient and adjust therapy as indicated    Thank you for the consult,  Riana Woo [facility]  8/10/2022

## 2022-08-10 NOTE — PROGRESS NOTES
Reason for Admission:  Sepsis ([facility] [address] [A41.9]                 RUR Score:    14%            Plan for utilizing home health:    no                      Likelihood of Readmission:   LOW                         Transition of Care Plan:              Initial assessment completed with patient. Cognitive status of patient: oriented to time, place, person and situation. Face sheet information confirmed:  yes. The patient designates son Randi Armstrong and boyfriend Ronna Stone to participate in her discharge plan and to receive any needed information. This patient lives in a apartment with patient and boyfriend. Patient is able to navigate steps as needed. Prior to hospitalization, patient was considered to be independent with ADLs/IADLS : yes . Patient has a current ACP document on file: no      The patient and boyfriend will be available to transport patient home upon discharge. The patient already has Rubie Mcardle, W/ERIK, Transfer tub bench,  medical equipment available in the home. Patient is not currently active with home health. Patient has stayed in a skilled nursing facility or rehab. Was  stay within last 60 days : no. This patient is on dialysis :no     Currently, the discharge plan is Home. with family    The patient states that she can obtain her medications from the pharmacy, and take her medications as directed. Patient's current insurance is [employer] Medicare Part [place] and B and  for Life       Care Management Interventions  PCP Verified by CM: Yes  Mode of Transport at Discharge:  Other (see comment) (boyfriend Ronna Stone)  Transition of Care Consult (CM Consult): Discharge Planning  Physical Therapy Consult: Yes  Occupational Therapy Consult: Yes  Support Systems: Spouse/Significant Other, Child(jace)  Confirm Follow Up Transport: Family  Discharge Location  Patient Expects to be Discharged to[de-identified] Home with family assistance         will continue to monitor and assist with transition of care needs.     Eddy Booth, BSN, RN  Care Management

## 2022-08-10 NOTE — CONSULTS
Cardiology Initial Patient Referral Note    Cardiology referral request from Dr. Melisa Belcher for evaluation and management/treatment of NSVT    Date of  Admission: 8/10/2022  2:34 AM   Primary Care Physician:  Mandie Morel MD    Attending Cardiologist: Dr. Eduardo Bass     Patient seen and independently examined. Agree with below with the following comments: Patient admitted with sepsis of unknown etiology. She does not have a known history of coronary artery disease. She did have very short runs of nonsustained VT while in the emergency room, she has had no recurrent episodes while on telemetry. Her EKG does not show worsening ST segment depressions in the setting of tachycardia which may be demand ischemia. Her last stress test was 2 years ago. She has not had any symptoms concerning for angina and her troponin level was negative. Agree with repeating an echocardiogram to reevaluate her LV function. If this is unremarkable, I would not pursue any further inpatient work-up. I would recommend that the patient follow-up with her outpatient cardiologist for repeat stress testing at that time once her acute medical issues resolved. Piedad Stein MD     Assessment:     Hospital Problems  Date Reviewed: 10/24/2021            Codes Class Noted POA    * (Principal) Sepsis [facility]) ICD-10-CM: A41.9  ICD-9-CM: 038.9, 995.91  8/10/2022 Unknown         -AMS, in setting of sepsis. CT Head negative for acute process. -Sepsis, UTI vs PNA. -Sinus Tachycardia with intermittent episodes of NSVT. Elevated rates likely physiologic in setting of sepsis. Initial troponin negative. -AQUILES, Baseline Scr 0.87-1.1  -HFpEF. Chest CT with vascular congestion. Echo (08/2020) Normal LVEF, Grade 2 LV DD   Negative NST (08/2020)   -HTN, on Amlodipine, Coreg, Lisinopril as outpatient. -HLD, on statin.   -DM2, uncontrolled. -Asthma.   -Hypothyroid, on replacement. -H/o DVT (06/2022) on Xarelto  -Family h/o CAD.    -H/o Tobacco abuse.   -Obesity. Primary cardiologist Dr. Flores Door:     -Tele reviewed in the ED, sinus tachycardia with intermittent episodes of NSVT noted, longest run approximately 5 beats. Continue to monitor rhythm on tele while admitted. Would not aggressively treat tachycardia, which is likely physiologic in setting of underlying infection. -Monitor and replace electrolytes as needed, recommend maintaining K+ at 4.0 and Mg at 2.0.   -Continued on Coreg. Hold Lisinopril given renal function.   -Echo and ECG pending.   -Continue statin.    -Continue diuresis as renal function and hemodynamics allow. Monitor strict I/Os and electrolytes. -IV abx per primary team.   -Continued on Xarelto for h/o DVT. -Further recommendations pending hospital course/test results. History of Present Illness: This is a 82 y.o. female admitted for Sepsis ([facility] [address] [A41.9]. Patient complains of: Glen Celis is a 82 y.o. female, pmhx as stated above, who we are seeing for NSVT. Patient presented to the ED for AMS and fall. Patient is unable to provide any pertinent hx d/t AMS. Currently she is alert to self only. Patient tachycardic and febrile in the ER. She was tachycardic had short bursts of NSVT . On tele she appeared to be in sinus. Currently she is without any complaints except for fatigue. Cardiac risk factors: dyslipidemia, diabetes mellitus, obesity, sedentary life style, hypertension, post-menopausal    Review of Symptoms:  not obtained d/t AMS.       Past Medical History:     Past Medical History:   Diagnosis Date    Acquired hypothyroidism 8/1/2016    Arthritis of right shoulder region 4/21/2016    Asthma     Bilateral lower extremity edema 7/7/2021    Chronic bilateral low back pain without sciatica 7/7/2021    Chronic diastolic congestive heart failure ([address] 3/10/2021    Chronic venous insufficiency     Diabetes ([address]     neuropathy    Essential hypertension 7/7/2021    Gout History of depression 2017    History of fall 2021    Hypercholesteremia 2021    Hypertension     MI (myocardial infarction) ([facility]     OAB (overactive bladder) 2021    Peripheral neuropathy     Rheumatoid arthritis ([facility]     Tear of right rotator cuff 2016    Thyroid pain     Urinary incontinence 2021    Vertigo          Social History:     Social History     Socioeconomic History    Marital status:    Tobacco Use    Smoking status: Former     Types: Cigarettes     Quit date:      Years since quittin.6    Smokeless tobacco: Never    Tobacco comments:     quit 45 years ago   Vaping Use    Vaping Use: Never used   Substance and Sexual Activity    Alcohol use: No     Alcohol/week: 0.0 standard drinks    Drug use: No    Sexual activity: Not Currently        Family History:     Family History   Problem Relation Age of Onset    Heart Attack Mother     Heart Attack Father         Medications:   No Known Allergies     Current Facility-Administered Medications   Medication Dose Route Frequency    sodium chloride (NS) flush 5-10 mL  5-10 mL IntraVENous PRN    sodium chloride (NS) flush 5-40 mL  5-40 mL IntraVENous Q8H    sodium chloride (NS) flush 5-40 mL  5-40 mL IntraVENous PRN    insulin lispro (HUMALOG) injection   SubCUTAneous AC&HS    glucose chewable tablet 16 g  16 g Oral PRN    glucagon (GLUCAGEN) injection 1 mg  1 mg IntraMUSCular PRN    dextrose 10% infusion 0-250 mL  0-250 mL IntraVENous PRN    [Held by provider] allopurinoL (ZYLOPRIM) tablet 100 mg  100 mg Oral DAILY    acetaminophen (TYLENOL) tablet 500 mg  500 mg Oral Q6H PRN    [Held by provider] amLODIPine (NORVASC) tablet 10 mg  10 mg Oral DAILY    [Held by provider] atorvastatin (LIPITOR) tablet 80 mg  80 mg Oral DAILY    [Held by provider] bumetanide (BUMEX) tablet 1 mg  1 mg Oral BID PRN    [Held by provider] carvediloL (COREG) tablet 12.5 mg  12.5 mg Oral BID WITH MEALS    [Held by provider] hydrOXYzine HCL (ATARAX) tablet 10 mg  10 mg Oral BID PRN    insulin glargine (LANTUS) injection 15 Units  15 Units SubCUTAneous QHS    [Held by provider] lisinopriL (PRINIVIL, ZESTRIL) tablet 40 mg  40 mg Oral DAILY    [Held by provider] metFORMIN (GLUCOPHAGE) tablet 500 mg  500 mg Oral DAILY    rivaroxaban (XARELTO) tablet 20 mg  20 mg Oral DAILY WITH BREAKFAST    sertraline (ZOLOFT) tablet 50 mg  50 mg Oral DAILY    levothyroxine (SYNTHROID) tablet 150 mcg  150 mcg Oral 6am     Current Outpatient Medications   Medication Sig    rivaroxaban (XARELTO) 15 mg tab tablet Take 15 mg by mouth two (2) times a day. Lantus Solostar U-100 Insulin 100 unit/mL (3 mL) inpn 15 Units by SubCUTAneous route nightly. insulin aspart U-100 (NovoLOG Flexpen U-100 Insulin) 100 unit/mL (3 mL) inpn 5 Units by SubCUTAneous route Before breakfast, lunch, and dinner. levothyroxine (SYNTHROID) 150 mcg tablet Take 150 mcg by mouth daily. morphine IR (MS IR) 15 mg tablet Take 15 mg by mouth two (2) times a day. metFORMIN (GLUCOPHAGE) 500 mg tablet Take 500 mg by mouth daily. hydrOXYzine HCL (ATARAX) 10 mg tablet Take 1 Tablet by mouth two (2) times daily as needed for Anxiety. allopurinoL (ZYLOPRIM) 100 mg tablet Take 100 mg by mouth daily. lisinopriL (PRINIVIL, ZESTRIL) 40 mg tablet Take 1 tablet by mouth once daily    sertraline (ZOLOFT) 50 mg tablet Take 1 Tablet by mouth daily. gabapentin (NEURONTIN) 400 mg capsule Take 1 cap in morning, 1 cap at 2pm, 2 caps at bedtime (Patient taking differently: Take 400 mg by mouth three (3) times daily.)    carvediloL (COREG) 12.5 mg tablet Take 1 Tablet by mouth two (2) times daily (with meals). bumetanide (BUMEX) 2 mg tablet Take 0.5 Tablets by mouth two (2) times daily as needed (swelling). amLODIPine (NORVASC) 10 mg tablet Take 1 Tablet by mouth daily. atorvastatin (LIPITOR) 80 mg tablet Take 1 Tablet by mouth daily.     Blood-Glucose Meter (FREESTYLE LITE METER) monitoring kit Test twice blood glucose daily; ICD-10 E11.9; Quantity 1 (Patient not taking: No sig reported)    insulin syringe,safetyneedle 1 mL 31 gauge x 5/16\" syrg 1 Each by Does Not Apply route daily. (Patient not taking: No sig reported)    glucose blood VI test strips (FREESTYLE TEST) strip Use to check blood glucose twice daily DX:E11.9 (Patient not taking: No sig reported)    acetaminophen (TYLENOL) 500 mg tablet Take 1 Tab by mouth every six (6) hours as needed for Pain. Physical Exam:   Visit Vitals  BP (!) 165/90   Pulse (!) 158   Temp 99.1 °F (37.3 °C)   Resp (!) 34   Ht 5' 4\" (1.626 m)   Wt 106.6 kg (235 lb)   SpO2 93%   BMI 40.34 kg/m²       TELE: Sinus Tachycardia, NSVT, PVCs     BP Readings from Last 3 Encounters:   08/10/22 (!) 165/90   07/29/22 134/74   07/25/22 132/68     Pulse Readings from Last 3 Encounters:   08/10/22 (!) 158   07/29/22 68   07/25/22 84     Wt Readings from Last 3 Encounters:   08/10/22 106.6 kg (235 lb)   05/27/22 106.4 kg (234 lb 9.6 oz)   11/09/21 109 kg (240 lb 3.2 oz)       General:  alert, cooperative, no distress, appears stated age, confused  Neck:  no JVD  Lungs:  diminished B/L, few expiratory wheezes   Heart:  regular rate and rhythm, S1, S2 normal, no murmur, click, rub or gallop  Abdomen:  abdomen is soft, slightly TTP suprapubic region   Extremities:  trace B/L LE edema, venous stasis   Skin: Warm and dry.  no hyperpigmentation, vitiligo, or suspicious lesions  Neuro: alert to self only   Psych: non focal     Data Review:     Recent Labs     08/10/22  0340   WBC 25.3*   HGB 12.6   HCT 38.3        Recent Labs     08/10/22  0340      K 5.2      CO2 26   *   BUN 24*   CREA 1.36*   CA 9.1   MG 1.8   ALB 3.7   ALT 13       Results for orders placed or performed during the hospital encounter of 08/10/22   EKG, 12 LEAD, INITIAL   Result Value Ref Range    Ventricular Rate 114 BPM    Atrial Rate 114 BPM    P-R Interval 186 ms    QRS Duration 100 ms Q-T Interval 312 ms    QTC Calculation (Bezet) 430 ms    Calculated P Axis 80 degrees    Calculated R Axis 15 degrees    Calculated T Axis 116 degrees    Diagnosis       Sinus tachycardia  ST & T wave abnormality, consider lateral ischemia  Abnormal ECG  When compared with ECG of 10-AUG-2022 02:46,  premature ventricular complexes are no longer present         All Cardiac Markers in the last 24 hours:  No results found for: CPK, CK, CKMMB, CKMB, RCK3, CKMBT, CKNDX, CKND1, GABRIEL, TROPT, TROIQ, KRUPA, TROPT, TNIPOC, BNP, BNPP    Last Lipid:    Lab Results   Component Value Date/Time    Cholesterol, total 191 11/09/2021 12:24 PM    HDL Cholesterol 44 11/09/2021 12:24 PM    LDL, calculated 96.8 11/09/2021 12:24 PM    Triglyceride 251 (H) 11/09/2021 12:24 PM    CHOL/HDL Ratio 4.3 11/09/2021 12:24 PM       Cardiographics:     EKG Results       Procedure 720 Value Units Date/Time    EKG, 12 LEAD, INITIAL [[id number]] Collected: 08/10/22 0410    Order Status: Completed Updated: 08/10/22 0412     Ventricular Rate 114 BPM      Atrial Rate 114 BPM      P-R Interval 186 ms      QRS Duration 100 ms      Q-T Interval 312 ms      QTC Calculation (Bezet) 430 ms      Calculated P Axis 80 degrees      Calculated R Axis 15 degrees      Calculated T Axis 116 degrees      Diagnosis --     Sinus tachycardia  ST & T wave abnormality, consider lateral ischemia  Abnormal ECG  When compared with ECG of 10-AUG-2022 02:46,  premature ventricular complexes are no longer present            08/21/20    ECHO ADULT COMPLETE 08/21/2020 8/21/2020    Interpretation Summary  · LV: Estimated LVEF is 60 - 65%. Normal cavity size and systolic function (ejection fraction normal). Moderate concentric hypertrophy. Wall motion: normal. Moderate (grade 2) left ventricular diastolic dysfunction. · LA: Mildly dilated left atrium. Left Atrium volume index is 37.31 mL/m2. · RV: Mildly dilated right ventricle. · RA: Mildly dilated right atrium.   · AV: Mild aortic valve regurgitation is present. · MV: Mitral valve thickening. Mild mitral valve regurgitation is present. · PA: Pulmonary arterial systolic pressure is 36 mmHg. Signed by: Theresa Foley MD on 8/21/2020 12:40 PM      08/21/20    NUCLEAR CARDIAC STRESS TEST 08/21/2020 8/21/2020    Interpretation Summary  · Baseline ECG: Normal sinus rhythm. · Negative stress test.  · Gated SPECT: Left ventricular function post-stress was normal. Calculated ejection fraction is 56%. There is no evidence of transient ischemic dilation (TID). The TID ratio is 1.09.  · Negative myocardial perfusion imaging. Myocardial perfusion imaging supports a low risk stress test.    Signed by: Theresa Foley MD on 8/21/2020 12:43 PM        XR Results (most recent):  Results from [place] encounter on 08/10/22    XR CHEST PORT    Narrative  Examination: Portable AP chest    History: Infection    Comparison: December 7, 2020    Findings: There is pulmonary vascular congestion without overt edema. No pleural  effusion. No pneumothorax. Mild cardiomegaly is stable. Atherosclerosis of  aortic arch. Impression  1. Pulmonary vascular congestion.         Signed By: Jose Hinton PA-C     August 10, 2022

## 2022-08-10 NOTE — ED PROVIDER NOTES
EMERGENCY DEPARTMENT HISTORY AND PHYSICAL EXAM    I have evaluated the patient at 2:51 AM      Date: 8/10/2022  Patient Name: New Ribera    History of Presenting Illness     Chief Complaint   Patient presents with    Altered mental status         History Provided By: Patient, EMS, and neighbor  Location/Duration/Severity/Modifying factors   61-year-old female with history as below presenting to the emergency department for evaluation of altered mental status. Patient currently had a fall at home and called for lift assist.  On arrival, patient was altered and not answering questions appropriately. Blood glucose of 269. Was tachycardic and warm to touch. Per patient's family patient has been fatigued and intermittently altered the past 2 to 3 days. Unfortunately, patient unable to participate in HPI given her altered mental status. She has been admitted previously for infection of her left foot back in May of this year      PCP: Lan Godfrey MD    Current Facility-Administered Medications   Medication Dose Route Frequency Provider Last Rate Last Admin    sodium chloride (NS) flush 5-10 mL  5-10 mL IntraVENous PRN Royanne Ruff, DO        sodium chloride 0.9 % bolus infusion 1,000 mL  1,000 mL IntraVENous ONCE Royanne Ruff, DO        magnesium sulfate 2 g/50 ml IVPB (premix or compounded)  2 g IntraVENous ONCE Royanne Ruff, DO 25 mL/hr at 08/10/22 0433 2 g at 08/10/22 0433    vancomycin (VANCOCIN) 1,000 mg in 0.9% sodium chloride 250 mL (VIAL-MATE)  1,000 mg IntraVENous ONCE Royanne Ruff, DO         Current Outpatient Medications   Medication Sig Dispense Refill    rivaroxaban (XARELTO) 15 mg tab tablet Take 15 mg by mouth two (2) times a day. Lantus Solostar U-100 Insulin 100 unit/mL (3 mL) inpn 15 Units by SubCUTAneous route nightly.  1 Pen 0    insulin aspart U-100 (NovoLOG Flexpen U-100 Insulin) 100 unit/mL (3 mL) inpn 5 Units by SubCUTAneous route Before breakfast, lunch, and dinner. 1 Pen 0    levothyroxine (SYNTHROID) 150 mcg tablet Take 150 mcg by mouth daily. morphine IR (MS IR) 15 mg tablet Take 15 mg by mouth two (2) times a day. metFORMIN (GLUCOPHAGE) 500 mg tablet Take 500 mg by mouth daily. hydrOXYzine HCL (ATARAX) 10 mg tablet Take 1 Tablet by mouth two (2) times daily as needed for Anxiety. allopurinoL (ZYLOPRIM) 100 mg tablet Take 100 mg by mouth daily. lisinopriL (PRINIVIL, ZESTRIL) 40 mg tablet Take 1 tablet by mouth once daily 90 Tablet 0    sertraline (ZOLOFT) 50 mg tablet Take 1 Tablet by mouth daily. 90 Tablet 3    gabapentin (NEURONTIN) 400 mg capsule Take 1 cap in morning, 1 cap at 2pm, 2 caps at bedtime (Patient taking differently: Take 400 mg by mouth three (3) times daily.) 360 Capsule 0    carvediloL (COREG) 12.5 mg tablet Take 1 Tablet by mouth two (2) times daily (with meals). 180 Tablet 0    bumetanide (BUMEX) 2 mg tablet Take 0.5 Tablets by mouth two (2) times daily as needed (swelling). 180 Tablet 1    amLODIPine (NORVASC) 10 mg tablet Take 1 Tablet by mouth daily. 90 Tablet 0    atorvastatin (LIPITOR) 80 mg tablet Take 1 Tablet by mouth daily. (Patient not taking: Reported on 11/9/2021) 90 Tablet 3    Blood-Glucose Meter (FREESTYLE LITE METER) monitoring kit Test twice blood glucose daily; ICD-10 E11.9; Quantity 1 (Patient not taking: Reported on 11/9/2021) 1 Kit 0    insulin syringe,safetyneedle 1 mL 31 gauge x 5/16\" syrg 1 Each by Does Not Apply route daily. (Patient not taking: Reported on 11/9/2021) 300 Each 3    glucose blood VI test strips (FREESTYLE TEST) strip Use to check blood glucose twice daily DX:E11.9 (Patient not taking: Reported on 11/9/2021) 300 Strip 3    acetaminophen (TYLENOL) 500 mg tablet Take 1 Tab by mouth every six (6) hours as needed for Pain.  270 Tab 3       Past History     Past Medical History:  Past Medical History:   Diagnosis Date    Acquired hypothyroidism 8/1/2016    Arthritis of right shoulder region 2016    Asthma     Bilateral lower extremity edema 2021    Chronic bilateral low back pain without sciatica 2021    Chronic diastolic congestive heart failure ([address] 3/10/2021    Chronic venous insufficiency     Diabetes (HCC)     neuropathy    Essential hypertension 2021    Gout     History of depression 2017    History of fall 2021    Hypercholesteremia 2021    Hypertension     MI (myocardial infarction) ([facility]     OAB (overactive bladder) 2021    Peripheral neuropathy     Rheumatoid arthritis ([facility] [address]     Tear of right rotator cuff 2016    Thyroid pain     Urinary incontinence 2021    Vertigo        Past Surgical History:  Past Surgical History:   Procedure Laterality Date    HX AMPUTATION TOE Right 2020    Great toe Dr. Joshua Constantino [address]      HX CHOLECYSTECTOMY      HX GYN      TAHOophorectomy due to infection    HX HEENT      resection of memegioma in the . HX KNEE REPLACEMENT Bilateral        Family History:  Family History   Problem Relation Age of Onset    Heart Attack Mother     Heart Attack Father        Social History:  Social History     Tobacco Use    Smoking status: Former     Types: Cigarettes     Quit date:      Years since quittin.6    Smokeless tobacco: Never    Tobacco comments:     quit 45 years ago   Vaping Use    Vaping Use: Never used   Substance Use Topics    Alcohol use: No     Alcohol/week: 0.0 standard drinks    Drug use: No       Allergies:  No Known Allergies      Review of Systems       Review of Systems   Unable to perform ROS: Mental status change       Physical Exam   Visit Vitals  BP (!) 168/70 (BP 1 Location: Right upper arm, BP Patient Position: At rest;Lying)   Pulse (!) 124   Temp (!) 103 °F (39.4 °C)   Resp 24   Ht 5' 4\" (1.626 m)   Wt 106.6 kg (235 lb)   SpO2 100%   BMI 40.34 kg/m²         Physical Exam  Constitutional:       General: She is not in acute distress.      Appearance: She is not toxic-appearing. HENT:      Head: Normocephalic and atraumatic. Mouth/Throat:      Mouth: Mucous membranes are moist.   Eyes:      Extraocular Movements: Extraocular movements intact. Pupils: Pupils are equal, round, and reactive to light. Cardiovascular:      Rate and Rhythm: Regular rhythm. Tachycardia present. Heart sounds: Normal heart sounds. No murmur heard. No friction rub. No gallop. Pulmonary:      Effort: Pulmonary effort is normal.      Breath sounds: Normal breath sounds. Abdominal:      General: There is no distension. Palpations: Abdomen is soft. There is no mass. Tenderness: There is no abdominal tenderness. There is no guarding. Hernia: No hernia is present. Musculoskeletal:         General: No swelling, tenderness or deformity. Cervical back: Normal range of motion and neck supple. Comments: R foot great toe amputation   Skin:     General: Skin is warm and dry. Findings: No rash. Comments: Healing ulcer on patient's left foot   Neurological:      General: No focal deficit present. Mental Status: She is alert. She is disoriented. Sensory: No sensory deficit. Motor: No weakness.          Diagnostic Study Results     Labs -  Recent Results (from the past 12 hour(s))   POC LACTIC ACID    Collection Time: 08/10/22  3:11 AM   Result Value Ref Range    Lactic Acid (POC) 1.53 0.40 - 4.16 mmol/L   METABOLIC PANEL, COMPREHENSIVE    Collection Time: 08/10/22  3:40 AM   Result Value Ref Range    Sodium 137 136 - 145 mmol/L    Potassium 5.2 3.5 - 5.5 mmol/L    Chloride 103 100 - 111 mmol/L    CO2 26 21 - 32 mmol/L    Anion gap 8 3.0 - 18 mmol/L    Glucose 226 (H) 74 - 99 mg/dL    BUN 24 (H) 7.0 - 18 MG/DL    Creatinine 1.36 (H) 0.6 - 1.3 MG/DL    BUN/Creatinine ratio 18 12 - 20      GFR est AA 45 (L) >60 ml/min/1.73m2    GFR est non-AA 37 (L) >60 ml/min/1.73m2    Calcium 9.1 8.5 - 10.1 MG/DL    Bilirubin, total 0.6 0.2 - 1.0 MG/DL ALT (SGPT) 13 13 - 56 U/L    AST (SGOT) 45 (H) 10 - 38 U/L    Alk. phosphatase 66 45 - 117 U/L    Protein, total 8.3 (H) 6.4 - 8.2 g/dL    Albumin 3.7 3.4 - 5.0 g/dL    Globulin 4.6 (H) 2.0 - 4.0 g/dL    A-G Ratio 0.8 0.8 - 1.7     CBC WITH AUTOMATED DIFF    Collection Time: 08/10/22  3:40 AM   Result Value Ref Range    WBC 25.3 (H) 4.6 - 13.2 K/uL    RBC 4.42 4.20 - 5.30 M/uL    HGB 12.6 12.0 - 16.0 g/dL    HCT 38.3 35.0 - 45.0 %    MCV 86.7 78.0 - 100.0 FL    MCH 28.5 24.0 - 34.0 PG    MCHC 32.9 31.0 - 37.0 g/dL    RDW 12.9 11.6 - 14.5 %    PLATELET 834 533 - 846 K/uL    MPV 12.6 (H) 9.2 - 11.8 FL    NRBC 0.0 0  WBC    ABSOLUTE NRBC 0.00 0.00 - 0.01 K/uL    NEUTROPHILS 81 (H) 40 - 73 %    BAND NEUTROPHILS 5 0 - 5 %    LYMPHOCYTES 4 (L) 21 - 52 %    MONOCYTES 10 3 - 10 %    EOSINOPHILS 0 0 - 5 %    BASOPHILS 0 0 - 2 %    IMMATURE GRANULOCYTES 0 %    ABS. NEUTROPHILS 21.8 (H) 1.8 - 8.0 K/UL    ABS. LYMPHOCYTES 1.0 0.9 - 3.6 K/UL    ABS. MONOCYTES 2.5 (H) 0.05 - 1.2 K/UL    ABS. EOSINOPHILS 0.0 0.0 - 0.4 K/UL    ABS. BASOPHILS 0.0 0.0 - 0.1 K/UL    ABS. IMM.  GRANS. 0.0 K/UL    DF MANUAL      PLATELET COMMENTS ADEQUATE PLATELETS      RBC COMMENTS NORMOCYTIC, NORMOCHROMIC     TROPONIN-HIGH SENSITIVITY    Collection Time: 08/10/22  3:40 AM   Result Value Ref Range    Troponin-High Sensitivity 16 0 - 54 ng/L   MAGNESIUM    Collection Time: 08/10/22  3:40 AM   Result Value Ref Range    Magnesium 1.8 1.6 - 2.6 mg/dL   NT-PRO BNP    Collection Time: 08/10/22  3:40 AM   Result Value Ref Range    NT pro-BNP 1,164 0 - 1,800 PG/ML   EKG, 12 LEAD, INITIAL    Collection Time: 08/10/22  4:10 AM   Result Value Ref Range    Ventricular Rate 114 BPM    Atrial Rate 114 BPM    P-R Interval 186 ms    QRS Duration 100 ms    Q-T Interval 312 ms    QTC Calculation (Bezet) 430 ms    Calculated P Axis 80 degrees    Calculated R Axis 15 degrees    Calculated T Axis 116 degrees    Diagnosis       Sinus tachycardia  ST & T wave abnormality, consider lateral ischemia  Abnormal ECG  When compared with ECG of 10-AUG-2022 02:46,  premature ventricular complexes are no longer present         Radiologic Studies -   XR CHEST PORT    (Results Pending)   CT HEAD WO CONT    (Results Pending)         Medical Decision Making   I am the first provider for this patient. I reviewed the vital signs, available nursing notes, past medical history, past surgical history, family history and social history. Vital Signs-Reviewed the patient's vital signs. EKG: sinus tachycardia with right bundle branch block morphology    Records Reviewed: Nursing Notes, Old Medical Records, Previous electrocardiograms, Previous Radiology Studies, and Previous Laboratory Studies (Time of Review: 2:51 AM)    ED Course: Progress Notes, Reevaluation, and Consults:         Provider Notes (Medical Decision Making):   MDM  Number of Diagnoses or Management Options  Diagnosis management comments: 62-year-old female presenting to the emergency department for evaluation of altered mental status. She is tachycardic and febrile meeting sepsis criteria. No obvious source per exam.  Lactic acid is negative. Blood cultures and IV fluids initiated. Screening lab work cardiac enzymes, chest x-ray urinalysis sent. Will obtain CT head. Rectal Tylenol administered. [id number]; Patient had recent episode of ventricular tachycardia that spontaneously resolved. Back to sinus tachycardia at a rate of 111. Will continue to closely monitor. 1899:  Patient has grossly elevated white count at 25.3. Broad-spectrum IV antibiotics started. Mild AQUILES with creatinine of 1.3. Troponin is negative at 16. BNP is negative. She has had 4 more episodes of ventricular tachycardia all spontaneously resolving approximately after 10 seconds. Discussed the case with cardiology who will follow along during admission. Recommendation is for amiodarone bolus for continued arrhythmia.   She is already receiving 2 mg of IV magnesium at this time. Discussed case with [facility] who accept to their service for ongoing monitoring and management at this time. Procedures    Critical Care Time: Critical Care Time:  The services I provided to this patient were to treat and/or prevent clinically significant deterioration that could result in the failure of one or more body systems and/or organ systems due to sepsis, ventricular tachyarrhythmia . Services included the following:  -reviewing nursing notes and old charts  -vital sign assessments  -direct patient care  -medication orders and management  -interpreting and reviewing diagnostic studies/labs  -re-evaluations  -documentation time    Aggregate critical care time was 73 minutes, which includes only time during which I was engaged in work directly related to the patient's care as described above, whether I was at bedside or elsewhere in the Emergency Department. It did not include time spent performing other reported procedures or the services of residents, students, nurses, or advance practice providers. Milana Decree DO    5:15 AM        Diagnosis     Clinical Impression:   1. Sepsis, due to unspecified organism, unspecified whether acute organ dysfunction present ([facility] [address]    2. Altered mental status, unspecified altered mental status type        Disposition: admitted, telemetry    Follow-up Information    None          Patient's Medications   Start Taking    No medications on file   Continue Taking    ACETAMINOPHEN (TYLENOL) 500 MG TABLET    Take 1 Tab by mouth every six (6) hours as needed for Pain. ALLOPURINOL (ZYLOPRIM) 100 MG TABLET    Take 100 mg by mouth daily. AMLODIPINE (NORVASC) 10 MG TABLET    Take 1 Tablet by mouth daily. ATORVASTATIN (LIPITOR) 80 MG TABLET    Take 1 Tablet by mouth daily.     BLOOD-GLUCOSE METER (FREESTYLE LITE METER) MONITORING KIT    Test twice blood glucose daily; ICD-10 E11.9; Quantity 1    BUMETANIDE (BUMEX) 2 MG TABLET    Take 0.5 Tablets by mouth two (2) times daily as needed (swelling). CARVEDILOL (COREG) 12.5 MG TABLET    Take 1 Tablet by mouth two (2) times daily (with meals). GABAPENTIN (NEURONTIN) 400 MG CAPSULE    Take 1 cap in morning, 1 cap at 2pm, 2 caps at bedtime    GLUCOSE BLOOD VI TEST STRIPS (FREESTYLE TEST) STRIP    Use to check blood glucose twice daily DX:E11.9    HYDROXYZINE HCL (ATARAX) 10 MG TABLET    Take 1 Tablet by mouth two (2) times daily as needed for Anxiety. INSULIN ASPART U-100 (NOVOLOG FLEXPEN U-100 INSULIN) 100 UNIT/ML (3 ML) INPN    5 Units by SubCUTAneous route Before breakfast, lunch, and dinner. INSULIN SYRINGE,SAFETYNEEDLE 1 ML 31 GAUGE X 5/16\" SYRG    1 Each by Does Not Apply route daily. LANTUS SOLOSTAR U-100 INSULIN 100 UNIT/ML (3 ML) INPN    15 Units by SubCUTAneous route nightly. LEVOTHYROXINE (SYNTHROID) 150 MCG TABLET    Take 150 mcg by mouth daily. LISINOPRIL (PRINIVIL, ZESTRIL) 40 MG TABLET    Take 1 tablet by mouth once daily    METFORMIN (GLUCOPHAGE) 500 MG TABLET    Take 500 mg by mouth daily. MORPHINE IR (MS IR) 15 MG TABLET    Take 15 mg by mouth two (2) times a day. RIVAROXABAN (XARELTO) 15 MG TAB TABLET    Take 15 mg by mouth two (2) times a day. SERTRALINE (ZOLOFT) 50 MG TABLET    Take 1 Tablet by mouth daily. These Medications have changed    No medications on file   Stop Taking    No medications on file     Disclaimer: Sections of this note are dictated using utilizing voice recognition software. Minor typographical errors may be present. If questions arise, please do not hesitate to contact me or call our department.

## 2022-08-10 NOTE — PROGRESS NOTES
Pharmacy Note - Zosyn    3375mg Zosyn IVPB q 6 h over 30 min each ordered for treatment of UTI. Per [facility] Renal / Extended Infusion B Lactam Policy, Zosyn will be changed to 3375 mg IVPB q 8 h each to infuse over 4 hours. Estimated Creatinine Clearance: Estimated Creatinine Clearance: 36 mL/min (A) (based on SCr of 1.36 mg/dL (H)). Dialysis Status, AQUILES, CKD: n/a    BMI:  Body mass index is 40.34 kg/m². Rationale for Adjustment:  [facility] extended infusion dosing policy. Pharmacy will continue to monitor and adjust dose as necessary. Please call Inpatient Pharmacy with any questions. Thank you,  Stephanie OREILLY. Ph

## 2022-08-10 NOTE — ED NOTES
This tech has attempted multiple times for IV access with the few successful attempts charted on this patient. Her veins tolerate the catheter briefly before ultimately infiltrating. Pt could not tolerate ultrasound IV due to her altered mental status, pt does not follow commands and is difficult to redirect.

## 2022-08-10 NOTE — PROGRESS NOTES
conducted an initial consultation and Spiritual Assessment for Mattie, who is a 82 y.o.,female. Patients Primary Language is: [place]. According to the patients EMR [employer] Affiliation is: [facility].     The reason the Patient came to the hospital is:   Patient Active Problem List    Diagnosis Date Noted    Sepsis ([address] 08/10/2022    Septic arthritis of foot ([address] 05/26/2022    Diabetic foot ulcer ([address] 05/26/2022    Septic joint ([address] 05/26/2022    Neuropathy 08/10/2021    Essential hypertension 07/07/2021    Bilateral lower extremity edema 07/07/2021    Hypercholesteremia 07/07/2021    History of fall 07/07/2021    Chronic bilateral low back pain without sciatica 07/07/2021    OAB (overactive bladder) 07/07/2021    Urinary incontinence 07/07/2021    Chronic diastolic congestive heart failure ([address] 03/10/2021    Obesity, morbid ([address] 12/15/2017    Type 2 diabetes mellitus with diabetic nephropathy, with long-term current use of insulin ([address] 12/15/2017    Dermatitis 08/23/2017    Acquired hypothyroidism 08/01/2016        The  provided the following Interventions:  Initiated a relationship of care and support with patient in room 451 this morning. .Patient seemed to be a little confused during out visit. Patient was playing with her teeth as I visited. There is no advance directive present. Listened empathically as she tried to have some form of conversation but following was a little difficult. Provided information about Spiritual Care Services. Offered prayer on patients behalf. Chart reviewed. Assessment:  Patient does not have any Worship/cultural needs that will affect patients preferences in health care. There are no further spiritual or Worship issues which require Spiritual Care Services interventions at this time. Plan:  Chaplains will continue to follow and will provide pastoral care on an as needed/requested basis    . Reiseñor 3   Board Certified [address] [place] [address]   [phone]

## 2022-08-10 NOTE — DIABETES MGMT
Diabetes/ Glycemic Control Plan of Care    Pending full assessment of home diabetes management and educational needs. Will need to consider contacting her primary caregiver at home if patient is unable to participate. Recommendations:   1.) basal and correctional insulin as ordered. 2.) adjust insulin dose as needed to manage hyperglycemia. Assessment: Patient is 82year old with history of T2DM was admitted on 8/10/2022 with report of altered mental status, fall at home, and blood glucose of 269. Lab BG of 226 at 03:40  POC BG of 227 at 12:40    DX:   1. Sepsis, due to unspecified organism, unspecified whether acute organ dysfunction present ([facility]        2. Altered mental status, unspecified altered mental status type        3. Abnormal EKG [R94.31 (ICD-10-CM)]           Fasting/ Morning blood glucose:   Lab Results   Component Value Date/Time    Glucose 243 (H) 08/10/2022 10:23 AM    Glucose (POC) 277 (H) 08/10/2022 12:40 PM     IV Fluids containing dextrose: none    Steroids:  None    Blood glucose values: Within target range (70-180mg/dL):  NO    Current insulin orders:   Basal lantus insulin 15 units daily at bedtime, first dose ordered 8/10/2022  Correctional lispro insulin. Modified to very resistant dose    Total Daily Dose previous 24 hours: N/A. Patient presented to ED today, 8/10/2022    Current A1c:   Lab Results   Component Value Date/Time    Hemoglobin A1c 11.8 (H) 05/27/2022 02:36 AM    Hemoglobin A1c (POC) 9.4 11/09/2021 11:58 AM      equivalent  to ave Blood Glucose of 292 mg/dl for 2-3 months prior to admission    Adequate glycemic control PTA: NO  Nutrition/Diet:   Active Orders   Diet    DIET NPO      Meal Intake:  No data found. Supplement Intake:  No data found. Home diabetes medications:   Key Antihyperglycemic Medications               Lantus Solostar U-100 Insulin 100 unit/mL (3 mL) inpn 15 Units by SubCUTAneous route nightly.     insulin aspart U-100 (NovoLOG Flexpen U-100 Insulin) 100 unit/mL (3 mL) inpn 5 Units by SubCUTAneous route Before breakfast, lunch, and dinner. metFORMIN (GLUCOPHAGE) 500 mg tablet Take 500 mg by mouth daily. Plan/Goals:   Blood glucose will be within target of 70 - 180 mg/dl within 72 hours  Reinforce dietary and medication compliance at home. Education:  [] Refer to Diabetes Education Record                       [] Education not indicated at this time   Pending full assessment of home diabetes management and educational needs.     Ally Palafox RN

## 2022-08-10 NOTE — PROGRESS NOTES
Physician Progress Note      PATIENT:               Kamla Tony  CSN #:                  [id number]  :                       1935  ADMIT DATE:       8/10/2022 2:34 AM  DISCH DATE:  RESPONDING  PROVIDER #:        Joan Hodges MD          QUERY TEXT:    Dear PFM  physician  Pt admitted with sepsis. Pt noted to have AMS. If possible, please document in the progress notes and discharge summary if you are evaluating and / or treating any of the following: The medical record reflects the following:  Risk Factors: advanced age  Clinical Indicators: On arrival, patient was altered and not answering questions appropriately. Per patient's family patient has been fatigued and intermittently altered the past 2 to 3 days. Pt could not tolerate ultrasound IV due to her altered mental status, pt does not follow commands and is difficult to redirect. temp   103;  HR  124   ; WBc  25.3  ? Treatment: CT head-  neg;   IV  vancomycin,  IV  zosyn    Thank you,   Romana Cornelia RN   [email or web address]  Options provided:  -- Metabolic encephalopathy, associated with sepsis  -- Septic encephalopathy  -- Other - I will add my own diagnosis  -- Disagree - Not applicable / Not valid  -- Disagree - Clinically unable to determine / Unknown  -- Refer to Clinical Documentation Reviewer    PROVIDER RESPONSE TEXT:    This patient has metabolic encephalopathy, associated with sepsis. Query created by: Kian Reddy on 8/10/2022 9:18 AM      QUERY TEXT:    Dear  PFM   physician  Pt admitted with sepsis  and has History of DVT 2022 documented. If possible, please document in progress notes and discharge summary further specificity regarding the DVT  acuity/chronicity.     The medical record reflects the following:  Risk Factors: age  Clinical Indicators: vascular  study  ;   Acute non-occlusive deep vein thrombosis in the common femoral and 2 of 2 peroneal veins within the right lower extremity. Treatment: pt  continues on  xarelto  20mg po qd  until  Dec  2022  Thank you,   [email or web address]  Options provided:  -- Acute RLE  DVT present on admission  -- Chronic RLE  DVT present on admission  -- Other - I will add my own diagnosis  -- Disagree - Not applicable / Not valid  -- Disagree - Clinically unable to determine / Unknown  -- Refer to Clinical Documentation Reviewer    PROVIDER RESPONSE TEXT:    Chronic RLE DVT was present on admission.     Query created by: Neema Jeffers on 8/10/2022 9:25 AM      Electronically signed by:  Barry Bhagat MD 8/10/2022 10:50 AM

## 2022-08-10 NOTE — PROGRESS NOTES
Problem: Pressure Injury - Risk of  Goal: *Prevention of pressure injury  Description: Document Ravi Scale and appropriate interventions in the flowsheet. Outcome: Progressing Towards Goal  Note: Pressure Injury Interventions:  Sensory Interventions: Avoid rigorous massage over bony prominences    Moisture Interventions: Absorbent underpads    Activity Interventions: Increase time out of bed    Mobility Interventions: Assess need for specialty bed    Nutrition Interventions: Document food/fluid/supplement intake    Friction and Shear Interventions: Minimize layers                Problem: Patient Education: Go to Patient Education Activity  Goal: Patient/Family Education  Outcome: Progressing Towards Goal     Problem: Falls - Risk of  Goal: *Absence of Falls  Description: Document Obdulio Fall Risk and appropriate interventions in the flowsheet.   Outcome: Progressing Towards Goal  Note: Fall Risk Interventions:  Mobility Interventions: Patient to call before getting OOB    Mentation Interventions: Bed/chair exit alarm         Elimination Interventions: Call light in reach    History of Falls Interventions: Bed/chair exit alarm         Problem: Patient Education: Go to Patient Education Activity  Goal: Patient/Family Education  Outcome: Progressing Towards Goal

## 2022-08-10 NOTE — Clinical Note
Status[de-identified] INPATIENT [101]   Type of Bed: Telemetry [19]   Cardiac Monitoring Required?: Yes   Inpatient Hospitalization Certified Necessary for the Following Reasons: 3.  Patient receiving treatment that can only be provided in an inpatient setting (further clarification in H&P documentation)   Admitting Diagnosis: Sepsis [facility]) [[id number]]   Admitting Physician: Hilary Emanuel   Attending Physician: Lucas Garcia [[id number]]   Estimated Length of Stay: 2 Midnights   Discharge Plan[de-identified] Home with Office Follow-up

## 2022-08-10 NOTE — PROGRESS NOTES
Problem: Self Care Deficits Care Plan (Adult)  Goal: *Acute Goals and Plan of Care (Insert Text)  Description: Occupational Therapy Goals  Initiated 8/10/2022 within 7 day(s). 1.  Patient will perform lower body dressing with modified independence. 2.  Patient will perform functional task in standing for 5 minutes with supervision for balance. 3.  Patient will perform toilet transfers with supervision/set-up. 4.  Patient will perform all aspects of toileting with supervision/set-up. 5.  Patient will participate in upper extremity therapeutic exercise/activities with supervision/set-up for 8 minutes to increase strength/endurance for ADLs. 6.  Patient will utilize energy conservation techniques during functional activities with verbal cues. Prior Level of Function:Pt was modified independent with basic self care tasks and used a [facility] for functional mobility PTA. She lives with a friend, Lida Moreno, who was present during the initial evaluation. He states that she did the cooking and cleaning as well. Outcome: Progressing Towards Goal   OCCUPATIONAL THERAPY EVALUATION    Patient: Jayy Timmons [de-identified]82 y.o. female)  Date: 8/10/2022  Primary Diagnosis: Sepsis ([facility] [address] [A41.9]       Precautions:   Fall    ASSESSMENT :  Based on the objective data described below, the patient presents with decreased ADLs, decreased functional mobility and muscle weakness, complicated by emesis with all movement. She was agreeable to attempt functional tasks while seated on EOB and in standing. Extra time needed and patient slow to process commands at times. She vomited x3 during session. Nursing notified. Min assist given overall for self care tasks and functional standing/transfers. Supportive friend/roommate present. Patient returned to supine in bed at end of session. Transport in room to take her for testing. All needs met.      Patient will benefit from skilled intervention to address the above impairments. Patient's rehabilitation potential is considered to be Good  Factors which may influence rehabilitation potential include:   []             None noted  [x]             Mental ability/status  [x]             Medical condition  []             Home/family situation and support systems  []             Safety awareness  []             Pain tolerance/management  []             Other:      PLAN :  Recommendations and Planned Interventions:   [x]               Self Care Training                  [x]      Therapeutic Activities  [x]               Functional Mobility Training   []      Cognitive Retraining  [x]               Therapeutic Exercises           [x]      Endurance Activities  [x]               Balance Training                    []      Neuromuscular Re-Education  []               Visual/Perceptual Training     [x]      Home Safety Training  [x]               Patient Education                   [x]      Family Training/Education  []               Other (comment):    Frequency/Duration: Patient will be followed by occupational therapy 1-2 times per day/4-7 days per week to address goals. Further Equipment Recommendations for Discharge: N/A; patient has all needed DME at home    AMPAC: Based on an AM-PAC score of 20/24 and their current ADL deficits; it is recommended that the patient have 2-3 sessions per week of Occupational Therapy at d/c to increase the patient's independence. This AMPAC score should be considered in conjunction with interdisciplinary team recommendations to determine the most appropriate discharge setting. Patient's social support, diagnosis, medical stability, and prior level of function should also be taken into consideration. SUBJECTIVE:   Patient stated I feel sick every time I move.     OBJECTIVE DATA SUMMARY:     Past Medical History:   Diagnosis Date    Acquired hypothyroidism 8/1/2016    Arthritis of right shoulder region 4/21/2016    Asthma     Bilateral lower extremity edema 7/7/2021    Chronic bilateral low back pain without sciatica 7/7/2021    Chronic diastolic congestive heart failure ([facility] [address] 3/10/2021    Chronic venous insufficiency     Diabetes (HCC)     neuropathy    Essential hypertension 7/7/2021    Gout     History of depression 1/23/2017    History of fall 7/7/2021    Hypercholesteremia 7/7/2021    Hypertension     MI (myocardial infarction) ([facility] [address]     OAB (overactive bladder) 7/7/2021    Peripheral neuropathy     Rheumatoid arthritis ([facility] [address]     Tear of right rotator cuff 5/16/2016    Thyroid pain     Urinary incontinence 7/7/2021    Vertigo      Past Surgical History:   Procedure Laterality Date    HX AMPUTATION TOE Right 2020    Great toe Dr. 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 HX KNEE REPLACEMENT Bilateral      Barriers to Learning/Limitations: yes;  altered mental status (i.e. Confusion)  Compensate with: visual, verbal, tactile, kinesthetic cues/model    Home Situation:   Home Situation  Home Environment: Apartment  # Steps to Enter: 0  One/Two Story Residence: One story  Living Alone: No  Support Systems: Friend/Neighbor  Patient Expects to be Discharged to[de-identified] Home with family assistance  Current DME Used/Available at Home: Cane, straight, Grab bars  Tub or Shower Type: Tub/Shower combination (with grab bars)  [x]  Right hand dominant   []  Left hand dominant    Cognitive/Behavioral Status:  Neurologic State: Alert  Orientation Level: Oriented to person  Cognition: Follows commands  Safety/Judgement: Fall prevention    Skin: Intact on UEs  Edema: None noted in UEs    Vision/Perceptual:    Acuity: Within Defined Limits      Coordination: BUE  Fine Motor Skills-Upper: Left Intact; Right Intact    Gross Motor Skills-Upper: Left Intact; Right Intact    Balance:  Sitting: Intact  Standing: With support    Strength: BUE  Strength: Generally decreased, functional (4/5 throughout)    Tone & Sensation: BUE  Tone: Normal  Sensation: Intact    Range of Motion: BUE  AROM: Generally decreased, functional    Functional Mobility and Transfers for ADLs:  Bed Mobility:  Supine to Sit: Minimum assistance  Sit to Supine: Minimum assistance (for LEs)     Transfers:  Sit to Stand: Minimum assistance  Stand to Sit: Contact guard assistance   Toilet Transfer : Contact guard assistance    ADL Assessment:   Feeding: Modified independent    Oral Facial Hygiene/Grooming: Setup;Supervision    Bathing: Minimum assistance    Upper Body Dressing: Setup;Supervision    Lower Body Dressing: Moderate assistance (secondary to pain in her abdomen)    Toileting: Minimum assistance    Pain: Patient noted discomfort in abdomen with all movement. Pain level pre-treatment: 0/10   Pain level post-treatment: 0/10   Pain Intervention(s): NA  Response to intervention: NA    Activity Tolerance:   Fair; patient vomiting with all mobilization  Please refer to the flowsheet for vital signs taken during this treatment. After treatment:   [] Patient left in no apparent distress sitting up in chair  [x] Patient left in no apparent distress in bed  [x] Call bell left within reach  [x] Nursing notified  [x] Caregiver (roommate) present  [x] Bed alarm activated    COMMUNICATION/EDUCATION:   [x] Role of Occupational Therapy in the acute care setting  [x] Home safety education was provided and the patient/caregiver indicated understanding. [x] Patient/family have participated as able in goal setting and plan of care. [x] Patient/family agree to work toward stated goals and plan of care. [] Patient understands intent and goals of therapy, but is neutral about his/her participation. [] Patient is unable to participate in goal setting and plan of care.     Thank you for this referral.  Cathi Vinson MS OTR/L   Time Calculation: 25 mins    Eval Complexity: History: LOW Complexity : Brief history review ; Examination: LOW Complexity : 1-3 performance deficits relating to physical, cognitive , or psychosocial skils that result in activity limitations and / or participation restrictions ; Decision Making:LOW Complexity : No comorbidities that affect functional and no verbal or physical assistance needed to complete eval tasks     [address] [place] [address] [address] [id number] AM-PAC® Daily Activity Inpatient Short Form (6-Clicks)*    How much HELP from another person does the patient currently need    (If the patient hasn't done an activity recently, how much help from another person do you think he/she would need if he/she tried?)   Total (Total A or Dep)   A Lot  (Mod to Max A)   A Little (Sup or Min A)   None (Mod I to I)   Putting on and taking off regular lower body clothing? [] 1 [] 2 [x] 3 [] 4   2. Bathing (including washing, rinsing,      drying)? [] 1 [] 2 [x] 3 [] 4   3. Toileting, which includes using toilet, bedpan or urinal?   [] 1 [] 2 [x] 3 [] 4   4. Putting on and taking off regular upper body clothing? [] 1 [] 2 [x] 3 [] 4   5. Taking care of personal grooming such as brushing teeth? [] 1 [] 2 [] 3 [x] 4   6. Eating meals? [] 1 [] 2 [] 3 [x] 4     Based on an AM-PAC score of 20/24 and their current ADL deficits; it is recommended that the patient have 2-3 sessions per week of Occupational Therapy at d/c to increase the patient's independence.

## 2022-08-10 NOTE — ROUTINE PROCESS
Bedside and Verbal shift change report given to [facility] [place] [facility]\"" Paul (oncoming nurse) by Leslye Ro RN (offgoing nurse). Report included the following information SBAR, Kardex, MAR and Recent Results.     SITUATION:   Code Status: DNR  Reason for Admission: Sepsis ([facility] [address] [A41.9]    Hospital day: 0  Problem List:       Hospital Problems  Date Reviewed: 10/24/2021            Codes Class Noted POA    * (Principal) Sepsis ([facility] [address] ICD-10-CM: A41.9  ICD-9-CM: 038.9, 995.91  8/10/2022 Unknown           BACKGROUND:    Past Medical History:   Past Medical History:   Diagnosis Date    Acquired hypothyroidism 8/1/2016    Arthritis of right shoulder region 4/21/2016    Asthma     Bilateral lower extremity edema 7/7/2021    Chronic bilateral low back pain without sciatica 7/7/2021    Chronic diastolic congestive heart failure ([facility] [address] 3/10/2021    Chronic venous insufficiency     Diabetes ([facility] [address]     neuropathy    Essential hypertension 7/7/2021    Gout     History of depression 1/23/2017    History of fall 7/7/2021    Hypercholesteremia 7/7/2021    Hypertension     MI (myocardial infarction) ([facility] [address]     OAB (overactive bladder) 7/7/2021    Peripheral neuropathy     Rheumatoid arthritis ([facility] [address]     Tear of right rotator cuff 5/16/2016    Thyroid pain     Urinary incontinence 7/7/2021    Vertigo          Patient taking anticoagulants yes     ASSESSMENT:   Changes in Assessment Throughout Shift: PT a/o x4 after placement of cruz    Patient has Central Line: no Reasons if yes:   Patient has Cruz Cath: yes Reasons if yes: Acute Urine Retention     Last Vitals:     Vitals:    08/10/22 0804 08/10/22 1123 08/10/22 1600 08/10/22 1727   BP: (!) 143/48 129/67 (!) (P) 147/79 (!) 147/76   Pulse: 96 100 (P) 97 94   Resp: (!) 32 20 (P) 13 20   Temp:  99.7 °F (37.6 °C) (P) 98.2 °F (36.8 °C) 97.5 °F (36.4 °C)   TempSrc:   (P) Oral    SpO2: 93% 93% (P) 98% 94%   Weight:   (P) 105.7 kg (233 lb)    Height:   (P) 5' 4\" (1.626 m)        IV and DRAINS (will only show if present)   Peripheral IV 08/10/22 Anterior;Right Other(comment)-Site Assessment: Clean, dry, & intact  Peripheral IV 08/10/22 Left Wrist-Site Assessment: Clean, dry, & intact  Peripheral IV 08/10/22 Right-Site Assessment: Clean, dry, & intact  Peripheral IV 10/94/16 Left Basilic-Site Assessment: Clean, dry, & intact    WOUND (if present)   Wound Type:  none, mid sacram   Dressing present Dressing Present : Yes   Wound Concerns/Notes:  Sacram    PAIN    Pain Assessment    Pain Intensity 1: 0 (08/10/22 1727)              Patient Stated Pain Goal: 0  Interventions for Pain:  none  Intervention effective: yes  Time of last intervention: n/a   Reassessment Completed: yes     Last 3 Weights:  Last 3 Recorded Weights in this Encounter    08/10/22 0317 08/10/22 1600   Weight: 106.6 kg (235 lb) (P) 105.7 kg (233 lb)     Weight change:     INTAKE/OUPUT    Current Shift: 08/10 0701 - 08/10 1900  In: 5196.7 [P.O.:480; I.V.:4716.7]  Out: 2600 [Urine:2600]    Last three shifts: No intake/output data recorded. LAB RESULTS     Recent Labs     08/10/22  1546 08/10/22  1023 08/10/22  0340   WBC 32.7* 37.9* 25.3*   HGB 13.5 12.6 12.6   HCT 41.7 39.2 38.3    184 174        Recent Labs     08/10/22  1023 08/10/22  0340    137   K 3.5 5.2   * 226*   BUN 21* 24*   CREA 1.35* 1.36*   CA 8.9 9.1   MG 2.2 1.8       RECOMMENDATIONS AND DISCHARGE PLANNING     Pending tests/procedures/ Plan of Care or Other Needs: Respiratory Virus, C. Diff, Vanco, CRP     Discharge plan for patient and Needs/Barriers: DM uncontrolled    Estimated Discharge Date: TBD Posted on Whiteboard in Patients Room: yes      4. The patient's care plan was reviewed with the oncoming nurse.        \"HEALS\" SAFETY CHECK      Fall Risk    Total Score: 4    Safety Measures: Safety Measures: Bed in low position, Bed/Chair alarm on, Call light within reach, Gripper socks    A safety check occurred in the patient's room between off going nurse and oncoming nurse listed above. The safety check included the below items  Area Items   H  High Alert Medications Verify all high alert medication drips (heparin, PCA, etc.)   E  Equipment Suction is set up for ALL patients (with jonathan)  Red plugs utilized for all equipment (IV pumps, etc.)  WOWs wiped down at end of shift. Room stocked with oxygen, suction, and other unit-specific supplies   A  Alarms Bed alarm is set for fall risk patients  Ensure chair alarm is in place and activated if patient is up in a chair   L  Lines Check IV for any infiltration  Ramirez bag is empty if patient has a Ramirez   Tubing and IV bags are labeled   S  Safety   Room is clean, patient is clean, and equipment is clean. Hallways are clear from equipment besides carts. Fall bracelet on for fall risk patients  Ensure room is clear and free of clutter  Suction is set up for ALL patients (with jonathan)  Hallways are clear from equipment besides carts.    Isolation precautions followed, supplies available outside room, sign posted     Carlos Montoya RN

## 2022-08-10 NOTE — H&P
[facility]  Admission History and Physical      Patient:    Fritzi Sacks      82 y.o. female            MRN:       [id number]                                                                                    Admission Date:         8/10/2022  Code status:                DNR/DNI    Fritzi Sacks is a 82y.o. year old female admitted for Sepsis ([facility] [A41.9]. ASSESSMENT AND PLAN  Problem List Items Addressed This Visit          Other    * (Principal) Sepsis ([facility] - Primary     Other Visit Diagnoses       Altered mental status, unspecified altered mental status type                Altered Mental Status  Sepsis 2/2   -Concern for infection. Source: UTI vs PNA vs skin. UA with negative nitrites, leuk esterase. CXR reassuring against pneumonia. Left foot diabetic ulcer clean, not infected. Pt with chronic back pain, unchanged, less concern for spinal abscess or cauna equina syndrome. Also considering opiate use, uremia, polypharmacy with patient's concurrent use of morphine and gabapentin. -SIRS: febrile, leukocytosis, Tachypnea, Tachycardia  -UA: clear, protein, glucose >1000, trace ketones, trace blood, neg nitrites, neg leuk esterase   -Blood cultures pending  -Urine culture pending  -CTA 8/10: No evidence for pulmonary emboli. Pulmonary vascular congestion without overt edema. No evidence of pneumonia. -CT A/P 8/10: No definite active inflammation. Colonic diverticulosis without acute diverticulitis. Prominent fluid-filled stomach and proximal small bowel without discrete transition point to suggest obstruction. This may be from ingested material. A mild gastroenteritis could have a similar appearance. Prior cholecystectomy with biliary prominence, likely reservoir effect. Atherosclerosis. -CT head 8/10: 1. No acute intracranial pathology. 2. Prior right-sided craniotomy with encephalomalacia in the right temporal lobe and mild sequela of chronic small vessel ischemic disease.   -CXR 8/10: Pulmonary vascular congestion.  -lactate 1.53  -straight cath in ED removed 1200 ml urine   Plan:  -admit to stepdown  -continue Vancomycin 1g IV daily, Zosyn 3.375 IV q6hrs   - bedside swallow eval, then plans to start diabetic diet if able  -F/U Blood cultures  -F/U ammonia  -F/U procal  -F/U UDS  -F/U covid/flu swab  -F/U repeat troponin    Sinus Tachycardia w/ intermittent Vtach runs  -Likely 2/2 to Sepsis, troponin negative, EKG without ST elevation   -EKG sinus tachycardia   -s/p 150 mg amiodarone bolus in ED  -cardiology consulted, appreciate recs  Plan:  - telemetry monitoring    Urinary retention  - Ddx: retention secondary to AMS vs neurogenic   - straight cath in ED removed 1200 ml urine  -[facility]  -ddx: UTI, obstruction, neurogenic bladder 2/2 diabetic neuropathy,   -Pt not on [id number] medications  Plan:  -antibiotics as above  -bladder checks q shift  -consider renal US if no improvement with 24 hours of antibiotic treatment    History of DVT 06/2022  -Diagnosed May 2022  -On Xarelto 20 mg daily until 12/16/22  Plan:  -Continue Xarelto 20 mg daily    DM2 Uncontrolled  DM Neuropathy  Recent hospitalization for infection 2/2 diabetic ulcer  -I&D (5/27)- Infection limited to superficial foot, not found in joint.  -Arterial Duplexes (5/27) Lower Extrem b/l normal  -hx R great toe amputation  -Home meds: Lantus 15 units QD   - SSI, hypoglycemia protocol   -A1c 14 (April)  Plan:  - 15 units lantus at night, SSI humalog  - hold home metformin    Chronic bilateral low back pain w/o sciatica  [facility] [place], Dr. rPakash Smiley  -gabapentin 400 mg Tid and morphine IR 15mg BID as to avoid withdrawal but consider may be contributing to AMS. Last filled August 2, 2022  Plan:  -hold gabapentin and morphine in setting of AMS and sepsis.  Consider restarting when more stable     CKD Stage III  -Cr1.36, Baseline Cr 1.0-1.2  Plan:  -Daily BMP, trend Cr     HTN, HLD, HFpEF  -Pt with mild vascular congestion on CXR, mild edema  - Home Meds: norvasc 10mg daily, lisinopril 40mg daily, coreg 12.5mg BID, bumex 2mg 1 - 2x daily, Lipitor 80mg daily.  -Last ECHO in system 8/2020: EF 60-65%   Plan:  -Consider diuresis when pt more stable  - Hold Home Norvasc, Lisinopril, Bumex PRN, Statin  - Continue Home Meds: Coreg 3.125 mg BID  - possible need for repeat ECHO     Hypothyroidism  TSH no updated  R/o Thyroid Storm with update TSH  Plan:  - Continue home synthroid 150mcg     Gout  -Chronic, symptomatically stable, no recent flare up, on home allopurinol  Plan:  - will hold allopurinol for now      Global:  Code: DNR  IVF/Drips: none  I/O / Wt: strict  Diet: NPO  DVT/AC: Xarelto  Mobility: per protocol   Disposition: home, pending clinical course  Anticipated LOS: greater than 2 midnights     Point of Contact (relationship, number): Devyn Rao (Son) [phone]     SUBJECTIVE:  History of Present Illness:  Annel Grajeda is a 82 y.o. female with PMHx of HFpEF, T2DM, Recent DVT hx on Xarelto, HTN, hypothyroidism, stage 3 CKD who presented to [facility] ED on 08/10/2022 with complaint of AMS. Patient was found down at home and was brought in by EMS. Per family friend, she has been fatigued and intermittently altered for the past 2-3 days. Pt is a poor historian given AMS but was complaining of lower abdominal pain. She states that she has been having difficulty urinating in the past month and has been seen by [facility]. She was hospitalized in May 2022 for diabetic foot infection. At that time she was diagnosed with DVT and is on Xarelto until December 2022. Son was out of town for past week and cannot provide history. States that she lives in a house with a friend, Anton Hawkins.      ED Course:  - Tachycardia 120s-180s, Tachypnea 22, Febrile 103F  -Labs: WBC 25.3, Lactate 1.36, Mg 1.8, K 5.2, Hb 12.6, Trop wnl, BNP 1164  -Imaging: CTA, CT A/P, CT head, CXR  -EKG: Sinus tachycardia,   -Meds: acetominophem 650 mg, amiodarone 150mg, ioamidol 72ml, magnesium sulfate 2g, Zosyn 3.375 g, Vancomycin 1g,    -IVF: 1L NS  -Procedures: none  -Consults:cardiology     Did non-adherence with patient's outpatient treatment plan contribute to this admission? Unknown, patient is poor historian      PMHx, PSHx, SHx, FHx, MEDS, ALLERGIES:   Past Medical History:   Diagnosis Date    Acquired hypothyroidism 2016    Arthritis of right shoulder region 2016    Asthma     Bilateral lower extremity edema 2021    Chronic bilateral low back pain without sciatica 2021    Chronic diastolic congestive heart failure ([address] 3/10/2021    Chronic venous insufficiency     Diabetes (HCC)     neuropathy    Essential hypertension 2021    Gout     History of depression 2017    History of fall 2021    Hypercholesteremia 2021    Hypertension     MI (myocardial infarction) ([address]     OAB (overactive bladder) 2021    Peripheral neuropathy     Rheumatoid arthritis ([address]     Tear of right rotator cuff 2016    Thyroid pain     Urinary incontinence 2021    Vertigo       Past Surgical History:   Procedure Laterality Date    HX AMPUTATION TOE Right     Great toe Dr. Melinda Woods [address]      HX CHOLECYSTECTOMY      HX GYN      TAHOophorectomy due to infection    HX HEENT      resection of memegioma in the .     HX KNEE REPLACEMENT Bilateral       Social History     Tobacco Use    Smoking status: Former     Types: Cigarettes     Quit date:      Years since quittin.6    Smokeless tobacco: Never    Tobacco comments:     quit 45 years ago   Vaping Use    Vaping Use: Never used   Substance Use Topics    Alcohol use: No     Alcohol/week: 0.0 standard drinks    Drug use: No     Family History   Problem Relation Age of Onset    Heart Attack Mother     Heart Attack Father      No Known Allergies    Current Facility-Administered Medications   Medication Dose Route Frequency Provider Last Rate Last Admin    sodium chloride (NS) flush 5-10 mL  5-10 mL IntraVENous PRN Nancy LAWSON MD        sodium chloride (NS) flush 5-40 mL  5-40 mL IntraVENous Q8H Selene Conroy DO        sodium chloride (NS) flush 5-40 mL  5-40 mL IntraVENous PRN Selene Conroy DO        insulin lispro (HUMALOG) injection   SubCUTAneous AC&HS Dionte Reagan MD        glucose chewable tablet 16 g  16 g Oral PRN Dionte Reagan MD        glucagon (GLUCAGEN) injection 1 mg  1 mg IntraMUSCular PRN Nancy LAWSON MD        dextrose 10% infusion 0-250 mL  0-250 mL IntraVENous PRN Dionte Reagan MD        [Held by provider] allopurinoL (ZYLOPRIM) tablet 100 mg  100 mg Oral DAILY Dionte Reagan MD        acetaminophen (TYLENOL) tablet 500 mg  500 mg Oral Q6H PRN Dionte Reagan MD        [Held by provider] amLODIPine (NORVASC) tablet 10 mg  10 mg Oral DAILY Dionte Reagan MD        [Held by provider] atorvastatin (LIPITOR) tablet 80 mg  80 mg Oral DAILY Dionte Reagan MD        [Held by provider] bumetanide (BUMEX) tablet 1 mg  1 mg Oral BID PRN Dionte Reagan MD        [Held by provider] hydrOXYzine HCL (ATARAX) tablet 10 mg  10 mg Oral BID PRN Dionte Reagan MD        insulin glargine (LANTUS) injection 15 Units  15 Units SubCUTAneous QHS Dionte Reagan MD        [Held by provider] lisinopriL (PRINIVIL, ZESTRIL) tablet 40 mg  40 mg Oral DAILY Dionte Reagan MD        [Held by provider] metFORMIN (GLUCOPHAGE) tablet 500 mg  500 mg Oral DAILY Dionte Reagan MD        rivaroxaban (XARELTO) tablet 20 mg  20 mg Oral DAILY WITH BREAKFAST Nancy LAWSON MD        sertraline (ZOLOFT) tablet 50 mg  50 mg Oral DAILY Dionte Reagan MD        levothyroxine (SYNTHROID) tablet 150 mcg  150 mcg Oral Ramone LAWSON MD        carvediloL (COREG) tablet 3.125 mg  3.125 mg Oral BID WITH MEALS Monica Clements MD          ROS unable to be obtained due to pt's AMS  ROS  OR   (positive findings are in BOLD; negative findings are in regular font)  Constitutional: fevers, chills, appetite changes, weight changes, fatigue  HEENT: changes in vision, changes in hearing, sore throat, dysphagia  Cardiovascular: chest pain, palpitations, PND, orthopnea, edema  Pulmonary: SOB, cough, sputum production, wheezing, chest tightness  Gastrointestinal: abdominal pain, nausea/vomiting, diarrhea, constipation, melena, hematochezia  Genitourinary: dysuria, hesitation, dribbling, urgency, hematuria  Musculoskeletal: arthralgias, myalgias  Skin: rash, itching  Neurological: sensory changes, motor changes, headache  Psychiatric: mood changes  Endocrine: heat/cold intolerance  Heme: easy bruising/easy bleeding, LAD     OBJECTIVE:  Patient Vitals for the past 24 hrs:   BP Temp Pulse Resp SpO2 Height Weight   08/10/22 0804 (!) 143/48 -- 96 (!) 32 93 % -- --   08/10/22 0534 -- -- (!) 158 -- -- -- --   08/10/22 0447 -- 99.1 °F (37.3 °C) (!) 109 (!) 34 93 % -- --   08/10/22 0347 (!) 165/90 -- (!) 128 28 (!) 84 % -- --   08/10/22 0317 (!) 168/70 (!) 103 °F (39.4 °C) (!) 124 24 100 % 5' 4\" (1.626 m) 106.6 kg (235 lb)     Body mass index is 40.34 kg/m². PHYSICAL EXAM:  Physical Exam  OR  General: The patient appears tired, in no acute distress, on RA. HEENT: NCAT, PERRLA, EOM intact; oral mucosa well perfused, oropharynx clear  Neck: no JVD, JVD difficult to assess due to obesity  CVS: Tachycardic, regular rhythm, no murmurs  Lungs: CTAB, no increased work of breathing, no vertebral tenderness, no CVA tenderness  Abdomen: Soft, non-distended, mildly tender to lower abdomen diffusely, BS+, no organomegaly, no masses  Ext: No calf tenderness, peripheral pulses present, no significant edema.   Skin: Warm, Dry, Intact , No significant rashes/petechia/ecchymosis, Diabetic ulcer on base of L foot, clean, dry, non-erythematous  Neuro: No focal neurologic deficits or gross abnormalities  Psych: A&Ox3, appropriate mood and affect           RECENT LABS AND IMAGING ON ADMISSION   Recent Results (from the past 24 hour(s))   POC LACTIC ACID    Collection Time: 08/10/22  3:11 AM   Result Value Ref Range    Lactic Acid (POC) 1.53 0.40 - 2.00 mmol/L   CULTURE, BLOOD    Collection Time: 08/10/22  3:40 AM    Specimen: Blood   Result Value Ref Range    Special Requests: NO SPECIAL REQUESTS      Culture result: NO GROWTH AFTER 2 HOURS     METABOLIC PANEL, COMPREHENSIVE    Collection Time: 08/10/22  3:40 AM   Result Value Ref Range    Sodium 137 136 - 145 mmol/L    Potassium 5.2 3.5 - 5.5 mmol/L    Chloride 103 100 - 111 mmol/L    CO2 26 21 - 32 mmol/L    Anion gap 8 3.0 - 18 mmol/L    Glucose 226 (H) 74 - 99 mg/dL    BUN 24 (H) 7.0 - 18 MG/DL    Creatinine 1.36 (H) 0.6 - 1.3 MG/DL    BUN/Creatinine ratio 18 12 - 20      GFR est AA 45 (L) >60 ml/min/1.73m2    GFR est non-AA 37 (L) >60 ml/min/1.73m2    Calcium 9.1 8.5 - 10.1 MG/DL    Bilirubin, total 0.6 0.2 - 1.0 MG/DL    ALT (SGPT) 13 13 - 56 U/L    AST (SGOT) 45 (H) 10 - 38 U/L    Alk. phosphatase 66 45 - 117 U/L    Protein, total 8.3 (H) 6.4 - 8.2 g/dL    Albumin 3.7 3.4 - 5.0 g/dL    Globulin 4.6 (H) 2.0 - 4.0 g/dL    A-G Ratio 0.8 0.8 - 1.7     CBC WITH AUTOMATED DIFF    Collection Time: 08/10/22  3:40 AM   Result Value Ref Range    WBC 25.3 (H) 4.6 - 13.2 K/uL    RBC 4.42 4.20 - 5.30 M/uL    HGB 12.6 12.0 - 16.0 g/dL    HCT 38.3 35.0 - 45.0 %    MCV 86.7 78.0 - 100.0 FL    MCH 28.5 24.0 - 34.0 PG    MCHC 32.9 31.0 - 37.0 g/dL    RDW 12.9 11.6 - 14.5 %    PLATELET 582 098 - 269 K/uL    MPV 12.6 (H) 9.2 - 11.8 FL    NRBC 0.0 0  WBC    ABSOLUTE NRBC 0.00 0.00 - 0.01 K/uL    NEUTROPHILS 81 (H) 40 - 73 %    BAND NEUTROPHILS 5 0 - 5 %    LYMPHOCYTES 4 (L) 21 - 52 %    MONOCYTES 10 3 - 10 %    EOSINOPHILS 0 0 - 5 %    BASOPHILS 0 0 - 2 %    IMMATURE GRANULOCYTES 0 %    ABS. NEUTROPHILS 21.8 (H) 1.8 - 8.0 K/UL    ABS. LYMPHOCYTES 1.0 0.9 - 3.6 K/UL    ABS. MONOCYTES 2.5 (H) 0.05 - 1.2 K/UL    ABS. EOSINOPHILS 0.0 0.0 - 0.4 K/UL    ABS. BASOPHILS 0.0 0.0 - 0.1 K/UL    ABS. IMM.  GRANS. 0.0 K/UL    DF MANUAL      PLATELET COMMENTS ADEQUATE PLATELETS      RBC COMMENTS NORMOCYTIC, NORMOCHROMIC     TROPONIN-HIGH SENSITIVITY    Collection Time: 08/10/22  3:40 AM   Result Value Ref Range    Troponin-High Sensitivity 16 0 - 54 ng/L   MAGNESIUM    Collection Time: 08/10/22  3:40 AM   Result Value Ref Range    Magnesium 1.8 1.6 - 2.6 mg/dL   NT-PRO BNP    Collection Time: 08/10/22  3:40 AM   Result Value Ref Range    NT pro-BNP 1,164 0 - 1,800 PG/ML   TSH 3RD GENERATION    Collection Time: 08/10/22  3:40 AM   Result Value Ref Range    TSH 2.84 0.36 - 3.74 uIU/mL   EKG, 12 LEAD, INITIAL    Collection Time: 08/10/22  4:10 AM   Result Value Ref Range    Ventricular Rate 114 BPM    Atrial Rate 114 BPM    P-R Interval 186 ms    QRS Duration 100 ms    Q-T Interval 312 ms    QTC Calculation (Bezet) 430 ms    Calculated P Axis 80 degrees    Calculated R Axis 15 degrees    Calculated T Axis 116 degrees    Diagnosis       Sinus tachycardia  ST & T wave abnormality, consider lateral ischemia  Abnormal ECG  When compared with ECG of 10-AUG-2022 02:46,  premature ventricular complexes are no longer present     URINALYSIS W/ RFLX MICROSCOPIC    Collection Time: 08/10/22  6:20 AM   Result Value Ref Range    Color YELLOW      Appearance CLEAR      Specific gravity 1.023 1.005 - 1.030      pH (UA) 7.0 5.0 - 8.0      Protein 100 (A) NEG mg/dL    Glucose >1,000 (A) NEG mg/dL    Ketone TRACE (A) NEG mg/dL    Bilirubin Negative NEG      Blood TRACE (A) NEG      Urobilinogen 0.2 0.2 - 1.0 EU/dL    Nitrites Negative NEG      Leukocyte Esterase Negative NEG     URINE MICROSCOPIC ONLY    Collection Time: 08/10/22  6:20 AM   Result Value Ref Range    WBC 0 to 2 0 - 4 /hpf    RBC 0 to 2 0 - 5 /hpf    Epithelial cells FEW 0 - 5 /lpf    Bacteria Negative NEG /hpf   DRUG SCREEN, URINE    Collection Time: 08/10/22  6:20 AM   Result Value Ref Range    BENZODIAZEPINES Negative NEG      BARBITURATES Negative NEG      THC (TH-CANNABINOL) Negative NEG      OPIATES Positive (A) NEG      PCP(PHENCYCLIDINE) Negative NEG      COCAINE Negative NEG      AMPHETAMINES Negative NEG      METHADONE Negative NEG      HDSCOM (NOTE)         XR (Most Recent). Results from Hospital Encounter encounter on 08/10/22    XR CHEST PORT    Narrative  Examination: Portable AP chest    History: Infection    Comparison: December 7, 2020    Findings: There is pulmonary vascular congestion without overt edema. No pleural  effusion. No pneumothorax. Mild cardiomegaly is stable. Atherosclerosis of  aortic arch. Impression  1. Pulmonary vascular congestion. CT (Most Recent) Results from Hospital Encounter encounter on 08/10/22    CTA CHEST W OR W WO CONT    Narrative  CTA CHEST PULMONARY EMBOLISM PROTOCOL      INDICATION: Tachycardia and febrile. Question pulmonary embolism. TECHNIQUE: Thin collimation axial images obtained through the level of the  pulmonary arteries with additional imaging through the chest following the  uneventful administration of nonionic intravenous contrast.  Images  reconstructed into three dimensional coronal and sagittal projections for  complete evaluation of the tortuous and overlapping pulmonary vascular  structures and to reduce patient radiation dose. All CT scans at this facility are performed using dose optimization technique as  appropriate to a performed exam, to include automated exposure control,  adjustment of the mA and/or kV according to patient size (including appropriate  matching first site-specific examinations), or use of iterative reconstruction  technique. COMPARISON: Chest radiograph from earlier filiberto. Simon Fournier     FINDINGS:    No filling defects are appreciated within the main, left, right, lobar or  visualized segmental pulmonary arteries to suggest embolism. Thyroid: Atrophic  Pericardium/ Heart: Heart size is normal for age. Aorta/ Vessels: No aneurysm or dissection. Mild atherosclerosis. Lymph Nodes: Unremarkable. .    Lungs: Mild pulmonary vascular congestion. No consolidative pneumonia or overt  pulmonary edema. Respiratory motion. No pleural effusion. Upper Abdomen: Reported separately. Bones/soft tissues: No acute finding    Impression  No evidence for pulmonary emboli. Pulmonary vascular congestion without overt edema. No evidence of pneumonia. CT abdomen and pelvis is reported separately. ECHO No results found for this or any previous visit. EKG No results found for this or any previous visit. I have discussed Ms. Renee Ledesma case with my attending who agrees with the plan of care. Lisa Rodríguez MD PGY-1   [facility]   August 10, 2022, 5:53 AM     [facility]  Senior Addendum to History and Physical    I have also seen and independently evaluated the patient. I agree with the plan as noted above. Vitals, labs and imaging reviewed     For additional problem list, assessment, and plan see intern note.      Bridgett Dunn MD, PGY-2   [facility] [address]   August 10, 2022, 5:53 AM

## 2022-08-10 NOTE — PROGRESS NOTES
Swallow Screen complete, patient Passed. No signs of distress during test. Pt is resting with family present in room.

## 2022-08-10 NOTE — ED TRIAGE NOTES
Per EMS patient from home. Per EMS patient has a fall and called for lift assist. Patient feels warm to the touch. Patient is sinus tach. Patient's blood glucose was 269mg/dL. Patient was moving around a lot while in transport and IV EMS placed infiltrated. Patient is altered and not able to answer questions.

## 2022-08-10 NOTE — PROGRESS NOTES
Notified PFM, another episode of emesis, color of brown/thin consistency. Pt is on 2L of O2, purewick  for urination. Pt is resting comfortably.

## 2022-08-10 NOTE — DISCHARGE SUMMARY
Julissa Paramjit Cardozo SUMMARY      Name:   Fritzi Sacks 82 y.o. female  MRN:   [id number]  CSN:   [id number]  Admission Date:  8/10/2022  Discharge Date:  8/13/22  Attending:             Jacob Rivera MD   PCP:              Fran Richard MD   ================================================================  Reason for Admission:  Sepsis [facility]) [A41.9]    Discharge Diagnosis:    Sepsis of unknown source   Sinus tachycardia with intermittent Vtach runs  Urinary retention  Chronic DVT, on Xarelto  Anemia  V2VZ complicated by diabetic ulcers and neuropathy   CKD stage 3   HTN  Hyperlipidemia  HFpEF  Gout  Chronic bilateral low back pain w/o sciatica     Follow-up studies/evaluations for PCP/Important Notes to PCP:  Ensure patient has been taking Flomax and cefdinir as prescribed  Poorly controlled diabetes. A1c noted to be 11.8% on 5/27/22. Lantus increased from 15u to 20u during this admission. Her other home regimen includes metformin 850mg BID and Jardiance 10mg QD. Discuss need for tighter blood sugar control. Pt to follow up with urology outpatient in 10 days for cruz removal/void trial (8/23-8/25). Referral for appointment placed on discharge. Please ensure patient has appointment scheduled. Pt has outpatient cardiology appointment with Dr. Kermit Monge on 9/13/22 at 9 am. Ensure patient is following up.  Per cardiology, pt needs outpatient stress test.   Advanced care planning, provide patient with document to discuss with family and return to office  Potassium noted to be low during hospital course (3.1), repeat BMP  Pending labs/investigations to follow up as below  Medication reconciliation:  Discontinued Medications: Vesicare  New Medications: cefdinir 300 mg BID 8/13-8/16, Flomax    RHIANNA Follow Up Appointment:   Follow-up Information       Follow up With Specialties Details Why Edgardo Hermosillo MD Cardiovascular Disease Physician, Internal Medicine Physician Go on 9/13/2022 Please go to appointment on September 13, 2022 at 9:00 am [address] [place] [address] [address] [facility] [id number]  [phone]      Tameka Encarnacion MD Urology Call on 8/22/2022 Please call to make an appointment the week of August 22 (please make the appointment between August 23-25) [address] [facility] [address] [facility]      Clarita Burkitt, MD Family Medicine Follow up on 8/16/2022 [address] [facility]  2:00pm for hospital discharge follow up appointment   with Dr. Beau Fonseca [id number]  [phone]            Recommended follow-up after [facility] visit: Patient needs follow-up visit 4 weeks after [facility] appointment, then at provider discretion      Readmission prevention plan: Follow up with urology  Follow up with cardiologist  Keep scheduled appointment with PCP  Take medications as prescribed     GOALS OF CARE (including Code Status, Advanced Care Plan):   DNR    Pending labs/ investigations at discharge to follow up:   Blood cultures drawn 8/10- preliminary report at discharge showed no growth at 3 days    Operative Procedures:   none    Consultants:    Cardiology - Dr. Jah Jeff MD, IGNACIO Zuleta  Infectious Disease - Dr. Ricardo Cardona MD  Urology - Dr. Regina Tapia MD, IGNACIO Graf  Podiatry - Dr. Jayson Marsh DPM    Condition at discharge: Afebrile  Ambulating  Eating, Drinking, Voiding  Stable    Disposition at Discharge:  Home    Functional Status at Discharge: Ambulates with rolling walker    Diet: Diabetic diet    Discharge Medications:  Current Discharge Medication List        START taking these medications    Details   tamsulosin (FLOMAX) 0.4 mg capsule Take 1 Capsule by mouth in the morning. Qty: 30 Capsule, Refills: 0  Start date: 8/13/2022      insulin glargine (LANTUS) 100 unit/mL injection Inject 20 units daily at bedtime. Monitor and record blood sugar daily.   Qty: 1 mL, Refills: 0  Start date: 8/13/2022 cefdinir (OMNICEF) 300 mg capsule Take 1 Capsule by mouth two (2) times a day for 4 days. Qty: 8 Capsule, Refills: 0  Start date: 8/13/2022, End date: 8/17/2022           CONTINUE these medications which have NOT CHANGED    Details   rivaroxaban (XARELTO) 15 mg tab tablet Take 15 mg by mouth two (2) times a day. insulin aspart U-100 (NovoLOG Flexpen U-100 Insulin) 100 unit/mL (3 mL) inpn 5 Units by SubCUTAneous route Before breakfast, lunch, and dinner. Qty: 1 Pen, Refills: 0      levothyroxine (SYNTHROID) 150 mcg tablet Take 150 mcg by mouth daily. morphine IR (MS IR) 15 mg tablet Take 15 mg by mouth two (2) times a day. metFORMIN (GLUCOPHAGE) 500 mg tablet Take 500 mg by mouth daily. hydrOXYzine HCL (ATARAX) 10 mg tablet Take 1 Tablet by mouth two (2) times daily as needed for Anxiety. allopurinoL (ZYLOPRIM) 100 mg tablet Take 100 mg by mouth daily. lisinopriL (PRINIVIL, ZESTRIL) 40 mg tablet Take 1 tablet by mouth once daily  Qty: 90 Tablet, Refills: 0    Associated Diagnoses: Essential hypertension      sertraline (ZOLOFT) 50 mg tablet Take 1 Tablet by mouth daily. Qty: 90 Tablet, Refills: 3    Associated Diagnoses: Depression with anxiety      gabapentin (NEURONTIN) 400 mg capsule Take 1 cap in morning, 1 cap at 2pm, 2 caps at bedtime  Qty: 360 Capsule, Refills: 0    Associated Diagnoses: Type 2 diabetes mellitus with diabetic neuropathy, with long-term current use of insulin ([facility] [address] Neuropathy      carvediloL (COREG) 12.5 mg tablet Take 1 Tablet by mouth two (2) times daily (with meals). Qty: 180 Tablet, Refills: 0    Associated Diagnoses: Essential hypertension      bumetanide (BUMEX) 2 mg tablet Take 0.5 Tablets by mouth two (2) times daily as needed (swelling). Qty: 180 Tablet, Refills: 1    Associated Diagnoses: Bilateral lower extremity edema      amLODIPine (NORVASC) 10 mg tablet Take 1 Tablet by mouth daily.   Qty: 90 Tablet, Refills: 0    Associated Diagnoses: Essential hypertension      atorvastatin (LIPITOR) 80 mg tablet Take 1 Tablet by mouth daily. Qty: 90 Tablet, Refills: 3    Associated Diagnoses: Hypercholesteremia      Blood-Glucose Meter (FREESTYLE LITE METER) monitoring kit Test twice blood glucose daily; ICD-10 E11.9; Quantity 1  Qty: 1 Kit, Refills: 0    Associated Diagnoses: Type 2 diabetes mellitus with nephropathy (HCC)      insulin syringe,safetyneedle 1 mL 31 gauge x 5/16\" syrg 1 Each by Does Not Apply route daily. Qty: 300 Each, Refills: 3    Comments: Dx e11.9      glucose blood VI test strips (FREESTYLE TEST) strip Use to check blood glucose twice daily DX:E11.9  Qty: 300 Strip, Refills: 3      acetaminophen (TYLENOL) 500 mg tablet Take 1 Tab by mouth every six (6) hours as needed for Pain. Qty: 270 Tab, Refills: 3    Associated Diagnoses: Controlled type 2 diabetes mellitus without complication, with long-term current use of insulin (HCC)           STOP taking these medications       Lantus Solostar U-100 Insulin 100 unit/mL (3 mL) inpn Comments:   Reason for Stopping: dosage increased from 15u to 20u. Hospital Course:   Brandee Loredo is a 82 y.o. female with PMHx of HFpEF, T2DM, Recent DVT hx on Xarelto, HTN, hypothyroidism, stage 3 CKD who presented to [facility] ED on 08/10/2022 with complaint of AMS. Patient was found down at home and was brought in by EMS. Per family friend, she has been fatigued and intermittently altered for the past 2-3 days. Pt is a poor historian given AMS but was complaining of lower abdominal pain. She states that she has been having difficulty urinating in the past month and has been seen by [facility]. Son was out of town for past week and cannot provide history. States that she lives in a house with a friend, Adeola Lou. She was hospitalized in May 2022 for diabetic foot infection. At that time she was diagnosed with DVT and is on Xarelto until December 2022.      In the ED she was noted to be achycardic up o 220s, Tachypnic up to 22m and febrile to 103F. WBC 25.3, Lactate 1.36, Mg 1.8, K 5.2, Hb 12.6, Trop wnl, BNP 1164  CTA was negative for PE or pneumonia, CT head showed no acute bleed, CT A/P showed prominent fluid-filled stomach and proximal small bowel without discrete  transition point to suggest obstruction. This may be from ingested material. A mild gastroenteritis could have a similar appearance. Cardiology was consulted for the tachycardia and she was given amiodarone 150mg. She was also given ioamidol 72ml, magnesium sulfate 2g, Zosyn 3.375 g, Vancomycin 1g. She was noted to have 1200 ml of urine on straight cath. She was admitted to the floor. On presentation to the floor, she was noted to have 700 ml of fluid in bladder. A cruz was placed. She was continued on Vancomycin (8/10- 8/12) and Zosyn (8/10-8/11). Given negative work-up up to this point, ID was consulted. ID discontinued Zosyn and vanc and started patient on ceftriaxone (8/12-) Podiatry was consulted for diabetic foot ulcer, was not concerned that ulcer was the source of infection. They recommended local wound care. Void trial 8/12 showed continued retention. Cruz was replaced. Urology was consulted, who recommended at least 10 days of cruz then follow up with urology outpatient for void trial. Pt was stable overnight and discharged on 8/13 with cruz and home antibiotics. Patient's problem list was managed in hospital as stated below:     Sepsis 2/2 unknown source   Patient presented with T-max 103F, WBC 25, Pro-Valdo 31.74. CTA negative for pulmonary emboli. CT head negative for bleed or mass. Chest imaging (x-ray/CT) negative for infiltrates. CT A/P negative for acute process. Blood and urine cultures showed no growth. Skin exam remarkable only for left foot ulcer. Patient started on vancomycin and Zosyn in ED on 8/10. Work-up negative for source of infection.   Infectious disease consulted and antibiotics narrowed to ceftriaxone on 8/12. Podiatry consulted for foot wound, no concern for infection. Patient's vitals and labs improved with antibiotics, patient was discharged on 5-day course of third-generation cephalosporin  -Continue cefdinir 300 mg BID 8/13-8/16     Urinary retention  Patient with 1.2 L of urine retained in bladder on presentation to ED. Differential of retaining secondary to sepsis versus neurogenic secondary to uncontrolled diabetes. [facility], but stated due to cost she stopped following with them. Ramirez was placed in emergency department. Void trial unsuccessful on 8/12, urology consulted. Ramirez replaced for 10 days, patient will follow-up with urology outpatient for void trial.  -Ramirez in place for at least 10 days from discharge. Follow-up with urology for void trial    Sinus Tachycardia w/ intermittent Vtach runs - resolved  Patient presented with sinus tachycardia, concerns on telemetry for intermittent V. tach runs in the emergency room and overnight during first day of hospitalization. Likely secondary to sepsis. Ischemic work-up negative. Cardiology consulted, and patient received 150 mg amiodarone bolus in ED. Patient was given antibiotics for infection and monitored continuously on telemetry. Electrolytes were replaced as necessary. Resolved prior to discharge. HTN   She presented in sepsis with hypotension. Her BP improved with antibiotics and she became mildly hypertensive. Home amlodipine and lisinopril were started once patient was stable for blood pressure control.  -Home lisinopril and amlodipine continued on discharge    HFpEF  Patient presented to ED with signs of mild vascular congestion and sepsis. Continued on home Coreg at 3.125 mg twice daily. Home lisinopril, amlodipine, and Bumex held on admission. Home with diuretics started IV once patient's blood pressure improved.  Transitioned to oral Bumex prior to discharge. Restarted on full dose of Coreg, Norvasc, lisinopril, Bumex prior to discharge. Echo 8/11 showed normal left ventricular systolic function, EF 55 to 60%, increased wall thickness with mild concentric hypertrophy.  -Home lisinopril, amlodipine, Bumex, Coreg, Norvasc continued on discharge     Anemia, resolved  Patient noted to be anemic likely delusional with volume given. Transient. Resolved. Other stable chronic conditions:    History of DVT 06/2022  Diagnosed May 2022. Patient continued on Xarelto 20 mg daily, will be on until 12/16/22    DM2 Uncontrolled  DM Neuropathy  Recent hospitalization for infection 2/2 diabetic ulcer  Patient with recent hospitalization 5/27 for superficial foot infection. Patient continued on home Lantus with sliding scale. A1c 11 in April. Home metformin held and restarted on discharge. CKD Stage III  Cr 1.3 on admission, baseline Cr 1.0-1.2. Her Cr downtrended during admission to 1.1. Home lisinopril was held until creatinine stabilized, and was restarted prior to discharge. Chronic bilateral low back pain w/o sciatica  Pt with chronic pain followed by [facility] [place], Dr. Brandin Tejeda. Her home gabapentin and morphine were held in the setting of AMS and sepsis. Patient did not complain of significant pain during admission. Gabapentin and morphine restarted on discharge. Hypothyroidism  Her TSH on admission was 2.84. She was continued on her home synthroid 150mcg. Gout  Chronic, symptomatically stable, no recent flare up, on home allopurinol. Home allopurinol held in setting of sepsis and restarted on discharge. Hyperlipidemia  Chronic. Patient continued on home statin    Pertinent Results:      CURRENT ADMISSION IMAGING RESULTS   XR FOOT LT AP/LAT    Result Date: 8/10/2022  Soft tissue swelling on the plantar surface of the forefoot. No osseous erosions to suggest osteoarthritis. MRI would be more sensitive.  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 CT HEAD WO CONT    Result Date: 8/10/2022  1. No acute intracranial pathology. 2. Prior right-sided craniotomy with encephalomalacia in the right temporal lobe and mild sequela of chronic small vessel ischemic disease. CTA CHEST W OR W WO CONT    Result Date: 8/10/2022  No evidence for pulmonary emboli. Pulmonary vascular congestion without overt edema. No evidence of pneumonia. CT abdomen and pelvis is reported separately. CT ABD PELV W CONT    Result Date: 8/10/2022  No definite active inflammation. Colonic diverticulosis without acute diverticulitis. Prominent fluid-filled stomach and proximal small bowel without discrete transition point to suggest obstruction. This may be from ingested material. A mild gastroenteritis could have a similar appearance. Prior cholecystectomy with biliary prominence, likely reservoir effect. Atherosclerosis. Additional incidentals as fully described above. XR CHEST PORT    Result Date: 8/10/2022  1. Pulmonary vascular congestion. Cardiology Procedures/Testing:  MODALITY RESULTS   EKG Results for orders placed or performed during the hospital encounter of 08/10/22   EKG, 12 LEAD, INITIAL   Result Value Ref Range    Ventricular Rate 114 BPM    Atrial Rate 114 BPM    P-R Interval 186 ms    QRS Duration 100 ms    Q-T Interval 312 ms    QTC Calculation (Bezet) 430 ms    Calculated P Axis 80 degrees    Calculated R Axis 15 degrees    Calculated T Axis 116 degrees    Diagnosis       Sinus tachycardia  ST & T wave abnormality, consider lateral ischemia  Abnormal ECG  When compared with ECG of 10-AUG-2022 02:46,  premature ventricular complexes are no longer present  Confirmed by Amarilis Pabon MD, Tevin Rice ([id number]) on 8/10/2022 1:10:22 PM         ECHO 08/21/20    ECHO ADULT COMPLETE 08/21/2020 8/21/2020    Interpretation Summary  · LV: Estimated LVEF is 60 - 65%.  Normal cavity size and systolic function (ejection fraction normal). Moderate concentric hypertrophy. Wall motion: normal. Moderate (grade 2) left ventricular diastolic dysfunction. · LA: Mildly dilated left atrium. Left Atrium volume index is 37.31 mL/m2. · RV: Mildly dilated right ventricle. · RA: Mildly dilated right atrium. · AV: Mild aortic valve regurgitation is present. · MV: Mitral valve thickening. Mild mitral valve regurgitation is present. · PA: Pulmonary arterial systolic pressure is 36 mmHg. Signed by: Laure Brown MD on 8/21/2020 12:40 PM     IR No results found for this or any previous visit.      CATH         Special Testing/Procedures:  MODALITY RESULTS   MICRO All Micro Results       Procedure Component Value Units Date/Time    CULTURE, BLOOD [[id number]] Collected: 08/10/22 0340    Order Status: Completed Specimen: Blood Updated: 08/13/22 0708     Special Requests: NO SPECIAL REQUESTS        Culture result: NO GROWTH 3 DAYS       CULTURE, BLOOD [[id number]] Collected: 08/10/22 0430    Order Status: Completed Specimen: Blood Updated: 08/13/22 0708     Special Requests: NO SPECIAL REQUESTS        Culture result: NO GROWTH 3 DAYS       C. DIFFICILE AG & TOXIN A/B [[id number]] Collected: 08/12/22 0423    Order Status: Completed Specimen: Stool Updated: 08/12/22 0848     GDH ANTIGEN Negative        C. difficile toxin Negative        INTERPRETATION       NEGATIVE FOR TOXIGENIC C. DIFFICILE          CULTURE, URINE [[id number]] Collected: 08/10/22 0620    Order Status: Completed Specimen: Urine from Clean catch Updated: 08/11/22 1110     Special Requests: NO SPECIAL REQUESTS        Culture result: No growth (<1,000 CFU/ML)       RESPIRATORY VIRUS PANEL W/COVID-19, PCR [[id number]] Collected: 08/10/22 1655    Order Status: Completed Specimen: Nasopharyngeal Updated: 08/10/22 1906     Adenovirus Not detected        Coronavirus 229E Not detected        Coronavirus HKU1 Not detected        Coronavirus CVNL63 Not detected        Coronavirus OC43 Not detected        SARS-CoV-2, PCR Not detected        Metapneumovirus Not detected        Rhinovirus and Enterovirus Not detected        Influenza A Not detected        Influenza A, subtype H1 Not detected        Influenza A, subtype H3 Not detected        INFLUENZA A H1N1 PCR Not detected        Influenza B Not detected        Parainfluenza 1 Not detected        Parainfluenza 2 Not detected        Parainfluenza 3 Not detected        Parainfluenza virus 4 Not detected        RSV by PCR Not detected        B. parapertussis, PCR Not detected        Bordetella pertussis - PCR Not detected        Chlamydophila pneumoniae DNA, QL, PCR Not detected        Mycoplasma pneumoniae DNA, QL, PCR Not detected       LEGIONELLA PNEUMOPHILA AG, URINE [[id number]] Collected: 08/10/22 1530    Order Status: Completed Specimen: Urine, random Updated: 08/10/22 1744     Legionella Ag, urine Negative       STREP PNEUMO AG, URINE [[id number]] Collected: 08/10/22 1530    Order Status: Completed Specimen: Urine, random Updated: 08/10/22 1744     Strep pneumo Ag, urine Negative       C. DIFFICILE AG & TOXIN A/B [[id number]]     Order Status: Canceled Specimen: Stool     COVID-19 WITH INFLUENZA A/B [[id number]] Collected: 08/10/22 1036    Order Status: Completed Specimen: Nasopharyngeal Updated: 08/10/22 1130     SARS-CoV-2 by PCR Not detected        Comment: Not Detected results do not preclude SARS-CoV-2 infection and should not be used as the sole basis for patient management decisions. Results must be combined with clinical observations, patient history, and epidemiological information. Influenza A by PCR Not detected        Influenza B by PCR Not detected        Comment: Testing was performed using matteo Angela SARS-CoV-2 and Influenza A/B nucleic acid assay.   This test is a multiplex Real-Time Reverse Transcriptase Polymerase Chain Reaction (RT-PCR) based in [employer] diagnostic test intended for the qualitative detection of nucleic acids from SARS-CoV-2, Influenza A, and Influenza B in nasopharyngeal for use under the FDA's Emergency Use Authorization (EAU) only.        Fact sheet for Patients: FindDrives.pl  Fact sheet for Healthcare Providers: FindDrives.pl                ABG No results found for: PH, PHI, PCO2, PCO2I, PO2, PO2I, HCO3, HCO3I, FIO2, FIO2I   UA Results for orders placed or performed in visit on 10/21/21   AMB POC URINALYSIS DIP STICK AUTO W/O MICRO     Status: None   Result Value Ref Range Status    Color (UA POC) Yellow  Final    Clarity (UA POC) Clear  Final    Glucose (UA POC) Negative Negative Final    Bilirubin (UA POC) Negative Negative Final    Ketones (UA POC) Negative Negative Final    Specific gravity (UA POC) 1.030 1.001 - 1.035 Final    Blood (UA POC) Negative Negative Final    pH (UA POC) 5.5 4.6 - 8.0 Final    Protein (UA POC) 1+ Negative Final    Urobilinogen (UA POC) 0.2 mg/dL 0.2 - 1 Final    Nitrites (UA POC) Negative Negative Final    Leukocyte esterase (UA POC) Negative Negative Final         Laboratory Results:  LABORATORY RESULTS   HEMATOLOGY Lab Results   Component Value Date/Time    WBC 12.8 08/13/2022 03:31 AM    HGB 11.6 (L) 08/13/2022 03:31 AM    HCT 35.7 08/13/2022 03:31 AM    PLATELET 843 32/30/3791 03:31 AM    MCV 87.5 08/13/2022 03:31 AM       CHEMISTRIES Lab Results   Component Value Date/Time    Sodium 139 08/13/2022 03:31 AM    Potassium 3.1 (L) 08/13/2022 03:31 AM    Chloride 103 08/13/2022 03:31 AM    CO2 30 08/13/2022 03:31 AM    Anion gap 6 08/13/2022 03:31 AM    Glucose 116 (H) 08/13/2022 03:31 AM    BUN 35 (H) 08/13/2022 03:31 AM    Creatinine 1.08 08/13/2022 03:31 AM    BUN/Creatinine ratio 32 (H) 08/13/2022 03:31 AM    GFR est AA 58 (L) 08/13/2022 03:31 AM    GFR est non-AA 48 (L) 08/13/2022 03:31 AM    Calcium 8.6 08/13/2022 03:31 AM      HEPATIC FUNCTION Lab Results   Component Value Date/Time    Albumin 3.7 08/10/2022 03:40 AM Bilirubin, total 0.6 08/10/2022 03:40 AM    Protein, total 8.3 (H) 08/10/2022 03:40 AM    Globulin 4.6 (H) 08/10/2022 03:40 AM    A-G Ratio 0.8 08/10/2022 03:40 AM    ALT (SGPT) 13 08/10/2022 03:40 AM    Alk. phosphatase 66 08/10/2022 03:40 AM       LACTIC ACID Lab Results   Component Value Date/Time    Lactic acid 1.5 08/10/2022 03:46 PM      CARDIAC PANEL Lab Results   Component Value Date/Time     08/11/2022 02:47 AM    CK - MB 2.2 04/18/2018 03:48 PM    CK-MB Index 2.5 04/18/2018 03:48 PM    Troponin-I, QT <0.02 04/18/2018 03:48 PM      NT-proBNP Lab Results   Component Value Date/Time    NT pro-BNP 1,164 08/10/2022 03:40 AM    NT pro- 04/18/2018 03:48 PM      THYROID Lab Results   Component Value Date/Time    TSH 2.84 08/10/2022 03:40 AM    T4, Free 1.3 11/09/2021 12:24 PM            Readmission Risk Score: High Risk            31 Total Score    4 IP Visits Last 12 Months (1-3=4, 4=9, >4=11)    5 Pt. Coverage (Medicare=5 , Medicaid, or Self-Pay=4)    22 Charlson Comorbidity Score (Age + Comorbid Conditions)        Criteria that do not apply:    Has Seen PCP in Last 6 Months (Yes=3, No=0)    . Living with Significant Other. Assisted Living. LTAC. SNF.  or   Rehab    Patient Length of Stay (>5 days = 3)          Cassie Sanchez MD, PGY-2  [facility]  8/13/2022 12:47 PM can walk 20-30 mins before experiencing calf pain/claudication

## 2022-08-10 NOTE — REMOTE MONITORING
Spoke with primary RN Afrodeasia regarding 3 hour Sepsis bundle. For any questions or concerns, please feel free to call [phone]. Thank you! [address] [facility]. Malaika Alfaro RN, BSN.

## 2022-08-11 ENCOUNTER — APPOINTMENT (OUTPATIENT)
Dept: NON INVASIVE DIAGNOSTICS | Age: 87
DRG: 871 | End: 2022-08-11
Attending: PHYSICIAN ASSISTANT
Payer: MEDICARE

## 2022-08-11 LAB
ANION GAP SERPL CALC-SCNC: 8 MMOL/L (ref 3–18)
BACTERIA SPEC CULT: NORMAL
BASOPHILS # BLD: 0.5 K/UL (ref 0–0.1)
BASOPHILS NFR BLD: 2 % (ref 0–2)
BUN SERPL-MCNC: 26 MG/DL (ref 7–18)
BUN/CREAT SERPL: 20 (ref 12–20)
CALCIUM SERPL-MCNC: 8.7 MG/DL (ref 8.5–10.1)
CHLORIDE SERPL-SCNC: 107 MMOL/L (ref 100–111)
CK SERPL-CCNC: 171 U/L (ref 26–192)
CO2 SERPL-SCNC: 26 MMOL/L (ref 21–32)
CREAT SERPL-MCNC: 1.32 MG/DL (ref 0.6–1.3)
DIFFERENTIAL METHOD BLD: ABNORMAL
EOSINOPHIL # BLD: 0 K/UL (ref 0–0.4)
EOSINOPHIL NFR BLD: 0 % (ref 0–5)
ERYTHROCYTE [DISTWIDTH] IN BLOOD BY AUTOMATED COUNT: 13.1 % (ref 11.6–14.5)
GLUCOSE BLD STRIP.AUTO-MCNC: 140 MG/DL (ref 70–110)
GLUCOSE BLD STRIP.AUTO-MCNC: 178 MG/DL (ref 70–110)
GLUCOSE BLD STRIP.AUTO-MCNC: 198 MG/DL (ref 70–110)
GLUCOSE BLD STRIP.AUTO-MCNC: 212 MG/DL (ref 70–110)
GLUCOSE SERPL-MCNC: 135 MG/DL (ref 74–99)
HCT VFR BLD AUTO: 36.2 % (ref 35–45)
HGB BLD-MCNC: 11.6 G/DL (ref 12–16)
IMM GRANULOCYTES # BLD AUTO: 0 K/UL (ref 0–0.04)
IMM GRANULOCYTES NFR BLD AUTO: 0 % (ref 0–0.5)
LYMPHOCYTES # BLD: 0.5 K/UL (ref 0.9–3.6)
LYMPHOCYTES NFR BLD: 2 % (ref 21–52)
MAGNESIUM SERPL-MCNC: 2.1 MG/DL (ref 1.6–2.6)
MCH RBC QN AUTO: 28.5 PG (ref 24–34)
MCHC RBC AUTO-ENTMCNC: 32 G/DL (ref 31–37)
MCV RBC AUTO: 88.9 FL (ref 78–100)
MONOCYTES # BLD: 2.1 K/UL (ref 0.05–1.2)
MONOCYTES NFR BLD: 8 % (ref 3–10)
NEUTS BAND NFR BLD MANUAL: 4 %
NEUTS SEG # BLD: 23.4 K/UL (ref 1.8–8)
NEUTS SEG NFR BLD: 84 % (ref 40–73)
NRBC # BLD: 0 K/UL (ref 0–0.01)
NRBC BLD-RTO: 0 PER 100 WBC
PLATELET # BLD AUTO: 192 K/UL (ref 135–420)
PLATELET COMMENTS,PCOM: ABNORMAL
PMV BLD AUTO: 12.5 FL (ref 9.2–11.8)
POTASSIUM SERPL-SCNC: 3.2 MMOL/L (ref 3.5–5.5)
RBC # BLD AUTO: 4.07 M/UL (ref 4.2–5.3)
RBC MORPH BLD: ABNORMAL
SERVICE CMNT-IMP: NORMAL
SODIUM SERPL-SCNC: 141 MMOL/L (ref 136–145)
TROPONIN-HIGH SENSITIVITY: 28 NG/L (ref 0–54)
VANCOMYCIN SERPL-MCNC: 13.7 UG/ML (ref 5–40)
WBC # BLD AUTO: 26.5 K/UL (ref 4.6–13.2)

## 2022-08-11 PROCEDURE — 80048 BASIC METABOLIC PNL TOTAL CA: CPT

## 2022-08-11 PROCEDURE — 74011250636 HC RX REV CODE- 250/636: Performed by: INTERNAL MEDICINE

## 2022-08-11 PROCEDURE — 97116 GAIT TRAINING THERAPY: CPT

## 2022-08-11 PROCEDURE — 74011000250 HC RX REV CODE- 250: Performed by: PHYSICIAN ASSISTANT

## 2022-08-11 PROCEDURE — 74011250637 HC RX REV CODE- 250/637: Performed by: STUDENT IN AN ORGANIZED HEALTH CARE EDUCATION/TRAINING PROGRAM

## 2022-08-11 PROCEDURE — 77010033678 HC OXYGEN DAILY

## 2022-08-11 PROCEDURE — 36415 COLL VENOUS BLD VENIPUNCTURE: CPT

## 2022-08-11 PROCEDURE — 2709999900 HC NON-CHARGEABLE SUPPLY

## 2022-08-11 PROCEDURE — 74011250637 HC RX REV CODE- 250/637

## 2022-08-11 PROCEDURE — 74011636637 HC RX REV CODE- 636/637

## 2022-08-11 PROCEDURE — 74011250636 HC RX REV CODE- 250/636: Performed by: STUDENT IN AN ORGANIZED HEALTH CARE EDUCATION/TRAINING PROGRAM

## 2022-08-11 PROCEDURE — 65270000046 HC RM TELEMETRY

## 2022-08-11 PROCEDURE — 74011636637 HC RX REV CODE- 636/637: Performed by: FAMILY MEDICINE

## 2022-08-11 PROCEDURE — 97535 SELF CARE MNGMENT TRAINING: CPT

## 2022-08-11 PROCEDURE — 74011000250 HC RX REV CODE- 250: Performed by: STUDENT IN AN ORGANIZED HEALTH CARE EDUCATION/TRAINING PROGRAM

## 2022-08-11 PROCEDURE — 84484 ASSAY OF TROPONIN QUANT: CPT

## 2022-08-11 PROCEDURE — 74011000258 HC RX REV CODE- 258: Performed by: STUDENT IN AN ORGANIZED HEALTH CARE EDUCATION/TRAINING PROGRAM

## 2022-08-11 PROCEDURE — 97162 PT EVAL MOD COMPLEX 30 MIN: CPT

## 2022-08-11 PROCEDURE — 77030037878 HC DRSG MEPILEX >48IN BORD MOLN -B

## 2022-08-11 PROCEDURE — 93308 TTE F-UP OR LMTD: CPT

## 2022-08-11 PROCEDURE — 83735 ASSAY OF MAGNESIUM: CPT

## 2022-08-11 PROCEDURE — 82550 ASSAY OF CK (CPK): CPT

## 2022-08-11 PROCEDURE — 99232 SBSQ HOSP IP/OBS MODERATE 35: CPT | Performed by: INTERNAL MEDICINE

## 2022-08-11 PROCEDURE — 74011000250 HC RX REV CODE- 250: Performed by: INTERNAL MEDICINE

## 2022-08-11 PROCEDURE — 97530 THERAPEUTIC ACTIVITIES: CPT

## 2022-08-11 PROCEDURE — 82962 GLUCOSE BLOOD TEST: CPT

## 2022-08-11 PROCEDURE — 80202 ASSAY OF VANCOMYCIN: CPT

## 2022-08-11 PROCEDURE — 85025 COMPLETE CBC W/AUTO DIFF WBC: CPT

## 2022-08-11 RX ORDER — CARVEDILOL 6.25 MG/1
6.25 TABLET ORAL 2 TIMES DAILY WITH MEALS
Status: DISCONTINUED | OUTPATIENT
Start: 2022-08-11 | End: 2022-08-13 | Stop reason: HOSPADM

## 2022-08-11 RX ADMIN — BUMETANIDE 1 MG: 0.25 INJECTION INTRAMUSCULAR; INTRAVENOUS at 17:04

## 2022-08-11 RX ADMIN — PIPERACILLIN AND TAZOBACTAM 3.38 G: 3; .375 INJECTION, POWDER, FOR SOLUTION INTRAVENOUS at 05:34

## 2022-08-11 RX ADMIN — SODIUM CHLORIDE, PRESERVATIVE FREE 10 ML: 5 INJECTION INTRAVENOUS at 21:06

## 2022-08-11 RX ADMIN — POTASSIUM BICARBONATE 40 MEQ: 782 TABLET, EFFERVESCENT ORAL at 10:31

## 2022-08-11 RX ADMIN — PIPERACILLIN AND TAZOBACTAM 3.38 G: 3; .375 INJECTION, POWDER, FOR SOLUTION INTRAVENOUS at 14:24

## 2022-08-11 RX ADMIN — CARVEDILOL 3.12 MG: 3.12 TABLET, FILM COATED ORAL at 10:32

## 2022-08-11 RX ADMIN — SODIUM CHLORIDE, PRESERVATIVE FREE 10 ML: 5 INJECTION INTRAVENOUS at 06:48

## 2022-08-11 RX ADMIN — WATER 2 G: 1 INJECTION INTRAMUSCULAR; INTRAVENOUS; SUBCUTANEOUS at 18:25

## 2022-08-11 RX ADMIN — Medication 3 UNITS: at 22:09

## 2022-08-11 RX ADMIN — VANCOMYCIN HYDROCHLORIDE 750 MG: 750 INJECTION, POWDER, LYOPHILIZED, FOR SOLUTION INTRAVENOUS at 21:01

## 2022-08-11 RX ADMIN — ACETAMINOPHEN 500 MG: 500 TABLET ORAL at 18:41

## 2022-08-11 RX ADMIN — Medication 15 UNITS: at 22:18

## 2022-08-11 RX ADMIN — Medication 3 UNITS: at 17:05

## 2022-08-11 RX ADMIN — BUMETANIDE 1 MG: 0.25 INJECTION INTRAMUSCULAR; INTRAVENOUS at 10:32

## 2022-08-11 RX ADMIN — SERTRALINE 50 MG: 50 TABLET, FILM COATED ORAL at 10:32

## 2022-08-11 RX ADMIN — CARVEDILOL 6.25 MG: 6.25 TABLET, FILM COATED ORAL at 17:05

## 2022-08-11 RX ADMIN — RIVAROXABAN 20 MG: 20 TABLET, FILM COATED ORAL at 10:32

## 2022-08-11 RX ADMIN — Medication 6 UNITS: at 11:58

## 2022-08-11 RX ADMIN — SODIUM CHLORIDE, PRESERVATIVE FREE 10 ML: 5 INJECTION INTRAVENOUS at 17:05

## 2022-08-11 RX ADMIN — POTASSIUM BICARBONATE 40 MEQ: 782 TABLET, EFFERVESCENT ORAL at 06:47

## 2022-08-11 RX ADMIN — ATORVASTATIN CALCIUM 80 MG: 40 TABLET, FILM COATED ORAL at 10:32

## 2022-08-11 RX ADMIN — LEVOTHYROXINE SODIUM 150 MCG: 150 TABLET ORAL at 06:48

## 2022-08-11 RX ADMIN — POLYETHYLENE GLYCOL 3350 17 G: 17 POWDER, FOR SOLUTION ORAL at 10:32

## 2022-08-11 RX ADMIN — SODIUM CHLORIDE, PRESERVATIVE FREE 10 ML: 5 INJECTION INTRAVENOUS at 14:30

## 2022-08-11 NOTE — DISCHARGE INSTRUCTIONS
DISCHARGE SUMMARY from Nurse    PATIENT INSTRUCTIONS:    After general anesthesia or intravenous sedation, for 24 hours or while taking prescription Narcotics:  Limit your activities  Do not drive and operate hazardous machinery  Do not make important personal or business decisions  Do  not drink alcoholic beverages  If you have not urinated within 8 hours after discharge, please contact your surgeon on call. Report the following to your surgeon:  Excessive pain, swelling, redness or odor of or around the surgical area  Temperature over 100.5  Nausea and vomiting lasting longer than 4 hours or if unable to take medications  Any signs of decreased circulation or nerve impairment to extremity: change in color, persistent  numbness, tingling, coldness or increase pain  Any questions    What to do at Home:  Recommended activity: Activity as tolerated    If you experience any of the following symptoms nausea,vomiting, diarrhea, shortness of breath, fever over 101, abnormal bleeding, dizziness, fainting, or any other issues or concerns, please follow up with primary care physician or 911 for emergencies. *  Please give a list of your current medications to your Primary Care Provider. *  Please update this list whenever your medications are discontinued, doses are      changed, or new medications (including over-the-counter products) are added. *  Please carry medication information at all times in case of emergency situations. These are general instructions for a healthy lifestyle:    No smoking/ No tobacco products/ Avoid exposure to second hand smoke  Surgeon General's Warning:  Quitting smoking now greatly reduces serious risk to your health.     Obesity, smoking, and sedentary lifestyle greatly increases your risk for illness    A healthy diet, regular physical exercise & weight monitoring are important for maintaining a healthy lifestyle    You may be retaining fluid if you have a history of heart failure or if you experience any of the following symptoms:  Weight gain of 3 pounds or more overnight or 5 pounds in a week, increased swelling in our hands or feet or shortness of breath while lying flat in bed. Please call your doctor as soon as you notice any of these symptoms; do not wait until your next office visit. The discharge information has been reviewed with the patient. The patient verbalized understanding. Discharge medications reviewed with the patient and appropriate educational materials and side effects teaching were provided. ___________________________________________________________________________________________________________________________________  Patient armband removed and shredded          Sepsis: Care Instructions  Overview     Sepsis is an intense reaction to an infection. It can cause damage to the body and lead to dangerously low blood pressure. You may have inflammation across large areas of your body. It can damage tissue and even go deep into your organs. Infections that can lead to sepsis include:  A skin infection such as from a cut. A lung infection like pneumonia. A kidney infection. A gut infection such as E. coli. Sepsis is treated with antibiotics. Your doctor will try to find the infection that led to sepsis. Stanislav Ao also get fluids through a vein (IV). Machines will track your vital signs, including temperature, blood pressure, breathing rate, and pulse rate. The physical and mental effects of sepsis may not be seen for several weeks after treatment. And they may last long after the infection is gone. Physical problems may include:  Feeling weak and tired. Feeling out of breath. Aches and pains. Problems with getting around. Trouble falling asleep or staying asleep. Dry and itchy skin, brittle nails, and hair loss. Some of these effects can lead to problems with your organs or your feet, legs, hands, or arms.   Sepsis can also affect your mind and emotions. Problems may include:  Self-doubt. Anxiety. Nightmares. Depression and mood problems. Wanting to avoid other people. Confusion. Flashbacks and bad memories of your illness. It's important to care for yourself and try to avoid infections. This may lower your risk of getting sepsis again. Follow-up care is a key part of your treatment and safety. Be sure to make and go to all appointments, and call your doctor if you are having problems. It's also a good idea to know your test results and keep a list of the medicines you take. How can you care for yourself at home? Be safe with medicines. Take your medicines exactly as prescribed. Call your doctor if you think you are having a problem with your medicine. If your doctor prescribed antibiotics, take them as directed. Do not stop taking them just because you feel better. You need to take the full course of antibiotics. Help prevent infections that could again lead to sepsis. Try to avoid colds and flu. If you must be around people who have a cold or the flu, wash your hands often. And get a flu vaccine every year. Ask your doctor if you need a pneumococcal vaccine (to prevent pneumonia, meningitis, and other infections). If you have had one before, ask your doctor if you need another dose. Clean any wounds or scrapes. Do not smoke or use other tobacco products. When you quit smoking, you are less likely to get a cold, the flu, bronchitis, and pneumonia. If you need help quitting, talk to your doctor about stop-smoking programs and medicines. These can increase your chances of quitting for good. Drink plenty of fluids to prevent dehydration. Choose water and other clear liquids until you feel better. If you have kidney, heart, or liver disease and have to limit fluids, talk with your doctor before you increase the amount of fluids you drink. Eat a healthy diet. Include fruits, vegetables, and whole grains in your diet every day.   If your doctor recommends it, try doing some physical activity. Walking is a good choice. Bit by bit, increase the amount you walk every day. Talk with your family and friends about your challenges. Ask for help if you need it. Keep a journal. Writing down your thoughts and feelings can help reduce your stress. Ask family members to fill in gaps in your memory. Set small goals for yourself that you can reach. Reward yourself for success. When should you call for help? Call 911  anytime you think you may need emergency care. For example, call if:    You passed out (lost consciousness). Call your doctor now or seek immediate medical care if:    You have symptoms such as:  Shortness of breath. Feeling very sick. Severe pain. A fast heart rate. Cool, pale, or clammy skin. Feeling confused. Feeling very sleepy, or you are hard to wake up. You are dizzy or lightheaded, or you feel like you may faint. You have a fever or chills. Watch closely for changes in your health, and be sure to contact your doctor if:    You do not get better as expected. Where can you learn more? Go to http://www.gray.com/  Enter T383 in the search box to learn more about \"Sepsis: Care Instructions. \"  Current as of: July 1, 2021               Content Version: 13.2  © 0685-9172 [employer]. Care instructions adapted under license by [employer] (which disclaims liability or warranty for this information). If you have questions about a medical condition or this instruction, always ask your healthcare professional. [place] [address] any warranty or liability for your use of this information. Type 2 Diabetes: Care Instructions  Your Care Instructions     Type 2 diabetes is a disease that develops when the body's tissues cannot use insulin properly. Over time, the pancreas cannot make enough insulin.  Insulin is a hormone that helps the body's cells use sugar (glucose) for energy. It also helps the body store extra sugar in muscle, fat, and liver cells. Without insulin, the sugar cannot get into the cells to do its work. It stays in the blood instead. This can cause high blood sugar levels. A person has diabetes when the blood sugar stays too high too much of the time. Over time, diabetes can lead to diseases of the heart, blood vessels, nerves, kidneys, and eyes. You may be able to control your blood sugar by losing weight, eating a healthy diet, and getting daily exercise. You may also have to take insulin or other diabetes medicine. Follow-up care is a key part of your treatment and safety. Be sure to make and go to all appointments. Call your doctor if you are having problems. It's also a good idea to know your test results and keep a list of the medicines you take. How can you care for yourself at home? Keep your blood sugar at a target level (which you set with your doctor). Carbohydrate--the body's main source of fuel--affects blood sugar more than any other nutrient. Carbohydrate is in fruits, vegetables, milk, and yogurt. It also is in breads, cereals, vegetables such as potatoes and corn, and sugary foods such as candy and cakes. Follow your meal plan to know how much carbohydrate to eat at each meal and snack. Aim for 30 minutes of exercise on most, preferably all, days of the week. Walking is a good choice. You also may want to do other activities, such as running, swimming, cycling, or playing tennis or team sports. Try to do muscle-strengthening exercises at least 2 times a week. Take your medicines exactly as prescribed. Call your doctor if you think you are having a problem with your medicine. You will get more details on the specific medicines your doctor prescribes. Check your blood sugar as often as your doctor recommends. It is important to keep track of any symptoms you have, such as low blood sugar.  Also tell your doctor if you have any changes in your activities, diet, or insulin use. Talk to your doctor before you start taking aspirin every day. Aspirin can help certain people lower their risk of a heart attack or stroke. But taking aspirin isn't right for everyone, because it can cause serious bleeding. Do not smoke. If you need help quitting, talk to your doctor about stop-smoking programs and medicines. These can increase your chances of quitting for good. Keep your cholesterol and blood pressure at normal levels. You may need to take one or more medicines to reach your goals. Take them exactly as directed. Do not stop or change a medicine without talking to your doctor first.  When should you call for help? Call 911 anytime you think you may need emergency care. For example, call if:    You passed out (lost consciousness), or you suddenly become very sleepy or confused. (You may have very low blood sugar.)   Call your doctor now or seek immediate medical care if:    Your blood sugar is 300 mg/dL or is higher than the level your doctor has set for you. You have symptoms of low blood sugar, such as:  Sweating. Feeling nervous, shaky, and weak. Extreme hunger and slight nausea. Dizziness and headache. Blurred vision. Confusion. Watch closely for changes in your health, and be sure to contact your doctor if:    You often have problems controlling your blood sugar. You have symptoms of long-term diabetes problems, such as:  New vision changes. New pain, numbness, or tingling in your hands or feet. Skin problems. Where can you learn more? Go to http://www.gray.com/  Enter C553 in the search box to learn more about \"Type 2 Diabetes: Care Instructions. \"  Current as of: July 28, 2021               Content Version: 13.2  © 3709-5227 [employer]. Care instructions adapted under license by [employer] (which disclaims liability or warranty for this information).  If you have questions about a medical condition or this instruction, always ask your healthcare professional. [address] any warranty or liability for your use of this information. Hypothyroidism: Care Instructions  Your Care Instructions     When you have hypothyroidism, your body doesn't make enough thyroid hormone. This hormone helps your body use energy. If your thyroid level is low, you may feel tired, be constipated, have an increase in your blood pressure, or have dry skin or memory problems. You may also get cold easily, even when it is warm. Women with low thyroid levels may have heavy menstrual periods. A blood test to find your thyroid-stimulating hormone (TSH) level is used to check for hypothyroidism. A high TSH level may mean that you have it. The treatment for hypothyroidism is thyroid hormone pills. You should start to feel better in 1 to 2 weeks. Most people need treatment for the rest of their lives. You will need regular visits with your doctor to make sure you are doing well and that you have the right dose of medicine. Follow-up care is a key part of your treatment and safety. Be sure to make and go to all appointments, and call your doctor if you are having problems. It's also a good idea to know your test results and keep a list of the medicines you take. How can you care for yourself at home? Take your thyroid hormone medicine exactly as prescribed. Call your doctor if you think you are having a problem with your medicine. Most people do not have side effects if they take the right amount of medicine regularly. Take the medicine 30 minutes before breakfast, and do not take it with calcium, vitamins, or iron. Do not take extra doses of your thyroid medicine. It will not help you get better any faster, and it may cause side effects. If you forget to take a dose, do NOT take a double dose of medicine. Take your usual dose the next day.   Tell your doctor about all prescription, herbal, or over-the-counter products you take. Take care of yourself. Eat a healthy diet, get enough sleep, and get regular exercise. When should you call for help? Call 911 anytime you think you may need emergency care. For example, call if:    You passed out (lost consciousness). You have severe trouble breathing. You have a very slow heartbeat (less than 60 beats a minute). You have a low body temperature (95°F or below). Call your doctor now or seek immediate medical care if:    You feel tired, sluggish, or weak. You have trouble remembering things or concentrating. You do not begin to feel better 2 weeks after starting your medicine. Watch closely for changes in your health, and be sure to contact your doctor if you have any problems. Where can you learn more? Go to http://www.gray.com/  Enter B862 in the search box to learn more about \"Hypothyroidism: Care Instructions. \"  Current as of: July 28, 2021               Content Version: 13.2  © 2006-2022 [employer]. Care instructions adapted under license by [employer] (which disclaims liability or warranty for this information). If you have questions about a medical condition or this instruction, always ask your healthcare professional. [address] any warranty or liability for your use of this information. Diabetic Neuropathy: Care Instructions  Overview     When you have diabetes, your blood sugar level may get too high. Over time, high blood sugar levels can damage nerves. This is called diabetic neuropathy. Nerve damage can cause pain, burning, tingling, and numbness and may leave you feeling weak. The feet are often affected. When you have nerve damage in your feet, you cannot feel your feet and toes as well as normal and may not notice cuts or sores. Even a small injury can lead to a serious infection.  It is very important that you follow your doctor's advice on foot care. Sometimes diabetes damages nerves that help the body function. If this happens, your blood pressure, sweating, digestion, and urination might be affected. Your doctor may give you a target range for your blood sugar that is higher or lower than you are used to. Try to keep your blood sugar very close to this target range to prevent more damage. Follow-up care is a key part of your treatment and safety. Be sure to make and go to all appointments, and call your doctor if you are having problems. It's also a good idea to know your test results and keep a list of the medicines you take. How can you care for yourself at home? Take your medicines exactly as prescribed. Call your doctor if you think you are having a problem with your medicine. Try to keep blood sugar in your target range. Follow your meal plan to know how much carbohydrate you need for meals and snacks. A registered dietitian or diabetes educator can help you plan meals. Try to get at least 30 minutes of exercise on most days. Check your blood sugar as many times each day as your doctor recommends. Take and record your blood pressure at home if your doctor tells you to. To take your blood pressure at home:  Ask your doctor to check your blood pressure monitor to be sure it is accurate and the cuff fits you. Also ask your doctor to watch you to make sure that you are using it right. Do not use medicine known to raise blood pressure (such as some nasal decongestant sprays) before taking your blood pressure. Avoid taking your blood pressure if you have just exercised or are nervous or upset. Rest at least 15 minutes before you take a reading. Do not smoke. Smoking can increase your chance for a heart attack or stroke. If you need help quitting, talk to your doctor about stop-smoking programs and medicines. These can increase your chances of quitting for good.   Limit alcohol to 2 drinks a day for men and 1 drink a day for women. Too much alcohol can cause health problems. Eat small meals often, rather than 2 or 3 large meals a day. To care for your feet  Prevent injury by wearing shoes at all times, even when you are indoors. Do foot care as part of your daily routine. Wash your feet and then rub lotion on your feet, but not between your toes. Use a handheld mirror or magnifying mirror to inspect your feet for blisters, cuts, cracks, or sores. Have your toenails trimmed and filed straight across. Wear shoes and socks that fit well. Soft shoes that have good support and that fit well (such as tennis shoes) are best for your feet. Check your shoes for any loose objects or rough edges before you put them on. Ask your doctor to check your feet during each visit. Your doctor may notice a foot problem you have missed. Get early treatment for any foot problem, even a minor one. When should you call for help? Call your doctor now or seek immediate medical care if:    You have symptoms of infection, such as: Increased pain, swelling, warmth, or redness. Red streaks leading from the area. Pus draining from the area. A fever. You have new or worse numbness, pain, or tingling in any part of your body. Watch closely for changes in your health, and be sure to contact your doctor if:    You have a new problem with your feet, such as:  A new sore or ulcer. A break in the skin that is not healing after several days. Bleeding corns or calluses. An ingrown toenail. You do not get better as expected. Where can you learn more? Go to http://www.gray.com/  Enter V828 in the search box to learn more about \"Diabetic Neuropathy: Care Instructions. \"  Current as of: July 28, 2021               Content Version: 13.2  © 7173-7869 Healthwise, [employer]. Care instructions adapted under license by [employer] (which disclaims liability or warranty for this information).  If you have questions about a medical condition or this instruction, always ask your healthcare professional. [address] any warranty or liability for your use of this information. Cefdinir (Omnicef) - (By mouth)   Why this medicine is used:   Treats bacterial infections. Contact a nurse or doctor right away if you have:  Blistering, peeling, red skin rash  Severe or bloody diarrhea     Common side effects:  Mild diarrhea, nausea  © 2017 [facility] Information is for End User's use only and may not be sold, redistributed or otherwise used for commercial purposes. Tamsulosin (Flomax) - (By mouth)   Why this medicine is used:   Treats benign prostatic hyperplasia (enlarged prostate). Contact a nurse or doctor right away if you have:  Blistering, peeling, red skin rash  Seeing flashes or salas of light, seeing floating spots  Painful, prolonged erection of your penis  Lightheadedness, dizziness, fainting     Common side effects:  Headache, backache, weakness  Problems with ejaculation  Runny or stuffy nose  © 2017 [address] is for End User's use only and may not be sold, redistributed or otherwise used for commercial purposes.

## 2022-08-11 NOTE — PROGRESS NOTES
[facility] Medicine  Admission History and Physical        Patient:                      Elena Lomas       82 y.o. female            MRN:                        [id number]                                                                                    Admission Date:         8/10/2022  Code status:                DNR/DNI     Elena Lomas is a 82y.o. year old female with PMHx of HFpEF, T2DM, Recent DVT hx on Xarelto, HTN, hypothyroidism, stage 3 CKD who presented with complaint of AMS. Patient admitted for Sepsis ([facility] [address] [A41.9]. ASSESSMENT AND PLAN    Sepsis 2/2 unknown source   -Concern for infection. Source: UTI vs PNA vs skin. UA with negative nitrites, leuk esterase. CXR reassuring against pneumonia. Left foot diabetic ulcer clean, not infected. Pt with chronic back pain, unchanged, less concern for spinal abscess or cauna equina syndrome. Also considering opiate use, uremia, polypharmacy with patient's concurrent use of morphine and gabapentin. -SIRS: febrile, leukocytosis, Tachypnea, Tachycardia  -UA: clear, protein, glucose >1000, trace ketones, trace blood, neg nitrites, neg leuk esterase  -Blood cultures NG 24 hours  -Urine culture NG 24 hours  -CTA 8/10: No evidence for pulmonary emboli. Pulmonary vascular congestion without overt edema. No evidence of pneumonia. -CT A/P 8/10: No definite active inflammation. Colonic diverticulosis without acute diverticulitis. Prominent fluid-filled stomach and proximal small bowel without discrete transition point to suggest obstruction. This may be from ingested material. A mild gastroenteritis could have a similar appearance. Prior cholecystectomy with biliary prominence, likely reservoir effect. Atherosclerosis. -CT head 8/10: 1. No acute intracranial pathology. 2. Prior right-sided craniotomy with encephalomalacia in the right temporal lobe and mild sequela of chronic small vessel ischemic disease.   -CXR 8/10: Pulmonary vascular congestion.  -lactate 1.53  -procal 31.74, elevated  -troponin 40>32>28  -straight cath in ED removed 1200 ml urine  -UDS positive for opioids, pt has current outpatient script  -Covid, flu, leigonella, RVP, RSV, strep pneumo negative. No diarrhea, no c. Diff culture collected.   -troponin   Plan:  -continue Vancomycin 1g IV daily, Zosyn 3.375 IV q8hrs   -F/U Blood cultures  -Daily CBC, BMP  -ID consulted, appreciate recs     Sinus Tachycardia w/ intermittent Vtach runs  -improved, no tachycardia overnight  -Likely 2/2 to Sepsis, troponin negative, EKG without ST elevation   -EKG sinus tachycardia   -s/p 150 mg amiodarone bolus in ED  Plan:  - telemetry monitoring  -cardiology consulted   -continue coreg. Hold Lisinopril given renal function  -Echo and ECG pending.  -Continue statin.    -Continue diuresis as renal function and hemodynamics allow. Monitor strict I/Os and electrolytes. -IV abx per primary team.  -Continued on Xarelto for h/o DVT. Urinary retention  - retention secondary to sepsis vs neurogenic 2/2 uncontrolled diabetes  - straight cath in ED removed 1200 ml urine  -Pt following with [facility] outpatient  -Pt not on anti-cholinergic medications  Plan:  -antibiotics as above  -cruz in place, plan for void trial 8/12  -consider renal US if no improvement with antibiotic treatment     History of DVT 06/2022  -Diagnosed May 2022  -On Xarelto 20 mg daily until 12/16/22  Plan:  -Continue Xarelto 20 mg daily    Anemia  -Hb on admission 12.6, Hb today (8/11) 11.6  -mild. Likely due to fluids/anti-biotic volume given.   Plan:  -daily cbc  -continue to monitor     DM2 Uncontrolled  DM Neuropathy  Recent hospitalization for infection 2/2 diabetic ulcer  -I&D (5/27)- Infection limited to superficial foot, not found in joint.  -Arterial Duplexes (5/27) Lower Extrem b/l normal  -hx R great toe amputation  -Home meds: Lantus 15 units QD             - SSI, hypoglycemia protocol  -A1c 14 (April)  Plan:  - 15 units lantus at night, SSI humalog  - hold home metformin     Chronic bilateral low back pain w/o sciatica  -[facility] [place], Dr. Prakash Smiley  -gabapentin 400 mg Tid and morphine IR 15mg BID as to avoid withdrawal but consider may be contributing to AMS. Last filled August 2, 2022  Plan:  -hold gabapentin and morphine in setting of AMS and sepsis. Consider restarting when more stable. Pt not currently complaining of pain      CKD Stage III  -Cr1.32, Baseline Cr 1.0-1.2  Plan:  -Daily BMP, trend Cr  -hold lisinopril     HTN, HLD, HFpEF  -Pt with mild vascular congestion on CXR, mild edema  - Home Meds: norvasc 10mg daily, lisinopril 40mg daily, coreg 12.5mg BID, bumex 2mg 1 - 2x daily, Lipitor 80mg daily.  -Last ECHO in system 8/2020: EF 60-65%   Plan:  -Consider diuresis when pt more stable  - Hold Home Norvasc, Lisinopril, Bumex PRN, Statin  - Continue Home Meds: Coreg 3.125 mg BID  - F/U echo     Hypothyroidism  -TSH 2.84   Plan:  - Continue home synthroid 150mcg     Gout  -Chronic, symptomatically stable, no recent flare up, on home allopurinol  Plan:  - will hold allopurinol for now      Global:  Code: DNR  IVF/Drips: none  I/O / Wt: strict  Diet: NPO  DVT/AC: Xarelto  Mobility: per protocol  Disposition: home, pending clinical course  Anticipated LOS: greater than 2 midnights     Point of Contact (relationship, number): Meri West (Son) [phone]      SUBJECTIVE:  Pt had a couple episodes of vomiting yesterday that resovled with zofran. No acute events overnight. Pt seen at beside. She is hard of hearing. Confusion improved this morning, able to carry on conversation. She endorses poor PO intake 2/2 decreased appetite. Denies pain, dyspnea, chest tightness, headache. Denies nausea this morning. Denies urinary pain this morning. Has not had a bowel movement since admission.      ROS  (positive findings are in BOLD; negative findings are in regular font)  Constitutional: fevers, chills, appetite changes, weight changes, fatigue  HEENT: changes in vision, changes in hearing, sore throat, dysphagia  Cardiovascular: chest pain, palpitations, PND, orthopnea, edema  Pulmonary: SOB, cough, sputum production, wheezing, chest tightness  Gastrointestinal: abdominal pain, nausea/vomiting, diarrhea, constipation, melena, hematochezia  Genitourinary: dysuria, hesitation, dribbling, urgency, hematuria  Musculoskeletal: arthralgias, myalgias  Skin: rash, itching  Neurological: sensory changes, motor changes, headache  Psychiatric: mood changes  Endocrine: heat/cold intolerance  Heme: easy bruising/easy bleeding, LAD     OBJECTIVE:  Patient Vitals for the past 24 hrs:    Date/Time Temp Pulse BP MAP (Calculated) BP Patient Position Resp SpO2 O2 Device O2 Flow Rate (L/min)   08/11/22 0745 98.8 °F (37.1 °C) 86 146/69 Abnormal  95 -- 20 99 % -- --   08/11/22 0323 99.1 °F (37.3 °C) 87 127/65 86 At rest 18 98 % -- --   08/10/22 2316 98.8 °F (37.1 °C) 83 133/61 85 At rest 18 98 % -- --   08/10/22 1924 -- -- -- -- -- -- -- Nasal cannula 2 l/min   08/10/22 1919 98.6 °F (37 °C) 93 158/76 Abnormal  103 At rest 20 98 % -- --      Body mass index is 40.34 kg/m². Tele report (8/11):  Normal sinus rhythm, no tachycardia    PHYSICAL EXAM:    General: Awake, alert, NAD, resting comfortably  HEENT: NCAT, PERRLA, EOM intact; oral mucosa well perfused, oropharynx clear  Neck: no JVD,  CVS: regular rate, regular rhythm, no murmurs, no LE edema  Lungs: CTAB, no increased work of breathing, no vertebral tenderness, no CVA tenderness  Abdomen: Soft, non-distended, non-tender, BS+, no organomegaly, no masses  Ext: No calf tenderness, peripheral pulses present, R great toe amputated  Skin: Warm, Dry, Intact , No significant rashes/petechia/ecchymosis, Diabetic ulcer on base of L foot, clean, dry, non-erythematous  Neuro: No focal neurologic deficits or gross abnormalities  Psych: A&Ox3, appropriate mood and affect             RECENT LABS AND IMAGING ON ADMISSION   Recent Results          Recent Results (from the past 24 hour(s))   POC LACTIC ACID     Collection Time: 08/10/22  3:11 AM   Result Value Ref Range     Lactic Acid (POC) 1.53 0.40 - 2.00 mmol/L   CULTURE, BLOOD     Collection Time: 08/10/22  3:40 AM     Specimen: Blood   Result Value Ref Range     Special Requests: NO SPECIAL REQUESTS       Culture result: NO GROWTH AFTER 2 HOURS     METABOLIC PANEL, COMPREHENSIVE     Collection Time: 08/10/22  3:40 AM   Result Value Ref Range     Sodium 137 136 - 145 mmol/L     Potassium 5.2 3.5 - 5.5 mmol/L     Chloride 103 100 - 111 mmol/L     CO2 26 21 - 32 mmol/L     Anion gap 8 3.0 - 18 mmol/L     Glucose 226 (H) 74 - 99 mg/dL     BUN 24 (H) 7.0 - 18 MG/DL     Creatinine 1.36 (H) 0.6 - 1.3 MG/DL     BUN/Creatinine ratio 18 12 - 20       GFR est AA 45 (L) >60 ml/min/1.73m2     GFR est non-AA 37 (L) >60 ml/min/1.73m2     Calcium 9.1 8.5 - 10.1 MG/DL     Bilirubin, total 0.6 0.2 - 1.0 MG/DL     ALT (SGPT) 13 13 - 56 U/L     AST (SGOT) 45 (H) 10 - 38 U/L     Alk. phosphatase 66 45 - 117 U/L     Protein, total 8.3 (H) 6.4 - 8.2 g/dL     Albumin 3.7 3.4 - 5.0 g/dL     Globulin 4.6 (H) 2.0 - 4.0 g/dL     A-G Ratio 0.8 0.8 - 1.7     CBC WITH AUTOMATED DIFF     Collection Time: 08/10/22  3:40 AM   Result Value Ref Range     WBC 25.3 (H) 4.6 - 13.2 K/uL     RBC 4.42 4.20 - 5.30 M/uL     HGB 12.6 12.0 - 16.0 g/dL     HCT 38.3 35.0 - 45.0 %     MCV 86.7 78.0 - 100.0 FL     MCH 28.5 24.0 - 34.0 PG     MCHC 32.9 31.0 - 37.0 g/dL     RDW 12.9 11.6 - 14.5 %     PLATELET 519 719 - 696 K/uL     MPV 12.6 (H) 9.2 - 11.8 FL     NRBC 0.0 0  WBC     ABSOLUTE NRBC 0.00 0.00 - 0.01 K/uL     NEUTROPHILS 81 (H) 40 - 73 %     BAND NEUTROPHILS 5 0 - 5 %     LYMPHOCYTES 4 (L) 21 - 52 %     MONOCYTES 10 3 - 10 %     EOSINOPHILS 0 0 - 5 %     BASOPHILS 0 0 - 2 %     IMMATURE GRANULOCYTES 0 %     ABS.  NEUTROPHILS 21.8 (H) 1.8 - 8.0 K/UL     ABS. LYMPHOCYTES 1.0 0.9 - 3.6 K/UL     ABS. MONOCYTES 2.5 (H) 0.05 - 1.2 K/UL     ABS. EOSINOPHILS 0.0 0.0 - 0.4 K/UL     ABS. BASOPHILS 0.0 0.0 - 0.1 K/UL     ABS. IMM.  GRANS. 0.0 K/UL     DF MANUAL       PLATELET COMMENTS ADEQUATE PLATELETS       RBC COMMENTS NORMOCYTIC, NORMOCHROMIC     TROPONIN-HIGH SENSITIVITY     Collection Time: 08/10/22  3:40 AM   Result Value Ref Range     Troponin-High Sensitivity 16 0 - 54 ng/L   MAGNESIUM     Collection Time: 08/10/22  3:40 AM   Result Value Ref Range     Magnesium 1.8 1.6 - 2.6 mg/dL   NT-PRO BNP     Collection Time: 08/10/22  3:40 AM   Result Value Ref Range     NT pro-BNP 1,164 0 - 1,800 PG/ML   TSH 3RD GENERATION     Collection Time: 08/10/22  3:40 AM   Result Value Ref Range     TSH 2.84 0.36 - 3.74 uIU/mL   EKG, 12 LEAD, INITIAL     Collection Time: 08/10/22  4:10 AM   Result Value Ref Range     Ventricular Rate 114 BPM     Atrial Rate 114 BPM     P-R Interval 186 ms     QRS Duration 100 ms     Q-T Interval 312 ms     QTC Calculation (Bezet) 430 ms     Calculated P Axis 80 degrees     Calculated R Axis 15 degrees     Calculated T Axis 116 degrees     Diagnosis           Sinus tachycardia  ST & T wave abnormality, consider lateral ischemia  Abnormal ECG  When compared with ECG of 10-AUG-2022 02:46,  premature ventricular complexes are no longer present      URINALYSIS W/ RFLX MICROSCOPIC     Collection Time: 08/10/22  6:20 AM   Result Value Ref Range     Color YELLOW       Appearance CLEAR       Specific gravity 1.023 1.005 - 1.030       pH (UA) 7.0 5.0 - 8.0       Protein 100 (A) NEG mg/dL     Glucose >1,000 (A) NEG mg/dL     Ketone TRACE (A) NEG mg/dL     Bilirubin Negative NEG       Blood TRACE (A) NEG       Urobilinogen 0.2 0.2 - 1.0 EU/dL     Nitrites Negative NEG       Leukocyte Esterase Negative NEG     URINE MICROSCOPIC ONLY     Collection Time: 08/10/22  6:20 AM   Result Value Ref Range     WBC 0 to 2 0 - 4 /hpf     RBC 0 to 2 0 - 5 /hpf Epithelial cells FEW 0 - 5 /lpf     Bacteria Negative NEG /hpf   DRUG SCREEN, URINE     Collection Time: 08/10/22  6:20 AM   Result Value Ref Range     BENZODIAZEPINES Negative NEG       BARBITURATES Negative NEG       THC (TH-CANNABINOL) Negative NEG       OPIATES Positive (A) NEG       PCP(PHENCYCLIDINE) Negative NEG       COCAINE Negative NEG       AMPHETAMINES Negative NEG       METHADONE Negative NEG       HDSCOM (NOTE)              XR (Most Recent). Results from Hospital Encounter encounter on 08/10/22     XR CHEST PORT     Narrative  Examination: Portable AP chest     History: Infection     Comparison: December 7, 2020     Findings: There is pulmonary vascular congestion without overt edema. No pleural  effusion. No pneumothorax. Mild cardiomegaly is stable. Atherosclerosis of  aortic arch. Impression  1. Pulmonary vascular congestion. CT (Most Recent) Results from Hospital Encounter encounter on 08/10/22     CTA CHEST W OR W WO CONT     Narrative  CTA CHEST PULMONARY EMBOLISM PROTOCOL        INDICATION: Tachycardia and febrile. Question pulmonary embolism. TECHNIQUE: Thin collimation axial images obtained through the level of the  pulmonary arteries with additional imaging through the chest following the  uneventful administration of nonionic intravenous contrast.  Images  reconstructed into three dimensional coronal and sagittal projections for  complete evaluation of the tortuous and overlapping pulmonary vascular  structures and to reduce patient radiation dose. All CT scans at this facility are performed using dose optimization technique as  appropriate to a performed exam, to include automated exposure control,  adjustment of the mA and/or kV according to patient size (including appropriate  matching first site-specific examinations), or use of iterative reconstruction  technique. COMPARISON: Chest radiograph from earlier tonight. Michelle Hall      FINDINGS:     No filling defects are appreciated within the main, left, right, lobar or  visualized segmental pulmonary arteries to suggest embolism. Thyroid: Atrophic  Pericardium/ Heart: Heart size is normal for age. Aorta/ Vessels: No aneurysm or dissection. Mild atherosclerosis. Lymph Nodes: Unremarkable. .     Lungs: Mild pulmonary vascular congestion. No consolidative pneumonia or overt  pulmonary edema. Respiratory motion. No pleural effusion. Upper Abdomen: Reported separately. Bones/soft tissues: No acute finding     Impression  No evidence for pulmonary emboli. Pulmonary vascular congestion without overt edema. No evidence of pneumonia. CT abdomen and pelvis is reported separately. ECHO No results found for this or any previous visit. EKG No results found for this or any previous visit. I have discussed Ms. Renee Ledesma case with my attending who agrees with the plan of care.      Pat Hills MD PGY-1   [facility]   August 11, 2022, 5:53 AM

## 2022-08-11 NOTE — PROGRESS NOTES
Problem: Self Care Deficits Care Plan (Adult)  Goal: *Acute Goals and Plan of Care (Insert Text)  Description: Occupational Therapy Goals  Initiated 8/10/2022 within 7 day(s). 1.  Patient will perform lower body dressing with modified independence. 2.  Patient will perform functional task in standing for 5 minutes with supervision for balance. 3.  Patient will perform toilet transfers with supervision/set-up. 4.  Patient will perform all aspects of toileting with supervision/set-up. 5.  Patient will participate in upper extremity therapeutic exercise/activities with supervision/set-up for 8 minutes to increase strength/endurance for ADLs. 6.  Patient will utilize energy conservation techniques during functional activities with verbal cues. Prior Level of Function:Pt was modified independent with basic self care tasks and used a [facility] for functional mobility PTA. She lives with a friend, Oskar Vasques, who was present during the initial evaluation. He states that she did the cooking and cleaning as well. Outcome: Progressing Towards Goal   OCCUPATIONAL THERAPY TREATMENT    Patient: Bruna Perkins (89 y.o. female)  Date: 8/11/2022  Diagnosis: Sepsis ([address] [A41.9] Sepsis ([address]      Precautions: Fall    Chart, occupational therapy assessment, plan of care, and goals were reviewed. ASSESSMENT:  Patient alert and agreeable to therapy session. She is less confused than yesterday and had no vomiting. Patient sat on EOB from supine with min assist and extra time. Self care practice completed while seated and then she was able to ambulate to the bathroom with CGA, using a RW, for additional activities including toileting. No LOB noted. Patient did have noted fatigue in bathroom while standing to perform hygiene/bathing after bowel movement. VCs given to rest.  Education provided on energy conservation techniques during daily activities and she verbalized understanding.   Patient ambulated back to bed and returned to supine with HOB elevated at end of session. All needs met and significant other in the room. Progression toward goals:  [x]          Improving appropriately and progressing toward goals  []          Improving slowly and progressing toward goals  []          Not making progress toward goals and plan of care will be adjusted     PLAN:  Patient continues to benefit from skilled intervention to address the above impairments. Continue treatment per established plan of care. Further Equipment Recommendations for Discharge:  N/A    AMPAC: Based on an AM-PAC score of 20/24 and their current ADL deficits; it is recommended that the patient have 2-3 sessions per week of Occupational Therapy at d/c to increase the patient's independence. This AMPAC score should be considered in conjunction with interdisciplinary team recommendations to determine the most appropriate discharge setting. Patient's social support, diagnosis, medical stability, and prior level of function should also be taken into consideration. SUBJECTIVE:   Patient stated I know I have to turn sideways and move with the walker to get to the toilet.     OBJECTIVE DATA SUMMARY:   Cognitive/Behavioral Status:  Neurologic State: Alert  Orientation Level: Oriented to person, Oriented to place, Oriented to situation  Cognition: Follows commands  Safety/Judgement: Fall prevention    Functional Mobility and Transfers for ADLs:   Bed Mobility:  Supine to Sit: Minimum assistance  Sit to Supine: Minimum assistance      Transfers:  Sit to Stand: Minimum assistance;Contact guard assistance  Stand to Sit: Contact guard assistance   Toilet Transfer : Contact guard assistance   Bathroom Mobility: Contact guard assistance    Balance:  Sitting: Intact  Standing: Impaired; With support  Standing - Static: Good  Standing - Dynamic : Fair    ADL Intervention:  Grooming  Grooming Assistance: Set-up; Supervision  Position Performed: Standing (at sink)  Washing Hands: Set-up; Supervision  Brushing/Combing Hair: Set-up; Supervision    Lower Body Bathing  Perineal  : Contact guard assistance (for standing balance after setup)  Position Performed: Standing    Lower Body Dressing Assistance  Socks: Supervision  Leg Crossed Method Used: No  Position Performed: Bending forward method;Seated edge of bed    Toileting  Toileting Assistance: Contact guard assistance  Bladder Hygiene: Set-up; Supervision  Bowel Hygiene: Set-up; Supervision  Clothing Management: Contact guard assistance    Cognitive Retraining  Safety/Judgement: Fall prevention    Pain:  Pain level pre-treatment: 0/10   Pain level post-treatment: 0/10  Pain Intervention(s): NA  Response to intervention: NA    Activity Tolerance:    Good  Please refer to the flowsheet for vital signs taken during this treatment. After treatment:   []  Patient left in no apparent distress sitting up in chair  [x]  Patient left in no apparent distress in bed  [x]  Call bell left within reach  [x]  Nursing notified  [x]  Caregiver (significant other) present  []  Bed alarm activated    COMMUNICATION/EDUCATION:   [x] Role of Occupational Therapy in the acute care setting  [x] Home safety education was provided and the patient/caregiver indicated understanding. [x] Patient/family have participated as able in working towards goals and plan of care. [x] Patient/family agree to work toward stated goals and plan of care. [] Patient understands intent and goals of therapy, but is neutral about his/her participation. [] Patient is unable to participate in goal setting and plan of care.       Thank you for this referral.  Micah Hinson MS OTR/L   Time Calculation: 39 mins    Anirudh Santoyo AM-PAC® Daily Activity Inpatient Short Form (6-Clicks)*    How much HELP from another person does the patient currently need    (If the patient hasn't done an activity recently, how much help from another person do you think he/she would need if he/she tried?)   Total (Total A or Dep)   A Lot  (Mod to Max A)   A Little (Sup or Min A)   None (Mod I to I)   Putting on and taking off regular lower body clothing? [] 1 [] 2 [x] 3 [] 4   2. Bathing (including washing, rinsing,      drying)? [] 1 [] 2 [x] 3 [] 4   3. Toileting, which includes using toilet, bedpan or urinal?   [] 1 [] 2 [x] 3 [] 4   4. Putting on and taking off regular upper body clothing? [] 1 [] 2 [] 3 [x] 4   5. Taking care of personal grooming such as brushing teeth? [] 1 [] 2 [x] 3 [] 4   6. Eating meals? [] 1 [] 2 [] 3 [x] 4     Based on an AM-PAC score of 20/24 and their current ADL deficits; it is recommended that the patient have 2-3 sessions per week of Occupational Therapy at d/c to increase the patient's independence.

## 2022-08-11 NOTE — DIABETES MGMT
Diabetes Patient/Family Education Record    Factors That May Influence Patients Ability to Learn or Comply with Recommendations   []   Language barrier    []   Cultural needs   []   Motivation    []   Cognitive limitation    []   Physical   [x]   Education    []   Physiological factors   []   Hearing/vision/speaking impairment   []   Advent beliefs    []   Financial factors   []  Other:   []  No factors identified at this time. Person Instructed:   [x]   Patient   [x]   Family: supportive  at bedside actively participated to assist patient.  also stated that he's diabetic too. []  Other     Preference for Learning:   [x]   Verbal   []   Written   []  Demonstration     Level of Comprehension & Competence:    [x]  Good                                      [] Fair                                     []  Poor                             []  Needs Reinforcement   [x]  Teach back completed    Education Component:   [x]  Medication management, including how to administer insulin (if appropriate) and potential medication interactions: Received the following list of home diabetes meds she's taking at home:  Lantus (SoloStar) pen insulin 15 units daily at bedtime  Novolog insulin 5 units    Metformin 500 mg daily     [x]  Nutritional management - [x] Obtained usual meal pattern   []   Basic carbohydrate counting  []  Plate method  []  Limit concentrated sweets and avoid sweetened beverages  [x]  Portion control  []    Avoid skipping meals     []  Exercise   [x]  Signs, symptoms, and treatment of hyperglycemia and hypoglycemia: Discussed high blood sugar in detail and discussed the importance of not only taking diabetes medications as prescribed but also to follow recommendations for portion control of carbs: starches, fruits, dairy. Patient is no longer eating sugary snacks but need to follow portion control of carbs.      [x] Prevention, recognition and treatment of hyperglycemia and hypoglycemia: Discussed low blood sugar less than 70 in detail and patient knows to drink 1/2 cup of fruit juice or regular soda. Then discussed with patient to wait 15 minutes to re chek and repeat treatment every 15 minutes until low blood sugar is above 70.     []  Importance of blood glucose monitoring  [] Blood Glucose targets   []   Provided patient with blood glucose meter  [x]  Has glucometer and supplies at home: Patient not monitoring blood sugar regularly. Educated. []  Instruction on use of the blood glucose meter and recommended monitoring schedule   [x]  Discuss the importance of HbA1C monitoring. Patient's is 11.8% (5/27/2022). This is equivalent to average glucose of 292 mg/dl for the past 2-3 months.     []  Sick day guidelines   []  Proper use and disposal of lancets, needles, syringes or insulin pens (if appropriate)   []  Potential long-term complications (retinopathy, kidney disease, neuropathy, foot care)   [x] Information about whom to contact in case of emergency or for more information    [x]  Goal:  Patient/family will demonstrate understanding of Diabetes Self- Management Skills by: 8/16/2022  Plan for post-discharge education or self-management support:    [x] Outpatient class schedule provided            [] Patient Declined    [] Scheduled for outpatient classes (date) _______    [] Written information provided  Verify: [x] Prior to admission Diabetes medications    Does patient understand how diabetes medications work? Yes. Does patient have difficulty obtaining diabetes medications or testing supplies?  No.

## 2022-08-11 NOTE — PROGRESS NOTES
Comprehensive Nutrition Assessment    Type and Reason for Visit: Initial, Positive nutrition screen    Nutrition Recommendations/Plan:   Rec continue current diet order. Rec order Glucerna (each provides 220 kcal, 10g protein) BID. Nutrition History and Allergies:   PMHx of HFpEF, T2DM, Recent DVT, HTN, hypothyroidism, stage 3 CKD . Recent hospitalization for infection 2/2 diabetic ulcer, hx R great toe amputation. Pt reports unknown of UBW but denies any significant wt changes. States she eats well at home, 3 meals per day, no decrease in appetite. Per charted wt hx, weighed 234 lb (5/27/22). Nutrition Assessment:    Admitted w/ AMS, sepsis 2/2 unknown source. Pt reports eating most of breakfast this AM.     Nutrition Related Findings:    Flowsheet - open skin wound gluteal fold, wound heel L. Pertinent Meds: bumex, lantus, synthroid, miralax, vanco  Pertinent Labs: POC Glucose 140-277 mg/dl x 24 hrs, K 3.2 L, BUN/Cr 26/1.32, GFR 38; 5/27/22: Hgb A1C 11.8 H        Current Nutrition Intake & Therapies:  ADULT DIET Regular; 3 carb choices (45 gm/meal)    Anthropometric Measures:  Height: 5' 4\" (162.6 cm)  Ideal Body Weight (IBW): 120 lbs (55 kg)     Current Body Wt:  107.5 kg (237 lb), 197.5 % IBW.  Bed scale  Current BMI (kg/m2): 40.7       Estimated Daily Nutrient Needs:  Energy Requirements Based On: Formula     Energy (kcal/day): 3457-6756 kcals/day (MSJ 1-1.2)  Weight Used for Protein Requirements: Ideal  Protein (g/day): 110-137 g/day (2-2.5 g/day)  Method Used for Fluid Requirements: 1 ml/kcal  Fluid (ml/day): 1380-4841    Nutrition Diagnosis:   Increased nutrient needs related to increased demand for energy/nutrients as evidenced by wounds  Altered nutrition-related lab values related to endocrine dysfunction as evidenced by  (elevated glucose, Hgb A1C 11.8 on 5/27/22)    Nutrition Interventions:   Food and/or Nutrient Delivery: Continue current diet, Start oral nutrition supplement      Goals: Goals:  Meet at least 75% of estimated needs, by next RD assessment       Nutrition Monitoring and Evaluation:      Food/Nutrient Intake Outcomes: Food and nutrient intake  Physical Signs/Symptoms Outcomes: Biochemical data, Weight, Meal time behavior    Discharge Planning:    Continue current diet    Ally Doss [address], [address] [place] [address] Dr  Contact: [phone]

## 2022-08-11 NOTE — DIABETES MGMT
Diabetes/ Glycemic Control Plan of Care    Patient is 82year old with history of T2DM was admitted on 8/10/2022 with report of altered mental status, fall at home, and blood glucose of 269.    8/11/2022: Completed assessment of home diabetes management and education.  visiting at bedside supportive. Recommendations:   1.) basal and correctional insulin as ordered. 2.) monitor BGs and consider adding mealtime bolus insulin for pattern of elevated postprandial glucose values. Assessment: Three BGs above target yesterday. Fasting BG this morning within target range. BG ac lunch 212 and 717 ac dinner. DX:   1. Sepsis, due to unspecified organism, unspecified whether acute organ dysfunction present ([facility] [address]        2. Altered mental status, unspecified altered mental status type        3. Abnormal EKG [R94.31 (ICD-10-CM)]        4. NSVT (nonsustained ventricular tachycardia) ([facility])           Fasting/ Morning blood glucose:   Lab Results   Component Value Date/Time    Glucose 135 (H) 08/11/2022 02:47 AM    Glucose (POC) 178 (H) 08/11/2022 03:58 PM     IV Fluids containing dextrose: none    Steroids:  None    Blood glucose values: Within target range (70-180mg/dL):  NO    Current insulin orders:   Basal lantus insulin 15 units daily at bedtime  Correctional lispro insulin.  Very resistant dose    Total Daily Dose previous 24 hours: 21.12 units of insulin  Lantus: 15 units  Lispro: 6.12 units    Current A1c:   Lab Results   Component Value Date/Time    Hemoglobin A1c 11.8 (H) 05/27/2022 02:36 AM    Hemoglobin A1c (POC) 9.4 11/09/2021 11:58 AM      equivalent  to ave Blood Glucose of 292 mg/dl for 2-3 months prior to admission    Adequate glycemic control PTA: NO    Nutrition/Diet:   Active Orders   Diet    ADULT DIET Regular; 3 carb choices (45 gm/meal)      Meal Intake:  Patient Vitals for the past 168 hrs:   % Diet Eaten   08/11/22 1002 51 - 75%   08/10/22 1828 26 - 50%     Supplement Intake:  No data found. Home diabetes medications: verified with patient on 8/11/2022 and confirmed with the help of her . Key Antihyperglycemic Medications               Lantus Solostar U-100 Insulin 100 unit/mL (3 mL) inpn 15 Units by SubCUTAneous route nightly. insulin aspart U-100 (NovoLOG Flexpen U-100 Insulin) 100 unit/mL (3 mL) inpn 5 Units by SubCUTAneous route Before breakfast, lunch, and dinner. metFORMIN (GLUCOPHAGE) 500 mg tablet Take 500 mg by mouth daily. Plan/Goals:   Blood glucose will be within target of 70 - 180 mg/dl within 72 hours  Reinforce dietary and medication compliance at home.           Education:  [x] Refer to Diabetes Education Record: 8/11/2022                       [] Education not indicated at this time       Javier Hernandez RN

## 2022-08-11 NOTE — PROGRESS NOTES
Levothyroxine and Xarelto were not administered during the day shift. RN [facility] stated that the medications were ordered when patient was still in the ED. DARBY Yañez said that patient came up to the unit and was sent away immediately for some tests. Had spoken to pharmacists Kennedy Bhatti and he said to just wait for tomorrow morning to administer the medications.

## 2022-08-11 NOTE — PROGRESS NOTES
[facility]   POST-ROUND NOTE    - Pt vitals and mental status improved. Still searching for sepsis source. ID consulted, appreciate recs   - Continue Vancomycin and Zosyn  - Continue cruz. Will plan for void trial tomorrow. - Patient NSR for most of night, but had a couple runs of tachycardia around 8am. Cardiology following  -increase coreg to 6.25 BID. Pt taking 12.5 BID outpatient   -echo pending   -continue Bumex IV, will transition to oral tomorrow  -holding arb. Will restart once on oral bumex        The above patient and plan were discussed with my supervising physician. See daily progress note for full assessment/plan.       Gerri Phalen, MD, PGY-1  [facility]  8/11/2022, 1:36 PM

## 2022-08-11 NOTE — PROGRESS NOTES
Cardiology Progress Note    Admit Date: 8/10/2022  Attending Cardiologist: Dr. Sid Vargas   Patient seen and examined independently. As noted below, patient had sinus tachycardia and intermittent episodes of NSVT. Echocardiogram ordered and is pending. She is on chronic anticoagulation with Xarelto. Patient appears comfortable at present. Agree with assessment and plan as noted below. John العلي MD  Assessment:     Hospital Problems  Date Reviewed: 10/24/2021            Codes Class Noted POA    * (Principal) Sepsis [facility]) ICD-10-CM: A41.9  ICD-9-CM: 038.9, 995.91  8/10/2022 Unknown         -AMS, in setting of sepsis. CT Head negative for acute process. -Sepsis, unclear source: UTI vs PNA?   -Sinus Tachycardia with intermittent episodes of NSVT. Elevated rates likely physiologic in setting of sepsis. MI ruled out with serial negative enzymes x 5. ECG showed sinus tachycardia with ST changes, which is likely rate related. -AQUILES, Baseline Scr 0.87-1.1  -HFpEF. Chest CT with vascular congestion. Echo (08/2020) Normal LVEF, Grade 2 LV DD  Negative NST (08/2020)  -HTN, on Amlodipine, Coreg, Lisinopril as outpatient. -HLD, on statin.  -DM2, uncontrolled. -Asthma.   -Hypothyroid, on replacement. -H/o DVT (06/2022) on Xarelto  -Family h/o CAD. -H/o Tobacco abuse.   -Obesity. Primary cardiologist Dr. Deyanira Gar:     -Echo pending.   -No further troponins necessary. She has ruled out for MI with 5 negative troponins.   -Maintaining SR with brief episode of possible atrial tachycardia, no further episodes of NSVT. Continue BB.   -Continued on pulse IV diuretics, can likely transition to maintenance within the next 24-48 hrs.  -Resume ARB once transitioned to maintenance diuretics. -Replace K, recommend maintaining at 4.0 and Mg at 2.0   -Continue statin.   -Continued on Xarelto for h/o DVT. -increase activity as tolerated. -Further recommendations pending Echo results.      Subjective: No new complaints. Sitting in recliner with  at bedside.      Objective:      Patient Vitals for the past 8 hrs:   Temp Pulse Resp BP SpO2   08/11/22 0745 98.8 °F (37.1 °C) 86 20 (!) 146/69 99 %   08/11/22 0323 99.1 °F (37.3 °C) 87 18 127/65 98 %         Patient Vitals for the past 96 hrs:   Weight   08/11/22 0922 107.5 kg (237 lb)   08/10/22 1600 (P) 105.7 kg (233 lb)   08/10/22 0317 106.6 kg (235 lb)       TELE: SR               Current Facility-Administered Medications   Medication Dose Route Frequency Last Admin    potassium bicarb-citric acid (EFFER-K) tablet 40 mEq  40 mEq Oral Q4H 40 mEq at 08/11/22 1031    sodium chloride (NS) flush 5-10 mL  5-10 mL IntraVENous PRN      sodium chloride (NS) flush 5-40 mL  5-40 mL IntraVENous Q8H 10 mL at 08/11/22 0648    sodium chloride (NS) flush 5-40 mL  5-40 mL IntraVENous PRN      insulin lispro (HUMALOG) injection   SubCUTAneous AC&HS 6 Units at 08/10/22 2109    glucose chewable tablet 16 g  16 g Oral PRN      glucagon (GLUCAGEN) injection 1 mg  1 mg IntraMUSCular PRN      dextrose 10% infusion 0-250 mL  0-250 mL IntraVENous PRN      [Held by provider] allopurinoL (ZYLOPRIM) tablet 100 mg  100 mg Oral DAILY      acetaminophen (TYLENOL) tablet 500 mg  500 mg Oral Q6H PRN      [Held by provider] amLODIPine (NORVASC) tablet 10 mg  10 mg Oral DAILY      atorvastatin (LIPITOR) tablet 80 mg  80 mg Oral DAILY 80 mg at 08/11/22 1032    [Held by provider] bumetanide (BUMEX) tablet 1 mg  1 mg Oral BID PRN      [Held by provider] hydrOXYzine HCL (ATARAX) tablet 10 mg  10 mg Oral BID PRN      insulin glargine (LANTUS) injection 15 Units  15 Units SubCUTAneous QHS 15 Units at 08/10/22 2110    [Held by provider] lisinopriL (PRINIVIL, ZESTRIL) tablet 40 mg  40 mg Oral DAILY      rivaroxaban (XARELTO) tablet 20 mg  20 mg Oral DAILY WITH BREAKFAST 20 mg at 08/11/22 1032    sertraline (ZOLOFT) tablet 50 mg  50 mg Oral DAILY 50 mg at 08/11/22 1032    levothyroxine (SYNTHROID) tablet 150 mcg  150 mcg Oral 6am 150 mcg at 08/11/22 0648    carvediloL (COREG) tablet 3.125 mg  3.125 mg Oral BID WITH MEALS 3.125 mg at 08/11/22 1032    bumetanide (BUMEX) injection 1 mg  1 mg IntraVENous BID 1 mg at 08/11/22 1032    piperacillin-tazobactam (ZOSYN) 3.375 g in 0.9% sodium chloride (MBP/ADV) 100 mL MBP  3.375 g IntraVENous Q8H 3.375 g at 08/11/22 0534    vancomycin (VANCOCIN) 750 mg in 0.9% sodium chloride 250 mL (VIAL-MATE)  750 mg IntraVENous Q24H 750 mg at 08/10/22 2059    promethazine (PHENERGAN) suppository 12.5 mg  12.5 mg Rectal Q6H PRN 12.5 mg at 08/10/22 1228    polyethylene glycol (MIRALAX) packet 17 g  17 g Oral DAILY 17 g at 08/11/22 1032         Intake/Output Summary (Last 24 hours) at 8/11/2022 1115  Last data filed at 8/11/2022 1002  Gross per 24 hour   Intake 960 ml   Output 1400 ml   Net -440 ml       Physical Exam:  General:  alert, cooperative, no distress, appears stated age, morbidly obese  Neck:  no JVD  Lungs:  diminished B/L   Heart:  regular rate and rhythm, S1, S2 normal, no murmur, click, rub or gallop  Abdomen:  abdomen is soft without significant tenderness, masses, organomegaly or guarding  Extremities:  trace B/L LE edema     Visit Vitals  BP (!) 146/69   Pulse 86   Temp 98.8 °F (37.1 °C)   Resp 20   Ht 5' 4\" (1.626 m)   Wt 107.5 kg (237 lb)   SpO2 99%   BMI 40.68 kg/m²       Data Review:     Labs: Results:       Chemistry Recent Labs     08/11/22  0247 08/10/22  1023 08/10/22  0340   * 243* 226*    141 137   K 3.2* 3.5 5.2    104 103   CO2 26 28 26   BUN 26* 21* 24*   CREA 1.32* 1.35* 1.36*   CA 8.7 8.9 9.1   MG 2.1 2.2 1.8   AGAP 8 9 8   BUCR 20 16 18   AP  --   --  66   TP  --   --  8.3*   ALB  --   --  3.7   GLOB  --   --  4.6*   AGRAT  --   --  0.8      CBC w/Diff Recent Labs     08/11/22  0247 08/10/22  1546 08/10/22  1023   WBC 26.5* 32.7* 37.9*   RBC 4.07* 4.75 4.43   HGB 11.6* 13.5 12.6   HCT 36.2 41.7 39.2    195 184   GRANS 84* 90* 84*   LYMPH 2* 3* 3*   EOS 0 0 0      Cardiac Enzymes Lab Results   Component Value Date/Time     08/11/2022 02:47 AM      Coagulation No results for input(s): PTP, INR, APTT, INREXT in the last 72 hours.     Lipid Panel Lab Results   Component Value Date/Time    Cholesterol, total 191 11/09/2021 12:24 PM    HDL Cholesterol 44 11/09/2021 12:24 PM    LDL, calculated 96.8 11/09/2021 12:24 PM    VLDL, calculated 50.2 11/09/2021 12:24 PM    Triglyceride 251 (H) 11/09/2021 12:24 PM    CHOL/HDL Ratio 4.3 11/09/2021 12:24 PM      BNP No results found for: BNP, BNPP, XBNPT   Liver Enzymes Recent Labs     08/10/22  0340   TP 8.3*   ALB 3.7   AP 66      Thyroid Studies Lab Results   Component Value Date/Time    TSH 2.84 08/10/2022 03:40 AM          Signed By: Ricardo Arthur PA-C     August 11, 2022

## 2022-08-11 NOTE — ROUTINE PROCESS
Bedside and Verbal shift change report given to DARBY Bonner (oncoming nurse) by Zenon Jensen RN (offgoing nurse). Report included the following information SBAR, Kardex, MAR and Recent Results.     SITUATION:  Code Status: DNR  Reason for Admission: Sepsis ([facility] [address] [A41.9]  Hospital day: 1  Problem List:       Hospital Problems  Date Reviewed: 10/24/2021            Codes Class Noted POA    * (Principal) Sepsis ([facility] [address] ICD-10-CM: A41.9  ICD-9-CM: 038.9, 995.91  8/10/2022 Unknown           BACKGROUND:   Past Medical History:   Past Medical History:   Diagnosis Date    Acquired hypothyroidism 8/1/2016    Arthritis of right shoulder region 4/21/2016    Asthma     Bilateral lower extremity edema 7/7/2021    Chronic bilateral low back pain without sciatica 7/7/2021    Chronic diastolic congestive heart failure ([address] 3/10/2021    Chronic venous insufficiency     Diabetes ([address]     neuropathy    Essential hypertension 7/7/2021    Gout     History of depression 1/23/2017    History of fall 7/7/2021    Hypercholesteremia 7/7/2021    Hypertension     MI (myocardial infarction) ([address]     OAB (overactive bladder) 7/7/2021    Peripheral neuropathy     Rheumatoid arthritis ([address]     Tear of right rotator cuff 5/16/2016    Thyroid pain     Urinary incontinence 7/7/2021    Vertigo       Patient taking anticoagulants yes    Patient has a defibrillator: no    History of shots YES for example, flu, pneumonia, tetanus   Isolation History YES for example, MRSA, CDiff    ASSESSMENT:  Changes in Assessment Throughout Shift: NONE  Significant Changes in 24 hours (for example, RR/code, fall)  Patient has Central Line: no   Patient has Ramirez Cath: no   Mobility Issues  PT  IV Patency  OR Checklist  Pending Tests    Last Vitals:  Vitals w/ MEWS Score (last day)       Date/Time MEWS Score Pulse Resp Temp BP Level of Consciousness SpO2    08/10/22 1919 1 93 20 98.6 °F (37 °C) 158/76 Alert (0) 98 %    08/10/22 1727 1 94 20 97.5 °F (36.4 °C) 147/76 Alert (0) 94 %    08/10/22 1123 1 100 20 99.7 °F (37.6 °C) 129/67 Alert (0) 93 %    08/10/22 08:04:58 -- 96 32 -- 143/48 -- 93 %    08/10/22 0534 -- 158 -- -- -- -- --    08/10/22 0447 -- 109 34 99.1 °F (37.3 °C) -- Alert (0) 93 %    08/10/22 0347 -- 128 28 -- 165/90 -- 84 %    08/10/22 0317 6 124 24 103 °F (39.4 °C) 168/70 Responds to Voice (1) 100 %          PAIN    Pain Assessment    Pain Intensity 1: 0 (08/11/22 0029)              Patient Stated Pain Goal: 0  Intervention effective: N/A  Time of last intervention: N/A Reassessment Completed: yes   Other actions taken for pain: Distraction    Last 3 Weights:  Last 3 Recorded Weights in this Encounter    08/10/22 0317 08/10/22 1600   Weight: 106.6 kg (235 lb) (P) 105.7 kg (233 lb)   Weight change:     INTAKE/OUPUT    Current Shift: No intake/output data recorded. Last three shifts: 08/09 0701 - 08/10 1900  In: 5196.7 [P.O.:480; I.V.:4716.7]  Out: 2600 [Urine:2600]    RECOMMENDATIONS AND DISCHARGE PLANNING  Patient needs and requests: Assistancwe with ADL's    Pending tests/procedures: labs     Discharge plan for patient: Home    Discharge planning Needs or Barriers: None    Estimated Discharge Date: 8/15/2022 Posted on Whiteboard in Patients Room: yes       \"HEALS\" SAFETY CHECK  A safety check occurred in the patient's room between off going nurse and oncoming nurse listed above. The safety check included the below items:    H  High Alert Medications Verify all high alert medication drips (heparin, PCA, etc.)  E  Equipment Suction is set up for ALL patients (with yanker)  Red plugs utilized for all equipment (IV pumps, etc.)  WOWs wiped down at end of shift.   Room stocked with oxygen, suction, and other unit-specific supplies  A  Alarms Bed alarm is set for fall risk patients  Ensure chair alarm is in place and activated if patient is up in a chair  L  Lines Check IV for any infiltration  Ramirez bag is empty if patient has a Ramirez   Tubing and IV bags are labeled  S  Safety  Room is clean, patient is clean, and equipment is clean. Hallways are clear from equipment besides carts. Fall bracelet on for fall risk patients  Ensure room is clear and free of clutter  Suction is set up for ALL patients (with jonathan)  Hallways are clear from equipment besides carts.    Isolation precautions followed, supplies available outside room, sign posted    Mitchell Mcardle, RN

## 2022-08-11 NOTE — CONSULTS
Infectious Disease Consultation Note        Reason: Sepsis    Current abx Prior abx   Piptazo  Vancomycin      Lines: PIV      Assessment :    82 yro female with history significant for uncontrolled type 2 diabetes, diabetic neuropathy, hypercholesterolemia, hypertension, on anticoagulation A. fib with anticoagulation who was admitted yesterday lethargy and fevers    #1 febrile illness/sepsis picture-source unclear so far  -T-max of 103 on admission, WBC 26K today, AQUILES, elevated procalcitonin of 31 and elevated CRP and ESR  -Blood cultures negative at 24 hours, culture negative at 24 hours, respiratory viral panel negative  -CT a/p without any acute findings  -On Vancomcyin and piptazo     #2 Diabetes- poorly controlled  a1C of 11.8  #3 AQUILES  #4 CMO  #5 Afib   #6 partially removed callus from plantar aspect of the L first toe. Silght opening, dry now. But apparently drains intermittently    Lab Results   Component Value Date/Time    Hemoglobin A1c 11.8 (H) 05/27/2022 02:36 AM    Hemoglobin A1c (POC) 9.4 11/09/2021 11:58 AM         Recommendations:    Continue with Vancomycin and stop Piptazo. Start on CTX for now  F/up with all cx in progress  Podiatry evaluation of the L foot plantar wound? Possible nidus of infection? Supportive care. Thank you for consultation request. Above plan was discussed in details with patient, family. Please call me if any further questions or concerns. Will continue to participate in the care of this patient. HPI:    82 yro female with history significant for uncontrolled type 2 diabetes, diabetic neuropathy, hypercholesterolemia, hypertension, on anticoagulation A. fib with anticoagulation who was admitted yesterday for weakness and lethargy. Per pt's family member, she was in bed, sleeping for 2 days, feeling week and almost falling when got off the bed. She denies any fevers, chills at home. No resp complaints, including coughing, sob, congestion, runny nose,.  No n/v/d at home, although she is nauseated her. She normally is taking care of her daily activities, but this was uncharacteristic for her. Her family member is concerned  about her urinary incontinence and previous episodes of UTIs. She gets confused like this. While in the ED, patient had a fever of 103, tachycardic and septic appearing. She had lab work, cultures and imaging. Head and abdominal imaging did not show any acute findings. X-ray of her left foot showed soft tissue swelling but no other findings that are acute. Has an ongoing chronic  callous on the plantar aspect which was removed recently, and has been draining on and off at home. It does not hurt. No other skin infections, lines, recent procedures, travel or known sick contact. She did very upset with me mid interview and yelled at [facility] to stop and leave or she will call security! The family member in the room was not sure what happened, as neither did I. I left the room, and nursing staff explained to her what was going on. I went back in, and she was calm and we resumed the interview and examination. Her respiratory viral panel was negative. She was started on broad-spectrum antibiotics and admitted.          Past Medical History:   Diagnosis Date    Acquired hypothyroidism 8/1/2016    Arthritis of right shoulder region 4/21/2016    Asthma     Bilateral lower extremity edema 7/7/2021    Chronic bilateral low back pain without sciatica 7/7/2021    Chronic diastolic congestive heart failure ([address] 3/10/2021    Chronic venous insufficiency     Diabetes (HCC)     neuropathy    Essential hypertension 7/7/2021    Gout     History of depression 1/23/2017    History of fall 7/7/2021    Hypercholesteremia 7/7/2021    Hypertension     MI (myocardial infarction) ([address]     OAB (overactive bladder) 7/7/2021    Peripheral neuropathy     Rheumatoid arthritis ([address]     Tear of right rotator cuff 5/16/2016    Thyroid pain     Urinary incontinence 7/7/2021    Vertigo Past Surgical History:   Procedure Laterality Date    HX AMPUTATION TOE Right 2020    Great toe Dr. 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 HX KNEE REPLACEMENT Bilateral        Current Discharge Medication List        CONTINUE these medications which have NOT CHANGED    Details   rivaroxaban (XARELTO) 15 mg tab tablet Take 15 mg by mouth two (2) times a day. Lantus Solostar U-100 Insulin 100 unit/mL (3 mL) inpn 15 Units by SubCUTAneous route nightly. Qty: 1 Pen, Refills: 0      insulin aspart U-100 (NovoLOG Flexpen U-100 Insulin) 100 unit/mL (3 mL) inpn 5 Units by SubCUTAneous route Before breakfast, lunch, and dinner. Qty: 1 Pen, Refills: 0      levothyroxine (SYNTHROID) 150 mcg tablet Take 150 mcg by mouth daily. morphine IR (MS IR) 15 mg tablet Take 15 mg by mouth two (2) times a day. metFORMIN (GLUCOPHAGE) 500 mg tablet Take 500 mg by mouth daily. hydrOXYzine HCL (ATARAX) 10 mg tablet Take 1 Tablet by mouth two (2) times daily as needed for Anxiety. allopurinoL (ZYLOPRIM) 100 mg tablet Take 100 mg by mouth daily. lisinopriL (PRINIVIL, ZESTRIL) 40 mg tablet Take 1 tablet by mouth once daily  Qty: 90 Tablet, Refills: 0    Associated Diagnoses: Essential hypertension      sertraline (ZOLOFT) 50 mg tablet Take 1 Tablet by mouth daily. Qty: 90 Tablet, Refills: 3    Associated Diagnoses: Depression with anxiety      gabapentin (NEURONTIN) 400 mg capsule Take 1 cap in morning, 1 cap at 2pm, 2 caps at bedtime  Qty: 360 Capsule, Refills: 0    Associated Diagnoses: Type 2 diabetes mellitus with diabetic neuropathy, with long-term current use of insulin ([facility] Neuropathy      carvediloL (COREG) 12.5 mg tablet Take 1 Tablet by mouth two (2) times daily (with meals).   Qty: 180 Tablet, Refills: 0    Associated Diagnoses: Essential hypertension      bumetanide (BUMEX) 2 mg tablet Take 0.5 Tablets by mouth two (2) times daily as needed (swelling). Qty: 180 Tablet, Refills: 1    Associated Diagnoses: Bilateral lower extremity edema      amLODIPine (NORVASC) 10 mg tablet Take 1 Tablet by mouth daily. Qty: 90 Tablet, Refills: 0    Associated Diagnoses: Essential hypertension      atorvastatin (LIPITOR) 80 mg tablet Take 1 Tablet by mouth daily. Qty: 90 Tablet, Refills: 3    Associated Diagnoses: Hypercholesteremia      Blood-Glucose Meter (FREESTYLE LITE METER) monitoring kit Test twice blood glucose daily; ICD-10 E11.9; Quantity 1  Qty: 1 Kit, Refills: 0    Associated Diagnoses: Type 2 diabetes mellitus with nephropathy (HCC)      insulin syringe,safetyneedle 1 mL 31 gauge x 5/16\" syrg 1 Each by Does Not Apply route daily. Qty: 300 Each, Refills: 3    Comments: Dx e11.9      glucose blood VI test strips (FREESTYLE TEST) strip Use to check blood glucose twice daily DX:E11.9  Qty: 300 Strip, Refills: 3      acetaminophen (TYLENOL) 500 mg tablet Take 1 Tab by mouth every six (6) hours as needed for Pain.   Qty: 270 Tab, Refills: 3    Associated Diagnoses: Controlled type 2 diabetes mellitus without complication, with long-term current use of insulin ([facility])             Current Facility-Administered Medications   Medication Dose Route Frequency    carvediloL (COREG) tablet 6.25 mg  6.25 mg Oral BID WITH MEALS    perflutren lipid microspheres (DEFINITY) contrast injection 2 mL  2 mL IntraVENous CARD ONCE    sodium chloride (NS) flush 5-10 mL  5-10 mL IntraVENous PRN    sodium chloride (NS) flush 5-40 mL  5-40 mL IntraVENous Q8H    sodium chloride (NS) flush 5-40 mL  5-40 mL IntraVENous PRN    insulin lispro (HUMALOG) injection   SubCUTAneous AC&HS    glucose chewable tablet 16 g  16 g Oral PRN    glucagon (GLUCAGEN) injection 1 mg  1 mg IntraMUSCular PRN    dextrose 10% infusion 0-250 mL  0-250 mL IntraVENous PRN    [Held by provider] allopurinoL (ZYLOPRIM) tablet 100 mg  100 mg Oral DAILY    acetaminophen (TYLENOL) tablet 500 mg  500 mg Oral Q6H PRN    [Held by provider] amLODIPine (NORVASC) tablet 10 mg  10 mg Oral DAILY    atorvastatin (LIPITOR) tablet 80 mg  80 mg Oral DAILY    [Held by provider] bumetanide (BUMEX) tablet 1 mg  1 mg Oral BID PRN    [Held by provider] hydrOXYzine HCL (ATARAX) tablet 10 mg  10 mg Oral BID PRN    insulin glargine (LANTUS) injection 15 Units  15 Units SubCUTAneous QHS    [Held by provider] lisinopriL (PRINIVIL, ZESTRIL) tablet 40 mg  40 mg Oral DAILY    rivaroxaban (XARELTO) tablet 20 mg  20 mg Oral DAILY WITH BREAKFAST    sertraline (ZOLOFT) tablet 50 mg  50 mg Oral DAILY    levothyroxine (SYNTHROID) tablet 150 mcg  150 mcg Oral 6am    bumetanide (BUMEX) injection 1 mg  1 mg IntraVENous BID    piperacillin-tazobactam (ZOSYN) 3.375 g in 0.9% sodium chloride (MBP/ADV) 100 mL MBP  3.375 g IntraVENous Q8H    vancomycin (VANCOCIN) 750 mg in 0.9% sodium chloride 250 mL (VIAL-MATE)  750 mg IntraVENous Q24H    promethazine (PHENERGAN) suppository 12.5 mg  12.5 mg Rectal Q6H PRN    polyethylene glycol (MIRALAX) packet 17 g  17 g Oral DAILY       Allergies: Patient has no known allergies.     Family History   Problem Relation Age of Onset    Heart Attack Mother     Heart Attack Father      Social History     Socioeconomic History    Marital status:      Spouse name: Not on file    Number of children: Not on file    Years of education: Not on file    Highest education level: Not on file   Occupational History    Not on file   Tobacco Use    Smoking status: Former     Types: Cigarettes     Quit date: 12     Years since quittin.6    Smokeless tobacco: Never    Tobacco comments:     quit 45 years ago   Vaping Use    Vaping Use: Never used   Substance and Sexual Activity    Alcohol use: No     Alcohol/week: 0.0 standard drinks    Drug use: No    Sexual activity: Not Currently   Other Topics Concern    Not on file   Social History Narrative    Not on file Social Determinants of Health     Financial Resource Strain: Not on file   Food Insecurity: Not on file   Transportation Needs: Not on file   Physical Activity: Not on file   Stress: Not on file   Social Connections: Not on file   Intimate Partner Violence: Not on file   Housing Stability: Not on file     Social History     Tobacco Use   Smoking Status Former    Types: Cigarettes    Quit date:     Years since quittin.6   Smokeless Tobacco Never   Tobacco Comments    quit 45 years ago        Temp (24hrs), Av.4 °F (36.9 °C), Min:97.5 °F (36.4 °C), Max:99.1 °F (37.3 °C)    Visit Vitals  BP (!) 159/67   Pulse 88   Temp 98.1 °F (36.7 °C)   Resp 20   Ht 5' 4\" (1.626 m)   Wt 107.5 kg (237 lb)   SpO2 96%   BMI 40.68 kg/m²       ROS: 12 point ROS obtained in details. Pertinent positives as mentioned in HPI,   otherwise negative    Physical Exam:    General: Well developed, well nourished female laying on the bed, no acute distress. General:   awake alert and oriented- And intermittently confused, and upset   HEENT:  Normocephalic, atraumatic,   nasal and oral mucous are moist and without evidence of lesions. No thrush. Dentures. Neck supple, no bruits. Lymph Nodes:   no cervical adenopathy   Lungs:   non-labored, bilaterally clear to auscultation- no crackles wheezes rales or rhonchi   Heart:  RRR, s1 and s2; no murmurs rubs or gallops, no edema, + pedal pulses   Abdomen:  soft, non-distended, Non-tender   Genitourinary:  deferred   Extremities:   S/p resection of the first R toe. Well healed. Callus on the plantar aspect of the L first toe. No swelling of the feet or legs. no joint effusions or swelling; Full ROM of all large joints to the upper and lower extremities; muscle mass appropriate for age  Well healed surgical scars on b/l knees   Neurologic:  No gross focal sensory abnormalities; Speech appropriate.  Cranial nerves intact                        Skin:  No rash or ulcers noted   Wound: Back:  no spinal or paraspinal muscle tenderness or rigidity, no CVA tenderness  Well healed surgical scar on lumbar region     Psychiatric:  No suicidal or homicidal ideations, Labile affect          Labs: Results:   Chemistry Recent Labs     08/11/22  0247 08/10/22  1023 08/10/22  0340   * 243* 226*    141 137   K 3.2* 3.5 5.2    104 103   CO2 26 28 26   BUN 26* 21* 24*   CREA 1.32* 1.35* 1.36*   CA 8.7 8.9 9.1   AGAP 8 9 8   BUCR 20 16 18   AP  --   --  66   TP  --   --  8.3*   ALB  --   --  3.7   GLOB  --   --  4.6*   AGRAT  --   --  0.8      CBC w/Diff Recent Labs     08/11/22  0247 08/10/22  1546 08/10/22  1023   WBC 26.5* 32.7* 37.9*   RBC 4.07* 4.75 4.43   HGB 11.6* 13.5 12.6   HCT 36.2 41.7 39.2    195 184   GRANS 84* 90* 84*   LYMPH 2* 3* 3*   EOS 0 0 0      Microbiology Recent Labs     08/10/22  0620 08/10/22  0430 08/10/22  0340   CULT No growth (<1,000 CFU/ML) NO GROWTH 1 DAY NO GROWTH 1 DAY          RADIOLOGY:    All available imaging studies/reports in [facility] for this admission were reviewed      Disclaimer: Sections of this note are dictated utilizing voice recognition software, which may have resulted in some phonetic based errors in grammar and contents. Even though attempts were made to correct all the mistakes, some may have been missed, and remained in the body of the document. If questions arise, please contact our department.     Dr. Riana Petersen, Infectious Disease Specialist    August 11, 2022  4:12 PM

## 2022-08-11 NOTE — PROGRESS NOTES
Problem: Pressure Injury - Risk of  Goal: *Prevention of pressure injury  Description: Document Ravi Scale and appropriate interventions in the flowsheet. Outcome: Progressing Towards Goal  Note: Pressure Injury Interventions:  Sensory Interventions: Assess changes in LOC, Check visual cues for pain, Float heels, Keep linens dry and wrinkle-free, Minimize linen layers, Monitor skin under medical devices, Pressure redistribution bed/mattress (bed type), Turn and reposition approx. every two hours (pillows and wedges if needed)    Moisture Interventions: Absorbent underpads, Apply protective barrier, creams and emollients, Check for incontinence Q2 hours and as needed, Limit adult briefs, Minimize layers, Moisture barrier, Offer toileting Q_hr, Internal/External urinary devices    Activity Interventions: Increase time out of bed, Pressure redistribution bed/mattress(bed type)    Mobility Interventions: Float heels, HOB 30 degrees or less, Pressure redistribution bed/mattress (bed type), Suspension boots, Turn and reposition approx. every two hours(pillow and wedges)    Nutrition Interventions: Document food/fluid/supplement intake    Friction and Shear Interventions: Apply protective barrier, creams and emollients, Foam dressings/transparent film/skin sealants, HOB 30 degrees or less, Lift sheet, Minimize layers, Transferring/repositioning devices                Problem: Patient Education: Go to Patient Education Activity  Goal: Patient/Family Education  Outcome: Progressing Towards Goal     Problem: Falls - Risk of  Goal: *Absence of Falls  Description: Document Obdulio Fall Risk and appropriate interventions in the flowsheet.   Outcome: Progressing Towards Goal  Note: Fall Risk Interventions:  Mobility Interventions: Bed/chair exit alarm, Patient to call before getting OOB, Utilize walker, cane, or other assistive device    Mentation Interventions: Adequate sleep, hydration, pain control, Bed/chair exit alarm, Door open when patient unattended, More frequent rounding, Reorient patient, Room close to nurse's station, Toileting rounds, Update white board    Medication Interventions: Bed/chair exit alarm, Patient to call before getting OOB, Teach patient to arise slowly    Elimination Interventions: Bed/chair exit alarm, Call light in reach, Elevated toilet seat, Patient to call for help with toileting needs, Stay With Me (per policy), Toilet paper/wipes in reach, Toileting schedule/hourly rounds    History of Falls Interventions: Bed/chair exit alarm, Door open when patient unattended, Room close to nurse's station         Problem: Patient Education: Go to Patient Education Activity  Goal: Patient/Family Education  Outcome: Progressing Towards Goal     Problem: Risk for Spread of Infection  Goal: Prevent transmission of infectious organism to others  Description: Prevent the transmission of infectious organisms to other patients, staff members, and visitors.   Outcome: Progressing Towards Goal     Problem: Patient Education:  Go to Education Activity  Goal: Patient/Family Education  Outcome: Progressing Towards Goal     Problem: Patient Education: Go to Patient Education Activity  Goal: Patient/Family Education  Outcome: Progressing Towards Goal     Problem: Patient Education: Go to Patient Education Activity  Goal: Patient/Family Education  Outcome: Progressing Towards Goal     Problem: Sepsis: Day of Diagnosis  Goal: Off Pathway (Use only if patient is Off Pathway)  Outcome: Progressing Towards Goal  Goal: *Fluid resuscitation  Outcome: Progressing Towards Goal  Goal: *Paired blood cultures prior to first dose of antibiotic  Outcome: Progressing Towards Goal  Goal: *First dose of  appropriate antibiotic within 3 hours of arrival to ED, within 1 hour of arrival to ICU  Outcome: Progressing Towards Goal  Goal: *Lactic acid with first set of blood cultures  Outcome: Progressing Towards Goal  Goal: *Pneumococcal immunization (if eligible)  Outcome: Progressing Towards Goal  Goal: *Influenza immunization (if eligible)  Outcome: Progressing Towards Goal  Goal: Activity/Safety  Outcome: Progressing Towards Goal  Goal: Consults, if ordered  Outcome: Progressing Towards Goal  Goal: Diagnostic Test/Procedures  Outcome: Progressing Towards Goal  Goal: Nutrition/Diet  Outcome: Progressing Towards Goal  Goal: Discharge Planning  Outcome: Progressing Towards Goal  Goal: Medications  Outcome: Progressing Towards Goal  Goal: Respiratory  Outcome: Progressing Towards Goal  Goal: Treatments/Interventions/Procedures  Outcome: Progressing Towards Goal  Goal: Psychosocial  Outcome: Progressing Towards Goal     Problem: Sepsis: Day 2  Goal: Off Pathway (Use only if patient is Off Pathway)  Outcome: Progressing Towards Goal  Goal: *Central Venous Pressure maintained at 8-12 mm Hg  Outcome: Progressing Towards Goal  Goal: *Hemodynamically stable  Outcome: Progressing Towards Goal  Goal: *Tolerating diet  Outcome: Progressing Towards Goal  Goal: Activity/Safety  Outcome: Progressing Towards Goal  Goal: Consults, if ordered  Outcome: Progressing Towards Goal  Goal: Diagnostic Test/Procedures  Outcome: Progressing Towards Goal  Goal: Nutrition/Diet  Outcome: Progressing Towards Goal  Goal: Discharge Planning  Outcome: Progressing Towards Goal  Goal: Medications  Outcome: Progressing Towards Goal  Goal: Respiratory  Outcome: Progressing Towards Goal  Goal: Treatments/Interventions/Procedures  Outcome: Progressing Towards Goal  Goal: Psychosocial  Outcome: Progressing Towards Goal     Problem: Sepsis: Day 3  Goal: Off Pathway (Use only if patient is Off Pathway)  Outcome: Progressing Towards Goal  Goal: *Central Venous Pressure maintained at 8-12 mm Hg  Outcome: Progressing Towards Goal  Goal: *Oxygen saturation within defined limits  Outcome: Progressing Towards Goal  Goal: *Vital sign stability  Outcome: Progressing Towards Goal  Goal: *Tolerating diet  Outcome: Progressing Towards Goal  Goal: *Demonstrates progressive activity  Outcome: Progressing Towards Goal  Goal: Activity/Safety  Outcome: Progressing Towards Goal  Goal: Consults, if ordered  Outcome: Progressing Towards Goal  Goal: Diagnostic Test/Procedures  Outcome: Progressing Towards Goal  Goal: Nutrition/Diet  Outcome: Progressing Towards Goal  Goal: Discharge Planning  Outcome: Progressing Towards Goal  Goal: Medications  Outcome: Progressing Towards Goal  Goal: Respiratory  Outcome: Progressing Towards Goal  Goal: Treatments/Interventions/Procedures  Outcome: Progressing Towards Goal  Goal: Psychosocial  Outcome: Progressing Towards Goal     Problem: Sepsis: Day 4  Goal: Off Pathway (Use only if patient is Off Pathway)  Outcome: Progressing Towards Goal  Goal: Activity/Safety  Outcome: Progressing Towards Goal  Goal: Consults, if ordered  Outcome: Progressing Towards Goal  Goal: Diagnostic Test/Procedures  Outcome: Progressing Towards Goal  Goal: Nutrition/Diet  Outcome: Progressing Towards Goal  Goal: Discharge Planning  Outcome: Progressing Towards Goal  Goal: Medications  Outcome: Progressing Towards Goal  Goal: Respiratory  Outcome: Progressing Towards Goal  Goal: Treatments/Interventions/Procedures  Outcome: Progressing Towards Goal  Goal: Psychosocial  Outcome: Progressing Towards Goal  Goal: *Oxygen saturation within defined limits  Outcome: Progressing Towards Goal  Goal: *Hemodynamically stable  Outcome: Progressing Towards Goal  Goal: *Vital signs within defined limits  Outcome: Progressing Towards Goal  Goal: *Tolerating diet  Outcome: Progressing Towards Goal  Goal: *Demonstrates progressive activity  Outcome: Progressing Towards Goal  Goal: *Fluid volume maintenance  Outcome: Progressing Towards Goal     Problem: Sepsis: Day 5  Goal: Off Pathway (Use only if patient is Off Pathway)  Outcome: Progressing Towards Goal  Goal: *Oxygen saturation within defined limits  Outcome: Progressing Towards Goal  Goal: *Vital signs within defined limits  Outcome: Progressing Towards Goal  Goal: *Tolerating diet  Outcome: Progressing Towards Goal  Goal: *Demonstrates progressive activity  Outcome: Progressing Towards Goal  Goal: *Discharge plan identified  Outcome: Progressing Towards Goal  Goal: Activity/Safety  Outcome: Progressing Towards Goal  Goal: Consults, if ordered  Outcome: Progressing Towards Goal  Goal: Diagnostic Test/Procedures  Outcome: Progressing Towards Goal  Goal: Nutrition/Diet  Outcome: Progressing Towards Goal  Goal: Discharge Planning  Outcome: Progressing Towards Goal  Goal: Medications  Outcome: Progressing Towards Goal  Goal: Respiratory  Outcome: Progressing Towards Goal  Goal: Treatments/Interventions/Procedures  Outcome: Progressing Towards Goal  Goal: Psychosocial  Outcome: Progressing Towards Goal     Problem: Sepsis: Day 6  Goal: Off Pathway (Use only if patient is Off Pathway)  Outcome: Progressing Towards Goal  Goal: *Oxygen saturation within defined limits  Outcome: Progressing Towards Goal  Goal: *Vital signs within defined limits  Outcome: Progressing Towards Goal  Goal: *Tolerating diet  Outcome: Progressing Towards Goal  Goal: *Demonstrates progressive activity  Outcome: Progressing Towards Goal  Goal: Influenza immunization  Outcome: Progressing Towards Goal  Goal: *Pneumococcal immunization  Outcome: Progressing Towards Goal  Goal: Activity/Safety  Outcome: Progressing Towards Goal  Goal: Diagnostic Test/Procedures  Outcome: Progressing Towards Goal  Goal: Nutrition/Diet  Outcome: Progressing Towards Goal  Goal: Discharge Planning  Outcome: Progressing Towards Goal  Goal: Medications  Outcome: Progressing Towards Goal  Goal: Respiratory  Outcome: Progressing Towards Goal  Goal: Treatments/Interventions/Procedures  Outcome: Progressing Towards Goal  Goal: Psychosocial  Outcome: Progressing Towards Goal     Problem: Sepsis: Discharge Outcomes  Goal: *Vital signs within defined limits  Outcome: Progressing Towards Goal  Goal: *Tolerating diet  Outcome: Progressing Towards Goal  Goal: *Verbalizes understanding and describes prescribed diet  Outcome: Progressing Towards Goal  Goal: *Demonstrates progressive activity  Outcome: Progressing Towards Goal  Goal: *Describes follow-up/return visits to physicians  Outcome: Progressing Towards Goal  Goal: *Verbalizes name, dosage, time, side effects, and number of days to continue medications  Outcome: Progressing Towards Goal  Goal: *Influenza immunization (Oct-Mar only)  Outcome: Progressing Towards Goal  Goal: *Pneumococcal immunization  Outcome: Progressing Towards Goal  Goal: *Lungs clear or at baseline  Outcome: Progressing Towards Goal  Goal: *Oxygen saturation returns to baseline or 90% or better on room air  Outcome: Progressing Towards Goal  Goal: *Glycemic control  Outcome: Progressing Towards Goal  Goal: *Absence of deep venous thrombosis signs and symptoms(Stroke Metric)  Outcome: Progressing Towards Goal  Goal: *Describes available resources and support systems  Outcome: Progressing Towards Goal  Goal: *Optimal pain control at patient's stated goal  Outcome: Progressing Towards Goal     Problem: Pain  Goal: *Control of Pain  Outcome: Progressing Towards Goal     Problem: Patient Education: Go to Patient Education Activity  Goal: Patient/Family Education  Outcome: Progressing Towards Goal

## 2022-08-11 NOTE — PROGRESS NOTES
Tele called to notify RN that patient is showing A flutter on the monitor. Patient usually runs NSR. MD notified. Cardiology on board. Patient currently back to NSR.

## 2022-08-11 NOTE — PROGRESS NOTES
Problem: Pressure Injury - Risk of  Goal: *Prevention of pressure injury  Description: Document Ravi Scale and appropriate interventions in the flowsheet. Outcome: Progressing Towards Goal  Note: Pressure Injury Interventions:  Sensory Interventions: Assess changes in LOC, Avoid rigorous massage over bony prominences, Check visual cues for pain, Float heels, Keep linens dry and wrinkle-free, Maintain/enhance activity level, Pressure redistribution bed/mattress (bed type)    Moisture Interventions: Absorbent underpads, Internal/External urinary devices    Activity Interventions: Increase time out of bed, Pressure redistribution bed/mattress(bed type), PT/OT evaluation    Mobility Interventions: Float heels, HOB 30 degrees or less, Pressure redistribution bed/mattress (bed type)    Nutrition Interventions: Document food/fluid/supplement intake    Friction and Shear Interventions: Foam dressings/transparent film/skin sealants, HOB 30 degrees or less, Lift sheet, Lift team/patient mobility team, Minimize layers                Problem: Patient Education: Go to Patient Education Activity  Goal: Patient/Family Education  Outcome: Progressing Towards Goal     Problem: Falls - Risk of  Goal: *Absence of Falls  Description: Document Obdulio Fall Risk and appropriate interventions in the flowsheet.   Outcome: Progressing Towards Goal  Note: Fall Risk Interventions:  Mobility Interventions: Communicate number of staff needed for ambulation/transfer, Patient to call before getting OOB, Utilize walker, cane, or other assistive device    Mentation Interventions: Bed/chair exit alarm, Door open when patient unattended, Reorient patient, Room close to nurse's station, Toileting rounds, Update white board, Family/sitter at bedside    Medication Interventions: Teach patient to arise slowly, Patient to call before getting OOB    Elimination Interventions: Call light in reach, Elevated toilet seat, Toilet paper/wipes in reach, Toileting schedule/hourly rounds, Patient to call for help with toileting needs    History of Falls Interventions:  Investigate reason for fall, Room close to nurse's station, Door open when patient unattended         Problem: Patient Education: Go to Patient Education Activity  Goal: Patient/Family Education  Outcome: Progressing Towards Goal     Problem: Patient Education: Go to Patient Education Activity  Goal: Patient/Family Education  Outcome: Progressing Towards Goal     Problem: Patient Education: Go to Patient Education Activity  Goal: Patient/Family Education  Outcome: Progressing Towards Goal     Problem: Sepsis: Day of Diagnosis  Goal: Off Pathway (Use only if patient is Off Pathway)  Outcome: Progressing Towards Goal  Goal: *Fluid resuscitation  Outcome: Progressing Towards Goal  Goal: *Paired blood cultures prior to first dose of antibiotic  Outcome: Progressing Towards Goal  Goal: *First dose of  appropriate antibiotic within 3 hours of arrival to ED, within 1 hour of arrival to ICU  Outcome: Progressing Towards Goal  Goal: *Lactic acid with first set of blood cultures  Outcome: Progressing Towards Goal  Goal: *Pneumococcal immunization (if eligible)  Outcome: Progressing Towards Goal  Goal: *Influenza immunization (if eligible)  Outcome: Progressing Towards Goal  Goal: Activity/Safety  Outcome: Progressing Towards Goal  Goal: Consults, if ordered  Outcome: Progressing Towards Goal  Goal: Diagnostic Test/Procedures  Outcome: Progressing Towards Goal  Goal: Nutrition/Diet  Outcome: Progressing Towards Goal  Goal: Discharge Planning  Outcome: Progressing Towards Goal  Goal: Medications  Outcome: Progressing Towards Goal  Goal: Respiratory  Outcome: Progressing Towards Goal  Goal: Treatments/Interventions/Procedures  Outcome: Progressing Towards Goal  Goal: Psychosocial  Outcome: Progressing Towards Goal     Problem: Sepsis: Day 2  Goal: Off Pathway (Use only if patient is Off Pathway)  Outcome: Progressing Towards Goal  Goal: *Central Venous Pressure maintained at 8-12 mm Hg  Outcome: Progressing Towards Goal  Goal: *Hemodynamically stable  Outcome: Progressing Towards Goal  Goal: *Tolerating diet  Outcome: Progressing Towards Goal  Goal: Activity/Safety  Outcome: Progressing Towards Goal  Goal: Consults, if ordered  Outcome: Progressing Towards Goal  Goal: Diagnostic Test/Procedures  Outcome: Progressing Towards Goal  Goal: Nutrition/Diet  Outcome: Progressing Towards Goal  Goal: Discharge Planning  Outcome: Progressing Towards Goal  Goal: Medications  Outcome: Progressing Towards Goal  Goal: Respiratory  Outcome: Progressing Towards Goal  Goal: Treatments/Interventions/Procedures  Outcome: Progressing Towards Goal  Goal: Psychosocial  Outcome: Progressing Towards Goal     Problem: Sepsis: Day 3  Goal: Off Pathway (Use only if patient is Off Pathway)  Outcome: Progressing Towards Goal  Goal: *Central Venous Pressure maintained at 8-12 mm Hg  Outcome: Progressing Towards Goal  Goal: *Oxygen saturation within defined limits  Outcome: Progressing Towards Goal  Goal: *Vital sign stability  Outcome: Progressing Towards Goal  Goal: *Tolerating diet  Outcome: Progressing Towards Goal  Goal: *Demonstrates progressive activity  Outcome: Progressing Towards Goal  Goal: Activity/Safety  Outcome: Progressing Towards Goal  Goal: Consults, if ordered  Outcome: Progressing Towards Goal  Goal: Diagnostic Test/Procedures  Outcome: Progressing Towards Goal  Goal: Nutrition/Diet  Outcome: Progressing Towards Goal  Goal: Discharge Planning  Outcome: Progressing Towards Goal  Goal: Medications  Outcome: Progressing Towards Goal  Goal: Respiratory  Outcome: Progressing Towards Goal  Goal: Treatments/Interventions/Procedures  Outcome: Progressing Towards Goal  Goal: Psychosocial  Outcome: Progressing Towards Goal     Problem: Sepsis: Day 4  Goal: Off Pathway (Use only if patient is Off Pathway)  Outcome: Progressing Towards Goal  Goal: Activity/Safety  Outcome: Progressing Towards Goal  Goal: Consults, if ordered  Outcome: Progressing Towards Goal  Goal: Diagnostic Test/Procedures  Outcome: Progressing Towards Goal  Goal: Nutrition/Diet  Outcome: Progressing Towards Goal  Goal: Discharge Planning  Outcome: Progressing Towards Goal  Goal: Medications  Outcome: Progressing Towards Goal  Goal: Respiratory  Outcome: Progressing Towards Goal  Goal: Treatments/Interventions/Procedures  Outcome: Progressing Towards Goal  Goal: Psychosocial  Outcome: Progressing Towards Goal  Goal: *Oxygen saturation within defined limits  Outcome: Progressing Towards Goal  Goal: *Hemodynamically stable  Outcome: Progressing Towards Goal  Goal: *Vital signs within defined limits  Outcome: Progressing Towards Goal  Goal: *Tolerating diet  Outcome: Progressing Towards Goal  Goal: *Demonstrates progressive activity  Outcome: Progressing Towards Goal  Goal: *Fluid volume maintenance  Outcome: Progressing Towards Goal     Problem: Sepsis: Day 5  Goal: Off Pathway (Use only if patient is Off Pathway)  Outcome: Progressing Towards Goal  Goal: *Oxygen saturation within defined limits  Outcome: Progressing Towards Goal  Goal: *Vital signs within defined limits  Outcome: Progressing Towards Goal  Goal: *Tolerating diet  Outcome: Progressing Towards Goal  Goal: *Demonstrates progressive activity  Outcome: Progressing Towards Goal  Goal: *Discharge plan identified  Outcome: Progressing Towards Goal  Goal: Activity/Safety  Outcome: Progressing Towards Goal  Goal: Consults, if ordered  Outcome: Progressing Towards Goal  Goal: Diagnostic Test/Procedures  Outcome: Progressing Towards Goal  Goal: Nutrition/Diet  Outcome: Progressing Towards Goal  Goal: Discharge Planning  Outcome: Progressing Towards Goal  Goal: Medications  Outcome: Progressing Towards Goal  Goal: Respiratory  Outcome: Progressing Towards Goal  Goal: Treatments/Interventions/Procedures  Outcome: Progressing Towards Goal  Goal: Psychosocial  Outcome: Progressing Towards Goal     Problem: Sepsis: Day 6  Goal: Off Pathway (Use only if patient is Off Pathway)  Outcome: Progressing Towards Goal  Goal: *Oxygen saturation within defined limits  Outcome: Progressing Towards Goal  Goal: *Vital signs within defined limits  Outcome: Progressing Towards Goal  Goal: *Tolerating diet  Outcome: Progressing Towards Goal  Goal: *Demonstrates progressive activity  Outcome: Progressing Towards Goal  Goal: Influenza immunization  Outcome: Progressing Towards Goal  Goal: *Pneumococcal immunization  Outcome: Progressing Towards Goal  Goal: Activity/Safety  Outcome: Progressing Towards Goal  Goal: Diagnostic Test/Procedures  Outcome: Progressing Towards Goal  Goal: Nutrition/Diet  Outcome: Progressing Towards Goal  Goal: Discharge Planning  Outcome: Progressing Towards Goal  Goal: Medications  Outcome: Progressing Towards Goal  Goal: Respiratory  Outcome: Progressing Towards Goal  Goal: Treatments/Interventions/Procedures  Outcome: Progressing Towards Goal  Goal: Psychosocial  Outcome: Progressing Towards Goal     Problem: Sepsis: Discharge Outcomes  Goal: *Vital signs within defined limits  Outcome: Progressing Towards Goal  Goal: *Tolerating diet  Outcome: Progressing Towards Goal  Goal: *Verbalizes understanding and describes prescribed diet  Outcome: Progressing Towards Goal  Goal: *Demonstrates progressive activity  Outcome: Progressing Towards Goal  Goal: *Describes follow-up/return visits to physicians  Outcome: Progressing Towards Goal  Goal: *Verbalizes name, dosage, time, side effects, and number of days to continue medications  Outcome: Progressing Towards Goal  Goal: *Influenza immunization (Oct-Mar only)  Outcome: Progressing Towards Goal  Goal: *Pneumococcal immunization  Outcome: Progressing Towards Goal  Goal: *Lungs clear or at baseline  Outcome: Progressing Towards Goal  Goal: *Oxygen saturation returns to baseline or 90% or better on room air  Outcome: Progressing Towards Goal  Goal: *Glycemic control  Outcome: Progressing Towards Goal  Goal: *Absence of deep venous thrombosis signs and symptoms(Stroke Metric)  Outcome: Progressing Towards Goal  Goal: *Describes available resources and support systems  Outcome: Progressing Towards Goal  Goal: *Optimal pain control at patient's stated goal  Outcome: Progressing Towards Goal     Problem: Pain  Goal: *Control of Pain  Outcome: Progressing Towards Goal     Problem: Patient Education: Go to Patient Education Activity  Goal: Patient/Family Education  Outcome: Progressing Towards Goal

## 2022-08-11 NOTE — PROGRESS NOTES
[address] [facility] Pharmacokinetic Monitoring Service - Vancomycin    Consulting Provider: Dr. Nelsy Sky   Indication: Urinary Tract Infection  Target Concentration: Goal AUC/TRINIDAD 400-600 mg*hr/L  Day of Therapy: 2  Additional Antimicrobials: Piperacillin/Tazobactam    Pertinent Laboratory Values:   Temp: 98.8 °F (37.1 °C)  Weight: (P) 105.7 kg (233 lb)  Recent Labs     08/11/22  0247 08/10/22  1546 08/10/22  1023   CREA 1.32*  --  1.35*   BUN 26*  --  21*   WBC 26.5* 32.7* 37.9*   PCT  --   --  31.74       Estimated Creatinine Clearance: 37.1 mL/min (A) (based on SCr of 1.32 mg/dL (H)). Pertinent Cultures:  Culture Date Source Results   8/10/22 Blood Pending   8/10/22 Urine Pending   MRSA Nasal Swab: N/A.  Non-respiratory infection    Assessment:  Date/Time Current Dose Concentration Timing of Concentration (h) AUC   8/11/22 @ 02:47 750mg q24h 13.7 5h 48min 483   Note: Serum concentrations collected for AUC dosing may appear elevated if collected in close proximity to the dose administered, this is not necessarily an indication of toxicity    Plan:  Current dosing regimen is therapeutic  Continue current dose of 750mg IV q24h  Renal labs as indicated   Vancomycin concentration to be ordered as deemed clinically necessary  Pharmacy will continue to monitor patient and adjust therapy as indicated    Thank you for the consult,  Gennaro Boss  8/11/2022

## 2022-08-11 NOTE — PROGRESS NOTES
Problem: Mobility Impaired (Adult and Pediatric)  Goal: *Acute Goals and Plan of Care (Insert Text)  Description: Physical Therapy Goals  Initiated 8/11/2022 and to be accomplished within 7 day(s)  1. Patient will move from supine to sit and sit to supine  in bed with modified independence. 2.  Patient will transfer from bed to chair and chair to bed with modified independence using the least restrictive device. 3.  Patient will perform sit to stand with modified independence. 4.  Patient will ambulate with modified independence for 100 feet with the least restrictive device. PLOF: Patient was independent PTA ambulating with SPC. She lives with friend Ronna Stone in single story apartment. Outcome: Progressing Towards Goal  PHYSICAL THERAPY EVALUATION    Patient: Monalisa Whittington [de-identified]82 y.o. female)  Date: 8/11/2022  Primary Diagnosis: Sepsis ([address] [A41.9]       Precautions:   Fall, [address]      ASSESSMENT :  Based on the objective data described below, the patient presents with generalized weakness, decreased endurance, balance impairments, gait deficits, and decreased independence with mobility. Supportive friend Ronna Stone at bedside. Patient is Ione. Supine to sit transfer with min. She stands up at [facility] with min /CGA and verbal cues to push up from the bed. Patient requests to use restroom. BSC placed over the toilet. She stands at the sink for ~ 10 minutes with CGA and single hand support while brushing her teeth and washing her hands. Pt c/o fatigue, educated on activity pacing for rest breaks. Patient ambulates back to be bed using RW with CGA. Agreeable to sit up in the chair. Patient left resting comfortably with call bell in reach and needs addressed. Patient will benefit from skilled intervention to address the above impairments.   Patient's rehabilitation potential is considered to be Good  Factors which may influence rehabilitation potential include:   []         None noted  []         Mental ability/status  []         Medical condition  []         Home/family situation and support systems  [x]         Safety awareness  []         Pain tolerance/management  []         Other:      PLAN :  Recommendations and Planned Interventions:   [x]           Bed Mobility Training             [x]    Neuromuscular Re-Education  [x]           Transfer Training                   []    Orthotic/Prosthetic Training  [x]           Gait Training                          []    Modalities  [x]           Therapeutic Exercises           []    Edema Management/Control  [x]           Therapeutic Activities            [x]    Family Training/Education  [x]           Patient Education  []           Other (comment):    Frequency/Duration: Patient will be followed by physical therapy 1-2 times per day/4-7 days per week to address goals. Further Equipment Recommendations for Discharge: shower chair, rolling walker, and N/A    AMPAC: Based on an AM-PAC score of 18/24 (or **/20 if omitting stairs) and their current functional mobility deficits, it is recommended that the patient have 2-3 sessions per week of Physical Therapy at d/c to increase the patient's independence. This AMPAC score should be considered in conjunction with interdisciplinary team recommendations to determine the most appropriate discharge setting. Patient's social support, diagnosis, medical stability, and prior level of function should also be taken into consideration. SUBJECTIVE:   Patient stated I want to get out of this bed.     OBJECTIVE DATA SUMMARY:     Past Medical History:   Diagnosis Date    Acquired hypothyroidism 8/1/2016    Arthritis of right shoulder region 4/21/2016    Asthma     Bilateral lower extremity edema 7/7/2021    Chronic bilateral low back pain without sciatica 7/7/2021    Chronic diastolic congestive heart failure ([facility] [address] [address] 3/10/2021    Chronic venous insufficiency     Diabetes ([facility] [address]     neuropathy    Essential hypertension 7/7/2021 Gout     History of depression 1/23/2017    History of fall 7/7/2021    Hypercholesteremia 7/7/2021    Hypertension     MI (myocardial infarction) ([facility] [address]     OAB (overactive bladder) 7/7/2021    Peripheral neuropathy     Rheumatoid arthritis ([facility] [address]     Tear of right rotator cuff 5/16/2016    Thyroid pain     Urinary incontinence 7/7/2021    Vertigo      Past Surgical History:   Procedure Laterality Date    HX AMPUTATION TOE Right 2020    Great toe 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 HX KNEE REPLACEMENT Bilateral      Barriers to Learning/Limitations: yes;  slight confusion   Compensate with: Visual Cues, Verbal Cues, and Tactile Cues  Home Situation:  Home Situation  Home Environment: Apartment  # Steps to Enter: 0  One/Two Story Residence: One story  Living Alone: No  Support Systems: Friend/Neighbor  Patient Expects to be Discharged to[de-identified] Home with family assistance  Current DME Used/Available at Home: Cane, straight, Grab bars  Tub or Shower Type: Tub/Shower combination (with grab bars)  Critical Behavior:              Psychosocial  Patient Behaviors: Calm; Cooperative                 Strength:    Strength: Generally decreased, functional                    Tone & Sensation:   Tone: Normal              Sensation: Impaired (reports neuropathy in B feet)               Range Of Motion:  AROM: Generally decreased, functional                   Functional Mobility:  Bed Mobility:     Supine to Sit: Minimum assistance        Transfers:  Sit to Stand: Minimum assistance;Contact guard assistance  Stand to Sit: Contact guard assistance             Balance:   Sitting: Intact  Standing: Impaired; With support  Standing - Static: Good  Standing - Dynamic : Fair    Ambulation/Gait Training:  Distance (ft): 20 Feet (ft)  Assistive Device: Walker, rolling  Ambulation - Level of Assistance: Contact guard assistance  Gait Abnormalities: Decreased step clearance                Therapeutic Exercises:   10x ankle pumps, LAQ< seated marching     Pain:  Pain level pre-treatment: 0/10   Pain level post-treatment: 0/10   Pain Intervention(s) : Medication (see MAR); Rest, Ice, Repositioning  Response to intervention: Nurse notified, See doc flow    Activity Tolerance:   Fair  Please refer to the flowsheet for vital signs taken during this treatment. After treatment:   [x]         Patient left in no apparent distress sitting up in chair  []         Patient left in no apparent distress in bed  [x]         Call bell left within reach  [x]         Nursing notified  [x]         Friend present  []         Bed alarm activated  []         SCDs applied    COMMUNICATION/EDUCATION:   [x]         Role of Physical Therapy in the acute care setting. [x]         Fall prevention education was provided and the patient/caregiver indicated understanding. [x]         Patient/family have participated as able in goal setting and plan of care. [x]         Patient/family agree to work toward stated goals and plan of care. []         Patient understands intent and goals of therapy, but is neutral about his/her participation. []         Patient is unable to participate in goal setting/plan of care: ongoing with therapy staff.  []         Other:     Thank you for this referral.  Celeste Holbrook, PT   Time Calculation: 48 mins      Eval Complexity: History: MEDIUM  Complexity : 1-2 comorbidities / personal factors will impact the outcome/ POC Exam:MEDIUM Complexity : 3 Standardized tests and measures addressing body structure, function, activity limitation and / or participation in recreation  Presentation: MEDIUM Complexity : Evolving with changing characteristics  Clinical Decision Making:Medium Complexity    Overall Complexity:MEDIUM    [employer] AM-PAC® Basic Mobility Inpatient Short Form (6-Clicks) Version 2    How much HELP from another person does the patient currently need    (If the patient hasn't done an activity recently, how much help from another person do you think he/she would need if he/she tried?)   Total (Total A or Dep)   A Lot  (Mod to Max A)   A Little (Sup or Min A)   None (Mod I to I)   Turning from your back to your side while in a flat bed without using bedrails? [] 1 [] 2 [x] 3 [] 4   2. Moving from lying on your back to sitting on the side of a flat bed without using bedrails? [] 1 [] 2 [x] 3 [] 4   3. Moving to and from a bed to a chair (including a wheelchair)? [] 1 [] 2 [x] 3 [] 4   4. Standing up from a chair using your arms (e.g., wheelchair, or bedside chair)? [] 1 [] 2 [x] 3 [] 4   5. Walking in hospital room? [] 1 [] 2 [x] 3 [] 4   6. Climbing 3-5 steps with a railing?+   [] 1 [] 2 [x] 3 [] 4   +If stair climbing cannot be assessed, skip item #6. Sum responses from items 1-5.

## 2022-08-12 LAB
ANION GAP SERPL CALC-SCNC: 7 MMOL/L (ref 3–18)
BASOPHILS # BLD: 0.1 K/UL (ref 0–0.1)
BASOPHILS NFR BLD: 1 % (ref 0–2)
BUN SERPL-MCNC: 31 MG/DL (ref 7–18)
BUN/CREAT SERPL: 27 (ref 12–20)
C DIFF GDH STL QL: NEGATIVE
C DIFF TOX A+B STL QL IA: NEGATIVE
CALCIUM SERPL-MCNC: 9 MG/DL (ref 8.5–10.1)
CHLORIDE SERPL-SCNC: 103 MMOL/L (ref 100–111)
CO2 SERPL-SCNC: 28 MMOL/L (ref 21–32)
CREAT SERPL-MCNC: 1.16 MG/DL (ref 0.6–1.3)
DIFFERENTIAL METHOD BLD: ABNORMAL
ECHO LV FRACTIONAL SHORTENING: 44 % (ref 28–44)
ECHO LV INTERNAL DIMENSION DIASTOLE INDEX: 2.29 CM/M2
ECHO LV INTERNAL DIMENSION DIASTOLIC: 4.8 CM (ref 3.9–5.3)
ECHO LV INTERNAL DIMENSION SYSTOLIC INDEX: 1.29 CM/M2
ECHO LV INTERNAL DIMENSION SYSTOLIC: 2.7 CM
ECHO LV IVSD: 1.2 CM (ref 0.6–0.9)
ECHO LV MASS 2D: 194 G (ref 67–162)
ECHO LV MASS INDEX 2D: 92.4 G/M2 (ref 43–95)
ECHO LV POSTERIOR WALL DIASTOLIC: 1 CM (ref 0.6–0.9)
ECHO LV RELATIVE WALL THICKNESS RATIO: 0.42
ECHO TV REGURGITANT MAX VELOCITY: 2.39 M/S
ECHO TV REGURGITANT PEAK GRADIENT: 23 MMHG
EOSINOPHIL # BLD: 0.1 K/UL (ref 0–0.4)
EOSINOPHIL NFR BLD: 1 % (ref 0–5)
ERYTHROCYTE [DISTWIDTH] IN BLOOD BY AUTOMATED COUNT: 12.9 % (ref 11.6–14.5)
GLUCOSE BLD STRIP.AUTO-MCNC: 135 MG/DL (ref 70–110)
GLUCOSE BLD STRIP.AUTO-MCNC: 168 MG/DL (ref 70–110)
GLUCOSE BLD STRIP.AUTO-MCNC: 183 MG/DL (ref 70–110)
GLUCOSE BLD STRIP.AUTO-MCNC: 207 MG/DL (ref 70–110)
GLUCOSE SERPL-MCNC: 158 MG/DL (ref 74–99)
HCT VFR BLD AUTO: 39.3 % (ref 35–45)
HGB BLD-MCNC: 12.6 G/DL (ref 12–16)
IMM GRANULOCYTES # BLD AUTO: 0.1 K/UL (ref 0–0.04)
IMM GRANULOCYTES NFR BLD AUTO: 1 % (ref 0–0.5)
INTERPRETATION: NORMAL
LYMPHOCYTES # BLD: 2.4 K/UL (ref 0.9–3.6)
LYMPHOCYTES NFR BLD: 14 % (ref 21–52)
MAGNESIUM SERPL-MCNC: 1.9 MG/DL (ref 1.6–2.6)
MCH RBC QN AUTO: 28.1 PG (ref 24–34)
MCHC RBC AUTO-ENTMCNC: 32.1 G/DL (ref 31–37)
MCV RBC AUTO: 87.7 FL (ref 78–100)
MONOCYTES # BLD: 1.2 K/UL (ref 0.05–1.2)
MONOCYTES NFR BLD: 7 % (ref 3–10)
NEUTS SEG # BLD: 12.8 K/UL (ref 1.8–8)
NEUTS SEG NFR BLD: 77 % (ref 40–73)
NRBC # BLD: 0 K/UL (ref 0–0.01)
NRBC BLD-RTO: 0 PER 100 WBC
PLATELET # BLD AUTO: 187 K/UL (ref 135–420)
PMV BLD AUTO: 12.7 FL (ref 9.2–11.8)
POTASSIUM SERPL-SCNC: 3.8 MMOL/L (ref 3.5–5.5)
RBC # BLD AUTO: 4.48 M/UL (ref 4.2–5.3)
SODIUM SERPL-SCNC: 138 MMOL/L (ref 136–145)
WBC # BLD AUTO: 16.7 K/UL (ref 4.6–13.2)

## 2022-08-12 PROCEDURE — 82962 GLUCOSE BLOOD TEST: CPT

## 2022-08-12 PROCEDURE — 80048 BASIC METABOLIC PNL TOTAL CA: CPT

## 2022-08-12 PROCEDURE — 74011636637 HC RX REV CODE- 636/637: Performed by: FAMILY MEDICINE

## 2022-08-12 PROCEDURE — 87324 CLOSTRIDIUM AG IA: CPT

## 2022-08-12 PROCEDURE — 74011000250 HC RX REV CODE- 250: Performed by: PHYSICIAN ASSISTANT

## 2022-08-12 PROCEDURE — 65270000046 HC RM TELEMETRY

## 2022-08-12 PROCEDURE — 74011636637 HC RX REV CODE- 636/637

## 2022-08-12 PROCEDURE — 97530 THERAPEUTIC ACTIVITIES: CPT

## 2022-08-12 PROCEDURE — 99232 SBSQ HOSP IP/OBS MODERATE 35: CPT | Performed by: INTERNAL MEDICINE

## 2022-08-12 PROCEDURE — 74011250636 HC RX REV CODE- 250/636: Performed by: STUDENT IN AN ORGANIZED HEALTH CARE EDUCATION/TRAINING PROGRAM

## 2022-08-12 PROCEDURE — 85025 COMPLETE CBC W/AUTO DIFF WBC: CPT

## 2022-08-12 PROCEDURE — 74011250636 HC RX REV CODE- 250/636

## 2022-08-12 PROCEDURE — 74011250637 HC RX REV CODE- 250/637

## 2022-08-12 PROCEDURE — 74011000250 HC RX REV CODE- 250: Performed by: STUDENT IN AN ORGANIZED HEALTH CARE EDUCATION/TRAINING PROGRAM

## 2022-08-12 PROCEDURE — 36415 COLL VENOUS BLD VENIPUNCTURE: CPT

## 2022-08-12 PROCEDURE — 77030040830 HC CATH URETH FOL MDII -A

## 2022-08-12 PROCEDURE — 2709999900 HC NON-CHARGEABLE SUPPLY

## 2022-08-12 PROCEDURE — 97535 SELF CARE MNGMENT TRAINING: CPT

## 2022-08-12 PROCEDURE — 83735 ASSAY OF MAGNESIUM: CPT

## 2022-08-12 RX ORDER — CEFPODOXIME PROXETIL 200 MG/1
200 TABLET, FILM COATED ORAL EVERY 12 HOURS
Status: DISCONTINUED | OUTPATIENT
Start: 2022-08-12 | End: 2022-08-13 | Stop reason: HOSPADM

## 2022-08-12 RX ORDER — POLYETHYLENE GLYCOL 3350 17 G/17G
17 POWDER, FOR SOLUTION ORAL
Status: DISCONTINUED | OUTPATIENT
Start: 2022-08-12 | End: 2022-08-13 | Stop reason: HOSPADM

## 2022-08-12 RX ORDER — HYDROXYZINE 50 MG/ML
50 INJECTION, SOLUTION INTRAMUSCULAR
Status: DISCONTINUED | OUTPATIENT
Start: 2022-08-12 | End: 2022-08-12

## 2022-08-12 RX ORDER — BUMETANIDE 1 MG/1
1 TABLET ORAL
Status: DISCONTINUED | OUTPATIENT
Start: 2022-08-13 | End: 2022-08-13 | Stop reason: HOSPADM

## 2022-08-12 RX ORDER — INSULIN GLARGINE 100 [IU]/ML
20 INJECTION, SOLUTION SUBCUTANEOUS
Status: DISCONTINUED | OUTPATIENT
Start: 2022-08-12 | End: 2022-08-13 | Stop reason: HOSPADM

## 2022-08-12 RX ORDER — BUMETANIDE 1 MG/1
1 TABLET ORAL DAILY
Status: DISCONTINUED | OUTPATIENT
Start: 2022-08-13 | End: 2022-08-12

## 2022-08-12 RX ORDER — HYDROXYZINE 50 MG/ML
50 INJECTION, SOLUTION INTRAMUSCULAR ONCE
Status: COMPLETED | OUTPATIENT
Start: 2022-08-12 | End: 2022-08-12

## 2022-08-12 RX ORDER — CEFDINIR 300 MG/1
300 CAPSULE ORAL 2 TIMES DAILY
Qty: 8 CAPSULE | Refills: 0 | Status: SHIPPED | OUTPATIENT
Start: 2022-08-13 | End: 2022-08-17

## 2022-08-12 RX ADMIN — BUMETANIDE 1 MG: 0.25 INJECTION INTRAMUSCULAR; INTRAVENOUS at 08:34

## 2022-08-12 RX ADMIN — CEFPODOXIME PROXETIL 200 MG: 200 TABLET, FILM COATED ORAL at 17:57

## 2022-08-12 RX ADMIN — INSULIN GLARGINE 20 UNITS: 100 INJECTION, SOLUTION SUBCUTANEOUS at 21:31

## 2022-08-12 RX ADMIN — RIVAROXABAN 20 MG: 20 TABLET, FILM COATED ORAL at 08:33

## 2022-08-12 RX ADMIN — Medication 6 UNITS: at 12:37

## 2022-08-12 RX ADMIN — AMLODIPINE BESYLATE 10 MG: 10 TABLET ORAL at 08:33

## 2022-08-12 RX ADMIN — SERTRALINE 50 MG: 50 TABLET, FILM COATED ORAL at 08:33

## 2022-08-12 RX ADMIN — VANCOMYCIN HYDROCHLORIDE 750 MG: 750 INJECTION, POWDER, LYOPHILIZED, FOR SOLUTION INTRAVENOUS at 21:04

## 2022-08-12 RX ADMIN — SODIUM CHLORIDE, PRESERVATIVE FREE 10 ML: 5 INJECTION INTRAVENOUS at 17:59

## 2022-08-12 RX ADMIN — LEVOTHYROXINE SODIUM 150 MCG: 150 TABLET ORAL at 06:28

## 2022-08-12 RX ADMIN — CARVEDILOL 6.25 MG: 6.25 TABLET, FILM COATED ORAL at 17:57

## 2022-08-12 RX ADMIN — CARVEDILOL 6.25 MG: 6.25 TABLET, FILM COATED ORAL at 08:33

## 2022-08-12 RX ADMIN — SODIUM CHLORIDE, PRESERVATIVE FREE 10 ML: 5 INJECTION INTRAVENOUS at 21:02

## 2022-08-12 RX ADMIN — Medication 3 UNITS: at 08:34

## 2022-08-12 RX ADMIN — Medication 3 UNITS: at 16:51

## 2022-08-12 RX ADMIN — ATORVASTATIN CALCIUM 80 MG: 40 TABLET, FILM COATED ORAL at 08:33

## 2022-08-12 RX ADMIN — HYDROXYZINE HYDROCHLORIDE 50 MG: 50 INJECTION, SOLUTION INTRAMUSCULAR at 13:32

## 2022-08-12 RX ADMIN — SODIUM CHLORIDE, PRESERVATIVE FREE 10 ML: 5 INJECTION INTRAVENOUS at 06:29

## 2022-08-12 NOTE — PROGRESS NOTES
During shift assessment and morning med pass, patient cruz was pulled by patient when ambulating to the bathroom. Balloon was still inflated and intact. Patient did not have any resulting injury that could be seen upon observation. Patient denies any discomfort. 1030- Patient voided in bedside commode about 300cc. Post void bladder scan was done 3 times. Results: 431mL, 508mL, 547mL. MD notified. Cruz will be replaced.

## 2022-08-12 NOTE — DIABETES MGMT
Diabetes/ Glycemic Control Plan of Care    Patient is 82year old with history of T2DM was admitted on 8/10/2022 with report of altered mental status, fall at home, and blood glucose of 269.    8/11/2022: Completed assessment of home diabetes management and education.  visiting at bedside supportive. 8/12/2022: Glycemic assessment: cont with elevated BGs and provider increased basal lantus insulin from 15 units to 20 units daily at bedtime starting tonight. Recommendations:   1.) cont monitoring to determine need for insulin dose adjustment if BG remain above target. The recommended BG upper limit is less than 180. DX:   1. Sepsis, due to unspecified organism, unspecified whether acute organ dysfunction present ([facility] [address]        2. Altered mental status, unspecified altered mental status type        3. Abnormal EKG [R94.31 (ICD-10-CM)]        4. NSVT (nonsustained ventricular tachycardia) ([facility])           Fasting/ Morning blood glucose:   Lab Results   Component Value Date/Time    Glucose 158 (H) 08/12/2022 05:31 AM    Glucose (POC) 207 (H) 08/12/2022 12:01 PM     IV Fluids containing dextrose: none    Steroids:  None    Blood glucose values: Within target range (70-180mg/dL):  NO    Current insulin orders:   Basal lantus insulin 20 units daily at bedtime  Correctional lispro insulin.  Very resistant dose    Total Daily Dose previous 24 hours: 27 units of insulin  Lantus: 15 units  Lispro: 12 units    Current A1c:   Lab Results   Component Value Date/Time    Hemoglobin A1c 11.8 (H) 05/27/2022 02:36 AM    Hemoglobin A1c (POC) 9.4 11/09/2021 11:58 AM      equivalent  to ave Blood Glucose of 292 mg/dl for 2-3 months prior to admission    Adequate glycemic control PTA: NO    Nutrition/Diet:   Active Orders   Diet    ADULT DIET Regular; 3 carb choices (45 gm/meal)      Meal Intake:  Patient Vitals for the past 168 hrs:   % Diet Eaten   08/12/22 1012 26 - 50%   08/11/22 1002 51 - 75%   08/10/22 1828 26 - 50%       Supplement Intake:  No data found. Home diabetes medications: verified with patient on 8/11/2022 and confirmed with the help of her . Key Antihyperglycemic Medications               Lantus Solostar U-100 Insulin 100 unit/mL (3 mL) inpn 15 Units by SubCUTAneous route nightly. insulin aspart U-100 (NovoLOG Flexpen U-100 Insulin) 100 unit/mL (3 mL) inpn 5 Units by SubCUTAneous route Before breakfast, lunch, and dinner. metFORMIN (GLUCOPHAGE) 500 mg tablet Take 500 mg by mouth daily. Plan/Goals:   Blood glucose will be within target of 70 - 180 mg/dl within 72 hours  Reinforce dietary and medication compliance at home.           Education:  [x] Refer to Diabetes Education Record: 8/11/2022                       [] Education not indicated at this time       Abimael Santoyo RN

## 2022-08-12 NOTE — PROGRESS NOTES
Bedside and Verbal shift change report given to [address] (oncoming nurse) by Denise Gipson RN (offgoing nurse). Report included the following information SBAR, Kardex, Intake/Output, and MAR.

## 2022-08-12 NOTE — PROGRESS NOTES
[facility]  Admission History and Physical        Patient:                      Humera Menjivar       82 y.o. female            MRN:                        [id number]                                                                                    Admission Date:         8/10/2022  Code status:                DNR/DNI     Humera Menjivar is a 82y.o. year old female with PMHx of HFpEF, T2DM, Recent DVT hx on Xarelto, HTN, hypothyroidism, stage 3 CKD who presented with complaint of AMS. Patient admitted for Sepsis ([facility] [address] [A41.9]. ASSESSMENT AND PLAN    Sepsis 2/2 unknown source   -Concern for infection. T max 103, WBC 25, procal 31.74. Source: UTI vs PNA vs skin. Urine culture negative. CXR/ CT A/P reassuring against pneumonia. Left foot diabetic ulcer clean, not infected. Pt with chronic back pain, unchanged, less concern for spinal abscess or cauna equina syndrome. Also considering opiate use, uremia, polypharmacy with patient's concurrent use of morphine and gabapentin. -SIRS: febrile, leukocytosis, Tachypnea, Tachycardia  -UA: clear, protein, glucose >1000, trace ketones, trace blood, neg nitrites, neg leuk esterase  -Blood cultures NG 2 days  -Urine culture NG final  -CTA 8/10: No evidence for pulmonary emboli. Pulmonary vascular congestion without overt edema. No evidence of pneumonia. -CT A/P 8/10: No definite active inflammation. Colonic diverticulosis without acute diverticulitis. Prominent fluid-filled stomach and proximal small bowel without discrete transition point to suggest obstruction. This may be from ingested material. A mild gastroenteritis could have a similar appearance. Prior cholecystectomy with biliary prominence, likely reservoir effect. Atherosclerosis. -CT head 8/10: 1. No acute intracranial pathology. 2. Prior right-sided craniotomy with encephalomalacia in the right temporal lobe and mild sequela of chronic small vessel ischemic disease.   -CXR 8/10: Pulmonary vascular congestion.  -lactate 1.53  -troponin 40>32>28  -straight cath in ED removed 1200 ml urine  -UDS positive for opioids, pt has current outpatient script  -Covid, flu, leigonella, RVP, RSV, strep pneumo negative  -troponin neg   Plan:  -continue Vancomycin 1g IV daily, Ceftriaxone 2 g daily  -Stop Zosyn    -F/U Blood cultures  -Daily CBC, BMP  -c. Diff pending   -ID consulted, appreciate recs  -Continue with Vancomycin and stop Piptazo. Start on CTX for now  -F/up with all cx in progress  -Podiatry evaluation of the L foot plantar wound? Possible nidus of infection?  -Supportive care.   -podiatry consulted for L foot wound, appreciate recs       Urinary retention  - retention secondary to sepsis vs neurogenic 2/2 uncontrolled diabetes  - straight cath in ED removed 1200 ml urine  -[facility]  -Pt not on anti-cholinergic medications  Plan:  -cruz in place, plan for void trial today  -consider renal US if no improvement with antibiotic treatment     Sinus Tachycardia w/ intermittent Vtach runs - resolved  -improved, no tachycardia overnight  -Likely 2/2 to Sepsis, troponin negative, EKG without ST elevation   -EKG sinus tachycardia   -s/p 150 mg amiodarone bolus in ED  Plan:  - continuous telemetry monitoring  -cardiology consulted   -continue coreg. Hold Lisinopril given renal function  -Echo and ECG pending.  -Continue statin.    -Continue diuresis as renal function and hemodynamics allow. Monitor strict I/Os and electrolytes.      History of DVT 06/2022  -Diagnosed May 2022  -On Xarelto 20 mg daily until 12/16/22  Plan:  -Continue Xarelto 20 mg daily    Anemia, resolved  -Hb on admission 12.6, Hb today (8/12) 12.6  Plan:  -daily cbc  -continue to monitor     DM2 Uncontrolled  DM Neuropathy  Recent hospitalization for infection 2/2 diabetic ulcer  -I&D (5/27)- Infection limited to superficial foot, not found in joint.  -Arterial Duplexes (5/27) Lower Extrem b/l normal  -hx R great toe amputation  -Home meds: Lantus 15 units QD             - SSI, hypoglycemia protocol  -A1c 14 (April)  Plan:  - Increase to 20 units lantus at night, SSI humalog  - hold home metformin     Chronic bilateral low back pain w/o sciatica  -[facility] [place], Dr. Kierra Buck  -gabapentin 400 mg Tid and morphine IR 15mg BID as to avoid withdrawal but consider may be contributing to AMS. Last filled August 2, 2022  Plan:  -hold gabapentin and morphine in setting of AMS and sepsis. Consider restarting when more stable. Pt not currently complaining of pain      CKD Stage III  -Cr1.1, Baseline Cr 1.0-1.2  Plan:  -Daily BMP, trend Cr  -hold lisinopril, will restart when oral bumex restarted     HTN, HLD, HFpEF  - Pt mildly hypertensive overnight  -Pt with mild vascular congestion on CXR, mild edema  - Home Meds: norvasc 10mg daily, lisinopril 40mg daily, coreg 12.5mg BID, bumex 2mg 1 - 2x daily, Lipitor 80mg daily.  -Last ECHO in system 8/2020: EF 60-65%   Plan:  - Consider diuresis when pt more stable  - Hold Home Lisinopril, Bumex PRN, Statin  - Continue Home Meds: Coreg 3.125 mg BID, Norvasc 10 mg daily  - echo pending     Hypothyroidism  -TSH 2.84   Plan:  - Continue home synthroid 150mcg     Gout  -Chronic, symptomatically stable, no recent flare up, on home allopurinol  Plan:  - will hold allopurinol for now      Global:  Code: DNR  IVF/Drips: none  I/O / Wt: strict  Diet: NPO  DVT/AC: Xarelto  Mobility: per protocol  Disposition: home, pending clinical course  Anticipated LOS: greater than 2 midnights     Point of Contact (relationship, number): Leonard Sierra (Son) [phone]      SUBJECTIVE:  No acute events overnight. She had 3 loose stools yesterday, c. Diff antigen pending. Pt seen at beside. She is hard of hearing. States she is feeling well this morning and wants to go home. Also states she is hungry.  Denies chest pain, dyspnea, nausea, vomiting, chest tightness, headache, dysuria. ROS  (positive findings are in BOLD; negative findings are in regular font)  Constitutional: fevers, chills, appetite changes, weight changes, fatigue  HEENT: changes in vision, changes in hearing, sore throat, dysphagia  Cardiovascular: chest pain, palpitations, PND, orthopnea, edema  Pulmonary: SOB, cough, sputum production, wheezing, chest tightness  Gastrointestinal: abdominal pain, nausea/vomiting, diarrhea, constipation, melena, hematochezia  Genitourinary: dysuria, hesitation, dribbling, urgency, hematuria  Musculoskeletal: arthralgias, myalgias  Skin: rash, itching  Neurological: sensory changes, motor changes, headache  Psychiatric: mood changes  Endocrine: heat/cold intolerance  Heme: easy bruising/easy bleeding, LAD     OBJECTIVE:  Patient Vitals for the past 24 hrs:  Patient Vitals for the past 24 hrs:   Temp Pulse Resp BP SpO2   08/12/22 0725 97.7 °F (36.5 °C) 88 20 (!) 162/67 96 %   08/12/22 0345 97.8 °F (36.6 °C) 75 19 (!) 158/79 96 %   08/11/22 2330 98.8 °F (37.1 °C) 85 20 (!) 150/67 96 %   08/11/22 1917 97.8 °F (36.6 °C) 86 18 136/65 93 %   08/11/22 1559 98.1 °F (36.7 °C) 88 20 (!) 159/67 96 %   08/11/22 1504 -- -- -- 133/78 --   08/11/22 1119 97.6 °F (36.4 °C) 84 20 133/78 100 %         Body mass index is 40.34 kg/m².       Tele report (8/11):  sinus with PACs overnight, sinus this morning    PHYSICAL EXAM:    General: Awake, alert, NAD, resting comfortably  HEENT: NCAT, PERRLA, EOM intact; oral mucosa well perfused, oropharynx clear  Neck: no JVD,  CVS: regular rate, regular rhythm, no murmurs, no LE edema  Lungs: CTAB, no increased work of breathing, no vertebral tenderness, no CVA tenderness  Abdomen: Soft, non-distended, non-tender, BS+, no organomegaly, no masses  Ext: No calf tenderness, peripheral pulses present, R great toe amputated, mild lower extremity edema bilaterally  Skin: Warm, Dry, Intact , No significant rashes/petechia/ecchymosis, Diabetic ulcer on base of L foot, clean, dry, non-erythematous  Neuro: No focal neurologic deficits or gross abnormalities  Psych: A&Ox3, appropriate mood and affect             RECENT LABS AND IMAGING ON ADMISSION     Recent Results (from the past 24 hour(s))   GLUCOSE, POC    Collection Time: 08/11/22 11:18 AM   Result Value Ref Range    Glucose (POC) 212 (H) 70 - 110 mg/dL   ECHO ADULT FOLLOW-UP OR LIMITED    Collection Time: 08/11/22  3:29 PM   Result Value Ref Range    IVSd 1.2 (A) 0.6 - 0.9 cm    LVIDd 4.8 3.9 - 5.3 cm    LVIDs 2.7 cm    LVPWd 1.0 (A) 0.6 - 0.9 cm    TR Peak Gradient 23 mmHg    TR Max Velocity 2.39 m/s    Fractional Shortening 2D 44 28 - 44 %    LVIDd Index 2.29 cm/m2    LVIDs Index 1.29 cm/m2    LV RWT Ratio 0.42     LV Mass 2D 194.0 (A) 67 - 162 g    LV Mass 2D Index 92.4 43 - 95 g/m2   GLUCOSE, POC    Collection Time: 08/11/22  3:58 PM   Result Value Ref Range    Glucose (POC) 178 (H) 70 - 110 mg/dL   GLUCOSE, POC    Collection Time: 08/11/22  9:06 PM   Result Value Ref Range    Glucose (POC) 198 (H) 70 - 946 mg/dL   METABOLIC PANEL, BASIC    Collection Time: 08/12/22  5:31 AM   Result Value Ref Range    Sodium 138 136 - 145 mmol/L    Potassium 3.8 3.5 - 5.5 mmol/L    Chloride 103 100 - 111 mmol/L    CO2 28 21 - 32 mmol/L    Anion gap 7 3.0 - 18 mmol/L    Glucose 158 (H) 74 - 99 mg/dL    BUN 31 (H) 7.0 - 18 MG/DL    Creatinine 1.16 0.6 - 1.3 MG/DL    BUN/Creatinine ratio 27 (H) 12 - 20      GFR est AA 54 (L) >60 ml/min/1.73m2    GFR est non-AA 44 (L) >60 ml/min/1.73m2    Calcium 9.0 8.5 - 10.1 MG/DL   MAGNESIUM    Collection Time: 08/12/22  5:31 AM   Result Value Ref Range    Magnesium 1.9 1.6 - 2.6 mg/dL   CBC WITH AUTOMATED DIFF    Collection Time: 08/12/22  5:31 AM   Result Value Ref Range    WBC 16.7 (H) 4.6 - 13.2 K/uL    RBC 4.48 4.20 - 5.30 M/uL    HGB 12.6 12.0 - 16.0 g/dL    HCT 39.3 35.0 - 45.0 %    MCV 87.7 78.0 - 100.0 FL    MCH 28.1 24.0 - 34.0 PG    MCHC 32.1 31.0 - 37.0 g/dL    RDW 12.9 11.6 - 14.5 %    PLATELET 328 044 - 966 K/uL    MPV 12.7 (H) 9.2 - 11.8 FL    NRBC 0.0 0  WBC    ABSOLUTE NRBC 0.00 0.00 - 0.01 K/uL    NEUTROPHILS 77 (H) 40 - 73 %    LYMPHOCYTES 14 (L) 21 - 52 %    MONOCYTES 7 3 - 10 %    EOSINOPHILS 1 0 - 5 %    BASOPHILS 1 0 - 2 %    IMMATURE GRANULOCYTES 1 (H) 0.0 - 0.5 %    ABS. NEUTROPHILS 12.8 (H) 1.8 - 8.0 K/UL    ABS. LYMPHOCYTES 2.4 0.9 - 3.6 K/UL    ABS. MONOCYTES 1.2 0.05 - 1.2 K/UL    ABS. EOSINOPHILS 0.1 0.0 - 0.4 K/UL    ABS. BASOPHILS 0.1 0.0 - 0.1 K/UL    ABS. IMM. GRANS. 0.1 (H) 0.00 - 0.04 K/UL    DF AUTOMATED     GLUCOSE, POC    Collection Time: 08/12/22  7:30 AM   Result Value Ref Range    Glucose (POC) 183 (H) 70 - 110 mg/dL     Echo Results  (Last 48 hours)      None                 I have discussed Ms. Renee Ledesma case with my attending who agrees with the plan of care.      Roland Forbes MD PGY-1   [facility]   August 12, 2022, 7:45 AM

## 2022-08-12 NOTE — PROGRESS NOTES
Dr. Rosa Martinez is informed that patient already had three loose bowel movements this shift. She said she will re-order C. Diff bundle. She is also informed that patient refused blood draw and that this RN will continue to encourage patient to get blood draw done.

## 2022-08-12 NOTE — ROUTINE PROCESS
Wound Prevention Checklist    Patient: Whitley Fortune [de-identified]82 y.o. female)  Date: 8/12/2022  Diagnosis: Sepsis ([facility] [address] [A41.9] Sepsis ([facility] [address]    Precautions: Fall       [x]  Heel prevention boots placed on patient    []  Patient turned q2h during shift    []  Lift team ordered    [x]  Patient on Shalini bed/Specialty bed    [x]  Each Wound is documented during shift (Stage, Color, drainage, odor, measurements, and dressings)    [x]  Dual skin checks done at bedside during shift report with DARBY Bonner RN

## 2022-08-12 NOTE — PROGRESS NOTES
Cardiology Progress Note    Admit Date: 8/10/2022      Assessment:     Hospital Problems  Date Reviewed: 10/24/2021            Codes Class Noted POA    * (Principal) Sepsis ([facility] [address] ICD-10-CM: A41.9  ICD-9-CM: 038.9, 995.91  8/10/2022 Unknown         -Sepsis, unclear source: UTI vs PNA?   -Sinus Tachycardia with intermittent episodes of nonsustained supraventricular tachycardia. There has been no significant VT episodes since admitted to telemetry. Her echocardiogram today demonstrated preserved LV function without any significant valvular heart disease. Elevated rates likely physiologic in setting of sepsis. MI ruled out with serial negative enzymes x 5. ECG showed sinus tachycardia with ST changes, which is likely rate related. -History of heart failure with preserved ejection fraction. Patient appears to be euvolemic on exam today. -Hypertension: Controlled  -HLD, on statin.  -DM2, uncontrolled. -Asthma.   -Hypothyroid, on replacement. -H/o DVT (06/2022) on Xarelto  -Family h/o CAD. -H/o Tobacco abuse.   -Obesity. Primary cardiologist Dr. Justen Benito: Will switch IV diuretics back to her outpatient oral dosing  Continue the remainder of her outpatient cardiac regimen. No additional cardiac testing needed this admission. We will be available if additional questions arise. She can follow-up with her outpatient cardiologist upon discharge. Subjective:     Patient still confused.     Objective:      Patient Vitals for the past 8 hrs:   Temp Pulse Resp BP SpO2   08/12/22 1121 98.8 °F (37.1 °C) 87 20 (!) 152/72 95 %   08/12/22 0725 97.7 °F (36.5 °C) 88 20 (!) 162/67 96 %         Patient Vitals for the past 96 hrs:   Weight   08/11/22 1504 107.5 kg (237 lb)   08/11/22 0922 107.5 kg (237 lb)   08/10/22 1600 (P) 105.7 kg (233 lb)   08/10/22 0317 106.6 kg (235 lb)       TELE: normal sinus rhythm, short runs of nonsustained paroxysmal supraventricular tachycardia               Current Facility-Administered Medications   Medication Dose Route Frequency Last Admin    [Held by provider] polyethylene glycol (MIRALAX) packet 17 g  17 g Oral DAILY PRN      insulin glargine (LANTUS) injection 20 Units  20 Units SubCUTAneous QHS      carvediloL (COREG) tablet 6.25 mg  6.25 mg Oral BID WITH MEALS 6.25 mg at 08/12/22 0833    cefTRIAXone (ROCEPHIN) 2 g in sterile water (preservative free) 20 mL IV syringe  2 g IntraVENous Q24H 2 g at 08/11/22 1825    sodium chloride (NS) flush 5-10 mL  5-10 mL IntraVENous PRN      sodium chloride (NS) flush 5-40 mL  5-40 mL IntraVENous Q8H 10 mL at 08/12/22 0629    sodium chloride (NS) flush 5-40 mL  5-40 mL IntraVENous PRN      insulin lispro (HUMALOG) injection   SubCUTAneous AC&HS 6 Units at 08/12/22 1237    glucose chewable tablet 16 g  16 g Oral PRN      glucagon (GLUCAGEN) injection 1 mg  1 mg IntraMUSCular PRN      dextrose 10% infusion 0-250 mL  0-250 mL IntraVENous PRN      [Held by provider] allopurinoL (ZYLOPRIM) tablet 100 mg  100 mg Oral DAILY      acetaminophen (TYLENOL) tablet 500 mg  500 mg Oral Q6H  mg at 08/11/22 1841    amLODIPine (NORVASC) tablet 10 mg  10 mg Oral DAILY 10 mg at 08/12/22 0833    atorvastatin (LIPITOR) tablet 80 mg  80 mg Oral DAILY 80 mg at 08/12/22 0833    [Held by provider] bumetanide (BUMEX) tablet 1 mg  1 mg Oral BID PRN      [Held by provider] hydrOXYzine HCL (ATARAX) tablet 10 mg  10 mg Oral BID PRN      [Held by provider] lisinopriL (PRINIVIL, ZESTRIL) tablet 40 mg  40 mg Oral DAILY      rivaroxaban (XARELTO) tablet 20 mg  20 mg Oral DAILY WITH BREAKFAST 20 mg at 08/12/22 0833    sertraline (ZOLOFT) tablet 50 mg  50 mg Oral DAILY 50 mg at 08/12/22 0833    levothyroxine (SYNTHROID) tablet 150 mcg  150 mcg Oral 6am 150 mcg at 08/12/22 0628    bumetanide (BUMEX) injection 1 mg  1 mg IntraVENous BID 1 mg at 08/12/22 0834    vancomycin (VANCOCIN) 750 mg in 0.9% sodium chloride 250 mL (VIAL-MATE)  750 mg IntraVENous Q24H 750 mg at 08/11/22 2101    promethazine (PHENERGAN) suppository 12.5 mg  12.5 mg Rectal Q6H PRN 12.5 mg at 08/10/22 1228         Intake/Output Summary (Last 24 hours) at 8/12/2022 1442  Last data filed at 8/12/2022 1012  Gross per 24 hour   Intake 240 ml   Output 3250 ml   Net -3010 ml       Physical Exam:  General: Mildly, cooperative, no distress, appears stated age  Neck:  nontender, no JVD  Lungs:  clear to auscultation bilaterally  Heart:  regular rate and rhythm  Abdomen:  abdomen is soft without significant tenderness, masses, organomegaly or guarding  Extremities:  extremities normal, atraumatic, no cyanosis or edema    Visit Vitals  BP (!) 152/72 (BP 1 Location: Left upper arm, BP Patient Position: Semi fowlers; Lying)   Pulse 87   Temp 98.8 °F (37.1 °C)   Resp 20   Ht 5' 4\" (1.626 m)   Wt 107.5 kg (237 lb)   SpO2 95%   BMI 40.68 kg/m²       Data Review:     Labs: Results:       Chemistry Recent Labs     08/12/22  0531 08/11/22  0247 08/10/22  1023 08/10/22  0340   * 135* 243* 226*    141 141 137   K 3.8 3.2* 3.5 5.2    107 104 103   CO2 28 26 28 26   BUN 31* 26* 21* 24*   CREA 1.16 1.32* 1.35* 1.36*   CA 9.0 8.7 8.9 9.1   MG 1.9 2.1 2.2 1.8   AGAP 7 8 9 8   BUCR 27* 20 16 18   AP  --   --   --  66   TP  --   --   --  8.3*   ALB  --   --   --  3.7   GLOB  --   --   --  4.6*   AGRAT  --   --   --  0.8      CBC w/Diff Recent Labs     08/12/22  0531 08/11/22  0247 08/10/22  1546   WBC 16.7* 26.5* 32.7*   RBC 4.48 4.07* 4.75   HGB 12.6 11.6* 13.5   HCT 39.3 36.2 41.7    192 195   GRANS 77* 84* 90*   LYMPH 14* 2* 3*   EOS 1 0 0      Cardiac Enzymes No results found for: CPK, CK, CKMMB, CKMB, RCK3, CKMBT, CKNDX, CKND1, GABRIEL, TROPT, TROIQ, KRUPA, TROPT, TNIPOC, BNP, BNPP   Coagulation No results for input(s): PTP, INR, APTT, INREXT in the last 72 hours.     Lipid Panel Lab Results   Component Value Date/Time    Cholesterol, total 191 11/09/2021 12:24 PM    HDL Cholesterol 44 11/09/2021 12:24 PM LDL, calculated 96.8 11/09/2021 12:24 PM    VLDL, calculated 50.2 11/09/2021 12:24 PM    Triglyceride 251 (H) 11/09/2021 12:24 PM    CHOL/HDL Ratio 4.3 11/09/2021 12:24 PM      BNP No results found for: BNP, BNPP, XBNPT   Liver Enzymes Recent Labs     08/10/22  0340   TP 8.3*   ALB 3.7   AP 66      Thyroid Studies Lab Results   Component Value Date/Time    TSH 2.84 08/10/2022 03:40 AM          Signed By: Jose Martin Delacruz MD     August 12, 2022

## 2022-08-12 NOTE — PROGRESS NOTES
Problem: Pressure Injury - Risk of  Goal: *Prevention of pressure injury  Description: Document Ravi Scale and appropriate interventions in the flowsheet. Outcome: Progressing Towards Goal  Note: Pressure Injury Interventions:  Sensory Interventions: Assess changes in LOC, Avoid rigorous massage over bony prominences, Check visual cues for pain, Float heels, Keep linens dry and wrinkle-free, Maintain/enhance activity level, Minimize linen layers, Pressure redistribution bed/mattress (bed type)    Moisture Interventions: Absorbent underpads, Check for incontinence Q2 hours and as needed, Internal/External urinary devices, Limit adult briefs, Minimize layers    Activity Interventions: Increase time out of bed, Pressure redistribution bed/mattress(bed type), PT/OT evaluation    Mobility Interventions: Float heels, HOB 30 degrees or less, Pressure redistribution bed/mattress (bed type)    Nutrition Interventions: Document food/fluid/supplement intake    Friction and Shear Interventions: Feet elevated on foot rest, HOB 30 degrees or less, Foam dressings/transparent film/skin sealants, Lift sheet, Minimize layers, Lift team/patient mobility team                Problem: Patient Education: Go to Patient Education Activity  Goal: Patient/Family Education  Outcome: Progressing Towards Goal     Problem: Falls - Risk of  Goal: *Absence of Falls  Description: Document Obdulio Fall Risk and appropriate interventions in the flowsheet.   Outcome: Progressing Towards Goal  Note: Fall Risk Interventions:  Mobility Interventions: Bed/chair exit alarm, Communicate number of staff needed for ambulation/transfer, Patient to call before getting OOB, Utilize walker, cane, or other assistive device    Mentation Interventions: Bed/chair exit alarm, Door open when patient unattended, Family/sitter at bedside, Familiar objects from home, More frequent rounding, Reorient patient, Room close to nurse's station, Toileting rounds, Update white board    Medication Interventions: Bed/chair exit alarm, Patient to call before getting OOB, Teach patient to arise slowly    Elimination Interventions: Bed/chair exit alarm, Call light in reach, Patient to call for help with toileting needs, Toilet paper/wipes in reach, Toileting schedule/hourly rounds    History of Falls Interventions: Bed/chair exit alarm, Door open when patient unattended, Room close to nurse's station, Investigate reason for fall         Problem: Patient Education: Go to Patient Education Activity  Goal: Patient/Family Education  Outcome: Progressing Towards Goal     Problem: Patient Education: Go to Patient Education Activity  Goal: Patient/Family Education  Outcome: Progressing Towards Goal     Problem: Sepsis: Day 2  Goal: Off Pathway (Use only if patient is Off Pathway)  Outcome: Progressing Towards Goal  Goal: *Central Venous Pressure maintained at 8-12 mm Hg  Outcome: Progressing Towards Goal  Goal: *Hemodynamically stable  Outcome: Progressing Towards Goal  Goal: *Tolerating diet  Outcome: Progressing Towards Goal  Goal: Activity/Safety  Outcome: Progressing Towards Goal  Goal: Consults, if ordered  Outcome: Progressing Towards Goal  Goal: Diagnostic Test/Procedures  Outcome: Progressing Towards Goal  Goal: Nutrition/Diet  Outcome: Progressing Towards Goal  Goal: Discharge Planning  Outcome: Progressing Towards Goal  Goal: Medications  Outcome: Progressing Towards Goal  Goal: Respiratory  Outcome: Progressing Towards Goal  Goal: Treatments/Interventions/Procedures  Outcome: Progressing Towards Goal  Goal: Psychosocial  Outcome: Progressing Towards Goal     Problem: Sepsis: Day 3  Goal: Off Pathway (Use only if patient is Off Pathway)  Outcome: Progressing Towards Goal  Goal: *Central Venous Pressure maintained at 8-12 mm Hg  Outcome: Progressing Towards Goal  Goal: *Oxygen saturation within defined limits  Outcome: Progressing Towards Goal  Goal: *Vital sign stability  Outcome: Progressing Towards Goal  Goal: *Tolerating diet  Outcome: Progressing Towards Goal  Goal: *Demonstrates progressive activity  Outcome: Progressing Towards Goal  Goal: Activity/Safety  Outcome: Progressing Towards Goal  Goal: Consults, if ordered  Outcome: Progressing Towards Goal  Goal: Diagnostic Test/Procedures  Outcome: Progressing Towards Goal  Goal: Nutrition/Diet  Outcome: Progressing Towards Goal  Goal: Discharge Planning  Outcome: Progressing Towards Goal  Goal: Medications  Outcome: Progressing Towards Goal  Goal: Respiratory  Outcome: Progressing Towards Goal  Goal: Treatments/Interventions/Procedures  Outcome: Progressing Towards Goal  Goal: Psychosocial  Outcome: Progressing Towards Goal     Problem: Sepsis: Day 4  Goal: Off Pathway (Use only if patient is Off Pathway)  Outcome: Progressing Towards Goal  Goal: Activity/Safety  Outcome: Progressing Towards Goal  Goal: Consults, if ordered  Outcome: Progressing Towards Goal  Goal: Diagnostic Test/Procedures  Outcome: Progressing Towards Goal  Goal: Nutrition/Diet  Outcome: Progressing Towards Goal  Goal: Discharge Planning  Outcome: Progressing Towards Goal  Goal: Medications  Outcome: Progressing Towards Goal  Goal: Respiratory  Outcome: Progressing Towards Goal  Goal: Treatments/Interventions/Procedures  Outcome: Progressing Towards Goal  Goal: Psychosocial  Outcome: Progressing Towards Goal  Goal: *Oxygen saturation within defined limits  Outcome: Progressing Towards Goal  Goal: *Hemodynamically stable  Outcome: Progressing Towards Goal  Goal: *Vital signs within defined limits  Outcome: Progressing Towards Goal  Goal: *Tolerating diet  Outcome: Progressing Towards Goal  Goal: *Demonstrates progressive activity  Outcome: Progressing Towards Goal  Goal: *Fluid volume maintenance  Outcome: Progressing Towards Goal     Problem: Sepsis: Day 5  Goal: Off Pathway (Use only if patient is Off Pathway)  Outcome: Progressing Towards Goal  Goal: *Oxygen saturation within defined limits  Outcome: Progressing Towards Goal  Goal: *Vital signs within defined limits  Outcome: Progressing Towards Goal  Goal: *Tolerating diet  Outcome: Progressing Towards Goal  Goal: *Demonstrates progressive activity  Outcome: Progressing Towards Goal  Goal: *Discharge plan identified  Outcome: Progressing Towards Goal  Goal: Activity/Safety  Outcome: Progressing Towards Goal  Goal: Consults, if ordered  Outcome: Progressing Towards Goal  Goal: Diagnostic Test/Procedures  Outcome: Progressing Towards Goal  Goal: Nutrition/Diet  Outcome: Progressing Towards Goal  Goal: Discharge Planning  Outcome: Progressing Towards Goal  Goal: Medications  Outcome: Progressing Towards Goal  Goal: Respiratory  Outcome: Progressing Towards Goal  Goal: Treatments/Interventions/Procedures  Outcome: Progressing Towards Goal  Goal: Psychosocial  Outcome: Progressing Towards Goal     Problem: Sepsis: Day 6  Goal: Off Pathway (Use only if patient is Off Pathway)  Outcome: Progressing Towards Goal  Goal: *Oxygen saturation within defined limits  Outcome: Progressing Towards Goal  Goal: *Vital signs within defined limits  Outcome: Progressing Towards Goal  Goal: *Tolerating diet  Outcome: Progressing Towards Goal  Goal: *Demonstrates progressive activity  Outcome: Progressing Towards Goal  Goal: Influenza immunization  Outcome: Progressing Towards Goal  Goal: *Pneumococcal immunization  Outcome: Progressing Towards Goal  Goal: Activity/Safety  Outcome: Progressing Towards Goal  Goal: Diagnostic Test/Procedures  Outcome: Progressing Towards Goal  Goal: Nutrition/Diet  Outcome: Progressing Towards Goal  Goal: Discharge Planning  Outcome: Progressing Towards Goal  Goal: Medications  Outcome: Progressing Towards Goal  Goal: Respiratory  Outcome: Progressing Towards Goal  Goal: Treatments/Interventions/Procedures  Outcome: Progressing Towards Goal  Goal: Psychosocial  Outcome: Progressing Towards Goal     Problem: Sepsis: Discharge Outcomes  Goal: *Vital signs within defined limits  Outcome: Progressing Towards Goal  Goal: *Tolerating diet  Outcome: Progressing Towards Goal  Goal: *Verbalizes understanding and describes prescribed diet  Outcome: Progressing Towards Goal  Goal: *Demonstrates progressive activity  Outcome: Progressing Towards Goal  Goal: *Describes follow-up/return visits to physicians  Outcome: Progressing Towards Goal  Goal: *Verbalizes name, dosage, time, side effects, and number of days to continue medications  Outcome: Progressing Towards Goal  Goal: *Influenza immunization (Oct-Mar only)  Outcome: Progressing Towards Goal  Goal: *Pneumococcal immunization  Outcome: Progressing Towards Goal  Goal: *Lungs clear or at baseline  Outcome: Progressing Towards Goal  Goal: *Oxygen saturation returns to baseline or 90% or better on room air  Outcome: Progressing Towards Goal  Goal: *Glycemic control  Outcome: Progressing Towards Goal  Goal: *Absence of deep venous thrombosis signs and symptoms(Stroke Metric)  Outcome: Progressing Towards Goal  Goal: *Describes available resources and support systems  Outcome: Progressing Towards Goal  Goal: *Optimal pain control at patient's stated goal  Outcome: Progressing Towards Goal     Problem: Pain  Goal: *Control of Pain  Outcome: Progressing Towards Goal     Problem: Patient Education: Go to Patient Education Activity  Goal: Patient/Family Education  Outcome: Progressing Towards Goal

## 2022-08-12 NOTE — PROGRESS NOTES
Infectious Disease Consultation Note        Reason: Sepsis    Current abx Prior abx   Piptazo  Vancomycin      Lines: PIV      Assessment :    82 yro female with history significant for uncontrolled type 2 diabetes, diabetic neuropathy, hypercholesterolemia, hypertension, on anticoagulation A. fib with anticoagulation who was admitted yesterday lethargy and fevers    #1 febrile illness/sepsis picture-source unclear so far-possibly due to large amount of urinary retention at home, and again here after a trial of removing Ramirez catheter (became very confused/agitated later this morning)  -Afebrile now, WBC 16K today, AQUILES, elevated procalcitonin of 31 and elevated CRP and ESR  -Blood cultures negative at 24 hours, culture negative at 24 hours, respiratory viral panel negative  -CT a/p without any acute findings  -On Vancomcyin and piptazo     #2 Diabetes- poorly controlled  a1C of 11.8  #3 AQUILES  #4 CMO  #5 Afib   #6 partially removed callus from plantar aspect of the L first toe. Silght opening, dry now. But apparently drains intermittently    Lab Results   Component Value Date/Time    Hemoglobin A1c 11.8 (H) 05/27/2022 02:36 AM    Hemoglobin A1c (POC) 9.4 11/09/2021 11:58 AM         Recommendations:    Patient was very agitated and ready to leave/all dressed and wanted to go home for the night -  If she is remaining in patient, continue to simplify antibiotics -continuing with CTX and stop Vancomycin as no MRSA has been isolated. Presentation could be due to urinary retention, despite negative urine cultures. F/up with all cx in progress  Podiatry evaluation of the L foot plantar wound? Possible nidus of infection? Supportive care. Thank you for consultation request. Above plan was discussed in details with patient, family and primary team. Please call me if any further questions or concerns. Will continue to participate in the care of this patient.       HPI:    Patient with urinary retention after a trial of removing Ramirez catheter this morning. She was all dressed and ready to go home. Very agitated and confused when seen. She wanted to go home for the night and come back the next day if she mass. The reason, she said she needs to discuss some things with her boyfriend and feels like she does not have any privacy in the hospital and people are listening to her.        Current Facility-Administered Medications   Medication Dose Route Frequency    [Held by provider] polyethylene glycol (MIRALAX) packet 17 g  17 g Oral DAILY PRN    insulin glargine (LANTUS) injection 20 Units  20 Units SubCUTAneous QHS    carvediloL (COREG) tablet 6.25 mg  6.25 mg Oral BID WITH MEALS    cefTRIAXone (ROCEPHIN) 2 g in sterile water (preservative free) 20 mL IV syringe  2 g IntraVENous Q24H    sodium chloride (NS) flush 5-10 mL  5-10 mL IntraVENous PRN    sodium chloride (NS) flush 5-40 mL  5-40 mL IntraVENous Q8H    sodium chloride (NS) flush 5-40 mL  5-40 mL IntraVENous PRN    insulin lispro (HUMALOG) injection   SubCUTAneous AC&HS    glucose chewable tablet 16 g  16 g Oral PRN    glucagon (GLUCAGEN) injection 1 mg  1 mg IntraMUSCular PRN    dextrose 10% infusion 0-250 mL  0-250 mL IntraVENous PRN    [Held by provider] allopurinoL (ZYLOPRIM) tablet 100 mg  100 mg Oral DAILY    acetaminophen (TYLENOL) tablet 500 mg  500 mg Oral Q6H PRN    amLODIPine (NORVASC) tablet 10 mg  10 mg Oral DAILY    atorvastatin (LIPITOR) tablet 80 mg  80 mg Oral DAILY    [Held by provider] bumetanide (BUMEX) tablet 1 mg  1 mg Oral BID PRN    [Held by provider] hydrOXYzine HCL (ATARAX) tablet 10 mg  10 mg Oral BID PRN    [Held by provider] lisinopriL (PRINIVIL, ZESTRIL) tablet 40 mg  40 mg Oral DAILY    rivaroxaban (XARELTO) tablet 20 mg  20 mg Oral DAILY WITH BREAKFAST    sertraline (ZOLOFT) tablet 50 mg  50 mg Oral DAILY    levothyroxine (SYNTHROID) tablet 150 mcg  150 mcg Oral 6am    bumetanide (BUMEX) injection 1 mg  1 mg IntraVENous BID    vancomycin (VANCOCIN) 750 mg in 0.9% sodium chloride 250 mL (VIAL-MATE)  750 mg IntraVENous Q24H    promethazine (PHENERGAN) suppository 12.5 mg  12.5 mg Rectal Q6H PRN       Allergies: Patient has no known allergies. Temp (24hrs), Av.2 °F (36.8 °C), Min:97.7 °F (36.5 °C), Max:98.8 °F (37.1 °C)    Visit Vitals  BP (!) 152/72 (BP 1 Location: Left upper arm, BP Patient Position: Semi fowlers; Lying)   Pulse 87   Temp 98.8 °F (37.1 °C)   Resp 20   Ht 5' 4\" (1.626 m)   Wt 107.5 kg (237 lb)   SpO2 95%   BMI 40.68 kg/m²           Physical Exam: Admitted exam today-as patient would not allow    General: Well developed, well nourished female laying on the bed, no acute distress. All dressed and ready to go home? General:   awake alert and oriented-self and place,  intermittently confused, and upset   HEENT:  Normocephalic, atraumatic,    Dentures. Lungs:   non-labored, bilaterally clear to auscultation- no crackles wheezes rales or rhonchi               Extremities:  From  -  s/p resection of the first R toe. Well healed. Callus on the plantar aspect of the L first toe. No swelling of the feet or legs. no joint effusions or swelling; Full ROM of all large joints to the upper and lower extremities; muscle mass appropriate for age  Well healed surgical scars on b/l knees   Neurologic:  No gross focal sensory abnormalities; Speech appropriate.         Wound:       Back:     Psychiatric:  Delusional and paranoid thought process-distrust of everyone and thinks people are listening to her conversations with her boyfriend - abile affect          Labs: Results:   Chemistry Recent Labs     22  0531 22  0247 08/10/22  1023 08/10/22  0340   * 135* 243* 226*    141 141 137   K 3.8 3.2* 3.5 5.2    107 104 103   CO2 28 26 28 26   BUN 31* 26* 21* 24*   CREA 1.16 1.32* 1.35* 1.36*   CA 9.0 8.7 8.9 9.1   AGAP 7 8 9 8   BUCR 27* 20 16 18   AP  --   --   --  66   TP  --   --   --  8.3*   ALB  --   -- --  3.7   GLOB  --   --   --  4.6*   AGRAT  --   --   --  0.8      CBC w/Diff Recent Labs     08/12/22  0531 08/11/22  0247 08/10/22  1546   WBC 16.7* 26.5* 32.7*   RBC 4.48 4.07* 4.75   HGB 12.6 11.6* 13.5   HCT 39.3 36.2 41.7    192 195   GRANS 77* 84* 90*   LYMPH 14* 2* 3*   EOS 1 0 0      Microbiology Recent Labs     08/10/22  0620 08/10/22  0430 08/10/22  0340   CULT No growth (<1,000 CFU/ML) NO GROWTH 2 DAYS NO GROWTH 2 DAYS          RADIOLOGY:    All available imaging studies/reports in [facility] for this admission were reviewed      Disclaimer: Sections of this note are dictated utilizing voice recognition software, which may have resulted in some phonetic based errors in grammar and contents. Even though attempts were made to correct all the mistakes, some may have been missed, and remained in the body of the document. If questions arise, please contact our department.     Dr. Riana Petersen, Infectious Disease Specialist    August 12, 2022  4:12 PM

## 2022-08-12 NOTE — CONSULTS
ASSESSMENT:   Acute Urinary Retention- 1200 cc on straight cath 8/10, VT failed today   UA 8/10/22: Trace blood, WBC 0-2, RBC 0-2   UCX NG    H/o OAB, THAI, UTI, rectocele, kidney stones    Admitted for AMS, Sepsis   WBC 16.7<26.5<32.7    BCX NGTD   VSS    H/o DVT  DM 2  DM Neuropathy  CKD 3   Creat 1.16<1.32<1.35<1.36  HTN  HLD  HFpEF  Hypothyroidism    PLAN:    CT reviewed, no hydronephrosis, no acute  abnormalities. UCX without growth. On Rocephin and Vanc. Recommend maintain cruz for at least 10 days with VT in office. Consider starting Flomax. Do not start Vesicare  If patent fails VT, will need UDS outpatient. Will sign off. Follow up arranged? YES      Rosita Riedel PA-C  [facility]  [facility]  Pager: [phone]     August 12, 2022 1:17 PM    I have reviewed pertinent vitals, I/O's, notes, laboratory values and imaging. I have discussed the case and I agree with the provider's assessment and plan as outlined above, with ammendments as follows:        Layton Cranker, MD  [facility], [address]  Pager [phone]        Chief Complaint   Patient presents with    Altered mental status       HISTORY OF PRESENT ILLNESS:  Krzysztof Dunn is a 82 y.o. female who is seen in consultation as referred by Dr. Eulalia Marks  for retention. Past urological hx significant for OAB, THAI, UTI, rectocele, kidney stones. Patient last seen on 10/24/21 by Nestor Pena PA-C. Patient started on vesicare. Patient with PMHX significant for below, presented to ED on 8/10/22 after falling. Patient altered for past 2 days. Patient admitted for sepsis. Patient required straight cath in ED, obtained 1200 cc. Cruz placed on 8/10/22. VT performed today, (pvr: 431 cc, 508 cc, 547 cc) failed. Urology then consulted. Per patient denies urinary symptoms. Denies retention. Attributes her ability to empty bladder due to not being allowed to get out of bed to use the restroom.  She appears slightly confused while talking. Past Medical History:   Diagnosis Date    Acquired hypothyroidism 2016    Arthritis of right shoulder region 2016    Asthma     Bilateral lower extremity edema 2021    Chronic bilateral low back pain without sciatica 2021    Chronic diastolic congestive heart failure ([facility] [address] 3/10/2021    Chronic venous insufficiency     Diabetes (HCC)     neuropathy    Essential hypertension 2021    Gout     History of depression 2017    History of fall 2021    Hypercholesteremia 2021    Hypertension     MI (myocardial infarction) ([facility] [address]     OAB (overactive bladder) 2021    Peripheral neuropathy     Rheumatoid arthritis ([facility] [address]     Tear of right rotator cuff 2016    Thyroid pain     Urinary incontinence 2021    Vertigo        Past Surgical History:   Procedure Laterality Date    HX AMPUTATION TOE Right 2020    Great toe Dr. Mariposa Valentin [address]      HX CHOLECYSTECTOMY      HX GYN      TAHOophorectomy due to infection    HX HEENT      resection of memegioma in the .     HX KNEE REPLACEMENT Bilateral        Social History     Tobacco Use    Smoking status: Former     Types: Cigarettes     Quit date:      Years since quittin.6    Smokeless tobacco: Never    Tobacco comments:     quit 45 years ago   Vaping Use    Vaping Use: Never used   Substance Use Topics    Alcohol use: No     Alcohol/week: 0.0 standard drinks    Drug use: No       No Known Allergies    Family History   Problem Relation Age of Onset    Heart Attack Mother     Heart Attack Father        Current Facility-Administered Medications   Medication Dose Route Frequency Provider Last Rate Last Admin    [Held by provider] polyethylene glycol (MIRALAX) packet 17 g  17 g Oral DAILY PRN Reina Alford MD        insulin glargine (LANTUS) injection 20 Units  20 Units SubCUTAneous QHS Hilario LAWSON MD        hydrOXYzine (VISTARIL) injection 50 mg  50 mg IntraMUSCular ONCE Hollis, Amara Farias MD        carvediloL (COREG) tablet 6.25 mg  6.25 mg Oral BID WITH MEALS Dulce LAWSON MD   6.25 mg at 08/12/22 5096    cefTRIAXone (ROCEPHIN) 2 g in sterile water (preservative free) 20 mL IV syringe  2 g IntraVENous Q24H Abena Ramirez MD   2 g at 08/11/22 1825    sodium chloride (NS) flush 5-10 mL  5-10 mL IntraVENous PRN Dulce LAWSON MD        sodium chloride (NS) flush 5-40 mL  5-40 mL IntraVENous Q8H Selene Conroy DO   10 mL at 08/12/22 1394    sodium chloride (NS) flush 5-40 mL  5-40 mL IntraVENous PRN Selene Conroy DO        insulin lispro (HUMALOG) injection   SubCUTAneous AC&HS Jayant Olson MD   6 Units at 08/12/22 1237    glucose chewable tablet 16 g  16 g Oral PRN Nancy Rick MD        glucagon (GLUCAGEN) injection 1 mg  1 mg IntraMUSCular PRN Dulce LAWSON MD        dextrose 10% infusion 0-250 mL  0-250 mL IntraVENous PRN Nancy Rick MD        [Held by provider] allopurinoL (ZYLOPRIM) tablet 100 mg  100 mg Oral DAILY Dulce LAWSON MD        acetaminophen (TYLENOL) tablet 500 mg  500 mg Oral Q6H PRN Dulce LAWSON MD   500 mg at 08/11/22 1841    amLODIPine (NORVASC) tablet 10 mg  10 mg Oral DAILY Dulce LAWSON MD   10 mg at 08/12/22 7604    atorvastatin (LIPITOR) tablet 80 mg  80 mg Oral DAILY Dulce LAWSON MD   80 mg at 08/12/22 0994    [Held by provider] bumetanide (BUMEX) tablet 1 mg  1 mg Oral BID PRN Nancy Rick MD        [facility] by provider] hydrOXYzine HCL (ATARAX) tablet 10 mg  10 mg Oral BID PRN Nancy Rick MD        [Held by provider] lisinopriL (PRINIVIL, ZESTRIL) tablet 40 mg  40 mg Oral DAILY Nancy Rick MD        rivaroxaban (XARELTO) tablet 20 mg  20 mg Oral DAILY WITH BREAKFAST Dulce LAWSON MD   20 mg at 08/12/22 0694    sertraline (ZOLOFT) tablet 50 mg  50 mg Oral DAILY Dulce LAWSON MD   50 mg at 08/12/22 3594    levothyroxine (SYNTHROID) tablet 150 mcg 150 mcg Oral Donavan LAWSON MD   150 mcg at 08/12/22 5217    bumetanide (BUMEX) injection 1 mg  1 mg IntraVENous BID Vanessa DSEAI PA-C   1 mg at 08/12/22 0834    vancomycin (VANCOCIN) 750 mg in 0.9% sodium chloride 250 mL (VIAL-MATE)  750 mg IntraVENous Q24H Cherylle Krabbe D,  mL/hr at 08/11/22 2101 750 mg at 08/11/22 2101    promethazine (PHENERGAN) suppository 12.5 mg  12.5 mg Rectal Q6H PRN Cherylle Krabbe D, MD   12.5 mg at 08/10/22 1228         Review of Systems:  ROS is:     Not obtainable due to patient factors, confused      PHYSICAL EXAMINATION:     Visit Vitals  BP (!) 152/72 (BP 1 Location: Left upper arm, BP Patient Position: Semi fowlers; Lying)   Pulse 87   Temp 98.8 °F (37.1 °C)   Resp 20   Ht 5' 4\" (1.626 m)   Wt 107.5 kg (237 lb)   SpO2 95%   BMI 40.68 kg/m²     Constitutional: Well developed, well-nourished female in no acute distress. HEENT: Normocephalic, Atraumatic   CV:   no edema   Respiratory: No respiratory distress or difficulties breathing   Abdomen:  Soft and nontender.  Female:  CVA tenderness: none  Skin:  Normal color. No evidence of jaundice. Neuro/Psych:  Patient with appropriate affect. Alert and confused      REVIEW OF LABS AND IMAGING:      CT 8/10/22: IMPRESSION     No definite active inflammation. Colonic diverticulosis without acute diverticulitis. Prominent fluid-filled stomach and proximal small bowel without discrete  transition point to suggest obstruction. This may be from ingested material. A  mild gastroenteritis could have a similar appearance. Prior cholecystectomy with biliary prominence, likely reservoir effect. Atherosclerosis. Additional incidentals as fully described above.     Labs: Results:   Chemistry    Recent Labs     08/12/22  0531 08/11/22  0247 08/10/22  1023 08/10/22  0340   * 135* 243* 226*    141 141 137   K 3.8 3.2* 3.5 5.2    107 104 103   CO2 28 26 28 26   BUN 31* 26* 21* 24* CREA 1.16 1.32* 1.35* 1.36*   CA 9.0 8.7 8.9 9.1   AGAP 7 8 9 8   BUCR 27* 20 16 18   AP  --   --   --  66   TP  --   --   --  8.3*   ALB  --   --   --  3.7   GLOB  --   --   --  4.6*   AGRAT  --   --   --  0.8      CBC w/Diff Recent Labs     08/12/22  0531 08/11/22  0247 08/10/22  1546   WBC 16.7* 26.5* 32.7*   RBC 4.48 4.07* 4.75   HGB 12.6 11.6* 13.5   HCT 39.3 36.2 41.7    192 195   GRANS 77* 84* 90*   LYMPH 14* 2* 3*   EOS 1 0 0      Cultures Recent Labs     08/10/22  0620 08/10/22  0430 08/10/22  0340   CULT No growth (<1,000 CFU/ML) NO GROWTH 2 DAYS NO GROWTH 2 DAYS     All Micro Results       Procedure Component Value Units Date/Time    C. DIFFICILE AG & TOXIN A/B [[id number]] Collected: 08/12/22 0423    Order Status: Completed Specimen: Stool Updated: 08/12/22 0848     GDH ANTIGEN Negative        C. difficile toxin Negative        INTERPRETATION       NEGATIVE FOR TOXIGENIC C. DIFFICILE          CULTURE, BLOOD [[id number]] Collected: 08/10/22 0340    Order Status: Completed Specimen: Blood Updated: 08/12/22 0613     Special Requests: NO SPECIAL REQUESTS        Culture result: NO GROWTH 2 DAYS       CULTURE, BLOOD [[id number]] Collected: 08/10/22 0430    Order Status: Completed Specimen: Blood Updated: 08/12/22 0613     Special Requests: NO SPECIAL REQUESTS        Culture result: NO GROWTH 2 DAYS       CULTURE, URINE [[id number]] Collected: 08/10/22 6070    Order Status: Completed Specimen: Urine from Clean catch Updated: 08/11/22 1110     Special Requests: NO SPECIAL REQUESTS        Culture result: No growth (<1,000 CFU/ML)       RESPIRATORY VIRUS PANEL W/COVID-19, PCR [[id number]] Collected: 08/10/22 1655    Order Status: Completed Specimen: Nasopharyngeal Updated: 08/10/22 1906     Adenovirus Not detected        Coronavirus 229E Not detected        Coronavirus HKU1 Not detected        Coronavirus CVNL63 Not detected        Coronavirus OC43 Not detected        SARS-CoV-2, PCR Not detected Metapneumovirus Not detected        Rhinovirus and Enterovirus Not detected        Influenza A Not detected        Influenza A, subtype H1 Not detected        Influenza A, subtype H3 Not detected        INFLUENZA A H1N1 PCR Not detected        Influenza B Not detected        Parainfluenza 1 Not detected        Parainfluenza 2 Not detected        Parainfluenza 3 Not detected        Parainfluenza virus 4 Not detected        RSV by PCR Not detected        B. parapertussis, PCR Not detected        Bordetella pertussis - PCR Not detected        Chlamydophila pneumoniae DNA, QL, PCR Not detected        Mycoplasma pneumoniae DNA, QL, PCR Not detected       LEGIONELLA PNEUMOPHILA AG, URINE [[id number]] Collected: 08/10/22 1530    Order Status: Completed Specimen: Urine, random Updated: 08/10/22 1744     Legionella Ag, urine Negative       STREP PNEUMO AG, URINE [[id number]] Collected: 08/10/22 1530    Order Status: Completed Specimen: Urine, random Updated: 08/10/22 1744     Strep pneumo Ag, urine Negative       C. DIFFICILE AG & TOXIN A/B [[id number]]     Order Status: Canceled Specimen: Stool     COVID-19 WITH INFLUENZA A/B [[id number]] Collected: 08/10/22 1036    Order Status: Completed Specimen: Nasopharyngeal Updated: 08/10/22 1130     SARS-CoV-2 by PCR Not detected        Comment: Not Detected results do not preclude SARS-CoV-2 infection and should not be used as the sole basis for patient management decisions. Results must be combined with clinical observations, patient history, and epidemiological information. Influenza A by PCR Not detected        Influenza B by PCR Not detected        Comment: Testing was performed using matteo Angela SARS-CoV-2 and Influenza A/B nucleic acid assay.   This test is a multiplex Real-Time Reverse Transcriptase Polymerase Chain Reaction (RT-PCR) based in [employer] diagnostic test intended for the qualitative detection of nucleic acids from SARS-CoV-2, Influenza A, and Influenza B in nasopharyngeal for use under the FDA's Emergency Use Authorization (EAU) only.        Fact sheet for Patients: FindDrives.pl  Fact sheet for Healthcare Providers: FindDrives.pl                   Urinalysis Color   Date Value Ref Range Status   08/10/2022 YELLOW   Final     Appearance   Date Value Ref Range Status   08/10/2022 CLEAR   Final     Specific gravity   Date Value Ref Range Status   08/10/2022 1.023 1.005 - 1.030   Final     pH (UA)   Date Value Ref Range Status   08/10/2022 7.0 5.0 - 8.0   Final     Protein   Date Value Ref Range Status   08/10/2022 100 (A) NEG mg/dL Final     Ketone   Date Value Ref Range Status   08/10/2022 TRACE (A) NEG mg/dL Final     Bilirubin   Date Value Ref Range Status   08/10/2022 Negative NEG   Final     Blood   Date Value Ref Range Status   08/10/2022 TRACE (A) NEG   Final     Urobilinogen   Date Value Ref Range Status   08/10/2022 0.2 0.2 - 1.0 EU/dL Final     Nitrites   Date Value Ref Range Status   08/10/2022 Negative NEG   Final     Leukocyte Esterase   Date Value Ref Range Status   08/10/2022 Negative NEG   Final     Potassium   Date Value Ref Range Status   08/12/2022 3.8 3.5 - 5.5 mmol/L Final     Creatinine   Date Value Ref Range Status   08/12/2022 1.16 0.6 - 1.3 MG/DL Final     BUN   Date Value Ref Range Status   08/12/2022 31 (H) 7.0 - 18 MG/DL Final      Coagulation Lab Results   Component Value Date/Time    Prothrombin time 13.2 11/04/2009 10:10 PM    INR 1.0 11/04/2009 10:10 PM    aPTT 31.5 11/04/2009 10:10 PM

## 2022-08-12 NOTE — PROGRESS NOTES
[facility]   BRIEF PROGRESS NOTE    12:15pm    Called to beside due to patient wanting to leave. Found patient dressing in her street clothes and putting on her shoes. Pt kept repeating \"I need to leave. \" When asked to explain, pt was unable to state why she was leaving. She denied pain, dyspnea, and anxiety. She was unable to state why she was in the hospital or what treatment she was being given. Explained to patient that we recommend she stay in the hospital for continued treatment of her urine retention. She persisted in wanting to leave. Due to mental status, patient's POA, her son Betty Croft was called. Explained that medical advice was that the patient stay in the hospital. Also explained that pt had continued urinary retention and if she left now there was risk of bladder infection, continued retention, and risk of bladder rupture from retention. Pt's POA approved a one-time dose of hydroxyzine to calm her down. POA stated that he understood the risks of patient leaving AMA but states that if the patient continues to be persistent in wanting to leave, then she can leave. The above patient and plan were discussed with my senior resident, Dr. Zelda Posadas. See daily progress note for full assessment/plan.       Maricel Arias MD, PGY-1  [facility]  8/12/2022, 12:45 PM

## 2022-08-12 NOTE — PROGRESS NOTES
Chart reviewed. No new orders. Patient not medically stable for discharge. Home DME: walker, W/C and transfer tub bench.  will continue to monitor and assist with transition of care needs.     ANTONINO Anne, RN  Care Management

## 2022-08-12 NOTE — PROGRESS NOTES
Problem: Self Care Deficits Care Plan (Adult)  Goal: *Acute Goals and Plan of Care (Insert Text)  Description: Occupational Therapy Goals  Initiated 8/10/2022 within 7 day(s). 1.  Patient will perform lower body dressing with modified independence. 2.  Patient will perform functional task in standing for 5 minutes with supervision for balance. GM 8/12  3. Patient will perform toilet transfers with supervision/set-up. 4.  Patient will perform all aspects of toileting with supervision/set-up. 5.  Patient will participate in upper extremity therapeutic exercise/activities with supervision/set-up for 8 minutes to increase strength/endurance for ADLs. 6.  Patient will utilize energy conservation techniques during functional activities with verbal cues. Prior Level of Function:Pt was modified independent with basic self care tasks and used a [address] for functional mobility PTA. She lives with a friend, Meghana De León, who was present during the initial evaluation. He states that she did the cooking and cleaning as well. Outcome: Progressing Towards Goal       OCCUPATIONAL THERAPY TREATMENT    Patient: Hugo Rodriguez (47 y.o. female)  Date: 8/12/2022  Diagnosis: Sepsis ([facility] [address] [A41.9] Sepsis ([address]      Precautions: Fall  PLOF: Pt was modified independent with basic self care tasks and used a [address] for functional mobility PTA. She lives with a friend, Meghana De León, who was present during the initial evaluation. He states that she did the cooking and cleaning as well. Chart, occupational therapy assessment, plan of care, and goals were reviewed. ASSESSMENT:  Pt presented supine in bed upon entry, pleasantly confused, and agreeable to work with OT. Pt is easily distractible and required mod/max cueing to complete task at hand. She came to EOB with MIN A in prep for functional task. STS transfer CGA with RW and maneuvered into BR ~ 15 ft w/ CGA.  She stood at sink side ~ 5 mins and performed simple grooming tasks with Supervision. She returned back to sitting EOB with CGA. Provided education for energy conservation techniques and strategies to increase activity tolerance for self cares. She was able to doff/elsy her socks with Supervision utilizing bend forward method. She returned back to supine with CGA and positioned for comfort. She was left with HOB elevated, bed alarm active, and all needs left within reach. RN made aware of pt's participation and position. Progression toward goals:  []          Improving appropriately and progressing toward goals  [x]          Improving slowly and progressing toward goals  []          Not making progress toward goals and plan of care will be adjusted     PLAN:  Patient continues to benefit from skilled intervention to address the above impairments. Continue treatment per established plan of care. Further Equipment Recommendations for Discharge:  N/A    AMPAC: Based on an AM-PAC score of 20/24 and their current ADL deficits; it is recommended that the patient have 2-3 sessions per week of Occupational Therapy at d/c to increase the patient's independence. This AMPAC score should be considered in conjunction with interdisciplinary team recommendations to determine the most appropriate discharge setting. Patient's social support, diagnosis, medical stability, and prior level of function should also be taken into consideration. SUBJECTIVE:   Patient stated Chrystine Schlatter is [employer].     OBJECTIVE DATA SUMMARY:   Cognitive/Behavioral Status:  Neurologic State: Alert, Eyes open spontaneously  Orientation Level: Oriented to person, Disoriented to time, Disoriented to situation, Disoriented to place  Cognition: Follows commands, Impaired decision making, Poor safety awareness  Safety/Judgement: Fall prevention    Functional Mobility and Transfers for ADLs:   Bed Mobility:     Supine to Sit: Minimum assistance  Sit to Supine: Contact guard assistance      Transfers:  Sit to Stand: Contact guard assistance  Stand to Sit: Contact guard assistance      Bathroom Mobility: Contact guard assistance        Balance:  Sitting: Intact  Standing: Impaired; With support  Standing - Static: Good  Standing - Dynamic : Fair    ADL Intervention:       Grooming  Position Performed: Standing  Washing Hands: Supervision  Brushing Teeth: Supervision  Brushing/Combing Hair: Supervision    Lower Body Dressing Assistance  Socks: Supervision  Leg Crossed Method Used: No  Position Performed: Bending forward method;Seated edge of bed    Pain:  Pain level pre-treatment: 0/10   Pain level post-treatment: 0/10    Activity Tolerance:    Fair  Please refer to the flowsheet for vital signs taken during this treatment. After treatment:   []  Patient left in no apparent distress sitting up in chair  [x]  Patient left in no apparent distress in bed  [x]  Call bell left within reach  [x]  Nursing notified  []  Caregiver present  [x]  Bed alarm activated    COMMUNICATION/EDUCATION:   [x] Role of Occupational Therapy in the acute care setting  [x] Home safety education was provided and the patient/caregiver indicated understanding. [x] Patient/family have participated as able in working towards goals and plan of care. [x] Patient/family agree to work toward stated goals and plan of care. [] Patient understands intent and goals of therapy, but is neutral about his/her participation. [] Patient is unable to participate in goal setting and plan of care.       Thank you for this referral.  JOHNNY Jennings  Time Calculation: 38 mins    [facility] AM-PAC® Daily Activity Inpatient Short Form (6-Clicks)    How much HELP from another person does the patient currently need    (If the patient hasn't done an activity recently, how much help from another person do you think he/she would need if he/she tried?)   Total (Total A or Dep)   A Lot  (Mod to Max A)   A Little (Sup or Min A)   None (Mod I to I)   Putting on and taking off regular lower body clothing? [] 1 [] 2 [x] 3 [] 4   2. Bathing (including washing, rinsing,      drying)? [] 1 [] 2 [x] 3 [] 4   3. Toileting, which includes using toilet, bedpan or urinal?   [] 1 [] 2 [x] 3 [] 4   4. Putting on and taking off regular upper body clothing? [] 1 [] 2 [] 3 [x] 4   5. Taking care of personal grooming such as brushing teeth? [] 1 [] 2 [x] 3 [] 4   6. Eating meals?    [] 1 [] 2 [] 3 [x] 4

## 2022-08-12 NOTE — PROGRESS NOTES
Bedside shift change report given to Jac Dunlap RN (oncoming nurse) by Lin Matos RN (offgoing nurse). Report included the following information SBAR, Kardex, Intake/Output, MAR, and Quality Measures. Wound Prevention Checklist    Patient: Gerson Garza [de-identified]82 y.o. female)  Date: 8/11/2022  Diagnosis: Sepsis ([address] [A41.9] Sepsis ([address]    Precautions: Fall       []  Heel prevention boots placed on patient    []  Patient turned q2h during shift    [x]  Lift team ordered    [x]  Patient on Shalini bed/Specialty bed    [x]  Each Wound is documented during shift (Stage, Color, drainage, odor, measurements, and dressings)    [x]  Dual skin checks done at bedside during shift report with Lin Matos RN.      Natalee Trinidad RN

## 2022-08-12 NOTE — CONSULTS
Consult    Patient: Mariya Wright MRN: [id number]  SSN: [id number]    YOB: 1935  Age: 82 y.o. Sex: female      Subjective:      Mariya Wright is a 82 y.o. female who is being seen for asked to evaluate and treat ulcer possible infection left foot. Past Medical History:   Diagnosis Date    Acquired hypothyroidism 2016    Arthritis of right shoulder region 2016    Asthma     Bilateral lower extremity edema 2021    Chronic bilateral low back pain without sciatica 2021    Chronic diastolic congestive heart failure ([address] 3/10/2021    Chronic venous insufficiency     Diabetes (HCC)     neuropathy    Essential hypertension 2021    Gout     History of depression 2017    History of fall 2021    Hypercholesteremia 2021    Hypertension     MI (myocardial infarction) ([address]     OAB (overactive bladder) 2021    Peripheral neuropathy     Rheumatoid arthritis ([address]     Tear of right rotator cuff 2016    Thyroid pain     Urinary incontinence 2021    Vertigo      Past Surgical History:   Procedure Laterality Date    HX AMPUTATION TOE Right     Great toe Dr. Jose Leslie [address]      HX CHOLECYSTECTOMY      HX GYN      TAHOophorectomy due to infection    HX HEENT      resection of memegioma in the .     HX KNEE REPLACEMENT Bilateral       Family History   Problem Relation Age of Onset    Heart Attack Mother     Heart Attack Father      Social History     Tobacco Use    Smoking status: Former     Types: Cigarettes     Quit date:      Years since quittin.6    Smokeless tobacco: Never    Tobacco comments:     quit 45 years ago   Substance Use Topics    Alcohol use: No     Alcohol/week: 0.0 standard drinks      Current Facility-Administered Medications   Medication Dose Route Frequency Provider Last Rate Last Admin    [Held by provider] polyethylene glycol (MIRALAX) packet 17 g  17 g Oral DAILY PRN Hamilton Sewell MD        insulin glargine (LANTUS) injection 20 Units  20 Units SubCUTAneous QHS Janelle LAWSON MD        carvediloL (COREG) tablet 6.25 mg  6.25 mg Oral BID WITH MEALS Janelle LAWSON MD   6.25 mg at 08/12/22 4471    cefTRIAXone (ROCEPHIN) 2 g in sterile water (preservative free) 20 mL IV syringe  2 g IntraVENous Q24H Raphael Arriaga MD   2 g at 08/11/22 1825    sodium chloride (NS) flush 5-10 mL  5-10 mL IntraVENous PRN Janelle LAWSON MD        sodium chloride (NS) flush 5-40 mL  5-40 mL IntraVENous Q8H Selene Conroy DO   10 mL at 08/12/22 1227    sodium chloride (NS) flush 5-40 mL  5-40 mL IntraVENous PRN Selene Conroy DO        insulin lispro (HUMALOG) injection   SubCUTAneous AC&HS Deangelo Mcgee MD   6 Units at 08/12/22 1237    glucose chewable tablet 16 g  16 g Oral PRN Devora Chou MD        glucagon (GLUCAGEN) injection 1 mg  1 mg IntraMUSCular PRN Janelle LAWSON MD        dextrose 10% infusion 0-250 mL  0-250 mL IntraVENous PRN Devora Chou MD        [Held by provider] allopurinoL (ZYLOPRIM) tablet 100 mg  100 mg Oral DAILY Janelle LAWSON MD        acetaminophen (TYLENOL) tablet 500 mg  500 mg Oral Q6H PRN Janelle LAWSON MD   500 mg at 08/11/22 1841    amLODIPine (NORVASC) tablet 10 mg  10 mg Oral DAILY Janelle LAWSON MD   10 mg at 08/12/22 3947    atorvastatin (LIPITOR) tablet 80 mg  80 mg Oral DAILY Janelle LAWSON MD   80 mg at 08/12/22 1736    [Held by provider] bumetanide (BUMEX) tablet 1 mg  1 mg Oral BID PRN Devora Chou MD        [facility] [place] by provider] hydrOXYzine HCL (ATARAX) tablet 10 mg  10 mg Oral BID PRN Devora Chou MD        [Held by provider] lisinopriL (PRINIVIL, ZESTRIL) tablet 40 mg  40 mg Oral DAILY Devora Chou MD        rivaroxaban (XARELTO) tablet 20 mg  20 mg Oral DAILY WITH BREAKFAST Janelle LAWSON MD   20 mg at 08/12/22 7814    sertraline (ZOLOFT) tablet 50 mg  50 mg Oral DAILY Janelle Billingsley MD RENNY   50 mg at 08/12/22 0833    levothyroxine (SYNTHROID) tablet 150 mcg  150 mcg Oral Leann LAWSON MD   150 mcg at 08/12/22 8301    bumetanide (BUMEX) injection 1 mg  1 mg IntraVENous BID Gabriela DESAI PA-C   1 mg at 08/12/22 0834    vancomycin (VANCOCIN) 750 mg in 0.9% sodium chloride 250 mL (VIAL-MATE)  750 mg IntraVENous Q24H Myranda SANTOS  mL/hr at 08/11/22 2101 750 mg at 08/11/22 2101    promethazine (PHENERGAN) suppository 12.5 mg  12.5 mg Rectal Q6H PRN Myranda SANTOS MD   12.5 mg at 08/10/22 1228        No Known Allergies    Review of Systems:  A comprehensive review of systems was negative except for that written in the History of Present Illness. Objective:     Vitals:    08/11/22 2330 08/12/22 0345 08/12/22 0725 08/12/22 1121   BP: (!) 150/67 (!) 158/79 (!) 162/67 (!) 152/72   Pulse: 85 75 88 87   Resp: 20 19 20 20   Temp: 98.8 °F (37.1 °C) 97.8 °F (36.6 °C) 97.7 °F (36.5 °C) 98.8 °F (37.1 °C)   TempSrc:       SpO2: 96% 96% 96% 95%   Weight:       Height:          Seen at bedside awake and alert. Inspected both feet. No pathology on right foot. Callus area left first metatarsal head. Some bleeding into callus but no signs necrosis or infection. No signs bone involvement on x-ray    Assessment:     Hospital Problems  Date Reviewed: 8/12/2022            Codes Class Noted POA    * (Principal) Sepsis ([facility] ICD-10-CM: A41.9  ICD-9-CM: 038.9, 995.91  8/10/2022 Unknown           Plan:     Small pressure callus, charcot joint left foot. Pre-ulcer ,no signs infection. Advise local wound care.      Signed By: Ravi Willoughby DPM     August 12, 2022

## 2022-08-12 NOTE — PROGRESS NOTES
Problem: Pressure Injury - Risk of  Goal: *Prevention of pressure injury  Description: Document Ravi Scale and appropriate interventions in the flowsheet. Outcome: Progressing Towards Goal  Note: Pressure Injury Interventions:  Sensory Interventions: Assess changes in LOC, Avoid rigorous massage over bony prominences, Check visual cues for pain, Float heels, Keep linens dry and wrinkle-free, Maintain/enhance activity level, Pressure redistribution bed/mattress (bed type)    Moisture Interventions: Absorbent underpads, Internal/External urinary devices    Activity Interventions: Increase time out of bed, Pressure redistribution bed/mattress(bed type), PT/OT evaluation    Mobility Interventions: Float heels, HOB 30 degrees or less, Pressure redistribution bed/mattress (bed type)    Nutrition Interventions: Document food/fluid/supplement intake    Friction and Shear Interventions: Foam dressings/transparent film/skin sealants, HOB 30 degrees or less, Lift sheet, Lift team/patient mobility team, Minimize layers                Problem: Patient Education: Go to Patient Education Activity  Goal: Patient/Family Education  Outcome: Progressing Towards Goal     Problem: Falls - Risk of  Goal: *Absence of Falls  Description: Document Obdulio Fall Risk and appropriate interventions in the flowsheet.   Outcome: Progressing Towards Goal  Note: Fall Risk Interventions:  Mobility Interventions: Communicate number of staff needed for ambulation/transfer, Patient to call before getting OOB, Utilize walker, cane, or other assistive device    Mentation Interventions: Bed/chair exit alarm, Door open when patient unattended, Reorient patient, Room close to nurse's station, Toileting rounds, Update white board, Family/sitter at bedside    Medication Interventions: Teach patient to arise slowly, Patient to call before getting OOB    Elimination Interventions: Call light in reach, Elevated toilet seat, Toilet paper/wipes in reach, Toileting schedule/hourly rounds, Patient to call for help with toileting needs    History of Falls Interventions:  Investigate reason for fall, Room close to nurse's station, Door open when patient unattended         Problem: Patient Education: Go to Patient Education Activity  Goal: Patient/Family Education  Outcome: Progressing Towards Goal     Problem: Patient Education: Go to Patient Education Activity  Goal: Patient/Family Education  Outcome: Progressing Towards Goal     Problem: Sepsis: Day 2  Goal: Off Pathway (Use only if patient is Off Pathway)  Outcome: Progressing Towards Goal  Goal: *Central Venous Pressure maintained at 8-12 mm Hg  Outcome: Progressing Towards Goal  Goal: *Hemodynamically stable  Outcome: Progressing Towards Goal  Goal: *Tolerating diet  Outcome: Progressing Towards Goal  Goal: Activity/Safety  Outcome: Progressing Towards Goal  Goal: Consults, if ordered  Outcome: Progressing Towards Goal  Goal: Diagnostic Test/Procedures  Outcome: Progressing Towards Goal  Goal: Nutrition/Diet  Outcome: Progressing Towards Goal  Goal: Discharge Planning  Outcome: Progressing Towards Goal  Goal: Medications  Outcome: Progressing Towards Goal  Goal: Respiratory  Outcome: Progressing Towards Goal  Goal: Treatments/Interventions/Procedures  Outcome: Progressing Towards Goal  Goal: Psychosocial  Outcome: Progressing Towards Goal     Problem: Sepsis: Day 3  Goal: Off Pathway (Use only if patient is Off Pathway)  Outcome: Progressing Towards Goal  Goal: *Central Venous Pressure maintained at 8-12 mm Hg  Outcome: Progressing Towards Goal  Goal: *Oxygen saturation within defined limits  Outcome: Progressing Towards Goal  Goal: *Vital sign stability  Outcome: Progressing Towards Goal  Goal: *Tolerating diet  Outcome: Progressing Towards Goal  Goal: *Demonstrates progressive activity  Outcome: Progressing Towards Goal  Goal: Activity/Safety  Outcome: Progressing Towards Goal  Goal: Consults, if ordered  Outcome: Progressing Towards Goal  Goal: Diagnostic Test/Procedures  Outcome: Progressing Towards Goal  Goal: Nutrition/Diet  Outcome: Progressing Towards Goal  Goal: Discharge Planning  Outcome: Progressing Towards Goal  Goal: Medications  Outcome: Progressing Towards Goal  Goal: Respiratory  Outcome: Progressing Towards Goal  Goal: Treatments/Interventions/Procedures  Outcome: Progressing Towards Goal  Goal: Psychosocial  Outcome: Progressing Towards Goal     Problem: Sepsis: Day 4  Goal: Off Pathway (Use only if patient is Off Pathway)  Outcome: Progressing Towards Goal  Goal: Activity/Safety  Outcome: Progressing Towards Goal  Goal: Consults, if ordered  Outcome: Progressing Towards Goal  Goal: Diagnostic Test/Procedures  Outcome: Progressing Towards Goal  Goal: Nutrition/Diet  Outcome: Progressing Towards Goal  Goal: Discharge Planning  Outcome: Progressing Towards Goal  Goal: Medications  Outcome: Progressing Towards Goal  Goal: Respiratory  Outcome: Progressing Towards Goal  Goal: Treatments/Interventions/Procedures  Outcome: Progressing Towards Goal  Goal: Psychosocial  Outcome: Progressing Towards Goal  Goal: *Oxygen saturation within defined limits  Outcome: Progressing Towards Goal  Goal: *Hemodynamically stable  Outcome: Progressing Towards Goal  Goal: *Vital signs within defined limits  Outcome: Progressing Towards Goal  Goal: *Tolerating diet  Outcome: Progressing Towards Goal  Goal: *Demonstrates progressive activity  Outcome: Progressing Towards Goal  Goal: *Fluid volume maintenance  Outcome: Progressing Towards Goal     Problem: Sepsis: Day 5  Goal: Off Pathway (Use only if patient is Off Pathway)  Outcome: Progressing Towards Goal  Goal: *Oxygen saturation within defined limits  Outcome: Progressing Towards Goal  Goal: *Vital signs within defined limits  Outcome: Progressing Towards Goal  Goal: *Tolerating diet  Outcome: Progressing Towards Goal  Goal: *Demonstrates progressive activity  Outcome: Progressing Towards Goal  Goal: *Discharge plan identified  Outcome: Progressing Towards Goal  Goal: Activity/Safety  Outcome: Progressing Towards Goal  Goal: Consults, if ordered  Outcome: Progressing Towards Goal  Goal: Diagnostic Test/Procedures  Outcome: Progressing Towards Goal  Goal: Nutrition/Diet  Outcome: Progressing Towards Goal  Goal: Discharge Planning  Outcome: Progressing Towards Goal  Goal: Medications  Outcome: Progressing Towards Goal  Goal: Respiratory  Outcome: Progressing Towards Goal  Goal: Treatments/Interventions/Procedures  Outcome: Progressing Towards Goal  Goal: Psychosocial  Outcome: Progressing Towards Goal     Problem: Sepsis: Day 6  Goal: Off Pathway (Use only if patient is Off Pathway)  Outcome: Progressing Towards Goal  Goal: *Oxygen saturation within defined limits  Outcome: Progressing Towards Goal  Goal: *Vital signs within defined limits  Outcome: Progressing Towards Goal  Goal: *Tolerating diet  Outcome: Progressing Towards Goal  Goal: *Demonstrates progressive activity  Outcome: Progressing Towards Goal  Goal: Influenza immunization  Outcome: Progressing Towards Goal  Goal: *Pneumococcal immunization  Outcome: Progressing Towards Goal  Goal: Activity/Safety  Outcome: Progressing Towards Goal  Goal: Diagnostic Test/Procedures  Outcome: Progressing Towards Goal  Goal: Nutrition/Diet  Outcome: Progressing Towards Goal  Goal: Discharge Planning  Outcome: Progressing Towards Goal  Goal: Medications  Outcome: Progressing Towards Goal  Goal: Respiratory  Outcome: Progressing Towards Goal  Goal: Treatments/Interventions/Procedures  Outcome: Progressing Towards Goal  Goal: Psychosocial  Outcome: Progressing Towards Goal     Problem: Sepsis: Discharge Outcomes  Goal: *Vital signs within defined limits  Outcome: Progressing Towards Goal  Goal: *Tolerating diet  Outcome: Progressing Towards Goal  Goal: *Verbalizes understanding and describes prescribed diet  Outcome: Progressing Towards Goal  Goal: *Demonstrates progressive activity  Outcome: Progressing Towards Goal  Goal: *Describes follow-up/return visits to physicians  Outcome: Progressing Towards Goal  Goal: *Verbalizes name, dosage, time, side effects, and number of days to continue medications  Outcome: Progressing Towards Goal  Goal: *Influenza immunization (Oct-Mar only)  Outcome: Progressing Towards Goal  Goal: *Pneumococcal immunization  Outcome: Progressing Towards Goal  Goal: *Lungs clear or at baseline  Outcome: Progressing Towards Goal  Goal: *Oxygen saturation returns to baseline or 90% or better on room air  Outcome: Progressing Towards Goal  Goal: *Glycemic control  Outcome: Progressing Towards Goal  Goal: *Absence of deep venous thrombosis signs and symptoms(Stroke Metric)  Outcome: Progressing Towards Goal  Goal: *Describes available resources and support systems  Outcome: Progressing Towards Goal  Goal: *Optimal pain control at patient's stated goal  Outcome: Progressing Towards Goal     Problem: Pain  Goal: *Control of Pain  Outcome: Progressing Towards Goal     Problem: Patient Education: Go to Patient Education Activity  Goal: Patient/Family Education  Outcome: Progressing Towards Goal     Problem: Patient Education: Go to Patient Education Activity  Goal: Patient/Family Education  Outcome: Progressing Towards Goal

## 2022-08-12 NOTE — PROGRESS NOTES
[facility]   POST-ROUND NOTE       - Pt continues to improve from sepsis standpoint. BP mildly hypertensive today. Will restart home amlodipine. Consider restarting home lisinopril tomorrow am.   -IV Bumex switched to bumex 1 mg PO. PT on 2 mg PO at home, consider uptitrating on discharge.    -Zosyn and Vancomycin d/c. Switched ceftriaxone to oral cefpodoxime. Discussed switching to oral abx with ID, per ID okay to switch to oral 3rd gen cephalosporin for 5 day course.     -Failed void trial today. Ramirez will be replaced. Urology consulted for evaluation and likely outpatient management  -podiatry consult for foot ulcer. Appreciate recs. -C. Diff antigen negative. The above patient and plan were discussed with my supervising physician. See daily progress note for full assessment/plan.       Farhat Douglas MD, PGY-1  [facility]  8/12/2022, 1:05 PM

## 2022-08-12 NOTE — ROUTINE PROCESS
Bedside and Verbal shift change report given to DARBY Bonner (oncoming nurse) by Susana Montiel RN (offgoing nurse). Report included the following information SBAR, Kardex, MAR and Recent Results.     SITUATION:  Code Status: DNR  Reason for Admission: Sepsis ([address] [A41.9]  Hospital day: 2  Problem List:       Hospital Problems  Date Reviewed: 10/24/2021            Codes Class Noted POA    * (Principal) Sepsis ([address] ICD-10-CM: A41.9  ICD-9-CM: 038.9, 995.91  8/10/2022 Unknown         BACKGROUND:   Past Medical History:   Past Medical History:   Diagnosis Date    Acquired hypothyroidism 8/1/2016    Arthritis of right shoulder region 4/21/2016    Asthma     Bilateral lower extremity edema 7/7/2021    Chronic bilateral low back pain without sciatica 7/7/2021    Chronic diastolic congestive heart failure ([address] 3/10/2021    Chronic venous insufficiency     Diabetes ([address]     neuropathy    Essential hypertension 7/7/2021    Gout     History of depression 1/23/2017    History of fall 7/7/2021    Hypercholesteremia 7/7/2021    Hypertension     MI (myocardial infarction) ([address]     OAB (overactive bladder) 7/7/2021    Peripheral neuropathy     Rheumatoid arthritis ([address]     Tear of right rotator cuff 5/16/2016    Thyroid pain     Urinary incontinence 7/7/2021    Vertigo       Patient taking anticoagulants yes    Patient has a defibrillator: no    History of shots YES for example, flu, pneumonia, tetanus   Isolation History YES for example, MRSA, CDiff    ASSESSMENT:  Changes in Assessment Throughout Shift: NONE  Significant Changes in 24 hours (for example, RR/code, fall)  Patient has Central Line: no   Patient has Ramirez Cath: no   Mobility Issues  PT  IV Patency  OR Checklist  Pending Tests    Last Vitals:  Vitals w/ MEWS Score (last day)       Date/Time MEWS Score Pulse Resp Temp BP Level of Consciousness SpO2    08/12/22 0725 1 88 20 97.7 °F (36.5 °C) 162/67 Alert (0) 96 %    08/12/22 0345 1 75 19 97.8 °F (36.6 °C) 158/79 Alert (0) 96 %    08/11/22 2330 1 85 20 98.8 °F (37.1 °C) 150/67 Alert (0) 96 %    08/11/22 1917 1 86 18 97.8 °F (36.6 °C) 136/65 Alert (0) 93 %    08/11/22 1559 1 88 20 98.1 °F (36.7 °C) 159/67 Alert (0) 96 %    08/11/22 1504 -- -- -- -- 133/78 -- --    08/11/22 1119 1 84 20 97.6 °F (36.4 °C) 133/78 Alert (0) 100 %    08/11/22 0745 1 86 20 98.8 °F (37.1 °C) 146/69 Alert (0) 99 %    08/11/22 0323 1 87 18 99.1 °F (37.3 °C) 127/65 Alert (0) 98 %          PAIN    Pain Assessment    Pain Intensity 1: 0 (08/12/22 0737)    Pain Location 1: Head    Pain Intervention(s) 1: Medication (see MAR)    Patient Stated Pain Goal: 0  Intervention effective: N/A  Time of last intervention: N/A Reassessment Completed: yes   Other actions taken for pain: Distraction    Last 3 Weights:  Last 3 Recorded Weights in this Encounter    08/10/22 1600 08/11/22 0922 08/11/22 1504   Weight: (P) 105.7 kg (233 lb) 107.5 kg (237 lb) 107.5 kg (237 lb)   Weight change:     INTAKE/OUPUT    Current Shift: No intake/output data recorded. Last three shifts: 08/10 1901 - 08/12 0700  In: 480 [P.O.:480]  Out: 3900 [Urine:3900]    RECOMMENDATIONS AND DISCHARGE PLANNING  Patient needs and requests: Assistancwe with ADL's    Pending tests/procedures: labs     Discharge plan for patient: Home    Discharge planning Needs or Barriers: None    Estimated Discharge Date: 8/15/2022 Posted on Whiteboard in Patients Room: yes       \"HEALS\" SAFETY CHECK  A safety check occurred in the patient's room between off going nurse and oncoming nurse listed above. The safety check included the below items:    H  High Alert Medications Verify all high alert medication drips (heparin, PCA, etc.)  E  Equipment Suction is set up for ALL patients (with yanker)  Red plugs utilized for all equipment (IV pumps, etc.)  WOWs wiped down at end of shift.   Room stocked with oxygen, suction, and other unit-specific supplies  A  Alarms Bed alarm is set for fall risk patients  Ensure chair alarm is in place and activated if patient is up in a chair  L  Lines Check IV for any infiltration  Ramirez bag is empty if patient has a Ramirez   Tubing and IV bags are labeled  S  Safety  Room is clean, patient is clean, and equipment is clean. Hallways are clear from equipment besides carts. Fall bracelet on for fall risk patients  Ensure room is clear and free of clutter  Suction is set up for ALL patients (with jonathan)  Hallways are clear from equipment besides carts.    Isolation precautions followed, supplies available outside room, sign posted    Adalberto Meyer RN

## 2022-08-13 VITALS
RESPIRATION RATE: 17 BRPM | WEIGHT: 237 LBS | HEIGHT: 64 IN | HEART RATE: 80 BPM | TEMPERATURE: 97.6 F | OXYGEN SATURATION: 99 % | BODY MASS INDEX: 40.46 KG/M2 | DIASTOLIC BLOOD PRESSURE: 75 MMHG | SYSTOLIC BLOOD PRESSURE: 163 MMHG

## 2022-08-13 LAB
ANION GAP SERPL CALC-SCNC: 6 MMOL/L (ref 3–18)
BASOPHILS # BLD: 0.1 K/UL (ref 0–0.1)
BASOPHILS NFR BLD: 1 % (ref 0–2)
BUN SERPL-MCNC: 35 MG/DL (ref 7–18)
BUN/CREAT SERPL: 32 (ref 12–20)
CALCIUM SERPL-MCNC: 8.6 MG/DL (ref 8.5–10.1)
CHLORIDE SERPL-SCNC: 103 MMOL/L (ref 100–111)
CO2 SERPL-SCNC: 30 MMOL/L (ref 21–32)
CREAT SERPL-MCNC: 1.08 MG/DL (ref 0.6–1.3)
DIFFERENTIAL METHOD BLD: ABNORMAL
EOSINOPHIL # BLD: 0.2 K/UL (ref 0–0.4)
EOSINOPHIL NFR BLD: 1 % (ref 0–5)
ERYTHROCYTE [DISTWIDTH] IN BLOOD BY AUTOMATED COUNT: 12.7 % (ref 11.6–14.5)
GLUCOSE BLD STRIP.AUTO-MCNC: 169 MG/DL (ref 70–110)
GLUCOSE BLD STRIP.AUTO-MCNC: 212 MG/DL (ref 70–110)
GLUCOSE SERPL-MCNC: 116 MG/DL (ref 74–99)
HCT VFR BLD AUTO: 35.7 % (ref 35–45)
HGB BLD-MCNC: 11.6 G/DL (ref 12–16)
IMM GRANULOCYTES # BLD AUTO: 0.1 K/UL (ref 0–0.04)
IMM GRANULOCYTES NFR BLD AUTO: 1 % (ref 0–0.5)
LYMPHOCYTES # BLD: 3.5 K/UL (ref 0.9–3.6)
LYMPHOCYTES NFR BLD: 27 % (ref 21–52)
MAGNESIUM SERPL-MCNC: 1.8 MG/DL (ref 1.6–2.6)
MCH RBC QN AUTO: 28.4 PG (ref 24–34)
MCHC RBC AUTO-ENTMCNC: 32.5 G/DL (ref 31–37)
MCV RBC AUTO: 87.5 FL (ref 78–100)
MONOCYTES # BLD: 0.9 K/UL (ref 0.05–1.2)
MONOCYTES NFR BLD: 7 % (ref 3–10)
NEUTS SEG # BLD: 8.1 K/UL (ref 1.8–8)
NEUTS SEG NFR BLD: 64 % (ref 40–73)
NRBC # BLD: 0 K/UL (ref 0–0.01)
NRBC BLD-RTO: 0 PER 100 WBC
PLATELET # BLD AUTO: 216 K/UL (ref 135–420)
PMV BLD AUTO: 12.3 FL (ref 9.2–11.8)
POTASSIUM SERPL-SCNC: 3.1 MMOL/L (ref 3.5–5.5)
RBC # BLD AUTO: 4.08 M/UL (ref 4.2–5.3)
SODIUM SERPL-SCNC: 139 MMOL/L (ref 136–145)
WBC # BLD AUTO: 12.8 K/UL (ref 4.6–13.2)

## 2022-08-13 PROCEDURE — 82962 GLUCOSE BLOOD TEST: CPT

## 2022-08-13 PROCEDURE — 74011250637 HC RX REV CODE- 250/637: Performed by: STUDENT IN AN ORGANIZED HEALTH CARE EDUCATION/TRAINING PROGRAM

## 2022-08-13 PROCEDURE — 77030018842 HC SOL IRR SOD CL 9% BAXT -A

## 2022-08-13 PROCEDURE — 94760 N-INVAS EAR/PLS OXIMETRY 1: CPT

## 2022-08-13 PROCEDURE — 74011000250 HC RX REV CODE- 250: Performed by: STUDENT IN AN ORGANIZED HEALTH CARE EDUCATION/TRAINING PROGRAM

## 2022-08-13 PROCEDURE — 80048 BASIC METABOLIC PNL TOTAL CA: CPT

## 2022-08-13 PROCEDURE — 74011250636 HC RX REV CODE- 250/636

## 2022-08-13 PROCEDURE — 85025 COMPLETE CBC W/AUTO DIFF WBC: CPT

## 2022-08-13 PROCEDURE — 2709999900 HC NON-CHARGEABLE SUPPLY

## 2022-08-13 PROCEDURE — 36415 COLL VENOUS BLD VENIPUNCTURE: CPT

## 2022-08-13 PROCEDURE — 83735 ASSAY OF MAGNESIUM: CPT

## 2022-08-13 PROCEDURE — 74011250637 HC RX REV CODE- 250/637

## 2022-08-13 PROCEDURE — 74011636637 HC RX REV CODE- 636/637: Performed by: FAMILY MEDICINE

## 2022-08-13 RX ORDER — POTASSIUM CHLORIDE 7.45 MG/ML
10 INJECTION INTRAVENOUS
Status: DISCONTINUED | OUTPATIENT
Start: 2022-08-13 | End: 2022-08-13

## 2022-08-13 RX ORDER — INSULIN GLARGINE 100 [IU]/ML
INJECTION, SOLUTION SUBCUTANEOUS
Qty: 1 ML | Refills: 0 | Status: SHIPPED | OUTPATIENT
Start: 2022-08-13

## 2022-08-13 RX ORDER — TAMSULOSIN HYDROCHLORIDE 0.4 MG/1
0.4 CAPSULE ORAL DAILY
Status: DISCONTINUED | OUTPATIENT
Start: 2022-08-13 | End: 2022-08-13 | Stop reason: HOSPADM

## 2022-08-13 RX ORDER — TAMSULOSIN HYDROCHLORIDE 0.4 MG/1
0.4 CAPSULE ORAL DAILY
Qty: 30 CAPSULE | Refills: 0 | Status: SHIPPED | OUTPATIENT
Start: 2022-08-13 | End: 2022-10-14

## 2022-08-13 RX ORDER — LISINOPRIL 40 MG/1
40 TABLET ORAL DAILY
Status: DISCONTINUED | OUTPATIENT
Start: 2022-08-13 | End: 2022-08-13 | Stop reason: HOSPADM

## 2022-08-13 RX ADMIN — SODIUM CHLORIDE, PRESERVATIVE FREE 10 ML: 5 INJECTION INTRAVENOUS at 06:42

## 2022-08-13 RX ADMIN — POTASSIUM BICARBONATE 20 MEQ: 782 TABLET, EFFERVESCENT ORAL at 11:50

## 2022-08-13 RX ADMIN — POTASSIUM CHLORIDE 10 MEQ: 7.46 INJECTION, SOLUTION INTRAVENOUS at 06:40

## 2022-08-13 RX ADMIN — LISINOPRIL 40 MG: 40 TABLET ORAL at 11:49

## 2022-08-13 RX ADMIN — SERTRALINE 50 MG: 50 TABLET, FILM COATED ORAL at 09:23

## 2022-08-13 RX ADMIN — LEVOTHYROXINE SODIUM 150 MCG: 150 TABLET ORAL at 06:39

## 2022-08-13 RX ADMIN — AMLODIPINE BESYLATE 10 MG: 10 TABLET ORAL at 09:23

## 2022-08-13 RX ADMIN — Medication 3 UNITS: at 08:25

## 2022-08-13 RX ADMIN — CARVEDILOL 6.25 MG: 6.25 TABLET, FILM COATED ORAL at 09:23

## 2022-08-13 RX ADMIN — Medication 6 UNITS: at 11:50

## 2022-08-13 RX ADMIN — ATORVASTATIN CALCIUM 80 MG: 40 TABLET, FILM COATED ORAL at 09:23

## 2022-08-13 RX ADMIN — RIVAROXABAN 20 MG: 20 TABLET, FILM COATED ORAL at 09:23

## 2022-08-13 RX ADMIN — TAMSULOSIN HYDROCHLORIDE 0.4 MG: 0.4 CAPSULE ORAL at 11:49

## 2022-08-13 RX ADMIN — CEFPODOXIME PROXETIL 200 MG: 200 TABLET, FILM COATED ORAL at 09:23

## 2022-08-13 NOTE — PROGRESS NOTES
Bedside shift change report given to EVERARDO Baker RN (oncoming nurse) by Bret Scott RN (offgoing nurse). Report included the following information SBAR, Kardex, MAR, and Quality Measures. Wound Prevention Checklist    Patient: Philly Farley [de-identified]82 y.o. female)  Date: 8/12/2022  Diagnosis: Sepsis ([address] [A41.9] Sepsis ([facility]    Precautions: Fall       []  Heel prevention boots placed on patient    [x]  Patient turned q2h during shift    [x]  Lift team ordered    [x]  Patient on Shalini bed/Specialty bed    [x]  Each Wound is documented during shift (Stage, Color, drainage, odor, measurements, and dressings)    [x]  Dual skin checks done at bedside during shift report with Bret Scott RN.     Chuy Mcwilliams, RN

## 2022-08-13 NOTE — PROGRESS NOTES
[facility]  DAILY PROGRESS NOTE      Patient:    Philly Farley , 82 y.o. 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 female with PMHx of HFpEF, T2DM, Recent DVT hx on Xarelto, HTN, hypothyroidism, stage 3 CKD who was admitted to [facility] ED on 08/10/2022 for sepsis of unknown source. Sepsis 2/2 unknown source   -Concern for infection. T max 103, WBC 25, procal 31.74. Source: UTI vs PNA vs skin. Urine culture negative. CXR/ CT A/P reassuring against pneumonia. Left foot diabetic ulcer clean, not infected. Pt with chronic back pain, unchanged, less concern for spinal abscess or cauna equina syndrome. Also considering opiate use, uremia, polypharmacy with patient's concurrent use of morphine and gabapentin. -SIRS on admission: febrile, leukocytosis, Tachypnea, Tachycardia  -UA: clear, protein, glucose >1000, trace ketones, trace blood, neg nitrites, neg leuk esterase  -Urine culture NG final  -CTA 8/10: No evidence for pulmonary emboli. Pulmonary vascular congestion without overt edema. No evidence of pneumonia. -CT A/P 8/10: No definite active inflammation. Colonic diverticulosis without acute diverticulitis. Prominent fluid-filled stomach and proximal small bowel without discrete transition point to suggest obstruction. This may be from ingested material. A mild gastroenteritis could have a similar appearance. Prior cholecystectomy with biliary prominence, likely reservoir effect. Atherosclerosis. -CT head 8/10: 1. No acute intracranial pathology.   2. Prior right-sided craniotomy with encephalomalacia in the right temporal lobe and mild sequela of chronic small vessel ischemic disease. -CXR 8/10: Pulmonary vascular congestion.  - s/p straight cath in ED on admission, 1,200 ml urine removed  -UDS positive for opioids, prescribed by her pain management physician  - Covid, flu, leigonella, RVP, RSV, strep pneumo, C diff negative  - podiatry consulted, 8/12, recs appreciated >> small pressure callus noted, no signs of infection, advised local wound care   Plan:  - ID following, recs appreciated >> continue cefpodoxime   -F/U Blood cultures >> no growth after 2 days  -Daily CBC, BMP  - possible discharge today, plans to switch her IV abx to PO     Urinary retention  - retention secondary to sepsis vs neurogenic 2/2 uncontrolled diabetes  - straight cath in ED removed 1200 ml urine  -Pt following with [facility] outpatient  -Pt not on anti-cholinergic medications  - CT: no hydronephrosis, no acute  abnormalities   Plan:  - urology consulted, 8/13 >> maintain cruz for at least 10 day swith VT in office, Flomax started 8/12, directed to not start Vesicare.  In the event patient fails VT, will need UDS outpatient   - will start Flomax 0.4mg QD  - instructions reviewed with patients partner, directed to call to make an urology appointment the week of August 22 (encouraged to make the appointment between August 23-25)  - possible discharge today with cruz placement      Sinus Tachycardia w/ intermittent Vtach runs - resolved  -improved, no tachycardia overnight  -Likely 2/2 to Sepsis, troponin negative, EKG without ST elevation   -EKG sinus tachycardia   -s/p 150 mg amiodarone bolus in ED  Plan:  - cardiology consulted, recs appreciated >> outpatient diuretic restarted 8/12, continue on additional outpatient cardiac regimen, follow up with outpatient cardiologist upon discharge  - given improved renal function, will restart lisinopril 40mg QD  - continue tele    HTN, HLD, HFpEF  - Pt mildly hypertensive overnight  -Pt with mild vascular congestion on CXR, mild edema  - Home Meds: norvasc 10mg daily, lisinopril 40mg daily, coreg 12.5mg BID, bumex 2mg 1 - 2x daily, Lipitor 80mg daily.  -Last ECHO in system 8/2020: EF 60-65%   - ECHO, 8/11/22: Normal left ventricular systolic function with a visually estimated EF of 55 - 60%. Findings consistent with mild concentric hypertrophy. Plan:  - continue Bumex 1mg BID prn  - will restart lisinopril 40mg QD  - Continue Home Meds: Coreg 3.125 mg BID, Norvasc 10 mg daily     DM2 Uncontrolled  DM Neuropathy  Recent hospitalization for infection 2/2 diabetic ulcer  -I&D (5/27)- Infection limited to superficial foot, not found in joint.  -Arterial Duplexes (5/27) Lower Extrem b/l normal  -hx R great toe amputation  -Home meds: Lantus 15 units QD  -A1c, 5/27/22: 11.8  Plan:  - continue Lantus 20u  - SSI  - hypoglycemic protocol  - diabetic educator following, recs appreciated      Chronic bilateral low back pain w/o sciatica  [facility] [place] [facility]. Jody Cruz  -gabapentin 400 mg Tid and morphine IR 15mg BID as to avoid withdrawal but consider may be contributing to AMS.  Last filled August 2, 2022  Plan:  - will continue to hold gabapentin and morphine      AQUILES in the setting of CKD Stage III (improved)  -Cr1.1, Baseline Cr 1.0-1.2  Plan:  -Daily BMP, trend Cr     History of DVT 06/2022  -Diagnosed May 2022  -On Xarelto 20 mg daily until 12/16/22  Plan:  -Continue Xarelto 20 mg daily     Hypothyroidism  -TSH 2.84  Plan:  - Continue home synthroid 150mcg     Gout  -Chronic, symptomatically stable, no recent flare up, on home allopurinol  Plan:  - will hold allopurinol for now    Anemia, resolved  -Hb on admission 12.6, Hb today (8/12) 12.6  Plan:  -daily cbc  -continue to monitor      Global:  Code: DNR  IVF/Drips: none  I/O / Wt: strict  Diet: diabetic diet   DVT/AC: Xarelto  Mobility: per protocol  Disposition: home, pending clinical course  Anticipated LOS: greater than 2 midnights     Point of Contact (relationship, number): Ramsey Madrigal (Son) [phone]     SUBJECTIVE:   Events of the last 24 hours:  No acute events overnight. Patient seen at beside resting in recliner. Appears comfortable. No acute distress.      ROS (positive findings are in BOLD; negative findings are in regular font)  Constitutional: fevers, chills, appetite changes, weight changes, fatigue  HEENT: changes in vision, changes in hearing, sore throat, dysphagia  Cardiovascular: chest pain, palpitations, PND, orthopnea, edema  Pulmonary: SOB, cough, sputum production, wheezing, chest tightness  Gastrointestinal: abdominal pain, nausea/vomiting, diarrhea, constipation, melena, hematochezia  Genitourinary: dysuria, hesitation, dribbling, urgency, hematuria  Musculoskeletal: arthralgias, myalgias  Skin: rash, itching  Neurological: sensory changes, motor changes, headache  Psychiatric: mood changes  Endocrine: heat/cold intolerance  Heme: easy bruising/easy bleeding, LAD    CURRENT INPATIENT MEDICATIONS:    Current Facility-Administered Medications:     potassium chloride 10 mEq in 100 ml IVPB, 10 mEq, IntraVENous, Q1H, Andrea Azar MD, Last Rate: 100 mL/hr at 08/13/22 0640, 10 mEq at 08/13/22 0640    [Held by provider] polyethylene glycol (MIRALAX) packet 17 g, 17 g, Oral, DAILY PRN, Mya Moreno MD    insulin glargine (LANTUS) injection 20 Units, 20 Units, SubCUTAneous, QHS, José Miguel LAWSON MD, 20 Units at 08/12/22 2131    cefpodoxime (VANTIN) tablet 200 mg, 200 mg, Oral, Q12H, José Miguel LAWSON MD, 200 mg at 08/12/22 1757    bumetanide (BUMEX) tablet 1 mg, 1 mg, Oral, BID PRN, Blair Encarnacion MD    carvediloL (COREG) tablet 6.25 mg, 6.25 mg, Oral, BID WITH MEALS, José Miguel LAWSON MD, 6.25 mg at 08/12/22 1757    sodium chloride (NS) flush 5-10 mL, 5-10 mL, IntraVENous, PRN, José Miguel LAWSON MD    sodium chloride (NS) flush 5-40 mL, 5-40 mL, IntraVENous, Q8H, Waldo Colon, DO, 10 mL at 08/13/22 9295    sodium chloride (NS) flush 5-40 mL, 5-40 mL, IntraVENous, PRN, Selene Conroy DO    insulin lispro (HUMALOG) injection, , SubCUTAneous, AC&HS, Álvaro Reddy MD, 3 Units at 08/12/22 1651    glucose chewable tablet 16 g, 16 g, Oral, PRN, Panchito Garcia MD    glucagon (GLUCAGEN) injection 1 mg, 1 mg, IntraMUSCular, PRN, Victor Manuel LAWSON MD    dextrose 10% infusion 0-250 mL, 0-250 mL, IntraVENous, PRN, Panchito Garcia MD    [Held by provider] allopurinoL (ZYLOPRIM) tablet 100 mg, 100 mg, Oral, DAILY, Victor Manuel LAWSON MD    acetaminophen (TYLENOL) tablet 500 mg, 500 mg, Oral, Q6H PRN, Victor Manuel LAWSON MD, 500 mg at 08/11/22 1841    amLODIPine (NORVASC) tablet 10 mg, 10 mg, Oral, DAILY, Victor Manuel LAWSON MD, 10 mg at 08/12/22 0583    atorvastatin (LIPITOR) tablet 80 mg, 80 mg, Oral, DAILY, Victor Manuel LAWSON MD, 80 mg at 08/12/22 2028    [Held by provider] hydrOXYzine HCL (ATARAX) tablet 10 mg, 10 mg, Oral, BID PRN, Panchito Garcia MD    [Held by provider] lisinopriL (PRINIVIL, ZESTRIL) tablet 40 mg, 40 mg, Oral, DAILY, Victor Manuel LAWSON MD    rivaroxaban (XARELTO) tablet 20 mg, 20 mg, Oral, DAILY WITH BREAKFAST, Victor Manuel LAWSON MD, 20 mg at 08/12/22 8791    sertraline (ZOLOFT) tablet 50 mg, 50 mg, Oral, DAILY, Victor Manuel LAWSON MD, 50 mg at 08/12/22 8206    levothyroxine (SYNTHROID) tablet 150 mcg, 150 mcg, Oral, 6am, Victor Manuel LAWSON MD, 150 mcg at 08/13/22 6084    promethazine (PHENERGAN) suppository 12.5 mg, 12.5 mg, Rectal, Q6H PRN, Deidra Turcios MD, 12.5 mg at 08/10/22 1228     Allergies  No Known Allergies    OBJECTIVE:  Visit Vitals  BP (!) 147/64   Pulse 74   Temp 98.2 °F (36.8 °C)   Resp 17   Ht 5' 4\" (1.626 m)   Wt 107.5 kg (237 lb)   SpO2 97%   BMI 40.68 kg/m²       PHYSICAL EXAM  Gen: NAD, resting comfortably, obese  HEENT: normocephalic, atraumatic, MMM, no thyromegaly, no JVD  CV: RRR, S1/S2 present without M/R/G, +2 radial pulses, well-perfused, PMI not displaced  Pulm: CTAB, no wheezes, no crackles  Abd: S/NT/ND, no rebound, no guarding, no hepatosplenomegaly   MSK: no clubbing, no edema  Skin: warm, dry, intact, no rash  Neuro: CN II-XII grossly intact, no focal deficits appreciated   Psych: appropriate, alert, oriented x3    LABWORK (LAST 24 HOURS)  Recent Results (from the past 24 hour(s))   GLUCOSE, POC    Collection Time: 08/12/22  7:30 AM   Result Value Ref Range    Glucose (POC) 183 (H) 70 - 110 mg/dL   GLUCOSE, POC    Collection Time: 08/12/22 12:01 PM   Result Value Ref Range    Glucose (POC) 207 (H) 70 - 110 mg/dL   GLUCOSE, POC    Collection Time: 08/12/22  4:27 PM   Result Value Ref Range    Glucose (POC) 168 (H) 70 - 110 mg/dL   GLUCOSE, POC    Collection Time: 08/12/22  9:10 PM   Result Value Ref Range    Glucose (POC) 135 (H) 70 - 007 mg/dL   METABOLIC PANEL, BASIC    Collection Time: 08/13/22  3:31 AM   Result Value Ref Range    Sodium 139 136 - 145 mmol/L    Potassium 3.1 (L) 3.5 - 5.5 mmol/L    Chloride 103 100 - 111 mmol/L    CO2 30 21 - 32 mmol/L    Anion gap 6 3.0 - 18 mmol/L    Glucose 116 (H) 74 - 99 mg/dL    BUN 35 (H) 7.0 - 18 MG/DL    Creatinine 1.08 0.6 - 1.3 MG/DL    BUN/Creatinine ratio 32 (H) 12 - 20      GFR est AA 58 (L) >60 ml/min/1.73m2    GFR est non-AA 48 (L) >60 ml/min/1.73m2    Calcium 8.6 8.5 - 10.1 MG/DL   MAGNESIUM    Collection Time: 08/13/22  3:31 AM   Result Value Ref Range    Magnesium 1.8 1.6 - 2.6 mg/dL   CBC WITH AUTOMATED DIFF    Collection Time: 08/13/22  3:31 AM   Result Value Ref Range    WBC 12.8 4.6 - 13.2 K/uL    RBC 4.08 (L) 4.20 - 5.30 M/uL    HGB 11.6 (L) 12.0 - 16.0 g/dL    HCT 35.7 35.0 - 45.0 %    MCV 87.5 78.0 - 100.0 FL    MCH 28.4 24.0 - 34.0 PG    MCHC 32.5 31.0 - 37.0 g/dL    RDW 12.7 11.6 - 14.5 %    PLATELET 646 492 - 108 K/uL    MPV 12.3 (H) 9.2 - 11.8 FL    NRBC 0.0 0  WBC    ABSOLUTE NRBC 0.00 0.00 - 0.01 K/uL    NEUTROPHILS 64 40 - 73 %    LYMPHOCYTES 27 21 - 52 %    MONOCYTES 7 3 - 10 %    EOSINOPHILS 1 0 - 5 %    BASOPHILS 1 0 - 2 %    IMMATURE GRANULOCYTES 1 (H) 0.0 - 0.5 %    ABS. NEUTROPHILS 8.1 (H) 1.8 - 8.0 K/UL    ABS. LYMPHOCYTES 3.5 0.9 - 3.6 K/UL    ABS. MONOCYTES 0.9 0.05 - 1.2 K/UL    ABS. EOSINOPHILS 0.2 0.0 - 0.4 K/UL    ABS. BASOPHILS 0.1 0.0 - 0.1 K/UL    ABS. IMM. GRANS. 0.1 (H) 0.00 - 0.04 K/UL    DF AUTOMATED        IMAGING AND PROCEDURES (LAST 36 HOURS)      X-ray of Left foot, 8/10/22     Technique: 3 views of left foot were obtained. Prior study from May 26, 2022. FINDINGS: No acute fracture, dislocation, or radiopaque foreign body seen. Moderate RCA arthritis, most pronounced in the mid foot/tarsometatarsal joint. Calcaneal Achilles and plantar enthesophytes. No osseous erosion. Soft tissue  swelling at along the plantar surface of the forefoot and subcutaneous edema in  the lower leg. IMPRESSION  Soft tissue swelling on the plantar surface of the forefoot. No osseous erosions  to suggest osteoarthritis. MRI would be more sensitive. 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 CTA Chest, 8/10/22     No filling defects are appreciated within the main, left, right, lobar or  visualized segmental pulmonary arteries to suggest embolism. Thyroid: Atrophic  Pericardium/ Heart: Heart size is normal for age. Aorta/ Vessels: No aneurysm or dissection. Mild atherosclerosis. Lymph Nodes: Unremarkable. .     Lungs: Mild pulmonary vascular congestion. No consolidative pneumonia or overt  pulmonary edema. Respiratory motion. No pleural effusion. Upper Abdomen: Reported separately. Bones/soft tissues: No acute finding     IMPRESSION  No evidence for pulmonary emboli. Pulmonary vascular congestion without overt edema. No evidence of pneumonia.      CT abdomen and pelvis is reported separately.    ================================================================  Further management for Ms. Whitley Fortune will be discussed on rounds with my attending.       Liam Edmondson MD, PGY-2  [facility]  August 13, 2022 6:48 AM

## 2022-08-13 NOTE — PROGRESS NOTES
D/C order noted for today. Orders reviewed. No needs identified at this time. CM spoke with patient's boyfriend Ronna Stone, he is in patient's room, and will be transporting patient home today at time of discharge. CM remains available if needed.        Eve Forbes -[phone]

## 2022-08-16 NOTE — PROGRESS NOTES
Contacted [facility] today to schedule patients follow up appointment. Spoke with Bosie Felty, patient has been scheduled for August 24, 2022 at 8:30am for cruz removal/voiding trial [facility] location). Will relay message to patient today during [facility] hospital follow up appointment at 2:00pm.   [facility] will also contact patient closer to the appointment date to confirm appointment.        Kee Fontenot MD, PGY-2   [facility]   [facility] Senior Pager: [phone]   August 16, 2022, 1:00 PM

## 2022-08-19 ENCOUNTER — HOSPITAL ENCOUNTER (EMERGENCY)
Age: 87
Discharge: HOME | End: 2022-08-19
Attending: EMERGENCY MEDICINE
Payer: MEDICARE

## 2022-08-19 VITALS
TEMPERATURE: 97.4 F | HEIGHT: 64 IN | WEIGHT: 237 LBS | BODY MASS INDEX: 40.46 KG/M2 | DIASTOLIC BLOOD PRESSURE: 74 MMHG | RESPIRATION RATE: 16 BRPM | SYSTOLIC BLOOD PRESSURE: 141 MMHG | OXYGEN SATURATION: 96 % | HEART RATE: 108 BPM

## 2022-08-19 DIAGNOSIS — N39.0 URINARY TRACT INFECTION ASSOCIATED WITH CATHETERIZATION OF URINARY TRACT, UNSPECIFIED INDWELLING URINARY CATHETER TYPE, INITIAL ENCOUNTER (HCC): Primary | ICD-10-CM

## 2022-08-19 DIAGNOSIS — T83.511A URINARY TRACT INFECTION ASSOCIATED WITH CATHETERIZATION OF URINARY TRACT, UNSPECIFIED INDWELLING URINARY CATHETER TYPE, INITIAL ENCOUNTER (HCC): Primary | ICD-10-CM

## 2022-08-19 LAB
APPEARANCE UR: ABNORMAL
BACTERIA URNS QL MICRO: ABNORMAL /HPF
BILIRUB UR QL: ABNORMAL
COLOR UR: ABNORMAL
EPITH CASTS URNS QL MICRO: ABNORMAL /LPF (ref 0–5)
GLUCOSE UR STRIP.AUTO-MCNC: >1000 MG/DL
HGB UR QL STRIP: ABNORMAL
KETONES UR QL STRIP.AUTO: NEGATIVE MG/DL
LEUKOCYTE ESTERASE UR QL STRIP.AUTO: ABNORMAL
NITRITE UR QL STRIP.AUTO: POSITIVE
PH UR STRIP: 5 [PH] (ref 5–8)
PROT UR STRIP-MCNC: 100 MG/DL
RBC #/AREA URNS HPF: ABNORMAL /HPF (ref 0–5)
SP GR UR REFRACTOMETRY: 1.01 (ref 1–1.03)
UROBILINOGEN UR QL STRIP.AUTO: 0.2 EU/DL (ref 0.2–1)
WBC URNS QL MICRO: ABNORMAL /HPF (ref 0–4)

## 2022-08-19 PROCEDURE — 99283 EMERGENCY DEPT VISIT LOW MDM: CPT

## 2022-08-19 PROCEDURE — 81001 URINALYSIS AUTO W/SCOPE: CPT

## 2022-08-19 PROCEDURE — 51702 INSERT TEMP BLADDER CATH: CPT

## 2022-08-19 PROCEDURE — 87086 URINE CULTURE/COLONY COUNT: CPT

## 2022-08-19 RX ORDER — CEFPODOXIME PROXETIL 100 MG/1
200 TABLET, FILM COATED ORAL 2 TIMES DAILY
Qty: 40 TABLET | Refills: 0 | Status: SHIPPED | OUTPATIENT
Start: 2022-08-19 | End: 2022-08-29

## 2022-08-19 NOTE — ED TRIAGE NOTES
Client has cruz cath placed on Wednesday due having urinary issue. Reports having blood in urine bag since yesterday. Client report having lower abdominal pain. NAD. AXOX4.

## 2022-08-19 NOTE — ED PROVIDER NOTES
EMERGENCY DEPARTMENT HISTORY AND PHYSICAL EXAM    Date: 8/19/2022  Patient Name: Dario Brooke    History of Presenting Illness     Chief Complaint   Patient presents with    Urinary Catheter Problem         History Provided By: Patient and     Chief Complaint: Hematuria, dysuria  Duration: 2 days  Timing: Worsening  Location: Back home  Quality: Bloody  Severity: Moderate  Modifying Factors: Worse after having a Ramirez catheter placed during recent admission  Associated Symptoms: none       Additional History (Context): Dario Brooke is a 82 y.o. female with an extensive medical history presents today for issues listed above. Patient reports he was recently admitted and was discharged home with a Ramirez. Patient denies real known reason for why she has this Ramirez. States she was told to follow-up with urology in 10 days and has an appointment for next Wednesday however noticed some blood in her Ramirez catheter bag. Reports the bleeding is intermittent. Patient also reports discomfort and dysuria. Denies any recent fevers, chills, abdominal pain, flank pain or chest pain. PCP: Saqib Christine MD    Current Outpatient Medications   Medication Sig Dispense Refill    cefpodoxime (VANTIN) 100 mg tablet Take 2 Tablets by mouth two (2) times a day for 10 days. 40 Tablet 0    tamsulosin (FLOMAX) 0.4 mg capsule Take 1 Capsule by mouth in the morning. 30 Capsule 0    insulin glargine (LANTUS) 100 unit/mL injection Inject 20 units daily at bedtime. Monitor and record blood sugar daily. 1 mL 0    rivaroxaban (XARELTO) 15 mg tab tablet Take 15 mg by mouth two (2) times a day. insulin aspart U-100 (NovoLOG Flexpen U-100 Insulin) 100 unit/mL (3 mL) inpn 5 Units by SubCUTAneous route Before breakfast, lunch, and dinner. 1 Pen 0    levothyroxine (SYNTHROID) 150 mcg tablet Take 150 mcg by mouth daily. morphine IR (MS IR) 15 mg tablet Take 15 mg by mouth two (2) times a day.       metFORMIN (GLUCOPHAGE) 500 mg tablet Take 500 mg by mouth daily. hydrOXYzine HCL (ATARAX) 10 mg tablet Take 1 Tablet by mouth two (2) times daily as needed for Anxiety. allopurinoL (ZYLOPRIM) 100 mg tablet Take 100 mg by mouth daily. lisinopriL (PRINIVIL, ZESTRIL) 40 mg tablet Take 1 tablet by mouth once daily 90 Tablet 0    sertraline (ZOLOFT) 50 mg tablet Take 1 Tablet by mouth daily. 90 Tablet 3    gabapentin (NEURONTIN) 400 mg capsule Take 1 cap in morning, 1 cap at 2pm, 2 caps at bedtime 360 Capsule 0    carvediloL (COREG) 12.5 mg tablet Take 1 Tablet by mouth two (2) times daily (with meals). 180 Tablet 0    bumetanide (BUMEX) 2 mg tablet Take 0.5 Tablets by mouth two (2) times daily as needed (swelling). 180 Tablet 1    amLODIPine (NORVASC) 10 mg tablet Take 1 Tablet by mouth daily. 90 Tablet 0    atorvastatin (LIPITOR) 80 mg tablet Take 1 Tablet by mouth daily. 90 Tablet 3    Blood-Glucose Meter (FREESTYLE LITE METER) monitoring kit Test twice blood glucose daily; ICD-10 E11.9; Quantity 1 1 Kit 0    insulin syringe,safetyneedle 1 mL 31 gauge x 5/16\" syrg 1 Each by Does Not Apply route daily. 300 Each 3    glucose blood VI test strips (FREESTYLE TEST) strip Use to check blood glucose twice daily DX:E11.9 300 Strip 3    acetaminophen (TYLENOL) 500 mg tablet Take 1 Tab by mouth every six (6) hours as needed for Pain.  270 Tab 3       Past History     Past Medical History:  Past Medical History:   Diagnosis Date    Acquired hypothyroidism 8/1/2016    Arthritis of right shoulder region 4/21/2016    Asthma     Bilateral lower extremity edema 7/7/2021    Chronic bilateral low back pain without sciatica 7/7/2021    Chronic diastolic congestive heart failure ([address] 3/10/2021    Chronic venous insufficiency     Diabetes (HCC)     neuropathy    Essential hypertension 7/7/2021    Gout     History of depression 1/23/2017    History of fall 7/7/2021    Hypercholesteremia 7/7/2021    Hypertension     MI (myocardial infarction) ([facility]     OAB (overactive bladder) 2021    Peripheral neuropathy     Rheumatoid arthritis (HCC)     Tear of right rotator cuff 2016    Thyroid pain     Urinary incontinence 2021    Vertigo        Past Surgical History:  Past Surgical History:   Procedure Laterality Date    HX AMPUTATION TOE Right 2020    Great toe Dr. Tosin Nugent [address]      HX CHOLECYSTECTOMY      HX GYN      TAHOophorectomy due to infection    HX HEENT      resection of memegioma in the . HX KNEE REPLACEMENT Bilateral        Family History:  Family History   Problem Relation Age of Onset    Heart Attack Mother     Heart Attack Father        Social History:  Social History     Tobacco Use    Smoking status: Former     Types: Cigarettes     Quit date:      Years since quittin.6    Smokeless tobacco: Never    Tobacco comments:     quit 45 years ago   Vaping Use    Vaping Use: Never used   Substance Use Topics    Alcohol use: No     Alcohol/week: 0.0 standard drinks    Drug use: No       Allergies:  No Known Allergies      Review of Systems   Review of Systems   Constitutional:  Negative for chills and fever. HENT:  Negative for congestion, rhinorrhea and sore throat. Respiratory:  Negative for cough and shortness of breath. Cardiovascular:  Negative for chest pain. Gastrointestinal:  Negative for abdominal pain, blood in stool, constipation, diarrhea, nausea and vomiting. Genitourinary:  Positive for dysuria and hematuria. Negative for frequency. Musculoskeletal:  Negative for back pain and myalgias. Skin:  Negative for rash and wound. Neurological:  Negative for dizziness and headaches. All other systems reviewed and are negative.   All Other Systems Negative  Physical Exam     Vitals:    22 1500 22 1707   BP: (!) 141/74    Pulse: (!) 108    Resp: 16    Temp: 97.4 °F (36.3 °C)    SpO2: 96%    Weight:  107.5 kg (237 lb)   Height:  5' 4\" (1.626 m)     Physical Exam  Vitals and nursing note reviewed. Constitutional:       General: She is not in acute distress. Appearance: She is well-developed. She is not diaphoretic. Comments: Well-appearing for age, hard of hearing   HENT:      Head: Normocephalic and atraumatic. Eyes:      Conjunctiva/sclera: Conjunctivae normal.   Cardiovascular:      Rate and Rhythm: Normal rate and regular rhythm. Heart sounds: Normal heart sounds. Pulmonary:      Effort: Pulmonary effort is normal. No respiratory distress. Breath sounds: Normal breath sounds. Chest:      Chest wall: No tenderness. Abdominal:      General: Bowel sounds are normal. There is no distension. Palpations: Abdomen is soft. Tenderness: There is no abdominal tenderness. There is no guarding or rebound. Musculoskeletal:         General: No deformity. Cervical back: Normal range of motion and neck supple. Skin:     General: Skin is warm and dry. Neurological:      Mental Status: She is alert and oriented to person, place, and time. Deep Tendon Reflexes: Reflexes are normal and symmetric.          Diagnostic Study Results     Labs -     Recent Results (from the past 12 hour(s))   URINALYSIS W/ RFLX MICROSCOPIC    Collection Time: 08/19/22  3:45 PM   Result Value Ref Range    Color RED      Appearance CLOUDY      Specific gravity 1.010 1.003 - 1.030      pH (UA) 5.0 5.0 - 8.0      Protein 100 (A) NEG mg/dL    Glucose >1,000 (A) NEG mg/dL    Ketone Negative NEG mg/dL    Bilirubin SMALL (A) NEG      Blood LARGE (A) NEG      Urobilinogen 0.2 0.2 - 1.0 EU/dL    Nitrites Positive (A) NEG      Leukocyte Esterase LARGE (A) NEG     URINE MICROSCOPIC ONLY    Collection Time: 08/19/22  3:45 PM   Result Value Ref Range    WBC  0 - 4 /hpf     UNABLE TO QUANTITATE MICROSCOPIC PARAMETERS DUE TO EXCESSIVE RBCS    RBC TOO NUMEROUS TO COUNT 0 - 5 /hpf    Epithelial cells 2+ 0 - 5 /lpf    Bacteria 2+ (A) NEG /hpf       Radiologic Studies -   No orders to display     CT Results  (Last 48 hours)      None          CXR Results  (Last 48 hours)      None              Medical Decision Making   I am the first provider for this patient. I reviewed the vital signs, available nursing notes, past medical history, past surgical history, family history and social history. Vital Signs-Reviewed the patient's vital signs. Records Reviewed: Nursing Notes and Old Medical Records     Procedures: None   Procedures    Provider Notes (Medical Decision Making):       Differential: Pyelonephritis, UTI, Ramirez catheter problem    Plan: Did agree to remove patient's Ramirez catheter and will do a voiding trial.  We will also order UA and urine culture    5:33 PM  Patient's UA is consistent with a urinary tract infection. Patient was able to void on her own without any difficulties. Will discharge home with Vantin and have stressed the importance of starting her antibiotic tonight and finishing it in its entirety. Patient has also been educated on the importance of hydration and completely emptying her bladder every time she urinates. Have encouraged patient to keep her urology appointment and follow-up closely. Return precautions have been given. Will discharge home. MED RECONCILIATION:  No current facility-administered medications for this encounter. Current Outpatient Medications   Medication Sig    cefpodoxime (VANTIN) 100 mg tablet Take 2 Tablets by mouth two (2) times a day for 10 days. tamsulosin (FLOMAX) 0.4 mg capsule Take 1 Capsule by mouth in the morning. insulin glargine (LANTUS) 100 unit/mL injection Inject 20 units daily at bedtime. Monitor and record blood sugar daily. rivaroxaban (XARELTO) 15 mg tab tablet Take 15 mg by mouth two (2) times a day. insulin aspart U-100 (NovoLOG Flexpen U-100 Insulin) 100 unit/mL (3 mL) inpn 5 Units by SubCUTAneous route Before breakfast, lunch, and dinner.     levothyroxine (SYNTHROID) 150 mcg tablet Take 150 mcg by mouth daily. morphine IR (MS IR) 15 mg tablet Take 15 mg by mouth two (2) times a day. metFORMIN (GLUCOPHAGE) 500 mg tablet Take 500 mg by mouth daily. hydrOXYzine HCL (ATARAX) 10 mg tablet Take 1 Tablet by mouth two (2) times daily as needed for Anxiety. allopurinoL (ZYLOPRIM) 100 mg tablet Take 100 mg by mouth daily. lisinopriL (PRINIVIL, ZESTRIL) 40 mg tablet Take 1 tablet by mouth once daily    sertraline (ZOLOFT) 50 mg tablet Take 1 Tablet by mouth daily. gabapentin (NEURONTIN) 400 mg capsule Take 1 cap in morning, 1 cap at 2pm, 2 caps at bedtime    carvediloL (COREG) 12.5 mg tablet Take 1 Tablet by mouth two (2) times daily (with meals). bumetanide (BUMEX) 2 mg tablet Take 0.5 Tablets by mouth two (2) times daily as needed (swelling). amLODIPine (NORVASC) 10 mg tablet Take 1 Tablet by mouth daily. atorvastatin (LIPITOR) 80 mg tablet Take 1 Tablet by mouth daily. Blood-Glucose Meter (FREESTYLE LITE METER) monitoring kit Test twice blood glucose daily; ICD-10 E11.9; Quantity 1    insulin syringe,safetyneedle 1 mL 31 gauge x 5/16\" syrg 1 Each by Does Not Apply route daily. glucose blood VI test strips (FREESTYLE TEST) strip Use to check blood glucose twice daily DX:E11.9    acetaminophen (TYLENOL) 500 mg tablet Take 1 Tab by mouth every six (6) hours as needed for Pain. Disposition:  Home     DISCHARGE NOTE:   Pt has been reexamined. Patient has no new complaints, changes, or physical findings. Care plan outlined and precautions discussed. Results of workup were reviewed with the patient. All medications were reviewed with the patient. All of pt's questions and concerns were addressed. Patient was instructed and agrees to follow up with PCP/urology as well as to return to the ED upon further deterioration. Patient is ready to go home.     Follow-up Information       Follow up With Specialties Details Why [address] [facility] EMERGENCY DEPT Emergency Medicine  As needed [address] [place] [address] [address] [facility]    Maria M Simon MD Family Medicine Schedule an appointment as soon as possible for a visit   [address] [address]      Kai Nielsen MD Urology Schedule an appointment as soon as possible for a visit   [address] [place] [address]  [phone]              Discharge Medication List as of 8/19/2022  5:03 PM        START taking these medications    Details   cefpodoxime (VANTIN) 100 mg tablet Take 2 Tablets by mouth two (2) times a day for 10 days. , Normal, Disp-40 Tablet, R-0           CONTINUE these medications which have NOT CHANGED    Details   tamsulosin (FLOMAX) 0.4 mg capsule Take 1 Capsule by mouth in the morning., Normal, Disp-30 Capsule, R-0      insulin glargine (LANTUS) 100 unit/mL injection Inject 20 units daily at bedtime. Monitor and record blood sugar daily. , Normal, Disp-1 mL, R-0      rivaroxaban (XARELTO) 15 mg tab tablet Take 15 mg by mouth two (2) times a day., Historical Med      insulin aspart U-100 (NovoLOG Flexpen U-100 Insulin) 100 unit/mL (3 mL) inpn 5 Units by SubCUTAneous route Before breakfast, lunch, and dinner., Normal, Disp-1 Pen, R-0      levothyroxine (SYNTHROID) 150 mcg tablet Take 150 mcg by mouth daily. , Historical Med      morphine IR (MS IR) 15 mg tablet Take 15 mg by mouth two (2) times a day., Historical Med      metFORMIN (GLUCOPHAGE) 500 mg tablet Take 500 mg by mouth daily. , Historical Med      hydrOXYzine HCL (ATARAX) 10 mg tablet Take 1 Tablet by mouth two (2) times daily as needed for Anxiety. , Historical Med      allopurinoL (ZYLOPRIM) 100 mg tablet Take 100 mg by mouth daily. , Historical Med      lisinopriL (PRINIVIL, ZESTRIL) 40 mg tablet Take 1 tablet by mouth once daily, Normal, Disp-90 Tablet, R-0      sertraline (ZOLOFT) 50 mg tablet Take 1 Tablet by mouth daily. , Normal, Disp-90 Tablet, R-3      gabapentin (NEURONTIN) 400 mg capsule Take 1 cap in morning, 1 cap at 2pm, 2 caps at bedtime, Normal, Disp-360 Capsule, R-0      carvediloL (COREG) 12.5 mg tablet Take 1 Tablet by mouth two (2) times daily (with meals). , Normal, Disp-180 Tablet, R-0      bumetanide (BUMEX) 2 mg tablet Take 0.5 Tablets by mouth two (2) times daily as needed (swelling). , Normal, Disp-180 Tablet, R-1      amLODIPine (NORVASC) 10 mg tablet Take 1 Tablet by mouth daily. , Normal, Disp-90 Tablet, R-0      atorvastatin (LIPITOR) 80 mg tablet Take 1 Tablet by mouth daily. , Normal, Disp-90 Tablet, R-3      Blood-Glucose Meter (FREESTYLE LITE METER) monitoring kit Test twice blood glucose daily; ICD-10 E11.9; Quantity 1, Normal, Disp-1 Kit, R-0      insulin syringe,safetyneedle 1 mL 31 gauge x 5/16\" syrg 1 Each by Does Not Apply route daily. , Normal, Disp-300 Each, R-3Dx e11.9      glucose blood VI test strips (FREESTYLE TEST) strip Use to check blood glucose twice daily DX:E11.9, Normal, Disp-300 Strip, R-3      acetaminophen (TYLENOL) 500 mg tablet Take 1 Tab by mouth every six (6) hours as needed for Pain., Print, Disp-270 Tab, R-3                 Diagnosis     Clinical Impression:   1. Urinary tract infection associated with catheterization of urinary tract, unspecified indwelling urinary catheter type, initial encounter [facility])          \"Please note that this dictation was completed with [employer], the [employer] voice recognition software. Quite often unanticipated grammatical, syntax, homophones, and other interpretive errors are inadvertently transcribed by the computer software. Please disregard these errors. Please excuse any errors that have escaped final proofreading. \"

## 2022-08-20 LAB
BACTERIA SPEC CULT: NORMAL
CC UR VC: NORMAL
SERVICE CMNT-IMP: NORMAL

## 2022-10-07 ENCOUNTER — APPOINTMENT (OUTPATIENT)
Dept: VASCULAR SURGERY | Age: 87
DRG: 291 | End: 2022-10-07
Attending: STUDENT IN AN ORGANIZED HEALTH CARE EDUCATION/TRAINING PROGRAM
Payer: MEDICARE

## 2022-10-07 ENCOUNTER — APPOINTMENT (OUTPATIENT)
Dept: CT IMAGING | Age: 87
DRG: 291 | End: 2022-10-07
Attending: STUDENT IN AN ORGANIZED HEALTH CARE EDUCATION/TRAINING PROGRAM
Payer: MEDICARE

## 2022-10-07 ENCOUNTER — APPOINTMENT (OUTPATIENT)
Dept: GENERAL RADIOLOGY | Age: 87
DRG: 291 | End: 2022-10-07
Attending: STUDENT IN AN ORGANIZED HEALTH CARE EDUCATION/TRAINING PROGRAM
Payer: MEDICARE

## 2022-10-07 ENCOUNTER — HOSPITAL ENCOUNTER (INPATIENT)
Age: 87
LOS: 7 days | Discharge: HOME | DRG: 291 | End: 2022-10-14
Attending: EMERGENCY MEDICINE | Admitting: FAMILY MEDICINE
Payer: MEDICARE

## 2022-10-07 ENCOUNTER — APPOINTMENT (OUTPATIENT)
Dept: GENERAL RADIOLOGY | Age: 87
DRG: 291 | End: 2022-10-07
Attending: EMERGENCY MEDICINE
Payer: MEDICARE

## 2022-10-07 DIAGNOSIS — I48.91 ATRIAL FIBRILLATION WITH RAPID VENTRICULAR RESPONSE (HCC): ICD-10-CM

## 2022-10-07 DIAGNOSIS — J96.02 ACUTE RESPIRATORY FAILURE WITH HYPOXIA AND HYPERCAPNIA (HCC): Primary | ICD-10-CM

## 2022-10-07 DIAGNOSIS — J96.01 ACUTE RESPIRATORY FAILURE WITH HYPOXIA AND HYPERCAPNIA (HCC): Primary | ICD-10-CM

## 2022-10-07 DIAGNOSIS — I50.9 ACUTE ON CHRONIC CONGESTIVE HEART FAILURE, UNSPECIFIED HEART FAILURE TYPE (HCC): ICD-10-CM

## 2022-10-07 LAB
ALBUMIN SERPL-MCNC: 2.7 G/DL (ref 3.4–5)
ALBUMIN SERPL-MCNC: 3.7 G/DL (ref 3.4–5)
ALBUMIN/GLOB SERPL: 0.8 {RATIO} (ref 0.8–1.7)
ALBUMIN/GLOB SERPL: 0.8 {RATIO} (ref 0.8–1.7)
ALP SERPL-CCNC: 51 U/L (ref 45–117)
ALP SERPL-CCNC: 76 U/L (ref 45–117)
ALT SERPL-CCNC: 10 U/L (ref 13–56)
ALT SERPL-CCNC: 7 U/L (ref 13–56)
ANION GAP SERPL CALC-SCNC: 5 MMOL/L (ref 3–18)
ANION GAP SERPL CALC-SCNC: 6 MMOL/L (ref 3–18)
APTT PPP: 30.6 SEC (ref 23–36.4)
AST SERPL-CCNC: 5 U/L (ref 10–38)
AST SERPL-CCNC: 8 U/L (ref 10–38)
BASE DEFICIT BLDV-SCNC: 1.5 MMOL/L
BASOPHILS # BLD: 0.2 K/UL (ref 0–0.1)
BASOPHILS NFR BLD: 1 % (ref 0–2)
BILIRUB SERPL-MCNC: 0.7 MG/DL (ref 0.2–1)
BILIRUB SERPL-MCNC: 1.2 MG/DL (ref 0.2–1)
BNP SERPL-MCNC: 3967 PG/ML (ref 0–1800)
BUN SERPL-MCNC: 20 MG/DL (ref 7–18)
BUN SERPL-MCNC: 23 MG/DL (ref 7–18)
BUN/CREAT SERPL: 18 (ref 12–20)
BUN/CREAT SERPL: 23 (ref 12–20)
CALCIUM SERPL-MCNC: 7.3 MG/DL (ref 8.5–10.1)
CALCIUM SERPL-MCNC: 9.6 MG/DL (ref 8.5–10.1)
CHLORIDE SERPL-SCNC: 101 MMOL/L (ref 100–111)
CHLORIDE SERPL-SCNC: 111 MMOL/L (ref 100–111)
CO2 SERPL-SCNC: 25 MMOL/L (ref 21–32)
CO2 SERPL-SCNC: 29 MMOL/L (ref 21–32)
CREAT SERPL-MCNC: 0.86 MG/DL (ref 0.6–1.3)
CREAT SERPL-MCNC: 1.25 MG/DL (ref 0.6–1.3)
DIFFERENTIAL METHOD BLD: ABNORMAL
EOSINOPHIL # BLD: 0.2 K/UL (ref 0–0.4)
EOSINOPHIL NFR BLD: 1 % (ref 0–5)
ERYTHROCYTE [DISTWIDTH] IN BLOOD BY AUTOMATED COUNT: 14.2 % (ref 11.6–14.5)
EST. AVERAGE GLUCOSE BLD GHB EST-MCNC: 278 MG/DL
FLUAV RNA SPEC QL NAA+PROBE: NOT DETECTED
FLUBV RNA SPEC QL NAA+PROBE: NOT DETECTED
GLOBULIN SER CALC-MCNC: 3.3 G/DL (ref 2–4)
GLOBULIN SER CALC-MCNC: 4.5 G/DL (ref 2–4)
GLUCOSE BLD STRIP.AUTO-MCNC: 353 MG/DL (ref 70–110)
GLUCOSE SERPL-MCNC: 276 MG/DL (ref 74–99)
GLUCOSE SERPL-MCNC: 309 MG/DL (ref 74–99)
HBA1C MFR BLD: 11.3 % (ref 4.2–5.6)
HCO3 BLDV-SCNC: 27.3 MMOL/L (ref 23–28)
HCT VFR BLD AUTO: 38.4 % (ref 35–45)
HGB BLD-MCNC: 11.8 G/DL (ref 12–16)
IMM GRANULOCYTES # BLD AUTO: 0 K/UL (ref 0–0.04)
IMM GRANULOCYTES NFR BLD AUTO: 0 % (ref 0–0.5)
INR PPP: 1.2 (ref 0.8–1.2)
LACTATE BLD-SCNC: 1.1 MMOL/L (ref 0.4–2)
LYMPHOCYTES # BLD: 2.9 K/UL (ref 0.9–3.6)
LYMPHOCYTES NFR BLD: 16 % (ref 21–52)
MAGNESIUM SERPL-MCNC: 1.4 MG/DL (ref 1.6–2.6)
MCH RBC QN AUTO: 27.1 PG (ref 24–34)
MCHC RBC AUTO-ENTMCNC: 30.7 G/DL (ref 31–37)
MCV RBC AUTO: 88.1 FL (ref 78–100)
MONOCYTES # BLD: 1.3 K/UL (ref 0.05–1.2)
MONOCYTES NFR BLD: 7 % (ref 3–10)
NEUTS SEG # BLD: 13.5 K/UL (ref 1.8–8)
NEUTS SEG NFR BLD: 75 % (ref 40–73)
NRBC # BLD: 0 K/UL (ref 0–0.01)
NRBC BLD-RTO: 0 PER 100 WBC
PCO2 BLDV: 62.7 MMHG (ref 41–51)
PH BLDV: 7.25 [PH] (ref 7.32–7.42)
PLATELET # BLD AUTO: 201 K/UL (ref 135–420)
PLATELET COMMENTS,PCOM: ABNORMAL
PMV BLD AUTO: 13.3 FL (ref 9.2–11.8)
PO2 BLDV: 44 MMHG (ref 25–40)
POTASSIUM SERPL-SCNC: 4.2 MMOL/L (ref 3.5–5.5)
POTASSIUM SERPL-SCNC: 5.4 MMOL/L (ref 3.5–5.5)
PROCALCITONIN SERPL-MCNC: <0.05 NG/ML
PROT SERPL-MCNC: 6 G/DL (ref 6.4–8.2)
PROT SERPL-MCNC: 8.2 G/DL (ref 6.4–8.2)
PROTHROMBIN TIME: 15.1 SEC (ref 11.5–15.2)
RBC # BLD AUTO: 4.36 M/UL (ref 4.2–5.3)
RBC MORPH BLD: ABNORMAL
SAO2 % BLDV: 70.1 % (ref 65–88)
SARS-COV-2, COV2: NOT DETECTED
SERVICE CMNT-IMP: ABNORMAL
SODIUM SERPL-SCNC: 135 MMOL/L (ref 136–145)
SODIUM SERPL-SCNC: 142 MMOL/L (ref 136–145)
SPECIMEN TYPE: ABNORMAL
TROPONIN-HIGH SENSITIVITY: 8 NG/L (ref 0–54)
TSH SERPL DL<=0.05 MIU/L-ACNC: 2.01 UIU/ML (ref 0.36–3.74)
WBC # BLD AUTO: 18.1 K/UL (ref 4.6–13.2)

## 2022-10-07 PROCEDURE — 80053 COMPREHEN METABOLIC PANEL: CPT

## 2022-10-07 PROCEDURE — 71045 X-RAY EXAM CHEST 1 VIEW: CPT

## 2022-10-07 PROCEDURE — 96374 THER/PROPH/DIAG INJ IV PUSH: CPT

## 2022-10-07 PROCEDURE — 74011636637 HC RX REV CODE- 636/637: Performed by: STUDENT IN AN ORGANIZED HEALTH CARE EDUCATION/TRAINING PROGRAM

## 2022-10-07 PROCEDURE — 96375 TX/PRO/DX INJ NEW DRUG ADDON: CPT

## 2022-10-07 PROCEDURE — 83880 ASSAY OF NATRIURETIC PEPTIDE: CPT

## 2022-10-07 PROCEDURE — 84484 ASSAY OF TROPONIN QUANT: CPT

## 2022-10-07 PROCEDURE — 74011250637 HC RX REV CODE- 250/637: Performed by: STUDENT IN AN ORGANIZED HEALTH CARE EDUCATION/TRAINING PROGRAM

## 2022-10-07 PROCEDURE — 74011250636 HC RX REV CODE- 250/636: Performed by: EMERGENCY MEDICINE

## 2022-10-07 PROCEDURE — 74011250636 HC RX REV CODE- 250/636: Performed by: STUDENT IN AN ORGANIZED HEALTH CARE EDUCATION/TRAINING PROGRAM

## 2022-10-07 PROCEDURE — 87040 BLOOD CULTURE FOR BACTERIA: CPT

## 2022-10-07 PROCEDURE — 74011000258 HC RX REV CODE- 258: Performed by: STUDENT IN AN ORGANIZED HEALTH CARE EDUCATION/TRAINING PROGRAM

## 2022-10-07 PROCEDURE — 83036 HEMOGLOBIN GLYCOSYLATED A1C: CPT

## 2022-10-07 PROCEDURE — 74011000250 HC RX REV CODE- 250

## 2022-10-07 PROCEDURE — 94660 CPAP INITIATION&MGMT: CPT

## 2022-10-07 PROCEDURE — 87636 SARSCOV2 & INF A&B AMP PRB: CPT

## 2022-10-07 PROCEDURE — 77010033678 HC OXYGEN DAILY

## 2022-10-07 PROCEDURE — 82803 BLOOD GASES ANY COMBINATION: CPT

## 2022-10-07 PROCEDURE — 71275 CT ANGIOGRAPHY CHEST: CPT

## 2022-10-07 PROCEDURE — 74011000636 HC RX REV CODE- 636: Performed by: FAMILY MEDICINE

## 2022-10-07 PROCEDURE — 84145 PROCALCITONIN (PCT): CPT

## 2022-10-07 PROCEDURE — 85610 PROTHROMBIN TIME: CPT

## 2022-10-07 PROCEDURE — 85025 COMPLETE CBC W/AUTO DIFF WBC: CPT

## 2022-10-07 PROCEDURE — 74011250637 HC RX REV CODE- 250/637

## 2022-10-07 PROCEDURE — 83735 ASSAY OF MAGNESIUM: CPT

## 2022-10-07 PROCEDURE — 73630 X-RAY EXAM OF FOOT: CPT

## 2022-10-07 PROCEDURE — 82962 GLUCOSE BLOOD TEST: CPT

## 2022-10-07 PROCEDURE — 99285 EMERGENCY DEPT VISIT HI MDM: CPT

## 2022-10-07 PROCEDURE — 74011000250 HC RX REV CODE- 250: Performed by: EMERGENCY MEDICINE

## 2022-10-07 PROCEDURE — 65660000004 HC RM CVT STEPDOWN

## 2022-10-07 PROCEDURE — 85730 THROMBOPLASTIN TIME PARTIAL: CPT

## 2022-10-07 PROCEDURE — 84443 ASSAY THYROID STIM HORMONE: CPT

## 2022-10-07 PROCEDURE — 83605 ASSAY OF LACTIC ACID: CPT

## 2022-10-07 PROCEDURE — 93005 ELECTROCARDIOGRAM TRACING: CPT

## 2022-10-07 PROCEDURE — 96376 TX/PRO/DX INJ SAME DRUG ADON: CPT

## 2022-10-07 PROCEDURE — 94762 N-INVAS EAR/PLS OXIMTRY CONT: CPT

## 2022-10-07 PROCEDURE — 5A09457 ASSISTANCE WITH RESPIRATORY VENTILATION, 24-96 CONSECUTIVE HOURS, CONTINUOUS POSITIVE AIRWAY PRESSURE: ICD-10-PCS | Performed by: FAMILY MEDICINE

## 2022-10-07 RX ORDER — ALLOPURINOL 100 MG/1
100 TABLET ORAL DAILY
Status: DISCONTINUED | OUTPATIENT
Start: 2022-10-08 | End: 2022-10-14 | Stop reason: HOSPADM

## 2022-10-07 RX ORDER — METOPROLOL TARTRATE 25 MG/1
25 TABLET, FILM COATED ORAL EVERY 12 HOURS
Status: DISCONTINUED | OUTPATIENT
Start: 2022-10-08 | End: 2022-10-09

## 2022-10-07 RX ORDER — MAGNESIUM SULFATE 100 %
16 CRYSTALS MISCELLANEOUS AS NEEDED
Status: DISCONTINUED | OUTPATIENT
Start: 2022-10-07 | End: 2022-10-14 | Stop reason: HOSPADM

## 2022-10-07 RX ORDER — VANCOMYCIN 2 GRAM/500 ML IN 0.9 % SODIUM CHLORIDE INTRAVENOUS
2000 ONCE
Status: COMPLETED | OUTPATIENT
Start: 2022-10-07 | End: 2022-10-08

## 2022-10-07 RX ORDER — ONDANSETRON 2 MG/ML
4 INJECTION INTRAMUSCULAR; INTRAVENOUS
Status: DISCONTINUED | OUTPATIENT
Start: 2022-10-07 | End: 2022-10-07

## 2022-10-07 RX ORDER — SODIUM CHLORIDE 0.9 % (FLUSH) 0.9 %
5-40 SYRINGE (ML) INJECTION AS NEEDED
Status: DISCONTINUED | OUTPATIENT
Start: 2022-10-07 | End: 2022-10-08 | Stop reason: SDUPTHER

## 2022-10-07 RX ORDER — ONDANSETRON 2 MG/ML
4 INJECTION INTRAMUSCULAR; INTRAVENOUS
Status: COMPLETED | OUTPATIENT
Start: 2022-10-07 | End: 2022-10-07

## 2022-10-07 RX ORDER — BUMETANIDE 1 MG/1
1 TABLET ORAL
Status: DISCONTINUED | OUTPATIENT
Start: 2022-10-07 | End: 2022-10-14 | Stop reason: HOSPADM

## 2022-10-07 RX ORDER — INSULIN PUMP SYRINGE, 3 ML
1 EACH MISCELLANEOUS DAILY
Status: DISCONTINUED | OUTPATIENT
Start: 2022-10-08 | End: 2022-10-07

## 2022-10-07 RX ORDER — METOPROLOL TARTRATE 25 MG/1
25 TABLET, FILM COATED ORAL ONCE
Status: COMPLETED | OUTPATIENT
Start: 2022-10-07 | End: 2022-10-07

## 2022-10-07 RX ORDER — INSULIN GLARGINE 100 [IU]/ML
20 INJECTION, SOLUTION SUBCUTANEOUS DAILY
Status: DISCONTINUED | OUTPATIENT
Start: 2022-10-08 | End: 2022-10-09

## 2022-10-07 RX ORDER — TAMSULOSIN HYDROCHLORIDE 0.4 MG/1
0.4 CAPSULE ORAL
Status: DISCONTINUED | OUTPATIENT
Start: 2022-10-08 | End: 2022-10-14

## 2022-10-07 RX ORDER — CARVEDILOL 12.5 MG/1
12.5 TABLET ORAL 2 TIMES DAILY WITH MEALS
Status: DISCONTINUED | OUTPATIENT
Start: 2022-10-08 | End: 2022-10-09

## 2022-10-07 RX ORDER — SYRINGE-NEEDLE,INSULIN,0.5 ML 30 GX5/16"
1 SYRINGE, EMPTY DISPOSABLE MISCELLANEOUS DAILY
Status: DISCONTINUED | OUTPATIENT
Start: 2022-10-08 | End: 2022-10-07

## 2022-10-07 RX ORDER — BUMETANIDE 0.25 MG/ML
1 INJECTION INTRAMUSCULAR; INTRAVENOUS ONCE
Status: COMPLETED | OUTPATIENT
Start: 2022-10-07 | End: 2022-10-07

## 2022-10-07 RX ORDER — POLYETHYLENE GLYCOL 3350 17 G/17G
17 POWDER, FOR SOLUTION ORAL AS NEEDED
Status: DISCONTINUED | OUTPATIENT
Start: 2022-10-07 | End: 2022-10-07

## 2022-10-07 RX ORDER — LEVOTHYROXINE SODIUM 150 UG/1
150 TABLET ORAL DAILY
Status: DISCONTINUED | OUTPATIENT
Start: 2022-10-08 | End: 2022-10-14 | Stop reason: HOSPADM

## 2022-10-07 RX ORDER — MORPHINE SULFATE 4 MG/ML
4 INJECTION INTRAVENOUS ONCE
Status: COMPLETED | OUTPATIENT
Start: 2022-10-07 | End: 2022-10-07

## 2022-10-07 RX ORDER — SERTRALINE HYDROCHLORIDE 50 MG/1
50 TABLET, FILM COATED ORAL DAILY
Status: DISCONTINUED | OUTPATIENT
Start: 2022-10-08 | End: 2022-10-14

## 2022-10-07 RX ORDER — METOPROLOL TARTRATE 5 MG/5ML
5 INJECTION INTRAVENOUS ONCE
Status: COMPLETED | OUTPATIENT
Start: 2022-10-07 | End: 2022-10-07

## 2022-10-07 RX ORDER — FUROSEMIDE 10 MG/ML
40 INJECTION INTRAMUSCULAR; INTRAVENOUS 2 TIMES DAILY
Status: DISCONTINUED | OUTPATIENT
Start: 2022-10-07 | End: 2022-10-09

## 2022-10-07 RX ORDER — LISINOPRIL 40 MG/1
40 TABLET ORAL DAILY
Status: DISCONTINUED | OUTPATIENT
Start: 2022-10-08 | End: 2022-10-14

## 2022-10-07 RX ORDER — MORPHINE SULFATE 15 MG/1
15 TABLET ORAL 2 TIMES DAILY
Status: DISCONTINUED | OUTPATIENT
Start: 2022-10-07 | End: 2022-10-07

## 2022-10-07 RX ORDER — AMLODIPINE BESYLATE 10 MG/1
10 TABLET ORAL DAILY
Status: DISCONTINUED | OUTPATIENT
Start: 2022-10-08 | End: 2022-10-12

## 2022-10-07 RX ORDER — SODIUM CHLORIDE 0.9 % (FLUSH) 0.9 %
5-40 SYRINGE (ML) INJECTION AS NEEDED
Status: DISCONTINUED | OUTPATIENT
Start: 2022-10-07 | End: 2022-10-14 | Stop reason: HOSPADM

## 2022-10-07 RX ORDER — POLYETHYLENE GLYCOL 3350 17 G/17G
17 POWDER, FOR SOLUTION ORAL
Status: DISCONTINUED | OUTPATIENT
Start: 2022-10-07 | End: 2022-10-14 | Stop reason: HOSPADM

## 2022-10-07 RX ORDER — SODIUM CHLORIDE 0.9 % (FLUSH) 0.9 %
5-40 SYRINGE (ML) INJECTION EVERY 8 HOURS
Status: DISCONTINUED | OUTPATIENT
Start: 2022-10-07 | End: 2022-10-08 | Stop reason: SDUPTHER

## 2022-10-07 RX ORDER — SODIUM CHLORIDE 0.9 % (FLUSH) 0.9 %
5-40 SYRINGE (ML) INJECTION EVERY 8 HOURS
Status: DISCONTINUED | OUTPATIENT
Start: 2022-10-07 | End: 2022-10-14 | Stop reason: HOSPADM

## 2022-10-07 RX ORDER — TAMSULOSIN HYDROCHLORIDE 0.4 MG/1
0.4 CAPSULE ORAL DAILY
Status: DISCONTINUED | OUTPATIENT
Start: 2022-10-08 | End: 2022-10-07

## 2022-10-07 RX ORDER — GABAPENTIN 400 MG/1
400 CAPSULE ORAL 3 TIMES DAILY
Status: DISCONTINUED | OUTPATIENT
Start: 2022-10-07 | End: 2022-10-07

## 2022-10-07 RX ORDER — INSULIN LISPRO 100 [IU]/ML
5 INJECTION, SOLUTION INTRAVENOUS; SUBCUTANEOUS
Status: DISCONTINUED | OUTPATIENT
Start: 2022-10-08 | End: 2022-10-14 | Stop reason: HOSPADM

## 2022-10-07 RX ORDER — HYDROXYZINE HYDROCHLORIDE 10 MG/1
10 TABLET, FILM COATED ORAL
Status: DISCONTINUED | OUTPATIENT
Start: 2022-10-07 | End: 2022-10-14 | Stop reason: HOSPADM

## 2022-10-07 RX ORDER — ACETAMINOPHEN 500 MG
500 TABLET ORAL
Status: DISCONTINUED | OUTPATIENT
Start: 2022-10-07 | End: 2022-10-14 | Stop reason: HOSPADM

## 2022-10-07 RX ORDER — ONDANSETRON 4 MG/1
4 TABLET, ORALLY DISINTEGRATING ORAL
Status: DISCONTINUED | OUTPATIENT
Start: 2022-10-07 | End: 2022-10-07

## 2022-10-07 RX ORDER — GABAPENTIN 300 MG/1
300 CAPSULE ORAL 3 TIMES DAILY
Status: DISCONTINUED | OUTPATIENT
Start: 2022-10-07 | End: 2022-10-14 | Stop reason: HOSPADM

## 2022-10-07 RX ORDER — METFORMIN HYDROCHLORIDE 500 MG/1
500 TABLET ORAL DAILY
Status: DISCONTINUED | OUTPATIENT
Start: 2022-10-08 | End: 2022-10-14 | Stop reason: HOSPADM

## 2022-10-07 RX ORDER — MORPHINE SULFATE 15 MG/1
15 TABLET ORAL
Status: DISCONTINUED | OUTPATIENT
Start: 2022-10-07 | End: 2022-10-14 | Stop reason: HOSPADM

## 2022-10-07 RX ORDER — ATORVASTATIN CALCIUM 40 MG/1
80 TABLET, FILM COATED ORAL DAILY
Status: DISCONTINUED | OUTPATIENT
Start: 2022-10-08 | End: 2022-10-14 | Stop reason: HOSPADM

## 2022-10-07 RX ORDER — INSULIN LISPRO 100 [IU]/ML
INJECTION, SOLUTION INTRAVENOUS; SUBCUTANEOUS
Status: DISCONTINUED | OUTPATIENT
Start: 2022-10-07 | End: 2022-10-14 | Stop reason: HOSPADM

## 2022-10-07 RX ADMIN — FUROSEMIDE 40 MG: 10 INJECTION, SOLUTION INTRAMUSCULAR; INTRAVENOUS at 22:40

## 2022-10-07 RX ADMIN — VANCOMYCIN HYDROCHLORIDE 2000 MG: 10 INJECTION, POWDER, LYOPHILIZED, FOR SOLUTION INTRAVENOUS at 23:37

## 2022-10-07 RX ADMIN — METOPROLOL TARTRATE 25 MG: 25 TABLET, FILM COATED ORAL at 22:24

## 2022-10-07 RX ADMIN — SODIUM CHLORIDE, PRESERVATIVE FREE 10 ML: 5 INJECTION INTRAVENOUS at 19:24

## 2022-10-07 RX ADMIN — METOPROLOL TARTRATE 5 MG: 5 INJECTION INTRAVENOUS at 17:18

## 2022-10-07 RX ADMIN — ONDANSETRON 4 MG: 2 INJECTION INTRAMUSCULAR; INTRAVENOUS at 17:01

## 2022-10-07 RX ADMIN — SODIUM CHLORIDE, PRESERVATIVE FREE 10 ML: 5 INJECTION INTRAVENOUS at 23:35

## 2022-10-07 RX ADMIN — Medication 10 UNITS: at 22:00

## 2022-10-07 RX ADMIN — MORPHINE SULFATE 4 MG: 4 INJECTION INTRAVENOUS at 17:01

## 2022-10-07 RX ADMIN — BUMETANIDE 1 MG: 0.25 INJECTION INTRAMUSCULAR; INTRAVENOUS at 17:00

## 2022-10-07 RX ADMIN — PIPERACILLIN AND TAZOBACTAM 4.5 G: 4; .5 INJECTION, POWDER, FOR SOLUTION INTRAVENOUS at 22:35

## 2022-10-07 RX ADMIN — METOPROLOL TARTRATE 5 MG: 5 INJECTION INTRAVENOUS at 19:22

## 2022-10-07 RX ADMIN — GABAPENTIN 300 MG: 300 CAPSULE ORAL at 22:22

## 2022-10-07 RX ADMIN — IOPAMIDOL 78 ML: 755 INJECTION, SOLUTION INTRAVENOUS at 21:11

## 2022-10-07 NOTE — ED PROVIDER NOTES
EMERGENCY DEPARTMENT HISTORY AND PHYSICAL EXAM      Date: 10/7/2022  Patient Name: Ginette Moses      History of Presenting Illness     Chief Complaint   Patient presents with    Shortness of Breath       Location/Duration/Severity/Modifying factors   Chief Complaint   Patient presents with    Shortness of Breath       HPI:  Ginette Moses is a 82 y.o. female with history as listed presents to the Emergency Department via EMS from home with acute respiratory difficulty. Patient has a history of congestive heart failure, states that she has been feeling increasingly short of breath over the past 3 days, became worse today. Also complains of some chest pain. On arrival, demonstrates increased work of breathing, complaints of midsternal chest pain, states she is having difficulty breathing. States that she takes her Bumex as needed, has not taken it for 3 days, unclear why. Additional history review of systems limited due to acuity of condition. PCP: Anival Trujillo MD    Current Facility-Administered Medications   Medication Dose Route Frequency Provider Last Rate Last Admin    sodium chloride (NS) flush 5-40 mL  5-40 mL IntraVENous Q8H Heather Martínez MD   10 mL at 10/07/22 1924    sodium chloride (NS) flush 5-40 mL  5-40 mL IntraVENous PRN Heather Martínez MD         Current Outpatient Medications   Medication Sig Dispense Refill    Jardiance 10 mg tablet Take 10 mg by mouth daily. BD Insulin Syringe SafetyGlide 0.5 mL 30 gauge x 5/16\" syrg       tamsulosin (FLOMAX) 0.4 mg capsule Take 1 Capsule by mouth daily (after dinner). 90 Capsule 3    tamsulosin (FLOMAX) 0.4 mg capsule Take 1 Capsule by mouth in the morning. (Patient not taking: Reported on 8/24/2022) 30 Capsule 0    insulin glargine (LANTUS) 100 unit/mL injection Inject 20 units daily at bedtime. Monitor and record blood sugar daily. 1 mL 0    rivaroxaban (XARELTO) 15 mg tab tablet Take 15 mg by mouth two (2) times a day.  (Patient not taking: Reported on 8/24/2022)      insulin aspart U-100 (NovoLOG Flexpen U-100 Insulin) 100 unit/mL (3 mL) inpn 5 Units by SubCUTAneous route Before breakfast, lunch, and dinner. (Patient not taking: Reported on 8/24/2022) 1 Pen 0    levothyroxine (SYNTHROID) 150 mcg tablet Take 150 mcg by mouth daily. morphine IR (MS IR) 15 mg tablet Take 15 mg by mouth two (2) times a day. metFORMIN (GLUCOPHAGE) 500 mg tablet Take 500 mg by mouth daily. hydrOXYzine HCL (ATARAX) 10 mg tablet Take 1 Tablet by mouth two (2) times daily as needed for Anxiety. (Patient not taking: Reported on 8/24/2022)      allopurinoL (ZYLOPRIM) 100 mg tablet Take 100 mg by mouth daily. lisinopriL (PRINIVIL, ZESTRIL) 40 mg tablet Take 1 tablet by mouth once daily 90 Tablet 0    sertraline (ZOLOFT) 50 mg tablet Take 1 Tablet by mouth daily. (Patient not taking: Reported on 8/24/2022) 90 Tablet 3    gabapentin (NEURONTIN) 400 mg capsule Take 1 cap in morning, 1 cap at 2pm, 2 caps at bedtime 360 Capsule 0    carvediloL (COREG) 12.5 mg tablet Take 1 Tablet by mouth two (2) times daily (with meals). 180 Tablet 0    bumetanide (BUMEX) 2 mg tablet Take 0.5 Tablets by mouth two (2) times daily as needed (swelling). 180 Tablet 1    amLODIPine (NORVASC) 10 mg tablet Take 1 Tablet by mouth daily. 90 Tablet 0    atorvastatin (LIPITOR) 80 mg tablet Take 1 Tablet by mouth daily. 90 Tablet 3    Blood-Glucose Meter (FREESTYLE LITE METER) monitoring kit Test twice blood glucose daily; ICD-10 E11.9; Quantity 1 1 Kit 0    insulin syringe,safetyneedle 1 mL 31 gauge x 5/16\" syrg 1 Each by Does Not Apply route daily. 300 Each 3    glucose blood VI test strips (FREESTYLE TEST) strip Use to check blood glucose twice daily DX:E11.9 300 Strip 3    acetaminophen (TYLENOL) 500 mg tablet Take 1 Tab by mouth every six (6) hours as needed for Pain.  270 Tab 3       Past History     Past Medical History:  Past Medical History:   Diagnosis Date    Acquired hypothyroidism 2016    Arthritis of right shoulder region 2016    Asthma     Bilateral lower extremity edema 2021    Chronic bilateral low back pain without sciatica 2021    Chronic diastolic congestive heart failure ([address] 3/10/2021    Chronic venous insufficiency     Diabetes (HCC)     neuropathy    Essential hypertension 2021    Gout     History of depression 2017    History of fall 2021    Hypercholesteremia 2021    Hypertension     MI (myocardial infarction) ([address]     OAB (overactive bladder) 2021    Peripheral neuropathy     Rheumatoid arthritis ([address]     Tear of right rotator cuff 2016    Thyroid pain     Urinary incontinence 2021    Vertigo        Past Surgical History:  Past Surgical History:   Procedure Laterality Date    HX AMPUTATION TOE Right     Great toe Dr. Soila Gagnon [address]      HX CHOLECYSTECTOMY      HX GYN      TAHOophorectomy due to infection    HX HEENT      resection of memegioma in the . HX KNEE REPLACEMENT Bilateral        Family History:  Family History   Problem Relation Age of Onset    Heart Attack Mother     Heart Attack Father        Social History:  Social History     Tobacco Use    Smoking status: Former     Types: Cigarettes     Quit date:      Years since quittin.7    Smokeless tobacco: Never    Tobacco comments:     quit 45 years ago   Vaping Use    Vaping Use: Never used   Substance Use Topics    Alcohol use: No     Alcohol/week: 0.0 standard drinks    Drug use: No       Allergies:  No Known Allergies      Review of Systems     Review of Systems   Unable to perform ROS: Acuity of condition     Physical Exam     Physical Exam  Vitals and nursing note reviewed. Constitutional:       General: She is in acute distress. Appearance: She is well-developed. She is obese. She is not ill-appearing or toxic-appearing. HENT:      Head: Normocephalic and atraumatic.       Mouth/Throat:      Mouth: Mucous membranes are moist.      Pharynx: Oropharynx is clear. Eyes:      Extraocular Movements: Extraocular movements intact. Pupils: Pupils are equal, round, and reactive to light. Cardiovascular:      Rate and Rhythm: Tachycardia present. Rhythm irregular. Heart sounds: No murmur heard. No friction rub. No gallop. Pulmonary:      Effort: Tachypnea, accessory muscle usage and respiratory distress present. Breath sounds: Examination of the right-lower field reveals rales. Examination of the left-lower field reveals rales. Decreased breath sounds and rales present. No wheezing or rhonchi. Musculoskeletal:         General: Normal range of motion. Cervical back: Normal range of motion and neck supple. Right lower leg: No tenderness. Left lower leg: No tenderness. Skin:     General: Skin is warm and dry. Capillary Refill: Capillary refill takes less than 2 seconds. Neurological:      General: No focal deficit present. Mental Status: She is alert and oriented to person, place, and time. Psychiatric:         Mood and Affect: Mood is anxious. Behavior: Behavior normal.       Lab and Diagnostic Study Results     Labs -  Recent Results (from the past 24 hour(s))   NT-PRO BNP    Collection Time: 10/07/22  4:53 PM   Result Value Ref Range    NT pro-BNP 3,967 (H) 0 - 5,709 PG/ML   METABOLIC PANEL, COMPREHENSIVE    Collection Time: 10/07/22  4:53 PM   Result Value Ref Range    Sodium 135 (L) 136 - 145 mmol/L    Potassium 5.4 3.5 - 5.5 mmol/L    Chloride 101 100 - 111 mmol/L    CO2 29 21 - 32 mmol/L    Anion gap 5 3.0 - 18 mmol/L    Glucose 309 (H) 74 - 99 mg/dL    BUN 23 (H) 7.0 - 18 MG/DL    Creatinine 1.25 0.6 - 1.3 MG/DL    BUN/Creatinine ratio 18 12 - 20      eGFR 42 (L) >60 ml/min/1.73m2    Calcium 9.6 8.5 - 10.1 MG/DL    Bilirubin, total 1.2 (H) 0.2 - 1.0 MG/DL    ALT (SGPT) 10 (L) 13 - 56 U/L    AST (SGOT) 8 (L) 10 - 38 U/L    Alk.  phosphatase 76 45 - 117 U/L Protein, total 8.2 6.4 - 8.2 g/dL    Albumin 3.7 3.4 - 5.0 g/dL    Globulin 4.5 (H) 2.0 - 4.0 g/dL    A-G Ratio 0.8 0.8 - 1.7     CBC WITH AUTOMATED DIFF    Collection Time: 10/07/22  4:53 PM   Result Value Ref Range    WBC 18.1 (H) 4.6 - 13.2 K/uL    RBC 4.36 4.20 - 5.30 M/uL    HGB 11.8 (L) 12.0 - 16.0 g/dL    HCT 38.4 35.0 - 45.0 %    MCV 88.1 78.0 - 100.0 FL    MCH 27.1 24.0 - 34.0 PG    MCHC 30.7 (L) 31.0 - 37.0 g/dL    RDW 14.2 11.6 - 14.5 %    PLATELET 430 612 - 903 K/uL    MPV 13.3 (H) 9.2 - 11.8 FL    NRBC 0.0 0  WBC    ABSOLUTE NRBC 0.00 0.00 - 0.01 K/uL    NEUTROPHILS 75 (H) 40 - 73 %    LYMPHOCYTES 16 (L) 21 - 52 %    MONOCYTES 7 3 - 10 %    EOSINOPHILS 1 0 - 5 %    BASOPHILS 1 0 - 2 %    IMMATURE GRANULOCYTES 0 0.0 - 0.5 %    ABS. NEUTROPHILS 13.5 (H) 1.8 - 8.0 K/UL    ABS. LYMPHOCYTES 2.9 0.9 - 3.6 K/UL    ABS. MONOCYTES 1.3 (H) 0.05 - 1.2 K/UL    ABS. EOSINOPHILS 0.2 0.0 - 0.4 K/UL    ABS. BASOPHILS 0.2 (H) 0.0 - 0.1 K/UL    ABS. IMM.  GRANS. 0.0 0.00 - 0.04 K/UL    DF MANUAL      PLATELET COMMENTS ADEQUATE PLATELETS      RBC COMMENTS NORMOCYTIC, NORMOCHROMIC     TROPONIN-HIGH SENSITIVITY    Collection Time: 10/07/22  4:53 PM   Result Value Ref Range    Troponin-High Sensitivity 8 0 - 54 ng/L   EKG, 12 LEAD, INITIAL    Collection Time: 10/07/22  4:57 PM   Result Value Ref Range    Ventricular Rate 149 BPM    QRS Duration 92 ms    Q-T Interval 298 ms    QTC Calculation (Bezet) 469 ms    Calculated R Axis -31 degrees    Calculated T Axis 112 degrees    Diagnosis       ** Critical Test Result: High HR  Atrial fibrillation with rapid ventricular response  Left axis deviation  Low voltage QRS  Nonspecific T wave abnormality  Abnormal ECG  When compared with ECG of 10-AUG-2022 04:10,  Atrial fibrillation has replaced Sinus rhythm  QRS axis shifted left  T wave inversion no longer evident in Anterior leads     POC VENOUS BLOOD GAS    Collection Time: 10/07/22  5:03 PM   Result Value Ref Range    pH, venous (POC) 7.25 (L) 7.32 - 7.42      pCO2, venous (POC) 62.7 (H) 41 - 51 MMHG    pO2, venous (POC) 44 (H) 25 - 40 mmHg    HCO3, venous (POC) 27.3 23.0 - 28.0 MMOL/L    sO2, venous (POC) 70.1 65 - 88 %    Base deficit, venous (POC) 1.5 mmol/L    Specimen type (POC) VENOUS BLOOD      Performed by Deyanira Foreman          Radiologic Studies -   XR CHEST PORT   Final Result      Increased interstitial edema. The mild groundglass at the lung bases may   represent mild pulmonary edema or atelectasis. Possible small right pleural effusion. Procedures and Critical Care       Performed by: Phuong Wang MD    Critical Care  Performed by: Mackenzie Norwood MD  Authorized by: Mackenzie Norwood MD     Critical care provider statement:     Critical care time (minutes):  45    Critical care time was exclusive of:  Separately billable procedures and treating other patients    Critical care was necessary to treat or prevent imminent or life-threatening deterioration of the following conditions:  Cardiac failure and respiratory failure    Critical care was time spent personally by me on the following activities:  Development of treatment plan with patient or surrogate, discussions with consultants, examination of patient, obtaining history from patient or surrogate, ordering and performing treatments and interventions, ordering and review of laboratory studies, ordering and review of radiographic studies, re-evaluation of patient's condition, review of old charts and ventilator management    I assumed direction of critical care for this patient from another provider in my specialty: no      Care discussed with: admitting provider           Phuong Wang MD    Medical Decision Making and ED Course   - I am the first and primary provider for this patient AND AM THE PRIMARY PROVIDER OF RECORD.     - I reviewed the vital signs, available nursing notes, past medical history, past surgical history, family history and social history. - Initial assessment performed. The patients presenting problems have been discussed, and the staff are in agreement with the care plan formulated and outlined with them. I have encouraged them to ask questions as they arise throughout their visit. Vital Signs-Reviewed the patient's vital signs. Patient Vitals for the past 12 hrs:   Temp Pulse Resp BP SpO2   10/07/22 1915 -- (!) 105 14 115/65 --   10/07/22 1900 -- (!) 103 25 (!) 108/92 --   10/07/22 1845 -- (!) 130 19 122/65 100 %   10/07/22 1830 -- (!) 120 21 124/68 100 %   10/07/22 1815 -- (!) 107 26 122/61 100 %   10/07/22 1745 -- (!) 115 21 119/65 100 %   10/07/22 1730 -- (!) 129 24 (!) 105/48 100 %   10/07/22 1715 -- (!) 124 23 (!) 143/114 (!) 82 %   10/07/22 1700 -- (!) 135 24 (!) 153/67 --   10/07/22 1656 -- -- -- -- 99 %   10/07/22 1646 98.3 °F (36.8 °C) 81 -- (!) 162/81 94 %         Provider Notes (Medical Decision Making): MDM     Patient was seen and evaluated. Placed on BiPAP on arrival, Bumex given. Testing as noted above, showing respiratory acidosis and mild hypercapnia. Imaging and lab work consistent with acute CHF as described above. Subjectively improved with interventions in the ED. Lopressor given for elevated heart rate, with improved rate, although still slightly tachycardic. Patient will be admitted for further management as indicated. See consult notes below. ED Course:       ED Course as of 10/07/22 1954   Fri Oct 07, 2022   0062 Patient reassessed. Much more comfortable. Test results reviewed, consistent with CHF exacerbation with interstitial pulmonary edema, elevated BNP. Patient still slightly tachycardic. Additional metoprolol being given. Troponin normal.  VBG shows respiratory acidosis. Will admit for further management.  [JA]   1940 All pertinent aspects of case discussed with Dr. Clara Montenegro, resident on-call for [facility], who accepts the patient for admission on behalf of of Dr. Mariana Knight, who is the supervising attending. [JA]      ED Course User Index  [JA] David Caldwell MD     ------------------------------------------------------------------------------------------------------------        Consultations:       Disposition         Admitted      Diagnosis     Clinical Impression:   1. Acute respiratory failure with hypoxia and hypercapnia (HCC)    2. Acute on chronic congestive heart failure, unspecified heart failure type ([address]    3.  Atrial fibrillation with rapid ventricular response (HCC)        Attestations:    Beatriz Finley MD

## 2022-10-08 ENCOUNTER — APPOINTMENT (OUTPATIENT)
Dept: VASCULAR SURGERY | Age: 87
DRG: 291 | End: 2022-10-08
Attending: STUDENT IN AN ORGANIZED HEALTH CARE EDUCATION/TRAINING PROGRAM
Payer: MEDICARE

## 2022-10-08 LAB
ALBUMIN SERPL-MCNC: 3.2 G/DL (ref 3.4–5)
ALBUMIN/GLOB SERPL: 0.7 {RATIO} (ref 0.8–1.7)
ALP SERPL-CCNC: 62 U/L (ref 45–117)
ALT SERPL-CCNC: 9 U/L (ref 13–56)
ANION GAP SERPL CALC-SCNC: 7 MMOL/L (ref 3–18)
APPEARANCE UR: CLEAR
AST SERPL-CCNC: 13 U/L (ref 10–38)
BACTERIA URNS QL MICRO: ABNORMAL /HPF
BILIRUB SERPL-MCNC: 1.2 MG/DL (ref 0.2–1)
BILIRUB UR QL: NEGATIVE
BUN SERPL-MCNC: 23 MG/DL (ref 7–18)
BUN/CREAT SERPL: 21 (ref 12–20)
CALCIUM SERPL-MCNC: 9.1 MG/DL (ref 8.5–10.1)
CALCULATED R AXIS, ECG10: -31 DEGREES
CALCULATED T AXIS, ECG11: 112 DEGREES
CHLORIDE SERPL-SCNC: 105 MMOL/L (ref 100–111)
CO2 SERPL-SCNC: 27 MMOL/L (ref 21–32)
COLOR UR: YELLOW
CREAT SERPL-MCNC: 1.12 MG/DL (ref 0.6–1.3)
DIAGNOSIS, 93000: NORMAL
EPITH CASTS URNS QL MICRO: ABNORMAL /LPF (ref 0–5)
GLOBULIN SER CALC-MCNC: 4.8 G/DL (ref 2–4)
GLUCOSE BLD STRIP.AUTO-MCNC: 217 MG/DL (ref 70–110)
GLUCOSE BLD STRIP.AUTO-MCNC: 223 MG/DL (ref 70–110)
GLUCOSE BLD STRIP.AUTO-MCNC: 257 MG/DL (ref 70–110)
GLUCOSE SERPL-MCNC: 165 MG/DL (ref 74–99)
GLUCOSE UR STRIP.AUTO-MCNC: 250 MG/DL
HGB UR QL STRIP: NEGATIVE
KETONES UR QL STRIP.AUTO: NEGATIVE MG/DL
LEUKOCYTE ESTERASE UR QL STRIP.AUTO: ABNORMAL
NITRITE UR QL STRIP.AUTO: NEGATIVE
PH UR STRIP: 5.5 [PH] (ref 5–8)
POTASSIUM SERPL-SCNC: 5 MMOL/L (ref 3.5–5.5)
PROT SERPL-MCNC: 8 G/DL (ref 6.4–8.2)
PROT UR STRIP-MCNC: NEGATIVE MG/DL
Q-T INTERVAL, ECG07: 298 MS
QRS DURATION, ECG06: 92 MS
QTC CALCULATION (BEZET), ECG08: 469 MS
RBC #/AREA URNS HPF: NEGATIVE /HPF (ref 0–5)
SODIUM SERPL-SCNC: 139 MMOL/L (ref 136–145)
SP GR UR REFRACTOMETRY: 1.01 (ref 1–1.03)
UROBILINOGEN UR QL STRIP.AUTO: 0.2 EU/DL (ref 0.2–1)
VENTRICULAR RATE, ECG03: 149 BPM
WBC URNS QL MICRO: NEGATIVE /HPF (ref 0–4)

## 2022-10-08 PROCEDURE — 87077 CULTURE AEROBIC IDENTIFY: CPT

## 2022-10-08 PROCEDURE — 74011250636 HC RX REV CODE- 250/636: Performed by: STUDENT IN AN ORGANIZED HEALTH CARE EDUCATION/TRAINING PROGRAM

## 2022-10-08 PROCEDURE — 74011000258 HC RX REV CODE- 258: Performed by: INTERNAL MEDICINE

## 2022-10-08 PROCEDURE — 65660000004 HC RM CVT STEPDOWN

## 2022-10-08 PROCEDURE — 97535 SELF CARE MNGMENT TRAINING: CPT

## 2022-10-08 PROCEDURE — 93970 EXTREMITY STUDY: CPT

## 2022-10-08 PROCEDURE — 87186 SC STD MICRODIL/AGAR DIL: CPT

## 2022-10-08 PROCEDURE — 74011000250 HC RX REV CODE- 250

## 2022-10-08 PROCEDURE — 82962 GLUCOSE BLOOD TEST: CPT

## 2022-10-08 PROCEDURE — 74011250636 HC RX REV CODE- 250/636

## 2022-10-08 PROCEDURE — 99223 1ST HOSP IP/OBS HIGH 75: CPT | Performed by: INTERNAL MEDICINE

## 2022-10-08 PROCEDURE — 74011250637 HC RX REV CODE- 250/637: Performed by: STUDENT IN AN ORGANIZED HEALTH CARE EDUCATION/TRAINING PROGRAM

## 2022-10-08 PROCEDURE — 74011636637 HC RX REV CODE- 636/637

## 2022-10-08 PROCEDURE — 2709999900 HC NON-CHARGEABLE SUPPLY

## 2022-10-08 PROCEDURE — 74011636637 HC RX REV CODE- 636/637: Performed by: STUDENT IN AN ORGANIZED HEALTH CARE EDUCATION/TRAINING PROGRAM

## 2022-10-08 PROCEDURE — 74011250636 HC RX REV CODE- 250/636: Performed by: INTERNAL MEDICINE

## 2022-10-08 PROCEDURE — 74011000250 HC RX REV CODE- 250: Performed by: INTERNAL MEDICINE

## 2022-10-08 PROCEDURE — 80053 COMPREHEN METABOLIC PANEL: CPT

## 2022-10-08 PROCEDURE — 74011000258 HC RX REV CODE- 258: Performed by: STUDENT IN AN ORGANIZED HEALTH CARE EDUCATION/TRAINING PROGRAM

## 2022-10-08 PROCEDURE — 87086 URINE CULTURE/COLONY COUNT: CPT

## 2022-10-08 PROCEDURE — 74011250637 HC RX REV CODE- 250/637

## 2022-10-08 PROCEDURE — 81001 URINALYSIS AUTO W/SCOPE: CPT

## 2022-10-08 PROCEDURE — 97166 OT EVAL MOD COMPLEX 45 MIN: CPT

## 2022-10-08 RX ORDER — VANCOMYCIN/0.9 % SOD CHLORIDE 1.5G/250ML
1500 PLASTIC BAG, INJECTION (ML) INTRAVENOUS EVERY 24 HOURS
Status: DISCONTINUED | OUTPATIENT
Start: 2022-10-08 | End: 2022-10-09

## 2022-10-08 RX ORDER — DILTIAZEM HYDROCHLORIDE 5 MG/ML
10 INJECTION INTRAVENOUS ONCE
Status: COMPLETED | OUTPATIENT
Start: 2022-10-08 | End: 2022-10-08

## 2022-10-08 RX ORDER — MAGNESIUM SULFATE HEPTAHYDRATE 40 MG/ML
2 INJECTION, SOLUTION INTRAVENOUS 2 TIMES DAILY
Status: COMPLETED | OUTPATIENT
Start: 2022-10-08 | End: 2022-10-08

## 2022-10-08 RX ADMIN — ALLOPURINOL 100 MG: 100 TABLET ORAL at 10:20

## 2022-10-08 RX ADMIN — MAGNESIUM SULFATE HEPTAHYDRATE 2 G: 40 INJECTION, SOLUTION INTRAVENOUS at 13:01

## 2022-10-08 RX ADMIN — LEVOTHYROXINE SODIUM 150 MCG: 0.05 TABLET ORAL at 10:20

## 2022-10-08 RX ADMIN — PIPERACILLIN AND TAZOBACTAM 3.38 G: 3; .375 INJECTION, POWDER, FOR SOLUTION INTRAVENOUS at 05:59

## 2022-10-08 RX ADMIN — SODIUM CHLORIDE 5 MG/HR: 900 INJECTION, SOLUTION INTRAVENOUS at 12:14

## 2022-10-08 RX ADMIN — ATORVASTATIN CALCIUM 80 MG: 40 TABLET, FILM COATED ORAL at 10:20

## 2022-10-08 RX ADMIN — GABAPENTIN 300 MG: 300 CAPSULE ORAL at 18:49

## 2022-10-08 RX ADMIN — SODIUM CHLORIDE, PRESERVATIVE FREE 5 ML: 5 INJECTION INTRAVENOUS at 08:10

## 2022-10-08 RX ADMIN — PIPERACILLIN AND TAZOBACTAM 3.38 G: 3; .375 INJECTION, POWDER, FOR SOLUTION INTRAVENOUS at 19:59

## 2022-10-08 RX ADMIN — Medication 4 UNITS: at 21:16

## 2022-10-08 RX ADMIN — Medication 4 UNITS: at 18:50

## 2022-10-08 RX ADMIN — GABAPENTIN 300 MG: 300 CAPSULE ORAL at 10:21

## 2022-10-08 RX ADMIN — PIPERACILLIN AND TAZOBACTAM 3.38 G: 3; .375 INJECTION, POWDER, FOR SOLUTION INTRAVENOUS at 13:19

## 2022-10-08 RX ADMIN — FUROSEMIDE 40 MG: 10 INJECTION, SOLUTION INTRAMUSCULAR; INTRAVENOUS at 18:53

## 2022-10-08 RX ADMIN — METOPROLOL TARTRATE 25 MG: 25 TABLET, FILM COATED ORAL at 10:21

## 2022-10-08 RX ADMIN — DILTIAZEM HYDROCHLORIDE 10 MG: 5 INJECTION, SOLUTION INTRAVENOUS at 11:35

## 2022-10-08 RX ADMIN — Medication 20 UNITS: at 10:36

## 2022-10-08 RX ADMIN — SODIUM CHLORIDE, PRESERVATIVE FREE 10 ML: 5 INJECTION INTRAVENOUS at 21:16

## 2022-10-08 RX ADMIN — Medication 6 UNITS: at 11:30

## 2022-10-08 RX ADMIN — SODIUM CHLORIDE, PRESERVATIVE FREE 5 ML: 5 INJECTION INTRAVENOUS at 06:00

## 2022-10-08 RX ADMIN — FUROSEMIDE 40 MG: 10 INJECTION, SOLUTION INTRAMUSCULAR; INTRAVENOUS at 10:22

## 2022-10-08 RX ADMIN — SODIUM CHLORIDE, PRESERVATIVE FREE 10 ML: 5 INJECTION INTRAVENOUS at 15:15

## 2022-10-08 RX ADMIN — METOPROLOL TARTRATE 25 MG: 25 TABLET, FILM COATED ORAL at 20:02

## 2022-10-08 RX ADMIN — VANCOMYCIN HYDROCHLORIDE 1500 MG: 10 INJECTION, POWDER, LYOPHILIZED, FOR SOLUTION INTRAVENOUS at 11:04

## 2022-10-08 RX ADMIN — GABAPENTIN 300 MG: 300 CAPSULE ORAL at 21:16

## 2022-10-08 RX ADMIN — RIVAROXABAN 20 MG: 20 TABLET, FILM COATED ORAL at 10:20

## 2022-10-08 NOTE — PROGRESS NOTES
*ATTENTION:  This note has been created by a medical student for educational purposes only. Please do not refer to the content of this note for clinical decision-making, billing, or other purposes. Please see attending physicians note to obtain clinical information on this patient. *       [facility]  DAILY PROGRESS NOTE      Patient:    Carmen Hsieh , 82 y.o. female   MRN:  [id number]  Room/Bed:  05/  Admission Date:   10/7/2022  Code status:  Full Code    Reason for Admission: Acute CHF exacerbation    ASSESSMENT AND PLAN:   Problem List Items Addressed This Visit          Circulatory    Acute exacerbation of CHF (congestive heart failure) ([facility] [address]     Other Visit Diagnoses       Acute respiratory failure with hypoxia and hypercapnia ([address]    -  Primary    Atrial fibrillation with rapid ventricular response ([address]                Acute exacerbation of HFpEF  - SOB both inspiration and expiration for 3 days before coming in ED  - pro-BNP elevated at 3,967 and troponin at 8 on admission  - CXR shows increased interstitial edema, mild ground glass at the lung bases that are consistent with pulmonary edema or atelectasis, possible small right pleural effusion  - Echo at 8/11/22 shows normal LV systolic function with EF of 55-60% and normal LV size, increased wall thickness consistent with mild concentric hypertrophy, normal wall motion  - Bilateral calf swelling.  Pt took Bumex 3 days ago  Plan:  - Consult cardiology this AM  - IV Lasix 40 mg BID; hold Bumex 1mg PO BID  - Continue atorvastatin PO 80 mg QD  - Continue monitoring CMP, troponin, CBC, lipid panel  - Follow up labs LA and Mag  - Supplemental oxygen with volume PRN, maintain SpO2>92%  - Consider nitrates  - Hold amlodipine d/t swelling    Atrial fibrillation with RVR  - Patient is stable  - EKG on 10/7/22 shows Afib with RVR, left axis deviation  Plan:  - Lopressor 25 mg q12h; holding carvedilol 12.5 mg PO every day  - Patient with [id number] score of 6. Stroke risk around 9.7% each year. Continue Xarelto 20 mg oral every day  - Consider ndHP-CCBs in addition to Lopressor, but avoid B-blocker and CCBs on decompensated HF    Possible DVT with PE  - Positive Roberta sign bilaterally and hx of DVT  - Dyspnea with tachycardia and patient not compliant with Xarelto  - Last PT/INR/PTT 15.1/1.2/30.6  Plan:  - Continue Xarelto 20 mg PO every day  - Follow up CTA, Duplex US  - Routine monitoring PT/INR/PTT    Possible SIRS with sepsis  - RR 21, WBC 18.1, , meets 3/4 SIRS criteria  - Procalcitonin < 0.05, qSOFA negative  Plan:  - Continue Zosyn  - Follow up bcx, UA, urine culture, LA  - Continue monitoring CBC, CMP, Coag panel    T2DM  - Most recent A1c of 11.8  - Random glucose of 309 on admission to ED  Plan:  - Continue Lantus 20u subQ every day  - Humalog subQ with meals  - Follow up of X ray left foot d/t presence of tender ulcer on the plantar surface, suspicious of diabetic foot ulcer  - Continue gabapentin 300 mg TID for neuropathic pain  -  patient on diabetic foot self-exam  - Routine renal function test and eye exam        Code: Full  Diet: Regular with low sodium  DVT/AC: Xarelto 20 mg every day and encourage ambulation  Mobility: per protocol  Disposition: stable    Point of Contact (relationship): Nathaniel Arango (son); [phone]           SUBJECTIVE:   Events of the last 24 hours:  Patient seen at beside. No acute complaints. Pt endorses acute onset SOB for four days at home. Yesterday it became more severe so her family member called 46 and sent here to the ED. She appears a little anxious and wants to go home as long as she feels fine. Denies chest pain, palpitations.     ROS (positive findings are in BOLD; negative findings are in regular font)  Constitutional: fevers, chills, appetite changes, weight changes, fatigue  HEENT: changes in vision, changes in hearing, sore throat, dysphagia  Cardiovascular: chest pain, palpitations, PND, orthopnea, edema  Pulmonary: SOB, cough, sputum production, wheezing, chest tightness  Gastrointestinal: abdominal pain, nausea/vomiting, diarrhea, constipation, melena, hematochezia  Genitourinary: dysuria, hesitation, dribbling, urgency, hematuria  Musculoskeletal: arthralgias, myalgias  Skin: rash, itching  Neurological: sensory changes, motor changes, headache  Psychiatric: mood changes  Endocrine: heat/cold intolerance  Heme: easy bruising/easy bleeding, LAD    CURRENT INPATIENT MEDICATIONS:  Current Facility-Administered Medications   Medication Dose Route Frequency Provider Last Rate Last Admin    vancomycin (VANCOCIN) 1500 mg in  ml infusion  1,500 mg IntraVENous Q24H Anju Cardona MD        sodium chloride (NS) flush 5-40 mL  5-40 mL IntraVENous Q8H Rezazad, Kerry, DO   5 mL at 10/08/22 0810    sodium chloride (NS) flush 5-40 mL  5-40 mL IntraVENous PRN Rezazad, Kerry, DO        furosemide (LASIX) injection 40 mg  40 mg IntraVENous BID Anju Cardona MD   40 mg at 10/07/22 2240    acetaminophen (TYLENOL) tablet 500 mg  500 mg Oral Q6H PRN Rezazad, Kerry, DO        allopurinoL (ZYLOPRIM) tablet 100 mg  100 mg Oral DAILY Rezazad, Kerry, DO        atorvastatin (LIPITOR) tablet 80 mg  80 mg Oral DAILY Rezazad, Kerry, DO        levothyroxine (SYNTHROID) tablet 150 mcg  150 mcg Oral DAILY Rezazad, Kerry, DO        [Held by provider] lisinopriL (PRINIVIL, ZESTRIL) tablet 40 mg  40 mg Oral DAILY Rezazad, Kerry, DO        [Held by provider] bumetanide (BUMEX) tablet 1 mg  1 mg Oral BID PRN Rezazad, Kerry, DO        [Held by provider] amLODIPine (NORVASC) tablet 10 mg  10 mg Oral DAILY Rezazad, Kerry, DO        [Held by provider] hydrOXYzine HCL (ATARAX) tablet 10 mg  10 mg Oral BID PRN Rezazad, Kerry, DO        [Held by provider] insulin lispro (HUMALOG) injection 5 Units  5 Units SubCUTAneous TIDAC Rezazad, Kerry, DO        [Held by provider] metFORMIN (GLUCOPHAGE) tablet 500 mg  500 mg Oral DAILY Rezazad, Kerry, DO        rivaroxaban (XARELTO) tablet 20 mg  20 mg Oral DAILY Rezazad, Kerry, DO        [Held by provider] sertraline (ZOLOFT) tablet 50 mg  50 mg Oral DAILY Rezazad, Kerry, DO        [Held by provider] tamsulosin (FLOMAX) capsule 0.4 mg  0.4 mg Oral PCD Rezazad, Kerry, DO        insulin glargine (LANTUS) injection 20 Units  20 Units SubCUTAneous DAILY Rezazad, Kerry, DO        [Held by provider] carvediloL (COREG) tablet 12.5 mg  12.5 mg Oral BID WITH MEALS Rezazad, Kerry, DO        piperacillin-tazobactam (ZOSYN) 3.375 g in 0.9% sodium chloride (MBP/ADV) 100 mL MBP  3.375 g IntraVENous Q8H Carrington Waters MD 25 mL/hr at 10/08/22 0559 3.375 g at 10/08/22 0559    sodium chloride (NS) flush 5-40 mL  5-40 mL IntraVENous Q8H Rezazad, Kerry, DO   5 mL at 10/08/22 0600    sodium chloride (NS) flush 5-40 mL  5-40 mL IntraVENous PRN Repratikd, Kerry, DO        gabapentin (NEURONTIN) capsule 300 mg  300 mg Oral TID Rezazad, Kerry, DO   300 mg at 10/07/22 2222    metoprolol tartrate (LOPRESSOR) tablet 25 mg  25 mg Oral Q12H Carrington Waters MD        [Held by provider] morphine IR (MS IR) tablet 15 mg  15 mg Oral BID PRN Carrington Waters MD        polyethylene glycol (MIRALAX) packet 17 g  17 g Oral DAILY PRN Yao Gordonena, DO        insulin lispro (HUMALOG) injection   SubCUTAneous AC&HS Carrington Waters MD   10 Units at 10/07/22 2200    glucose chewable tablet 16 g  16 g Oral PRN Carrington Waters MD        glucagon (GLUCAGEN) injection 1 mg  1 mg IntraMUSCular PRN Carrington Waters MD         Current Outpatient Medications   Medication Sig Dispense Refill    Jardiance 10 mg tablet Take 10 mg by mouth daily. BD Insulin Syringe SafetyGlide 0.5 mL 30 gauge x 5/16\" syrg       tamsulosin (FLOMAX) 0.4 mg capsule Take 1 Capsule by mouth daily (after dinner). 90 Capsule 3    tamsulosin (FLOMAX) 0.4 mg capsule Take 1 Capsule by mouth in the morning.  (Patient not taking: Reported on 8/24/2022) 30 Capsule 0    insulin glargine (LANTUS) 100 unit/mL injection Inject 20 units daily at bedtime. Monitor and record blood sugar daily. 1 mL 0    rivaroxaban (XARELTO) 15 mg tab tablet Take 15 mg by mouth two (2) times a day. (Patient not taking: Reported on 8/24/2022)      insulin aspart U-100 (NovoLOG Flexpen U-100 Insulin) 100 unit/mL (3 mL) inpn 5 Units by SubCUTAneous route Before breakfast, lunch, and dinner. (Patient not taking: Reported on 8/24/2022) 1 Pen 0    levothyroxine (SYNTHROID) 150 mcg tablet Take 150 mcg by mouth daily. morphine IR (MS IR) 15 mg tablet Take 15 mg by mouth two (2) times a day. metFORMIN (GLUCOPHAGE) 500 mg tablet Take 500 mg by mouth daily. hydrOXYzine HCL (ATARAX) 10 mg tablet Take 1 Tablet by mouth two (2) times daily as needed for Anxiety. (Patient not taking: Reported on 8/24/2022)      allopurinoL (ZYLOPRIM) 100 mg tablet Take 100 mg by mouth daily. lisinopriL (PRINIVIL, ZESTRIL) 40 mg tablet Take 1 tablet by mouth once daily 90 Tablet 0    sertraline (ZOLOFT) 50 mg tablet Take 1 Tablet by mouth daily. (Patient not taking: Reported on 8/24/2022) 90 Tablet 3    gabapentin (NEURONTIN) 400 mg capsule Take 1 cap in morning, 1 cap at 2pm, 2 caps at bedtime 360 Capsule 0    carvediloL (COREG) 12.5 mg tablet Take 1 Tablet by mouth two (2) times daily (with meals). 180 Tablet 0    bumetanide (BUMEX) 2 mg tablet Take 0.5 Tablets by mouth two (2) times daily as needed (swelling). 180 Tablet 1    amLODIPine (NORVASC) 10 mg tablet Take 1 Tablet by mouth daily. 90 Tablet 0    atorvastatin (LIPITOR) 80 mg tablet Take 1 Tablet by mouth daily. 90 Tablet 3    Blood-Glucose Meter (FREESTYLE LITE METER) monitoring kit Test twice blood glucose daily; ICD-10 E11.9; Quantity 1 1 Kit 0    insulin syringe,safetyneedle 1 mL 31 gauge x 5/16\" syrg 1 Each by Does Not Apply route daily.  300 Each 3    glucose blood VI test strips (FREESTYLE TEST) strip Use to check blood glucose twice daily DX:E11.9 300 Strip 3 acetaminophen (TYLENOL) 500 mg tablet Take 1 Tab by mouth every six (6) hours as needed for Pain. 270 Tab 3       Allergies  No Known Allergies    OBJECTIVE:    Intake/Output Summary (Last 24 hours) at 10/8/2022 0818  Last data filed at 10/8/2022 0137  Gross per 24 hour   Intake 600 ml   Output --   Net 600 ml       Visit Vitals  /64   Pulse (!) 126   Temp 98.3 °F (36.8 °C)   Resp 21   Ht 5' 5\" (1.651 m)   Wt 100 kg (220 lb 7.4 oz)   SpO2 96%   BMI 36.69 kg/m²       PHYSICAL EXAM  Gen: In some acute distress, comfortable lying in bed  HEENT: normocephalic, atraumatic, MMM, no thyromegaly, no JVD  CV: Tachycardic, irregular rhythm, normal S1, S2, no m/g/r, no S3, but S4 heard  Pulm: Muffled and distant breath sound heard posteriorly  Abd: S/NT/ND, no rebound, no guarding, no hepatosplenomegaly   MSK: Absent right great toe d/t amputation, 1+ edema  Skin: warm, dry, intact, red calloused area on plantar surface of L great toe  Neuro: CN II-XII grossly intact, no focal deficits appreciated   Psych: appropriate, alert, oriented x3    LABWORK (LAST 24 HOURS)      IMAGING AND PROCEDURES (LAST 36 HOURS)  XR FOOT LT MIN 3 V    Result Date: 10/8/2022  Generalized soft tissue swelling. . Degenerative changes are noted similar to prior No radiographic findings of osteomyelitis are identified    CTA CHEST W OR W WO CONT    Result Date: 10/7/2022  1. No evidence for pulmonary emboli. 2.  Heart failure with biatrial cardiac chamber enlargement, moderate new bilateral pleural effusions and mild pulmonary edema. 3.  Mild to moderate aortic atherosclerosis with irregular noncalcified mural plaque along the wall of the descending aorta which increases risk of embolic events. XR CHEST PORT    Result Date: 10/7/2022  Increased interstitial edema. The mild groundglass at the lung bases may represent mild pulmonary edema or atelectasis. Possible small right pleural effusion. ================================================================  Further management for Ms. Herman Chavez will be discussed on rounds with my attending.       João Austin, MS3  [facility]  October 8, 2022 6:21 AM

## 2022-10-08 NOTE — PROGRESS NOTES
Respiratory Progress Note:      Upon assessment patient was sitting in bed alert and oriented. Spoke with Nurse Christiano Earing and based on her table vitals the Bipap was removed and she was placed on 2lpm NC. HR: 129, Spo2%: 96%, RR: 19. Bs; decreased at the bases. No wheezing noted. Will change bipap order to prn.     Emy GALLEGOS RRT

## 2022-10-08 NOTE — CONSULTS
Cardiology Consult Note    Consultation request by Devonte Marley MD for advice/opinion related to evaluating A. fib and CHF    Date of  Admission: 10/7/2022  4:41 PM   Primary Care Physician:  Andrey Emanuel MD       Assessment:     Hospital Problems  Date Reviewed: 8/12/2022            Codes Class Noted POA    Acute exacerbation of CHF (congestive heart failure) ([facility] ICD-10-CM: I50.9  ICD-9-CM: 428.0  10/7/2022 Unknown           1. Shortness of breath related to acute on chronic diastolic heart failure  2. New onset atrial fibrillation with rapid ventricular rate  3. History of DVT right lower extremity 6/2022. Repeat scan today does not show any DVT. Patient ran out of Xarelto 1 week ago as she could not afford it  4. Hypertension  5. Acute on chronic diastolic heart failure with normal ejection fraction in past likely precipitated by atrial fibrillation  #6 hyperlipidemia       Primary cardiologist Bryant Lo     Plan:     Rate controlled with IV Cardizem agree with change to p.o. Lopressor at present. IV diuresis as planned  Anticoagulation as planned. Blood pressure monitoring and continue treatment         History of Present Illness: This is a 82 y.o. female admitted for Acute exacerbation of CHF (congestive heart failure) ([facility] [I50.9]. Patient complains of: Shortness of breath. Patient presents with complaint of increasing shortness of breath orthopnea peripheral edema. Denies any fever or chills denies any chest pain. She was noted to be in rapid A. fib in emergency room which is new compared to her prior evaluations. She has a history of diastolic heart failure, venous insufficiency. She was also diagnosed to have DVT 6/2022 for which she was on Eliquis. About a week ago she ran out and she could not afford it. No prior history of A. fib.   Emergency room her heart rate remains fast.        Review of Symptoms:  Except as stated above include:  Constitutional: negative  Respiratory: Shortness of breath, orthopnea  Cardiovascular: Tachycardia  Gastrointestinal: negative  Genitourinary:  negative  Musculoskeletal: Leg edema  Neurological:  Negative  Dermatological:  Negative  Endocrinological: Negative  Psychological:  Negative       Past Medical History:     Past Medical History:   Diagnosis Date    Acquired hypothyroidism 2016    Arthritis of right shoulder region 2016    Asthma     Bilateral lower extremity edema 2021    Chronic bilateral low back pain without sciatica 2021    Chronic diastolic congestive heart failure ([facility] 3/10/2021    Chronic venous insufficiency     Diabetes ([facility]     neuropathy    Essential hypertension 2021    Gout     History of depression 2017    History of fall 2021    Hypercholesteremia 2021    Hypertension     MI (myocardial infarction) ([facility]     OAB (overactive bladder) 2021    Peripheral neuropathy     Rheumatoid arthritis ([facility]     Tear of right rotator cuff 2016    Thyroid pain     Urinary incontinence 2021    Vertigo          Social History:     Social History     Socioeconomic History    Marital status:    Tobacco Use    Smoking status: Former     Types: Cigarettes     Quit date:      Years since quittin.8    Smokeless tobacco: Never    Tobacco comments:     quit 45 years ago   Vaping Use    Vaping Use: Never used   Substance and Sexual Activity    Alcohol use: No     Alcohol/week: 0.0 standard drinks    Drug use: No    Sexual activity: Not Currently        Family History:     Family History   Problem Relation Age of Onset    Heart Attack Mother     Heart Attack Father         Medications:   No Known Allergies     Current Facility-Administered Medications   Medication Dose Route Frequency    vancomycin (VANCOCIN) 1500 mg in  ml infusion  1,500 mg IntraVENous Q24H    sodium chloride (NS) flush 5-40 mL  5-40 mL IntraVENous Q8H    sodium chloride (NS) flush 5-40 mL  5-40 mL IntraVENous PRN    furosemide (LASIX) injection 40 mg  40 mg IntraVENous BID    acetaminophen (TYLENOL) tablet 500 mg  500 mg Oral Q6H PRN    allopurinoL (ZYLOPRIM) tablet 100 mg  100 mg Oral DAILY    atorvastatin (LIPITOR) tablet 80 mg  80 mg Oral DAILY    levothyroxine (SYNTHROID) tablet 150 mcg  150 mcg Oral DAILY    [Held by provider] lisinopriL (PRINIVIL, ZESTRIL) tablet 40 mg  40 mg Oral DAILY    [Held by provider] bumetanide (BUMEX) tablet 1 mg  1 mg Oral BID PRN    [Held by provider] amLODIPine (NORVASC) tablet 10 mg  10 mg Oral DAILY    [Held by provider] hydrOXYzine HCL (ATARAX) tablet 10 mg  10 mg Oral BID PRN    [Held by provider] insulin lispro (HUMALOG) injection 5 Units  5 Units SubCUTAneous TIDAC    [Held by provider] metFORMIN (GLUCOPHAGE) tablet 500 mg  500 mg Oral DAILY    rivaroxaban (XARELTO) tablet 20 mg  20 mg Oral DAILY    [Held by provider] sertraline (ZOLOFT) tablet 50 mg  50 mg Oral DAILY    [Held by provider] tamsulosin (FLOMAX) capsule 0.4 mg  0.4 mg Oral PCD    insulin glargine (LANTUS) injection 20 Units  20 Units SubCUTAneous DAILY    [Held by provider] carvediloL (COREG) tablet 12.5 mg  12.5 mg Oral BID WITH MEALS    piperacillin-tazobactam (ZOSYN) 3.375 g in 0.9% sodium chloride (MBP/ADV) 100 mL MBP  3.375 g IntraVENous Q8H    sodium chloride (NS) flush 5-40 mL  5-40 mL IntraVENous Q8H    sodium chloride (NS) flush 5-40 mL  5-40 mL IntraVENous PRN    gabapentin (NEURONTIN) capsule 300 mg  300 mg Oral TID    metoprolol tartrate (LOPRESSOR) tablet 25 mg  25 mg Oral Q12H    [Held by provider] morphine IR (MS IR) tablet 15 mg  15 mg Oral BID PRN    polyethylene glycol (MIRALAX) packet 17 g  17 g Oral DAILY PRN    insulin lispro (HUMALOG) injection   SubCUTAneous AC&HS    glucose chewable tablet 16 g  16 g Oral PRN    glucagon (GLUCAGEN) injection 1 mg  1 mg IntraMUSCular PRN     Current Outpatient Medications   Medication Sig    Jardiance 10 mg tablet Take 10 mg by mouth daily.     BD Insulin Syringe SafetyGlide 0.5 mL 30 gauge x 5/16\" syrg     tamsulosin (FLOMAX) 0.4 mg capsule Take 1 Capsule by mouth daily (after dinner). tamsulosin (FLOMAX) 0.4 mg capsule Take 1 Capsule by mouth in the morning. (Patient not taking: Reported on 8/24/2022)    insulin glargine (LANTUS) 100 unit/mL injection Inject 20 units daily at bedtime. Monitor and record blood sugar daily. rivaroxaban (XARELTO) 15 mg tab tablet Take 15 mg by mouth two (2) times a day. (Patient not taking: Reported on 8/24/2022)    insulin aspart U-100 (NovoLOG Flexpen U-100 Insulin) 100 unit/mL (3 mL) inpn 5 Units by SubCUTAneous route Before breakfast, lunch, and dinner. (Patient not taking: Reported on 8/24/2022)    levothyroxine (SYNTHROID) 150 mcg tablet Take 150 mcg by mouth daily. morphine IR (MS IR) 15 mg tablet Take 15 mg by mouth two (2) times a day. metFORMIN (GLUCOPHAGE) 500 mg tablet Take 500 mg by mouth daily. hydrOXYzine HCL (ATARAX) 10 mg tablet Take 1 Tablet by mouth two (2) times daily as needed for Anxiety. (Patient not taking: Reported on 8/24/2022)    allopurinoL (ZYLOPRIM) 100 mg tablet Take 100 mg by mouth daily. lisinopriL (PRINIVIL, ZESTRIL) 40 mg tablet Take 1 tablet by mouth once daily    sertraline (ZOLOFT) 50 mg tablet Take 1 Tablet by mouth daily. (Patient not taking: Reported on 8/24/2022)    gabapentin (NEURONTIN) 400 mg capsule Take 1 cap in morning, 1 cap at 2pm, 2 caps at bedtime    carvediloL (COREG) 12.5 mg tablet Take 1 Tablet by mouth two (2) times daily (with meals). bumetanide (BUMEX) 2 mg tablet Take 0.5 Tablets by mouth two (2) times daily as needed (swelling). amLODIPine (NORVASC) 10 mg tablet Take 1 Tablet by mouth daily. atorvastatin (LIPITOR) 80 mg tablet Take 1 Tablet by mouth daily.     Blood-Glucose Meter (FREESTYLE LITE METER) monitoring kit Test twice blood glucose daily; ICD-10 E11.9; Quantity 1    insulin syringe,safetyneedle 1 mL 31 gauge x 5/16\" syrg 1 Each by Does Not Apply route daily. glucose blood VI test strips (FREESTYLE TEST) strip Use to check blood glucose twice daily DX:E11.9    acetaminophen (TYLENOL) 500 mg tablet Take 1 Tab by mouth every six (6) hours as needed for Pain. Physical Exam:   Visit Vitals  BP (!) 133/92   Pulse (!) 137   Temp 98.3 °F (36.8 °C)   Resp 11   Ht 5' 5\" (1.651 m)   Wt 100 kg (220 lb 7.4 oz)   SpO2 96%   BMI 36.69 kg/m²       TELE:  ANTONIETTA robledo RVR    BP Readings from Last 3 Encounters:   10/08/22 (!) 133/92   08/19/22 (!) 141/74   08/13/22 (!) 163/75     Pulse Readings from Last 3 Encounters:   10/08/22 (!) 137   08/19/22 (!) 108   08/13/22 80     Wt Readings from Last 3 Encounters:   10/07/22 100 kg (220 lb 7.4 oz)   08/24/22 99.8 kg (220 lb)   08/19/22 107.5 kg (237 lb)       General:  alert, cooperative, no distress, appears stated age  Neck:  nontender, no masses, no stridor, no carotid bruit, no JVD  Lungs:  clear to auscultation bilaterally  Heart:  irregularly irregular rhythm, S1, S2 normal  Abdomen:  abdomen is soft without significant tenderness, masses, organomegaly or guarding  Extremities:  venous stasis dermatitis noted, edema mild edema  Skin: Warm and dry. no hyperpigmentation, vitiligo, or suspicious lesions.   Redness on extremity  Neuro: alert, oriented x3, affect appropriate, no focal neurological deficits, moves all extremities well, no involuntary movements, reflexes at knee and ankle intact  Psych: non focal     Data Review:     Recent Labs     10/07/22  1653   WBC 18.1*   HGB 11.8*   HCT 38.4        Recent Labs     10/08/22  0753 10/07/22  2230 10/07/22  1653    142 135*   K 5.0 4.2 5.4    111 101   CO2 27 25 29   * 276* 309*   BUN 23* 20* 23*   CREA 1.12 0.86 1.25   CA 9.1 7.3* 9.6   MG  --  1.4*  --    ALB 3.2* 2.7* 3.7   ALT 9* 7* 10*   INR  --  1.2  --            All Cardiac Markers in the last 24 hours:  No results found for: CPK, CK, CKMMB, CKMB, RCK3, CKMBT, CKNDX, CKND1, GABRIEL, TROPT, TROIQ, KRUPA, TROPT, TNIPOC, BNP, BNPP    Last Lipid:    Lab Results   Component Value Date/Time    Cholesterol, total 191 11/09/2021 12:24 PM    HDL Cholesterol 44 11/09/2021 12:24 PM    LDL, calculated 96.8 11/09/2021 12:24 PM    Triglyceride 251 (H) 11/09/2021 12:24 PM    CHOL/HDL Ratio 4.3 11/09/2021 12:24 PM       Cardiographics:     I have personally reviewed patients ekg done at other facility. A. fib RVR    08/10/22    ECHO ADULT FOLLOW-UP OR LIMITED 08/12/2022 8/12/2022    Interpretation Summary    Left Ventricle: Normal left ventricular systolic function with a visually estimated EF of 55 - 60%. Left ventricle size is normal. Increased wall thickness. Findings consistent with mild concentric hypertrophy. Normal wall motion. Signed by: Johann Bonilla MD on 8/12/2022  8:12 AM      08/21/20    NUCLEAR CARDIAC STRESS TEST 08/21/2020 8/21/2020    Interpretation Summary  · Baseline ECG: Normal sinus rhythm. · Negative stress test.  · Gated SPECT: Left ventricular function post-stress was normal. Calculated ejection fraction is 56%. There is no evidence of transient ischemic dilation (TID). The TID ratio is 1.09.  · Negative myocardial perfusion imaging. Myocardial perfusion imaging supports a low risk stress test.    Signed by: Pepe Carlos MD on 8/21/2020 12:43 PM        XR Results (most recent):  Results from [place] encounter on 10/07/22    XR FOOT LT MIN 3 V    Narrative  FOOT COMPLETE    Three views of the left foot are reviewed. INDICATION: Diabetic ulcer, sepsis. COMPARISON: August 2022. FINDINGS:    Negative for fracture or subluxation. No osteolytic or osteoblastic lesions are evident. Diffuse osteoarthritic changes are present most severe at the bunion joint. There is moderate hallux valgus and bunion development. .  Bone mineralization seems adequate. There is diffuse soft tissue swelling    Impression  Generalized soft tissue swelling. .  Degenerative changes are noted similar to prior  No radiographic findings of osteomyelitis are identified        Signed By: Sri Corbin MD     October 8, 2022

## 2022-10-08 NOTE — H&P
[facility].  Admission History and Physical      Patient:    Tara Calhoun      82 y.o. female            MRN:       [id number]                                                                                    Admission Date:         10/7/2022  Code status:                Full    Tara Calhoun is a 82y.o. year old female admitted for Acute exacerbation of CHF (congestive heart failure) ([facility] [I50.9]. ASSESSMENT AND PLAN  Problem List Items Addressed This Visit          Circulatory    Acute exacerbation of CHF (congestive heart failure) ([facility])     Other Visit Diagnoses       Acute respiratory failure with hypoxia and hypercapnia ([facility])    -  Primary    Atrial fibrillation with rapid ventricular response ([facility])                Acute exacerbation of CHF  -Patient indicates difficulty breathing in and out for 3 days prior to admission  -NT pro-BNP elevated at 3,967 and trop nl at 8 in ED  -B/l LE swelling noted on exam; pt last took Bumex 3 days ago  -Pt states that she follows with outpatient cardiologist Dr. Teresa Munguia; unable to verify this  -CXR shows increased interstitial edema, mild ground glass at the lung bases that may represent mild pulmonary edema or atelectasis, possible small right pleural effusion   -Echocardiogram (8/11/22): normal LV systolic function with EF of 55-60% and normal LV size, increased wall thickness consistent with mild concentric hypertrophy, normal wall motion   Plan:  -Admit to stepdown unit with cardiac monitoring   -Consult cardiology in AM  -IV Lasix 40 mg BID; holding home Bumex 1mg oral BID  -Continue home atorvastatin oral 80 mg qdaily   -Holding home amlodipine d/t acute LE swelling  -Holding home lisinopril 40 mg oral qdaily   -CBC w/ auto diff qdaily  -CMP qdaily   -Potassium qdaily   -Pending one time lactic acid  -Pending one time magnesium  -Supplemental O2, Maintain SpO2 >92%.  Currently at 2L       Possible acute on chronic DVT's with PE  -Positive Roberta's sign bilaterally and patient's history of DVT's make b/l LE DVT's likely   -Dyspnea associated with tachycardia and patient not having taken Xarelto for 3-4 weeks is likely consistent with PE  Plan:  -Continue home xarelto 20 mg oral qdaily   -Pending CTA  -Pending Duplex U/S of b/l LE  -Pending PT/INR/PTT    Atrial fibrillation with RVR  -EKG (10/7/22): atrial fibrillation with RVR, Left axis deviation  -HR in 110's-130's upon arrival to patient's bedside   Plan:  -Oral Lopressor 25 mg q12h; holding home carvedilol 12.5 mg oral qdaily    -Pending TSH  -On cardiac monitoring     Sepsis of unknown origin   -Meets 3/4 SIRS criteria (does not meet criteria for temperature)  -WBC 18.1 in ED  -Procalcitonin <0.05  Plan:  -Zosyn 4.5 gIV given once, followed by Zosyn 3.375g IV q8h  -Vancomycin 2000mg IV given once  -Pending blood culture  -Pending MRSA culture  -Pending urinalysis and urine culture  -Pending lactic acid  -CBC w/ diff qdaily     Type 2 Diabetes Mellitus  -Most recent HbA1c of 11.8   -Serum glucose 309 on admission   Plan:  -Lantus 20 units SubQ qdaily; pending repeat HbA1c  -Insulin lispro subQ QID with meals   -Holding home Jardiance   -Xray of L foot ordered d/t presence of tender foot callus with central reddening on plantar aspect  -Continue home gabapentin 300 mg TID  -Holding home metformin 500 mg oral qdaily      Gout  -No acute flare ups  Plan:  -Continue home allopurinol 100 mg oral qdaily      Hypothyroidism   -Denies any acute symptoms   Plan:  -Continue home Synthroid 150 mcg oral qdaily  -Pending TSH    Chronic pain of back and LE   -Endorses continuing pain   -Follows with outpatient pain management   Plan:  -Continue home Tylenol 500 mg oral q6h  -Holding home morphine 15 mg d/t hypoxic state        Global:  Code: Full  IVF/Drips: None  I/O / Wt: None  Diet: Regular with low sodium  Bowel Regimen, Last BM:  Miralax PRN, last BM 2 days ago per pt  DVT/AC: Xarelto 20 mg qdaily and encourage ambulation as tolerated  Mobility: per protocol   Disposition: stable  Anticipated LOS: 1-2 nights     Point of Contact (relationship, number): Nathaniel Arango (son); [phone]      SUBJECTIVE:  History of Present Illness:  Atif Ennis is a 82 y.o.  female with PMHx of chronic diastolic CHF, chronic venous insufficiency, Type 2 DM, HTN, hypothyroidism, arthritis, asthma, chronic back pain, gout, hypercholesterolemia, and peripheral neuropathy who presented to ED on 10/7/86483 with complaint of difficulty breathing. Patient reports that 3 days prior to admission, she was having trouble breathing with both inspiration and expiration. Denies any triggering factors. Took home ASA and Tylenol without relief of symptoms. Due to continuing difficulty breathing, she presented to the ED. ED Course:  -Afebrile, hypertensive, tachycardic, on supplemental O2 satting > 90%  -Labs: CBC showed elevated WBC at 18.1 and Hb 11.8 but otherwise unremarkable, serum glucose 309, CMP unremarkable, Troponins nl, pro-BNP 3,967, VBG showing respiratory acidosis   -Imaging: CXR shows increased interstitial edema, mild ground glass at the lung bases that may represent mild pulmonary edema or atelectasis, possible small right pleural effusion   -EKG: Atrial fibrillation with RVR, left axis deviation   -Meds: Bumex and BIPAP  -IVF: None  -Procedures: None  -Consults: None      Did non-adherence with patient's outpatient treatment plan contribute to this admission?  Yes  If yes, please explain   Patient has not taken Xarelto in 3-4 weeks or any of her diabetic medications other than metformin d/t high cost        PMHx, PSHx, SHx, FHx, MEDS, ALLERGIES:   Past Medical History:   Diagnosis Date    Acquired hypothyroidism 8/1/2016    Arthritis of right shoulder region 4/21/2016    Asthma     Bilateral lower extremity edema 7/7/2021    Chronic bilateral low back pain without sciatica 7/7/2021    Chronic diastolic congestive heart failure ([facility] 3/10/2021    Chronic venous insufficiency     Diabetes (HCC)     neuropathy    Essential hypertension 2021    Gout     History of depression 2017    History of fall 2021    Hypercholesteremia 2021    Hypertension     MI (myocardial infarction) ([facility]     OAB (overactive bladder) 2021    Peripheral neuropathy     Rheumatoid arthritis ([facility]     Tear of right rotator cuff 2016    Thyroid pain     Urinary incontinence 2021    Vertigo       Past Surgical History:   Procedure Laterality Date    HX AMPUTATION TOE Right     Great toe Dr. Nati Will [address]      HX CHOLECYSTECTOMY      HX GYN      TAHOophorectomy due to infection    HX HEENT      resection of memegioma in the .     HX KNEE REPLACEMENT Bilateral       Social History     Tobacco Use    Smoking status: Former     Types: Cigarettes     Quit date:      Years since quittin.7    Smokeless tobacco: Never    Tobacco comments:     quit 45 years ago   Vaping Use    Vaping Use: Never used   Substance Use Topics    Alcohol use: No     Alcohol/week: 0.0 standard drinks    Drug use: No     Family History   Problem Relation Age of Onset    Heart Attack Mother     Heart Attack Father      No Known Allergies    Current Facility-Administered Medications   Medication Dose Route Frequency Provider Last Rate Last Admin    sodium chloride (NS) flush 5-40 mL  5-40 mL IntraVENous Q8H Kerry Gordon, DO   10 mL at 10/07/22 1924    sodium chloride (NS) flush 5-40 mL  5-40 mL IntraVENous PRN Kerry Gordon, DO        furosemide (LASIX) injection 40 mg  40 mg IntraVENous BID Joselo Gonzalez MD        metoprolol tartrate (LOPRESSOR) tablet 25 mg  25 mg Oral Omid Telles MD        acetaminophen (TYLENOL) tablet 500 mg  500 mg Oral Q6H PRN Kerry Gordon DO        [START ON 10/8/2022] allopurinoL (ZYLOPRIM) tablet 100 mg  100 mg Oral DAILY Kerry Gordon DO        [START ON 10/8/2022] atorvastatin (LIPITOR) tablet 80 mg  80 mg Oral DAILY Rezazad, Kerry, DO        gabapentin (NEURONTIN) capsule 400 mg  400 mg Oral TID Rezazad, Kerry, DO        [START ON 10/8/2022] levothyroxine (SYNTHROID) tablet 150 mcg  150 mcg Oral DAILY Rezazad, Kerry, DO        [Held by provider] lisinopriL (PRINIVIL, ZESTRIL) tablet 40 mg  40 mg Oral DAILY Rezazad, Kerry, DO        [Held by provider] bumetanide (BUMEX) tablet 1 mg  1 mg Oral BID PRN Rezazad, Kerry, DO        [Held by provider] Insulin Syringe-Needle U-100 syrg 1 Pen Needle  1 Pen Needle SubCUTAneous DAILY Rezazad, Kerry, DO        [Held by provider] amLODIPine (NORVASC) tablet 10 mg  10 mg Oral DAILY Rezazad, Kerry, DO        [Held by provider] Blood-Glucose Meter kit 1 Kit  1 Kit SubCUTAneous DAILY Rezazad, Kerry, DO        [Held by provider] glucose blood VI test strips strip 1 Each  1 Each Does Not Apply See Admin Instructions Rezazad, Kerry, DO        [Held by provider] hydrOXYzine HCL (ATARAX) tablet 10 mg  10 mg Oral BID PRN Rezazad, Kerry, DO        [Held by provider] insulin aspart U-100 (NOVOLOG) pen 5 Units  5 Units SubCUTAneous TIDAC Rezazad, Kerry, DO        [Held by provider] empagliflozin (JARDIANCE) tablet 10 mg  10 mg Oral DAILY Rezazad, Kerry, DO        [Held by provider] metFORMIN (GLUCOPHAGE) tablet 500 mg  500 mg Oral DAILY Rezazad, Kerry, DO        morphine IR (MS IR) tablet 15 mg  15 mg Oral BID Rezazad, Kerry, DO        [START ON 10/8/2022] rivaroxaban (XARELTO) tablet 20 mg  20 mg Oral DAILY Rezazad, Kerry, DO        [Held by provider] sertraline (ZOLOFT) tablet 50 mg  50 mg Oral DAILY Rezazad, Kerry, DO        [Held by provider] tamsulosin (FLOMAX) capsule 0.4 mg  0.4 mg Oral DAILY Rezazad, Kerry, DO        [Held by provider] tamsulosin (FLOMAX) capsule 0.4 mg  0.4 mg Oral PCD Kerry Gordon DO        [START ON 10/8/2022] insulin glargine (LANTUS) injection 20 Units  20 Units SubCUTAneous DAILY Kerry Gordon DO        [Held by provider] carvediloL (COREG) tablet 12.5 mg  12.5 mg Oral BID WITH MEALS Kerry Gordon, DO        iopamidoL (ISOVUE-370) 76 % injection  mL   mL IntraVENous RAD ONCE Kristine Jane MD        piperacillin-tazobactam (ZOSYN) 4.5 g in 0.9% sodium chloride (MBP/ADV) 100 mL MBP  4.5 g IntraVENous Jose Jones MD        [START ON 10/8/2022] piperacillin-tazobactam (ZOSYN) 3.375 g in 0.9% sodium chloride (MBP/ADV) 100 mL MBP  3.375 g IntraVENous Q8H Jon Pineda MD        vancomycin (VANCOCIN) 2000 mg in  ml infusion  2,000 mg IntraVENous Jose Jones MD        VANCOMYCIN INFORMATION NOTE   Other Rx Dosing/Monitoring Jon Pineda MD        sodium chloride (NS) flush 5-40 mL  5-40 mL IntraVENous Q8H Kerry Gordon, DO        sodium chloride (NS) flush 5-40 mL  5-40 mL IntraVENous PRN Kerry Gordon,          Current Outpatient Medications   Medication Sig Dispense Refill    Jardiance 10 mg tablet Take 10 mg by mouth daily. BD Insulin Syringe SafetyGlide 0.5 mL 30 gauge x 5/16\" syrg       tamsulosin (FLOMAX) 0.4 mg capsule Take 1 Capsule by mouth daily (after dinner). 90 Capsule 3    tamsulosin (FLOMAX) 0.4 mg capsule Take 1 Capsule by mouth in the morning. (Patient not taking: Reported on 8/24/2022) 30 Capsule 0    insulin glargine (LANTUS) 100 unit/mL injection Inject 20 units daily at bedtime. Monitor and record blood sugar daily. 1 mL 0    rivaroxaban (XARELTO) 15 mg tab tablet Take 15 mg by mouth two (2) times a day. (Patient not taking: Reported on 8/24/2022)      insulin aspart U-100 (NovoLOG Flexpen U-100 Insulin) 100 unit/mL (3 mL) inpn 5 Units by SubCUTAneous route Before breakfast, lunch, and dinner. (Patient not taking: Reported on 8/24/2022) 1 Pen 0    levothyroxine (SYNTHROID) 150 mcg tablet Take 150 mcg by mouth daily. morphine IR (MS IR) 15 mg tablet Take 15 mg by mouth two (2) times a day. metFORMIN (GLUCOPHAGE) 500 mg tablet Take 500 mg by mouth daily.       hydrOXYzine HCL (ATARAX) 10 mg tablet Take 1 Tablet by mouth two (2) times daily as needed for Anxiety. (Patient not taking: Reported on 8/24/2022)      allopurinoL (ZYLOPRIM) 100 mg tablet Take 100 mg by mouth daily. lisinopriL (PRINIVIL, ZESTRIL) 40 mg tablet Take 1 tablet by mouth once daily 90 Tablet 0    sertraline (ZOLOFT) 50 mg tablet Take 1 Tablet by mouth daily. (Patient not taking: Reported on 8/24/2022) 90 Tablet 3    gabapentin (NEURONTIN) 400 mg capsule Take 1 cap in morning, 1 cap at 2pm, 2 caps at bedtime 360 Capsule 0    carvediloL (COREG) 12.5 mg tablet Take 1 Tablet by mouth two (2) times daily (with meals). 180 Tablet 0    bumetanide (BUMEX) 2 mg tablet Take 0.5 Tablets by mouth two (2) times daily as needed (swelling). 180 Tablet 1    amLODIPine (NORVASC) 10 mg tablet Take 1 Tablet by mouth daily. 90 Tablet 0    atorvastatin (LIPITOR) 80 mg tablet Take 1 Tablet by mouth daily. 90 Tablet 3    Blood-Glucose Meter (FREESTYLE LITE METER) monitoring kit Test twice blood glucose daily; ICD-10 E11.9; Quantity 1 1 Kit 0    insulin syringe,safetyneedle 1 mL 31 gauge x 5/16\" syrg 1 Each by Does Not Apply route daily. 300 Each 3    glucose blood VI test strips (FREESTYLE TEST) strip Use to check blood glucose twice daily DX:E11.9 300 Strip 3    acetaminophen (TYLENOL) 500 mg tablet Take 1 Tab by mouth every six (6) hours as needed for Pain.  270 Tab 3        ROS  (positive findings are in BOLD; negative findings are in regular font)  Constitutional: fevers, chills, appetite changes, weight changes, fatigue  HEENT: changes in vision, changes in hearing, sore throat, dysphagia  Cardiovascular: chest pain, palpitations, PND, orthopnea, edema  Pulmonary: SOB, cough, sputum production, wheezing, chest tightness  Gastrointestinal: abdominal pain, nausea/vomiting, diarrhea, constipation, melena, hematochezia  Genitourinary: dysuria, hesitation, dribbling, urgency, hematuria  Musculoskeletal: arthralgias, myalgias  Skin: rash, itching  Neurological: sensory changes, motor changes, headache  Psychiatric: mood changes  Endocrine: heat/cold intolerance  Heme: easy bruising/easy bleeding, LAD     OBJECTIVE:  Patient Vitals for the past 24 hrs:   BP Temp Pulse Resp SpO2 Height Weight   10/07/22 2013 122/73 -- (!) 125 22 96 % -- --   10/07/22 2000 -- -- -- -- 97 % -- --   10/07/22 1958 (!) 105/58 -- (!) 124 18 -- -- --   10/07/22 1948 101/69 -- (!) 119 15 -- -- --   10/07/22 1915 115/65 -- (!) 105 14 -- -- --   10/07/22 1900 (!) 108/92 -- (!) 103 25 -- -- --   10/07/22 1845 122/65 -- (!) 130 19 100 % -- --   10/07/22 1830 124/68 -- (!) 120 21 100 % -- --   10/07/22 1815 122/61 -- (!) 107 26 100 % -- --   10/07/22 1745 119/65 -- (!) 115 21 100 % -- --   10/07/22 1730 (!) 105/48 -- (!) 129 24 100 % -- --   10/07/22 1715 (!) 143/114 -- (!) 124 23 (!) 82 % -- --   10/07/22 1700 (!) 153/67 -- (!) 135 24 -- -- --   10/07/22 1656 -- -- -- -- 99 % -- --   10/07/22 1646 (!) 162/81 98.3 °F (36.8 °C) 81 -- 94 % 5' 5\" (1.651 m) 100 kg (220 lb 7.4 oz)     Body mass index is 36.69 kg/m². PHYSICAL EXAM:  Physical Exam  General: The patient appears alert and in some acute distress. HEENT: NCAT, PERRLA, EOM intact; oral mucosa well perfused, oropharynx clear  Neck: negative, JVD difficult to assess due to obesity  CVS: rapid rate and irregular rhythm  Lungs: distant breath sounds bilaterally   Abdomen: Soft, non-distended, non-TTP, BS+, no organomegaly, no masses  Ext: Bilateral calf tenderness, peripheral pulses present, no significant edema. Absence of R great toe d/t previous amputation   Skin: Warm, Dry, Intact, red calloused area tender to palpation on plantar aspect of L great toe   Neuro: No focal neurologic deficits or gross abnormalities  Psych: A&Ox3, appropriate mood and affect     RECENT LABS AND IMAGING ON ADMISSION        XR (Most Recent).  Results from [place] encounter on 10/07/22    XR CHEST PORT    Narrative  PORTABLE CHEST RADIOGRAPH    CPT CODE: 50693    INDICATION: Chest pain, shortness of breath, dyspnea. COMPARISON: 8/10/2022. FINDINGS:    Frontal view of the chest obtained at 1710 hours. Cardiomediastinal silhouette  is similar. Atherosclerosis at the arch. There is increased interstitial  thickening and mild groundglass at the lung bases. There may be a small right  pleural effusion. No pneumothorax. Bones are osteopenic. Degenerative changes  bilateral shoulders. Degenerative disc disease. Impression  Increased interstitial edema. The mild groundglass at the lung bases may  represent mild pulmonary edema or atelectasis. Possible small right pleural effusion. CT (Most Recent) Results from Hospital Encounter encounter on 08/10/22    CTA CHEST W OR W WO CONT    Narrative  CTA CHEST PULMONARY EMBOLISM PROTOCOL      INDICATION: Tachycardia and febrile. Question pulmonary embolism. TECHNIQUE: Thin collimation axial images obtained through the level of the  pulmonary arteries with additional imaging through the chest following the  uneventful administration of nonionic intravenous contrast.  Images  reconstructed into three dimensional coronal and sagittal projections for  complete evaluation of the tortuous and overlapping pulmonary vascular  structures and to reduce patient radiation dose. All CT scans at this facility are performed using dose optimization technique as  appropriate to a performed exam, to include automated exposure control,  adjustment of the mA and/or kV according to patient size (including appropriate  matching first site-specific examinations), or use of iterative reconstruction  technique. COMPARISON: Chest radiograph from earlier tonight. Vearl Pippins FINDINGS:    No filling defects are appreciated within the main, left, right, lobar or  visualized segmental pulmonary arteries to suggest embolism. Thyroid: Atrophic  Pericardium/ Heart: Heart size is normal for age.   Aorta/ Vessels: No aneurysm or dissection. Mild atherosclerosis. Lymph Nodes: Unremarkable. .    Lungs: Mild pulmonary vascular congestion. No consolidative pneumonia or overt  pulmonary edema. Respiratory motion. No pleural effusion. Upper Abdomen: Reported separately. Bones/soft tissues: No acute finding    Impression  No evidence for pulmonary emboli. Pulmonary vascular congestion without overt edema. No evidence of pneumonia. CT abdomen and pelvis is reported separately. ECHO No results found for this or any previous visit. EKG No results found for this or any previous visit. I have discussed Ms. Renee Ledesma case with my attending who agrees with the plan of care. Andreas Kelly DO , PGY-1   [facility]   October 7, 2022, 9:06 PM     [facility]  Senior Addendum to History and Physical    I have also seen and evaluated the patient with Dr. Sean Henning. I agree with the plan as noted above. Additional HPI, A/P:     81 yo F with PMHx of HFpEF, arthritis, hypothyroidism, asthma, chronic back pain, poorly controlled DM2, HTN, HLD, gout, peripheral neuropathy, urinary retention, DVT, obesity, who presents to the ED with worsening SOB for 3 days. She tried Tylenol and Motrin for her SOB without any benefit. She did not take her PRN Bumex for 3 days because she did not want to use the bathroom frequently at a recent social gathering, and has noticed increased swelling in her thighs. She reported chest pain to the ED but denied this fact when asked. Reports feeling better after 2L NC, IV Bumex in ED. Reports following with Dr. Lidia Koroma of Cardiology every 3 months but unable to locate Cardiology clinic notes on [facility]. Patient reports compliance with all her medications except for Xarelto and Insulin which were too costly for her so she stopped taking them 4 weeks ago. She lives with a roommate who helps her around the house.  Vitals significant for Afib at 110s-130s, SpO2 >92% on 2L but desats to 87-89% when talking. On exam, HR irregularly irregular w/o murmurs, tachypnea with diffuse decrease in breath sounds and crackles at lung bases, no abdo distension/tenderness, 2+ edema bilateral LE with warm erythematous patches around the ankles R>L, bilateral calf tenderness, amputated R great toe, Tender calloused red spot on the L Met-Head. Admitting patient for acute CHF exacerbation. Acute hypoxic respiratory failure 2/2 acute on chronic HFpEF  Patient presents with 3 days of worsening SOB and LE edema. SpO2 in the ED of 82% on RA with Tachycardia, new onset Afib w/ RVR. VBG shows mild acidosis with pH 7.25, pCO2 62.7 BNP elevated to 3697 from 1162 in 8/10, Trop negative. Last ECHO on 8/11 showing normal L ventricular systolic function, EF 77-89%. Has Bumex PRN as a home med but has not taken it for the past few days. Patient never took Coreg. Was scheduled to see Dr. Leydi Scott of Cardiology on 9/13 but no note of this on Epic. Received IV Bumex in the ED. CXR and exam indicative of fluid overload. Pt's clinical picture is also concerning for possible PE, as patient stopped her Xarelto 4 weeks ago and has bilateral calf tenderness  Admit to stepdown with Cardiac monitoring  IV Lasix 40mg BID, to start tomorrow AM  Consult Cardiology in AM  [address] while on Lasix  Supplemental O2, Maintain SpO2 >92%. Currently at 2L  BiPAP PRN  CTA chest and PVLs to check for PE and DVT  Restricted salt diet, Fluid <1200ml/day  PT/OT/CM    Sepsis of unknown origin, hx of diabetic foot ulcers  3/4 SIRS met for Tachycardia, leukocytosis to 18.1 and tachypnea to 20s. Patient in no acute distress, denies any fevers, chills, does not necessarily look septic on exam, however. She does have tender warm erythematous patches on her lower legs worse on the right concerning for stasis dermatitis, but could also be developing cellulitis.  Also, pt has poorly controlled diabetes with a tender dark red spot on the L 1st metatarsal head. This was evaluated by Podiatry on 8/12 who stated this was a pre-ulcer/small pressure callus at the time. XR foot on 8/10 showed no signs of osteoarthritis on the L forefoot. Of note, she did have I&D done with Podiatry on that area on 5/27 as MRI on 5/27 showed regional soft tissue ulceration and cellulitis without osteomyelitis. CXR today shows increased interstitial edema, mild groundglass opacities at lung bases, R pleural effusion. COVID/Flu negative. Patient was also noted to have been admitted on 8/10-8/13/22 also with sepsis of unknown origin, was discharged on cefdinir. No fevers/chills since then. [facility]ed the HFU. Initiate Vanc and Zosyn  Ordered BCx, UA, UCx  Ordered Procalcitonin, Lactic acid  Will trend WBCs and check bands  XR L foot    Hx of DVT  Noted on PVLs on 6/16, Acute R non-occlusive DVT in the common femoral and 2/2 peroneal veins. Also had a R superficial acute occlusive thrombus in the great saphenous thigh vein. Was started on Xarelto but patient stopped shortly after due to cost. Now has bilateral Roberta's sign and acute respiratory status change concerning for PE. Continue Xarelto for now  PT/PTT  Stat PVLs and CTA chest. Will change anticoagulation if positive for either    Afib w/RVR  New diagnosis. Noted at bedside and on EKG. Tachycardic to 110s-130s. Responded to Lopressor 5mg IV x2 in the ED initially but HR again 130s at bedside on evaluation. No prior hx of arrhythmias. Reportedly on Coreg at home but patient denies ever taking it. Pt reported chest pain on arrival but denied this fact when asked at bedside. On Tele  Cardiology Consult in AM  Will start patient on Lopressor 25mg PO BID  Repeat TSH    DM2, poorly controlled   Last A1C of 11.8 on 5/27/22.  on admission. Home meds include: Lantus 20U qHS, Novolog 5U with meals, Metformin 500mg daily, Jardiance 10mg daily.  However, has not been taking her insulin for the past 4 weeks as it is cost prohibitive   Restart Lantus 20U qHS  ACHS BG checks with mealtime insulin  Hold Metformin and Jardiance   Repeat A1C  Continue Gabapentin 400mg TID for peripheral neuropathy    Hx of Urinary retention  Follows with Urology, was discharged from the previous hospital stay in August with [facility] but was taken out on Urology follow up. Patient states she is urinating without issues. UA and urine culture  Strict I/Os  Continue home Flomax      Other chronic problems include:    CKDIII - Baseline Cr 1.0-1.2. Cr on admission 1.25, GFR 48. Will continue to monitor with daily labs  Hypothyroidism - Last TSH of 2.84 on 8/10. Continue home Synthroid 150mcg daily. Will recheck TSH. Gout - Chronic but no recent flare ups. Continue home Allopurinol 100mg daily  HTN - BP appropriate in the ED. Now low-normal after IV Bumex. Will hold home Lisinopril 40mg daily, Norvasc 10mg daily, can resume later  HLD - continue home Lipitor 80mg daily  Hx of depression and anxiety - no longer an active diagnosis per clinic chart, denies depression, No longer taking Sertraline 50mg  Chronic Back pain - Follows pain management, is on Morphine 15mg BID PRN for back pain. Will hold due to acute hypoxia. Presbycusis - please speak loudly close to the patient      Vitals, labs and imaging reviewed     For additional problem list, assessment, and plan see intern note.     Judi West MD , PGY-3   [facility]   October 7, 2022, 9:06 PM

## 2022-10-08 NOTE — PROGRESS NOTES
[address] [facility] Pharmacokinetic Monitoring Service - Vancomycin     Renee Godoy is a 82 y.o. female starting on vancomycin therapy for sepsis. Pharmacy consulted by Anaid Simons MD for monitoring and adjustment. Target Concentration: <15 mg/L    Additional Antimicrobials: pip-tazo    Pertinent Laboratory Values: Wt Readings from Last 1 Encounters:   10/07/22 100 kg (220 lb 7.4 oz)     Temp Readings from Last 1 Encounters:   10/07/22 98.3 °F (36.8 °C)     No components found for: PROCAL  Estimated Creatinine Clearance: 38.5 mL/min (based on SCr of 1.25 mg/dL). Recent Labs     10/07/22  1653   WBC 18.1*       Pertinent Cultures:  Culture Date Source Results   - - -   MRSA Nasal Swab: not ordered. Order placed by pharmacy.     Plan:  Concentration-guided dosing due to renal impairment/insufficiency  Start vancomycin 2,000 mg x1  Renal labs as indicated   Vancomycin concentration ordered for 10/9 with AM labs   Pharmacy will continue to monitor patient and adjust therapy as indicated    Thank you for the consult,  Kenn Dakins, [facility]  10/7/2022 8:57 PM

## 2022-10-08 NOTE — PROGRESS NOTES
Bedside and Verbal shift change report received from Rosemarie Hubbard RN(offgoing nurse). Report included the following information SBAR, Kardex, Procedure Summary, Recent Results, and Cardiac Rhythm Afib .      1953: AOX4, on 2L 02 via NC, resting in bed, no distress noted; v/s monitored; shift assessment done; Cardizem Drip infusing well    2116: ; 4 units Humalog given sc; due meds given    2300: AFib 105 on tele; no needs/complaints voiced    0200: sleeping comfortably    0344: -118 Afib; pt awake watching tv; Cardizem drip infusing well at 5 mg/hr    0530: bath given; mell care done    0700: resting comfortably    Bedside and Verbal shift change report given to Rosemarie Hubbard (oncoming nurse) by Abisai Scott RN (offgoing nurse). Report included the following information SBAR, Kardex, Intake/Output, Recent Results, and Cardiac Rhythm Afib .       Wound Prevention Checklist    Patient: Venkatesh Hilton [de-identified]82 y.o. female)  Date: 10/9/2022  Diagnosis: Acute exacerbation of CHF (congestive heart failure) ([facility]) [I50.9] Acute exacerbation of CHF (congestive heart failure) ([facility] [address]    Precautions: Fall       [x]  Heel prevention boots placed on patient    [x]  Patient turned q2h during shift    []  Lift team ordered    [x]  Patient on Shalini bed/Specialty bed    []  Each Wound is documented during shift (Stage, Color, drainage, odor, measurements, and dressings)    [x]  Dual skin check done with Rosemarie Scott RN

## 2022-10-08 NOTE — PROGRESS NOTES
Problem: Self Care Deficits Care Plan (Adult)  Goal: *Acute Goals and Plan of Care (Insert Text)  Description: Occupational Therapy Goals  Initiated 10/8/2022 within 7 day(s). 1.  Patient will perform grooming with modified independence. 2.  Patient will perform bathing with modified independence using adaptive equipment as needed. 3.  Patient will perform upper body dressing and lower body dressing with modified independence using adaptive equipment as needed. 4.  Patient will perform toilet transfers with modified independence using RW with good balance. 5.  Patient will perform all aspects of toileting with modified independence. 6.  Patient will participate in upper extremity therapeutic exercise/activities with modified independence for 8 minutes. 7.  Patient will utilize energy conservation techniques during functional activities with min verbal cues. Prior Level of Function: Mod I with ADLs and functional mobility using RW, supportive roommate Lucio Plaster assists with household chores e.g. taking out garbage     Outcome: Progressing Towards Goal   OCCUPATIONAL THERAPY EVALUATION    Patient: Gabino Wilder [de-identified]82 y.o. female)  Date: 10/8/2022  Primary Diagnosis: Acute exacerbation of CHF (congestive heart failure) ([facility] [address] [I50.9]       Precautions:   Fall    ASSESSMENT :  Nursing/RN cleared for pt to participate in OT evaluation and tx session. Patient presents lying semi-reclined on ED stretcher, A & O x 4, tachycardia while supine with -144. Pt refusing functional transfers e.g. bedside commode transfer d/t report that MD told her not to, although MD cleared pt for mobility with OT. Bed mobility: Min A supine to sit edge of ED stretcher, able to scoot laterally towards left with CGA,  noted elevated HR to 168, dep to don slipper socks. Bed mobility:  sit <->supine w/ CGA. Pt washed her face laying semi-reclined.   Patient resting comfortably lying semi reclined, call bell within reach & pt verbalized understanding and provided return demonstration to utilize for assist e.g. functional transfers in order to prevent falls. Patient will benefit from skilled intervention to address the above impairments. Patient's rehabilitation potential is considered to be Good  Factors which may influence rehabilitation potential include:   []             None noted  []             Mental ability/status  [x]             Medical condition  []             Home/family situation and support systems  []             Safety awareness  []             Pain tolerance/management  []             Other:      PLAN :  Recommendations and Planned Interventions:   [x]               Self Care Training                  [x]      Therapeutic Activities  [x]               Functional Mobility Training   []      Cognitive Retraining  [x]               Therapeutic Exercises           [x]      Endurance Activities  [x]               Balance Training                    [x]      Neuromuscular Re-Education  []               Visual/Perceptual Training     [x]      Home Safety Training  [x]               Patient Education                   [x]      Family Training/Education  []               Other (comment):    Frequency/Duration: Patient will be followed by occupational therapy 3-5 times a week to address goals. Further Equipment Recommendations for Discharge: patient has all recommended DME    AMPAC: Current research shows that an AM-PAC score of 17 or less is not associated with a discharge to the patient's home setting. Based on an AM-PAC score of 17/24 and their current ADL deficits; it is recommended that the patient have 5-7 sessions per week of Occupational Therapy at d/c to increase the patient's independence. Currently, this patient demonstrates the potential endurance, and/or tolerance for 3 hours of therapy each day at d/c.       This AMPAC score should be considered in conjunction with interdisciplinary team recommendations to determine the most appropriate discharge setting. Patient's social support, diagnosis, medical stability, and prior level of function should also be taken into consideration. SUBJECTIVE:   Patient stated I have a transport wheelchair, although I need a real wheelchair.     OBJECTIVE DATA SUMMARY:     Past Medical History:   Diagnosis Date    Acquired hypothyroidism 8/1/2016    Arthritis of right shoulder region 4/21/2016    Asthma     Bilateral lower extremity edema 7/7/2021    Chronic bilateral low back pain without sciatica 7/7/2021    Chronic diastolic congestive heart failure ([address] 3/10/2021    Chronic venous insufficiency     Diabetes (HCC)     neuropathy    Essential hypertension 7/7/2021    Gout     History of depression 1/23/2017    History of fall 7/7/2021    Hypercholesteremia 7/7/2021    Hypertension     MI (myocardial infarction) ([address]     OAB (overactive bladder) 7/7/2021    Peripheral neuropathy     Rheumatoid arthritis ([address]     Tear of right rotator cuff 5/16/2016    Thyroid pain     Urinary incontinence 7/7/2021    Vertigo      Past Surgical History:   Procedure Laterality Date    HX AMPUTATION TOE Right 2020    Great toe Dr. 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     HX KNEE REPLACEMENT Bilateral      Barriers to Learning/Limitations: None  Compensate with: visual, verbal, tactile, kinesthetic cues/model    Home Situation:   Home Situation  Home Environment: Apartment  # Steps to Enter: 0  One/Two Story Residence: One story  Living Alone: No  Support Systems: Child(jace), Friend/Neighbor (roomate Sarmad)  Current DME Used/Available at Home: Walker, rolling, Safety frame toliet, Raised toilet seat, Cane, straight, Commode, bedside, Tub transfer bench, Grab bars  Tub or Shower Type: Tub/Shower combination  []  Right hand dominant   []  Left hand dominant    Cognitive/Behavioral Status:  Neurologic State: Alert  Orientation Level: Oriented X4  Cognition: Follows commands  Safety/Judgement: Fall prevention    Skin: appears intact    Edema: BLEs    Vision/Perceptual:  appears intact, able to tell correct time on wall clock       Coordination: BUE  Coordination: Within functional limits  Fine Motor Skills-Upper: Left Intact; Right Intact    Gross Motor Skills-Upper: Left Intact; Right Intact    Balance:  Sitting: With support; Impaired  Sitting - Static: Good (unsupported)  Sitting - Dynamic: Fair (occasional)    Strength: BUE  Strength: Generally decreased, functional     Tone & Sensation: BUE  Tone: Normal     Range of Motion: BUE  AROM: Generally decreased, functional     Functional Mobility and Transfers for ADLs:  Bed Mobility:     Supine to Sit: Minimum assistance  Sit to Supine: Contact guard assistance  Scooting: Contact guard assistance    ADL Assessment:   Feeding: Modified independent    Oral Facial Hygiene/Grooming: Stand-by assistance    Bathing: Minimum assistance    Upper Body Dressing: Stand-by assistance    Lower Body Dressing: Total assistance    Toileting: Minimum assistance     ADL Intervention:  Grooming  Washing Face: Stand-by assistance    Lower Body Dressing Assistance  Socks: Total assistance (dependent)  Position Performed: Seated edge of bed    Cognitive Retraining  Safety/Judgement: Fall prevention    Pain:  Pain level pre-treatment: 0/10   Pain level post-treatment: 0/10     Activity Tolerance:   poor  Please refer to the flowsheet for vital signs taken during this treatment.   After treatment:   [] Patient left in no apparent distress sitting up in chair  [x] Patient left in no apparent distress in bed  [x] Call bell left within reach  [x] Nursing notified  [] Caregiver present  [] Bed alarm activated    COMMUNICATION/EDUCATION:   [x] Role of Occupational Therapy in the acute care setting  [x] Home safety education was provided and the patient/caregiver indicated understanding. [x] Patient/family have participated as able in goal setting and plan of care. [x] Patient/family agree to work toward stated goals and plan of care. [] Patient understands intent and goals of therapy, but is neutral about his/her participation. [] Patient is unable to participate in goal setting and plan of care. Thank you for this referral.  Eduardo Sidra  Time Calculation: 22 mins    Eval Complexity: History: MEDIUM Complexity : Expanded review of history including physical, cognitive and psychosocial  history ; Examination: MEDIUM Complexity : 3-5 performance deficits relating to physical, cognitive , or psychosocial skils that result in activity limitations and / or participation restrictions; Decision Making:MEDIUM Complexity : Patient may present with comorbidities that affect occupational performnce. Miniml to moderate modification of tasks or assistance (eg, physical or verbal ) with assesment(s) is necessary to enable patient to complete evaluation     [facility]-PAC® Daily Activity Inpatient Short Form (6-Clicks)*    How much HELP from another person does the patient currently need    (If the patient hasn't done an activity recently, how much help from another person do you think he/she would need if he/she tried?)   Total (Total A or Dep)   A Lot  (Mod to Max A)   A Little (Sup or Min A)   None (Mod I to I)   Putting on and taking off regular lower body clothing? [x] 1 [] 2 [] 3 [] 4   2. Bathing (including washing, rinsing,      drying)? [] 1 [x] 2 [] 3 [] 4   3. Toileting, which includes using toilet, bedpan or urinal?   [] 1 [x] 2 [] 3 [] 4   4. Putting on and taking off regular upper body clothing? [] 1 [] 2 [x] 3 [] 4   5. Taking care of personal grooming such as brushing teeth? [] 1 [] 2 [x] 3 [] 4   6. Eating meals?    [] 1 [] 2 [x] 3 [] 4

## 2022-10-08 NOTE — PROGRESS NOTES
Reason for Renal Dosing:  Per Renal Dosing Policy    Patient clinical status and labs ordered/reviewed. Pt Weight Weight: 100 kg (220 lb 7.4 oz)   Serum Creatinine Lab Results   Component Value Date/Time    Creatinine 1.25 10/07/2022 04:53 PM       Creatinine Clearance Estimated Creatinine Clearance: 38.5 mL/min (based on SCr of 1.25 mg/dL). BUN Lab Results   Component Value Date/Time    BUN 23 (H) 10/07/2022 04:53 PM       WBC Lab Results   Component Value Date/Time    WBC 18.1 (H) 10/07/2022 04:53 PM      Temperature Temp: 98.3 °F (36.8 °C)   HR Pulse (Heart Rate): (!) 125     BP BP: 122/73           Drug type: Non-Antibiotic      Drug/dose: was adjusted to : 300 mg three times daily. Max daily dose 900 mg/ day in 2 to 3 divided doses    Continue to monitor.     Signed Vamshi Bernstein, PHARMD  Date 10/7/2022  Time 9:13 PM

## 2022-10-08 NOTE — PROGRESS NOTES
Pharmacy Pharmacokinetic Monitoring Service - Vancomycin    Consulting Provider: Solange Lipscomb MD   Indication: Sepsis of Unknown Etiology  Target Concentration: Goal AUC/TRINIDAD 400-600 mg*hr/L  Day of Therapy: 2  Additional Antimicrobials: Piperacillin/Tazobactam    Pertinent Laboratory Values:   Temp: 98.3 °F (36.8 °C)  Weight: 100 kg (220 lb 7.4 oz)  Recent Labs     10/07/22  2230 10/07/22  1653   CREA 0.86 1.25   BUN 20* 23*   WBC  --  18.1*   PCT  --  <0.05       Estimated Creatinine Clearance: 56 mL/min (based on SCr of 0.86 mg/dL).     Pertinent Cultures:  Microbiology Results (Current encounter)   Date/Time Order Name Specimen Source Lab Status   10/8/22 0257 CULTURE, URINE Clean catch In process   10/7/22 2230 COVID-19 WITH INFLUENZA A/B Nasopharyngeal Not Detected   10/7/22 2230 CULTURE, BLOOD Blood Pending   10/7/22 2059 CULTURE, MRSA Nares Not seen     Assessment:  Date/Time Current Dose Concentration Timing of Concentration (h) AUC   10/07/22 23:37 2000 mg x1 N/A N/A --   Note: Serum concentrations collected for AUC dosing may appear elevated if collected in close proximity to the dose administered, this is not necessarily an indication of toxicity    Plan:  Begin vancomycin 1500 mg IV q24h  AUC24,ss: 482 mg/L.hr  Ctrough,ss: 14.3 mg/L  Renal labs as indicated   Vancomycin concentration ordered for AM labs tomorrow  Pharmacy will continue to monitor patient and adjust therapy as indicated    Thank you for the consult,    BELINDA Rivera [facility]  10/8/2022

## 2022-10-09 LAB
ALBUMIN SERPL-MCNC: 3 G/DL (ref 3.4–5)
ALBUMIN/GLOB SERPL: 0.7 {RATIO} (ref 0.8–1.7)
ALP SERPL-CCNC: 56 U/L (ref 45–117)
ALT SERPL-CCNC: 7 U/L (ref 13–56)
ANION GAP SERPL CALC-SCNC: 6 MMOL/L (ref 3–18)
AST SERPL-CCNC: 8 U/L (ref 10–38)
BASOPHILS # BLD: 0.1 K/UL (ref 0–0.1)
BASOPHILS NFR BLD: 1 % (ref 0–2)
BILIRUB SERPL-MCNC: 0.7 MG/DL (ref 0.2–1)
BUN SERPL-MCNC: 28 MG/DL (ref 7–18)
BUN/CREAT SERPL: 23 (ref 12–20)
CALCIUM SERPL-MCNC: 9.1 MG/DL (ref 8.5–10.1)
CHLORIDE SERPL-SCNC: 103 MMOL/L (ref 100–111)
CO2 SERPL-SCNC: 31 MMOL/L (ref 21–32)
CREAT SERPL-MCNC: 1.21 MG/DL (ref 0.6–1.3)
DIFFERENTIAL METHOD BLD: ABNORMAL
EOSINOPHIL # BLD: 0.4 K/UL (ref 0–0.4)
EOSINOPHIL NFR BLD: 4 % (ref 0–5)
ERYTHROCYTE [DISTWIDTH] IN BLOOD BY AUTOMATED COUNT: 14.1 % (ref 11.6–14.5)
GLOBULIN SER CALC-MCNC: 4.1 G/DL (ref 2–4)
GLUCOSE BLD STRIP.AUTO-MCNC: 120 MG/DL (ref 70–110)
GLUCOSE BLD STRIP.AUTO-MCNC: 137 MG/DL (ref 70–110)
GLUCOSE BLD STRIP.AUTO-MCNC: 162 MG/DL (ref 70–110)
GLUCOSE BLD STRIP.AUTO-MCNC: 173 MG/DL (ref 70–110)
GLUCOSE SERPL-MCNC: 115 MG/DL (ref 74–99)
HCT VFR BLD AUTO: 33.4 % (ref 35–45)
HGB BLD-MCNC: 10.2 G/DL (ref 12–16)
IMM GRANULOCYTES # BLD AUTO: 0 K/UL (ref 0–0.04)
IMM GRANULOCYTES NFR BLD AUTO: 0 % (ref 0–0.5)
LYMPHOCYTES # BLD: 2.1 K/UL (ref 0.9–3.6)
LYMPHOCYTES NFR BLD: 22 % (ref 21–52)
MCH RBC QN AUTO: 27 PG (ref 24–34)
MCHC RBC AUTO-ENTMCNC: 30.5 G/DL (ref 31–37)
MCV RBC AUTO: 88.4 FL (ref 78–100)
MONOCYTES # BLD: 0.9 K/UL (ref 0.05–1.2)
MONOCYTES NFR BLD: 10 % (ref 3–10)
NEUTS SEG # BLD: 5.9 K/UL (ref 1.8–8)
NEUTS SEG NFR BLD: 62 % (ref 40–73)
NRBC # BLD: 0 K/UL (ref 0–0.01)
NRBC BLD-RTO: 0 PER 100 WBC
PLATELET # BLD AUTO: 164 K/UL (ref 135–420)
PMV BLD AUTO: 13 FL (ref 9.2–11.8)
POTASSIUM SERPL-SCNC: 4.4 MMOL/L (ref 3.5–5.5)
PROT SERPL-MCNC: 7.1 G/DL (ref 6.4–8.2)
RBC # BLD AUTO: 3.78 M/UL (ref 4.2–5.3)
SODIUM SERPL-SCNC: 140 MMOL/L (ref 136–145)
VANCOMYCIN SERPL-MCNC: 19.3 UG/ML (ref 5–40)
WBC # BLD AUTO: 9.5 K/UL (ref 4.6–13.2)

## 2022-10-09 PROCEDURE — 94762 N-INVAS EAR/PLS OXIMTRY CONT: CPT

## 2022-10-09 PROCEDURE — 82962 GLUCOSE BLOOD TEST: CPT

## 2022-10-09 PROCEDURE — 74011250637 HC RX REV CODE- 250/637

## 2022-10-09 PROCEDURE — 99232 SBSQ HOSP IP/OBS MODERATE 35: CPT | Performed by: INTERNAL MEDICINE

## 2022-10-09 PROCEDURE — 2709999900 HC NON-CHARGEABLE SUPPLY

## 2022-10-09 PROCEDURE — 74011000250 HC RX REV CODE- 250

## 2022-10-09 PROCEDURE — 74011000258 HC RX REV CODE- 258: Performed by: INTERNAL MEDICINE

## 2022-10-09 PROCEDURE — 74011250637 HC RX REV CODE- 250/637: Performed by: INTERNAL MEDICINE

## 2022-10-09 PROCEDURE — 80202 ASSAY OF VANCOMYCIN: CPT

## 2022-10-09 PROCEDURE — 97161 PT EVAL LOW COMPLEX 20 MIN: CPT

## 2022-10-09 PROCEDURE — 74011000258 HC RX REV CODE- 258: Performed by: STUDENT IN AN ORGANIZED HEALTH CARE EDUCATION/TRAINING PROGRAM

## 2022-10-09 PROCEDURE — 80053 COMPREHEN METABOLIC PANEL: CPT

## 2022-10-09 PROCEDURE — 36415 COLL VENOUS BLD VENIPUNCTURE: CPT

## 2022-10-09 PROCEDURE — 74011250636 HC RX REV CODE- 250/636: Performed by: STUDENT IN AN ORGANIZED HEALTH CARE EDUCATION/TRAINING PROGRAM

## 2022-10-09 PROCEDURE — 65660000004 HC RM CVT STEPDOWN

## 2022-10-09 PROCEDURE — 77010033678 HC OXYGEN DAILY

## 2022-10-09 PROCEDURE — 97116 GAIT TRAINING THERAPY: CPT

## 2022-10-09 PROCEDURE — 85025 COMPLETE CBC W/AUTO DIFF WBC: CPT

## 2022-10-09 PROCEDURE — 74011636637 HC RX REV CODE- 636/637: Performed by: STUDENT IN AN ORGANIZED HEALTH CARE EDUCATION/TRAINING PROGRAM

## 2022-10-09 PROCEDURE — 74011636637 HC RX REV CODE- 636/637

## 2022-10-09 PROCEDURE — 74011250636 HC RX REV CODE- 250/636: Performed by: INTERNAL MEDICINE

## 2022-10-09 PROCEDURE — 74011250636 HC RX REV CODE- 250/636: Performed by: FAMILY MEDICINE

## 2022-10-09 RX ORDER — INSULIN GLARGINE 100 [IU]/ML
20 INJECTION, SOLUTION SUBCUTANEOUS DAILY
Status: DISCONTINUED | OUTPATIENT
Start: 2022-10-09 | End: 2022-10-14 | Stop reason: HOSPADM

## 2022-10-09 RX ORDER — NITROFURANTOIN 25; 75 MG/1; MG/1
100 CAPSULE ORAL 2 TIMES DAILY
Status: DISCONTINUED | OUTPATIENT
Start: 2022-10-09 | End: 2022-10-09

## 2022-10-09 RX ORDER — LEVOFLOXACIN 250 MG/1
250 TABLET ORAL EVERY 24 HOURS
Status: DISCONTINUED | OUTPATIENT
Start: 2022-10-10 | End: 2022-10-10

## 2022-10-09 RX ORDER — METOPROLOL TARTRATE 50 MG/1
50 TABLET ORAL EVERY 12 HOURS
Status: DISCONTINUED | OUTPATIENT
Start: 2022-10-09 | End: 2022-10-10

## 2022-10-09 RX ORDER — INSULIN GLARGINE 100 [IU]/ML
25 INJECTION, SOLUTION SUBCUTANEOUS DAILY
Status: DISCONTINUED | OUTPATIENT
Start: 2022-10-09 | End: 2022-10-09

## 2022-10-09 RX ORDER — FUROSEMIDE 40 MG/1
40 TABLET ORAL 2 TIMES DAILY
Status: DISCONTINUED | OUTPATIENT
Start: 2022-10-09 | End: 2022-10-14 | Stop reason: HOSPADM

## 2022-10-09 RX ORDER — MORPHINE SULFATE 15 MG/1
15 TABLET ORAL
Status: COMPLETED | OUTPATIENT
Start: 2022-10-09 | End: 2022-10-10

## 2022-10-09 RX ORDER — METOPROLOL TARTRATE 50 MG/1
50 TABLET ORAL EVERY 12 HOURS
Status: DISCONTINUED | OUTPATIENT
Start: 2022-10-09 | End: 2022-10-09

## 2022-10-09 RX ADMIN — RIVAROXABAN 20 MG: 20 TABLET, FILM COATED ORAL at 09:43

## 2022-10-09 RX ADMIN — FUROSEMIDE 40 MG: 40 TABLET ORAL at 17:36

## 2022-10-09 RX ADMIN — VANCOMYCIN HYDROCHLORIDE 750 MG: 750 INJECTION, POWDER, LYOPHILIZED, FOR SOLUTION INTRAVENOUS at 11:34

## 2022-10-09 RX ADMIN — ALLOPURINOL 100 MG: 100 TABLET ORAL at 09:39

## 2022-10-09 RX ADMIN — SODIUM CHLORIDE 5 MG/HR: 900 INJECTION, SOLUTION INTRAVENOUS at 03:54

## 2022-10-09 RX ADMIN — GABAPENTIN 300 MG: 300 CAPSULE ORAL at 17:36

## 2022-10-09 RX ADMIN — ACETAMINOPHEN 500 MG: 500 TABLET ORAL at 23:32

## 2022-10-09 RX ADMIN — SODIUM CHLORIDE, PRESERVATIVE FREE 10 ML: 5 INJECTION INTRAVENOUS at 14:56

## 2022-10-09 RX ADMIN — Medication 20 UNITS: at 09:42

## 2022-10-09 RX ADMIN — NITROFURANTOIN MONOHYDRATE/MACROCRYSTALLINE 100 MG: 25; 75 CAPSULE ORAL at 17:36

## 2022-10-09 RX ADMIN — SODIUM CHLORIDE, PRESERVATIVE FREE 10 ML: 5 INJECTION INTRAVENOUS at 05:37

## 2022-10-09 RX ADMIN — METOPROLOL TARTRATE 50 MG: 50 TABLET, FILM COATED ORAL at 21:12

## 2022-10-09 RX ADMIN — Medication 2 UNITS: at 12:00

## 2022-10-09 RX ADMIN — GABAPENTIN 300 MG: 300 CAPSULE ORAL at 09:39

## 2022-10-09 RX ADMIN — LEVOTHYROXINE SODIUM 150 MCG: 0.05 TABLET ORAL at 09:39

## 2022-10-09 RX ADMIN — SODIUM CHLORIDE, PRESERVATIVE FREE 10 ML: 5 INJECTION INTRAVENOUS at 21:12

## 2022-10-09 RX ADMIN — METOPROLOL TARTRATE 50 MG: 50 TABLET, FILM COATED ORAL at 11:34

## 2022-10-09 RX ADMIN — GABAPENTIN 300 MG: 300 CAPSULE ORAL at 21:12

## 2022-10-09 RX ADMIN — ATORVASTATIN CALCIUM 80 MG: 40 TABLET, FILM COATED ORAL at 09:39

## 2022-10-09 RX ADMIN — PIPERACILLIN AND TAZOBACTAM 3.38 G: 3; .375 INJECTION, POWDER, FOR SOLUTION INTRAVENOUS at 03:47

## 2022-10-09 RX ADMIN — Medication 2 UNITS: at 17:37

## 2022-10-09 NOTE — PROGRESS NOTES
Problem: Mobility Impaired (Adult and Pediatric)  Goal: *Acute Goals and Plan of Care (Insert Text)  Description: Physical Therapy Goals  Initiated 10/9/2022 and to be accomplished within 7 day(s)  1. Patient will move from supine to sit and sit to supine , scoot up and down, and roll side to side in bed with modified independence. 2.  Patient will transfer from bed to chair and chair to bed with supervision/set-up using the least restrictive device. 3.  Patient will perform sit to stand with supervision/set-up. 4.  Patient will ambulate with supervision/set-up for 50 feet with the least restrictive device. 5.  Patient will participate in LE exercise to tolerance. PLOF: Pt reporting she lives with boyfriend in first floor apartment with 1 SAIGE. Pt has RW for mobility, reporting falls about every 6 months. Outcome: Progressing Towards Goal   PHYSICAL THERAPY EVALUATION    Patient: Baljit Shanks [de-identified]82 y.o. female)  Date: 10/9/2022  Primary Diagnosis: Acute exacerbation of CHF (congestive heart failure) ([facility] [address] [I50.9]       Precautions:  Fall    ASSESSMENT :  Pt cleared to participate in PT session, pt received on [facility] and agreeable to therapy session. Based on the objective data described below, the patient presents with decreased endurance, decreased strength, decreased balance reactions, gait deviations, and decreased independence in functional mobility. Pt completing sit to stand from [facility] with CGA to . Able to perform pericare with SBA in standing with Unilateral UE support on RW. Pt then ambulating x5 feet to recliner with CGA. HR fluctuating from 130s to 160s. Then standing for second time to RW with CGA and ambulating x20 feet in room with CGA. Pt returned to sitting in recliner. Pt positioned for comfort and educated to call for assist before getting up, pt verbalized understanding. Pt left with all needs met and call bell in reach. RN notified of position and participation.        Patient will benefit from skilled intervention to address the above impairments. Patient's rehabilitation potential is considered to be Good  Factors which may influence rehabilitation potential include:   []         None noted  []         Mental ability/status  [x]         Medical condition  []         Home/family situation and support systems  []         Safety awareness  []         Pain tolerance/management  []         Other:      PLAN :  Recommendations and Planned Interventions:   [x]           Bed Mobility Training             []    Neuromuscular Re-Education  [x]           Transfer Training                   []    Orthotic/Prosthetic Training  [x]           Gait Training                          []    Modalities  [x]           Therapeutic Exercises           []    Edema Management/Control  [x]           Therapeutic Activities            [x]    Family Training/Education  [x]           Patient Education  []           Other (comment):    Frequency/Duration: Patient will be followed by physical therapy 1-2 times per day/4-7 days per week to address goals. Further Equipment Recommendations for Discharge: rolling walker    AMPAC: Current research shows that an AM-PAC score of 14 without stairs or greater is associated with a discharge to the patient's home setting. Based on an AM-PAC score of 15/20 if omitting stairs and their current functional mobility deficits, it is recommended that the patient have 2-3 sessions per week of Physical Therapy at d/c to increase the patient's independence. This AMPAC score should be considered in conjunction with interdisciplinary team recommendations to determine the most appropriate discharge setting. Patient's social support, diagnosis, medical stability, and prior level of function should also be taken into consideration. SUBJECTIVE:   Patient stated I need to see how my breathing is today.     OBJECTIVE DATA SUMMARY:     Past Medical History:   Diagnosis Date    Acquired hypothyroidism 8/1/2016    Arthritis of right shoulder region 4/21/2016    Asthma     Bilateral lower extremity edema 7/7/2021    Chronic bilateral low back pain without sciatica 7/7/2021    Chronic diastolic congestive heart failure ([facility] [address] 3/10/2021    Chronic venous insufficiency     Diabetes (HCC)     neuropathy    Essential hypertension 7/7/2021    Gout     History of depression 1/23/2017    History of fall 7/7/2021    Hypercholesteremia 7/7/2021    Hypertension     MI (myocardial infarction) ([address]     OAB (overactive bladder) 7/7/2021    Peripheral neuropathy     Rheumatoid arthritis ([facility] [address]     Tear of right rotator cuff 5/16/2016    Thyroid pain     Urinary incontinence 7/7/2021    Vertigo      Past Surgical History:   Procedure Laterality Date    HX AMPUTATION TOE Right 2020    Great toe Dr. 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 HX KNEE REPLACEMENT Bilateral      Barriers to Learning/Limitations: None  Compensate with: N/A  Home Situation:  Home Situation  Home Environment: Apartment  # Steps to Enter: 1  Rails to Enter: No  Wheelchair Ramp: No  One/Two Story Residence: One story  Living Alone: No  Support Systems: Child(jace), Friend/Neighbor (roomate Sarmad)  Current DME Used/Available at Home: Walker, rolling, Safety frame toliet, Raised toilet seat, Cane, straight, Commode, bedside, Tub transfer bench, Grab bars  Tub or Shower Type: Tub/Shower combination  Critical Behavior:  Neurologic State: Alert  Orientation Level: Oriented X4  Cognition: Appropriate decision making  Safety/Judgement: Awareness of environment; Fall prevention  Psychosocial  Patient Behaviors: Calm; Cooperative  Other Caring Modalities: LLOYD VANCE AT BEDSIDE (CARDIOLOGIST AT BEDSIDE MAKING CHANGES)    Strength:    Strength: Generally decreased, functional    Tone & Sensation:   Tone: Normal    Sensation: Intact Range Of Motion:  AROM: Generally decreased, functional    Posture:  Posture (WDL): Within defined limits     Functional Mobility:  Bed Mobility:     Supine to Sit:  (found up for [facility].)     Scooting: Contact guard assistance  Transfers:  Sit to Stand: Contact guard assistance  Stand to Sit: Contact guard assistance    Balance:   Sitting: Intact  Sitting - Static: Good (unsupported)  Sitting - Dynamic: Good (unsupported)  Standing: Impaired; With support  Standing - Static: Good  Standing - Dynamic : Fair    Ambulation/Gait Training:  Distance (ft): 5 Feet (ft) (then x20 feet)  Assistive Device: Walker, rolling  Ambulation - Level of Assistance: Contact guard assistance     Gait Description (WDL): Exceptions to WDL  Gait Abnormalities: Decreased step clearance    Speed/Keyla: Slow;Shuffled  Step Length: Right shortened;Left shortened    Pain:  Pain level pre-treatment: 0/10   Pain level post-treatment: 0/10     Activity Tolerance:   Fair tolerance, mobility in room. Please refer to the flowsheet for vital signs taken during this treatment. After treatment:   [x]         Patient left in no apparent distress sitting up in chair  []         Patient left in no apparent distress in bed  [x]         Call bell left within reach  [x]         Nursing notified  []         Caregiver present  []         Bed alarm activated  []         SCDs applied    COMMUNICATION/EDUCATION:   [x]         Role of Physical Therapy in the acute care setting. [x]         Fall prevention education was provided and the patient/caregiver indicated understanding. [x]         Patient/family have participated as able in goal setting and plan of care. [x]         Patient/family agree to work toward stated goals and plan of care. []         Patient understands intent and goals of therapy, but is neutral about his/her participation. []         Patient is unable to participate in goal setting/plan of care: ongoing with therapy staff.   [] Other:    Thank you for this referral.  Joya Post, PT   Time Calculation: 26 mins      Eval Complexity: History: MEDIUM  Complexity : 1-2 comorbidities / personal factors will impact the outcome/ POC Exam:LOW Complexity : 1-2 Standardized tests and measures addressing body structure, function, activity limitation and / or participation in recreation  Presentation: LOW Complexity : Stable, uncomplicated  Clinical Decision Making:Low Complexity low  Overall Complexity:LOW     [employer] AM-PAC® Basic Mobility Inpatient Short Form (6-Clicks) Version 2    How much HELP from another person does the patient currently need    (If the patient hasn't done an activity recently, how much help from another person do you think he/she would need if he/she tried?)   Total (Total A or Dep)   A Lot  (Mod to Max A)   A Little (Sup or Min A)   None (Mod I to I)   Turning from your back to your side while in a flat bed without using bedrails? [] 1 [] 2 [x] 3 [] 4   2. Moving from lying on your back to sitting on the side of a flat bed without using bedrails? [] 1 [] 2 [x] 3 [] 4   3. Moving to and from a bed to a chair (including a wheelchair)? [] 1 [] 2 [x] 3 [] 4   4. Standing up from a chair using your arms (e.g., wheelchair, or bedside chair)? [] 1 [] 2 [x] 3 [] 4   5. Walking in hospital room? [] 1 [] 2 [x] 3 [] 4          +If stair climbing cannot be assessed, skip item #6. Sum responses from items 1-5.

## 2022-10-09 NOTE — ROUTINE PROCESS
Wound Prevention Checklist    Patient: Keon Kiran (64 y.o. female)  Date: 10/9/2022  Diagnosis: Acute exacerbation of CHF (congestive heart failure) ([facility]) [I50.9] Acute exacerbation of CHF (congestive heart failure) ([facility] [address]    Precautions: Fall       []  Heel prevention boots placed on patient    [x]  Patient turned q2h during shift  Encourage OOD daily and with meals. []  Lift team ordered    []  Patient on [place] bed/Specialty bed    []  Each Wound is documented during shift (Stage, Color, drainage, odor, measurements, and dressings)    [x]  Dual skin check done with with Elaina Sigala (ENDER). Andrey Case RN  Skin is intact with no pressure injuries. Patient does have scattered scabs to abdomin area from reported bug bites.     Rosemarie Hubbard RN

## 2022-10-09 NOTE — PROGRESS NOTES
[facility]   POST-ROUNDING NOTE    Assessment & Plan:  - Continue with cardiac monitoring   - Zosyn was discontinued. Patient is no longer on any abx.  - IV lasix 40mg BID was switched to PO Lasix 40mg BID  - Metoprolol was increased to 50mg BID from 25mg BID  - Continue IV Cardizem drip 5 ml/hr   - Continue Xarelto 20mg  - Still requiring 2L NC on and off. Continue to wean. - 6 minute walk test ordered. The above patient and plan were discussed with my supervising physician. See daily progress note for full assessment/plan.       Kayleen Harrell DO, PGY-1  [facility]  10/9/2022, 12:49 PM

## 2022-10-09 NOTE — PROGRESS NOTES
Bedside and Verbal shift change report received from Rosemarie Hubbard RN (offgoing nurse). Report included the following information SBAR, Kardex, Intake/Output, Recent Results, and Cardiac Rhythm AFib . 1940: AOX4, sitting up on recliner; on room air, CArdizem Drip infusing well; Afib 120-140's on tele; assisted back to bed; shift assessment done    2112: meds given; Metoprolol 50 mg scheduled dose given    2200: ; no coverage given    2332: -140's Afib; c/o back pain, lower abdominal pain from a \"growth that's been there\"; prn Tylenol po given    0004: said that Tylenol doesn's help- says she takes Morphine 15 mg po BID at home; MD paged- one time order for Morphine IR 15 mg po given    0100: sleeping; 's Afib; Cardizem DRip infusing well    0300: reassessment done; no apparent distress    0446: CHG bath given    0600: mell care done; resting in bed; CArdizem Drip infusing well    0700: Bedside and Verbal shift change report given to Rosemarie Hubbard RN (oncoming nurse) by Siomara (offgoing nurse). Report included the following information SBAR, Kardex, Intake/Output, Recent Results, and Cardiac Rhythm Afib .       Wound Prevention Checklist    Patient: Baljit Urias [de-identified]82 y.o. female)  Date: 10/10/2022  Diagnosis: Acute exacerbation of CHF (congestive heart failure) ([facility]) [I50.9] Acute exacerbation of CHF (congestive heart failure) ([facility] [address]    Precautions: Fall       []  Heel prevention boots placed on patient    []  Patient turned q2h during shift    []  Lift team ordered    [x]  Patient on [place] bed/Specialty bed    []  Each Wound is documented during shift (Stage, Color, drainage, odor, measurements, and dressings)    [x]  Dual skin check done with Rosemarie Willoughby RN

## 2022-10-09 NOTE — PROGRESS NOTES
[facility] [address]  DAILY PROGRESS NOTE      Patient:    Sandip Reilly , 82 y.o. female   MRN:  [id number]  Room/Bed:  200/56  Admission Date:   10/7/2022  Code status:  Full Code    Reason for Admission: Acute CHF Exacerbation     ASSESSMENT AND PLAN:   Problem List Items Addressed This Visit          Circulatory    * (Principal) Acute exacerbation of CHF (congestive heart failure) ([facility] [address]     Other Visit Diagnoses       Acute respiratory failure with hypoxia and hypercapnia (HCC)    -  Primary    Atrial fibrillation with rapid ventricular response (HCC)                Acute exacerbation of CHF  - Difficulty breathing for 3 days prior to admission  - B/l LE swelling noted on exam; pt last took Bumex 3 days ago  - Follows with outpatient cardiologist Dr. Mary Vasquez [id number]  - CXR shows increased interstitial edema, mild ground glass at the lung bases that may represent mild pulmonary edema or atelectasis, possible small right pleural effusion   - Echocardiogram (8/11/22): normal LV systolic function with EF of 55-60% and normal LV size, increased wall thickness consistent with mild concentric hypertrophy, normal wall motion   - pro-BNP 3,967   - trop nl at 8  - Lactic acid 1.1  - Mag 1.4  Plan:  - Admit to stepdown unit with cardiac monitoring   - Start PO Lasix 40 mg BID; holding home Bumex 1mg oral BID   - Switched from IV lasix 40mg BID  - PO Lopressor increased to 50mg BID from 25mg  - Continue IV Diltiazem 5ml/hr   - Continue home atorvastatin oral 80 mg qdaily   - Holding home amlodipine d/t acute LE swelling  - Holding home lisinopril 40 mg oral qdaily   - CBC w/ auto diff qdaily  - CMP qdaily   - Weaned off supplemental O2    Atrial fibrillation with RVR  - EKG (10/7/22): atrial fibrillation with RVR, Left axis deviation  - HR in 110's-130's upon arrival to patient's bedside   - 10/9 AM still in afib with HR ranging 90s-120s  - TSH WNL  Plan:  - PO Lopressor increased to 50mg BID from 25mg.  Continue holding home carvedilol 12.5 mg oral qdaily   - Continue IV Diltiazem 5ml/hr    - On cardiac monitoring      Possible acute on chronic DVT's  - Positive Roberta's sign bilaterally and patient's history of DVT's make b/l LE DVT's possible. - CTA chest: No evidence for pulmonary emboli. Heart failure with biatrial cardiac chamber enlargement, moderate new. Bilateral pleural effusions and mild pulmonary edema. Mild to moderate aortic atherosclerosis with irregular noncalcified mural plaque along the wall of the descending aorta which increases risk of embolic events. - Duplex U/S of b/l LE pending   - PT 15.1  - INR 1.2  - PTT 30.6  Plan:  - Continue home xarelto 20 mg oral qdaily  - Pending Duplex U/S of b/l LE     Sepsis of unknown origin   - Meets 3/4 SIRS criteria (does not meet criteria for temperature)  - WBC 18.1 in ED  - Procalcitonin <0.05  - Lactic acid 1.1  - Negative blood cultures  - Lesion on foot  - Foot Xray: Generalized soft tissue swelling but no radiographic findings of osteomyelitis  Plan:  - Discontinued Zosyn 3.375g IV q8h  - Vancomycin 2000mg IV given once and discontinued. - Pending MRSA culture  - Pending urine culture  - CBC w/ diff qdaily      Type 2 Diabetes Mellitus  -Most recent HbA1c of 11.8   -Serum glucose 309 on admission   Plan:  -Lantus 20 units SubQ qdaily; pending repeat HbA1c  -Insulin lispro subQ QID with meals   -Holding home Jardiance   -Continue home gabapentin 300 mg TID  -Holding home metformin 500 mg oral qdaily      Gout  -No acute flare ups  Plan:  -Continue home allopurinol 100 mg oral qdaily      Hypothyroidism   -Denies any acute symptoms   Plan:  -Continue home Synthroid 150 mcg oral qdaily  -Pending TSH     Chronic pain of back and LE   -Endorses continuing pain   -Follows with outpatient pain management   Plan:  -Continue home Tylenol 500 mg oral q6h  -Holding home morphine 15 mg d/t hypoxic state    Global:  Code: Full  IVF/Drips: None  I/O / Wt:  None  Diet: Regular with low sodium  Bowel Regimen, Last BM: Miralax PRN, last BM 2 days ago per pt  DVT/AC: Xarelto 20 mg qdaily and encourage ambulation as tolerated  Mobility: per protocol   Disposition: stable  Anticipated LOS: 1-2 nights     Point of Contact (relationship, number): Kyle Herrera (son); [phone]      SUBJECTIVE:   Baljit Urias is a 82 y.o.  female with PMHx of chronic diastolic CHF, chronic venous insufficiency, Type 2 DM, HTN, hypothyroidism, arthritis, asthma, chronic back pain, gout, hypercholesterolemia, and peripheral neuropathy who presented to ED on 10/7/68071 with complaint of difficulty breathing and was admitted for acute CHF exacerbation. Events of the last 24 hours:  No acute events overnight. Patient seen at beside. No acute complaints. Patient is doing well and denies chest pain or SOB when on NC. She denies palpitations, but does report acknowledging that her HR is fast. She denies fatigue or weakness and reports that at home her BP runs similar to what it does in the hospital but she is not sure what her BP is at home. She reports that the callus on her left foot is sore but not painful. She denies fever or chills. She is eating well.     ROS (positive findings are in BOLD; negative findings are in regular font)  Constitutional: fevers, chills, appetite changes, weight changes, fatigue  HEENT: changes in vision, changes in hearing, sore throat, dysphagia  Cardiovascular: chest pain, palpitations, PND, orthopnea, edema  Pulmonary: SOB, cough, sputum production, wheezing, chest tightness  Gastrointestinal: abdominal pain, nausea/vomiting, diarrhea, constipation, melena, hematochezia  Genitourinary: dysuria, hesitation, dribbling, urgency, hematuria  Musculoskeletal: arthralgias, myalgias  Skin: rash, itching  Neurological: sensory changes, motor changes, headache  Psychiatric: mood changes  Endocrine: heat/cold intolerance  Heme: easy bruising/easy bleeding, LAD    CURRENT INPATIENT MEDICATIONS:  Current Facility-Administered Medications   Medication Dose Route Frequency Provider Last Rate Last Admin    insulin glargine (LANTUS) injection 20 Units  20 Units SubCUTAneous DAILY Daniel De Santiago MD        vancomycin (VANCOCIN) 1500 mg in  ml infusion  1,500 mg IntraVENous Q24H Karine Park MD   Transfusion Completed at 10/08/22 1300    dilTIAZem (CARDIZEM) 100 mg in 0.9% sodium chloride (MBP/ADV) 100 mL infusion  5 mg/hr IntraVENous CONTINUOUS Murphy Urena MD 5 mL/hr at 10/09/22 0354 5 mg/hr at 10/09/22 0354    furosemide (LASIX) injection 40 mg  40 mg IntraVENous BID Karine Park MD   40 mg at 10/08/22 1853    acetaminophen (TYLENOL) tablet 500 mg  500 mg Oral Q6H PRN Rezazad, Kerry, DO        allopurinoL (ZYLOPRIM) tablet 100 mg  100 mg Oral DAILY Rezazad, Kerry, DO   100 mg at 10/08/22 1020    atorvastatin (LIPITOR) tablet 80 mg  80 mg Oral DAILY Rezazad, Kerry, DO   80 mg at 10/08/22 1020    levothyroxine (SYNTHROID) tablet 150 mcg  150 mcg Oral DAILY Rezazad, Kerry, DO   150 mcg at 10/08/22 1020    [Held by provider] lisinopriL (PRINIVIL, ZESTRIL) tablet 40 mg  40 mg Oral DAILY Rezazad, Kerry, DO        [Held by provider] bumetanide (BUMEX) tablet 1 mg  1 mg Oral BID PRN Rezazad, Kerry, DO        [Held by provider] amLODIPine (NORVASC) tablet 10 mg  10 mg Oral DAILY Rezazad, Kerry, DO        [Held by provider] hydrOXYzine HCL (ATARAX) tablet 10 mg  10 mg Oral BID PRN Rezazad, Kerry, DO        [Held by provider] insulin lispro (HUMALOG) injection 5 Units  5 Units SubCUTAneous TIDAC Rezazad, Kerry, DO        [Held by provider] metFORMIN (GLUCOPHAGE) tablet 500 mg  500 mg Oral DAILY Rezazad, Kerry, DO        rivaroxaban (XARELTO) tablet 20 mg  20 mg Oral DAILY Rezazad, Kerry, DO   20 mg at 10/08/22 1020    [Held by provider] sertraline (ZOLOFT) tablet 50 mg  50 mg Oral DAILY Kerry Gordon DO        [Held by provider] tamsulosin (FLOMAX) capsule 0.4 mg  0.4 mg Oral PCD Kerry Gordon DO        [Held by provider] carvediloL (COREG) tablet 12.5 mg  12.5 mg Oral BID WITH MEALS Kerry Gordon DO        piperacillin-tazobactam (ZOSYN) 3.375 g in 0.9% sodium chloride (MBP/ADV) 100 mL MBP  3.375 g IntraVENous Q8H Duyen Macdonald MD 25 mL/hr at 10/09/22 0347 3.375 g at 10/09/22 0347    sodium chloride (NS) flush 5-40 mL  5-40 mL IntraVENous Q8H Kerry Gordon DO   10 mL at 10/09/22 0537    sodium chloride (NS) flush 5-40 mL  5-40 mL IntraVENous PRN Kerry Gordon DO        gabapentin (NEURONTIN) capsule 300 mg  300 mg Oral TID Kerry Gordon DO   300 mg at 10/08/22 2116    metoprolol tartrate (LOPRESSOR) tablet 25 mg  25 mg Oral Q12H Duyen Macdonald MD   25 mg at 10/08/22 2002    [Held by provider] morphine IR (MS IR) tablet 15 mg  15 mg Oral BID PRN Duyen Macdonald MD        polyethylene glycol (MIRALAX) packet 17 g  17 g Oral DAILY PRN Kerry Gordon DO        insulin lispro (HUMALOG) injection   SubCUTAneous AC&HS Duyen Macdonald MD   4 Units at 10/08/22 2116    glucose chewable tablet 16 g  16 g Oral PRN Duyen Macdonald MD        glucagon [facility]) injection 1 mg  1 mg IntraMUSCular PRN Duyen Macdonald MD           Allergies  No Known Allergies    OBJECTIVE:    Intake/Output Summary (Last 24 hours) at 10/9/2022 0714  Last data filed at 10/9/2022 0649  Gross per 24 hour   Intake 380 ml   Output 1100 ml   Net -720 ml       Visit Vitals  BP (!) 101/50   Pulse 87   Temp 98.2 °F (36.8 °C)   Resp 19   Ht 5' 5\" (1.651 m)   Wt 109.8 kg (242 lb)   SpO2 97%   BMI 40.27 kg/m²       PHYSICAL EXAM  Gen: NAD, comfortable, obese   HEENT: normocephalic, atraumatic, MMM, no thyromegaly, no JVD  CV: tachycardic with an irregular rhythm.  No M/R/G, +2 radial pulses  Pulm: CTAB, no wheezes, no crackles  Abd: S/NT/ND, no rebound, no guarding, no hepatosplenomegaly   MSK: mild LE non-pitting edema   Skin: warm, dry, intact, no rash, callous of left foot under big toe    Neuro: CN II-XII grossly intact, no focal deficits appreciated   Psych: appropriate, alert, oriented x3    LABWORK (LAST 24 HOURS)  Recent Results (from the past 24 hour(s))   METABOLIC PANEL, COMPREHENSIVE    Collection Time: 10/08/22  7:53 AM   Result Value Ref Range    Sodium 139 136 - 145 mmol/L    Potassium 5.0 3.5 - 5.5 mmol/L    Chloride 105 100 - 111 mmol/L    CO2 27 21 - 32 mmol/L    Anion gap 7 3.0 - 18 mmol/L    Glucose 165 (H) 74 - 99 mg/dL    BUN 23 (H) 7.0 - 18 MG/DL    Creatinine 1.12 0.6 - 1.3 MG/DL    BUN/Creatinine ratio 21 (H) 12 - 20      eGFR 48 (L) >60 ml/min/1.73m2    Calcium 9.1 8.5 - 10.1 MG/DL    Bilirubin, total 1.2 (H) 0.2 - 1.0 MG/DL    ALT (SGPT) 9 (L) 13 - 56 U/L    AST (SGOT) 13 10 - 38 U/L    Alk. phosphatase 62 45 - 117 U/L    Protein, total 8.0 6.4 - 8.2 g/dL    Albumin 3.2 (L) 3.4 - 5.0 g/dL    Globulin 4.8 (H) 2.0 - 4.0 g/dL    A-G Ratio 0.7 (L) 0.8 - 1.7     GLUCOSE, POC    Collection Time: 10/08/22 10:31 AM   Result Value Ref Range    Glucose (POC) 257 (H) 70 - 110 mg/dL   GLUCOSE, POC    Collection Time: 10/08/22  6:10 PM   Result Value Ref Range    Glucose (POC) 217 (H) 70 - 110 mg/dL   GLUCOSE, POC    Collection Time: 10/08/22  8:48 PM   Result Value Ref Range    Glucose (POC) 223 (H) 70 - 110 mg/dL   VANCOMYCIN, RANDOM    Collection Time: 10/09/22  5:25 AM   Result Value Ref Range    Vancomycin, random 19.3 5.0 - 18.3 UG/ML   METABOLIC PANEL, COMPREHENSIVE    Collection Time: 10/09/22  5:25 AM   Result Value Ref Range    Sodium 140 136 - 145 mmol/L    Potassium 4.4 3.5 - 5.5 mmol/L    Chloride 103 100 - 111 mmol/L    CO2 31 21 - 32 mmol/L    Anion gap 6 3.0 - 18 mmol/L    Glucose 115 (H) 74 - 99 mg/dL    BUN 28 (H) 7.0 - 18 MG/DL    Creatinine 1.21 0.6 - 1.3 MG/DL    BUN/Creatinine ratio 23 (H) 12 - 20      eGFR 44 (L) >60 ml/min/1.73m2    Calcium 9.1 8.5 - 10.1 MG/DL    Bilirubin, total 0.7 0.2 - 1.0 MG/DL    ALT (SGPT) 7 (L) 13 - 56 U/L    AST (SGOT) 8 (L) 10 - 38 U/L    Alk.  phosphatase 56 45 - 117 U/L Protein, total 7.1 6.4 - 8.2 g/dL    Albumin 3.0 (L) 3.4 - 5.0 g/dL    Globulin 4.1 (H) 2.0 - 4.0 g/dL    A-G Ratio 0.7 (L) 0.8 - 1.7         IMAGING AND PROCEDURES (LAST 36 HOURS)  No results found.    ================================================================  Further management for Ms. Wayman Schaumann will be discussed on rounds with my attending.       Elle Duque DO, PGY-1  [facility]  October 9, 2022 7:14 AM

## 2022-10-09 NOTE — PROGRESS NOTES
Problem: Diabetes Self-Management  Goal: *Disease process and treatment process  Description: Define diabetes and identify own type of diabetes; list 3 options for treating diabetes. Outcome: Progressing Towards Goal  Goal: *Incorporating nutritional management into lifestyle  Description: Describe effect of type, amount and timing of food on blood glucose; list 3 methods for planning meals. Outcome: Progressing Towards Goal  Goal: *Incorporating physical activity into lifestyle  Description: State effect of exercise on blood glucose levels. Outcome: Progressing Towards Goal  Goal: *Developing strategies to promote health/change behavior  Description: Define the ABC's of diabetes; identify appropriate screenings, schedule and personal plan for screenings. Outcome: Progressing Towards Goal  Goal: *Using medications safely  Description: State effect of diabetes medications on diabetes; name diabetes medication taking, action and side effects. Outcome: Progressing Towards Goal  Goal: *Monitoring blood glucose, interpreting and using results  Description: Identify recommended blood glucose targets  and personal targets. Outcome: Progressing Towards Goal  Goal: *Prevention, detection, treatment of acute complications  Description: List symptoms of hyper- and hypoglycemia; describe how to treat low blood sugar and actions for lowering  high blood glucose level. Outcome: Progressing Towards Goal  Goal: *Prevention, detection and treatment of chronic complications  Description: Define the natural course of diabetes and describe the relationship of blood glucose levels to long term complications of diabetes.   Outcome: Progressing Towards Goal  Goal: *Developing strategies to address psychosocial issues  Description: Describe feelings about living with diabetes; identify support needed and support network  Outcome: Progressing Towards Goal  Goal: *Insulin pump training  Outcome: Progressing Towards Goal  Goal: *Sick day guidelines  Outcome: Progressing Towards Goal  Goal: *Patient Specific Goal (EDIT GOAL, INSERT TEXT)  Outcome: Progressing Towards Goal     Problem: Patient Education: Go to Patient Education Activity  Goal: Patient/Family Education  Outcome: Progressing Towards Goal     Problem: Patient Education: Go to Patient Education Activity  Goal: Patient/Family Education  Outcome: Progressing Towards Goal     Problem: Pressure Injury - Risk of  Goal: *Prevention of pressure injury  Description: Document Ravi Scale and appropriate interventions in the flowsheet. Outcome: Progressing Towards Goal  Note: Pressure Injury Interventions:       Moisture Interventions: Apply protective barrier, creams and emollients, Assess need for specialty bed, Check for incontinence Q2 hours and as needed, Internal/External urinary devices    Activity Interventions: PT/OT evaluation    Mobility Interventions: PT/OT evaluation    Nutrition Interventions: Document food/fluid/supplement intake                     Problem: Patient Education: Go to Patient Education Activity  Goal: Patient/Family Education  Outcome: Progressing Towards Goal     Problem: Falls - Risk of  Goal: *Absence of Falls  Description: Document Kimi Hoop Fall Risk and appropriate interventions in the flowsheet.   Outcome: Progressing Towards Goal  Note: Fall Risk Interventions:            Medication Interventions: Bed/chair exit alarm, Evaluate medications/consider consulting pharmacy    Elimination Interventions: Patient to call for help with toileting needs, Call light in reach, Bed/chair exit alarm    History of Falls Interventions: Bed/chair exit alarm, Door open when patient unattended, Room close to nurse's station         Problem: Patient Education: Go to Patient Education Activity  Goal: Patient/Family Education  Outcome: Progressing Towards Goal

## 2022-10-09 NOTE — PROGRESS NOTES
Cardiology Progress Note    Admit Date: 10/7/2022  Attending Cardiologist: Dr. Jenn Ramos:     Hospital Problems  Date Reviewed: 8/12/2022            Codes Class Noted POA    * (Principal) Acute exacerbation of CHF (congestive heart failure) ([facility] [address] ICD-10-CM: I50.9  ICD-9-CM: 428.0  10/7/2022 Unknown              1.  Shortness of breath related to acute on chronic diastolic heart failure  2. New onset atrial fibrillation with rapid ventricular rate  3. History of DVT right lower extremity 6/2022. Repeat scan today does not show any DVT. Patient ran out of Xarelto 1 week ago as she could not afford it  4. Hypertension  5. Acute on chronic diastolic heart failure with normal ejection fraction in past likely precipitated by atrial fibrillation  #6 hyperlipidemia       Plan:     Increase metoprolol for rate control  Continue Cardizem drip  Anticoagulation        Subjective:     No new complaints.  Feels better sob improving    Objective:      Patient Vitals for the past 8 hrs:   Temp Pulse Resp BP SpO2   10/09/22 0734 98.3 °F (36.8 °C) (!) 114 19 (!) 104/44 94 %   10/09/22 0300 98.2 °F (36.8 °C) 87 19 (!) 101/50 97 %         Patient Vitals for the past 96 hrs:   Weight   10/09/22 0355 109.8 kg (242 lb)   10/08/22 1713 111.6 kg (246 lb)   10/07/22 1646 100 kg (220 lb 7.4 oz)       TELE: AFIB               Current Facility-Administered Medications   Medication Dose Route Frequency Last Admin    insulin glargine (LANTUS) injection 20 Units  20 Units SubCUTAneous DAILY      metoprolol tartrate (LOPRESSOR) tablet 50 mg  50 mg Oral Q12H      vancomycin (VANCOCIN) 1500 mg in  ml infusion  1,500 mg IntraVENous Q24H Transfusion Completed at 10/08/22 1300    dilTIAZem (CARDIZEM) 100 mg in 0.9% sodium chloride (MBP/ADV) 100 mL infusion  5 mg/hr IntraVENous CONTINUOUS 5 mg/hr at 10/09/22 0354    furosemide (LASIX) injection 40 mg  40 mg IntraVENous BID 40 mg at 10/08/22 1853    acetaminophen (TYLENOL) tablet 500 mg 500 mg Oral Q6H PRN      allopurinoL (ZYLOPRIM) tablet 100 mg  100 mg Oral DAILY 100 mg at 10/08/22 1020    atorvastatin (LIPITOR) tablet 80 mg  80 mg Oral DAILY 80 mg at 10/08/22 1020    levothyroxine (SYNTHROID) tablet 150 mcg  150 mcg Oral DAILY 150 mcg at 10/08/22 1020    [Held by provider] lisinopriL (PRINIVIL, ZESTRIL) tablet 40 mg  40 mg Oral DAILY      [Held by provider] bumetanide (BUMEX) tablet 1 mg  1 mg Oral BID PRN      [Held by provider] amLODIPine (NORVASC) tablet 10 mg  10 mg Oral DAILY      [Held by provider] hydrOXYzine HCL (ATARAX) tablet 10 mg  10 mg Oral BID PRN      [Held by provider] insulin lispro (HUMALOG) injection 5 Units  5 Units SubCUTAneous TIDAC      [Held by provider] metFORMIN (GLUCOPHAGE) tablet 500 mg  500 mg Oral DAILY      rivaroxaban (XARELTO) tablet 20 mg  20 mg Oral DAILY 20 mg at 10/08/22 1020    [Held by provider] sertraline (ZOLOFT) tablet 50 mg  50 mg Oral DAILY      [Held by provider] tamsulosin (FLOMAX) capsule 0.4 mg  0.4 mg Oral PCD      piperacillin-tazobactam (ZOSYN) 3.375 g in 0.9% sodium chloride (MBP/ADV) 100 mL MBP  3.375 g IntraVENous Q8H 3.375 g at 10/09/22 0347    sodium chloride (NS) flush 5-40 mL  5-40 mL IntraVENous Q8H 10 mL at 10/09/22 0537    sodium chloride (NS) flush 5-40 mL  5-40 mL IntraVENous PRN      gabapentin (NEURONTIN) capsule 300 mg  300 mg Oral  mg at 10/08/22 2116    [Held by provider] morphine IR (MS IR) tablet 15 mg  15 mg Oral BID PRN      polyethylene glycol (MIRALAX) packet 17 g  17 g Oral DAILY PRN      insulin lispro (HUMALOG) injection   SubCUTAneous AC&HS 4 Units at 10/08/22 2116    glucose chewable tablet 16 g  16 g Oral PRN      glucagon (GLUCAGEN) injection 1 mg  1 mg IntraMUSCular PRN           Intake/Output Summary (Last 24 hours) at 10/9/2022 0936  Last data filed at 10/9/2022 0902  Gross per 24 hour   Intake 500 ml   Output 1100 ml   Net -600 ml       Physical Exam:  General:  alert, cooperative, no distress, appears stated age  Neck:  no JVD  Lungs:  clear to auscultation bilaterally  Heart:  irregularly irregular rhythm, S1, S2 normal  Abdomen:  abdomen is soft without significant tenderness, masses, organomegaly or guarding  Extremities:  extremities normal, atraumatic, no cyanosis or edema    Visit Vitals  BP (!) 104/44   Pulse (!) 114   Temp 98.3 °F (36.8 °C)   Resp 19   Ht 5' 5\" (1.651 m)   Wt 109.8 kg (242 lb)   SpO2 94%   BMI 40.27 kg/m²       Data Review:     Labs: Results:       Chemistry Recent Labs     10/09/22  0525 10/08/22  0753 10/07/22  2230   * 165* 276*    139 142   K 4.4 5.0 4.2    105 111   CO2 31 27 25   BUN 28* 23* 20*   CREA 1.21 1.12 0.86   CA 9.1 9.1 7.3*   MG  --   --  1.4*   AGAP 6 7 6   BUCR 23* 21* 23*   AP 56 62 51   TP 7.1 8.0 6.0*   ALB 3.0* 3.2* 2.7*   GLOB 4.1* 4.8* 3.3   AGRAT 0.7* 0.7* 0.8      CBC w/Diff Recent Labs     10/09/22  0525 10/07/22  1653   WBC 9.5 18.1*   RBC 3.78* 4.36   HGB 10.2* 11.8*   HCT 33.4* 38.4    201   GRANS 62 75*   LYMPH 22 16*   EOS 4 1      Cardiac Enzymes No results found for: CPK, CK, CKMMB, CKMB, RCK3, CKMBT, CKNDX, CKND1, GABRIEL, TROPT, TROIQ, KRUPA, TROPT, TNIPOC, BNP, BNPP   Coagulation Recent Labs     10/07/22  2230   PTP 15.1   INR 1.2   APTT 30.6       Lipid Panel Lab Results   Component Value Date/Time    Cholesterol, total 191 11/09/2021 12:24 PM    HDL Cholesterol 44 11/09/2021 12:24 PM    LDL, calculated 96.8 11/09/2021 12:24 PM    VLDL, calculated 50.2 11/09/2021 12:24 PM    Triglyceride 251 (H) 11/09/2021 12:24 PM    CHOL/HDL Ratio 4.3 11/09/2021 12:24 PM      BNP No results found for: BNP, BNPP, XBNPT   Liver Enzymes Recent Labs     10/09/22  0525   TP 7.1   ALB 3.0*   AP 56      Thyroid Studies Lab Results   Component Value Date/Time    TSH 2.01 10/07/2022 10:30 PM          Signed By: Patricia Hull MD     October 9, 2022

## 2022-10-10 LAB
ALBUMIN SERPL-MCNC: 2.9 G/DL (ref 3.4–5)
ALBUMIN/GLOB SERPL: 0.7 {RATIO} (ref 0.8–1.7)
ALP SERPL-CCNC: 49 U/L (ref 45–117)
ALT SERPL-CCNC: <6 U/L (ref 13–56)
ANION GAP SERPL CALC-SCNC: 7 MMOL/L (ref 3–18)
AST SERPL-CCNC: 6 U/L (ref 10–38)
BACTERIA SPEC CULT: ABNORMAL
BACTERIA SPEC CULT: ABNORMAL
BACTERIA SPEC CULT: NORMAL
BACTERIA SPEC CULT: NORMAL
BASOPHILS # BLD: 0.1 K/UL (ref 0–0.1)
BASOPHILS NFR BLD: 1 % (ref 0–2)
BILIRUB SERPL-MCNC: 0.8 MG/DL (ref 0.2–1)
BUN SERPL-MCNC: 25 MG/DL (ref 7–18)
BUN/CREAT SERPL: 24 (ref 12–20)
CALCIUM SERPL-MCNC: 8.6 MG/DL (ref 8.5–10.1)
CC UR VC: ABNORMAL
CHLORIDE SERPL-SCNC: 104 MMOL/L (ref 100–111)
CO2 SERPL-SCNC: 30 MMOL/L (ref 21–32)
CREAT SERPL-MCNC: 1.03 MG/DL (ref 0.6–1.3)
DIFFERENTIAL METHOD BLD: ABNORMAL
EOSINOPHIL # BLD: 0.3 K/UL (ref 0–0.4)
EOSINOPHIL NFR BLD: 3 % (ref 0–5)
ERYTHROCYTE [DISTWIDTH] IN BLOOD BY AUTOMATED COUNT: 14.1 % (ref 11.6–14.5)
GLOBULIN SER CALC-MCNC: 4.1 G/DL (ref 2–4)
GLUCOSE BLD STRIP.AUTO-MCNC: 109 MG/DL (ref 70–110)
GLUCOSE BLD STRIP.AUTO-MCNC: 157 MG/DL (ref 70–110)
GLUCOSE BLD STRIP.AUTO-MCNC: 178 MG/DL (ref 70–110)
GLUCOSE BLD STRIP.AUTO-MCNC: 241 MG/DL (ref 70–110)
GLUCOSE SERPL-MCNC: 103 MG/DL (ref 74–99)
HCT VFR BLD AUTO: 32.2 % (ref 35–45)
HGB BLD-MCNC: 10 G/DL (ref 12–16)
IMM GRANULOCYTES # BLD AUTO: 0 K/UL (ref 0–0.04)
IMM GRANULOCYTES NFR BLD AUTO: 0 % (ref 0–0.5)
LYMPHOCYTES # BLD: 2.8 K/UL (ref 0.9–3.6)
LYMPHOCYTES NFR BLD: 27 % (ref 21–52)
MCH RBC QN AUTO: 27.3 PG (ref 24–34)
MCHC RBC AUTO-ENTMCNC: 31.1 G/DL (ref 31–37)
MCV RBC AUTO: 88 FL (ref 78–100)
MONOCYTES # BLD: 1 K/UL (ref 0.05–1.2)
MONOCYTES NFR BLD: 10 % (ref 3–10)
NEUTS SEG # BLD: 6.4 K/UL (ref 1.8–8)
NEUTS SEG NFR BLD: 60 % (ref 40–73)
NRBC # BLD: 0 K/UL (ref 0–0.01)
NRBC BLD-RTO: 0 PER 100 WBC
PLATELET # BLD AUTO: 184 K/UL (ref 135–420)
PMV BLD AUTO: 13.2 FL (ref 9.2–11.8)
POTASSIUM SERPL-SCNC: 3.7 MMOL/L (ref 3.5–5.5)
PROT SERPL-MCNC: 7 G/DL (ref 6.4–8.2)
RBC # BLD AUTO: 3.66 M/UL (ref 4.2–5.3)
SERVICE CMNT-IMP: ABNORMAL
SERVICE CMNT-IMP: NORMAL
SODIUM SERPL-SCNC: 141 MMOL/L (ref 136–145)
WBC # BLD AUTO: 10.7 K/UL (ref 4.6–13.2)

## 2022-10-10 PROCEDURE — 74011636637 HC RX REV CODE- 636/637: Performed by: STUDENT IN AN ORGANIZED HEALTH CARE EDUCATION/TRAINING PROGRAM

## 2022-10-10 PROCEDURE — 36415 COLL VENOUS BLD VENIPUNCTURE: CPT

## 2022-10-10 PROCEDURE — 74011000258 HC RX REV CODE- 258: Performed by: INTERNAL MEDICINE

## 2022-10-10 PROCEDURE — 97530 THERAPEUTIC ACTIVITIES: CPT

## 2022-10-10 PROCEDURE — 99232 SBSQ HOSP IP/OBS MODERATE 35: CPT | Performed by: INTERNAL MEDICINE

## 2022-10-10 PROCEDURE — 80053 COMPREHEN METABOLIC PANEL: CPT

## 2022-10-10 PROCEDURE — 97535 SELF CARE MNGMENT TRAINING: CPT

## 2022-10-10 PROCEDURE — 77030038269 HC DRN EXT URIN PURWCK BARD -A

## 2022-10-10 PROCEDURE — 74011000250 HC RX REV CODE- 250

## 2022-10-10 PROCEDURE — 65660000004 HC RM CVT STEPDOWN

## 2022-10-10 PROCEDURE — 74011250636 HC RX REV CODE- 250/636: Performed by: INTERNAL MEDICINE

## 2022-10-10 PROCEDURE — 74011250637 HC RX REV CODE- 250/637: Performed by: STUDENT IN AN ORGANIZED HEALTH CARE EDUCATION/TRAINING PROGRAM

## 2022-10-10 PROCEDURE — 2709999900 HC NON-CHARGEABLE SUPPLY

## 2022-10-10 PROCEDURE — 74011250637 HC RX REV CODE- 250/637

## 2022-10-10 PROCEDURE — 74011250637 HC RX REV CODE- 250/637: Performed by: INTERNAL MEDICINE

## 2022-10-10 PROCEDURE — 74011636637 HC RX REV CODE- 636/637

## 2022-10-10 PROCEDURE — 85025 COMPLETE CBC W/AUTO DIFF WBC: CPT

## 2022-10-10 PROCEDURE — 82962 GLUCOSE BLOOD TEST: CPT

## 2022-10-10 RX ORDER — METOPROLOL TARTRATE 50 MG/1
100 TABLET ORAL EVERY 12 HOURS
Status: DISCONTINUED | OUTPATIENT
Start: 2022-10-10 | End: 2022-10-14 | Stop reason: HOSPADM

## 2022-10-10 RX ORDER — LEVOFLOXACIN 250 MG/1
250 TABLET ORAL EVERY 24 HOURS
Status: DISCONTINUED | OUTPATIENT
Start: 2022-10-11 | End: 2022-10-11

## 2022-10-10 RX ADMIN — ALLOPURINOL 100 MG: 100 TABLET ORAL at 08:42

## 2022-10-10 RX ADMIN — Medication 4 UNITS: at 17:46

## 2022-10-10 RX ADMIN — SODIUM CHLORIDE 5 MG/HR: 900 INJECTION, SOLUTION INTRAVENOUS at 00:06

## 2022-10-10 RX ADMIN — MORPHINE SULFATE 15 MG: 15 TABLET ORAL at 00:04

## 2022-10-10 RX ADMIN — LEVOFLOXACIN 250 MG: 250 TABLET, FILM COATED ORAL at 08:42

## 2022-10-10 RX ADMIN — Medication 20 UNITS: at 08:42

## 2022-10-10 RX ADMIN — GABAPENTIN 300 MG: 300 CAPSULE ORAL at 18:15

## 2022-10-10 RX ADMIN — METOPROLOL TARTRATE 50 MG: 50 TABLET, FILM COATED ORAL at 08:42

## 2022-10-10 RX ADMIN — FUROSEMIDE 40 MG: 40 TABLET ORAL at 08:42

## 2022-10-10 RX ADMIN — ATORVASTATIN CALCIUM 80 MG: 40 TABLET, FILM COATED ORAL at 08:42

## 2022-10-10 RX ADMIN — LEVOTHYROXINE SODIUM 150 MCG: 0.05 TABLET ORAL at 05:50

## 2022-10-10 RX ADMIN — RIVAROXABAN 20 MG: 20 TABLET, FILM COATED ORAL at 08:42

## 2022-10-10 RX ADMIN — SODIUM CHLORIDE, PRESERVATIVE FREE 10 ML: 5 INJECTION INTRAVENOUS at 21:52

## 2022-10-10 RX ADMIN — SODIUM CHLORIDE 5 MG/HR: 900 INJECTION, SOLUTION INTRAVENOUS at 20:22

## 2022-10-10 RX ADMIN — SODIUM CHLORIDE, PRESERVATIVE FREE 10 ML: 5 INJECTION INTRAVENOUS at 05:50

## 2022-10-10 RX ADMIN — FUROSEMIDE 40 MG: 40 TABLET ORAL at 18:15

## 2022-10-10 RX ADMIN — Medication 2 UNITS: at 21:54

## 2022-10-10 RX ADMIN — GABAPENTIN 300 MG: 300 CAPSULE ORAL at 21:46

## 2022-10-10 RX ADMIN — GABAPENTIN 300 MG: 300 CAPSULE ORAL at 08:42

## 2022-10-10 RX ADMIN — SODIUM CHLORIDE, PRESERVATIVE FREE 10 ML: 5 INJECTION INTRAVENOUS at 12:59

## 2022-10-10 RX ADMIN — Medication 2 UNITS: at 13:00

## 2022-10-10 RX ADMIN — METOPROLOL TARTRATE 100 MG: 50 TABLET, FILM COATED ORAL at 21:46

## 2022-10-10 NOTE — PROGRESS NOTES
conducted an initial consultation and Spiritual Assessment for Mattie, who is a 82 y.o.,female. Patient's Primary Language is: [place]. According to the patient's EMR Adventist Affiliation is: Nondenominational.     The reason the Patient came to the hospital is:   Patient Active Problem List    Diagnosis Date Noted    Acute exacerbation of CHF (congestive heart failure) ([address] 10/07/2022    Sepsis ([address] 08/10/2022    Septic arthritis of foot ([address] 05/26/2022    Diabetic foot ulcer ([address] 05/26/2022    Septic joint ([address] 05/26/2022    Neuropathy 08/10/2021    Essential hypertension 07/07/2021    Bilateral lower extremity edema 07/07/2021    Hypercholesteremia 07/07/2021    History of fall 07/07/2021    Chronic bilateral low back pain without sciatica 07/07/2021    OAB (overactive bladder) 07/07/2021    Urinary incontinence 07/07/2021    Chronic diastolic congestive heart failure ([address] 03/10/2021    Obesity, morbid ([address] 12/15/2017    Type 2 diabetes mellitus with diabetic nephropathy, with long-term current use of insulin ([address] 12/15/2017    Dermatitis 08/23/2017    Acquired hypothyroidism 08/01/2016        The  provided the following Interventions:  Initiated a relationship of care and support. Explored issues of trinity, belief, spirituality and Sikhism/ritual needs while hospitalized. Listened empathically and engaged supportively as she expressed frustration that she was not discharged today. \"I had something to do at home,\" she stated. As she declined any other need for support, addressed Advance medical Directives and she stated she would bring hers in to the hospital. See ACP note. Chart reviewed. The following outcomes where achieved:  Patient shared limited information about both their medical narrative. Patient processed feelings about current hospitalization. Patient expressed gratitude for 's visit.     Assessment:  Patient does not have any Sikhism/cultural needs that will affect patient's preferences in health care. There are no spiritual or Methodist issues which require intervention at this time. Plan:  Chaplains will continue to follow and will provide pastoral care on an as needed/requested basis.  recommends bedside caregivers page  on duty if patient shows signs of acute spiritual or emotional distress.     [address] [place]   [phone]

## 2022-10-10 NOTE — DIABETES MGMT
Diabetes/ Glycemic Control Plan of Care    Assessment:   Admitted with exacerbation of CHF    mg/dl on admission. Patient reports having difficulty obtaining her medications due to a change in her prescription policy -  She has not been taking her insulin as prescribed. She has a glucometer but has not been monitoring BG. We reviewed her A1c - and target of 7% or less. She is receiving basal and corrective insulin    mg/dl this morning  Will continue to monitor -     DX:   1. Acute respiratory failure with hypoxia and hypercapnia (HCC)        2. Acute on chronic congestive heart failure, unspecified heart failure type ([address]        3. Atrial fibrillation with rapid ventricular response (HCC)           Fasting/ Morning blood glucose:   Lab Results   Component Value Date/Time    Glucose 103 (H) 10/10/2022 04:50 AM    Glucose (POC) 178 (H) 10/10/2022 12:04 PM     IV Fluids containing dextrose: none  Steroids: none    Blood glucose values: Within target range (70-180mg/dL):  no  Current insulin orders:   Lantus 20 units daily  Corrective humalog, very insulin resistant, 4  times daily  Total Daily Dose previous 24 hours = 24 units   20 units lantus  4 units humalog    Current A1c:   Lab Results   Component Value Date/Time    Hemoglobin A1c 11.3 (H) 10/07/2022 10:30 PM    Hemoglobin A1c (POC) 9.4 11/09/2021 11:58 AM      equivalent  to ave Blood Glucose of 278 mg/dl for 2-3 months prior to admission  Adequate glycemic control PTA: no    Nutrition/Diet:   Active Orders   Diet    ADULT DIET Regular; 3 carb choices (45 gm/meal); Low Sodium (2 gm); 1200 ml      Meal Intake:  Patient Vitals for the past 168 hrs:   % Diet Eaten   10/09/22 0902 76 - 100%     Home diabetes medications:    Jardiance 10 mg tablet Take 10 mg by mouth daily. insulin glargine (LANTUS) 100 unit/mL injection Inject 20 units daily at bedtime. Monitor and record blood sugar daily.     insulin aspart U-100 (NovoLOG Flexpen U-100 Insulin) 100 unit/mL (3 mL) inpn 5 Units by SubCUTAneous route Before breakfast, lunch, and dinner. metFORMIN (GLUCOPHAGE) 500 mg tablet Take 500 mg by mouth daily. Plan/Goals:   Blood glucose will be within target of 70 - 180 mg/dl within 72 hours  Reinforce dietary and medication compliance at home.           Education:  [x] Refer to Diabetes Education Record                       [] Education not indicated at this time     Stacey Gutierrez MPH RN [facility] [phone]

## 2022-10-10 NOTE — PROGRESS NOTES
Reason for Admission:  Acute exacerbation of CHF (congestive heart failure) ([facility] [I50.9]                 RUR Score:    19            Plan for utilizing home health:    pt is declining                      Likelihood of Readmission:   Moderate                         Do you (patient/family) have any concerns for transition/discharge?  no    Transition of Care Plan:       Initial assessment completed with patient and her boyfriend Carolina Soria at pt's bedside. Cognitive status of patient: oriented to time, place, person and situation. Face sheet information confirmed:  yes. The patient designates her son Kari Giang to participate in her discharge plan and to receive any needed information. This patient lives in a apartment with boyfriend. Patient is able to navigate one steps as needed to get in the apartment    Prior to hospitalization, patient was considered to be independent with ADLs/IADLS : yes . Patient has a current ACP document on file: no      Healthcare Decision Maker:   Supplemental (Other) Decision Maker: Iljose [facility]/ All permissions - Child - [phone]    Click here to complete [address] including selection of the Healthcare Decision Maker Relationship (ie \"Primary\")    The boyfriend will be available to transport patient home upon discharge. The patient already has Shawna Ion, W/C, Transfer tub bench, BSC, Grab bars, raised toilet set and transport chair medical equipment available in the home. Patient is not currently active with home health. .  Patient has stayed in a skilled nursing facility or rehab. Was  stay within last 60 days : no. This patient is on dialysis :no   Currently, the discharge plan is Home. Per pt, he does not want home health    The patient states that she can obtain her medications from the pharmacy, and take her medications as directed. Patient's current insurance is Medicare and [employer].       Care Management Interventions  PCP Verified by CM: Yes  Palliative Care Criteria Met (RRAT>21 & CHF Dx)?: No  Mode of Transport at Discharge:  Other (see comment) (family)  Transition of Care Consult (CM Consult): Discharge Planning  Support Systems: Spouse/Significant Other, Child(jace)  Confirm Follow Up Transport: Family  Discharge Location  Patient Expects to be Discharged to[de-identified] Home with family assistance        ANTONINO Hinds RN  Care Management  Pager: [phone]

## 2022-10-10 NOTE — ROUTINE PROCESS
SBAR report given by Vianey Mccormack RN offgoing nurse to Rosemarie VANN RN and Dominique Guerra RN offgoing nurses. 0710: Nurses introduced to patient and acknowledged patient at bedside. Skin assessment performed. Six lesions noted on patients abdomen, which patient states is from bug bites she received prior to admission to the hospital. Patient also has lesions on upper chest, neck, and face which she states is from bug bites received prior to admission. Redness noted bilaterally on lower extremities. 0740: Patient assisted out of bed and onto the bedside commode. Patient returned to bed safely. External catheter changed. 0800: Dr. Christine Chaves at patient bedside and discussed plan of care. 0900: OT at patient beside. 1010: Attending and team at bedside with nurse. Nurse updated on elevated heart rate. 1200: Family at patient bedside. Family is very supportive and involved in patients care. Patient received call from  and discussed medications. Patient repositioned in bed for comfort. Patient had no changes to medications today. SBAR report given by Rosemarie VANN Rn and Yadira Eric nurses to Ramu Glaser.

## 2022-10-10 NOTE — PROGRESS NOTES
[facility] - Progress Note  Admit Date: 10/7/2022    Assessment:      -Acute on chronic diastolic heart failure, switched to oral diuretics. Echo 8/11/22 with EF 55%  -New onset atrial fibrillation with rapid ventricular rate, plan rate control and anticoagulation  -History of DVT right lower extremity 6/2022. Patient ran out of Xarelto 1 week ago as she could not afford it  -Possible sepsis, on antbx  -Hypertension  -hyperlipidemia  -DM on insulin    Primary cardiologist Dr. Jamison Mccord:     Continue oral diuresis. Increase metoprolol today, on xarelto for A.fib and h/o DVT. Subjective:     No new complaints.    Still with A.fib, rate 100-130 bpm.    Objective:      Patient Vitals for the past 8 hrs:   Temp Pulse Resp BP SpO2   10/10/22 0800 98.5 °F (36.9 °C) (!) 134 22 107/74 95 %   10/10/22 0359 97.4 °F (36.3 °C) 97 21 131/67 94 %   10/10/22 0217 -- (!) 124 -- -- --         Patient Vitals for the past 96 hrs:   Weight   10/10/22 0445 107.5 kg (237 lb)   10/09/22 0355 109.8 kg (242 lb)   10/08/22 1713 111.6 kg (246 lb)   10/07/22 1646 100 kg (220 lb 7.4 oz)                    Intake/Output Summary (Last 24 hours) at 10/10/2022 0949  Last data filed at 10/10/2022 0445  Gross per 24 hour   Intake 240 ml   Output 1300 ml   Net -1060 ml       Physical Exam:  General:  alert, cooperative, no distress, appears stated age  Neck:  nontender  Lungs:  clear to auscultation bilaterally  Heart:  irreg, S1, S2 normal, no murmur, click, rub or gallop  Abdomen:  abdomen is soft without significant tenderness, masses, organomegaly or guarding  Extremities:  extremities normal, atraumatic, no cyanosis or edema    Data Review:     Labs: Results:       Chemistry Recent Labs     10/10/22  0450 10/09/22  0525 10/08/22  0753 10/07/22  2230   * 115* 165* 276*    140 139 142   K 3.7 4.4 5.0 4.2    103 105 111   CO2 30 31 27 25   BUN 25* 28* 23* 20*   CREA 1.03 1.21 1.12 0.86   CA 8.6 9.1 9.1 7.3* MG  --   --   --  1.4*   AGAP 7 6 7 6   BUCR 24* 23* 21* 23*   AP 49 56 62 51   TP 7.0 7.1 8.0 6.0*   ALB 2.9* 3.0* 3.2* 2.7*   GLOB 4.1* 4.1* 4.8* 3.3   AGRAT 0.7* 0.7* 0.7* 0.8      CBC w/Diff Recent Labs     10/10/22  0450 10/09/22  0525 10/07/22  1653   WBC 10.7 9.5 18.1*   RBC 3.66* 3.78* 4.36   HGB 10.0* 10.2* 11.8*   HCT 32.2* 33.4* 38.4    164 201   GRANS 60 62 75*   LYMPH 27 22 16*   EOS 3 4 1      Cardiac Enzymes No results found for: CPK, CK, CKMMB, CKMB, RCK3, CKMBT, CKNDX, CKND1, GABRIEL, TROPT, TROIQ, KRUPA, TROPT, TNIPOC, BNP, BNPP   Coagulation Recent Labs     10/07/22  2230   PTP 15.1   INR 1.2   APTT 30.6       Lipid Panel Lab Results   Component Value Date/Time    Cholesterol, total 191 11/09/2021 12:24 PM    HDL Cholesterol 44 11/09/2021 12:24 PM    LDL, calculated 96.8 11/09/2021 12:24 PM    VLDL, calculated 50.2 11/09/2021 12:24 PM    Triglyceride 251 (H) 11/09/2021 12:24 PM    CHOL/HDL Ratio 4.3 11/09/2021 12:24 PM      BNP No results found for: BNP, BNPP, XBNPT   Liver Enzymes Recent Labs     10/10/22  0450   TP 7.0   ALB 2.9*   AP 49      Digoxin    Thyroid Studies Lab Results   Component Value Date/Time    TSH 2.01 10/07/2022 10:30 PM          Signed By: Estefany Carrizales MD     October 10, 2022

## 2022-10-10 NOTE — PROGRESS NOTES
Per pt, she is her copay for xarelto and insulin very high. She stated her pharmacy \"Forcing me to register for mail order to reduce the cost of the medications. \"  CM asked why she does not want to accept the order. She stated her son asked her same questions. She stated she feels the pharmacy is forcing her. She stated she does not think it is the insurance suggesting mail order. She stated  should not be charging her for her medications. Informed pt that her primary insurance is Medicare and the pharmacy is running her medications through Medicare. Informed her to call medicare and find out about mail order and also why her copays are high. CM suggested we send her medications to the [facility] outpatient pharmacy so we can find out why her copays are high and she declined. .  LUL will continue to follow.             ANTONINO Leung RN  Care Management  Pager: [phone]

## 2022-10-10 NOTE — DIABETES MGMT
Diabetes Patient/Family Education Record    Factors That May Influence Patients Ability to Learn or Comply with Recommendations   []   Language barrier    []   Cultural needs   []   Motivation    []   Cognitive limitation    []   Physical   []   Education    []   Physiological factors   [x]   Hearing/vision/speaking impairment -  Jamul    []   Uatsdin beliefs    []   Financial factors   [x]  Other: difficulty with prescription drug plan   []  No factors identified at this time. Person Instructed:   [x]   Patient   []   Family   []  Other     Preference for Learning:   [x]   Verbal   [x]   Written   []  Demonstration     Level of Comprehension & Competence:    []  Good                                      [x] Fair                                     []  Poor                             [x]  Needs Reinforcement   [x]  Teach back completed - importance of medication compliance and BG monitoring    Education Component:  See DM note    [x]  Medication management, including how to administer insulin (if appropriate) and potential medication interactions    [x]  Nutritional management - [] Obtained usual meal pattern   []   Basic carbohydrate counting  []  Plate method  []  Limit concentrated sweets and avoid sweetened beverages  [x]  Portion control  []    Avoid skipping meals   [x]  Exercise   [x]  Signs, symptoms, and treatment of hyperglycemia and hypoglycemia   [x] Prevention, recognition and treatment of hyperglycemia and hypoglycemia   [x]  Importance of blood glucose monitoring  [x] Blood Glucose targets   []   Provided patient with blood glucose meter  [x]  Has glucometer and supplies at home   []  Instruction on use of the blood glucose meter and recommended monitoring schedule   [x]  Discuss the importance of HbA1C monitoring. Patients A1c is 11.3 %.  This is equivalent to average glucose of 278 mg/dl for the past 2-3 months.   []  Sick day guidelines   [] Proper use and disposal of lancets, needles, syringes or insulin pens (if appropriate)   []  Potential long-term complications (retinopathy, kidney disease, neuropathy, foot care)   [x] Information about whom to contact in case of emergency or for more information    []  Goal:  Patient/family will demonstrate understanding of Diabetes Self- Management Skills by: (date) _______  Plan for post-discharge education or self-management support:    [x] Outpatient class schedule provided            [] Patient Declined    [] Scheduled for outpatient classes (date) _______    [x] Written information provided  Verify: [x] Prior to admission Diabetes medications    Does patient understand how diabetes medications work? _______no___________  Does patient have difficulty obtaining diabetes medications or testing supplies?  __yes - difficulty obtaining medications____    Siva Zelaya MPH RN [facility]  Office [phone]

## 2022-10-10 NOTE — ROUTINE PROCESS
Wound Prevention Checklist    Patient: Nataliia Moscoso (12 y.o. female)  Date: 10/10/2022  Diagnosis: Acute exacerbation of CHF (congestive heart failure) ([facility]) [I50.9] Acute exacerbation of CHF (congestive heart failure) ([facility]    Precautions: Fall       []  Heel prevention boots placed on patient    [x]  Patient turned q2h during shift. OOB daily with assist.     []  Lift team ordered    []  Patient on [place] bed/Specialty bed    [x]  Each Wound is documented during shift (Stage, Color, drainage, odor, measurements, and dressings)    [x]  Dual skin check done with Brent EDWARD RN  Skin intact with no pressure injuries. Patient has some bug bites to abdomen that are scabbed. Noted a small scratch to right side of neck that was cleansed today and a small mepilex applied to site.      Rosemarie Hubbard RN

## 2022-10-10 NOTE — ACP (ADVANCE CARE PLANNING)
Advance Care Planning   Advance Care Planning Inpatient Note  [address] [facility] Department    Today's Date: 10/10/2022  Unit: [facility] CV STEPDOWN    Upon review of chart and communication with care team, patient's decision making abilities are not in question. Patient and her friend Crispin Stewart were present in the room during visit. Goals of ACP Conversation:  Discuss Advance Care planning documents  Facilitate a discussion related to patient's goals of care as they align with the patient's values and beliefs    Health Care Decision Makers:      Supplemental (Other) Decision Maker: Amanda Stallworth [facility]/ All permissions - Child - [phone]    Summary:  Verified she currently had an Advance medical Directive and which naked her son and [facility] Float: [place] [employer] and asked her to bring it in. Advance Care Planning Documents (Patient Wishes) on file:  None     Assessment:    Ms. Lali Hunter expresses some frustration that she was not discharged today but also understanding that something is not right with her heart. Basil Laud have to get that right first,\" she stated.      Interventions:  Requested patient/family submit existing document(s) for our records: [address] of /Advance Directive appointment of Health care agent  Discussed and provided education on state decision maker hierarchy  Encouraged ongoing ACP conversation with future decision makers and loved ones    Care Preferences Communicated:  No    Outcomes/Plan:  ACP Discussion Completed    Chaplain Jose on 10/10/2022 at 1:38 PM

## 2022-10-10 NOTE — PROGRESS NOTES
Problem: Self Care Deficits Care Plan (Adult)  Goal: *Acute Goals and Plan of Care (Insert Text)  Description: Occupational Therapy Goals  Initiated 10/8/2022 within 7 day(s). 1.  Patient will perform grooming with modified independence. 2.  Patient will perform bathing with modified independence using adaptive equipment as needed. 3.  Patient will perform upper body dressing and lower body dressing with modified independence using adaptive equipment as needed. 4.  Patient will perform toilet transfers with modified independence using RW with good balance. 5.  Patient will perform all aspects of toileting with modified independence. 6.  Patient will participate in upper extremity therapeutic exercise/activities with modified independence for 8 minutes. 7.  Patient will utilize energy conservation techniques during functional activities with min verbal cues. Prior Level of Function: Mod I with ADLs and functional mobility using RW, supportive roommate Vinson Kinderhook assists with household chores e.g. taking out garbage     Outcome: Progressing Towards Goal   OCCUPATIONAL THERAPY TREATMENT    Patient: Piedad Sunshine [de-identified]82 y.o. female)  Date: 10/10/2022  Diagnosis: Acute exacerbation of CHF (congestive heart failure) ([facility] [address] [I50.9] Acute exacerbation of CHF (congestive heart failure) ([facility] [address]      Precautions: Fall  PLOF: Mod I with ADLs and functional mobility using RW, supportive roommate Vinson Kinderhook assists with household chores e.g. taking out garbage    Chart, occupational therapy assessment, plan of care, and goals were reviewed. ASSESSMENT:  Pt presented supine in bed upon entry and agreeable to work with OT. She transitioned to EOB from supine with CGA in prep for functional task. Once sitting, pt educated on EC/WS techniques during all ADL's for increased (I) and safety. Pt required MAX A donning her socks with 2/2 EOB 2/2 having difficulty reaching her toes.  STS transfer CGA with RW and maneuvered to sink side ~ 10 ft with CGA. Pt stood ~ 5 mins to performed simple grooming tasks with CGA 2/2 decreased dyn standing balance. Pt returned back to EOB and to supine with CGA. She was positioned for comfort and left with HOB elevated, bed alarm active, and all needs left within reach. RN made aware. Progression toward goals:  []          Improving appropriately and progressing toward goals  [x]          Improving slowly and progressing toward goals  []          Not making progress toward goals and plan of care will be adjusted     PLAN:  Patient continues to benefit from skilled intervention to address the above impairments. Continue treatment per established plan of care. Further Equipment Recommendations for Discharge:  RW    AMPAC: Current research shows that an AM-PAC score of 17 or less is not associated with a discharge to the patient's home setting. Based on an AM-PAC score of 16/24 and their current ADL deficits; it is recommended that the patient have 3-5 sessions per week of Occupational Therapy at d/c to increase the patient's independence. This AMPAC score should be considered in conjunction with interdisciplinary team recommendations to determine the most appropriate discharge setting. Patient's social support, diagnosis, medical stability, and prior level of function should also be taken into consideration. SUBJECTIVE:   Patient stated I live with Jaelyn Gillespie.     OBJECTIVE DATA SUMMARY:   Cognitive/Behavioral Status:  Neurologic State: Alert  Orientation Level: Oriented X4  Cognition: Appropriate decision making  Safety/Judgement: Awareness of environment, Fall prevention    Functional Mobility and Transfers for ADLs:   Bed Mobility:     Supine to Sit: Contact guard assistance  Sit to Supine: Contact guard assistance  Scooting: Contact guard assistance   Transfers:  Sit to Stand: Contact guard assistance  Stand to Sit: Contact guard assistance          Balance:  Sitting: Intact  Sitting - Static: Good (unsupported)  Sitting - Dynamic: Good (unsupported)  Standing: Impaired; With support  Standing - Static: Good  Standing - Dynamic : Fair    ADL Intervention:       Grooming  Position Performed: Standing  Washing Face: Contact guard assistance  Washing Hands: Contact guard assistance  Brushing Teeth: Contact guard assistance      Lower Body Dressing Assistance  Socks: Maximum assistance  Position Performed: Seated edge of bed    Pain:  Pain level pre-treatment: 0/10   Pain level post-treatment: 0/10    Activity Tolerance:    Fair   Please refer to the flowsheet for vital signs taken during this treatment. After treatment:   []  Patient left in no apparent distress sitting up in chair  [x]  Patient left in no apparent distress in bed  [x]  Call bell left within reach  [x]  Nursing notified  []  Caregiver present  [x]  Bed alarm activated    COMMUNICATION/EDUCATION:   [x] Role of Occupational Therapy in the acute care setting  [] Home safety education was provided and the patient/caregiver indicated understanding. [x] Patient/family have participated as able in working towards goals and plan of care. [x] Patient/family agree to work toward stated goals and plan of care. [] Patient understands intent and goals of therapy, but is neutral about his/her participation. [] Patient is unable to participate in goal setting and plan of care. Thank you for this referral.  JOHNNY De Jesus  Time Calculation: 38 mins    [employer] Instrument AM-PAC® Daily Activity Inpatient Short Form (6-Clicks)    How much HELP from another person does the patient currently need    (If the patient hasn't done an activity recently, how much help from another person do you think he/she would need if he/she tried?)   Total (Total A or Dep)   A Lot  (Mod to Max A)   A Little (Sup or Min A)   None (Mod I to I)   Putting on and taking off regular lower body clothing? [] 1 [x] 2 [] 3 [] 4   2.  Bathing (including washing, rinsing, drying)? [] 1 [x] 2 [] 3 [] 4   3. Toileting, which includes using toilet, bedpan or urinal?   [] 1 [x] 2 [] 3 [] 4   4. Putting on and taking off regular upper body clothing? [] 1 [] 2 [x] 3 [] 4   5. Taking care of personal grooming such as brushing teeth? [] 1 [] 2 [x] 3 [] 4   6. Eating meals?    [] 1 [] 2 [] 3 [x] 4

## 2022-10-10 NOTE — PROGRESS NOTES
[facility] [address]  DAILY PROGRESS NOTE      Patient:    Mattie , 82 y.o. female   MRN:  [id number]  Room/Bed:  200/56  Admission Date:   10/7/2022  Code status:  Full Code    Reason for Admission: Acute CHF Exacerbation     ASSESSMENT AND PLAN:   Problem List Items Addressed This Visit          Circulatory    * (Principal) Acute exacerbation of CHF (congestive heart failure) ([facility] [address]     Other Visit Diagnoses       Acute respiratory failure with hypoxia and hypercapnia (HCC)    -  Primary    Atrial fibrillation with rapid ventricular response (HCC)                Acute exacerbation of CHF  - Difficulty breathing for 3 days prior to admission  - B/l LE swelling noted on exam; pt last took Bumex 3 days ago  - Follows with outpatient cardiologist Dr. Sury Card [id number]  - CXR shows increased interstitial edema, mild ground glass at the lung bases that may represent mild pulmonary edema or atelectasis, possible small right pleural effusion   - Echocardiogram (8/11/22): normal LV systolic function with EF of 55-60% and normal LV size, increased wall thickness consistent with mild concentric hypertrophy, normal wall motion   - pro-BNP 3,967   - trop nl at 8  - Lactic acid 1.1  - Mag 1.4  Plan:  - Admit to stepdown unit with cardiac monitoring   - Start PO Lasix 40 mg BID; holding home Bumex 1mg oral BID, switched from IV lasix 40mg BID  - PO Lopressor increased to 50mg BID from 25mg  - Continue IV Diltiazem 5ml/hr   - Continue home atorvastatin oral 80 mg qdaily   - Holding home amlodipine d/t acute LE swelling  - Holding home lisinopril 40 mg oral qdaily   - CBC w/ auto diff qdaily  - CMP qdaily   - Not currently on O2  - 6 min walk test ordered 10/9 - pending    Atrial fibrillation with RVR  - EKG (10/7/22): atrial fibrillation with RVR, Left axis deviation  - HR in 110's-130's upon arrival to patient's bedside   - 10/9 AM still in afib with HR ranging 90s-120s  - TSH WNL  Plan:  - PO Lopressor increased to 50mg BID from 25mg. Continue holding home carvedilol 12.5 mg oral qdaily   - Continue IV Diltiazem 5ml/hr    - On cardiac monitoring      Possible acute on chronic DVT's  - Positive Roberta's sign bilaterally and patient's history of DVT's make b/l LE DVT's possible. - CTA chest: No evidence for pulmonary emboli. Heart failure with biatrial cardiac chamber enlargement, moderate new. Bilateral pleural effusions and mild pulmonary edema. Mild to moderate aortic atherosclerosis with irregular noncalcified mural plaque along the wall of the descending aorta which increases risk of embolic events.    - Duplex U/S of b/l LE pending   - PT 15.1  - INR 1.2  - PTT 30.6  Plan:  - Continue home xarelto 20 mg oral qdaily  - Pending Duplex U/S of b/l LE     Sepsis of unknown origin   - Meets 3/4 SIRS criteria (does not meet criteria for temperature)  - WBC 18.1 in ED  - Procalcitonin <0.05  - Lactic acid 1.1  - Negative blood cultures  - Lesion on foot  - Foot Xray: Generalized soft tissue swelling but no radiographic findings of osteomyelitis  Plan:  - DC Zosyn 3.375g IV q8h  - DC Vancomycin 2000mg IV   - Levofloxacin 250 every day for 10 days starting 10/10  - Pending MRSA culture  - urine culture 10/8: Gram (-) rods: waiting for sensitivities   - CBC w/ diff qdaily      Type 2 Diabetes Mellitus  -Most recent HbA1c of 11.8   -Serum glucose 309 on admission   Plan:  -Lantus 20 units SubQ qdaily; pending repeat HbA1c  -Insulin lispro subQ QID with meals   -Holding home Jardiance   -Continue home gabapentin 300 mg TID  -Holding home metformin 500 mg oral qdaily      Gout  -No acute flare ups  Plan:  -Continue home allopurinol 100 mg oral qdaily      Hypothyroidism   -Denies any acute symptoms   Plan:  -Continue home Synthroid 150 mcg oral qdaily  -TSH: 2.01     Chronic pain of back and LE   -Endorses continuing pain   -Follows with outpatient pain management   Plan:  -Continue home Tylenol 500 mg oral q6h  -Continue home morphine 15 mg     Global:  Code: Full  IVF/Drips: None  I/O / Wt: None  Diet: Regular with low sodium  Bowel Regimen, Last BM: Miralax PRN, last BM 2 days ago per pt  DVT/AC: Xarelto 20 mg qdaily and encourage ambulation as tolerated  Mobility: per protocol   Disposition: stable  Anticipated LOS: 1-2 nights     Point of Contact (relationship, number): Caridad Marx (son); [phone]    SUBJECTIVE:   Vira Luna is a 82 y.o.  female with PMHx of chronic diastolic CHF, chronic venous insufficiency, Type 2 DM, HTN, hypothyroidism, arthritis, asthma, chronic back pain, gout, hypercholesterolemia, and peripheral neuropathy who presented to ED on 10/7/84096 with complaint of difficulty breathing and was admitted for acute CHF exacerbation and new onset A. Fib on EKG. 10/9  No acute events overnight. Patient seen at beside. No acute complaints. Patient is doing well and denies chest pain or SOB when on NC. She denies palpitations, but does report acknowledging that her HR is fast. She denies fatigue or weakness and reports that at home her BP runs similar to what it does in the hospital but she is not sure what her BP is at home. She reports that the callus on her left foot is sore but not painful. She denies fever or chills. She is eating well. 10/10  No acute events overnight. Patient seen at beside. No acute complaints. Patient in no current pain. Nursing called overnight for patient in abdominal/back pain - morphine was administered. She has morphine 15 PRN at home and follows up with pain clinic. No cp, sob, n/v. Patient would like to go home soon she says.      ROS (positive findings are in BOLD; negative findings are in regular font)  Constitutional: fevers, chills, appetite changes, weight changes, fatigue  HEENT: changes in vision, changes in hearing, sore throat, dysphagia  Cardiovascular: chest pain, palpitations, PND, orthopnea, edema  Pulmonary: SOB, cough, sputum production, wheezing, chest tightness  Gastrointestinal: abdominal pain, nausea/vomiting, diarrhea, constipation, melena, hematochezia  Genitourinary: dysuria, hesitation, dribbling, urgency, hematuria  Musculoskeletal: arthralgias, myalgias  Skin: rash, itching  Neurological: sensory changes, motor changes, headache  Psychiatric: mood changes  Endocrine: heat/cold intolerance  Heme: easy bruising/easy bleeding, LAD    CURRENT INPATIENT MEDICATIONS:  Current Facility-Administered Medications   Medication Dose Route Frequency Provider Last Rate Last Admin    insulin glargine (LANTUS) injection 20 Units  20 Units SubCUTAneous DAILY Jessica Carrion MD   20 Units at 10/09/22 0942    furosemide (LASIX) tablet 40 mg  40 mg Oral BID Herrera Nash DO   40 mg at 10/09/22 1736    metoprolol tartrate (LOPRESSOR) tablet 50 mg  50 mg Oral Q12H Abigail Pavon MD   50 mg at 10/09/22 2112    levoFLOXacin (LEVAQUIN) tablet 250 mg  250 mg Oral Q24H Jessica Carrion MD        dilTIAZem (CARDIZEM) 100 mg in 0.9% sodium chloride (MBP/ADV) 100 mL infusion  5 mg/hr IntraVENous CONTINUOUS Loy Palumbo MD 5 mL/hr at 10/10/22 0006 5 mg/hr at 10/10/22 0006    acetaminophen (TYLENOL) tablet 500 mg  500 mg Oral Q6H PRN Rezazad, Kerry, DO   500 mg at 10/09/22 2332    allopurinoL (ZYLOPRIM) tablet 100 mg  100 mg Oral DAILY Rezazad, Kerry, DO   100 mg at 10/09/22 0939    atorvastatin (LIPITOR) tablet 80 mg  80 mg Oral DAILY Rezazad, Kerry, DO   80 mg at 10/09/22 2957    levothyroxine (SYNTHROID) tablet 150 mcg  150 mcg Oral DAILY Rezazad, Kerry, DO   150 mcg at 10/10/22 0550    [Held by provider] lisinopriL (PRINIVIL, ZESTRIL) tablet 40 mg  40 mg Oral DAILY Rezazad, Kerry, DO        [Held by provider] bumetanide (BUMEX) tablet 1 mg  1 mg Oral BID PRN Rezazad, Kerry, DO        [Held by provider] amLODIPine (NORVASC) tablet 10 mg  10 mg Oral DAILY Kerry Gordon DO        [Held by provider] hydrOXYzine HCL (ATARAX) tablet 10 mg  10 mg Oral BID PRN Miranda Pozo, DO        [Held by provider] insulin lispro (HUMALOG) injection 5 Units  5 Units SubCUTAneous TIDAC Rezazad, Kerry, DO        [Held by provider] metFORMIN (GLUCOPHAGE) tablet 500 mg  500 mg Oral DAILY Rezazad, Kerry, DO        rivaroxaban (XARELTO) tablet 20 mg  20 mg Oral DAILY Rezazad, Kerry, DO   20 mg at 10/09/22 0943    [Held by provider] sertraline (ZOLOFT) tablet 50 mg  50 mg Oral DAILY Rezazad, Kerry, DO        [Held by provider] tamsulosin (FLOMAX) capsule 0.4 mg  0.4 mg Oral PCD Rezazad, Kerry, DO        sodium chloride (NS) flush 5-40 mL  5-40 mL IntraVENous Q8H Rezazad, Kerry, DO   10 mL at 10/10/22 0550    sodium chloride (NS) flush 5-40 mL  5-40 mL IntraVENous PRN Rezazad, Kerry, DO        gabapentin (NEURONTIN) capsule 300 mg  300 mg Oral TID Rezazad, Kerry, DO   300 mg at 10/09/22 2112    [Held by provider] morphine IR (MS IR) tablet 15 mg  15 mg Oral BID PRN Elizabeth Varela MD        polyethylene glycol (MIRALAX) packet 17 g  17 g Oral DAILY PRN Rezazad, Kerry, DO        insulin lispro (HUMALOG) injection   SubCUTAneous AC&HS Elizabeth Varela MD   2 Units at 10/09/22 1737    glucose chewable tablet 16 g  16 g Oral PRN Elizabeth Varela MD        glucagon (GLUCAGEN) injection 1 mg  1 mg IntraMUSCular PRN Elizabeth Varela MD           Allergies  No Known Allergies    OBJECTIVE:    Intake/Output Summary (Last 24 hours) at 10/10/2022 0616  Last data filed at 10/10/2022 0445  Gross per 24 hour   Intake 300 ml   Output 1300 ml   Net -1000 ml       Visit Vitals  /67   Pulse 97   Temp 97.4 °F (36.3 °C)   Resp 21   Ht 5' 5\" (1.651 m)   Wt 107.5 kg (237 lb)   SpO2 94%   BMI 39.44 kg/m²       PHYSICAL EXAM  Gen: NAD, comfortable, obese   HEENT: normocephalic, atraumatic, MMM, no thyromegaly, no JVD  CV: tachycardic with an irregular rhythm.  No M/R/G, +2 radial pulses  Pulm: CTAB, no wheezes, no crackles  Abd: S/NT/ND, no rebound, no guarding, no hepatosplenomegaly   MSK: mild LE non-pitting edema Skin: warm, dry, intact, no rash, callous of left foot under big toe  Neuro: CN II-XII grossly intact, no focal deficits appreciated   Psych: appropriate, alert, oriented x3    LABWORK (LAST 24 HOURS)  Recent Results (from the past 24 hour(s))   GLUCOSE, POC    Collection Time: 10/09/22  7:36 AM   Result Value Ref Range    Glucose (POC) 137 (H) 70 - 110 mg/dL   GLUCOSE, POC    Collection Time: 10/09/22 11:54 AM   Result Value Ref Range    Glucose (POC) 173 (H) 70 - 110 mg/dL   GLUCOSE, POC    Collection Time: 10/09/22  4:38 PM   Result Value Ref Range    Glucose (POC) 162 (H) 70 - 110 mg/dL   GLUCOSE, POC    Collection Time: 10/09/22  9:06 PM   Result Value Ref Range    Glucose (POC) 120 (H) 70 - 110 mg/dL       IMAGING AND PROCEDURES (LAST 36 HOURS)  No results found.    ================================================================  Further management for Ms. Tara Valentine will be discussed on rounds with my attending.       Kike Hodgson DO, PGY-1  [facility]  October 10, 2022 7:14 AM

## 2022-10-10 NOTE — PROGRESS NOTES
Kt Pickering  [facility]   POST-ROUNDING NOTE    Assessment & Plan:  - Continue with cardiac monitoring   - Levofloxacin 250 every day for 7 days d/t UTI  - Continue PO Lasix 40mg BID  - 50 mg BID - Cardiology suggested increasing dose  - Continue IV Cardizem drip 5 ml/hr   - Continue Xarelto 20mg  - Not requiring NC O2 - continue to wean  - 6 minute walk test pending - patient desats to upper 80s when speaking. The above patient and plan were discussed with my supervising physician. See daily progress note for full assessment/plan.       Chris King DO, PGY-1  [facility]  10/10/2022, 12:49 PM

## 2022-10-10 NOTE — PROGRESS NOTES
*ATTENTION:  This note has been created by a medical student for educational purposes only. Please do not refer to the content of this note for clinical decision-making, billing, or other purposes. Please see attending physicians note to obtain clinical information on this patient. *         [facility] Medicine  DAILY PROGRESS NOTE      Patient:    Piedad Sunshine , 82 y.o. female   MRN:  [id number]  Room/Bed:  [address] [place] [address]  Admission Date:   10/7/2022  Code status:  Full Code    Reason for Admission: Acute CHF exacerbation    ASSESSMENT AND PLAN:   Problem List Items Addressed This Visit          Circulatory    * (Principal) Acute exacerbation of CHF (congestive heart failure) ([facility] [address]     Other Visit Diagnoses       Acute respiratory failure with hypoxia and hypercapnia (HCC)    -  Primary    Atrial fibrillation with rapid ventricular response ([facility] [address]                Problem 1- Acute exacerbation of CHF   -SOB, Increased Work of breathing and chest pain starting 3 days prior to admission. Pt denies change in Bumex regime. B/l 1+ pitting LE edema on Physical exam. CXR showed increased interstitial edema, mild ground glass appearance in b/l lower lobes. Echo on 8/11 showed EF of 55-60%. Pro BNP of 3,967, trop nl at 8, LA of 1.1 and Mag 1.4 all on admission. Plan:  - Continue Cardiac Monitoring  - Continue PO Furosemide. Hold home Bumex  - Metoprolol increased to 50 mg BID from 25 mg BID  - Continue Diltiazem 5ml/hr drip. Problem 2- Afib with RVR  -EKG on 10/7 showed atrial fibrillation with RVR, and left axis deviation. HR consistently 120-130. TSH WNL   Plan:  - Metoprolol increased to 50 mg BID from 25 mg BID  - Continue IV Diltiazem 5 ml/hr  - Continue telemetry  - Criteria for d/c HR must meet goal of <90 consistently    Problem 3- Possible Acute on Chronic DVT  -CTA chest showed no evidence of pulmonary edema. Duplex Ultrasound of b/l LE pending.  PT 15.1, INR 1.2, PTT 30.6  Plan:  - Continue Xarelto 20 mg orally  - Pending b/l LE     Problem 4- Sepsis of unknown origin  -3/4 of SIRS criteria of sepsis. WBC 18.1 in ED, today 10. 7. negative blood pressures. Place on foot being monitored likely callus- Foot XR to evaluate showed soft tissue swelling but no findings of osteomyelitis. Plan:  - D/c Zosyn and Vancomycin  - Levofloxacin 250 everyday for 10 days starting 10/10.   - Pending MRSA culture  - Urine culture 10/8: Gram (-) rods  - CBC w/ diff daily to trend WBC      Code: FULL  Diet: none  DVT/AC: Xarelto   Mobility: per protocol  Disposition: LOS 2-3 days    Point of Contact (relationship): Martin Tucker (son) [phone]        SUBJECTIVE:   82year old female with PMHx of diastolic CHF, Venous insufficiency, asthma, HTN, Hypothyroidism, arthritis, back pain, gout  Events of the last 24 hours:  No acute events overnight. Patient seen at beside. No acute complaints.  Morphine ordered for back pain,     ROS (positive findings are in BOLD; negative findings are in regular font)  Constitutional: fevers, chills, appetite changes, weight changes, fatigue  HEENT: changes in vision, changes in hearing, sore throat, dysphagia  Cardiovascular: chest pain, palpitations, PND, orthopnea, edema  Pulmonary: SOB, cough, sputum production, wheezing, chest tightness  Gastrointestinal: abdominal pain, nausea/vomiting, diarrhea, constipation, melena, hematochezia  Genitourinary: dysuria, hesitation, dribbling, urgency, hematuria  Musculoskeletal: arthralgias, myalgias  Skin: rash, itching  Neurological: sensory changes, motor changes, headache  Psychiatric: mood changes  Endocrine: heat/cold intolerance  Heme: easy bruising/easy bleeding, LAD    CURRENT INPATIENT MEDICATIONS:  Current Facility-Administered Medications   Medication Dose Route Frequency Provider Last Rate Last Admin    [START ON 10/11/2022] levoFLOXacin (LEVAQUIN) tablet 250 mg  250 mg Oral Q24H Tin Dumont MD        insulin glargine (LANTUS) injection 20 Units  20 Units SubCUTAneous DAILY Juvenal Acosta MD   20 Units at 10/10/22 5740    furosemide (LASIX) tablet 40 mg  40 mg Oral BID Charity Herrera G, DO   40 mg at 10/10/22 2053    metoprolol tartrate (LOPRESSOR) tablet 50 mg  50 mg Oral Q12H Juan Estrella MD   50 mg at 10/10/22 0842    dilTIAZem (CARDIZEM) 100 mg in 0.9% sodium chloride (MBP/ADV) 100 mL infusion  5 mg/hr IntraVENous CONTINUOUS Regino Aguero MD 5 mL/hr at 10/10/22 0006 5 mg/hr at 10/10/22 0006    acetaminophen (TYLENOL) tablet 500 mg  500 mg Oral Q6H PRN Rezazad, Kerry, DO   500 mg at 10/09/22 2332    allopurinoL (ZYLOPRIM) tablet 100 mg  100 mg Oral DAILY Rezazad, Kerry, DO   100 mg at 10/10/22 0842    atorvastatin (LIPITOR) tablet 80 mg  80 mg Oral DAILY Rezazad, Kerry, DO   80 mg at 10/10/22 5189    levothyroxine (SYNTHROID) tablet 150 mcg  150 mcg Oral DAILY Rezazad, Kerry, DO   150 mcg at 10/10/22 0550    [Held by provider] lisinopriL (PRINIVIL, ZESTRIL) tablet 40 mg  40 mg Oral DAILY Rezazad, Kerry, DO        [Held by provider] bumetanide (BUMEX) tablet 1 mg  1 mg Oral BID PRN Rezazad, Kerry, DO        [Held by provider] amLODIPine (NORVASC) tablet 10 mg  10 mg Oral DAILY Rezazad, Kerry, DO        [Held by provider] hydrOXYzine HCL (ATARAX) tablet 10 mg  10 mg Oral BID PRN Rezazad, Kerry, DO        [Held by provider] insulin lispro (HUMALOG) injection 5 Units  5 Units SubCUTAneous TIDAC Rezazad, Kerry, DO        [Held by provider] metFORMIN (GLUCOPHAGE) tablet 500 mg  500 mg Oral DAILY Rezazad, Kerry, DO        rivaroxaban (XARELTO) tablet 20 mg  20 mg Oral DAILY Rezazad, Kerry, DO   20 mg at 10/10/22 0842    [Held by provider] sertraline (ZOLOFT) tablet 50 mg  50 mg Oral DAILY Kerry Gordon, DO        [Held by provider] tamsulosin (FLOMAX) capsule 0.4 mg  0.4 mg Oral PCD Kerry Gordon DO        sodium chloride (NS) flush 5-40 mL  5-40 mL IntraVENous Q8H Kerry Gordon DO   10 mL at 10/10/22 0550    sodium chloride (NS) flush 5-40 mL  5-40 mL IntraVENous PRN Kerry Gordon, DO        gabapentin (NEURONTIN) capsule 300 mg  300 mg Oral TID WilderzadKerry DO   300 mg at 10/10/22 0842    [Held by provider] morphine IR (MS IR) tablet 15 mg  15 mg Oral BID PRN Rogelio Lopez MD        polyethylene glycol (MIRALAX) packet 17 g  17 g Oral DAILY PRN Kerry Gordon DO        insulin lispro (HUMALOG) injection   SubCUTAneous AC&HS Rogelio Lopez MD   2 Units at 10/09/22 1737    glucose chewable tablet 16 g  16 g Oral PRN Rogelio Lopez MD        glucagon (GLUCAGEN) injection 1 mg  1 mg IntraMUSCular PRN Rogelio Lopez MD           Allergies  No Known Allergies    OBJECTIVE:    Intake/Output Summary (Last 24 hours) at 10/10/2022 1233  Last data filed at 10/10/2022 0445  Gross per 24 hour   Intake 240 ml   Output 900 ml   Net -660 ml       Visit Vitals  /74 (BP 1 Location: Right lower arm)   Pulse (!) 134   Temp 98.5 °F (36.9 °C)   Resp 22   Ht 5' 5\" (1.651 m)   Wt 107.5 kg (237 lb)   SpO2 95%   BMI 39.44 kg/m²       PHYSICAL EXAM  Gen: NAD, comfortable, obese  HEENT: normocephalic, atraumatic, MMM, no thyromegaly, no JVD  CV: RRR, S1/S2 present without M/R/G, +2 radial pulses, well-perfused, PMI not displaced  Pulm: CTAB, no wheezes, no crackles  Abd: S/NT/ND, no rebound, no guarding, no hepatosplenomegaly   MSK: no clubbing, no edema  Skin: warm, dry, intact, no rash  Neuro: CN II-XII grossly intact, no focal deficits appreciated   Psych: appropriate, alert, oriented x3    LABWORK (LAST 24 HOURS)  Recent Results (from the past 24 hour(s))   GLUCOSE, POC    Collection Time: 10/09/22  4:38 PM   Result Value Ref Range    Glucose (POC) 162 (H) 70 - 110 mg/dL   GLUCOSE, POC    Collection Time: 10/09/22  9:06 PM   Result Value Ref Range    Glucose (POC) 120 (H) 70 - 822 mg/dL   METABOLIC PANEL, COMPREHENSIVE    Collection Time: 10/10/22  4:50 AM   Result Value Ref Range    Sodium 141 136 - 145 mmol/L    Potassium 3.7 3.5 - 5.5 mmol/L    Chloride 104 100 - 111 mmol/L    CO2 30 21 - 32 mmol/L    Anion gap 7 3.0 - 18 mmol/L    Glucose 103 (H) 74 - 99 mg/dL    BUN 25 (H) 7.0 - 18 MG/DL    Creatinine 1.03 0.6 - 1.3 MG/DL    BUN/Creatinine ratio 24 (H) 12 - 20      eGFR 53 (L) >60 ml/min/1.73m2    Calcium 8.6 8.5 - 10.1 MG/DL    Bilirubin, total 0.8 0.2 - 1.0 MG/DL    ALT (SGPT) <6 (L) 13 - 56 U/L    AST (SGOT) 6 (L) 10 - 38 U/L    Alk. phosphatase 49 45 - 117 U/L    Protein, total 7.0 6.4 - 8.2 g/dL    Albumin 2.9 (L) 3.4 - 5.0 g/dL    Globulin 4.1 (H) 2.0 - 4.0 g/dL    A-G Ratio 0.7 (L) 0.8 - 1.7     CBC WITH AUTOMATED DIFF    Collection Time: 10/10/22  4:50 AM   Result Value Ref Range    WBC 10.7 4.6 - 13.2 K/uL    RBC 3.66 (L) 4.20 - 5.30 M/uL    HGB 10.0 (L) 12.0 - 16.0 g/dL    HCT 32.2 (L) 35.0 - 45.0 %    MCV 88.0 78.0 - 100.0 FL    MCH 27.3 24.0 - 34.0 PG    MCHC 31.1 31.0 - 37.0 g/dL    RDW 14.1 11.6 - 14.5 %    PLATELET 235 175 - 452 K/uL    MPV 13.2 (H) 9.2 - 11.8 FL    NRBC 0.0 0  WBC    ABSOLUTE NRBC 0.00 0.00 - 0.01 K/uL    NEUTROPHILS 60 40 - 73 %    LYMPHOCYTES 27 21 - 52 %    MONOCYTES 10 3 - 10 %    EOSINOPHILS 3 0 - 5 %    BASOPHILS 1 0 - 2 %    IMMATURE GRANULOCYTES 0 0.0 - 0.5 %    ABS. NEUTROPHILS 6.4 1.8 - 8.0 K/UL    ABS. LYMPHOCYTES 2.8 0.9 - 3.6 K/UL    ABS. MONOCYTES 1.0 0.05 - 1.2 K/UL    ABS. EOSINOPHILS 0.3 0.0 - 0.4 K/UL    ABS. BASOPHILS 0.1 0.0 - 0.1 K/UL    ABS. IMM. GRANS. 0.0 0.00 - 0.04 K/UL    DF AUTOMATED     GLUCOSE, POC    Collection Time: 10/10/22  8:05 AM   Result Value Ref Range    Glucose (POC) 109 70 - 110 mg/dL   GLUCOSE, POC    Collection Time: 10/10/22 12:04 PM   Result Value Ref Range    Glucose (POC) 178 (H) 70 - 110 mg/dL       IMAGING AND PROCEDURES (LAST 36 HOURS)  No results found.    ================================================================  Further management for Ms. Vira Luna will be discussed on rounds with my attending.       Jenelle Hamilton, OMS- IV  [facility]  October 10, 2022 12:33 PM

## 2022-10-11 LAB
ALBUMIN SERPL-MCNC: 3 G/DL (ref 3.4–5)
ALBUMIN/GLOB SERPL: 0.7 {RATIO} (ref 0.8–1.7)
ALP SERPL-CCNC: 50 U/L (ref 45–117)
ALT SERPL-CCNC: 7 U/L (ref 13–56)
ANION GAP SERPL CALC-SCNC: 6 MMOL/L (ref 3–18)
AST SERPL-CCNC: 7 U/L (ref 10–38)
BASOPHILS # BLD: 0.1 K/UL (ref 0–0.1)
BASOPHILS NFR BLD: 1 % (ref 0–2)
BILIRUB SERPL-MCNC: 0.5 MG/DL (ref 0.2–1)
BUN SERPL-MCNC: 26 MG/DL (ref 7–18)
BUN/CREAT SERPL: 26 (ref 12–20)
CALCIUM SERPL-MCNC: 8.8 MG/DL (ref 8.5–10.1)
CHLORIDE SERPL-SCNC: 104 MMOL/L (ref 100–111)
CO2 SERPL-SCNC: 34 MMOL/L (ref 21–32)
CREAT SERPL-MCNC: 1 MG/DL (ref 0.6–1.3)
DIFFERENTIAL METHOD BLD: ABNORMAL
EOSINOPHIL # BLD: 0.3 K/UL (ref 0–0.4)
EOSINOPHIL NFR BLD: 4 % (ref 0–5)
ERYTHROCYTE [DISTWIDTH] IN BLOOD BY AUTOMATED COUNT: 14.1 % (ref 11.6–14.5)
GLOBULIN SER CALC-MCNC: 4.5 G/DL (ref 2–4)
GLUCOSE BLD STRIP.AUTO-MCNC: 114 MG/DL (ref 70–110)
GLUCOSE BLD STRIP.AUTO-MCNC: 180 MG/DL (ref 70–110)
GLUCOSE BLD STRIP.AUTO-MCNC: 222 MG/DL (ref 70–110)
GLUCOSE BLD STRIP.AUTO-MCNC: 88 MG/DL (ref 70–110)
GLUCOSE SERPL-MCNC: 104 MG/DL (ref 74–99)
HCT VFR BLD AUTO: 36.1 % (ref 35–45)
HGB BLD-MCNC: 11 G/DL (ref 12–16)
IMM GRANULOCYTES # BLD AUTO: 0.1 K/UL (ref 0–0.04)
IMM GRANULOCYTES NFR BLD AUTO: 1 % (ref 0–0.5)
LYMPHOCYTES # BLD: 2 K/UL (ref 0.9–3.6)
LYMPHOCYTES NFR BLD: 23 % (ref 21–52)
MCH RBC QN AUTO: 26.8 PG (ref 24–34)
MCHC RBC AUTO-ENTMCNC: 30.5 G/DL (ref 31–37)
MCV RBC AUTO: 88 FL (ref 78–100)
MONOCYTES # BLD: 1 K/UL (ref 0.05–1.2)
MONOCYTES NFR BLD: 11 % (ref 3–10)
NEUTS SEG # BLD: 5.2 K/UL (ref 1.8–8)
NEUTS SEG NFR BLD: 60 % (ref 40–73)
NRBC # BLD: 0 K/UL (ref 0–0.01)
NRBC BLD-RTO: 0 PER 100 WBC
PLATELET # BLD AUTO: 164 K/UL (ref 135–420)
PLATELET COMMENTS,PCOM: ABNORMAL
PMV BLD AUTO: 13.2 FL (ref 9.2–11.8)
POTASSIUM SERPL-SCNC: 3.5 MMOL/L (ref 3.5–5.5)
PROT SERPL-MCNC: 7.5 G/DL (ref 6.4–8.2)
RBC # BLD AUTO: 4.1 M/UL (ref 4.2–5.3)
RBC MORPH BLD: ABNORMAL
SODIUM SERPL-SCNC: 144 MMOL/L (ref 136–145)
WBC # BLD AUTO: 8.7 K/UL (ref 4.6–13.2)

## 2022-10-11 PROCEDURE — 82962 GLUCOSE BLOOD TEST: CPT

## 2022-10-11 PROCEDURE — 99232 SBSQ HOSP IP/OBS MODERATE 35: CPT | Performed by: INTERNAL MEDICINE

## 2022-10-11 PROCEDURE — 65660000004 HC RM CVT STEPDOWN

## 2022-10-11 PROCEDURE — 74011250637 HC RX REV CODE- 250/637

## 2022-10-11 PROCEDURE — 74011250636 HC RX REV CODE- 250/636: Performed by: INTERNAL MEDICINE

## 2022-10-11 PROCEDURE — 74011636637 HC RX REV CODE- 636/637

## 2022-10-11 PROCEDURE — 85025 COMPLETE CBC W/AUTO DIFF WBC: CPT

## 2022-10-11 PROCEDURE — 74011000250 HC RX REV CODE- 250

## 2022-10-11 PROCEDURE — 74011000258 HC RX REV CODE- 258: Performed by: INTERNAL MEDICINE

## 2022-10-11 PROCEDURE — 36415 COLL VENOUS BLD VENIPUNCTURE: CPT

## 2022-10-11 PROCEDURE — 2709999900 HC NON-CHARGEABLE SUPPLY

## 2022-10-11 PROCEDURE — 80053 COMPREHEN METABOLIC PANEL: CPT

## 2022-10-11 PROCEDURE — 74011636637 HC RX REV CODE- 636/637: Performed by: STUDENT IN AN ORGANIZED HEALTH CARE EDUCATION/TRAINING PROGRAM

## 2022-10-11 RX ORDER — LEVOFLOXACIN 250 MG/1
250 TABLET ORAL EVERY 24 HOURS
Status: DISCONTINUED | OUTPATIENT
Start: 2022-10-11 | End: 2022-10-14 | Stop reason: HOSPADM

## 2022-10-11 RX ADMIN — LEVOTHYROXINE SODIUM 150 MCG: 0.05 TABLET ORAL at 06:49

## 2022-10-11 RX ADMIN — Medication 2 UNITS: at 17:41

## 2022-10-11 RX ADMIN — LEVOFLOXACIN 250 MG: 250 TABLET, FILM COATED ORAL at 09:59

## 2022-10-11 RX ADMIN — GABAPENTIN 300 MG: 300 CAPSULE ORAL at 21:23

## 2022-10-11 RX ADMIN — FUROSEMIDE 40 MG: 40 TABLET ORAL at 17:42

## 2022-10-11 RX ADMIN — ATORVASTATIN CALCIUM 80 MG: 40 TABLET, FILM COATED ORAL at 09:59

## 2022-10-11 RX ADMIN — ALLOPURINOL 100 MG: 100 TABLET ORAL at 09:59

## 2022-10-11 RX ADMIN — SODIUM CHLORIDE, PRESERVATIVE FREE 10 ML: 5 INJECTION INTRAVENOUS at 21:24

## 2022-10-11 RX ADMIN — FUROSEMIDE 40 MG: 40 TABLET ORAL at 09:59

## 2022-10-11 RX ADMIN — Medication 4 UNITS: at 12:36

## 2022-10-11 RX ADMIN — METOPROLOL TARTRATE 100 MG: 50 TABLET, FILM COATED ORAL at 21:23

## 2022-10-11 RX ADMIN — GABAPENTIN 300 MG: 300 CAPSULE ORAL at 16:18

## 2022-10-11 RX ADMIN — SODIUM CHLORIDE, PRESERVATIVE FREE 10 ML: 5 INJECTION INTRAVENOUS at 06:51

## 2022-10-11 RX ADMIN — SODIUM CHLORIDE 5 MG/HR: 900 INJECTION, SOLUTION INTRAVENOUS at 16:18

## 2022-10-11 RX ADMIN — METOPROLOL TARTRATE 100 MG: 50 TABLET, FILM COATED ORAL at 09:59

## 2022-10-11 RX ADMIN — Medication 20 UNITS: at 10:09

## 2022-10-11 RX ADMIN — RIVAROXABAN 20 MG: 20 TABLET, FILM COATED ORAL at 09:59

## 2022-10-11 RX ADMIN — GABAPENTIN 300 MG: 300 CAPSULE ORAL at 09:59

## 2022-10-11 RX ADMIN — SODIUM CHLORIDE, PRESERVATIVE FREE 10 ML: 5 INJECTION INTRAVENOUS at 16:21

## 2022-10-11 NOTE — DIABETES MGMT
Diabetes/ Glycemic Control Plan of Care    Continues with her home dose of lantus and corrective humalog   mg/dl this morning  needs assistance securing her prescriptions prior to discharge   Will continue to monitor     Blood glucose values: Within target range (70-180mg/dL):  no    Current insulin orders:   Lantus 20 units daily  Corrective humalog, normal insulin resistant, 4 times daily  Total Daily Dose previous 24 hours = 28 units   20 units lantus  8 units humalog    Current A1c:   Lab Results   Component Value Date/Time    Hemoglobin A1c 11.3 (H) 10/07/2022 10:30 PM    Hemoglobin A1c (POC) 9.4 11/09/2021 11:58 AM      equivalent  to ave Blood Glucose of 278 mg/dl for 2-3 months prior to admission  Adequate glycemic control PTA: no    Nutrition/Diet:   Active Orders   Diet    ADULT DIET Regular; 3 carb choices (45 gm/meal); Low Sodium (2 gm); 1200 ml      Meal Intake:  Patient Vitals for the past 168 hrs:   % Diet Eaten   10/11/22 1055 76 - 100%   10/09/22 0902 76 - 100%     Home diabetes medications:    Jardiance 10 mg tablet Take 10 mg by mouth daily. insulin glargine (LANTUS) 100 unit/mL injection Inject 20 units daily at bedtime. Monitor and record blood sugar daily. insulin aspart U-100 (NovoLOG Flexpen U-100 Insulin) 100 unit/mL (3 mL) inpn 5 Units by SubCUTAneous route Before breakfast, lunch, and dinner. metFORMIN (GLUCOPHAGE) 500 mg tablet Take 500 mg by mouth daily. Plan/Goals:   Blood glucose will be within target of 70 - 180 mg/dl within 72 hours  Reinforce dietary and medication compliance at home.        Education:  [x] Refer to Diabetes Education Record                       [] Education not indicated at this time     Denice Mahoney MPH RN 1 [facility] [phone]

## 2022-10-11 NOTE — PROGRESS NOTES
[facility]   POST-ROUNDING NOTE    Assessment & Plan:  - Continue with cardiac monitoring   - Levofloxacin 250 every day for 3 days d/t UTI  - Continue PO Lasix 40mg BID  - 100 mg BID - Await Cardiology recs  - Continue IV Cardizem drip 5 ml/hr - consider switching to PO dose and increasing.  - Continue Xarelto 20mg  - Not requiring NC O2 at rest  - 6 min walk test completed by nursin Patient ambulated in the hallway on room air. Oxygen saturation drops down to 86-87% on room air. O2  sats goes up to 93-94 % when she stops and take deep breath on room air. O2 sat on room air at rest is 96-98%. - Consider DC tomorrow if rate controled and not de-sating into the 80 with exertion. The above patient and plan were discussed with my supervising physician. See daily progress note for full assessment/plan.       Mickie Munoz DO, PGY-1  [facility]  10/11/2022, 12:49 PM

## 2022-10-11 NOTE — PROGRESS NOTES
[facility] - Progress Note  Admit Date: 10/7/2022    Assessment:      -Acute on chronic diastolic heart failure, switched to oral diuretics. Echo 8/11/22 with EF 55%  -New onset atrial fibrillation with rapid ventricular rate, plan rate control and anticoagulation  -History of DVT right lower extremity 6/2022. Patient ran out of Xarelto 1 week ago as she could not afford it  -Possible sepsis, on antbx  -Hypertension  -hyperlipidemia  -DM on insulin     Primary cardiologist Dr. Doreen Cantrell:     A.fib rates improving today. Toprol increased to 100 mg twice daily yesterday. We will continue for another 24 hours before making any adjustments. Remains on Xarelto for both history of DVT as well as new A. fib.     Subjective:     No new complaints, remains A.fib ~100 bpm.    Objective:      Patient Vitals for the past 8 hrs:   Temp Pulse Resp BP SpO2   10/11/22 0817 98.3 °F (36.8 °C) (!) 103 19 (!) 133/100 97 %   10/11/22 0400 97.8 °F (36.6 °C) -- 24 128/60 98 %         Patient Vitals for the past 96 hrs:   Weight   10/11/22 0400 108.4 kg (239 lb)   10/10/22 0445 107.5 kg (237 lb)   10/09/22 0355 109.8 kg (242 lb)   10/08/22 1713 111.6 kg (246 lb)   10/07/22 1646 100 kg (220 lb 7.4 oz)                    Intake/Output Summary (Last 24 hours) at 10/11/2022 0950  Last data filed at 10/11/2022 0715  Gross per 24 hour   Intake --   Output 800 ml   Net -800 ml       Physical Exam:  General:  alert, cooperative, no distress, appears stated age  Neck:  nontender  Lungs:  clear to auscultation bilaterally  Heart: irreg, S1, S2 normal, no murmur, click, rub or gallop  Abdomen:  abdomen is soft without significant tenderness, masses, organomegaly or guarding  Extremities:  extremities normal, atraumatic, no cyanosis or edema    Data Review:     Labs: Results:       Chemistry Recent Labs     10/11/22  0513 10/10/22  0450 10/09/22  0525   * 103* 115*    141 140   K 3.5 3.7 4.4    104 103   CO2 34* 30 31   BUN 26* 25* 28*   CREA 1.00 1.03 1.21   CA 8.8 8.6 9.1   AGAP 6 7 6   BUCR 26* 24* 23*   AP 50 49 56   TP 7.5 7.0 7.1   ALB 3.0* 2.9* 3.0*   GLOB 4.5* 4.1* 4.1*   AGRAT 0.7* 0.7* 0.7*      CBC w/Diff Recent Labs     10/11/22  0513 10/10/22  0450 10/09/22  0525   WBC 8.7 10.7 9.5   RBC 4.10* 3.66* 3.78*   HGB 11.0* 10.0* 10.2*   HCT 36.1 32.2* 33.4*    184 164   GRANS 60 60 62   LYMPH 23 27 22   EOS 4 3 4      Cardiac Enzymes No results found for: CPK, CK, CKMMB, CKMB, RCK3, CKMBT, CKNDX, CKND1, GABRIEL, TROPT, TROIQ, KRUPA, TROPT, TNIPOC, BNP, BNPP   Coagulation No results for input(s): PTP, INR, APTT, INREXT in the last 72 hours.     Lipid Panel Lab Results   Component Value Date/Time    Cholesterol, total 191 11/09/2021 12:24 PM    HDL Cholesterol 44 11/09/2021 12:24 PM    LDL, calculated 96.8 11/09/2021 12:24 PM    VLDL, calculated 50.2 11/09/2021 12:24 PM    Triglyceride 251 (H) 11/09/2021 12:24 PM    CHOL/HDL Ratio 4.3 11/09/2021 12:24 PM      BNP No results found for: BNP, BNPP, XBNPT   Liver Enzymes Recent Labs     10/11/22  0513   TP 7.5   ALB 3.0*   AP 50      Digoxin    Thyroid Studies Lab Results   Component Value Date/Time    TSH 2.01 10/07/2022 10:30 PM          Signed By: Clarke Werner MD     October 11, 2022

## 2022-10-11 NOTE — PROGRESS NOTES
1930:Bedside and Verbal shift change report given to Rigoberto Hernandez RN, RN (oncoming nurse) by Dino Osgood, RN (offgoing nurse). Report included the following information SBAR, Kardex, Intake/Output, MAR, Recent Results, and Cardiac Rhythm A-Fib.     Wound Prevention Checklist    Patient: Venkatesh Hilton [de-identified]82 y.o. female)  Date: 10/11/2022  Diagnosis: Acute exacerbation of CHF (congestive heart failure) ([facility]) [I50.9] Acute exacerbation of CHF (congestive heart failure) ([facility] [address]    Precautions: Fall       []  Heel prevention boots placed on patient    []  Patient turned q2h during shift    []  Lift team ordered    []  Patient on Shalini bed/Specialty bed    []  Each Wound is documented during shift (Stage, Color, drainage, odor, measurements, and dressings)    [x]  Dual skin check done with DARBY Reinoso RN

## 2022-10-11 NOTE — PROGRESS NOTES
[facility] [address]  DAILY PROGRESS NOTE      Patient:    Sahil Reyes , 82 y.o. female   MRN:  [id number]  Room/Bed:  200/56  Admission Date:   10/7/2022  Code status:  Full Code    Reason for Admission: Acute CHF Exacerbation     ASSESSMENT AND PLAN:   Problem List Items Addressed This Visit          Circulatory    * (Principal) Acute exacerbation of CHF (congestive heart failure) ([facility] [address]     Other Visit Diagnoses       Acute respiratory failure with hypoxia and hypercapnia (HCC)    -  Primary    Atrial fibrillation with rapid ventricular response ([facility] [address]                Acute exacerbation of CHF  - Difficulty breathing for 3 days prior to admission  - B/l LE swelling noted on exam; pt last took Bumex 3 days ago  - Follows with outpatient cardiologist Dr. Tabitha Manzo [id number]  - CXR shows increased interstitial edema, mild ground glass at the lung bases that may represent mild pulmonary edema or atelectasis, possible small right pleural effusion   - Echocardiogram (8/11/22): normal LV systolic function with EF of 55-60% and normal LV size, increased wall thickness consistent with mild concentric hypertrophy, normal wall motion   - pro-BNP 3,967   - trop nl at 8  - Lactic acid 1.1  - Mag 1.4  Plan:  - Admit to stepdown unit with cardiac monitoring   - Start PO Lasix 40 mg BID; holding home Bumex 1mg oral BID, switched from IV lasix 40mg BID  - PO Lopressor increased to 100 mg BID from 50 mg - consider increasing per cardiology  - Continue IV Diltiazem 5ml/hr   - Continue home atorvastatin oral 80 mg qdaily   - Holding home amlodipine d/t acute LE swelling  - Holding home lisinopril 40 mg oral qdaily   - CBC w/ auto diff qdaily  - CMP qdaily   - On 3LNC  - 6 min walk test ordered 10/9 - pending     Atrial fibrillation with RVR  - EKG (10/7/22): atrial fibrillation with RVR, Left axis deviation  - HR in 110's-130's upon arrival to patient's bedside   - 10/9 AM still in afib with HR ranging 90s-120s  - TSH WNL  Plan:  - PO Lopressor increased to 100mg BID from 25mg. Continue holding home carvedilol 12.5 mg oral qdaily   - Continue IV Diltiazem 5ml/hr    - On cardiac monitoring      Possible acute on chronic DVT's  - Positive Roberta's sign bilaterally and patient's history of DVT's make b/l LE DVT's possible. - CTA chest: No evidence for pulmonary emboli. Heart failure with biatrial cardiac chamber enlargement, moderate new. Bilateral pleural effusions and mild pulmonary edema. Mild to moderate aortic atherosclerosis with irregular noncalcified mural plaque along the wall of the descending aorta which increases risk of embolic events.    - Duplex U/S of b/l LE pending   - PT 15.1  - INR 1.2  - PTT 30.6  Plan:  - Continue home xarelto 20 mg oral qdaily  - Duplex U/S of b/l LE - No evidence of DVT     Sepsis of unknown origin   - Meets 3/4 SIRS criteria (does not meet criteria for temperature)  - WBC 18.1 in ED  - Procalcitonin <0.05  - Lactic acid 1.1  - Negative blood cultures  - Lesion on foot  - Foot Xray: Generalized soft tissue swelling but no radiographic findings of osteomyelitis  Plan:  - DC Zosyn 3.375g IV q8h  - DC Vancomycin 2000mg IV   - Levofloxacin 250 every day for 10 days starting 10/10  - Pending MRSA culture  - urine culture 10/8: Gram (-) rods: waiting for sensitivities   - CBC w/ diff qdaily      Type 2 Diabetes Mellitus  -Most recent HbA1c of 11.8   -Serum glucose 309 on admission   Plan:  -Lantus 20 units SubQ qdaily; pending repeat HbA1c  -Insulin lispro subQ QID with meals   -Holding home Jardiance   -Continue home gabapentin 300 mg TID  -Holding home metformin 500 mg oral qdaily      Gout  -No acute flare ups  Plan:  -Continue home allopurinol 100 mg oral qdaily      Hypothyroidism   -Denies any acute symptoms   Plan:  -Continue home Synthroid 150 mcg oral qdaily  -TSH: 2.01     Chronic pain of back and LE   -Endorses continuing pain   -Follows with outpatient pain management   Plan:  -Continue home Tylenol 500 mg oral q6h  -Continue home morphine 15 mg     Obstructive Sleep Apnea  Plan  -BiPAP PRN at night    Global:  Code: Full  IVF/Drips: None  I/O / Wt: None  Diet: Regular with low sodium  Bowel Regimen, Last BM: Miralax PRN, last BM 2 days ago per pt  DVT/AC: Xarelto 20 mg qdaily and encourage ambulation as tolerated  Mobility: per protocol   Disposition: stable  Anticipated LOS: 1-2 nights     Point of Contact (relationship, number): Marcos Rivera (son); [phone]    SUBJECTIVE:   Hussain Madera is a 82 y.o.  female with PMHx of chronic diastolic CHF, chronic venous insufficiency, Type 2 DM, HTN, hypothyroidism, arthritis, asthma, chronic back pain, gout, hypercholesterolemia, and peripheral neuropathy who presented to ED on 10/7/16734 with complaint of difficulty breathing and was admitted for acute CHF exacerbation and new onset A. Fib on EKG. 10/9  No acute events overnight. Patient seen at beside. No acute complaints. Patient is doing well and denies chest pain or SOB when on NC. She denies palpitations, but does report acknowledging that her HR is fast. She denies fatigue or weakness and reports that at home her BP runs similar to what it does in the hospital but she is not sure what her BP is at home. She reports that the callus on her left foot is sore but not painful. She denies fever or chills. She is eating well. 10/10  No acute events overnight. Patient seen at beside. No acute complaints. Patient in no current pain. Nursing called overnight for patient in abdominal/back pain - morphine was administered. She has morphine 15 PRN at home and follows up with pain clinic. No cp, sob, n/v. Patient would like to go home soon she says. 10/11  No acute events overnight. Patient in no current chest pain, SOB, N/V, swelling, fevers/chills, dizziness, syncope, palpitations. Nursing mentioned she has not received her metoprolol or her other meds this morning and she is tachy in the 120s. Patient is asymptomatic. On 3LNC since last night, nursing further noted sleep apnea. Patient feels good and wants to go home, she was reassured she would leave after we slow down her heart rate and remove her of the Cardizem drip.      ROS (positive findings are in BOLD; negative findings are in regular font)  Constitutional: fevers, chills, appetite changes, weight changes, fatigue  HEENT: changes in vision, changes in hearing, sore throat, dysphagia  Cardiovascular: chest pain, palpitations, PND, orthopnea, edema  Pulmonary: SOB, cough, sputum production, wheezing, chest tightness  Gastrointestinal: abdominal pain, nausea/vomiting, diarrhea, constipation, melena, hematochezia  Genitourinary: dysuria, hesitation, dribbling, urgency, hematuria  Musculoskeletal: arthralgias, myalgias  Skin: rash, itching  Neurological: sensory changes, motor changes, headache  Psychiatric: mood changes  Endocrine: heat/cold intolerance  Heme: easy bruising/easy bleeding, LAD    CURRENT INPATIENT MEDICATIONS:  Current Facility-Administered Medications   Medication Dose Route Frequency Provider Last Rate Last Admin    levoFLOXacin (LEVAQUIN) tablet 250 mg  250 mg Oral Q24H Rob Mcdonald MD        metoprolol tartrate (LOPRESSOR) tablet 100 mg  100 mg Oral Q12H Rob Mcdonald MD   100 mg at 10/10/22 2146    insulin glargine (LANTUS) injection 20 Units  20 Units SubCUTAneous DAILY Rob Mcdonald MD   20 Units at 10/10/22 0842    furosemide (LASIX) tablet 40 mg  40 mg Oral BID Herrera Nash, DO   40 mg at 10/10/22 1815    dilTIAZem (CARDIZEM) 100 mg in 0.9% sodium chloride (MBP/ADV) 100 mL infusion  5 mg/hr IntraVENous CONTINUOUS Karyn Leigh MD 5 mL/hr at 10/10/22 2022 5 mg/hr at 10/10/22 2022    acetaminophen (TYLENOL) tablet 500 mg  500 mg Oral Q6H PRN Yao Gordonena, DO   500 mg at 10/09/22 2332    allopurinoL (ZYLOPRIM) tablet 100 mg  100 mg Oral DAILY Rezazad Kerry, DO   100 mg at 10/10/22 1408 atorvastatin (LIPITOR) tablet 80 mg  80 mg Oral DAILY Rezazad, Kerry, DO   80 mg at 10/10/22 1356    levothyroxine (SYNTHROID) tablet 150 mcg  150 mcg Oral DAILY Rezazad, Kerry, DO   150 mcg at 10/11/22 0649    [Held by provider] lisinopriL (PRINIVIL, ZESTRIL) tablet 40 mg  40 mg Oral DAILY Rezazad, Kerry, DO        [Held by provider] bumetanide (BUMEX) tablet 1 mg  1 mg Oral BID PRN Rezazad, Kerry, DO        [Held by provider] amLODIPine (NORVASC) tablet 10 mg  10 mg Oral DAILY Rezazad, Kerry, DO        [Held by provider] hydrOXYzine HCL (ATARAX) tablet 10 mg  10 mg Oral BID PRN Rezazad, Kerry, DO        [Held by provider] insulin lispro (HUMALOG) injection 5 Units  5 Units SubCUTAneous TIDAC Rezazad, Kerry, DO        [Held by provider] metFORMIN (GLUCOPHAGE) tablet 500 mg  500 mg Oral DAILY Rezazad, Kerry, DO        rivaroxaban (XARELTO) tablet 20 mg  20 mg Oral DAILY Rezazad, Kerry, DO   20 mg at 10/10/22 0842    [Held by provider] sertraline (ZOLOFT) tablet 50 mg  50 mg Oral DAILY Rezazad, Kerry, DO        [Held by provider] tamsulosin (FLOMAX) capsule 0.4 mg  0.4 mg Oral PCD Rezazad, Kerry, DO        sodium chloride (NS) flush 5-40 mL  5-40 mL IntraVENous Q8H Rezazad, Kerry, DO   10 mL at 10/11/22 0651    sodium chloride (NS) flush 5-40 mL  5-40 mL IntraVENous PRN Rezazad, Kerry, DO        gabapentin (NEURONTIN) capsule 300 mg  300 mg Oral TID Rezazad, Kerry, DO   300 mg at 10/10/22 2146    [Held by provider] morphine IR (MS IR) tablet 15 mg  15 mg Oral BID PRN Anju Cardona MD        polyethylene glycol (MIRALAX) packet 17 g  17 g Oral DAILY PRN Rezazad, Kerry, DO        insulin lispro (HUMALOG) injection   SubCUTAneous AC&HS Anju Cardona MD   2 Units at 10/10/22 2940    glucose chewable tablet 16 g  16 g Oral PRN Anju Cardona MD        glucagon (GLUCAGEN) injection 1 mg  1 mg IntraMUSCular PRN Anju Cardona MD           Allergies  No Known Allergies    OBJECTIVE:    Intake/Output Summary (Last 24 hours) at 10/11/2022 0849  Last data filed at 10/11/2022 0715  Gross per 24 hour   Intake --   Output 800 ml   Net -800 ml       Visit Vitals  BP (!) 133/100   Pulse (!) 103   Temp 98.3 °F (36.8 °C)   Resp 19   Ht 5' 5\" (1.651 m)   Wt 108.4 kg (239 lb)   SpO2 97%   BMI 39.77 kg/m²       PHYSICAL EXAM  Gen: NAD, comfortable, obese   HEENT: normocephalic, atraumatic, MMM, no thyromegaly, no JVD  CV: tachycardic with an irregular rhythm. No M/R/G, +2 radial pulses  Pulm: CTAB, no wheezes, no crackles  Abd: S/NT/ND, no rebound, no guarding, no hepatosplenomegaly   MSK: mild LE non-pitting edema   Skin: warm, dry, intact, no rash, callous of left foot under big toe  Neuro: CN II-XII grossly intact, no focal deficits appreciated   Psych: appropriate, alert, oriented x3    LABWORK (LAST 24 HOURS)  Recent Results (from the past 24 hour(s))   GLUCOSE, POC    Collection Time: 10/10/22 12:04 PM   Result Value Ref Range    Glucose (POC) 178 (H) 70 - 110 mg/dL   GLUCOSE, POC    Collection Time: 10/10/22  4:57 PM   Result Value Ref Range    Glucose (POC) 241 (H) 70 - 110 mg/dL   GLUCOSE, POC    Collection Time: 10/10/22  9:51 PM   Result Value Ref Range    Glucose (POC) 157 (H) 70 - 971 mg/dL   METABOLIC PANEL, COMPREHENSIVE    Collection Time: 10/11/22  5:13 AM   Result Value Ref Range    Sodium 144 136 - 145 mmol/L    Potassium 3.5 3.5 - 5.5 mmol/L    Chloride 104 100 - 111 mmol/L    CO2 34 (H) 21 - 32 mmol/L    Anion gap 6 3.0 - 18 mmol/L    Glucose 104 (H) 74 - 99 mg/dL    BUN 26 (H) 7.0 - 18 MG/DL    Creatinine 1.00 0.6 - 1.3 MG/DL    BUN/Creatinine ratio 26 (H) 12 - 20      eGFR 55 (L) >60 ml/min/1.73m2    Calcium 8.8 8.5 - 10.1 MG/DL    Bilirubin, total 0.5 0.2 - 1.0 MG/DL    ALT (SGPT) 7 (L) 13 - 56 U/L    AST (SGOT) 7 (L) 10 - 38 U/L    Alk.  phosphatase 50 45 - 117 U/L    Protein, total 7.5 6.4 - 8.2 g/dL    Albumin 3.0 (L) 3.4 - 5.0 g/dL    Globulin 4.5 (H) 2.0 - 4.0 g/dL    A-G Ratio 0.7 (L) 0.8 - 1.7     CBC WITH AUTOMATED DIFF    Collection Time: 10/11/22  5:13 AM   Result Value Ref Range    WBC 8.7 4.6 - 13.2 K/uL    RBC 4.10 (L) 4.20 - 5.30 M/uL    HGB 11.0 (L) 12.0 - 16.0 g/dL    HCT 36.1 35.0 - 45.0 %    MCV 88.0 78.0 - 100.0 FL    MCH 26.8 24.0 - 34.0 PG    MCHC 30.5 (L) 31.0 - 37.0 g/dL    RDW 14.1 11.6 - 14.5 %    PLATELET 917 928 - 047 K/uL    MPV 13.2 (H) 9.2 - 11.8 FL    NRBC 0.0 0  WBC    ABSOLUTE NRBC 0.00 0.00 - 0.01 K/uL    NEUTROPHILS 60 40 - 73 %    LYMPHOCYTES 23 21 - 52 %    MONOCYTES 11 (H) 3 - 10 %    EOSINOPHILS 4 0 - 5 %    BASOPHILS 1 0 - 2 %    IMMATURE GRANULOCYTES 1 (H) 0.0 - 0.5 %    ABS. NEUTROPHILS 5.2 1.8 - 8.0 K/UL    ABS. LYMPHOCYTES 2.0 0.9 - 3.6 K/UL    ABS. MONOCYTES 1.0 0.05 - 1.2 K/UL    ABS. EOSINOPHILS 0.3 0.0 - 0.4 K/UL    ABS. BASOPHILS 0.1 0.0 - 0.1 K/UL    ABS. IMM. GRANS. 0.1 (H) 0.00 - 0.04 K/UL    DF SMEAR SCANNED      PLATELET COMMENTS ADEQUATE PLATELETS      RBC COMMENTS NORMOCYTIC, NORMOCHROMIC     GLUCOSE, POC    Collection Time: 10/11/22  8:16 AM   Result Value Ref Range    Glucose (POC) 114 (H) 70 - 110 mg/dL       IMAGING AND PROCEDURES (LAST 36 HOURS)  No results found.    ================================================================  Further management for Ms. Srivastava Pi will be discussed on rounds with my attending.       Aydee Hernandez DO, PGY-1  [facility]  October 11, 2022 7:14 AM

## 2022-10-11 NOTE — PROGRESS NOTES
*ATTENTION:  This note has been created by a medical student for educational purposes only. Please do not refer to the content of this note for clinical decision-making, billing, or other purposes. Please see attending physicians note to obtain clinical information on this patient. *         [facility]  DAILY PROGRESS NOTE      Patient:    Herman Chavez , 82 y.o. female   MRN:  [id number]  Room/Bed:  [address] [place] [address]  Admission Date:   10/7/2022  Code status:  Full Code    Reason for Admission: Acute Exacerbation of CHF    ASSESSMENT AND PLAN:   Problem List Items Addressed This Visit          Circulatory    * (Principal) Acute exacerbation of CHF (congestive heart failure) ([facility] [address]     Other Visit Diagnoses       Acute respiratory failure with hypoxia and hypercapnia (HCC)    -  Primary    Atrial fibrillation with rapid ventricular response ([facility] [address]                Problem 1- Acute Exacerbation of CHF  - Pt to ED on 10/7 for SOB for 3 days. B/l LE edema noted on PE. Pt states adherence to Bumex regimen. CXR showed increased interstitial edema, mild ground glass appearance at b/l lung bases. Last Echocardiogram (8/11/22) showed EF 55-60%. BNP on admission 3,967. Trop 8. LA 1.1 Mag 1.4  Plan:  - Consult Cardiology about next steps to obtain rate control. Possible next steps could be adding Digoxin or increasing Diltiazem to 7.5mg/hr  - Metoprolol increased yesterday to 100mg BID. HR still tachycardiac.   - Continue Metoprolol 100 mg BID until further notice  - Continue Diltiazem 5ml/hr IV, until further notice  - Continue Lasix 40 mg BID  - CMP and CBC daily  - Currently on 3L NC  - PT consultation for walking trial to assess current O2 demand and need for home O2 upon d/c. Problem 2- Atrial Fibrillation with RVR  -EKG in ED showed Afib with RVR. HR consistently elevated throughout hospital stay despite attempts for rate control. HR between 110-140 on bedside PE this am. TSH WNL.  MOGNP-7-UCJN score of 9 (12.2% stroke risk per year)   Plan:  - Contact Cardiology for further dosing recommendations for either increasing diltiazem or adding digoxin to the regimen.  - Continue  mg Metoprolol BID   - Continue IV Diltiazem 5 ml/hr  - Continue telemetry    Problem 3- Possible acute on chronic DVT  - (+) Homans sign b/l, that has decreased in severity during stay. Duplex U/S showed no DVT in LE b/l; R leg showed varicose veins with a no occluding thrombosis in a varicose vein. CTA chest showed no evidence for PE. Pt denies SOB and tachypnea on bedside PE. PT 15.1, INR 1.2, PTT 30.6  Plan:  -Continue home Xarelto dose of 20 mg PO daily  - Venous Duplex shows no evidence of DVT    Problem 4- Resolved Sepsis, Possible UTI  - PT no longer meets SIRS criteria for sepsis. WBC on admission of 18.1 now 8.7. Plan:  - Continue Levofloxacin 250 daily for 10 days starting 10/10  - Pending MRSA culture  - Urine culture 10/8 grew G(-) rods   - CBC daily     Problem 5- T2 DM  -HbA1c 11.8; Serum glucose 309 on admission, today 104  Plan:  - Lantus 20 units Sub Q daily  - Continue home Gabapentin   - Hold home Metformin  - Hold home Jardiance    Problem 6- Gout  -No sx during visit  Plan:  - Continue home Allopurinol    Problem 7- Hypothyroidism  -Denies sx  Plan:  - Continue home levothyroxine  - TSH 2.01 (WNL) during admission    Problem 8- Chronic Pain of Back and LE  - Frequent complaints of back pain. Follows outpatient pain management for Morphine. Plan:  - Continue home Morphine  - Continue home Tylenol    Problem 9- MARY  -Nursing reported possible AMRY  Plan:  - BiPAP as needed at night for sleep.        Code: FULL  Diet: Low sodium  DVT/AC: Xarelto  Mobility: per protocol  Disposition: 1-2 days    Point of Contact (relationship): Tonya Calix (Son) [phone]        SUBJECTIVE:   82 F with PMHx of diastolic CHF, Venous insufficiency, T2DM, HTN, Hypothyroidism, arthritis, asthma, gout, back pain presented to ED 10/7 for SOB and difficulty breathing. Admitted for acute exacerbation of CHF. EKG showed new onset Afib  Events of the last 24 hours:  No acute events overnight. Patient seen at beside. No acute complaints. Expresses want to leave and hospital. Pt states understanding of why it is advised she does not leave right now, but asks the same questions with each evaluation.      ROS (positive findings are in BOLD; negative findings are in regular font)  Constitutional: NO fevers, chills, appetite changes, weight changes, fatigue  HEENT: NO changes in vision, changes in hearing, sore throat, dysphagia  Cardiovascular: NO chest pain, palpitations, PND, orthopnea, edema  Pulmonary: NO SOB, cough, sputum production, wheezing, chest tightness  Gastrointestinal: NO abdominal pain, nausea/vomiting, diarrhea, constipation, melena, hematochezia  Genitourinary: NO dysuria, hesitation, dribbling, urgency, hematuria  Musculoskeletal: NO arthralgias, myalgias  Skin: NO rash, itching  Neurological: NO sensory changes, motor changes, headache  Psychiatric: NO mood changes  Endocrine: NO heat/cold intolerance  Heme: NO easy bruising/easy bleeding, LAD    CURRENT INPATIENT MEDICATIONS:  Current Facility-Administered Medications   Medication Dose Route Frequency Provider Last Rate Last Admin    levoFLOXacin (LEVAQUIN) tablet 250 mg  250 mg Oral Q24H Kamala Souza MD        metoprolol tartrate (LOPRESSOR) tablet 100 mg  100 mg Oral Q12H Kamala Souza MD   100 mg at 10/10/22 2146    insulin glargine (LANTUS) injection 20 Units  20 Units SubCUTAneous DAILY Kamala Souza MD   20 Units at 10/10/22 0842    furosemide (LASIX) tablet 40 mg  40 mg Oral BID Herrera Nash DO   40 mg at 10/10/22 1815    dilTIAZem (CARDIZEM) 100 mg in 0.9% sodium chloride (MBP/ADV) 100 mL infusion  5 mg/hr IntraVENous CONTINUOUS Dania Chu MD 5 mL/hr at 10/10/22 2022 5 mg/hr at 10/10/22 2022    acetaminophen (TYLENOL) tablet 500 mg  500 mg Oral Q6H PRN Rezazad, Kerry, DO   500 mg at 10/09/22 2332    allopurinoL (ZYLOPRIM) tablet 100 mg  100 mg Oral DAILY Rezazad, Kerry, DO   100 mg at 10/10/22 0842    atorvastatin (LIPITOR) tablet 80 mg  80 mg Oral DAILY Rezazad, Kerry, DO   80 mg at 10/10/22 3123    levothyroxine (SYNTHROID) tablet 150 mcg  150 mcg Oral DAILY Rezazad, Kerry, DO   150 mcg at 10/10/22 0550    [Held by provider] lisinopriL (PRINIVIL, ZESTRIL) tablet 40 mg  40 mg Oral DAILY Rezazad, Kerry, DO        [Held by provider] bumetanide (BUMEX) tablet 1 mg  1 mg Oral BID PRN Rezazad, Kerry, DO        [Held by provider] amLODIPine (NORVASC) tablet 10 mg  10 mg Oral DAILY Rezazad, Kerry, DO        [Held by provider] hydrOXYzine HCL (ATARAX) tablet 10 mg  10 mg Oral BID PRN Rezazad, Kerry, DO        [Held by provider] insulin lispro (HUMALOG) injection 5 Units  5 Units SubCUTAneous TIDAC Rezazad, Kerry, DO        [Held by provider] metFORMIN (GLUCOPHAGE) tablet 500 mg  500 mg Oral DAILY Rezazad, Kerry, DO        rivaroxaban (XARELTO) tablet 20 mg  20 mg Oral DAILY Rezazad, Kerry, DO   20 mg at 10/10/22 0842    [Held by provider] sertraline (ZOLOFT) tablet 50 mg  50 mg Oral DAILY Rezazad, Kerry, DO        [Held by provider] tamsulosin (FLOMAX) capsule 0.4 mg  0.4 mg Oral PCD Rezazad, Kerry, DO        sodium chloride (NS) flush 5-40 mL  5-40 mL IntraVENous Q8H Rezazad, Kerry, DO   10 mL at 10/10/22 2152    sodium chloride (NS) flush 5-40 mL  5-40 mL IntraVENous PRN Rezazad, Kerry, DO        gabapentin (NEURONTIN) capsule 300 mg  300 mg Oral TID Rezazad, Kerry, DO   300 mg at 10/10/22 2146    [Held by provider] morphine IR (MS IR) tablet 15 mg  15 mg Oral BID PRN Michael Stoll MD        polyethylene glycol (MIRALAX) packet 17 g  17 g Oral DAILY PRN Kerry Gordon DO        insulin lispro (HUMALOG) injection   SubCUTAneous AC&HS Michael Stoll MD   2 Units at 10/10/22 4312    glucose chewable tablet 16 g  16 g Oral PRN Michael Stoll MD        glucagon (GLUCAGEN) injection 1 mg  1 mg IntraMUSCular PRN Jon Pineda MD           Allergies  No Known Allergies    OBJECTIVE:  No intake or output data in the 24 hours ending 10/11/22 0616    Visit Vitals  /60   Pulse 96   Temp 98.1 °F (36.7 °C)   Resp 23   Ht 5' 5\" (1.651 m)   Wt 107.5 kg (237 lb)   SpO2 95%   BMI 39.44 kg/m²       PHYSICAL EXAM  Gen: NAD, comfortable, obese   HEENT: normocephalic, atraumatic, MMM, no thyromegaly, no JVD  CV: RRR, S1/S2 present without M/R/G, +2 radial pulses, well-perfused, PMI not displaced  Pulm: CTAB, no wheezes, no crackles  Abd: S/NT/ND, no rebound, no guarding, no hepatosplenomegaly   MSK: no clubbing, no edema  Skin: warm, dry, intact, no rash  Neuro: CN II-XII grossly intact, no focal deficits appreciated   Psych: appropriate, alert, oriented x3    LABWORK (LAST 24 HOURS)  Recent Results (from the past 24 hour(s))   GLUCOSE, POC    Collection Time: 10/10/22  8:05 AM   Result Value Ref Range    Glucose (POC) 109 70 - 110 mg/dL   GLUCOSE, POC    Collection Time: 10/10/22 12:04 PM   Result Value Ref Range    Glucose (POC) 178 (H) 70 - 110 mg/dL   GLUCOSE, POC    Collection Time: 10/10/22  4:57 PM   Result Value Ref Range    Glucose (POC) 241 (H) 70 - 110 mg/dL   GLUCOSE, POC    Collection Time: 10/10/22  9:51 PM   Result Value Ref Range    Glucose (POC) 157 (H) 70 - 110 mg/dL       IMAGING AND PROCEDURES (LAST 36 HOURS)  No results found.    ================================================================  Further management for Ms. Nilsa Marcum will be discussed on rounds with my attending.       KARLEY Alarcon- IV  [facility]  October 11, 2022 6:16 AM

## 2022-10-11 NOTE — PROGRESS NOTES
1200 Patient ambulated in the hallway on room air. Oxygen saturation drops down to 86-87% on room air. O2 sats goes up to 93-94 % when she stops and take deep breath on room air. O2 sat on room air at rest is 96-98%.

## 2022-10-11 NOTE — ROUTINE PROCESS
5228 Received report from [facility]. Patient asleep, equal and non labored breathing. 0940 Medications given. Patient up sitting in the recliner. 1600 Patient siting in the bed. Visitor /Family at bedside. 1950 Bedside and Verbal shift change report given to [facility] (oncoming nurse). Report included the following information SBAR, Intake/Output, MAR, Recent Results, and Cardiac Rhythm Atrial Fib .

## 2022-10-12 LAB
ALBUMIN SERPL-MCNC: 3 G/DL (ref 3.4–5)
ALBUMIN/GLOB SERPL: 0.7 {RATIO} (ref 0.8–1.7)
ALP SERPL-CCNC: 48 U/L (ref 45–117)
ALT SERPL-CCNC: 7 U/L (ref 13–56)
ANION GAP SERPL CALC-SCNC: 7 MMOL/L (ref 3–18)
AST SERPL-CCNC: 8 U/L (ref 10–38)
BASOPHILS # BLD: 0.1 K/UL (ref 0–0.1)
BASOPHILS NFR BLD: 1 % (ref 0–2)
BILIRUB SERPL-MCNC: 0.5 MG/DL (ref 0.2–1)
BUN SERPL-MCNC: 23 MG/DL (ref 7–18)
BUN/CREAT SERPL: 23 (ref 12–20)
CALCIUM SERPL-MCNC: 9.1 MG/DL (ref 8.5–10.1)
CHLORIDE SERPL-SCNC: 102 MMOL/L (ref 100–111)
CO2 SERPL-SCNC: 34 MMOL/L (ref 21–32)
CREAT SERPL-MCNC: 0.98 MG/DL (ref 0.6–1.3)
DIFFERENTIAL METHOD BLD: ABNORMAL
EOSINOPHIL # BLD: 0.5 K/UL (ref 0–0.4)
EOSINOPHIL NFR BLD: 5 % (ref 0–5)
ERYTHROCYTE [DISTWIDTH] IN BLOOD BY AUTOMATED COUNT: 14.1 % (ref 11.6–14.5)
GLOBULIN SER CALC-MCNC: 4.6 G/DL (ref 2–4)
GLUCOSE BLD STRIP.AUTO-MCNC: 134 MG/DL (ref 70–110)
GLUCOSE BLD STRIP.AUTO-MCNC: 137 MG/DL (ref 70–110)
GLUCOSE BLD STRIP.AUTO-MCNC: 138 MG/DL (ref 70–110)
GLUCOSE BLD STRIP.AUTO-MCNC: 88 MG/DL (ref 70–110)
GLUCOSE SERPL-MCNC: 87 MG/DL (ref 74–99)
HCT VFR BLD AUTO: 36.4 % (ref 35–45)
HGB BLD-MCNC: 11.1 G/DL (ref 12–16)
IMM GRANULOCYTES # BLD AUTO: 0 K/UL (ref 0–0.04)
IMM GRANULOCYTES NFR BLD AUTO: 0 % (ref 0–0.5)
LYMPHOCYTES # BLD: 2.5 K/UL (ref 0.9–3.6)
LYMPHOCYTES NFR BLD: 24 % (ref 21–52)
MAGNESIUM SERPL-MCNC: 1.5 MG/DL (ref 1.6–2.6)
MCH RBC QN AUTO: 26.4 PG (ref 24–34)
MCHC RBC AUTO-ENTMCNC: 30.5 G/DL (ref 31–37)
MCV RBC AUTO: 86.7 FL (ref 78–100)
MONOCYTES # BLD: 1.1 K/UL (ref 0.05–1.2)
MONOCYTES NFR BLD: 11 % (ref 3–10)
NEUTS SEG # BLD: 6 K/UL (ref 1.8–8)
NEUTS SEG NFR BLD: 59 % (ref 40–73)
NRBC # BLD: 0 K/UL (ref 0–0.01)
NRBC BLD-RTO: 0 PER 100 WBC
PLATELET # BLD AUTO: 202 K/UL (ref 135–420)
PMV BLD AUTO: 12.7 FL (ref 9.2–11.8)
POTASSIUM SERPL-SCNC: 3.3 MMOL/L (ref 3.5–5.5)
POTASSIUM SERPL-SCNC: 3.5 MMOL/L (ref 3.5–5.5)
PROT SERPL-MCNC: 7.6 G/DL (ref 6.4–8.2)
RBC # BLD AUTO: 4.2 M/UL (ref 4.2–5.3)
SODIUM SERPL-SCNC: 143 MMOL/L (ref 136–145)
WBC # BLD AUTO: 10.2 K/UL (ref 4.6–13.2)

## 2022-10-12 PROCEDURE — 83735 ASSAY OF MAGNESIUM: CPT

## 2022-10-12 PROCEDURE — 74011000250 HC RX REV CODE- 250

## 2022-10-12 PROCEDURE — 74011250637 HC RX REV CODE- 250/637

## 2022-10-12 PROCEDURE — 74011636637 HC RX REV CODE- 636/637

## 2022-10-12 PROCEDURE — 85025 COMPLETE CBC W/AUTO DIFF WBC: CPT

## 2022-10-12 PROCEDURE — 84132 ASSAY OF SERUM POTASSIUM: CPT

## 2022-10-12 PROCEDURE — 74011250636 HC RX REV CODE- 250/636: Performed by: FAMILY MEDICINE

## 2022-10-12 PROCEDURE — 2709999900 HC NON-CHARGEABLE SUPPLY

## 2022-10-12 PROCEDURE — 65660000004 HC RM CVT STEPDOWN

## 2022-10-12 PROCEDURE — 82962 GLUCOSE BLOOD TEST: CPT

## 2022-10-12 PROCEDURE — 74011250637 HC RX REV CODE- 250/637: Performed by: PHYSICIAN ASSISTANT

## 2022-10-12 PROCEDURE — 77030038269 HC DRN EXT URIN PURWCK BARD -A

## 2022-10-12 PROCEDURE — 74011250636 HC RX REV CODE- 250/636: Performed by: PHYSICIAN ASSISTANT

## 2022-10-12 PROCEDURE — 36415 COLL VENOUS BLD VENIPUNCTURE: CPT

## 2022-10-12 PROCEDURE — 99232 SBSQ HOSP IP/OBS MODERATE 35: CPT | Performed by: INTERNAL MEDICINE

## 2022-10-12 PROCEDURE — 77010033678 HC OXYGEN DAILY

## 2022-10-12 RX ORDER — POTASSIUM CHLORIDE 7.45 MG/ML
10 INJECTION INTRAVENOUS
Status: DISCONTINUED | OUTPATIENT
Start: 2022-10-12 | End: 2022-10-12

## 2022-10-12 RX ORDER — MAGNESIUM SULFATE 1 G/100ML
1 INJECTION INTRAVENOUS ONCE
Status: DISCONTINUED | OUTPATIENT
Start: 2022-10-12 | End: 2022-10-12

## 2022-10-12 RX ORDER — MAGNESIUM SULFATE 1 G/100ML
1 INJECTION INTRAVENOUS
Status: ACTIVE | OUTPATIENT
Start: 2022-10-12 | End: 2022-10-13

## 2022-10-12 RX ORDER — POTASSIUM CHLORIDE 7.45 MG/ML
10 INJECTION INTRAVENOUS
Status: DISPENSED | OUTPATIENT
Start: 2022-10-12 | End: 2022-10-12

## 2022-10-12 RX ORDER — POTASSIUM CHLORIDE 20 MEQ/1
20 TABLET, EXTENDED RELEASE ORAL
Status: COMPLETED | OUTPATIENT
Start: 2022-10-12 | End: 2022-10-12

## 2022-10-12 RX ORDER — DILTIAZEM HYDROCHLORIDE 60 MG/1
60 CAPSULE, EXTENDED RELEASE ORAL DAILY
Status: DISCONTINUED | OUTPATIENT
Start: 2022-10-12 | End: 2022-10-12

## 2022-10-12 RX ORDER — MAGNESIUM SULFATE HEPTAHYDRATE 40 MG/ML
2 INJECTION, SOLUTION INTRAVENOUS ONCE
Status: COMPLETED | OUTPATIENT
Start: 2022-10-12 | End: 2022-10-12

## 2022-10-12 RX ORDER — SODIUM CHLORIDE 0.9 % (FLUSH) 0.9 %
5-40 SYRINGE (ML) INJECTION AS NEEDED
Status: DISCONTINUED | OUTPATIENT
Start: 2022-10-12 | End: 2022-10-14 | Stop reason: HOSPADM

## 2022-10-12 RX ORDER — DILTIAZEM HYDROCHLORIDE 180 MG/1
180 CAPSULE, COATED, EXTENDED RELEASE ORAL DAILY
Status: DISCONTINUED | OUTPATIENT
Start: 2022-10-13 | End: 2022-10-12

## 2022-10-12 RX ORDER — LIDOCAINE HYDROCHLORIDE 10 MG/ML
5 INJECTION, SOLUTION EPIDURAL; INFILTRATION; INTRACAUDAL; PERINEURAL ONCE
Status: ACTIVE | OUTPATIENT
Start: 2022-10-12 | End: 2022-10-12

## 2022-10-12 RX ORDER — POTASSIUM CHLORIDE 20 MEQ/1
40 TABLET, EXTENDED RELEASE ORAL EVERY 4 HOURS
Status: COMPLETED | OUTPATIENT
Start: 2022-10-12 | End: 2022-10-13

## 2022-10-12 RX ORDER — DILTIAZEM HYDROCHLORIDE 180 MG/1
180 CAPSULE, COATED, EXTENDED RELEASE ORAL DAILY
Status: DISCONTINUED | OUTPATIENT
Start: 2022-10-12 | End: 2022-10-14 | Stop reason: HOSPADM

## 2022-10-12 RX ORDER — SODIUM CHLORIDE 0.9 % (FLUSH) 0.9 %
5-40 SYRINGE (ML) INJECTION EVERY 8 HOURS
Status: DISCONTINUED | OUTPATIENT
Start: 2022-10-12 | End: 2022-10-14 | Stop reason: HOSPADM

## 2022-10-12 RX ORDER — DILTIAZEM HYDROCHLORIDE 120 MG/1
120 CAPSULE, COATED, EXTENDED RELEASE ORAL DAILY
Status: DISCONTINUED | OUTPATIENT
Start: 2022-10-12 | End: 2022-10-12

## 2022-10-12 RX ADMIN — SODIUM CHLORIDE, PRESERVATIVE FREE 10 ML: 5 INJECTION INTRAVENOUS at 14:34

## 2022-10-12 RX ADMIN — SODIUM CHLORIDE, PRESERVATIVE FREE 10 ML: 5 INJECTION INTRAVENOUS at 21:27

## 2022-10-12 RX ADMIN — POTASSIUM CHLORIDE 10 MEQ: 7.46 INJECTION, SOLUTION INTRAVENOUS at 14:05

## 2022-10-12 RX ADMIN — POTASSIUM CHLORIDE 40 MEQ: 1500 TABLET, EXTENDED RELEASE ORAL at 20:18

## 2022-10-12 RX ADMIN — POTASSIUM CHLORIDE 20 MEQ: 1500 TABLET, EXTENDED RELEASE ORAL at 14:38

## 2022-10-12 RX ADMIN — DILTIAZEM HYDROCHLORIDE 180 MG: 180 CAPSULE, COATED, EXTENDED RELEASE ORAL at 21:25

## 2022-10-12 RX ADMIN — SODIUM CHLORIDE, PRESERVATIVE FREE 10 ML: 5 INJECTION INTRAVENOUS at 06:08

## 2022-10-12 RX ADMIN — LEVOTHYROXINE SODIUM 150 MCG: 0.05 TABLET ORAL at 06:08

## 2022-10-12 RX ADMIN — MAGNESIUM SULFATE HEPTAHYDRATE 2 G: 40 INJECTION, SOLUTION INTRAVENOUS at 16:02

## 2022-10-12 RX ADMIN — ALLOPURINOL 100 MG: 100 TABLET ORAL at 09:17

## 2022-10-12 RX ADMIN — DILTIAZEM HYDROCHLORIDE 120 MG: 120 CAPSULE, COATED, EXTENDED RELEASE ORAL at 14:15

## 2022-10-12 RX ADMIN — MAGNESIUM SULFATE HEPTAHYDRATE 1 G: 1 INJECTION, SOLUTION INTRAVENOUS at 18:59

## 2022-10-12 RX ADMIN — ATORVASTATIN CALCIUM 80 MG: 40 TABLET, FILM COATED ORAL at 09:16

## 2022-10-12 RX ADMIN — RIVAROXABAN 20 MG: 20 TABLET, FILM COATED ORAL at 09:17

## 2022-10-12 RX ADMIN — GABAPENTIN 300 MG: 300 CAPSULE ORAL at 16:06

## 2022-10-12 RX ADMIN — Medication 20 UNITS: at 09:17

## 2022-10-12 RX ADMIN — GABAPENTIN 300 MG: 300 CAPSULE ORAL at 09:17

## 2022-10-12 RX ADMIN — SODIUM CHLORIDE, PRESERVATIVE FREE 10 ML: 5 INJECTION INTRAVENOUS at 14:33

## 2022-10-12 RX ADMIN — LEVOFLOXACIN 250 MG: 250 TABLET, FILM COATED ORAL at 09:17

## 2022-10-12 RX ADMIN — METOPROLOL TARTRATE 100 MG: 50 TABLET, FILM COATED ORAL at 09:17

## 2022-10-12 RX ADMIN — FUROSEMIDE 40 MG: 40 TABLET ORAL at 09:17

## 2022-10-12 RX ADMIN — GABAPENTIN 300 MG: 300 CAPSULE ORAL at 21:25

## 2022-10-12 RX ADMIN — METOPROLOL TARTRATE 100 MG: 50 TABLET, FILM COATED ORAL at 21:25

## 2022-10-12 RX ADMIN — SODIUM CHLORIDE, PRESERVATIVE FREE 10 ML: 5 INJECTION INTRAVENOUS at 21:28

## 2022-10-12 RX ADMIN — FUROSEMIDE 40 MG: 40 TABLET ORAL at 18:58

## 2022-10-12 NOTE — PROGRESS NOTES
[facility] - Progress Note  Admit Date: 10/7/2022    Assessment:      -Acute on chronic diastolic heart failure, switched to oral diuretics. Echo 8/11/22 with EF 55%  -New onset atrial fibrillation with rapid ventricular rate, plan rate control and anticoagulation  -History of DVT right lower extremity 6/2022. Patient ran out of Xarelto 1 week ago as she could not afford it  -Possible sepsis, on antbx  -Hypertension  -hyperlipidemia  -DM on insulin       Primary cardiologist Dr. Linn Medel:     AF rates stable this am in the low 100s. Will plan to D/c Cardizem gtt. Continue po lopressor and start po cardizem for further rate control. Remains on Xarelto for both history of DVT as well as new A. fib. Replace K, recommend maintaining at 4.0. Will also recheck a serum Mg level. Last Mg on 10/7 was 1.4. Recommend maintaining Mg at 2.0. Subjective:     No new complaints, remains A.fib ~100 bpm.  Wants to go home.      Objective:      Patient Vitals for the past 8 hrs:   Temp Pulse Resp BP SpO2   10/12/22 1123 98.3 °F (36.8 °C) (!) 128 17 (!) 129/91 99 %   10/12/22 0756 98.3 °F (36.8 °C) (!) 132 27 127/80 95 %           Patient Vitals for the past 96 hrs:   Weight   10/11/22 0400 108.4 kg (239 lb)   10/10/22 0445 107.5 kg (237 lb)   10/09/22 0355 109.8 kg (242 lb)   10/08/22 1713 111.6 kg (246 lb)                      Intake/Output Summary (Last 24 hours) at 10/12/2022 1303  Last data filed at 10/12/2022 0913  Gross per 24 hour   Intake 300 ml   Output 1100 ml   Net -800 ml         Physical Exam:  General:  alert, cooperative, no distress, appears stated age  Neck:  nontender  Lungs:  clear to auscultation bilaterally  Heart: irreg, S1, S2 normal, no murmur, click, rub or gallop  Abdomen:  abdomen is soft without significant tenderness, masses, organomegaly or guarding  Extremities:  extremities normal, atraumatic, no cyanosis or edema    Data Review:     Labs: Results:       Chemistry Recent Labs     10/12/22  0536 10/11/22  0513 10/10/22  0450   GLU 87 104* 103*    144 141   K 3.3* 3.5 3.7    104 104   CO2 34* 34* 30   BUN 23* 26* 25*   CREA 0.98 1.00 1.03   CA 9.1 8.8 8.6   AGAP 7 6 7   BUCR 23* 26* 24*   AP 48 50 49   TP 7.6 7.5 7.0   ALB 3.0* 3.0* 2.9*   GLOB 4.6* 4.5* 4.1*   AGRAT 0.7* 0.7* 0.7*        CBC w/Diff Recent Labs     10/12/22  0536 10/11/22  0513 10/10/22  0450   WBC 10.2 8.7 10.7   RBC 4.20 4.10* 3.66*   HGB 11.1* 11.0* 10.0*   HCT 36.4 36.1 32.2*    164 184   GRANS 59 60 60   LYMPH 24 23 27   EOS 5 4 3        Cardiac Enzymes No results found for: CPK, CK, CKMMB, CKMB, RCK3, CKMBT, CKNDX, CKND1, GABRIEL, TROPT, TROIQ, KRUPA, TROPT, TNIPOC, BNP, BNPP   Coagulation No results for input(s): PTP, INR, APTT, INREXT, INREXT in the last 72 hours.     Lipid Panel Lab Results   Component Value Date/Time    Cholesterol, total 191 11/09/2021 12:24 PM    HDL Cholesterol 44 11/09/2021 12:24 PM    LDL, calculated 96.8 11/09/2021 12:24 PM    VLDL, calculated 50.2 11/09/2021 12:24 PM    Triglyceride 251 (H) 11/09/2021 12:24 PM    CHOL/HDL Ratio 4.3 11/09/2021 12:24 PM      BNP No results found for: BNP, BNPP, XBNPT   Liver Enzymes Recent Labs     10/12/22  0536   TP 7.6   ALB 3.0*   AP 48        Digoxin    Thyroid Studies Lab Results   Component Value Date/Time    TSH 2.01 10/07/2022 10:30 PM          Signed By: Smita Galicia PA-C     October 12, 2022

## 2022-10-12 NOTE — PROGRESS NOTES
Wound Prevention Checklist    Patient: Wayman Schaumann [de-identified]82 y.o. female)  Date: 10/11/2022  Diagnosis: Acute exacerbation of CHF (congestive heart failure) ([facility]) [I50.9] Acute exacerbation of CHF (congestive heart failure) ([facility] [address]    Precautions: Fall       []  Heel prevention boots placed on patient    []  Patient turned q2h during shift    []  Lift team ordered    [x]  Patient on [place] bed/Specialty bed    [x]  Each Wound is documented during shift (Stage, Color, drainage, odor, measurements, and dressings)    [x]  Dual skin check done with Shiva Ramírez RN

## 2022-10-12 NOTE — DISCHARGE INSTRUCTIONS

## 2022-10-12 NOTE — PROGRESS NOTES
Bedside shift change report given to DARBY Lopez (oncoming nurse) by Neena Coburn (offgoing nurse). Report included the following information SBAR, Kardex, Intake/Output, MAR, Recent Results, and Cardiac Rhythm A-fib . Wound Prevention Checklist    Patient: Nilsa Marcum [de-identified]82 y.o. female)  Date: 10/12/2022  Diagnosis: Acute exacerbation of CHF (congestive heart failure) ([facility]) [I50.9] Acute exacerbation of CHF (congestive heart failure) ([facility] [address]    Precautions: Fall       []  Heel prevention boots placed on patient    []  Patient turned q2h during shift    []  Lift team ordered    []  Patient on [place] bed/Specialty bed    []  Each Wound is documented during shift (Stage, Color, drainage, odor, measurements, and dressings)    [x]  Dual skin check done with Yamilka Amezcua RN     :Shift assessment done; V/S obtained. A/Ox4. Transferred pt from recliner to bed with walker. No c/o pain or SOB. Pt on 2LNC. Scheduled med given. No other needs expressed. Call light within reach. 2125: Scheduled meds given. B; No insulin coverage given per protocol. No complaints. 2230: Pt sleeping comfortably. No distress noted. 0000: No changes from previous assessment. Pt sleeping comfortably. No c/o pain or SOB. Scheduled med given. Call light within reach. 0200: Pt sleeping comfortably. No distress noted. 0400: No changes from previous assessment. No c/o pain or SOB. Pt ambulate to the bathroom; bowel movement noted. Call light within reach. 0500: Assisted pt with bedbath, and external cath care. Oral care and facial care done by pt. Gown changed, bed pad, linen, and periwick changed. 9567: Scheduled med given. 0715: Bedside shift change report given to Percy Dinh 184 (oncoming nurse) by Drew Montaño (offgoing nurse). Report included the following information SBAR, Kardex, Intake/Output, MAR, Recent Results, and Cardiac Rhythm A-fib .

## 2022-10-12 NOTE — PROGRESS NOTES
[facility] [address]  DAILY PROGRESS NOTE      Patient:    Venkatesh Hilton , 82 y.o. female   MRN:  [id number]  Room/Bed:  200/56  Admission Date:   10/7/2022  Code status:  Full Code    Reason for Admission: Acute CHF Exacerbation     ASSESSMENT AND PLAN:   Problem List Items Addressed This Visit          Circulatory    * (Principal) Acute exacerbation of CHF (congestive heart failure) ([facility] [address]     Other Visit Diagnoses       Acute respiratory failure with hypoxia and hypercapnia (HCC)    -  Primary    Atrial fibrillation with rapid ventricular response (HCC)                Acute exacerbation of CHF  - Difficulty breathing for 3 days prior to admission  - B/l LE swelling noted on exam; pt last took Bumex 3 days ago  - Follows with outpatient cardiologist Dr. Patrice Dawn [id number]  - CXR shows increased interstitial edema, mild ground glass at the lung bases that may represent mild pulmonary edema or atelectasis, possible small right pleural effusion   - Echocardiogram (8/11/22): normal LV systolic function with EF of 55-60% and normal LV size, increased wall thickness consistent with mild concentric hypertrophy, normal wall motion   - pro-BNP 3,967   - trop nl at 8  - Lactic acid 1.1  - Mag 1.4  Plan:  - Continue cardiac monitoring   - Continue PO Lasix 40 mg BID  - Continue PO Lopressor 100 mg BID - consider increasing per cardiology  - Continue home PO Atorvastatin 80 mg qdaily   - Holding home amlodipine d/t acute LE swelling  - Holding home lisinopril 40 mg oral qdaily   - CBC, CMP qdaily   - Requires 2-3LNC with exertional activity/walking - order home oxygen tank prior to DC  - 6 min walk test completed 10/11 - desat to mid 80s with walking with recovery at rest - nursing to redo this to assess O2 requirement with activity upon heart rate control and subsequently, discharge.     Atrial fibrillation with RVR  - EKG (10/7/22): atrial fibrillation with RVR, Left axis deviation  - HR in 110's-130's upon arrival to patient's bedside   - 10/9 AM still in afib with HR ranging 90s-120s  - TSH WNL  Plan:  - PO Lopressor 100mg BID.  - Continue holding home carvedilol 12.5 mg oral qdaily   - Continue IV Diltiazem 5ml/hr - consider transitioning to PO dose 10/12     Possible acute on chronic DVT's  - Positive Roberta's sign bilaterally and patient's history of DVT's make b/l LE DVT's possible. - CTA chest: No evidence for pulmonary emboli. Heart failure with biatrial cardiac chamber enlargement, moderate new. Bilateral pleural effusions and mild pulmonary edema. Mild to moderate aortic atherosclerosis with irregular noncalcified mural plaque along the wall of the descending aorta which increases risk of embolic events.    - Duplex U/S of b/l LE pending   - PT 15.1  - INR 1.2  - PTT 30.6  Plan:  - Continue home Xarelto 20 mg oral qdaily  - Duplex U/S of b/l LE - No evidence of DVT     Sepsis of unknown origin   - Meets 3/4 SIRS criteria (does not meet criteria for temperature)  - WBC 18.1 in ED  - Procalcitonin <0.05  - Lactic acid 1.1  - Negative blood cultures  - Lesion on foot  - Foot Xray: Generalized soft tissue swelling but no radiographic findings of osteomyelitis  Plan:  - Urine culture 10/8: Gram (-) rods  - Levofloxacin 250 every day from (10/10-10/13)  - MRSA culture negative     Type 2 Diabetes Mellitus  -Most recent HbA1c of 11.8   -Serum glucose 309 on admission   Plan:  -Lantus 20 units SubQ qdaily; pending repeat HbA1c  -Insulin lispro subQ QID with meals   -Holding home Jardiance   -Continue home Gabapentin 300 mg TID  -Holding home metformin 500 mg oral qdaily      Gout  -No acute flare ups  Plan:  -Continue home Allopurinol 100 mg oral qdaily      Hypothyroidism   -Denies any acute symptoms   Plan:  -Continue home Synthroid 150 mcg oral qdaily  -TSH: 2.01     Chronic pain of back and LE   -Endorses continuing pain   -Follows with outpatient pain management   Plan:  -Continue home Tylenol 500 mg oral q6h  -Continue home Morphine 15 mg PRN    Obstructive Sleep Apnea  Plan  -BiPAP PRN at night    Global:  Code: Full  IVF/Drips: None  I/O / Wt: None  Diet: Regular with low sodium  Bowel Regimen, Last BM: Miralax PRN, last BM 2 days ago per pt  DVT/AC: Xarelto 20 mg qdaily and encourage ambulation as tolerated  Mobility: per protocol   Disposition: stable  Anticipated LOS: 1-2 nights     Point of Contact (relationship, number): Delano Mota (son); [phone]    SUBJECTIVE:   Sathish Don is a 82 y.o.  female with PMHx of chronic diastolic CHF, chronic venous insufficiency, Type 2 DM, HTN, hypothyroidism, arthritis, asthma, chronic back pain, gout, hypercholesterolemia, and peripheral neuropathy who presented to ED on 10/7/78207 with complaint of difficulty breathing and was admitted for acute CHF exacerbation and new onset A. Fib on EKG. 10/9  No acute events overnight. Patient seen at beside. No acute complaints. Patient is doing well and denies chest pain or SOB when on NC. She denies palpitations, but does report acknowledging that her HR is fast. She denies fatigue or weakness and reports that at home her BP runs similar to what it does in the hospital but she is not sure what her BP is at home. She reports that the callus on her left foot is sore but not painful. She denies fever or chills. She is eating well. 10/10  No acute events overnight. Patient seen at beside. No acute complaints. Patient in no current pain. Nursing called overnight for patient in abdominal/back pain - morphine was administered. She has morphine 15 PRN at home and follows up with pain clinic. No cp, sob, n/v. Patient would like to go home soon she says. 10/11  No acute events overnight. Patient in no current chest pain, SOB, N/V, swelling, fevers/chills, dizziness, syncope, palpitations. Nursing mentioned she has not received her metoprolol or her other meds this morning and she is tachy in the 120s. Patient is asymptomatic. On 3LNC since last night, nursing further noted sleep apnea. Patient feels good and wants to go home, she was reassured she would leave after we slow down her heart rate and remove her of the Cardizem drip.     10/12  No acute events overnight. Patient in no current chest pain, SOB, N/V, swelling, fevers/chills, dizziness, syncope, palpitations. Patient is eager to go home tomorrow regardless of her heart rate. Urged to allow our team to tailor her medical management to lower her heart rate. Patient currently on dilt drip and 100mg lopressor BID, will wait for cardiology recs today for outpatient management. A. Fib still currently not controled. Will nee another walk test when heart rate controled to assess for O2 requirement.      ROS (positive findings are in BOLD; negative findings are in regular font)  Constitutional: fevers, chills, appetite changes, weight changes, fatigue  HEENT: changes in vision, changes in hearing, sore throat, dysphagia  Cardiovascular: chest pain, palpitations, PND, orthopnea, edema  Pulmonary: SOB, cough, sputum production, wheezing, chest tightness  Gastrointestinal: abdominal pain, nausea/vomiting, diarrhea, constipation, melena, hematochezia  Genitourinary: dysuria, hesitation, dribbling, urgency, hematuria  Musculoskeletal: arthralgias, myalgias  Skin: rash, itching  Neurological: sensory changes, motor changes, headache  Psychiatric: mood changes  Endocrine: heat/cold intolerance  Heme: easy bruising/easy bleeding, LAD    CURRENT INPATIENT MEDICATIONS:  Current Facility-Administered Medications   Medication Dose Route Frequency Provider Last Rate Last Admin    potassium chloride 10 mEq in 100 ml IVPB  10 mEq IntraVENous Q1H Kamala Souza MD        levoFLOXacin (LEVAQUIN) tablet 250 mg  250 mg Oral Q24H Raul Cintron DO   250 mg at 10/11/22 0959    metoprolol tartrate (LOPRESSOR) tablet 100 mg  100 mg Oral Q12H Kamala Souza MD   100 mg at 10/11/22 212 insulin glargine (LANTUS) injection 20 Units  20 Units SubCUTAneous DAILY Checo Cazares MD   20 Units at 10/11/22 1009    furosemide (LASIX) tablet 40 mg  40 mg Oral BID Herrera Nash, DO   40 mg at 10/11/22 1742    dilTIAZem (CARDIZEM) 100 mg in 0.9% sodium chloride (MBP/ADV) 100 mL infusion  5 mg/hr IntraVENous CONTINUOUS Emre Agosto MD 5 mL/hr at 10/11/22 1618 5 mg/hr at 10/11/22 1618    acetaminophen (TYLENOL) tablet 500 mg  500 mg Oral Q6H PRN Rezazad, Kerry, DO   500 mg at 10/09/22 2332    allopurinoL (ZYLOPRIM) tablet 100 mg  100 mg Oral DAILY Rezazad, Kerry, DO   100 mg at 10/11/22 0959    atorvastatin (LIPITOR) tablet 80 mg  80 mg Oral DAILY Rezazad, Kerry, DO   80 mg at 10/11/22 0959    levothyroxine (SYNTHROID) tablet 150 mcg  150 mcg Oral DAILY Rezazad, Kerry, DO   150 mcg at 10/12/22 0608    [Held by provider] lisinopriL (PRINIVIL, ZESTRIL) tablet 40 mg  40 mg Oral DAILY Rezazad, Kerry, DO        [Held by provider] bumetanide (BUMEX) tablet 1 mg  1 mg Oral BID PRN Rezazad, Kerry, DO        [Held by provider] amLODIPine (NORVASC) tablet 10 mg  10 mg Oral DAILY Rezazad, Kerry, DO        [Held by provider] hydrOXYzine HCL (ATARAX) tablet 10 mg  10 mg Oral BID PRN Rezazad, Kerry, DO        [Held by provider] insulin lispro (HUMALOG) injection 5 Units  5 Units SubCUTAneous TIDAC Rezazad, Kerry, DO        [Held by provider] metFORMIN (GLUCOPHAGE) tablet 500 mg  500 mg Oral DAILY Rezazad, Kerry, DO        rivaroxaban (XARELTO) tablet 20 mg  20 mg Oral DAILY Rezazad, Kerry, DO   20 mg at 10/11/22 0959    [Held by provider] sertraline (ZOLOFT) tablet 50 mg  50 mg Oral DAILY Rezazad, Kerry, DO        [Held by provider] tamsulosin (FLOMAX) capsule 0.4 mg  0.4 mg Oral PCD Yao Gordonena, DO        sodium chloride (NS) flush 5-40 mL  5-40 mL IntraVENous Q8H Kerry Gordon, DO   10 mL at 10/12/22 0608    sodium chloride (NS) flush 5-40 mL  5-40 mL IntraVENous PRN Kerry Gordon, DO        gabapentin (NEURONTIN) capsule 300 mg  300 mg Oral TID Rezazad, Kerry, DO   300 mg at 10/11/22 2123    [Held by provider] morphine IR (MS IR) tablet 15 mg  15 mg Oral BID PRN Cara Han MD        polyethylene glycol (MIRALAX) packet 17 g  17 g Oral DAILY PRN Kerry Gordon, DO        insulin lispro (HUMALOG) injection   SubCUTAneous AC&HS Cara Han MD   2 Units at 10/11/22 1741    glucose chewable tablet 16 g  16 g Oral PRN Cara Han MD        glucagon [facility]) injection 1 mg  1 mg IntraMUSCular PRN Cara Han MD           Allergies  No Known Allergies    OBJECTIVE:    Intake/Output Summary (Last 24 hours) at 10/12/2022 0917  Last data filed at 10/12/2022 0403  Gross per 24 hour   Intake 300 ml   Output 1100 ml   Net -800 ml       Visit Vitals  /80   Pulse (!) 132   Temp 98.3 °F (36.8 °C)   Resp 27   Ht 5' 5\" (1.651 m)   Wt 108.4 kg (239 lb)   SpO2 95%   BMI 39.77 kg/m²       PHYSICAL EXAM  Gen: NAD, comfortable, obese   HEENT: normocephalic, atraumatic, MMM, no thyromegaly, no JVD  CV: tachycardic with an irregular rhythm. No M/R/G, +2 radial pulses  Pulm: CTAB, Inspiratory wheezes in BL lower lobes.   Abd: S/NT/ND, no rebound, no guarding, no hepatosplenomegaly   MSK: mild LE non-pitting edema   Skin: warm, dry, intact, no rash, callous of left foot under big toe  Neuro: CN II-XII grossly intact, no focal deficits appreciated   Psych: appropriate, alert, oriented x3    LABWORK (LAST 24 HOURS)  Recent Results (from the past 24 hour(s))   GLUCOSE, POC    Collection Time: 10/11/22 12:13 PM   Result Value Ref Range    Glucose (POC) 222 (H) 70 - 110 mg/dL   GLUCOSE, POC    Collection Time: 10/11/22  4:14 PM   Result Value Ref Range    Glucose (POC) 180 (H) 70 - 110 mg/dL   GLUCOSE, POC    Collection Time: 10/11/22  9:09 PM   Result Value Ref Range    Glucose (POC) 88 70 - 545 mg/dL   METABOLIC PANEL, COMPREHENSIVE    Collection Time: 10/12/22  5:36 AM   Result Value Ref Range    Sodium 143 136 - 145 mmol/L Potassium 3.3 (L) 3.5 - 5.5 mmol/L    Chloride 102 100 - 111 mmol/L    CO2 34 (H) 21 - 32 mmol/L    Anion gap 7 3.0 - 18 mmol/L    Glucose 87 74 - 99 mg/dL    BUN 23 (H) 7.0 - 18 MG/DL    Creatinine 0.98 0.6 - 1.3 MG/DL    BUN/Creatinine ratio 23 (H) 12 - 20      eGFR 57 (L) >60 ml/min/1.73m2    Calcium 9.1 8.5 - 10.1 MG/DL    Bilirubin, total 0.5 0.2 - 1.0 MG/DL    ALT (SGPT) 7 (L) 13 - 56 U/L    AST (SGOT) 8 (L) 10 - 38 U/L    Alk. phosphatase 48 45 - 117 U/L    Protein, total 7.6 6.4 - 8.2 g/dL    Albumin 3.0 (L) 3.4 - 5.0 g/dL    Globulin 4.6 (H) 2.0 - 4.0 g/dL    A-G Ratio 0.7 (L) 0.8 - 1.7     CBC WITH AUTOMATED DIFF    Collection Time: 10/12/22  5:36 AM   Result Value Ref Range    WBC 10.2 4.6 - 13.2 K/uL    RBC 4.20 4. 20 - 5.30 M/uL    HGB 11.1 (L) 12.0 - 16.0 g/dL    HCT 36.4 35.0 - 45.0 %    MCV 86.7 78.0 - 100.0 FL    MCH 26.4 24.0 - 34.0 PG    MCHC 30.5 (L) 31.0 - 37.0 g/dL    RDW 14.1 11.6 - 14.5 %    PLATELET 331 465 - 979 K/uL    MPV 12.7 (H) 9.2 - 11.8 FL    NRBC 0.0 0  WBC    ABSOLUTE NRBC 0.00 0.00 - 0.01 K/uL    NEUTROPHILS 59 40 - 73 %    LYMPHOCYTES 24 21 - 52 %    MONOCYTES 11 (H) 3 - 10 %    EOSINOPHILS 5 0 - 5 %    BASOPHILS 1 0 - 2 %    IMMATURE GRANULOCYTES 0 0.0 - 0.5 %    ABS. NEUTROPHILS 6.0 1.8 - 8.0 K/UL    ABS. LYMPHOCYTES 2.5 0.9 - 3.6 K/UL    ABS. MONOCYTES 1.1 0.05 - 1.2 K/UL    ABS. EOSINOPHILS 0.5 (H) 0.0 - 0.4 K/UL    ABS. BASOPHILS 0.1 0.0 - 0.1 K/UL    ABS. IMM. GRANS. 0.0 0.00 - 0.04 K/UL    DF AUTOMATED     GLUCOSE, POC    Collection Time: 10/12/22  7:36 AM   Result Value Ref Range    Glucose (POC) 88 70 - 110 mg/dL       IMAGING AND PROCEDURES (LAST 36 HOURS)  No results found.    ================================================================  Further management for Ms. Venkatesh Hilton will be discussed on rounds with my attending.       Lilian Mckeon DO, PGY-1  [facility]  October 12, 2022 7:14 AM

## 2022-10-12 NOTE — PROGRESS NOTES
1940 Bedside and Verbal shift change  Received from Tiana Zapata RN (outgoing nurse), to IZAIAH Uribe (oncoming)  Pt. Is AOX 3. IV patent and infusing well, Pt. denies  pain at this time. Report included the following information  SBAR, Kardex, Procedure Summary, Intake/Output, MAR, Recent Lab Results, and  Cardiac Rhythm @ Afib. Will resume care and monitor Pt. Condition. Pt. Educated on call bell when in need of help and assistance. Pt. verbalized understanding. Bed alarm on.     Pt. Head to toe Assessment Done and documented. Pt. OOB to [facility] x1 assist.         Suzan care done, new [facility] on.    2040  Pt. Resting comfortably in bed.     2150  Pt. Not in distress. 2300  Pt made complaints. 0000 Pt. Resting with eyes closed, easily awaken. 0200  Pt denies pain. 0400  Pt. Resting comfortably in bed.    0600  Pt. Able to rest and sleep well throughout the shift. Verbal and bedside Shift changed report given to Julisa Anthony (oncoming RN) on Pt. Condition. Report consisted of patients Situation, History, Activities, intake/output,  Background, Assessment and Recommendations(SBAR). Information from the following report(s) Kardex, order Summary, Lab results and MAR was reviewed with the receiving nurse. Opportunity for questions and clarification was provided.

## 2022-10-12 NOTE — PROGRESS NOTES
*ATTENTION:  This note has been created by a medical student for educational purposes only. Please do not refer to the content of this note for clinical decision-making, billing, or other purposes. Please see attending physicians note to obtain clinical information on this patient. *         [facility] Medicine  DAILY PROGRESS NOTE      Patient:    Gabino Wilder , 82 y.o. female   MRN:  [id number]  Room/Bed:  [address] [place] [address]  Admission Date:   10/7/2022  Code status:  Full Code    Reason for Admission: Acute Exacerbation of CHF    ASSESSMENT AND PLAN:   Problem List Items Addressed This Visit          Circulatory    * (Principal) Acute exacerbation of CHF (congestive heart failure) ([facility] [address]     Other Visit Diagnoses       Acute respiratory failure with hypoxia and hypercapnia (HCC)    -  Primary    Atrial fibrillation with rapid ventricular response ([facility] [address]                Problem 1- Acute exacerbation of CHF  -Pt to ED on 10/7 for SOB for 3 days. B/l LE edema noted on PE. Pt states adherence to Bumex regimen. CXR showed increased interstitial edema, mild ground glass appearance at b/l lung bases. Last Echocardiogram (8/11/22) showed EF 55-60%. BNP on admission 3,967. Trop 8. LA 1.1 Mag 1.4. Walking trial completed yesterday and O2 sats dropped to 86-87% while ambulating. Needs further evaluation to determine if RVR is complicating study. Repeat when RVR is resolved. Plan:  - Consult Cardiology about medication regime changes due to consistent elevated HR  - Continue Metoprolol  mg BID  - Continue Diltiazem 5ml/hr IV   - Continue Lasix 40 mg BID  - CMP and CBC daily  -Currently on Room air  - Upon resolution of RVR recheck walking trial for home O2 requirement    Problem 2- Atrial Fibrillation with RVR  -EKG in ED showed Afib with RVR. HR consistently elevated throughout hospital stay despite attempts for rate control. HR between 110-140 on bedside PE this am. TSH WNL.  [id number] score of 9 (12.2% stroke risk per year)   Plan:  - Contact Cardiology for further management of medications  - Continue PO 100mg Metoprolol BID  - Continue IV Diltiazem 5 ml/hr  - Continue Telemetry    Problem 3- Possible Acute on chronic DVTs  -(+) Homans sign b/l, that has decreased in severity during stay. Duplex U/S showed no DVT in LE b/l; R leg showed varicose veins with a no occluding thrombosis in a varicose vein. CTA chest showed no evidence for PE. Pt denies SOB and tachypnea on bedside PE. PT 15.1, INR 1.2, PTT 30.6  Plan:  - Continue home Xareltyo dose 20 mg PO daily    Problem 4- Sepsis of unknown origin  -PT no longer meets SIRS criteria for sepsis. WBC on admission of 18.1 now 8.7. Plan:  - Continue Levofloxacin 250 daily for 3days starting 10/10  - MRSA culture negative  - Urine culture 10/8 grew G(-) rods   - CBC daily    Problem 5- T2DM  -HbA1c 11.8; Serum glucose 309 on admission, today 104  Plan:  - Lantus 20 units Sub Q daily  - Continue home Gabapentin   - Hold home Metformin  - Hold home Jardiance    Problem 6- Gout  -No sx during visit  Plan:  - Continue home Allopurinol    Problem 7- Hypothyroidism  -Denies sx  Plan:  - Continue home levothyroxine  - TSH 2.01 (WNL) during admission    Problem 8- Chronic back pain  -Frequent complaints of back pain. Follows outpatient pain management for Morphine. Plan:  - Continue home Morphine  - Continue home Tylenol    Problem 9- Obstructive Sleep Apnea  -Nursing reported possible MARY  Plan:  - BiPAP as needed at night for sleep      Code: Full  Diet: Low sodium  DVT/AC: Xarelto  Mobility: per protocol  Disposition: 1-2 days    Point of Contact (relationship):         SUBJECTIVE:   82 F with PMHx of diastolic CHF, Venous insufficiency, T2DM, HTN, Hypothyroidism, arthritis, asthma, gout, back pain presented to ED 10/7 for SOB and difficulty breathing. Admitted for acute exacerbation of CHF. EKG showed new onset Afib  Events of the last 24 hours:  No acute events overnight.   Patient seen at beside. No acute complaints. Patient mentioned leaving AMA tomorrow due to needing to go home. Pt was advised against this but seems to be of sound mind and capable of autonomy.      ROS (positive findings are in BOLD; negative findings are in regular font)  Constitutional: fevers, chills, appetite changes, weight changes, fatigue  HEENT: changes in vision, changes in hearing, sore throat, dysphagia  Cardiovascular: chest pain, palpitations, PND, orthopnea, edema  Pulmonary: SOB, cough, sputum production, wheezing, chest tightness  Gastrointestinal: abdominal pain, nausea/vomiting, diarrhea, constipation, melena, hematochezia  Genitourinary: dysuria, hesitation, dribbling, urgency, hematuria  Musculoskeletal: arthralgias, myalgias  Skin: rash, itching  Neurological: sensory changes, motor changes, headache  Psychiatric: mood changes  Endocrine: heat/cold intolerance  Heme: easy bruising/easy bleeding, LAD    CURRENT INPATIENT MEDICATIONS:  Current Facility-Administered Medications   Medication Dose Route Frequency Provider Last Rate Last Admin    potassium chloride 10 mEq in 100 ml IVPB  10 mEq IntraVENous Q1H Felipe Reilly MD        lidocaine (PF) (XYLOCAINE) 10 mg/mL (1 %) injection 5 mL  5 mL IntraDERMal Joaquin Li,         sodium chloride (NS) flush 5-40 mL  5-40 mL IntraVENous Q8H Joaquin Nuno DO        sodium chloride (NS) flush 5-40 mL  5-40 mL IntraVENous PRN Shanna Crafts, DO        dilTIAZem ER (CARDIZEM SR) capsule 60 mg  60 mg Oral DAILY Taylor Tracey PA-C        levoFLOXacin (LEVAQUIN) tablet 250 mg  250 mg Oral Q24H Shanna Manav, DO   250 mg at 10/12/22 0917    metoprolol tartrate (LOPRESSOR) tablet 100 mg  100 mg Oral Q12H Felipe Reilly MD   100 mg at 10/12/22 0917    insulin glargine (LANTUS) injection 20 Units  20 Units SubCUTAneous DAILY Felipe Reilly MD   20 Units at 10/12/22 0917    furosemide (LASIX) tablet 40 mg  40 mg Oral BID Charity Herrera G, DO   40 mg at 10/12/22 0917    dilTIAZem (CARDIZEM) 100 mg in 0.9% sodium chloride (MBP/ADV) 100 mL infusion  5 mg/hr IntraVENous CONTINUOUS Isidro James MD 5 mL/hr at 10/11/22 1618 5 mg/hr at 10/11/22 1618    acetaminophen (TYLENOL) tablet 500 mg  500 mg Oral Q6H PRN Rezazad, Kerry, DO   500 mg at 10/09/22 2332    allopurinoL (ZYLOPRIM) tablet 100 mg  100 mg Oral DAILY Rezazad, Kerry, DO   100 mg at 10/12/22 0917    atorvastatin (LIPITOR) tablet 80 mg  80 mg Oral DAILY Rezazad, Kerry, DO   80 mg at 10/12/22 0916    levothyroxine (SYNTHROID) tablet 150 mcg  150 mcg Oral DAILY Rezazad, Kerry, DO   150 mcg at 10/12/22 0608    [Held by provider] lisinopriL (PRINIVIL, ZESTRIL) tablet 40 mg  40 mg Oral DAILY Rezazad, Kerry, DO        [Held by provider] bumetanide (BUMEX) tablet 1 mg  1 mg Oral BID PRN Rezazad, Kerry, DO        [Held by provider] hydrOXYzine HCL (ATARAX) tablet 10 mg  10 mg Oral BID PRN Rezazad, Kerry, DO        [Held by provider] insulin lispro (HUMALOG) injection 5 Units  5 Units SubCUTAneous TIDAC Rezazad, Kerry, DO        [Held by provider] metFORMIN (GLUCOPHAGE) tablet 500 mg  500 mg Oral DAILY Rezazad, Kerry, DO        rivaroxaban (XARELTO) tablet 20 mg  20 mg Oral DAILY Rezazad, Kerry, DO   20 mg at 10/12/22 0917    [Held by provider] sertraline (ZOLOFT) tablet 50 mg  50 mg Oral DAILY Rezazad, Kerry, DO        [Held by provider] tamsulosin (FLOMAX) capsule 0.4 mg  0.4 mg Oral PCD Rezazad, Kerry, DO        sodium chloride (NS) flush 5-40 mL  5-40 mL IntraVENous Q8H Rezazad, Kerry, DO   10 mL at 10/12/22 0608    sodium chloride (NS) flush 5-40 mL  5-40 mL IntraVENous PRN Rejanezad, Kerry, DO        gabapentin (NEURONTIN) capsule 300 mg  300 mg Oral TID Rezazad, Kerry, DO   300 mg at 10/12/22 0917    [Held by provider] morphine IR (MS IR) tablet 15 mg  15 mg Oral BID PRN Dominick Miner MD        polyethylene glycol (MIRALAX) packet 17 g  17 g Oral DAILY PRN Heidi Kerry, DO        insulin lispro (HUMALOG) injection   SubCUTAneous AC&HS Nikki Nguyen MD   2 Units at 10/11/22 1741    glucose chewable tablet 16 g  16 g Oral PRN Nikki Nguyen MD        glucagon [facility]) injection 1 mg  1 mg IntraMUSCular PRN Nikki Nguyen MD           Allergies  No Known Allergies    OBJECTIVE:    Intake/Output Summary (Last 24 hours) at 10/12/2022 1121  Last data filed at 10/12/2022 0913  Gross per 24 hour   Intake 300 ml   Output 1100 ml   Net -800 ml       Visit Vitals  /80   Pulse (!) 132   Temp 98.3 °F (36.8 °C)   Resp 27   Ht 5' 5\" (1.651 m)   Wt 108.4 kg (239 lb)   SpO2 95%   BMI 39.77 kg/m²       PHYSICAL EXAM  Gen: NAD, comfortable, obese   HEENT: normocephalic, atraumatic, MMM, no thyromegaly, no JVD  CV: RRR, S1/S2 present without M/R/G, +2 radial pulses, well-perfused, PMI not displaced  Pulm: CTAB, no wheezes, no crackles  Abd: S/NT/ND, no rebound, no guarding, no hepatosplenomegaly   MSK: no clubbing, no edema  Skin: warm, dry, intact, no rash  Neuro: CN II-XII grossly intact, no focal deficits appreciated   Psych: appropriate, alert, oriented x3    LABWORK (LAST 24 HOURS)  Recent Results (from the past 24 hour(s))   GLUCOSE, POC    Collection Time: 10/11/22 12:13 PM   Result Value Ref Range    Glucose (POC) 222 (H) 70 - 110 mg/dL   GLUCOSE, POC    Collection Time: 10/11/22  4:14 PM   Result Value Ref Range    Glucose (POC) 180 (H) 70 - 110 mg/dL   GLUCOSE, POC    Collection Time: 10/11/22  9:09 PM   Result Value Ref Range    Glucose (POC) 88 70 - 919 mg/dL   METABOLIC PANEL, COMPREHENSIVE    Collection Time: 10/12/22  5:36 AM   Result Value Ref Range    Sodium 143 136 - 145 mmol/L    Potassium 3.3 (L) 3.5 - 5.5 mmol/L    Chloride 102 100 - 111 mmol/L    CO2 34 (H) 21 - 32 mmol/L    Anion gap 7 3.0 - 18 mmol/L    Glucose 87 74 - 99 mg/dL    BUN 23 (H) 7.0 - 18 MG/DL    Creatinine 0.98 0.6 - 1.3 MG/DL    BUN/Creatinine ratio 23 (H) 12 - 20      eGFR 57 (L) >60 ml/min/1.73m2    Calcium 9.1 8.5 - 10.1 MG/DL    Bilirubin, total 0.5 0.2 - 1.0 MG/DL    ALT (SGPT) 7 (L) 13 - 56 U/L    AST (SGOT) 8 (L) 10 - 38 U/L    Alk. phosphatase 48 45 - 117 U/L    Protein, total 7.6 6.4 - 8.2 g/dL    Albumin 3.0 (L) 3.4 - 5.0 g/dL    Globulin 4.6 (H) 2.0 - 4.0 g/dL    A-G Ratio 0.7 (L) 0.8 - 1.7     CBC WITH AUTOMATED DIFF    Collection Time: 10/12/22  5:36 AM   Result Value Ref Range    WBC 10.2 4.6 - 13.2 K/uL    RBC 4.20 4. 20 - 5.30 M/uL    HGB 11.1 (L) 12.0 - 16.0 g/dL    HCT 36.4 35.0 - 45.0 %    MCV 86.7 78.0 - 100.0 FL    MCH 26.4 24.0 - 34.0 PG    MCHC 30.5 (L) 31.0 - 37.0 g/dL    RDW 14.1 11.6 - 14.5 %    PLATELET 590 880 - 421 K/uL    MPV 12.7 (H) 9.2 - 11.8 FL    NRBC 0.0 0  WBC    ABSOLUTE NRBC 0.00 0.00 - 0.01 K/uL    NEUTROPHILS 59 40 - 73 %    LYMPHOCYTES 24 21 - 52 %    MONOCYTES 11 (H) 3 - 10 %    EOSINOPHILS 5 0 - 5 %    BASOPHILS 1 0 - 2 %    IMMATURE GRANULOCYTES 0 0.0 - 0.5 %    ABS. NEUTROPHILS 6.0 1.8 - 8.0 K/UL    ABS. LYMPHOCYTES 2.5 0.9 - 3.6 K/UL    ABS. MONOCYTES 1.1 0.05 - 1.2 K/UL    ABS. EOSINOPHILS 0.5 (H) 0.0 - 0.4 K/UL    ABS. BASOPHILS 0.1 0.0 - 0.1 K/UL    ABS. IMM. GRANS. 0.0 0.00 - 0.04 K/UL    DF AUTOMATED     GLUCOSE, POC    Collection Time: 10/12/22  7:36 AM   Result Value Ref Range    Glucose (POC) 88 70 - 110 mg/dL       IMAGING AND PROCEDURES (LAST 36 HOURS)  No results found.    ================================================================  Further management for Ms. Sandip Reilly will be discussed on rounds with my attending.       RUBI Gupta  [facility]  October 12, 2022 11:21 AM

## 2022-10-12 NOTE — ROUTINE PROCESS
Bedside and Verbal shift change report given to DARBY May (oncoming nurse) by Florence Andrade RN (offgoing nurse). Report included the following information Kardex, Intake/Output, MAR, and Recent Results.

## 2022-10-12 NOTE — PROGRESS NOTES
OCCUPATIONAL THERAPY NOTE    Patient: Benja Reynolds (4 y.o. female)  Date: 10/12/2022  Diagnosis: Acute exacerbation of CHF (congestive heart failure) ([facility]) [I50.9] Acute exacerbation of CHF (congestive heart failure) ([facility] [address]      Precautions: Fall  Chart, occupational therapy assessment, plan of care, and goals were reviewed. Occupational Therapy treatment attempted. Patient is unable to participate due to:  []  Nausea/vomiting  []  Eating  []  Pain  []  Pt lethargic  []  Off Unit  [x] Other:  Pt is resting in the chair, RN is present, just established IV access. Per RN pt needs to rest at this time in order to participate in walk test later. Will f/u later as schedule allows. Thank you.     Alyssa Cadet MS, OTR/L

## 2022-10-13 LAB
ALBUMIN SERPL-MCNC: 3.3 G/DL (ref 3.4–5)
ALBUMIN/GLOB SERPL: 0.7 {RATIO} (ref 0.8–1.7)
ALP SERPL-CCNC: 53 U/L (ref 45–117)
ALT SERPL-CCNC: 12 U/L (ref 13–56)
ANION GAP SERPL CALC-SCNC: 7 MMOL/L (ref 3–18)
AST SERPL-CCNC: 11 U/L (ref 10–38)
BACTERIA SPEC CULT: NORMAL
BASOPHILS # BLD: 0.1 K/UL (ref 0–0.1)
BASOPHILS NFR BLD: 1 % (ref 0–2)
BILIRUB SERPL-MCNC: 0.7 MG/DL (ref 0.2–1)
BUN SERPL-MCNC: 26 MG/DL (ref 7–18)
BUN/CREAT SERPL: 21 (ref 12–20)
CALCIUM SERPL-MCNC: 9.2 MG/DL (ref 8.5–10.1)
CHLORIDE SERPL-SCNC: 101 MMOL/L (ref 100–111)
CO2 SERPL-SCNC: 31 MMOL/L (ref 21–32)
CREAT SERPL-MCNC: 1.24 MG/DL (ref 0.6–1.3)
DIFFERENTIAL METHOD BLD: ABNORMAL
EOSINOPHIL # BLD: 0.5 K/UL (ref 0–0.4)
EOSINOPHIL NFR BLD: 4 % (ref 0–5)
ERYTHROCYTE [DISTWIDTH] IN BLOOD BY AUTOMATED COUNT: 14 % (ref 11.6–14.5)
GLOBULIN SER CALC-MCNC: 4.8 G/DL (ref 2–4)
GLUCOSE BLD STRIP.AUTO-MCNC: 106 MG/DL (ref 70–110)
GLUCOSE BLD STRIP.AUTO-MCNC: 155 MG/DL (ref 70–110)
GLUCOSE BLD STRIP.AUTO-MCNC: 184 MG/DL (ref 70–110)
GLUCOSE BLD STRIP.AUTO-MCNC: 97 MG/DL (ref 70–110)
GLUCOSE SERPL-MCNC: 88 MG/DL (ref 74–99)
HCT VFR BLD AUTO: 38.1 % (ref 35–45)
HGB BLD-MCNC: 11.9 G/DL (ref 12–16)
IMM GRANULOCYTES # BLD AUTO: 0.1 K/UL (ref 0–0.04)
IMM GRANULOCYTES NFR BLD AUTO: 0 % (ref 0–0.5)
LYMPHOCYTES # BLD: 4.4 K/UL (ref 0.9–3.6)
LYMPHOCYTES NFR BLD: 33 % (ref 21–52)
MAGNESIUM SERPL-MCNC: 2.4 MG/DL (ref 1.6–2.6)
MCH RBC QN AUTO: 27.2 PG (ref 24–34)
MCHC RBC AUTO-ENTMCNC: 31.2 G/DL (ref 31–37)
MCV RBC AUTO: 87.2 FL (ref 78–100)
MONOCYTES # BLD: 1.4 K/UL (ref 0.05–1.2)
MONOCYTES NFR BLD: 10 % (ref 3–10)
NEUTS SEG # BLD: 7 K/UL (ref 1.8–8)
NEUTS SEG NFR BLD: 52 % (ref 40–73)
NRBC # BLD: 0 K/UL (ref 0–0.01)
NRBC BLD-RTO: 0 PER 100 WBC
PLATELET # BLD AUTO: 248 K/UL (ref 135–420)
PMV BLD AUTO: 12.7 FL (ref 9.2–11.8)
POTASSIUM SERPL-SCNC: 4.3 MMOL/L (ref 3.5–5.5)
PROT SERPL-MCNC: 8.1 G/DL (ref 6.4–8.2)
RBC # BLD AUTO: 4.37 M/UL (ref 4.2–5.3)
SERVICE CMNT-IMP: NORMAL
SODIUM SERPL-SCNC: 139 MMOL/L (ref 136–145)
WBC # BLD AUTO: 13.4 K/UL (ref 4.6–13.2)

## 2022-10-13 PROCEDURE — 85025 COMPLETE CBC W/AUTO DIFF WBC: CPT

## 2022-10-13 PROCEDURE — 74011000250 HC RX REV CODE- 250

## 2022-10-13 PROCEDURE — 74011636637 HC RX REV CODE- 636/637

## 2022-10-13 PROCEDURE — 74011250637 HC RX REV CODE- 250/637

## 2022-10-13 PROCEDURE — 80053 COMPREHEN METABOLIC PANEL: CPT

## 2022-10-13 PROCEDURE — 36415 COLL VENOUS BLD VENIPUNCTURE: CPT

## 2022-10-13 PROCEDURE — 97530 THERAPEUTIC ACTIVITIES: CPT

## 2022-10-13 PROCEDURE — 97535 SELF CARE MNGMENT TRAINING: CPT

## 2022-10-13 PROCEDURE — 74011636637 HC RX REV CODE- 636/637: Performed by: STUDENT IN AN ORGANIZED HEALTH CARE EDUCATION/TRAINING PROGRAM

## 2022-10-13 PROCEDURE — 97116 GAIT TRAINING THERAPY: CPT

## 2022-10-13 PROCEDURE — 83735 ASSAY OF MAGNESIUM: CPT

## 2022-10-13 PROCEDURE — 82962 GLUCOSE BLOOD TEST: CPT

## 2022-10-13 PROCEDURE — 65660000004 HC RM CVT STEPDOWN

## 2022-10-13 PROCEDURE — 99232 SBSQ HOSP IP/OBS MODERATE 35: CPT | Performed by: INTERNAL MEDICINE

## 2022-10-13 RX ORDER — METOPROLOL TARTRATE 100 MG/1
100 TABLET ORAL EVERY 12 HOURS
Qty: 120 TABLET | Refills: 2 | Status: SHIPPED | OUTPATIENT
Start: 2022-10-13

## 2022-10-13 RX ORDER — FUROSEMIDE 40 MG/1
40 TABLET ORAL DAILY
Qty: 90 TABLET | Refills: 3 | Status: SHIPPED | OUTPATIENT
Start: 2022-10-13

## 2022-10-13 RX ORDER — DILTIAZEM HYDROCHLORIDE 180 MG/1
180 CAPSULE, COATED, EXTENDED RELEASE ORAL DAILY
Qty: 120 CAPSULE | Refills: 3 | Status: SHIPPED | OUTPATIENT
Start: 2022-10-14

## 2022-10-13 RX ORDER — METOPROLOL TARTRATE 100 MG/1
100 TABLET ORAL EVERY 12 HOURS
Qty: 120 TABLET | Refills: 3 | Status: SHIPPED | OUTPATIENT
Start: 2022-10-13 | End: 2022-10-13 | Stop reason: SDUPTHER

## 2022-10-13 RX ORDER — FUROSEMIDE 40 MG/1
40 TABLET ORAL DAILY
Qty: 90 TABLET | Refills: 3 | Status: SHIPPED | OUTPATIENT
Start: 2022-10-13 | End: 2022-10-13 | Stop reason: SDUPTHER

## 2022-10-13 RX ORDER — FUROSEMIDE 40 MG/1
40 TABLET ORAL DAILY
Qty: 90 TABLET | Refills: 2 | Status: SHIPPED | OUTPATIENT
Start: 2022-10-13 | End: 2022-10-13 | Stop reason: SDUPTHER

## 2022-10-13 RX ORDER — DILTIAZEM HYDROCHLORIDE 180 MG/1
180 CAPSULE, COATED, EXTENDED RELEASE ORAL DAILY
Qty: 120 CAPSULE | Refills: 2 | Status: SHIPPED | OUTPATIENT
Start: 2022-10-14 | End: 2022-10-13 | Stop reason: SDUPTHER

## 2022-10-13 RX ORDER — METOPROLOL TARTRATE 100 MG/1
100 TABLET ORAL EVERY 12 HOURS
Qty: 120 TABLET | Refills: 5 | Status: SHIPPED | OUTPATIENT
Start: 2022-10-13 | End: 2022-10-13 | Stop reason: SDUPTHER

## 2022-10-13 RX ADMIN — ALLOPURINOL 100 MG: 100 TABLET ORAL at 10:04

## 2022-10-13 RX ADMIN — LEVOFLOXACIN 250 MG: 250 TABLET, FILM COATED ORAL at 10:07

## 2022-10-13 RX ADMIN — SODIUM CHLORIDE, PRESERVATIVE FREE 10 ML: 5 INJECTION INTRAVENOUS at 05:22

## 2022-10-13 RX ADMIN — Medication 2 UNITS: at 13:06

## 2022-10-13 RX ADMIN — Medication 20 UNITS: at 10:04

## 2022-10-13 RX ADMIN — GABAPENTIN 300 MG: 300 CAPSULE ORAL at 16:27

## 2022-10-13 RX ADMIN — Medication 2 UNITS: at 21:33

## 2022-10-13 RX ADMIN — GABAPENTIN 300 MG: 300 CAPSULE ORAL at 21:26

## 2022-10-13 RX ADMIN — RIVAROXABAN 20 MG: 20 TABLET, FILM COATED ORAL at 10:04

## 2022-10-13 RX ADMIN — METOPROLOL TARTRATE 100 MG: 50 TABLET, FILM COATED ORAL at 10:04

## 2022-10-13 RX ADMIN — POTASSIUM CHLORIDE 40 MEQ: 1500 TABLET, EXTENDED RELEASE ORAL at 00:12

## 2022-10-13 RX ADMIN — LEVOTHYROXINE SODIUM 150 MCG: 0.05 TABLET ORAL at 06:20

## 2022-10-13 RX ADMIN — GABAPENTIN 300 MG: 300 CAPSULE ORAL at 10:04

## 2022-10-13 RX ADMIN — FUROSEMIDE 40 MG: 40 TABLET ORAL at 10:04

## 2022-10-13 RX ADMIN — ATORVASTATIN CALCIUM 80 MG: 40 TABLET, FILM COATED ORAL at 10:04

## 2022-10-13 RX ADMIN — FUROSEMIDE 40 MG: 40 TABLET ORAL at 17:00

## 2022-10-13 RX ADMIN — SODIUM CHLORIDE, PRESERVATIVE FREE 10 ML: 5 INJECTION INTRAVENOUS at 21:35

## 2022-10-13 RX ADMIN — SODIUM CHLORIDE, PRESERVATIVE FREE 10 ML: 5 INJECTION INTRAVENOUS at 13:09

## 2022-10-13 RX ADMIN — METOPROLOL TARTRATE 100 MG: 50 TABLET, FILM COATED ORAL at 21:26

## 2022-10-13 NOTE — PROGRESS NOTES
Problem: Mobility Impaired (Adult and Pediatric)  Goal: *Acute Goals and Plan of Care (Insert Text)  Description: Physical Therapy Goals  Initiated 10/9/2022 and to be accomplished within 7 day(s)  1. Patient will move from supine to sit and sit to supine , scoot up and down, and roll side to side in bed with modified independence. 2.  Patient will transfer from bed to chair and chair to bed with supervision/set-up using the least restrictive device. 3.  Patient will perform sit to stand with supervision/set-up. 4.  Patient will ambulate with supervision/set-up for 50 feet with the least restrictive device. 5.  Patient will participate in LE exercise to tolerance. PLOF: Pt reporting she lives with boyfriend in first floor apartment with 1 SAIGE. Pt has RW for mobility, reporting falls about every 6 months. Outcome: Progressing Towards Goal       PHYSICAL THERAPY TREATMENT    Patient: Flynn Cummings (55 y.o. female)  Date: 10/13/2022  Diagnosis: Acute exacerbation of CHF (congestive heart failure) ([facility]) [I50.9] Acute exacerbation of CHF (congestive heart failure) ([facility] [address]      Precautions: Fall      ASSESSMENT:  Patient received sitting in chair next to bed and agreeable to PT treatment. SBA for functional transfers. She ambulates 60 ft in hallway using RW with SBA. Initially 96% O2 on RA. Following activity 94% O2 on RA before entering her room. Patient returns to sitting EOB and denies SOB with activity. Nurse notified. Progression toward goals:   [x]      Improving appropriately and progressing toward goals  []      Improving slowly and progressing toward goals  []      Not making progress toward goals and plan of care will be adjusted     PLAN:  Patient continues to benefit from skilled intervention to address the above impairments. Continue treatment per established plan of care.     Further Equipment Recommendations for Discharge:  N/A    [facility]: Current research shows that an AM-PAC score of 18 (14 without stairs) or greater is associated with a discharge to the patient's home setting. Based on an AM-PAC score of 18/24 (or **/20 if omitting stairs) and their current functional mobility deficits, it is recommended that the patient have 2-3 sessions per week of Physical Therapy at d/c to increase the patient's independence. This [facility] score should be considered in conjunction with interdisciplinary team recommendations to determine the most appropriate discharge setting. Patient's social support, diagnosis, medical stability, and prior level of function should also be taken into consideration. SUBJECTIVE:   Patient stated I am waiting to go home.     OBJECTIVE DATA SUMMARY:   Critical Behavior:  Neurologic State: Alert, Eyes open spontaneously  Orientation Level: Oriented X4  Cognition: Follows commands  Safety/Judgement: Awareness of environment, Fall prevention  Functional Mobility Training:        Transfers:  Sit to Stand: Stand-by assistance  Stand to Sit: Stand-by assistance                   Balance:  Sitting: Intact  Standing: Impaired; With support  Standing - Static: Good  Standing - Dynamic : Fair         Ambulation/Gait Training:  Distance (ft): 60 Feet (ft)  Assistive Device: Walker, rolling  Ambulation - Level of Assistance: Stand-by assistance        Gait Abnormalities: Decreased step clearance                  Pain:  Pain level pre-treatment: 0/10  Pain level post-treatment: 0/10   Pain Intervention(s): Medication (see MAR); Rest, Ice, Repositioning   Response to intervention: Nurse notified, See doc flow    Activity Tolerance:   Fair  Please refer to the flowsheet for vital signs taken during this treatment.   After treatment:   [x] Patient left in no apparent distress sitting up in chair  [] Patient left in no apparent distress in bed  [x] Call bell left within reach  [x] Nursing notified  [] Caregiver present  [] Bed alarm activated  [] SCDs applied      COMMUNICATION/EDUCATION: []         Role of Physical Therapy in the acute care setting. [x]         Fall prevention education was provided and the patient/caregiver indicated understanding. [x]         Patient/family have participated as able in working toward goals and plan of care. [x]         Patient/family agree to work toward stated goals and plan of care. []         Patient understands intent and goals of therapy, but is neutral about his/her participation. []         Patient is unable to participate in stated goals/plan of care: ongoing with therapy staff.  []         Other:        Para Karolyn, PT   Time Calculation: 15 mins    [facility] AM-PAC® Basic Mobility Inpatient Short Form (6-Clicks) Version 2    How much HELP from another person does the patient currently need    (If the patient hasn't done an activity recently, how much help from another person do you think he/she would need if he/she tried?)   Total (Total A or Dep)   A Lot  (Mod to Max A)   A Little (Sup or Min A)   None (Mod I to I)   Turning from your back to your side while in a flat bed without using bedrails? [] 1 [] 2 [x] 3 [] 4   2. Moving from lying on your back to sitting on the side of a flat bed without using bedrails? [] 1 [] 2 [x] 3 [] 4   3. Moving to and from a bed to a chair (including a wheelchair)? [] 1 [] 2 [x] 3 [] 4   4. Standing up from a chair using your arms (e.g., wheelchair, or bedside chair)? [] 1 [] 2 [x] 3 [] 4   5. Walking in hospital room? [] 1 [] 2 [x] 3 [] 4   6. Climbing 3-5 steps with a railing?+   [] 1 [] 2 [x] 3 [] 4   +If stair climbing cannot be assessed, skip item #6. Sum responses from items 1-5.

## 2022-10-13 NOTE — PROGRESS NOTES
Oxygen order completed and sent in [place] to [facility].             ROSY FloresN RN  Care Management  Pager: [phone]

## 2022-10-13 NOTE — PROGRESS NOTES
Planned to discharge patient today as portable oxygen was delivered to her room. However, after sending prescription down to outpatient pharmacy, learned that patient's insurance  was not accepted at pharmacy. Not safe to discharge patient with her xarelto and diltiazem as she is not able to afford these medications. Discussed case with CM and they will look into this matter further tomorrow. Appreciate their help in this matter. Patient agreeable with change in plan.     Alvaro Floyd MD  [address] [facility], PGY-2

## 2022-10-13 NOTE — PROGRESS NOTES
[facility]   POST-ROUNDING NOTE    Assessment & Plan:  - Patient will need to wait until tomorrow for DC d/t [facility] pharmacy not being able to accept  to fill her Xarelto, Contacted CM to find options. - Continue with cardiac monitoring   - Continue PO Lasix 40mg BID  - Metoprolol 100 mg BID   - Cardiology transitioned patient to PO Diltiazem 180  - Home O2 being delivered to patient today    The above patient and plan were discussed with my supervising physician. See daily progress note for full assessment/plan.       Nighat Castro DO, PGY-1  [facility]  10/13/2022

## 2022-10-13 NOTE — DISCHARGE SUMMARY
Julissa Cardozo SUMMARY      Name:   Chaparro Coronado 82 y.o. female  MRN:   [id number]  CSN:   [id number]  Admission Date:  10/7/2022  Discharge Date:  10/14/2022  Attending: Osman Concepcion MD   PCP:              Osman Concepcion MD   ================================================================  Reason for Admission:  Acute exacerbation of CHF (congestive heart failure) ([facility] [I50.9]    Discharge Diagnosis:    Acute exacerbation of CHF  Atrial fibrillation with RVR  UTI  History of DVT's    Follow-up studies/evaluations for PCP/Important Notes to PCP:  Pending labs/investigations to follow up as below  Medication reconciliation:  Discontinued Medications: Vancomycin, Zosyn, Bumex, Coreg, Levofloxacin, Tamsulosin, Lisinopril, Zoloft  New Medications: Lasix, Diltiazem, Metoprolol    RHIANNA Follow Up Appointment:   - October 1th at 2pm with Dr. Alex Baca at [facility]  - Cardiology appointment Oct 24 @10am with Dr. Linda Mccormack office    Recommended follow-up after [facility] visit: Patient needs follow-up visit 2 weeks after [facility] appointment, then every 1 month thereafter. Readmission prevention plan:   Medication adherence    GOALS OF CARE (including Code Status, Advanced Care Plan): Full measures    Pending labs/ investigations at discharge to follow up:   None    Operative Procedures:            - 6 minute walk test        Consultants:          - Cardiology        - Case Management    Condition at discharge:  Afebrile  Ambulating  Eating, Drinking, Voiding  Stable    Disposition at Discharge:  Home    Functional Status at Discharge: Rolling walker    Diet: Regular diet    Discharge Medications:  Current Discharge Medication List        START taking these medications    Details   metoprolol tartrate (LOPRESSOR) 100 mg IR tablet Take 1 Tablet by mouth every twelve (12) hours.   Qty: 120 Tablet, Refills: 2  Start date: 10/13/2022      dilTIAZem ER (CARDIZEM CD) 180 mg capsule Take 1 Capsule by mouth daily. Qty: 120 Capsule, Refills: 3  Start date: 10/14/2022      furosemide (LASIX) 40 mg tablet Take 1 Tablet by mouth daily. Qty: 90 Tablet, Refills: 3  Start date: 10/13/2022           CONTINUE these medications which have CHANGED    Details   rivaroxaban (XARELTO) 20 mg tab tablet Take 1 Tablet by mouth daily. Qty: 30 Tablet, Refills: 2  Start date: 10/14/2022           CONTINUE these medications which have NOT CHANGED    Details   Jardiance 10 mg tablet Take 10 mg by mouth daily. BD Insulin Syringe SafetyGlide 0.5 mL 30 gauge x 5/16\" syrg       insulin glargine (LANTUS) 100 unit/mL injection Inject 20 units daily at bedtime. Monitor and record blood sugar daily. Qty: 1 mL, Refills: 0      insulin aspart U-100 (NovoLOG Flexpen U-100 Insulin) 100 unit/mL (3 mL) inpn 5 Units by SubCUTAneous route Before breakfast, lunch, and dinner. Qty: 1 Pen, Refills: 0      levothyroxine (SYNTHROID) 150 mcg tablet Take 150 mcg by mouth daily. morphine IR (MS IR) 15 mg tablet Take 15 mg by mouth two (2) times a day. metFORMIN (GLUCOPHAGE) 500 mg tablet Take 500 mg by mouth daily. hydrOXYzine HCL (ATARAX) 10 mg tablet Take 1 Tablet by mouth two (2) times daily as needed for Anxiety. allopurinoL (ZYLOPRIM) 100 mg tablet Take 100 mg by mouth daily. gabapentin (NEURONTIN) 400 mg capsule Take 1 cap in morning, 1 cap at 2pm, 2 caps at bedtime  Qty: 360 Capsule, Refills: 0    Associated Diagnoses: Type 2 diabetes mellitus with diabetic neuropathy, with long-term current use of insulin ([facility] Neuropathy      amLODIPine (NORVASC) 10 mg tablet Take 1 Tablet by mouth daily. Qty: 90 Tablet, Refills: 0    Associated Diagnoses: Essential hypertension      atorvastatin (LIPITOR) 80 mg tablet Take 1 Tablet by mouth daily.   Qty: 90 Tablet, Refills: 3    Associated Diagnoses: Hypercholesteremia      Blood-Glucose Meter (FREESTYLE LITE METER) monitoring kit Test twice blood glucose daily; ICD-10 E11.9; Quantity 1  Qty: 1 Kit, Refills: 0    Associated Diagnoses: Type 2 diabetes mellitus with nephropathy (HCC)      insulin syringe,safetyneedle 1 mL 31 gauge x 5/16\" syrg 1 Each by Does Not Apply route daily. Qty: 300 Each, Refills: 3    Comments: Dx e11.9      glucose blood VI test strips (FREESTYLE TEST) strip Use to check blood glucose twice daily DX:E11.9  Qty: 300 Strip, Refills: 3      acetaminophen (TYLENOL) 500 mg tablet Take 1 Tab by mouth every six (6) hours as needed for Pain. Qty: 270 Tab, Refills: 3    Associated Diagnoses: Controlled type 2 diabetes mellitus without complication, with long-term current use of insulin (HCC)           STOP taking these medications       tamsulosin (FLOMAX) 0.4 mg capsule Comments:   Reason for Stopping:         tamsulosin (FLOMAX) 0.4 mg capsule Comments:   Reason for Stopping:         lisinopriL (PRINIVIL, ZESTRIL) 40 mg tablet Comments:   Reason for Stopping:         sertraline (ZOLOFT) 50 mg tablet Comments:   Reason for Stopping:         carvediloL (COREG) 12.5 mg tablet Comments:   Reason for Stopping:         bumetanide (BUMEX) 2 mg tablet Comments:   Reason for Stopping:               Hospital Course:     81 yo F with PMHx of HFpEF, arthritis, hypothyroidism, asthma, chronic back pain, poorly controlled DM2, HTN, HLD, gout, peripheral neuropathy, urinary retention, DVT, obesity, who presented to the ED with worsening SOB for 3 days. She tried Tylenol and Motrin for her SOB without any benefit. She did not take her PRN Bumex for 3 days because she did not want to use the bathroom frequently at a recent social gathering, and has noticed increased swelling in her thighs. She reported chest pain to the ED but denied this fact when asked. Reports feeling better after 2L NC, IV Bumex in ED. Reports following with Dr. Romel Martinez of Cardiology every 3 months.  Patient reports compliance with all her medications except for Xarelto and Insulin which were too costly for her so she stopped taking them 4 weeks ago. She lives with a roommate who helps her around the house. Vitals significant for Afib at 110s-130s, SpO2 >92% on 2L but desats to 87-89% when talking. On exam, HR irregularly irregular w/o murmurs, tachypnea with diffuse decrease in breath sounds and crackles at lung bases, no abdo distension/tenderness, 2+ edema bilateral LE with warm erythematous patches around the ankles R>L, bilateral calf tenderness, amputated R great toe, Tender calloused red spot on the L Met-Head. Admitting patient for acute CHF exacerbation. Ms. Callum Canales continued to tolerate her Diltiazem drip w/o any symptoms of her Afib, even though she continued to be tachycardic from the 90s-130s. Cardiology was consulted and recommended adding lopressor BID. Patient continued to be tachy. Lopressor was increased to 100 mg BID and her dilt gtt was transitioned to PO diltiazem 180 mg every day. She tolerated this well and was dischargeable with a heart rate in the 90s. Additionally, patient preformed the 6 minuet walk test with desats into the 70s with walking exertion and recovery back to the 90s with rest and 2LNC. Home oxygen was ordered and she is instructed to use it with exertional activity. 10/9  No acute events overnight. Patient seen at beside. No acute complaints. Patient is doing well and denies chest pain or SOB when on NC. She denies palpitations, but does report acknowledging that her HR is fast. She denies fatigue or weakness and reports that at home her BP runs similar to what it does in the hospital but she is not sure what her BP is at home. She reports that the callus on her left foot is sore but not painful. She denies fever or chills. She is eating well. 10/10  No acute events overnight. Patient seen at beside. No acute complaints. Patient in no current pain. Nursing called overnight for patient in abdominal/back pain - morphine was administered.  She has morphine 15 PRN at home and follows up with pain clinic. No cp, sob, n/v. Patient would like to go home soon she says. 10/11  No acute events overnight. Patient in no current chest pain, SOB, N/V, swelling, fevers/chills, dizziness, syncope, palpitations. Nursing mentioned she has not received her metoprolol or her other meds this morning and she is tachy in the 120s. Patient is asymptomatic. On 3LNC since last night, nursing further noted sleep apnea. Patient feels good and wants to go home, she was reassured she would leave after we slow down her heart rate and remove her of the Cardizem drip.      10/12  No acute events overnight. Patient in no current chest pain, SOB, N/V, swelling, fevers/chills, dizziness, syncope, palpitations. Patient is eager to go home tomorrow regardless of her heart rate. Urged to allow our team to tailor her medical management to lower her heart rate. Patient currently on dilt drip and 100mg lopressor BID, will wait for cardiology recs today for outpatient management. A. Fib still currently not controled. Will nee another walk test when heart rate controled to assess for O2 requirement. 10/13  No acute events overnight. Patient seen at beside. No acute complaints. Patient mentioned leaving today due to needing to go home. Pt was advised against this but seems to have capacity. Cardiology okay with this plan and will discharge her with PO meds to control her heart rate. No additional ROS.     Patient's problem list was managed in hospital as stated below:        Acute exacerbation of CHF - resolved  - Difficulty breathing for 3 days prior to admission  - B/l LE swelling noted on exam; pt last took Bumex 3 days ago  - Follows with outpatient cardiologist Dr. Gladys Marcano  - CXR shows increased interstitial edema, mild ground glass at the lung bases that may represent mild pulmonary edema or atelectasis, possible small right pleural effusion   - Echocardiogram (8/11/22): normal LV systolic function with EF of 55-60% and normal LV size, increased wall thickness consistent with mild concentric hypertrophy, normal wall motion   - pro-BNP 3,967   - trop nl at 8  - Lactic acid 1.1  - Mag 1.4  Plan:  - Continue cardiac monitoring   - Continue PO Lasix 40 mg BID  - Continue PO Lopressor 100 mg BID - consider increasing per cardiology  - Continue home PO Atorvastatin 80 mg qdaily   - Holding home amlodipine d/t acute LE swelling  - Holding home lisinopril 40 mg oral qdaily   - CBC, CMP qdaily   - Requires 2-3LNC with exertional activity/walking - ordered home oxygen tank prior to DC  - 6 min walk test completed 10/12 with desats into the 70 with recovery on 2LNC at rest      Atrial fibrillation with RVR  - EKG (10/7/22): atrial fibrillation with RVR, Left axis deviation  - HR in 110's-130's upon arrival to patient's bedside   - 10/9 AM still in afib with HR ranging 90s-120s  - TSH WNL  Plan:  - PO Lopressor 100mg BID.  - PO Diltiazem 180 - transitioned to PO dose 10/12  Cardio: Continue po lopressor and po cardizem. Rates elevated this am, but she has not received her po cardizem. If her rates remain improved to the ~110s after taking her scheduled medications, she can be discharged home today from a CV standpoint. Plan for her to follow up with her primary cardiologist in 2-3 weeks. -DC Today pending O2 delivery     Possible acute on chronic DVT's  - Positive Roberta's sign bilaterally and patient's history of DVT's make b/l LE DVT's possible. - CTA chest: No evidence for pulmonary emboli. Heart failure with biatrial cardiac chamber enlargement, moderate new. Bilateral pleural effusions and mild pulmonary edema. Mild to moderate aortic atherosclerosis with irregular noncalcified mural plaque along the wall of the descending aorta which increases risk of embolic events.    - Duplex U/S of b/l LE pending   - PT 15.1  - INR 1.2  - PTT 30.6  Plan:  - Continue home Xarelto 20 mg oral qdaily  - Duplex U/S of b/l LE - No evidence of DVT     Sepsis of unknown origin   - Meets 3/4 SIRS criteria (does not meet criteria for temperature)  - WBC 18.1 in ED  - Procalcitonin <0.05  - Lactic acid 1.1  - Negative blood cultures  - Lesion on foot  - Foot Xray: Generalized soft tissue swelling but no radiographic findings of osteomyelitis  Plan:  - Urine culture 10/8: Gram (-) rods  - DC Levofloxacin  - MRSA culture negative     Type 2 Diabetes Mellitus  -Most recent HbA1c of 11.8   -Serum glucose 309 on admission   Plan:  -Lantus 20 units SubQ qdaily; pending repeat HbA1c  -Insulin lispro subQ QID with meals   -Holding home Jardiance   -Continue home Gabapentin 300 mg TID  -Holding home metformin 500 mg oral qdaily      Pertinent Results:      CURRENT ADMISSION IMAGING RESULTS   XR FOOT LT MIN 3 V    Result Date: 10/8/2022  Generalized soft tissue swelling. . Degenerative changes are noted similar to prior No radiographic findings of osteomyelitis are identified    CTA CHEST W OR W WO CONT    Result Date: 10/7/2022  1. No evidence for pulmonary emboli. 2.  Heart failure with biatrial cardiac chamber enlargement, moderate new bilateral pleural effusions and mild pulmonary edema. 3.  Mild to moderate aortic atherosclerosis with irregular noncalcified mural plaque along the wall of the descending aorta which increases risk of embolic events. XR CHEST PORT    Result Date: 10/7/2022  Increased interstitial edema. The mild groundglass at the lung bases may represent mild pulmonary edema or atelectasis. Possible small right pleural effusion.         Cardiology Procedures/Testing:  MODALITY RESULTS   EKG Results for orders placed or performed during the hospital encounter of 10/07/22   EKG, 12 LEAD, INITIAL   Result Value Ref Range    Ventricular Rate 149 BPM    QRS Duration 92 ms    Q-T Interval 298 ms    QTC Calculation (Bezet) 469 ms    Calculated R Axis -31 degrees    Calculated T Axis 112 degrees    Diagnosis       Critical Test Result: High HR  Atrial fibrillation with rapid ventricular response  Left axis deviation  Low voltage QRS  Nonspecific T wave abnormality  Abnormal ECG  When compared with ECG of 10-AUG-2022 04:10,  Atrial fibrillation has replaced Sinus rhythm  QRS axis shifted left  T wave inversion no longer evident in Anterior leads  Confirmed by Roberta Moody ([id number]) on 10/8/2022 7:21:01 AM         ECHO 08/10/22    ECHO ADULT FOLLOW-UP OR LIMITED 08/12/2022 8/12/2022    Interpretation Summary    Left Ventricle: Normal left ventricular systolic function with a visually estimated EF of 55 - 60%. Left ventricle size is normal. Increased wall thickness. Findings consistent with mild concentric hypertrophy. Normal wall motion. Signed by: Karla Glass MD on 8/12/2022  8:12 AM     IR No results found for this or any previous visit. CATH         Special Testing/Procedures:  MODALITY RESULTS   MICRO All Micro Results       Procedure Component Value Units Date/Time    CULTURE, BLOOD [[id number]] Collected: 10/07/22 2230    Order Status: Completed Specimen: Blood Updated: 10/13/22 0833     Special Requests: NO SPECIAL REQUESTS        Culture result: NO GROWTH 6 DAYS       CULTURE, MRSA [[id number]] Collected: 10/09/22 1513    Order Status: Completed Specimen: Nasal from Nares Updated: 10/10/22 1559     Special Requests: NO SPECIAL REQUESTS        Culture result: MRSA NOT PRESENT               Screening of patient nares for MRSA is for surveillance purposes and, if positive, to facilitate isolation considerations in high risk settings. It is not intended for automatic decolonization interventions per se as regimens are not sufficiently effective to warrant routine use.       CULTURE, URINE [[id number]]  (Abnormal)  (Susceptibility) Collected: 10/08/22 0257    Order Status: Completed Specimen: Urine from Clean catch Updated: 10/10/22 1020     Special Requests: NO SPECIAL REQUESTS        [place] Count --        [id number]  COLONIES/mL Culture result: ESCHERICHIA COLI               2ND GRAM NEGATIVE YESENIA (3,000 COL/mL)          COVID-19 WITH INFLUENZA A/B [[id number]] Collected: 10/07/22 2230    Order Status: Completed Specimen: Nasopharyngeal Updated: 10/07/22 2314     SARS-CoV-2 by PCR Not detected        Comment: Not Detected results do not preclude SARS-CoV-2 infection and should not be used as the sole basis for patient management decisions. Results must be combined with clinical observations, patient history, and epidemiological information. Influenza A by PCR Not detected        Influenza B by PCR Not detected        Comment: Testing was performed using matteo Angela SARS-CoV-2 and Influenza A/B nucleic acid assay. This test is a multiplex Real-Time Reverse Transcriptase Polymerase Chain Reaction (RT-PCR) based in vitro diagnostic test intended for the qualitative detection of nucleic acids from SARS-CoV-2, Influenza A, and Influenza B in nasopharyngeal for use under the FDA's Emergency Use Authorization(EAU) only.        Fact sheet for Patients: FindDrives.pl  Fact sheet for Healthcare Providers: FindDrives.pl                ABG No results found for: PH, PHI, PCO2, PCO2I, PO2, PO2I, HCO3, HCO3I, FIO2, FIO2I   UA Results for orders placed or performed in visit on 08/24/22   AMB POC URINALYSIS DIP STICK AUTO W/O MICRO     Status: None   Result Value Ref Range Status    Color (UA POC) Yellow  Final    Clarity (UA POC) Clear  Final    Glucose (UA POC) 3+ Negative Final    Bilirubin (UA POC) Negative Negative Final    Ketones (UA POC) Negative Negative Final    Specific gravity (UA POC) 1.015 1.001 - 1.035 Final    Blood (UA POC) 2+ Negative Final    pH (UA POC) 6.0 4.6 - 8.0 Final    Protein (UA POC) Trace Negative Final    Urobilinogen (UA POC) 0.2 mg/dL 0.2 - 1 Final    Nitrites (UA POC) Negative Negative Final    Leukocyte esterase (UA POC) Negative Negative Final         Laboratory Results:  LABORATORY RESULTS   HEMATOLOGY Lab Results   Component Value Date/Time    WBC 13.4 (H) 10/13/2022 05:14 AM    HGB 11.9 (L) 10/13/2022 05:14 AM    HCT 38.1 10/13/2022 05:14 AM    PLATELET 202 30/73/1207 05:14 AM    MCV 87.2 10/13/2022 05:14 AM       CHEMISTRIES Lab Results   Component Value Date/Time    Sodium 139 10/13/2022 05:14 AM    Potassium 4.3 10/13/2022 05:14 AM    Chloride 101 10/13/2022 05:14 AM    CO2 31 10/13/2022 05:14 AM    Anion gap 7 10/13/2022 05:14 AM    Glucose 88 10/13/2022 05:14 AM    BUN 26 (H) 10/13/2022 05:14 AM    Creatinine 1.24 10/13/2022 05:14 AM    BUN/Creatinine ratio 21 (H) 10/13/2022 05:14 AM    GFR est AA 58 (L) 08/13/2022 03:31 AM    GFR est non-AA 48 (L) 08/13/2022 03:31 AM    Calcium 9.2 10/13/2022 05:14 AM      HEPATIC FUNCTION Lab Results   Component Value Date/Time    Albumin 3.3 (L) 10/13/2022 05:14 AM    Bilirubin, total 0.7 10/13/2022 05:14 AM    Protein, total 8.1 10/13/2022 05:14 AM    Globulin 4.8 (H) 10/13/2022 05:14 AM    A-G Ratio 0.7 (L) 10/13/2022 05:14 AM    ALT (SGPT) 12 (L) 10/13/2022 05:14 AM    Alk. phosphatase 53 10/13/2022 05:14 AM       LACTIC ACID Lab Results   Component Value Date/Time    Lactic acid 1.5 08/10/2022 03:46 PM      CARDIAC PANEL Lab Results   Component Value Date/Time     08/11/2022 02:47 AM    CK - MB 2.2 04/18/2018 03:48 PM    CK-MB Index 2.5 04/18/2018 03:48 PM    Troponin-I, QT <0.02 04/18/2018 03:48 PM      NT-proBNP Lab Results   Component Value Date/Time    NT pro-BNP 3,967 (H) 10/07/2022 04:53 PM    NT pro-BNP 1,164 08/10/2022 03:40 AM    NT pro- 04/18/2018 03:48 PM      THYROID Lab Results   Component Value Date/Time    TSH 2.01 10/07/2022 10:30 PM    T4, Free 1.3 11/09/2021 12:24 PM            Readmission Risk Score: High Risk            34 Total Score    3 Patient Length of Stay (>5 days = 3)    4 IP Visits Last 12 Months (1-3=4, 4=9, >4=11)    5 Pt.  Coverage (Medicare=5 , Medicaid, or Self-Pay=4)    22 Charlson Comorbidity Score (Age + Comorbid Conditions)        Criteria that do not apply:    Has Seen PCP in Last 6 Months (Yes=3, No=0)    . Living with Significant Other. Assisted Living. LTAC. SNF.  or   Rehab        John Paul Vences DO, PGY-1  [facility]  10/13/2022 6:27 AM

## 2022-10-13 NOTE — DIABETES MGMT
Diabetes/ Glycemic Control Plan of Care  Recommendations:   Blood glucose this am 97 mg/dl  Recommend decreasing Lantus dose to 16 units daily  Continue corrective insulin coverage as needed  Will continue inpatient monitoring. Assessment:   DX:   1. Acute respiratory failure with hypoxia and hypercapnia (HCC)        2. Acute on chronic congestive heart failure, unspecified heart failure type ([facility] [address]        3. Atrial fibrillation with rapid ventricular response (HCC)           Fasting/ Morning blood glucose:   Lab Results   Component Value Date/Time    Glucose 88 10/13/2022 05:14 AM    Glucose (POC) 97 10/13/2022 08:14 AM     IV Fluids containing dextrose:  none   Steroids:  none     Blood glucose values:        Latest Reference Range & Units 10/12/22 07:36 10/12/22 11:28 10/12/22 16:20 10/12/22 21:24 10/13/22 08:14   GLUCOSE,FAST - POC 70 - 110 mg/dL 88 137 (H) 134 (H) 138 (H) 97   (H): Data is abnormally high  Within target range (70-180mg/dL):  yes   Current insulin orders:   Lantus 20 units daily  Lispro corrective insulin coverage AC&HS as needed  Total Daily Dose previous 24 hours = 20 units   Current A1c:   Lab Results   Component Value Date/Time    Hemoglobin A1c 11.3 (H) 10/07/2022 10:30 PM    Hemoglobin A1c (POC) 9.4 11/09/2021 11:58 AM      Equivalent to an average Blood Glucose of 278 mg/dl for 2-3 months prior to admission  Adequate glycemic control PTA: no   Nutrition/Diet:   Active Orders   Diet    ADULT DIET Regular; 3 carb choices (45 gm/meal); Low Sodium (2 gm); 1200 ml      Meal Intake:  Patient Vitals for the past 168 hrs:   % Diet Eaten   10/12/22 1737 76 - 100%   10/12/22 1344 76 - 100%   10/12/22 0913 76 - 100%   10/11/22 1055 76 - 100%   10/09/22 0902 76 - 100%     Supplement Intake:  No data found. Home diabetes medications:   Key Antihyperglycemic Medications               Jardiance 10 mg tablet Take 10 mg by mouth daily.     insulin glargine (LANTUS) 100 unit/mL injection Inject 20 units daily at bedtime. Monitor and record blood sugar daily. insulin aspart U-100 (NovoLOG Flexpen U-100 Insulin) 100 unit/mL (3 mL) inpn 5 Units by SubCUTAneous route Before breakfast, lunch, and dinner. metFORMIN (GLUCOPHAGE) 500 mg tablet Take 500 mg by mouth daily. Plan/Goals:   Blood glucose will be within target of 70 - 180 mg/dl within 72 hours  Reinforce dietary and medication compliance at home.          Education:  [x] Refer to Diabetes Education Record (10/10/2022)                       [] Education not indicated at this time     Mark Durbin [phone]

## 2022-10-13 NOTE — PROGRESS NOTES
Pt's oxygen with delivery  at this time. Sent message to [employer] to deliver pt's oxygen  Updated pt's nurse Sathya Valentine    [phone]: Shaylee sent CM a message stating oxygen is with  and she has sent message to  .     Mary Malik, BSN RN  Care Management  Pager: [phone]

## 2022-10-13 NOTE — PROGRESS NOTES
Discharge orders noted. Pt is still waiting for oxygen delivery. Pt discussed home health with pt but she declined stating\"I've got people at home that do that with me and I don't want nobody else in my home. \"          ROSY VargasN RN  Care Management  Pager: [phone]

## 2022-10-13 NOTE — PROGRESS NOTES
Problem: Self Care Deficits Care Plan (Adult)  Goal: *Acute Goals and Plan of Care (Insert Text)  Description: Occupational Therapy Goals  Initiated 10/8/2022 within 7 day(s). 1.  Patient will perform grooming with modified independence. 2.  Patient will perform bathing with modified independence using adaptive equipment as needed. 3.  Patient will perform upper body dressing and lower body dressing with modified independence using adaptive equipment as needed. 4.  Patient will perform toilet transfers with modified independence using RW with good balance. 5.  Patient will perform all aspects of toileting with modified independence. 6.  Patient will participate in upper extremity therapeutic exercise/activities with modified independence for 8 minutes. 7.  Patient will utilize energy conservation techniques during functional activities with min verbal cues. Prior Level of Function: Mod I with ADLs and functional mobility using RW, supportive roommate Melly Mckinley assists with household chores e.g. taking out garbage     Outcome: Progressing Towards Goal   OCCUPATIONAL THERAPY TREATMENT    Patient: Baljit Shanks [de-identified]82 y.o. female)  Date: 10/13/2022  Diagnosis: Acute exacerbation of CHF (congestive heart failure) ([facility] [address] [I50.9] Acute exacerbation of CHF (congestive heart failure) ([facility] [address]      Precautions: Fall  PLOF: Mod I with ADLs and functional mobility using RW, supportive roommate Melly Mckinley assists with household chores e.g. taking out garbage    Chart, occupational therapy assessment, plan of care, and goals were reviewed. ASSESSMENT:  Pt presented sitting EOB upon entry requesting to go to BR. STS transfer CGA with RW and maneuvered into BR ~ 15 ft. She performed toileting ADL CGA wit RW 2/2 decreased dyn standing balance. Pt returned to sitting EOB. She was able to elsy her underwear Supervision and then given CGA once in stance for hip hiking. Pt then maneuvered to reclining chair ~ 5ft CGA.  Pt left sitting in chair with B LE's elevated and all needs left within reach. Progression toward goals:  []          Improving appropriately and progressing toward goals  [x]          Improving slowly and progressing toward goals  []          Not making progress toward goals and plan of care will be adjusted     PLAN:  Patient continues to benefit from skilled intervention to address the above impairments. Continue treatment per established plan of care. Further Equipment Recommendations for Discharge:  RW    AMPAC: Current research shows that an AM-PAC score of 18 or greater is associated with a discharge to the patient's home setting. Based on an AM-PAC score of 18/24 and their current ADL deficits; it is recommended that the patient have 2-3 sessions per week of Occupational Therapy at d/c to increase the patient's independence. This AMPAC score should be considered in conjunction with interdisciplinary team recommendations to determine the most appropriate discharge setting. Patient's social support, diagnosis, medical stability, and prior level of function should also be taken into consideration. SUBJECTIVE:   Patient stated I want to go home.     OBJECTIVE DATA SUMMARY:   Cognitive/Behavioral Status:  Neurologic State: Alert, Eyes open spontaneously  Orientation Level: Oriented X4  Cognition: Follows commands  Safety/Judgement: Awareness of environment, Fall prevention    Functional Mobility and Transfers for ADLs:   Bed Mobility:               Transfers:  Sit to Stand: Contact guard assistance  Stand to Sit: Contact guard assistance                Toilet Transfer : Contact guard assistance           Bathroom Mobility: Contact guard assistance (w/ RW)        Balance:  Sitting: Intact  Standing: Impaired; With support  Standing - Static: Good  Standing - Dynamic : Fair    ADL Intervention:     Lower Body Dressing Assistance  Underpants: Contact guard assistance  Position Performed: Seated edge of bed;Standing  Adaptive Equipment Used: Walker    Toileting  Bowel Hygiene: Contact guard assistance  Clothing Management: Contact guard assistance  Adaptive Equipment: Grab bars    Pain:  Pain level pre-treatment: 0/10   Pain level post-treatment: 0/10      Activity Tolerance:    Fair   Please refer to the flowsheet for vital signs taken during this treatment. After treatment:   [x]  Patient left in no apparent distress sitting up in chair  []  Patient left in no apparent distress in bed  [x]  Call bell left within reach  [x]  Nursing notified  []  Caregiver present  []  Bed alarm activated    COMMUNICATION/EDUCATION:   [x] Role of Occupational Therapy in the acute care setting  [x] Home safety education was provided and the patient/caregiver indicated understanding. [x] Patient/family have participated as able in working towards goals and plan of care. [x] Patient/family agree to work toward stated goals and plan of care. [] Patient understands intent and goals of therapy, but is neutral about his/her participation. [] Patient is unable to participate in goal setting and plan of care. Thank you for this referral.  JOHNNY Horne  Time Calculation: 29 mins    [address] [facility] [address] [address] AM-PAC® Daily Activity Inpatient Short Form (6-Clicks)    How much HELP from another person does the patient currently need    (If the patient hasn't done an activity recently, how much help from another person do you think he/she would need if he/she tried?)   Total (Total A or Dep)   A Lot  (Mod to Max A)   A Little (Sup or Min A)   None (Mod I to I)   Putting on and taking off regular lower body clothing? [] 1 [] 2 [x] 3 [] 4   2. Bathing (including washing, rinsing,      drying)? [] 1 [] 2 [x] 3 [] 4   3. Toileting, which includes using toilet, bedpan or urinal?   [] 1 [] 2 [x] 3 [] 4   4. Putting on and taking off regular upper body clothing? [] 1 [] 2 [x] 3 [] 4   5.  Taking care of personal grooming such as brushing teeth? [] 1 [] 2 [x] 3 [] 4   6. Eating meals?    [] 1 [] 2 [x] 3 [] 4

## 2022-10-13 NOTE — ROUTINE PROCESS
7557 Received report from [facility]. Patient alert and oriented. 0830 Patient up sitting in the recliner. Denies any pain at this time. Call bell and telephone placed within reach. 1330 Patient for discharge today once oxygen available. 1730 Patient unable to fill xarelto - will be discharge tomorrow. 1925  Bedside and Verbal shift change report given to [facility] (oncoming nurse). Report included the following information SBAR, Kardex, Intake/Output, MAR, Recent Results, and Cardiac Rhythm Atrial Fibrillation .

## 2022-10-13 NOTE — PROGRESS NOTES
[facility]  DAILY PROGRESS NOTE      Patient:    Terarnce Weathers , 82 y.o. female   MRN:  [id number]  Room/Bed:  200/56  Admission Date:   10/7/2022  Code status:  Full Code    Reason for Admission: Acute CHF Exacerbation     ASSESSMENT AND PLAN:   Problem List Items Addressed This Visit          Circulatory    * (Principal) Acute exacerbation of CHF (congestive heart failure) (HCC)    Relevant Medications    metoprolol tartrate (LOPRESSOR) 100 mg IR tablet    rivaroxaban (XARELTO) 20 mg tab tablet (Start on 10/14/2022)    dilTIAZem ER (CARDIZEM CD) 180 mg capsule (Start on 10/14/2022)    furosemide (LASIX) 40 mg tablet     Other Visit Diagnoses       Acute respiratory failure with hypoxia and hypercapnia (HCC)    -  Primary    Atrial fibrillation with rapid ventricular response (HCC)        Relevant Medications    metoprolol tartrate (LOPRESSOR) 100 mg IR tablet    rivaroxaban (XARELTO) 20 mg tab tablet (Start on 10/14/2022)    dilTIAZem ER (CARDIZEM CD) 180 mg capsule (Start on 10/14/2022)    furosemide (LASIX) 40 mg tablet            Acute exacerbation of CHF - resolved  - Difficulty breathing for 3 days prior to admission  - B/l LE swelling noted on exam; pt last took Bumex 3 days ago  - Follows with outpatient cardiologist Dr. Hang Atkins [id number]  - CXR shows increased interstitial edema, mild ground glass at the lung bases that may represent mild pulmonary edema or atelectasis, possible small right pleural effusion   - Echocardiogram (8/11/22): normal LV systolic function with EF of 55-60% and normal LV size, increased wall thickness consistent with mild concentric hypertrophy, normal wall motion   - pro-BNP 3,967   - trop nl at 8  - Lactic acid 1.1  - Mag 1.4  Plan:  - Continue cardiac monitoring   - Continue PO Lasix 40 mg BID  - Continue PO Lopressor 100 mg BID - consider increasing per cardiology  - Continue home PO Atorvastatin 80 mg qdaily   - Holding home amlodipine d/t acute LE swelling  - Holding home lisinopril 40 mg oral qdaily   - CBC, CMP qdaily   - Requires 2-3LNC with exertional activity/walking - ordered home oxygen tank prior to DC  - 6 min walk test completed 10/12 with desats into the 70 with recovery on 2LNC at rest     Atrial fibrillation with RVR  - EKG (10/7/22): atrial fibrillation with RVR, Left axis deviation  - HR in 110's-130's upon arrival to patient's bedside   - 10/9 AM still in afib with HR ranging 90s-120s  - TSH WNL  Plan:  - PO Lopressor 100mg BID.  - PO Diltiazem 180 - transitioned to PO dose 10/12  Cardio: Continue po lopressor and po cardizem. Rates elevated this am, but she has not received her po cardizem. If her rates remain improved to the ~110s after taking her scheduled medications, she can be discharged home today from a CV standpoint. Plan for her to follow up with her primary cardiologist in 2-3 weeks. -DC Today pending O2 delivery     Possible acute on chronic DVT's  - Positive Roberta's sign bilaterally and patient's history of DVT's make b/l LE DVT's possible. - CTA chest: No evidence for pulmonary emboli. Heart failure with biatrial cardiac chamber enlargement, moderate new. Bilateral pleural effusions and mild pulmonary edema. Mild to moderate aortic atherosclerosis with irregular noncalcified mural plaque along the wall of the descending aorta which increases risk of embolic events.    - Duplex U/S of b/l LE pending   - PT 15.1  - INR 1.2  - PTT 30.6  Plan:  - Continue home Xarelto 20 mg oral qdaily  - Duplex U/S of b/l LE - No evidence of DVT     Sepsis of unknown origin   - Meets 3/4 SIRS criteria (does not meet criteria for temperature)  - WBC 18.1 in ED  - Procalcitonin <0.05  - Lactic acid 1.1  - Negative blood cultures  - Lesion on foot  - Foot Xray: Generalized soft tissue swelling but no radiographic findings of osteomyelitis  Plan:  - Urine culture 10/8: Gram (-) rods  - DC Levofloxacin  - MRSA culture negative     Type 2 Diabetes Mellitus  -Most recent HbA1c of 11.8   -Serum glucose 309 on admission   Plan:  -Lantus 20 units SubQ qdaily; pending repeat HbA1c  -Insulin lispro subQ QID with meals   -Holding home Jardiance   -Continue home Gabapentin 300 mg TID  -Holding home metformin 500 mg oral qdaily      Gout  -No acute flare ups  Plan:  -Continue home Allopurinol 100 mg oral qdaily      Hypothyroidism   -Denies any acute symptoms   Plan:  -Continue home Synthroid 150 mcg oral qdaily  -TSH: 2.01     Chronic pain of back and LE   -Endorses continuing pain   -Follows with outpatient pain management   Plan:  -Continue home Tylenol 500 mg oral q6h  -Continue home Morphine 15 mg PRN    Obstructive Sleep Apnea  Plan  -BiPAP PRN at night    Global:  Code: Full  IVF/Drips: None  I/O / Wt: None  Diet: Regular with low sodium  Bowel Regimen, Last BM: Miralax PRN, last BM 2 days ago per pt  DVT/AC: Xarelto 20 mg qdaily and encourage ambulation as tolerated  Mobility: per protocol   Disposition: stable  Anticipated LOS: 1-2 nights     Point of Contact (relationship, number): Ky Mello (son); [phone]    SUBJECTIVE:   Gabino Wilder is a 82 y.o.  female with PMHx of chronic diastolic CHF, chronic venous insufficiency, Type 2 DM, HTN, hypothyroidism, arthritis, asthma, chronic back pain, gout, hypercholesterolemia, and peripheral neuropathy who presented to ED on 10/7/13287 with complaint of difficulty breathing and was admitted for acute CHF exacerbation and new onset A. Fib on EKG. 10/9  No acute events overnight. Patient seen at beside. No acute complaints. Patient is doing well and denies chest pain or SOB when on NC. She denies palpitations, but does report acknowledging that her HR is fast. She denies fatigue or weakness and reports that at home her BP runs similar to what it does in the hospital but she is not sure what her BP is at home. She reports that the callus on her left foot is sore but not painful. She denies fever or chills.  She is eating well. 10/10  No acute events overnight. Patient seen at beside. No acute complaints. Patient in no current pain. Nursing called overnight for patient in abdominal/back pain - morphine was administered. She has morphine 15 PRN at home and follows up with pain clinic. No cp, sob, n/v. Patient would like to go home soon she says. 10/11  No acute events overnight. Patient in no current chest pain, SOB, N/V, swelling, fevers/chills, dizziness, syncope, palpitations. Nursing mentioned she has not received her metoprolol or her other meds this morning and she is tachy in the 120s. Patient is asymptomatic. On 3LNC since last night, nursing further noted sleep apnea. Patient feels good and wants to go home, she was reassured she would leave after we slow down her heart rate and remove her of the Cardizem drip.     10/12  No acute events overnight. Patient in no current chest pain, SOB, N/V, swelling, fevers/chills, dizziness, syncope, palpitations. Patient is eager to go home tomorrow regardless of her heart rate. Urged to allow our team to tailor her medical management to lower her heart rate. Patient currently on dilt drip and 100mg lopressor BID, will wait for cardiology recs today for outpatient management. A. Fib still currently not controled. Will nee another walk test when heart rate controled to assess for O2 requirement. 10/13  No acute events overnight. Patient seen at beside. No acute complaints. Patient mentioned leaving today due to needing to go home. Pt was advised against this but seems to have capacity. Cardiology okay with this plan and will discharge her with PO meds to control her heart rate. No additional ROS.     ROS (positive findings are in BOLD; negative findings are in regular font)  Constitutional: fevers, chills, appetite changes, weight changes, fatigue  HEENT: changes in vision, changes in hearing, sore throat, dysphagia  Cardiovascular: chest pain, palpitations, PND, orthopnea, edema  Pulmonary: SOB, cough, sputum production, wheezing, chest tightness  Gastrointestinal: abdominal pain, nausea/vomiting, diarrhea, constipation, melena, hematochezia  Genitourinary: dysuria, hesitation, dribbling, urgency, hematuria  Musculoskeletal: arthralgias, myalgias  Skin: rash, itching  Neurological: sensory changes, motor changes, headache  Psychiatric: mood changes  Endocrine: heat/cold intolerance  Heme: easy bruising/easy bleeding, LAD    CURRENT INPATIENT MEDICATIONS:  Current Facility-Administered Medications   Medication Dose Route Frequency Provider Last Rate Last Admin    sodium chloride (NS) flush 5-40 mL  5-40 mL IntraVENous Q8H Maurizio Nuno, DO   10 mL at 10/13/22 1309    sodium chloride (NS) flush 5-40 mL  5-40 mL IntraVENous PRN Lizette Reis DO        dilTIAZem ER (CARDIZEM CD) capsule 180 mg  180 mg Oral DAILY Rosio Zhao MD   493 mg at 10/12/22 2125    levoFLOXacin (LEVAQUIN) tablet 250 mg  250 mg Oral Q24H Lizette Reis DO   250 mg at 10/13/22 1007    metoprolol tartrate (LOPRESSOR) tablet 100 mg  100 mg Oral Q12H Jayshree Centeno MD   100 mg at 10/13/22 1004    insulin glargine (LANTUS) injection 20 Units  20 Units SubCUTAneous DAILY Jayshree Centeno MD   20 Units at 10/13/22 1004    furosemide (LASIX) tablet 40 mg  40 mg Oral BID Herrera Nash DO   40 mg at 10/13/22 1004    acetaminophen (TYLENOL) tablet 500 mg  500 mg Oral Q6H PRN Yao Gordonena, DO   500 mg at 10/09/22 2332    allopurinoL (ZYLOPRIM) tablet 100 mg  100 mg Oral DAILY Rezazad, Kerry, DO   100 mg at 10/13/22 1004    atorvastatin (LIPITOR) tablet 80 mg  80 mg Oral DAILY Rezazad, Kerry, DO   80 mg at 10/13/22 1004    levothyroxine (SYNTHROID) tablet 150 mcg  150 mcg Oral DAILY Rezazad, Kerry, DO   150 mcg at 10/13/22 0620    [Held by provider] lisinopriL (PRINIVIL, ZESTRIL) tablet 40 mg  40 mg Oral DAILY Kerry Gordon DO        [Held by provider] bumetanide (BUMEX) tablet 1 mg  1 mg Oral BID PRN Rezazad, Kerry, DO        [Held by provider] hydrOXYzine HCL (ATARAX) tablet 10 mg  10 mg Oral BID PRN Rezazad, Kerry, DO        [Held by provider] insulin lispro (HUMALOG) injection 5 Units  5 Units SubCUTAneous TIDAC Rezazad, Kerry, DO        [Held by provider] metFORMIN (GLUCOPHAGE) tablet 500 mg  500 mg Oral DAILY Rezazad, Kerry, DO        rivaroxaban (XARELTO) tablet 20 mg  20 mg Oral DAILY Rezazad, Kerry, DO   20 mg at 10/13/22 1004    [Held by provider] sertraline (ZOLOFT) tablet 50 mg  50 mg Oral DAILY Rezazad, Kerry, DO        [Held by provider] tamsulosin (FLOMAX) capsule 0.4 mg  0.4 mg Oral PCD Rezazad, Kerry, DO        sodium chloride (NS) flush 5-40 mL  5-40 mL IntraVENous Q8H Rezazad, Kerry, DO   10 mL at 10/13/22 1309    sodium chloride (NS) flush 5-40 mL  5-40 mL IntraVENous PRN Rezazad, Kerry, DO        gabapentin (NEURONTIN) capsule 300 mg  300 mg Oral TID Rezazad, Kerry, DO   300 mg at 10/13/22 1627    [Held by provider] morphine IR (MS IR) tablet 15 mg  15 mg Oral BID PRN Karine Park MD        polyethylene glycol (MIRALAX) packet 17 g  17 g Oral DAILY PRN Rezazad, Kerry, DO        insulin lispro (HUMALOG) injection   SubCUTAneous AC&HS Karine Park MD   2 Units at 10/13/22 1306    glucose chewable tablet 16 g  16 g Oral PRN Karine Park MD        glucagon (GLUCAGEN) injection 1 mg  1 mg IntraMUSCular PRN Karine Park MD           Allergies  No Known Allergies    OBJECTIVE:    Intake/Output Summary (Last 24 hours) at 10/13/2022 1638  Last data filed at 10/13/2022 1407  Gross per 24 hour   Intake 580 ml   Output --   Net 580 ml       Visit Vitals  /78 (BP 1 Location: Right lower arm, BP Patient Position: Sitting)   Pulse (!) 109   Temp 98.3 °F (36.8 °C)   Resp 21   Ht 5' 5\" (1.651 m)   Wt 105.6 kg (232 lb 14.2 oz)   SpO2 95%   BMI 38.75 kg/m²       PHYSICAL EXAM  Gen: NAD, comfortable, obese   HEENT: normocephalic, atraumatic, MMM, no thyromegaly, no JVD  CV: tachycardic with an irregular rhythm. No M/R/G, +2 radial pulses  Pulm: CTAB, Inspiratory wheezes in BL lower lobes. Abd: S/NT/ND, no rebound, no guarding, no hepatosplenomegaly   MSK: mild LE non-pitting edema   Skin: warm, dry, intact, no rash, callous of left foot under big toe  Neuro: CN II-XII grossly intact, no focal deficits appreciated   Psych: appropriate, alert, oriented x3    LABWORK (LAST 24 HOURS)  Recent Results (from the past 24 hour(s))   POTASSIUM    Collection Time: 10/12/22  4:42 PM   Result Value Ref Range    Potassium 3.5 3.5 - 5.5 mmol/L   GLUCOSE, POC    Collection Time: 10/12/22  9:24 PM   Result Value Ref Range    Glucose (POC) 138 (H) 70 - 675 mg/dL   METABOLIC PANEL, COMPREHENSIVE    Collection Time: 10/13/22  5:14 AM   Result Value Ref Range    Sodium 139 136 - 145 mmol/L    Potassium 4.3 3.5 - 5.5 mmol/L    Chloride 101 100 - 111 mmol/L    CO2 31 21 - 32 mmol/L    Anion gap 7 3.0 - 18 mmol/L    Glucose 88 74 - 99 mg/dL    BUN 26 (H) 7.0 - 18 MG/DL    Creatinine 1.24 0.6 - 1.3 MG/DL    BUN/Creatinine ratio 21 (H) 12 - 20      eGFR 43 (L) >60 ml/min/1.73m2    Calcium 9.2 8.5 - 10.1 MG/DL    Bilirubin, total 0.7 0.2 - 1.0 MG/DL    ALT (SGPT) 12 (L) 13 - 56 U/L    AST (SGOT) 11 10 - 38 U/L    Alk.  phosphatase 53 45 - 117 U/L    Protein, total 8.1 6.4 - 8.2 g/dL    Albumin 3.3 (L) 3.4 - 5.0 g/dL    Globulin 4.8 (H) 2.0 - 4.0 g/dL    A-G Ratio 0.7 (L) 0.8 - 1.7     CBC WITH AUTOMATED DIFF    Collection Time: 10/13/22  5:14 AM   Result Value Ref Range    WBC 13.4 (H) 4.6 - 13.2 K/uL    RBC 4.37 4.20 - 5.30 M/uL    HGB 11.9 (L) 12.0 - 16.0 g/dL    HCT 38.1 35.0 - 45.0 %    MCV 87.2 78.0 - 100.0 FL    MCH 27.2 24.0 - 34.0 PG    MCHC 31.2 31.0 - 37.0 g/dL    RDW 14.0 11.6 - 14.5 %    PLATELET 625 231 - 773 K/uL    MPV 12.7 (H) 9.2 - 11.8 FL    NRBC 0.0 0  WBC    ABSOLUTE NRBC 0.00 0.00 - 0.01 K/uL    NEUTROPHILS 52 40 - 73 %    LYMPHOCYTES 33 21 - 52 %    MONOCYTES 10 3 - 10 % EOSINOPHILS 4 0 - 5 %    BASOPHILS 1 0 - 2 %    IMMATURE GRANULOCYTES 0 0.0 - 0.5 %    ABS. NEUTROPHILS 7.0 1.8 - 8.0 K/UL    ABS. LYMPHOCYTES 4.4 (H) 0.9 - 3.6 K/UL    ABS. MONOCYTES 1.4 (H) 0.05 - 1.2 K/UL    ABS. EOSINOPHILS 0.5 (H) 0.0 - 0.4 K/UL    ABS. BASOPHILS 0.1 0.0 - 0.1 K/UL    ABS. IMM. GRANS. 0.1 (H) 0.00 - 0.04 K/UL    DF AUTOMATED     MAGNESIUM    Collection Time: 10/13/22  5:16 AM   Result Value Ref Range    Magnesium 2.4 1.6 - 2.6 mg/dL   GLUCOSE, POC    Collection Time: 10/13/22  8:14 AM   Result Value Ref Range    Glucose (POC) 97 70 - 110 mg/dL   GLUCOSE, POC    Collection Time: 10/13/22 12:52 PM   Result Value Ref Range    Glucose (POC) 184 (H) 70 - 110 mg/dL   GLUCOSE, POC    Collection Time: 10/13/22  4:24 PM   Result Value Ref Range    Glucose (POC) 106 70 - 110 mg/dL       IMAGING AND PROCEDURES (LAST 36 HOURS)  No results found.    ================================================================  Further management for Ms. Davy Kaur will be discussed on rounds with my attending.       Maribel Marsh DO, PGY-1  [facility]  October 13, 2022 7:14 AM

## 2022-10-13 NOTE — PROGRESS NOTES
Problem: Diabetes Self-Management  Goal: *Disease process and treatment process  Description: Define diabetes and identify own type of diabetes; list 3 options for treating diabetes. Outcome: Progressing Towards Goal  Goal: *Incorporating nutritional management into lifestyle  Description: Describe effect of type, amount and timing of food on blood glucose; list 3 methods for planning meals. Outcome: Progressing Towards Goal  Goal: *Incorporating physical activity into lifestyle  Description: State effect of exercise on blood glucose levels. Outcome: Progressing Towards Goal  Goal: *Developing strategies to promote health/change behavior  Description: Define the ABC's of diabetes; identify appropriate screenings, schedule and personal plan for screenings. Outcome: Progressing Towards Goal     Problem: Pressure Injury - Risk of  Goal: *Prevention of pressure injury  Description: Document Ravi Scale and appropriate interventions in the flowsheet. Outcome: Progressing Towards Goal  Note: Pressure Injury Interventions:  Sensory Interventions: Assess changes in LOC, Assess need for specialty bed, Keep linens dry and wrinkle-free, Sit a 90-degree angle/use footstool if needed, Use 30-degree side-lying position    Moisture Interventions: Maintain skin hydration (lotion/cream)    Activity Interventions: Increase time out of bed, Pressure redistribution bed/mattress(bed type), PT/OT evaluation    Mobility Interventions: HOB 30 degrees or less, PT/OT evaluation    Nutrition Interventions: Document food/fluid/supplement intake    Friction and Shear Interventions: HOB 30 degrees or less, Sit at 90-degree angle                Problem: Falls - Risk of  Goal: *Absence of Falls  Description: Document Obdulio Fall Risk and appropriate interventions in the flowsheet.   Outcome: Progressing Towards Goal  Note: Fall Risk Interventions:  Mobility Interventions: Bed/chair exit alarm, OT consult for ADLs, Patient to call before getting OOB, PT Consult for mobility concerns, PT Consult for assist device competence    Mentation Interventions: Bed/chair exit alarm, Door open when patient unattended    Medication Interventions: Bed/chair exit alarm, Patient to call before getting OOB, Teach patient to arise slowly    Elimination Interventions: Bed/chair exit alarm, Call light in reach, Patient to call for help with toileting needs    History of Falls Interventions: Bed/chair exit alarm, Door open when patient unattended

## 2022-10-13 NOTE — PROGRESS NOTES
[facility] - Progress Note  Admit Date: 10/7/2022    Assessment:      -Acute on chronic diastolic heart failure, switched to oral diuretics. Echo 8/11/22 with EF 55%  -New onset atrial fibrillation with rapid ventricular rate, plan rate control and anticoagulation  -History of DVT right lower extremity 6/2022. Patient ran out of Xarelto 1 week ago as she could not afford it  -Possible sepsis, on antbx  -Hypertension  -hyperlipidemia  -DM on insulin       Primary cardiologist Dr. Perkins Lacks:     Addendum: Independently seen and evaluated. Agree with below. The plan has been to continue rate control and anticoagulation. Further decision can be made as outpatient from her primary cardiologist and patient discussion. Increase activity as tolerated. Continue po lopressor and po cardizem. Rates elevated this am, but she has not received her po cardizem. If her rates remain improved to the ~110s after taking her scheduled medications, she can be discharged home today from a CV standpoint. Plan for her to follow up with her primary cardiologist in 2-3 weeks. Remains on Xarelto for both history of DVT as well as new A. fib. Subjective:     No new complaints, asymptomatic to her AFIb. Wants to go home.      Objective:      Patient Vitals for the past 8 hrs:   Temp Pulse Resp BP SpO2   10/13/22 1007 -- (!) 125 15 105/65 99 %   10/13/22 0900 -- (!) 110 16 115/61 97 %   10/13/22 0815 97.4 °F (36.3 °C) (!) 124 21 131/82 95 %   10/13/22 0400 98.6 °F (37 °C) 79 20 (!) 109/50 98 %           Patient Vitals for the past 96 hrs:   Weight   10/13/22 0400 105.6 kg (232 lb 14.2 oz)   10/11/22 0400 108.4 kg (239 lb)   10/10/22 0445 107.5 kg (237 lb)                      Intake/Output Summary (Last 24 hours) at 10/13/2022 1024  Last data filed at 10/12/2022 1859  Gross per 24 hour   Intake 724.17 ml   Output --   Net 724.17 ml         Physical Exam:  General:  alert, cooperative, no distress, appears stated age  Neck:  nontender  Lungs:  clear to auscultation bilaterally  Heart: irreg, S1, S2 normal, no murmur, click, rub or gallop  Abdomen:  abdomen is soft without significant tenderness, masses, organomegaly or guarding  Extremities:  extremities normal, atraumatic, no cyanosis or edema    Data Review:     Labs: Results:       Chemistry Recent Labs     10/13/22  0516 10/13/22  0514 10/12/22  1642 10/12/22  0536 10/11/22  0513   GLU  --  88  --  87 104*   NA  --  139  --  143 144   K  --  4.3 3.5 3.3* 3.5   CL  --  101  --  102 104   CO2  --  31  --  34* 34*   BUN  --  26*  --  23* 26*   CREA  --  1.24  --  0.98 1.00   CA  --  9.2  --  9.1 8.8   MG 2.4  --   --  1.5*  --    AGAP  --  7  --  7 6   BUCR  --  21*  --  23* 26*   AP  --  53  --  48 50   TP  --  8.1  --  7.6 7.5   ALB  --  3.3*  --  3.0* 3.0*   GLOB  --  4.8*  --  4.6* 4.5*   AGRAT  --  0.7*  --  0.7* 0.7*        CBC w/Diff Recent Labs     10/13/22  0514 10/12/22  0536 10/11/22  0513   WBC 13.4* 10.2 8.7   RBC 4.37 4.20 4.10*   HGB 11.9* 11.1* 11.0*   HCT 38.1 36.4 36.1    202 164   GRANS 52 59 60   LYMPH 33 24 23   EOS 4 5 4        Cardiac Enzymes No results found for: CPK, CK, CKMMB, CKMB, RCK3, CKMBT, CKNDX, CKND1, GABRIEL, TROPT, TROIQ, KRUPA, TROPT, TNIPOC, BNP, BNPP   Coagulation No results for input(s): PTP, INR, APTT, INREXT, INREXT in the last 72 hours.     Lipid Panel Lab Results   Component Value Date/Time    Cholesterol, total 191 11/09/2021 12:24 PM    HDL Cholesterol 44 11/09/2021 12:24 PM    LDL, calculated 96.8 11/09/2021 12:24 PM    VLDL, calculated 50.2 11/09/2021 12:24 PM    Triglyceride 251 (H) 11/09/2021 12:24 PM    CHOL/HDL Ratio 4.3 11/09/2021 12:24 PM      BNP No results found for: BNP, BNPP, XBNPT   Liver Enzymes Recent Labs     10/13/22  0514   TP 8.1   ALB 3.3*   AP 53        Digoxin    Thyroid Studies Lab Results   Component Value Date/Time    TSH 2.01 10/07/2022 10:30 PM          Signed By: FLAKO Red 13, 2022

## 2022-10-13 NOTE — ROUTINE PROCESS
Pulse oximetry assessment   99% at rest on room air (if 88% or less, skip next steps)  73% while ambulating on room air  99% at rest on 2LPM  92% while ambulating on 2LPM

## 2022-10-14 VITALS
OXYGEN SATURATION: 97 % | WEIGHT: 232.89 LBS | SYSTOLIC BLOOD PRESSURE: 116 MMHG | DIASTOLIC BLOOD PRESSURE: 56 MMHG | HEIGHT: 65 IN | BODY MASS INDEX: 38.8 KG/M2 | HEART RATE: 90 BPM | TEMPERATURE: 98.7 F | RESPIRATION RATE: 18 BRPM

## 2022-10-14 LAB
ALBUMIN SERPL-MCNC: 3 G/DL (ref 3.4–5)
ALBUMIN/GLOB SERPL: 0.6 {RATIO} (ref 0.8–1.7)
ALP SERPL-CCNC: 51 U/L (ref 45–117)
ALT SERPL-CCNC: 11 U/L (ref 13–56)
ANION GAP SERPL CALC-SCNC: 3 MMOL/L (ref 3–18)
AST SERPL-CCNC: 17 U/L (ref 10–38)
BASOPHILS # BLD: 0.1 K/UL (ref 0–0.1)
BASOPHILS NFR BLD: 1 % (ref 0–2)
BILIRUB SERPL-MCNC: 0.5 MG/DL (ref 0.2–1)
BUN SERPL-MCNC: 30 MG/DL (ref 7–18)
BUN/CREAT SERPL: 25 (ref 12–20)
CALCIUM SERPL-MCNC: 9.3 MG/DL (ref 8.5–10.1)
CHLORIDE SERPL-SCNC: 105 MMOL/L (ref 100–111)
CO2 SERPL-SCNC: 32 MMOL/L (ref 21–32)
CREAT SERPL-MCNC: 1.19 MG/DL (ref 0.6–1.3)
DIFFERENTIAL METHOD BLD: ABNORMAL
EOSINOPHIL # BLD: 0.4 K/UL (ref 0–0.4)
EOSINOPHIL NFR BLD: 4 % (ref 0–5)
ERYTHROCYTE [DISTWIDTH] IN BLOOD BY AUTOMATED COUNT: 14 % (ref 11.6–14.5)
GLOBULIN SER CALC-MCNC: 4.7 G/DL (ref 2–4)
GLUCOSE BLD STRIP.AUTO-MCNC: 114 MG/DL (ref 70–110)
GLUCOSE BLD STRIP.AUTO-MCNC: 164 MG/DL (ref 70–110)
GLUCOSE BLD STRIP.AUTO-MCNC: 95 MG/DL (ref 70–110)
GLUCOSE SERPL-MCNC: 95 MG/DL (ref 74–99)
HCT VFR BLD AUTO: 37.1 % (ref 35–45)
HGB BLD-MCNC: 11.6 G/DL (ref 12–16)
IMM GRANULOCYTES # BLD AUTO: 0.1 K/UL (ref 0–0.04)
IMM GRANULOCYTES NFR BLD AUTO: 1 % (ref 0–0.5)
LYMPHOCYTES # BLD: 3.2 K/UL (ref 0.9–3.6)
LYMPHOCYTES NFR BLD: 31 % (ref 21–52)
MCH RBC QN AUTO: 27.1 PG (ref 24–34)
MCHC RBC AUTO-ENTMCNC: 31.3 G/DL (ref 31–37)
MCV RBC AUTO: 86.7 FL (ref 78–100)
MONOCYTES # BLD: 1 K/UL (ref 0.05–1.2)
MONOCYTES NFR BLD: 10 % (ref 3–10)
NEUTS SEG # BLD: 5.5 K/UL (ref 1.8–8)
NEUTS SEG NFR BLD: 54 % (ref 40–73)
NRBC # BLD: 0 K/UL (ref 0–0.01)
NRBC BLD-RTO: 0 PER 100 WBC
PLATELET # BLD AUTO: 230 K/UL (ref 135–420)
PMV BLD AUTO: 12.6 FL (ref 9.2–11.8)
POTASSIUM SERPL-SCNC: 3.7 MMOL/L (ref 3.5–5.5)
PROT SERPL-MCNC: 7.7 G/DL (ref 6.4–8.2)
RBC # BLD AUTO: 4.28 M/UL (ref 4.2–5.3)
SODIUM SERPL-SCNC: 140 MMOL/L (ref 136–145)
WBC # BLD AUTO: 10.3 K/UL (ref 4.6–13.2)

## 2022-10-14 PROCEDURE — 74011250637 HC RX REV CODE- 250/637

## 2022-10-14 PROCEDURE — 74011636637 HC RX REV CODE- 636/637

## 2022-10-14 PROCEDURE — 36415 COLL VENOUS BLD VENIPUNCTURE: CPT

## 2022-10-14 PROCEDURE — 82962 GLUCOSE BLOOD TEST: CPT

## 2022-10-14 PROCEDURE — 85025 COMPLETE CBC W/AUTO DIFF WBC: CPT

## 2022-10-14 PROCEDURE — 80053 COMPREHEN METABOLIC PANEL: CPT

## 2022-10-14 PROCEDURE — 74011000250 HC RX REV CODE- 250

## 2022-10-14 PROCEDURE — 99232 SBSQ HOSP IP/OBS MODERATE 35: CPT | Performed by: INTERNAL MEDICINE

## 2022-10-14 PROCEDURE — 74011636637 HC RX REV CODE- 636/637: Performed by: STUDENT IN AN ORGANIZED HEALTH CARE EDUCATION/TRAINING PROGRAM

## 2022-10-14 RX ADMIN — ATORVASTATIN CALCIUM 80 MG: 40 TABLET, FILM COATED ORAL at 08:37

## 2022-10-14 RX ADMIN — FUROSEMIDE 40 MG: 40 TABLET ORAL at 17:48

## 2022-10-14 RX ADMIN — GABAPENTIN 300 MG: 300 CAPSULE ORAL at 15:26

## 2022-10-14 RX ADMIN — SODIUM CHLORIDE, PRESERVATIVE FREE 10 ML: 5 INJECTION INTRAVENOUS at 06:04

## 2022-10-14 RX ADMIN — RIVAROXABAN 20 MG: 20 TABLET, FILM COATED ORAL at 08:37

## 2022-10-14 RX ADMIN — SERTRALINE 50 MG: 50 TABLET, FILM COATED ORAL at 11:34

## 2022-10-14 RX ADMIN — METOPROLOL TARTRATE 100 MG: 50 TABLET, FILM COATED ORAL at 08:37

## 2022-10-14 RX ADMIN — LEVOTHYROXINE SODIUM 150 MCG: 0.05 TABLET ORAL at 06:04

## 2022-10-14 RX ADMIN — FUROSEMIDE 40 MG: 40 TABLET ORAL at 08:37

## 2022-10-14 RX ADMIN — Medication 20 UNITS: at 08:36

## 2022-10-14 RX ADMIN — Medication 2 UNITS: at 12:09

## 2022-10-14 RX ADMIN — DILTIAZEM HYDROCHLORIDE 180 MG: 180 CAPSULE, COATED, EXTENDED RELEASE ORAL at 08:37

## 2022-10-14 RX ADMIN — SODIUM CHLORIDE, PRESERVATIVE FREE 10 ML: 5 INJECTION INTRAVENOUS at 15:28

## 2022-10-14 RX ADMIN — LEVOFLOXACIN 250 MG: 250 TABLET, FILM COATED ORAL at 11:28

## 2022-10-14 RX ADMIN — SODIUM CHLORIDE, PRESERVATIVE FREE 10 ML: 5 INJECTION INTRAVENOUS at 14:00

## 2022-10-14 RX ADMIN — ALLOPURINOL 100 MG: 100 TABLET ORAL at 08:37

## 2022-10-14 RX ADMIN — GABAPENTIN 300 MG: 300 CAPSULE ORAL at 08:37

## 2022-10-14 NOTE — PROGRESS NOTES
[facility] - Progress Note  Admit Date: 10/7/2022    Assessment:      -Acute on chronic diastolic heart failure, switched to oral diuretics. Echo 8/11/22 with EF 55%  -New onset atrial fibrillation with rapid ventricular rate, plan rate control and anticoagulation  -History of DVT right lower extremity 6/2022. Patient ran out of Xarelto 1 week ago as she could not afford it  -Possible sepsis, on antbx  -Hypertension  -hyperlipidemia  -DM on insulin     Primary cardiologist Dr. Malena Britt:     Stable from cardiac standpoint. Heart failure compensated. Atrial fibrillation rates controlled. Back on Xarelto. Please call for questions. She will follow-up with Dr. Dexter An, her primary cardiologist.    Subjective:     No new complaints.    A.fib rates  bpm.    Objective:      Patient Vitals for the past 8 hrs:   Temp Pulse Resp BP SpO2   10/14/22 1111 97.5 °F (36.4 °C) 99 22 106/64 98 %   10/14/22 0752 98.5 °F (36.9 °C) (!) 106 20 94/72 98 %         Patient Vitals for the past 96 hrs:   Weight   10/13/22 0400 105.6 kg (232 lb 14.2 oz)   10/11/22 0400 108.4 kg (239 lb)                    Intake/Output Summary (Last 24 hours) at 10/14/2022 1259  Last data filed at 10/14/2022 0575  Gross per 24 hour   Intake 480 ml   Output --   Net 480 ml       Physical Exam:  General:  alert, cooperative, no distress, appears stated age  Neck:  nontender  Lungs:  clear to auscultation bilaterally  Heart:  regular rate and rhythm, S1, S2 normal, no murmur, click, rub or gallop  Abdomen:  abdomen is soft without significant tenderness, masses, organomegaly or guarding  Extremities:  extremities normal, atraumatic, no cyanosis or edema    Data Review:     Labs: Results:       Chemistry Recent Labs     10/14/22  0507 10/13/22  0516 10/13/22  0514 10/12/22  1642 10/12/22  0536   GLU 95  --  88  --  87     --  139  --  143   K 3.7  --  4.3 3.5 3.3*     --  101  --  102   CO2 32  --  31  --  34*   BUN 30*  --  26*  --  23*   CREA 1.19  --  1.24  --  0.98   CA 9.3  --  9.2  --  9.1   MG  --  2.4  --   --  1.5*   AGAP 3  --  7  --  7   BUCR 25*  --  21*  --  23*   AP 51  --  53  --  48   TP 7.7  --  8.1  --  7.6   ALB 3.0*  --  3.3*  --  3.0*   GLOB 4.7*  --  4.8*  --  4.6*   AGRAT 0.6*  --  0.7*  --  0.7*      CBC w/Diff Recent Labs     10/14/22  0507 10/13/22  0514 10/12/22  0536   WBC 10.3 13.4* 10.2   RBC 4.28 4.37 4.20   HGB 11.6* 11.9* 11.1*   HCT 37.1 38.1 36.4    248 202   GRANS 54 52 59   LYMPH 31 33 24   EOS 4 4 5      Cardiac Enzymes No results found for: CPK, CK, CKMMB, CKMB, RCK3, CKMBT, CKNDX, CKND1, GABRIEL, TROPT, TROIQ, KRUPA, TROPT, TNIPOC, BNP, BNPP   Coagulation No results for input(s): PTP, INR, APTT, INREXT in the last 72 hours.     Lipid Panel Lab Results   Component Value Date/Time    Cholesterol, total 191 11/09/2021 12:24 PM    HDL Cholesterol 44 11/09/2021 12:24 PM    LDL, calculated 96.8 11/09/2021 12:24 PM    VLDL, calculated 50.2 11/09/2021 12:24 PM    Triglyceride 251 (H) 11/09/2021 12:24 PM    CHOL/HDL Ratio 4.3 11/09/2021 12:24 PM      BNP No results found for: BNP, BNPP, XBNPT   Liver Enzymes Recent Labs     10/14/22  0507   TP 7.7   ALB 3.0*   AP 51      Digoxin    Thyroid Studies Lab Results   Component Value Date/Time    TSH 2.01 10/07/2022 10:30 PM          Signed By: Jose Bañuelos MD     October 14, 2022

## 2022-10-14 NOTE — DIABETES MGMT
Diabetes/ Glycemic Control Plan of Care  Recommendations:   Blood glucose this am 114 mg/dl  Will continue inpatient monitoring. Fasting/ Morning blood glucose:   Lab Results   Component Value Date/Time    Glucose 95 10/14/2022 05:07 AM    Glucose (POC) 114 (H) 10/14/2022 07:31 AM     IV Fluids containing dextrose:  none   Steroids:  none     Blood glucose values:        Latest Reference Range & Units 10/13/22 08:14 10/13/22 12:52 10/13/22 16:24 10/13/22 21:29 10/14/22 07:31   GLUCOSE,FAST - POC 70 - 110 mg/dL 97 184 (H) 106 155 (H) 114 (H)   (H): Data is abnormally high  Within target range (70-180mg/dL):  yes   Current insulin orders:   Lantus 20 units daily  Lispro corrective insulin coverage AC&HS as needed  Total Daily Dose previous 24 hours = 24 units   Current A1c:   Lab Results   Component Value Date/Time    Hemoglobin A1c 11.3 (H) 10/07/2022 10:30 PM    Hemoglobin A1c (POC) 9.4 11/09/2021 11:58 AM      Equivalent to an average Blood Glucose of 278 mg/dl for 2-3 months prior to admission  Adequate glycemic control PTA: no   Nutrition/Diet:   Active Orders   Diet    ADULT DIET Regular; 3 carb choices (45 gm/meal); Low Sodium (2 gm); 1200 ml      Meal Intake:  Patient Vitals for the past 168 hrs:   % Diet Eaten   10/14/22 0907 76 - 100%   10/13/22 1407 26 - 50%   10/12/22 1737 76 - 100%   10/12/22 1344 76 - 100%   10/12/22 0913 76 - 100%   10/11/22 1055 76 - 100%   10/09/22 0902 76 - 100%       Supplement Intake:  No data found. Home diabetes medications:   Key Antihyperglycemic Medications               Jardiance 10 mg tablet Take 10 mg by mouth daily. insulin glargine (LANTUS) 100 unit/mL injection Inject 20 units daily at bedtime. Monitor and record blood sugar daily. insulin aspart U-100 (NovoLOG Flexpen U-100 Insulin) 100 unit/mL (3 mL) inpn 5 Units by SubCUTAneous route Before breakfast, lunch, and dinner. metFORMIN (GLUCOPHAGE) 500 mg tablet Take 500 mg by mouth daily. Plan/Goals:   Blood glucose will be within target of 70 - 180 mg/dl within 72 hours  Reinforce dietary and medication compliance at home.          Education:  [x] Refer to Diabetes Education Record (10/10/2022)                       [] Education not indicated at this time     Mark Domínguez [phone]

## 2022-10-14 NOTE — PROGRESS NOTES
[facility]  DAILY PROGRESS NOTE      Patient:    Raquel Toledo , 82 y.o. female   MRN:  [id number]  Room/Bed:  200/56  Admission Date:   10/7/2022  Code status:  Full Code    Reason for Admission: Acute CHF Exacerbation     ASSESSMENT AND PLAN:   Problem List Items Addressed This Visit          Circulatory    * (Principal) Acute exacerbation of CHF (congestive heart failure) (HCC)    Relevant Medications    metoprolol tartrate (LOPRESSOR) 100 mg IR tablet    rivaroxaban (XARELTO) 20 mg tab tablet    dilTIAZem ER (CARDIZEM CD) 180 mg capsule    furosemide (LASIX) 40 mg tablet     Other Visit Diagnoses       Acute respiratory failure with hypoxia and hypercapnia (HCC)    -  Primary    Atrial fibrillation with rapid ventricular response (HCC)        Relevant Medications    metoprolol tartrate (LOPRESSOR) 100 mg IR tablet    rivaroxaban (XARELTO) 20 mg tab tablet    dilTIAZem ER (CARDIZEM CD) 180 mg capsule    furosemide (LASIX) 40 mg tablet            Acute exacerbation of CHF - resolved  - Difficulty breathing for 3 days prior to admission  - B/l LE swelling noted on exam; pt last took Bumex 3 days ago  - Follows with outpatient cardiologist Dr. Kelley Pabon [id number]  - CXR shows increased interstitial edema, mild ground glass at the lung bases that may represent mild pulmonary edema or atelectasis, possible small right pleural effusion   - Echocardiogram (8/11/22): normal LV systolic function with EF of 55-60% and normal LV size, increased wall thickness consistent with mild concentric hypertrophy, normal wall motion   - pro-BNP 3,967   - trop nl at 8  - Lactic acid 1.1  - Mag 1.4  Plan:  - Continue cardiac monitoring   - Continue PO Lasix 40 mg BID  - Continue PO Lopressor 100 mg BID - consider increasing per cardiology  - Continue home PO Atorvastatin 80 mg qdaily   - Holding home amlodipine d/t acute LE swelling  - Holding home lisinopril 40 mg oral qdaily   - CBC, CMP qdaily   - Requires 2-3LNC with exertional activity/walking - ordered home oxygen tank prior to DC  - 6 min walk test completed 10/12 with desats into the 70 with recovery on 2LNC at rest     Atrial fibrillation with RVR  - EKG (10/7/22): atrial fibrillation with RVR, Left axis deviation  - HR in 110's-130's upon arrival to patient's bedside   - 10/9 AM still in afib with HR ranging 90s-120s  - TSH WNL  Plan:  - PO Lopressor 100mg BID.  - PO Diltiazem 180 - transitioned to PO dose 10/12  Cardio: Continue po lopressor and po cardizem. Rates elevated this am, but she has not received her po cardizem. If her rates remain improved to the ~110s after taking her scheduled medications, she can be discharged home today from a CV standpoint. Plan for her to follow up with her primary cardiologist in 2-3 weeks. -DC Today pending O2 delivery and Xarelto script  - Issues discharging Ms. Ledesma on 10/13 d/t inpatient [facility] - [facility] pharmacy not accepting  for her Xarelto script - contacting CM today to find solution for ms Ledesma to have affordable meds. Possible acute on chronic DVT's  - Positive Roberta's sign bilaterally and patient's history of DVT's make b/l LE DVT's possible. - CTA chest: No evidence for pulmonary emboli. Heart failure with biatrial cardiac chamber enlargement, moderate new. Bilateral pleural effusions and mild pulmonary edema. Mild to moderate aortic atherosclerosis with irregular noncalcified mural plaque along the wall of the descending aorta which increases risk of embolic events.    - Duplex U/S of b/l LE pending   - PT 15.1  - INR 1.2  - PTT 30.6  Plan:  - Continue home Xarelto 20 mg oral qdaily  - Duplex U/S of b/l LE - No evidence of DVT     Sepsis of unknown origin   - Meets 3/4 SIRS criteria (does not meet criteria for temperature)  - WBC 18.1 in ED  - Procalcitonin <0.05  - Lactic acid 1.1  - Negative blood cultures  - Lesion on foot  - Foot Xray: Generalized soft tissue swelling but no radiographic findings of osteomyelitis  Plan:  - Urine culture 10/8: Gram (-) rods  - DC Levofloxacin  - MRSA culture negative     Type 2 Diabetes Mellitus  -Most recent HbA1c of 11.8   -Serum glucose 309 on admission   Plan:  -Lantus 20 units SubQ qdaily; pending repeat HbA1c  -Insulin lispro subQ QID with meals   -Holding home Jardiance   -Continue home Gabapentin 300 mg TID  -Holding home metformin 500 mg oral qdaily      Gout  -No acute flare ups  Plan:  -Continue home Allopurinol 100 mg oral qdaily      Hypothyroidism   -Denies any acute symptoms   Plan:  -Continue home Synthroid 150 mcg oral qdaily  -TSH: 2.01     Chronic pain of back and LE   -Endorses continuing pain   -Follows with outpatient pain management   Plan:  -Continue home Tylenol 500 mg oral q6h  -Continue home Morphine 15 mg PRN    Obstructive Sleep Apnea  Plan  -BiPAP PRN at night    Global:  Code: Full  IVF/Drips: None  I/O / Wt: None  Diet: Regular with low sodium  Bowel Regimen, Last BM: Miralax PRN, last BM 2 days ago per pt  DVT/AC: Xarelto 20 mg qdaily and encourage ambulation as tolerated  Mobility: per protocol   Disposition: stable  Anticipated LOS: 1-2 nights     Point of Contact (relationship, number): Caridad Marx (son); [phone]    SUBJECTIVE:   Vira Luna is a 82 y.o.  female with PMHx of chronic diastolic CHF, chronic venous insufficiency, Type 2 DM, HTN, hypothyroidism, arthritis, asthma, chronic back pain, gout, hypercholesterolemia, and peripheral neuropathy who presented to ED on 10/7/00236 with complaint of difficulty breathing and was admitted for acute CHF exacerbation and new onset A. Fib on EKG. 10/9  No acute events overnight. Patient seen at beside. No acute complaints. Patient is doing well and denies chest pain or SOB when on NC.  She denies palpitations, but does report acknowledging that her HR is fast. She denies fatigue or weakness and reports that at home her BP runs similar to what it does in the hospital but she is not sure what her BP is at home. She reports that the callus on her left foot is sore but not painful. She denies fever or chills. She is eating well. 10/10  No acute events overnight. Patient seen at beside. No acute complaints. Patient in no current pain. Nursing called overnight for patient in abdominal/back pain - morphine was administered. She has morphine 15 PRN at home and follows up with pain clinic. No cp, sob, n/v. Patient would like to go home soon she says. 10/11  No acute events overnight. Patient in no current chest pain, SOB, N/V, swelling, fevers/chills, dizziness, syncope, palpitations. Nursing mentioned she has not received her metoprolol or her other meds this morning and she is tachy in the 120s. Patient is asymptomatic. On 3LNC since last night, nursing further noted sleep apnea. Patient feels good and wants to go home, she was reassured she would leave after we slow down her heart rate and remove her of the Cardizem drip.     10/12  No acute events overnight. Patient in no current chest pain, SOB, N/V, swelling, fevers/chills, dizziness, syncope, palpitations. Patient is eager to go home tomorrow regardless of her heart rate. Urged to allow our team to tailor her medical management to lower her heart rate. Patient currently on dilt drip and 100mg lopressor BID, will wait for cardiology recs today for outpatient management. A. Fib still currently not controled. Will nee another walk test when heart rate controled to assess for O2 requirement. 10/13  No acute events overnight. Patient seen at beside. No acute complaints. Patient mentioned leaving today due to needing to go home. Pt was advised against this but seems to have capacity. Cardiology okay with this plan and will discharge her with PO meds to control her heart rate. No additional ROS. 10/14  No additional complaints. Patient is at her new baseline after this admission.  She states she absolutely wants to go home today after we figure out how to get her Xarelto through insurance.  No positive ROS/    ROS (positive findings are in BOLD; negative findings are in regular font)  Constitutional: fevers, chills, appetite changes, weight changes, fatigue  HEENT: changes in vision, changes in hearing, sore throat, dysphagia  Cardiovascular: chest pain, palpitations, PND, orthopnea, edema  Pulmonary: SOB, cough, sputum production, wheezing, chest tightness  Gastrointestinal: abdominal pain, nausea/vomiting, diarrhea, constipation, melena, hematochezia  Genitourinary: dysuria, hesitation, dribbling, urgency, hematuria  Musculoskeletal: arthralgias, myalgias  Skin: rash, itching  Neurological: sensory changes, motor changes, headache  Psychiatric: mood changes  Endocrine: heat/cold intolerance  Heme: easy bruising/easy bleeding, LAD    CURRENT INPATIENT MEDICATIONS:  Current Facility-Administered Medications   Medication Dose Route Frequency Provider Last Rate Last Admin    sodium chloride (NS) flush 5-40 mL  5-40 mL IntraVENous Q8H Nahomy Shivers, DO   10 mL at 10/14/22 0604    sodium chloride (NS) flush 5-40 mL  5-40 mL IntraVENous PRN Nahomy Shivers, DO        dilTIAZem ER (CARDIZEM CD) capsule 180 mg  180 mg Oral DAILY Rosio Zhao MD   863 mg at 10/12/22 2125    levoFLOXacin (LEVAQUIN) tablet 250 mg  250 mg Oral Q24H Nahomy Shivers, DO   250 mg at 10/13/22 1007    metoprolol tartrate (LOPRESSOR) tablet 100 mg  100 mg Oral Q12H Jonathan Palafox MD   100 mg at 10/13/22 2126    insulin glargine (LANTUS) injection 20 Units  20 Units SubCUTAneous DAILY Jonathan Palafox MD   20 Units at 10/13/22 1004    furosemide (LASIX) tablet 40 mg  40 mg Oral BID Herrera Nash DO   40 mg at 10/13/22 1700    acetaminophen (TYLENOL) tablet 500 mg  500 mg Oral Q6H PRN Yao Gordonena, DO   500 mg at 10/09/22 2332    allopurinoL (ZYLOPRIM) tablet 100 mg  100 mg Oral DAILY Rezazad Kerry, DO   100 mg at 10/13/22 1004 atorvastatin (LIPITOR) tablet 80 mg  80 mg Oral DAILY Rezazad, Kerry, DO   80 mg at 10/13/22 1004    levothyroxine (SYNTHROID) tablet 150 mcg  150 mcg Oral DAILY Rezazad, Kerry, DO   150 mcg at 10/14/22 0604    [Held by provider] lisinopriL (PRINIVIL, ZESTRIL) tablet 40 mg  40 mg Oral DAILY Rezazad, Kerry, DO        [Held by provider] bumetanide (BUMEX) tablet 1 mg  1 mg Oral BID PRN Rezazad, Kerry, DO        [Held by provider] hydrOXYzine HCL (ATARAX) tablet 10 mg  10 mg Oral BID PRN Rezazad, Kerry, DO        [Held by provider] insulin lispro (HUMALOG) injection 5 Units  5 Units SubCUTAneous TIDAC Rezazad, Kerry, DO        [Held by provider] metFORMIN (GLUCOPHAGE) tablet 500 mg  500 mg Oral DAILY Rezazad, Kerry, DO        rivaroxaban (XARELTO) tablet 20 mg  20 mg Oral DAILY Rezazad, Kerry, DO   20 mg at 10/13/22 1004    [Held by provider] sertraline (ZOLOFT) tablet 50 mg  50 mg Oral DAILY Rezazad, Kerry, DO        [Held by provider] tamsulosin (FLOMAX) capsule 0.4 mg  0.4 mg Oral PCD Rezazad, Kerry, DO        sodium chloride (NS) flush 5-40 mL  5-40 mL IntraVENous Q8H Rezazad, Kerry, DO   10 mL at 10/14/22 0604    sodium chloride (NS) flush 5-40 mL  5-40 mL IntraVENous PRN Rezazad, Kerry, DO        gabapentin (NEURONTIN) capsule 300 mg  300 mg Oral TID Rezazad, Kerry, DO   300 mg at 10/13/22 2126    [Held by provider] morphine IR (MS IR) tablet 15 mg  15 mg Oral BID PRN Marisa Vera MD        polyethylene glycol (MIRALAX) packet 17 g  17 g Oral DAILY PRN Rezazad, Kerry, DO        insulin lispro (HUMALOG) injection   SubCUTAneous AC&HS Marisa Vera MD   2 Units at 10/13/22 2133    glucose chewable tablet 16 g  16 g Oral PRN Marisa Vera MD        glucagon (GLUCAGEN) injection 1 mg  1 mg IntraMUSCular PRN Marisa Vera MD           Allergies  No Known Allergies    OBJECTIVE:    Intake/Output Summary (Last 24 hours) at 10/14/2022 0701  Last data filed at 10/13/2022 1407  Gross per 24 hour   Intake 240 ml   Output --   Net 240 ml       Visit Vitals  BP (!) 109/53 (BP 1 Location: Right lower arm, BP Patient Position: At rest)   Pulse 90   Temp 97.9 °F (36.6 °C)   Resp 13   Ht 5' 5\" (1.651 m)   Wt 105.6 kg (232 lb 14.2 oz)   SpO2 98%   BMI 38.75 kg/m²       PHYSICAL EXAM  Gen: NAD, comfortable, obese   HEENT: normocephalic, atraumatic, MMM, no thyromegaly, no JVD  CV: tachycardic with an irregular rhythm. No M/R/G, +2 radial pulses  Pulm: CTAB, Inspiratory wheezes in BL lower lobes. Abd: S/NT/ND, no rebound, no guarding, no hepatosplenomegaly   MSK: mild LE non-pitting edema   Skin: warm, dry, intact, no rash, callous of left foot under big toe  Neuro: CN II-XII grossly intact, no focal deficits appreciated   Psych: appropriate, alert, oriented x3    LABWORK (LAST 24 HOURS)  Recent Results (from the past 24 hour(s))   GLUCOSE, POC    Collection Time: 10/13/22  8:14 AM   Result Value Ref Range    Glucose (POC) 97 70 - 110 mg/dL   GLUCOSE, POC    Collection Time: 10/13/22 12:52 PM   Result Value Ref Range    Glucose (POC) 184 (H) 70 - 110 mg/dL   GLUCOSE, POC    Collection Time: 10/13/22  4:24 PM   Result Value Ref Range    Glucose (POC) 106 70 - 110 mg/dL   GLUCOSE, POC    Collection Time: 10/13/22  9:29 PM   Result Value Ref Range    Glucose (POC) 155 (H) 70 - 454 mg/dL   METABOLIC PANEL, COMPREHENSIVE    Collection Time: 10/14/22  5:07 AM   Result Value Ref Range    Sodium 140 136 - 145 mmol/L    Potassium 3.7 3.5 - 5.5 mmol/L    Chloride 105 100 - 111 mmol/L    CO2 32 21 - 32 mmol/L    Anion gap 3 3.0 - 18 mmol/L    Glucose 95 74 - 99 mg/dL    BUN 30 (H) 7.0 - 18 MG/DL    Creatinine 1.19 0.6 - 1.3 MG/DL    BUN/Creatinine ratio 25 (H) 12 - 20      eGFR 45 (L) >60 ml/min/1.73m2    Calcium 9.3 8.5 - 10.1 MG/DL    Bilirubin, total 0.5 0.2 - 1.0 MG/DL    ALT (SGPT) 11 (L) 13 - 56 U/L    AST (SGOT) 17 10 - 38 U/L    Alk.  phosphatase 51 45 - 117 U/L    Protein, total 7.7 6.4 - 8.2 g/dL    Albumin 3.0 (L) 3.4 - 5.0 g/dL    Globulin 4.7 (H) 2.0 - 4.0 g/dL    A-G Ratio 0.6 (L) 0.8 - 1.7     CBC WITH AUTOMATED DIFF    Collection Time: 10/14/22  5:07 AM   Result Value Ref Range    WBC 10.3 4.6 - 13.2 K/uL    RBC 4.28 4.20 - 5.30 M/uL    HGB 11.6 (L) 12.0 - 16.0 g/dL    HCT 37.1 35.0 - 45.0 %    MCV 86.7 78.0 - 100.0 FL    MCH 27.1 24.0 - 34.0 PG    MCHC 31.3 31.0 - 37.0 g/dL    RDW 14.0 11.6 - 14.5 %    PLATELET 655 639 - 304 K/uL    MPV 12.6 (H) 9.2 - 11.8 FL    NRBC 0.0 0  WBC    ABSOLUTE NRBC 0.00 0.00 - 0.01 K/uL    NEUTROPHILS 54 40 - 73 %    LYMPHOCYTES 31 21 - 52 %    MONOCYTES 10 3 - 10 %    EOSINOPHILS 4 0 - 5 %    BASOPHILS 1 0 - 2 %    IMMATURE GRANULOCYTES 1 (H) 0.0 - 0.5 %    ABS. NEUTROPHILS 5.5 1.8 - 8.0 K/UL    ABS. LYMPHOCYTES 3.2 0.9 - 3.6 K/UL    ABS. MONOCYTES 1.0 0.05 - 1.2 K/UL    ABS. EOSINOPHILS 0.4 0.0 - 0.4 K/UL    ABS. BASOPHILS 0.1 0.0 - 0.1 K/UL    ABS. IMM. GRANS. 0.1 (H) 0.00 - 0.04 K/UL    DF AUTOMATED         IMAGING AND PROCEDURES (LAST 36 HOURS)  No results found.    ================================================================  Further management for Ms. Ana Choudhury will be discussed on rounds with my attending.       January Mistry DO, PGY-1  [facility]  October 14, 2022 7:14 AM

## 2022-10-14 NOTE — PROGRESS NOTES
Bedside shift change report given to DARBY Lopez (oncoming nurse) by Slime Dubois (offgoing nurse). Report included the following information SBAR, Kardex, Intake/Output, MAR, Recent Results, and Cardiac Rhythm A-fib . Wound Prevention Checklist    Patient: Vira Luna [de-identified]82 y.o. female)  Date: 10/13/2022  Diagnosis: Acute exacerbation of CHF (congestive heart failure) ([facility]) [I50.9] Acute exacerbation of CHF (congestive heart failure) ([facility] [address]    Precautions: Fall       []  Heel prevention boots placed on patient    []  Patient turned q2h during shift    []  Lift team ordered    []  Patient on [facility] bed/Specialty bed    []  Each Wound is documented during shift (Stage, Color, drainage, odor, measurements, and dressings)    [x]  Dual skin check done with Savannah Montenegro RN     : Shift assessment done. V/S obtained. Pt resting quietly in bed. No c/o pain or SOB. Call light within reach. 2130: Scheduled med given. B; Insulin coverage given per protocol. No complaints. 2200: Pt sleeping comfortably. No distress noted. 0000: No changes from previous assessment. Pt sleeping comfortably. No c/o pain or SOB. Call light within reach. 0200: Pt sleeping comfortably. No distress noted. 0400: No changes from previous assessment. Pt sleeping comfortably. No c/o pain or SOB. Call light within reach. 0600: Scheduled med given. Pt ambulate to the bathroom. Gown changed, bed pad changed. Pt did her oral care. No complaints. 0715: Bedside shift change report given to [employer] Reminder (oncoming nurse) by Keon Benton (offgoing nurse). Report included the following information SBAR, Kardex, Intake/Output, MAR, Recent Results, and Cardiac Rhythm A-fib .

## 2022-10-14 NOTE — PROGRESS NOTES
Met with pt. She stated she was getting xarelto from [facility] on [address] until the last time that they want to charge her $500. She stated they want her to do mail order but she does not want to. She stated Medicare is paying for her Medications but [facility] picks up the cost that Medicare will not cover. She stated she was doing good with her medications costs when [address] was [facility] [address] until her boyfriend Meli Riddle talked her out of it. She stated she wants to go back to [address] Discussed with Attending at IR  Xarelto $10 discout card and 30 days free supply cards given to pt. Outpatient Pharmacy does not participate with [facility]  Attending called [address] and they do not participate with pt's insurance. Attending to sent xarelto prescription to [address] [place] [address]. Updated pt.               ROSY CastañedaN RN  Care Management  Pager: [phone]

## 2022-10-14 NOTE — PROGRESS NOTES
*ATTENTION:  This note has been created by a medical student for educational purposes only. Please do not refer to the content of this note for clinical decision-making, billing, or other purposes. Please see attending physicians note to obtain clinical information on this patient. *         [facility]  DAILY PROGRESS NOTE      Patient:    Ginette Moses , 82 y.o. female   MRN:  [id number]  Room/Bed:  200/56  Admission Date:   10/7/2022  Code status:  Full Code    Reason for Admission: Acute exacerbation of CHF     ASSESSMENT AND PLAN:   Problem List Items Addressed This Visit          Circulatory    * (Principal) Acute exacerbation of CHF (congestive heart failure) (HCC)    Relevant Medications    metoprolol tartrate (LOPRESSOR) 100 mg IR tablet    rivaroxaban (XARELTO) 20 mg tab tablet    dilTIAZem ER (CARDIZEM CD) 180 mg capsule    furosemide (LASIX) 40 mg tablet     Other Visit Diagnoses       Acute respiratory failure with hypoxia and hypercapnia (HCC)    -  Primary    Atrial fibrillation with rapid ventricular response (HCC)        Relevant Medications    metoprolol tartrate (LOPRESSOR) 100 mg IR tablet    rivaroxaban (XARELTO) 20 mg tab tablet    dilTIAZem ER (CARDIZEM CD) 180 mg capsule    furosemide (LASIX) 40 mg tablet            Acute exacerbation of CHF - resolved  - Difficulty breathing for 3 days prior to admission  - B/l LE swelling noted on exam; pt last took Bumex 3 days ago  - Follows with outpatient cardiologist Dr. Neil Villanueva [id number]  - CXR shows increased interstitial edema, mild ground glass at the lung bases that may represent mild pulmonary edema or atelectasis, possible small right pleural effusion   - Echocardiogram (8/11/22): normal LV systolic function with EF of 55-60% and normal LV size, increased wall thickness consistent with mild concentric hypertrophy, normal wall motion   - pro-BNP 3,967   - trop nl at 8  - Lactic acid 1.1  - Mag 1.4  Plan:  - Contact case management with regards to patient's medication finances   - Send medications to [facility] and inform patient of cost   - Discharge home with PO Lasix 40 mg BID  - Discharge home with Lopressor 100 mg BID  - Discharge home with PO Atorvastatin 80 mg qdaily   - Home oxygen tank ordered home - Pt requires 2-3LNC with exertional activity/walking. Atrial fibrillation with RVR  - EKG (10/7/22): atrial fibrillation with RVR, Left axis deviation  - HR in 110's-130's upon arrival to patient's bedside   - 10/13 AM still in afib with HR ranging 90s-120s  Plan:  - Discharge home with PO Diltiazem 180 mg      Possible acute on chronic DVT's  - Positive Roberta's sign bilaterally and patient's history of DVT's make b/l LE DVT's possible. - CTA chest: No evidence for pulmonary emboli. Heart failure with biatrial cardiac chamber enlargement, moderate new. Bilateral pleural effusions and mild pulmonary edema. Mild to moderate aortic atherosclerosis with irregular noncalcified mural plaque along the wall of the descending aorta which increases risk of embolic events.    Plan:  - Discharge home with Xarelto 20 mg oral qd    Sepsis of unknown origin, resolved    - WBC 18.1 in ED, now (10/14) 10.3  - Negative blood cultures  - Lesion on foot  - Foot Xray: Generalized soft tissue swelling but no radiographic findings of osteomyelitis  Plan:  - Discharge home      Type 2 Diabetes Mellitus  -Most recent HbA1c of 11.8   -Serum glucose 309 on admission   Plan:  - Discharge home with Lantus 20 units SubQ qdaily; pending repeat HbA1c  - Discharge home with Insulin lispro subQ QID with meals   - Discharge home with Gabapentin 300 mg TID  - Discharge home with metformin 500 mg oral qdaily      Gout  -No acute flare ups  Plan:  - Discharge home with Allopurinol 100 mg oral qdaily      Hypothyroidism   -Denies any acute symptoms   Plan:  - Discharge home with Synthroid 150 mcg oral qdaily       Chronic pain of back and LE   -Endorses continuing pain -Follows with outpatient pain management   Plan:  - Discharge home with Tylenol 500 mg oral q6h  - Discharge home with Morphine 15 mg PRN    Hypertension  - D/c lisinopril and tamsulosin due to hypotension  - Follow up in office to recheck blood pressure and adjust medications      Obstructive Sleep Apnea  Plan  -BiPAP PRN at night     Global:  Code: Full  IVF/Drips: None  I/O / Wt: None  Diet: Regular with low sodium  Bowel Regimen, Last BM: Miralax PRN, last BM 2 days ago per pt  DVT/AC: Xarelto 20 mg qdaily and encourage ambulation as tolerated  Mobility: per protocol   Disposition: stable  Anticipated LOS: 1-2 nights     Point of Contact (relationship, number): Parish Mueller (son); [phone]        SUBJECTIVE:   Events of the last 24 hours:  Per nursing, no acute events overnight. Patient comfortable and engaging in conversation. Raquel Toledo is an 83 yo female with PMHx of chronic diastolic CHF, chronic venous insufficiency, Type 2 DM, HTN, hypothyroidism, arthritis, asthma, chronic back pain, gout, hypercholesterolemia, and peripheral neuropathy who presented to ED on 10/7/79869 with complaint of difficulty breathing and was admitted for acute CHF exacerbation and new onset A. Fib on EKG. 10/9  No acute events overnight. Patient seen at beside. No acute complaints. Patient is doing well and denies chest pain or SOB when on NC. She denies palpitations, but does report acknowledging that her HR is fast. She denies fatigue or weakness and reports that at home her BP runs similar to what it does in the hospital but she is not sure what her BP is at home. She reports that the callus on her left foot is sore but not painful. She denies fever or chills. She is eating well. 10/10  No acute events overnight. Patient seen at beside. No acute complaints. Patient in no current pain. Nursing called overnight for patient in abdominal/back pain - morphine was administered.  She has morphine 15 PRN at home and follows up with pain clinic. No cp, sob, n/v. Patient would like to go home soon she says. 10/11  No acute events overnight. Patient in no current chest pain, SOB, N/V, swelling, fevers/chills, dizziness, syncope, palpitations. Nursing mentioned she has not received her metoprolol or her other meds this morning and she is tachy in the 120s. Patient is asymptomatic. On 3LNC since last night, nursing further noted sleep apnea. Patient feels good and wants to go home, she was reassured she would leave after we slow down her heart rate and remove her of the Cardizem drip.      10/12  No acute events overnight. Patient in no current chest pain, SOB, N/V, swelling, fevers/chills, dizziness, syncope, palpitations. Patient is eager to go home tomorrow regardless of her heart rate. Urged to allow our team to tailor her medical management to lower her heart rate. Patient currently on dilt drip and 100mg lopressor BID, will wait for cardiology recs today for outpatient management. A. Fib still currently not controled. Will nee another walk test when heart rate controled to assess for O2 requirement. 10/13  No acute events overnight. Patient seen at beside. No acute complaints. Patient mentioned leaving today due to needing to go home. Pt was advised against this but seems to have capacity. Cardiology okay with this plan and will discharge her with PO meds to control her heart rate. No additional ROS. 10/14  No acute events overnight. Patient in no current chest pain, SOB, N/V, swelling, fevers/chills, dizziness, syncope, palpitations. Patient is delighted and eager to go home today. She was informed of the communication with case management and sending her medications to [place]. Tank oxygen has been sent to her home. Planning on discharging her with her PO meds to control her heart rate and other health conditions. No additional ROS.      ROS (positive findings are in BOLD; negative findings are in regular font)  Constitutional: fevers, chills, appetite changes, weight changes, fatigue  HEENT: changes in vision, changes in hearing, sore throat, dysphagia  Cardiovascular: chest pain, palpitations, PND, orthopnea, edema  Pulmonary: SOB, cough, sputum production, wheezing, chest tightness  Gastrointestinal: abdominal pain, nausea/vomiting, diarrhea, constipation, melena, hematochezia  Genitourinary: dysuria, hesitation, dribbling, urgency, hematuria  Musculoskeletal: arthralgias, myalgias, lower back pain   Skin: rash, itching  Neurological: sensory changes, motor changes, headache  Psychiatric: mood changes  Endocrine: heat/cold intolerance  Heme: easy bruising/easy bleeding, LAD    CURRENT INPATIENT MEDICATIONS:  Current Facility-Administered Medications   Medication Dose Route Frequency Provider Last Rate Last Admin    sodium chloride (NS) flush 5-40 mL  5-40 mL IntraVENous Q8H Kaushal Nuno DO   10 mL at 10/14/22 0604    sodium chloride (NS) flush 5-40 mL  5-40 mL IntraVENous PRN Marlena Magallon DO        dilTIAZem ER (CARDIZEM CD) capsule 180 mg  180 mg Oral DAILY Rosio Zhao MD   105 mg at 10/14/22 0837    levoFLOXacin (LEVAQUIN) tablet 250 mg  250 mg Oral Q24H Marlena Magallon DO   250 mg at 10/13/22 1007    metoprolol tartrate (LOPRESSOR) tablet 100 mg  100 mg Oral Q12H Checo Cazares MD   100 mg at 10/14/22 0837    insulin glargine (LANTUS) injection 20 Units  20 Units SubCUTAneous DAILY Checo Cazares MD   20 Units at 10/14/22 0836    furosemide (LASIX) tablet 40 mg  40 mg Oral BID Herrera Nash DO   40 mg at 10/14/22 0837    acetaminophen (TYLENOL) tablet 500 mg  500 mg Oral Q6H PRN Kerry Gordon DO   500 mg at 10/09/22 2332    allopurinoL (ZYLOPRIM) tablet 100 mg  100 mg Oral DAILY Kerry Gordon, DO   100 mg at 10/14/22 0837    atorvastatin (LIPITOR) tablet 80 mg  80 mg Oral DAILY WilderzaKerry shelton, DO   80 mg at 10/14/22 0837    levothyroxine (SYNTHROID) tablet 150 mcg  150 mcg Oral DAILY Rezazad, Kerry, DO   150 mcg at 10/14/22 0604    [Held by provider] lisinopriL (PRINIVIL, ZESTRIL) tablet 40 mg  40 mg Oral DAILY Rezazad, Kerry, DO        [Held by provider] bumetanide (BUMEX) tablet 1 mg  1 mg Oral BID PRN Rezazad, Kerry, DO        [Held by provider] hydrOXYzine HCL (ATARAX) tablet 10 mg  10 mg Oral BID PRN Rezazad, Kerry, DO        [Held by provider] insulin lispro (HUMALOG) injection 5 Units  5 Units SubCUTAneous TIDAC Rezazad, Kerry, DO        [Held by provider] metFORMIN (GLUCOPHAGE) tablet 500 mg  500 mg Oral DAILY Rezazad, Kerry, DO        rivaroxaban (XARELTO) tablet 20 mg  20 mg Oral DAILY Rezazad, Kerry, DO   20 mg at 10/14/22 0837    [Held by provider] sertraline (ZOLOFT) tablet 50 mg  50 mg Oral DAILY Rezazad, Kerry, DO        [Held by provider] tamsulosin (FLOMAX) capsule 0.4 mg  0.4 mg Oral PCD Rezazad, Kerry, DO        sodium chloride (NS) flush 5-40 mL  5-40 mL IntraVENous Q8H Rezazad, Kerry, DO   10 mL at 10/14/22 0604    sodium chloride (NS) flush 5-40 mL  5-40 mL IntraVENous PRN Rezazad, Kerry, DO        gabapentin (NEURONTIN) capsule 300 mg  300 mg Oral TID Rezazad, Kerry, DO   300 mg at 10/14/22 0837    [Held by provider] morphine IR (MS IR) tablet 15 mg  15 mg Oral BID PRN Oliva Schmidt MD        polyethylene glycol (MIRALAX) packet 17 g  17 g Oral DAILY PRN Rezazad, Kerry, DO        insulin lispro (HUMALOG) injection   SubCUTAneous AC&HS Oliva Schmidt MD   2 Units at 10/13/22 2133    glucose chewable tablet 16 g  16 g Oral PRN Oliva Schmidt MD        glucagon (GLUCAGEN) injection 1 mg  1 mg IntraMUSCular PRN Oliva Schmidt MD           Allergies  No Known Allergies    OBJECTIVE:    Intake/Output Summary (Last 24 hours) at 10/14/2022 0858  Last data filed at 10/13/2022 1407  Gross per 24 hour   Intake 240 ml   Output --   Net 240 ml       Visit Vitals  BP 94/72   Pulse (!) 106   Temp 98.5 °F (36.9 °C)   Resp 20   Ht 5' 5\" (1.651 m)   Wt 105.6 kg (232 lb 14.2 oz)   SpO2 98%   BMI 38.75 kg/m²       PHYSICAL EXAM  Gen: NAD, comfortable, obese   HEENT: normocephalic, atraumatic  CV: Tachycardic, irregular irregular rhythm   Pulm: CTAB, no wheezes, no crackles  Abd: S/NT/ND, no rebound, no guarding, no hepatosplenomegaly   MSK: BLE nonpitting edema   Skin: warm, dry, intact, red irritation on BLE shins   Neuro: no focal deficits appreciated   Psych: appropriate, alert, oriented x3    LABWORK (LAST 24 HOURS)  Recent Results (from the past 24 hour(s))   GLUCOSE, POC    Collection Time: 10/13/22 12:52 PM   Result Value Ref Range    Glucose (POC) 184 (H) 70 - 110 mg/dL   GLUCOSE, POC    Collection Time: 10/13/22  4:24 PM   Result Value Ref Range    Glucose (POC) 106 70 - 110 mg/dL   GLUCOSE, POC    Collection Time: 10/13/22  9:29 PM   Result Value Ref Range    Glucose (POC) 155 (H) 70 - 019 mg/dL   METABOLIC PANEL, COMPREHENSIVE    Collection Time: 10/14/22  5:07 AM   Result Value Ref Range    Sodium 140 136 - 145 mmol/L    Potassium 3.7 3.5 - 5.5 mmol/L    Chloride 105 100 - 111 mmol/L    CO2 32 21 - 32 mmol/L    Anion gap 3 3.0 - 18 mmol/L    Glucose 95 74 - 99 mg/dL    BUN 30 (H) 7.0 - 18 MG/DL    Creatinine 1.19 0.6 - 1.3 MG/DL    BUN/Creatinine ratio 25 (H) 12 - 20      eGFR 45 (L) >60 ml/min/1.73m2    Calcium 9.3 8.5 - 10.1 MG/DL    Bilirubin, total 0.5 0.2 - 1.0 MG/DL    ALT (SGPT) 11 (L) 13 - 56 U/L    AST (SGOT) 17 10 - 38 U/L    Alk.  phosphatase 51 45 - 117 U/L    Protein, total 7.7 6.4 - 8.2 g/dL    Albumin 3.0 (L) 3.4 - 5.0 g/dL    Globulin 4.7 (H) 2.0 - 4.0 g/dL    A-G Ratio 0.6 (L) 0.8 - 1.7     CBC WITH AUTOMATED DIFF    Collection Time: 10/14/22  5:07 AM   Result Value Ref Range    WBC 10.3 4.6 - 13.2 K/uL    RBC 4.28 4.20 - 5.30 M/uL    HGB 11.6 (L) 12.0 - 16.0 g/dL    HCT 37.1 35.0 - 45.0 %    MCV 86.7 78.0 - 100.0 FL    MCH 27.1 24.0 - 34.0 PG    MCHC 31.3 31.0 - 37.0 g/dL    RDW 14.0 11.6 - 14.5 %    PLATELET 785 991 - 761 K/uL    MPV 12.6 (H) 9.2 - 11.8 FL    NRBC 0.0 0  WBC    ABSOLUTE NRBC 0.00 0.00 - 0.01 K/uL    NEUTROPHILS 54 40 - 73 %    LYMPHOCYTES 31 21 - 52 %    MONOCYTES 10 3 - 10 %    EOSINOPHILS 4 0 - 5 %    BASOPHILS 1 0 - 2 %    IMMATURE GRANULOCYTES 1 (H) 0.0 - 0.5 %    ABS. NEUTROPHILS 5.5 1.8 - 8.0 K/UL    ABS. LYMPHOCYTES 3.2 0.9 - 3.6 K/UL    ABS. MONOCYTES 1.0 0.05 - 1.2 K/UL    ABS. EOSINOPHILS 0.4 0.0 - 0.4 K/UL    ABS. BASOPHILS 0.1 0.0 - 0.1 K/UL    ABS. IMM. GRANS. 0.1 (H) 0.00 - 0.04 K/UL    DF AUTOMATED     GLUCOSE, POC    Collection Time: 10/14/22  7:31 AM   Result Value Ref Range    Glucose (POC) 114 (H) 70 - 110 mg/dL       IMAGING AND PROCEDURES (LAST 36 HOURS)  No results found.    ================================================================  Further management for MsGary Reyes will be discussed on rounds with my attending.       Vickie De La Garza, MS-III  [facility]  October 14, 2022 8:58 AM

## 2022-10-14 NOTE — PROGRESS NOTES
Pt's nurse found out that pt does not have home use oxygen in room. Pt informed nurse that oxygen was delivered yesterday but the 2 men that delivered took it back with them. Called José Miguel. He stated oxygen was delivered to pt yesterday and pt told them that she was not going home yesterday and did not know when she will be discharged. She told the  to take oxygen with them and she will call them back when she is ready for discharge. Informed Lula Machado to please contact the  to bring the oxygen as pt has been discharged. Lula Machado stated he will contact the  right away. Updated Nurse Ghassan Singh.           ROSY SmithN RN  Care Management  Pager: [phone]

## 2022-10-14 NOTE — PROGRESS NOTES
Physician Progress Note      PATIENT:               Jacob Wilkinson  [facility] #:                  [id number]  :                       1935  ADMIT DATE:       10/7/2022 4:41 PM  DISCH DATE:  RESPONDING  PROVIDER #:        Aaliyah Garcia DO          QUERY TEXT:    Patient admitted with Acute CHF exacerbation. Noted documentation of sepsis in [facility] progress note on 10/13 . If possible, please document in progress notes and discharge summary the source of sepsis:    The medical record reflects the following:  Risk Factors: 83 yo female with PMH COPD, CHF    Clinical Indicators: Day of admission  RR 24  Acute respiratory failure with BIPAP on admission  U/A with 30,000 colonies/ml  E coli  Blood cultures no growth to date    Treatment: IV antibiotics, Urine and blood cultures      Thank you for your time,    Nilesh MUÑIZ, RN, [address] [place] [address]  C: [phone]    [email or web address]  Options provided:  -- Sepsis, present on admission, due to, Please document source. -- Sepsis, not present on admission due to, Please document source. -- Sepsis, present on admission, now resolved, due to, Please document source.   -- Other - I will add my own diagnosis  -- Disagree - Not applicable / Not valid  -- Disagree - Clinically unable to determine / Unknown  -- Refer to Clinical Documentation Reviewer    PROVIDER RESPONSE TEXT:    This patient has sepsis which was not present on admission due to UTI    Query created by: Jordan Araiza on 10/13/2022 7:04 PM      Electronically signed by:  Aaliyah Garcia DO 10/14/2022 6:01 AM

## 2022-10-27 NOTE — PROGRESS NOTES
Physician Progress Note      PATIENT:               Christophe Herzog  CSN #:                  [id number]  :                       1935  ADMIT DATE:       10/7/2022 4:41 PM  [address] [place] DATE:        10/14/2022 6:45 PM  RESPONDING  PROVIDER #:        Lata Warren MD          QUERY TEXT:    H&P stated sepsis on unknown origin, UC obtained within 8 hours of admission showed E. coli. A query was placed for the etiology of sepsis and the response was \"Sepsis was not present on admission due to UTI\". Please clarify whether sepsis was POA or not POA. If possible, please document in progress notes and discharge summary if you are evaluating and /or treating any of the following: The medical record reflects the following:  Risk Factors:  Clinical Indicators: H&P:  Sepsis of unknown origin, 3/4 SIRS met for Tachycardia, leukocytosis to 18.1 and tachypnea to 20s. 10/8  Urine culture  E coli  Treatment: Vanco; Zosyn; blood and urine Cx; procalcitonin; lactic acid; VS; trend WBCs    Thank you,  Ingris Arboleda RN/CCDS  [email or web address]  Options provided:  -- Sepsis due to UTI POA confirmed and sepsis due to UTI not POA ruled out  -- Sepsis due to UTI not POA confirmed and sepsis due to UTI POA ruled out  -- Other - I will add my own diagnosis  -- Disagree - Not applicable / Not valid  -- Disagree - Clinically unable to determine / Unknown  -- Refer to Clinical Documentation Reviewer    PROVIDER RESPONSE TEXT:    After study, sepsis due to UTI not POA confirmed sepsis due to UTI POA ruled out.     Query created by: Traci Jennings on 10/26/2022 3:13 PM      Electronically signed by:  Lata Warren MD 10/27/2022 10:53 AM

## 2022-12-21 ENCOUNTER — APPOINTMENT (OUTPATIENT)
Dept: GENERAL RADIOLOGY | Age: 87
End: 2022-12-21
Attending: EMERGENCY MEDICINE
Payer: MEDICARE

## 2022-12-21 ENCOUNTER — APPOINTMENT (OUTPATIENT)
Dept: GENERAL RADIOLOGY | Age: 87
End: 2022-12-21
Payer: MEDICARE

## 2022-12-21 ENCOUNTER — HOSPITAL ENCOUNTER (INPATIENT)
Age: 87
LOS: 2 days | Discharge: HOME | End: 2022-12-23
Attending: EMERGENCY MEDICINE | Admitting: HOSPITALIST
Payer: MEDICARE

## 2022-12-21 ENCOUNTER — APPOINTMENT (OUTPATIENT)
Dept: NON INVASIVE DIAGNOSTICS | Age: 87
End: 2022-12-21
Attending: HOSPITALIST
Payer: MEDICARE

## 2022-12-21 DIAGNOSIS — I48.91 ATRIAL FIBRILLATION WITH RVR (HCC): Primary | ICD-10-CM

## 2022-12-21 DIAGNOSIS — J81.0 ACUTE PULMONARY EDEMA (HCC): ICD-10-CM

## 2022-12-21 DIAGNOSIS — Z79.4 TYPE 2 DIABETES MELLITUS WITH DIABETIC NEUROPATHY, WITH LONG-TERM CURRENT USE OF INSULIN (HCC): ICD-10-CM

## 2022-12-21 DIAGNOSIS — E11.40 TYPE 2 DIABETES MELLITUS WITH DIABETIC NEUROPATHY, WITH LONG-TERM CURRENT USE OF INSULIN (HCC): ICD-10-CM

## 2022-12-21 DIAGNOSIS — D72.829 LEUKOCYTOSIS, UNSPECIFIED TYPE: ICD-10-CM

## 2022-12-21 DIAGNOSIS — G62.9 NEUROPATHY: ICD-10-CM

## 2022-12-21 DIAGNOSIS — I50.23 ACUTE ON CHRONIC SYSTOLIC CONGESTIVE HEART FAILURE (HCC): ICD-10-CM

## 2022-12-21 PROBLEM — R06.00 DYSPNEA: Status: ACTIVE | Noted: 2022-12-21

## 2022-12-21 PROBLEM — Z71.89 ADVANCED CARE PLANNING/COUNSELING DISCUSSION: Status: ACTIVE | Noted: 2022-12-21

## 2022-12-21 PROBLEM — J81.1 PULMONARY EDEMA: Status: ACTIVE | Noted: 2022-12-21

## 2022-12-21 PROBLEM — R53.81 DEBILITY: Status: ACTIVE | Noted: 2022-12-21

## 2022-12-21 PROBLEM — Z91.199 PATIENT'S NONCOMPLIANCE WITH OTHER MEDICAL TREATMENT AND REGIMEN DUE TO UNSPECIFIED REASON: Status: ACTIVE | Noted: 2022-12-21

## 2022-12-21 LAB
ALBUMIN SERPL-MCNC: 4.1 G/DL (ref 3.4–5)
ALBUMIN/GLOB SERPL: 0.8 {RATIO} (ref 0.8–1.7)
ALP SERPL-CCNC: 69 U/L (ref 45–117)
ALT SERPL-CCNC: 10 U/L (ref 13–56)
ANION GAP SERPL CALC-SCNC: 6 MMOL/L (ref 3–18)
ANION GAP SERPL CALC-SCNC: 8 MMOL/L (ref 3–18)
APPEARANCE UR: CLEAR
ARTERIAL PATENCY WRIST A: POSITIVE
AST SERPL-CCNC: 14 U/L (ref 10–38)
BACTERIA URNS QL MICRO: ABNORMAL /HPF
BASE EXCESS BLD CALC-SCNC: 2.7 MMOL/L
BASOPHILS # BLD: 0 K/UL (ref 0–0.1)
BASOPHILS # BLD: 0.3 K/UL (ref 0–0.1)
BASOPHILS NFR BLD: 0 % (ref 0–2)
BASOPHILS NFR BLD: 1 % (ref 0–2)
BDY SITE: ABNORMAL
BILIRUB SERPL-MCNC: 0.9 MG/DL (ref 0.2–1)
BILIRUB UR QL: NEGATIVE
BNP SERPL-MCNC: 4582 PG/ML (ref 0–1800)
BUN SERPL-MCNC: 33 MG/DL (ref 7–18)
BUN SERPL-MCNC: 35 MG/DL (ref 7–18)
BUN/CREAT SERPL: 18 (ref 12–20)
BUN/CREAT SERPL: 20 (ref 12–20)
CALCIUM SERPL-MCNC: 8.7 MG/DL (ref 8.5–10.1)
CALCIUM SERPL-MCNC: 9.7 MG/DL (ref 8.5–10.1)
CALCULATED R AXIS, ECG10: 17 DEGREES
CALCULATED T AXIS, ECG11: -133 DEGREES
CHLORIDE SERPL-SCNC: 95 MMOL/L (ref 100–111)
CHLORIDE SERPL-SCNC: 97 MMOL/L (ref 100–111)
CO2 SERPL-SCNC: 29 MMOL/L (ref 21–32)
CO2 SERPL-SCNC: 31 MMOL/L (ref 21–32)
COLOR UR: YELLOW
CREAT SERPL-MCNC: 1.79 MG/DL (ref 0.6–1.3)
CREAT SERPL-MCNC: 1.79 MG/DL (ref 0.6–1.3)
DIAGNOSIS, 93000: NORMAL
DIFFERENTIAL METHOD BLD: ABNORMAL
DIFFERENTIAL METHOD BLD: ABNORMAL
ECHO AO ASC DIAM: 2.5 CM
ECHO AO ROOT DIAM: 2.7 CM
ECHO AV AREA PEAK VELOCITY: 1.9 CM2
ECHO AV AREA PEAK VELOCITY: 2.3 CM2
ECHO AV AREA VTI: 3.3 CM2
ECHO AV MEAN GRADIENT: 3 MMHG
ECHO AV MEAN VELOCITY: 0.8 M/S
ECHO AV PEAK GRADIENT: 5 MMHG
ECHO AV PEAK VELOCITY: 1.1 M/S
ECHO AV VTI: 15.3 CM
ECHO EST RA PRESSURE: 8 MMHG
ECHO LA VOL 2C: 52 ML (ref 22–52)
ECHO LA VOL 4C: 59 ML (ref 22–52)
ECHO LA VOLUME AREA LENGTH: 62 ML
ECHO LV FRACTIONAL SHORTENING: 30 % (ref 28–44)
ECHO LV INTERNAL DIMENSION DIASTOLIC: 4.6 CM (ref 3.9–5.3)
ECHO LV INTERNAL DIMENSION SYSTOLIC: 3.2 CM
ECHO LV IVSD: 1.1 CM (ref 0.6–0.9)
ECHO LV MASS 2D: 169.9 G (ref 67–162)
ECHO LV POSTERIOR WALL DIASTOLIC: 1 CM (ref 0.6–0.9)
ECHO LV RELATIVE WALL THICKNESS RATIO: 0.43
ECHO LVOT AREA: 3.1 CM2
ECHO LVOT AV VTI INDEX: 1.07
ECHO LVOT DIAM: 2 CM
ECHO LVOT MEAN GRADIENT: 1 MMHG
ECHO LVOT PEAK GRADIENT: 2 MMHG
ECHO LVOT PEAK GRADIENT: 3 MMHG
ECHO LVOT PEAK VELOCITY: 0.7 M/S
ECHO LVOT PEAK VELOCITY: 0.8 M/S
ECHO LVOT SV: 51.5 ML
ECHO LVOT VTI: 16.4 CM
ECHO MV E VELOCITY: 1.14 M/S
ECHO PV MAX VELOCITY: 0.7 M/S
ECHO PV PEAK GRADIENT: 2 MMHG
ECHO RIGHT VENTRICULAR SYSTOLIC PRESSURE (RVSP): 51 MMHG
ECHO RV FREE WALL PEAK S': 9 CM/S
ECHO RV LONGITUDINAL DIMENSION: 3.8 CM
ECHO RV TAPSE: 1.5 CM (ref 1.7–?)
ECHO RVOT PEAK GRADIENT: 0 MMHG
ECHO RVOT PEAK VELOCITY: 0.3 M/S
ECHO TV REGURGITANT MAX VELOCITY: 3.26 M/S
ECHO TV REGURGITANT PEAK GRADIENT: 43 MMHG
EOSINOPHIL # BLD: 0 K/UL (ref 0–0.4)
EOSINOPHIL # BLD: 0 K/UL (ref 0–0.4)
EOSINOPHIL NFR BLD: 0 % (ref 0–5)
EOSINOPHIL NFR BLD: 0 % (ref 0–5)
EPITH CASTS URNS QL MICRO: ABNORMAL /LPF (ref 0–5)
ERYTHROCYTE [DISTWIDTH] IN BLOOD BY AUTOMATED COUNT: 15.7 % (ref 11.6–14.5)
ERYTHROCYTE [DISTWIDTH] IN BLOOD BY AUTOMATED COUNT: 15.8 % (ref 11.6–14.5)
ERYTHROCYTE [SEDIMENTATION RATE] IN BLOOD: 18 MM/HR (ref 0–30)
FLUAV RNA SPEC QL NAA+PROBE: NOT DETECTED
FLUBV RNA SPEC QL NAA+PROBE: NOT DETECTED
GAS FLOW.O2 O2 DELIVERY SYS: ABNORMAL L/MIN
GAS FLOW.O2 SETTING OXYMISER: 8 BPM
GLOBULIN SER CALC-MCNC: 5 G/DL (ref 2–4)
GLUCOSE BLD STRIP.AUTO-MCNC: 305 MG/DL (ref 70–110)
GLUCOSE SERPL-MCNC: 345 MG/DL (ref 74–99)
GLUCOSE SERPL-MCNC: 452 MG/DL (ref 74–99)
GLUCOSE UR STRIP.AUTO-MCNC: >1000 MG/DL
HCO3 BLD-SCNC: 27.3 MMOL/L (ref 22–26)
HCT VFR BLD AUTO: 28.6 % (ref 35–45)
HCT VFR BLD AUTO: 37.7 % (ref 35–45)
HGB BLD-MCNC: 11.9 G/DL (ref 12–16)
HGB BLD-MCNC: 9.2 G/DL (ref 12–16)
HGB UR QL STRIP: NEGATIVE
IMM GRANULOCYTES # BLD AUTO: 0 K/UL (ref 0–0.04)
IMM GRANULOCYTES # BLD AUTO: 0 K/UL (ref 0–0.04)
IMM GRANULOCYTES NFR BLD AUTO: 0 % (ref 0–0.5)
IMM GRANULOCYTES NFR BLD AUTO: 0 % (ref 0–0.5)
KETONES UR QL STRIP.AUTO: NEGATIVE MG/DL
LEUKOCYTE ESTERASE UR QL STRIP.AUTO: NEGATIVE
LYMPHOCYTES # BLD: 0.3 K/UL (ref 0.9–3.6)
LYMPHOCYTES # BLD: 1.3 K/UL (ref 0.9–3.6)
LYMPHOCYTES NFR BLD: 1 % (ref 21–52)
LYMPHOCYTES NFR BLD: 5 % (ref 21–52)
MAGNESIUM SERPL-MCNC: 1.6 MG/DL (ref 1.6–2.6)
MAGNESIUM SERPL-MCNC: 2 MG/DL (ref 1.6–2.6)
MCH RBC QN AUTO: 25.9 PG (ref 24–34)
MCH RBC QN AUTO: 26.7 PG (ref 24–34)
MCHC RBC AUTO-ENTMCNC: 31.6 G/DL (ref 31–37)
MCHC RBC AUTO-ENTMCNC: 32.2 G/DL (ref 31–37)
MCV RBC AUTO: 82.1 FL (ref 78–100)
MCV RBC AUTO: 82.9 FL (ref 78–100)
METAMYELOCYTES NFR BLD MANUAL: 1 %
MONOCYTES # BLD: 0.3 K/UL (ref 0.05–1.2)
MONOCYTES # BLD: 1.5 K/UL (ref 0.05–1.2)
MONOCYTES NFR BLD: 1 % (ref 3–10)
MONOCYTES NFR BLD: 6 % (ref 3–10)
MUCOUS THREADS URNS QL MICRO: ABNORMAL /LPF
NEUTS BAND NFR BLD MANUAL: 3 %
NEUTS BAND NFR BLD MANUAL: 9 %
NEUTS SEG # BLD: 22.2 K/UL (ref 1.8–8)
NEUTS SEG # BLD: 27.1 K/UL (ref 1.8–8)
NEUTS SEG NFR BLD: 80 % (ref 40–73)
NEUTS SEG NFR BLD: 93 % (ref 40–73)
NITRITE UR QL STRIP.AUTO: NEGATIVE
NRBC # BLD: 0 K/UL (ref 0–0.01)
NRBC # BLD: 0 K/UL (ref 0–0.01)
NRBC BLD-RTO: 0 PER 100 WBC
NRBC BLD-RTO: 0 PER 100 WBC
O2/TOTAL GAS SETTING VFR VENT: 50 %
PCO2 BLD: 41 MMHG (ref 35–45)
PEEP RESPIRATORY: 8 CMH2O
PH BLD: 7.43 [PH] (ref 7.35–7.45)
PH UR STRIP: 6 [PH] (ref 5–8)
PLATELET # BLD AUTO: 175 K/UL (ref 135–420)
PLATELET # BLD AUTO: 225 K/UL (ref 135–420)
PLATELET COMMENTS,PCOM: ABNORMAL
PLATELET COMMENTS,PCOM: ABNORMAL
PMV BLD AUTO: 13.8 FL (ref 9.2–11.8)
PMV BLD AUTO: 13.9 FL (ref 9.2–11.8)
PO2 BLD: 120 MMHG (ref 80–100)
POTASSIUM SERPL-SCNC: 3.8 MMOL/L (ref 3.5–5.5)
POTASSIUM SERPL-SCNC: 4.3 MMOL/L (ref 3.5–5.5)
PRESSURE SUPPORT SETTING VENT: 8 CMH2O
PROCALCITONIN SERPL-MCNC: 0.1 NG/ML
PROT SERPL-MCNC: 9.1 G/DL (ref 6.4–8.2)
PROT UR STRIP-MCNC: 30 MG/DL
Q-T INTERVAL, ECG07: 286 MS
QRS DURATION, ECG06: 88 MS
QTC CALCULATION (BEZET), ECG08: 473 MS
RBC # BLD AUTO: 3.45 M/UL (ref 4.2–5.3)
RBC # BLD AUTO: 4.59 M/UL (ref 4.2–5.3)
RBC #/AREA URNS HPF: ABNORMAL /HPF (ref 0–5)
RBC MORPH BLD: ABNORMAL
SAO2 % BLD: 98.8 % (ref 92–97)
SARS-COV-2, COV2: NOT DETECTED
SERVICE CMNT-IMP: ABNORMAL
SODIUM SERPL-SCNC: 132 MMOL/L (ref 136–145)
SODIUM SERPL-SCNC: 134 MMOL/L (ref 136–145)
SP GR UR REFRACTOMETRY: 1.02 (ref 1–1.03)
SPECIMEN TYPE: ABNORMAL
T4 FREE SERPL-MCNC: 0.5 NG/DL (ref 0.7–1.5)
TOTAL RESP. RATE, ITRR: 22
TROPONIN-HIGH SENSITIVITY: 20 NG/L (ref 0–54)
TSH SERPL DL<=0.05 MIU/L-ACNC: 64.9 UIU/ML (ref 0.36–3.74)
UROBILINOGEN UR QL STRIP.AUTO: 0.2 EU/DL (ref 0.2–1)
VENTRICULAR RATE, ECG03: 165 BPM
WBC # BLD AUTO: 25 K/UL (ref 4.6–13.2)
WBC # BLD AUTO: 28.2 K/UL (ref 4.6–13.2)
WBC URNS QL MICRO: ABNORMAL /HPF (ref 0–4)

## 2022-12-21 PROCEDURE — 87086 URINE CULTURE/COLONY COUNT: CPT

## 2022-12-21 PROCEDURE — 93306 TTE W/DOPPLER COMPLETE: CPT | Performed by: INTERNAL MEDICINE

## 2022-12-21 PROCEDURE — 74011636637 HC RX REV CODE- 636/637

## 2022-12-21 PROCEDURE — 94660 CPAP INITIATION&MGMT: CPT

## 2022-12-21 PROCEDURE — 71045 X-RAY EXAM CHEST 1 VIEW: CPT

## 2022-12-21 PROCEDURE — 74011000250 HC RX REV CODE- 250: Performed by: HOSPITALIST

## 2022-12-21 PROCEDURE — 77010033678 HC OXYGEN DAILY

## 2022-12-21 PROCEDURE — 84439 ASSAY OF FREE THYROXINE: CPT

## 2022-12-21 PROCEDURE — 84145 PROCALCITONIN (PCT): CPT

## 2022-12-21 PROCEDURE — 99221 1ST HOSP IP/OBS SF/LOW 40: CPT | Performed by: NURSE PRACTITIONER

## 2022-12-21 PROCEDURE — 36600 WITHDRAWAL OF ARTERIAL BLOOD: CPT

## 2022-12-21 PROCEDURE — 5A09357 ASSISTANCE WITH RESPIRATORY VENTILATION, LESS THAN 24 CONSECUTIVE HOURS, CONTINUOUS POSITIVE AIRWAY PRESSURE: ICD-10-PCS

## 2022-12-21 PROCEDURE — 74011250636 HC RX REV CODE- 250/636: Performed by: EMERGENCY MEDICINE

## 2022-12-21 PROCEDURE — 94762 N-INVAS EAR/PLS OXIMTRY CONT: CPT

## 2022-12-21 PROCEDURE — 87186 SC STD MICRODIL/AGAR DIL: CPT

## 2022-12-21 PROCEDURE — 84443 ASSAY THYROID STIM HORMONE: CPT

## 2022-12-21 PROCEDURE — 74011250636 HC RX REV CODE- 250/636: Performed by: HOSPITALIST

## 2022-12-21 PROCEDURE — 94761 N-INVAS EAR/PLS OXIMETRY MLT: CPT

## 2022-12-21 PROCEDURE — 87150 DNA/RNA AMPLIFIED PROBE: CPT

## 2022-12-21 PROCEDURE — 87077 CULTURE AEROBIC IDENTIFY: CPT

## 2022-12-21 PROCEDURE — 87147 CULTURE TYPE IMMUNOLOGIC: CPT

## 2022-12-21 PROCEDURE — 74011636637 HC RX REV CODE- 636/637: Performed by: EMERGENCY MEDICINE

## 2022-12-21 PROCEDURE — 74011000250 HC RX REV CODE- 250: Performed by: EMERGENCY MEDICINE

## 2022-12-21 PROCEDURE — 84484 ASSAY OF TROPONIN QUANT: CPT

## 2022-12-21 PROCEDURE — 80053 COMPREHEN METABOLIC PANEL: CPT

## 2022-12-21 PROCEDURE — 85025 COMPLETE CBC W/AUTO DIFF WBC: CPT

## 2022-12-21 PROCEDURE — 73660 X-RAY EXAM OF TOE(S): CPT

## 2022-12-21 PROCEDURE — 74011250637 HC RX REV CODE- 250/637: Performed by: HOSPITALIST

## 2022-12-21 PROCEDURE — 85652 RBC SED RATE AUTOMATED: CPT

## 2022-12-21 PROCEDURE — 74011000258 HC RX REV CODE- 258: Performed by: HOSPITALIST

## 2022-12-21 PROCEDURE — 74011636637 HC RX REV CODE- 636/637: Performed by: HOSPITALIST

## 2022-12-21 PROCEDURE — 93306 TTE W/DOPPLER COMPLETE: CPT

## 2022-12-21 PROCEDURE — 65270000029 HC RM PRIVATE

## 2022-12-21 PROCEDURE — 82962 GLUCOSE BLOOD TEST: CPT

## 2022-12-21 PROCEDURE — 83735 ASSAY OF MAGNESIUM: CPT

## 2022-12-21 PROCEDURE — 82803 BLOOD GASES ANY COMBINATION: CPT

## 2022-12-21 PROCEDURE — 74011250637 HC RX REV CODE- 250/637: Performed by: EMERGENCY MEDICINE

## 2022-12-21 PROCEDURE — 87040 BLOOD CULTURE FOR BACTERIA: CPT

## 2022-12-21 PROCEDURE — 83880 ASSAY OF NATRIURETIC PEPTIDE: CPT

## 2022-12-21 PROCEDURE — 93005 ELECTROCARDIOGRAM TRACING: CPT

## 2022-12-21 PROCEDURE — 99285 EMERGENCY DEPT VISIT HI MDM: CPT

## 2022-12-21 PROCEDURE — 65660000004 HC RM CVT STEPDOWN

## 2022-12-21 PROCEDURE — 87636 SARSCOV2 & INF A&B AMP PRB: CPT

## 2022-12-21 PROCEDURE — 99223 1ST HOSP IP/OBS HIGH 75: CPT | Performed by: HOSPITALIST

## 2022-12-21 PROCEDURE — 74011000250 HC RX REV CODE- 250

## 2022-12-21 PROCEDURE — 77030035694 HC MSK BIPAP FLL FAC PERFMAX PHIL -B

## 2022-12-21 PROCEDURE — 81001 URINALYSIS AUTO W/SCOPE: CPT

## 2022-12-21 PROCEDURE — 73502 X-RAY EXAM HIP UNI 2-3 VIEWS: CPT

## 2022-12-21 PROCEDURE — 74011250636 HC RX REV CODE- 250/636

## 2022-12-21 PROCEDURE — 96374 THER/PROPH/DIAG INJ IV PUSH: CPT

## 2022-12-21 PROCEDURE — 99223 1ST HOSP IP/OBS HIGH 75: CPT | Performed by: INTERNAL MEDICINE

## 2022-12-21 RX ORDER — DILTIAZEM HYDROCHLORIDE 5 MG/ML
0.25 INJECTION INTRAVENOUS ONCE
Status: COMPLETED | OUTPATIENT
Start: 2022-12-21 | End: 2022-12-21

## 2022-12-21 RX ORDER — ATORVASTATIN CALCIUM 40 MG/1
80 TABLET, FILM COATED ORAL DAILY
Status: DISCONTINUED | OUTPATIENT
Start: 2022-12-21 | End: 2022-12-23 | Stop reason: HOSPADM

## 2022-12-21 RX ORDER — MAGNESIUM SULFATE HEPTAHYDRATE 40 MG/ML
2 INJECTION, SOLUTION INTRAVENOUS
Status: COMPLETED | OUTPATIENT
Start: 2022-12-21 | End: 2022-12-21

## 2022-12-21 RX ORDER — DILTIAZEM HYDROCHLORIDE 180 MG/1
180 CAPSULE, COATED, EXTENDED RELEASE ORAL DAILY
Status: DISCONTINUED | OUTPATIENT
Start: 2022-12-21 | End: 2022-12-23 | Stop reason: HOSPADM

## 2022-12-21 RX ORDER — FUROSEMIDE 10 MG/ML
40 INJECTION INTRAMUSCULAR; INTRAVENOUS
Status: COMPLETED | OUTPATIENT
Start: 2022-12-21 | End: 2022-12-21

## 2022-12-21 RX ORDER — LISINOPRIL 5 MG/1
5 TABLET ORAL DAILY
Status: DISCONTINUED | OUTPATIENT
Start: 2022-12-21 | End: 2022-12-23 | Stop reason: HOSPADM

## 2022-12-21 RX ORDER — INSULIN GLARGINE 100 [IU]/ML
20 INJECTION, SOLUTION SUBCUTANEOUS
Status: DISCONTINUED | OUTPATIENT
Start: 2022-12-21 | End: 2022-12-23 | Stop reason: HOSPADM

## 2022-12-21 RX ORDER — METOPROLOL TARTRATE 50 MG/1
100 TABLET ORAL EVERY 12 HOURS
Status: DISCONTINUED | OUTPATIENT
Start: 2022-12-21 | End: 2022-12-23 | Stop reason: HOSPADM

## 2022-12-21 RX ORDER — GABAPENTIN 100 MG/1
100 CAPSULE ORAL 3 TIMES DAILY
Status: DISCONTINUED | OUTPATIENT
Start: 2022-12-21 | End: 2022-12-21

## 2022-12-21 RX ORDER — MAGNESIUM SULFATE 100 %
4 CRYSTALS MISCELLANEOUS AS NEEDED
Status: DISCONTINUED | OUTPATIENT
Start: 2022-12-21 | End: 2022-12-23 | Stop reason: HOSPADM

## 2022-12-21 RX ORDER — DILTIAZEM HYDROCHLORIDE 5 MG/ML
10 INJECTION INTRAVENOUS
Status: COMPLETED | OUTPATIENT
Start: 2022-12-21 | End: 2022-12-21

## 2022-12-21 RX ORDER — MORPHINE SULFATE 15 MG/1
15 TABLET ORAL 2 TIMES DAILY
Status: DISCONTINUED | OUTPATIENT
Start: 2022-12-21 | End: 2022-12-23 | Stop reason: HOSPADM

## 2022-12-21 RX ORDER — ONDANSETRON 2 MG/ML
4 INJECTION INTRAMUSCULAR; INTRAVENOUS
Status: DISCONTINUED | OUTPATIENT
Start: 2022-12-21 | End: 2022-12-23 | Stop reason: HOSPADM

## 2022-12-21 RX ORDER — LEVOTHYROXINE SODIUM 150 UG/1
150 TABLET ORAL DAILY
Status: DISCONTINUED | OUTPATIENT
Start: 2022-12-21 | End: 2022-12-23 | Stop reason: HOSPADM

## 2022-12-21 RX ORDER — SODIUM CHLORIDE 0.9 % (FLUSH) 0.9 %
5-10 SYRINGE (ML) INJECTION AS NEEDED
Status: DISCONTINUED | OUTPATIENT
Start: 2022-12-21 | End: 2022-12-23 | Stop reason: HOSPADM

## 2022-12-21 RX ORDER — INSULIN LISPRO 100 [IU]/ML
5 INJECTION, SOLUTION INTRAVENOUS; SUBCUTANEOUS
Status: DISCONTINUED | OUTPATIENT
Start: 2022-12-21 | End: 2022-12-23 | Stop reason: HOSPADM

## 2022-12-21 RX ORDER — ACETAMINOPHEN 500 MG
1000 TABLET ORAL
Status: COMPLETED | OUTPATIENT
Start: 2022-12-21 | End: 2022-12-21

## 2022-12-21 RX ORDER — ACETAMINOPHEN 500 MG
500 TABLET ORAL
Status: DISCONTINUED | OUTPATIENT
Start: 2022-12-21 | End: 2022-12-21 | Stop reason: SDUPTHER

## 2022-12-21 RX ORDER — INSULIN LISPRO 100 [IU]/ML
INJECTION, SOLUTION INTRAVENOUS; SUBCUTANEOUS
Status: DISCONTINUED | OUTPATIENT
Start: 2022-12-21 | End: 2022-12-23 | Stop reason: HOSPADM

## 2022-12-21 RX ORDER — HYDROXYZINE HYDROCHLORIDE 10 MG/1
10 TABLET, FILM COATED ORAL
Status: DISCONTINUED | OUTPATIENT
Start: 2022-12-21 | End: 2022-12-23 | Stop reason: HOSPADM

## 2022-12-21 RX ORDER — ALLOPURINOL 100 MG/1
100 TABLET ORAL DAILY
Status: DISCONTINUED | OUTPATIENT
Start: 2022-12-21 | End: 2022-12-21

## 2022-12-21 RX ORDER — SODIUM CHLORIDE 0.9 % (FLUSH) 0.9 %
5-40 SYRINGE (ML) INJECTION AS NEEDED
Status: DISCONTINUED | OUTPATIENT
Start: 2022-12-21 | End: 2022-12-23 | Stop reason: HOSPADM

## 2022-12-21 RX ORDER — SODIUM CHLORIDE 0.9 % (FLUSH) 0.9 %
5-40 SYRINGE (ML) INJECTION EVERY 8 HOURS
Status: DISCONTINUED | OUTPATIENT
Start: 2022-12-21 | End: 2022-12-23 | Stop reason: HOSPADM

## 2022-12-21 RX ORDER — GABAPENTIN 400 MG/1
400 CAPSULE ORAL 3 TIMES DAILY
Status: DISCONTINUED | OUTPATIENT
Start: 2022-12-21 | End: 2022-12-22

## 2022-12-21 RX ORDER — ACETAMINOPHEN 325 MG/1
650 TABLET ORAL
Status: DISCONTINUED | OUTPATIENT
Start: 2022-12-21 | End: 2022-12-23 | Stop reason: HOSPADM

## 2022-12-21 RX ADMIN — Medication 8 UNITS: at 21:12

## 2022-12-21 RX ADMIN — SODIUM CHLORIDE, PRESERVATIVE FREE 10 ML: 5 INJECTION INTRAVENOUS at 22:00

## 2022-12-21 RX ADMIN — DILTIAZEM HYDROCHLORIDE 10 MG: 5 INJECTION, SOLUTION INTRAVENOUS at 02:18

## 2022-12-21 RX ADMIN — DILTIAZEM HYDROCHLORIDE 26.5 MG: 5 INJECTION, SOLUTION INTRAVENOUS at 09:33

## 2022-12-21 RX ADMIN — SODIUM CHLORIDE 5 MG/HR: 900 INJECTION, SOLUTION INTRAVENOUS at 07:54

## 2022-12-21 RX ADMIN — METOPROLOL TARTRATE 100 MG: 50 TABLET, FILM COATED ORAL at 09:33

## 2022-12-21 RX ADMIN — METOPROLOL TARTRATE 100 MG: 50 TABLET, FILM COATED ORAL at 21:12

## 2022-12-21 RX ADMIN — SODIUM CHLORIDE, PRESERVATIVE FREE 10 ML: 5 INJECTION INTRAVENOUS at 02:24

## 2022-12-21 RX ADMIN — SODIUM CHLORIDE, PRESERVATIVE FREE 10 ML: 5 INJECTION INTRAVENOUS at 09:34

## 2022-12-21 RX ADMIN — DILTIAZEM HYDROCHLORIDE 10 MG: 5 INJECTION, SOLUTION INTRAVENOUS at 04:49

## 2022-12-21 RX ADMIN — Medication 20 UNITS: at 06:52

## 2022-12-21 RX ADMIN — LEVOTHYROXINE SODIUM 150 MCG: 150 TABLET ORAL at 09:33

## 2022-12-21 RX ADMIN — FUROSEMIDE 40 MG: 10 INJECTION, SOLUTION INTRAMUSCULAR; INTRAVENOUS at 04:56

## 2022-12-21 RX ADMIN — Medication 5 UNITS: at 04:53

## 2022-12-21 RX ADMIN — SODIUM CHLORIDE, PRESERVATIVE FREE 10 ML: 5 INJECTION INTRAVENOUS at 14:00

## 2022-12-21 RX ADMIN — GABAPENTIN 400 MG: 400 CAPSULE ORAL at 21:12

## 2022-12-21 RX ADMIN — LISINOPRIL 5 MG: 10 TABLET ORAL at 09:33

## 2022-12-21 RX ADMIN — ATORVASTATIN CALCIUM 80 MG: 40 TABLET, FILM COATED ORAL at 09:34

## 2022-12-21 RX ADMIN — WATER 2 G: 1 INJECTION INTRAMUSCULAR; INTRAVENOUS; SUBCUTANEOUS at 15:30

## 2022-12-21 RX ADMIN — RIVAROXABAN 20 MG: 20 TABLET, FILM COATED ORAL at 09:33

## 2022-12-21 RX ADMIN — Medication 20 UNITS: at 22:00

## 2022-12-21 RX ADMIN — ACETAMINOPHEN 1000 MG: 500 TABLET ORAL at 02:14

## 2022-12-21 RX ADMIN — MAGNESIUM SULFATE HEPTAHYDRATE 2 G: 40 INJECTION, SOLUTION INTRAVENOUS at 05:02

## 2022-12-21 RX ADMIN — MORPHINE SULFATE 15 MG: 15 TABLET ORAL at 09:33

## 2022-12-21 RX ADMIN — GABAPENTIN 400 MG: 400 CAPSULE ORAL at 09:33

## 2022-12-21 NOTE — CONSULTS
Cardiology Initial Patient Referral Note    Cardiology referral request from Dr. Anaid Mcnally for evaluation and management/treatment of CHF    Date of  Admission: 12/21/2022 12:04 AM   Primary Care Physician:  Giorgio Levin MD    Attending Cardiologist: Dr. Rhonda Vera    Patient seen and Sammy Deepak in the ED. The patient is a poor historian. She does report that she has been out of bed medications for a unknown period of time. Echocardiogram done this admission as noted below demonstrated a marked reduction in ejection fraction (20 to 25%) since a previous echo of 11/2022. She was diagnosed with atrial fibrillation earlier this year. She has been taking Xarelto for stroke prevention and she also had DVT in the right lower extremity in June 2022. Will attempt to wean and discontinue Cardizem in light of her severe  cardiomyopathy. The  patient's social situation appears to be a significant contributing factor. Would arrange for home health evaluation. Agree with assessment plan as noted below. Ayah Castro MD   Assessment:     -HFrEF, new diagnosis. Echo 12/21/2022 with LVEF 20-25%, prior Echo 08/2022 with LVEF 55%. -Afib, recently diagnosed 10/2022, on Xarelto  -Hx DVT RLE 06/2022, on Xarelto  -Hypothyroidism, on Synthroid  -HTN  -DM, on insulin  -HLD    Primary cardiologist is Dr. Hai Chester:     -Will check repeat BMP, pt s/p one dose IV Lasix, only 400 cc output recorded. Need strict I/O's as previously ordered.  -Continue PO Lopressor, Cardizem. -Pt started on low-dose Lisinopril as per primary team, continue to monitor BP and renal function closely.  -Will discontinue Cardizem infusion, rates remain stable, rhythm currently afib with rates ~80's.  -Continue to monitor HR, pt notes that she was out of medications. History of Present Illness:      This is a 82 y.o. female admitted for Pulmonary edema [J81.1]  Dyspnea [R06.00]  Leukocytosis [D72.829]  Acute exacerbation of CHF (congestive heart failure) ([facility] [address] [I50.9]  Atrial fibrillation with rapid ventricular response ([address] [I48.91]. Patient complains of: shortness of breath      Carmen Hsieh is a 82 y.o. female who presented to the hospital due to shortness of breath. Pt reports that she became acutely short of breath yesterday. Pt endorses that she ran out of her medications recently and notes that she has an upcoming appointment with Dr. Marlena Ac later this week. She denies any associated chest pain. She reports her breathing is currently improved.     Cardiac risk factors: dyslipidemia, diabetes mellitus, obesity, hypertension, post-menopausal      Review of Symptoms:  Except as stated above include:  Constitutional:  negative  Respiratory:  As per HPI  Cardiovascular:  negative  Gastrointestinal: negative  Genitourinary:  negative  Musculoskeletal:  Negative  Neurological:  Negative  Dermatological:  Negative  Endocrinological: Negative  Psychological:  Negative       Past Medical History:     Past Medical History:   Diagnosis Date    Acquired hypothyroidism 8/1/2016    Arthritis of right shoulder region 4/21/2016    Asthma     Bilateral lower extremity edema 7/7/2021    Chronic bilateral low back pain without sciatica 7/7/2021    Chronic diastolic congestive heart failure ([address] 3/10/2021    Chronic venous insufficiency     Diabetes ([address]     neuropathy    Essential hypertension 7/7/2021    Gout     History of depression 1/23/2017    History of fall 7/7/2021    Hypercholesteremia 7/7/2021    Hypertension     MI (myocardial infarction) ([address]     OAB (overactive bladder) 7/7/2021    Peripheral neuropathy     Rheumatoid arthritis ([address]     Tear of right rotator cuff 5/16/2016    Thyroid pain     Urinary incontinence 7/7/2021    Vertigo          Social History:     Social History     Socioeconomic History    Marital status:    Tobacco Use    Smoking status: Former     Types: Cigarettes     Quit date: 1976     Years since quitting: 47.0    Smokeless tobacco: Never    Tobacco comments:     quit 45 years ago   Vaping Use    Vaping Use: Never used   Substance and Sexual Activity    Alcohol use: No     Alcohol/week: 0.0 standard drinks    Drug use: No    Sexual activity: Not Currently        Family History:     Family History   Problem Relation Age of Onset    Heart Attack Mother     Heart Attack Father         Medications:   No Known Allergies     Current Facility-Administered Medications   Medication Dose Route Frequency    sodium chloride (NS) flush 5-10 mL  5-10 mL IntraVENous PRN    atorvastatin (LIPITOR) tablet 80 mg  80 mg Oral DAILY    [Held by provider] dilTIAZem ER (CARDIZEM CD) capsule 180 mg  180 mg Oral DAILY    insulin glargine (LANTUS) injection 20 Units  20 Units SubCUTAneous QHS    hydrOXYzine HCL (ATARAX) tablet 10 mg  10 mg Oral BID PRN    levothyroxine (SYNTHROID) tablet 150 mcg  150 mcg Oral DAILY    metoprolol tartrate (LOPRESSOR) tablet 100 mg  100 mg Oral Q12H    morphine IR (MS IR) tablet 15 mg  15 mg Oral BID    rivaroxaban (XARELTO) tablet 20 mg  20 mg Oral DAILY    sodium chloride (NS) flush 5-40 mL  5-40 mL IntraVENous Q8H    sodium chloride (NS) flush 5-40 mL  5-40 mL IntraVENous PRN    acetaminophen (TYLENOL) tablet 650 mg  650 mg Oral Q6H PRN    Or    acetaminophen (TYLENOL) suppository 650 mg  650 mg Rectal Q6H PRN    lisinopriL (PRINIVIL, ZESTRIL) tablet 5 mg  5 mg Oral DAILY    ondansetron (ZOFRAN) injection 4 mg  4 mg IntraVENous Q6H PRN    gabapentin (NEURONTIN) capsule 400 mg  400 mg Oral TID    dilTIAZem (CARDIZEM) 100 mg in 0.9% sodium chloride (MBP/ADV) 100 mL infusion  5 mg/hr IntraVENous CONTINUOUS     Current Outpatient Medications   Medication Sig    metoprolol tartrate (LOPRESSOR) 100 mg IR tablet Take 1 Tablet by mouth every twelve (12) hours. rivaroxaban (XARELTO) 20 mg tab tablet Take 1 Tablet by mouth daily. dilTIAZem ER (CARDIZEM CD) 180 mg capsule Take 1 Capsule by mouth daily. furosemide (LASIX) 40 mg tablet Take 1 Tablet by mouth daily. Jardiance 10 mg tablet Take 10 mg by mouth daily. BD Insulin Syringe SafetyGlide 0.5 mL 30 gauge x 5/16\" syrg     insulin glargine (LANTUS) 100 unit/mL injection Inject 20 units daily at bedtime. Monitor and record blood sugar daily. insulin aspart U-100 (NovoLOG Flexpen U-100 Insulin) 100 unit/mL (3 mL) inpn 5 Units by SubCUTAneous route Before breakfast, lunch, and dinner. levothyroxine (SYNTHROID) 150 mcg tablet Take 150 mcg by mouth daily. morphine IR (MS IR) 15 mg tablet Take 15 mg by mouth two (2) times a day. metFORMIN (GLUCOPHAGE) 500 mg tablet Take 500 mg by mouth daily. hydrOXYzine HCL (ATARAX) 10 mg tablet Take 1 Tablet by mouth two (2) times daily as needed for Anxiety. allopurinoL (ZYLOPRIM) 100 mg tablet Take 100 mg by mouth daily. gabapentin (NEURONTIN) 400 mg capsule Take 1 cap in morning, 1 cap at 2pm, 2 caps at bedtime    amLODIPine (NORVASC) 10 mg tablet Take 1 Tablet by mouth daily. atorvastatin (LIPITOR) 80 mg tablet Take 1 Tablet by mouth daily. Blood-Glucose Meter (FREESTYLE LITE METER) monitoring kit Test twice blood glucose daily; ICD-10 E11.9; Quantity 1    insulin syringe,safetyneedle 1 mL 31 gauge x 5/16\" syrg 1 Each by Does Not Apply route daily. glucose blood VI test strips (FREESTYLE TEST) strip Use to check blood glucose twice daily DX:E11.9    acetaminophen (TYLENOL) 500 mg tablet Take 1 Tab by mouth every six (6) hours as needed for Pain.          Physical Exam:   Visit Vitals  /60   Pulse (!) 101   Temp 97.6 °F (36.4 °C)   Resp 25   Ht 5' 1\" (1.549 m)   Wt 105.2 kg (232 lb)   SpO2 92%   BMI 43.84 kg/m²       TELE: AFIB    BP Readings from Last 3 Encounters:   12/21/22 109/60   10/14/22 (!) 116/56   08/19/22 (!) 141/74     Pulse Readings from Last 3 Encounters:   12/21/22 (!) 101   10/14/22 90   08/19/22 (!) 108     Wt Readings from Last 3 Encounters:   12/21/22 105.2 kg (232 lb) 10/13/22 105.6 kg (232 lb 14.2 oz)   08/24/22 99.8 kg (220 lb)       General:  alert, cooperative, no distress, appears stated age  Neck:  supple  Lungs:  few bibasilar rales  Heart:  irregularly irregular rhythm  Abdomen:  abdomen is soft without significant tenderness, masses, organomegaly or guarding  Extremities:  atraumatic, no appreciable pitting edema  Skin: Warm and dry.    Neuro: alert, oriented x3, affect appropriate, no focal neurological deficits, moves all extremities well, no involuntary movements  Psych: non focal     Data Review:     Recent Labs     12/21/22 0037   WBC 28.2*   HGB 11.9*   HCT 37.7        Recent Labs     12/21/22 0037   *   K 4.3   CL 95*   CO2 29   *   BUN 33*   CREA 1.79*   CA 9.7   MG 1.6   ALB 4.1   ALT 10*       Results for orders placed or performed during the hospital encounter of 12/21/22   EKG, 12 LEAD, INITIAL   Result Value Ref Range    Ventricular Rate 165 BPM    QRS Duration 88 ms    Q-T Interval 286 ms    QTC Calculation (Bezet) 473 ms    Calculated R Axis 17 degrees    Calculated T Axis -133 degrees    Diagnosis       Critical Test Result: High HR  Atrial fibrillation with rapid ventricular response  Non-specific ST/T wave changes  Abnormal ECG  When compared with ECG of 07-OCT-2022 16:57,  T wave inversion now evident in Inferior leads  Inverted T waves have replaced nonspecific T wave abnormality in Lateral   leads  Confirmed by Eros Lei ([id number]) on 12/21/2022 8:12:47 AM         Last Lipid:    Lab Results   Component Value Date/Time    Cholesterol, total 191 11/09/2021 12:24 PM    HDL Cholesterol 44 11/09/2021 12:24 PM    LDL, calculated 96.8 11/09/2021 12:24 PM    Triglyceride 251 (H) 11/09/2021 12:24 PM    CHOL/HDL Ratio 4.3 11/09/2021 12:24 PM       Signed By: Gonzalo Beard PA-C     December 21, 2022

## 2022-12-21 NOTE — ED PROVIDER NOTES
EMERGENCY DEPARTMENT HISTORY AND PHYSICAL EXAM          Date: 12/21/2022  Patient Name: Rk Ledesma    History of Presenting Illness     Chief Complaint   Patient presents with    Rapid Heart Rate    Shortness of Breath         History Provided By: Patient and EMS    HPI: Shayne Stone is a 82 y.o. female, pmhx hypertension, diabetes, CAD, MI, diastolic heart failure, hypothyroidism and rheumatoid arthritis, who presents via EMS to the ED c/o shortness of breath    Patient states that she was fine earlier today and had a doctor's appointment with no difficulties. Approximately an hour ago*developed sudden onset shortness of breath. Her friend that was with her at her house called 911 when her symptoms worsened and did not get better. Medics found her to be quite hypertensive with a blood pressure of 96. While they were checking her her heart rate increased from 120-->170, her blood pressure increased and her oxygen dropped to 94% they put her on a nonrebreather mask and put her in the back of the ambulance. While in route to the hospital they placed her on CPAP with 80% O2 and noted at that time her saturation to be 100% with a heart rate in the 150s and continued elevated blood pressure. They placed nitroglycerin on her chest wall and gave her tablets of nitroglycerin without change in her symptoms. At this time patient states she still feels short of breath but she denies any chest pain, recent coughing, recent fevers, chills, abdominal pain, nausea, vomiting or diarrhea. PCP: Kathrine Thapa MD    Allergies: None known    There are no other complaints, changes, or physical findings at this time.      Current Facility-Administered Medications   Medication Dose Route Frequency Provider Last Rate Last Admin    sodium chloride (NS) flush 5-10 mL  5-10 mL IntraVENous PRN Neto Nichols MD   10 mL at 12/21/22 0224    magnesium sulfate 2 g/50 ml IVPB (premix or compounded)  2 g IntraVENous NOW Savannah Nichols MD 50 mL/hr at 12/21/22 0502 2 g at 12/21/22 0502    acetaminophen (TYLENOL) tablet 500 mg  500 mg Oral Q6H PRN Gisell Meredith MD        allopurinoL (ZYLOPRIM) tablet 100 mg  100 mg Oral DAILY Gisell Meredith MD        atorvastatin (LIPITOR) tablet 80 mg  80 mg Oral DAILY Gisell Meredith MD        dilTIAZem ER (CARDIZEM CD) capsule 180 mg  180 mg Oral DAILY Gisell Meredith MD        gabapentin (NEURONTIN) capsule 100 mg  100 mg Oral TID Marilynn Oliva MD        insulin glargine (LANTUS) injection 20 Units  20 Units SubCUTAneous QHS Gisell Meredith MD        hydrOXYzine HCL (ATARAX) tablet 10 mg  10 mg Oral BID PRN Gisell Meredith MD        levothyroxine (SYNTHROID) tablet 150 mcg  150 mcg Oral DAILY Gisell Meredith MD        metoprolol tartrate (LOPRESSOR) tablet 100 mg  100 mg Oral Q12H Gisell Meredith MD        morphine IR (MS IR) tablet 15 mg  15 mg Oral BID Gisell Meredith MD        rivaroxaban (XARELTO) tablet 20 mg  20 mg Oral DAILY Phoenix Meredith MD        sodium chloride (NS) flush 5-40 mL  5-40 mL IntraVENous Q8H Phoenix Meredith MD        sodium chloride (NS) flush 5-40 mL  5-40 mL IntraVENous PRN Gisell Meredith MD        acetaminophen (TYLENOL) tablet 650 mg  650 mg Oral Q6H PRN Gisell Meredith MD        Or    acetaminophen (TYLENOL) suppository 650 mg  650 mg Rectal Q6H PRN Gisell Meredith MD        lisinopriL (PRINIVIL, ZESTRIL) tablet 5 mg  5 mg Oral DAILY Gisell Meredith MD        ondansetron (ZOFRAN) injection 4 mg  4 mg IntraVENous Q6H PRN Gisell Meredith MD         Current Outpatient Medications   Medication Sig Dispense Refill    metoprolol tartrate (LOPRESSOR) 100 mg IR tablet Take 1 Tablet by mouth every twelve (12) hours. 120 Tablet 2    rivaroxaban (XARELTO) 20 mg tab tablet Take 1 Tablet by mouth daily. 30 Tablet 2    dilTIAZem ER (CARDIZEM CD) 180 mg capsule Take 1 Capsule by mouth daily.  120 Capsule 3    furosemide (LASIX) 40 mg tablet Take 1 Tablet by mouth daily. 90 Tablet 3    Jardiance 10 mg tablet Take 10 mg by mouth daily. BD Insulin Syringe SafetyGlide 0.5 mL 30 gauge x 5/16\" syrg       insulin glargine (LANTUS) 100 unit/mL injection Inject 20 units daily at bedtime. Monitor and record blood sugar daily. 1 mL 0    insulin aspart U-100 (NovoLOG Flexpen U-100 Insulin) 100 unit/mL (3 mL) inpn 5 Units by SubCUTAneous route Before breakfast, lunch, and dinner. 1 Pen 0    levothyroxine (SYNTHROID) 150 mcg tablet Take 150 mcg by mouth daily. morphine IR (MS IR) 15 mg tablet Take 15 mg by mouth two (2) times a day. metFORMIN (GLUCOPHAGE) 500 mg tablet Take 500 mg by mouth daily. hydrOXYzine HCL (ATARAX) 10 mg tablet Take 1 Tablet by mouth two (2) times daily as needed for Anxiety. allopurinoL (ZYLOPRIM) 100 mg tablet Take 100 mg by mouth daily. gabapentin (NEURONTIN) 400 mg capsule Take 1 cap in morning, 1 cap at 2pm, 2 caps at bedtime 360 Capsule 0    amLODIPine (NORVASC) 10 mg tablet Take 1 Tablet by mouth daily. 90 Tablet 0    atorvastatin (LIPITOR) 80 mg tablet Take 1 Tablet by mouth daily. 90 Tablet 3    Blood-Glucose Meter (FREESTYLE LITE METER) monitoring kit Test twice blood glucose daily; ICD-10 E11.9; Quantity 1 1 Kit 0    insulin syringe,safetyneedle 1 mL 31 gauge x 5/16\" syrg 1 Each by Does Not Apply route daily. 300 Each 3    glucose blood VI test strips (FREESTYLE TEST) strip Use to check blood glucose twice daily DX:E11.9 300 Strip 3    acetaminophen (TYLENOL) 500 mg tablet Take 1 Tab by mouth every six (6) hours as needed for Pain.  270 Tab 3       Past History     Past Medical History:  Past Medical History:   Diagnosis Date    Acquired hypothyroidism 8/1/2016    Arthritis of right shoulder region 4/21/2016    Asthma     Bilateral lower extremity edema 7/7/2021    Chronic bilateral low back pain without sciatica 7/7/2021    Chronic diastolic congestive heart failure ([address] 3/10/2021    Chronic venous insufficiency     Diabetes ([facility]     neuropathy    Essential hypertension 2021    Gout     History of depression 2017    History of fall 2021    Hypercholesteremia 2021    Hypertension     MI (myocardial infarction) ([facility] [address]     OAB (overactive bladder) 2021    Peripheral neuropathy     Rheumatoid arthritis ([facility] [address]     Tear of right rotator cuff 2016    Thyroid pain     Urinary incontinence 2021    Vertigo        Past Surgical History:  Past Surgical History:   Procedure Laterality Date    HX AMPUTATION TOE Right     Great toe Dr. Duncan Clock [address]      HX CHOLECYSTECTOMY      HX GYN      TAHOophorectomy due to infection    HX HEENT      resection of memegioma in the . HX KNEE REPLACEMENT Bilateral        Family History:  Family History   Problem Relation Age of Onset    Heart Attack Mother     Heart Attack Father        Social History:  Social History     Tobacco Use    Smoking status: Former     Types: Cigarettes     Quit date:      Years since quittin.0    Smokeless tobacco: Never    Tobacco comments:     quit 45 years ago   Vaping Use    Vaping Use: Never used   Substance Use Topics    Alcohol use: No     Alcohol/week: 0.0 standard drinks    Drug use: No       Allergies:  No Known Allergies      Review of Systems   Review of Systems   Constitutional:  Negative for activity change, appetite change, chills, fever and unexpected weight change. HENT:  Negative for congestion. Eyes:  Negative for pain and visual disturbance. Respiratory:  Positive for shortness of breath. Negative for cough. Cardiovascular:  Negative for chest pain. Gastrointestinal:  Negative for abdominal pain, diarrhea, nausea and vomiting. Genitourinary:  Negative for dysuria. Musculoskeletal:  Negative for back pain. Skin:  Negative for rash. Neurological:  Negative for headaches. Physical Exam   Physical Exam  Vitals and nursing note reviewed.    Constitutional: Appearance: She is well-developed. She is not diaphoretic. Comments: Elderly female awake and alert, tachypneic and in moderate acute distress   HENT:      Head: Normocephalic and atraumatic. Eyes:      General:         Right eye: No discharge. Left eye: No discharge. Conjunctiva/sclera: Conjunctivae normal.      Pupils: Pupils are equal, round, and reactive to light. Cardiovascular:      Rate and Rhythm: Normal rate and regular rhythm. Heart sounds: Normal heart sounds. No murmur heard. Pulmonary:      Effort: Pulmonary effort is normal. No respiratory distress. Breath sounds: Normal breath sounds. No wheezing, rhonchi or rales. Abdominal:      General: Bowel sounds are normal. There is no distension. Palpations: Abdomen is soft. Tenderness: There is no abdominal tenderness. There is no guarding or rebound. Musculoskeletal:         General: Normal range of motion. Cervical back: Normal range of motion and neck supple. Right lower leg: No edema. Left lower leg: No edema. Skin:     General: Skin is warm and dry. Coloration: Skin is not pale. Findings: No bruising, erythema or rash. Comments: Lesion on the base of the left great toe appears slightly erythematous and tender. There is a central ulceration present, but it appears to be healing   Neurological:      Mental Status: She is alert and oriented to person, place, and time. Cranial Nerves: No cranial nerve deficit. Motor: No abnormal muscle tone.      Diagnostic Study Results     Labs -     Recent Results (from the past 12 hour(s))   EKG, 12 LEAD, INITIAL    Collection Time: 12/21/22 12:09 AM   Result Value Ref Range    Ventricular Rate 165 BPM    QRS Duration 88 ms    Q-T Interval 286 ms    QTC Calculation (Bezet) 473 ms    Calculated R Axis 17 degrees    Calculated T Axis -133 degrees    Diagnosis        Critical Test Result: High HR  Atrial fibrillation with rapid ventricular response  ST & T wave abnormality, consider inferior ischemia  Abnormal ECG  When compared with ECG of 07-OCT-2022 16:57,  T wave inversion now evident in Inferior leads  Inverted T waves have replaced nonspecific T wave abnormality in Lateral   leads         Radiologic Studies -   XR CHEST PORT    (Results Pending)   XR GREAT TOE LT MIN 2 V    (Results Pending)     CT Results  (Last 48 hours)      None          CXR Results  (Last 48 hours)      None              Medical Decision Making   I am the first provider for this patient. I reviewed the vital signs, available nursing notes, past medical history, past surgical history, family history and social history. Vital Signs-Reviewed the patient's vital signs. Patient Vitals for the past 12 hrs:   Pulse Resp BP SpO2   12/21/22 0424 (!) 154 -- 110/80 98 %   12/21/22 0414 -- -- -- 97 %   12/21/22 0409 (!) 152 -- (!) 148/70 98 %   12/21/22 0354 (!) 150 -- 113/68 98 %   12/21/22 0339 (!) 151 -- (!) 131/97 98 %   12/21/22 0324 (!) 136 -- 120/84 98 %   12/21/22 0309 (!) 131 -- 113/77 98 %   12/21/22 0254 (!) 137 -- 112/64 98 %   12/21/22 0239 (!) 147 -- 134/75 100 %   12/21/22 0224 (!) 145 -- 124/83 100 %   12/21/22 0218 -- -- (!) 140/110 --   12/21/22 0125 -- -- -- 100 %   12/21/22 0015 (!) 169 28 -- 100 %       Pulse Oximetry Analysis - 100% on BiPAP with 50% O2     Cardiac Monitor:   Rate: 170bpm  Rhythm: Atrial Fibrillation      Records Reviewed: Nursing Notes, Old Medical Records, Previous electrocardiograms, Ambulance Run Sheet, Previous Radiology Studies, and Previous Laboratory Studies    Provider Notes (Medical Decision Making):   MDM: [facility]. Given increasing pain of her wound which she says was previously healed as a possible source of infection suspicious this order set requested for initiation given her tachycardia.   Await vitals to determine if patient is afebrile or hemodynamically unstable. ED Course:   Initial assessment performed. The patients presenting problems have been discussed, and they are in agreement with the care plan formulated and outlined with them. I have encouraged them to ask questions as they arise throughout their visit. EKG interpretation: (Preliminary)  Rhythm: Atrial fibrillation rate of 165 bpm; normal QRS; prolonged QTC with normal axis. Incomplete right bundle  ST depression in the inferior leads without reciprocal changes  This EKG was interpreted by ED Provider Maria Del Carmen Ty MD    PROGRESS NOTE:    ED Course as of 12/21/22 0438   Wed Dec 21, 2022   0135 Available labs reviewed with significantly elevated white count. pH surprisingly normal with normal oxygenation. Procalcitonin is 0.1 and sed rate at 18. Blood sugar notably elevated with a mild AQUILES but without anion gap acidosis. Heart rate currently 160 but yet to be provided any calcium channel blocker. [JT]   0209 Patient continues to await diltiazem dosing and x-ray to rule out acute pneumonia or pulmonary edema. Heart rate currently 145. She states she feels much better and her shortness of breath is resolving. Await BNP. [JT]   [id number] Patient taken off of BiPAP and placed onto 4 L nasal cannula. . Tolerating well and saturations remaining within normal limits. Continues to await repeat CCB dosing. Awaiting magnesium previously ordered which could be contributing to her RVR. Discussed case with admitting hospitalist; agrees to admission.  Charge RN made aware of medication delays [JT]      ED Course User Index  [JT] Eduardo Peoples MD        Critical Care Time:   CRITICAL CARE NOTE :  4:46 AM   IMPENDING DETERIORATION -Airway, Respiratory, Cardiovascular, CNS, Metabolic, Renal, and Hepatic  ASSOCIATED RISK FACTORS - Hypotension, Hypoxia, Dysrhythmia, and Dehydration  MANAGEMENT- Bedside Assessment and Supervision of Care  INTERPRETATION -  Xrays, ECG, and Blood Pressure  INTERVENTIONS - hemodynamic mngmt, Metobolic interventions, and IV calcium channel blocker  CASE REVIEW - Hospitalist/Intensivist and Nursing  TREATMENT RESPONSE -Stable  PERFORMED BY - Self    NOTES   :  I have spent 40 minutes of critical care time involved in lab review, consultations with specialist, family decision- making, bedside attention and documentation; time exculsive of EKG review/interpretation. During this entire length of time I was immediately available to the patient. Trinidad Pittman. MD Magdalena        Diagnosis     Clinical Impression:   1. Atrial fibrillation with RVR ([address]    2. Acute pulmonary edema (HCC)    3. Leukocytosis, unspecified type        PLAN:  Admission for further management and care      Please note, this dictation was completed with Stretch, the computer voice recognition software. Quite often unanticipated grammatical, syntax, homophones, and other interpretive errors are inadvertently transcribed by the computer software. Please disregard these errors. Please excuse any errors that have escaped final proof reading.

## 2022-12-21 NOTE — PROGRESS NOTES
completed the initial Spiritual Assessment of the patient, and offered Pastoral Care support to the patient.,  There is no advance directive present. . Patient does not have any Adventism/cultural needs that will affect patients preferences in health care. Chaplains will continue to follow and will provide pastoral care on an as needed/requested basis.     [facility]  [phone] No

## 2022-12-21 NOTE — PROGRESS NOTES
Per conversation with patient today, she would like to continue this hospital stay as full code. Spoke with pt's son, Navi Roth (MPOA). ACP on file in allscripts from 2019 - signed durable DNR. Navi Roth states that this is the most up to date document and he hasn't addressed his mother's end of life wishes since then. He will speak to her to clarify/update documentation as necessary. Pt will remain full code at this time.      [address]

## 2022-12-21 NOTE — ACP (ADVANCE CARE PLANNING)
Advance Care Planning   Advance Care Planning Inpatient Note  [facility]  Spiritual Care Department    Today's Date: 12/21/2022  Unit: [facility] EMERGENCY DEPT    Received request from . Upon review of chart and communication with care team, patient's decision making abilities are not in question. Patient was/were present in the room during visit. Goals of ACP Conversation:  Discuss Advance Care planning documents    Health Care Decision Makers:      Supplemental (Other) Decision Maker: Betina Morales - [phone]    Summary:  No Decision Maker named by patient at this time    Advance Care Planning Documents (Patient Wishes) on file:  None     Assessment:    Patient seen as a new admit to the hospital from bed 15 of the emergency room where. Patient is not very alert at this time. There is no advance directive present.     Interventions:  Deferred conversation as patient not interested in completing an advance directive at this time    Care Preferences Communicated:  No    Outcomes/Plan:      Erick Leroy [facility] on 12/21/2022 at 10:18 AM

## 2022-12-21 NOTE — H&P
History & Physical    Patient: Winsome Carrizales MRN: [id number]  CSN: [id number]    YOB: 1935  Age: 82 y.o. Sex: female      DOA: 12/21/2022    Chief Complaint   Patient presents with    Rapid Heart Rate    Shortness of Breath     Dragon medical dictation software was used for portions of this report. Unintended errors may occur. If you have any questions regarding the note or its content please set up a time to discuss by calling the nurse on the floor. Assessment/Plan     Acute CHF exacerbation: Appears more diastolic. Placed on IV diuretics at this time. Strict I's and O's and daily weights. Monitor on telemetry. We will cycle cardiac enzymes. Check echocardiogram.  Continue oxygen via high flow. Monitor renal function on a daily basis. Paroxysmal atrial fibrillation with rapid ventricular rate: We will place the patient on IV Cardizem drip. Plan to resume home Cardizem dosage at this time. Continue Lopressor for better heart rate control. Continue Xarelto 20 mg daily. Insulin-dependent diabetes mellitus: We will resume Lantus and and then placed on corrective insulin coverage. Check blood sugars closely and adjust insulin regimen accordingly. Diabetic peripheral neuropathy: Continue Neurontin 400 mg 3 times daily. Chronic pain syndrome: Patient to continue on MS IR 15 mg twice daily. Hypertension: Continue lisinopril, Lopressor. Monitor blood pressures closely and avoid hypotension. Hypothyroidism: Continue 150 mcg Synthroid daily.       Active Problems:    Acute exacerbation of CHF (congestive heart failure) ([address] (10/7/2022)      Leukocytosis (12/21/2022)      Pulmonary edema (12/21/2022)      Dyspnea (12/21/2022)      Atrial fibrillation with rapid ventricular response ([address] (12/21/2022)         HPI:     Winsome Carrizales is a 82 y.o. female who has a known history of hypertension, coronary artery disease, diabetes mellitus type 2, chronic diastolic congestive heart failure, rheumatoid arthritis, hypothyroidism presented to the emergency room with the complaints of shortness of breath. Patient mentions that she was doing well and had a routine doctor's appointment yesterday and came home and she was doing completely fine. Patient was with her friend in the evening when she suddenly developed shortness of breath and the friend called 911 as her symptoms gotten worse and EMS came and found her to be significantly hypertensive and also her heart rate was between 1 20-1 70 and her O2 saturations dropped so she was placed on nonrebreather and put her in the ambulance to bring her to the emergency room. In route patient O2 sats were in the 80s and his CPAP machine was used to improve that. Patient was given nitroglycerin without any significant change in her symptoms. Patient denied any complaints of chest pain no fevers or chills no nausea vomiting diarrhea abdominal pain. The emergency room patient was given IV Lasix and placed on BiPAP initially and a nitro paste was given to her. Patient's breathing slowly improved and was taken off the BiPAP and placed on nasal cannula. Patient's heart rate diastolic continued to stay in the 120s.       Past Medical History:   Diagnosis Date    Acquired hypothyroidism 8/1/2016    Arthritis of right shoulder region 4/21/2016    Asthma     Bilateral lower extremity edema 7/7/2021    Chronic bilateral low back pain without sciatica 7/7/2021    Chronic diastolic congestive heart failure ([address] 3/10/2021    Chronic venous insufficiency     Diabetes (HCC)     neuropathy    Essential hypertension 7/7/2021    Gout     History of depression 1/23/2017    History of fall 7/7/2021    Hypercholesteremia 7/7/2021    Hypertension     MI (myocardial infarction) ([address]     OAB (overactive bladder) 7/7/2021    Peripheral neuropathy     Rheumatoid arthritis ([address]     Tear of right rotator cuff 5/16/2016    Thyroid pain     Urinary incontinence 2021    Vertigo        Past Surgical History:   Procedure Laterality Date    HX AMPUTATION TOE Right 2020    Great toe Dr. Tiana Ballard      HX GYN      TAHOophorectomy due to infection    HX HEENT      resection of memegioma in the . HX KNEE REPLACEMENT Bilateral        Family History   Problem Relation Age of Onset    Heart Attack Mother     Heart Attack Father        Social History     Socioeconomic History    Marital status:    Tobacco Use    Smoking status: Former     Types: Cigarettes     Quit date:      Years since quittin.0    Smokeless tobacco: Never    Tobacco comments:     quit 45 years ago   Vaping Use    Vaping Use: Never used   Substance and Sexual Activity    Alcohol use: No     Alcohol/week: 0.0 standard drinks    Drug use: No    Sexual activity: Not Currently       Prior to Admission medications    Medication Sig Start Date End Date Taking? Authorizing Provider   metoprolol tartrate (LOPRESSOR) 100 mg IR tablet Take 1 Tablet by mouth every twelve (12) hours. 10/13/22   Felipe Reilly MD   rivaroxaban (XARELTO) 20 mg tab tablet Take 1 Tablet by mouth daily. 10/14/22   Felipe Reilly MD   dilTIAZem ER (CARDIZEM CD) 180 mg capsule Take 1 Capsule by mouth daily. 10/14/22   Felipe Reilly MD   furosemide (LASIX) 40 mg tablet Take 1 Tablet by mouth daily. 10/13/22   Felipe Reilly MD   Jardiance 10 mg tablet Take 10 mg by mouth daily. 22   Provider, Historical   BD Insulin Syringe SafetyGlide 0.5 mL 30 gauge x 16\" syrg  22   Provider, Historical   insulin glargine (LANTUS) 100 unit/mL injection Inject 20 units daily at bedtime. Monitor and record blood sugar daily. 22   Lexi SANTOS MD   insulin aspart U-100 (NovoLOG Flexpen U-100 Insulin) 100 unit/mL (3 mL) inpn 5 Units by SubCUTAneous route Before breakfast, lunch, and dinner.  22   Lura Hashimoto, MD levothyroxine (SYNTHROID) 150 mcg tablet Take 150 mcg by mouth daily. 2/25/22   Provider, Historical   morphine IR (MS IR) 15 mg tablet Take 15 mg by mouth two (2) times a day. 5/2/22   Provider, Historical   metFORMIN (GLUCOPHAGE) 500 mg tablet Take 500 mg by mouth daily. 2/25/22   Provider, Historical   hydrOXYzine HCL (ATARAX) 10 mg tablet Take 1 Tablet by mouth two (2) times daily as needed for Anxiety. 2/25/22   Provider, Historical   allopurinoL (ZYLOPRIM) 100 mg tablet Take 100 mg by mouth daily. 2/25/22   Provider, Historical   gabapentin (NEURONTIN) 400 mg capsule Take 1 cap in morning, 1 cap at 2pm, 2 caps at bedtime 11/9/21   Jackie Levy DNP   amLODIPine (NORVASC) 10 mg tablet Take 1 Tablet by mouth daily. 11/9/21   Radha Levy DNP   atorvastatin (LIPITOR) 80 mg tablet Take 1 Tablet by mouth daily. 9/15/21   Raymond Oscar MD   Blood-Glucose Meter (FREESTYLE LITE METER) monitoring kit Test twice blood glucose daily; ICD-10 E11.9; Quantity 1 12/15/17   Carol Masters NP   insulin syringe,safetyneedle 1 mL 31 gauge x 5/16\" syrg 1 Each by Does Not Apply route daily. 8/7/17   Morgan Diaz MD   glucose blood VI test strips (FREESTYLE TEST) strip Use to check blood glucose twice daily DX:E11.9 7/5/17   Morgan Diaz MD   acetaminophen (TYLENOL) 500 mg tablet Take 1 Tab by mouth every six (6) hours as needed for Pain. 11/2/16   Morgan Diaz MD       No Known Allergies      Review of Systems  GENERAL: No fevers or chills. HEENT: No change in vision, no earache, tinnitus, sore throat or sinus congestion. NECK: No pain or stiffness. CARDIOVASCULAR: No chest pain or pressure. + palpitations. PULMONARY: ++ shortness of breath, cough. No wheeze. GASTROINTESTINAL: No abdominal pain, nausea, vomiting or diarrhea, melena or bright red blood per rectum. GENITOURINARY: No urinary frequency, urgency, hesitancy or dysuria.    MUSCULOSKELETAL: No joint or muscle pain, no back pain, no recent trauma. DERMATOLOGIC: No rash, no itching, no lesions. ENDOCRINE: No polyuria, polydipsia, no heat or cold intolerance. No recent change in weight. HEMATOLOGICAL: No anemia or easy bruising or bleeding. NEUROLOGIC: No headache, seizures, numbness, tingling or weakness. PSYCHIATRIC: No depression, anxiety, mood disorder, no loss of interest in normal activity or change in sleep pattern. Physical Exam:     Physical Exam:  Visit Vitals  /80   Pulse (!) 154   Resp 28   SpO2 98%    O2 Flow Rate (L/min): 4 l/min O2 Device: Nasal cannula    No data recorded. General:  Alert, cooperative, no distress, appears stated age. Head:  Normocephalic, without obvious abnormality, atraumatic. Eyes:  Conjunctivae/corneas clear. PERRL, EOMs intact. Nose: Nares normal. No drainage or sinus tenderness. Throat: Lips, mucosa, and tongue normal. Teeth and gums normal.   Neck: Supple, symmetrical, trachea midline, no adenopathy, thyroid: no enlargement/tenderness/nodules, no carotid bruit and no JVD. Back:   ROM normal. No CVA tenderness. Lungs:   Basal crackles noted bilaterally. Chest wall:  No tenderness or deformity. Heart:  Irregular rate and rhythm, S1, S2 normal, no murmur, click, rub or gallop. Abdomen: Soft, non-tender. Bowel sounds normal. No masses,  No organomegaly. Extremities: Extremities normal, atraumatic, no cyanosis . 1+ bilateral lower extremity edema. Pulses: 2+ and symmetric all extremities. Skin: Skin color, texture, turgor normal.  Redness around the left great toe with a central ulceration but healing noted. Neurologic: CNII-XII intact. No focal motor or sensory deficit.        Labs Reviewed:    Recent Results (from the past 24 hour(s))   EKG, 12 LEAD, INITIAL    Collection Time: 12/21/22 12:09 AM   Result Value Ref Range    Ventricular Rate 165 BPM    QRS Duration 88 ms    Q-T Interval 286 ms    QTC Calculation (Bezet) 473 ms    Calculated R Axis 17 degrees    Calculated T Axis -133 degrees    Diagnosis        Critical Test Result: High HR  Atrial fibrillation with rapid ventricular response  ST & T wave abnormality, consider inferior ischemia  Abnormal ECG  When compared with ECG of 07-OCT-2022 16:57,  T wave inversion now evident in Inferior leads  Inverted T waves have replaced nonspecific T wave abnormality in Lateral   leads     TROPONIN-HIGH SENSITIVITY    Collection Time: 12/21/22 12:37 AM   Result Value Ref Range    Troponin-High Sensitivity 20 0 - 54 ng/L   METABOLIC PANEL, COMPREHENSIVE    Collection Time: 12/21/22 12:37 AM   Result Value Ref Range    Sodium 132 (L) 136 - 145 mmol/L    Potassium 4.3 3.5 - 5.5 mmol/L    Chloride 95 (L) 100 - 111 mmol/L    CO2 29 21 - 32 mmol/L    Anion gap 8 3.0 - 18 mmol/L    Glucose 452 (HH) 74 - 99 mg/dL    BUN 33 (H) 7.0 - 18 MG/DL    Creatinine 1.79 (H) 0.6 - 1.3 MG/DL    BUN/Creatinine ratio 18 12 - 20      eGFR 27 (L) >60 ml/min/1.73m2    Calcium 9.7 8.5 - 10.1 MG/DL    Bilirubin, total 0.9 0.2 - 1.0 MG/DL    ALT (SGPT) 10 (L) 13 - 56 U/L    AST (SGOT) 14 10 - 38 U/L    Alk. phosphatase 69 45 - 117 U/L    Protein, total 9.1 (H) 6.4 - 8.2 g/dL    Albumin 4.1 3.4 - 5.0 g/dL    Globulin 5.0 (H) 2.0 - 4.0 g/dL    A-G Ratio 0.8 0.8 - 1.7     CBC WITH AUTOMATED DIFF    Collection Time: 12/21/22 12:37 AM   Result Value Ref Range    WBC 28.2 (H) 4.6 - 13.2 K/uL    RBC 4.59 4.20 - 5.30 M/uL    HGB 11.9 (L) 12.0 - 16.0 g/dL    HCT 37.7 35.0 - 45.0 %    MCV 82.1 78.0 - 100.0 FL    MCH 25.9 24.0 - 34.0 PG    MCHC 31.6 31.0 - 37.0 g/dL    RDW 15.7 (H) 11.6 - 14.5 %    PLATELET 346 056 - 676 K/uL    MPV 13.8 (H) 9.2 - 11.8 FL    NRBC 0.0 0  WBC    ABSOLUTE NRBC 0.00 0.00 - 0.01 K/uL    NEUTROPHILS 93 (H) 40 - 73 %    BAND NEUTROPHILS 3 %    LYMPHOCYTES 1 (L) 21 - 52 %    MONOCYTES 1 (L) 3 - 10 %    EOSINOPHILS 0 0 - 5 %    BASOPHILS 1 0 - 2 %    METAMYELOCYTES 1 %    IMMATURE GRANULOCYTES 0 0.0 - 0.5 %    ABS. NEUTROPHILS 27.1 (H) 1.8 - 8.0 K/UL    ABS. LYMPHOCYTES 0.3 (L) 0.9 - 3.6 K/UL    ABS. MONOCYTES 0.3 0.05 - 1.2 K/UL    ABS. EOSINOPHILS 0.0 0.0 - 0.4 K/UL    ABS. BASOPHILS 0.3 (H) 0.0 - 0.1 K/UL    ABS. IMM. GRANS. 0.0 0.00 - 0.04 K/UL    DF MANUAL      PLATELET COMMENTS ADEQUATE PLATELETS      RBC COMMENTS ANISOCYTOSIS  1+        RBC COMMENTS POLYCHROMASIA  1+        RBC COMMENTS OVALOCYTES  FEW       SED RATE (ESR)    Collection Time: 12/21/22 12:37 AM   Result Value Ref Range    Sed rate, automated 18 0 - 30 mm/hr   MAGNESIUM    Collection Time: 12/21/22 12:37 AM   Result Value Ref Range    Magnesium 1.6 1.6 - 2.6 mg/dL   PROCALCITONIN    Collection Time: 12/21/22 12:37 AM   Result Value Ref Range    Procalcitonin 0.10 ng/mL   NT-PRO BNP    Collection Time: 12/21/22 12:37 AM   Result Value Ref Range    NT pro-BNP 4,582 (H) 0 - 1,800 PG/ML   BLOOD GAS, ARTERIAL POC    Collection Time: 12/21/22  1:21 AM   Result Value Ref Range    Device: BIPAP MASK      FIO2 (POC) 50 %    pH (POC) 7.43 7.35 - 7.45      pCO2 (POC) 41.0 35.0 - 45.0 MMHG    pO2 (POC) 120 (H) 80 - 100 MMHG    HCO3 (POC) 27.3 (H) 22 - 26 MMOL/L    sO2 (POC) 98.8 (H) 92 - 97 %    Base excess (POC) 2.7 mmol/L    Set Rate 8 bpm    PEEP/CPAP (POC) 8 cmH2O    Pressure support 8 cmH2O    Allens test (POC) Positive      Total resp. rate 22      Site RIGHT RADIAL      Specimen type (POC) ARTERIAL      Performed by Calvin Travis        Procedures/imaging: see electronic medical records for all procedures/Xrays and details which were not copied into this note but were reviewed prior to creation of Jessy Agrawal MD  December 21, 2022  [phone].

## 2022-12-21 NOTE — ED NOTES
Assumed care of patient, remains tachycardic on standard non-titrating Cardizem. Requested bolus from Dr. Ludin Otto. Patient continues to deny pain and all other vitals are stable.

## 2022-12-21 NOTE — PROGRESS NOTES
12/21/22 0414   Oxygen Therapy   O2 Sat (%) 97 %   Pulse via Oximetry 140 beats per minute   O2 Device Nasal cannula   O2 Flow Rate (L/min) 4 l/min   FIO2 (%) 36 %   CPAP/BIPAP   CPAP/BIPAP Start/Stop Off   Pt wean off Bipap and switched to nasal cannula. Pt denies SOB. MD is aware.

## 2022-12-21 NOTE — CONSULTS
[id number] [facility] 54: [phone]  [facility]: [address] [place] [address] Way: [phone]    Patient Name: Chaparro Coronado  YOB: 1935    Date of Initial Consult: 12/21/2022   Reason for Consult: goals of care   Requesting Provider: Dr Uri Posada    Primary Care Physician: Osman Concepcion MD      SUMMARY:   Chaparro Coronado is a 82y.o. year old with a past history of congestive heart failure, atrial fib, diabetes, hypertension and DVT of leg in October 22, who was admitted on 12/21/2022 from home with a diagnosis of new onset of atrial fib with RVR. Patient was seen at PCP office earlier that day and noted to be fine however she went home developed shortness of breath came to the ER and found to be in atrial fib with RVR. Current medical issues leading to Palliative Medicine involvement include:82year-old female who presented with shortness of breath found to be in new onset atrial fib with RVR. Palliative medicine is consulted to discuss goals of care due to advanced age and some overall debility. PALLIATIVE DIAGNOSES:    Encounter for palliative care / goals of care   Advanced care plan discussions   A fib   Advanced age / debility        PLAN:   Encounter for palliative care / goals of care seen along with Ms. Surjit mcarthur RN. Patient seen in ER she is alert somewhat hard of hearing. Patient tells that she was short of breath at home and it was a terrible feeling. She currently feels much better. Of care discussed with patient including benefits and burdens she tells us she wants to live to be 89. And she is for CPR and intubation for cardiac arrest.  Goals of care are full code with full interventions. Advanced care plan discussions patient she had 6 children 1 has recently passed. She has completed an AMD in the past naming her son Carly Anchors is medical power of .   A fib  seen by cards also found to have new dx of HFrEF with echo of 20 to 25% / on xarelto for a fib. Lasix x 1 for heart failure per cards  Advanced age / debility lives at home with her partner of 4 years. \" Take care of each other\" Tries to stay mobile. PPS 50   Initial consult note routed to primary continuity provider  Communicated plan of care with: Palliative IDT, patient       Patient/Health Care Proxy Stated Goals: Prolong life      TREATMENT PREFERENCES:   Code Status: Full Code    Advance Care Planning:  [] [facility] Interdisciplinary Team has updated the ACP Navigator with [address] and Patient Capacity    Primary Decision [facility] ([address]): Supplemental (Other) Decision MakerNecristhian Candy  Carmen - [phone]    Medical Interventions: Full interventions         Other:  As far as possible, the palliative care team has discussed with patient / health care proxy about goals of care / treatment preferences for patient. HISTORY:     History obtained from: chart and patient     CHIEF COMPLAINT: shortness of breath     HPI/SUBJECTIVE:    The patient is:   [x] Verbal and participatory  [] Non-participatory due to:   Please see summary      Clinical Pain Assessment (nonverbal scale for nonverbal patients): Clinical Pain Assessment  Severity: 0          Duration: for how long has pt been experiencing pain (e.g., 2 days, 1 month, years)  Frequency: how often pain is an issue (e.g., several times per day, once every few days, constant)     FUNCTIONAL ASSESSMENT:     Palliative Performance Scale (PPS):  PPS: 50    ECOG  ECOG Status : Ambulatory, but unable to carry out work activities     PSYCHOSOCIAL/SPIRITUAL SCREENING:      Any spiritual / Mu-ism concerns:  [] Yes /  [x] No    Caregiver Burnout:  [] Yes /  [] No /  [x] No Caregiver Present      Anticipatory grief assessment:   [x] Normal  / [] Maladaptive        REVIEW OF SYSTEMS:     Positive and pertinent negative findings in ROS are noted above in HPI.   The following systems were [x] reviewed / [] unable to be reviewed as noted in HPI  Other findings are noted below. Systems: constitutional, ears/nose/mouth/throat, respiratory, gastrointestinal, genitourinary, musculoskeletal, integumentary, neurologic, psychiatric, endocrine. Positive findings noted below. Modified ESAS Completed by: provider   Fatigue: 3 Drowsiness: 0   Depression: 0 Pain: 0   Anxiety: 0 Nausea: 0     Dyspnea: 0                    PHYSICAL EXAM:     Wt Readings from Last 3 Encounters:   12/21/22 105.2 kg (232 lb)   10/13/22 105.6 kg (232 lb 14.2 oz)   08/24/22 99.8 kg (220 lb)     Blood pressure (!) 107/53, pulse 84, temperature 97.6 °F (36.4 °C), resp. rate 18, height 5' 1\" (1.549 m), weight 105.2 kg (232 lb), SpO2 96 %. Pain:  Pain Scale 1: Visual  Pain Intensity 1: 0                   Constitutional: elderly female who is on ER [place] alert [place].    Eyes: pupils equal  ENMT: moist MM   Cardiovascular: irregular   Respiratory: respirations not labored   Gastrointestinal: soft non-tender   Skin: warm, dry  Neurologic: alert oriented x 3 [place]   Psychiatric: full affect, no hallucinations       HISTORY:     Active Problems:    Acquired hypothyroidism (8/1/2016)      Type 2 diabetes mellitus with diabetic nephropathy, with long-term current use of insulin ([address] (12/15/2017)      Essential hypertension (7/7/2021)      Bilateral lower extremity edema (7/7/2021)      Sepsis ([address] (8/10/2022)      Acute exacerbation of CHF (congestive heart failure) ([address] (10/7/2022)      Leukocytosis (12/21/2022)      Pulmonary edema (12/21/2022)      Dyspnea (12/21/2022)      Atrial fibrillation with rapid ventricular response ([address] (12/21/2022)      Patient's noncompliance with other medical treatment and regimen due to unspecified reason (12/21/2022)      Overview: Out of medicines ER visit 12/20/2022    Past Medical History:   Diagnosis Date    Acquired hypothyroidism 8/1/2016    Arthritis of right shoulder region 4/21/2016    Asthma Bilateral lower extremity edema 2021    Chronic bilateral low back pain without sciatica 2021    Chronic diastolic congestive heart failure ([address] 3/10/2021    Chronic venous insufficiency     Diabetes (HCC)     neuropathy    Essential hypertension 2021    Gout     History of depression 2017    History of fall 2021    Hypercholesteremia 2021    Hypertension     MI (myocardial infarction) ([facility]     OAB (overactive bladder) 2021    Peripheral neuropathy     Rheumatoid arthritis ([facility]     Tear of right rotator cuff 2016    Thyroid pain     Urinary incontinence 2021    Vertigo       Past Surgical History:   Procedure Laterality Date    HX AMPUTATION TOE Right     Great toe Dr. Brenna Guerra [address]      HX CHOLECYSTECTOMY      HX GYN      TAHOophorectomy due to infection    HX HEENT      resection of memegioma in the . HX KNEE REPLACEMENT Bilateral       Family History   Problem Relation Age of Onset    Heart Attack Mother     Heart Attack Father      History reviewed, no pertinent family history.   Social History     Tobacco Use    Smoking status: Former     Types: Cigarettes     Quit date:      Years since quittin.0    Smokeless tobacco: Never    Tobacco comments:     quit 45 years ago   Substance Use Topics    Alcohol use: No     Alcohol/week: 0.0 standard drinks     No Known Allergies   Current Facility-Administered Medications   Medication Dose Route Frequency    sodium chloride (NS) flush 5-10 mL  5-10 mL IntraVENous PRN    atorvastatin (LIPITOR) tablet 80 mg  80 mg Oral DAILY    dilTIAZem ER (CARDIZEM CD) capsule 180 mg  180 mg Oral DAILY    insulin glargine (LANTUS) injection 20 Units  20 Units SubCUTAneous QHS    hydrOXYzine HCL (ATARAX) tablet 10 mg  10 mg Oral BID PRN    levothyroxine (SYNTHROID) tablet 150 mcg  150 mcg Oral DAILY    metoprolol tartrate (LOPRESSOR) tablet 100 mg  100 mg Oral Q12H    morphine IR (MS IR) tablet 15 mg  15 mg Oral BID    rivaroxaban (XARELTO) tablet 20 mg  20 mg Oral DAILY    sodium chloride (NS) flush 5-40 mL  5-40 mL IntraVENous Q8H    sodium chloride (NS) flush 5-40 mL  5-40 mL IntraVENous PRN    acetaminophen (TYLENOL) tablet 650 mg  650 mg Oral Q6H PRN    Or    acetaminophen (TYLENOL) suppository 650 mg  650 mg Rectal Q6H PRN    [Held by provider] lisinopriL (PRINIVIL, ZESTRIL) tablet 5 mg  5 mg Oral DAILY    ondansetron (ZOFRAN) injection 4 mg  4 mg IntraVENous Q6H PRN    gabapentin (NEURONTIN) capsule 400 mg  400 mg Oral TID    cefTRIAXone (ROCEPHIN) 2 g in sterile water (preservative free) 20 mL IV syringe  2 g IntraVENous Q24H    insulin lispro (HUMALOG) injection   SubCUTAneous AC&HS    glucose chewable tablet 16 g  4 Tablet Oral PRN    glucagon (GLUCAGEN) injection 1 mg  1 mg IntraMUSCular PRN    insulin lispro (HUMALOG) injection 5 Units  5 Units SubCUTAneous TIDAC     Current Outpatient Medications   Medication Sig    metoprolol tartrate (LOPRESSOR) 100 mg IR tablet Take 1 Tablet by mouth every twelve (12) hours. rivaroxaban (XARELTO) 20 mg tab tablet Take 1 Tablet by mouth daily. dilTIAZem ER (CARDIZEM CD) 180 mg capsule Take 1 Capsule by mouth daily. furosemide (LASIX) 40 mg tablet Take 1 Tablet by mouth daily. Jardiance 10 mg tablet Take 10 mg by mouth daily. BD Insulin Syringe SafetyGlide 0.5 mL 30 gauge x 5/16\" syrg     insulin glargine (LANTUS) 100 unit/mL injection Inject 20 units daily at bedtime. Monitor and record blood sugar daily. insulin aspart U-100 (NovoLOG Flexpen U-100 Insulin) 100 unit/mL (3 mL) inpn 5 Units by SubCUTAneous route Before breakfast, lunch, and dinner. levothyroxine (SYNTHROID) 150 mcg tablet Take 150 mcg by mouth daily. morphine IR (MS IR) 15 mg tablet Take 15 mg by mouth two (2) times a day. metFORMIN (GLUCOPHAGE) 500 mg tablet Take 500 mg by mouth daily.     hydrOXYzine HCL (ATARAX) 10 mg tablet Take 1 Tablet by mouth two (2) times daily as needed for Anxiety. allopurinoL (ZYLOPRIM) 100 mg tablet Take 100 mg by mouth daily. gabapentin (NEURONTIN) 400 mg capsule Take 1 cap in morning, 1 cap at 2pm, 2 caps at bedtime    amLODIPine (NORVASC) 10 mg tablet Take 1 Tablet by mouth daily. atorvastatin (LIPITOR) 80 mg tablet Take 1 Tablet by mouth daily. Blood-Glucose Meter (FREESTYLE LITE METER) monitoring kit Test twice blood glucose daily; ICD-10 E11.9; Quantity 1    insulin syringe,safetyneedle 1 mL 31 gauge x 5/16\" syrg 1 Each by Does Not Apply route daily. glucose blood VI test strips (FREESTYLE TEST) strip Use to check blood glucose twice daily DX:E11.9    acetaminophen (TYLENOL) 500 mg tablet Take 1 Tab by mouth every six (6) hours as needed for Pain. LAB AND IMAGING FINDINGS:     Lab Results   Component Value Date/Time    WBC 28.2 (H) 12/21/2022 12:37 AM    HGB 11.9 (L) 12/21/2022 12:37 AM    PLATELET 386 05/14/4290 12:37 AM     Lab Results   Component Value Date/Time    Sodium 132 (L) 12/21/2022 12:37 AM    Potassium 4.3 12/21/2022 12:37 AM    Chloride 95 (L) 12/21/2022 12:37 AM    CO2 29 12/21/2022 12:37 AM    BUN 33 (H) 12/21/2022 12:37 AM    Creatinine 1.79 (H) 12/21/2022 12:37 AM    Calcium 9.7 12/21/2022 12:37 AM    Magnesium 1.6 12/21/2022 12:37 AM      Lab Results   Component Value Date/Time    Alk.  phosphatase 69 12/21/2022 12:37 AM    Protein, total 9.1 (H) 12/21/2022 12:37 AM    Albumin 4.1 12/21/2022 12:37 AM    Globulin 5.0 (H) 12/21/2022 12:37 AM     Lab Results   Component Value Date/Time    INR 1.2 10/07/2022 10:30 PM    Prothrombin time 15.1 10/07/2022 10:30 PM    aPTT 30.6 10/07/2022 10:30 PM      No results found for: IRON, FE, TIBC, IBCT, PSAT, FERR   No results found for: PH, PCO2, PO2  No components found for: [place]   Lab Results   Component Value Date/Time     08/11/2022 02:47 AM    CK - MB 2.2 04/18/2018 03:48 PM              Total time: 30 minutes   Counseling / coordination time, spent as noted above:   > 50% counseling / coordination: yes with patient     Prolonged service was provided for  []30 min   []75 min in face to face time in the presence of the patient, spent as noted above.   Time Start:   Time End:

## 2022-12-21 NOTE — PROGRESS NOTES
Palliative Medicine    Palliative Medicine team Roxanna Alston NP and Joyce Mooney RN met at the pt's bedside. PFM also present in room obtaining history. Pt alert and oriented x 4. Very [place]. Is on 3lpm O2 via [place] at home. States that she lives at home with her partner of 4 years, Crispin Head. States that she has 6 adult children but lost one of her daughter's back in February. States that she has completed advance directives naming her son Ravi Reilly as her medical POA. Discussed benefits and burdens of CPR and intubation with the pt. She states that while she has given thought to these decisions that she \"really wants to live to be a beautiful 89\". Expressed our worry that with advanced age and underlying chronic medical conditions that CPR and intubation would not bring her back to a better state of health. Pt stated that she will engage in further conversation with her son Ravi Reilly regarding these topics. Pt wishes to remain FULL code with FULL aggressive medical management. Thank you for the Palliative Medicine consult and allowing us to participate in the care of Ms. Berenice Mckeon. Will continue to monitor and provide support.     Joyce Mooney RN, BSN  Palliative Medicine Inpatient RN  [facility]  Palliative COPE Line: [phone])

## 2022-12-21 NOTE — ED TRIAGE NOTES
Patient brought in by EMS for complaints of shortness of breath that became progressively worse over the last hour. Patient wears 3L of oxygen normally at home but was placed on bipap when medics arrived. Patient went in to AFIB with RVR and her oxygen levels began to drop.

## 2022-12-21 NOTE — Clinical Note
Status[de-identified] INPATIENT [101]   Type of Bed: Stepdown [17]   Cardiac Monitoring Required?: Yes   Inpatient Hospitalization Certified Necessary for the Following Reasons: 9.  Other (further clarification in H&P documentation)   Admitting Diagnosis: Pulmonary edema [565383]   Admitting Diagnosis: Dyspnea [032660]   Admitting Diagnosis: Leukocytosis [[id number]]   Admitting Physician: Sabra Servin [[id number]]   Attending Physician: Sabra Servin [[id number]]   Estimated Length of Stay: 2 Midnights   Discharge Plan[de-identified] Other (Specify)

## 2022-12-21 NOTE — H&P
Admission History and Physical  [facility]      Patient: Hussain Madera MRN: [id number]  CSN: [id number]    YOB: 1935  Age: 82 y.o. Sex: female      Admission Date: 12/21/2022       HPI:     Hussain Madera is a 82 y.o. female with PMH CHF, DM, HTN, hypothyroidism, RA, Hx DVT, macular degeneration, urinary incontinence. Presenting with complaint of shortness of breath. Pt reports that after her doctors apt yesterday she began to develop shortness of breath. Her partner called 911 and she was brought into the hospital. EMS had her on CPAP since she had a desat to [address] on the way. She was also noted to be tachycardic to the 150s and hypertensive. Given nitro patch and tablets en route, did not provide relief per chart review. She was found to be tachycardic, tachypneic on admission. ED Course:  -Labs obtained:   CBC remarkable for WBC of 28. 2, Hb 11.9  CMP remarkable for glucose 452, Na 132, Cl 95, CO2 29, AG 8, BUN 33, Cr 1.79, GFR 27, Mg 1.6  Trop 20   BNP 4582  Procal 0.1  Bcx pending  ABG pH 7.43, pO2 120, HCO3 27.3   UA remarkable for glucose, few bacteria, protein  -Medications administered:  Tylenol, dilt push x2, lasix 40 mg, insulin 5 U, Mg  -Imaging obtained:  EKG A fib, , Qtc 473  CXR mild heart failure with tiny effusions  CXR great toe Lt - no evidence of OM     Initially admitted to hospitalist. Plan per hospitalist described below (paraphrased from H+P by Dr. Karrie Malone). Acute CHF exacerbation - IV diuretics, strict I+Os, daily weights, tele, trops/BNP, echo, O2 support, BMP daily  pAfib- IV cardizem gtt, lopressor, xarelto 20 mg  DM: lantus, SSI, monitor BG  Neuropathy: Neurontin 400 mg TID  Chronic pain- MS IR 14 mg BID   HTN- lisinopril, lopressor  Hypothyroidism - 150 mcg    Pt now transferred to our service. Pt states that she isn't sure what triggered this episode.  Aside from the shortness of breath she denies fever, chest pain, dizziness, cough, congestion, sick contacts, dizziness, lightheadedness, syncope. She reports abdominal discomfort and constipation for the past week. Denies nausea, vomiting. Reports some dysuria, has hx incontinence. Med rec attempted. Pt unclear on what she is taking at home and states there are financial constraints to purchasing her medications. As a result, she is not taking multiple medications on her list (notably eliquis, insulin). Denies smoking, alcohol, substance use. Pt states she has an ACP and her MPOA is her son Liam Agudelo. She wishes to be full code at this time.      Review of Systems:  General ROS: negative for  - chills, fever, night sweats, weight gain and weight loss  Psychological ROS: negative for - anxiety and depression  Ophthalmic ROS: negative for - blurry vision, decreased vision or loss of vision  ENT ROS: negative for - headaches, hearing change or visual changes  Hematological and Lymphatic ROS: negative for - bruising, jaundice  Respiratory ROS: negative for - cough, hemoptysis, shortness of breath, orthopnea, paroxysmal dyspnea, or wheezing  Cardiovascular ROS: negative for - chest pain, dyspnea on exertion, edema, loss of consciousness, or palpitations   Gastrointestinal ROS: negative for - abdominal pain, blood in stools, change in stools, constipation, diarrhea, hematemesis, melena, nausea/vomiting or swallowing difficulty/pain  Genito-Urinary ROS: negative for - dysuria, hematuria or urinary frequency/urgency  Musculoskeletal ROS: negative for - joint pain, joint swelling or muscle pain  Neurological ROS: negative for - dizziness, headaches, numbness/tingling or weakness  Dermatological ROS: negative for - rash or skin lesion changes    Past Medical History:   Diagnosis Date    Acquired hypothyroidism 8/1/2016    Arthritis of right shoulder region 4/21/2016    Asthma     Bilateral lower extremity edema 7/7/2021    Chronic bilateral low back pain without sciatica 7/7/2021    Chronic diastolic congestive heart failure ([facility] [address] 3/10/2021    Chronic venous insufficiency     Diabetes (HCC)     neuropathy    Essential hypertension 2021    Gout     History of depression 2017    History of fall 2021    Hypercholesteremia 2021    Hypertension     MI (myocardial infarction) ([facility] [address]     OAB (overactive bladder) 2021    Peripheral neuropathy     Rheumatoid arthritis ([facility] [address]     Tear of right rotator cuff 2016    Thyroid pain     Urinary incontinence 2021    Vertigo        Past Surgical History:   Procedure Laterality Date    HX AMPUTATION TOE Right     Great toe Dr. Jose Brown [address]      HX CHOLECYSTECTOMY      HX GYN      TAHOophorectomy due to infection    HX HEENT      resection of memegioma in the . HX KNEE REPLACEMENT Bilateral        Family History   Problem Relation Age of Onset    Heart Attack Mother     Heart Attack Father        Social History     Socioeconomic History    Marital status:    Tobacco Use    Smoking status: Former     Types: Cigarettes     Quit date:      Years since quittin.0    Smokeless tobacco: Never    Tobacco comments:     quit 45 years ago   Vaping Use    Vaping Use: Never used   Substance and Sexual Activity    Alcohol use: No     Alcohol/week: 0.0 standard drinks    Drug use: No    Sexual activity: Not Currently       No Known Allergies    Prior to Admission Medications   Prescriptions Last Dose Informant Patient Reported? Taking? BD Insulin Syringe SafetyGlide 0.5 mL 30 gauge x \" syrg   Yes No   Blood-Glucose Meter (FREESTYLE LITE METER) monitoring kit   No No   Sig: Test twice blood glucose daily; ICD-10 E11.9; Quantity 1   Jardiance 10 mg tablet   Yes No   Sig: Take 10 mg by mouth daily. acetaminophen (TYLENOL) 500 mg tablet   No No   Sig: Take 1 Tab by mouth every six (6) hours as needed for Pain. allopurinoL (ZYLOPRIM) 100 mg tablet   Yes No   Sig: Take 100 mg by mouth daily.    amLODIPine (NORVASC) 10 mg tablet   No No   Sig: Take 1 Tablet by mouth daily. atorvastatin (LIPITOR) 80 mg tablet   No No   Sig: Take 1 Tablet by mouth daily. dilTIAZem ER (CARDIZEM CD) 180 mg capsule   No No   Sig: Take 1 Capsule by mouth daily. furosemide (LASIX) 40 mg tablet   No No   Sig: Take 1 Tablet by mouth daily. gabapentin (NEURONTIN) 400 mg capsule   No No   Sig: Take 1 cap in morning, 1 cap at 2pm, 2 caps at bedtime   glucose blood VI test strips (FREESTYLE TEST) strip   No No   Sig: Use to check blood glucose twice daily DX:E11.9   hydrOXYzine HCL (ATARAX) 10 mg tablet   Yes No   Sig: Take 1 Tablet by mouth two (2) times daily as needed for Anxiety. insulin aspart U-100 (NovoLOG Flexpen U-100 Insulin) 100 unit/mL (3 mL) inpn   No No   Si Units by SubCUTAneous route Before breakfast, lunch, and dinner. insulin glargine (LANTUS) 100 unit/mL injection   No No   Sig: Inject 20 units daily at bedtime. Monitor and record blood sugar daily. insulin syringe,safetyneedle 1 mL 31 gauge x 5/16\" syrg   No No   Si Each by Does Not Apply route daily. levothyroxine (SYNTHROID) 150 mcg tablet   Yes No   Sig: Take 150 mcg by mouth daily. metFORMIN (GLUCOPHAGE) 500 mg tablet   Yes No   Sig: Take 500 mg by mouth daily. metoprolol tartrate (LOPRESSOR) 100 mg IR tablet   No No   Sig: Take 1 Tablet by mouth every twelve (12) hours. morphine IR (MS IR) 15 mg tablet   Yes No   Sig: Take 15 mg by mouth two (2) times a day. rivaroxaban (XARELTO) 20 mg tab tablet   No No   Sig: Take 1 Tablet by mouth daily.       Facility-Administered Medications: None       Physical Exam:     Patient Vitals for the past 24 hrs:   Temp Pulse Resp BP SpO2   22 1230 -- 84 -- (!) 122/54 93 %   22 1215 -- 84 -- 115/72 93 %   22 1200 -- 80 -- 101/72 90 %   22 1145 -- 95 -- (!) 124/98 92 %   22 1130 -- 80 -- 112/77 93 %   22 1115 -- 77 -- (!) 107/50 92 %   22 1100 -- 80 -- (!) 107/55 (!) 89 %   12/21/22 1045 -- 74 -- (!) 94/51 93 %   12/21/22 1000 -- 93 -- (!) 108/49 91 %   12/21/22 0948 -- (!) 101 -- -- 92 %   12/21/22 0940 -- (!) 152 -- 109/60 92 %   12/21/22 0933 -- (!) 140 -- 127/71 --   12/21/22 0930 -- (!) 120 -- 127/71 93 %   12/21/22 0915 -- (!) 115 -- 115/69 91 %   12/21/22 0907 97.6 °F (36.4 °C) (!) 146 25 125/73 93 %   12/21/22 0906 -- (!) 150 -- -- 92 %   12/21/22 0715 -- (!) 108 -- 118/74 99 %   12/21/22 0641 -- (!) 134 -- -- 97 %   12/21/22 0626 -- (!) 130 -- 115/83 98 %   12/21/22 0611 -- (!) 131 -- 122/80 98 %   12/21/22 0556 -- (!) 114 -- 109/75 98 %   12/21/22 0541 -- (!) 122 -- (!) 102/58 98 %   12/21/22 0526 -- (!) 122 -- 115/73 97 %   12/21/22 0511 -- (!) 124 -- (!) 119/51 98 %   12/21/22 0456 -- (!) 128 -- 111/65 96 %   12/21/22 0441 -- (!) 136 -- 128/67 97 %   12/21/22 0426 -- (!) 147 -- -- 97 %   12/21/22 0424 -- (!) 154 -- 110/80 98 %   12/21/22 0414 -- -- -- -- 97 %   12/21/22 0411 -- (!) 151 -- -- 98 %   12/21/22 0409 -- (!) 152 -- (!) 148/70 98 %   12/21/22 0356 -- (!) 145 -- -- 98 %   12/21/22 0354 -- (!) 150 -- 113/68 98 %   12/21/22 0341 -- (!) 142 -- -- 98 %   12/21/22 0339 -- (!) 151 -- (!) 131/97 98 %   12/21/22 0326 -- (!) 146 -- -- 99 %   12/21/22 0324 -- (!) 136 -- 120/84 98 %   12/21/22 0311 -- (!) 147 -- -- 97 %   12/21/22 0309 -- (!) 131 -- 113/77 98 %   12/21/22 0256 -- (!) 142 -- -- 98 %   12/21/22 0254 -- (!) 137 -- 112/64 98 %   12/21/22 0241 -- (!) 133 -- -- 99 %   12/21/22 0239 -- (!) 147 -- 134/75 100 %   12/21/22 0224 -- (!) 145 -- 124/83 100 %   12/21/22 0218 -- -- -- (!) 140/110 --   12/21/22 0125 -- -- -- -- 100 %   12/21/22 0015 97.3 °F (36.3 °C) (!) 169 28 -- 100 %       Physical Exam:   General:  NAD   HEENT: Conjunctiva pink, sclera anicteric. PERRL. EOMI. Pharynx moist, nonerythematous. Moist mucous membranes. Thyroid not enlarged, no nodules. No other gross abnormalities present. CV:  RRR, no murmurs. RESP:  Unlabored breathing.   Lungs clear to auscultation without adventitious breath sounds. Equal expansion bilaterally. ABD:  Soft, nontender, nondistended. BS (+). No suprapubic tenderness. MSK:  No edema or tenderness to palpation of BLE, limited mobility in bed (deconditioned), missing toes on RLE  Neuro:  CN II-XII grossly intact. 5/5 strength bilateral upper extremities and lower extremities. Skin:  Chronic venous stasis changes of BLE, varicose veins BLE  Psych: alert, appropriate, oriented to self and place      Chemistry Recent Labs     12/21/22 0037   *   *   K 4.3   CL 95*   CO2 29   BUN 33*   CREA 1.79*   CA 9.7   MG 1.6   AGAP 8   BUCR 18   AP 69   TP 9.1*   ALB 4.1   GLOB 5.0*   AGRAT 0.8        CBC w/Diff Recent Labs     12/21/22 0037   WBC 28.2*   RBC 4.59   HGB 11.9*   HCT 37.7      GRANS 93*   LYMPH 1*   EOS 0        Liver Enzymes Protein, total   Date Value Ref Range Status   12/21/2022 9.1 (H) 6.4 - 8.2 g/dL Final     Albumin   Date Value Ref Range Status   12/21/2022 4.1 3.4 - 5.0 g/dL Final     Globulin   Date Value Ref Range Status   12/21/2022 5.0 (H) 2.0 - 4.0 g/dL Final     A-G Ratio   Date Value Ref Range Status   12/21/2022 0.8 0.8 - 1.7   Final     Alk. phosphatase   Date Value Ref Range Status   12/21/2022 69 45 - 117 U/L Final     Recent Labs     12/21/22 0037   TP 9.1*   ALB 4.1   GLOB 5.0*   AGRAT 0.8   AP 69        Lactic Acid Lactic acid   Date Value Ref Range Status   08/10/2022 1.5 0.4 - 2.0 MMOL/L Final     No results for input(s): LAC in the last 72 hours. BNP No results found for: BNP, BNPP, XBNPT     Cardiac Enzymes No results found for: CPK, CK, CKMMB, CKMB, RCK3, CKMBT, CKNDX, CKND1, GABRIEL, TROPT, TROIQ, KRUPA, TROPT, TNIPOC, BNP, BNPP     Coagulation No results for input(s): PTP, INR, APTT, INREXT, INREXT in the last 72 hours.       Thyroid  Lab Results   Component Value Date/Time    TSH 64.90 (H) 12/21/2022 12:37 AM          Lipid Panel Lab Results   Component Value Date/Time Cholesterol, total 191 11/09/2021 12:24 PM    HDL Cholesterol 44 11/09/2021 12:24 PM    LDL, calculated 96.8 11/09/2021 12:24 PM    VLDL, calculated 50.2 11/09/2021 12:24 PM    Triglyceride 251 (H) 11/09/2021 12:24 PM    CHOL/HDL Ratio 4.3 11/09/2021 12:24 PM        ABG Recent Labs     12/21/22  0121   PHI 7.43   PCO2I 41.0   PO2I 120*   HCO3I 27.3*   FIO2I 50        Urinalysis Lab Results   Component Value Date/Time    Color YELLOW 12/21/2022 08:45 AM    Appearance CLEAR 12/21/2022 08:45 AM    Specific gravity 1.019 12/21/2022 08:45 AM    pH (UA) 6.0 12/21/2022 08:45 AM    Protein 30 (A) 12/21/2022 08:45 AM    Glucose >1,000 (A) 12/21/2022 08:45 AM    Ketone Negative 12/21/2022 08:45 AM    Bilirubin Negative 12/21/2022 08:45 AM    Urobilinogen 0.2 12/21/2022 08:45 AM    Nitrites Negative 12/21/2022 08:45 AM    Leukocyte Esterase Negative 12/21/2022 08:45 AM    Epithelial cells FEW 12/21/2022 08:45 AM    Bacteria FEW (A) 12/21/2022 08:45 AM    WBC 0 to 2 12/21/2022 08:45 AM    RBC NONE 12/21/2022 08:45 AM        Micro Recent Labs     12/21/22  0511 12/21/22  0441   CULT NO GROWTH AFTER 1 HOUR NO GROWTH AFTER 2 HOURS     Recent Labs     12/21/22  0511 12/21/22  0441   CULT NO GROWTH AFTER 1 HOUR NO GROWTH AFTER 2 HOURS          Imaging:  XR (Most Recent). Results from [place] encounter on 12/21/22    XR GREAT TOE LT MIN 2 V    Narrative  EXAM: TOES/ LEFT    CLINICAL HISTORY/INDICATION:  Trauma , syncopal event prior to arrival,  hypertensive, tachycardia, elevated white blood cell count, increased pain left  great toe wound    COMPARISON: Left foot 10/7/2022. TECHNIQUE: Three views obtained. FINDINGS:    Mild bunion deformity. Narrowing of the first metatarsal phalangeal joint with  spurring. Small calcific density in the soft tissues adjacent to the medial head  of the first metatarsal. No bony destructive change. Old healed fracture of the  proximal phalanx of the fourth digit.  Periosteal bone formation at the distal  diaphysis of the second metatarsal and along the medial diaphysis of the third  metatarsal without change. There is no fracture . No foreign body is noted. Impression  No evidence of acute osteomyelitis. Stable smooth periosteal bone formation at the level of the second and third  metatarsals. Mild osteoarthrosis of the first metatarsal phalangeal joint with mild bunion  deformity       CT (Most Recent) Results from Hospital Encounter encounter on 10/07/22    CTA CHEST W OR W WO CONT    Narrative  CTA CHEST PULMONARY EMBOLISM PROTOCOL    INDICATION: Acute respiratory difficulty, heart failure, symptoms worsened over  3 days, chest pain, increased work of breathing. Question pulmonary embolism. TECHNIQUE: Thin collimation axial images obtained through the level of the  pulmonary arteries with additional imaging through the chest following the  uneventful administration of intravenous contrast.  Images reconstructed into  three dimensional coronal and sagittal projections for complete evaluation of  the tortuous and overlapping pulmonary vascular structures and to reduce patient  radiation dose. All CT scans at this facility are performed using dose optimization technique as  appropriate to a performed exam, to include automated exposure control,  adjustment of the mA and/or kV according to patient size (including appropriate  matching first site-specific examinations), or use of iterative reconstruction  technique. COMPARISON: 8/10/2022. FINDINGS:    No filling defects are appreciated within the main, left, right, lobar or  visualized segmental pulmonary arteries to suggest embolism. Thyroid: Unremarkable in its visualized aspects. Pericardium/ Heart: No significant effusion. Biatrial cardiac chamber  enlargement. Aorta/ Vessels: Mild to moderate aortic atherosclerosis. There is irregular  noncalcified mural plaque in the descending aorta.   Lymph Nodes: Unremarkable. .    Lungs: There is hilar edema. Mild bronchial wall thickening. Mild groundglass  and interstitial thickening. Mild mosaic attenuation. Mild to moderate dependent  atelectasis. Pleura: Moderate bilateral pleural effusions which are new. No pneumothorax. Upper Abdomen: Unremarkable. Bones/soft tissues: Osteopenia. Degenerative disc disease. Impression  1. No evidence for pulmonary emboli. 2.  Heart failure with biatrial cardiac chamber enlargement, moderate new  bilateral pleural effusions and mild pulmonary edema. 3.  Mild to moderate aortic atherosclerosis with irregular noncalcified mural  plaque along the wall of the descending aorta which increases risk of embolic  events. ECHO No results found for this or any previous visit. EKG No results found for this or any previous visit.      Recent Results (from the past 12 hour(s))   CULTURE, BLOOD    Collection Time: 12/21/22  4:41 AM    Specimen: Blood   Result Value Ref Range    Special Requests: NO SPECIAL REQUESTS      Culture result: NO GROWTH AFTER 2 HOURS     CULTURE, BLOOD    Collection Time: 12/21/22  5:11 AM    Specimen: Blood   Result Value Ref Range    Special Requests: NO SPECIAL REQUESTS      Culture result: NO GROWTH AFTER 1 HOUR     URINALYSIS W/ RFLX MICROSCOPIC    Collection Time: 12/21/22  8:45 AM   Result Value Ref Range    Color YELLOW      Appearance CLEAR      Specific gravity 1.019 1.005 - 1.030      pH (UA) 6.0 5.0 - 8.0      Protein 30 (A) NEG mg/dL    Glucose >1,000 (A) NEG mg/dL    Ketone Negative NEG mg/dL    Bilirubin Negative NEG      Blood Negative NEG      Urobilinogen 0.2 0.2 - 1.0 EU/dL    Nitrites Negative NEG      Leukocyte Esterase Negative NEG     URINE MICROSCOPIC ONLY    Collection Time: 12/21/22  8:45 AM   Result Value Ref Range    WBC 0 to 2 0 - 4 /hpf    RBC NONE 0 - 5 /hpf    Epithelial cells FEW 0 - 5 /lpf    Bacteria FEW (A) NEG /hpf    Mucus FEW (A) NEG /lpf   ECHO ADULT COMPLETE Collection Time: 12/21/22  9:23 AM   Result Value Ref Range    Fractional Shortening 2D 30 28 - 44 %    LV RWT Ratio 0.43     LV Mass 2D 169.9 (A) 67 - 162 g    LVOT Area 3.1 cm2    LVOT:AV VTI Index 1.07     Est. RA Pressure 8 mmHg    RVSP 51 mmHg    IVSd 1.1 (A) 0.6 - 0.9 cm    LVIDd 4.6 3.9 - 5.3 cm    LVIDs 3.2 cm    LVOT Diameter 2.0 cm    LVPWd 1.0 (A) 0.6 - 0.9 cm    LVOT Peak Gradient 3 mmHg    LVOT Peak Gradient 2 mmHg    LVOT Mean Gradient 1 mmHg    LVOT SV 51.5 ml    LVOT Peak Velocity 0.8 m/s    LVOT Peak Velocity 0.7 m/s    LVOT VTI 16.4 cm    RV Longitudinal Dimension 3.8 cm    RV Free Wall Peak S' 9 cm/s    RVOT Peak Gradient 0 mmHg    RVOT Peak Velocity 0.3 m/s    LA Volume A/L 62 mL    LA Volume 2C 52 22 - 52 mL    LA Volume 4C 59 (A) 22 - 52 mL    AV Area by Peak Velocity 2.3 cm2    AV Area by Peak Velocity 1.9 cm2    AV Area by VTI 3.3 cm2    AV Peak Gradient 5 mmHg    AV Mean Gradient 3 mmHg    AV Peak Velocity 1.1 m/s    AV Mean Velocity 0.8 m/s    AV VTI 15.3 cm    MV E Velocity 1.14 m/s    PV Peak Gradient 2 mmHg    PV Max Velocity 0.7 m/s    TAPSE 1.5 (A) 1.7 cm    TR Peak Gradient 43 mmHg    TR Max Velocity 3.26 m/s    Ascending Aorta 2.5 cm    Aortic Root 2.7 cm       Assessment/Plan:   82 y.o. female with PMH CHF, DM, HTN, hypothyroidism, RA, Hx DVT, macular degeneration, urinary incontinence. Admitted with CHF exacerbation, Afib with RVR, concern for sepsis.     Sepsis of unknown etiology   -recently admitted with very similar presentation in 10/2022, determined to be sepsis 2/ UTI  -on admission: tachycardic, tachypneic, WBC 28.2, afebrile  -pt ROS + dysuria, UA non-conclusive for infection  -CXR with tiny effusions, no pna   -did not receive fluids or abx to date    Plan:   -admit to stepdown with tele   -monitor VS   -supportive O2  -Bcx, Ucx, LA  -daily CBC  -covid/flu   -will hold off on IVF for now given CHF exacerbation   -will start ceftriaxone 2 g daily, abx change pending culture results  -PT/OT/CM    Afib   CHF  HTN  -tachycardic on admission   -Cr 1.79 on admission (baseline 0.8-1)    -Hb 11.9 (baseline 11s)  -EKG on admission: A fib, , Qtc 473  -CXR on admission: mild HF with tiny effusions  -Trop 20  -BNP 4582  -ABG pH 7.43, pO2 120, HCO3 27.3   -Echo 12/21 EF 20-25%, severe hypokinesis, LA and RA severely dilated, mod pulmonary HTN, moderate TR  -meds per chart review: amlo 10, lopressor 100 mg BID, dilt 180 mg daily, lasix 40 mg daily, xarelto 20 mg BID  -on home O2 for prn use  - s/p IV 40 mg lasix in ED    Plan:   -cardio consulted by hospitalist team, appreciate recs  -tele   -PT/INR/PTT  -supportive O2 care   -strict I+Os, daily weights  -fluid restrict 1200 cc   -c/w xarelto, dilt, lopressor  -normotensive at this time, can add amlo back of if needed   -held lasix and lisinopril due to AQUILES    AQUILES  -BUN 33, Cr 1.79, GFR 27 on admission   -baseline Cr 0.8-1  -ddx prerenal vs cardiorenal vs meds     Plan:   -daily BMP  -renally dose medications  -avoid nephrotoxins   -elyte repletion prn     DM with neuoropathy  -most recent A1c 11.3 in 10/2022  -meds per chart review: jardiance 10, insulin glargine 20 U qhs, novolog 5 U TID with meals, metformin 500 mg daily  -CMP remarkable for glucose 452, Na 132, Cl 95, CO2 29, AG 8,  Mg 1.6  -UA positive for glucose, protein, negative for ketones  -s/p 25 units  on 12/21 am    Plan:   -monitor BG qachs  -lantus 20 qhs, humalog 5 U TID with meals   -SSI   -hypoglycemia protocol    Hypothyroidism  -meds: levo 150 mcg  -TSH 64.9, FT4 0.5 on 12/21  -pt nonadherent with medications at home     Plan:   -c/w levo 150 mcg, titrate as needed    COPD  -on home O2 for prn use  -no inhalers prescribed per chart review    Plan:   -c/w supportive O2    Chronic pain  RA  -ESR 18 on admission   -meds per chart review: MS IR 15 mg BID    Plan:   -c/w meds    Hx DVT  -PVL 10/8 showed non occlusive thrombus within varicose veins of calf on RLE, otherwise no DVT bilaterally   -c/w xarelto 20 mg daily    Anxiety   -c/w hydroxyzine 10 mg BID prn     Social Hx/Barriers to care   -pt has MPOA, son Jaymes Seip  -pt having financial difficulties - unable to afford medications   -pt currently managing medications on her own, not adherent.   -lives with partner, Marian Irons Cardiac diet, fluid restrict   DVT Prophylaxis Xarelto   GI rx Miralax   Code status Full   Disposition TBD     [address] [place]  [phone]      Fortunato Bautista MD , PGY-2   [address]   Senior Pager: [phone]   December 21, 2022, 12:16 PM

## 2022-12-22 LAB
ACC. NO. FROM MICRO ORDER, ACCP: ABNORMAL
ACINETOBACTER CALCOACETICUS-BAUMANII COMPLEX, ACBCX: NOT DETECTED
ANION GAP SERPL CALC-SCNC: 6 MMOL/L (ref 3–18)
BACTEROIDES FRAGILIS, BFRA: NOT DETECTED
BIOFIRE COMMENT, BCIDPF: ABNORMAL
BUN SERPL-MCNC: 36 MG/DL (ref 7–18)
BUN/CREAT SERPL: 20 (ref 12–20)
C GLABRATA DNA VAG QL NAA+PROBE: NOT DETECTED
CALCIUM SERPL-MCNC: 8.6 MG/DL (ref 8.5–10.1)
CANDIDA ALBICANS: NOT DETECTED
CANDIDA AURIS, CAAU: NOT DETECTED
CANDIDA KRUSEI, CKRP: NOT DETECTED
CANDIDA PARAPSILOSIS, CPAUP: NOT DETECTED
CANDIDA TROPICALIS, CTROP: NOT DETECTED
CHLORIDE SERPL-SCNC: 99 MMOL/L (ref 100–111)
CO2 SERPL-SCNC: 31 MMOL/L (ref 21–32)
CREAT SERPL-MCNC: 1.77 MG/DL (ref 0.6–1.3)
CRYPTO NEOFORMANS/GATTII, CRYNEG: NOT DETECTED
ENTEROBACTER CLOACAE COMPLEX, ECCP: NOT DETECTED
ENTEROBACTERALES SP. , ENBLS: NOT DETECTED
ENTEROCOCCUS FAECALIS, ENFA: NOT DETECTED
ENTEROCOCCUS FAECIUM, ENFAM: NOT DETECTED
ERYTHROCYTE [DISTWIDTH] IN BLOOD BY AUTOMATED COUNT: 15.8 % (ref 11.6–14.5)
ESCHERICHIA COLI: NOT DETECTED
EST. AVERAGE GLUCOSE BLD GHB EST-MCNC: 295 MG/DL
GLUCOSE BLD STRIP.AUTO-MCNC: 130 MG/DL (ref 70–110)
GLUCOSE BLD STRIP.AUTO-MCNC: 149 MG/DL (ref 70–110)
GLUCOSE BLD STRIP.AUTO-MCNC: 154 MG/DL (ref 70–110)
GLUCOSE BLD STRIP.AUTO-MCNC: 234 MG/DL (ref 70–110)
GLUCOSE SERPL-MCNC: 159 MG/DL (ref 74–99)
HAEMOPHILUS INFLUENZAE, HMI: NOT DETECTED
HBA1C MFR BLD: 11.9 % (ref 4.2–5.6)
HCT VFR BLD AUTO: 29.9 % (ref 35–45)
HGB BLD-MCNC: 9.5 G/DL (ref 12–16)
KLEBSIELLA AEROGENES, KLAE: NOT DETECTED
KLEBSIELLA OXYTOCA: NOT DETECTED
KLEBSIELLA PNEUMONIAE GROUP, KPPG: NOT DETECTED
LACTATE SERPL-SCNC: 1.4 MMOL/L (ref 0.4–2)
LISTERIA MONOCYTOGENES, LMONP: NOT DETECTED
MAGNESIUM SERPL-MCNC: 2 MG/DL (ref 1.6–2.6)
MCH RBC QN AUTO: 26.5 PG (ref 24–34)
MCHC RBC AUTO-ENTMCNC: 31.8 G/DL (ref 31–37)
MCV RBC AUTO: 83.3 FL (ref 78–100)
NEISSERIA MENINGITIDIS, NMNI: NOT DETECTED
NRBC # BLD: 0 K/UL (ref 0–0.01)
NRBC BLD-RTO: 0 PER 100 WBC
PLATELET # BLD AUTO: 178 K/UL (ref 135–420)
PMV BLD AUTO: 14.1 FL (ref 9.2–11.8)
POTASSIUM SERPL-SCNC: 3.5 MMOL/L (ref 3.5–5.5)
PROTEUS, PRP: NOT DETECTED
PSEUDOMONAS AERUGINOSA: NOT DETECTED
RBC # BLD AUTO: 3.59 M/UL (ref 4.2–5.3)
RESISTANT GENE SPACE, REGENE: ABNORMAL
SALMONELLA, SALMO: NOT DETECTED
SERRATIA MARCESCENS: NOT DETECTED
SODIUM SERPL-SCNC: 136 MMOL/L (ref 136–145)
STAPH EPIDERMIDIS, STEP: NOT DETECTED
STAPH LUGDUNENSIS, STALUG: NOT DETECTED
STAPHYLOCOCCUS AUREUS: NOT DETECTED
STAPHYLOCOCCUS, STAPP: NOT DETECTED
STENO MALTOPHILIA, STMA: NOT DETECTED
STREPTOCOCCUS , STPSP: DETECTED
STREPTOCOCCUS AGALACTIAE (GROUP B): DETECTED
STREPTOCOCCUS PNEUMONIAE , SPNP: NOT DETECTED
STREPTOCOCCUS PYOGENES (GROUP A), SPYOP: NOT DETECTED
WBC # BLD AUTO: 21.7 K/UL (ref 4.6–13.2)

## 2022-12-22 PROCEDURE — 97165 OT EVAL LOW COMPLEX 30 MIN: CPT

## 2022-12-22 PROCEDURE — 85027 COMPLETE CBC AUTOMATED: CPT

## 2022-12-22 PROCEDURE — 74011250636 HC RX REV CODE- 250/636

## 2022-12-22 PROCEDURE — 82962 GLUCOSE BLOOD TEST: CPT

## 2022-12-22 PROCEDURE — 83735 ASSAY OF MAGNESIUM: CPT

## 2022-12-22 PROCEDURE — 83036 HEMOGLOBIN GLYCOSYLATED A1C: CPT

## 2022-12-22 PROCEDURE — 36415 COLL VENOUS BLD VENIPUNCTURE: CPT

## 2022-12-22 PROCEDURE — 65660000004 HC RM CVT STEPDOWN

## 2022-12-22 PROCEDURE — 74011000250 HC RX REV CODE- 250: Performed by: HOSPITALIST

## 2022-12-22 PROCEDURE — 97162 PT EVAL MOD COMPLEX 30 MIN: CPT

## 2022-12-22 PROCEDURE — 83605 ASSAY OF LACTIC ACID: CPT

## 2022-12-22 PROCEDURE — 97116 GAIT TRAINING THERAPY: CPT

## 2022-12-22 PROCEDURE — 80048 BASIC METABOLIC PNL TOTAL CA: CPT

## 2022-12-22 PROCEDURE — 97535 SELF CARE MNGMENT TRAINING: CPT

## 2022-12-22 PROCEDURE — 74011250637 HC RX REV CODE- 250/637

## 2022-12-22 PROCEDURE — 74011250637 HC RX REV CODE- 250/637: Performed by: HOSPITALIST

## 2022-12-22 PROCEDURE — 74011000250 HC RX REV CODE- 250

## 2022-12-22 PROCEDURE — 2709999900 HC NON-CHARGEABLE SUPPLY

## 2022-12-22 PROCEDURE — 74011000250 HC RX REV CODE- 250: Performed by: EMERGENCY MEDICINE

## 2022-12-22 PROCEDURE — 99232 SBSQ HOSP IP/OBS MODERATE 35: CPT | Performed by: INTERNAL MEDICINE

## 2022-12-22 PROCEDURE — 74011636637 HC RX REV CODE- 636/637

## 2022-12-22 PROCEDURE — 74011636637 HC RX REV CODE- 636/637: Performed by: HOSPITALIST

## 2022-12-22 RX ORDER — GABAPENTIN 400 MG/1
400 CAPSULE ORAL DAILY
Status: DISCONTINUED | OUTPATIENT
Start: 2022-12-23 | End: 2022-12-23 | Stop reason: HOSPADM

## 2022-12-22 RX ORDER — FUROSEMIDE 20 MG/1
20 TABLET ORAL DAILY
Status: DISCONTINUED | OUTPATIENT
Start: 2022-12-23 | End: 2022-12-23 | Stop reason: HOSPADM

## 2022-12-22 RX ORDER — POLYETHYLENE GLYCOL 3350 17 G/17G
17 POWDER, FOR SOLUTION ORAL
Status: DISCONTINUED | OUTPATIENT
Start: 2022-12-22 | End: 2022-12-23 | Stop reason: HOSPADM

## 2022-12-22 RX ORDER — POLYETHYLENE GLYCOL 3350 17 G/17G
17 POWDER, FOR SOLUTION ORAL DAILY
Status: DISCONTINUED | OUTPATIENT
Start: 2022-12-22 | End: 2022-12-22

## 2022-12-22 RX ADMIN — POLYETHYLENE GLYCOL 3350 17 G: 17 POWDER, FOR SOLUTION ORAL at 12:46

## 2022-12-22 RX ADMIN — Medication 20 UNITS: at 21:31

## 2022-12-22 RX ADMIN — SODIUM CHLORIDE, PRESERVATIVE FREE 10 ML: 5 INJECTION INTRAVENOUS at 05:48

## 2022-12-22 RX ADMIN — SODIUM CHLORIDE, POTASSIUM CHLORIDE, SODIUM LACTATE AND CALCIUM CHLORIDE 250 ML: 600; 310; 30; 20 INJECTION, SOLUTION INTRAVENOUS at 06:45

## 2022-12-22 RX ADMIN — METOPROLOL TARTRATE 100 MG: 50 TABLET, FILM COATED ORAL at 10:48

## 2022-12-22 RX ADMIN — RIVAROXABAN 20 MG: 20 TABLET, FILM COATED ORAL at 09:49

## 2022-12-22 RX ADMIN — Medication 2 UNITS: at 21:02

## 2022-12-22 RX ADMIN — METOPROLOL TARTRATE 100 MG: 50 TABLET, FILM COATED ORAL at 20:25

## 2022-12-22 RX ADMIN — SODIUM CHLORIDE, PRESERVATIVE FREE 10 ML: 5 INJECTION INTRAVENOUS at 21:31

## 2022-12-22 RX ADMIN — Medication 5 UNITS: at 17:04

## 2022-12-22 RX ADMIN — LEVOTHYROXINE SODIUM 150 MCG: 150 TABLET ORAL at 09:49

## 2022-12-22 RX ADMIN — WATER 2 G: 1 INJECTION INTRAMUSCULAR; INTRAVENOUS; SUBCUTANEOUS at 15:53

## 2022-12-22 RX ADMIN — SODIUM CHLORIDE, PRESERVATIVE FREE 10 ML: 5 INJECTION INTRAVENOUS at 06:46

## 2022-12-22 RX ADMIN — SODIUM CHLORIDE, PRESERVATIVE FREE 10 ML: 5 INJECTION INTRAVENOUS at 14:54

## 2022-12-22 RX ADMIN — ATORVASTATIN CALCIUM 80 MG: 40 TABLET, FILM COATED ORAL at 09:49

## 2022-12-22 RX ADMIN — MORPHINE SULFATE 15 MG: 15 TABLET ORAL at 17:05

## 2022-12-22 RX ADMIN — Medication 4 UNITS: at 17:05

## 2022-12-22 RX ADMIN — MORPHINE SULFATE 15 MG: 15 TABLET ORAL at 09:49

## 2022-12-22 NOTE — ED NOTES
Report to oncoming nurse. Patient remains alert, oriented, and ambulatory with x1 assistance. Left AC 20g IV in place. Denies pain. Hard of hearing bilaterally. Changed for urinary incontinence and provided dinner. Belongings on stretcher shelf.

## 2022-12-22 NOTE — PROGRESS NOTES
[facility] Family Medicine  DAILY PROGRESS NOTE      Patient:    Vance Lincoln , 82 y.o. female   MRN:  [id number]  Room/Bed:  2308/01  Admission Date:   12/21/2022  Code status:  Full Code    Reason for Admission:     82 yr old F with PMH of diastolic CHF, a. Fib, DM II, hypertension and DVT leg 10/22, right hip pain, hypothyroidism, developed SOB after a doctors appt 12/20. Found to have a. Fib with RVR. Improved with diltiazem and lasix.  Has not been taking meds regularly due to cost.     ASSESSMENT AND PLAN:   Problem List Items Addressed This Visit          Respiratory    Pulmonary edema       Other    Leukocytosis     Other Visit Diagnoses       Atrial fibrillation with RVR ([facility] [address]    -  Primary          Sepsis of unknown etiology   -recently admitted with very similar presentation in 10/2022, determined to be sepsis 2/ UTI  -on admission: tachycardic, tachypneic, WBC 28.2, afebrile  -pt ROS + dysuria, UA non-conclusive for infection  -CXR with tiny effusions, no pna   -did not receive fluids or abx to date   -covid/flu  neg  -Bcx with Streptococcus species  Plan:   -Ucx, LA  -daily CBC  -250cc bolus IVF for hypotension in setting of CHF exacerbation   -ceftriaxone 2 g daily, abx change pending culture results  -PT/OT/CM    SAfib   CHF  HTN  -tachycardic on admission   -Cr 1.79 on admission (baseline 0.8-1)  -Hb 11.9 (baseline 11s)  -EKG on admission: A fib, , Qtc 473  -CXR on admission: mild HF with tiny effusions  -Trop 20  -BNP 4582  -ABG pH 7.43, pO2 120, HCO3 27.3   -Echo 12/21 EF 20-25%, severe hypokinesis, LA and RA severely dilated, mod pulmonary HTN, moderate TR  -meds per chart review: amlo 10, lopressor 100 mg BID, dilt 180 mg daily, lasix 40 mg daily, xarelto 20 mg BID  -on home O2 for prn use  - s/p IV 40 mg lasix in ED  Plan:   -cardio consulted by hospitalist team, appreciate recs  -tele   -PT/INR/PTT  -supportive O2 care   -strict I+Os, daily weights  -fluid restrict 1200 cc   -c/w xarelto, diltiazem, lopressor  -held lasix and lisinopril due to AQUILES     AQUILES  -BUN 33, Cr 1.79, GFR 27 on admission   -baseline Cr 0.8-1  -ddx prerenal vs cardiorenal vs meds   -Today Cr 1.77, BUN 36  Plan:   -daily BMP  -renally dose medications  -avoid nephrotoxins   -electrolyte repletion prn      DM with neuoropathy  -most recent A1c 11.3 in 10/2022  -meds per chart review: jardiance 10, insulin glargine 20 U qhs, novolog 5 U TID with meals, metformin 500 mg daily  -CMP remarkable for glucose 452, Na 132, Cl 95, CO2 29, AG 8,  Mg 1.6  -UA positive for glucose, protein, negative for ketones  -s/p 25 units  on 12/21 am   Plan:   -monitor BG qachs  -lantus 20 qhs, humalog 5 U TID with meals   -SSI   -hypoglycemia protocol     Hypothyroidism  -meds: levo 150 mcg  -TSH 64.9, FT4 0.5 on 12/21  -pt nonadherent with medications at home   Plan:   -cont. levo 150 mcg, titrate as needed     COPD  -on home O2 for prn use  -no inhalers prescribed per chart review  -Currently on 3L NC   Plan:   -cont. supportive O2     Chronic pain  RA  -ESR 18 on admission   -meds per chart review: MS IR 15 mg BID   Plan:   -Cont. MS IR 15 mg BID      Hx DVT  -PVL 10/8 showed non occlusive thrombus within varicose veins of calf on RLE, otherwise no DVT bilaterally   Plan:  -Cont. xarelto 20 mg daily  -SW to try and get coverage for Eliquis OP and if not then Warfarin     Anxiety   -Cont.hydroxyzine 10 mg BID prn     Hypothyroidism   -Cont. Levothyroxine 150 mcg    Code: Full  Diet: cardiac diet w/ fluid restriction  DVT/AC: xarelto  Mobility: per protocol  Disposition: pending    Point of Contact (relationship): Marbella Suzy [phone]        SUBJECTIVE:   Events of the last 24 hours:  No acute events overnight. Patient seen at beside. No acute complaints. States that her breathing is much improved from yesterday.     ROS (positive findings are in BOLD; negative findings are in regular font)  Constitutional: fevers, chills, appetite changes, weight changes, fatigue  HEENT: changes in vision, changes in hearing, sore throat, dysphagia  Cardiovascular: chest pain, palpitations, PND, orthopnea, edema  Pulmonary: SOB, cough, sputum production, wheezing, chest tightness  Gastrointestinal: abdominal pain, nausea/vomiting, diarrhea, constipation, melena, hematochezia  Genitourinary: dysuria, hesitation, dribbling, urgency, hematuria  Musculoskeletal: arthralgias, myalgias  Skin: rash, itching  Neurological: sensory changes, motor changes, headache  Psychiatric: mood changes  Endocrine: heat/cold intolerance  Heme: easy bruising/easy bleeding, LAD    CURRENT INPATIENT MEDICATIONS:  Current Facility-Administered Medications   Medication Dose Route Frequency Provider Last Rate Last Admin    [START ON 12/23/2022] gabapentin (NEURONTIN) capsule 400 mg  400 mg Oral DAILY Chet Meredith MD        polyethylene glycol (MIRALAX) packet 17 g  17 g Oral DAILY PRN Dillan Crowley MD        sodium chloride (NS) flush 5-10 mL  5-10 mL IntraVENous PRN Adilia Nichols MD   10 mL at 12/22/22 0646    atorvastatin (LIPITOR) tablet 80 mg  80 mg Oral DAILY Chet Meredith MD   80 mg at 12/22/22 0949    dilTIAZem ER (CARDIZEM CD) capsule 180 mg  180 mg Oral DAILY Phoenix Meredith MD        insulin glargine (LANTUS) injection 20 Units  20 Units SubCUTAneous QHS Phoenix Meredith MD   20 Units at 12/21/22 2200    hydrOXYzine HCL (ATARAX) tablet 10 mg  10 mg Oral BID PRN Luh Gerard MD        levothyroxine (SYNTHROID) tablet 150 mcg  150 mcg Oral DAILY Luh Gerard MD   150 mcg at 12/22/22 0949    metoprolol tartrate (LOPRESSOR) tablet 100 mg  100 mg Oral Q12H Phoenix Meredith MD   100 mg at 12/22/22 1048    morphine IR (MS IR) tablet 15 mg  15 mg Oral BID Luh Gerard MD   15 mg at 12/22/22 3236    rivaroxaban (XARELTO) tablet 20 mg  20 mg Oral DAILY Luh Gerard MD   20 mg at 12/22/22 0949    sodium chloride (NS) flush 5-40 mL  5-40 mL IntraVENous Q8H Belen Fernandes MD   10 mL at 12/22/22 0548    sodium chloride (NS) flush 5-40 mL  5-40 mL IntraVENous PRN Lisset Meredith MD        acetaminophen (TYLENOL) tablet 650 mg  650 mg Oral Q6H PRN Lisset Meredith MD        Or    acetaminophen (TYLENOL) suppository 650 mg  650 mg Rectal Q6H PRN Belen Fernandes MD        [Held by provider] lisinopriL (PRINIVIL, ZESTRIL) tablet 5 mg  5 mg Oral DAILY Lisset Meredith MD   5 mg at 12/21/22 0933    ondansetron (ZOFRAN) injection 4 mg  4 mg IntraVENous Q6H PRN Lisset Meredith MD        cefTRIAXone (ROCEPHIN) 2 g in sterile water (preservative free) 20 mL IV syringe  2 g IntraVENous Q24H Marc Cardona MD   2 g at 12/21/22 1530    insulin lispro (HUMALOG) injection   SubCUTAneous AC&HS Marc Cardona MD   8 Units at 12/21/22 2112    glucose chewable tablet 16 g  4 Tablet Oral PRN Marc Cardona MD        glucagon (GLUCAGEN) injection 1 mg  1 mg IntraMUSCular PRN Marc Cardona MD        insulin lispro (HUMALOG) injection 5 Units  5 Units SubCUTAneous TIDAC Marc Cardona MD           Allergies  No Known Allergies    OBJECTIVE:    Intake/Output Summary (Last 24 hours) at 12/22/2022 1150  Last data filed at 12/22/2022 0645  Gross per 24 hour   Intake 250 ml   Output --   Net 250 ml       Visit Vitals  BP (!) 108/51   Pulse (!) 108   Temp 98 °F (36.7 °C)   Resp 16   Ht 5' 5\" (1.651 m)   Wt 101 kg (222 lb 9.6 oz)   SpO2 95%   BMI 37.04 kg/m²       PHYSICAL EXAM  Gen: NAD, comfortable, obese   HEENT: normocephalic, atraumatic, MMM, no thyromegaly, no JVD  CV: RRR, S1/S2 present without M/R/G, +2 radial pulses, well-perfused, PMI not displaced, no edema, bilateral varicose veins  Pulm: CTAB, no wheezes, no crackles  Abd: S/NT/ND, no rebound, no guarding, no hepatosplenomegaly   MSK: no clubbing, no edema, missing toes on RLE  Skin: warm, dry, intact, no rash,  Neuro: CN II-XII grossly intact, no focal deficits appreciated   Psych: appropriate, alert, oriented x3    LABWORK (LAST 24 HOURS)  Recent Results (from the past 24 hour(s))   GLUCOSE, POC    Collection Time: 12/21/22  8:39 PM   Result Value Ref Range    Glucose (POC) 305 (H) 70 - 110 mg/dL   CBC WITH AUTOMATED DIFF    Collection Time: 12/21/22  9:45 PM   Result Value Ref Range    WBC 25.0 (H) 4.6 - 13.2 K/uL    RBC 3.45 (L) 4.20 - 5.30 M/uL    HGB 9.2 (L) 12.0 - 16.0 g/dL    HCT 28.6 (L) 35.0 - 45.0 %    MCV 82.9 78.0 - 100.0 FL    MCH 26.7 24.0 - 34.0 PG    MCHC 32.2 31.0 - 37.0 g/dL    RDW 15.8 (H) 11.6 - 14.5 %    PLATELET 025 829 - 169 K/uL    MPV 13.9 (H) 9.2 - 11.8 FL    NRBC 0.0 0  WBC    ABSOLUTE NRBC 0.00 0.00 - 0.01 K/uL    NEUTROPHILS 80 (H) 40 - 73 %    BAND NEUTROPHILS 9 %    LYMPHOCYTES 5 (L) 21 - 52 %    MONOCYTES 6 3 - 10 %    EOSINOPHILS 0 0 - 5 %    BASOPHILS 0 0 - 2 %    IMMATURE GRANULOCYTES 0 0.0 - 0.5 %    ABS. NEUTROPHILS 22.2 (H) 1.8 - 8.0 K/UL    ABS. LYMPHOCYTES 1.3 0.9 - 3.6 K/UL    ABS. MONOCYTES 1.5 (H) 0.05 - 1.2 K/UL    ABS. EOSINOPHILS 0.0 0.0 - 0.4 K/UL    ABS. BASOPHILS 0.0 0.0 - 0.1 K/UL    ABS. IMM.  GRANS. 0.0 0.00 - 0.04 K/UL    DF AUTOMATED      PLATELET COMMENTS ADEQUATE PLATELETS      RBC COMMENTS ANISOCYTOSIS  1+        RBC COMMENTS MICROCYTOSIS  1+        RBC COMMENTS HYPOCHROMIA  1+       COVID-19 WITH INFLUENZA A/B    Collection Time: 12/21/22  9:45 PM   Result Value Ref Range    SARS-CoV-2 by PCR Not detected NOTD      Influenza A by PCR Not detected NOTD      Influenza B by PCR Not detected NOTD     METABOLIC PANEL, BASIC    Collection Time: 12/21/22  9:45 PM   Result Value Ref Range    Sodium 134 (L) 136 - 145 mmol/L    Potassium 3.8 3.5 - 5.5 mmol/L    Chloride 97 (L) 100 - 111 mmol/L    CO2 31 21 - 32 mmol/L    Anion gap 6 3.0 - 18 mmol/L    Glucose 345 (H) 74 - 99 mg/dL    BUN 35 (H) 7.0 - 18 MG/DL    Creatinine 1.79 (H) 0.6 - 1.3 MG/DL    BUN/Creatinine ratio 20 12 - 20      eGFR 27 (L) >60 ml/min/1.73m2    Calcium 8.7 8.5 - 10.1 MG/DL   MAGNESIUM    Collection Time: 12/21/22  9:45 PM   Result Value Ref Range    Magnesium 2.0 1.6 - 2.6 mg/dL   LACTIC ACID    Collection Time: 12/22/22  3:37 AM   Result Value Ref Range    Lactic acid 1.4 0.4 - 2.0 MMOL/L   HEMOGLOBIN A1C WITH EAG    Collection Time: 12/22/22  3:37 AM   Result Value Ref Range    Hemoglobin A1c 11.9 (H) 4.2 - 5.6 %    Est. average glucose 295 mg/dL   CBC W/O DIFF    Collection Time: 12/22/22  3:37 AM   Result Value Ref Range    WBC 21.7 (H) 4.6 - 13.2 K/uL    RBC 3.59 (L) 4.20 - 5.30 M/uL    HGB 9.5 (L) 12.0 - 16.0 g/dL    HCT 29.9 (L) 35.0 - 45.0 %    MCV 83.3 78.0 - 100.0 FL    MCH 26.5 24.0 - 34.0 PG    MCHC 31.8 31.0 - 37.0 g/dL    RDW 15.8 (H) 11.6 - 14.5 %    PLATELET 370 823 - 960 K/uL    MPV 14.1 (H) 9.2 - 11.8 FL    NRBC 0.0 0  WBC    ABSOLUTE NRBC 0.00 0.00 - 8.64 K/uL   METABOLIC PANEL, BASIC    Collection Time: 12/22/22  3:37 AM   Result Value Ref Range    Sodium 136 136 - 145 mmol/L    Potassium 3.5 3.5 - 5.5 mmol/L    Chloride 99 (L) 100 - 111 mmol/L    CO2 31 21 - 32 mmol/L    Anion gap 6 3.0 - 18 mmol/L    Glucose 159 (H) 74 - 99 mg/dL    BUN 36 (H) 7.0 - 18 MG/DL    Creatinine 1.77 (H) 0.6 - 1.3 MG/DL    BUN/Creatinine ratio 20 12 - 20      eGFR 28 (L) >60 ml/min/1.73m2    Calcium 8.6 8.5 - 10.1 MG/DL   MAGNESIUM    Collection Time: 12/22/22  3:37 AM   Result Value Ref Range    Magnesium 2.0 1.6 - 2.6 mg/dL   GLUCOSE, POC    Collection Time: 12/22/22  7:38 AM   Result Value Ref Range    Glucose (POC) 130 (H) 70 - 110 mg/dL       IMAGING AND PROCEDURES (LAST 36 HOURS)  XR HIP RT W OR WO PELV 2-3 VWS    Result Date: 12/22/2022  1. No acute osseous findings.     ================================================================  Further management for Ms. Chaparro Coronado will be discussed on rounds with my attending.       Ming Gomez MD, PGY-1  [facility]  December 22, 2022 11:50 AM

## 2022-12-22 NOTE — PROGRESS NOTES
Assessment/Transition Planning    Reason for Admission:   Pulmonary edema [J81.1]  Dyspnea [R06.00]  Leukocytosis [D72.829]  Acute exacerbation of CHF (congestive heart failure) ([facility] [address] [I50.9]  Atrial fibrillation with rapid ventricular response ([facility] [address] [I48.91]    PCP: Keven Swift MD               RUR Score:     22             Resources/supports as identified by patient/family:       Top Challenges facing patient (as identified by patient/family and CM):     none    Finances/Medication cost?     no  Transportation      no  Support system or lack thereof?   no  Living arrangements?        no   Self-care/ADLs/Cognition? no        Current Advanced Directive/Advance Care Plan:   no    Healthcare Decision Maker:   Supplemental (Other) Decision Maker: Lilianyudi Valdez  Carmen - [phone]    Click here to complete [address] including selection of the Healthcare Decision Maker Relationship (ie \"Primary\")                          Plan for utilizing home health:    TBD                      Likelihood of readmission:   HIGH    Transition of Care Plan:                    Initial assessment completed with patient. Cognitive status of patient: oriented to time, place, person and situation. Face sheet information confirmed:  yes. The patient designates Mckenzie Rahman (Son) to participate in her discharge plan and to receive any needed information. This patient lives in a single family home with boyfriend. Patient is able to navigate steps as needed. Prior to hospitalization, patient was considered to be independent with ADLs/IADLS : yes . Patient has a current ACP document on file: no  The boyfriend will be available to transport patient home upon discharge. The patient already has Og Roses, Walker, Rollator, W/C, Transfer tub bench, BSC, Grab bars, Raised Toilet seat  medical equipment available in the home. Patient is not currently active with home health.      Currently, the discharge plan is Home with Family Assistance. The patient states that she can obtain her medications from the pharmacy, and take her medications as directed. Patient's current insurance is Payor: [place] MED[place] / Plan: [place] MEDICARE PART A & B / Product Type: Medicare /       Care Management Interventions  PCP Verified by CM: Yes  Palliative Care Criteria Met (RRAT>21 & CHF Dx)?: No  Mode of Transport at Discharge:  Other (see comment) (Family Friend)  Transition of Care Consult (CM Consult): Discharge Planning  Physical Therapy Consult: Yes  Occupational Therapy Consult: Yes  Support Systems: Child(jace), Friend/Neighbor  Confirm Follow Up Transport: Family  Discharge Location  Patient Expects to be Discharged to[de-identified] Home with family assistance      JULIO Dominguez    [facility]

## 2022-12-22 NOTE — ROUTINE PROCESS
2259:TRANSFER - IN REPORT:    Verbal report received from Carmen Breen RN(name) on Mattie  being received from ER(unit) for routine progression of care      Report consisted of patients Situation, Background, Assessment and   Recommendations(SBAR). Information from the following report(s) SBAR, Kardex, Intake/Output, MAR, Recent Results, and Cardiac Rhythm Afib  was reviewed with the receiving nurse. Opportunity for questions and clarification was provided. 2344: Received patient via stretcher. AAOx3 . [place]. HOB elevated. On oxygen at 3 LPM. DIEZ noted. Denies any pain or discomfort at this time. Oriented patient to room & surroundings. Call light within reach. Bed exit alarm on. Assessment completed upon patients arrival to unit and care assumed. 0000: Unable to do admission data collection at this time. Patient too sleepy. 80: MD at bedside. Mentioned about the scattered multiple sores w/ scabs over in the torso/trunk     0446: No change from previous assessment. 5589: Concern about having low BP. Paged PFM.     [phone]: Re -paged PFM. 0630: PFM on call called back. Made aware of the above situations. Will put order in.     0646:   ml bolus started. Patient slept good thru night. Needs attended. 0715: Bolus ended. MD at bedside. Updated. 0800:Bedside and Verbal shift change report given to Peggy Amado (oncoming nurse) by Kenia Rush RN (offgoing nurse). Report included the following information SBAR, Kardex, Intake/Output, MAR, Accordion, and Cardiac Rhythm Afib .  Wound Prevention Checklist    Patient: Mattie (37 y.o. female)  Date: 12/22/2022  Diagnosis: Pulmonary edema [J81.1]  Dyspnea [R06.00]  Leukocytosis [D72.829]  Acute exacerbation of CHF (congestive heart failure) ([address] [I50.9]  Atrial fibrillation with rapid ventricular response (HCC) [I48.91] Sepsis ([facility] [address]    Precautions:         []  Heel prevention boots placed on patient    []  Patient turned q2h during shift    []  Lift team ordered    [x]  Patient on [place] bed/Specialty bed    [x]  Each Wound is documented during shift (Stage, Color, drainage, odor, measurements, and dressings)    [x]  Dual skin check done with Vincent Ely, RN

## 2022-12-22 NOTE — PROGRESS NOTES
Problem: Pressure Injury - Risk of  Goal: *Prevention of pressure injury  Description: Document Ravi Scale and appropriate interventions in the flowsheet. Outcome: Progressing Towards Goal  Note: Pressure Injury Interventions:       Moisture Interventions: Absorbent underpads, Internal/External urinary devices, Minimize layers    Activity Interventions: Pressure redistribution bed/mattress(bed type), PT/OT evaluation    Mobility Interventions: Assess need for specialty bed, Chair cushion, HOB 30 degrees or less, Pressure redistribution bed/mattress (bed type), PT/OT evaluation    Nutrition Interventions: Document food/fluid/supplement intake, Offer support with meals,snacks and hydration    Friction and Shear Interventions: Apply protective barrier, creams and emollients, HOB 30 degrees or less, Minimize layers                Problem: Diabetes Self-Management  Goal: *Disease process and treatment process  Description: Define diabetes and identify own type of diabetes; list 3 options for treating diabetes. Outcome: Progressing Towards Goal  Goal: *Incorporating nutritional management into lifestyle  Description: Describe effect of type, amount and timing of food on blood glucose; list 3 methods for planning meals. Outcome: Progressing Towards Goal  Goal: *Incorporating physical activity into lifestyle  Description: State effect of exercise on blood glucose levels. Outcome: Progressing Towards Goal  Goal: *Developing strategies to promote health/change behavior  Description: Define the ABC's of diabetes; identify appropriate screenings, schedule and personal plan for screenings. Outcome: Progressing Towards Goal  Goal: *Using medications safely  Description: State effect of diabetes medications on diabetes; name diabetes medication taking, action and side effects.   Outcome: Progressing Towards Goal  Goal: *Monitoring blood glucose, interpreting and using results  Description: Identify recommended blood glucose targets  and personal targets. Outcome: Progressing Towards Goal  Goal: *Prevention, detection, treatment of acute complications  Description: List symptoms of hyper- and hypoglycemia; describe how to treat low blood sugar and actions for lowering  high blood glucose level. Outcome: Progressing Towards Goal  Goal: *Prevention, detection and treatment of chronic complications  Description: Define the natural course of diabetes and describe the relationship of blood glucose levels to long term complications of diabetes.   Outcome: Progressing Towards Goal  Goal: *Developing strategies to address psychosocial issues  Description: Describe feelings about living with diabetes; identify support needed and support network  Outcome: Progressing Towards Goal  Goal: *Insulin pump training  Outcome: Progressing Towards Goal  Goal: *Sick day guidelines  Outcome: Progressing Towards Goal  Goal: *Patient Specific Goal (EDIT GOAL, INSERT TEXT)  Outcome: Progressing Towards Goal     Problem: Afib Pathway: Day 1  Goal: Activity/Safety  Outcome: Progressing Towards Goal  Goal: Consults, if ordered  Outcome: Progressing Towards Goal  Goal: Diagnostic Test/Procedures  Outcome: Progressing Towards Goal  Goal: Nutrition/Diet  Outcome: Progressing Towards Goal  Goal: Discharge Planning  Outcome: Progressing Towards Goal  Goal: Medications  Outcome: Progressing Towards Goal  Goal: Respiratory  Outcome: Progressing Towards Goal  Goal: Treatments/Interventions/Procedures  Outcome: Progressing Towards Goal  Goal: Psychosocial  Outcome: Progressing Towards Goal  Goal: *Optimal pain control at patient's stated goal  Outcome: Progressing Towards Goal  Goal: *Hemodynamically stable  Outcome: Progressing Towards Goal  Goal: *Stable cardiac rhythm  Outcome: Progressing Towards Goal  Goal: *Lungs clear or at baseline  Outcome: Progressing Towards Goal  Goal: *Labs within defined limits  Outcome: Progressing Towards Goal  Goal: *Describes available resources and support systems  Outcome: Progressing Towards Goal     Problem: Heart Failure: Day 1  Goal: Activity/Safety  Outcome: Progressing Towards Goal  Goal: Consults, if ordered  Outcome: Progressing Towards Goal  Goal: Diagnostic Test/Procedures  Outcome: Progressing Towards Goal  Goal: Nutrition/Diet  Outcome: Progressing Towards Goal  Goal: Discharge Planning  Outcome: Progressing Towards Goal  Goal: Medications  Outcome: Progressing Towards Goal  Goal: Respiratory  Outcome: Progressing Towards Goal  Goal: Treatments/Interventions/Procedures  Outcome: Progressing Towards Goal  Goal: Psychosocial  Outcome: Progressing Towards Goal  Goal: *Oxygen saturation within defined limits  Outcome: Progressing Towards Goal  Goal: *Hemodynamically stable  Outcome: Progressing Towards Goal  Goal: *Optimal pain control at patient's stated goal  Outcome: Progressing Towards Goal  Goal: *Anxiety reduced or absent  Outcome: Progressing Towards Goal     Problem: Pain  Goal: *Control of Pain  Outcome: Progressing Towards Goal     Problem: Falls - Risk of  Goal: *Absence of Falls  Description: Document Obdulio Fall Risk and appropriate interventions in the flowsheet.   Outcome: Progressing Towards Goal     Problem: Patient Education: Go to Patient Education Activity  Goal: Patient/Family Education  Outcome: Progressing Towards Goal

## 2022-12-22 NOTE — PROGRESS NOTES
Problem: Pressure Injury - Risk of  Goal: *Prevention of pressure injury  Description: Document Ravi Scale and appropriate interventions in the flowsheet. Outcome: Progressing Towards Goal  Note: Pressure Injury Interventions:       Moisture Interventions: Internal/External urinary devices, Apply protective barrier, creams and emollients, Absorbent underpads, Minimize layers    Activity Interventions: Pressure redistribution bed/mattress(bed type), Increase time out of bed    Mobility Interventions: Pressure redistribution bed/mattress (bed type), HOB 30 degrees or less    Nutrition Interventions: Document food/fluid/supplement intake    Friction and Shear Interventions: HOB 30 degrees or less, Minimize layers, Sit at 90-degree angle                Problem: Patient Education: Go to Patient Education Activity  Goal: Patient/Family Education  Outcome: Progressing Towards Goal     Problem: Diabetes Self-Management  Goal: *Disease process and treatment process  Description: Define diabetes and identify own type of diabetes; list 3 options for treating diabetes. Outcome: Progressing Towards Goal  Goal: *Incorporating nutritional management into lifestyle  Description: Describe effect of type, amount and timing of food on blood glucose; list 3 methods for planning meals. Outcome: Progressing Towards Goal  Goal: *Incorporating physical activity into lifestyle  Description: State effect of exercise on blood glucose levels. Outcome: Progressing Towards Goal  Goal: *Developing strategies to promote health/change behavior  Description: Define the ABC's of diabetes; identify appropriate screenings, schedule and personal plan for screenings. Outcome: Progressing Towards Goal  Goal: *Using medications safely  Description: State effect of diabetes medications on diabetes; name diabetes medication taking, action and side effects.   Outcome: Progressing Towards Goal  Goal: *Monitoring blood glucose, interpreting and using results  Description: Identify recommended blood glucose targets  and personal targets. Outcome: Progressing Towards Goal  Goal: *Prevention, detection, treatment of acute complications  Description: List symptoms of hyper- and hypoglycemia; describe how to treat low blood sugar and actions for lowering  high blood glucose level. Outcome: Progressing Towards Goal  Goal: *Prevention, detection and treatment of chronic complications  Description: Define the natural course of diabetes and describe the relationship of blood glucose levels to long term complications of diabetes.   Outcome: Progressing Towards Goal  Goal: *Developing strategies to address psychosocial issues  Description: Describe feelings about living with diabetes; identify support needed and support network  Outcome: Progressing Towards Goal  Goal: *Insulin pump training  Outcome: Progressing Towards Goal  Goal: *Sick day guidelines  Outcome: Progressing Towards Goal  Goal: *Patient Specific Goal (EDIT GOAL, INSERT TEXT)  Outcome: Progressing Towards Goal     Problem: Patient Education: Go to Patient Education Activity  Goal: Patient/Family Education  Outcome: Progressing Towards Goal     Problem: Afib Pathway: Day 1  Goal: Activity/Safety  Outcome: Progressing Towards Goal  Goal: Consults, if ordered  Outcome: Progressing Towards Goal  Goal: Diagnostic Test/Procedures  Outcome: Progressing Towards Goal  Goal: Nutrition/Diet  Outcome: Progressing Towards Goal  Goal: Discharge Planning  Outcome: Progressing Towards Goal  Goal: Medications  Outcome: Progressing Towards Goal  Goal: Respiratory  Outcome: Progressing Towards Goal  Goal: Treatments/Interventions/Procedures  Outcome: Progressing Towards Goal  Goal: Psychosocial  Outcome: Progressing Towards Goal  Goal: *Optimal pain control at patient's stated goal  Outcome: Progressing Towards Goal  Goal: *Hemodynamically stable  Outcome: Progressing Towards Goal  Goal: *Stable cardiac rhythm  Outcome: Progressing Towards Goal  Goal: *Lungs clear or at baseline  Outcome: Progressing Towards Goal  Goal: *Labs within defined limits  Outcome: Progressing Towards Goal  Goal: *Describes available resources and support systems  Outcome: Progressing Towards Goal     Problem: Heart Failure: Day 1  Goal: Activity/Safety  Outcome: Progressing Towards Goal  Goal: Consults, if ordered  Outcome: Progressing Towards Goal  Goal: Diagnostic Test/Procedures  Outcome: Progressing Towards Goal  Goal: Nutrition/Diet  Outcome: Progressing Towards Goal  Goal: Discharge Planning  Outcome: Progressing Towards Goal  Goal: Medications  Outcome: Progressing Towards Goal  Goal: Respiratory  Outcome: Progressing Towards Goal  Goal: Treatments/Interventions/Procedures  Outcome: Progressing Towards Goal  Goal: Psychosocial  Outcome: Progressing Towards Goal  Goal: *Oxygen saturation within defined limits  Outcome: Progressing Towards Goal  Goal: *Hemodynamically stable  Outcome: Progressing Towards Goal  Goal: *Optimal pain control at patient's stated goal  Outcome: Progressing Towards Goal  Goal: *Anxiety reduced or absent  Outcome: Progressing Towards Goal     Problem: Pain  Goal: *Control of Pain  Outcome: Progressing Towards Goal     Problem: Patient Education: Go to Patient Education Activity  Goal: Patient/Family Education  Outcome: Progressing Towards Goal     Problem: Falls - Risk of  Goal: *Absence of Falls  Description: Document Obdulio Fall Risk and appropriate interventions in the flowsheet.   Outcome: Progressing Towards Goal  Note: Fall Risk Interventions:  Mobility Interventions: Bed/chair exit alarm, Communicate number of staff needed for ambulation/transfer, Patient to call before getting OOB, PT Consult for mobility concerns         Medication Interventions: Bed/chair exit alarm, Evaluate medications/consider consulting pharmacy, Patient to call before getting OOB, Teach patient to arise slowly    Elimination Interventions: Bed/chair exit alarm, Call light in reach, Patient to call for help with toileting needs, Toileting schedule/hourly rounds              Problem: Patient Education: Go to Patient Education Activity  Goal: Patient/Family Education  Outcome: Progressing Towards Goal     Problem: Falls - Risk of  Goal: *Absence of Falls  Description: Document Cherylle Cockayne Fall Risk and appropriate interventions in the flowsheet.   Outcome: Progressing Towards Goal  Note: Fall Risk Interventions:  Mobility Interventions: Bed/chair exit alarm, Communicate number of staff needed for ambulation/transfer, Patient to call before getting OOB, PT Consult for mobility concerns         Medication Interventions: Bed/chair exit alarm, Evaluate medications/consider consulting pharmacy, Patient to call before getting OOB, Teach patient to arise slowly    Elimination Interventions: Bed/chair exit alarm, Call light in reach, Patient to call for help with toileting needs, Toileting schedule/hourly rounds              Problem: Patient Education: Go to Patient Education Activity  Goal: Patient/Family Education  Outcome: Progressing Towards Goal

## 2022-12-22 NOTE — PROGRESS NOTES
Wound Prevention Checklist    Patient: Teresa Adams [de-identified]82 y.o. female)  Date: 12/22/2022  Diagnosis: Pulmonary edema [J81.1]  Dyspnea [R06.00]  Leukocytosis [D72.829]  Acute exacerbation of CHF (congestive heart failure) (HCC) [I50.9]  Atrial fibrillation with rapid ventricular response (HCC) [I48.91] Sepsis ([address]    Precautions:         []  Heel prevention boots placed on patient    []  Patient turned q2h during shift    []  Lift team ordered    [x]  Patient on [facility] bed/Specialty bed    [x]  Each Wound is documented during shift (Stage, Color, drainage, odor, measurements, and dressings)    [x]  Dual skin check done with Hussein Fong RN

## 2022-12-22 NOTE — PROGRESS NOTES
SW discussed with patient current OT recommendations and the Patient refused Home Health services. JOAQUIN spoke with Kari Giang to inform him of Ms. Shonda Cm declination of [address].        JULIO Lockwood    [facility]

## 2022-12-22 NOTE — PROGRESS NOTES
[facility]   BRIEF PROGRESS NOTE    Evaluated patient at bedside. She was sleeping comfortably. Readjusted BP cuff and rechecked BP. 98/59. VSS on RA. Will continue to hold off IVF given CHF exacerbation.         Romana Belling, MD, PGY-1  [facility]  12/22/2022, 3:12 AM

## 2022-12-22 NOTE — PROGRESS NOTES
[facility]   BRIEF PROGRESS NOTE    - HFrEF - ECHO from 12/21/2022 notable for EF 20-25%, which is a significantly decreased from 8/2022 when EF was 55%. S/p IV lasix 40 mg on 12/21 with around 400 mL output. Started on PO lasix 20 mg qD today. Continue to monitor lytes and kidney function on daily BMP. - Afib - Continue PO lopressor 100 mg BID and Cardizem 180 mg qD (weaned off of IV dilt on 12/21). Continue to closely monitor Bps as soft blood pressures overnight with MAPs <60. S/p 250 ml of LR this AM. Can continue to spot dose small bolus of fluids if concerns for soft Bps overnight. - Currently on Xarelto 20 mg qD. Will need to work closely with case management on discharge options for patient based on what she can afford.  - Continue Rocephin 2 g qD. Bcx with probable streptococcus growing in 1 of 2 bottles, likely a contaminant. Ucx with GNR but less <100k cfu. WBC continues to trend down.        Krista Jackson MD, PGY- 3  [facility]  12/22/2022, 4:24 PM

## 2022-12-22 NOTE — PROGRESS NOTES
Pharmacy Monitoring/Intervention - Renal Dosing    An ordered medication has been automatically dosed per [employer] Renal Dosing Protocol for Renee Kirkpatrick [de-identified], 82 y.o., female:    Provider ordered medication: Gabapentin 400 mg po TID  Changed to: 400 mg po Daily    Pharmacy will follow daily and adjust medication as appropriate for renal function and/or serum levels. Thank you,     BARNEY Olmedo  Clinical Pharmacist        Weight Weight: 101 kg (222 lb 9.6 oz)   BMI Estimated body mass index is 37.04 kg/m² as calculated from the following:    Height as of this encounter: 165.1 cm (65\"). Weight as of this encounter: 101 kg (222 lb 9.6 oz). Temperature Temp: 98.1 °F (36.7 °C)  Temp (72hrs), Av.7 °F (36.5 °C), Min:97.3 °F (36.3 °C), Max:98.1 °F (36.7 °C)     Labs Recent Labs     22  2145 22  0037   CREA 1.79* 1.79*   BUN 35* 33*   WBC 25.0* 28.2*      CrCl Estimated Creatinine Clearance: 27.1 mL/min (A) (based on SCr of 1.79 mg/dL (H)). NOTE: This information is to be used to assist in clinical efficiency but is not a substitute for clinical judgement.

## 2022-12-22 NOTE — PROGRESS NOTES
Problem: Mobility Impaired (Adult and Pediatric)  Goal: *Acute Goals and Plan of Care (Insert Text)  Description: Physical Therapy Goals  Initiated 12/22/2022 and to be accomplished within 7 day(s)  1. Patient will move from supine to sit and sit to supine in bed with modified independence. 2.  Patient will transfer from bed to chair and chair to bed with modified independence using the least restrictive device. 3.  Patient will perform sit to stand with modified independence. 4.  Patient will ambulate with modified independence for 150 feet with the least restrictive device. PLOF: Amb with rollator or rolling walker. Lives with partner. One story apartment; no steps. Amb with rollator or rolling walker. Outcome: Progressing Towards Goal   PHYSICAL THERAPY EVALUATION    Patient: Baljit Urias (47 y.o. female)  Date: 12/22/2022  Primary Diagnosis: Pulmonary edema [J81.1]  Dyspnea [R06.00]  Leukocytosis [D72.829]  Acute exacerbation of CHF (congestive heart failure) ([facility] [address] [I50.9]  Atrial fibrillation with rapid ventricular response ([facility] [address] [I48.91]  Precautions: Fall  ASSESSMENT :  Seated in bed. On 3L O2; baseline. Supervision for supine to sit. Supervision for sit to stand. Amb 25ft with ww. Returned to seated EOB. Supervision for sit to stand. Amb 12ft with ww to chair. Seated in chair. Educated on EOB/OOB for meals/toileting; verbalized understanding. Educated on need for RN assistance with mobility d/t lines; verbalized understanding. Call bell in reach. Will follow 1-2 more sessions to progress amb; close to baseline line mobility. Patient will benefit from skilled intervention to address the above impairments.   Patient's rehabilitation potential is considered to be Fair  Factors which may influence rehabilitation potential include:   []         None noted  []         Mental ability/status  [x]         Medical condition  []         Home/family situation and support systems  [] Safety awareness  []         Pain tolerance/management  []         Other:      PLAN :  Recommendations and Planned Interventions:   [x]           Bed Mobility Training             [x]    Neuromuscular Re-Education  [x]           Transfer Training                   []    Orthotic/Prosthetic Training  [x]           Gait Training                          []    Modalities  [x]           Therapeutic Exercises           []    Edema Management/Control  [x]           Therapeutic Activities            [x]    Family Training/Education  [x]           Patient Education  []           Other (comment):    Frequency/Duration: Patient will be followed by physical therapy 1-2 times a week to address goals. Further Equipment Recommendations for Discharge: rolling walker    AMPAC Basic Mobility Inpatient Short Form:  15/20, with stairs omitted  This AMPAC score should be considered in conjunction with interdisciplinary team recommendations to determine the most appropriate discharge setting. Patient's social support, diagnosis, medical stability, and prior level of function should also be taken into consideration. At this time and based on an AM-PAC score of 15/20 if omitting stairs, no further PT is recommended upon discharge. SUBJECTIVE:   Patient stated Thank you.     OBJECTIVE DATA SUMMARY:     Past Medical History:   Diagnosis Date    Acquired hypothyroidism 8/1/2016    Arthritis of right shoulder region 4/21/2016    Asthma     Bilateral lower extremity edema 7/7/2021    Chronic bilateral low back pain without sciatica 7/7/2021    Chronic diastolic congestive heart failure ([address] 3/10/2021    Chronic venous insufficiency     Diabetes (HCC)     neuropathy    Essential hypertension 7/7/2021    Gout     History of depression 1/23/2017    History of fall 7/7/2021    Hypercholesteremia 7/7/2021    Hypertension     MI (myocardial infarction) ([address]     OAB (overactive bladder) 7/7/2021    Peripheral neuropathy     Rheumatoid arthritis (HCC)     Tear of right rotator cuff 5/16/2016    Thyroid pain     Urinary incontinence 7/7/2021    Vertigo      Past Surgical History:   Procedure Laterality Date    HX AMPUTATION TOE Right 2020    Great toe Dr. 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     HX KNEE REPLACEMENT Bilateral      Barriers to Learning/Limitations: yes;  sensory deficits-vision/hearing/speech  Compensate with: Visual Cues, Verbal Cues, Tactile Cues and Kinesthetic Cues    Home Situation:  Home Situation  Home Environment: Apartment  One/Two Story Residence: One story  Living Alone: No  Support Systems: Spouse/Significant Other  Patient Expects to be Discharged to[de-identified] Home  Current DME Used/Available at Home: [employer], rolling, Walker, rollator    Critical Behavior:  Neurologic State: Alert  Orientation Level: Oriented X4    Strength:    Manual Muscle Testing (LE)         R     L    Hip Flexion:   4+/5  4+/5  Knee EXT:   4+/5  4+/5  Knee FLEX:   4+/5  4+/5  Ankle DF:   4+/5  4+/5  _________________________________________________   Range Of Motion:  BLE AROM WFL  Functional Mobility:  Bed Mobility:  Supine to Sit: Supervision  Transfers:  Sit to Stand: Supervision  Stand to Sit: Supervision  Balance:   Sitting: Intact  Standing: Impaired  Standing - Static: Fair  Standing - Dynamic : Fair  Ambulation/Gait Training:  Distance (ft):  (25ft, 12ft)   Assistive Device: Walker, rolling  Ambulation - Level of Assistance: Supervision  Pain:  Pain level pre-treatment: 0/10   Pain level post-treatment: 0/10     Activity Tolerance:   Fair    After treatment:   [x]         Patient left in no apparent distress sitting up in chair  []         Patient left in no apparent distress in bed  [x]         Call bell left within reach  [x]         Nursing notified  []         Caregiver present  []         Bed alarm activated  []         SCDs applied    COMMUNICATION/EDUCATION:   [x]         Role of physical therapy and plan of care in the acute care setting. [x]         Fall prevention education was provided and the patient/caregiver indicated understanding. [x]         Patient/family have participated as able in goal setting and plan of care. []         Patient/family agree to work toward stated goals and plan of care. []         Patient understands intent and goals of therapy, but is neutral about his/her participation. []         Patient is unable to participate in goal setting/plan of care: ongoing with therapy staff. Thank you for this referral.  Ruddy Bamberger, PT   Time Calculation: 21 mins    Eval Complexity: History: MEDIUM  Complexity : 1-2 comorbidities / personal factors will impact the outcome/ POC Exam:MEDIUM Complexity : 3 Standardized tests and measures addressing body structure, function, activity limitation and / or participation in recreation  Presentation: MEDIUM Complexity : Evolving with changing characteristics  Clinical Decision Making:Medium Complexity    Clinical judgement; ROM, MMT, functional mobility Overall Complexity:MEDIUM    [facility] AM-PAC® Basic Mobility Inpatient Short Form (6-Clicks) Version 2    How much HELP from another person does the patient currently need    (If the patient hasn't done an activity recently, how much help from another person do you think he/she would need if he/she tried?)   Total (Total A or Dep)   A Lot  (Mod to Max A)   A Little (Sup or Min A)   None (Mod I to I)   Turning from your back to your side while in a flat bed without using bedrails? [] 1 [] 2 [x] 3 [] 4   2. Moving from lying on your back to sitting on the side of a flat bed without using bedrails? [] 1 [] 2 [x] 3 [] 4   3. Moving to and from a bed to a chair (including a wheelchair)? [] 1 [] 2 [x] 3 [] 4   4. Standing up from a chair using your arms (e.g., wheelchair, or bedside chair)?    [] 1 [] 2 [x] 3 [] 4   5. Walking in hospital room? [] 1 [] 2 [x] 3 [] 4   6. Climbing 3-5 steps with a railing?+  Not tested d/t none in home set-up [] 1 [] 2 [] 3 [] 4   +If stair climbing cannot be assessed, skip item #6. Sum responses from items 1-5.

## 2022-12-22 NOTE — PROGRESS NOTES
Cardiology progress notes    Cardiology referral request from Dr. Michelle Vázquez for evaluation and management/treatment of CHF    Date of  Admission: 12/21/2022 12:04 AM   Primary Care Physician:  Ariadna Minor MD    Attending Cardiologist: Dr. Holli Fernandez      Subjective: Breathing much improved. Assessment:     -HFrEF, new diagnosis. Echo 12/21/2022 with LVEF 20-25%, prior Echo 08/2022 with LVEF 55%. -Afib, recently diagnosed 10/2022, on Xarelto. Rate 92 bpm  -Hx DVT RLE 06/2022, on Xarelto  -Hypothyroidism, on Synthroid  -HTN  -DM, on insulin. Hemoglobin A1c 11.9  -HLD    Primary cardiologist is Dr. Serra Marrow:     -BMP repeated. No significant change in BUN/creatinine. I&O appears inaccurate. We will start p.o. Lasix. Recheck BMP  -Continue PO Lopressor, Cardizem. -Pt started on low-dose Lisinopril as per primary team, continue to monitor BP and renal function closely. -Off IV Cardizem infusion, rates remain stable, rhythm currently afib with rates ~80's - 90s. -Continue to monitor HR, pt notes that she was totally out of medications.   -Needs diabetic teaching/will start p.o. Lasix management   History of Present Illness: This is a 82 y.o. female admitted for Pulmonary edema [J81.1]  Dyspnea [R06.00]  Leukocytosis [D72.829]  Acute exacerbation of CHF (congestive heart failure) ([facility] [address] [I50.9]  Atrial fibrillation with rapid ventricular response ([address] [I48.91]. Patient complains of: shortness of breath      Gabino Wilder is a 82 y.o. female who presented to the hospital due to shortness of breath. Pt reports that she became acutely short of breath yesterday. Pt endorses that she ran out of her medications recently and notes that she has an upcoming appointment with Dr. Merry Grajeda later this week. She denies any associated chest pain. She reports her breathing is currently improved.     Cardiac risk factors: dyslipidemia, diabetes mellitus, obesity, hypertension, post-menopausal      Review of Symptoms:  Except as stated above include:  Constitutional:  negative  Respiratory:  As per HPI  Cardiovascular:  negative  Gastrointestinal: negative  Genitourinary:  negative  Musculoskeletal:  Negative  Neurological:  Negative  Dermatological:  Negative  Endocrinological: Negative  Psychological:  Negative       Past Medical History:     Past Medical History:   Diagnosis Date    Acquired hypothyroidism 2016    Arthritis of right shoulder region 2016    Asthma     Bilateral lower extremity edema 2021    Chronic bilateral low back pain without sciatica 2021    Chronic diastolic congestive heart failure ([address] 3/10/2021    Chronic venous insufficiency     Diabetes ([address]     neuropathy    Essential hypertension 2021    Gout     History of depression 2017    History of fall 2021    Hypercholesteremia 2021    Hypertension     MI (myocardial infarction) ([facility] [address]     OAB (overactive bladder) 2021    Peripheral neuropathy     Rheumatoid arthritis ([facility] [address]     Tear of right rotator cuff 2016    Thyroid pain     Urinary incontinence 2021    Vertigo          Social History:     Social History     Socioeconomic History    Marital status:    Tobacco Use    Smoking status: Former     Types: Cigarettes     Quit date:      Years since quittin.0    Smokeless tobacco: Never    Tobacco comments:     quit 45 years ago   Vaping Use    Vaping Use: Never used   Substance and Sexual Activity    Alcohol use: No     Alcohol/week: 0.0 standard drinks    Drug use: No    Sexual activity: Not Currently        Family History:     Family History   Problem Relation Age of Onset    Heart Attack Mother     Heart Attack Father         Medications:   No Known Allergies     Current Facility-Administered Medications   Medication Dose Route Frequency    [START ON 2022] gabapentin (NEURONTIN) capsule 400 mg  400 mg Oral DAILY    polyethylene glycol (MIRALAX) packet 17 g  17 g Oral DAILY PRN    sodium chloride (NS) flush 5-10 mL  5-10 mL IntraVENous PRN    atorvastatin (LIPITOR) tablet 80 mg  80 mg Oral DAILY    dilTIAZem ER (CARDIZEM CD) capsule 180 mg  180 mg Oral DAILY    insulin glargine (LANTUS) injection 20 Units  20 Units SubCUTAneous QHS    hydrOXYzine HCL (ATARAX) tablet 10 mg  10 mg Oral BID PRN    levothyroxine (SYNTHROID) tablet 150 mcg  150 mcg Oral DAILY    metoprolol tartrate (LOPRESSOR) tablet 100 mg  100 mg Oral Q12H    morphine IR (MS IR) tablet 15 mg  15 mg Oral BID    rivaroxaban (XARELTO) tablet 20 mg  20 mg Oral DAILY    sodium chloride (NS) flush 5-40 mL  5-40 mL IntraVENous Q8H    sodium chloride (NS) flush 5-40 mL  5-40 mL IntraVENous PRN    acetaminophen (TYLENOL) tablet 650 mg  650 mg Oral Q6H PRN    Or    acetaminophen (TYLENOL) suppository 650 mg  650 mg Rectal Q6H PRN    [Held by provider] lisinopriL (PRINIVIL, ZESTRIL) tablet 5 mg  5 mg Oral DAILY    ondansetron (ZOFRAN) injection 4 mg  4 mg IntraVENous Q6H PRN    cefTRIAXone (ROCEPHIN) 2 g in sterile water (preservative free) 20 mL IV syringe  2 g IntraVENous Q24H    insulin lispro (HUMALOG) injection   SubCUTAneous AC&HS    glucose chewable tablet 16 g  4 Tablet Oral PRN    glucagon (GLUCAGEN) injection 1 mg  1 mg IntraMUSCular PRN    insulin lispro (HUMALOG) injection 5 Units  5 Units SubCUTAneous TIDAC         Physical Exam:   Visit Vitals  /61   Pulse 92   Temp 98.5 °F (36.9 °C)   Resp 20   Ht 5' 5\" (1.651 m)   Wt 101 kg (222 lb 9.6 oz)   SpO2 99%   BMI 37.04 kg/m²       TELE: AFIB    BP Readings from Last 3 Encounters:   12/22/22 117/61   10/14/22 (!) 116/56   08/19/22 (!) 141/74     Pulse Readings from Last 3 Encounters:   12/22/22 92   10/14/22 90   08/19/22 (!) 108     Wt Readings from Last 3 Encounters:   12/21/22 101 kg (222 lb 9.6 oz)   10/13/22 105.6 kg (232 lb 14.2 oz)   08/24/22 99.8 kg (220 lb)       General:  alert, cooperative, no distress, appears stated age  Neck: supple  Lungs:  few bibasilar rales  Heart:  irregularly irregular rhythm  Abdomen:  abdomen is soft without significant tenderness, masses, organomegaly or guarding  Extremities:  atraumatic, no appreciable pitting edema  Skin: Warm and dry.    Neuro: alert, oriented x3, affect appropriate, no focal neurological deficits, moves all extremities well, no involuntary movements  Psych: non focal     Data Review:     Recent Labs     12/22/22 0337 12/21/22 2145 12/21/22  0037   WBC 21.7* 25.0* 28.2*   HGB 9.5* 9.2* 11.9*   HCT 29.9* 28.6* 37.7    175 225       Recent Labs     12/22/22 0337 12/21/22 2145 12/21/22 0037    134* 132*   K 3.5 3.8 4.3   CL 99* 97* 95*   CO2 31 31 29   * 345* 452*   BUN 36* 35* 33*   CREA 1.77* 1.79* 1.79*   CA 8.6 8.7 9.7   MG 2.0 2.0 1.6   ALB  --   --  4.1   ALT  --   --  10*         Results for orders placed or performed during the hospital encounter of 12/21/22   EKG, 12 LEAD, INITIAL   Result Value Ref Range    Ventricular Rate 165 BPM    QRS Duration 88 ms    Q-T Interval 286 ms    QTC Calculation (Bezet) 473 ms    Calculated R Axis 17 degrees    Calculated T Axis -133 degrees    Diagnosis       Critical Test Result: High HR  Atrial fibrillation with rapid ventricular response  Non-specific ST/T wave changes  Abnormal ECG  When compared with ECG of 07-OCT-2022 16:57,  T wave inversion now evident in Inferior leads  Inverted T waves have replaced nonspecific T wave abnormality in Lateral   leads  Confirmed by Franck White ([id number]) on 12/21/2022 8:12:47 AM         Last Lipid:    Lab Results   Component Value Date/Time    Cholesterol, total 191 11/09/2021 12:24 PM    HDL Cholesterol 44 11/09/2021 12:24 PM    LDL, calculated 96.8 11/09/2021 12:24 PM    Triglyceride 251 (H) 11/09/2021 12:24 PM    CHOL/HDL Ratio 4.3 11/09/2021 12:24 PM       Signed By: Karla Glass MD     December 22, 2022

## 2022-12-22 NOTE — PROGRESS NOTES
Problem: Self Care Deficits Care Plan (Adult)  Goal: *Acute Goals and Plan of Care (Insert Text)  Outcome: Resolved/Met    OCCUPATIONAL THERAPY EVALUATION/DISCHARGE    Patient: Vira Luna [de-identified]82 y.o. female)  Date: 12/22/2022  Primary Diagnosis: Pulmonary edema [J81.1]  Dyspnea [R06.00]  Leukocytosis [D72.829]  Acute exacerbation of CHF (congestive heart failure) ([facility] [address] [I50.9]  Atrial fibrillation with rapid ventricular response ([facility] [address] [I48.91]  Precautions:   Fall  PLOF: Patient was modified independent with self-care and uses rolling walker or furniture walks for functional mobility PTA. ASSESSMENT AND RECOMMENDATIONS:  Patient cleared to participate in OT evaluation by RN. Upon entering the room, patient was seated in chair, alert, and agreeable to participate in OT evaluation with 3L NC donned. Patient O2 maintained > 95% this session, HR ranged between 110 - 124bpm. Patient is modified independent with basic self-care seated and supervision with functional transfers using rolling walker in preparation for ADLs. Based on the objective data described below, the patient presents with no deficits that impede pt function with ADLs. The patient presents with good static standing and fair+ dynamic standing balance, however will defer to PT for functional balance and functional mobility tasks. OT to d/c from caseload. Skilled occupational therapy is not indicated at this time. Further Equipment Recommendations for Discharge: Patient has all DME    AMPAC: Current research shows that an AM-PAC score of 18 or greater is associated with a discharge to the patient's home setting. Based on an AM-PAC score of 22/24 and their current ADL deficits; it is recommended that the patient have 2-3 sessions per week of Occupational Therapy at d/c to increase the patient's independence.       This AMPAC score should be considered in conjunction with interdisciplinary team recommendations to determine the most appropriate discharge setting. Patient's social support, diagnosis, medical stability, and prior level of function should also be taken into consideration. SUBJECTIVE:   Patient stated I did therapy before    OBJECTIVE DATA SUMMARY:     Past Medical History:   Diagnosis Date    Acquired hypothyroidism 8/1/2016    Arthritis of right shoulder region 4/21/2016    Asthma     Bilateral lower extremity edema 7/7/2021    Chronic bilateral low back pain without sciatica 7/7/2021    Chronic diastolic congestive heart failure ([address] 3/10/2021    Chronic venous insufficiency     Diabetes (HCC)     neuropathy    Essential hypertension 7/7/2021    Gout     History of depression 1/23/2017    History of fall 7/7/2021    Hypercholesteremia 7/7/2021    Hypertension     MI (myocardial infarction) ([address]     OAB (overactive bladder) 7/7/2021    Peripheral neuropathy     Rheumatoid arthritis ([address]     Tear of right rotator cuff 5/16/2016    Thyroid pain     Urinary incontinence 7/7/2021    Vertigo      Past Surgical History:   Procedure Laterality Date    HX AMPUTATION TOE Right 2020    Great toe Dr. 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     HX KNEE REPLACEMENT Bilateral      Barriers to Learning/Limitations: None  Compensate with: visual, verbal, tactile, kinesthetic cues/model    Home Situation:   Home Situation  Home Environment: Apartment  One/Two Story Residence: One story  Living Alone: No  Support Systems: Spouse/Significant Other  Patient Expects to be Discharged to[de-identified] Home  Current DME Used/Available at Home: Walker, rolling, Walker, rollator  Tub or Shower Type: Tub/Shower combination  [x]     Right hand dominant   [x]     Left hand dominant (pt reports both)    Cognitive/Behavioral Status:  Neurologic State: Alert  Orientation Level: Oriented X4  Cognition: Follows commands  Safety/Judgement: Fall prevention    Skin: Intact  Edema: None noted    Vision/Perceptual:    Acuity: Within Defined Limits      Coordination: BUE  Fine Motor Skills-Upper: Left Intact; Right Intact    Gross Motor Skills-Upper: Left Intact; Right Intact    Balance:  Sitting: Intact  Standing: Impaired; With support  Standing - Static: Fair  Standing - Dynamic : Fair    Strength: BUE  Strength: Generally decreased, functional       Tone & Sensation: BUE  Tone: Normal  Sensation: Intact    Range of Motion: BUE  AROM: Generally decreased, functional (R shoulder at baseline)    Functional Mobility and Transfers for ADLs:  Bed Mobility:  Supine to Sit: Supervision  Scooting: Supervision    Transfers:  Sit to Stand: Supervision  Stand to Sit: Supervision   Toilet Transfer : Supervision (simulation with chair)     ADL Assessment:  Feeding: Independent    Oral Facial Hygiene/Grooming: Modified Independent;Supervision; Additional time    Bathing: Modified independent;Supervision    Upper Body Dressing: Modified independent    Lower Body Dressing: Modified independent; Additional time;Supervision    Toileting: Supervision       ADL Intervention:  Grooming  Grooming Assistance: Modified independent  Position Performed: Seated in chair  Brushing/Combing Hair: Modified independent (additional time needed for RE d/t limitations at baseline)    Upper Body Dressing Assistance  Dressing Assistance: Modified independent  Hospital Gown: Modified independent    Lower Body Dressing Assistance  Dressing Assistance: Modified independent (pt supervision for standing tasks)  Socks: Modified independent  Leg Crossed Method Used: No  Position Performed: Bending forward method;Seated in chair    Cognitive Retraining  Safety/Judgement: Fall prevention    Pain:  Pain level pre-treatment: 0/10   Pain level post-treatment: 0/10   Pain Intervention(s): Medication (see MAR);  Rest, Ice, Repositioning   Response to intervention: Nurse notified, See doc flow    Activity Tolerance:   Fair+      Please refer to the flowsheet for vital signs taken during this treatment. After treatment:   [x]  Patient left in no apparent distress sitting up in chair  []  Patient left in no apparent distress in bed  [x]  Call bell left within reach  [x]  Nursing notified  []  Caregiver present  []  Bed alarm activated    COMMUNICATION/EDUCATION:   [x]      Role of Occupational Therapy in the acute care setting  [x]      Home safety education was provided and the patient/caregiver indicated understanding. [x]      Patient/family have participated as able and agree with findings and recommendations. []      Patient is unable to participate in plan of care at this time. Thank you for this referral.  Radha Alaniz OTR/L  Time Calculation: 24 mins      Eval Complexity: History: MEDIUM Complexity : Expanded review of history including physical, cognitive and psychosocial  history ; Examination: LOW Complexity : 1-3 performance deficits relating to physical, cognitive , or psychosocial skils that result in activity limitations and / or participation restrictions ; Decision Making:LOW Complexity : No comorbidities that affect functional and no verbal or physical assistance needed to complete eval tasks     [facility]-PAC® Daily Activity Inpatient Short Form (6-Clicks)*    How much HELP from another person does the patient currently need    (If the patient hasn't done an activity recently, how much help from another person do you think he/she would need if he/she tried?)   Total (Total A or Dep)   A Lot  (Mod to Max A)   A Little (Sup or Min A)   None (Mod I to I)   Putting on and taking off regular lower body clothing? [] 1 [] 2 [x] 3 [] 4   2. Bathing (including washing, rinsing,      drying)? [] 1 [] 2 [x] 3 [] 4   3. Toileting, which includes using toilet, bedpan or urinal?   [] 1 [] 2 [] 3 [x] 4   4. Putting on and taking off regular upper body clothing?    [] 1 [] 2 [] 3 [x] 4 5. Taking care of personal grooming such as brushing teeth? [] 1 [] 2 [] 3 [x] 4   6. Eating meals?    [] 1 [] 2 [] 3 [x] 4

## 2022-12-23 VITALS
HEIGHT: 65 IN | SYSTOLIC BLOOD PRESSURE: 124 MMHG | TEMPERATURE: 97.4 F | HEART RATE: 100 BPM | RESPIRATION RATE: 16 BRPM | WEIGHT: 222.6 LBS | BODY MASS INDEX: 37.09 KG/M2 | OXYGEN SATURATION: 91 % | DIASTOLIC BLOOD PRESSURE: 94 MMHG

## 2022-12-23 LAB
ANION GAP SERPL CALC-SCNC: 8 MMOL/L (ref 3–18)
BUN SERPL-MCNC: 40 MG/DL (ref 7–18)
BUN/CREAT SERPL: 26 (ref 12–20)
CALCIUM SERPL-MCNC: 8.4 MG/DL (ref 8.5–10.1)
CHLORIDE SERPL-SCNC: 100 MMOL/L (ref 100–111)
CO2 SERPL-SCNC: 29 MMOL/L (ref 21–32)
CREAT SERPL-MCNC: 1.51 MG/DL (ref 0.6–1.3)
ERYTHROCYTE [DISTWIDTH] IN BLOOD BY AUTOMATED COUNT: 15.8 % (ref 11.6–14.5)
GLUCOSE SERPL-MCNC: 70 MG/DL (ref 74–99)
HCT VFR BLD AUTO: 31.7 % (ref 35–45)
HGB BLD-MCNC: 10 G/DL (ref 12–16)
MAGNESIUM SERPL-MCNC: 1.9 MG/DL (ref 1.6–2.6)
MCH RBC QN AUTO: 26.4 PG (ref 24–34)
MCHC RBC AUTO-ENTMCNC: 31.5 G/DL (ref 31–37)
MCV RBC AUTO: 83.6 FL (ref 78–100)
NRBC # BLD: 0 K/UL (ref 0–0.01)
NRBC BLD-RTO: 0 PER 100 WBC
PLATELET # BLD AUTO: 193 K/UL (ref 135–420)
PMV BLD AUTO: 13.5 FL (ref 9.2–11.8)
POTASSIUM SERPL-SCNC: 3.9 MMOL/L (ref 3.5–5.5)
RBC # BLD AUTO: 3.79 M/UL (ref 4.2–5.3)
SODIUM SERPL-SCNC: 137 MMOL/L (ref 136–145)
WBC # BLD AUTO: 12 K/UL (ref 4.6–13.2)

## 2022-12-23 PROCEDURE — 74011250636 HC RX REV CODE- 250/636

## 2022-12-23 PROCEDURE — 77010033678 HC OXYGEN DAILY

## 2022-12-23 PROCEDURE — 83735 ASSAY OF MAGNESIUM: CPT

## 2022-12-23 PROCEDURE — 74011250637 HC RX REV CODE- 250/637: Performed by: HOSPITALIST

## 2022-12-23 PROCEDURE — 94762 N-INVAS EAR/PLS OXIMTRY CONT: CPT

## 2022-12-23 PROCEDURE — 74011000250 HC RX REV CODE- 250

## 2022-12-23 PROCEDURE — 80048 BASIC METABOLIC PNL TOTAL CA: CPT

## 2022-12-23 PROCEDURE — 99232 SBSQ HOSP IP/OBS MODERATE 35: CPT | Performed by: INTERNAL MEDICINE

## 2022-12-23 PROCEDURE — 74011636637 HC RX REV CODE- 636/637

## 2022-12-23 PROCEDURE — 74011250637 HC RX REV CODE- 250/637: Performed by: INTERNAL MEDICINE

## 2022-12-23 PROCEDURE — 85027 COMPLETE CBC AUTOMATED: CPT

## 2022-12-23 PROCEDURE — 36415 COLL VENOUS BLD VENIPUNCTURE: CPT

## 2022-12-23 PROCEDURE — 74011000250 HC RX REV CODE- 250: Performed by: HOSPITALIST

## 2022-12-23 RX ORDER — FUROSEMIDE 40 MG/1
20 TABLET ORAL DAILY
Qty: 90 TABLET | Refills: 3 | Status: SHIPPED | OUTPATIENT
Start: 2022-12-23

## 2022-12-23 RX ORDER — INSULIN GLARGINE 100 [IU]/ML
INJECTION, SOLUTION SUBCUTANEOUS
Qty: 10 ML | Refills: 1 | Status: SHIPPED | OUTPATIENT
Start: 2022-12-23

## 2022-12-23 RX ORDER — AMOXICILLIN AND CLAVULANATE POTASSIUM 500; 125 MG/1; MG/1
1 TABLET, FILM COATED ORAL 2 TIMES DAILY
Qty: 10 TABLET | Refills: 0 | Status: SHIPPED | OUTPATIENT
Start: 2022-12-23

## 2022-12-23 RX ORDER — AMOXICILLIN AND CLAVULANATE POTASSIUM 500; 125 MG/1; MG/1
1 TABLET, FILM COATED ORAL 2 TIMES DAILY
Qty: 10 TABLET | Refills: 0 | Status: SHIPPED | OUTPATIENT
Start: 2022-12-23 | End: 2022-12-23 | Stop reason: SDUPTHER

## 2022-12-23 RX ORDER — GABAPENTIN 400 MG/1
CAPSULE ORAL
Qty: 360 CAPSULE | Refills: 0 | Status: SHIPPED | OUTPATIENT
Start: 2022-12-23

## 2022-12-23 RX ORDER — GUAIFENESIN 100 MG/5ML
81 LIQUID (ML) ORAL DAILY
Qty: 30 TABLET | Refills: 2 | Status: SHIPPED | OUTPATIENT
Start: 2022-12-23

## 2022-12-23 RX ORDER — GUAIFENESIN 100 MG/5ML
81 LIQUID (ML) ORAL DAILY
Qty: 30 TABLET | Refills: 2 | Status: SHIPPED | OUTPATIENT
Start: 2022-12-23 | End: 2022-12-23 | Stop reason: SDUPTHER

## 2022-12-23 RX ORDER — INSULIN GLARGINE 100 [IU]/ML
INJECTION, SOLUTION SUBCUTANEOUS
Qty: 1 ML | Refills: 0 | Status: SHIPPED | OUTPATIENT
Start: 2022-12-23 | End: 2022-12-23

## 2022-12-23 RX ORDER — AMOXICILLIN AND CLAVULANATE POTASSIUM 500; 125 MG/1; MG/1
1 TABLET, FILM COATED ORAL 2 TIMES DAILY
Qty: 10 TABLET | Refills: 0 | Status: SHIPPED | OUTPATIENT
Start: 2022-12-23 | End: 2022-12-23

## 2022-12-23 RX ADMIN — Medication 5 UNITS: at 09:02

## 2022-12-23 RX ADMIN — ATORVASTATIN CALCIUM 80 MG: 40 TABLET, FILM COATED ORAL at 09:04

## 2022-12-23 RX ADMIN — LEVOTHYROXINE SODIUM 150 MCG: 150 TABLET ORAL at 09:04

## 2022-12-23 RX ADMIN — WATER 2 G: 1 INJECTION INTRAMUSCULAR; INTRAVENOUS; SUBCUTANEOUS at 14:11

## 2022-12-23 RX ADMIN — METOPROLOL TARTRATE 100 MG: 50 TABLET, FILM COATED ORAL at 09:03

## 2022-12-23 RX ADMIN — GABAPENTIN 400 MG: 400 CAPSULE ORAL at 09:03

## 2022-12-23 RX ADMIN — DILTIAZEM HYDROCHLORIDE 180 MG: 180 CAPSULE, COATED, EXTENDED RELEASE ORAL at 09:03

## 2022-12-23 RX ADMIN — RIVAROXABAN 20 MG: 20 TABLET, FILM COATED ORAL at 09:04

## 2022-12-23 RX ADMIN — MORPHINE SULFATE 15 MG: 15 TABLET ORAL at 09:04

## 2022-12-23 RX ADMIN — SODIUM CHLORIDE, PRESERVATIVE FREE 10 ML: 5 INJECTION INTRAVENOUS at 06:56

## 2022-12-23 RX ADMIN — SODIUM CHLORIDE, PRESERVATIVE FREE 10 ML: 5 INJECTION INTRAVENOUS at 14:12

## 2022-12-23 RX ADMIN — FUROSEMIDE 20 MG: 20 TABLET ORAL at 09:04

## 2022-12-23 NOTE — PROGRESS NOTES
Physician Progress Note      PATIENT:               Elijah Anglin  CSN #:                  [id number]  :                       1935  ADMIT DATE:       2022 12:04 AM  DISCH DATE:  RESPONDING  PROVIDER #:        Dustin Collier MD          QUERY TEXT:    Patient admitted with acute on chronic diastolic CHF, noted to have paroxysmal atrial fibrillation and is maintained on Xarelto. If possible, please document in progress notes and discharge summary if you are evaluating and/or treating any of the following: The medical record reflects the following:  Risk Factors: female over age 76, HTN, CHF, diabetic    Clinical Indicators:  2022, consult, Cornel Jensen PA-C:  Luis Daniel Parra, recently diagnosed 10/2022, on Xarelto. Hx DVT RLE 2022, on Xarelto. \"    Treatment: Xarelto    Thank you,  ONEAL Suarez RN, CDS  [email or web address]  Options provided:  -- Secondary hypercoagulable state in a patient with atrial fibrillation  -- Other - I will add my own diagnosis  -- Disagree - Not applicable / Not valid  -- Disagree - Clinically unable to determine / Unknown  -- Refer to Clinical Documentation Reviewer    PROVIDER RESPONSE TEXT:    This patient has secondary hypercoagulable state in a patient with atrial fibrillation. Query created by:  Shavon Law on 2022 1:45 PM      Electronically signed by:  Dustin Collier MD 2022 2:12 PM

## 2022-12-23 NOTE — PROGRESS NOTES
Bedside and Verbal shift change report given to [address] (oncoming nurse) by Janes Alegria (offgoing nurse). Report included the following information SBAR, Kardex, Intake/Output, MAR, and Cardiac Rhythm Atrial fibrillation. Travis Ronak

## 2022-12-23 NOTE — DISCHARGE SUMMARY
Julissa Solanoila SUMMARY      Name:   Nilsa Marcum 82 y.o. female  MRN:   [id number]  CSN:   [id number]  Admission Date:  12/21/2022  Discharge Date:  12/23/2022  Attending:             Aguila Donovan MD   PCP:              Fani Rausch MD   ================================================================  Reason for Admission:  Pulmonary edema [J81.1]  Dyspnea [R06.00]  Leukocytosis [D72.829]  Acute exacerbation of CHF (congestive heart failure) ([facility] [I50.9]  Atrial fibrillation with rapid ventricular response ([facility] [I48.91]    Discharge Diagnosis:    CHF exacerbation  Afib w RVR  Leukocytosis, sepsis rule out       Follow-up studies/evaluations for PCP/Important Notes to PCP:  Cardiology F/U with Dr. Farheen Jama  Augmentin course end 12/27  Able to get medications from pharmacy? Pending labs/investigations to follow up as below  Medication reconciliation:  Discontinued Medications: Alllopurinol, Gabapentin, Hydroxyzine, Xarelto, and Jardiance held due to cost  New Medications: Augmentin    RHIANNA Follow Up Appointment:   Follow-up Information       Follow up With Specialties Details Why Contact Info    Fani Rausch MD [facility]   [id number] [facility] [address] [phone]      Aden Mark DO Family Medicine Follow up on 12/27/2022 @3:50 PM [address] [place] [address]  [phone]              Recommended follow-up after [facility] visit: Patient needs follow-up visit after [facility] appointment at the discretion of their PCP. Readmission prevention plan:   Patient has financial difficulties when it comes to getting her medications. Have stopped/replaced some of her nonessential medications in order to help Pt remain compliant with procuring medications from pharmacy. $60 GoodRx coupon for Insulin should last 50 days. ASA for anticoagulation. GOALS OF CARE (including Code Status, Advanced Care Plan):    Full measures    Pending labs/ investigations at discharge to follow up:   N/a    Operative Procedures:   none    Consultants:    Cardiology    Condition at discharge: Afebrile  Ambulating  Eating, Drinking, Voiding  Stable    Disposition at Discharge:  Home    Functional Status at Discharge: Ambulates rolling walker    Diet: Regular diet    Discharge Medications:  Discharge Medication List as of 12/23/2022  3:34 PM        CONTINUE these medications which have CHANGED    Details   furosemide (LASIX) 40 mg tablet Take 0.5 Tablets by mouth daily. , Normal, Disp-90 Tablet, R-3      gabapentin (NEURONTIN) 400 mg capsule 1 capsule daily, Normal, Disp-360 Capsule, R-0      insulin glargine (LANTUS) 100 unit/mL injection Inject 20 Units at bedtime. Monitor and record blood sugar daily. , Print, Disp-10 mL, R-1      aspirin 81 mg chewable tablet Take 1 Tablet by mouth daily. , Normal, Disp-30 Tablet, R-2      amoxicillin-clavulanate (Augmentin) 500-125 mg per tablet Take 1 Tablet by mouth two (2) times a day., Normal, Disp-10 Tablet, R-0           CONTINUE these medications which have NOT CHANGED    Details   metoprolol tartrate (LOPRESSOR) 100 mg IR tablet Take 1 Tablet by mouth every twelve (12) hours. , Normal, Disp-120 Tablet, R-2      dilTIAZem ER (CARDIZEM CD) 180 mg capsule Take 1 Capsule by mouth daily. , Normal, Disp-120 Capsule, R-3      BD Insulin Syringe SafetyGlide 0.5 mL 30 gauge x 5/16\" syrg Historical Med, GRUPO      insulin aspart U-100 (NovoLOG Flexpen U-100 Insulin) 100 unit/mL (3 mL) inpn 5 Units by SubCUTAneous route Before breakfast, lunch, and dinner., Normal, Disp-1 Pen, R-0      levothyroxine (SYNTHROID) 150 mcg tablet Take 150 mcg by mouth daily. , Historical Med      morphine IR (MS IR) 15 mg tablet Take 15 mg by mouth two (2) times a day., Historical Med      metFORMIN (GLUCOPHAGE) 500 mg tablet Take 500 mg by mouth daily. , Historical Med      allopurinoL (ZYLOPRIM) 100 mg tablet Take 100 mg by mouth daily. , Historical Med atorvastatin (LIPITOR) 80 mg tablet Take 1 Tablet by mouth daily. , Normal, Disp-90 Tablet, R-3      Blood-Glucose Meter (FREESTYLE LITE METER) monitoring kit Test twice blood glucose daily; ICD-10 E11.9; Quantity 1, Normal, Disp-1 Kit, R-0      insulin syringe,safetyneedle 1 mL 31 gauge x 5/16\" syrg 1 Each by Does Not Apply route daily. , Normal, Disp-300 Each, R-3Dx e11.9      glucose blood VI test strips (FREESTYLE TEST) strip Use to check blood glucose twice daily DX:E11.9, Normal, Disp-300 Strip, R-3      acetaminophen (TYLENOL) 500 mg tablet Take 1 Tab by mouth every six (6) hours as needed for Pain., Print, Disp-270 Tab, R-3           STOP taking these medications       rivaroxaban (XARELTO) 20 mg tab tablet Comments:   Reason for Stopping:         Jardiance 10 mg tablet Comments:   Reason for Stopping:         hydrOXYzine HCL (ATARAX) 10 mg tablet Comments:   Reason for Stopping:         amLODIPine (NORVASC) 10 mg tablet Comments:   Reason for Stopping:                 Hospital Course:   Vira Luna is a 82 y.o. female with PMHx of CHF, DM, HTN, hypothyroidism, RA, Hx DVT, macular degeneration, urinary incontinence who presented to ED after developing SOB after a doctors appt 12/20. Her partner called 911 and she was brought into the hospital. EMS had her on CPAP since she had a desat to [address]. She was also noted to be tachycardic to the 150s and hypertensive. Given nitro patch and tablets en route, did not provide relief per chart review. She was found to be tachycardic, tachypneic on admission. Found to have a. Fib with RVR. Improved with diltiazem and lasix. Pt reported no increased salt intake but has not been taking meds regularly due to high cost. Denied chest pain or new edema. Endorsed urinary dysuria and frequency more than usual for last week. Had not been on anticoagulants.      HFrEF - ECHO from 12/21/2022 notable for EF 20-25%, which is a significantly decreased from 8/2022 when EF was 55%. S/p IV lasix 40 mg on 12/21 with around 400 mL output. Started on PO lasix 20 mg qD    Afib - Continued PO lopressor 100 mg BID and Cardizem 180 mg qD (weaned off of IV dilt on 12/21). Pt with soft blood pressures, MAPs <60. Was given small 250 ml bolus of LR. Pt admitted on Xarelto 20 mg qD. Stated Xarelto is too expensive for her to get OP. Worked with case management on discharge options for patient based on what she can afford. Due to cost and lack of  in the hospital, her medications were pared down to essentials. Due to Xarelto and Eliquis being too expensive with patient's co-pay, will continue ASA for anticoagulation. Discontinued her Amlodipine as she had soft BPs on Cardizem and Lopressor, as well as her Jardiance and Hydroxyzine to keep medication expenses down. Rocephin 2 g qD transitioned to Augmentin 500 mg BID x 5 days OP. Bcx with probable streptococcus growing in 1 of 2 bottles, likely a contaminant. Ucx with Klebsiella. WBC continued to trend down.     Patient's problem list was managed in hospital as stated below:       Leukocytosis  Sepsis of unknown etiology   -recently admitted with very similar presentation in 10/2022, determined to be sepsis 2/ UTI  -on admission: tachycardic, tachypneic, WBC 28.2, afebrile  -pt ROS + dysuria, UA non-conclusive for infection  -CXR with tiny effusions, no pna   -did not receive fluids or abx to date   -covid/flu neg  -Bcx 12/21 with Streptococcus agalactiae in 1 of 2 bottle, likely a contaminant  -Ucx 12/21 with Klebsiella, 60k CFU  -Trended daily CBC, WBC continued to trend down  -12/22: Received 250cc bolus IVF for hypotension in setting of CHF exacerbation   -Abx: Ceftriaxone 2 g daily     Afib   CHF  HTN  -meds per chart review: amlo 10, lopressor 100 mg BID, dilt 180 mg daily, lasix 40 mg daily, xarelto 20 mg BID  -tachycardic on admission   -EKG on admission: A fib, , Qtc 473  -CXR on admission: mild HF with tiny effusions  -Hb 11.9 (baseline 11s), Trop 20, BNP 4582, ABG pH 7.43, pO2 120, HCO3 27.3   -Echo 12/21 EF 20-25%, severe hypokinesis, LA and RA severely dilated, mod pulmonary HTN, moderate TR  -on home O2 for prn use  - s/p IV 40 mg lasix in ED  -cardiology consulted by hospitalist team, appreciate recs  -Admitted on telemetry   -PT/INR/PTT  -supportive O2 care   -strict I+Os and daily weights  -fluid restricted to 1200 cc   -Continued xarelto, diltiazem, lopressor  -held lasix and lisinopril due to AQUILES     AQUILES (Downtrending)  -BUN 33, Cr 1.79, GFR 27 on admission   -baseline Cr 0.8-1  -ddx prerenal vs cardiorenal vs medication adverse effect  -1.55 on discharge  -Trended daily BMP  -Renally dosed medications  -avoided nephrotoxins and repleted electrolyte prn  -held lasix and lisinopril due to AQUILES     DM with neuoropathy  -most recent A1c 11.3 in 10/2022  -meds per chart review: jardiance 10, insulin glargine 20 U qhs, novolog 5 U TID with meals, metformin 500 mg daily  -CMP was remarkable for glucose 452, Na 132, Cl 95, CO2 29, AG 8,  Mg 1.6  -UA positive for glucose, protein, negative for ketones  -monitored BG qachs  -Started Lantus 20 qhs and humalog 5 U TID with meals plus SSI  -hypoglycemia protocol     Hypothyroidism  -meds: levo 150 mcg  -TSH 64.9, FT4 0.5 on 12/21  -pt nonadherent with medications at home   -continued levothyroxine 150 mcg      Hx DVT  -PVL 10/8 showed non occlusive thrombus within varicose veins of calf on RLE, otherwise no DVT bilaterally   -Continued xarelto 20 mg daily while IP  -SW tried for coverage for Eliquis OP  -Not good candidate for Warfarin due to non-compliance  -Will use ASA for anticoagulation OP        Code: Full  Diet: cardiac diet w/ fluid restriction  DVT/AC: xarelto  Mobility: per protocol  Disposition: pending     Point of Contact (relationship):  Marbella Horne [phone]    Other stable chronic conditions:  - COPD: on 3L NC home O2 for prn use, no inhalers prescribed per chart review, continued supportive O2  - Anxiety: Continued hydroxyzine 10 mg BID prn   - Chronic pain; RA: Continued MS IR 15 mg BID   etc      Pertinent Results:      CURRENT ADMISSION IMAGING RESULTS   XR HIP RT W OR WO PELV 2-3 VWS    Result Date: 12/22/2022  1. No acute osseous findings. XR GREAT TOE LT MIN 2 V    Result Date: 12/21/2022  No evidence of acute osteomyelitis. Stable smooth periosteal bone formation at the level of the second and third metatarsals. Mild osteoarthrosis of the first metatarsal phalangeal joint with mild bunion deformity    XR CHEST PORT    Result Date: 12/21/2022  Mild heart failure with tiny effusions        Cardiology Procedures/Testing:  MODALITY RESULTS   EKG Results for orders placed or performed during the hospital encounter of 12/21/22   EKG, 12 LEAD, INITIAL   Result Value Ref Range    Ventricular Rate 165 BPM    QRS Duration 88 ms    Q-T Interval 286 ms    QTC Calculation (Bezet) 473 ms    Calculated R Axis 17 degrees    Calculated T Axis -133 degrees    Diagnosis       Critical Test Result: High HR  Atrial fibrillation with rapid ventricular response  Non-specific ST/T wave changes  Abnormal ECG  When compared with ECG of 07-OCT-2022 16:57,  T wave inversion now evident in Inferior leads  Inverted T waves have replaced nonspecific T wave abnormality in Lateral   leads  Confirmed by Boyd Simons ([id number]) on 12/21/2022 8:12:47 AM         ECHO 12/21/22    ECHO ADULT COMPLETE 12/21/2022 12/21/2022    Interpretation Summary    Left Ventricle: Severely reduced left ventricular systolic function with a visually estimated EF of 20 - 25%. Left ventricle size is normal. Mild septal thickening. Severe hypokinesis of the following segments: basal anteroseptal, basal inferoseptal and apical septal.    Right Ventricle: Right ventricle is moderately dilated. Reduced systolic function. TAPSE is normal. TAPSE is 1.5 cm. Mitral Valve: Moderately thickened leaflet.  Mild annular calcification at the posterior leaflet of the mitral valve. Mild regurgitation. Left Atrium: Left atrium is severely dilated. Right Atrium: Right atrium is severely dilated. Pulmonary Arteries: Moderate pulmonary hypertension present. Tricuspid Valve: Moderate regurgitation. The estimated RVSP is 51 mmHg. Signed by: José Molina MD on 12/21/2022 10:33 AM     IR No results found for this or any previous visit.      CATH         Special Testing/Procedures:  MODALITY RESULTS   MICRO All Micro Results       Procedure Component Value Units Date/Time    CULTURE, BLOOD [[id number]]  (Abnormal) Collected: 12/21/22 0511    Order Status: Completed Specimen: Blood Updated: 12/23/22 0956     Special Requests: NO SPECIAL REQUESTS        GRAM STAIN ANAEROBIC BOTTLE               GRAM POSITIVE COCCI IN CHAINS                  SMEAR CALLED TO AND CORRECTLY REPEATED BY: RN P OKULO ER ON 12/21/22 AT [address] [facility] TO [facility]           Culture result:       STREPTOCOCCI, BETA HEMOLYTIC GROUP B GROWING IN 1 OF 2 BOTTLES DRAWN No Site Indicated          CULTURE, BLOOD [[id number]] Collected: 12/21/22 0441    Order Status: Completed Specimen: Blood Updated: 12/23/22 0714     Special Requests: NO SPECIAL REQUESTS        Culture result: NO GROWTH 2 DAYS       CULTURE, URINE [[id number]]  (Abnormal) Collected: 12/21/22 0845    Order Status: Completed Specimen: Cath Urine Updated: 12/22/22 1535     Special Requests: NO SPECIAL REQUESTS        [place] Count --        76053  COLONIES/mL       Culture result: GRAM NEGATIVE RODS       BLOOD CULTURE ID PANEL [[id number]]  (Abnormal) Collected: 12/21/22 0511    Order Status: Completed Specimen: Blood Updated: 12/22/22 1316     Acc. no. from Micro Order [id number]     Enterococcus faecalis Not detected        Enterococcus faecium Not detected        Listeria monocytogenes Not detected        Staphylococcus Not detected        Staphylococcus aureus Not detected        Staph epidermidis Not detected        Staph lugdunensis Not detected        Streptococcus Detected        Streptococcus agalactiae (Group B) Detected        Streptococcus pneumoniae Not detected        Streptococcus pyogenes (Group A) Not detected        Acinetobacter calcoaceticus-baumanii complex Not detected        Bacteroides fragilis Not detected        Enterobacterales species Not detected        Enterobacter cloacae complex Not detected        Escherichia coli Not detected        Klebsiella aerogenes Not detected        Klebsiella oxytoca Not detected        Klebsiella pneumoniae group Not detected        Proteus Not detected        Salmonella Not detected        Serratia marcescens Not detected        Haemophilus influenzae Not detected        Neisseria meningitidis Not detected        Pseudomonas aeruginosa Not detected        Steno maltophilia Not detected        Candida albicans Not detected        Candida auris Not detected        Candida glabrata Not detected        Candida krusei Not detected        Candida parapsilosis Not detected        Candida tropicalis Not detected        Crypto neoformans/gattii Not detected        RESISTANT GENES:            Comment       False positive results may rarely occur. Correlate with clinical,epidemiologic, and other laboratory findings           Comment: Please see BCID Interpretation Guide in EPIC Links       COVID-19 WITH INFLUENZA A/B [[id number]] Collected: 12/21/22 2145    Order Status: Completed Specimen: Nasopharyngeal Updated: 12/21/22 2225     SARS-CoV-2 by PCR Not detected        Comment: Not Detected results do not preclude SARS-CoV-2 infection and should not be used as the sole basis for patient management decisions. Results must be combined with clinical observations, patient history, and epidemiological information.         Influenza A by PCR Not detected        Influenza B by PCR Not detected        Comment: Testing was performed using matteo Angela SARS-CoV-2 and Influenza A/B nucleic acid assay. This test is a multiplex Real-Time Reverse Transcriptase Polymerase Chain Reaction (RT-PCR) based in vitro diagnostic test intended for the qualitative detection of nucleic acids from SARS-CoV-2, Influenza A, and Influenza B in nasopharyngeal for use under the FDA's Emergency Use Authorization(EAU) only.        Fact sheet for Patients: FindDrives.pl  Fact sheet for Healthcare Providers: FindDrives.pl         BLOOD CULTURE ID PANEL [[id number]] Collected: 12/21/22 0511    Order Status: Canceled            ABG Lab Results   Component Value Date/Time    pH (POC) 7.43 12/21/2022 01:21 AM    pCO2 (POC) 41.0 12/21/2022 01:21 AM    pO2 (POC) 120 (H) 12/21/2022 01:21 AM    HCO3 (POC) 27.3 (H) 12/21/2022 01:21 AM    FIO2 (POC) 50 12/21/2022 01:21 AM      UA Results for orders placed or performed in visit on 08/24/22   AMB POC URINALYSIS DIP STICK AUTO W/O MICRO     Status: None   Result Value Ref Range Status    Color (UA POC) Yellow  Final    Clarity (UA POC) Clear  Final    Glucose (UA POC) 3+ Negative Final    Bilirubin (UA POC) Negative Negative Final    Ketones (UA POC) Negative Negative Final    Specific gravity (UA POC) 1.015 1.001 - 1.035 Final    Blood (UA POC) 2+ Negative Final    pH (UA POC) 6.0 4.6 - 8.0 Final    Protein (UA POC) Trace Negative Final    Urobilinogen (UA POC) 0.2 mg/dL 0.2 - 1 Final    Nitrites (UA POC) Negative Negative Final    Leukocyte esterase (UA POC) Negative Negative Final         Laboratory Results:  LABORATORY RESULTS   HEMATOLOGY Lab Results   Component Value Date/Time    WBC 12.0 12/23/2022 04:37 AM    HGB 10.0 (L) 12/23/2022 04:37 AM    HCT 31.7 (L) 12/23/2022 04:37 AM    PLATELET 939 25/12/4314 04:37 AM    MCV 83.6 12/23/2022 04:37 AM       CHEMISTRIES Lab Results   Component Value Date/Time    Sodium 137 12/23/2022 04:37 AM    Potassium 3.9 12/23/2022 04:37 AM    Chloride 100 12/23/2022 04:37 AM    CO2 29 12/23/2022 04:37 AM    Anion gap 8 12/23/2022 04:37 AM    Glucose 70 (L) 12/23/2022 04:37 AM    BUN 40 (H) 12/23/2022 04:37 AM    Creatinine 1.51 (H) 12/23/2022 04:37 AM    BUN/Creatinine ratio 26 (H) 12/23/2022 04:37 AM    GFR est AA 58 (L) 08/13/2022 03:31 AM    GFR est non-AA 48 (L) 08/13/2022 03:31 AM    Calcium 8.4 (L) 12/23/2022 04:37 AM      HEPATIC FUNCTION Lab Results   Component Value Date/Time    Albumin 4.1 12/21/2022 12:37 AM    Bilirubin, total 0.9 12/21/2022 12:37 AM    Protein, total 9.1 (H) 12/21/2022 12:37 AM    Globulin 5.0 (H) 12/21/2022 12:37 AM    A-G Ratio 0.8 12/21/2022 12:37 AM    ALT (SGPT) 10 (L) 12/21/2022 12:37 AM    Alk. phosphatase 69 12/21/2022 12:37 AM       LACTIC ACID Lab Results   Component Value Date/Time    Lactic acid 1.4 12/22/2022 03:37 AM    Lactic acid 1.5 08/10/2022 03:46 PM      CARDIAC PANEL Lab Results   Component Value Date/Time     08/11/2022 02:47 AM    CK - MB 2.2 04/18/2018 03:48 PM    CK-MB Index 2.5 04/18/2018 03:48 PM    Troponin-I, QT <0.02 04/18/2018 03:48 PM      NT-proBNP Lab Results   Component Value Date/Time    NT pro-BNP 4,582 (H) 12/21/2022 12:37 AM    NT pro-BNP 3,967 (H) 10/07/2022 04:53 PM    NT pro-BNP 1,164 08/10/2022 03:40 AM    NT pro- 04/18/2018 03:48 PM      THYROID Lab Results   Component Value Date/Time    TSH 64.90 (H) 12/21/2022 12:37 AM    T4, Free 0.5 (L) 12/21/2022 12:37 AM            Readmission Risk Score: High Risk            31 Total Score    4 IP Visits Last 12 Months (1-3=4, 4=9, >4=11)    5 Pt. Coverage (Medicare=5 , Medicaid, or Self-Pay=4)    22 Charlson Comorbidity Score (Age + Comorbid Conditions)        Criteria that do not apply:    Has Seen PCP in Last 6 Months (Yes=3, No=0)    . Living with Significant Other. Assisted Living. LTAC. SNF.  or   Rehab    Patient Length of Stay (>5 days = 3)          An Johnson MD, PGY-1  [facility] Family Medicine  12/23/2022 2:20 PM

## 2022-12-23 NOTE — PROGRESS NOTES
Bedside and Verbal shift change report given to [facility] (oncoming nurse) by Deshawn Love RN (offgoing nurse). Report included the following information SBAR and Kardex.

## 2022-12-23 NOTE — PROGRESS NOTES
Pt attempted to leave AMA. Primary nurse and other nurse together with  encouraged,educated and talked to Pt. Pt finally agreed to stay.

## 2022-12-23 NOTE — PROGRESS NOTES
Reason for Renal Dosing:  Per Renal Dosing Policy    Patient clinical status and labs ordered/reviewed. Pt Weight Weight: 101 kg (222 lb 9.6 oz)   Serum Creatinine Lab Results   Component Value Date/Time    Creatinine 1.51 (H) 12/23/2022 04:37 AM       Creatinine Clearance Estimated Creatinine Clearance: 32.1 mL/min (A) (based on SCr of 1.51 mg/dL (H)). BUN Lab Results   Component Value Date/Time    BUN 40 (H) 12/23/2022 04:37 AM       WBC Lab Results   Component Value Date/Time    WBC 12.0 12/23/2022 04:37 AM      Temperature Temp: 97.4 °F (36.3 °C)   HR Pulse (Heart Rate): 100     BP BP: (!) 124/94           Drug type: Non-Antibiotic      Drug/dose: for naïve patient was adjusted to 15 mg every 24 hours for CrCl < 50 mL//min    Continue to monitor.     Signed Kevin Lopez  Date 12/23/2022  Time 2:54 PM

## 2022-12-23 NOTE — PROGRESS NOTES
[facility] Family Medicine   POST-ROUNDING NOTE    Assessment & Plan:    -Called Patient's son, Kari Giang, to discuss patient's discharge and ability to procure prescriptions from pharmacy with present financial difficulties, as she was noncompliant due to inability to afford medications. Son states that he is waiting to hear back from  about getting a veterans account that can help pay for patient's medications but has not yet heard back and is currently unable to afford some of her medications.     -Due to Xarelto and Eliquis being too expensive with patient's co-pay we will send her home on aspirin. We are also discontinuing her Amlodipine as she has soft BPs on Cardizem and Lopressor, as well as her Jardiance and Hydroxyzine to keep medication expenses down. The above patient and plan were discussed with my supervising physician. See daily progress note for full assessment/plan.       Nayana العلي MD, PGY-1  [facility] Family Medicine  12/23/2022, 12:47 PM

## 2022-12-23 NOTE — PROGRESS NOTES
Palliative Medicine    Goals of care defined. Patient has voiced she wishes for Full Code, wants to live to [facility]  [age] y/o. Will sign off. Please reconsult team as needed/if appropriate. Thank you for the Palliative Medicine consult and allowing us to participate in the care of Ms. Skyler Beard.     CODE STATUS: FULL CODE WITH FULL INTERVENTIONS      Laura Hernandez RN  Palliative Medicine Inpatient RN  [facility]  Palliative COPE Line: [phone]

## 2022-12-23 NOTE — PROGRESS NOTES
1900: Bedside and Verbal shift change report given to DARBY Soni (oncoming nurse) by Rachel Tapia RN (offgoing nurse). Report included the following information SBAR, Kardex, MAR, and Cardiac Rhythm AFIB .       0700: Bedside and Verbal shift change report given to Veronica Mccallum RN (oncoming nurse) by Amirah Araya RN (offgoing nurse). Report included the following information SBAR, Kardex, MAR, and Cardiac Rhythm AFIB .

## 2022-12-23 NOTE — PROGRESS NOTES
[facility]  DAILY PROGRESS NOTE      Patient:    Ana Choudhury , 82 y.o. female   MRN:  [id number]  Room/Bed:  2308/01  Admission Date:   12/21/2022  Code status:  Full Code    Reason for Admission:     82 yr old F with PMH of diastolic CHF, a. Fib, DM II, hypertension and DVT leg 10/22, right hip pain, hypothyroidism, developed SOB after a doctors appt 12/20. Found to have a. Fib with RVR. Improved with diltiazem and lasix.  Has not been taking meds regularly due to cost.     ASSESSMENT AND PLAN:   Problem List Items Addressed This Visit          Circulatory    Acute exacerbation of CHF (congestive heart failure) (HCC)       Respiratory    Pulmonary edema       Other    Leukocytosis     Other Visit Diagnoses       Atrial fibrillation with RVR ([facility] [address]    -  Primary          Sepsis of unknown etiology   -recently admitted with very similar presentation in 10/2022, determined to be sepsis 2/ UTI  -on admission: tachycardic, tachypneic, WBC 28.2, afebrile  -pt ROS + dysuria, UA non-conclusive for infection  -CXR with tiny effusions, no pna   -did not receive fluids or abx to date   -covid/flu  neg  -Bcx with Streptococcus species  Plan:   -Ucx  -daily CBC  -ceftriaxone 2 g daily, transition to PO Abx today  -PT/OT/CM    SAfib   CHF  HTN  -tachycardic on admission   -Cr 1.79 on admission (baseline 0.8-1)  -Hb 11.9 (baseline 11s)  -EKG on admission: A fib, , Qtc 473  -CXR on admission: mild HF with tiny effusions  -Trop 20  -BNP 4582  -ABG pH 7.43, pO2 120, HCO3 27.3   -Echo 12/21 EF 20-25%, severe hypokinesis, LA and RA severely dilated, mod pulmonary HTN, moderate TR  -meds per chart review: amlo 10, lopressor 100 mg BID, dilt 180 mg daily, lasix 40 mg daily, xarelto 20 mg BID  -on home O2 for prn use  - s/p IV 40 mg lasix in ED  Plan:   -cardio consulted by hospitalist team, appreciate recs  -tele   -PT/INR/PTT  -supportive O2 care   -strict I+Os, daily weights  -fluid restrict 1200 cc   -c/w xarelto, diltiazem, lopressor  -Lasix 20mg PO daily  -held lisinopril due to AQUILES     AQUILES  -BUN 33, Cr 1.79, GFR 27 on admission   -baseline Cr 0.8-1  -ddx prerenal vs cardiorenal vs meds   -Today Cr 1.77, BUN 36  Plan:   -daily BMP  -renally dose medications  -avoid nephrotoxins   -electrolyte repletion prn      DM with neuoropathy  -most recent A1c 11.3 in 10/2022  -meds per chart review: jardiance 10, insulin glargine 20 U qhs, novolog 5 U TID with meals, metformin 500 mg daily  -CMP remarkable for glucose 452, Na 132, Cl 95, CO2 29, AG 8,  Mg 1.6  -UA positive for glucose, protein, negative for ketones  -s/p 25 units  on 12/21 am   Plan:   -monitor BG qachs  -lantus 20 qhs, humalog 5 U TID with meals   -SSI   -hypoglycemia protocol     Hypothyroidism  -meds: levo 150 mcg  -TSH 64.9, FT4 0.5 on 12/21  -pt nonadherent with medications at home   Plan:   -cont. levo 150 mcg, titrate as needed     COPD  -on home O2 for prn use  -no inhalers prescribed per chart review  -Currently on 3L NC   Plan:   -cont. supportive O2     Chronic pain  RA  -ESR 18 on admission   -meds per chart review: MS IR 15 mg BID   Plan:   -Cont. MS IR 15 mg BID      Hx DVT  -PVL 10/8 showed non occlusive thrombus within varicose veins of calf on RLE, otherwise no DVT bilaterally   Plan:  -Cont. xarelto 20 mg daily  -SW to try and get coverage for Eliquis OP and if not then Warfarin     Anxiety   -Cont.hydroxyzine 10 mg BID prn     Hypothyroidism   -Cont. Levothyroxine 150 mcg    Code: Full  Diet: cardiac diet w/ fluid restriction  DVT/AC: xarelto  Mobility: per protocol  Disposition: pending    Point of Contact (relationship): Kj Yeager [phone]        SUBJECTIVE:   Events of the last 24 hours:  No acute events overnight. Patient seen at beside. Patient upset about staff talking about bed bugs in her home, states that she wants to go home today no matter what.     ROS (positive findings are in BOLD; negative findings are in regular font)  Constitutional: fevers, chills, appetite changes, weight changes, fatigue  HEENT: changes in vision, changes in hearing, sore throat, dysphagia  Cardiovascular: chest pain, palpitations, PND, orthopnea, edema  Pulmonary: SOB, cough, sputum production, wheezing, chest tightness  Gastrointestinal: abdominal pain, nausea/vomiting, diarrhea, constipation, melena, hematochezia  Genitourinary: dysuria, hesitation, dribbling, urgency, hematuria  Musculoskeletal: arthralgias, myalgias  Skin: rash, itching  Neurological: sensory changes, motor changes, headache  Psychiatric: mood changes  Endocrine: heat/cold intolerance  Heme: easy bruising/easy bleeding, LAD    CURRENT INPATIENT MEDICATIONS:  Current Facility-Administered Medications   Medication Dose Route Frequency Provider Last Rate Last Admin    gabapentin (NEURONTIN) capsule 400 mg  400 mg Oral DAILY Phoenix Meredith MD   400 mg at 12/23/22 0903    polyethylene glycol (MIRALAX) packet 17 g  17 g Oral DAILY PRN Isaac Price MD   17 g at 12/22/22 1246    furosemide (LASIX) tablet 20 mg  20 mg Oral DAILY Steffen Tucker MD   20 mg at 12/23/22 1932    sodium chloride (NS) flush 5-10 mL  5-10 mL IntraVENous PRN Pat Nichols MD   10 mL at 12/22/22 0646    atorvastatin (LIPITOR) tablet 80 mg  80 mg Oral DAILY Jam Macedo MD   80 mg at 12/23/22 0904    dilTIAZem ER (CARDIZEM CD) capsule 180 mg  180 mg Oral DAILY Jam Macedo MD   180 mg at 12/23/22 5562    insulin glargine (LANTUS) injection 20 Units  20 Units SubCUTAneous QHS Jam Macedo MD   20 Units at 12/22/22 2131    hydrOXYzine HCL (ATARAX) tablet 10 mg  10 mg Oral BID PRN Jam Macedo MD        levothyroxine (SYNTHROID) tablet 150 mcg  150 mcg Oral DAILY Jam Macedo MD   150 mcg at 12/23/22 0904    metoprolol tartrate (LOPRESSOR) tablet 100 mg  100 mg Oral Q12H Carmen Meredith MD   100 mg at 12/23/22 0903    morphine IR (MS IR) tablet 15 mg  15 mg Oral BID [employer] MD KAYLEEN   15 mg at 12/23/22 9665    rivaroxaban (XARELTO) tablet 20 mg  20 mg Oral DAILY Tanisha Lion MD   20 mg at 12/23/22 4475    sodium chloride (NS) flush 5-40 mL  5-40 mL IntraVENous Q8H Thaddeus Meredith MD   10 mL at 12/23/22 0656    sodium chloride (NS) flush 5-40 mL  5-40 mL IntraVENous PRN Thaddeus Meredith MD        acetaminophen (TYLENOL) tablet 650 mg  650 mg Oral Q6H PRN Thaddeus Meredith MD        Or    acetaminophen (TYLENOL) suppository 650 mg  650 mg Rectal Q6H PRN Tanisha Lion MD        [Held by provider] lisinopriL (PRINIVIL, ZESTRIL) tablet 5 mg  5 mg Oral DAILY Thaddues Meredith MD   5 mg at 12/21/22 0933    ondansetron (ZOFRAN) injection 4 mg  4 mg IntraVENous Q6H PRN Thaddeus Meredith MD        cefTRIAXone (ROCEPHIN) 2 g in sterile water (preservative free) 20 mL IV syringe  2 g IntraVENous Q24H Mann Rizvi MD   2 g at 12/22/22 1553    insulin lispro (HUMALOG) injection   SubCUTAneous AC&HS Mann Rizvi MD   2 Units at 12/22/22 2102    glucose chewable tablet 16 g  4 Tablet Oral PRN Mann Rizvi MD        glucagon (GLUCAGEN) injection 1 mg  1 mg IntraMUSCular PRN Mann Rizvi MD        insulin lispro (HUMALOG) injection 5 Units  5 Units SubCUTAneous TIDAC Mann Rizvi MD   5 Units at 12/23/22 7209       Allergies  No Known Allergies    OBJECTIVE:    Intake/Output Summary (Last 24 hours) at 12/23/2022 6916  Last data filed at 12/23/2022 0257  Gross per 24 hour   Intake 140 ml   Output 1250 ml   Net -1110 ml       Visit Vitals  BP (!) 116/58   Pulse 84   Temp 97.3 °F (36.3 °C)   Resp 13   Ht 5' 5\" (1.651 m)   Wt 101 kg (222 lb 9.6 oz)   SpO2 95%   BMI 37.04 kg/m²       PHYSICAL EXAM  Gen: NAD, comfortable, obese   HEENT: normocephalic, atraumatic, MMM, no thyromegaly, no JVD  CV: RRR, S1/S2 present without M/R/G, +2 radial pulses, well-perfused, PMI not displaced, no edema, bilateral varicose veins  Pulm: CTAB, no wheezes, no crackles  Abd: S/NT/ND, no rebound, no guarding, no hepatosplenomegaly   MSK: no clubbing, no edema, missing toes on RLE  Skin: warm, dry, intact, no rash,  Neuro: CN II-XII grossly intact, no focal deficits appreciated   Psych: appropriate, alert, oriented x3    LABWORK (LAST 24 HOURS)  Recent Results (from the past 24 hour(s))   GLUCOSE, POC    Collection Time: 12/22/22 11:53 AM   Result Value Ref Range    Glucose (POC) 149 (H) 70 - 110 mg/dL   GLUCOSE, POC    Collection Time: 12/22/22  4:58 PM   Result Value Ref Range    Glucose (POC) 234 (H) 70 - 110 mg/dL   GLUCOSE, POC    Collection Time: 12/22/22  8:23 PM   Result Value Ref Range    Glucose (POC) 154 (H) 70 - 110 mg/dL   CBC W/O DIFF    Collection Time: 12/23/22  4:37 AM   Result Value Ref Range    WBC 12.0 4.6 - 13.2 K/uL    RBC 3.79 (L) 4.20 - 5.30 M/uL    HGB 10.0 (L) 12.0 - 16.0 g/dL    HCT 31.7 (L) 35.0 - 45.0 %    MCV 83.6 78.0 - 100.0 FL    MCH 26.4 24.0 - 34.0 PG    MCHC 31.5 31.0 - 37.0 g/dL    RDW 15.8 (H) 11.6 - 14.5 %    PLATELET 141 268 - 077 K/uL    MPV 13.5 (H) 9.2 - 11.8 FL    NRBC 0.0 0  WBC    ABSOLUTE NRBC 0.00 0.00 - 4.28 K/uL   METABOLIC PANEL, BASIC    Collection Time: 12/23/22  4:37 AM   Result Value Ref Range    Sodium 137 136 - 145 mmol/L    Potassium 3.9 3.5 - 5.5 mmol/L    Chloride 100 100 - 111 mmol/L    CO2 29 21 - 32 mmol/L    Anion gap 8 3.0 - 18 mmol/L    Glucose 70 (L) 74 - 99 mg/dL    BUN 40 (H) 7.0 - 18 MG/DL    Creatinine 1.51 (H) 0.6 - 1.3 MG/DL    BUN/Creatinine ratio 26 (H) 12 - 20      eGFR 34 (L) >60 ml/min/1.73m2    Calcium 8.4 (L) 8.5 - 10.1 MG/DL   MAGNESIUM    Collection Time: 12/23/22  4:37 AM   Result Value Ref Range    Magnesium 1.9 1.6 - 2.6 mg/dL       IMAGING AND PROCEDURES (LAST 36 HOURS)  No results found.    ================================================================  Further management for Ms. Gabino Wilder will be discussed on rounds with my attending.       Andreia Jordan MD, PGY-1  [facility]  December 23, 2022 11:50 AM

## 2022-12-23 NOTE — PROGRESS NOTES
Cardiology Progress Note    Admit Date: 12/21/2022  Attending Cardiologist: Dr. Martha Cortez:     -HFrEF, new diagnosis. Echo 12/21/2022 with LVEF 20-25%, prior Echo 08/2022 with LVEF 55%. -Afib, recently diagnosed 10/2022, on Xarelto. Rate 92 bpm, plan rate control.  -Empiric treatment for possible UTI  -Hx DVT RLE 06/2022, on Xarelto  -Hypothyroidism, on Synthroid  -HTN  -DM, on insulin. Hemoglobin A1c 11.9 on 12/22/2022  -HLD  -Medication noncompliance, not taking upon presentation, likely contributory     Primary cardiologist is Dr. Linn Medel:     -Pt being started on PO Lasix today.  -Continue PO Lopressor, Cardizem. -Continued on Xarelto for anticoagulation.  -Continue Synthroid as per primary team.    ------------------------------------------------  Switch to oral Lasix today. Breathing stable. A. fib rates reasonable. 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 I discussed outpatient ischemia evaluation given recent cardiomyopathy and this can be follow-up'd with Dr. Darryl Lion to consider outpatient stress test.  We will plan to follow peripherally for now and hopefully patient can be discharged soon. She had not been taking medications upon presentation, encourage compliance. Please call if questions. I saw, examined, and evaluated this patient and performed the substantive portion of the encounter for > 50% of the time including extensive history, physical exam, and medical decision making as discussed with patient and next-of-kin as needed. I personally reviewed the patient's labs, tests, vitals, orders, medications, updated history, and other providers assessments as well. I personally agree with the findings as stated and the plan as documented. Timothy Muniz MD        Subjective:     No new complaints. Objective:      Patient Vitals for the past 8 hrs:   Temp Pulse Resp BP SpO2   12/23/22 0730 97.3 °F (36.3 °C) -- -- -- --   12/23/22 0700 -- 84 13 (!) 116/58 95 %   12/23/22 0400 98.1 °F (36.7 °C) 83 14 (!) 93/53 100 %         Patient Vitals for the past 96 hrs:   Weight   12/21/22 2344 101 kg (222 lb 9.6 oz)   12/21/22 0947 105.2 kg (232 lb)       TELE: AFIB               Current Facility-Administered Medications   Medication Dose Route Frequency Last Admin    gabapentin (NEURONTIN) capsule 400 mg  400 mg Oral DAILY      polyethylene glycol (MIRALAX) packet 17 g  17 g Oral DAILY PRN 17 g at 12/22/22 1246    furosemide (LASIX) tablet 20 mg  20 mg Oral DAILY      sodium chloride (NS) flush 5-10 mL  5-10 mL IntraVENous PRN 10 mL at 12/22/22 0646    atorvastatin (LIPITOR) tablet 80 mg  80 mg Oral DAILY 80 mg at 12/22/22 0949    dilTIAZem ER (CARDIZEM CD) capsule 180 mg  180 mg Oral DAILY      insulin glargine (LANTUS) injection 20 Units  20 Units SubCUTAneous QHS 20 Units at 12/22/22 2131    hydrOXYzine HCL (ATARAX) tablet 10 mg  10 mg Oral BID PRN      levothyroxine (SYNTHROID) tablet 150 mcg  150 mcg Oral DAILY 150 mcg at 12/22/22 0949    metoprolol tartrate (LOPRESSOR) tablet 100 mg  100 mg Oral Q12H 100 mg at 12/22/22 2025    morphine IR (MS IR) tablet 15 mg  15 mg Oral BID 15 mg at 12/22/22 1705    rivaroxaban (XARELTO) tablet 20 mg  20 mg Oral DAILY 20 mg at 12/22/22 0949    sodium chloride (NS) flush 5-40 mL  5-40 mL IntraVENous Q8H 10 mL at 12/23/22 0656    sodium chloride (NS) flush 5-40 mL  5-40 mL IntraVENous PRN      acetaminophen (TYLENOL) tablet 650 mg  650 mg Oral Q6H PRN      Or    acetaminophen (TYLENOL) suppository 650 mg  650 mg Rectal Q6H PRN      [Held by provider] lisinopriL (PRINIVIL, ZESTRIL) tablet 5 mg  5 mg Oral DAILY 5 mg at 12/21/22 0933    ondansetron (ZOFRAN) injection 4 mg  4 mg IntraVENous Q6H PRN      cefTRIAXone (ROCEPHIN) 2 g in sterile water (preservative free) 20 mL IV syringe 2 g IntraVENous Q24H 2 g at 12/22/22 1553    insulin lispro (HUMALOG) injection   SubCUTAneous AC&HS 2 Units at 12/22/22 2102    glucose chewable tablet 16 g  4 Tablet Oral PRN      glucagon (GLUCAGEN) injection 1 mg  1 mg IntraMUSCular PRN      insulin lispro (HUMALOG) injection 5 Units  5 Units SubCUTAneous TIDAC 5 Units at 12/22/22 1704         Intake/Output Summary (Last 24 hours) at 12/23/2022 0851  Last data filed at 12/23/2022 0257  Gross per 24 hour   Intake 140 ml   Output 1250 ml   Net -1110 ml       Physical Exam:  General:  alert, cooperative, no distress, appears stated age  Neck:  supple  Lungs:  clear to auscultation bilaterally  Heart:  irregularly irregular rhythm  Abdomen:  abdomen is soft without significant tenderness, masses, organomegaly or guarding  Extremities:  atraumatic, no edema    Visit Vitals  BP (!) 116/58   Pulse 84   Temp 97.3 °F (36.3 °C)   Resp 13   Ht 5' 5\" (1.651 m)   Wt 101 kg (222 lb 9.6 oz)   SpO2 95%   BMI 37.04 kg/m²       Data Review:     Labs: Results:       Chemistry Recent Labs     12/23/22 0437 12/22/22 0337 12/21/22 2145 12/21/22 0037   GLU 70* 159* 345* 452*    136 134* 132*   K 3.9 3.5 3.8 4.3    99* 97* 95*   CO2 29 31 31 29   BUN 40* 36* 35* 33*   CREA 1.51* 1.77* 1.79* 1.79*   CA 8.4* 8.6 8.7 9.7   MG 1.9 2.0 2.0 1.6   AGAP 8 6 6 8   BUCR 26* 20 20 18   AP  --   --   --  69   TP  --   --   --  9.1*   ALB  --   --   --  4.1   GLOB  --   --   --  5.0*   AGRAT  --   --   --  0.8      CBC w/Diff Recent Labs     12/23/22  0437 12/22/22 0337 12/21/22 2145 12/21/22  0037   WBC 12.0 21.7* 25.0* 28.2*   RBC 3.79* 3.59* 3.45* 4.59   HGB 10.0* 9.5* 9.2* 11.9*   HCT 31.7* 29.9* 28.6* 37.7    178 175 225   GRANS  --   --  80* 93*   LYMPH  --   --  5* 1*   EOS  --   --  0 0      Lipid Panel Lab Results   Component Value Date/Time    Cholesterol, total 191 11/09/2021 12:24 PM    HDL Cholesterol 44 11/09/2021 12:24 PM    LDL, calculated 96.8 11/09/2021 12:24 PM    VLDL, calculated 50.2 11/09/2021 12:24 PM    Triglyceride 251 (H) 11/09/2021 12:24 PM    CHOL/HDL Ratio 4.3 11/09/2021 12:24 PM      Liver Enzymes Recent Labs     12/21/22  0037   TP 9.1*   ALB 4.1   AP 69      Thyroid Studies Lab Results   Component Value Date/Time    TSH 64.90 (H) 12/21/2022 12:37 AM          Signed By: Shirley Preciado PA-C     December 23, 2022

## 2022-12-24 LAB
BACTERIA SPEC CULT: ABNORMAL
BACTERIA SPEC CULT: ABNORMAL
CC UR VC: ABNORMAL
GRAM STN SPEC: ABNORMAL
SERVICE CMNT-IMP: ABNORMAL
SERVICE CMNT-IMP: ABNORMAL

## 2022-12-25 LAB
BACTERIA SPEC CULT: NORMAL
SERVICE CMNT-IMP: NORMAL

## 2022-12-27 ENCOUNTER — PATIENT OUTREACH (OUTPATIENT)
Dept: CASE MANAGEMENT | Age: 87
End: 2022-12-27

## 2022-12-27 LAB
BACTERIA SPEC CULT: NORMAL
SERVICE CMNT-IMP: NORMAL

## 2022-12-27 NOTE — PROGRESS NOTES
Care Transitions Initial Call    Call within 2 business days of discharge: Yes     Patient: Yun Tejeda Patient : 1935 MRN: [id number]    Last Discharge  Street       Date Complaint Diagnosis Description Type Department Provider    22 Rapid Heart Rate; Shortness of Breath Atrial fibrillation with RVR ([facility] [address] . .. ED to Hosp-Admission (Discharged) (ADMIT) Sherley Salas MD; NEFTALI Nichols Patient was admitted to [facility] from 22 to 22 for acute CHF exacerbation, Afib with RVR, and leukocytosis-possibly 2/2 UTI. Was this an external facility discharge? No Discharge Facility: [facility]    Challenges to be reviewed by the provider   Additional needs identified to be addressed with provider: no  none         Method of communication with provider : none    COVID-19 related testing was available at this time. Test results were negative. Advance Care Planning:   Does patient have an Advance Directive: not on file. Inpatient Readmission Risk score: Unplanned Readmit Risk Score: 22.3    Was this a readmission? no   Patient stated reason for the admission: I was having trouble breathing. Fluid mostly, I swell, and I was taking Bumex to get the fluid off of my lungs and heart. Patients top risk factors for readmission: financial and medical condition-CHF-EF 20-25%, Afib RVR, COPD on home O2 prn. Non-adherence to meds d/t cost.    Interventions to address risk factors: Obtained and reviewed discharge summary and/or continuity of care documents, Education of patient/family/caregiver/guardian to support self-management-Education was limited d/t patient being [facility]. Reviewed basic red flags r/t CHF, Afib, and UTI., and Assessment and support for treatment adherence and medication management-Patient reports feeling well today, slept all night without O2, swelling in feet and legs, intermittent palpitations, minimal dysuria.  Patient denies SOB, fever/chills/sweating, cloudy or malodorous urine. Temp 96 degrees today. Appetite has returned. Moving around \"a good bit\", but slowly d/t neuropathy in feet and legs. Using a walker and other DME around the house. She has a friend with her who assists with managing her medications. She has not picked up her Rxs from the pharmacy yet and will pick them up today. She and her friend will call CTN after picking up medications to complete the med rec and she agrees to bring all meds to her appt today. Checking BG and BP daily and has not checked yet today. She will call her cardiologist Dr. Kelley Pabon to schedule an appt. She reports having issues with corns on her feet and plans to schedule an appt with her podiatrist.        Care Transition Nurse (CTN) contacted the patient by telephone to perform post hospital discharge assessment. Verified name and  with patient as identifiers. Provided introduction to self, and explanation of the CTN role. The patient is [address], so the conversation was limited. CTN reviewed discharge instructions, medical action plan and red flags with patient who verbalized understanding. Were discharge instructions available to patient? Unable to assess. Reviewed appropriate site of care based on symptoms and resources available to patient including: PCP, Specialist, When to call 911, and CTN . Patient given an opportunity to ask questions and does not have any further questions or concerns at this time. The patient agrees to contact the PCP office for questions related to their healthcare. Unable to complete medication reconciliation with the patient due to requiring assistance from her friend and prefers to have him complete med rec with CTN. Patient stated a plan to  new Rxs today, bring all meds to appt with PCP associate, and have her friend contact CTN to complete med rec. She is in agreement with CTN contacting her tomorrow if her friend does not contact CTN today.     Home Health/Outpatient orders at discharge: refused      Durable Medical Equipment ordered at discharge: None      Was patient discharged with a pulse oximeter? no    Discussed follow-up appointments. If no appointment was previously scheduled, appointment scheduling offered:  n/a . Is follow up appointment scheduled within 7 days of discharge? yes. [address] follow up appointment(s):   Future Appointments   Date Time Provider Srinivas Alejandroi   3/3/2023 11:00 AM Loan Jane MD CAP BS AMB     [facility] up appointment(s):   PCP associate Dr. Rachael Maurice on 12/27/22 @ 3:50 PM      Plan for follow-up call in 1-2 days based on severity of symptoms and risk factors. Plan for next call: follow up appointment-confirm attendance of PCP appt and medication management-Confirm  of all new Rxs, affordability, and complete med rec with friend  CTN provided contact information for future needs. Goals Addressed                   This Visit's Progress     Attends follow-up appointments as directed. Patient will schedule and attend recommended follow up appointments over the next 30 days. 12/27/22  PCP associate Dr. Rachael Maurice on 12/27/22 @ 3:50 PM.    Patient states a plan to schedule appt with cardiologist Dr. Carleen Benjamin.

## 2022-12-28 ENCOUNTER — PATIENT OUTREACH (OUTPATIENT)
Dept: CASE MANAGEMENT | Age: 87
End: 2022-12-28

## 2022-12-28 NOTE — PROGRESS NOTES
Care Transitions Follow Up Call    Challenges to be reviewed by the provider   Additional needs identified to be addressed with provider: no  none           Method of communication with provider : none    Care Transitions Nurse (CTN) attempted to contact patient to complete transitions of care follow up and complete medication reconciliation. Unable to reach. Left [facility] requesting a return call. Care Transition Nurse (CTN) contacted the  patient's son Brissa Man, on PHI release,  by telephone to follow up after admission on 22. Verified name and  with family as identifiers. Introduced self, role, and reason for call. Addressed changes since last contact: medications- family will try to confirm if patient filled all new Rxs  Follow up appointment completed? yes. Was follow up appointment scheduled within 7 days of discharge? yes. Advance Care Planning:   Does patient have an Advance Directive:   not on file      Patients top risk factors for readmission:  financial and medical condition-CHF-EF 20-25%, Afib RVR, COPD on home O2 prn. Non-adherence to meds d/t cost.    Interventions to address risk factors: Assessment and support for treatment adherence and medication management-Patient's son Eugenia Jordan reports he has been trying to get the patient enrolled in [facility] program for the past 2 months in order to reduce medication costs. He has had some issues with the process and will call again this week to try to continue pursuing this route. He spoke to the patient yesterday and believes she was able to obtain all of her medications, including Eliquis. CTN reviewed discontinued medications with her son and advised that Eliquis is not listed as a current medication and according to the hospital notes, the plan was to start her on ASA only d/t cost of OACs.  Reviewed new and changed medications per AVS and advised him that ASA and Augmentin were sent to [facility], Lasix and gabapentin were sent to [facility], and Lantus Rx was printed. CTN contacted [address] today and verified that they have not filled any medications for the patient. CTN attempted to contact [address] today without success d/t no answer and call continually gets sent back to a recording to select options. Patient's son states a plan to visit her this afternoon and review medications, and will contact CTN with an update. He confirmed that the patient attended the appt yesterday with PCP associate and believes she has an appt this morning. [facility] follow up appointment(s):   Future Appointments   Date Time Provider Srinivas Saba   3/3/2023 11:00 AM Albert Simon MD CAP BS AMB     Non-[facility] follow up appointment(s): n/a    CTN provided contact information for future needs. Plan for follow-up call in 1-2 days based on severity of symptoms and risk factors. Plan for next call: symptom management-assess for CHF/Afib/UTI red flags, self management-review management of CHF, and medication management-complete med rec, confirm if patient was able to fill all Rxs       Goals Addressed                   This Visit's Progress     Attends follow-up appointments as directed. On track     Patient will schedule and attend recommended follow up appointments over the next 30 days. 12/27/22  PCP associate Dr. Janet Epperson on 12/27/22 @ 3:50 PM. 12/28: Completed appt with PCP associate yesterday. Patient states a plan to schedule appt with cardiologist Dr. William Darling.

## 2022-12-29 ENCOUNTER — PATIENT OUTREACH (OUTPATIENT)
Dept: CASE MANAGEMENT | Age: 87
End: 2022-12-29

## 2022-12-29 NOTE — PROGRESS NOTES
Physician Progress Note      PATIENT:               Jovanny Concepcion  CSN #:                  [id number]  :                       1935  ADMIT DATE:       2022 12:04 AM  DISCH DATE:        2022 5:10 PM  RESPONDING  PROVIDER #:        Lien Paz MD          QUERY TEXT:    Patient admitted with acute diastolic CHF. Noted documentation of UTI in discharge summary on 2022. In order to support the diagnosis of UTI, please include additional clinical indicators in your documentation. Or please document if the diagnosis of UTI has been ruled out after further study. The medical record reflects the following:  Risk Factors: sepsis w/ UTI 10/2022, new onset of diastolic CHF, AQUILES    Clinical Indicators:  2022, discharge summary, Dr. Chris Bates MD, PGY-1/Dr. Devan Rene MD:  \"Leukocytosis, sepsis rule out. Rocephin 2 g qD transitioned to Augmentin 500 mg BID x 5 days OP. Bcx with probable streptococcus growing in 1 of 2 bottles, likely a contaminant. Ucx with Klebsiella. WBC continued to trend down. \"  2022, urine culture:  [place] Count ? [id number] COLONIES/mL  Culture result: ? KLEBSIELLA PNEUMONIAE? Treatment: Rocephin 2 g qD transitioned to Augmentin 500 mg BID x 5 days OP. Thank you,  ONEAL Suarez RN, CDS  [email or web address]  Options provided:  -- Sepsis ruled out and UTI present as evidenced by, Please document evidence. -- UTI and sepsis were ruled out  -- Other - I will add my own diagnosis  -- Disagree - Not applicable / Not valid  -- Disagree - Clinically unable to determine / Unknown  -- Refer to Clinical Documentation Reviewer    PROVIDER RESPONSE TEXT:    Provider disagreed with this query. Had sepsis with elevated WBC but source not determined    Query created by:  Bubba Harris on 2022 9:42 AM      Electronically signed by:  Lien Paz MD 2022 11:07 AM

## 2023-01-01 ENCOUNTER — HOSPITAL ENCOUNTER (INPATIENT)
Age: 88
LOS: 13 days | DRG: 208 | End: 2023-01-30
Attending: EMERGENCY MEDICINE | Admitting: FAMILY MEDICINE
Payer: MEDICARE

## 2023-01-01 ENCOUNTER — APPOINTMENT (OUTPATIENT)
Dept: GENERAL RADIOLOGY | Age: 88
DRG: 208 | End: 2023-01-01
Attending: INTERNAL MEDICINE
Payer: MEDICARE

## 2023-01-01 ENCOUNTER — APPOINTMENT (OUTPATIENT)
Dept: GENERAL RADIOLOGY | Age: 88
DRG: 208 | End: 2023-01-01
Attending: PHYSICIAN ASSISTANT
Payer: MEDICARE

## 2023-01-01 ENCOUNTER — APPOINTMENT (OUTPATIENT)
Dept: GENERAL RADIOLOGY | Age: 88
DRG: 208 | End: 2023-01-01
Attending: EMERGENCY MEDICINE
Payer: MEDICARE

## 2023-01-01 ENCOUNTER — APPOINTMENT (OUTPATIENT)
Dept: NON INVASIVE DIAGNOSTICS | Age: 88
DRG: 208 | End: 2023-01-01
Attending: PHYSICIAN ASSISTANT
Payer: MEDICARE

## 2023-01-01 ENCOUNTER — APPOINTMENT (OUTPATIENT)
Dept: GENERAL RADIOLOGY | Age: 88
DRG: 208 | End: 2023-01-01
Payer: MEDICARE

## 2023-01-01 ENCOUNTER — APPOINTMENT (OUTPATIENT)
Dept: VASCULAR SURGERY | Age: 88
DRG: 208 | End: 2023-01-01
Attending: INTERNAL MEDICINE
Payer: MEDICARE

## 2023-01-01 ENCOUNTER — APPOINTMENT (OUTPATIENT)
Dept: ULTRASOUND IMAGING | Age: 88
DRG: 208 | End: 2023-01-01
Payer: MEDICARE

## 2023-01-01 VITALS
SYSTOLIC BLOOD PRESSURE: 120 MMHG | TEMPERATURE: 99.8 F | HEART RATE: 136 BPM | RESPIRATION RATE: 30 BRPM | OXYGEN SATURATION: 85 % | HEIGHT: 64 IN | DIASTOLIC BLOOD PRESSURE: 54 MMHG | WEIGHT: 235.89 LBS | BODY MASS INDEX: 40.27 KG/M2

## 2023-01-01 DIAGNOSIS — R54 AGE-RELATED PHYSICAL DEBILITY: ICD-10-CM

## 2023-01-01 DIAGNOSIS — I50.43 ACUTE ON CHRONIC HEART FAILURE WITH REDUCED EJECTION FRACTION AND DIASTOLIC DYSFUNCTION (HCC): Primary | ICD-10-CM

## 2023-01-01 DIAGNOSIS — U07.1 COVID: ICD-10-CM

## 2023-01-01 DIAGNOSIS — I48.19 PERSISTENT ATRIAL FIBRILLATION (HCC): ICD-10-CM

## 2023-01-01 DIAGNOSIS — N17.9 AKI (ACUTE KIDNEY INJURY) (HCC): ICD-10-CM

## 2023-01-01 DIAGNOSIS — R00.0 TACHYCARDIA: ICD-10-CM

## 2023-01-01 DIAGNOSIS — G93.40 ACUTE ENCEPHALOPATHY: ICD-10-CM

## 2023-01-01 DIAGNOSIS — R54 ADVANCED AGE: ICD-10-CM

## 2023-01-01 DIAGNOSIS — R53.81 PHYSICAL DECONDITIONING: ICD-10-CM

## 2023-01-01 DIAGNOSIS — Z71.89 GOALS OF CARE, COUNSELING/DISCUSSION: ICD-10-CM

## 2023-01-01 DIAGNOSIS — J96.01 ACUTE RESPIRATORY FAILURE WITH HYPOXIA (HCC): ICD-10-CM

## 2023-01-01 DIAGNOSIS — I48.91 ATRIAL FIBRILLATION, UNSPECIFIED TYPE (HCC): ICD-10-CM

## 2023-01-01 DIAGNOSIS — I21.4 NSTEMI (NON-ST ELEVATED MYOCARDIAL INFARCTION) (HCC): ICD-10-CM

## 2023-01-01 DIAGNOSIS — G93.40 ENCEPHALOPATHY: ICD-10-CM

## 2023-01-01 DIAGNOSIS — J81.0 ACUTE PULMONARY EDEMA (HCC): ICD-10-CM

## 2023-01-01 LAB
ALBUMIN SERPL-MCNC: 2.4 G/DL (ref 3.4–5)
ALBUMIN SERPL-MCNC: 2.7 G/DL (ref 3.4–5)
ALBUMIN SERPL-MCNC: 2.8 G/DL (ref 3.4–5)
ALBUMIN SERPL-MCNC: 2.9 G/DL (ref 3.4–5)
ALBUMIN SERPL-MCNC: 3.4 G/DL (ref 3.4–5)
ALBUMIN/GLOB SERPL: 0.5 (ref 0.8–1.7)
ALBUMIN/GLOB SERPL: 0.6 (ref 0.8–1.7)
ALBUMIN/GLOB SERPL: 0.6 (ref 0.8–1.7)
ALBUMIN/GLOB SERPL: 0.7 (ref 0.8–1.7)
ALP SERPL-CCNC: 51 U/L (ref 45–117)
ALP SERPL-CCNC: 56 U/L (ref 45–117)
ALP SERPL-CCNC: 58 U/L (ref 45–117)
ALP SERPL-CCNC: 69 U/L (ref 45–117)
ALT SERPL-CCNC: 10 U/L (ref 13–56)
ALT SERPL-CCNC: 41 U/L (ref 13–56)
ALT SERPL-CCNC: 43 U/L (ref 13–56)
ALT SERPL-CCNC: 6 U/L (ref 13–56)
ANION GAP BLD CALC-SCNC: 10 (ref 10–20)
ANION GAP SERPL CALC-SCNC: 10 MMOL/L (ref 3–18)
ANION GAP SERPL CALC-SCNC: 11 MMOL/L (ref 3–18)
ANION GAP SERPL CALC-SCNC: 12 MMOL/L (ref 3–18)
ANION GAP SERPL CALC-SCNC: 12 MMOL/L (ref 3–18)
ANION GAP SERPL CALC-SCNC: 13 MMOL/L (ref 3–18)
ANION GAP SERPL CALC-SCNC: 4 MMOL/L (ref 3–18)
ANION GAP SERPL CALC-SCNC: 5 MMOL/L (ref 3–18)
ANION GAP SERPL CALC-SCNC: 5 MMOL/L (ref 3–18)
ANION GAP SERPL CALC-SCNC: 7 MMOL/L (ref 3–18)
ANION GAP SERPL CALC-SCNC: 8 MMOL/L (ref 3–18)
ANION GAP SERPL CALC-SCNC: 9 MMOL/L (ref 3–18)
ANION GAP SERPL CALC-SCNC: 9 MMOL/L (ref 3–18)
APPEARANCE UR: ABNORMAL
APPEARANCE UR: CLEAR
APTT PPP: 141.1 SEC (ref 23–36.4)
APTT PPP: 161 SEC (ref 23–36.4)
APTT PPP: 166 SEC (ref 23–36.4)
APTT PPP: 32.3 SEC (ref 23–36.4)
APTT PPP: 32.4 SEC (ref 23–36.4)
APTT PPP: 36.5 SEC (ref 23–36.4)
APTT PPP: 37.4 SEC (ref 23–36.4)
APTT PPP: 37.5 SEC (ref 23–36.4)
APTT PPP: 47.9 SEC (ref 23–36.4)
APTT PPP: 61.4 SEC (ref 23–36.4)
APTT PPP: 64.3 SEC (ref 23–36.4)
APTT PPP: 77.9 SEC (ref 23–36.4)
APTT PPP: 79.7 SEC (ref 23–36.4)
APTT PPP: 86.9 SEC (ref 23–36.4)
APTT PPP: 91 SEC (ref 23–36.4)
APTT PPP: 94.1 SEC (ref 23–36.4)
APTT PPP: 95.2 SEC (ref 23–36.4)
APTT PPP: >180 SEC (ref 23–36.4)
ARTERIAL PATENCY WRIST A: POSITIVE
AST SERPL-CCNC: 18 U/L (ref 10–38)
AST SERPL-CCNC: 24 U/L (ref 10–38)
AST SERPL-CCNC: 72 U/L (ref 10–38)
AST SERPL-CCNC: 77 U/L (ref 10–38)
ATRIAL RATE: 150 BPM
ATRIAL RATE: 192 BPM
BACTERIA SPEC CULT: NORMAL
BACTERIA URNS QL MICRO: ABNORMAL /HPF
BACTERIA URNS QL MICRO: NEGATIVE /HPF
BASE DEFICIT BLD-SCNC: 0.2 MMOL/L
BASE DEFICIT BLD-SCNC: 0.3 MMOL/L
BASE DEFICIT BLD-SCNC: 1.2 MMOL/L
BASE DEFICIT BLD-SCNC: 1.6 MMOL/L
BASE DEFICIT BLD-SCNC: 2.3 MMOL/L
BASE DEFICIT BLD-SCNC: 3.3 MMOL/L
BASE EXCESS BLD CALC-SCNC: 1.4 MMOL/L
BASOPHILS # BLD: 0 K/UL (ref 0–0.1)
BASOPHILS # BLD: 0.1 K/UL (ref 0–0.1)
BASOPHILS NFR BLD: 0 % (ref 0–2)
BASOPHILS NFR BLD: 1 % (ref 0–2)
BDY SITE: ABNORMAL
BDY SITE: NORMAL
BILIRUB SERPL-MCNC: 0.5 MG/DL (ref 0.2–1)
BILIRUB SERPL-MCNC: 0.6 MG/DL (ref 0.2–1)
BILIRUB SERPL-MCNC: 0.8 MG/DL (ref 0.2–1)
BILIRUB SERPL-MCNC: 0.8 MG/DL (ref 0.2–1)
BILIRUB UR QL: NEGATIVE
BILIRUB UR QL: NEGATIVE
BNP SERPL-MCNC: 7478 PG/ML (ref 0–1800)
BODY TEMPERATURE: 98.5
BODY TEMPERATURE: 99
BUN SERPL-MCNC: 25 MG/DL (ref 7–18)
BUN SERPL-MCNC: 27 MG/DL (ref 7–18)
BUN SERPL-MCNC: 38 MG/DL (ref 7–18)
BUN SERPL-MCNC: 40 MG/DL (ref 7–18)
BUN SERPL-MCNC: 41 MG/DL (ref 7–18)
BUN SERPL-MCNC: 49 MG/DL (ref 7–18)
BUN SERPL-MCNC: 51 MG/DL (ref 7–18)
BUN SERPL-MCNC: 62 MG/DL (ref 7–18)
BUN SERPL-MCNC: 70 MG/DL (ref 7–18)
BUN SERPL-MCNC: 75 MG/DL (ref 7–18)
BUN SERPL-MCNC: 78 MG/DL (ref 7–18)
BUN SERPL-MCNC: 83 MG/DL (ref 7–18)
BUN SERPL-MCNC: 84 MG/DL (ref 7–18)
BUN SERPL-MCNC: 88 MG/DL (ref 7–18)
BUN SERPL-MCNC: 90 MG/DL (ref 7–18)
BUN SERPL-MCNC: 94 MG/DL (ref 7–18)
BUN/CREAT SERPL: 19 (ref 12–20)
BUN/CREAT SERPL: 19 (ref 12–20)
BUN/CREAT SERPL: 20 (ref 12–20)
BUN/CREAT SERPL: 21 (ref 12–20)
BUN/CREAT SERPL: 22 (ref 12–20)
BUN/CREAT SERPL: 24 (ref 12–20)
BUN/CREAT SERPL: 24 (ref 12–20)
BUN/CREAT SERPL: 26 (ref 12–20)
BUN/CREAT SERPL: 27 (ref 12–20)
BUN/CREAT SERPL: 27 (ref 12–20)
BUN/CREAT SERPL: 32 (ref 12–20)
BUN/CREAT SERPL: 34 (ref 12–20)
BUN/CREAT SERPL: 38 (ref 12–20)
BUN/CREAT SERPL: 39 (ref 12–20)
BUN/CREAT SERPL: 40 (ref 12–20)
BUN/CREAT SERPL: 43 (ref 12–20)
CA-I BLD-MCNC: 0.93 MMOL/L (ref 1.12–1.32)
CALCIUM SERPL-MCNC: 7.3 MG/DL (ref 8.5–10.1)
CALCIUM SERPL-MCNC: 7.9 MG/DL (ref 8.5–10.1)
CALCIUM SERPL-MCNC: 7.9 MG/DL (ref 8.5–10.1)
CALCIUM SERPL-MCNC: 8 MG/DL (ref 8.5–10.1)
CALCIUM SERPL-MCNC: 8.1 MG/DL (ref 8.5–10.1)
CALCIUM SERPL-MCNC: 8.2 MG/DL (ref 8.5–10.1)
CALCIUM SERPL-MCNC: 8.2 MG/DL (ref 8.5–10.1)
CALCIUM SERPL-MCNC: 8.3 MG/DL (ref 8.5–10.1)
CALCIUM SERPL-MCNC: 8.7 MG/DL (ref 8.5–10.1)
CALCIUM SERPL-MCNC: 9 MG/DL (ref 8.5–10.1)
CALCIUM SERPL-MCNC: 9.1 MG/DL (ref 8.5–10.1)
CALCIUM SERPL-MCNC: 9.3 MG/DL (ref 8.5–10.1)
CALCIUM SERPL-MCNC: 9.4 MG/DL (ref 8.5–10.1)
CALCULATED P AXIS, ECG09: 46 DEGREES
CALCULATED R AXIS, ECG10: -5 DEGREES
CALCULATED R AXIS, ECG10: 11 DEGREES
CALCULATED R AXIS, ECG10: 26 DEGREES
CALCULATED T AXIS, ECG11: -144 DEGREES
CALCULATED T AXIS, ECG11: -152 DEGREES
CALCULATED T AXIS, ECG11: -177 DEGREES
CHLORIDE BLD-SCNC: 105 MMOL/L (ref 98–107)
CHLORIDE SERPL-SCNC: 102 MMOL/L (ref 100–111)
CHLORIDE SERPL-SCNC: 103 MMOL/L (ref 100–111)
CHLORIDE SERPL-SCNC: 103 MMOL/L (ref 100–111)
CHLORIDE SERPL-SCNC: 104 MMOL/L (ref 100–111)
CHLORIDE SERPL-SCNC: 105 MMOL/L (ref 100–111)
CHLORIDE SERPL-SCNC: 105 MMOL/L (ref 100–111)
CHLORIDE SERPL-SCNC: 108 MMOL/L (ref 100–111)
CHLORIDE SERPL-SCNC: 109 MMOL/L (ref 100–111)
CHLORIDE SERPL-SCNC: 109 MMOL/L (ref 100–111)
CHLORIDE SERPL-SCNC: 111 MMOL/L (ref 100–111)
CHLORIDE SERPL-SCNC: 111 MMOL/L (ref 100–111)
CHLORIDE SERPL-SCNC: 113 MMOL/L (ref 100–111)
CO2 BLD-SCNC: 24 MMOL/L (ref 19–24)
CO2 SERPL-SCNC: 21 MMOL/L (ref 21–32)
CO2 SERPL-SCNC: 23 MMOL/L (ref 21–32)
CO2 SERPL-SCNC: 23 MMOL/L (ref 21–32)
CO2 SERPL-SCNC: 24 MMOL/L (ref 21–32)
CO2 SERPL-SCNC: 26 MMOL/L (ref 21–32)
CO2 SERPL-SCNC: 27 MMOL/L (ref 21–32)
CO2 SERPL-SCNC: 28 MMOL/L (ref 21–32)
CO2 SERPL-SCNC: 31 MMOL/L (ref 21–32)
CO2 SERPL-SCNC: 33 MMOL/L (ref 21–32)
COLOR UR: YELLOW
COLOR UR: YELLOW
CREAT BLD-MCNC: 1.12 MG/DL (ref 0.6–1.3)
CREAT SERPL-MCNC: 1.33 MG/DL (ref 0.6–1.3)
CREAT SERPL-MCNC: 1.43 MG/DL (ref 0.6–1.3)
CREAT SERPL-MCNC: 1.62 MG/DL (ref 0.6–1.3)
CREAT SERPL-MCNC: 1.76 MG/DL (ref 0.6–1.3)
CREAT SERPL-MCNC: 1.86 MG/DL (ref 0.6–1.3)
CREAT SERPL-MCNC: 1.9 MG/DL (ref 0.6–1.3)
CREAT SERPL-MCNC: 1.9 MG/DL (ref 0.6–1.3)
CREAT SERPL-MCNC: 2.04 MG/DL (ref 0.6–1.3)
CREAT SERPL-MCNC: 2.15 MG/DL (ref 0.6–1.3)
CREAT SERPL-MCNC: 2.31 MG/DL (ref 0.6–1.3)
CREAT SERPL-MCNC: 2.36 MG/DL (ref 0.6–1.3)
CREAT SERPL-MCNC: 2.6 MG/DL (ref 0.6–1.3)
CREAT SERPL-MCNC: 2.65 MG/DL (ref 0.6–1.3)
CREAT SERPL-MCNC: 2.69 MG/DL (ref 0.6–1.3)
CREAT SERPL-MCNC: 2.86 MG/DL (ref 0.6–1.3)
CREAT SERPL-MCNC: 3.03 MG/DL (ref 0.6–1.3)
D DIMER PPP FEU-MCNC: 2.32 UG/ML(FEU)
DIAGNOSIS, 93000: NORMAL
DIFFERENTIAL METHOD BLD: ABNORMAL
ECHO AO ROOT DIAM: 3.1 CM
ECHO AO ROOT INDEX: 1.5 CM/M2
ECHO AR MAX VEL PISA: 3 M/S
ECHO AV REGURGITANT PHT: 508 MILLISECOND
ECHO LV FRACTIONAL SHORTENING: 9 % (ref 28–44)
ECHO LV INTERNAL DIMENSION DIASTOLE INDEX: 2.23 CM/M2
ECHO LV INTERNAL DIMENSION DIASTOLIC: 4.6 CM (ref 3.9–5.3)
ECHO LV INTERNAL DIMENSION SYSTOLIC INDEX: 2.04 CM/M2
ECHO LV INTERNAL DIMENSION SYSTOLIC: 4.2 CM
ECHO LV IVSD: 1.1 CM (ref 0.6–0.9)
ECHO LV MASS 2D: 192.9 G (ref 67–162)
ECHO LV MASS INDEX 2D: 93.7 G/M2 (ref 43–95)
ECHO LV POSTERIOR WALL DIASTOLIC: 1.2 CM (ref 0.6–0.9)
ECHO LV RELATIVE WALL THICKNESS RATIO: 0.52
ECHO LVOT AREA: 3.8 CM2
ECHO LVOT DIAM: 2.2 CM
ECHO MV REGURGITANT ALIASING (NYQUIST) VELOCITY: 37 CM/S
ECHO MV REGURGITANT VELOCITY PISA: 3 M/S
ECHO MV REGURGITANT VTIA: 101.3 CM
ECHO PULMONARY ARTERY SYSTOLIC PRESSURE (PASP): 42 MMHG
ECHO TV REGURGITANT MAX VELOCITY: 3.25 M/S
ECHO TV REGURGITANT PEAK GRADIENT: 42 MMHG
EOSINOPHIL # BLD: 0 K/UL (ref 0–0.4)
EOSINOPHIL # BLD: 0.1 K/UL (ref 0–0.4)
EOSINOPHIL # BLD: 0.1 K/UL (ref 0–0.4)
EOSINOPHIL # BLD: 0.2 K/UL (ref 0–0.4)
EOSINOPHIL # BLD: 0.3 K/UL (ref 0–0.4)
EOSINOPHIL # BLD: 0.8 K/UL (ref 0–0.4)
EOSINOPHIL #/AREA URNS HPF: NORMAL /[HPF]
EOSINOPHIL NFR BLD: 0 % (ref 0–5)
EOSINOPHIL NFR BLD: 1 % (ref 0–5)
EOSINOPHIL NFR BLD: 2 % (ref 0–5)
EOSINOPHIL NFR BLD: 8 % (ref 0–5)
EPITH CASTS URNS QL MICRO: ABNORMAL /LPF (ref 0–5)
EPITH CASTS URNS QL MICRO: ABNORMAL /LPF (ref 0–5)
ERYTHROCYTE [DISTWIDTH] IN BLOOD BY AUTOMATED COUNT: 16.3 % (ref 11.6–14.5)
ERYTHROCYTE [DISTWIDTH] IN BLOOD BY AUTOMATED COUNT: 16.6 % (ref 11.6–14.5)
ERYTHROCYTE [DISTWIDTH] IN BLOOD BY AUTOMATED COUNT: 16.7 % (ref 11.6–14.5)
ERYTHROCYTE [DISTWIDTH] IN BLOOD BY AUTOMATED COUNT: 17 % (ref 11.6–14.5)
ERYTHROCYTE [DISTWIDTH] IN BLOOD BY AUTOMATED COUNT: 17.2 % (ref 11.6–14.5)
ERYTHROCYTE [DISTWIDTH] IN BLOOD BY AUTOMATED COUNT: 17.3 % (ref 11.6–14.5)
ERYTHROCYTE [DISTWIDTH] IN BLOOD BY AUTOMATED COUNT: 17.8 % (ref 11.6–14.5)
ERYTHROCYTE [DISTWIDTH] IN BLOOD BY AUTOMATED COUNT: 18 % (ref 11.6–14.5)
ERYTHROCYTE [DISTWIDTH] IN BLOOD BY AUTOMATED COUNT: 18.3 % (ref 11.6–14.5)
ERYTHROCYTE [DISTWIDTH] IN BLOOD BY AUTOMATED COUNT: 18.6 % (ref 11.6–14.5)
ERYTHROCYTE [DISTWIDTH] IN BLOOD BY AUTOMATED COUNT: 18.6 % (ref 11.6–14.5)
FLUAV RNA SPEC QL NAA+PROBE: NOT DETECTED
FLUBV RNA SPEC QL NAA+PROBE: NOT DETECTED
GAS FLOW.O2 O2 DELIVERY SYS: ABNORMAL
GAS FLOW.O2 O2 DELIVERY SYS: NORMAL
GAS FLOW.O2 SETTING OXYMISER: 16 BPM
GAS FLOW.O2 SETTING OXYMISER: 8 BPM
GLOBULIN SER CALC-MCNC: 4.7 G/DL (ref 2–4)
GLOBULIN SER CALC-MCNC: 4.9 G/DL (ref 2–4)
GLOBULIN SER CALC-MCNC: 5 G/DL (ref 2–4)
GLOBULIN SER CALC-MCNC: 5.1 G/DL (ref 2–4)
GLUCOSE BLD STRIP.AUTO-MCNC: 101 MG/DL (ref 70–110)
GLUCOSE BLD STRIP.AUTO-MCNC: 107 MG/DL (ref 70–110)
GLUCOSE BLD STRIP.AUTO-MCNC: 121 MG/DL (ref 70–110)
GLUCOSE BLD STRIP.AUTO-MCNC: 127 MG/DL (ref 70–110)
GLUCOSE BLD STRIP.AUTO-MCNC: 151 MG/DL (ref 70–110)
GLUCOSE BLD STRIP.AUTO-MCNC: 157 MG/DL (ref 70–110)
GLUCOSE BLD STRIP.AUTO-MCNC: 157 MG/DL (ref 70–110)
GLUCOSE BLD STRIP.AUTO-MCNC: 168 MG/DL (ref 70–110)
GLUCOSE BLD STRIP.AUTO-MCNC: 169 MG/DL (ref 70–110)
GLUCOSE BLD STRIP.AUTO-MCNC: 171 MG/DL (ref 70–110)
GLUCOSE BLD STRIP.AUTO-MCNC: 177 MG/DL (ref 70–110)
GLUCOSE BLD STRIP.AUTO-MCNC: 178 MG/DL (ref 70–110)
GLUCOSE BLD STRIP.AUTO-MCNC: 184 MG/DL (ref 70–110)
GLUCOSE BLD STRIP.AUTO-MCNC: 188 MG/DL (ref 70–110)
GLUCOSE BLD STRIP.AUTO-MCNC: 189 MG/DL (ref 70–110)
GLUCOSE BLD STRIP.AUTO-MCNC: 191 MG/DL (ref 70–110)
GLUCOSE BLD STRIP.AUTO-MCNC: 198 MG/DL (ref 70–110)
GLUCOSE BLD STRIP.AUTO-MCNC: 199 MG/DL (ref 70–110)
GLUCOSE BLD STRIP.AUTO-MCNC: 202 MG/DL (ref 70–110)
GLUCOSE BLD STRIP.AUTO-MCNC: 209 MG/DL (ref 70–110)
GLUCOSE BLD STRIP.AUTO-MCNC: 215 MG/DL (ref 70–110)
GLUCOSE BLD STRIP.AUTO-MCNC: 220 MG/DL (ref 70–110)
GLUCOSE BLD STRIP.AUTO-MCNC: 225 MG/DL (ref 70–110)
GLUCOSE BLD STRIP.AUTO-MCNC: 228 MG/DL (ref 70–110)
GLUCOSE BLD STRIP.AUTO-MCNC: 231 MG/DL (ref 70–110)
GLUCOSE BLD STRIP.AUTO-MCNC: 236 MG/DL (ref 70–110)
GLUCOSE BLD STRIP.AUTO-MCNC: 242 MG/DL (ref 70–110)
GLUCOSE BLD STRIP.AUTO-MCNC: 249 MG/DL (ref 70–110)
GLUCOSE BLD STRIP.AUTO-MCNC: 260 MG/DL (ref 70–110)
GLUCOSE BLD STRIP.AUTO-MCNC: 270 MG/DL (ref 70–110)
GLUCOSE BLD STRIP.AUTO-MCNC: 274 MG/DL (ref 70–110)
GLUCOSE BLD STRIP.AUTO-MCNC: 275 MG/DL (ref 70–110)
GLUCOSE BLD STRIP.AUTO-MCNC: 286 MG/DL (ref 70–110)
GLUCOSE BLD STRIP.AUTO-MCNC: 289 MG/DL (ref 70–110)
GLUCOSE BLD STRIP.AUTO-MCNC: 313 MG/DL (ref 70–110)
GLUCOSE BLD STRIP.AUTO-MCNC: 315 MG/DL (ref 70–110)
GLUCOSE BLD STRIP.AUTO-MCNC: 322 MG/DL (ref 70–110)
GLUCOSE BLD STRIP.AUTO-MCNC: 331 MG/DL (ref 70–110)
GLUCOSE BLD STRIP.AUTO-MCNC: 350 MG/DL (ref 70–110)
GLUCOSE BLD STRIP.AUTO-MCNC: 96 MG/DL (ref 70–110)
GLUCOSE BLD-MCNC: 190 MG/DL (ref 65–100)
GLUCOSE SERPL-MCNC: 102 MG/DL (ref 74–99)
GLUCOSE SERPL-MCNC: 131 MG/DL (ref 74–99)
GLUCOSE SERPL-MCNC: 154 MG/DL (ref 74–99)
GLUCOSE SERPL-MCNC: 157 MG/DL (ref 74–99)
GLUCOSE SERPL-MCNC: 163 MG/DL (ref 74–99)
GLUCOSE SERPL-MCNC: 168 MG/DL (ref 74–99)
GLUCOSE SERPL-MCNC: 181 MG/DL (ref 74–99)
GLUCOSE SERPL-MCNC: 185 MG/DL (ref 74–99)
GLUCOSE SERPL-MCNC: 194 MG/DL (ref 74–99)
GLUCOSE SERPL-MCNC: 202 MG/DL (ref 74–99)
GLUCOSE SERPL-MCNC: 207 MG/DL (ref 74–99)
GLUCOSE SERPL-MCNC: 223 MG/DL (ref 74–99)
GLUCOSE SERPL-MCNC: 247 MG/DL (ref 74–99)
GLUCOSE SERPL-MCNC: 248 MG/DL (ref 74–99)
GLUCOSE SERPL-MCNC: 306 MG/DL (ref 74–99)
GLUCOSE SERPL-MCNC: 313 MG/DL (ref 74–99)
GLUCOSE UR STRIP.AUTO-MCNC: NEGATIVE MG/DL
GLUCOSE UR STRIP.AUTO-MCNC: NEGATIVE MG/DL
GRAM STN SPEC: NORMAL
HCO3 BLD-SCNC: 21.5 MMOL/L (ref 22–26)
HCO3 BLD-SCNC: 22.5 MMOL/L (ref 22–26)
HCO3 BLD-SCNC: 22.8 MMOL/L (ref 22–26)
HCO3 BLD-SCNC: 23.8 MMOL/L (ref 22–26)
HCO3 BLD-SCNC: 24.4 MMOL/L (ref 22–26)
HCO3 BLD-SCNC: 24.5 MMOL/L (ref 22–26)
HCO3 BLD-SCNC: 25.5 MMOL/L (ref 22–26)
HCT VFR BLD AUTO: 29.2 % (ref 35–45)
HCT VFR BLD AUTO: 30.2 % (ref 35–45)
HCT VFR BLD AUTO: 30.7 % (ref 35–45)
HCT VFR BLD AUTO: 31 % (ref 35–45)
HCT VFR BLD AUTO: 31.6 % (ref 35–45)
HCT VFR BLD AUTO: 32.6 % (ref 35–45)
HCT VFR BLD AUTO: 33 % (ref 35–45)
HCT VFR BLD AUTO: 33.3 % (ref 35–45)
HCT VFR BLD AUTO: 33.8 % (ref 35–45)
HCT VFR BLD AUTO: 33.9 % (ref 35–45)
HCT VFR BLD AUTO: 34.7 % (ref 35–45)
HCT VFR BLD AUTO: 34.9 % (ref 35–45)
HCT VFR BLD AUTO: 35.4 % (ref 35–45)
HCT VFR BLD AUTO: 36.7 % (ref 35–45)
HCT VFR BLD AUTO: 37.5 % (ref 35–45)
HGB BLD-MCNC: 10 G/DL (ref 12–16)
HGB BLD-MCNC: 10.1 G/DL (ref 12–16)
HGB BLD-MCNC: 10.2 G/DL (ref 12–16)
HGB BLD-MCNC: 10.3 G/DL (ref 12–16)
HGB BLD-MCNC: 10.3 G/DL (ref 12–16)
HGB BLD-MCNC: 10.4 G/DL (ref 12–16)
HGB BLD-MCNC: 10.6 G/DL (ref 12–16)
HGB BLD-MCNC: 10.7 G/DL (ref 12–16)
HGB BLD-MCNC: 10.9 G/DL (ref 12–16)
HGB BLD-MCNC: 11.2 G/DL (ref 12–16)
HGB BLD-MCNC: 9.2 G/DL (ref 12–16)
HGB BLD-MCNC: 9.3 G/DL (ref 12–16)
HGB BLD-MCNC: 9.3 G/DL (ref 12–16)
HGB BLD-MCNC: 9.6 G/DL (ref 12–16)
HGB BLD-MCNC: 9.9 G/DL (ref 12–16)
HGB UR QL STRIP: ABNORMAL
HGB UR QL STRIP: NEGATIVE
HYALINE CASTS URNS QL MICRO: ABNORMAL /LPF (ref 0–2)
IMM GRANULOCYTES # BLD AUTO: 0 K/UL (ref 0–0.04)
IMM GRANULOCYTES # BLD AUTO: 0.2 K/UL (ref 0–0.04)
IMM GRANULOCYTES # BLD AUTO: 0.3 K/UL (ref 0–0.04)
IMM GRANULOCYTES # BLD AUTO: 0.4 K/UL (ref 0–0.04)
IMM GRANULOCYTES # BLD AUTO: 0.5 K/UL (ref 0–0.04)
IMM GRANULOCYTES # BLD AUTO: 0.5 K/UL (ref 0–0.04)
IMM GRANULOCYTES # BLD AUTO: 0.6 K/UL (ref 0–0.04)
IMM GRANULOCYTES # BLD AUTO: 0.8 K/UL (ref 0–0.04)
IMM GRANULOCYTES NFR BLD AUTO: 0 % (ref 0–0.5)
IMM GRANULOCYTES NFR BLD AUTO: 2 % (ref 0–0.5)
IMM GRANULOCYTES NFR BLD AUTO: 3 % (ref 0–0.5)
IMM GRANULOCYTES NFR BLD AUTO: 4 % (ref 0–0.5)
IMM GRANULOCYTES NFR BLD AUTO: 5 % (ref 0–0.5)
IMM GRANULOCYTES NFR BLD AUTO: 5 % (ref 0–0.5)
INR PPP: 1.5 (ref 0.8–1.2)
INR PPP: 1.7 (ref 0.8–1.2)
INR PPP: 1.7 (ref 0.8–1.2)
KETONES UR QL STRIP.AUTO: ABNORMAL MG/DL
KETONES UR QL STRIP.AUTO: NEGATIVE MG/DL
LACTATE BLD-SCNC: 1.71 MMOL/L (ref 0.4–2)
LACTATE BLD-SCNC: 2.17 MMOL/L (ref 0.4–2)
LACTATE BLD-SCNC: 2.3 MMOL/L (ref 0.4–2)
LACTATE BLD-SCNC: 2.75 MMOL/L (ref 0.4–2)
LACTATE BLD-SCNC: 3.06 MMOL/L (ref 0.4–2)
LACTATE SERPL-SCNC: 1.6 MMOL/L (ref 0.4–2)
LACTATE SERPL-SCNC: 1.6 MMOL/L (ref 0.4–2)
LACTATE SERPL-SCNC: 1.8 MMOL/L (ref 0.4–2)
LACTATE SERPL-SCNC: 1.9 MMOL/L (ref 0.4–2)
LACTATE SERPL-SCNC: 2.4 MMOL/L (ref 0.4–2)
LACTATE SERPL-SCNC: 2.6 MMOL/L (ref 0.4–2)
LEUKOCYTE ESTERASE UR QL STRIP.AUTO: ABNORMAL
LEUKOCYTE ESTERASE UR QL STRIP.AUTO: NEGATIVE
LYMPHOCYTES # BLD: 0.3 K/UL (ref 0.9–3.6)
LYMPHOCYTES # BLD: 0.4 K/UL (ref 0.9–3.6)
LYMPHOCYTES # BLD: 0.6 K/UL (ref 0.9–3.6)
LYMPHOCYTES # BLD: 0.8 K/UL (ref 0.9–3.6)
LYMPHOCYTES # BLD: 0.9 K/UL (ref 0.9–3.6)
LYMPHOCYTES # BLD: 0.9 K/UL (ref 0.9–3.6)
LYMPHOCYTES # BLD: 1 K/UL (ref 0.9–3.6)
LYMPHOCYTES # BLD: 1 K/UL (ref 0.9–3.6)
LYMPHOCYTES # BLD: 1.3 K/UL (ref 0.9–3.6)
LYMPHOCYTES # BLD: 1.4 K/UL (ref 0.9–3.6)
LYMPHOCYTES # BLD: 1.5 K/UL (ref 0.9–3.6)
LYMPHOCYTES # BLD: 1.6 K/UL (ref 0.9–3.6)
LYMPHOCYTES # BLD: 1.8 K/UL (ref 0.9–3.6)
LYMPHOCYTES # BLD: 1.9 K/UL (ref 0.9–3.6)
LYMPHOCYTES NFR BLD: 10 % (ref 21–52)
LYMPHOCYTES NFR BLD: 12 % (ref 21–52)
LYMPHOCYTES NFR BLD: 12 % (ref 21–52)
LYMPHOCYTES NFR BLD: 14 % (ref 21–52)
LYMPHOCYTES NFR BLD: 14 % (ref 21–52)
LYMPHOCYTES NFR BLD: 18 % (ref 21–52)
LYMPHOCYTES NFR BLD: 2 % (ref 21–52)
LYMPHOCYTES NFR BLD: 2 % (ref 21–52)
LYMPHOCYTES NFR BLD: 3 % (ref 21–52)
LYMPHOCYTES NFR BLD: 5 % (ref 21–52)
LYMPHOCYTES NFR BLD: 5 % (ref 21–52)
LYMPHOCYTES NFR BLD: 6 % (ref 21–52)
LYMPHOCYTES NFR BLD: 7 % (ref 21–52)
LYMPHOCYTES NFR BLD: 8 % (ref 21–52)
MAGNESIUM SERPL-MCNC: 1.4 MG/DL (ref 1.6–2.6)
MAGNESIUM SERPL-MCNC: 1.6 MG/DL (ref 1.6–2.6)
MAGNESIUM SERPL-MCNC: 1.8 MG/DL (ref 1.6–2.6)
MAGNESIUM SERPL-MCNC: 1.9 MG/DL (ref 1.6–2.6)
MAGNESIUM SERPL-MCNC: 1.9 MG/DL (ref 1.6–2.6)
MAGNESIUM SERPL-MCNC: 2.1 MG/DL (ref 1.6–2.6)
MAGNESIUM SERPL-MCNC: 2.1 MG/DL (ref 1.6–2.6)
MAGNESIUM SERPL-MCNC: 2.2 MG/DL (ref 1.6–2.6)
MAGNESIUM SERPL-MCNC: 2.2 MG/DL (ref 1.6–2.6)
MAGNESIUM SERPL-MCNC: 2.4 MG/DL (ref 1.6–2.6)
MCH RBC QN AUTO: 24.9 PG (ref 24–34)
MCH RBC QN AUTO: 25 PG (ref 24–34)
MCH RBC QN AUTO: 25.2 PG (ref 24–34)
MCH RBC QN AUTO: 25.4 PG (ref 24–34)
MCH RBC QN AUTO: 25.4 PG (ref 24–34)
MCH RBC QN AUTO: 25.5 PG (ref 24–34)
MCH RBC QN AUTO: 25.6 PG (ref 24–34)
MCH RBC QN AUTO: 25.6 PG (ref 24–34)
MCH RBC QN AUTO: 25.7 PG (ref 24–34)
MCH RBC QN AUTO: 25.8 PG (ref 24–34)
MCH RBC QN AUTO: 25.9 PG (ref 24–34)
MCH RBC QN AUTO: 26.1 PG (ref 24–34)
MCH RBC QN AUTO: 26.6 PG (ref 24–34)
MCHC RBC AUTO-ENTMCNC: 29.7 G/DL (ref 31–37)
MCHC RBC AUTO-ENTMCNC: 29.9 G/DL (ref 31–37)
MCHC RBC AUTO-ENTMCNC: 30 G/DL (ref 31–37)
MCHC RBC AUTO-ENTMCNC: 30.1 G/DL (ref 31–37)
MCHC RBC AUTO-ENTMCNC: 30.2 G/DL (ref 31–37)
MCHC RBC AUTO-ENTMCNC: 30.3 G/DL (ref 31–37)
MCHC RBC AUTO-ENTMCNC: 30.4 G/DL (ref 31–37)
MCHC RBC AUTO-ENTMCNC: 30.4 G/DL (ref 31–37)
MCHC RBC AUTO-ENTMCNC: 30.5 G/DL (ref 31–37)
MCHC RBC AUTO-ENTMCNC: 30.5 G/DL (ref 31–37)
MCHC RBC AUTO-ENTMCNC: 30.6 G/DL (ref 31–37)
MCHC RBC AUTO-ENTMCNC: 30.9 G/DL (ref 31–37)
MCHC RBC AUTO-ENTMCNC: 31 G/DL (ref 31–37)
MCHC RBC AUTO-ENTMCNC: 31.6 G/DL (ref 31–37)
MCHC RBC AUTO-ENTMCNC: 31.8 G/DL (ref 31–37)
MCV RBC AUTO: 81.8 FL (ref 78–100)
MCV RBC AUTO: 82.5 FL (ref 78–100)
MCV RBC AUTO: 83 FL (ref 78–100)
MCV RBC AUTO: 83.1 FL (ref 78–100)
MCV RBC AUTO: 83.3 FL (ref 78–100)
MCV RBC AUTO: 83.5 FL (ref 78–100)
MCV RBC AUTO: 83.5 FL (ref 78–100)
MCV RBC AUTO: 83.6 FL (ref 78–100)
MCV RBC AUTO: 83.9 FL (ref 78–100)
MCV RBC AUTO: 84 FL (ref 78–100)
MCV RBC AUTO: 84.5 FL (ref 78–100)
MCV RBC AUTO: 84.6 FL (ref 78–100)
MCV RBC AUTO: 84.9 FL (ref 78–100)
MCV RBC AUTO: 85 FL (ref 78–100)
MCV RBC AUTO: 85.7 FL (ref 78–100)
MONOCYTES # BLD: 0.2 K/UL (ref 0.05–1.2)
MONOCYTES # BLD: 0.3 K/UL (ref 0.05–1.2)
MONOCYTES # BLD: 0.4 K/UL (ref 0.05–1.2)
MONOCYTES # BLD: 0.5 K/UL (ref 0.05–1.2)
MONOCYTES # BLD: 0.5 K/UL (ref 0.05–1.2)
MONOCYTES # BLD: 0.6 K/UL (ref 0.05–1.2)
MONOCYTES # BLD: 0.7 K/UL (ref 0.05–1.2)
MONOCYTES # BLD: 0.9 K/UL (ref 0.05–1.2)
MONOCYTES # BLD: 1.1 K/UL (ref 0.05–1.2)
MONOCYTES # BLD: 1.2 K/UL (ref 0.05–1.2)
MONOCYTES # BLD: 2 K/UL (ref 0.05–1.2)
MONOCYTES # BLD: 2.3 K/UL (ref 0.05–1.2)
MONOCYTES NFR BLD: 1 % (ref 3–10)
MONOCYTES NFR BLD: 1 % (ref 3–10)
MONOCYTES NFR BLD: 12 % (ref 3–10)
MONOCYTES NFR BLD: 12 % (ref 3–10)
MONOCYTES NFR BLD: 14 % (ref 3–10)
MONOCYTES NFR BLD: 3 % (ref 3–10)
MONOCYTES NFR BLD: 3 % (ref 3–10)
MONOCYTES NFR BLD: 4 % (ref 3–10)
MONOCYTES NFR BLD: 5 % (ref 3–10)
MONOCYTES NFR BLD: 6 % (ref 3–10)
MONOCYTES NFR BLD: 6 % (ref 3–10)
MONOCYTES NFR BLD: 9 % (ref 3–10)
MUCOUS THREADS URNS QL MICRO: NEGATIVE /LPF
NEUTS BAND NFR BLD MANUAL: 1 %
NEUTS SEG # BLD: 10 K/UL (ref 1.8–8)
NEUTS SEG # BLD: 10.4 K/UL (ref 1.8–8)
NEUTS SEG # BLD: 11 K/UL (ref 1.8–8)
NEUTS SEG # BLD: 11.3 K/UL (ref 1.8–8)
NEUTS SEG # BLD: 11.9 K/UL (ref 1.8–8)
NEUTS SEG # BLD: 14 K/UL (ref 1.8–8)
NEUTS SEG # BLD: 16.3 K/UL (ref 1.8–8)
NEUTS SEG # BLD: 19.8 K/UL (ref 1.8–8)
NEUTS SEG # BLD: 20.3 K/UL (ref 1.8–8)
NEUTS SEG # BLD: 24.4 K/UL (ref 1.8–8)
NEUTS SEG # BLD: 6 K/UL (ref 1.8–8)
NEUTS SEG # BLD: 7 K/UL (ref 1.8–8)
NEUTS SEG # BLD: 7.1 K/UL (ref 1.8–8)
NEUTS SEG # BLD: 7.6 K/UL (ref 1.8–8)
NEUTS SEG NFR BLD: 65 % (ref 40–73)
NEUTS SEG NFR BLD: 70 % (ref 40–73)
NEUTS SEG NFR BLD: 71 % (ref 40–73)
NEUTS SEG NFR BLD: 71 % (ref 40–73)
NEUTS SEG NFR BLD: 74 % (ref 40–73)
NEUTS SEG NFR BLD: 78 % (ref 40–73)
NEUTS SEG NFR BLD: 79 % (ref 40–73)
NEUTS SEG NFR BLD: 80 % (ref 40–73)
NEUTS SEG NFR BLD: 84 % (ref 40–73)
NEUTS SEG NFR BLD: 87 % (ref 40–73)
NEUTS SEG NFR BLD: 88 % (ref 40–73)
NEUTS SEG NFR BLD: 94 % (ref 40–73)
NEUTS SEG NFR BLD: 96 % (ref 40–73)
NEUTS SEG NFR BLD: 96 % (ref 40–73)
NITRITE UR QL STRIP.AUTO: NEGATIVE
NITRITE UR QL STRIP.AUTO: NEGATIVE
NRBC # BLD: 0 K/UL (ref 0–0.01)
NRBC # BLD: 0.02 K/UL (ref 0–0.01)
NRBC # BLD: 0.03 K/UL (ref 0–0.01)
NRBC # BLD: 0.03 K/UL (ref 0–0.01)
NRBC # BLD: 0.04 K/UL (ref 0–0.01)
NRBC # BLD: 0.09 K/UL (ref 0–0.01)
NRBC # BLD: 0.13 K/UL (ref 0–0.01)
NRBC # BLD: 0.13 K/UL (ref 0–0.01)
NRBC # BLD: 0.19 K/UL (ref 0–0.01)
NRBC # BLD: 0.32 K/UL (ref 0–0.01)
NRBC # BLD: 0.42 K/UL (ref 0–0.01)
NRBC # BLD: 0.65 K/UL (ref 0–0.01)
NRBC # BLD: 0.85 K/UL (ref 0–0.01)
NRBC BLD-RTO: 0 PER 100 WBC
NRBC BLD-RTO: 0.1 PER 100 WBC
NRBC BLD-RTO: 0.2 PER 100 WBC
NRBC BLD-RTO: 0.3 PER 100 WBC
NRBC BLD-RTO: 0.5 PER 100 WBC
NRBC BLD-RTO: 0.6 PER 100 WBC
NRBC BLD-RTO: 0.8 PER 100 WBC
NRBC BLD-RTO: 1.1 PER 100 WBC
NRBC BLD-RTO: 2.6 PER 100 WBC
NRBC BLD-RTO: 2.8 PER 100 WBC
NRBC BLD-RTO: 4.9 PER 100 WBC
NRBC BLD-RTO: 6.2 PER 100 WBC
O2/TOTAL GAS SETTING VFR VENT: 100 %
O2/TOTAL GAS SETTING VFR VENT: 100 %
O2/TOTAL GAS SETTING VFR VENT: 40 %
O2/TOTAL GAS SETTING VFR VENT: 50 %
O2/TOTAL GAS SETTING VFR VENT: 60 %
P-R INTERVAL, ECG05: 160 MS
PAW @ MEAN EXP FLOW ON VENT: 12 CMH2O
PCO2 BLD: 31.7 MMHG (ref 35–45)
PCO2 BLD: 34.6 MMHG (ref 35–45)
PCO2 BLD: 36.7 MMHG (ref 35–45)
PCO2 BLD: 37.6 MMHG (ref 35–45)
PCO2 BLD: 40 MMHG (ref 35–45)
PCO2 BLD: 49.6 MMHG (ref 35–45)
PCO2 BLDV: 39.7 MMHG (ref 41–51)
PEEP RESPIRATORY: 5 CMH2O
PEEP RESPIRATORY: 6 CMH2O
PEEP RESPIRATORY: 8 CMH2O
PH BLD: 7.3 (ref 7.35–7.45)
PH BLD: 7.38 (ref 7.35–7.45)
PH BLD: 7.38 (ref 7.35–7.45)
PH BLD: 7.42 (ref 7.35–7.45)
PH BLD: 7.44 (ref 7.35–7.45)
PH BLD: 7.47 (ref 7.35–7.45)
PH BLDV: 7.4 (ref 7.32–7.42)
PH UR STRIP: 5 (ref 5–8)
PH UR STRIP: 5.5 (ref 5–8)
PHOSPHATE SERPL-MCNC: 2.6 MG/DL (ref 2.5–4.9)
PHOSPHATE SERPL-MCNC: 3 MG/DL (ref 2.5–4.9)
PHOSPHATE SERPL-MCNC: 4.2 MG/DL (ref 2.5–4.9)
PHOSPHATE SERPL-MCNC: 4.5 MG/DL (ref 2.5–4.9)
PHOSPHATE SERPL-MCNC: 4.9 MG/DL (ref 2.5–4.9)
PHOSPHATE SERPL-MCNC: 5 MG/DL (ref 2.5–4.9)
PHOSPHATE SERPL-MCNC: 5 MG/DL (ref 2.5–4.9)
PHOSPHATE SERPL-MCNC: 5.1 MG/DL (ref 2.5–4.9)
PHOSPHATE SERPL-MCNC: 5.2 MG/DL (ref 2.5–4.9)
PHOSPHATE SERPL-MCNC: 5.5 MG/DL (ref 2.5–4.9)
PHOSPHATE SERPL-MCNC: 6.7 MG/DL (ref 2.5–4.9)
PIP ISTAT,IPIP: 18
PIP ISTAT,IPIP: 24
PLATELET # BLD AUTO: 183 K/UL (ref 135–420)
PLATELET # BLD AUTO: 189 K/UL (ref 135–420)
PLATELET # BLD AUTO: 195 K/UL (ref 135–420)
PLATELET # BLD AUTO: 211 K/UL (ref 135–420)
PLATELET # BLD AUTO: 214 K/UL (ref 135–420)
PLATELET # BLD AUTO: 216 K/UL (ref 135–420)
PLATELET # BLD AUTO: 219 K/UL (ref 135–420)
PLATELET # BLD AUTO: 225 K/UL (ref 135–420)
PLATELET # BLD AUTO: 227 K/UL (ref 135–420)
PLATELET # BLD AUTO: 232 K/UL (ref 135–420)
PLATELET # BLD AUTO: 233 K/UL (ref 135–420)
PLATELET # BLD AUTO: 233 K/UL (ref 135–420)
PLATELET # BLD AUTO: 265 K/UL (ref 135–420)
PLATELET # BLD AUTO: 267 K/UL (ref 135–420)
PLATELET # BLD AUTO: 328 K/UL (ref 135–420)
PLATELET COMMENTS,PCOM: ABNORMAL
PMV BLD AUTO: 11.9 FL (ref 9.2–11.8)
PMV BLD AUTO: 12 FL (ref 9.2–11.8)
PMV BLD AUTO: 12.4 FL (ref 9.2–11.8)
PMV BLD AUTO: 12.4 FL (ref 9.2–11.8)
PMV BLD AUTO: 12.5 FL (ref 9.2–11.8)
PMV BLD AUTO: 12.7 FL (ref 9.2–11.8)
PMV BLD AUTO: 12.9 FL (ref 9.2–11.8)
PMV BLD AUTO: 13.1 FL (ref 9.2–11.8)
PMV BLD AUTO: 13.4 FL (ref 9.2–11.8)
PMV BLD AUTO: 13.6 FL (ref 9.2–11.8)
PMV BLD AUTO: 13.8 FL (ref 9.2–11.8)
PMV BLD AUTO: 13.8 FL (ref 9.2–11.8)
PMV BLD AUTO: 14 FL (ref 9.2–11.8)
PMV BLD AUTO: 14.2 FL (ref 9.2–11.8)
PO2 BLD: 107 MMHG (ref 80–100)
PO2 BLD: 387 MMHG (ref 80–100)
PO2 BLD: 57 MMHG (ref 80–100)
PO2 BLD: 61 MMHG (ref 80–100)
PO2 BLD: 87 MMHG (ref 80–100)
PO2 BLD: 98 MMHG (ref 80–100)
PO2 BLDV: 35 MMHG (ref 25–40)
POTASSIUM BLD-SCNC: 5 MMOL/L (ref 3.5–5.1)
POTASSIUM SERPL-SCNC: 3.1 MMOL/L (ref 3.5–5.5)
POTASSIUM SERPL-SCNC: 3.2 MMOL/L (ref 3.5–5.5)
POTASSIUM SERPL-SCNC: 3.8 MMOL/L (ref 3.5–5.5)
POTASSIUM SERPL-SCNC: 3.9 MMOL/L (ref 3.5–5.5)
POTASSIUM SERPL-SCNC: 4.1 MMOL/L (ref 3.5–5.5)
POTASSIUM SERPL-SCNC: 4.1 MMOL/L (ref 3.5–5.5)
POTASSIUM SERPL-SCNC: 4.2 MMOL/L (ref 3.5–5.5)
POTASSIUM SERPL-SCNC: 4.3 MMOL/L (ref 3.5–5.5)
POTASSIUM SERPL-SCNC: 4.4 MMOL/L (ref 3.5–5.5)
POTASSIUM SERPL-SCNC: 4.6 MMOL/L (ref 3.5–5.5)
POTASSIUM SERPL-SCNC: 4.8 MMOL/L (ref 3.5–5.5)
POTASSIUM SERPL-SCNC: 6.8 MMOL/L (ref 3.5–5.5)
PRESSURE SUPPORT SETTING VENT: 16 CMH2O
PRESSURE SUPPORT SETTING VENT: 18 CMH2O
PROCALCITONIN SERPL-MCNC: 0.18 NG/ML
PROCALCITONIN SERPL-MCNC: 0.21 NG/ML
PROCALCITONIN SERPL-MCNC: 0.23 NG/ML
PROCALCITONIN SERPL-MCNC: 0.54 NG/ML
PROT SERPL-MCNC: 7.6 G/DL (ref 6.4–8.2)
PROT SERPL-MCNC: 7.6 G/DL (ref 6.4–8.2)
PROT SERPL-MCNC: 7.9 G/DL (ref 6.4–8.2)
PROT SERPL-MCNC: 8.4 G/DL (ref 6.4–8.2)
PROT UR STRIP-MCNC: 30 MG/DL
PROT UR STRIP-MCNC: 30 MG/DL
PROTHROMBIN TIME: 18.9 SEC (ref 11.5–15.2)
PROTHROMBIN TIME: 20 SEC (ref 11.5–15.2)
PROTHROMBIN TIME: 20.2 SEC (ref 11.5–15.2)
Q-T INTERVAL, ECG07: 226 MS
Q-T INTERVAL, ECG07: 318 MS
Q-T INTERVAL, ECG07: 332 MS
QRS DURATION, ECG06: 82 MS
QRS DURATION, ECG06: 86 MS
QRS DURATION, ECG06: 94 MS
QTC CALCULATION (BEZET), ECG08: 394 MS
QTC CALCULATION (BEZET), ECG08: 471 MS
QTC CALCULATION (BEZET), ECG08: 482 MS
RBC # BLD AUTO: 3.57 M/UL (ref 4.2–5.3)
RBC # BLD AUTO: 3.6 M/UL (ref 4.2–5.3)
RBC # BLD AUTO: 3.63 M/UL (ref 4.2–5.3)
RBC # BLD AUTO: 3.71 M/UL (ref 4.2–5.3)
RBC # BLD AUTO: 3.76 M/UL (ref 4.2–5.3)
RBC # BLD AUTO: 3.84 M/UL (ref 4.2–5.3)
RBC # BLD AUTO: 3.96 M/UL (ref 4.2–5.3)
RBC # BLD AUTO: 4.01 M/UL (ref 4.2–5.3)
RBC # BLD AUTO: 4.05 M/UL (ref 4.2–5.3)
RBC # BLD AUTO: 4.08 M/UL (ref 4.2–5.3)
RBC # BLD AUTO: 4.08 M/UL (ref 4.2–5.3)
RBC # BLD AUTO: 4.18 M/UL (ref 4.2–5.3)
RBC # BLD AUTO: 4.28 M/UL (ref 4.2–5.3)
RBC # BLD AUTO: 4.29 M/UL (ref 4.2–5.3)
RBC # BLD AUTO: 4.44 M/UL (ref 4.2–5.3)
RBC #/AREA URNS HPF: ABNORMAL /HPF (ref 0–5)
RBC #/AREA URNS HPF: NEGATIVE /HPF (ref 0–5)
RBC MORPH BLD: ABNORMAL
SAO2 % BLD: 100 % (ref 92–97)
SAO2 % BLD: 68 %
SAO2 % BLD: 88.5 % (ref 92–97)
SAO2 % BLD: 92.5 % (ref 92–97)
SAO2 % BLD: 96.5 % (ref 92–97)
SAO2 % BLD: 96.8 % (ref 92–97)
SAO2 % BLD: 98.3 % (ref 92–97)
SARS-COV-2, COV2: DETECTED
SERVICE CMNT-IMP: ABNORMAL
SERVICE CMNT-IMP: NORMAL
SODIUM BLD-SCNC: 138 MMOL/L (ref 136–145)
SODIUM SERPL-SCNC: 134 MMOL/L (ref 136–145)
SODIUM SERPL-SCNC: 136 MMOL/L (ref 136–145)
SODIUM SERPL-SCNC: 136 MMOL/L (ref 136–145)
SODIUM SERPL-SCNC: 137 MMOL/L (ref 136–145)
SODIUM SERPL-SCNC: 138 MMOL/L (ref 136–145)
SODIUM SERPL-SCNC: 138 MMOL/L (ref 136–145)
SODIUM SERPL-SCNC: 139 MMOL/L (ref 136–145)
SODIUM SERPL-SCNC: 142 MMOL/L (ref 136–145)
SODIUM SERPL-SCNC: 142 MMOL/L (ref 136–145)
SODIUM SERPL-SCNC: 145 MMOL/L (ref 136–145)
SODIUM SERPL-SCNC: 146 MMOL/L (ref 136–145)
SODIUM SERPL-SCNC: 147 MMOL/L (ref 136–145)
SODIUM SERPL-SCNC: 149 MMOL/L (ref 136–145)
SP GR UR REFRACTOMETRY: 1.01 (ref 1–1.03)
SP GR UR REFRACTOMETRY: 1.02 (ref 1–1.03)
SPECIMEN SITE: ABNORMAL
SPECIMEN TYPE: ABNORMAL
SPECIMEN TYPE: NORMAL
T3FREE SERPL-MCNC: 0.7 PG/ML (ref 2.18–3.98)
T4 FREE SERPL-MCNC: 1.4 NG/DL (ref 0.7–1.5)
TOTAL RESP. RATE, ITRR: 16
TOTAL RESP. RATE, ITRR: 20
TOTAL RESP. RATE, ITRR: 27
TOTAL RESP. RATE, ITRR: 28
TROPONIN-HIGH SENSITIVITY: 1493 NG/L (ref 0–54)
TROPONIN-HIGH SENSITIVITY: 1592 NG/L (ref 0–54)
TROPONIN-HIGH SENSITIVITY: 174 NG/L (ref 0–54)
TROPONIN-HIGH SENSITIVITY: 1942 NG/L (ref 0–54)
TROPONIN-HIGH SENSITIVITY: 2002 NG/L (ref 0–54)
TROPONIN-HIGH SENSITIVITY: 2317 NG/L (ref 0–54)
TROPONIN-HIGH SENSITIVITY: 2343 NG/L (ref 0–54)
TROPONIN-HIGH SENSITIVITY: 241 NG/L (ref 0–54)
TSH SERPL DL<=0.05 MIU/L-ACNC: 2.88 UIU/ML (ref 0.36–3.74)
TSH SERPL DL<=0.05 MIU/L-ACNC: 4.15 UIU/ML (ref 0.36–3.74)
UROBILINOGEN UR QL STRIP.AUTO: 0.2 EU/DL (ref 0.2–1)
UROBILINOGEN UR QL STRIP.AUTO: 0.2 EU/DL (ref 0.2–1)
VANCOMYCIN SERPL-MCNC: 15.4 UG/ML (ref 5–40)
VENTILATION MODE VENT: ABNORMAL
VENTILATION MODE VENT: NORMAL
VENTRICULAR RATE, ECG03: 127 BPM
VENTRICULAR RATE, ECG03: 132 BPM
VENTRICULAR RATE, ECG03: 183 BPM
VT SETTING VENT: 355 ML
VT SETTING VENT: 420 ML
VT SETTING VENT: 420 ML
VT SETTING VENT: 580 ML
WBC # BLD AUTO: 10.1 K/UL (ref 4.6–13.2)
WBC # BLD AUTO: 10.7 K/UL (ref 4.6–13.2)
WBC # BLD AUTO: 10.7 K/UL (ref 4.6–13.2)
WBC # BLD AUTO: 11.4 K/UL (ref 4.6–13.2)
WBC # BLD AUTO: 13.3 K/UL (ref 4.6–13.2)
WBC # BLD AUTO: 13.8 K/UL (ref 4.6–13.2)
WBC # BLD AUTO: 15.8 K/UL (ref 4.6–13.2)
WBC # BLD AUTO: 16 K/UL (ref 4.6–13.2)
WBC # BLD AUTO: 16.3 K/UL (ref 4.6–13.2)
WBC # BLD AUTO: 16.7 K/UL (ref 4.6–13.2)
WBC # BLD AUTO: 17.3 K/UL (ref 4.6–13.2)
WBC # BLD AUTO: 20.4 K/UL (ref 4.6–13.2)
WBC # BLD AUTO: 23.1 K/UL (ref 4.6–13.2)
WBC # BLD AUTO: 25.5 K/UL (ref 4.6–13.2)
WBC # BLD AUTO: 7.7 K/UL (ref 4.6–13.2)
WBC MORPH BLD: ABNORMAL
WBC MORPH BLD: ABNORMAL
WBC URNS QL MICRO: ABNORMAL /HPF (ref 0–4)
WBC URNS QL MICRO: ABNORMAL /HPF (ref 0–4)
YEAST URNS QL MICRO: ABNORMAL
YEAST URNS QL MICRO: ABNORMAL

## 2023-01-01 PROCEDURE — 74011250636 HC RX REV CODE- 250/636: Performed by: INTERNAL MEDICINE

## 2023-01-01 PROCEDURE — 74011250636 HC RX REV CODE- 250/636

## 2023-01-01 PROCEDURE — 74011000250 HC RX REV CODE- 250: Performed by: INTERNAL MEDICINE

## 2023-01-01 PROCEDURE — 5A1945Z RESPIRATORY VENTILATION, 24-96 CONSECUTIVE HOURS: ICD-10-PCS | Performed by: FAMILY MEDICINE

## 2023-01-01 PROCEDURE — 85025 COMPLETE CBC W/AUTO DIFF WBC: CPT

## 2023-01-01 PROCEDURE — 85610 PROTHROMBIN TIME: CPT

## 2023-01-01 PROCEDURE — 74011000250 HC RX REV CODE- 250: Performed by: PHYSICIAN ASSISTANT

## 2023-01-01 PROCEDURE — 74011250637 HC RX REV CODE- 250/637: Performed by: INTERNAL MEDICINE

## 2023-01-01 PROCEDURE — 74011636637 HC RX REV CODE- 636/637: Performed by: PHYSICIAN ASSISTANT

## 2023-01-01 PROCEDURE — 94762 N-INVAS EAR/PLS OXIMTRY CONT: CPT

## 2023-01-01 PROCEDURE — 74011250637 HC RX REV CODE- 250/637

## 2023-01-01 PROCEDURE — 82962 GLUCOSE BLOOD TEST: CPT

## 2023-01-01 PROCEDURE — 77010033711 HC HIGH FLOW OXYGEN

## 2023-01-01 PROCEDURE — 85379 FIBRIN DEGRADATION QUANT: CPT

## 2023-01-01 PROCEDURE — 36415 COLL VENOUS BLD VENIPUNCTURE: CPT

## 2023-01-01 PROCEDURE — 65270000046 HC RM TELEMETRY

## 2023-01-01 PROCEDURE — 99291 CRITICAL CARE FIRST HOUR: CPT | Performed by: INTERNAL MEDICINE

## 2023-01-01 PROCEDURE — 74011000250 HC RX REV CODE- 250

## 2023-01-01 PROCEDURE — 85730 THROMBOPLASTIN TIME PARTIAL: CPT

## 2023-01-01 PROCEDURE — 74011000258 HC RX REV CODE- 258: Performed by: EMERGENCY MEDICINE

## 2023-01-01 PROCEDURE — 2709999900 HC NON-CHARGEABLE SUPPLY

## 2023-01-01 PROCEDURE — 84484 ASSAY OF TROPONIN QUANT: CPT

## 2023-01-01 PROCEDURE — 82803 BLOOD GASES ANY COMBINATION: CPT

## 2023-01-01 PROCEDURE — 84100 ASSAY OF PHOSPHORUS: CPT

## 2023-01-01 PROCEDURE — 83605 ASSAY OF LACTIC ACID: CPT

## 2023-01-01 PROCEDURE — 74011250636 HC RX REV CODE- 250/636: Performed by: STUDENT IN AN ORGANIZED HEALTH CARE EDUCATION/TRAINING PROGRAM

## 2023-01-01 PROCEDURE — 74011636637 HC RX REV CODE- 636/637: Performed by: REGISTERED NURSE

## 2023-01-01 PROCEDURE — 80202 ASSAY OF VANCOMYCIN: CPT

## 2023-01-01 PROCEDURE — 93005 ELECTROCARDIOGRAM TRACING: CPT

## 2023-01-01 PROCEDURE — 99223 1ST HOSP IP/OBS HIGH 75: CPT | Performed by: INTERNAL MEDICINE

## 2023-01-01 PROCEDURE — 80048 BASIC METABOLIC PNL TOTAL CA: CPT

## 2023-01-01 PROCEDURE — C8923 2D TTE W OR W/O FOL W/CON,CO: HCPCS

## 2023-01-01 PROCEDURE — 94640 AIRWAY INHALATION TREATMENT: CPT

## 2023-01-01 PROCEDURE — 74011250636 HC RX REV CODE- 250/636: Performed by: PHYSICIAN ASSISTANT

## 2023-01-01 PROCEDURE — 99233 SBSQ HOSP IP/OBS HIGH 50: CPT | Performed by: INTERNAL MEDICINE

## 2023-01-01 PROCEDURE — 36600 WITHDRAWAL OF ARTERIAL BLOOD: CPT

## 2023-01-01 PROCEDURE — 92526 ORAL FUNCTION THERAPY: CPT

## 2023-01-01 PROCEDURE — 83735 ASSAY OF MAGNESIUM: CPT

## 2023-01-01 PROCEDURE — 0BH17EZ INSERTION OF ENDOTRACHEAL AIRWAY INTO TRACHEA, VIA NATURAL OR ARTIFICIAL OPENING: ICD-10-PCS | Performed by: FAMILY MEDICINE

## 2023-01-01 PROCEDURE — 65610000006 HC RM INTENSIVE CARE

## 2023-01-01 PROCEDURE — 85027 COMPLETE CBC AUTOMATED: CPT

## 2023-01-01 PROCEDURE — 84481 FREE ASSAY (FT-3): CPT

## 2023-01-01 PROCEDURE — 84145 PROCALCITONIN (PCT): CPT

## 2023-01-01 PROCEDURE — 94002 VENT MGMT INPAT INIT DAY: CPT

## 2023-01-01 PROCEDURE — 74011000258 HC RX REV CODE- 258: Performed by: INTERNAL MEDICINE

## 2023-01-01 PROCEDURE — 74011636637 HC RX REV CODE- 636/637

## 2023-01-01 PROCEDURE — 99232 SBSQ HOSP IP/OBS MODERATE 35: CPT | Performed by: NURSE PRACTITIONER

## 2023-01-01 PROCEDURE — 87070 CULTURE OTHR SPECIMN AEROBIC: CPT

## 2023-01-01 PROCEDURE — 74011250636 HC RX REV CODE- 250/636: Performed by: EMERGENCY MEDICINE

## 2023-01-01 PROCEDURE — 71045 X-RAY EXAM CHEST 1 VIEW: CPT

## 2023-01-01 PROCEDURE — 96366 THER/PROPH/DIAG IV INF ADDON: CPT

## 2023-01-01 PROCEDURE — 74011250637 HC RX REV CODE- 250/637: Performed by: PHYSICIAN ASSISTANT

## 2023-01-01 PROCEDURE — 97530 THERAPEUTIC ACTIVITIES: CPT

## 2023-01-01 PROCEDURE — 74018 RADEX ABDOMEN 1 VIEW: CPT

## 2023-01-01 PROCEDURE — 84443 ASSAY THYROID STIM HORMONE: CPT

## 2023-01-01 PROCEDURE — 99232 SBSQ HOSP IP/OBS MODERATE 35: CPT | Performed by: INTERNAL MEDICINE

## 2023-01-01 PROCEDURE — 5A09457 ASSISTANCE WITH RESPIRATORY VENTILATION, 24-96 CONSECUTIVE HOURS, CONTINUOUS POSITIVE AIRWAY PRESSURE: ICD-10-PCS | Performed by: FAMILY MEDICINE

## 2023-01-01 PROCEDURE — 87205 SMEAR GRAM STAIN: CPT

## 2023-01-01 PROCEDURE — 87636 SARSCOV2 & INF A&B AMP PRB: CPT

## 2023-01-01 PROCEDURE — 99233 SBSQ HOSP IP/OBS HIGH 50: CPT | Performed by: NURSE PRACTITIONER

## 2023-01-01 PROCEDURE — 80053 COMPREHEN METABOLIC PANEL: CPT

## 2023-01-01 PROCEDURE — 74011000258 HC RX REV CODE- 258

## 2023-01-01 PROCEDURE — 82947 ASSAY GLUCOSE BLOOD QUANT: CPT

## 2023-01-01 PROCEDURE — 36592 COLLECT BLOOD FROM PICC: CPT

## 2023-01-01 PROCEDURE — 82040 ASSAY OF SERUM ALBUMIN: CPT

## 2023-01-01 PROCEDURE — 83880 ASSAY OF NATRIURETIC PEPTIDE: CPT

## 2023-01-01 PROCEDURE — 65660000004 HC RM CVT STEPDOWN

## 2023-01-01 PROCEDURE — 87086 URINE CULTURE/COLONY COUNT: CPT

## 2023-01-01 PROCEDURE — 92610 EVALUATE SWALLOWING FUNCTION: CPT

## 2023-01-01 PROCEDURE — 97161 PT EVAL LOW COMPLEX 20 MIN: CPT

## 2023-01-01 PROCEDURE — APPSS30 APP SPLIT SHARED TIME 16-30 MINUTES: Performed by: REGISTERED NURSE

## 2023-01-01 PROCEDURE — 36573 INSJ PICC RS&I 5 YR+: CPT | Performed by: STUDENT IN AN ORGANIZED HEALTH CARE EDUCATION/TRAINING PROGRAM

## 2023-01-01 PROCEDURE — 94003 VENT MGMT INPAT SUBQ DAY: CPT

## 2023-01-01 PROCEDURE — 74011000250 HC RX REV CODE- 250: Performed by: EMERGENCY MEDICINE

## 2023-01-01 PROCEDURE — 74011000250 HC RX REV CODE- 250: Performed by: REGISTERED NURSE

## 2023-01-01 PROCEDURE — 99222 1ST HOSP IP/OBS MODERATE 55: CPT | Performed by: NURSE PRACTITIONER

## 2023-01-01 PROCEDURE — 94660 CPAP INITIATION&MGMT: CPT

## 2023-01-01 PROCEDURE — 77030038269 HC DRN EXT URIN PURWCK BARD -A

## 2023-01-01 PROCEDURE — 84439 ASSAY OF FREE THYROXINE: CPT

## 2023-01-01 PROCEDURE — 81001 URINALYSIS AUTO W/SCOPE: CPT

## 2023-01-01 PROCEDURE — 74011250637 HC RX REV CODE- 250/637: Performed by: REGISTERED NURSE

## 2023-01-01 PROCEDURE — 77030040830 HC CATH URETH FOL MDII -A

## 2023-01-01 PROCEDURE — 96376 TX/PRO/DX INJ SAME DRUG ADON: CPT

## 2023-01-01 PROCEDURE — 77010033678 HC OXYGEN DAILY

## 2023-01-01 PROCEDURE — 76770 US EXAM ABDO BACK WALL COMP: CPT

## 2023-01-01 PROCEDURE — 97535 SELF CARE MNGMENT TRAINING: CPT

## 2023-01-01 PROCEDURE — 93970 EXTREMITY STUDY: CPT

## 2023-01-01 PROCEDURE — 96365 THER/PROPH/DIAG IV INF INIT: CPT

## 2023-01-01 PROCEDURE — 31500 INSERT EMERGENCY AIRWAY: CPT

## 2023-01-01 PROCEDURE — 74011000258 HC RX REV CODE- 258: Performed by: STUDENT IN AN ORGANIZED HEALTH CARE EDUCATION/TRAINING PROGRAM

## 2023-01-01 PROCEDURE — 74011250636 HC RX REV CODE- 250/636: Performed by: FAMILY MEDICINE

## 2023-01-01 PROCEDURE — 99285 EMERGENCY DEPT VISIT HI MDM: CPT

## 2023-01-01 PROCEDURE — 36410 VNPNXR 3YR/> PHY/QHP DX/THER: CPT

## 2023-01-01 PROCEDURE — 96367 TX/PROPH/DG ADDL SEQ IV INF: CPT

## 2023-01-01 PROCEDURE — APPSS15 APP SPLIT SHARED TIME 0-15 MINUTES: Performed by: PHYSICIAN ASSISTANT

## 2023-01-01 PROCEDURE — 97166 OT EVAL MOD COMPLEX 45 MIN: CPT

## 2023-01-01 RX ORDER — SODIUM CHLORIDE 0.9 % (FLUSH) 0.9 %
5-40 SYRINGE (ML) INJECTION EVERY 8 HOURS
Status: DISCONTINUED | OUTPATIENT
Start: 2023-01-01 | End: 2023-01-01 | Stop reason: HOSPADM

## 2023-01-01 RX ORDER — ONDANSETRON 4 MG/1
4 TABLET, ORALLY DISINTEGRATING ORAL
Status: DISCONTINUED | OUTPATIENT
Start: 2023-01-01 | End: 2023-01-01 | Stop reason: HOSPADM

## 2023-01-01 RX ORDER — GABAPENTIN 400 MG/1
400 CAPSULE ORAL DAILY
Status: DISCONTINUED | OUTPATIENT
Start: 2023-01-01 | End: 2023-01-01 | Stop reason: HOSPADM

## 2023-01-01 RX ORDER — METOPROLOL TARTRATE 50 MG/1
100 TABLET ORAL EVERY 12 HOURS
Status: DISCONTINUED | OUTPATIENT
Start: 2023-01-01 | End: 2023-01-01

## 2023-01-01 RX ORDER — DILTIAZEM HYDROCHLORIDE 60 MG/1
60 TABLET, FILM COATED ORAL 4 TIMES DAILY
Status: DISCONTINUED | OUTPATIENT
Start: 2023-01-01 | End: 2023-01-01

## 2023-01-01 RX ORDER — BUMETANIDE 0.25 MG/ML
1 INJECTION INTRAMUSCULAR; INTRAVENOUS ONCE
Status: COMPLETED | OUTPATIENT
Start: 2023-01-01 | End: 2023-01-01

## 2023-01-01 RX ORDER — FENTANYL CITRATE 50 UG/ML
100 INJECTION, SOLUTION INTRAMUSCULAR; INTRAVENOUS
Status: DISCONTINUED | OUTPATIENT
Start: 2023-01-01 | End: 2023-01-01

## 2023-01-01 RX ORDER — MIDAZOLAM IN 0.9 % SOD.CHLORID 1 MG/ML
0-10 PLASTIC BAG, INJECTION (ML) INTRAVENOUS
Status: DISCONTINUED | OUTPATIENT
Start: 2023-01-01 | End: 2023-01-01

## 2023-01-01 RX ORDER — DEXAMETHASONE SODIUM PHOSPHATE 4 MG/ML
6 INJECTION, SOLUTION INTRA-ARTICULAR; INTRALESIONAL; INTRAMUSCULAR; INTRAVENOUS; SOFT TISSUE EVERY 24 HOURS
Status: COMPLETED | OUTPATIENT
Start: 2023-01-01 | End: 2023-01-01

## 2023-01-01 RX ORDER — MORPHINE SULFATE 2 MG/ML
2 INJECTION, SOLUTION INTRAMUSCULAR; INTRAVENOUS
Status: DISCONTINUED | OUTPATIENT
Start: 2023-01-01 | End: 2023-01-01 | Stop reason: HOSPADM

## 2023-01-01 RX ORDER — HEPARIN SODIUM 10000 [USP'U]/100ML
18-36 INJECTION, SOLUTION INTRAVENOUS
Status: DISCONTINUED | OUTPATIENT
Start: 2023-01-01 | End: 2023-01-01

## 2023-01-01 RX ORDER — INSULIN GLARGINE 100 [IU]/ML
10 INJECTION, SOLUTION SUBCUTANEOUS
Status: DISCONTINUED | OUTPATIENT
Start: 2023-01-01 | End: 2023-01-01

## 2023-01-01 RX ORDER — METOPROLOL TARTRATE 5 MG/5ML
5 INJECTION INTRAVENOUS EVERY 6 HOURS
Status: DISCONTINUED | OUTPATIENT
Start: 2023-01-01 | End: 2023-01-01 | Stop reason: HOSPADM

## 2023-01-01 RX ORDER — METOPROLOL TARTRATE 50 MG/1
50 TABLET ORAL EVERY 6 HOURS
Status: DISCONTINUED | OUTPATIENT
Start: 2023-01-01 | End: 2023-01-01

## 2023-01-01 RX ORDER — POTASSIUM CHLORIDE 20 MEQ/1
40 TABLET, EXTENDED RELEASE ORAL 2 TIMES DAILY
Status: DISCONTINUED | OUTPATIENT
Start: 2023-01-01 | End: 2023-01-01 | Stop reason: HOSPADM

## 2023-01-01 RX ORDER — METOPROLOL TARTRATE 5 MG/5ML
5 INJECTION INTRAVENOUS EVERY 6 HOURS
Status: DISCONTINUED | OUTPATIENT
Start: 2023-01-01 | End: 2023-01-01

## 2023-01-01 RX ORDER — LORAZEPAM 2 MG/ML
0.5 INJECTION INTRAMUSCULAR ONCE
Status: COMPLETED | OUTPATIENT
Start: 2023-01-01 | End: 2023-01-01

## 2023-01-01 RX ORDER — ACETAMINOPHEN 325 MG/1
650 TABLET ORAL
Status: DISCONTINUED | OUTPATIENT
Start: 2023-01-01 | End: 2023-01-01 | Stop reason: HOSPADM

## 2023-01-01 RX ORDER — MILRINONE LACTATE 0.2 MG/ML
0.2 INJECTION, SOLUTION INTRAVENOUS CONTINUOUS
Status: DISCONTINUED | OUTPATIENT
Start: 2023-01-01 | End: 2023-01-01 | Stop reason: HOSPADM

## 2023-01-01 RX ORDER — INSULIN LISPRO 100 [IU]/ML
INJECTION, SOLUTION INTRAVENOUS; SUBCUTANEOUS 3 TIMES DAILY
Status: DISCONTINUED | OUTPATIENT
Start: 2023-01-01 | End: 2023-01-01

## 2023-01-01 RX ORDER — BUMETANIDE 0.25 MG/ML
0.5 INJECTION INTRAMUSCULAR; INTRAVENOUS 2 TIMES DAILY
Status: COMPLETED | OUTPATIENT
Start: 2023-01-01 | End: 2023-01-01

## 2023-01-01 RX ORDER — WARFARIN 2.5 MG/1
2.5 TABLET ORAL ONCE
Status: DISCONTINUED | OUTPATIENT
Start: 2023-01-01 | End: 2023-01-01

## 2023-01-01 RX ORDER — ENOXAPARIN SODIUM 100 MG/ML
1 INJECTION SUBCUTANEOUS DAILY
Status: DISCONTINUED | OUTPATIENT
Start: 2023-01-01 | End: 2023-01-01 | Stop reason: HOSPADM

## 2023-01-01 RX ORDER — MORPHINE SULFATE 20 MG/ML
10 SOLUTION ORAL
Status: DISCONTINUED | OUTPATIENT
Start: 2023-01-01 | End: 2023-01-01 | Stop reason: HOSPADM

## 2023-01-01 RX ORDER — HEPARIN SODIUM 1000 [USP'U]/ML
INJECTION, SOLUTION INTRAVENOUS; SUBCUTANEOUS
Status: DISCONTINUED
Start: 2023-01-01 | End: 2023-01-01 | Stop reason: WASHOUT

## 2023-01-01 RX ORDER — FUROSEMIDE 10 MG/ML
40 INJECTION INTRAMUSCULAR; INTRAVENOUS ONCE
Status: COMPLETED | OUTPATIENT
Start: 2023-01-01 | End: 2023-01-01

## 2023-01-01 RX ORDER — INSULIN GLARGINE 100 [IU]/ML
5 INJECTION, SOLUTION SUBCUTANEOUS
Status: DISCONTINUED | OUTPATIENT
Start: 2023-01-01 | End: 2023-01-01

## 2023-01-01 RX ORDER — VANCOMYCIN 2 GRAM/500 ML IN 0.9 % SODIUM CHLORIDE INTRAVENOUS
2000 ONCE
Status: COMPLETED | OUTPATIENT
Start: 2023-01-01 | End: 2023-01-01

## 2023-01-01 RX ORDER — FUROSEMIDE 10 MG/ML
40 INJECTION INTRAMUSCULAR; INTRAVENOUS EVERY 12 HOURS
Status: DISCONTINUED | OUTPATIENT
Start: 2023-01-01 | End: 2023-01-01

## 2023-01-01 RX ORDER — BUMETANIDE 1 MG/1
1 TABLET ORAL DAILY
Status: DISCONTINUED | OUTPATIENT
Start: 2023-01-01 | End: 2023-01-01

## 2023-01-01 RX ORDER — LORAZEPAM 2 MG/ML
1 CONCENTRATE ORAL
Status: DISCONTINUED | OUTPATIENT
Start: 2023-01-01 | End: 2023-01-01 | Stop reason: HOSPADM

## 2023-01-01 RX ORDER — MIDAZOLAM HYDROCHLORIDE 1 MG/ML
2 INJECTION, SOLUTION INTRAMUSCULAR; INTRAVENOUS
Status: DISCONTINUED | OUTPATIENT
Start: 2023-01-01 | End: 2023-01-01

## 2023-01-01 RX ORDER — HEPARIN SODIUM 5000 [USP'U]/ML
5000 INJECTION, SOLUTION INTRAVENOUS; SUBCUTANEOUS EVERY 8 HOURS
Status: DISCONTINUED | OUTPATIENT
Start: 2023-01-01 | End: 2023-01-01 | Stop reason: HOSPADM

## 2023-01-01 RX ORDER — DILTIAZEM HYDROCHLORIDE 5 MG/ML
0.25 INJECTION INTRAVENOUS ONCE
Status: COMPLETED | OUTPATIENT
Start: 2023-01-01 | End: 2023-01-01

## 2023-01-01 RX ORDER — METRONIDAZOLE 500 MG/100ML
500 INJECTION, SOLUTION INTRAVENOUS EVERY 12 HOURS
Status: DISCONTINUED | OUTPATIENT
Start: 2023-01-01 | End: 2023-01-01 | Stop reason: ALTCHOICE

## 2023-01-01 RX ORDER — ACETAMINOPHEN 650 MG/1
650 SUPPOSITORY RECTAL
Status: DISCONTINUED | OUTPATIENT
Start: 2023-01-01 | End: 2023-01-01 | Stop reason: HOSPADM

## 2023-01-01 RX ORDER — LEVOTHYROXINE SODIUM 75 UG/1
150 TABLET ORAL DAILY
Status: DISCONTINUED | OUTPATIENT
Start: 2023-01-01 | End: 2023-01-01 | Stop reason: HOSPADM

## 2023-01-01 RX ORDER — METOPROLOL TARTRATE 50 MG/1
50 TABLET ORAL EVERY 12 HOURS
Status: DISCONTINUED | OUTPATIENT
Start: 2023-01-01 | End: 2023-01-01

## 2023-01-01 RX ORDER — NOREPINEPHRINE BIT/0.9 % NACL 16MG/250ML
INFUSION BOTTLE (ML) INTRAVENOUS
Status: DISCONTINUED
Start: 2023-01-01 | End: 2023-01-01

## 2023-01-01 RX ORDER — WARFARIN 2.5 MG/1
2.5 TABLET ORAL ONCE
Status: ACTIVE | OUTPATIENT
Start: 2023-01-01 | End: 2023-01-01

## 2023-01-01 RX ORDER — IPRATROPIUM BROMIDE AND ALBUTEROL SULFATE 2.5; .5 MG/3ML; MG/3ML
3 SOLUTION RESPIRATORY (INHALATION)
Status: DISCONTINUED | OUTPATIENT
Start: 2023-01-01 | End: 2023-01-01

## 2023-01-01 RX ORDER — POTASSIUM CHLORIDE 7.45 MG/ML
10 INJECTION INTRAVENOUS
Status: DISCONTINUED | OUTPATIENT
Start: 2023-01-01 | End: 2023-01-01

## 2023-01-01 RX ORDER — INSULIN GLARGINE 100 [IU]/ML
15 INJECTION, SOLUTION SUBCUTANEOUS
Status: DISCONTINUED | OUTPATIENT
Start: 2023-01-01 | End: 2023-01-01

## 2023-01-01 RX ORDER — ENOXAPARIN SODIUM 100 MG/ML
1 INJECTION SUBCUTANEOUS EVERY 12 HOURS
Status: DISCONTINUED | OUTPATIENT
Start: 2023-01-01 | End: 2023-01-01

## 2023-01-01 RX ORDER — LORAZEPAM 2 MG/ML
1 INJECTION INTRAMUSCULAR
Status: DISCONTINUED | OUTPATIENT
Start: 2023-01-01 | End: 2023-01-01 | Stop reason: HOSPADM

## 2023-01-01 RX ORDER — DILTIAZEM HYDROCHLORIDE 180 MG/1
180 CAPSULE, COATED, EXTENDED RELEASE ORAL DAILY
Status: DISCONTINUED | OUTPATIENT
Start: 2023-01-01 | End: 2023-01-01

## 2023-01-01 RX ORDER — FUROSEMIDE 10 MG/ML
40 INJECTION INTRAMUSCULAR; INTRAVENOUS ONCE
Status: DISCONTINUED | OUTPATIENT
Start: 2023-01-01 | End: 2023-01-01

## 2023-01-01 RX ORDER — ACETAMINOPHEN 325 MG/1
650 TABLET ORAL EVERY 6 HOURS
Status: DISCONTINUED | OUTPATIENT
Start: 2023-01-01 | End: 2023-01-01 | Stop reason: HOSPADM

## 2023-01-01 RX ORDER — BUMETANIDE 0.25 MG/ML
0.5 INJECTION INTRAMUSCULAR; INTRAVENOUS 2 TIMES DAILY
Status: DISCONTINUED | OUTPATIENT
Start: 2023-01-01 | End: 2023-01-01

## 2023-01-01 RX ORDER — MAGNESIUM SULFATE HEPTAHYDRATE 40 MG/ML
2 INJECTION, SOLUTION INTRAVENOUS ONCE
Status: COMPLETED | OUTPATIENT
Start: 2023-01-01 | End: 2023-01-01

## 2023-01-01 RX ORDER — POTASSIUM CHLORIDE 7.45 MG/ML
INJECTION INTRAVENOUS
Status: COMPLETED
Start: 2023-01-01 | End: 2023-01-01

## 2023-01-01 RX ORDER — GUAIFENESIN 100 MG/5ML
81 LIQUID (ML) ORAL DAILY
Status: DISCONTINUED | OUTPATIENT
Start: 2023-01-01 | End: 2023-01-01 | Stop reason: HOSPADM

## 2023-01-01 RX ORDER — AMIODARONE HYDROCHLORIDE 200 MG/1
200 TABLET ORAL DAILY
Status: DISCONTINUED | OUTPATIENT
Start: 2023-01-01 | End: 2023-01-01

## 2023-01-01 RX ORDER — HYDROXYZINE HYDROCHLORIDE 10 MG/1
10 TABLET, FILM COATED ORAL
Status: DISCONTINUED | OUTPATIENT
Start: 2023-01-01 | End: 2023-01-01

## 2023-01-01 RX ORDER — BUMETANIDE 0.25 MG/ML
1 INJECTION INTRAMUSCULAR; INTRAVENOUS 2 TIMES DAILY
Status: DISCONTINUED | OUTPATIENT
Start: 2023-01-01 | End: 2023-01-01 | Stop reason: HOSPADM

## 2023-01-01 RX ORDER — METOPROLOL TARTRATE 50 MG/1
100 TABLET ORAL 2 TIMES DAILY
Status: DISCONTINUED | OUTPATIENT
Start: 2023-01-01 | End: 2023-01-01

## 2023-01-01 RX ORDER — ALLOPURINOL 100 MG/1
100 TABLET ORAL DAILY
Status: DISCONTINUED | OUTPATIENT
Start: 2023-01-01 | End: 2023-01-01 | Stop reason: HOSPADM

## 2023-01-01 RX ORDER — ONDANSETRON 2 MG/ML
4 INJECTION INTRAMUSCULAR; INTRAVENOUS
Status: DISCONTINUED | OUTPATIENT
Start: 2023-01-01 | End: 2023-01-01 | Stop reason: HOSPADM

## 2023-01-01 RX ORDER — HEPARIN SODIUM 1000 [USP'U]/ML
80 INJECTION, SOLUTION INTRAVENOUS; SUBCUTANEOUS ONCE
Status: COMPLETED | OUTPATIENT
Start: 2023-01-01 | End: 2023-01-01

## 2023-01-01 RX ORDER — DILTIAZEM HYDROCHLORIDE 5 MG/ML
15 INJECTION INTRAVENOUS
Status: COMPLETED | OUTPATIENT
Start: 2023-01-01 | End: 2023-01-01

## 2023-01-01 RX ORDER — TRIAMCINOLONE ACETONIDE 5 MG/G
CREAM TOPICAL 2 TIMES DAILY
Status: DISCONTINUED | OUTPATIENT
Start: 2023-01-01 | End: 2023-01-01 | Stop reason: HOSPADM

## 2023-01-01 RX ORDER — BUMETANIDE 0.25 MG/ML
1 INJECTION INTRAMUSCULAR; INTRAVENOUS 2 TIMES DAILY
Status: DISCONTINUED | OUTPATIENT
Start: 2023-01-01 | End: 2023-01-01

## 2023-01-01 RX ORDER — INSULIN LISPRO 100 [IU]/ML
INJECTION, SOLUTION INTRAVENOUS; SUBCUTANEOUS EVERY 6 HOURS
Status: DISCONTINUED | OUTPATIENT
Start: 2023-01-01 | End: 2023-01-01 | Stop reason: HOSPADM

## 2023-01-01 RX ORDER — ENOXAPARIN SODIUM 100 MG/ML
40 INJECTION SUBCUTANEOUS DAILY
Status: DISCONTINUED | OUTPATIENT
Start: 2023-01-01 | End: 2023-01-01

## 2023-01-01 RX ORDER — AMIODARONE HYDROCHLORIDE 200 MG/1
200 TABLET ORAL 2 TIMES DAILY
Status: DISCONTINUED | OUTPATIENT
Start: 2023-01-01 | End: 2023-01-01

## 2023-01-01 RX ORDER — METOPROLOL TARTRATE 50 MG/1
50 TABLET ORAL EVERY 6 HOURS
Status: DISCONTINUED | OUTPATIENT
Start: 2023-01-01 | End: 2023-01-01 | Stop reason: HOSPADM

## 2023-01-01 RX ORDER — DEXTROSE MONOHYDRATE 100 MG/ML
0-250 INJECTION, SOLUTION INTRAVENOUS AS NEEDED
Status: DISCONTINUED | OUTPATIENT
Start: 2023-01-01 | End: 2023-01-01 | Stop reason: HOSPADM

## 2023-01-01 RX ORDER — CHLORHEXIDINE GLUCONATE 1.2 MG/ML
10 RINSE ORAL EVERY 12 HOURS
Status: DISCONTINUED | OUTPATIENT
Start: 2023-01-01 | End: 2023-01-01

## 2023-01-01 RX ORDER — IBUPROFEN 200 MG
16 TABLET ORAL AS NEEDED
Status: DISCONTINUED | OUTPATIENT
Start: 2023-01-01 | End: 2023-01-01 | Stop reason: HOSPADM

## 2023-01-01 RX ORDER — VANCOMYCIN/0.9 % SOD CHLORIDE 1.5G/250ML
1500 PLASTIC BAG, INJECTION (ML) INTRAVENOUS ONCE
Status: DISCONTINUED | OUTPATIENT
Start: 2023-01-01 | End: 2023-01-01 | Stop reason: ALTCHOICE

## 2023-01-01 RX ORDER — HEPARIN SODIUM 1000 [USP'U]/ML
40 INJECTION, SOLUTION INTRAVENOUS; SUBCUTANEOUS ONCE
Status: COMPLETED | OUTPATIENT
Start: 2023-01-01 | End: 2023-01-01

## 2023-01-01 RX ORDER — SODIUM CHLORIDE 0.9 % (FLUSH) 0.9 %
5-40 SYRINGE (ML) INJECTION AS NEEDED
Status: DISCONTINUED | OUTPATIENT
Start: 2023-01-01 | End: 2023-01-01

## 2023-01-01 RX ORDER — ATORVASTATIN CALCIUM 40 MG/1
80 TABLET, FILM COATED ORAL DAILY
Status: DISCONTINUED | OUTPATIENT
Start: 2023-01-01 | End: 2023-01-01 | Stop reason: HOSPADM

## 2023-01-01 RX ORDER — INSULIN GLARGINE 100 [IU]/ML
20 INJECTION, SOLUTION SUBCUTANEOUS
Status: DISCONTINUED | OUTPATIENT
Start: 2023-01-01 | End: 2023-01-01 | Stop reason: HOSPADM

## 2023-01-01 RX ORDER — HEPARIN SODIUM 10000 [USP'U]/100ML
18-36 INJECTION, SOLUTION INTRAVENOUS
Status: DISCONTINUED | OUTPATIENT
Start: 2023-01-01 | End: 2023-01-01 | Stop reason: HOSPADM

## 2023-01-01 RX ORDER — METOLAZONE 5 MG/1
2.5 TABLET ORAL 2 TIMES DAILY
Status: DISPENSED | OUTPATIENT
Start: 2023-01-01 | End: 2023-01-01

## 2023-01-01 RX ORDER — POLYETHYLENE GLYCOL 3350 17 G/17G
17 POWDER, FOR SOLUTION ORAL DAILY PRN
Status: DISCONTINUED | OUTPATIENT
Start: 2023-01-01 | End: 2023-01-01 | Stop reason: HOSPADM

## 2023-01-01 RX ORDER — METOLAZONE 5 MG/1
2.5 TABLET ORAL 2 TIMES DAILY
Status: COMPLETED | OUTPATIENT
Start: 2023-01-01 | End: 2023-01-01

## 2023-01-01 RX ADMIN — ENOXAPARIN SODIUM 100 MG: 100 INJECTION SUBCUTANEOUS at 10:49

## 2023-01-01 RX ADMIN — Medication 2 UNITS: at 12:37

## 2023-01-01 RX ADMIN — SODIUM CHLORIDE, PRESERVATIVE FREE 10 ML: 5 INJECTION INTRAVENOUS at 13:19

## 2023-01-01 RX ADMIN — PIPERACILLIN SODIUM AND TAZOBACTAM SODIUM 3.38 G: 3; .375 INJECTION, POWDER, LYOPHILIZED, FOR SOLUTION INTRAVENOUS at 16:30

## 2023-01-01 RX ADMIN — BUMETANIDE 1 MG: 0.25 INJECTION INTRAMUSCULAR; INTRAVENOUS at 11:16

## 2023-01-01 RX ADMIN — TRIAMCINOLONE ACETONIDE: 5 CREAM TOPICAL at 10:21

## 2023-01-01 RX ADMIN — LORAZEPAM 1 MG: 2 INJECTION INTRAMUSCULAR; INTRAVENOUS at 22:00

## 2023-01-01 RX ADMIN — Medication 20 UNITS: at 23:00

## 2023-01-01 RX ADMIN — METOLAZONE 2.5 MG: 5 TABLET ORAL at 11:23

## 2023-01-01 RX ADMIN — AMIODARONE HYDROCHLORIDE 0.5 MG/MIN: 1.8 INJECTION, SOLUTION INTRAVENOUS at 04:10

## 2023-01-01 RX ADMIN — CAMPHOR, MENTHOL: .5; .5 LOTION TOPICAL at 16:37

## 2023-01-01 RX ADMIN — SODIUM CHLORIDE 15 MG/HR: 900 INJECTION, SOLUTION INTRAVENOUS at 15:16

## 2023-01-01 RX ADMIN — SODIUM CHLORIDE 10 MG/HR: 900 INJECTION, SOLUTION INTRAVENOUS at 06:08

## 2023-01-01 RX ADMIN — TRIAMCINOLONE ACETONIDE: 5 CREAM TOPICAL at 17:35

## 2023-01-01 RX ADMIN — MAGNESIUM SULFATE HEPTAHYDRATE 2 G: 40 INJECTION, SOLUTION INTRAVENOUS at 09:08

## 2023-01-01 RX ADMIN — METRONIDAZOLE 500 MG: 500 INJECTION, SOLUTION INTRAVENOUS at 10:00

## 2023-01-01 RX ADMIN — LORAZEPAM 1 MG: 2 INJECTION INTRAMUSCULAR; INTRAVENOUS at 18:05

## 2023-01-01 RX ADMIN — ALLOPURINOL 100 MG: 100 TABLET ORAL at 09:20

## 2023-01-01 RX ADMIN — SODIUM CHLORIDE, PRESERVATIVE FREE 10 ML: 5 INJECTION INTRAVENOUS at 06:04

## 2023-01-01 RX ADMIN — METRONIDAZOLE 500 MG: 500 INJECTION, SOLUTION INTRAVENOUS at 11:15

## 2023-01-01 RX ADMIN — FENTANYL CITRATE 100 MCG: 50 INJECTION, SOLUTION INTRAMUSCULAR; INTRAVENOUS at 04:04

## 2023-01-01 RX ADMIN — ASPIRIN 81 MG CHEWABLE TABLET 81 MG: 81 TABLET CHEWABLE at 09:00

## 2023-01-01 RX ADMIN — SODIUM CHLORIDE, PRESERVATIVE FREE 10 ML: 5 INJECTION INTRAVENOUS at 23:47

## 2023-01-01 RX ADMIN — MIDAZOLAM 2 MG: 1 INJECTION INTRAMUSCULAR; INTRAVENOUS at 04:04

## 2023-01-01 RX ADMIN — SODIUM CHLORIDE, PRESERVATIVE FREE 10 ML: 5 INJECTION INTRAVENOUS at 06:37

## 2023-01-01 RX ADMIN — SODIUM CHLORIDE, PRESERVATIVE FREE 10 ML: 5 INJECTION INTRAVENOUS at 16:19

## 2023-01-01 RX ADMIN — METOPROLOL TARTRATE 5 MG: 1 INJECTION, SOLUTION INTRAVENOUS at 03:35

## 2023-01-01 RX ADMIN — ALLOPURINOL 100 MG: 100 TABLET ORAL at 09:02

## 2023-01-01 RX ADMIN — SODIUM CHLORIDE, PRESERVATIVE FREE 10 ML: 5 INJECTION INTRAVENOUS at 14:00

## 2023-01-01 RX ADMIN — METOPROLOL TARTRATE 5 MG: 1 INJECTION, SOLUTION INTRAVENOUS at 22:32

## 2023-01-01 RX ADMIN — SODIUM CHLORIDE, PRESERVATIVE FREE 10 ML: 5 INJECTION INTRAVENOUS at 05:17

## 2023-01-01 RX ADMIN — FUROSEMIDE 40 MG: 10 INJECTION, SOLUTION INTRAMUSCULAR; INTRAVENOUS at 11:32

## 2023-01-01 RX ADMIN — DEXAMETHASONE SODIUM PHOSPHATE 6 MG: 4 INJECTION, SOLUTION INTRAMUSCULAR; INTRAVENOUS at 18:41

## 2023-01-01 RX ADMIN — Medication 6 UNITS: at 18:30

## 2023-01-01 RX ADMIN — METOPROLOL TARTRATE 5 MG: 1 INJECTION, SOLUTION INTRAVENOUS at 06:43

## 2023-01-01 RX ADMIN — DILTIAZEM HYDROCHLORIDE 15 MG: 5 INJECTION INTRAVENOUS at 19:01

## 2023-01-01 RX ADMIN — METOPROLOL TARTRATE 5 MG: 1 INJECTION, SOLUTION INTRAVENOUS at 16:44

## 2023-01-01 RX ADMIN — CAMPHOR, MENTHOL: .5; .5 LOTION TOPICAL at 09:57

## 2023-01-01 RX ADMIN — ONDANSETRON 4 MG: 2 INJECTION INTRAMUSCULAR; INTRAVENOUS at 07:41

## 2023-01-01 RX ADMIN — HEPARIN SODIUM 5000 UNITS: 5000 INJECTION INTRAVENOUS; SUBCUTANEOUS at 14:57

## 2023-01-01 RX ADMIN — BUMETANIDE 0.5 MG: 0.25 INJECTION INTRAMUSCULAR; INTRAVENOUS at 10:02

## 2023-01-01 RX ADMIN — METRONIDAZOLE 500 MG: 500 INJECTION, SOLUTION INTRAVENOUS at 22:23

## 2023-01-01 RX ADMIN — CAMPHOR, MENTHOL: .5; .5 LOTION TOPICAL at 22:00

## 2023-01-01 RX ADMIN — BUMETANIDE 0.5 MG: 0.25 INJECTION INTRAMUSCULAR; INTRAVENOUS at 12:10

## 2023-01-01 RX ADMIN — ATORVASTATIN CALCIUM 80 MG: 40 TABLET, FILM COATED ORAL at 09:20

## 2023-01-01 RX ADMIN — ENOXAPARIN SODIUM 100 MG: 100 INJECTION SUBCUTANEOUS at 12:30

## 2023-01-01 RX ADMIN — PIPERACILLIN SODIUM AND TAZOBACTAM SODIUM 3.38 G: 3; .375 INJECTION, POWDER, LYOPHILIZED, FOR SOLUTION INTRAVENOUS at 09:00

## 2023-01-01 RX ADMIN — CAMPHOR, MENTHOL: .5; .5 LOTION TOPICAL at 16:02

## 2023-01-01 RX ADMIN — HEPARIN SODIUM 3980 UNITS: 1000 INJECTION INTRAVENOUS; SUBCUTANEOUS at 06:13

## 2023-01-01 RX ADMIN — TRIAMCINOLONE ACETONIDE: 5 CREAM TOPICAL at 18:17

## 2023-01-01 RX ADMIN — ATORVASTATIN CALCIUM 80 MG: 40 TABLET, FILM COATED ORAL at 09:32

## 2023-01-01 RX ADMIN — CAMPHOR, MENTHOL: .5; .5 LOTION TOPICAL at 16:00

## 2023-01-01 RX ADMIN — HEPARIN SODIUM 5000 UNITS: 5000 INJECTION INTRAVENOUS; SUBCUTANEOUS at 11:17

## 2023-01-01 RX ADMIN — TRIAMCINOLONE ACETONIDE: 5 CREAM TOPICAL at 09:03

## 2023-01-01 RX ADMIN — MORPHINE SULFATE 2 MG: 2 INJECTION, SOLUTION INTRAMUSCULAR; INTRAVENOUS at 05:38

## 2023-01-01 RX ADMIN — CAMPHOR, MENTHOL: .5; .5 LOTION TOPICAL at 21:32

## 2023-01-01 RX ADMIN — MORPHINE SULFATE 10 MG: 100 SOLUTION ORAL at 18:20

## 2023-01-01 RX ADMIN — CHLORHEXIDINE GLUCONATE 0.12% ORAL RINSE 10 ML: 1.2 LIQUID ORAL at 21:21

## 2023-01-01 RX ADMIN — LEVOTHYROXINE SODIUM 150 MCG: 150 TABLET ORAL at 11:05

## 2023-01-01 RX ADMIN — WATER 2 G: 1 INJECTION INTRAMUSCULAR; INTRAVENOUS; SUBCUTANEOUS at 14:56

## 2023-01-01 RX ADMIN — SODIUM CHLORIDE 10 MG/HR: 900 INJECTION, SOLUTION INTRAVENOUS at 07:59

## 2023-01-01 RX ADMIN — SODIUM CHLORIDE, PRESERVATIVE FREE 10 ML: 5 INJECTION INTRAVENOUS at 08:00

## 2023-01-01 RX ADMIN — METOPROLOL TARTRATE 100 MG: 50 TABLET, FILM COATED ORAL at 23:02

## 2023-01-01 RX ADMIN — Medication 2 UNITS: at 18:49

## 2023-01-01 RX ADMIN — WATER 2 G: 1 INJECTION INTRAMUSCULAR; INTRAVENOUS; SUBCUTANEOUS at 10:01

## 2023-01-01 RX ADMIN — CAMPHOR, MENTHOL: .5; .5 LOTION TOPICAL at 10:50

## 2023-01-01 RX ADMIN — FENTANYL CITRATE 125 MCG/HR: 0.05 INJECTION, SOLUTION INTRAMUSCULAR; INTRAVENOUS at 08:16

## 2023-01-01 RX ADMIN — GABAPENTIN 400 MG: 400 CAPSULE ORAL at 09:40

## 2023-01-01 RX ADMIN — SODIUM CHLORIDE, PRESERVATIVE FREE 10 ML: 5 INJECTION INTRAVENOUS at 16:46

## 2023-01-01 RX ADMIN — METOPROLOL TARTRATE 100 MG: 50 TABLET, FILM COATED ORAL at 09:40

## 2023-01-01 RX ADMIN — Medication 20 UNITS: at 22:32

## 2023-01-01 RX ADMIN — VANCOMYCIN HYDROCHLORIDE 1500 MG: 10 INJECTION, POWDER, LYOPHILIZED, FOR SOLUTION INTRAVENOUS at 06:43

## 2023-01-01 RX ADMIN — ASPIRIN 81 MG CHEWABLE TABLET 81 MG: 81 TABLET CHEWABLE at 09:55

## 2023-01-01 RX ADMIN — MORPHINE SULFATE 2 MG: 2 INJECTION, SOLUTION INTRAMUSCULAR; INTRAVENOUS at 22:00

## 2023-01-01 RX ADMIN — METOPROLOL TARTRATE 5 MG: 1 INJECTION, SOLUTION INTRAVENOUS at 11:05

## 2023-01-01 RX ADMIN — LEVOTHYROXINE SODIUM ANHYDROUS 112 MCG: 100 INJECTION, POWDER, LYOPHILIZED, FOR SOLUTION INTRAVENOUS at 16:59

## 2023-01-01 RX ADMIN — CHLORHEXIDINE GLUCONATE 0.12% ORAL RINSE 10 ML: 1.2 LIQUID ORAL at 09:00

## 2023-01-01 RX ADMIN — BUMETANIDE 0.5 MG: 0.25 INJECTION INTRAMUSCULAR; INTRAVENOUS at 11:23

## 2023-01-01 RX ADMIN — Medication 4 UNITS: at 00:00

## 2023-01-01 RX ADMIN — DEXAMETHASONE SODIUM PHOSPHATE 6 MG: 4 INJECTION, SOLUTION INTRAMUSCULAR; INTRAVENOUS at 18:07

## 2023-01-01 RX ADMIN — SODIUM CHLORIDE, PRESERVATIVE FREE 10 ML: 5 INJECTION INTRAVENOUS at 14:40

## 2023-01-01 RX ADMIN — BUMETANIDE 0.5 MG: 0.25 INJECTION INTRAMUSCULAR; INTRAVENOUS at 17:45

## 2023-01-01 RX ADMIN — CAMPHOR, MENTHOL: .5; .5 LOTION TOPICAL at 10:30

## 2023-01-01 RX ADMIN — SODIUM CHLORIDE, PRESERVATIVE FREE 10 ML: 5 INJECTION INTRAVENOUS at 21:11

## 2023-01-01 RX ADMIN — MORPHINE SULFATE 2 MG: 2 INJECTION, SOLUTION INTRAMUSCULAR; INTRAVENOUS at 01:59

## 2023-01-01 RX ADMIN — SODIUM CHLORIDE, PRESERVATIVE FREE 5 ML: 5 INJECTION INTRAVENOUS at 06:00

## 2023-01-01 RX ADMIN — Medication 8 UNITS: at 00:00

## 2023-01-01 RX ADMIN — METOPROLOL TARTRATE 5 MG: 1 INJECTION, SOLUTION INTRAVENOUS at 07:41

## 2023-01-01 RX ADMIN — FAMOTIDINE 20 MG: 10 INJECTION, SOLUTION INTRAVENOUS at 11:05

## 2023-01-01 RX ADMIN — POTASSIUM CHLORIDE 10 MEQ: 7.46 INJECTION, SOLUTION INTRAVENOUS at 13:05

## 2023-01-01 RX ADMIN — MORPHINE SULFATE 2 MG: 2 INJECTION, SOLUTION INTRAMUSCULAR; INTRAVENOUS at 20:01

## 2023-01-01 RX ADMIN — METOPROLOL TARTRATE 50 MG: 50 TABLET, FILM COATED ORAL at 21:21

## 2023-01-01 RX ADMIN — BUMETANIDE 1 MG: 1 TABLET ORAL at 09:20

## 2023-01-01 RX ADMIN — METOPROLOL TARTRATE 5 MG: 1 INJECTION, SOLUTION INTRAVENOUS at 09:58

## 2023-01-01 RX ADMIN — Medication 15 UNITS: at 21:06

## 2023-01-01 RX ADMIN — Medication 4 UNITS: at 12:26

## 2023-01-01 RX ADMIN — CHLORHEXIDINE GLUCONATE 0.12% ORAL RINSE 10 ML: 1.2 LIQUID ORAL at 09:20

## 2023-01-01 RX ADMIN — SODIUM CHLORIDE 500 ML: 9 INJECTION, SOLUTION INTRAVENOUS at 19:51

## 2023-01-01 RX ADMIN — HYDROXYZINE HYDROCHLORIDE 10 MG: 10 TABLET ORAL at 20:00

## 2023-01-01 RX ADMIN — CAMPHOR, MENTHOL: .5; .5 LOTION TOPICAL at 21:49

## 2023-01-01 RX ADMIN — Medication 20 UNITS: at 21:01

## 2023-01-01 RX ADMIN — Medication 6 UNITS: at 11:30

## 2023-01-01 RX ADMIN — METOPROLOL TARTRATE 50 MG: 50 TABLET, FILM COATED ORAL at 20:00

## 2023-01-01 RX ADMIN — METOPROLOL TARTRATE 5 MG: 1 INJECTION, SOLUTION INTRAVENOUS at 16:02

## 2023-01-01 RX ADMIN — ALLOPURINOL 100 MG: 100 TABLET ORAL at 10:50

## 2023-01-01 RX ADMIN — LEVOTHYROXINE SODIUM 150 MCG: 150 TABLET ORAL at 09:40

## 2023-01-01 RX ADMIN — CAMPHOR, MENTHOL: .5; .5 LOTION TOPICAL at 09:00

## 2023-01-01 RX ADMIN — METRONIDAZOLE 500 MG: 500 INJECTION, SOLUTION INTRAVENOUS at 21:00

## 2023-01-01 RX ADMIN — TRIAMCINOLONE ACETONIDE: 5 CREAM TOPICAL at 18:00

## 2023-01-01 RX ADMIN — PIPERACILLIN SODIUM AND TAZOBACTAM SODIUM 3.38 G: 3; .375 INJECTION, POWDER, LYOPHILIZED, FOR SOLUTION INTRAVENOUS at 09:22

## 2023-01-01 RX ADMIN — HEPARIN SODIUM 7 UNITS/KG/HR: 10000 INJECTION, SOLUTION INTRAVENOUS at 21:21

## 2023-01-01 RX ADMIN — DEXAMETHASONE SODIUM PHOSPHATE 6 MG: 4 INJECTION, SOLUTION INTRAMUSCULAR; INTRAVENOUS at 18:00

## 2023-01-01 RX ADMIN — CAMPHOR, MENTHOL: .5; .5 LOTION TOPICAL at 00:37

## 2023-01-01 RX ADMIN — METOPROLOL TARTRATE 5 MG: 1 INJECTION, SOLUTION INTRAVENOUS at 02:19

## 2023-01-01 RX ADMIN — FENTANYL CITRATE 50 MCG/HR: 0.05 INJECTION, SOLUTION INTRAMUSCULAR; INTRAVENOUS at 10:14

## 2023-01-01 RX ADMIN — SODIUM CHLORIDE, PRESERVATIVE FREE 10 ML: 5 INJECTION INTRAVENOUS at 06:00

## 2023-01-01 RX ADMIN — SODIUM CHLORIDE, PRESERVATIVE FREE 10 ML: 5 INJECTION INTRAVENOUS at 13:31

## 2023-01-01 RX ADMIN — BUMETANIDE 1 MG: 1 TABLET ORAL at 09:00

## 2023-01-01 RX ADMIN — LEVOTHYROXINE SODIUM ANHYDROUS 112 MCG: 100 INJECTION, POWDER, LYOPHILIZED, FOR SOLUTION INTRAVENOUS at 22:27

## 2023-01-01 RX ADMIN — AMIODARONE HYDROCHLORIDE 1 MG/MIN: 1.8 INJECTION, SOLUTION INTRAVENOUS at 21:56

## 2023-01-01 RX ADMIN — GABAPENTIN 400 MG: 400 CAPSULE ORAL at 09:21

## 2023-01-01 RX ADMIN — LEVOTHYROXINE SODIUM 150 MCG: 150 TABLET ORAL at 09:32

## 2023-01-01 RX ADMIN — CAMPHOR, MENTHOL: .5; .5 LOTION TOPICAL at 16:42

## 2023-01-01 RX ADMIN — METOPROLOL TARTRATE 5 MG: 1 INJECTION, SOLUTION INTRAVENOUS at 03:14

## 2023-01-01 RX ADMIN — METOLAZONE 2.5 MG: 5 TABLET ORAL at 09:55

## 2023-01-01 RX ADMIN — FAMOTIDINE 20 MG: 10 INJECTION, SOLUTION INTRAVENOUS at 09:57

## 2023-01-01 RX ADMIN — MILRINONE LACTATE IN DEXTROSE 0.2 MCG/KG/MIN: 200 INJECTION, SOLUTION INTRAVENOUS at 15:00

## 2023-01-01 RX ADMIN — CAMPHOR, MENTHOL: .5; .5 LOTION TOPICAL at 11:15

## 2023-01-01 RX ADMIN — SODIUM CHLORIDE, PRESERVATIVE FREE 10 ML: 5 INJECTION INTRAVENOUS at 22:00

## 2023-01-01 RX ADMIN — ATORVASTATIN CALCIUM 80 MG: 40 TABLET, FILM COATED ORAL at 11:05

## 2023-01-01 RX ADMIN — HEPARIN SODIUM 12 UNITS/KG/HR: 10000 INJECTION, SOLUTION INTRAVENOUS at 22:29

## 2023-01-01 RX ADMIN — LEVOTHYROXINE SODIUM ANHYDROUS 112 MCG: 100 INJECTION, POWDER, LYOPHILIZED, FOR SOLUTION INTRAVENOUS at 16:53

## 2023-01-01 RX ADMIN — Medication 10 UNITS: at 08:25

## 2023-01-01 RX ADMIN — TRIAMCINOLONE ACETONIDE: 5 CREAM TOPICAL at 10:39

## 2023-01-01 RX ADMIN — PIPERACILLIN AND TAZOBACTAM 4.5 G: 4; .5 INJECTION, POWDER, FOR SOLUTION INTRAVENOUS at 19:54

## 2023-01-01 RX ADMIN — WATER 2 G: 1 INJECTION INTRAMUSCULAR; INTRAVENOUS; SUBCUTANEOUS at 11:18

## 2023-01-01 RX ADMIN — Medication 2 UNITS: at 06:03

## 2023-01-01 RX ADMIN — DEXAMETHASONE SODIUM PHOSPHATE 6 MG: 4 INJECTION, SOLUTION INTRAMUSCULAR; INTRAVENOUS at 17:30

## 2023-01-01 RX ADMIN — SODIUM CHLORIDE, PRESERVATIVE FREE 10 ML: 5 INJECTION INTRAVENOUS at 23:03

## 2023-01-01 RX ADMIN — CAMPHOR, MENTHOL: .5; .5 LOTION TOPICAL at 16:48

## 2023-01-01 RX ADMIN — CAMPHOR, MENTHOL 1 EACH: .5; .5 LOTION TOPICAL at 20:39

## 2023-01-01 RX ADMIN — CAMPHOR, MENTHOL: .5; .5 LOTION TOPICAL at 15:57

## 2023-01-01 RX ADMIN — PIPERACILLIN SODIUM AND TAZOBACTAM SODIUM 3.38 G: 3; .375 INJECTION, POWDER, LYOPHILIZED, FOR SOLUTION INTRAVENOUS at 01:00

## 2023-01-01 RX ADMIN — BUMETANIDE 1 MG: 0.25 INJECTION INTRAMUSCULAR; INTRAVENOUS at 18:10

## 2023-01-01 RX ADMIN — PIPERACILLIN SODIUM AND TAZOBACTAM SODIUM 3.38 G: 3; .375 INJECTION, POWDER, LYOPHILIZED, FOR SOLUTION INTRAVENOUS at 00:50

## 2023-01-01 RX ADMIN — Medication 8 UNITS: at 06:02

## 2023-01-01 RX ADMIN — HEPARIN SODIUM 8540 UNITS: 1000 INJECTION INTRAVENOUS; SUBCUTANEOUS at 20:36

## 2023-01-01 RX ADMIN — METOPROLOL TARTRATE 5 MG: 1 INJECTION, SOLUTION INTRAVENOUS at 16:53

## 2023-01-01 RX ADMIN — ATORVASTATIN CALCIUM 80 MG: 40 TABLET, FILM COATED ORAL at 10:50

## 2023-01-01 RX ADMIN — ATORVASTATIN CALCIUM 80 MG: 40 TABLET, FILM COATED ORAL at 09:40

## 2023-01-01 RX ADMIN — METOPROLOL TARTRATE 50 MG: 50 TABLET, FILM COATED ORAL at 04:11

## 2023-01-01 RX ADMIN — FENTANYL CITRATE 100 MCG/HR: 0.05 INJECTION, SOLUTION INTRAMUSCULAR; INTRAVENOUS at 23:34

## 2023-01-01 RX ADMIN — DILTIAZEM HYDROCHLORIDE 60 MG: 60 TABLET, FILM COATED ORAL at 04:59

## 2023-01-01 RX ADMIN — AMIODARONE HYDROCHLORIDE 0.5 MG/MIN: 1.8 INJECTION, SOLUTION INTRAVENOUS at 14:45

## 2023-01-01 RX ADMIN — MIDAZOLAM 2 MG: 1 INJECTION INTRAMUSCULAR; INTRAVENOUS at 23:41

## 2023-01-01 RX ADMIN — TRIAMCINOLONE ACETONIDE: 5 CREAM TOPICAL at 18:51

## 2023-01-01 RX ADMIN — METOPROLOL TARTRATE 5 MG: 1 INJECTION, SOLUTION INTRAVENOUS at 16:00

## 2023-01-01 RX ADMIN — Medication 20 UNITS: at 23:24

## 2023-01-01 RX ADMIN — PIPERACILLIN SODIUM AND TAZOBACTAM SODIUM 3.38 G: 3; .375 INJECTION, POWDER, LYOPHILIZED, FOR SOLUTION INTRAVENOUS at 16:20

## 2023-01-01 RX ADMIN — SODIUM CHLORIDE, PRESERVATIVE FREE 5 ML: 5 INJECTION INTRAVENOUS at 22:00

## 2023-01-01 RX ADMIN — FENTANYL CITRATE 75 MCG/HR: 0.05 INJECTION, SOLUTION INTRAMUSCULAR; INTRAVENOUS at 14:48

## 2023-01-01 RX ADMIN — Medication 15 UNITS: at 21:17

## 2023-01-01 RX ADMIN — Medication 2 UNITS: at 23:20

## 2023-01-01 RX ADMIN — MILRINONE LACTATE IN DEXTROSE 0.2 MCG/KG/MIN: 200 INJECTION, SOLUTION INTRAVENOUS at 22:32

## 2023-01-01 RX ADMIN — ASPIRIN 81 MG CHEWABLE TABLET 81 MG: 81 TABLET CHEWABLE at 11:05

## 2023-01-01 RX ADMIN — DEXAMETHASONE SODIUM PHOSPHATE 6 MG: 4 INJECTION, SOLUTION INTRAMUSCULAR; INTRAVENOUS at 17:07

## 2023-01-01 RX ADMIN — METOPROLOL TARTRATE 5 MG: 1 INJECTION, SOLUTION INTRAVENOUS at 21:11

## 2023-01-01 RX ADMIN — AMIODARONE HYDROCHLORIDE 0.5 MG/MIN: 1.8 INJECTION, SOLUTION INTRAVENOUS at 03:14

## 2023-01-01 RX ADMIN — METOLAZONE 2.5 MG: 5 TABLET ORAL at 11:05

## 2023-01-01 RX ADMIN — ASPIRIN 81 MG CHEWABLE TABLET 81 MG: 81 TABLET CHEWABLE at 10:50

## 2023-01-01 RX ADMIN — GABAPENTIN 400 MG: 400 CAPSULE ORAL at 09:00

## 2023-01-01 RX ADMIN — VANCOMYCIN HYDROCHLORIDE 2000 MG: 10 INJECTION, POWDER, LYOPHILIZED, FOR SOLUTION INTRAVENOUS at 19:58

## 2023-01-01 RX ADMIN — PIPERACILLIN SODIUM AND TAZOBACTAM SODIUM 3.38 G: 3; .375 INJECTION, POWDER, LYOPHILIZED, FOR SOLUTION INTRAVENOUS at 01:15

## 2023-01-01 RX ADMIN — HEPARIN SODIUM 18 UNITS/KG/HR: 10000 INJECTION, SOLUTION INTRAVENOUS at 16:35

## 2023-01-01 RX ADMIN — Medication 20 UNITS: at 21:37

## 2023-01-01 RX ADMIN — PIPERACILLIN SODIUM AND TAZOBACTAM SODIUM 3.38 G: 3; .375 INJECTION, POWDER, LYOPHILIZED, FOR SOLUTION INTRAVENOUS at 01:01

## 2023-01-01 RX ADMIN — METOPROLOL TARTRATE 5 MG: 1 INJECTION, SOLUTION INTRAVENOUS at 03:53

## 2023-01-01 RX ADMIN — PIPERACILLIN AND TAZOBACTAM 3.38 G: 3; .375 INJECTION, POWDER, FOR SOLUTION INTRAVENOUS at 14:13

## 2023-01-01 RX ADMIN — FAMOTIDINE 20 MG: 10 INJECTION, SOLUTION INTRAVENOUS at 09:04

## 2023-01-01 RX ADMIN — SODIUM CHLORIDE, PRESERVATIVE FREE 10 ML: 5 INJECTION INTRAVENOUS at 11:30

## 2023-01-01 RX ADMIN — BUMETANIDE 0.5 MG: 0.25 INJECTION INTRAMUSCULAR; INTRAVENOUS at 08:51

## 2023-01-01 RX ADMIN — ASPIRIN 81 MG CHEWABLE TABLET 81 MG: 81 TABLET CHEWABLE at 08:51

## 2023-01-01 RX ADMIN — PIPERACILLIN SODIUM AND TAZOBACTAM SODIUM 3.38 G: 3; .375 INJECTION, POWDER, LYOPHILIZED, FOR SOLUTION INTRAVENOUS at 16:39

## 2023-01-01 RX ADMIN — PIPERACILLIN SODIUM AND TAZOBACTAM SODIUM 3.38 G: 3; .375 INJECTION, POWDER, LYOPHILIZED, FOR SOLUTION INTRAVENOUS at 10:45

## 2023-01-01 RX ADMIN — AMIODARONE HYDROCHLORIDE 150 MG: 1.5 INJECTION, SOLUTION INTRAVENOUS at 21:00

## 2023-01-01 RX ADMIN — POTASSIUM CHLORIDE 40 MEQ: 1500 TABLET, EXTENDED RELEASE ORAL at 12:10

## 2023-01-01 RX ADMIN — DILTIAZEM HYDROCHLORIDE 60 MG: 60 TABLET, FILM COATED ORAL at 17:05

## 2023-01-01 RX ADMIN — AMIODARONE HYDROCHLORIDE 0.5 MG/MIN: 1.8 INJECTION, SOLUTION INTRAVENOUS at 14:29

## 2023-01-01 RX ADMIN — METOPROLOL TARTRATE 5 MG: 1 INJECTION, SOLUTION INTRAVENOUS at 10:50

## 2023-01-01 RX ADMIN — POTASSIUM CHLORIDE 10 MEQ: 7.46 INJECTION, SOLUTION INTRAVENOUS at 08:51

## 2023-01-01 RX ADMIN — ASPIRIN 81 MG CHEWABLE TABLET 81 MG: 81 TABLET CHEWABLE at 10:02

## 2023-01-01 RX ADMIN — METOPROLOL TARTRATE 5 MG: 1 INJECTION, SOLUTION INTRAVENOUS at 17:06

## 2023-01-01 RX ADMIN — Medication 2 UNITS: at 17:23

## 2023-01-01 RX ADMIN — SODIUM CHLORIDE, PRESERVATIVE FREE 10 ML: 5 INJECTION INTRAVENOUS at 21:31

## 2023-01-01 RX ADMIN — FENTANYL CITRATE 125 MCG/HR: 0.05 INJECTION, SOLUTION INTRAMUSCULAR; INTRAVENOUS at 02:50

## 2023-01-01 RX ADMIN — CAMPHOR, MENTHOL: .5; .5 LOTION TOPICAL at 16:45

## 2023-01-01 RX ADMIN — METRONIDAZOLE 500 MG: 500 INJECTION, SOLUTION INTRAVENOUS at 21:36

## 2023-01-01 RX ADMIN — SODIUM CHLORIDE, PRESERVATIVE FREE 10 ML: 5 INJECTION INTRAVENOUS at 06:43

## 2023-01-01 RX ADMIN — SODIUM CHLORIDE 2.5 MG/HR: 900 INJECTION, SOLUTION INTRAVENOUS at 19:12

## 2023-01-01 RX ADMIN — CHLORHEXIDINE GLUCONATE 0.12% ORAL RINSE 10 ML: 1.2 LIQUID ORAL at 20:40

## 2023-01-01 RX ADMIN — METOPROLOL TARTRATE 5 MG: 1 INJECTION, SOLUTION INTRAVENOUS at 16:33

## 2023-01-01 RX ADMIN — Medication 4 UNITS: at 23:18

## 2023-01-01 RX ADMIN — BUMETANIDE 1 MG: 0.25 INJECTION INTRAMUSCULAR; INTRAVENOUS at 01:55

## 2023-01-01 RX ADMIN — SODIUM CHLORIDE 10 MG/HR: 900 INJECTION, SOLUTION INTRAVENOUS at 19:13

## 2023-01-01 RX ADMIN — LEVOTHYROXINE SODIUM ANHYDROUS 112 MCG: 100 INJECTION, POWDER, LYOPHILIZED, FOR SOLUTION INTRAVENOUS at 14:32

## 2023-01-01 RX ADMIN — Medication 6 UNITS: at 06:48

## 2023-01-01 RX ADMIN — HEPARIN SODIUM 15 UNITS/KG/HR: 10000 INJECTION, SOLUTION INTRAVENOUS at 03:25

## 2023-01-01 RX ADMIN — AMIODARONE HYDROCHLORIDE 0.5 MG/MIN: 1.8 INJECTION, SOLUTION INTRAVENOUS at 17:15

## 2023-01-01 RX ADMIN — Medication 6 UNITS: at 17:45

## 2023-01-01 RX ADMIN — Medication 2 UNITS: at 06:36

## 2023-01-01 RX ADMIN — INSULIN GLARGINE 10 UNITS: 100 INJECTION, SOLUTION SUBCUTANEOUS at 21:12

## 2023-01-01 RX ADMIN — BUMETANIDE 0.5 MG: 0.25 INJECTION INTRAMUSCULAR; INTRAVENOUS at 17:20

## 2023-01-01 RX ADMIN — FAMOTIDINE 20 MG: 10 INJECTION, SOLUTION INTRAVENOUS at 11:16

## 2023-01-01 RX ADMIN — IPRATROPIUM BROMIDE AND ALBUTEROL 1 PUFF: 20; 100 SPRAY, METERED RESPIRATORY (INHALATION) at 01:53

## 2023-01-01 RX ADMIN — MILRINONE LACTATE IN DEXTROSE 0.2 MCG/KG/MIN: 200 INJECTION, SOLUTION INTRAVENOUS at 06:08

## 2023-01-01 RX ADMIN — SODIUM CHLORIDE, PRESERVATIVE FREE 10 ML: 5 INJECTION INTRAVENOUS at 21:50

## 2023-01-01 RX ADMIN — DILTIAZEM HYDROCHLORIDE 60 MG: 60 TABLET, FILM COATED ORAL at 21:30

## 2023-01-01 RX ADMIN — SODIUM CHLORIDE 15 MG/HR: 900 INJECTION, SOLUTION INTRAVENOUS at 12:08

## 2023-01-01 RX ADMIN — WATER 2 G: 1 INJECTION INTRAMUSCULAR; INTRAVENOUS; SUBCUTANEOUS at 10:18

## 2023-01-01 RX ADMIN — BUMETANIDE 0.5 MG: 0.25 INJECTION INTRAMUSCULAR; INTRAVENOUS at 18:40

## 2023-01-01 RX ADMIN — Medication 2 UNITS: at 00:24

## 2023-01-01 RX ADMIN — FUROSEMIDE 40 MG: 10 INJECTION, SOLUTION INTRAMUSCULAR; INTRAVENOUS at 22:49

## 2023-01-01 RX ADMIN — PIPERACILLIN SODIUM AND TAZOBACTAM SODIUM 3.38 G: 3; .375 INJECTION, POWDER, LYOPHILIZED, FOR SOLUTION INTRAVENOUS at 17:00

## 2023-01-01 RX ADMIN — SODIUM CHLORIDE, PRESERVATIVE FREE 10 ML: 5 INJECTION INTRAVENOUS at 21:01

## 2023-01-01 RX ADMIN — METRONIDAZOLE 500 MG: 500 INJECTION, SOLUTION INTRAVENOUS at 23:02

## 2023-01-01 RX ADMIN — METOPROLOL TARTRATE 5 MG: 1 INJECTION, SOLUTION INTRAVENOUS at 21:37

## 2023-01-01 RX ADMIN — CAMPHOR, MENTHOL: .5; .5 LOTION TOPICAL at 11:37

## 2023-01-01 RX ADMIN — Medication 8 UNITS: at 17:30

## 2023-01-01 RX ADMIN — Medication 2 UNITS: at 05:45

## 2023-01-01 RX ADMIN — ACETAMINOPHEN 650 MG: 325 TABLET ORAL at 13:14

## 2023-01-01 RX ADMIN — SODIUM CHLORIDE, PRESERVATIVE FREE 10 ML: 5 INJECTION INTRAVENOUS at 06:02

## 2023-01-01 RX ADMIN — FAMOTIDINE 20 MG: 10 INJECTION, SOLUTION INTRAVENOUS at 08:51

## 2023-01-01 RX ADMIN — LEVOTHYROXINE SODIUM 150 MCG: 150 TABLET ORAL at 10:49

## 2023-01-01 RX ADMIN — SODIUM CHLORIDE 10 MG/HR: 900 INJECTION, SOLUTION INTRAVENOUS at 22:41

## 2023-01-01 RX ADMIN — SODIUM CHLORIDE 10 MG/HR: 900 INJECTION, SOLUTION INTRAVENOUS at 19:22

## 2023-01-01 RX ADMIN — ASPIRIN 81 MG CHEWABLE TABLET 81 MG: 81 TABLET CHEWABLE at 15:18

## 2023-01-01 RX ADMIN — SODIUM CHLORIDE, PRESERVATIVE FREE 10 ML: 5 INJECTION INTRAVENOUS at 12:00

## 2023-01-01 RX ADMIN — TRIAMCINOLONE ACETONIDE: 5 CREAM TOPICAL at 17:06

## 2023-01-01 RX ADMIN — MAGNESIUM SULFATE HEPTAHYDRATE 2 G: 40 INJECTION, SOLUTION INTRAVENOUS at 23:42

## 2023-01-01 RX ADMIN — GABAPENTIN 400 MG: 400 CAPSULE ORAL at 11:05

## 2023-01-01 RX ADMIN — METOPROLOL TARTRATE 100 MG: 50 TABLET, FILM COATED ORAL at 23:43

## 2023-01-01 RX ADMIN — ALLOPURINOL 100 MG: 100 TABLET ORAL at 09:00

## 2023-01-01 RX ADMIN — BUMETANIDE 1 MG: 1 TABLET ORAL at 10:49

## 2023-01-01 RX ADMIN — SODIUM CHLORIDE 15 MG/HR: 900 INJECTION, SOLUTION INTRAVENOUS at 23:46

## 2023-01-01 RX ADMIN — FENTANYL CITRATE 100 MCG/HR: 0.05 INJECTION, SOLUTION INTRAMUSCULAR; INTRAVENOUS at 11:10

## 2023-01-01 RX ADMIN — GABAPENTIN 400 MG: 400 CAPSULE ORAL at 10:50

## 2023-01-01 RX ADMIN — METOLAZONE 2.5 MG: 5 TABLET ORAL at 17:20

## 2023-01-01 RX ADMIN — HEPARIN SODIUM 5000 UNITS: 5000 INJECTION INTRAVENOUS; SUBCUTANEOUS at 02:13

## 2023-01-01 RX ADMIN — TRIAMCINOLONE ACETONIDE: 5 CREAM TOPICAL at 11:38

## 2023-01-01 RX ADMIN — HEPARIN SODIUM 6 UNITS/KG/HR: 10000 INJECTION, SOLUTION INTRAVENOUS at 11:08

## 2023-01-01 RX ADMIN — METOPROLOL TARTRATE 5 MG: 1 INJECTION, SOLUTION INTRAVENOUS at 03:55

## 2023-01-01 RX ADMIN — HEPARIN SODIUM 18 UNITS/KG/HR: 10000 INJECTION, SOLUTION INTRAVENOUS at 11:18

## 2023-01-01 RX ADMIN — AMIODARONE HYDROCHLORIDE 0.5 MG/MIN: 1.8 INJECTION, SOLUTION INTRAVENOUS at 03:25

## 2023-01-01 RX ADMIN — METOPROLOL TARTRATE 5 MG: 1 INJECTION, SOLUTION INTRAVENOUS at 10:18

## 2023-01-01 RX ADMIN — ACETAMINOPHEN 650 MG: 325 TABLET, FILM COATED ORAL at 17:05

## 2023-01-01 RX ADMIN — FENTANYL CITRATE 125 MCG/HR: 0.05 INJECTION, SOLUTION INTRAMUSCULAR; INTRAVENOUS at 14:32

## 2023-01-01 RX ADMIN — SODIUM CHLORIDE 15 MG/HR: 900 INJECTION, SOLUTION INTRAVENOUS at 04:15

## 2023-01-01 RX ADMIN — BUMETANIDE 1 MG: 1 TABLET ORAL at 09:32

## 2023-01-01 RX ADMIN — SODIUM CHLORIDE 10 MG/HR: 900 INJECTION, SOLUTION INTRAVENOUS at 15:32

## 2023-01-01 RX ADMIN — HYDROXYZINE HYDROCHLORIDE 10 MG: 10 TABLET ORAL at 16:37

## 2023-01-01 RX ADMIN — METOPROLOL TARTRATE 50 MG: 50 TABLET, FILM COATED ORAL at 10:11

## 2023-01-01 RX ADMIN — FAMOTIDINE 20 MG: 10 INJECTION, SOLUTION INTRAVENOUS at 14:56

## 2023-01-01 RX ADMIN — CAMPHOR, MENTHOL: .5; .5 LOTION TOPICAL at 21:03

## 2023-01-01 RX ADMIN — MILRINONE LACTATE IN DEXTROSE 0.2 MCG/KG/MIN: 200 INJECTION, SOLUTION INTRAVENOUS at 13:35

## 2023-01-01 RX ADMIN — GABAPENTIN 400 MG: 400 CAPSULE ORAL at 09:01

## 2023-01-01 RX ADMIN — METOPROLOL TARTRATE 50 MG: 50 TABLET, FILM COATED ORAL at 09:00

## 2023-01-01 RX ADMIN — LORAZEPAM 0.5 MG: 2 INJECTION INTRAMUSCULAR; INTRAVENOUS at 04:29

## 2023-01-01 RX ADMIN — POTASSIUM CHLORIDE 10 MEQ: 7.46 INJECTION, SOLUTION INTRAVENOUS at 06:55

## 2023-01-01 RX ADMIN — LORAZEPAM 1 MG: 2 INJECTION INTRAMUSCULAR; INTRAVENOUS at 20:01

## 2023-01-01 RX ADMIN — POTASSIUM CHLORIDE 10 MEQ: 7.46 INJECTION, SOLUTION INTRAVENOUS at 10:50

## 2023-01-01 RX ADMIN — PERFLUTREN 2 ML: 6.52 INJECTION, SUSPENSION INTRAVENOUS at 15:35

## 2023-01-01 RX ADMIN — METOPROLOL TARTRATE 5 MG: 1 INJECTION, SOLUTION INTRAVENOUS at 21:21

## 2023-01-01 RX ADMIN — Medication 6 UNITS: at 14:14

## 2023-01-01 RX ADMIN — Medication 2 UNITS: at 06:04

## 2023-01-01 RX ADMIN — LORAZEPAM 1 MG: 2 INJECTION INTRAMUSCULAR; INTRAVENOUS at 05:38

## 2023-01-01 RX ADMIN — CHLORHEXIDINE GLUCONATE 0.12% ORAL RINSE 10 ML: 1.2 LIQUID ORAL at 11:05

## 2023-01-01 RX ADMIN — CAMPHOR, MENTHOL: .5; .5 LOTION TOPICAL at 10:21

## 2023-01-01 RX ADMIN — SODIUM CHLORIDE 5 MG/HR: 900 INJECTION, SOLUTION INTRAVENOUS at 01:05

## 2023-01-01 RX ADMIN — AMIODARONE HYDROCHLORIDE 200 MG: 200 TABLET ORAL at 10:50

## 2023-01-01 RX ADMIN — Medication 2 UNITS: at 13:29

## 2023-01-01 RX ADMIN — Medication 4 UNITS: at 00:09

## 2023-01-01 RX ADMIN — FAMOTIDINE 20 MG: 10 INJECTION, SOLUTION INTRAVENOUS at 10:02

## 2023-01-01 RX ADMIN — ATORVASTATIN CALCIUM 80 MG: 40 TABLET, FILM COATED ORAL at 09:00

## 2023-01-01 RX ADMIN — ACETAMINOPHEN 650 MG: 325 TABLET, FILM COATED ORAL at 14:56

## 2023-01-01 RX ADMIN — METOPROLOL TARTRATE 5 MG: 1 INJECTION, SOLUTION INTRAVENOUS at 16:25

## 2023-01-01 RX ADMIN — METOPROLOL TARTRATE 5 MG: 1 INJECTION, SOLUTION INTRAVENOUS at 21:31

## 2023-01-01 RX ADMIN — METOLAZONE 2.5 MG: 5 TABLET ORAL at 18:40

## 2023-01-01 RX ADMIN — MIDAZOLAM 2 MG/HR: 5 INJECTION INTRAMUSCULAR; INTRAVENOUS at 06:39

## 2023-01-01 RX ADMIN — POTASSIUM BICARBONATE 40 MEQ: 782 TABLET, EFFERVESCENT ORAL at 11:06

## 2023-01-01 RX ADMIN — METOPROLOL TARTRATE 50 MG: 50 TABLET, FILM COATED ORAL at 09:20

## 2023-01-01 RX ADMIN — ASPIRIN 81 MG CHEWABLE TABLET 81 MG: 81 TABLET CHEWABLE at 09:22

## 2023-01-01 RX ADMIN — Medication 6 UNITS: at 13:19

## 2023-01-01 RX ADMIN — ALLOPURINOL 100 MG: 100 TABLET ORAL at 11:05

## 2023-01-01 RX ADMIN — Medication 2 UNITS: at 06:42

## 2023-01-01 RX ADMIN — SODIUM CHLORIDE, PRESERVATIVE FREE 10 ML: 5 INJECTION INTRAVENOUS at 05:36

## 2023-01-01 RX ADMIN — METOPROLOL TARTRATE 50 MG: 50 TABLET, FILM COATED ORAL at 21:43

## 2023-01-01 RX ADMIN — ACETAMINOPHEN 650 MG: 325 TABLET ORAL at 14:28

## 2023-01-01 RX ADMIN — MIDAZOLAM 2 MG: 1 INJECTION INTRAMUSCULAR; INTRAVENOUS at 09:22

## 2023-01-01 RX ADMIN — TRIAMCINOLONE ACETONIDE: 5 CREAM TOPICAL at 09:58

## 2023-01-01 RX ADMIN — LORAZEPAM 1 MG: 2 INJECTION INTRAMUSCULAR; INTRAVENOUS at 01:59

## 2023-01-01 RX ADMIN — FAMOTIDINE 20 MG: 10 INJECTION, SOLUTION INTRAVENOUS at 09:20

## 2023-01-01 RX ADMIN — Medication 5 UNITS: at 22:00

## 2023-01-01 RX ADMIN — TRIAMCINOLONE ACETONIDE: 5 CREAM TOPICAL at 09:00

## 2023-01-01 RX ADMIN — CHLORHEXIDINE GLUCONATE 0.12% ORAL RINSE 10 ML: 1.2 LIQUID ORAL at 09:21

## 2023-01-01 RX ADMIN — FAMOTIDINE 20 MG: 10 INJECTION, SOLUTION INTRAVENOUS at 10:49

## 2023-01-01 RX ADMIN — AMIODARONE HYDROCHLORIDE 0.5 MG/MIN: 1.8 INJECTION, SOLUTION INTRAVENOUS at 04:45

## 2023-01-01 RX ADMIN — FAMOTIDINE 20 MG: 10 INJECTION, SOLUTION INTRAVENOUS at 09:00

## 2023-01-01 RX ADMIN — CAMPHOR, MENTHOL: .5; .5 LOTION TOPICAL at 17:06

## 2023-01-01 RX ADMIN — TRIAMCINOLONE ACETONIDE: 5 CREAM TOPICAL at 17:00

## 2023-01-01 RX ADMIN — CAMPHOR, MENTHOL: .5; .5 LOTION TOPICAL at 08:00

## 2023-01-01 RX ADMIN — SODIUM CHLORIDE, PRESERVATIVE FREE 10 ML: 5 INJECTION INTRAVENOUS at 15:00

## 2023-01-01 RX ADMIN — MILRINONE LACTATE IN DEXTROSE 0.2 MCG/KG/MIN: 200 INJECTION, SOLUTION INTRAVENOUS at 21:42

## 2023-01-01 RX ADMIN — ASPIRIN 81 MG CHEWABLE TABLET 81 MG: 81 TABLET CHEWABLE at 09:20

## 2023-01-01 RX ADMIN — FENTANYL CITRATE 200 MCG/HR: 0.05 INJECTION, SOLUTION INTRAMUSCULAR; INTRAVENOUS at 02:51

## 2023-01-01 RX ADMIN — SODIUM CHLORIDE, PRESERVATIVE FREE 10 ML: 5 INJECTION INTRAVENOUS at 21:19

## 2023-01-01 RX ADMIN — HEPARIN SODIUM 13.03 UNITS/KG/HR: 10000 INJECTION, SOLUTION INTRAVENOUS at 00:30

## 2023-01-01 RX ADMIN — Medication 4 UNITS: at 17:30

## 2023-01-01 RX ADMIN — METRONIDAZOLE 500 MG: 500 INJECTION, SOLUTION INTRAVENOUS at 10:26

## 2023-01-01 RX ADMIN — IPRATROPIUM BROMIDE AND ALBUTEROL 1 PUFF: 20; 100 SPRAY, METERED RESPIRATORY (INHALATION) at 11:02

## 2023-01-01 RX ADMIN — CAMPHOR, MENTHOL: .5; .5 LOTION TOPICAL at 16:54

## 2023-01-01 RX ADMIN — ACETAMINOPHEN 650 MG: 325 TABLET, FILM COATED ORAL at 06:03

## 2023-01-01 RX ADMIN — LEVOTHYROXINE SODIUM 150 MCG: 150 TABLET ORAL at 09:00

## 2023-01-01 RX ADMIN — METOPROLOL TARTRATE 5 MG: 1 INJECTION, SOLUTION INTRAVENOUS at 21:00

## 2023-01-01 RX ADMIN — ALLOPURINOL 100 MG: 100 TABLET ORAL at 09:40

## 2023-01-01 RX ADMIN — Medication 2 UNITS: at 05:16

## 2023-01-01 RX ADMIN — DILTIAZEM HYDROCHLORIDE 60 MG: 60 TABLET, FILM COATED ORAL at 10:50

## 2023-01-01 RX ADMIN — FAMOTIDINE 20 MG: 10 INJECTION, SOLUTION INTRAVENOUS at 09:22

## 2023-01-01 RX ADMIN — POTASSIUM CHLORIDE 10 MEQ: 7.46 INJECTION, SOLUTION INTRAVENOUS at 10:04

## 2023-01-01 RX ADMIN — ACETAMINOPHEN 650 MG: 325 TABLET, FILM COATED ORAL at 00:24

## 2023-01-01 RX ADMIN — CAMPHOR, MENTHOL: .5; .5 LOTION TOPICAL at 09:03

## 2023-01-01 RX ADMIN — LEVOTHYROXINE SODIUM 150 MCG: 150 TABLET ORAL at 09:21

## 2023-01-01 RX ADMIN — METOLAZONE 2.5 MG: 5 TABLET ORAL at 10:02

## 2023-01-01 RX ADMIN — LEVOTHYROXINE SODIUM 150 MCG: 150 TABLET ORAL at 14:56

## 2023-01-01 RX ADMIN — Medication 2 UNITS: at 18:35

## 2023-01-01 RX ADMIN — Medication 4 UNITS: at 23:24

## 2023-01-01 RX ADMIN — CHLORHEXIDINE GLUCONATE 0.12% ORAL RINSE 10 ML: 1.2 LIQUID ORAL at 20:00

## 2023-01-01 RX ADMIN — METOLAZONE 2.5 MG: 5 TABLET ORAL at 17:45

## 2023-01-01 RX ADMIN — ACETAMINOPHEN 650 MG: 325 TABLET, FILM COATED ORAL at 23:01

## 2023-01-01 RX ADMIN — DILTIAZEM HYDROCHLORIDE 27 MG: 5 INJECTION, SOLUTION INTRAVENOUS at 00:50

## 2023-01-01 RX ADMIN — DILTIAZEM HYDROCHLORIDE 60 MG: 60 TABLET, FILM COATED ORAL at 14:13

## 2023-01-01 RX ADMIN — HEPARIN SODIUM 10.3 UNITS/KG/HR: 10000 INJECTION, SOLUTION INTRAVENOUS at 23:46

## 2023-01-01 RX ADMIN — Medication 8 UNITS: at 11:45

## 2023-01-01 RX ADMIN — ASPIRIN 81 MG CHEWABLE TABLET 81 MG: 81 TABLET CHEWABLE at 09:39

## 2023-01-01 RX ADMIN — BUMETANIDE 1 MG: 1 TABLET ORAL at 09:40

## 2023-01-01 RX ADMIN — SODIUM CHLORIDE, PRESERVATIVE FREE 10 ML: 5 INJECTION INTRAVENOUS at 15:11

## 2023-01-01 RX ADMIN — HYDROXYZINE HYDROCHLORIDE 10 MG: 10 TABLET ORAL at 11:30

## 2023-01-01 RX ADMIN — SODIUM CHLORIDE, PRESERVATIVE FREE 10 ML: 5 INJECTION INTRAVENOUS at 17:07

## 2023-01-01 RX ADMIN — ACETAMINOPHEN 650 MG: 325 TABLET, FILM COATED ORAL at 11:18

## 2023-01-01 RX ADMIN — SODIUM CHLORIDE, PRESERVATIVE FREE 10 ML: 5 INJECTION INTRAVENOUS at 05:00

## 2023-01-01 RX ADMIN — METOPROLOL TARTRATE 100 MG: 50 TABLET, FILM COATED ORAL at 14:57

## 2023-01-01 RX ADMIN — CAMPHOR, MENTHOL: .5; .5 LOTION TOPICAL at 01:04

## 2023-01-01 RX ADMIN — DEXAMETHASONE SODIUM PHOSPHATE 6 MG: 4 INJECTION, SOLUTION INTRAMUSCULAR; INTRAVENOUS at 19:58

## 2023-01-01 RX ADMIN — CAMPHOR, MENTHOL: .5; .5 LOTION TOPICAL at 22:58

## 2023-01-01 RX ADMIN — METRONIDAZOLE 500 MG: 500 INJECTION, SOLUTION INTRAVENOUS at 10:19

## 2023-01-01 RX ADMIN — Medication 15 UNITS: at 21:31

## 2023-01-01 RX ADMIN — SODIUM CHLORIDE, PRESERVATIVE FREE 10 ML: 5 INJECTION INTRAVENOUS at 21:13

## 2023-01-01 RX ADMIN — HEPARIN SODIUM 7970 UNITS: 1000 INJECTION INTRAVENOUS; SUBCUTANEOUS at 17:15

## 2023-01-01 RX ADMIN — AMIODARONE HYDROCHLORIDE 0.5 MG/MIN: 1.8 INJECTION, SOLUTION INTRAVENOUS at 15:40

## 2023-01-01 RX ADMIN — TRIAMCINOLONE ACETONIDE: 5 CREAM TOPICAL at 10:51

## 2023-01-01 RX ADMIN — FAMOTIDINE 20 MG: 10 INJECTION, SOLUTION INTRAVENOUS at 10:18

## 2023-01-01 RX ADMIN — BUMETANIDE 1 MG: 1 TABLET ORAL at 11:05

## 2023-01-03 ENCOUNTER — PATIENT OUTREACH (OUTPATIENT)
Dept: CASE MANAGEMENT | Age: 88
End: 2023-01-03

## 2023-01-03 NOTE — PROGRESS NOTES
Care Transitions Follow Up Call    Challenges to be reviewed by the provider   Additional needs identified to be addressed with provider: no  none           Method of communication with provider : none    Care Transitions Nurse (CTN) attempted to contact the patient and her friend Shashi Perkins by telephone to perform post hospital discharge assessment. Unable to reach. Left VMMs requesting a return call. Care Transition Nurse (CTN) contacted the  patient's son Merline Sham, on PHI release,  by telephone to follow up after admission on 22. Verified name and  with family as identifiers. CTN notified him that a few VMMs have been left for the patient and Vijaya Knox and he will ask them to contact CTN. He stated that typically Vijaya Knox will return calls when a VM is left. CTN received a return call from Shashi Perkins stating at this time the patient is feeling better and they were able to get everything straightened out. He states he and the patient decided to contact CTN for future needs and declined further follow up at this time. Addressed changes since last contact: none  Follow up appointment completed? yes. Was follow up appointment scheduled within 7 days of discharge? yes. Advance Care Planning:   Does patient have an Advance Directive:   not on file        Patients top risk factors for readmission:  financial and medical condition-CHF-EF 20-25%, Afib RVR, COPD on home O2 prn. Non-adherence to meds d/t cost    Interventions to address risk factors: Assessment and support for treatment adherence and medication management-Patient's son confirmed that the patient obtained her new medications. Patient's friend Vijaya Knox confirmed that the patient is feeling better and she has everything she needs at this time.     [address] follow up appointment(s):   Future Appointments   Date Time Provider Srinivas Saba   3/3/2023 11:00 AM Yinka De Leon MD CAP BS [facility]     Non-[facility] follow up appointment(s): n/a    CTN provided contact information for future needs. Patient declined follow up calls from CTN. Resolving Transitions of Care episode. Goals Addressed                   This Visit's Progress     COMPLETED: Attends follow-up appointments as directed. Patient will schedule and attend recommended follow up appointments over the next 30 days. 12/27/22  PCP associate Dr. Stefanie Kendall on 12/27/22 @ 3:50 PM. 12/28: Completed appt with PCP associate yesterday. Patient states a plan to schedule appt with cardiologist Dr. Ning Jones.

## 2023-01-12 ENCOUNTER — APPOINTMENT (OUTPATIENT)
Dept: GENERAL RADIOLOGY | Age: 88
DRG: 638 | End: 2023-01-12
Payer: MEDICARE

## 2023-01-12 ENCOUNTER — APPOINTMENT (OUTPATIENT)
Dept: GENERAL RADIOLOGY | Age: 88
DRG: 638 | End: 2023-01-12
Attending: EMERGENCY MEDICINE
Payer: MEDICARE

## 2023-01-12 ENCOUNTER — HOSPITAL ENCOUNTER (INPATIENT)
Age: 88
LOS: 1 days | Discharge: LEFT BEFORE DISCHARGE | DRG: 638 | End: 2023-01-13
Attending: EMERGENCY MEDICINE | Admitting: FAMILY MEDICINE
Payer: MEDICARE

## 2023-01-12 DIAGNOSIS — L03.116 CELLULITIS OF BOTH LOWER EXTREMITIES: Primary | ICD-10-CM

## 2023-01-12 DIAGNOSIS — I48.0 PAROXYSMAL ATRIAL FIBRILLATION (HCC): ICD-10-CM

## 2023-01-12 DIAGNOSIS — L03.115 CELLULITIS OF BOTH LOWER EXTREMITIES: Primary | ICD-10-CM

## 2023-01-12 PROBLEM — L03.90 CELLULITIS: Status: ACTIVE | Noted: 2023-01-01

## 2023-01-12 LAB
ALBUMIN SERPL-MCNC: 3 G/DL (ref 3.4–5)
ALBUMIN/GLOB SERPL: 0.6 (ref 0.8–1.7)
ALP SERPL-CCNC: 54 U/L (ref 45–117)
ALT SERPL-CCNC: <6 U/L (ref 13–56)
ANION GAP SERPL CALC-SCNC: 8 MMOL/L (ref 3–18)
AST SERPL-CCNC: 8 U/L (ref 10–38)
BASOPHILS # BLD: 0.1 K/UL (ref 0–0.1)
BASOPHILS NFR BLD: 1 % (ref 0–2)
BILIRUB SERPL-MCNC: 0.5 MG/DL (ref 0.2–1)
BUN SERPL-MCNC: 35 MG/DL (ref 7–18)
BUN/CREAT SERPL: 26 (ref 12–20)
CALCIUM SERPL-MCNC: 8.6 MG/DL (ref 8.5–10.1)
CHLORIDE SERPL-SCNC: 99 MMOL/L (ref 100–111)
CO2 SERPL-SCNC: 30 MMOL/L (ref 21–32)
CREAT SERPL-MCNC: 1.37 MG/DL (ref 0.6–1.3)
DIFFERENTIAL METHOD BLD: ABNORMAL
EOSINOPHIL # BLD: 0.8 K/UL (ref 0–0.4)
EOSINOPHIL NFR BLD: 6 % (ref 0–5)
ERYTHROCYTE [DISTWIDTH] IN BLOOD BY AUTOMATED COUNT: 15.4 % (ref 11.6–14.5)
GLOBULIN SER CALC-MCNC: 4.8 G/DL (ref 2–4)
GLUCOSE BLD STRIP.AUTO-MCNC: 239 MG/DL (ref 70–110)
GLUCOSE BLD STRIP.AUTO-MCNC: 303 MG/DL (ref 70–110)
GLUCOSE SERPL-MCNC: 305 MG/DL (ref 74–99)
HCT VFR BLD AUTO: 29 % (ref 35–45)
HGB BLD-MCNC: 9.2 G/DL (ref 12–16)
IMM GRANULOCYTES # BLD AUTO: 0.1 K/UL (ref 0–0.04)
IMM GRANULOCYTES NFR BLD AUTO: 1 % (ref 0–0.5)
LACTATE SERPL-SCNC: 1.5 MMOL/L (ref 0.4–2)
LYMPHOCYTES # BLD: 1.3 K/UL (ref 0.9–3.6)
LYMPHOCYTES NFR BLD: 11 % (ref 21–52)
MCH RBC QN AUTO: 26.1 PG (ref 24–34)
MCHC RBC AUTO-ENTMCNC: 31.7 G/DL (ref 31–37)
MCV RBC AUTO: 82.4 FL (ref 78–100)
MONOCYTES # BLD: 1 K/UL (ref 0.05–1.2)
MONOCYTES NFR BLD: 9 % (ref 3–10)
NEUTS SEG # BLD: 8.5 K/UL (ref 1.8–8)
NEUTS SEG NFR BLD: 72 % (ref 40–73)
NRBC # BLD: 0 K/UL (ref 0–0.01)
NRBC BLD-RTO: 0 PER 100 WBC
PLATELET # BLD AUTO: 284 K/UL (ref 135–420)
PMV BLD AUTO: 11.5 FL (ref 9.2–11.8)
POTASSIUM SERPL-SCNC: 3.6 MMOL/L (ref 3.5–5.5)
PROT SERPL-MCNC: 7.8 G/DL (ref 6.4–8.2)
RBC # BLD AUTO: 3.52 M/UL (ref 4.2–5.3)
SODIUM SERPL-SCNC: 137 MMOL/L (ref 136–145)
WBC # BLD AUTO: 11.8 K/UL (ref 4.6–13.2)

## 2023-01-12 PROCEDURE — 83605 ASSAY OF LACTIC ACID: CPT

## 2023-01-12 PROCEDURE — 96366 THER/PROPH/DIAG IV INF ADDON: CPT

## 2023-01-12 PROCEDURE — 74011250636 HC RX REV CODE- 250/636

## 2023-01-12 PROCEDURE — 74011000250 HC RX REV CODE- 250

## 2023-01-12 PROCEDURE — 96365 THER/PROPH/DIAG IV INF INIT: CPT

## 2023-01-12 PROCEDURE — 93005 ELECTROCARDIOGRAM TRACING: CPT

## 2023-01-12 PROCEDURE — 80053 COMPREHEN METABOLIC PANEL: CPT

## 2023-01-12 PROCEDURE — 65660000004 HC RM CVT STEPDOWN

## 2023-01-12 PROCEDURE — 87040 BLOOD CULTURE FOR BACTERIA: CPT

## 2023-01-12 PROCEDURE — 71045 X-RAY EXAM CHEST 1 VIEW: CPT

## 2023-01-12 PROCEDURE — 74011000258 HC RX REV CODE- 258: Performed by: EMERGENCY MEDICINE

## 2023-01-12 PROCEDURE — 82962 GLUCOSE BLOOD TEST: CPT

## 2023-01-12 PROCEDURE — 99285 EMERGENCY DEPT VISIT HI MDM: CPT

## 2023-01-12 PROCEDURE — 85025 COMPLETE CBC W/AUTO DIFF WBC: CPT

## 2023-01-12 PROCEDURE — 74011250637 HC RX REV CODE- 250/637

## 2023-01-12 PROCEDURE — 74011000250 HC RX REV CODE- 250: Performed by: EMERGENCY MEDICINE

## 2023-01-12 PROCEDURE — 96375 TX/PRO/DX INJ NEW DRUG ADDON: CPT

## 2023-01-12 PROCEDURE — 94762 N-INVAS EAR/PLS OXIMTRY CONT: CPT

## 2023-01-12 PROCEDURE — 74011250636 HC RX REV CODE- 250/636: Performed by: EMERGENCY MEDICINE

## 2023-01-12 PROCEDURE — 74011636637 HC RX REV CODE- 636/637: Performed by: STUDENT IN AN ORGANIZED HEALTH CARE EDUCATION/TRAINING PROGRAM

## 2023-01-12 RX ORDER — BUMETANIDE 1 MG/1
1 TABLET ORAL DAILY
Status: ON HOLD | COMMUNITY

## 2023-01-12 RX ORDER — BUMETANIDE 1 MG/1
1 TABLET ORAL DAILY
Status: DISCONTINUED | OUTPATIENT
Start: 2023-01-13 | End: 2023-01-13 | Stop reason: HOSPADM

## 2023-01-12 RX ORDER — ACETAMINOPHEN 650 MG/1
650 SUPPOSITORY RECTAL
Status: DISCONTINUED | OUTPATIENT
Start: 2023-01-12 | End: 2023-01-13 | Stop reason: HOSPADM

## 2023-01-12 RX ORDER — SODIUM CHLORIDE 0.9 % (FLUSH) 0.9 %
5-40 SYRINGE (ML) INJECTION AS NEEDED
Status: DISCONTINUED | OUTPATIENT
Start: 2023-01-12 | End: 2023-01-13 | Stop reason: HOSPADM

## 2023-01-12 RX ORDER — VANCOMYCIN 2 GRAM/500 ML IN 0.9 % SODIUM CHLORIDE INTRAVENOUS
2000 ONCE
Status: COMPLETED | OUTPATIENT
Start: 2023-01-12 | End: 2023-01-12

## 2023-01-12 RX ORDER — DIPHENHYDRAMINE HYDROCHLORIDE 50 MG/ML
25 INJECTION, SOLUTION INTRAMUSCULAR; INTRAVENOUS ONCE
Status: COMPLETED | OUTPATIENT
Start: 2023-01-12 | End: 2023-01-12

## 2023-01-12 RX ORDER — METOPROLOL TARTRATE 50 MG/1
100 TABLET ORAL EVERY 12 HOURS
Status: DISCONTINUED | OUTPATIENT
Start: 2023-01-12 | End: 2023-01-12

## 2023-01-12 RX ORDER — METOPROLOL TARTRATE 50 MG/1
50 TABLET ORAL ONCE
Status: COMPLETED | OUTPATIENT
Start: 2023-01-12 | End: 2023-01-12

## 2023-01-12 RX ORDER — POLYETHYLENE GLYCOL 3350 17 G/17G
17 POWDER, FOR SOLUTION ORAL DAILY PRN
Status: DISCONTINUED | OUTPATIENT
Start: 2023-01-12 | End: 2023-01-13 | Stop reason: HOSPADM

## 2023-01-12 RX ORDER — IBUPROFEN 200 MG
16 TABLET ORAL AS NEEDED
Status: DISCONTINUED | OUTPATIENT
Start: 2023-01-12 | End: 2023-01-13 | Stop reason: HOSPADM

## 2023-01-12 RX ORDER — LEVOTHYROXINE SODIUM 75 UG/1
150 TABLET ORAL
Status: DISCONTINUED | OUTPATIENT
Start: 2023-01-13 | End: 2023-01-13 | Stop reason: HOSPADM

## 2023-01-12 RX ORDER — HYDROXYZINE 25 MG/1
25 TABLET, FILM COATED ORAL 4 TIMES DAILY
Status: DISCONTINUED | OUTPATIENT
Start: 2023-01-12 | End: 2023-01-12

## 2023-01-12 RX ORDER — CETIRIZINE HYDROCHLORIDE 10 MG/1
10 TABLET ORAL DAILY
Status: DISCONTINUED | OUTPATIENT
Start: 2023-01-12 | End: 2023-01-13 | Stop reason: HOSPADM

## 2023-01-12 RX ORDER — ALLOPURINOL 100 MG/1
100 TABLET ORAL DAILY
Status: DISCONTINUED | OUTPATIENT
Start: 2023-01-13 | End: 2023-01-13 | Stop reason: HOSPADM

## 2023-01-12 RX ORDER — DEXTROSE MONOHYDRATE 100 MG/ML
0-250 INJECTION, SOLUTION INTRAVENOUS AS NEEDED
Status: DISCONTINUED | OUTPATIENT
Start: 2023-01-12 | End: 2023-01-13 | Stop reason: HOSPADM

## 2023-01-12 RX ORDER — ATORVASTATIN CALCIUM 40 MG/1
80 TABLET, FILM COATED ORAL DAILY
Status: DISCONTINUED | OUTPATIENT
Start: 2023-01-13 | End: 2023-01-13 | Stop reason: HOSPADM

## 2023-01-12 RX ORDER — HYDROXYZINE 25 MG/1
25 TABLET, FILM COATED ORAL
Status: DISCONTINUED | OUTPATIENT
Start: 2023-01-12 | End: 2023-01-13 | Stop reason: HOSPADM

## 2023-01-12 RX ORDER — ENOXAPARIN SODIUM 100 MG/ML
40 INJECTION SUBCUTANEOUS DAILY
Status: DISCONTINUED | OUTPATIENT
Start: 2023-01-13 | End: 2023-01-12

## 2023-01-12 RX ORDER — METOPROLOL TARTRATE 50 MG/1
50 TABLET ORAL EVERY 12 HOURS
Status: DISCONTINUED | OUTPATIENT
Start: 2023-01-13 | End: 2023-01-12

## 2023-01-12 RX ORDER — GABAPENTIN 400 MG/1
800 CAPSULE ORAL
Status: DISCONTINUED | OUTPATIENT
Start: 2023-01-12 | End: 2023-01-13 | Stop reason: HOSPADM

## 2023-01-12 RX ORDER — ACETAMINOPHEN 325 MG/1
650 TABLET ORAL
Status: DISCONTINUED | OUTPATIENT
Start: 2023-01-12 | End: 2023-01-13 | Stop reason: HOSPADM

## 2023-01-12 RX ORDER — GUAIFENESIN 100 MG/5ML
81 LIQUID (ML) ORAL DAILY
Status: DISCONTINUED | OUTPATIENT
Start: 2023-01-13 | End: 2023-01-13 | Stop reason: HOSPADM

## 2023-01-12 RX ORDER — SODIUM CHLORIDE 0.9 % (FLUSH) 0.9 %
5-40 SYRINGE (ML) INJECTION EVERY 8 HOURS
Status: DISCONTINUED | OUTPATIENT
Start: 2023-01-12 | End: 2023-01-13 | Stop reason: HOSPADM

## 2023-01-12 RX ORDER — ONDANSETRON 4 MG/1
4 TABLET, ORALLY DISINTEGRATING ORAL
Status: DISCONTINUED | OUTPATIENT
Start: 2023-01-12 | End: 2023-01-13 | Stop reason: HOSPADM

## 2023-01-12 RX ORDER — INSULIN LISPRO 100 [IU]/ML
INJECTION, SOLUTION INTRAVENOUS; SUBCUTANEOUS
Status: DISCONTINUED | OUTPATIENT
Start: 2023-01-12 | End: 2023-01-13 | Stop reason: HOSPADM

## 2023-01-12 RX ORDER — HYDROXYZINE 25 MG/1
50 TABLET, FILM COATED ORAL
Status: DISCONTINUED | OUTPATIENT
Start: 2023-01-12 | End: 2023-01-12

## 2023-01-12 RX ORDER — METOPROLOL TARTRATE 50 MG/1
50 TABLET ORAL 2 TIMES DAILY
Status: DISCONTINUED | OUTPATIENT
Start: 2023-01-13 | End: 2023-01-13 | Stop reason: HOSPADM

## 2023-01-12 RX ORDER — HYDROXYZINE 25 MG/1
25 TABLET, FILM COATED ORAL
Status: DISCONTINUED | OUTPATIENT
Start: 2023-01-13 | End: 2023-01-12

## 2023-01-12 RX ORDER — METOPROLOL TARTRATE 5 MG/5ML
5 INJECTION INTRAVENOUS ONCE
Status: COMPLETED | OUTPATIENT
Start: 2023-01-12 | End: 2023-01-12

## 2023-01-12 RX ORDER — INSULIN LISPRO 100 [IU]/ML
INJECTION, SOLUTION INTRAVENOUS; SUBCUTANEOUS
Status: DISCONTINUED | OUTPATIENT
Start: 2023-01-12 | End: 2023-01-12

## 2023-01-12 RX ORDER — ONDANSETRON 2 MG/ML
4 INJECTION INTRAMUSCULAR; INTRAVENOUS
Status: DISCONTINUED | OUTPATIENT
Start: 2023-01-12 | End: 2023-01-13 | Stop reason: HOSPADM

## 2023-01-12 RX ORDER — DILTIAZEM HYDROCHLORIDE 5 MG/ML
10 INJECTION INTRAVENOUS
Status: COMPLETED | OUTPATIENT
Start: 2023-01-12 | End: 2023-01-12

## 2023-01-12 RX ADMIN — SODIUM CHLORIDE, PRESERVATIVE FREE 10 ML: 5 INJECTION INTRAVENOUS at 18:30

## 2023-01-12 RX ADMIN — DIPHENHYDRAMINE HYDROCHLORIDE 25 MG: 50 INJECTION INTRAMUSCULAR; INTRAVENOUS at 19:45

## 2023-01-12 RX ADMIN — VANCOMYCIN HYDROCHLORIDE 2000 MG: 10 INJECTION, POWDER, LYOPHILIZED, FOR SOLUTION INTRAVENOUS at 15:09

## 2023-01-12 RX ADMIN — SODIUM CHLORIDE, PRESERVATIVE FREE 10 ML: 5 INJECTION INTRAVENOUS at 21:53

## 2023-01-12 RX ADMIN — METOPROLOL TARTRATE 5 MG: 5 INJECTION, SOLUTION INTRAVENOUS at 20:23

## 2023-01-12 RX ADMIN — SODIUM CHLORIDE 15 MG/HR: 900 INJECTION, SOLUTION INTRAVENOUS at 18:29

## 2023-01-12 RX ADMIN — METOPROLOL TARTRATE 50 MG: 50 TABLET, FILM COATED ORAL at 21:51

## 2023-01-12 RX ADMIN — SODIUM CHLORIDE, POTASSIUM CHLORIDE, SODIUM LACTATE AND CALCIUM CHLORIDE 1000 ML: 600; 310; 30; 20 INJECTION, SOLUTION INTRAVENOUS at 15:18

## 2023-01-12 RX ADMIN — SODIUM CHLORIDE 10 MG/HR: 900 INJECTION, SOLUTION INTRAVENOUS at 18:01

## 2023-01-12 RX ADMIN — APIXABAN 5 MG: 5 TABLET, FILM COATED ORAL at 19:45

## 2023-01-12 RX ADMIN — GABAPENTIN 800 MG: 400 CAPSULE ORAL at 21:51

## 2023-01-12 RX ADMIN — HYDROXYZINE HYDROCHLORIDE 25 MG: 25 TABLET ORAL at 21:52

## 2023-01-12 RX ADMIN — DILTIAZEM HYDROCHLORIDE 10 MG: 5 INJECTION INTRAVENOUS at 15:08

## 2023-01-12 RX ADMIN — Medication 8 UNITS: at 21:50

## 2023-01-12 RX ADMIN — SODIUM CHLORIDE 5 MG/HR: 900 INJECTION, SOLUTION INTRAVENOUS at 17:18

## 2023-01-12 RX ADMIN — WATER 1 G: 1 INJECTION INTRAMUSCULAR; INTRAVENOUS; SUBCUTANEOUS at 15:20

## 2023-01-12 RX ADMIN — CETIRIZINE HYDROCHLORIDE 10 MG: 10 TABLET, FILM COATED ORAL at 18:38

## 2023-01-12 NOTE — PROGRESS NOTES
[address] [facility] Pharmacokinetic Monitoring Service - Vancomycin     Renee Lazaro is a 82 y.o. female starting on vancomycin therapy for Skin and Soft Tissue Infection. Pharmacy consulted by Dr. Misty Hernandez for monitoring and adjustment. Target Concentration: Goal AUC/TRINIDAD 400-600 mg*hr/L    Additional Antimicrobials: Ceftriaxone    Pertinent Laboratory Values:   Temp: 98 °F (36.7 °C)  Weight: 101 kg (222 lb 10.6 oz)  No results for input(s): CREA, BUN, WBC, PCT in the last 72 hours. Estimated Creatinine Clearance: 32.1 mL/min (A) (by C-G formula based on SCr of 1.51 mg/dL (H)). Pertinent Cultures:  Culture Date Source Results   1/12 blood pending   MRSA Nasal Swab: N/A.  Non-respiratory infection    Plan:  Dosing recommendations based on Bayesian software  Start vancomycin 2gm x1 followed by 1gm q24h  Anticipated AUC of 499 and trough concentration of 16.7 at steady state  Renal labs as indicated   Vancomycin concentration ordered for AM labs tomorrow  Pharmacy will continue to monitor patient and adjust therapy as indicated    Thank you for the consult,  Michelle Felix, [facility]  1/12/2023

## 2023-01-12 NOTE — ED PROVIDER NOTES
EMERGENCY DEPARTMENT HISTORY AND PHYSICAL EXAM    Date: 1/12/2023  Patient Name: Mary Ledesma    History of Presenting Illness     Chief Complaint   Patient presents with    Irregular Heart Beat         History Provided By:       Additional History (Context): Wing Luis is a 82 y.o. female with a history of of DVT, atrial fib, poorly controlled diabetes who presents emerged department from her primary care doctor's office due to worsening cellulitis, atrial fibrillation with RVR. I spoke with the patient's primary care doctor earlier this morning who states that the patient has had worsening redness to both legs now extending higher and originally a couple of weeks ago. She states that she rarely takes her medications, has very poorly controlled diabetes and is no longer on any anticoagulation other than potentially daily aspirin and due to her poor compliance and medication availability. States that she has not had any fevers or chills, does prescribe some mild dyspnea.       PCP: Shaka Knight MD    Current Facility-Administered Medications   Medication Dose Route Frequency Provider Last Rate Last Admin    cefTRIAXone (ROCEPHIN) 1 g in sterile water (preservative free) 10 mL IV syringe  1 g IntraVENous Q24H Samir Girard MD   1 g at 01/12/23 1520    [START ON 1/13/2023] vancomycin (VANCOCIN) 1,000 mg in 0.9% sodium chloride 250 mL (VIAL-MATE)  1,000 mg IntraVENous Q24H Samir Girard MD        dilTIAZem (CARDIZEM) 100 mg in 0.9% sodium chloride (MBP/ADV) 100 mL infusion  0-15 mg/hr IntraVENous TITRATE Nidia Love MD 10 mL/hr at 01/12/23 1801 10 mg/hr at 01/12/23 1801    sodium chloride (NS) flush 5-40 mL  5-40 mL IntraVENous Q8H Berna Houston MD        sodium chloride (NS) flush 5-40 mL  5-40 mL IntraVENous PRN Berna Houston MD        acetaminophen (TYLENOL) tablet 650 mg  650 mg Oral Q6H PRN Berna Houston MD        Or    acetaminophen (TYLENOL) suppository 650 mg 650 mg Rectal Q6H PRN Isaac Price MD        polyethylene glycol (MIRALAX) packet 17 g  17 g Oral DAILY PRN Isaac Price MD        ondansetron (ZOFRAN ODT) tablet 4 mg  4 mg Oral Q8H PRN Isaac Price MD        Or    ondansetron [facility]) injection 4 mg  4 mg IntraVENous Q6H PRN Isaac Price MD        [START ON 1/13/2023] enoxaparin (LOVENOX) injection 40 mg  40 mg SubCUTAneous DAILY Isaac Price MD        [START ON 1/13/2023] allopurinoL (ZYLOPRIM) tablet 100 mg  100 mg Oral DAILY Isaac Price MD        [START ON 1/13/2023] aspirin chewable tablet 81 mg  81 mg Oral DAILY Isaac Price MD        [START ON 1/13/2023] atorvastatin (LIPITOR) tablet 80 mg  80 mg Oral DAILY Isaac Price MD        [START ON 1/13/2023] bumetanide (BUMEX) tablet 1 mg  1 mg Oral DAILY Isaac Price MD        gabapentin (NEURONTIN) capsule 800 mg  800 mg Oral QHS Isaac Price MD        [facility] [place] by provider] metoprolol tartrate (LOPRESSOR) tablet 100 mg  100 mg Oral Q12H Isaac Price MD        glucose chewable tablet 16 g  16 g Oral PRN Isaac Price MD        glucagon (GLUCAGEN) injection 1 mg  1 mg IntraMUSCular PRN Isaac Price MD        dextrose 10% infusion 0-250 mL  0-250 mL IntraVENous PRN Isaac Price MD        [START ON 1/13/2023] levothyroxine (SYNTHROID) tablet 150 mcg  150 mcg Oral 6am Isaac Price MD        insulin lispro (HUMALOG) injection   SubCUTAneous AC&HS Lizbeth De Leon MD         Current Outpatient Medications   Medication Sig Dispense Refill    bumetanide (BUMEX) 1 mg tablet Take 1 mg by mouth daily. gabapentin (NEURONTIN) 400 mg capsule 1 capsule daily 360 Capsule 0    insulin glargine (LANTUS) 100 unit/mL injection Inject 20 Units at bedtime. Monitor and record blood sugar daily. 10 mL 1    aspirin 81 mg chewable tablet Take 1 Tablet by mouth daily.  30 Tablet 2    metoprolol tartrate (LOPRESSOR) 100 mg IR tablet Take 1 Tablet by mouth every twelve (12) hours. 120 Tablet 2    dilTIAZem ER (CARDIZEM CD) 180 mg capsule Take 1 Capsule by mouth daily. 120 Capsule 3    levothyroxine (SYNTHROID) 150 mcg tablet Take 150 mcg by mouth daily. morphine IR (MS IR) 15 mg tablet Take 15 mg by mouth two (2) times a day. metFORMIN (GLUCOPHAGE) 500 mg tablet Take 500 mg by mouth daily. allopurinoL (ZYLOPRIM) 100 mg tablet Take 100 mg by mouth daily. atorvastatin (LIPITOR) 80 mg tablet Take 1 Tablet by mouth daily. 90 Tablet 3    acetaminophen (TYLENOL) 500 mg tablet Take 1 Tab by mouth every six (6) hours as needed for Pain. 270 Tab 3    BD Insulin Syringe SafetyGlide 0.5 mL 30 gauge x 5/16\" syrg       Blood-Glucose Meter (FREESTYLE LITE METER) monitoring kit Test twice blood glucose daily; ICD-10 E11.9; Quantity 1 1 Kit 0    insulin syringe,safetyneedle 1 mL 31 gauge x 5/16\" syrg 1 Each by Does Not Apply route daily.  300 Each 3    glucose blood VI test strips (FREESTYLE TEST) strip Use to check blood glucose twice daily DX:E11.9 300 Strip 3       Past History     Past Medical History:  Past Medical History:   Diagnosis Date    Acquired hypothyroidism 8/1/2016    Arthritis of right shoulder region 4/21/2016    Asthma     Bilateral lower extremity edema 7/7/2021    Chronic bilateral low back pain without sciatica 7/7/2021    Chronic diastolic congestive heart failure ([address] 3/10/2021    Chronic venous insufficiency     Diabetes (HCC)     neuropathy    Essential hypertension 7/7/2021    Gout     History of depression 1/23/2017    History of fall 7/7/2021    Hypercholesteremia 7/7/2021    Hypertension     MI (myocardial infarction) ([address]     OAB (overactive bladder) 7/7/2021    Peripheral neuropathy     Rheumatoid arthritis ([address]     Tear of right rotator cuff 5/16/2016    Thyroid pain     Urinary incontinence 7/7/2021    Vertigo        Past Surgical History:  Past Surgical History:   Procedure Laterality Date    HX AMPUTATION TOE Right 2020 Great toe Dr. Blanka Chi      HX GYN      TAHOophorectomy due to infection    HX HEENT      resection of memegioma in the . HX KNEE REPLACEMENT Bilateral        Family History:  Family History   Problem Relation Age of Onset    Heart Attack Mother     Heart Attack Father        Social History:  Social History     Tobacco Use    Smoking status: Former     Types: Cigarettes     Quit date:      Years since quittin.0    Smokeless tobacco: Never    Tobacco comments:     quit 45 years ago   Vaping Use    Vaping Use: Never used   Substance Use Topics    Alcohol use: No     Alcohol/week: 0.0 standard drinks    Drug use: No       Allergies:  No Known Allergies      Review of Systems   Review of Systems   Constitutional:  Positive for activity change and fatigue. Negative for fever. Respiratory:  Positive for shortness of breath. Cardiovascular:  Positive for palpitations. Negative for chest pain. Genitourinary:  Negative for difficulty urinating and dysuria. All Other Systems Negative  Physical Exam     Vitals:    23 1253 23 1323 23 1353 23 1723   BP: 135/86 114/78 133/84 124/66   Pulse:  (!) 130 (!) 137 (!) 140   Resp:       Temp:       SpO2:  95% 97% 92%   Weight:       Height:         Physical Exam  Constitutional:       Appearance: She is obese. Comments: Chronically ill-appearing   HENT:      Head: Normocephalic and atraumatic. Right Ear: External ear normal.      Left Ear: External ear normal.      Nose: Nose normal.      Mouth/Throat:      Mouth: Mucous membranes are moist.      Pharynx: Oropharynx is clear. Eyes:      Extraocular Movements: Extraocular movements intact. Conjunctiva/sclera: Conjunctivae normal.   Cardiovascular:      Rate and Rhythm: Tachycardia present. Rhythm irregular. Pulses: Normal pulses. Heart sounds: Normal heart sounds.    Pulmonary:      Effort: Pulmonary effort is normal. Comments: Diminished breath sounds at bases  Abdominal:      General: Abdomen is flat. Bowel sounds are normal.      Palpations: Abdomen is soft. Musculoskeletal:         General: Swelling and tenderness present. Cervical back: Normal range of motion and neck supple. Right lower leg: Edema present. Left lower leg: Edema present. Comments: Patient has erythema to bilateral lower extremities extending above knees   Skin:     General: Skin is warm and dry. Capillary Refill: Capillary refill takes less than 2 seconds. Neurological:      General: No focal deficit present. Mental Status: She is alert and oriented to person, place, and time. Mental status is at baseline. Psychiatric:         Mood and Affect: Mood normal.         Behavior: Behavior normal.         Thought Content: Thought content normal.         Judgment: Judgment normal.         Diagnostic Study Results     Labs -         Radiologic Studies -   XR CHEST PORT   Final Result      No pneumothorax post CVL placement. Similar enlarged cardiac silhouette with vascular congestion and mild pulmonary   edema. XR CHEST PORT   Final Result      Enlarged cardiac silhouette with vascular congestion, small pleural effusion and   mild pulmonary edema/CHF, less pronounced than comparison study. CT Results  (Last 48 hours)      None          CXR Results  (Last 48 hours)                 01/12/23 1430  XR CHEST PORT Final result    Impression:      No pneumothorax post CVL placement. Similar enlarged cardiac silhouette with vascular congestion and mild pulmonary   edema. Narrative:  EXAM:  XR CHEST PORT       INDICATION:   post central line       COMPARISON: 1/12/2023. FINDINGS:   Right jugular CVL tip overlies the SVC. No pneumothorax. Stable mildly enlarged   cardiac silhouette. Similar vascular congestion and increased interstitial   opacities. No pneumothorax. Probably small pleural effusions.  Intact osseous   structures. 01/12/23 1050  XR CHEST PORT Final result    Impression:      Enlarged cardiac silhouette with vascular congestion, small pleural effusion and   mild pulmonary edema/CHF, less pronounced than comparison study. Narrative:  EXAM:  XR CHEST PORT       INDICATION:   Shortness of breath       COMPARISON: 12/21/2022. FINDINGS:   Similar mild to moderate enlargement of the cardiac silhouette. Pulmonary   vascular congestion. Increased interstitial opacities. Small pleural effusion   right greater than left. No pneumothorax. Intact osseous structures. Medical Decision Making   I am the first provider for this patient. I reviewed the vital signs, available nursing notes, past medical history, past surgical history, family history and social history. Vital Signs-Reviewed the patient's vital signs. Records Reviewed: Nursing Notes and Old Medical Records     Procedures: None   Central Line    Date/Time: 1/12/2023 2:56 PM  Performed by: Sierra Guthrie MD  Authorized by: Sierra Guthrie MD     Consent:     Consent obtained:  Verbal    Consent given by:  Patient    Risks, benefits, and alternatives were discussed: yes      Risks discussed:  Arterial puncture, bleeding, infection, incorrect placement, nerve damage and pneumothorax    Alternatives discussed:  No treatment and delayed treatment  Universal protocol:     Procedure explained and questions answered to patient or proxy's satisfaction: yes      Test results available: yes      Imaging studies available: yes      Site/side marked: no      Immediately prior to procedure, a time out was called: no      Patient identity confirmed:  Verbally with patient and arm band  Pre-procedure details:     Indication(s): central venous access and insufficient peripheral access      Hand hygiene: Hand hygiene performed prior to insertion      Sterile barrier technique:  All elements of maximal sterile technique followed      Skin preparation:  Chlorhexidine with alcohol    Skin preparation agent: Skin preparation agent completely dried prior to procedure    Sedation:     Sedation type:  None  Anesthesia:     Anesthesia method:  Local infiltration    Local anesthetic:  Lidocaine 1% w/o epi  Procedure details:     Location:  R internal jugular    Patient position:  Supine    Procedural supplies:  Triple lumen    Catheter size:  7 Fr    Landmarks identified: yes      Ultrasound guidance: yes      Ultrasound guidance timing: prior to insertion and real time      Sterile ultrasound techniques: Sterile gel and sterile probe covers were used      Number of attempts:  1    Successful placement: yes    Post-procedure details:     Post-procedure:  Dressing applied and line sutured    Assessment:  Blood return through all ports, free fluid flow, no pneumothorax on x-ray and placement verified by x-ray    Procedure completion:  Tolerated well, no immediate complications    Provider Notes (Medical Decision Makin-year-old female with worsening bilateral lower extremity cellulitis, poorly controlled diabetes and atrial fibrillation with rapid ventricular response. Plan to check electrolytes, CBC, blood cultures, chest x-ray, administer broad-spectrum antibiotics. We will also need rate control with diltiazem, may require a diltiazem drip. Patient will certainly require admission to the hospital for further treatment. Labs are relatively unremarkable, chest x-ray does not reveal any acute process, central line in appropriate place, plan is to initiate diltiazem drip, admit to the hospital for further treatment with IV antibiotics and rate control for her atrial fibrillation. ED Course as of 23 1802   Thu 2023   1221 Patient was difficult to establish access on so there is delay in giving fluids and antibiotics. At this time patient was transferred to Dr. Catracho Field the attending for further care.  [DL] ED Course User Index  [DL] Shane Sutton MD         MED RECONCILIATION:  Current Facility-Administered Medications   Medication Dose Route Frequency    cefTRIAXone (ROCEPHIN) 1 g in sterile water (preservative free) 10 mL IV syringe  1 g IntraVENous Q24H    [START ON 1/13/2023] vancomycin (VANCOCIN) 1,000 mg in 0.9% sodium chloride 250 mL (VIAL-MATE)  1,000 mg IntraVENous Q24H    dilTIAZem (CARDIZEM) 100 mg in 0.9% sodium chloride (MBP/ADV) 100 mL infusion  0-15 mg/hr IntraVENous TITRATE    sodium chloride (NS) flush 5-40 mL  5-40 mL IntraVENous Q8H    sodium chloride (NS) flush 5-40 mL  5-40 mL IntraVENous PRN    acetaminophen (TYLENOL) tablet 650 mg  650 mg Oral Q6H PRN    Or    acetaminophen (TYLENOL) suppository 650 mg  650 mg Rectal Q6H PRN    polyethylene glycol (MIRALAX) packet 17 g  17 g Oral DAILY PRN    ondansetron (ZOFRAN ODT) tablet 4 mg  4 mg Oral Q8H PRN    Or    ondansetron (ZOFRAN) injection 4 mg  4 mg IntraVENous Q6H PRN    [START ON 1/13/2023] enoxaparin (LOVENOX) injection 40 mg  40 mg SubCUTAneous DAILY    [START ON 1/13/2023] allopurinoL (ZYLOPRIM) tablet 100 mg  100 mg Oral DAILY    [START ON 1/13/2023] aspirin chewable tablet 81 mg  81 mg Oral DAILY    [START ON 1/13/2023] atorvastatin (LIPITOR) tablet 80 mg  80 mg Oral DAILY    [START ON 1/13/2023] bumetanide (BUMEX) tablet 1 mg  1 mg Oral DAILY    gabapentin (NEURONTIN) capsule 800 mg  800 mg Oral QHS    [Held by provider] metoprolol tartrate (LOPRESSOR) tablet 100 mg  100 mg Oral Q12H    glucose chewable tablet 16 g  16 g Oral PRN    glucagon (GLUCAGEN) injection 1 mg  1 mg IntraMUSCular PRN    dextrose 10% infusion 0-250 mL  0-250 mL IntraVENous PRN    [START ON 1/13/2023] levothyroxine (SYNTHROID) tablet 150 mcg  150 mcg Oral 6am    insulin lispro (HUMALOG) injection   SubCUTAneous AC&HS     Current Outpatient Medications   Medication Sig    bumetanide (BUMEX) 1 mg tablet Take 1 mg by mouth daily.     gabapentin (NEURONTIN) 400 mg capsule 1 capsule daily    insulin glargine (LANTUS) 100 unit/mL injection Inject 20 Units at bedtime. Monitor and record blood sugar daily. aspirin 81 mg chewable tablet Take 1 Tablet by mouth daily. metoprolol tartrate (LOPRESSOR) 100 mg IR tablet Take 1 Tablet by mouth every twelve (12) hours. dilTIAZem ER (CARDIZEM CD) 180 mg capsule Take 1 Capsule by mouth daily. levothyroxine (SYNTHROID) 150 mcg tablet Take 150 mcg by mouth daily. morphine IR (MS IR) 15 mg tablet Take 15 mg by mouth two (2) times a day. metFORMIN (GLUCOPHAGE) 500 mg tablet Take 500 mg by mouth daily. allopurinoL (ZYLOPRIM) 100 mg tablet Take 100 mg by mouth daily. atorvastatin (LIPITOR) 80 mg tablet Take 1 Tablet by mouth daily. acetaminophen (TYLENOL) 500 mg tablet Take 1 Tab by mouth every six (6) hours as needed for Pain. BD Insulin Syringe SafetyGlide 0.5 mL 30 gauge x 5/16\" syrg     Blood-Glucose Meter (FREESTYLE LITE METER) monitoring kit Test twice blood glucose daily; ICD-10 E11.9; Quantity 1    insulin syringe,safetyneedle 1 mL 31 gauge x 5/16\" syrg 1 Each by Does Not Apply route daily. glucose blood VI test strips (FREESTYLE TEST) strip Use to check blood glucose twice daily DX:E11.9       Disposition:  admitted    Follow-up Information    None         Current Discharge Medication List              Diagnosis     Clinical Impression:   1. Cellulitis of both lower extremities    2. Paroxysmal atrial fibrillation (HCC)          \"Please note that this dictation was completed with [employer], the computer voice recognition software. Quite often unanticipated grammatical, syntax, homophones, and other interpretive errors are inadvertently transcribed by the computer software. Please disregard these errors. Please excuse any errors that have escaped final proofreading. \"

## 2023-01-12 NOTE — Clinical Note
Status[de-identified] INPATIENT [101]   Type of Bed: Stepdown [17]   Cardiac Monitoring Required?: Yes   Inpatient Hospitalization Certified Necessary for the Following Reasons: 3. Patient receiving treatment that can only be provided in an inpatient setting (further clarification in H&P documentation)   Admitting Diagnosis: Cellulitis [746216]   Admitting Diagnosis: Atrial fibrillation ([facility] [427.31. ICD-9-CM]   Admitting Physician: Farhat Norman   Attending Physician: Farhat Norman   Estimated Length of Stay: 3-4 Midnights   Discharge Plan[de-identified] Home with Office Follow-up

## 2023-01-12 NOTE — H&P
[facility] Medicine  Admission History and Physical      Patient:    Gabino Wilder      82 y.o. female            MRN:       [id number]                                                                                    Admission Date:         1/12/2023  Code status:                Full    Gabino Wilder is a 82y.o. year old female admitted for Cellulitis [L03.90]  Atrial fibrillation ([facility] [address] [I48.91]. ASSESSMENT AND PLAN  Problem List Items Addressed This Visit          Circulatory    Atrial fibrillation ([address]    Relevant Medications    bumetanide (BUMEX) 1 mg tablet     Other Visit Diagnoses       Cellulitis of both lower extremities    -  Primary            Cellulitis in setting of lymphedema/hx of CHF likely from excoriation.     -Pt has been complain of pruritis and has scratch non stop. Pt report having some bug at home and family is in process of moving from house. -Bilateral lower extremity swelling with erythema worsen on left  -Patient nontoxic-appearing, unlikely to be septic at this time with normal labs  -On admission, WBC 11.8, no leukocytosis or neutrophil shift, lactic acid 1.5  -SIRS 1/4   -BCx collected 1/12  -other conditions to consider, includes allergic reaction (hives)  -abtx started in ED     Plan:  -Daily CBC, monitor WBCs and fever   -Monitor vitals  -Cont.  on Vancomycin and rocephin   -Follow-Up Blood Cx and sensitivities  -Start Zyrtec, Hydroxyzine for itching  -Perform full body examination and consider placing on isolation     Afib w/ RVR  CHF on bumex  HTN  Hx DVT  -On admission, pt tachycardic  -EKG: Atrial fibrillation with rapid ventricular response with PVCs  -Started on Cardizem drip in the ED  -OP meds: Eliquis for anticoagulation, Diltiazem and Lopressor for rate control and HTN, Bumex for CHF  -PVL 10/8 showed non occlusive thrombus within varicose veins of calf on RLE, otherwise no DVT bilaterally     Plan:  -Telemetry   -PT/INR/PTT  -supportive O2 care as needed  -strict I+Os, daily weights  -fluid restrict 1200 cc   -Currently on diltiazem drip will transition to  PO in the AM  -Cont. Bumex  -continue Metoprolol  -cont Eliquis 5 mg BID     DM with neuoropathy  -most recent A1c 11.6 in Jan 4 2023  -meds per chart review: insulin glargine 20 U qhs, metformin 500 mg daily. Per patient she has not been on Lantus due to affordability  -BG in   -on Gabapentin 800 QHS     Plan:   -monitor BG   -will start lantus 10 qhs tomorrow   -SSI   -hypoglycemia protocol  -cont Gabapentin       Hypothyroidism - stable  -meds: levo 150 mcg  -pt nonadherent with medications at home   Plan:   -c/w levo 150 mcg     COPD - stable  -on home O2 for prn use  -no inhalers prescribed per chart review  Plan:   -c/w supportive O2 as needed     Chronic pain, stable  RA  -ESR 18 on admission   -meds per chart review: MS IR 15 mg   -Recently stated that she stopped taking morphine due to no longer going to pain management provider   Plan:   -We will monitor for flare         Social Hx/Barriers to care   -pt has MPOA, son Aj Daniels  -pt having financial difficulties - unable to afford medications   -pt currently managing medications on her own, not adherent.   -lives with partner, Tho Collar:  Code: Full  IVF/Drips: Cardizem  Diet: Diabetic diet with fluid restriction  Bowel Regimen, Last BM: MiraLAX as needed  DVT/AC: Eliquis  Mobility: per protocol      Point of Contact (relationship, number): Melissa Tanner  [phone]         SUBJECTIVE:  History of Present Illness:  Carmen Hsieh is a 82 y.o.  female with PMHx of diastolic CHF, a. Fib, DM II, hypertension  who presented to [facility] ED on 1/12/2023 after clinic visit with PCP due to bilateral lower leg suspicion for infection with A. fib RVR in office. Patient reports her legs have been extremely itchy and swelling. Her legs have become very erythematous all the way up to her mid thigh on the right leg and groin on the left.   She reported that she could not sleep last night because she was rubbing at her skin. She was tachycardic to 163. Denies any chest pain, shortness of breath, abdominal pain, headache, n/v, changes in urinary or bowel habits. Patient states that she has bugs in her home that she has been trying to exterminate but they bite her skin. ED Course:  -Afebrile, tachycardic   -Labs: POC blood glucose 239  -Imaging: Chest XR- shows enlarged cardiac silhouette and vascular congestion, small pleural effusion and mild pulmonary edema, no pneumothorax post central line placement  -EKG: Atrial fibrillation with rapid ventricular response with PVCs  -Meds: Cardizem, vancomycin, Rocephin  -IVF: 1L LR bolus  -Procedures: Central line placement  -Consults:-     Did non-adherence with patient's outpatient treatment plan contribute to this admission? Yes  If yes, please explain     patient's has difficulty with medication compliance due to affordability. She does endorse taking all of her p.o. medications.        PMHx, PSHx, SHx, FHx, MEDS, ALLERGIES:   Past Medical History:   Diagnosis Date    Acquired hypothyroidism 8/1/2016    Arthritis of right shoulder region 4/21/2016    Asthma     Bilateral lower extremity edema 7/7/2021    Chronic bilateral low back pain without sciatica 7/7/2021    Chronic diastolic congestive heart failure ([address] 3/10/2021    Chronic venous insufficiency     Diabetes (HCC)     neuropathy    Essential hypertension 7/7/2021    Gout     History of depression 1/23/2017    History of fall 7/7/2021    Hypercholesteremia 7/7/2021    Hypertension     MI (myocardial infarction) ([address]     OAB (overactive bladder) 7/7/2021    Peripheral neuropathy     Rheumatoid arthritis ([address]     Tear of right rotator cuff 5/16/2016    Thyroid pain     Urinary incontinence 7/7/2021    Vertigo       Past Surgical History:   Procedure Laterality Date    HX AMPUTATION TOE Right 2020    Great toe Dr. Stephania Riley HX CHOLECYSTECTOMY      HX GYN      TAHOophorectomy due to infection    HX HEENT      resection of memegioma in the .     HX KNEE REPLACEMENT Bilateral       Social History     Tobacco Use    Smoking status: Former     Types: Cigarettes     Quit date:      Years since quittin.0    Smokeless tobacco: Never    Tobacco comments:     quit 45 years ago   Vaping Use    Vaping Use: Never used   Substance Use Topics    Alcohol use: No     Alcohol/week: 0.0 standard drinks    Drug use: No     Family History   Problem Relation Age of Onset    Heart Attack Mother     Heart Attack Father      No Known Allergies    Current Facility-Administered Medications   Medication Dose Route Frequency Provider Last Rate Last Admin    cefTRIAXone (ROCEPHIN) 1 g in sterile water (preservative free) 10 mL IV syringe  1 g IntraVENous Q24H Abena Beebe MD   1 g at 23 1520    [START ON 2023] vancomycin (VANCOCIN) 1,000 mg in 0.9% sodium chloride 250 mL (VIAL-MATE)  1,000 mg IntraVENous Q24H Abena Beebe MD        sodium chloride (NS) flush 5-40 mL  5-40 mL IntraVENous Q8H Stacey Barber MD   10 mL at 23 1830    sodium chloride (NS) flush 5-40 mL  5-40 mL IntraVENous PRN Stacey Barber MD        acetaminophen (TYLENOL) tablet 650 mg  650 mg Oral Q6H PRN Stacey Barber MD        Or    acetaminophen (TYLENOL) suppository 650 mg  650 mg Rectal Q6H PRN Stacey Barber MD        polyethylene glycol (MIRALAX) packet 17 g  17 g Oral DAILY PRN Stacey Barber MD        ondansetron (ZOFRAN ODT) tablet 4 mg  4 mg Oral Q8H PRN Stacey Barber MD        Or    ondansetron [facility]) injection 4 mg  4 mg IntraVENous Q6H PRN Stacey Barber MD        [START ON 2023] allopurinoL (ZYLOPRIM) tablet 100 mg  100 mg Oral DAILY Stacey Barber MD        [START ON 2023] aspirin chewable tablet 81 mg  81 mg Oral DAILY Stacey Barber MD        [START ON 2023] atorvastatin (LIPITOR) tablet 80 mg  80 mg Oral DAILY Diego Sultana MD        [START ON 1/13/2023] bumetanide (BUMEX) tablet 1 mg  1 mg Oral DAILY Diego Sultana MD        gabapentin (NEURONTIN) capsule 800 mg  800 mg Oral QHS Diego Sultana MD        metoprolol tartrate (LOPRESSOR) tablet 100 mg  100 mg Oral Q12H Diego Sultana MD        glucose chewable tablet 16 g  16 g Oral PRN Diego Sultana MD        glucagon (GLUCAGEN) injection 1 mg  1 mg IntraMUSCular PRN Diego Sultana MD        dextrose 10% infusion 0-250 mL  0-250 mL IntraVENous PRN Diego Sultana MD        [START ON 1/13/2023] levothyroxine (SYNTHROID) tablet 150 mcg  150 mcg Oral 6am Diego Sultana MD        insulin lispro (HUMALOG) injection   SubCUTAneous AC&HS Kayden Huang MD        cetirizine (ZYRTEC) tablet 10 mg  10 mg Oral DAILY Diego Sultana MD   10 mg at 01/12/23 1838    apixaban (ELIQUIS) tablet 5 mg  5 mg Oral BID Diego Sultana MD        hydrOXYzine HCL (ATARAX) tablet 50 mg  50 mg Oral TID PRN Quinten Faith P, DO        dilTIAZem (CARDIZEM) 100 mg in 0.9% sodium chloride (MBP/ADV) 100 mL infusion  15 mg/hr IntraVENous CONTINUOUS Cass Wakefield DO        metoprolol (LOPRESSOR) syringe 5 mg  5 mg IntraVENous ONCE Kaylan Wakefield DO         Current Outpatient Medications   Medication Sig Dispense Refill    bumetanide (BUMEX) 1 mg tablet Take 1 mg by mouth daily. gabapentin (NEURONTIN) 400 mg capsule 1 capsule daily 360 Capsule 0    insulin glargine (LANTUS) 100 unit/mL injection Inject 20 Units at bedtime. Monitor and record blood sugar daily. 10 mL 1    aspirin 81 mg chewable tablet Take 1 Tablet by mouth daily. 30 Tablet 2    metoprolol tartrate (LOPRESSOR) 100 mg IR tablet Take 1 Tablet by mouth every twelve (12) hours. 120 Tablet 2    dilTIAZem ER (CARDIZEM CD) 180 mg capsule Take 1 Capsule by mouth daily. 120 Capsule 3    levothyroxine (SYNTHROID) 150 mcg tablet Take 150 mcg by mouth daily.       morphine IR (MS IR) 15 mg tablet Take 15 mg by mouth two (2) times a day. metFORMIN (GLUCOPHAGE) 500 mg tablet Take 500 mg by mouth daily. allopurinoL (ZYLOPRIM) 100 mg tablet Take 100 mg by mouth daily. atorvastatin (LIPITOR) 80 mg tablet Take 1 Tablet by mouth daily. 90 Tablet 3    acetaminophen (TYLENOL) 500 mg tablet Take 1 Tab by mouth every six (6) hours as needed for Pain. 270 Tab 3    BD Insulin Syringe SafetyGlide 0.5 mL 30 gauge x 5/16\" syrg       Blood-Glucose Meter (FREESTYLE LITE METER) monitoring kit Test twice blood glucose daily; ICD-10 E11.9; Quantity 1 1 Kit 0    insulin syringe,safetyneedle 1 mL 31 gauge x 5/16\" syrg 1 Each by Does Not Apply route daily.  300 Each 3    glucose blood VI test strips (FREESTYLE TEST) strip Use to check blood glucose twice daily DX:E11.9 300 Strip 3        ROS  (positive findings are in BOLD; negative findings are in regular font)  Constitutional: fevers, chills, appetite changes, weight changes, fatigue  HEENT: changes in vision, changes in hearing, sore throat, dysphagia  Cardiovascular: chest pain, palpitations, PND, orthopnea, edema  Pulmonary: SOB, cough, sputum production, wheezing, chest tightness  Gastrointestinal: abdominal pain, nausea/vomiting, diarrhea, constipation, melena, hematochezia  Genitourinary: dysuria, hesitation, dribbling, urgency, hematuria  Musculoskeletal: arthralgias, myalgias  Skin: rash, itching  Neurological: sensory changes, motor changes, headache  Psychiatric: mood changes  Endocrine: heat/cold intolerance  Heme: easy bruising/easy bleeding, LAD     OBJECTIVE:  Patient Vitals for the past 24 hrs:   BP Temp Pulse Resp SpO2 Height Weight   01/12/23 1854 -- -- (!) 150 -- 92 % -- --   01/12/23 1830 (!) 129/57 -- (!) 151 -- -- -- --   01/12/23 1759 113/67 -- (!) 163 -- 91 % -- --   01/12/23 1723 124/66 -- (!) 140 -- 92 % -- --   01/12/23 1353 133/84 -- (!) 137 -- 97 % -- --   01/12/23 1323 114/78 -- (!) 130 -- 95 % -- --   01/12/23 1258 135/86 -- -- -- -- -- --   01/12/23 1108 -- -- (!) 145 -- -- -- --   01/12/23 1004 113/87 98 °F (36.7 °C) (!) 118 16 100 % 5' 5\" (1.651 m) 101 kg (222 lb 10.6 oz)     Body mass index is 37.05 kg/m².       PHYSICAL EXAM:  Physical Exam  General: NAD  HEENT: NCAT, PERRLA, EOM intact; oral mucosa well perfused, oropharynx clear  Neck: Central line placed in R IJ  CVS: irregularly irregular rhythm, tachycardic, no murmurs, gallops or rubs  Lungs: chest clear, no wheezing, rales, normal symmetric air entry    Abdomen: Soft, non-distended, non-TTP, BS+, no organomegaly, no masses  Ext: peripheral pulses present, significant erythema bilaterally up to mid thighs, 2+ nonpitting edema  Skin: Warm, Dry, significant excoriation and erythema on extremities  Neuro: No focal neurologic deficits or gross abnormalities  Psych: A&Ox3, appropriate mood and affect     RECENT LABS AND IMAGING ON ADMISSION   Recent Results (from the past 24 hour(s))   EKG, 12 LEAD, INITIAL    Collection Time: 01/12/23 10:08 AM   Result Value Ref Range    Ventricular Rate 141 BPM    QRS Duration 90 ms    Q-T Interval 322 ms    QTC Calculation (Bezet) 493 ms    Calculated R Axis 0 degrees    Calculated T Axis -56 degrees    Diagnosis        Critical Test Result: High HR  Atrial fibrillation with rapid ventricular response with premature   ventricular or aberrantly conducted complexes  Abnormal ECG  When compared with ECG of 21-DEC-2022 00:09,  T wave inversion no longer evident in Lateral leads     CBC WITH AUTOMATED DIFF    Collection Time: 01/12/23 10:25 AM   Result Value Ref Range    WBC 11.8 4.6 - 13.2 K/uL    RBC 3.52 (L) 4.20 - 5.30 M/uL    HGB 9.2 (L) 12.0 - 16.0 g/dL    HCT 29.0 (L) 35.0 - 45.0 %    MCV 82.4 78.0 - 100.0 FL    MCH 26.1 24.0 - 34.0 PG    MCHC 31.7 31.0 - 37.0 g/dL    RDW 15.4 (H) 11.6 - 14.5 %    PLATELET 458 599 - 720 K/uL    MPV 11.5 9.2 - 11.8 FL    NRBC 0.0 0  WBC    ABSOLUTE NRBC 0.00 0.00 - 0.01 K/uL    NEUTROPHILS 72 40 - 73 %    LYMPHOCYTES 11 (L) 21 - 52 %    MONOCYTES 9 3 - 10 %    EOSINOPHILS 6 (H) 0 - 5 %    BASOPHILS 1 0 - 2 %    IMMATURE GRANULOCYTES 1 (H) 0.0 - 0.5 %    ABS. NEUTROPHILS 8.5 (H) 1.8 - 8.0 K/UL    ABS. LYMPHOCYTES 1.3 0.9 - 3.6 K/UL    ABS. MONOCYTES 1.0 0.05 - 1.2 K/UL    ABS. EOSINOPHILS 0.8 (H) 0.0 - 0.4 K/UL    ABS. BASOPHILS 0.1 0.0 - 0.1 K/UL    ABS. IMM. GRANS. 0.1 (H) 0.00 - 0.04 K/UL    DF AUTOMATED     METABOLIC PANEL, COMPREHENSIVE    Collection Time: 01/12/23 10:25 AM   Result Value Ref Range    Sodium 137 136 - 145 mmol/L    Potassium 3.6 3.5 - 5.5 mmol/L    Chloride 99 (L) 100 - 111 mmol/L    CO2 30 21 - 32 mmol/L    Anion gap 8 3.0 - 18 mmol/L    Glucose 305 (H) 74 - 99 mg/dL    BUN 35 (H) 7.0 - 18 MG/DL    Creatinine 1.37 (H) 0.6 - 1.3 MG/DL    BUN/Creatinine ratio 26 (H) 12 - 20      eGFR 38 (L) >60 ml/min/1.73m2    Calcium 8.6 8.5 - 10.1 MG/DL    Bilirubin, total 0.5 0.2 - 1.0 MG/DL    ALT (SGPT) <6 (L) 13 - 56 U/L    AST (SGOT) 8 (L) 10 - 38 U/L    Alk. phosphatase 54 45 - 117 U/L    Protein, total 7.8 6.4 - 8.2 g/dL    Albumin 3.0 (L) 3.4 - 5.0 g/dL    Globulin 4.8 (H) 2.0 - 4.0 g/dL    A-G Ratio 0.6 (L) 0.8 - 1.7     LACTIC ACID    Collection Time: 01/12/23  2:45 PM   Result Value Ref Range    Lactic acid 1.5 0.4 - 2.0 MMOL/L   GLUCOSE, POC    Collection Time: 01/12/23  6:02 PM   Result Value Ref Range    Glucose (POC) 239 (H) 70 - 110 mg/dL        XR (Most Recent). Results from [place] encounter on 01/12/23    XR CHEST PORT    Narrative  EXAM:  XR CHEST PORT    INDICATION:   post central line    COMPARISON: 1/12/2023. FINDINGS:  Right jugular CVL tip overlies the SVC. No pneumothorax. Stable mildly enlarged  cardiac silhouette. Similar vascular congestion and increased interstitial  opacities. No pneumothorax. Probably small pleural effusions. Intact osseous  structures. Impression  No pneumothorax post CVL placement.   Similar enlarged cardiac silhouette with vascular congestion and mild pulmonary  edema. CT (Most Recent) Results from Hospital Encounter encounter on 10/07/22    CTA CHEST W OR W WO CONT    Narrative  CTA CHEST PULMONARY EMBOLISM PROTOCOL    INDICATION: Acute respiratory difficulty, heart failure, symptoms worsened over  3 days, chest pain, increased work of breathing. Question pulmonary embolism. TECHNIQUE: Thin collimation axial images obtained through the level of the  pulmonary arteries with additional imaging through the chest following the  uneventful administration of intravenous contrast.  Images reconstructed into  three dimensional coronal and sagittal projections for complete evaluation of  the tortuous and overlapping pulmonary vascular structures and to reduce patient  radiation dose. All CT scans at this facility are performed using dose optimization technique as  appropriate to a performed exam, to include automated exposure control,  adjustment of the mA and/or kV according to patient size (including appropriate  matching first site-specific examinations), or use of iterative reconstruction  technique. COMPARISON: 8/10/2022. FINDINGS:    No filling defects are appreciated within the main, left, right, lobar or  visualized segmental pulmonary arteries to suggest embolism. Thyroid: Unremarkable in its visualized aspects. Pericardium/ Heart: No significant effusion. Biatrial cardiac chamber  enlargement. Aorta/ Vessels: Mild to moderate aortic atherosclerosis. There is irregular  noncalcified mural plaque in the descending aorta. Lymph Nodes: Unremarkable. .    Lungs: There is hilar edema. Mild bronchial wall thickening. Mild groundglass  and interstitial thickening. Mild mosaic attenuation. Mild to moderate dependent  atelectasis. Pleura: Moderate bilateral pleural effusions which are new. No pneumothorax. Upper Abdomen: Unremarkable. Bones/soft tissues: Osteopenia. Degenerative disc disease. Impression  1.   No evidence for pulmonary emboli. 2.  Heart failure with biatrial cardiac chamber enlargement, moderate new  bilateral pleural effusions and mild pulmonary edema. 3.  Mild to moderate aortic atherosclerosis with irregular noncalcified mural  plaque along the wall of the descending aorta which increases risk of embolic  events. ECHO No results found for this or any previous visit. EKG No results found for this or any previous visit. I have discussed Ms. Renee Ledesma case with my attending who agrees with the plan of care. Raven Lomax MD , PGY-1   [facility]   January 12, 2023, 4:28 PM     [facility]  Senior Addendum to History and Physical    I have also seen and independently evaluated the patient. I agree with the plan as noted above. Additional HPI, A/P:   I have reviewed the notes above and agreed with assessment and plan. Vitals, labs and imaging reviewed     For additional problem list, assessment, and plan see intern note.     Maurizio Chaves , PGY-2   [facility]   January 12, 2023, 4:28 PM

## 2023-01-12 NOTE — ED PROVIDER NOTES
EMERGENCY DEPARTMENT HISTORY AND PHYSICAL EXAM    12:18 PM      Date: 1/12/2023  Patient Name: Raquel Toledo    History of Presenting Illness     Chief Complaint   Patient presents with    Irregular Heart Beat         History Provided By: patient, EMS    Additional History (Context): Raquel Toledo is a 82 y.o. female, with a history of hypertension, diabetes, CHF, and poor medical compliance presents with A. fib and worsening cellulitis from [facility]. Per EMS the patient has been treated for cellulitis for the last 2 weeks with antibiotics but has had worsening of her condition despite antibiotics. Patient is unsure what antibiotic she is on. She was found to be in A. fib at the [facility]. Patient currently denies any chest pain or shortness of breath. No fever or chills. She does note that her cellulitis has traveled from just her lower legs up to her groin. She denies any lower extremity pain. No dizziness, lightheadedness, vision changes, numbness, weakness. PCP: eNetu Spicer MD      Current Facility-Administered Medications   Medication Dose Route Frequency Provider Last Rate Last Admin    cefTRIAXone (ROCEPHIN) 1 g in sterile water (preservative free) 10 mL IV syringe  1 g IntraVENous Q24H James Higgins MD        lactated ringers bolus infusion 1,000 mL  1,000 mL IntraVENous ONCE Radha Mishra MD        vancomycin (VANCOCIN) 2000 mg in  ml infusion  2,000 mg IntraVENous ONCE Radha Mishra MD        [START ON 1/13/2023] vancomycin (VANCOCIN) 1,000 mg in 0.9% sodium chloride 250 mL (VIAL-MATE)  1,000 mg IntraVENous Q24H Radha Mishra MD         Current Outpatient Medications   Medication Sig Dispense Refill    furosemide (LASIX) 40 mg tablet Take 0.5 Tablets by mouth daily. 90 Tablet 3    gabapentin (NEURONTIN) 400 mg capsule 1 capsule daily 360 Capsule 0    insulin glargine (LANTUS) 100 unit/mL injection Inject 20 Units at bedtime. Monitor and record blood sugar daily. 10 mL 1    aspirin 81 mg chewable tablet Take 1 Tablet by mouth daily. 30 Tablet 2    amoxicillin-clavulanate (Augmentin) 500-125 mg per tablet Take 1 Tablet by mouth two (2) times a day. 10 Tablet 0    metoprolol tartrate (LOPRESSOR) 100 mg IR tablet Take 1 Tablet by mouth every twelve (12) hours. 120 Tablet 2    dilTIAZem ER (CARDIZEM CD) 180 mg capsule Take 1 Capsule by mouth daily. 120 Capsule 3    BD Insulin Syringe SafetyGlide 0.5 mL 30 gauge x 5/16\" syrg       insulin aspart U-100 (NovoLOG Flexpen U-100 Insulin) 100 unit/mL (3 mL) inpn 5 Units by SubCUTAneous route Before breakfast, lunch, and dinner. 1 Pen 0    levothyroxine (SYNTHROID) 150 mcg tablet Take 150 mcg by mouth daily. morphine IR (MS IR) 15 mg tablet Take 15 mg by mouth two (2) times a day. metFORMIN (GLUCOPHAGE) 500 mg tablet Take 500 mg by mouth daily. allopurinoL (ZYLOPRIM) 100 mg tablet Take 100 mg by mouth daily. atorvastatin (LIPITOR) 80 mg tablet Take 1 Tablet by mouth daily. 90 Tablet 3    Blood-Glucose Meter (FREESTYLE LITE METER) monitoring kit Test twice blood glucose daily; ICD-10 E11.9; Quantity 1 1 Kit 0    insulin syringe,safetyneedle 1 mL 31 gauge x 5/16\" syrg 1 Each by Does Not Apply route daily. 300 Each 3    glucose blood VI test strips (FREESTYLE TEST) strip Use to check blood glucose twice daily DX:E11.9 300 Strip 3    acetaminophen (TYLENOL) 500 mg tablet Take 1 Tab by mouth every six (6) hours as needed for Pain.  270 Tab 3       Past History     Past Medical History:  Past Medical History:   Diagnosis Date    Acquired hypothyroidism 8/1/2016    Arthritis of right shoulder region 4/21/2016    Asthma     Bilateral lower extremity edema 7/7/2021    Chronic bilateral low back pain without sciatica 7/7/2021    Chronic diastolic congestive heart failure ([address] 3/10/2021    Chronic venous insufficiency     Diabetes (HCC)     neuropathy    Essential hypertension 7/7/2021    Gout History of depression 2017    History of fall 2021    Hypercholesteremia 2021    Hypertension     MI (myocardial infarction) [address] [address]     OAB (overactive bladder) 2021    Peripheral neuropathy     Rheumatoid arthritis ([facility] [address]     Tear of right rotator cuff 2016    Thyroid pain     Urinary incontinence 2021    Vertigo        Past Surgical History:  Past Surgical History:   Procedure Laterality Date    HX AMPUTATION TOE Right     Great toe Dr. Garcia Older [address]      HX CHOLECYSTECTOMY      HX GYN      TAHOophorectomy due to infection    HX HEENT      resection of memegioma in the . HX KNEE REPLACEMENT Bilateral        Family History:  Family History   Problem Relation Age of Onset    Heart Attack Mother     Heart Attack Father        Social History:  Social History     Tobacco Use    Smoking status: Former     Types: Cigarettes     Quit date:      Years since quittin.0    Smokeless tobacco: Never    Tobacco comments:     quit 45 years ago   Vaping Use    Vaping Use: Never used   Substance Use Topics    Alcohol use: No     Alcohol/week: 0.0 standard drinks    Drug use: No       Allergies:  No Known Allergies      Review of Systems     Review of Systems   Constitutional:  Negative for chills and fever. HENT:  Negative for congestion and sore throat. Eyes:  Negative for discharge. Respiratory:  Negative for cough and shortness of breath. Cardiovascular:  Negative for chest pain, palpitations and leg swelling. Gastrointestinal:  Negative for abdominal pain, nausea and vomiting. Genitourinary:  Negative for difficulty urinating. Musculoskeletal:  Negative for back pain. Skin:  Positive for rash. Neurological:  Negative for weakness, numbness and headaches. Psychiatric/Behavioral:  The patient is not nervous/anxious.         Physical Exam       Patient Vitals for the past 12 hrs:   Temp Pulse Resp BP SpO2   23 1004 98 °F (36.7 °C) (!) 118 16 113/87 100 %       IPVITALS  Patient Vitals for the past 24 hrs:   BP Temp Pulse Resp SpO2 Height Weight   01/12/23 1004 113/87 98 °F (36.7 °C) (!) 118 16 100 % 5' 5\" (1.651 m) 101 kg (222 lb 10.6 oz)       Physical Exam  Vitals and nursing note reviewed. Constitutional:       General: She is not in acute distress. Appearance: She is not ill-appearing or toxic-appearing. HENT:      Head: Normocephalic and atraumatic. Nose: Nose normal.      Mouth/Throat:      Mouth: Mucous membranes are dry. Pharynx: Oropharynx is clear. No oropharyngeal exudate or posterior oropharyngeal erythema. Eyes:      Extraocular Movements: Extraocular movements intact. Conjunctiva/sclera: Conjunctivae normal.      Pupils: Pupils are equal, round, and reactive to light. Cardiovascular:      Rate and Rhythm: Tachycardia present. Rhythm irregular. Heart sounds: No murmur heard. No friction rub. No gallop. Pulmonary:      Effort: No respiratory distress. Breath sounds: Normal breath sounds. No stridor. No wheezing, rhonchi or rales. Chest:      Chest wall: No tenderness. Abdominal:      General: Abdomen is flat. There is no distension. Palpations: Abdomen is soft. Tenderness: There is no abdominal tenderness. There is no guarding or rebound. Musculoskeletal:      Comments: Bilateral lower extremities are swollen with mild tenderness to palpation. They are both neurovascularly intact. The great toe is amputated on the right. Skin:     General: Skin is warm and dry. Comments: There is erythema with peau d'orange to both lower extremities. It extends to the mid thigh and the right lower extremity and to the groin of the left lower extremity. It is consistent with cellulitis. It is mildly tender to palpation but there is no underlying fluctuance. Neurological:      General: No focal deficit present. Mental Status: She is alert and oriented to person, place, and time. Psychiatric:         Mood and Affect: Mood normal.         Behavior: Behavior normal.         Diagnostic Study Results   Labs -  Pending    Radiologic Studies -   XR CHEST PORT   Final Result      Enlarged cardiac silhouette with vascular congestion, small pleural effusion and   mild pulmonary edema/CHF, less pronounced than comparison study. XR CHEST PORT    Result Date: 1/12/2023  EXAM:  XR CHEST PORT INDICATION:   Shortness of breath COMPARISON: 12/21/2022. FINDINGS: Similar mild to moderate enlargement of the cardiac silhouette. Pulmonary vascular congestion. Increased interstitial opacities. Small pleural effusion right greater than left. No pneumothorax. Intact osseous structures. Enlarged cardiac silhouette with vascular congestion, small pleural effusion and mild pulmonary edema/CHF, less pronounced than comparison study. Medications ordered:   Medications   cefTRIAXone (ROCEPHIN) 1 g in sterile water (preservative free) 10 mL IV syringe (has no administration in time range)   lactated ringers bolus infusion 1,000 mL (has no administration in time range)   vancomycin (VANCOCIN) 2000 mg in  ml infusion (has no administration in time range)   vancomycin (VANCOCIN) 1,000 mg in 0.9% sodium chloride 250 mL (VIAL-MATE) (has no administration in time range)         Medical Decision Making   Initial Medical Decision Making and DDx:  Patient is an 26-year-old female with a history of hypertension, diabetes, CHF, and poor medical compliance who presents to the ER from [facility] clinic with A. fib with RVR and worsening cellulitis of lower extremities. Patient has had cellulitis of the lower extremities for 2 weeks but it has been traveling up her thigh despite antibiotic treatment. Today when she went to the [facility] clinic she was found to be in A. fib with RVR and her cellulitis was much worse. Vitals are significant for a heart rate of 118 but she is afebrile.   On exam the patient has cellulitis extending up to her mid thigh on the right and to her groin on the left. She has an irregularly irregular rhythm and is tachycardic. EKG shows A. fib with RVR. Concerned for sepsis with resulting A. fib. Will obtain lactate and blood cultures as well as basic labs. We will also start Rocephin and vancomycin and IV fluids. Will give metoprolol for RVR. ED Course: Progress Notes, Reevaluation, and Consults:  ED Course as of 01/12/23 1226   Thu Jan 12, 2023   1221 Patient was difficult to establish access on so there is delay in giving fluids and antibiotics. At this time patient was transferred to Dr. Allyson Varela the attending for further care. [DL]      ED Course User Index  [DL] Isi Montaño MD         I am the first provider for this patient. I reviewed the vital signs, available nursing notes, past medical history, past surgical history, family history and social history. Patient Vitals for the past 12 hrs:   Temp Pulse Resp BP SpO2   01/12/23 1004 98 °F (36.7 °C) (!) 118 16 113/87 100 %       Vital Signs-Reviewed the patient's vital signs. Pulse Oximetry Analysis, Cardiac Monitor, 12 lead ekg:      Interpreted by the EP. EKG shows A. fib with RVR with a rate of 141 bpm.  There is no axis deviation. No ST elevation or depression. Records Reviewed: Nursing notes reviewed (Time of Review: 12:18 PM)    Procedures:   Critical Care Time: 0  If critical care time is note it is exclusive of any separately billable procedures. Aspirin: (was aspirin given for stroke?)    Diagnosis     Clinical Impression: No diagnosis found. Disposition:       Follow-up Information    None          Patient's Medications   Start Taking    No medications on file   Continue Taking    ACETAMINOPHEN (TYLENOL) 500 MG TABLET    Take 1 Tab by mouth every six (6) hours as needed for Pain. ALLOPURINOL (ZYLOPRIM) 100 MG TABLET    Take 100 mg by mouth daily.     AMOXICILLIN-CLAVULANATE (AUGMENTIN) 500-125 MG PER TABLET    Take 1 Tablet by mouth two (2) times a day. ASPIRIN 81 MG CHEWABLE TABLET    Take 1 Tablet by mouth daily. ATORVASTATIN (LIPITOR) 80 MG TABLET    Take 1 Tablet by mouth daily. BD INSULIN SYRINGE SAFETYGLIDE 0.5 ML 30 GAUGE X 5/16\" SYRG        BLOOD-GLUCOSE METER (FREESTYLE LITE METER) MONITORING KIT    Test twice blood glucose daily; ICD-10 E11.9; Quantity 1    DILTIAZEM ER (CARDIZEM CD) 180 MG CAPSULE    Take 1 Capsule by mouth daily. FUROSEMIDE (LASIX) 40 MG TABLET    Take 0.5 Tablets by mouth daily. GABAPENTIN (NEURONTIN) 400 MG CAPSULE    1 capsule daily    GLUCOSE BLOOD VI TEST STRIPS (FREESTYLE TEST) STRIP    Use to check blood glucose twice daily DX:E11.9    INSULIN ASPART U-100 (NOVOLOG FLEXPEN U-100 INSULIN) 100 UNIT/ML (3 ML) INPN    5 Units by SubCUTAneous route Before breakfast, lunch, and dinner. INSULIN GLARGINE (LANTUS) 100 UNIT/ML INJECTION    Inject 20 Units at bedtime. Monitor and record blood sugar daily. INSULIN SYRINGE,SAFETYNEEDLE 1 ML 31 GAUGE X 5/16\" SYRG    1 Each by Does Not Apply route daily. LEVOTHYROXINE (SYNTHROID) 150 MCG TABLET    Take 150 mcg by mouth daily. METFORMIN (GLUCOPHAGE) 500 MG TABLET    Take 500 mg by mouth daily. METOPROLOL TARTRATE (LOPRESSOR) 100 MG IR TABLET    Take 1 Tablet by mouth every twelve (12) hours. MORPHINE IR (MS IR) 15 MG TABLET    Take 15 mg by mouth two (2) times a day.    These Medications have changed    No medications on file   Stop Taking    No medications on file     _______________________________    Notes:    Berta Mcdermott MD using Dragon dictation      _______________________________

## 2023-01-13 VITALS
OXYGEN SATURATION: 100 % | TEMPERATURE: 98.7 F | SYSTOLIC BLOOD PRESSURE: 111 MMHG | DIASTOLIC BLOOD PRESSURE: 80 MMHG | HEART RATE: 109 BPM | WEIGHT: 222.66 LBS | RESPIRATION RATE: 29 BRPM | HEIGHT: 65 IN | BODY MASS INDEX: 37.1 KG/M2

## 2023-01-13 LAB
ALBUMIN SERPL-MCNC: 2.6 G/DL (ref 3.4–5)
ALBUMIN/GLOB SERPL: 0.6 (ref 0.8–1.7)
ALP SERPL-CCNC: 43 U/L (ref 45–117)
ALT SERPL-CCNC: 6 U/L (ref 13–56)
ANION GAP SERPL CALC-SCNC: 7 MMOL/L (ref 3–18)
AST SERPL-CCNC: 5 U/L (ref 10–38)
BASOPHILS # BLD: 0.1 K/UL (ref 0–0.1)
BASOPHILS NFR BLD: 1 % (ref 0–2)
BILIRUB SERPL-MCNC: 0.6 MG/DL (ref 0.2–1)
BUN SERPL-MCNC: 29 MG/DL (ref 7–18)
BUN/CREAT SERPL: 25 (ref 12–20)
CALCIUM SERPL-MCNC: 8.3 MG/DL (ref 8.5–10.1)
CALCULATED R AXIS, ECG10: 0 DEGREES
CALCULATED T AXIS, ECG11: -56 DEGREES
CHLORIDE SERPL-SCNC: 103 MMOL/L (ref 100–111)
CO2 SERPL-SCNC: 29 MMOL/L (ref 21–32)
CREAT SERPL-MCNC: 1.16 MG/DL (ref 0.6–1.3)
DIAGNOSIS, 93000: NORMAL
DIFFERENTIAL METHOD BLD: ABNORMAL
EOSINOPHIL # BLD: 0.8 K/UL (ref 0–0.4)
EOSINOPHIL NFR BLD: 7 % (ref 0–5)
ERYTHROCYTE [DISTWIDTH] IN BLOOD BY AUTOMATED COUNT: 15.5 % (ref 11.6–14.5)
FLUAV RNA SPEC QL NAA+PROBE: NOT DETECTED
FLUBV RNA SPEC QL NAA+PROBE: NOT DETECTED
GLOBULIN SER CALC-MCNC: 4.2 G/DL (ref 2–4)
GLUCOSE SERPL-MCNC: 139 MG/DL (ref 74–99)
HCT VFR BLD AUTO: 26 % (ref 35–45)
HGB BLD-MCNC: 8.1 G/DL (ref 12–16)
IMM GRANULOCYTES # BLD AUTO: 0.1 K/UL (ref 0–0.04)
IMM GRANULOCYTES NFR BLD AUTO: 1 % (ref 0–0.5)
INR PPP: 2 (ref 0.8–1.2)
LYMPHOCYTES # BLD: 1.2 K/UL (ref 0.9–3.6)
LYMPHOCYTES NFR BLD: 11 % (ref 21–52)
MCH RBC QN AUTO: 25.7 PG (ref 24–34)
MCHC RBC AUTO-ENTMCNC: 31.2 G/DL (ref 31–37)
MCV RBC AUTO: 82.5 FL (ref 78–100)
MONOCYTES # BLD: 1 K/UL (ref 0.05–1.2)
MONOCYTES NFR BLD: 9 % (ref 3–10)
NEUTS SEG # BLD: 7.8 K/UL (ref 1.8–8)
NEUTS SEG NFR BLD: 72 % (ref 40–73)
NRBC # BLD: 0 K/UL (ref 0–0.01)
NRBC BLD-RTO: 0 PER 100 WBC
PLATELET # BLD AUTO: 261 K/UL (ref 135–420)
PMV BLD AUTO: 11.4 FL (ref 9.2–11.8)
POTASSIUM SERPL-SCNC: 3.5 MMOL/L (ref 3.5–5.5)
PROT SERPL-MCNC: 6.8 G/DL (ref 6.4–8.2)
PROTHROMBIN TIME: 22.9 SEC (ref 11.5–15.2)
Q-T INTERVAL, ECG07: 322 MS
QRS DURATION, ECG06: 90 MS
QTC CALCULATION (BEZET), ECG08: 493 MS
RBC # BLD AUTO: 3.15 M/UL (ref 4.2–5.3)
SARS-COV-2, COV2: NOT DETECTED
SODIUM SERPL-SCNC: 139 MMOL/L (ref 136–145)
VANCOMYCIN SERPL-MCNC: 16.3 UG/ML (ref 5–40)
VENTRICULAR RATE, ECG03: 141 BPM
WBC # BLD AUTO: 10.9 K/UL (ref 4.6–13.2)

## 2023-01-13 PROCEDURE — 85610 PROTHROMBIN TIME: CPT

## 2023-01-13 PROCEDURE — 74011000250 HC RX REV CODE- 250

## 2023-01-13 PROCEDURE — 74011000258 HC RX REV CODE- 258

## 2023-01-13 PROCEDURE — 74011250636 HC RX REV CODE- 250/636

## 2023-01-13 PROCEDURE — 80053 COMPREHEN METABOLIC PANEL: CPT

## 2023-01-13 PROCEDURE — 87636 SARSCOV2 & INF A&B AMP PRB: CPT

## 2023-01-13 PROCEDURE — 77010033678 HC OXYGEN DAILY

## 2023-01-13 PROCEDURE — 2709999900 HC NON-CHARGEABLE SUPPLY

## 2023-01-13 PROCEDURE — 85025 COMPLETE CBC W/AUTO DIFF WBC: CPT

## 2023-01-13 PROCEDURE — 74011250637 HC RX REV CODE- 250/637

## 2023-01-13 PROCEDURE — 94762 N-INVAS EAR/PLS OXIMTRY CONT: CPT

## 2023-01-13 PROCEDURE — 80202 ASSAY OF VANCOMYCIN: CPT

## 2023-01-13 PROCEDURE — 97530 THERAPEUTIC ACTIVITIES: CPT

## 2023-01-13 PROCEDURE — 97166 OT EVAL MOD COMPLEX 45 MIN: CPT

## 2023-01-13 PROCEDURE — 97162 PT EVAL MOD COMPLEX 30 MIN: CPT

## 2023-01-13 PROCEDURE — 97535 SELF CARE MNGMENT TRAINING: CPT

## 2023-01-13 RX ADMIN — SODIUM CHLORIDE, PRESERVATIVE FREE 10 ML: 5 INJECTION INTRAVENOUS at 10:34

## 2023-01-13 RX ADMIN — ATORVASTATIN CALCIUM 80 MG: 40 TABLET, FILM COATED ORAL at 10:31

## 2023-01-13 RX ADMIN — SODIUM CHLORIDE 15 MG/HR: 900 INJECTION, SOLUTION INTRAVENOUS at 06:12

## 2023-01-13 RX ADMIN — PRAMOXINE HYDROCHLORIDE AND ZINC ACETATE LOTION: 10; 1 LOTION TOPICAL at 04:43

## 2023-01-13 RX ADMIN — SODIUM CHLORIDE 15 MG/HR: 900 INJECTION, SOLUTION INTRAVENOUS at 00:50

## 2023-01-13 RX ADMIN — ASPIRIN 81 MG: 81 TABLET, CHEWABLE ORAL at 10:32

## 2023-01-13 RX ADMIN — VANCOMYCIN HYDROCHLORIDE 1000 MG: 1 INJECTION, POWDER, LYOPHILIZED, FOR SOLUTION INTRAVENOUS at 10:33

## 2023-01-13 RX ADMIN — WATER 1 G: 1 INJECTION INTRAMUSCULAR; INTRAVENOUS; SUBCUTANEOUS at 10:32

## 2023-01-13 RX ADMIN — CETIRIZINE HYDROCHLORIDE 10 MG: 10 TABLET, FILM COATED ORAL at 10:32

## 2023-01-13 RX ADMIN — BUMETANIDE 1 MG: 1 TABLET ORAL at 10:32

## 2023-01-13 RX ADMIN — APIXABAN 5 MG: 5 TABLET, FILM COATED ORAL at 10:30

## 2023-01-13 RX ADMIN — LEVOTHYROXINE SODIUM 150 MCG: 100 TABLET ORAL at 06:12

## 2023-01-13 RX ADMIN — ALLOPURINOL 100 MG: 100 TABLET ORAL at 10:32

## 2023-01-13 RX ADMIN — METOPROLOL TARTRATE 50 MG: 50 TABLET, FILM COATED ORAL at 10:31

## 2023-01-13 NOTE — PROGRESS NOTES
conducted an initial consultation and Spiritual Assessment for Mattie, who is a 82 y.o.,female. Patients Primary Language is: [place]. According to the patients EMR Yazidism Affiliation is: Trevor Daniels.     The reason the Patient came to the hospital is:   Patient Active Problem List    Diagnosis Date Noted    Cellulitis 01/12/2023    Leukocytosis 12/21/2022    Pulmonary edema 12/21/2022    Dyspnea 12/21/2022    Atrial fibrillation with RVR ([address] 12/21/2022    Patient's noncompliance with other medical treatment and regimen due to unspecified reason 12/21/2022    Debility 12/21/2022    Atrial fibrillation ([address] 12/21/2022    Advanced care planning/counseling discussion 12/21/2022    Acute exacerbation of CHF (congestive heart failure) ([address] 10/07/2022    Sepsis ([address] 08/10/2022    Septic arthritis of foot ([address] 05/26/2022    Diabetic foot ulcer ([address] 05/26/2022    Septic joint ([address] 05/26/2022    Neuropathy 08/10/2021    Essential hypertension 07/07/2021    Bilateral lower extremity edema 07/07/2021    Hypercholesteremia 07/07/2021    History of fall 07/07/2021    Chronic bilateral low back pain without sciatica 07/07/2021    OAB (overactive bladder) 07/07/2021    Urinary incontinence 07/07/2021    Chronic diastolic congestive heart failure ([address] 03/10/2021    Obesity, morbid ([address] 12/15/2017    Type 2 diabetes mellitus with diabetic nephropathy, with long-term current use of insulin ([address] 12/15/2017    Dermatitis 08/23/2017    Acquired hypothyroidism 08/01/2016    Encounter for palliative care 04/21/2016        The  provided the following Interventions:  Initiated a relationship of care and support. Listened empathically. Provided information about Spiritual Care Services. Chart reviewed. The following outcomes where achieved:  Family expressed gratitude for 's visit.     Assessment:  Patient does not have any Buddhist/cultural needs that will affect patients preferences in health care. Plan:  Chaplains will continue to follow and will provide pastoral care on an as needed/requested basis.  recommends bedside caregivers page  on duty if patient shows signs of acute spiritual or emotional distress.     [address]  ([phone])

## 2023-01-13 NOTE — ED NOTES
TRANSFER - OUT REPORT:    Verbal report given to polina(name) on Renee Ledesma  being transferred to [facility](unit) for routine progression of care       Report consisted of patients Situation, Background, Assessment and   Recommendations(SBAR). Information from the following report(s) SBAR, ED Summary, and MAR was reviewed with the receiving nurse. Lines:   Triple Lumen 01/12/23 (Active)       Peripheral IV Right Antecubital (Active)        Opportunity for questions and clarification was provided.       Patient transported with:   Registered Nurse

## 2023-01-13 NOTE — PROGRESS NOTES
[facility] Medicine   BRIEF PROGRESS NOTE    A-FIB RVR    - Paged PFM for HR sustained in >150s-160s w/ stable BP but diltiazem maxed out on 15 mg/hr. The only floors that can titrate diltiazem drips are CVT ICU or med ICU. Given pt is overall stable, no reason she would need that level of care. Rate of diltiazem was changed to continuous a 15 mg/hr. Can now be moved to stepdown floor when bed available, on which she can be titrated down. Misc. order placed w/ parameters, and staff is more than welcome to call us. We will re-order diltiazem at another level if needed to increase after titration down. Still on 15 mg/hr.     - Added 5 mg IV metoprolol around 8:30 pm. HR did decrease into the 110s-130s. Stable around high 120s. BP stable w/ map >75.     - Will add 50 mg metoprolol tartrate XL around 2 a.m., and keep this BID. Since she did have response to IV metoprolol, will be cautious with further dosing. She has not been taking meds at home and only has 1 IV access (central line) at which she has been pulling. Do not want to drop her pressure if she looses her central line. - Will defer management of metoprolol to day team thereafter. ** UPDATE 9:30 PM -- RN called from ED, HR sustained in 150s again. Pt short of breath, but had taken O2 off (previously on 2L NC). RN reapplied NC, sats 98%. BP stable w/ MAP 76. Will give one dose of PO metoprolol tartrate XL now, and schedule BID for tomorrow 1/13. ** UPDATE 11:10 PM -- visited at bedside, vitals stable. Still in A-fib on monitor, HR consistently in 90s-110s. BP stable at 138/64. Kept saying she was short of breath but said she'd feel better if she just knew she would be okay or if someone would be with her. Brief PE not concerning for acute fluid overload. Satting low 90s on RA, 95+ on 2.5 L NC. Kept taking this off. Kept pulling at central line. RN cleaned site and replaced tegaderm. Visited again at 1:00 a.m., sleeping well on RA.  Vitals similar as above. ITCHING     - Given 25 mg IV benadryl around 8 pm with decent response in sx. Less itchy and frantic. Will schedule PO hydroxyzine 25 mg beginning at midnight. - Cetrizine given around 6:30 pm. Continue daily. - Calamine lotion ordered.       Dena Alexis DO, PGY-1  [facility] Medicine  1/12/2023, 8:40 PM

## 2023-01-13 NOTE — PROGRESS NOTES
Problem: Mobility Impaired (Adult and Pediatric)  Goal: *Acute Goals and Plan of Care (Insert Text)  Description: Physical Therapy Goals  Initiated 1/13/2023 and to be accomplished within 7 day(s)  1. Patient will move from supine to sit and sit to supine  in bed with modified independence. 2.  Patient will transfer from bed to chair and chair to bed with modified independence using the least restrictive device. 3.  Patient will perform sit to stand with modified independence. 4.  Patient will ambulate with modified independence for 150 feet with the least restrictive device. PLOF: Amb with ww. Lives with partner. Apartment. Outcome: Progressing Towards Goal   PHYSICAL THERAPY EVALUATION    Patient: April Casanova (89 y.o. female)  Date: 1/13/2023  Primary Diagnosis: Cellulitis [L03.90]  Atrial fibrillation (HCC) [I48.91]  Precautions: Fall, Contact  ASSESSMENT :  Contact precautions per RN Nitesh Hess d/t possible bed bugs. Evaluation limited to bed level d/t elevated HR.  -143bpm at bedside. 88-92% O2 saturation on 2L O2. /77. Follows commands. Supervision for rolling. BLE AROM functional range in bed. Seated in bed with HOB elevated. Educated on need for RN assistance with mobility; verbalized understanding. Call bell in reach. Patient will benefit from skilled intervention to address the above impairments.   Patient's rehabilitation potential is considered to be Fair  Factors which may influence rehabilitation potential include:   []         None noted  []         Mental ability/status  [x]         Medical condition  []         Home/family situation and support systems  []         Safety awareness  []         Pain tolerance/management  []         Other:      PLAN :  Recommendations and Planned Interventions:   [x]           Bed Mobility Training             [x]    Neuromuscular Re-Education  [x]           Transfer Training                   []    Orthotic/Prosthetic Training  [x] Gait Training                          []    Modalities  [x]           Therapeutic Exercises           []    Edema Management/Control  [x]           Therapeutic Activities            [x]    Family Training/Education  [x]           Patient Education  []           Other (comment):    Frequency/Duration: Patient will be followed by physical therapy 3-5 times a week to address goals. Further Equipment Recommendations for Discharge: rolling walker    [facility] Basic Mobility Inpatient Short Form:  13/24  This AMPA score should be considered in conjunction with interdisciplinary team recommendations to determine the most appropriate discharge setting. Patient's social support, diagnosis, medical stability, and prior level of function should also be taken into consideration. Current research shows that an AM-PAC score of 17 (13 without stairs) or less is not associated with a discharge to the patient's home setting. Based on an AM-PAC score of 13/24 and current functional mobility deficits, it is recommended that the patient have 3-5 sessions per week of Physical Therapy at d/c to increase the patient's independence. Recommendation may change with tolerance of OOB activity. SUBJECTIVE:   Patient stated Good morning.     OBJECTIVE DATA SUMMARY:     Past Medical History:   Diagnosis Date    Acquired hypothyroidism 8/1/2016    Arthritis of right shoulder region 4/21/2016    Asthma     Bilateral lower extremity edema 7/7/2021    Chronic bilateral low back pain without sciatica 7/7/2021    Chronic diastolic congestive heart failure ([address] 3/10/2021    Chronic venous insufficiency     Diabetes (HCC)     neuropathy    Essential hypertension 7/7/2021    Gout     History of depression 1/23/2017    History of fall 7/7/2021    Hypercholesteremia 7/7/2021    Hypertension     MI (myocardial infarction) ([address]     OAB (overactive bladder) 7/7/2021    Peripheral neuropathy     Rheumatoid arthritis ([address]     Tear of right rotator cuff 5/16/2016    Thyroid pain     Urinary incontinence 7/7/2021    Vertigo      Past Surgical History:   Procedure Laterality Date    HX AMPUTATION TOE Right 2020    Great toe Dr. 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 HX KNEE REPLACEMENT Bilateral      Barriers to Learning/Limitations: yes;  sensory deficits-vision/hearing/speech  Compensate with: Visual Cues, Verbal Cues, Tactile Cues and Kinesthetic Cues    Home Situation:  Home Situation  Home Environment: Apartment  One/Two Story Residence: One story  Living Alone: No  Support Systems: Spouse/Significant Other  Patient Expects to be Discharged to[de-identified] Home  Current DME Used/Available at Home: Walker, rolling    Critical Behavior:  Neurologic State: Alert  Orientation Level: Oriented to person;Oriented to place    Strength:    BLE grossly 3+/5 per bed level  Range Of Motion:  BLE AROM WFL at bed level  Functional Mobility:  Bed Mobility:  Rolling: Supervision  Pain:  Pain level pre-treatment: 0/10   Pain level post-treatment: 0/10     Activity Tolerance:   Fair    After treatment:   []         Patient left in no apparent distress sitting up in chair  [x]         Patient left in no apparent distress in bed  [x]         Call bell left within reach  [x]         Nursing notified  []         Caregiver present  []         Bed alarm activated  []         SCDs applied    COMMUNICATION/EDUCATION:   [x]         Role of physical therapy and plan of care in the acute care setting. [x]         Fall prevention education was provided and the patient/caregiver indicated understanding. [x]         Patient/family have participated as able in goal setting and plan of care. []         Patient/family agree to work toward stated goals and plan of care. []         Patient understands intent and goals of therapy, but is neutral about his/her participation.   [] Patient is unable to participate in goal setting/plan of care: ongoing with therapy staff. Thank you for this referral.  Glen Benitez, PT   Time Calculation: 16 mins    Eval Complexity: History: MEDIUM  Complexity : 1-2 comorbidities / personal factors will impact the outcome/ POC Exam:MEDIUM Complexity : 3 Standardized tests and measures addressing body structure, function, activity limitation and / or participation in recreation  Presentation: MEDIUM Complexity : Evolving with changing characteristics  Clinical Decision Making:Medium Complexity    Clinical judgement; ROM, MMT, functional mobility Overall Complexity:MEDIUM    [employer] AM-PAC® Basic Mobility Inpatient Short Form (6-Clicks) Version 2    How much HELP from another person does the patient currently need    (If the patient hasn't done an activity recently, how much help from another person do you think he/she would need if he/she tried?)   Total (Total A or Dep)   A Lot  (Mod to Max A)   A Little (Sup or Min A)   None (Mod I to I)   Turning from your back to your side while in a flat bed without using bedrails? [] 1 [] 2 [x] 3 [] 4   2. Moving from lying on your back to sitting on the side of a flat bed without using bedrails? [] 1 [x] 2 [] 3 [] 4   3. Moving to and from a bed to a chair (including a wheelchair)? [] 1 [x] 2 [] 3 [] 4   4. Standing up from a chair using your arms (e.g., wheelchair, or bedside chair)? [] 1 [x] 2 [] 3 [] 4   5. Walking in hospital room? [] 1 [x] 2 [] 3 [] 4   6. Climbing 3-5 steps with a railing?+   [] 1 [x] 2 [] 3 [] 4   +If stair climbing cannot be assessed, skip item #6. Sum responses from items 1-5.

## 2023-01-13 NOTE — PROGRESS NOTES
Problem: Self Care Deficits Care Plan (Adult)  Goal: *Acute Goals and Plan of Care (Insert Text)  Description: Occupational Therapy Goals  Initiated 1/13/2023 within 7 day(s). 1.  Patient will perform grooming with modified independence. 2.  Patient will perform bathing with modified independence. 3.  Patient will perform upper body dressing and lower body dressing with modified independence. 4.  Patient will perform toilet transfers with modified independence using RW with good balance. 5.  Patient will perform all aspects of toileting with modified independence. 6.  Patient will participate in upper extremity therapeutic exercise/activities with modified independence for 8 minutes. 7.  Patient will utilize energy conservation techniques during functional activities with min verbal cues. Prior Level of Function: patient reports she is Mod I with ADLs, use of RW for functional mobility, performs cooking and cleaning tasks      Outcome: Progressing Towards Goal   OCCUPATIONAL THERAPY EVALUATION    Patient: Baljit Shanks [de-identified]82 y.o. female)  Date: 1/13/2023  Primary Diagnosis: Cellulitis [L03.90]  Atrial fibrillation ([address] [I48.91]       Precautions:   Fall, Contact    ASSESSMENT :  Nursing/RN cleared for pt to participate in OT evaluation and tx session. Per nursing, pt has contact precautions/bed bugs, in which medicated cream needs to be applied, pt seen at bed level. Patient presents lying semi-reclined in bed, very hard of hearing with report that she recently purchased hearing aides and needs assist to set up, and they are currently at home. BUE WFL, MMT 3+/5. Patient lying semi reclined in bed, call bell within reach & pt verbalized understanding and provided return demonstration to utilize for assist e.g. functional transfers in order to prevent falls. Patient will benefit from skilled intervention to address the above impairments.   Patient's rehabilitation potential is considered to be Good  Factors which may influence rehabilitation potential include:   []             None noted  []             Mental ability/status  [x]             Medical condition  []             Home/family situation and support systems  []             Safety awareness  []             Pain tolerance/management  []             Other:      PLAN :  Recommendations and Planned Interventions:   [x]               Self Care Training                  [x]      Therapeutic Activities  [x]               Functional Mobility Training   []      Cognitive Retraining  [x]               Therapeutic Exercises           [x]      Endurance Activities  [x]               Balance Training                    [x]      Neuromuscular Re-Education  []               Visual/Perceptual Training     [x]      Home Safety Training  [x]               Patient Education                   [x]      Family Training/Education  []               Other (comment):    Frequency/Duration: Patient will be followed by occupational therapy 3-5 times a week to address goals. Further Equipment Recommendations for Discharge: to be determined as progress is made with therapy    AMPAC: Current research shows that an AM-PAC score of 17 or less is not associated with a discharge to the patient's home setting. Based on an AM-PAC score of 15/24 and their current ADL deficits; it is recommended that the patient have 3-5 sessions per week of Occupational Therapy at d/c to increase the patient's independence. This AMPAC score should be considered in conjunction with interdisciplinary team recommendations to determine the most appropriate discharge setting. Patient's social support, diagnosis, medical stability, and prior level of function should also be taken into consideration. SUBJECTIVE:   Patient stated I walk all over the place with my walker, even the laundromat.     OBJECTIVE DATA SUMMARY:     Past Medical History:   Diagnosis Date    Acquired hypothyroidism 8/1/2016    Arthritis of right shoulder region 4/21/2016    Asthma     Bilateral lower extremity edema 7/7/2021    Chronic bilateral low back pain without sciatica 7/7/2021    Chronic diastolic congestive heart failure ([address] 3/10/2021    Chronic venous insufficiency     Diabetes (HCC)     neuropathy    Essential hypertension 7/7/2021    Gout     History of depression 1/23/2017    History of fall 7/7/2021    Hypercholesteremia 7/7/2021    Hypertension     MI (myocardial infarction) ([address]     OAB (overactive bladder) 7/7/2021    Peripheral neuropathy     Rheumatoid arthritis ([facility]     Tear of right rotator cuff 5/16/2016    Thyroid pain     Urinary incontinence 7/7/2021    Vertigo      Past Surgical History:   Procedure Laterality Date    HX AMPUTATION TOE Right 2020    Great toe Dr. 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 HX KNEE REPLACEMENT Bilateral      Barriers to Learning/Limitations: None  Compensate with: visual, verbal, tactile, kinesthetic cues/model    Home Situation:   Home Situation  Home Environment: Apartment  One/Two Story Residence: One story  Living Alone: No  Support Systems: Other (Comment) (roomate)  Patient Expects to be Discharged to[de-identified] Home  Current DME Used/Available at Home: Walker, rolling, Grab bars  Tub or Shower Type: Tub/Shower combination  []  Right hand dominant   []  Left hand dominant    Cognitive/Behavioral Status:  Neurologic State: Alert  Orientation Level: Oriented to place;Oriented to person  Cognition: Follows commands  Safety/Judgement: Fall prevention    Skin: skin scabs noted on chest, pt actively scratching with bath cloth noted with blood on it-nursing aware  Edema: none noted    Vision/Perceptual:  none noted       Coordination: BUE  Coordination: Within functional limits  Fine Motor Skills-Upper: Left Intact; Right Intact    Gross Motor Skills-Upper: Left Intact; Right Intact    Strength: BUE  Strength: Generally decreased, functional     Tone & Sensation: BUE  Tone: Normal     Range of Motion: BUE  AROM: Within functional limits     ADL Assessment:   Feeding: Setup    Oral Facial Hygiene/Grooming: Stand-by assistance (clinical judgement)    Bathing: Moderate assistance (clinical judgement)    Upper Body Dressing: Minimum assistance (clinical judgement)    Lower Body Dressing: Moderate assistance (clinical judgement)    Toileting: Moderate assistance (clinical judgement)     Cognitive Retraining  Safety/Judgement: Fall prevention    Pain:  Pain level pre-treatment: 0/10   Pain level post-treatment: 0/10     Activity Tolerance:   poor  Please refer to the flowsheet for vital signs taken during this treatment. After treatment:   [] Patient left in no apparent distress sitting up in chair  [x] Patient left in no apparent distress in bed  [x] Call bell left within reach  [x] Nursing notified  [] Caregiver present  [x] Bed alarm activated    COMMUNICATION/EDUCATION:   [x] Role of Occupational Therapy in the acute care setting  [x] Home safety education was provided and the patient/caregiver indicated understanding. [x] Patient/family have participated as able in goal setting and plan of care. [x] Patient/family agree to work toward stated goals and plan of care. [] Patient understands intent and goals of therapy, but is neutral about his/her participation. [] Patient is unable to participate in goal setting and plan of care. Thank you for this referral.  Forbes Seip  Time Calculation: 16 mins    Eval Complexity: History: MEDIUM Complexity : Expanded review of history including physical, cognitive and psychosocial  history ; Examination: MEDIUM Complexity : 3-5 performance deficits relating to physical, cognitive , or psychosocial skils that result in activity limitations and / or participation restrictions;    Decision Making:MEDIUM Complexity : Patient may present with comorbidities that affect occupational performnce. Miniml to moderate modification of tasks or assistance (eg, physical or verbal ) with assesment(s) is necessary to enable patient to complete evaluation     [employer] AM-PAC® Daily Activity Inpatient Short Form (6-Clicks)*    How much HELP from another person does the patient currently need    (If the patient hasn't done an activity recently, how much help from another person do you think he/she would need if he/she tried?)   Total (Total A or Dep)   A Lot  (Mod to Max A)   A Little (Sup or Min A)   None (Mod I to I)   Putting on and taking off regular lower body clothing? [] 1 [x] 2 [] 3 [] 4   2. Bathing (including washing, rinsing,      drying)? [] 1 [x] 2 [] 3 [] 4   3. Toileting, which includes using toilet, bedpan or urinal?   [] 1 [x] 2 [] 3 [] 4   4. Putting on and taking off regular upper body clothing? [] 1 [] 2 [x] 3 [] 4   5. Taking care of personal grooming such as brushing teeth? [] 1 [] 2 [x] 3 [] 4   6. Eating meals?    [] 1 [] 2 [x] 3 [] 4

## 2023-01-13 NOTE — ED NOTES
Attending lorne. Moshe Vegas MD returned page. Given update that the patient now has a working peripheral IV. He stated he would let Dr. Saurav Theodore know. No orders given.

## 2023-01-13 NOTE — PROGRESS NOTES
[facility] Family Medicine  DAILY PROGRESS NOTE      Patient:    Tara Calhoun , 82 y.o. female   MRN:  [id number]  Room/Bed:  ER15/15  Admission Date:   1/12/2023  Code status:  Full Code    Reason for Admission:     83yo F with PMHx of CHF, Afib w RVR, HTN, Hx of DVT, T2DM, Hypothyroidism, COPD, RA, and chronic pain admitted for Afib w RVR bilateral lower extremity cellulitis. Pt currently on diltiazem drip with plan to transition to PO. Central line removed this morning now that Pt has pIV. ASSESSMENT AND PLAN:   Problem List Items Addressed This Visit          Circulatory    Atrial fibrillation ([facility] [address]    Relevant Medications    bumetanide (BUMEX) 1 mg tablet     Other Visit Diagnoses       Cellulitis of both lower extremities    -  Primary            Cellulitis in setting of lymphedema/hx of CHF likely from excoriation.     -Pt has been complain of pruritis and has scratch non stop. Pt report having some bug at home and family is in process of moving from house. -Bilateral lower extremity swelling with erythema worsen on left  -Patient nontoxic-appearing, unlikely to be septic at this time with normal labs  -On admission, WBC 11.8, no leukocytosis or neutrophil shift, lactic acid 1.5  -SIRS 1/4   -BCx collected 1/12: NGTD  -other conditions to consider, includes allergic reaction (hives)  -abtx started in ED      Plan:  -Daily CBC, monitor WBCs and fever   -Monitor vitals  -Cont. on Vancomycin and rocephin, duration 5 days, may transition to PO for OP   -Follow-Up Blood Cx and sensitivities  -Cont.   Zyrtec, Hydroxyzine for itching  -Perform full body examination and consider placing on isolation      Afib w/ RVR  CHF on bumex  HTN  Hx DVT  -EKG: Atrial fibrillation with rapid ventricular response with PVCs  -Started on Cardizem drip in the ED  -OP meds: Eliquis for anticoagulation, Diltiazem and Lopressor for rate control and HTN, Bumex for CHF  -PVL 10/8 showed non occlusive thrombus within varicose veins of calf on RLE, otherwise no DVT bilaterally      Plan:  -supportive O2 care as needed  -strict I+Os, daily weights  -fluid restrict 1200 cc   -Plan to titrate Diltiazem down and transition to PO  -Cont. Bumex  -continue Metoprolol  -cont Eliquis 5 mg BID     DM with neuoropathy  -most recent A1c 11.6 in Jan 4 2023  -meds per chart review: insulin glargine 20 U qhs, metformin 500 mg daily. Per patient she has not been on Lantus due to affordability  -BG in   -on Gabapentin 800 QHS      Plan:   -monitor BG   -will start lantus 10 qhs tomorrow   -SSI   -hypoglycemia protocol  -cont Gabapentin        Hypothyroidism - stable  -meds: levo 150 mcg  -pt nonadherent with medications at home   Plan:   -c/w levo 150 mcg     COPD - stable  -on home O2 for prn use  -no inhalers prescribed per chart review  Plan:   -c/w supportive O2 as needed     Chronic pain, stable  RA  -ESR 18 on admission   -meds per chart review: MS IR 15 mg   -Recently stated that she stopped taking morphine due to no longer going to pain management provider   Plan:   -We will monitor for flare         Global:  Code: Full  IVF/Drips: Cardizem  Diet: Diabetic diet with fluid restriction  Bowel Regimen, Last BM: MiraLAX as needed  DVT/AC: Eliquis  Mobility: per protocol      Point of Contact (relationship, number): [place]  [phone]           SUBJECTIVE:   Events of the last 24 hours:  Overnight, after getting zyrtec pt with continued itching and given iv benadryl, hydroxyzine, and calamine lotion. Patient seen at beside. No acute complaints.      ROS (positive findings are in BOLD; negative findings are in regular font)  Constitutional: fevers, chills, appetite changes, weight changes, fatigue  HEENT: changes in vision, changes in hearing, sore throat, dysphagia  Cardiovascular: chest pain, palpitations, PND, orthopnea, edema  Pulmonary: SOB, cough, sputum production, wheezing, chest tightness  Gastrointestinal: abdominal pain, nausea/vomiting, diarrhea, constipation, melena, hematochezia  Genitourinary: dysuria, hesitation, dribbling, urgency, hematuria  Musculoskeletal: arthralgias, myalgias  Skin: rash, itching  Neurological: sensory changes, motor changes, headache  Psychiatric: mood changes  Endocrine: heat/cold intolerance  Heme: easy bruising/easy bleeding, LAD    CURRENT INPATIENT MEDICATIONS:  Current Facility-Administered Medications   Medication Dose Route Frequency Provider Last Rate Last Admin    cefTRIAXone (ROCEPHIN) 1 g in sterile water (preservative free) 10 mL IV syringe  1 g IntraVENous Q24H Christo Henderson MD   1 g at 01/12/23 1520    vancomycin (VANCOCIN) 1,000 mg in 0.9% sodium chloride 250 mL (VIAL-MATE)  1,000 mg IntraVENous Q24H Christo Henderson MD        sodium chloride (NS) flush 5-40 mL  5-40 mL IntraVENous Q8H Chiqui Oliver MD   10 mL at 01/12/23 2153    sodium chloride (NS) flush 5-40 mL  5-40 mL IntraVENous PRN Chiqui Oliver MD        acetaminophen (TYLENOL) tablet 650 mg  650 mg Oral Q6H PRN Chiqui Oliver MD        Or    acetaminophen (TYLENOL) suppository 650 mg  650 mg Rectal Q6H PRN Chiqui Oliver MD        polyethylene glycol (MIRALAX) packet 17 g  17 g Oral DAILY PRN Chiqui Oliver MD        ondansetron (ZOFRAN ODT) tablet 4 mg  4 mg Oral Q8H PRN Chiqui Oliver MD        Or    ondansetron (ZOFRAN) injection 4 mg  4 mg IntraVENous Q6H PRN Chiqui Oliver MD        allopurinoL (ZYLOPRIM) tablet 100 mg  100 mg Oral DAILY Chiqui Oliver MD        aspirin chewable tablet 81 mg  81 mg Oral DAILY Chiqui Oliver MD        atorvastatin (LIPITOR) tablet 80 mg  80 mg Oral DAILY Chiqui Oliver MD        bumetanide (BUMEX) tablet 1 mg  1 mg Oral DAILY Chqiui Oliver MD        gabapentin (NEURONTIN) capsule 800 mg  800 mg Oral QHS Chiqui Oliver MD   800 mg at 01/12/23 2151    glucose chewable tablet 16 g  16 g Oral PRN Chiqui Oliver MD        glucagon (GLUCAGEN) injection 1 mg  1 mg IntraMUSCular PRN Amanda Helm MD        dextrose 10% infusion 0-250 mL  0-250 mL IntraVENous PRN Amanda Helm MD        levothyroxine (SYNTHROID) tablet 150 mcg  150 mcg Oral 6am Amanda Helm MD        insulin lispro (HUMALOG) injection   SubCUTAneous AC&HS Emily Rios MD   8 Units at 01/12/23 2150    cetirizine (ZYRTEC) tablet 10 mg  10 mg Oral DAILY Amanda Helm MD   10 mg at 01/12/23 1838    apixaban (ELIQUIS) tablet 5 mg  5 mg Oral BID Amanda Helm MD   5 mg at 01/12/23 1945    dilTIAZem (CARDIZEM) 100 mg in 0.9% sodium chloride (MBP/ADV) 100 mL infusion  15 mg/hr IntraVENous CONTINUOUS Castro Noun, DO 15 mL/hr at 01/13/23 0050 15 mg/hr at 01/13/23 0050    metoprolol tartrate (LOPRESSOR) tablet 50 mg  50 mg Oral BID Floydene Jenny P, DO        hydrOXYzine HCL (ATARAX) tablet 25 mg  25 mg Oral Q6H PRN Cass Wakefield P, DO        pramoxine-zinc acetate (CALDYPHEN) 1-0.1 % lotion   Topical QID Castro Noun, DO   Given at 01/13/23 0443     Current Outpatient Medications   Medication Sig Dispense Refill    bumetanide (BUMEX) 1 mg tablet Take 1 mg by mouth daily. gabapentin (NEURONTIN) 400 mg capsule 1 capsule daily 360 Capsule 0    insulin glargine (LANTUS) 100 unit/mL injection Inject 20 Units at bedtime. Monitor and record blood sugar daily. 10 mL 1    aspirin 81 mg chewable tablet Take 1 Tablet by mouth daily. 30 Tablet 2    metoprolol tartrate (LOPRESSOR) 100 mg IR tablet Take 1 Tablet by mouth every twelve (12) hours. 120 Tablet 2    dilTIAZem ER (CARDIZEM CD) 180 mg capsule Take 1 Capsule by mouth daily. 120 Capsule 3    levothyroxine (SYNTHROID) 150 mcg tablet Take 150 mcg by mouth daily. morphine IR (MS IR) 15 mg tablet Take 15 mg by mouth two (2) times a day. metFORMIN (GLUCOPHAGE) 500 mg tablet Take 500 mg by mouth daily. allopurinoL (ZYLOPRIM) 100 mg tablet Take 100 mg by mouth daily.       atorvastatin (LIPITOR) 80 mg tablet Take 1 Tablet by mouth daily. 90 Tablet 3    acetaminophen (TYLENOL) 500 mg tablet Take 1 Tab by mouth every six (6) hours as needed for Pain. 270 Tab 3    BD Insulin Syringe SafetyGlide 0.5 mL 30 gauge x 5/16\" syrg       Blood-Glucose Meter (FREESTYLE LITE METER) monitoring kit Test twice blood glucose daily; ICD-10 E11.9; Quantity 1 1 Kit 0    insulin syringe,safetyneedle 1 mL 31 gauge x 5/16\" syrg 1 Each by Does Not Apply route daily.  300 Each 3    glucose blood VI test strips (FREESTYLE TEST) strip Use to check blood glucose twice daily DX:E11.9 300 Strip 3       Allergies  No Known Allergies    OBJECTIVE:    Intake/Output Summary (Last 24 hours) at 1/13/2023 0610  Last data filed at 1/13/2023 0515  Gross per 24 hour   Intake 1530.5 ml   Output 700 ml   Net 830.5 ml       Visit Vitals  /61   Pulse (!) 103   Temp 98 °F (36.7 °C)   Resp 16   Ht 5' 5\" (1.651 m)   Wt 101 kg (222 lb 10.6 oz)   SpO2 94%   BMI 37.05 kg/m²       PHYSICAL EXAM  Gen: NAD, comfortable, obese   HEENT: normocephalic, atraumatic, MMM, no thyromegaly, no JVD  CV: irregularly irregular, S1/S2 present without M/R/G, +2 radial pulses, well-perfused, PMI not displaced  Pulm: CTAB, no wheezes, no crackles  Abd: S/NT/ND, no rebound, no guarding, no hepatosplenomegaly   Ext: peripheral pulses present, bilateral LE erythema up to mid-thighs, erythema slightly improved from yesterday above the knee, slight tenderness to palpation, 2+ nonpitting edema  Skin: warm, dry, intact, no rash  Neuro: CN II-XII grossly intact, no focal deficits appreciated   Psych: appropriate, alert, oriented x3    LABWORK (LAST 24 HOURS)  Recent Results (from the past 24 hour(s))   EKG, 12 LEAD, INITIAL    Collection Time: 01/12/23 10:08 AM   Result Value Ref Range    Ventricular Rate 141 BPM    QRS Duration 90 ms    Q-T Interval 322 ms    QTC Calculation (Bezet) 493 ms    Calculated R Axis 0 degrees    Calculated T Axis -56 degrees    Diagnosis Critical Test Result: High HR  Atrial fibrillation with rapid ventricular response with premature   ventricular or aberrantly conducted complexes  Abnormal ECG  When compared with ECG of 21-DEC-2022 00:09,  T wave inversion no longer evident in Lateral leads     CBC WITH AUTOMATED DIFF    Collection Time: 01/12/23 10:25 AM   Result Value Ref Range    WBC 11.8 4.6 - 13.2 K/uL    RBC 3.52 (L) 4.20 - 5.30 M/uL    HGB 9.2 (L) 12.0 - 16.0 g/dL    HCT 29.0 (L) 35.0 - 45.0 %    MCV 82.4 78.0 - 100.0 FL    MCH 26.1 24.0 - 34.0 PG    MCHC 31.7 31.0 - 37.0 g/dL    RDW 15.4 (H) 11.6 - 14.5 %    PLATELET 178 373 - 949 K/uL    MPV 11.5 9.2 - 11.8 FL    NRBC 0.0 0  WBC    ABSOLUTE NRBC 0.00 0.00 - 0.01 K/uL    NEUTROPHILS 72 40 - 73 %    LYMPHOCYTES 11 (L) 21 - 52 %    MONOCYTES 9 3 - 10 %    EOSINOPHILS 6 (H) 0 - 5 %    BASOPHILS 1 0 - 2 %    IMMATURE GRANULOCYTES 1 (H) 0.0 - 0.5 %    ABS. NEUTROPHILS 8.5 (H) 1.8 - 8.0 K/UL    ABS. LYMPHOCYTES 1.3 0.9 - 3.6 K/UL    ABS. MONOCYTES 1.0 0.05 - 1.2 K/UL    ABS. EOSINOPHILS 0.8 (H) 0.0 - 0.4 K/UL    ABS. BASOPHILS 0.1 0.0 - 0.1 K/UL    ABS. IMM. GRANS. 0.1 (H) 0.00 - 0.04 K/UL    DF AUTOMATED     METABOLIC PANEL, COMPREHENSIVE    Collection Time: 01/12/23 10:25 AM   Result Value Ref Range    Sodium 137 136 - 145 mmol/L    Potassium 3.6 3.5 - 5.5 mmol/L    Chloride 99 (L) 100 - 111 mmol/L    CO2 30 21 - 32 mmol/L    Anion gap 8 3.0 - 18 mmol/L    Glucose 305 (H) 74 - 99 mg/dL    BUN 35 (H) 7.0 - 18 MG/DL    Creatinine 1.37 (H) 0.6 - 1.3 MG/DL    BUN/Creatinine ratio 26 (H) 12 - 20      eGFR 38 (L) >60 ml/min/1.73m2    Calcium 8.6 8.5 - 10.1 MG/DL    Bilirubin, total 0.5 0.2 - 1.0 MG/DL    ALT (SGPT) <6 (L) 13 - 56 U/L    AST (SGOT) 8 (L) 10 - 38 U/L    Alk.  phosphatase 54 45 - 117 U/L    Protein, total 7.8 6.4 - 8.2 g/dL    Albumin 3.0 (L) 3.4 - 5.0 g/dL    Globulin 4.8 (H) 2.0 - 4.0 g/dL    A-G Ratio 0.6 (L) 0.8 - 1.7     LACTIC ACID    Collection Time: 01/12/23  2:45 PM Result Value Ref Range    Lactic acid 1.5 0.4 - 2.0 MMOL/L   GLUCOSE, POC    Collection Time: 01/12/23  6:02 PM   Result Value Ref Range    Glucose (POC) 239 (H) 70 - 110 mg/dL   GLUCOSE, POC    Collection Time: 01/12/23  9:45 PM   Result Value Ref Range    Glucose (POC) 303 (H) 70 - 110 mg/dL   COVID-19 WITH INFLUENZA A/B    Collection Time: 01/13/23  2:58 AM   Result Value Ref Range    SARS-CoV-2 by PCR Not detected NOTD      Influenza A by PCR Not detected NOTD      Influenza B by PCR Not detected NOTD     VANCOMYCIN, RANDOM    Collection Time: 01/13/23  4:40 AM   Result Value Ref Range    Vancomycin, random 16.3 5.0 - 45.9 UG/ML   METABOLIC PANEL, COMPREHENSIVE    Collection Time: 01/13/23  4:40 AM   Result Value Ref Range    Sodium 139 136 - 145 mmol/L    Potassium 3.5 3.5 - 5.5 mmol/L    Chloride 103 100 - 111 mmol/L    CO2 29 21 - 32 mmol/L    Anion gap 7 3.0 - 18 mmol/L    Glucose 139 (H) 74 - 99 mg/dL    BUN 29 (H) 7.0 - 18 MG/DL    Creatinine 1.16 0.6 - 1.3 MG/DL    BUN/Creatinine ratio 25 (H) 12 - 20      eGFR 46 (L) >60 ml/min/1.73m2    Calcium 8.3 (L) 8.5 - 10.1 MG/DL    Bilirubin, total 0.6 0.2 - 1.0 MG/DL    ALT (SGPT) 6 (L) 13 - 56 U/L    AST (SGOT) 5 (L) 10 - 38 U/L    Alk.  phosphatase 43 (L) 45 - 117 U/L    Protein, total 6.8 6.4 - 8.2 g/dL    Albumin 2.6 (L) 3.4 - 5.0 g/dL    Globulin 4.2 (H) 2.0 - 4.0 g/dL    A-G Ratio 0.6 (L) 0.8 - 1.7     CBC WITH AUTOMATED DIFF    Collection Time: 01/13/23  4:40 AM   Result Value Ref Range    WBC 10.9 4.6 - 13.2 K/uL    RBC 3.15 (L) 4.20 - 5.30 M/uL    HGB 8.1 (L) 12.0 - 16.0 g/dL    HCT 26.0 (L) 35.0 - 45.0 %    MCV 82.5 78.0 - 100.0 FL    MCH 25.7 24.0 - 34.0 PG    MCHC 31.2 31.0 - 37.0 g/dL    RDW 15.5 (H) 11.6 - 14.5 %    PLATELET 007 799 - 952 K/uL    MPV 11.4 9.2 - 11.8 FL    NRBC 0.0 0  WBC    ABSOLUTE NRBC 0.00 0.00 - 0.01 K/uL    NEUTROPHILS 72 40 - 73 %    LYMPHOCYTES 11 (L) 21 - 52 %    MONOCYTES 9 3 - 10 %    EOSINOPHILS 7 (H) 0 - 5 % BASOPHILS 1 0 - 2 %    IMMATURE GRANULOCYTES 1 (H) 0.0 - 0.5 %    ABS. NEUTROPHILS 7.8 1.8 - 8.0 K/UL    ABS. LYMPHOCYTES 1.2 0.9 - 3.6 K/UL    ABS. MONOCYTES 1.0 0.05 - 1.2 K/UL    ABS. EOSINOPHILS 0.8 (H) 0.0 - 0.4 K/UL    ABS. BASOPHILS 0.1 0.0 - 0.1 K/UL    ABS. IMM. GRANS. 0.1 (H) 0.00 - 0.04 K/UL    DF AUTOMATED     PROTHROMBIN TIME + INR    Collection Time: 01/13/23  4:40 AM   Result Value Ref Range    Prothrombin time 22.9 (H) 11.5 - 15.2 sec    INR 2.0 (H) 0.8 - 1.2         IMAGING AND PROCEDURES (LAST 36 HOURS)  XR CHEST PORT    Result Date: 1/12/2023  No pneumothorax post CVL placement. Similar enlarged cardiac silhouette with vascular congestion and mild pulmonary edema. XR CHEST PORT    Result Date: 1/12/2023  Enlarged cardiac silhouette with vascular congestion, small pleural effusion and mild pulmonary edema/CHF, less pronounced than comparison study.     ================================================================  Further management for Ms. Flynn Cummings will be discussed on rounds with my attending.       Macey Rojas MD, PGY-1  [facility]  January 13, 2023 6:10 AM

## 2023-01-13 NOTE — ED NOTES
Attending paged for elevated HR and patient complaint of sob. MD returned page and stated orders would  be placed for patient.

## 2023-01-13 NOTE — ED NOTES
MD paged due to cvc saturated with blood. MD returned paged and stated she would come see patient at the bedside.  Dr Meme Wong came to exam cvc site at bedside and stated she would contact the [facility] supervisor and PICC team.

## 2023-01-13 NOTE — ED NOTES
TRANSFER - OUT REPORT:    Verbal report given to Shadia(name) on Renee Ledesma  being transferred to cvtsd(unit) for routine progression of care       Report consisted of patients Situation, Background, Assessment and   Recommendations(SBAR). Information from the following report(s) SBAR, OR Summary, and MAR was reviewed with the receiving nurse. Lines:   [employer] 01/12/23 (Active)        Opportunity for questions and clarification was provided.       Patient transported with:   [employer]

## 2023-01-13 NOTE — ROUTINE PROCESS
Patient refusing assessment;  is upset about \"different people wearing different things in and out of my room\". .. \"the bugs I have don't bite other people! \". Nurse attempt to educate patient on Infection Control Practices, safety and prevention; to not take the \"isolation guidelines\" personally; how this is a \"protocol\" to keep everyone safe. Patient not responding or acknowledging education; persistent with voicing frustration over \"contact precautions\".

## 2023-01-13 NOTE — ROUTINE PROCESS
Patient AMA paperwork provided to patient. Patient leaving AMA with significant other via personal vehicle home.

## 2023-01-13 NOTE — PROGRESS NOTES
Patient removed monitoring equipment, got herself dressed; IV removed; significant other at bedside;

## 2023-01-13 NOTE — PROGRESS NOTES
Physician Progress Note      PATIENT:               Madai Garvey  CSN #:                  [id number]  :                       1935  ADMIT DATE:       2023 9:45 AM  [address] [place] DATE:  RESPONDING  PROVIDER #:        Deisi Banegas MD          QUERY TEXT:    Dear  PFM  physicians  Pt admitted with BLE cellulitis. Pt noted to have  DM  type  2 If possible, please document in progress notes and discharge summary the relationship, if any, between cellulitis and DM. The medical record reflects the following:  Risk Factors: medication  non compliance;  ER  MD notes  poorly   h/o  controlled  Diabetes  Clinical Indicators:   glucose on admit   305;     patient has been treated for cellulitis for the last 2 weeks with antibiotics but has had worsening of her condition despite antibiotics. Treatment:   IV  rocephin    1  gm  IV  q24h    Thank you,   Kathryn Ruiz RN   CCDS  Options provided:  -- BLE  cellulitis associated with Diabetes  -- BLE cellulitis unrelated to Diabetes  -- Other - I will add my own diagnosis  -- Disagree - Not applicable / Not valid  -- Disagree - Clinically unable to determine / Unknown  -- Refer to Clinical Documentation Reviewer    PROVIDER RESPONSE TEXT:    BLE cellulitis associated with Diabetes.     Query created by: Melita Sethi on 2023 9:57 AM      Electronically signed by:  Deisi Banegas MD 2023 10:02 AM

## 2023-01-13 NOTE — ROUTINE PROCESS
Patient upset about being on contact precautions; MD notified of patient concerns re: contact precautions. Patient states, \"these bugs only bite me, they don't bite nobody else in my house if ya'll don't want me here, there are other hospitals, so why is everyone acting crazy about this and wearing all this mess? \". Patient states she will leave. MD aware.

## 2023-01-14 NOTE — DISCHARGE SUMMARY
Julissa Cardozo SUMMARY      Name:   Benja Reynolds 82 y.o. female  MRN:   [id number]  CSN:   [id number]  Admission Date:  1/12/2023  Discharge Date:  1/13/2023  Attending:             Star Gayle MD  PCP:              Luciano Woodard MD  ================================================================  Reason for Admission:  Cellulitis [L03.90]  Atrial fibrillation [facility]) [I48.91]    Discharge Diagnosis:    A. fib with RVR  BLE cellulitis  T2DM with neuropathy  CHF  HTN    Follow-up studies/evaluations for PCP/Important Notes to PCP:  Patient left AMA. Patient is bothered by contact precautions. States \"bugs only by me, they do not bite nobody else . .. Why is everyone acting crazy about this and were not all this mess\"    patient able to get Eliquis will continue for anticoagulation  patient reported that she did not have insulin at home due to affordability; provided good Rx coupon for $60 insulin that should last 50 days, ensure she has coupon can print off again  Pending labs/investigations to follow up as below  Medication reconciliation:  Discontinued Medications: None  New Medications: N/A    RHIANNA Follow Up Appointment:   Follow-up Information    None         Recommended follow-up after RHIANNA visit: Patient needs follow-up visit 1 weeks after LUIS MARTÍNEZ appointment, then at the discretion of PCP. Readmission prevention plan:   Patient compliance limited by medication affordability    GOALS OF CARE (including Code Status, Advanced Care Plan):    Full measures    Pending labs/ investigations at discharge to follow up:   Final blood cultures    Operative Procedures:   None    Consultants:    None    Condition at discharge: Afebrile  Ambulating  Eating, Drinking, Voiding    Disposition at Discharge:  Home    Functional Status at Discharge: Ambulates with rolling walker    Diet: cardiac diet, diabetic diet    Discharge Medications:  Discharge Medication List as of 1/13/2023  4:41 PM CONTINUE these medications which have NOT CHANGED    Details   bumetanide (BUMEX) 1 mg tablet Take 1 mg by mouth daily. , Historical Med      gabapentin (NEURONTIN) 400 mg capsule 1 capsule daily, Normal, Disp-360 Capsule, R-0      insulin glargine (LANTUS) 100 unit/mL injection Inject 20 Units at bedtime. Monitor and record blood sugar daily. , Print, Disp-10 mL, R-1      aspirin 81 mg chewable tablet Take 1 Tablet by mouth daily. , Normal, Disp-30 Tablet, R-2      metoprolol tartrate (LOPRESSOR) 100 mg IR tablet Take 1 Tablet by mouth every twelve (12) hours. , Normal, Disp-120 Tablet, R-2      dilTIAZem ER (CARDIZEM CD) 180 mg capsule Take 1 Capsule by mouth daily. , Normal, Disp-120 Capsule, R-3      levothyroxine (SYNTHROID) 150 mcg tablet Take 150 mcg by mouth daily. , Historical Med      morphine IR (MS IR) 15 mg tablet Take 15 mg by mouth two (2) times a day., Historical Med      metFORMIN (GLUCOPHAGE) 500 mg tablet Take 500 mg by mouth daily. , Historical Med      allopurinoL (ZYLOPRIM) 100 mg tablet Take 100 mg by mouth daily. , Historical Med      atorvastatin (LIPITOR) 80 mg tablet Take 1 Tablet by mouth daily. , Normal, Disp-90 Tablet, R-3      acetaminophen (TYLENOL) 500 mg tablet Take 1 Tab by mouth every six (6) hours as needed for Pain., Print, Disp-270 Tab, R-3      BD Insulin Syringe SafetyGlide 0.5 mL 30 gauge x 5/16\" syrg Historical Med, GRUPO      Blood-Glucose Meter (FREESTYLE LITE METER) monitoring kit Test twice blood glucose daily; ICD-10 E11.9; Quantity 1, Normal, Disp-1 Kit, R-0      insulin syringe,safetyneedle 1 mL 31 gauge x 5/16\" syrg 1 Each by Does Not Apply route daily. , Normal, Disp-300 Each, R-3Dx e11.9      glucose blood VI test strips (FREESTYLE TEST) strip Use to check blood glucose twice daily DX:E11.9, Normal, Disp-300 Strip, R-3           STOP taking these medications       furosemide (LASIX) 40 mg tablet Comments:   Reason for Stopping: Now on Bumex        amoxicillin-clavulanate (Augmentin) 500-125 mg per tablet Comments:   Reason for Stopping: Therapy completed                Hospital Course:   History of Present Illness:  Nataliia Moscoso is a 82 y.o.  female with PMHx of diastolic CHF, a. Fib, DM II, hypertension  who presented to [facility] ED on 1/12/2023 after clinic visit with PCP due to bilateral lower leg suspicion for infection with A. fib RVR during office visit. Patient reported her legs have been extremely itchy and swelling. Her legs have become very erythematous all the way up to her mid thigh on the right leg and groin on the left. She reported that she could not sleep last night because she was rubbing at her skin. She was tachycardic to 163. Denied any chest pain, shortness of breath, abdominal pain, headache, n/v, changes in urinary or bowel habits. Patient stated that she has bugs in her home that she has been trying to exterminate but they bite her skin. Patient was started on a diltiazem drip with plans to transition back to p.o. after rate controlled. Patient was started on vancomycin and Rocephin for lower extremity cellulitis. Likely patient also has concurrent dermatitis from chronic venous stasis and lymphedema in the setting of CHF. Patient began refusing assessment due to being upset about \"different people wearing different things in and out of my room\". .. \"the bugs I have don't bite other people! \". Nursing attempted to educate patient on Infection Control Practices, safety and prevention; to not take the \"isolation guidelines\" personally; how this is a \"protocol\" to keep everyone safe. Patient did not respond to or acknowledge education. Patient decided to leave [place] due to frustrations. Patient's problem list was managed in hospital as stated below:     Cellulitis in setting of lymphedema/hx of CHF likely from excoriation.     -Pt has been complain of pruritis and has scratch non stop.  Pt report having some bug at home and family is in process of moving from house. -Bilateral lower extremity swelling with erythema worsen on left  -Patient nontoxic-appearing, unlikely to be septic at this time with normal labs  -On admission, WBC 11.8, no leukocytosis or neutrophil shift, lactic acid 1.5  -SIRS 1/4   -BCx collected 1/12: NGTD  -other conditions to consider, includes allergic reaction (hives)  -abtx started in ED      Plan:  -Daily CBC, monitor WBCs and fever   -Monitor vitals  -Cont. on Vancomycin and rocephin, duration 5 days, may transition to PO for OP   -Follow-Up Blood Cx and sensitivities  -Cont. Zyrtec, Hydroxyzine for itching  -Perform full body examination and consider placing on isolation      Afib w/ RVR  CHF on bumex  HTN  Hx DVT  -EKG: Atrial fibrillation with rapid ventricular response with PVCs  -Started on Cardizem drip in the ED  -OP meds: Eliquis for anticoagulation, Diltiazem and Lopressor for rate control and HTN, Bumex for CHF  -PVL 10/8 showed non occlusive thrombus within varicose veins of calf on RLE, otherwise no DVT bilaterally      Plan:  -supportive O2 care as needed  -strict I+Os, daily weights  -fluid restrict 1200 cc   -Plan to titrate Diltiazem down and transition to PO  -Cont. Bumex  -continue Metoprolol  -cont Eliquis 5 mg BID     DM with neuoropathy  -most recent A1c 11.6 in Jan 4 2023  -meds per chart review: insulin glargine 20 U qhs, metformin 500 mg daily.  Per patient she has not been on Lantus due to affordability  -BG in   -on Gabapentin 800 QHS      Plan:   -monitor BG   -will start lantus 10 qhs tomorrow   -SSI   -hypoglycemia protocol  -cont Gabapentin        Hypothyroidism - stable  -meds: levo 150 mcg  -pt nonadherent with medications at home   Plan:   -c/w levo 150 mcg     COPD - stable  -on home O2 for prn use  -no inhalers prescribed per chart review  Plan:   -c/w supportive O2 as needed     Chronic pain, stable  RA  -ESR 18 on admission   -meds per chart review: MS IR 15 mg   -Recently stated that she stopped taking morphine due to no longer going to pain management provider   Plan:   -We will monitor for flare         Global:  Code: Full  IVF/Drips: Cardizem  Diet: Diabetic diet with fluid restriction  Bowel Regimen, Last BM: MiraLAX as needed  DVT/AC: Eliquis  Mobility: per protocol      Point of Contact (relationship, number): Dania Ness  [phone]               SUBJECTIVE:       Pertinent Results:      CURRENT ADMISSION IMAGING RESULTS   XR CHEST PORT    Result Date: 1/12/2023  No pneumothorax post CVL placement. Similar enlarged cardiac silhouette with vascular congestion and mild pulmonary edema. XR CHEST PORT    Result Date: 1/12/2023  Enlarged cardiac silhouette with vascular congestion, small pleural effusion and mild pulmonary edema/CHF, less pronounced than comparison study. Cardiology Procedures/Testing:  MODALITY RESULTS   EKG Results for orders placed or performed during the hospital encounter of 01/12/23   EKG, 12 LEAD, INITIAL   Result Value Ref Range    Ventricular Rate 141 BPM    QRS Duration 90 ms    Q-T Interval 322 ms    QTC Calculation (Bezet) 493 ms    Calculated R Axis 0 degrees    Calculated T Axis -56 degrees    Diagnosis       Atrial fibrillation with rapid ventricular response with premature   ventricular or aberrantly conducted complexes  Abnormal ECG  When compared with ECG of 21-DEC-2022 00:09,  T wave inversion no longer evident in Lateral leads  Confirmed by Yomaira Lyon ([id number]) on 1/13/2023 5:56:37 PM         ECHO 12/21/22    ECHO ADULT COMPLETE 12/21/2022 12/21/2022    Interpretation Summary    Left Ventricle: Severely reduced left ventricular systolic function with a visually estimated EF of 20 - 25%. Left ventricle size is normal. Mild septal thickening. Severe hypokinesis of the following segments: basal anteroseptal, basal inferoseptal and apical septal.    Right Ventricle: Right ventricle is moderately dilated. Reduced systolic function.  TAPSE is normal. TAPSE is 1.5 cm. Mitral Valve: Moderately thickened leaflet. Mild annular calcification at the posterior leaflet of the mitral valve. Mild regurgitation. Left Atrium: Left atrium is severely dilated. Right Atrium: Right atrium is severely dilated. Pulmonary Arteries: Moderate pulmonary hypertension present. Tricuspid Valve: Moderate regurgitation. The estimated RVSP is 51 mmHg. Signed by: Rosa Geronimo MD on 12/21/2022 10:33 AM     IR No results found for this or any previous visit. CATH         Special Testing/Procedures:  MODALITY RESULTS   MICRO All Micro Results       Procedure Component Value Units Date/Time    CULTURE, BLOOD [[id number]] Collected: 01/12/23 1025    Order Status: Completed Specimen: Blood Updated: 01/14/23 0752     Special Requests: NO SPECIAL REQUESTS        Culture result: NO GROWTH 2 DAYS       CULTURE, BLOOD [[id number]] Collected: 01/12/23 1040    Order Status: Completed Specimen: Blood Updated: 01/14/23 0752     Special Requests: NO SPECIAL REQUESTS        Culture result: NO GROWTH 2 DAYS       COVID-19 WITH INFLUENZA A/B [[id number]] Collected: 01/13/23 0258    Order Status: Completed Specimen: Nasopharyngeal Updated: 01/13/23 0336     SARS-CoV-2 by PCR Not detected        Comment: Not Detected results do not preclude SARS-CoV-2 infection and should not be used as the sole basis for patient management decisions. Results must be combined with clinical observations, patient history, and epidemiological information. Influenza A by PCR Not detected        Influenza B by PCR Not detected        Comment: Testing was performed using matteo Angela SARS-CoV-2 and Influenza A/B nucleic acid assay.   This test is a multiplex Real-Time Reverse Transcriptase Polymerase Chain Reaction (RT-PCR) based in vitro diagnostic test intended for the qualitative detection of nucleic acids from SARS-CoV-2, Influenza A, and Influenza B in nasopharyngeal for use under the FDA's Emergency Use Authorization(EAU) only.        Fact sheet for Patients: FindDrives.pl  Fact sheet for Healthcare Providers: FindDrives.pl                ABG Lab Results   Component Value Date/Time    pH (POC) 7.43 12/21/2022 01:21 AM    pCO2 (POC) 41.0 12/21/2022 01:21 AM    pO2 (POC) 120 (H) 12/21/2022 01:21 AM    HCO3 (POC) 27.3 (H) 12/21/2022 01:21 AM    FIO2 (POC) 50 12/21/2022 01:21 AM      UA Results for orders placed or performed in visit on 08/24/22   AMB POC URINALYSIS DIP STICK AUTO W/O MICRO     Status: None   Result Value Ref Range Status    Color (UA POC) Yellow  Final    Clarity (UA POC) Clear  Final    Glucose (UA POC) 3+ Negative Final    Bilirubin (UA POC) Negative Negative Final    Ketones (UA POC) Negative Negative Final    Specific gravity (UA POC) 1.015 1.001 - 1.035 Final    Blood (UA POC) 2+ Negative Final    pH (UA POC) 6.0 4.6 - 8.0 Final    Protein (UA POC) Trace Negative Final    Urobilinogen (UA POC) 0.2 mg/dL 0.2 - 1 Final    Nitrites (UA POC) Negative Negative Final    Leukocyte esterase (UA POC) Negative Negative Final         Laboratory Results:  LABORATORY RESULTS   HEMATOLOGY Lab Results   Component Value Date/Time    WBC 10.9 01/13/2023 04:40 AM    HGB 8.1 (L) 01/13/2023 04:40 AM    HCT 26.0 (L) 01/13/2023 04:40 AM    PLATELET 801 31/18/8121 04:40 AM    MCV 82.5 01/13/2023 04:40 AM       CHEMISTRIES Lab Results   Component Value Date/Time    Sodium 139 01/13/2023 04:40 AM    Potassium 3.5 01/13/2023 04:40 AM    Chloride 103 01/13/2023 04:40 AM    CO2 29 01/13/2023 04:40 AM    Anion gap 7 01/13/2023 04:40 AM    Glucose 139 (H) 01/13/2023 04:40 AM    BUN 29 (H) 01/13/2023 04:40 AM    Creatinine 1.16 01/13/2023 04:40 AM    BUN/Creatinine ratio 25 (H) 01/13/2023 04:40 AM    GFR est AA 58 (L) 08/13/2022 03:31 AM    GFR est non-AA 48 (L) 08/13/2022 03:31 AM    Calcium 8.3 (L) 01/13/2023 04:40 AM      HEPATIC FUNCTION Lab Results Component Value Date/Time    Albumin 2.6 (L) 01/13/2023 04:40 AM    Bilirubin, total 0.6 01/13/2023 04:40 AM    Protein, total 6.8 01/13/2023 04:40 AM    Globulin 4.2 (H) 01/13/2023 04:40 AM    A-G Ratio 0.6 (L) 01/13/2023 04:40 AM    ALT (SGPT) 6 (L) 01/13/2023 04:40 AM    Alk. phosphatase 43 (L) 01/13/2023 04:40 AM       LACTIC ACID Lab Results   Component Value Date/Time    Lactic acid 1.5 01/12/2023 02:45 PM    Lactic acid 1.4 12/22/2022 03:37 AM    Lactic acid 1.5 08/10/2022 03:46 PM      CARDIAC PANEL Lab Results   Component Value Date/Time     08/11/2022 02:47 AM                        NT-proBNP Lab Results   Component Value Date/Time    NT pro-BNP 4,582 (H) 12/21/2022 12:37 AM    NT pro-BNP 3,967 (H) 10/07/2022 04:53 PM    NT pro-BNP 1,164 08/10/2022 03:40 AM            THYROID Lab Results   Component Value Date/Time    TSH 64.90 (H) 12/21/2022 12:37 AM    T4, Free 0.5 (L) 12/21/2022 12:37 AM            Readmission Risk Score: Medium Risk            14 Total Score    9 IP Visits Last 12 Months (1-3=4, 4=9, >4=11)    5 Pt. Coverage (Medicare=5 , Medicaid, or Self-Pay=4)        Criteria that do not apply:    Has Seen PCP in Last 6 Months (Yes=3, No=0)    . Living with Significant Other. Assisted Living. LTAC. SNF.  or   Rehab    Patient Length of Stay (>5 days = 3)    Charlson Comorbidity Score (Age + Comorbid Conditions)          Nayana العلي MD, PGY-1  [facility]  1/14/2023 2:00 PM

## 2023-01-17 PROBLEM — I50.9 HEART FAILURE (HCC): Status: ACTIVE | Noted: 2023-01-01

## 2023-01-17 PROBLEM — U07.1 COVID-19 VIRUS INFECTION: Status: ACTIVE | Noted: 2023-01-17

## 2023-01-17 PROBLEM — I50.9 ACUTE ON CHRONIC HEART FAILURE (HCC): Status: ACTIVE | Noted: 2023-01-01

## 2023-01-17 PROBLEM — U07.1 COVID: Status: ACTIVE | Noted: 2023-01-17

## 2023-01-17 PROBLEM — I48.91 ATRIAL FIBRILLATION WITH RAPID VENTRICULAR RESPONSE (HCC): Status: ACTIVE | Noted: 2023-01-01

## 2023-01-17 NOTE — ED TRIAGE NOTES
Pt c/o of SOB since 1200 pm today. Pt was given 3 nitro tablets by EMS and nitro paste. Pt arrived on CPAP. Per EMS pt stating 80% on RA.

## 2023-01-17 NOTE — Clinical Note
Status[de-identified] INPATIENT [101]   Type of Bed: Telemetry [19]   Cardiac Monitoring Required?: Yes   Inpatient Hospitalization Certified Necessary for the Following Reasons: 3.  Patient receiving treatment that can only be provided in an inpatient setting (further clarification in H&P documentation)   Ad[facility] Diagnosis[facility] [[id number]]   Admitting Diagnosis: Atrial fibrillation with RVR [facility] [[id number]]   Admitting Diagnosis: COVID-19 virus infection [[id number]]   Admitting Physician: Roxi Lugo [[id number]]   Attending Physician: Roxi Lugo [[id number]]   Estimated Length of Stay: 3-4 Midnights   Discharge Plan[de-identified] [facility]

## 2023-01-17 NOTE — PROGRESS NOTES
01/17/23 1840   Oxygen Therapy   O2 Sat (%) 98 %   Pulse via Oximetry (!) 181 beats per minute   O2 Device BIPAP   Skin Assessment Clean, dry, & intact   FIO2 (%) 100 %   Respiratory   Respiratory (WDL) X   Respiratory Pattern Labored   Chest/Tracheal Assessment Chest expansion, symmetrical   Breath Sounds Bilateral Crackles ;Coarse;Diminished   Cough Non-productive   CPAP/BIPAP   CPAP/BIPAP Start/Stop On   Device Mode S/T   $$ Bipap Daily Yes   [employer] ID # [id number]   Mask Type and Size Full face   PIP Observed 17 cm H20   IPAP (cm H2O) 16 cm H2O   EPAP (cm H2O) 6 cm H2O   Inspiratory Time (sec) 0.9 seconds   Vt Spont (ml) 685 ml   Ve Observed (l/min) 19 l/min   Backup Rate 8   Total RR (Spontaneous) 34 breaths per minute   Insp Rise Time (sec) 3   Leak (Estimated) 58 L/min   Pt's Home Machine No   Biomedical Check Performed Yes   Settings Verified Yes   Alarm Settings   High Pressure 40   Low Pressure 8   Apnea 20   Low Ve 3   High Rate 40   Low Rate 8   Pulmonary Toilet   Pulmonary Toilet H. O.B elevated

## 2023-01-18 PROBLEM — J96.01 ACUTE RESPIRATORY FAILURE WITH HYPOXIA (HCC): Status: ACTIVE | Noted: 2023-01-18

## 2023-01-18 PROBLEM — U07.1 COVID-19: Status: ACTIVE | Noted: 2023-01-18

## 2023-01-18 NOTE — ED NOTES
Pt continues to have increased work of breathing. Provider aware and at bedside. Intubation setup at bedside.

## 2023-01-18 NOTE — ED NOTES
IV infiltrated. NS and amiodarone d/c.  Md aware and at bedside attempting ultrasound guided iv access

## 2023-01-18 NOTE — PROGRESS NOTES
PT order received and chart reviewed. Patient was intubated and awaiting transfer to ICU. Will sign off at this time. Please re-order when patient is medically ready to participate in PT.      Thank you for this referral.   Nai Cosby PT, DPT

## 2023-01-18 NOTE — H&P
[facility]  Pulmonary, Critical Care, and Sleep Medicine    Name: Terrance Weathers MRN: [id number]   : 1935 Hospital: [address] [place] [address]   Date: 2023        Critical Care History and Physical      IMPRESSION:   Acute hypoxic respiratory failure - intubated   Atrial fibrillation with rapid ventricular response - anticoagulated on Eliquis outpatient, diagnosed 10/22  Acute on chronic heart failure - LVEF 20-25% 22 (LVEF 55% ), possible tachycardia induced cardiomyopathy vs. Potential precipitating ischemic event, does not appear to have had recent catheterization for further characterization  NSTEMI - likely type II due to demand ischemia in setting of atrial fibrillation with RVR and CHF exacerbation  COVID-19  Pulmonary edema  AQUILES   Hypertension  Type 2 diabetes mellitus  Hypercholesterolemia  Hypothyroidism  Hx of tobacco use - cessation in      Patient Active Problem List   Diagnosis Code    Encounter for palliative care Z51.5    Acquired hypothyroidism E03.9    Dermatitis L30.9    Obesity, morbid ([facility] E66.01    Type 2 diabetes mellitus with diabetic nephropathy, with long-term current use of insulin ([facility]) E11.21, Z79.4    Chronic diastolic congestive heart failure ([facility]) I50.32    Essential hypertension I10    Bilateral lower extremity edema R60.0    Hypercholesteremia E78.00    History of fall Z91.81    Chronic bilateral low back pain without sciatica M54.50, G89.29    OAB (overactive bladder) N32.81    Urinary incontinence R32    Neuropathy G62.9    Septic arthritis of foot ([facility]) M00.9    Diabetic foot ulcer ([facility]) E11.621, L97.509    Septic joint ([facility]) M00.9    Sepsis ([facility]) A41.9    Acute exacerbation of CHF (congestive heart failure) ([facility]) I50.9    Leukocytosis D72.829    Pulmonary edema J81.1    Dyspnea R06.00    Atrial fibrillation with RVR ([facility] I48.91    Patient's noncompliance with other medical treatment and regimen due to unspecified reason Z91.199    Debility R53.81    Atrial fibrillation ([facility] [address] I48.91    Advanced care planning/counseling discussion Z71.89    Cellulitis L03.90    Atrial fibrillation with rapid ventricular response (HCC) I48.91    Acute on chronic heart failure (HCC) I50.9    COVID U07.1    Heart failure (HCC) I50.9    COVID-19 virus infection U07.1    Acute respiratory failure with hypoxia (HCC) J96.01    COVID-19 U07.1        RECOMMENDATIONS:   Neuro: Titrate sedation to RASS of 0 to -1. PRN for breakthrough sedation needs. Pulm: Titrate FiO2 for goal SPO2> 90%,VAP prevention bundle, head of the bed at 30' all times. Daily sedation holiday and assessment for weaning with SBT as tolerated. PRN duonebs. CVS : -Amiodarone gtt for atrial fibrillation w/ RVR,   -Heparin gtt NSTEMI likely secondary to demand ischemia. Consult cardiology. -MAP >65 mmHg, anticipate need for central access and vasopressors if MAP continues to trend downward given poor LVEF, possible cardiogenic shock.   -Careful diuresis for acute CHF exacerbation. -ECHO read pending  GI: NPO  Renal:  Trend Cr, currently elevated suspect pre-renal origin, Cruz for strict I&O monitoring, electrolyte repletion PRN per protocol  Hem/Onc: Monitor for s/o active bleeding. I/D:Trend WBCs and temperature curve. COVID 19 positive. Endocrine: Q6 glucoses, SSI. Continue home levothyroxine. Metabolic:  Daily BMP, mag, phos. Trend lytes, replace as needed.    Musc/Skin: wound care PRN    Full Code       Best practice : APPLICABLE TO PATIENT    Glycemic control  IHI ICU bundles:   Vent and cruz bundle followed  [facility] Vent patients-    VAP bundle-[place] tube to suction at 20-30 cm Hg, Maintain [place] tube with 5-10ml air every 4 hours, Routine oral care every 4 hours, Elevation of head > 45 degree, Daily sedation holiday and SBT evaluation starting at 6.00am. and [facility] network Guidelines: Lung protective strategy and Plateau  Pressure goal < 30 cm H2O goals, Oxygenation Goals PaO2 55-80 mm Hg or SaO2 88-95%, PH goal 7.30-7.45  Sress ulcer prophylaxis. Pepcid  DVT prophylaxis. Heparin gtt  Need for Lines, cruz assessed. Palliative care evaluation. Restraints need. Restraint evaluation     I have reevaluated the patient after initiation of intervention. The patient is comfortable, uninjured, and is not posing a risk to themselves, staff or others. The restraints have been tolerated well. The patient's current medical and behavioral conditions that warrant the use intervention include danger to self and danger to others and Interference with medical equipment or treatment. Restraint or seclusion will be discontinued at the earliest possible time, regardless of the scheduled expiration of the order. Based on my evaluation, restraints will be continued: YES          Subjective/History:     Patient is a 82 y.o. female with a PMH of HFrEF last EF 20-25%, atrial fibrillation with RVR, T2DM, HTN, HLD, and hypothyroidism who presented to the [facility] ED on 1/17 due to shortness of breath. Patient found at home by EMS hypoxic to mid 80's and put on CPAP. Given oral nitroglycerin x3 and nitropaste. She was noted to be tachycardic to nearly 200 bpm in the ED in atrial fibrillation with RVR. In the ED she was initially placed on BiPAP with good respiratory response and started on diltiazem gtt. Following diltiazem gtt patient became hypotensive with MAPs in low 60's but remained persistently tachycardic. Then transitioned to amiodarone gtt. Blood pressure improved and was given lasix x1. Following BiPAP initiation ABG improved and she was transitioned to NC, however this was tolerated poorly and required resuming BiPAP. Despite BiPAP patient continued to be in respiratory distress and required intubation in the ED. Noted to be COVID positive. ICU was then contacted for admission and further management.     Of note, patient left inpatient hospitalization AMA on 1/16 following admission for cellulitis in setting of lymphedema and atrial fibrillation with RVR. The patient is critically ill and can not provide additional history due to being mechanically ventilated. Past Medical History:   Diagnosis Date    Acquired hypothyroidism 8/1/2016    Arthritis of right shoulder region 4/21/2016    Asthma     Bilateral lower extremity edema 7/7/2021    Chronic bilateral low back pain without sciatica 7/7/2021    Chronic diastolic congestive heart failure ([address] 3/10/2021    Chronic venous insufficiency     Diabetes (HCC)     neuropathy    Essential hypertension 7/7/2021    Gout     History of depression 1/23/2017    History of fall 7/7/2021    Hypercholesteremia 7/7/2021    Hypertension     MI (myocardial infarction) ([address]     OAB (overactive bladder) 7/7/2021    Peripheral neuropathy     Rheumatoid arthritis ([facility] [facility]     Tear of right rotator cuff 5/16/2016    Thyroid pain     Urinary incontinence 7/7/2021    Vertigo         Past Surgical History:   Procedure Laterality Date    HX AMPUTATION TOE Right 2020    Great toe Dr. 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 HX KNEE REPLACEMENT Bilateral         Prior to Admission medications    Medication Sig Start Date End Date Taking? Authorizing Provider   bumetanide (BUMEX) 1 mg tablet Take 1 mg by mouth daily. Yes Provider, Historical   gabapentin (NEURONTIN) 400 mg capsule 1 capsule daily 12/23/22  Yes David Holman MD   insulin glargine (LANTUS) 100 unit/mL injection Inject 20 Units at bedtime. Monitor and record blood sugar daily. 12/23/22  Yes David Holman MD   aspirin 81 mg chewable tablet Take 1 Tablet by mouth daily. 12/23/22  Yes David Holman MD   metoprolol tartrate (LOPRESSOR) 100 mg IR tablet Take 1 Tablet by mouth every twelve (12) hours.  10/13/22  Yes Dulce Flannery MD   dilTIAZem ER (CARDIZEM CD) 180 mg capsule Take 1 Capsule by mouth daily. 10/14/22  Yes Oren Burciaga MD   BD Insulin Syringe SafetyGlide 0.5 mL 30 gauge x 5/16\" syrg  6/17/22  Yes Provider, Historical   levothyroxine (SYNTHROID) 150 mcg tablet Take 150 mcg by mouth daily. 2/25/22  Yes Provider, Historical   morphine IR (MS IR) 15 mg tablet Take 15 mg by mouth two (2) times a day. 5/2/22  Yes Provider, Historical   allopurinoL (ZYLOPRIM) 100 mg tablet Take 100 mg by mouth daily. 2/25/22  Yes Provider, Historical   atorvastatin (LIPITOR) 80 mg tablet Take 1 Tablet by mouth daily. 9/15/21  Yes Karin Humphrey MD   Blood-Glucose Meter (FREESTYLE LITE METER) monitoring kit Test twice blood glucose daily; ICD-10 E11.9; Quantity 1 12/15/17  Yes Nemesio Montalvo NP   insulin syringe,safetyneedle 1 mL 31 gauge x 5/16\" syrg 1 Each by Does Not Apply route daily. 8/7/17  Yes Kelli Hillman MD   glucose blood VI test strips (FREESTYLE TEST) strip Use to check blood glucose twice daily DX:E11.9 7/5/17  Yes Kelli Hillman MD   metFORMIN (GLUCOPHAGE) 500 mg tablet Take 500 mg by mouth daily. Patient not taking: Reported on 1/17/2023 2/25/22   Provider, Historical   acetaminophen (TYLENOL) 500 mg tablet Take 1 Tab by mouth every six (6) hours as needed for Pain.   Patient not taking: Reported on 1/17/2023 11/2/16   Kelli Hillman MD       Current Facility-Administered Medications   Medication Dose Route Frequency    insulin lispro (HUMALOG) injection   SubCUTAneous Q6H    fentaNYL (PF) 1,500 mcg/30 mL (50 mcg/mL) infusion  0-200 mcg/hr IntraVENous TITRATE    chlorhexidine (PERIDEX) 0.12 % mouthwash 10 mL  10 mL Oral Q12H    famotidine (PF) (PEPCID) 20 mg in 0.9% sodium chloride 10 mL injection  20 mg IntraVENous DAILY    heparin (porcine) 25,000 units in 0.45% saline 250 ml infusion  18-36 Units/kg/hr IntraVENous TITRATE    amiodarone (NEXTERONE) 360 mg in dextrose 200 mL (1.8 mg/mL) infusion  0.5-1 mg/min IntraVENous TITRATE    aspirin chewable tablet 81 mg  81 mg Oral DAILY    atorvastatin (LIPITOR) tablet 80 mg  80 mg Oral DAILY    bumetanide (BUMEX) tablet 1 mg  1 mg Oral DAILY    [Held by provider] dilTIAZem ER (CARDIZEM CD) capsule 180 mg  180 mg Oral DAILY    levothyroxine (SYNTHROID) tablet 150 mcg  150 mcg Oral DAILY    gabapentin (NEURONTIN) capsule 400 mg  400 mg Oral DAILY    allopurinoL (ZYLOPRIM) tablet 100 mg  100 mg Oral DAILY    metoprolol tartrate (LOPRESSOR) tablet 100 mg  100 mg Oral Q12H    sodium chloride (NS) flush 5-40 mL  5-40 mL IntraVENous Q8H       No Known Allergies     Social History     Tobacco Use    Smoking status: Former     Types: Cigarettes     Quit date:      Years since quittin.0    Smokeless tobacco: Never    Tobacco comments:     quit 45 years ago   Substance Use Topics    Alcohol use: No     Alcohol/week: 0.0 standard drinks        Family History   Problem Relation Age of Onset    Heart Attack Mother     Heart Attack Father           Review of Systems:  Review of systems not obtained due to patient factors. Objective:   Vital Signs:    Visit Vitals  BP (!) 98/51   Pulse 98   Temp 98.5 °F (36.9 °C)   Resp 16   Ht 5' 5\" (1.651 m)   Wt 99.8 kg (220 lb)   SpO2 97%   BMI 36.61 kg/m²       O2 Device: Ventilator   O2 Flow Rate (L/min): 6 l/min   Temp (24hrs), Av.5 °F (36.9 °C), Min:98.5 °F (36.9 °C), Max:98.5 °F (36.9 °C)       Intake/Output:   Last shift:      No intake/output data recorded. Last 3 shifts:  1901 -  0700  In: -   Out: 150 [Urine:150]    Intake/Output Summary (Last 24 hours) at 2023 1154  Last data filed at 2023 0044  Gross per 24 hour   Intake --   Output 150 ml   Net -150 ml           Physical Exam:     General:  Intubated and sedated    Head:  Normocephalic, without obvious abnormality, atraumatic. Eyes:  Conjunctivae/corneas clear. PERRL   Nose: Nares normal. Septum midline. Mucosa normal. No drainage.    Throat: Lips, mucosa, and tongue normal.    Neck: Trachea midline, no adenopathy,  no JVD. Lungs:   Clear to auscultation bilaterally. Chest wall:  No deformity. Heart:  Irregularly irregular, rate controlled   Abdomen:   Soft, non-tender. Bowel sounds normal.    Extremities: Extremities normal, atraumatic, no cyanosis, mild edema bilateral lower extremities. Pulses: 2+ and symmetric all extremities.    Skin: Scattered excoriations in bilateral upper and lower extremities   Lymph nodes:  Cervical and supraclavicular nodes normal   Neurologic: Intubated and sedated     Devices:   ETT  Lines: PIV x2  Ramirez  OGT    Data:     Recent Results (from the past 24 hour(s))   EKG, 12 LEAD, INITIAL    Collection Time: 01/17/23  6:44 PM   Result Value Ref Range    Ventricular Rate 183 BPM    Atrial Rate 192 BPM    P-R Interval 160 ms    QRS Duration 82 ms    Q-T Interval 226 ms    QTC Calculation (Bezet) 394 ms    Calculated P Axis 46 degrees    Calculated R Axis -5 degrees    Calculated T Axis -144 degrees    Diagnosis        Critical Test Result: High HR  a.ib with RVR  Possible Anterior infarct , age undetermined  ST & T wave abnormality, consider inferolateral ischemia  Abnormal ECG  When compared with ECG of 12-JAN-2023 10:08,  Current undetermined rhythm precludes rhythm comparison, needs review  T wave inversion now evident in Lateral leads  Confirmed by Rasta Fajardo ([id number]) on 1/18/2023 7:37:57 AM     BLOOD GAS,CHEM8,LACTIC ACID POC    Collection Time: 01/17/23  6:55 PM   Result Value Ref Range    Calcium, ionized (POC) 0.93 (L) 1.12 - 1.32 mmol/L    Base deficit (POC) 0.3 mmol/L    HCO3 (POC) 24.5 22 - 26 MMOL/L    CO2, POC 24 19 - 24 MMOL/L    O2 SAT 68 %    Sample source VENOUS BLOOD      Performed by Magy Velasquez     Sodium (POC) 138 136 - 145 mmol/L    Potassium (POC) 5.0 3.5 - 5.1 mmol/L    Glucose (POC) 190 (H) 65 - 100 mg/dL    Creatinine (POC) 1.12 0.6 - 1.3 mg/dL    Lactic Acid (POC) 2.75 (HH) 0.40 - 2.00 mmol/L    Chloride (POC) 105 98 - 107 mmol/L    Anion gap, POC 10 10 - 20      pH, venous (POC) 7.40 7.32 - 7.42      pCO2, venous (POC) 39.7 (L) 41 - 51 MMHG    pO2, venous (POC) 35 25 - 40 mmHg    eGFR (POC) 48 (L) >60 ml/min/1.73m2   CBC WITH AUTOMATED DIFF    Collection Time: 01/17/23  6:59 PM   Result Value Ref Range    WBC 10.7 4.6 - 13.2 K/uL    RBC 4.01 (L) 4.20 - 5.30 M/uL    HGB 10.3 (L) 12.0 - 16.0 g/dL    HCT 33.3 (L) 35.0 - 45.0 %    MCV 83.0 78.0 - 100.0 FL    MCH 25.7 24.0 - 34.0 PG    MCHC 30.9 (L) 31.0 - 37.0 g/dL    RDW 16.7 (H) 11.6 - 14.5 %    PLATELET 969 861 - 083 K/uL    MPV 12.0 (H) 9.2 - 11.8 FL    NRBC 0.0 0  WBC    ABSOLUTE NRBC 0.00 0.00 - 0.01 K/uL    NEUTROPHILS 65 40 - 73 %    LYMPHOCYTES 14 (L) 21 - 52 %    MONOCYTES 9 3 - 10 %    EOSINOPHILS 8 (H) 0 - 5 %    BASOPHILS 1 0 - 2 %    IMMATURE GRANULOCYTES 4 (H) 0.0 - 0.5 %    ABS. NEUTROPHILS 7.0 1.8 - 8.0 K/UL    ABS. LYMPHOCYTES 1.5 0.9 - 3.6 K/UL    ABS. MONOCYTES 0.9 0.05 - 1.2 K/UL    ABS. EOSINOPHILS 0.8 (H) 0.0 - 0.4 K/UL    ABS. BASOPHILS 0.1 0.0 - 0.1 K/UL    ABS. IMM. GRANS. 0.4 (H) 0.00 - 0.04 K/UL    DF AUTOMATED     METABOLIC PANEL, COMPREHENSIVE    Collection Time: 01/17/23  6:59 PM   Result Value Ref Range    Sodium 137 136 - 145 mmol/L    Potassium 4.8 3.5 - 5.5 mmol/L    Chloride 102 100 - 111 mmol/L    CO2 26 21 - 32 mmol/L    Anion gap 9 3.0 - 18 mmol/L    Glucose 181 (H) 74 - 99 mg/dL    BUN 25 (H) 7.0 - 18 MG/DL    Creatinine 1.33 (H) 0.6 - 1.3 MG/DL    BUN/Creatinine ratio 19 12 - 20      eGFR 39 (L) >60 ml/min/1.73m2    Calcium 8.7 8.5 - 10.1 MG/DL    Bilirubin, total 0.8 0.2 - 1.0 MG/DL    ALT (SGPT) 10 (L) 13 - 56 U/L    AST (SGOT) 24 10 - 38 U/L    Alk.  phosphatase 56 45 - 117 U/L    Protein, total 8.4 (H) 6.4 - 8.2 g/dL    Albumin 3.4 3.4 - 5.0 g/dL    Globulin 5.0 (H) 2.0 - 4.0 g/dL    A-G Ratio 0.7 (L) 0.8 - 1.7     TROPONIN-HIGH SENSITIVITY    Collection Time: 01/17/23  6:59 PM   Result Value Ref Range    Troponin-High Sensitivity 241 (HH) 0 - 54 ng/L   NT-PRO BNP    Collection Time: 01/17/23  6:59 PM   Result Value Ref Range    NT pro-BNP 7,478 (H) 0 - 1,800 PG/ML   MAGNESIUM    Collection Time: 01/17/23  6:59 PM   Result Value Ref Range    Magnesium 1.4 (L) 1.6 - 2.6 mg/dL   TSH 3RD GENERATION    Collection Time: 01/17/23  6:59 PM   Result Value Ref Range    TSH 4.15 (H) 0.36 - 3.74 uIU/mL   COVID-19 WITH INFLUENZA A/B    Collection Time: 01/17/23  7:05 PM   Result Value Ref Range    SARS-CoV-2 by PCR Detected (AA) NOTD      Influenza A by PCR Not detected NOTD      Influenza B by PCR Not detected NOTD     BLOOD GAS, ARTERIAL POC    Collection Time: 01/17/23  9:14 PM   Result Value Ref Range    Device: BIPAP MASK      FIO2 (POC) 100 %    pH (POC) 7.44 7.35 - 7.45      pCO2 (POC) 37.6 35.0 - 45.0 MMHG    pO2 (POC) 387 (H) 80 - 100 MMHG    HCO3 (POC) 25.5 22 - 26 MMOL/L    sO2 (POC) 100.0 (H) 92 - 97 %    Base excess (POC) 1.4 mmol/L    Mode Non Invasive      Tidal volume 580 ml    Set Rate 8 bpm    PEEP/CPAP (POC) 6 cmH2O    Pressure support 16 cmH2O    Allens test (POC) Positive      Total resp.  rate 27      Site LEFT RADIAL      Specimen type (POC) ARTERIAL      Performed by Marylou Oglesby    POC LACTIC ACID    Collection Time: 01/17/23  9:58 PM   Result Value Ref Range    Lactic Acid (POC) 1.71 0.40 - 2.00 mmol/L   URINALYSIS W/ RFLX MICROSCOPIC    Collection Time: 01/18/23 12:37 AM   Result Value Ref Range    Color YELLOW      Appearance CLEAR      Specific gravity 1.015 1.005 - 1.030      pH (UA) 5.5 5.0 - 8.0      Protein 30 (A) NEG mg/dL    Glucose Negative NEG mg/dL    Ketone TRACE (A) NEG mg/dL    Bilirubin Negative NEG      Blood Negative NEG      Urobilinogen 0.2 0.2 - 1.0 EU/dL    Nitrites Negative NEG      Leukocyte Esterase Negative NEG     URINE MICROSCOPIC ONLY    Collection Time: 01/18/23 12:37 AM   Result Value Ref Range    WBC 1 to 4 0 - 4 /hpf    RBC Negative 0 - 5 /hpf    Epithelial cells 2+ 0 - 5 /lpf    Bacteria Negative NEG /hpf    Mucus Negative NEG /lpf    Hyaline cast 1 to 2 0 - 2 /lpf    Yeast 2+ (A) NEG   EKG, 12 LEAD, SUBSEQUENT    Collection Time: 01/18/23 12:56 AM   Result Value Ref Range    Ventricular Rate 127 BPM    QRS Duration 86 ms    Q-T Interval 332 ms    QTC Calculation (Bezet) 482 ms    Calculated R Axis 26 degrees    Calculated T Axis -177 degrees    Diagnosis       Atrial fibrillation with rapid ventricular response  Low voltage QRS  T wave abnormality, consider lateral ischemia  Abnormal ECG  When compared with ECG of 17-JAN-2023 18:44,  Previous ECG has undetermined rhythm, needs review  T wave inversion more evident in Anterior leads  T wave inversion less evident in Lateral leads  Confirmed by Elieser Willis ([id number]) on 1/18/2023 9:93:63 AM     METABOLIC PANEL, COMPREHENSIVE    Collection Time: 01/18/23  3:08 AM   Result Value Ref Range    Sodium 136 136 - 145 mmol/L    Potassium 3.9 3.5 - 5.5 mmol/L    Chloride 102 100 - 111 mmol/L    CO2 27 21 - 32 mmol/L    Anion gap 7 3.0 - 18 mmol/L    Glucose 306 (H) 74 - 99 mg/dL    BUN 27 (H) 7.0 - 18 MG/DL    Creatinine 1.43 (H) 0.6 - 1.3 MG/DL    BUN/Creatinine ratio 19 12 - 20      eGFR 36 (L) >60 ml/min/1.73m2    Calcium 8.3 (L) 8.5 - 10.1 MG/DL    Bilirubin, total 0.5 0.2 - 1.0 MG/DL    ALT (SGPT) 6 (L) 13 - 56 U/L    AST (SGOT) 18 10 - 38 U/L    Alk.  phosphatase 51 45 - 117 U/L    Protein, total 7.6 6.4 - 8.2 g/dL    Albumin 2.9 (L) 3.4 - 5.0 g/dL    Globulin 4.7 (H) 2.0 - 4.0 g/dL    A-G Ratio 0.6 (L) 0.8 - 1.7     MAGNESIUM    Collection Time: 01/18/23  3:08 AM   Result Value Ref Range    Magnesium 1.9 1.6 - 2.6 mg/dL   CBC WITH AUTOMATED DIFF    Collection Time: 01/18/23  3:08 AM   Result Value Ref Range    WBC 10.1 4.6 - 13.2 K/uL    RBC 3.60 (L) 4.20 - 5.30 M/uL    HGB 9.2 (L) 12.0 - 16.0 g/dL    HCT 30.2 (L) 35.0 - 45.0 %    MCV 83.9 78.0 - 100.0 FL    MCH 25.6 24.0 - 34.0 PG    MCHC 30.5 (L) 31.0 - 37.0 g/dL    RDW 16.3 (H) 11.6 - 14.5 % PLATELET 407 316 - 335 K/uL    MPV 11.9 (H) 9.2 - 11.8 FL    NRBC 0.2 (H) 0  WBC    ABSOLUTE NRBC 0.02 (H) 0.00 - 0.01 K/uL    NEUTROPHILS 70 40 - 73 %    LYMPHOCYTES 14 (L) 21 - 52 %    MONOCYTES 12 (H) 3 - 10 %    EOSINOPHILS 1 0 - 5 %    BASOPHILS 1 0 - 2 %    IMMATURE GRANULOCYTES 3 (H) 0.0 - 0.5 %    ABS. NEUTROPHILS 7.1 1.8 - 8.0 K/UL    ABS. LYMPHOCYTES 1.4 0.9 - 3.6 K/UL    ABS. MONOCYTES 1.2 0.05 - 1.2 K/UL    ABS. EOSINOPHILS 0.1 0.0 - 0.4 K/UL    ABS. BASOPHILS 0.1 0.0 - 0.1 K/UL    ABS. IMM. GRANS. 0.3 (H) 0.00 - 0.04 K/UL    DF AUTOMATED     TROPONIN-HIGH SENSITIVITY    Collection Time: 01/18/23  7:15 AM   Result Value Ref Range    Troponin-High Sensitivity 2,002 (HH) 0 - 54 ng/L   BLOOD GAS,LACTIC ACID, POC    Collection Time: 01/18/23  7:20 AM   Result Value Ref Range    Device: ADULT VENT      FIO2 (POC) 100 %    pH (POC) 7.30 (L) 7.35 - 7.45      pCO2 (POC) 49.6 (H) 35.0 - 45.0 MMHG    pO2 (POC) 98 80 - 100 MMHG    HCO3 (POC) 24.4 22 - 26 MMOL/L    sO2 (POC) 96.8 92 - 97 %    Base deficit (POC) 2.3 mmol/L    Mode ASSIST CONTROL      Tidal volume 355 ml    Set Rate 16 bpm    PEEP/CPAP (POC) 8 cmH2O    Mean Airway Pressure 12 cmH2O    PIP (POC) 24      Total resp. rate 16      Site RIGHT BRACHIAL      Patient temp.  98.5      Specimen type (POC) ARTERIAL      Performed by Jarrell Driscoll     Lactic Acid (POC) 2.30 (HH) 0.40 - 2.00 mmol/L   GLUCOSE, POC    Collection Time: 01/18/23  7:48 AM   Result Value Ref Range    Glucose (POC) 350 (H) 70 - 110 mg/dL   PTT    Collection Time: 01/18/23  9:03 AM   Result Value Ref Range    aPTT 37.5 (H) 23.0 - 36.4 SEC           Recent Labs     01/18/23  0720 01/17/23 2114 01/17/23  1855   FIO2I 100 100  --    HCO3I 24.4 25.5 24.5   PCO2I 49.6* 37.6  --    PHI 7.30* 7.44  --    PO2I 98 387*  --        Telemetry: A-fib RVR and rate controlled a-fib     Imaging:  I have personally reviewed the patients radiographs and have reviewed the reports:    XR Results (most recent):  Results from [place] encounter on 01/17/23    XR CHEST PORT    Narrative  INDICATION: Intubated patient. Nasogastric tube. TECHNIQUE: Portable chest radiograph    COMPARISON: 17 January 2023    FINDINGS:    Appropriately positioned endotracheal tube with tip approximately 2.5 cm above  the level of the tigist. Enteric tube with tip approximately 6 cm proximal to  the gastroesophageal junction. Progressive pulmonary vascular congestion and interstitial edema. Progressive  mild/moderate pleural effusions with overlying atelectasis versus lung  consolidation. No pneumothorax    Impression  Progressive cardiogenic pulmonary edema and pleural effusions. Appropriately positioned endotracheal tube. Enteric tube tip approximately 6 cm proximal to the gastroesophageal junction. Recommend repositioning. CT Results (most recent):  Results from Hospital Encounter encounter on 10/07/22    CTA CHEST W OR W WO CONT    Narrative  CTA CHEST PULMONARY EMBOLISM PROTOCOL    INDICATION: Acute respiratory difficulty, heart failure, symptoms worsened over  3 days, chest pain, increased work of breathing. Question pulmonary embolism. TECHNIQUE: Thin collimation axial images obtained through the level of the  pulmonary arteries with additional imaging through the chest following the  uneventful administration of intravenous contrast.  Images reconstructed into  three dimensional coronal and sagittal projections for complete evaluation of  the tortuous and overlapping pulmonary vascular structures and to reduce patient  radiation dose. All CT scans at this facility are performed using dose optimization technique as  appropriate to a performed exam, to include automated exposure control,  adjustment of the mA and/or kV according to patient size (including appropriate  matching first site-specific examinations), or use of iterative reconstruction  technique.     COMPARISON: 8/10/2022. FINDINGS:    No filling defects are appreciated within the main, left, right, lobar or  visualized segmental pulmonary arteries to suggest embolism. Thyroid: Unremarkable in its visualized aspects. Pericardium/ Heart: No significant effusion. Biatrial cardiac chamber  enlargement. Aorta/ Vessels: Mild to moderate aortic atherosclerosis. There is irregular  noncalcified mural plaque in the descending aorta. Lymph Nodes: Unremarkable. .    Lungs: There is hilar edema. Mild bronchial wall thickening. Mild groundglass  and interstitial thickening. Mild mosaic attenuation. Mild to moderate dependent  atelectasis. Pleura: Moderate bilateral pleural effusions which are new. No pneumothorax. Upper Abdomen: Unremarkable. Bones/soft tissues: Osteopenia. Degenerative disc disease. Impression  1. No evidence for pulmonary emboli. 2.  Heart failure with biatrial cardiac chamber enlargement, moderate new  bilateral pleural effusions and mild pulmonary edema. 3.  Mild to moderate aortic atherosclerosis with irregular noncalcified mural  plaque along the wall of the descending aorta which increases risk of embolic  events.                    Gail Velazquez DO PGY-1  01/18/23  Pulmonary, Critical Care Medicine  [address] [place] [address] [facility]

## 2023-01-18 NOTE — ACP (ADVANCE CARE PLANNING)
Advance Care Planning   Advance Care Planning Inpatient Note  [address] [facility] Department    Today's Date: 1/18/2023  Unit: [facility] EMERGENCY DEPT    Received request from . Upon review of chart and communication with care team, patient's decision making abilities are not in question. Patient was/were present in the room during visit. Goals of ACP Conversation:  Discuss Advance Care planning documents    Health Care Decision Makers:      Supplemental (Other) Decision Maker: Suma Sotomayors - Child - [phone]    Summary:  No Decision Maker named by patient at this time    Advance Care Planning Documents (Patient Wishes) on file:  None     Assessment:     attempted to visit with patient in bed 2 of the emergency room but found her currently intubated and on life support. Patient is not in a condition where she can communicate now. There is no advance directive present.     Interventions:  Deferred conversation as patient not interested in completing an advance directive at this time    Care Preferences Communicated:  No    Outcomes/Plan:      Ru Henley [facility] on 1/18/2023 at 10:43 AM

## 2023-01-18 NOTE — ED PROVIDER NOTES
EMERGENCY DEPARTMENT HISTORY AND PHYSICAL EXAM    7:08 PM    Date: 1/17/2023  Patient Name: Nataliia Moscoso    History of Presenting Illness     Chief Complaint   Patient presents with    Irregular Heart Beat    Respiratory Distress       History Provided By: Patient  HPI: 82-year-old female with history of heart failure with reduced EF last EF 20 to 25%, atrial fibrillation with question compliance with rate control and anticoagulation presenting to the ER by EMS for shortness of breath. She was found hypoxic to mid 80s on home oxygen was transitioned to CPAP with oxygen saturations above 90%. She has been persistently tachycardic up to 200. She was initially hypertensive to the 190s. She received 3 doses of oral nitro along with 1 inch of Nitropaste. She denies chest pain. Denies fevers, chills. Denies abdominal pain. PCP: Malena Bradford MD    Current Outpatient Medications   Medication Sig Dispense Refill    bumetanide (BUMEX) 1 mg tablet Take 1 mg by mouth daily. gabapentin (NEURONTIN) 400 mg capsule 1 capsule daily 360 Capsule 0    insulin glargine (LANTUS) 100 unit/mL injection Inject 20 Units at bedtime. Monitor and record blood sugar daily. 10 mL 1    aspirin 81 mg chewable tablet Take 1 Tablet by mouth daily. 30 Tablet 2    metoprolol tartrate (LOPRESSOR) 100 mg IR tablet Take 1 Tablet by mouth every twelve (12) hours. 120 Tablet 2    dilTIAZem ER (CARDIZEM CD) 180 mg capsule Take 1 Capsule by mouth daily. 120 Capsule 3    BD Insulin Syringe SafetyGlide 0.5 mL 30 gauge x 5/16\" syrg       levothyroxine (SYNTHROID) 150 mcg tablet Take 150 mcg by mouth daily. morphine IR (MS IR) 15 mg tablet Take 15 mg by mouth two (2) times a day. metFORMIN (GLUCOPHAGE) 500 mg tablet Take 500 mg by mouth daily. allopurinoL (ZYLOPRIM) 100 mg tablet Take 100 mg by mouth daily. atorvastatin (LIPITOR) 80 mg tablet Take 1 Tablet by mouth daily.  90 Tablet 3    Blood-Glucose Meter (FREESTYLE LITE METER) monitoring kit Test twice blood glucose daily; ICD-10 E11.9; Quantity 1 1 Kit 0    insulin syringe,safetyneedle 1 mL 31 gauge x \" syrg 1 Each by Does Not Apply route daily. 300 Each 3    glucose blood VI test strips (FREESTYLE TEST) strip Use to check blood glucose twice daily DX:E11.9 300 Strip 3    acetaminophen (TYLENOL) 500 mg tablet Take 1 Tab by mouth every six (6) hours as needed for Pain. 270 Tab 3       Past History     Past Medical History:  Past Medical History:   Diagnosis Date    Acquired hypothyroidism 2016    Arthritis of right shoulder region 2016    Asthma     Bilateral lower extremity edema 2021    Chronic bilateral low back pain without sciatica 2021    Chronic diastolic congestive heart failure ([address] 3/10/2021    Chronic venous insufficiency     Diabetes (HCC)     neuropathy    Essential hypertension 2021    Gout     History of depression 2017    History of fall 2021    Hypercholesteremia 2021    Hypertension     MI (myocardial infarction) ([address]     OAB (overactive bladder) 2021    Peripheral neuropathy     Rheumatoid arthritis ([address]     Tear of right rotator cuff 2016    Thyroid pain     Urinary incontinence 2021    Vertigo        Past Surgical History:  Past Surgical History:   Procedure Laterality Date    HX AMPUTATION TOE Right     Great toe Dr. Salvador Medina      HX CHOLECYSTECTOMY      HX GYN      TAHOophorectomy due to infection    HX HEENT      resection of memegioma in the .     HX KNEE REPLACEMENT Bilateral        Family History:  Family History   Problem Relation Age of Onset    Heart Attack Mother     Heart Attack Father        Social History:  Social History     Tobacco Use    Smoking status: Former     Types: Cigarettes     Quit date:      Years since quittin.0    Smokeless tobacco: Never    Tobacco comments:     quit 45 years ago   Vaping Use    Vaping Use: Never used   Substance Use Topics    Alcohol use: No     Alcohol/week: 0.0 standard drinks    Drug use: No       Allergies:  No Known Allergies    Review of Systems       Review of Systems   Constitutional:  Negative for chills, fatigue, fever and unexpected weight change. HENT:  Negative for congestion, ear pain, rhinorrhea and sore throat. Respiratory:  Positive for shortness of breath. Negative for cough and wheezing. Cardiovascular:  Positive for palpitations and leg swelling. Negative for chest pain. Gastrointestinal:  Negative for abdominal pain, blood in stool, diarrhea, nausea and vomiting. Endocrine: Negative for cold intolerance and polyuria. Genitourinary:  Negative for difficulty urinating, dysuria, frequency, vaginal bleeding and vaginal discharge. Musculoskeletal:  Negative for back pain and joint swelling. Skin:  Negative for color change. Neurological:  Negative for dizziness, weakness and numbness. Psychiatric/Behavioral:  Negative for agitation. The patient is not nervous/anxious. Physical Exam   Visit Vitals  BP (!) 145/109   Pulse (!) 156   Resp 23   SpO2 100%     Physical Exam  Constitutional:       General: She is in acute distress. Appearance: Normal appearance. She is obese. She is not diaphoretic. HENT:      Head: Normocephalic and atraumatic. Right Ear: Tympanic membrane and external ear normal.      Left Ear: Tympanic membrane and external ear normal.      Nose: Nose normal.      Mouth/Throat:      Mouth: Mucous membranes are moist.      Pharynx: Oropharynx is clear. Eyes:      Extraocular Movements: Extraocular movements intact. Conjunctiva/sclera: Conjunctivae normal.      Pupils: Pupils are equal, round, and reactive to light. Cardiovascular:      Rate and Rhythm: Tachycardia present. Rhythm irregular. Pulses: Normal pulses. Heart sounds: Normal heart sounds. No murmur heard. No gallop. Pulmonary:      Effort: Respiratory distress present.       Breath sounds: Rales present. No wheezing or rhonchi. Abdominal:      General: Abdomen is flat. Bowel sounds are normal. There is no distension. Palpations: Abdomen is soft. There is no mass. Tenderness: There is no abdominal tenderness. There is no rebound. Musculoskeletal:         General: No swelling or tenderness. Cervical back: Normal range of motion and neck supple. Right lower leg: Edema present. Left lower leg: Edema present. Skin:     General: Skin is warm and dry. Capillary Refill: Capillary refill takes less than 2 seconds. Findings: Lesion present. No bruising or rash. Comments: Multiple excoriations on the forearms bilaterally and abdomen. Neurological:      General: No focal deficit present. Mental Status: She is alert. Mental status is at baseline. She is disoriented. Cranial Nerves: No cranial nerve deficit. Gait: Gait normal.   Psychiatric:         Mood and Affect: Mood normal.         Behavior: Behavior normal.         Thought Content: Thought content normal.         Judgment: Judgment normal.       Diagnostic Study Results     Labs -  No results found for this or any previous visit (from the past 12 hour(s)). Radiologic Studies -   XR CHEST PORT    (Results Pending)       Medical Decision Making   I am the first provider for this patient. I reviewed the vital signs, available nursing notes, past medical history, past surgical history, family history and social history. Vital Signs-Reviewed the patient's vital signs. EKG:   Pre hospital EKG as read by me shows atrial fibrillation with RVR to 182. EKG as read by me shows atrial fibrillation.       Records Reviewed: Nursing Notes, Old Medical Records, and Previous electrocardiograms (Time of Review: 7:08 PM)    ED Course: Progress Notes, Reevaluation, and Consults:    ED Course as of 01/17/23 2235 Tue Jan 17, 2023   1906 Patient seen and evaluated with Dr. Susan Madrigal and Dr. Ovidio Jama at bedside. Received EMS report stating patient was found hypoxic to the mid 80s on home oxygen. Patient has a history of A. fib and congestive heart failure. Found to be tachycardic to 200. Working diagnosis A. fib with RVR versus acute on chronic heart failure, flash pulmonary edema. [LC]   1907 Patient transition to BiPAP upon arrival with oxygen saturations above 95%. Blood pressure 140/100. Heart rate 160s to 180s. We will proceed with 15 mg of diltiazem. [LC]   2037 Patient started on dilt drip. Patient initially hypotensive on dilt to MAPs of 60.      [LC]   2054 Patient remains persistently tachycardic to 160s with blood pressure of systolics low 188R over 10G. We will proceed with transition to amiodarone [LC]   2156 Magnesium low at 1.4. Will give 1 g mag now [LC]   8682 Patient transition to nasal cannula after ABG showed pH 7.4 within normal limits. Patient now complaining of shortness of breath will transition back to BiPAP. [LC]   2232 Pressure currently systolic in 734V. IV Lasix 40 mg given. [facility] family medicine consulted for admission, staff is Dr. Ovidio Young [LC]      ED Course User Index  [LC] Howie Lopez DO       Provider Notes (Medical Decision Making):   MDM  Number of Diagnoses or Management Options  Acute on chronic heart failure with reduced ejection fraction and diastolic dysfunction ([facility]): established, worsening  COVID: new, no workup  NSTEMI (non-ST elevated myocardial infarction) [facility]): new, needed workup  Persistent atrial fibrillation ([facility] [address] [address]): established, worsening  Diagnosis management comments: Acute on chronic heart failure  (CXR shows bilateral interstitial infiltrates, elev pro-BNP, peripheral edema) with reduced EF presenting with atrial fibrillation with RVR found to have COVID and elevated troponin, suspect troponin leak 2/2 demand ischemia/NSTEMI. Suspect COVID versus medicine noncompliance contributing to A. ib with RVR.      Patient did not tolerate diltiazem due to hypotension. Patient transition to amnio bolus/drip. IV Lasix 40 mg given. Patient will benefit from admission for gentle diuresis, heart rate control. Procedures      Diagnosis     Clinical Impression: No diagnosis found. Disposition: Admission for IV medications and further stabilization. [facility] family medicine consulted for admission, Dr. Jose Armando Zhu and is the attending. Follow-up Information    None          Patient's Medications   Start Taking    No medications on file   Continue Taking    ACETAMINOPHEN (TYLENOL) 500 MG TABLET    Take 1 Tab by mouth every six (6) hours as needed for Pain. ALLOPURINOL (ZYLOPRIM) 100 MG TABLET    Take 100 mg by mouth daily. ASPIRIN 81 MG CHEWABLE TABLET    Take 1 Tablet by mouth daily. ATORVASTATIN (LIPITOR) 80 MG TABLET    Take 1 Tablet by mouth daily. BD INSULIN SYRINGE SAFETYGLIDE 0.5 ML 30 GAUGE X 5/16\" SYRG        BLOOD-GLUCOSE METER (FREESTYLE LITE METER) MONITORING KIT    Test twice blood glucose daily; ICD-10 E11.9; Quantity 1    BUMETANIDE (BUMEX) 1 MG TABLET    Take 1 mg by mouth daily. DILTIAZEM ER (CARDIZEM CD) 180 MG CAPSULE    Take 1 Capsule by mouth daily. GABAPENTIN (NEURONTIN) 400 MG CAPSULE    1 capsule daily    GLUCOSE BLOOD VI TEST STRIPS (FREESTYLE TEST) STRIP    Use to check blood glucose twice daily DX:E11.9    INSULIN GLARGINE (LANTUS) 100 UNIT/ML INJECTION    Inject 20 Units at bedtime. Monitor and record blood sugar daily. INSULIN SYRINGE,SAFETYNEEDLE 1 ML 31 GAUGE X 5/16\" SYRG    1 Each by Does Not Apply route daily. LEVOTHYROXINE (SYNTHROID) 150 MCG TABLET    Take 150 mcg by mouth daily. METFORMIN (GLUCOPHAGE) 500 MG TABLET    Take 500 mg by mouth daily. METOPROLOL TARTRATE (LOPRESSOR) 100 MG IR TABLET    Take 1 Tablet by mouth every twelve (12) hours. MORPHINE IR (MS IR) 15 MG TABLET    Take 15 mg by mouth two (2) times a day.    These Medications have changed    No medications on file Stop Taking    No medications on file         The patient was personally evaluated by myself and Dr. Miriam Craft who agrees with the above assessment and plan. Matteo Sewell DO  [facility]  January 17, 2023    My signature above authenticates this document and my orders, the final    diagnosis (es), discharge prescription (s), and instructions in the Epic    record. If you have any questions please contact [phone]. Nursing notes have been reviewed by the physician/ advanced practice    Clinician. Disclaimer: Sections of this note are dictated using utilizing voice recognition software. Minor typographical errors may be present. If questions arise, please do not hesitate to contact me or call our department.

## 2023-01-18 NOTE — PROGRESS NOTES
Increased Vt 420/7cc/kg and Ve 6.2/ABG results   01/18/23 0744   Patient Observations   Pulse (Heart Rate) (!) 122   Resp Rate 16   O2 Sat (%) 100 %   Airway - Continuous Aspiration of Subglottic Secretions (KHUSHI) Tube 01/18/23 Oral   Placement Date/Time: 01/18/23 8706   Number of Attempts: 1  Inserted By: Khoa Elena MD  Present on Admission/Arrival: No  Location: Oral  Placement Verified: Auscultation;BBS;EtCO2  Airway Types: Endotracheal, cuffed  Airway Tube Size: 7.5 mm   Insertion Depth (cm) 23 cm   Line Dominik Lips   Side Secured Right   Cuff Pressure   (MLT)   Site Assessment Clean, dry, & intact   Respiratory   Respiratory (WDL) WDL   Patient on Vent Yes - If patient is on vent, add Doc Flowsheet Ventilator ().    Respiratory Pattern Regular   Breath Sounds Bilateral Coarse   Vent Settings   FIO2 (%) 80 %   SpO2/FIO2 Ratio 125   CMV Rate Set 16   Back-Up Rate 16   Vt Set (ml) 420 ml   PEEP/VENT (cm H2O) 8 cm H20   Insp Time (sec) 0.8 sec   Insp Rise Time % 50 %   Flow Trigger 3   Ventilator Measurements   Resp Rate Observed 16   Vt Exhaled (Machine Breath) (ml) 389 ml   Ve Observed (l/min) 6.23 l/min   PIP Observed (cm H2O) 27 cm H2O   Plateau Pressure (cm H2O) 24 cm H2O   Driving Pressure 16 [id number]   MAP (cm H2O) 12   I:E Ratio Actual 1:3.7   Safety & Alarms   Circuit Temperature 98.6 °F (37 °C)   Backup Mode Checked/Apnea Yes   Pressure Max 40 cm H2O   Ve Min 2   Ve Max 20   Vt Min 200 ml   RR Max 40   Ambu Bag Yes   Ambu Mask Yes   Vent Method/Mode   Ventilator Mode Assist control;VC+

## 2023-01-18 NOTE — PROGRESS NOTES
Patient continually removing bipap mask even with redirection. Removed bipap mask at this time and placed on NC. SpO2 96%.

## 2023-01-18 NOTE — ED NOTES
Pt pulling off bipap and screaming \"I cant breathe\". Multiple attempts made to redirect patient, patent airway, continues to pull off mask. RT at bedside, md aware.  Placed on NC 6L

## 2023-01-18 NOTE — PROGRESS NOTES
ABG drawn and patient taken off bipap. Placed on 2L NC. MD shown results.           01/17/23 2117   Oxygen Therapy   O2 Sat (%) 97 %   Pulse via Oximetry (!) 165 beats per minute   O2 Device Nasal cannula   O2 Flow Rate (L/min) 2 l/min

## 2023-01-18 NOTE — ED NOTES
Procedure Note - Intubation:   Performed by Maria M Mccollum MD .     Immediately prior to the procedure, the patient was reevaluated and found suitable for the planned procedure and any planned medications. Immediately prior to the procedure a time out was called to verify the correct patient, procedure, equipment, staff, and marking as appropriate. Medications given were etomidate and rocuronium (Zemuron). A number 7.5 cuffed   ETT was placed to 23 cm at the teeth. Placement was evaluated by noting bilateral, symmetric breath sounds. Attempts required: 1. Complications: none. The procedure was tolerated well. Critical Care Procedure Note  Authorized and Performed by: MD Name  Total critical care time: Approximately 60 minutes  Due to a high probability of clinically significant, life threatening deterioration, the patient required my highest level of preparedness to intervene emergently and I personally spent this critical care time directly and personally managing the patient. This critical care time included obtaining a history; examining the patient; pulse oximetry; ordering and review of studies; arranging urgent treatment with development of a management plan; evaluation of patient's response to treatment; frequent reassessment; and, discussions with other providers. This critical care time was performed to assess and manage the high probability of imminent, life-threatening deterioration that could result in multi-organ failure. It was exclusive of separately billable procedures and treating other patients and teaching time. Please see MDM section and the rest of the note for further information on patient assessment and treatment.

## 2023-01-18 NOTE — PROGRESS NOTES
Patient placed back on bipap.        01/17/23 2230   Oxygen Therapy   O2 Sat (%) 98 %   Pulse via Oximetry (!) 170 beats per minute   O2 Device BIPAP   FIO2 (%) (S)  50 %   CPAP/BIPAP   CPAP/BIPAP Start/Stop On   PIP Observed 17 cm H20   IPAP (cm H2O) 16 cm H2O   EPAP (cm H2O) 6 cm H2O   Inspiratory Time (sec) (S)  0.8 seconds   Vt Spont (ml) 540 ml   Ve Observed (l/min) 12.4 l/min   Backup Rate 8   Total RR (Spontaneous) 21 breaths per minute   Insp Rise Time (sec) 3   Leak (Estimated) 44 L/min

## 2023-01-18 NOTE — PROGRESS NOTES
OT orders received and medical chart review completed. Pt is currently intubated and to be transferred to ICU. OT to sign off. Please re-order when pt is medically stable to participate in OT.     Thank you for this referral,  Lottie Jefferson MS OTR/L

## 2023-01-18 NOTE — ED NOTES
RT at bedside. MD at bedside. Intubation setup. Pt was given etomidate at 0616. Rocuronium at  0617. Pt was intubated at 0619.

## 2023-01-18 NOTE — ED NOTES
Assumed care of pt at this time. Sitting up on stretcher, bipap in place. Provider burns at bedside. Warm blankets provided per request. No other complaints at this time.

## 2023-01-18 NOTE — PROGRESS NOTES
Patient intubated with 7.5 ET Tube and secured at 23 lip with Holister device. Placed on vent with below settings. 01/18/23 0626   Patient Observations   Pulse (Heart Rate) (!) 106   Resp Rate 16   O2 Sat (%) 100 %   Airway - Continuous Aspiration of Subglottic Secretions (KHUSHI) Tube 01/18/23 Oral   Placement Date/Time: 01/18/23 1185   Number of Attempts: 1  Inserted By: Kathy Borjas MD  Present on Admission/Arrival: No  Location: Oral  Placement Verified: Auscultation;BBS;EtCO2  Airway Types: Endotracheal, cuffed  Airway Tube Size: 7.5 mm   Insertion Depth (cm) 23 cm   Line Dominik Lips   Side Secured Right;Device   Cuff Pressure   (MLT)   Site Assessment Clean, dry, & intact   Suction on No   Respiratory   Respiratory (WDL) X   Patient on Vent Yes - If patient is on vent, add Doc Flowsheet Ventilator ().    Respiratory Pattern Regular   Chest/Tracheal Assessment Chest expansion, symmetrical   Breath Sounds Bilateral Diminished;Coarse   Ventilator Initiate/Discontinue   Ventilator Initiate Yes   Bio-Med ID # [id number]   Vent Settings   FIO2 (%) 100 %   SpO2/FIO2 Ratio 100   CMV Rate Set 16   Back-Up Rate 16   Vt Set (ml) 355 ml   PEEP/VENT (cm H2O) 8 cm H20   Insp Time (sec) 0.8 sec   Insp Rise Time % 50 %   Flow Trigger 3   Ventilator Measurements   Resp Rate Observed 16   Vt Exhaled (Machine Breath) (ml) 344 ml   Ve Observed (l/min) 5.49 l/min   PIP Observed (cm H2O) 23 cm H2O   MAP (cm H2O) 12   I:E Ratio Actual 1:3.7   Safety & Alarms   Circuit Temperature 99.5 °F (37.5 °C)   Backup Mode Checked/Apnea Yes   Pressure Max 40 cm H2O   Pressure Min 12 cm H2O   Ve Min 2   Ve Max 20   Vt Min 250 ml   Vt Max 800 ml   RR Max 40   Ambu Bag Yes   Ambu Mask Yes   Age Specific Ventilator Associated Pneumonia Bundle   Patient Age Group Adult   Oxygen Therapy   Skin Assessment Clean, dry, & intact   Skin Protection for O2 Device Yes   Orientation Bilateral   Location Cheek   Airway Procedures   $$ Airway Procedures Intubation   Vent Method/Mode   Ventilation Method Conventional   Ventilator Mode VC+   $$ Ventilator Initial Initial Vent Day

## 2023-01-18 NOTE — ED NOTES
Bedside report received from [facility]. Pt is intubated, RT at bedside to check ABG. Noted with OG; with cruz catheter connected to UB draining yellow urine. HR ranging from 110's-130's, on Amiodarone drip at 0.5mg/min. On Versed infusion at 2mg/hr and needs to be placed on Fentanyl infusion but ED unit only has 1 PCA pump. B, insulin given. Troponin: 2,002    ICU paged, waiting for call back.

## 2023-01-18 NOTE — ED NOTES
Pt is difficult stick and had a second PIV inserted using the US machine. Pt has edema on both arms and scabs as well as skin spots making attempting access difficult.

## 2023-01-18 NOTE — PROGRESS NOTES
attempted to complete the initial Spiritual Assessment of the patient in bed 2 of the emergency room  and offered Pastoral Care, support but found her currently on life support and not in a condition where she could communicate. There is no advance directive found on chart. No family were present at this time. Patient does not have any Amish/cultural needs that will affect patients preferences in health care. Chaplains will continue to follow and will provide pastoral care on an as needed/requested basis.     Luis Ndiaye  Spiritual Care Department  [phone]

## 2023-01-18 NOTE — H&P
[facility] Medicine  Admission History and Physical      Patient:    Benja Reynolds      82 y.o. female            MRN:       [id number]                                                                                    Admission Date:         1/17/2023  Code status:                Full    Benja Reynolds is a 82y.o. year old female admitted for Atrial fibrillation with rapid ventricular response ([facility] [address] [I48.91]  Acute on chronic heart failure ([address] [I50.9]  COVID [U07.1]  Heart failure ([address] [I50.9]  Atrial fibrillation with RVR ([facility] [address] [I48.91]  COVID-19 virus infection [U07.1]. ASSESSMENT AND PLAN  Problem List Items Addressed This Visit          Circulatory    Atrial fibrillation ([address]       Other    COVID     Other Visit Diagnoses       Acute on chronic heart failure with reduced ejection fraction and diastolic dysfunction (HCC)    -  Primary    NSTEMI (non-ST elevated myocardial infarction) ([facility] [address]                Shortness of breath 2/2 CHF exacerbation/COVID-19/COPD  -Presented with SOB and HR in 200's. Acute SOB most likely multifactorial given pt's comorbidities with recent COVID infection that has exacerbated the SOB  -Tested positive for COVID-19.  -Required Bipap in the ED. Weaned to NC, but due to continuing SOB and desaturations, Bipap was reinitiated. SpO2 99%  -last ECHO 12/21/22: EF 20-25%  -trop 241, pro-BNP 7,478  -CXR: official read pending, but bilateral interstitial infiltrates  -ABG wnl  -On PE, patient appears fluid overloaded   -Patient is on home O2 (2.5L) for prn use for her COPD. No inhalers prescribed per chart review. No PFT's in OP records  -Started on Dilt drip in ED, due to HR in the 200s. This was ineffective and it dropped her BP.  Dilt was discontinued and switched to Amiodarone and Lasix 40mg.   -home meds: Bumex 1mg  Plan:  -continue on BiPAP, wean as tolerated  -continue on bumex 1mg  -consult cardiology in the AM  -start paxlovid vs. remdesavir        Afib w/ RVR, HTN, Hx DVT  -On admission, pt tachycardic in the 200's  -EKG: Atrial fibrillation with rapid ventricular response   -Started on Cardizem drip in the ED due to HR in 200's, but decreased BP too fast so dc'd and started Amnio gtt  -INR: 2 on previous admission  -OP meds: Eliquis 5mg BID for anticoagulation, Diltiazem and Lopressor for rate control and HTN  -PVL 10/8 showed non occlusive thrombus within varicose veins of calf on RLE, otherwise no DVT bilaterally      Plan:  -Telemetry   -continue amniodarone gtt  -continue Metoprolol 100mg BID  -continue daily aspirin 81 mg  -DVT ppx with Lovenox       DM with neuoropathy  -most recent A1c 11.6 in Jan 4 2023  -home meds: insulin glargine 20 U qhs, Metformin 1000 mg BID.   -BG in   -on Gabapentin 800 QHS      Plan:   -monitor BG   -SSI   -hypoglycemia protocol  -cont Gabapentin        Hypothyroidism - stable  -meds: levo 150 mcg  -pt nonadherent with medications at home   Plan:   -c/w levo 150 mcg        Chronic pain, Gout, stable  -ESR 18 on  previous admission   -home meds: previously on morphine 15mg q12 PRN  -Recently stated that she stopped taking morphine due to no longer going to pain management provider   Plan:   -We will monitor for flare-ups  -continue allopurinol 100mg daily for gout ppx        Social Hx/Barriers to care   -pt has MPOA, son Vinnie Valverde  -pt having financial difficulties - unable to afford medications   -pt currently managing medications on her own, not adherent.   -lives with partner, Gio Moseleymer:  Code: Full  IVF/Drips: Amnio  I/O / Wt: 99.8kg  Diet: adult oral nutrition supplement  Bowel Regimen, Last BM: Miralax  DVT/AC: Lovenox  Mobility: per protocol   Anticipated LOS: 3-4 midnights     Point of Contact (relationship, number):  Marbella Coleen [phone]      SUBJECTIVE:  History of Present Illness:  Terrance Weathers is a 82 y.o. female with a PMHx of HFrEF, afib with RVR, T2DM, hypertension  who presented to [facility] ED on 1/17/2023 for worsening SOB. History was difficult to obtain as patient was fixated on SOB as well as patient did not have her hearing aids with her. Patient was found hypoxic at home on home oxygen saturating in the 80's. She was placed on CPAP and upon arrival to [facility] ED, was switched to BiPAP. Of note, pt was previously admitted to our service on 1/13/23 for cellulitis in the setting of lymphedema and Afib with RVR. It was noted that the patient left AMA last admission. Pt denies chest pain, fevers, chills, muscular weakness, N/V, dizziness, fatigue, and abdominal pain. ED Course:  -Afebrile, tachycardic, tachypneic in 160-180's, Bipap with Spo2 95%, /100  -Labs: CBC, CMP, UA, Trops, pro-BNP, TSH, Influenza, COVID, ABG  -Imaging: CXR,   -EKG: Afib with RVR  -Meds: Diltiazem, Amiodarone, Lasix, 3x oral nitro, 1 inch of nitropaste  -IVF: none  -Procedures: none  -Consults: none     Did non-adherence with patient's outpatient treatment plan contribute to this admission?  no       PMHx, PSHx, SHx, FHx, MEDS, ALLERGIES:   Past Medical History:   Diagnosis Date    Acquired hypothyroidism 8/1/2016    Arthritis of right shoulder region 4/21/2016    Asthma     Bilateral lower extremity edema 7/7/2021    Chronic bilateral low back pain without sciatica 7/7/2021    Chronic diastolic congestive heart failure ([address] 3/10/2021    Chronic venous insufficiency     Diabetes (HCC)     neuropathy    Essential hypertension 7/7/2021    Gout     History of depression 1/23/2017    History of fall 7/7/2021    Hypercholesteremia 7/7/2021    Hypertension     MI (myocardial infarction) ([address]     OAB (overactive bladder) 7/7/2021    Peripheral neuropathy     Rheumatoid arthritis ([address]     Tear of right rotator cuff 5/16/2016    Thyroid pain     Urinary incontinence 7/7/2021    Vertigo       Past Surgical History:   Procedure Laterality Date    HX AMPUTATION TOE Right 2020    Great toe Dr. Lea Wilder HX GYN      TAHOophorectomy due to infection    HX HEENT      resection of memegioma in the .     HX KNEE REPLACEMENT Bilateral       Social History     Tobacco Use    Smoking status: Former     Types: Cigarettes     Quit date:      Years since quittin.0    Smokeless tobacco: Never    Tobacco comments:     quit 45 years ago   Vaping Use    Vaping Use: Never used   Substance Use Topics    Alcohol use: No     Alcohol/week: 0.0 standard drinks    Drug use: No     Family History   Problem Relation Age of Onset    Heart Attack Mother     Heart Attack Father      No Known Allergies    Current Facility-Administered Medications   Medication Dose Route Frequency Provider Last Rate Last Admin    insulin lispro (HUMALOG) injection   SubCUTAneous TID Sebastian Stark MD        glucose chewable tablet 16 g  16 g Oral PRN Sebastian Stark MD        glucagon (GLUCAGEN) injection 1 mg  1 mg IntraMUSCular PRN Sebastian Stark MD        dextrose 10% infusion 0-250 mL  0-250 mL IntraVENous PRN Sebastian Stark MD        amiodarone (NEXTERONE) 360 mg in dextrose 200 mL (1.8 mg/mL) infusion  0.5-1 mg/min IntraVENous TITRATE Sebatsian Stark MD 16.7 mL/hr at 23 0445 0.5 mg/min at 23 0445    aspirin chewable tablet 81 mg  81 mg Oral DAILY Sebastian Stark MD        atorvastatin (LIPITOR) tablet 80 mg  80 mg Oral DAILY Sebastian Stark MD        bumetanide Donivan Pauling) tablet 1 mg  1 mg Oral DAILY Sebastian Stark MD        [facility] [place] by provider] dilTIAZem ER (CARDIZEM CD) capsule 180 mg  180 mg Oral DAILY Sebastian Stark MD        levothyroxine (SYNTHROID) tablet 150 mcg  150 mcg Oral DAILY Sebastian Stark MD        gabapentin (NEURONTIN) capsule 400 mg  400 mg Oral DAILY Sebastian Stark MD        allopurinoL (ZYLOPRIM) tablet 100 mg  100 mg Oral DAILY Sebastian Stark MD        metoprolol tartrate (LOPRESSOR) tablet 100 mg  100 mg Oral Q12H Sebastian Stark MD   100 mg at 23 4713    sodium chloride (NS) flush 5-40 mL  5-40 mL IntraVENous Q8H Yu Romero MD   10 mL at 01/17/23 2347    sodium chloride (NS) flush 5-40 mL  5-40 mL IntraVENous PRN Yu Romero MD        acetaminophen (TYLENOL) tablet 650 mg  650 mg Oral Q6H PRN Yu Romero MD        Or    acetaminophen (TYLENOL) suppository 650 mg  650 mg Rectal Q6H PRN Yu Romero MD        polyethylene glycol (MIRALAX) packet 17 g  17 g Oral DAILY PRN Yu Romero MD        ondansetron (ZOFRAN ODT) tablet 4 mg  4 mg Oral Q8H PRN Yu Romero MD        Or    ondansetron [facility]) injection 4 mg  4 mg IntraVENous Q6H PRN Yu Romero MD        enoxaparin (LOVENOX) injection 40 mg  40 mg SubCUTAneous DAILY Yu Romero MD         Current Outpatient Medications   Medication Sig Dispense Refill    bumetanide (BUMEX) 1 mg tablet Take 1 mg by mouth daily. gabapentin (NEURONTIN) 400 mg capsule 1 capsule daily 360 Capsule 0    insulin glargine (LANTUS) 100 unit/mL injection Inject 20 Units at bedtime. Monitor and record blood sugar daily. 10 mL 1    aspirin 81 mg chewable tablet Take 1 Tablet by mouth daily. 30 Tablet 2    metoprolol tartrate (LOPRESSOR) 100 mg IR tablet Take 1 Tablet by mouth every twelve (12) hours. 120 Tablet 2    dilTIAZem ER (CARDIZEM CD) 180 mg capsule Take 1 Capsule by mouth daily. 120 Capsule 3    BD Insulin Syringe SafetyGlide 0.5 mL 30 gauge x 5/16\" syrg       levothyroxine (SYNTHROID) 150 mcg tablet Take 150 mcg by mouth daily. morphine IR (MS IR) 15 mg tablet Take 15 mg by mouth two (2) times a day. allopurinoL (ZYLOPRIM) 100 mg tablet Take 100 mg by mouth daily. atorvastatin (LIPITOR) 80 mg tablet Take 1 Tablet by mouth daily. 90 Tablet 3    Blood-Glucose Meter (FREESTYLE LITE METER) monitoring kit Test twice blood glucose daily; ICD-10 E11.9; Quantity 1 1 Kit 0    insulin syringe,safetyneedle 1 mL 31 gauge x 5/16\" syrg 1 Each by Does Not Apply route daily.  300 Each 3    glucose blood VI test strips (FREESTYLE TEST) strip Use to check blood glucose twice daily DX:E11.9 300 Strip 3    metFORMIN (GLUCOPHAGE) 500 mg tablet Take 500 mg by mouth daily. (Patient not taking: Reported on 1/17/2023)      acetaminophen (TYLENOL) 500 mg tablet Take 1 Tab by mouth every six (6) hours as needed for Pain.  (Patient not taking: Reported on 1/17/2023) 270 Tab 3        ROS  OR   (positive findings are in BOLD; negative findings are in regular font)  Constitutional: fevers, chills, appetite changes, weight changes, fatigue  HEENT: changes in vision, changes in hearing, sore throat, dysphagia  Cardiovascular: chest pain, palpitations, PND, orthopnea, edema  Pulmonary: SOB, cough, sputum production, wheezing, chest tightness  Gastrointestinal: abdominal pain, nausea/vomiting, diarrhea, constipation, melena, hematochezia  Genitourinary: dysuria, hesitation, dribbling, urgency, hematuria  Musculoskeletal: arthralgias, myalgias  Skin: rash, itching  Neurological: sensory changes, motor changes, headache  Psychiatric: mood changes  Endocrine: heat/cold intolerance  Heme: easy bruising/easy bleeding, LAD     OBJECTIVE:  Patient Vitals for the past 24 hrs:   BP Temp Pulse Resp SpO2 Height Weight   01/18/23 0502 -- -- -- -- 96 % -- --   01/18/23 0223 139/75 -- (!) 117 28 97 % -- --   01/18/23 0043 133/72 -- (!) 121 28 99 % -- --   01/17/23 2347 136/84 -- (!) 152 28 99 % -- --   01/17/23 2251 -- -- -- -- 100 % -- --   01/17/23 2249 135/87 98.5 °F (36.9 °C) (!) 161 29 97 % -- --   01/17/23 2230 -- -- -- -- 98 % -- --   01/17/23 2201 (!) 141/84 -- (!) 153 28 95 % -- --   01/17/23 2117 -- -- -- -- 97 % -- --   01/17/23 2104 127/78 -- (!) 163 26 100 % -- --   01/17/23 2016 115/67 -- (!) 167 26 100 % -- --   01/17/23 1954 (!) 120/50 -- (!) 165 26 100 % 5' 5\" (1.651 m) 99.8 kg (220 lb)   01/17/23 1915 93/64 -- (!) 173 25 100 % -- --   01/17/23 1907 -- -- (!) 156 23 100 % -- --   01/17/23 1901 (!) 145/109 -- (!) 188 -- -- -- --   01/17/23 1840 -- -- -- -- 98 % -- -- Body mass index is 36.61 kg/m². PHYSICAL EXAM:  Physical Exam  OR  General: The patient appears in moderate distress.   HEENT: NCAT, PERRLA, EOM intact; oral mucosa well perfused, oropharynx clear  Neck: negative, JVD difficult to assess due to obesity  CVS: regular rate and rhythm, S1, S2 normal, no murmur, click, rub or gallop  Lungs: chest clear, no wheezing, rales, normal symmetric air entry, Heart exam - S1, S2 normal, no murmur, no gallop, rate regular    Abdomen: Soft, non-distended, non-TTP, BS+, no organomegaly, no masses  Ext: No calf tenderness, peripheral pulses present, peripheral edema in bilateral LE  Skin: Warm, Dry, Intact , No significant rashes/petechia/ecchymosis  Neuro: No focal neurologic deficits or gross abnormalities  Psych: A&Ox3, appropriate mood and affect     RECENT LABS AND IMAGING ON ADMISSION   Recent Results (from the past 24 hour(s))   BLOOD GAS,CHEM8,LACTIC ACID POC    Collection Time: 01/17/23  6:55 PM   Result Value Ref Range    Calcium, ionized (POC) 0.93 (L) 1.12 - 1.32 mmol/L    Base deficit (POC) 0.3 mmol/L    HCO3 (POC) 24.5 22 - 26 MMOL/L    CO2, POC 24 19 - 24 MMOL/L    O2 SAT 68 %    Sample source VENOUS BLOOD      Performed by Marcos Theodore     Sodium (POC) 138 136 - 145 mmol/L    Potassium (POC) 5.0 3.5 - 5.1 mmol/L    Glucose (POC) 190 (H) 65 - 100 mg/dL    Creatinine (POC) 1.12 0.6 - 1.3 mg/dL    Lactic Acid (POC) 2.75 (HH) 0.40 - 2.00 mmol/L    Chloride (POC) 105 98 - 107 mmol/L    Anion gap, POC 10 10 - 20      pH, venous (POC) 7.40 7.32 - 7.42      pCO2, venous (POC) 39.7 (L) 41 - 51 MMHG    pO2, venous (POC) 35 25 - 40 mmHg    eGFR (POC) 48 (L) >60 ml/min/1.73m2   CBC WITH AUTOMATED DIFF    Collection Time: 01/17/23  6:59 PM   Result Value Ref Range    WBC 10.7 4.6 - 13.2 K/uL    RBC 4.01 (L) 4.20 - 5.30 M/uL    HGB 10.3 (L) 12.0 - 16.0 g/dL    HCT 33.3 (L) 35.0 - 45.0 %    MCV 83.0 78.0 - 100.0 FL    MCH 25.7 24.0 - 34.0 PG    MCHC 30.9 (L) 31.0 - 37.0 g/dL    RDW 16.7 (H) 11.6 - 14.5 %    PLATELET 052 079 - 417 K/uL    MPV 12.0 (H) 9.2 - 11.8 FL    NRBC 0.0 0  WBC    ABSOLUTE NRBC 0.00 0.00 - 0.01 K/uL    NEUTROPHILS 65 40 - 73 %    LYMPHOCYTES 14 (L) 21 - 52 %    MONOCYTES 9 3 - 10 %    EOSINOPHILS 8 (H) 0 - 5 %    BASOPHILS 1 0 - 2 %    IMMATURE GRANULOCYTES 4 (H) 0.0 - 0.5 %    ABS. NEUTROPHILS 7.0 1.8 - 8.0 K/UL    ABS. LYMPHOCYTES 1.5 0.9 - 3.6 K/UL    ABS. MONOCYTES 0.9 0.05 - 1.2 K/UL    ABS. EOSINOPHILS 0.8 (H) 0.0 - 0.4 K/UL    ABS. BASOPHILS 0.1 0.0 - 0.1 K/UL    ABS. IMM. GRANS. 0.4 (H) 0.00 - 0.04 K/UL    DF AUTOMATED     METABOLIC PANEL, COMPREHENSIVE    Collection Time: 01/17/23  6:59 PM   Result Value Ref Range    Sodium 137 136 - 145 mmol/L    Potassium 4.8 3.5 - 5.5 mmol/L    Chloride 102 100 - 111 mmol/L    CO2 26 21 - 32 mmol/L    Anion gap 9 3.0 - 18 mmol/L    Glucose 181 (H) 74 - 99 mg/dL    BUN 25 (H) 7.0 - 18 MG/DL    Creatinine 1.33 (H) 0.6 - 1.3 MG/DL    BUN/Creatinine ratio 19 12 - 20      eGFR 39 (L) >60 ml/min/1.73m2    Calcium 8.7 8.5 - 10.1 MG/DL    Bilirubin, total 0.8 0.2 - 1.0 MG/DL    ALT (SGPT) 10 (L) 13 - 56 U/L    AST (SGOT) 24 10 - 38 U/L    Alk.  phosphatase 56 45 - 117 U/L    Protein, total 8.4 (H) 6.4 - 8.2 g/dL    Albumin 3.4 3.4 - 5.0 g/dL    Globulin 5.0 (H) 2.0 - 4.0 g/dL    A-G Ratio 0.7 (L) 0.8 - 1.7     TROPONIN-HIGH SENSITIVITY    Collection Time: 01/17/23  6:59 PM   Result Value Ref Range    Troponin-High Sensitivity 241 (HH) 0 - 54 ng/L   NT-PRO BNP    Collection Time: 01/17/23  6:59 PM   Result Value Ref Range    NT pro-BNP 7,478 (H) 0 - 1,800 PG/ML   MAGNESIUM    Collection Time: 01/17/23  6:59 PM   Result Value Ref Range    Magnesium 1.4 (L) 1.6 - 2.6 mg/dL   TSH 3RD GENERATION    Collection Time: 01/17/23  6:59 PM   Result Value Ref Range    TSH 4.15 (H) 0.36 - 3.74 uIU/mL   COVID-19 WITH INFLUENZA A/B    Collection Time: 01/17/23  7:05 PM   Result Value Ref Range    SARS-CoV-2 by PCR Detected (AA) NOTD      Influenza A by PCR Not detected NOTD      Influenza B by PCR Not detected NOTD     BLOOD GAS, ARTERIAL POC    Collection Time: 01/17/23  9:14 PM   Result Value Ref Range    Device: BIPAP MASK      FIO2 (POC) 100 %    pH (POC) 7.44 7.35 - 7.45      pCO2 (POC) 37.6 35.0 - 45.0 MMHG    pO2 (POC) 387 (H) 80 - 100 MMHG    HCO3 (POC) 25.5 22 - 26 MMOL/L    sO2 (POC) 100.0 (H) 92 - 97 %    Base excess (POC) 1.4 mmol/L    Mode Non Invasive      Tidal volume 580 ml    Set Rate 8 bpm    PEEP/CPAP (POC) 6 cmH2O    Pressure support 16 cmH2O    Allens test (POC) Positive      Total resp.  rate 27      Site LEFT RADIAL      Specimen type (POC) ARTERIAL      Performed by Kasia Sotelo    POC LACTIC ACID    Collection Time: 01/17/23  9:58 PM   Result Value Ref Range    Lactic Acid (POC) 1.71 0.40 - 2.00 mmol/L   URINALYSIS W/ RFLX MICROSCOPIC    Collection Time: 01/18/23 12:37 AM   Result Value Ref Range    Color YELLOW      Appearance CLEAR      Specific gravity 1.015 1.005 - 1.030      pH (UA) 5.5 5.0 - 8.0      Protein 30 (A) NEG mg/dL    Glucose Negative NEG mg/dL    Ketone TRACE (A) NEG mg/dL    Bilirubin Negative NEG      Blood Negative NEG      Urobilinogen 0.2 0.2 - 1.0 EU/dL    Nitrites Negative NEG      Leukocyte Esterase Negative NEG     URINE MICROSCOPIC ONLY    Collection Time: 01/18/23 12:37 AM   Result Value Ref Range    WBC 1 to 4 0 - 4 /hpf    RBC Negative 0 - 5 /hpf    Epithelial cells 2+ 0 - 5 /lpf    Bacteria Negative NEG /hpf    Mucus Negative NEG /lpf    Hyaline cast 1 to 2 0 - 2 /lpf    Yeast 2+ (A) NEG   METABOLIC PANEL, COMPREHENSIVE    Collection Time: 01/18/23  3:08 AM   Result Value Ref Range    Sodium 136 136 - 145 mmol/L    Potassium 3.9 3.5 - 5.5 mmol/L    Chloride 102 100 - 111 mmol/L    CO2 27 21 - 32 mmol/L    Anion gap 7 3.0 - 18 mmol/L    Glucose 306 (H) 74 - 99 mg/dL    BUN 27 (H) 7.0 - 18 MG/DL    Creatinine 1.43 (H) 0.6 - 1.3 MG/DL    BUN/Creatinine ratio 19 12 - 20      eGFR 36 (L) >60 ml/min/1.73m2    Calcium 8.3 (L) 8.5 - 10.1 MG/DL    Bilirubin, total 0.5 0.2 - 1.0 MG/DL    ALT (SGPT) 6 (L) 13 - 56 U/L    AST (SGOT) 18 10 - 38 U/L    Alk. phosphatase 51 45 - 117 U/L    Protein, total 7.6 6.4 - 8.2 g/dL    Albumin 2.9 (L) 3.4 - 5.0 g/dL    Globulin 4.7 (H) 2.0 - 4.0 g/dL    A-G Ratio 0.6 (L) 0.8 - 1.7     MAGNESIUM    Collection Time: 01/18/23  3:08 AM   Result Value Ref Range    Magnesium 1.9 1.6 - 2.6 mg/dL   CBC WITH AUTOMATED DIFF    Collection Time: 01/18/23  3:08 AM   Result Value Ref Range    WBC 10.1 4.6 - 13.2 K/uL    RBC 3.60 (L) 4.20 - 5.30 M/uL    HGB 9.2 (L) 12.0 - 16.0 g/dL    HCT 30.2 (L) 35.0 - 45.0 %    MCV 83.9 78.0 - 100.0 FL    MCH 25.6 24.0 - 34.0 PG    MCHC 30.5 (L) 31.0 - 37.0 g/dL    RDW 16.3 (H) 11.6 - 14.5 %    PLATELET 515 183 - 267 K/uL    MPV 11.9 (H) 9.2 - 11.8 FL    NRBC 0.2 (H) 0  WBC    ABSOLUTE NRBC 0.02 (H) 0.00 - 0.01 K/uL    NEUTROPHILS 70 40 - 73 %    LYMPHOCYTES 14 (L) 21 - 52 %    MONOCYTES 12 (H) 3 - 10 %    EOSINOPHILS 1 0 - 5 %    BASOPHILS 1 0 - 2 %    IMMATURE GRANULOCYTES 3 (H) 0.0 - 0.5 %    ABS. NEUTROPHILS 7.1 1.8 - 8.0 K/UL    ABS. LYMPHOCYTES 1.4 0.9 - 3.6 K/UL    ABS. MONOCYTES 1.2 0.05 - 1.2 K/UL    ABS. EOSINOPHILS 0.1 0.0 - 0.4 K/UL    ABS. BASOPHILS 0.1 0.0 - 0.1 K/UL    ABS. IMM. GRANS. 0.3 (H) 0.00 - 0.04 K/UL    DF AUTOMATED          XR (Most Recent). Results from [place] encounter on 01/12/23    XR CHEST PORT    Narrative  EXAM:  XR CHEST PORT    INDICATION:   post central line    COMPARISON: 1/12/2023. FINDINGS:  Right jugular CVL tip overlies the SVC. No pneumothorax. Stable mildly enlarged  cardiac silhouette. Similar vascular congestion and increased interstitial  opacities. No pneumothorax. Probably small pleural effusions. Intact osseous  structures. Impression  No pneumothorax post CVL placement. Similar enlarged cardiac silhouette with vascular congestion and mild pulmonary  edema.        CT (Most Recent) Results from [place] encounter on 10/07/22    CTA CHEST W OR W WO CONT    Narrative  CTA CHEST PULMONARY EMBOLISM PROTOCOL    INDICATION: Acute respiratory difficulty, heart failure, symptoms worsened over  3 days, chest pain, increased work of breathing. Question pulmonary embolism. TECHNIQUE: Thin collimation axial images obtained through the level of the  pulmonary arteries with additional imaging through the chest following the  uneventful administration of intravenous contrast.  Images reconstructed into  three dimensional coronal and sagittal projections for complete evaluation of  the tortuous and overlapping pulmonary vascular structures and to reduce patient  radiation dose. All CT scans at this facility are performed using dose optimization technique as  appropriate to a performed exam, to include automated exposure control,  adjustment of the mA and/or kV according to patient size (including appropriate  matching first site-specific examinations), or use of iterative reconstruction  technique. COMPARISON: 8/10/2022. FINDINGS:    No filling defects are appreciated within the main, left, right, lobar or  visualized segmental pulmonary arteries to suggest embolism. Thyroid: Unremarkable in its visualized aspects. Pericardium/ Heart: No significant effusion. Biatrial cardiac chamber  enlargement. Aorta/ Vessels: Mild to moderate aortic atherosclerosis. There is irregular  noncalcified mural plaque in the descending aorta. Lymph Nodes: Unremarkable. .    Lungs: There is hilar edema. Mild bronchial wall thickening. Mild groundglass  and interstitial thickening. Mild mosaic attenuation. Mild to moderate dependent  atelectasis. Pleura: Moderate bilateral pleural effusions which are new. No pneumothorax. Upper Abdomen: Unremarkable. Bones/soft tissues: Osteopenia. Degenerative disc disease. Impression  1. No evidence for pulmonary emboli.   2.  Heart failure with biatrial cardiac chamber enlargement, moderate new  bilateral pleural effusions and mild pulmonary edema. 3.  Mild to moderate aortic atherosclerosis with irregular noncalcified mural  plaque along the wall of the descending aorta which increases risk of embolic  events. ECHO No results found for this or any previous visit. EKG No results found for this or any previous visit. I have discussed Ms. Renee Ledesma case with my attending who agrees with the plan of care. Natalia Glass, PGY-1   [facility]   January 18, 2023, 10:26 PM     [facility]  Senior Addendum to History and Physical    I have also seen and independently evaluated the patient. I agree with the plan as noted above. Additional HPI, A/P:     hortness of breath 2/2 CHF exacerbation/COVID-19/COPD  -Presented with SOB and HR in 200's. Acute SOB most likely multifactorial given pt's comorbidities with recent COVID infection that has exacerbated the SOB  -Tested positive for COVID-19.  -Required Bipap in the ED. Weaned to NC, but due to continuing SOB and desaturations, Bipap was reinitiated. SpO2 99%  -last ECHO 12/21/22: EF 20-25%  -trop 241, pro-BNP 7,478  -CXR: official read pending, but bilateral interstitial infiltrates  -ABG wnl  -On PE, patient appears fluid overloaded   -Patient is on home O2 (2.5L) for prn use for her COPD. No inhalers prescribed per chart review. No PFT's in OP records  -Started on Dilt drip in ED, due to HR in the 200s. This was ineffective and it dropped her BP. Dilt was discontinued and switched to Amiodarone and Lasix 40mg.   -home meds: Bumex 1mg  Plan:  -continue on BiPAP, wean as tolerated  -continue on bumex 1mg  -consult cardiology in the AM  -start paxlovid vs. Romeo Patience, labs and imaging reviewed     For additional problem list, assessment, and plan see intern note.     Jose Miguel Shin MD , PGY-2   [facility] [address]   January 18, 2023, 10:26 PM

## 2023-01-18 NOTE — ED NOTES
US IV was established, pt required an extended dwelling catheter due to excess adipose in her upper arm. Labs were drawn and placed bedside in the event a redraw was requested. RN was bedside and reconnected the pt's medication. Pt desatted to 88 on 2lpm, pt was repositioned and increased to 6lpm.  Pt is also mouth breathing. Provider made aware.

## 2023-01-18 NOTE — CONSULTS
Cardiology Initial Patient Referral Note    Cardiology referral request from Anahy Choudhury MD for evaluation and management/treatment of afib RVR    Date of  Admission: 1/17/2023  6:45 PM   Primary Care Physician:  Malena Bradford MD    Attending Cardiologist: Dr. Radha Adhikari:     -Acute hypoxic respiratory failure associated with COVID with underlying HFrEF and COPD, emergently intubated 1/18/2023 AM.  -Afib RVR, known Hx afib diagnosed 10/2022  -Elevated troponin at 872 --> 4384, uncertain significance  -HFrEF, recent diagnosis 12/2022  Echo 12/21/2022 with LVEF 20-25% with severe hypokinesis of basal anteroseptal, basal inferoseptal and apical septal walls, moderately dilated RV with reduced systolic function, severe biatrial enlargement  -Hx RLE DVT 06/2022  -HTN  -DM  -HLD  -Hx medication noncompliance     Primary cardiologist is Dr. Sp iBrch:       I saw, evaluated, examined the patient personally. Patient admitted with respiratory distress and hypoxia. Denies any chest pain. COVID-positive  No MI.  /74. Pulse is elevated. Slight edema. Decreased breath sound. No obvious murmur  Pertinent labs EKG reviewed. A. fib with RVR. Nonspecific ST changes  Echo with cardiomyopathy with EF 20%    Continue amiodarone which was started in the emergency department. Started on oral meds already  Currently on IV heparin. Will evaluate patient for further cardiac work-up depending on hospital course      Significant time spent in reviewing the case, multiple EMR databases, physician notes, reviewing pertinent labs and imaging studies  I spent significant amount of time for medical decision making and updated history, and other providers assessments as well. I personally agree with the findings as stated and the plan as documented.   I saw, examined, and evaluated this patient and performed the substantive portion of the encounter for > 50% of the time including extensive history, physical exam, assessment and plan    Amol Russell MD       -Continue Amiodarone infusion and PO Lopressor; pt remains in afib but improved rates this afternoon.  -Agree with initiation of Heparin infusion, given afib RVR, Hx DVT and increasing HS-troponin.    -Recommend to replace electrolytes to keep K > 4 and Mg > 2, defer replacement to primary team.  -Continue ASA, Lipitor as able to take PO medications.  -Will check Echo for completeness. Last Echo 12/2022 with reduced LVEF with WMA. -Continue supportive care per PCCM team, appreciate assistance. -Further recommendations based on test results, hospital course. History of Present Illness: This is a 82 y.o. female admitted for Atrial fibrillation with rapid ventricular response ([address] [I48.91]  Acute on chronic heart failure (HCC) [I50.9]  COVID [U07.1]  Heart failure ([address] [I50.9]  Atrial fibrillation with RVR ([address] [I48.91]  COVID-19 virus infection [U07.1]  Acute respiratory failure with hypoxia ([address] [J96.01]  COVID-19 [U07.1]. Patient complains of: shortness of breath      Sahil Reyes is a 82 y.o. female who presented to the hospital due to shortness of breath. Pt was reportedly found with O2 sats 80% RA per EMS, with improvement to >90% with CPAP. She was found to be persistently tachycardic, rates up to ~200 BPM, and initially hypertensive with 's. She was given SL NTG x 3 and nitro paste by EMS. She denied c/o chest pain. In the ER, pt was placed on BiPAP, however kept pulling off, noted to have increased work of breathing. She was ultimately intubated. Cardiology has been consulted due to afib RVR. Further history/ROS limited secondary to pt condition. Cardiac risk factors: dyslipidemia, diabetes mellitus, obesity, hypertension, known CMY      Review of Symptoms:      Review of systems not obtained due to patient factors.      Past Medical History:     Past Medical History:   Diagnosis Date    Acquired hypothyroidism 2016    Arthritis of right shoulder region 2016    Asthma     Bilateral lower extremity edema 2021    Chronic bilateral low back pain without sciatica 2021    Chronic diastolic congestive heart failure ([facility] 3/10/2021    Chronic venous insufficiency     Diabetes (HCC)     neuropathy    Essential hypertension 2021    Gout     History of depression 2017    History of fall 2021    Hypercholesteremia 2021    Hypertension     MI (myocardial infarction) ([facility]     OAB (overactive bladder) 2021    Peripheral neuropathy     Rheumatoid arthritis (HCC)     Tear of right rotator cuff 2016    Thyroid pain     Urinary incontinence 2021    Vertigo          Social History:     Social History     Socioeconomic History    Marital status:    Tobacco Use    Smoking status: Former     Types: Cigarettes     Quit date:      Years since quittin.0    Smokeless tobacco: Never    Tobacco comments:     quit 45 years ago   Vaping Use    Vaping Use: Never used   Substance and Sexual Activity    Alcohol use: No     Alcohol/week: 0.0 standard drinks    Drug use: No    Sexual activity: Not Currently        Family History:     Family History   Problem Relation Age of Onset    Heart Attack Mother     Heart Attack Father         Medications:   No Known Allergies     Current Facility-Administered Medications   Medication Dose Route Frequency    insulin lispro (HUMALOG) injection   SubCUTAneous TID    glucose chewable tablet 16 g  16 g Oral PRN    glucagon (GLUCAGEN) injection 1 mg  1 mg IntraMUSCular PRN    dextrose 10% infusion 0-250 mL  0-250 mL IntraVENous PRN    midazolam in normal saline (VERSED) 1 mg/mL infusion  0-10 mg/hr IntraVENous TITRATE    amiodarone (NEXTERONE) 360 mg in dextrose 200 mL (1.8 mg/mL) infusion  0.5-1 mg/min IntraVENous TITRATE    aspirin chewable tablet 81 mg  81 mg Oral DAILY    atorvastatin (LIPITOR) tablet 80 mg  80 mg Oral DAILY    bumetanide (BUMEX) tablet 1 mg  1 mg Oral DAILY    [Held by provider] dilTIAZem ER (CARDIZEM CD) capsule 180 mg  180 mg Oral DAILY    levothyroxine (SYNTHROID) tablet 150 mcg  150 mcg Oral DAILY    gabapentin (NEURONTIN) capsule 400 mg  400 mg Oral DAILY    allopurinoL (ZYLOPRIM) tablet 100 mg  100 mg Oral DAILY    metoprolol tartrate (LOPRESSOR) tablet 100 mg  100 mg Oral Q12H    sodium chloride (NS) flush 5-40 mL  5-40 mL IntraVENous Q8H    sodium chloride (NS) flush 5-40 mL  5-40 mL IntraVENous PRN    acetaminophen (TYLENOL) tablet 650 mg  650 mg Oral Q6H PRN    Or    acetaminophen (TYLENOL) suppository 650 mg  650 mg Rectal Q6H PRN    polyethylene glycol (MIRALAX) packet 17 g  17 g Oral DAILY PRN    ondansetron (ZOFRAN ODT) tablet 4 mg  4 mg Oral Q8H PRN    Or    ondansetron (ZOFRAN) injection 4 mg  4 mg IntraVENous Q6H PRN    enoxaparin (LOVENOX) injection 40 mg  40 mg SubCUTAneous DAILY     Current Outpatient Medications   Medication Sig    bumetanide (BUMEX) 1 mg tablet Take 1 mg by mouth daily. gabapentin (NEURONTIN) 400 mg capsule 1 capsule daily    insulin glargine (LANTUS) 100 unit/mL injection Inject 20 Units at bedtime. Monitor and record blood sugar daily. aspirin 81 mg chewable tablet Take 1 Tablet by mouth daily. metoprolol tartrate (LOPRESSOR) 100 mg IR tablet Take 1 Tablet by mouth every twelve (12) hours. dilTIAZem ER (CARDIZEM CD) 180 mg capsule Take 1 Capsule by mouth daily. BD Insulin Syringe SafetyGlide 0.5 mL 30 gauge x 5/16\" syrg     levothyroxine (SYNTHROID) 150 mcg tablet Take 150 mcg by mouth daily. morphine IR (MS IR) 15 mg tablet Take 15 mg by mouth two (2) times a day. allopurinoL (ZYLOPRIM) 100 mg tablet Take 100 mg by mouth daily. atorvastatin (LIPITOR) 80 mg tablet Take 1 Tablet by mouth daily.     Blood-Glucose Meter (FREESTYLE LITE METER) monitoring kit Test twice blood glucose daily; ICD-10 E11.9; Quantity 1    insulin syringe,safetyneedle 1 mL 31 gauge x 5/16\" syrg 1 Each by Does Not Apply route daily. glucose blood VI test strips (FREESTYLE TEST) strip Use to check blood glucose twice daily DX:E11.9    metFORMIN (GLUCOPHAGE) 500 mg tablet Take 500 mg by mouth daily. (Patient not taking: Reported on 1/17/2023)    acetaminophen (TYLENOL) 500 mg tablet Take 1 Tab by mouth every six (6) hours as needed for Pain. (Patient not taking: Reported on 1/17/2023)         Physical Exam:   Visit Vitals  /74 (BP 1 Location: Left upper arm, BP Patient Position: Semi fowlers)   Pulse (!) 112   Temp 98.5 °F (36.9 °C)   Resp 16   Ht 5' 5\" (1.651 m)   Wt 99.8 kg (220 lb)   SpO2 99%   BMI 36.61 kg/m²       TELE: AFIB    BP Readings from Last 3 Encounters:   01/18/23 118/74   01/13/23 111/80   12/23/22 (!) 124/94     Pulse Readings from Last 3 Encounters:   01/18/23 (!) 112   01/13/23 (!) 109   12/23/22 100     Wt Readings from Last 3 Encounters:   01/17/23 99.8 kg (220 lb)   01/12/23 101 kg (222 lb 10.6 oz)   12/21/22 101 kg (222 lb 9.6 oz)       General:  intubated, sedated, no apparent distress  Neck:  supple  Lungs:  coarse, on ventilator  Heart:  irregularly irregular rhythm  Abdomen:  abdomen is soft without significant tenderness, masses, organomegaly or guarding  Extremities:  atraumatic, no significant pitting edema  Skin: Warm and dry.    Neuro: unable to fully assess due to pt condition - intubated/sedated  Psych: unable to fully assess due to pt condition - intubated/sedated     Data Review:     Recent Labs     01/18/23  0308 01/17/23  1859   WBC 10.1 10.7   HGB 9.2* 10.3*   HCT 30.2* 33.3*    328     Recent Labs     01/18/23  0308 01/17/23  1859    137   K 3.9 4.8    102   CO2 27 26   * 181*   BUN 27* 25*   CREA 1.43* 1.33*   CA 8.3* 8.7   MG 1.9 1.4*   ALB 2.9* 3.4   ALT 6* 10*       Results for orders placed or performed during the hospital encounter of 01/12/23   EKG, 12 LEAD, INITIAL   Result Value Ref Range    Ventricular Rate 141 BPM    QRS Duration 90 ms    Q-T Interval 322 ms    QTC Calculation (Bezet) 493 ms    Calculated R Axis 0 degrees    Calculated T Axis -56 degrees    Diagnosis       Atrial fibrillation with rapid ventricular response with premature   ventricular or aberrantly conducted complexes  Abnormal ECG  When compared with ECG of 21-DEC-2022 00:09,  T wave inversion no longer evident in Lateral leads  Confirmed by Frances Lilly ([id number]) on 1/13/2023 5:56:37 PM         Last Lipid:    Lab Results   Component Value Date/Time    Cholesterol, total 191 11/09/2021 12:24 PM    HDL Cholesterol 44 11/09/2021 12:24 PM    LDL, calculated 96.8 11/09/2021 12:24 PM    Triglyceride 251 (H) 11/09/2021 12:24 PM    CHOL/HDL Ratio 4.3 11/09/2021 12:24 PM         Signed By: Jayant Mata PA-C     January 18, 2023

## 2023-01-19 NOTE — PROGRESS NOTES
[facility] Pharmacokinetic Monitoring Service - Vancomycin    Indication: HAP  Goal AUC/TRINIDAD: 400-600 mg*hr/L  Day of Therapy: 2  Additional Antimicrobials: pip-tazo    Pertinent Laboratory Values: Wt Readings from Last 1 Encounters:   01/18/23 99.8 kg (220 lb)     Temp Readings from Last 1 Encounters:   01/19/23 99.7 °F (37.6 °C)     No components found for: PROCAL  Estimated Creatinine Clearance: 25.3 mL/min (A) (based on SCr of 1.9 mg/dL (H)).   Recent Labs     01/19/23  0508 01/19/23  0247   WBC 7.7 10.7     Pertinent Cultures:  Date Source Results   - - -   MRSA Nasal Swab: awaiting result    Assessment:  Date Current Dose Concentration (mg/L) Timing of Concentration (h) AUC   1/18 2,000 mg x1 - - -   1/19 - - - -   Note: Serum concentrations collected for AUC dosing may appear elevated if collected in close proximity to the dose administered, this is not necessarily an indication of toxicity    Plan:  AQUILES, SCr trending up - dose by level  No dose today  Ordered a level for 1/20 with AM labs  Pharmacy will continue to monitor patient and adjust therapy as indicated    Thank you for the consult,  BARNEY Beatty  1/19/2023

## 2023-01-19 NOTE — PROGRESS NOTES
TRANSFER - IN REPORT:    Verbal report received from [address] [place] [address] (name) on [address]  being received from ED (unit) for routine progression of care      Report consisted of patients Situation, Background, Assessment and   Recommendations(SBAR). Information from the following report(s) SBAR, ED Summary, Procedure Summary, Intake/Output, MAR, and Recent Results was reviewed with the receiving nurse. Opportunity for questions and clarification was provided. Assessment completed upon patients arrival to unit and care assumed.

## 2023-01-19 NOTE — PROGRESS NOTES
0800 bedside turnover given to me by RN Tigre Nieves. Pt is in bed on the cardiac telemetry monitor on fentanyl for sedation. SBAR, Mar and ed summary given with updates overnight. Restraints assessed  0900 medications given and physical assessment  1000 repositioned restraints reassessed  1100 blood glucose assessed and insulin given  1200 restraints reassessed with vitals and needs  1300 bathed with chlorhexidine wipes and a new gown placed      1840 called lab to have phlebotomy come up, labs are overdue  1900 bedside report SBAR< MAR< ED summary given to DARBY Barrios. A chance to ask questions was given. Drips were verified as well as information pertaining to overdue labs and phoning. Overnight hitting and kicking at nurses, difficult stick to get labs and ptt over 180 three times        88 Y/O full code isolation Covid    Admitted 01/17/2023 c/o SOB 80% on RA, acute respiratory failure with hypoxia Irregular heart beat, Respiratory distress. Was hypertensive on arrival, hyperglycemic, and AQUILES  Denied chest pain    History: acquired hypothyroidism, asthma, bilateral lower extremity edema. 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  60% FIO2 Peep 8    Cardiac A Fib amiodarone at 0.5 and heparin at 12 EF 20% inverted T waves on EKG dilated RV with reduced systolic function,   This morning sinus tach 120s   GI OG Tube 55 @ lip  Hypothyroidism   cruz  Skin excoriation on upper arms and upper legs, bleeding from her bug bites she has been scratching even with restraints        Skin multiple excoriations on forearms bilaterally and abdomen leg edema bilateral arm edena received bumex oral

## 2023-01-19 NOTE — PROGRESS NOTES
Cardiology Progress Note    Admit Date: 1/17/2023  Attending Cardiologist: Dr. Shanita Flores:     -Acute hypoxic respiratory failure associated with COVID with underlying HFrEF and COPD, emergently intubated 1/18/2023 AM.  -Afib RVR, known Hx afib diagnosed 10/2022  -Elevated troponin, peaked at 2343  -HFrEF, recent diagnosis 12/2022  Echo 12/21/2022 with LVEF 20-25% with severe hypokinesis of basal anteroseptal, basal inferoseptal and apical septal walls, moderately dilated RV with reduced systolic function, severe biatrial enlargement  Repeat Echo this admission with LVEF 20-25%, no significant change from prior  -Hx RLE DVT 06/2022  -HTN  -DM  -HLD  -Hx medication noncompliance      Primary cardiologist is Dr. Julio Cesar Villa:       I saw, evaluated, examined the patient personally. She is intubated  Continue low-dose IV amiodarone infusion, she is on heparin  DNR status noted  Continue supportive care for now    Marci Pfeiffer MD       -Continue supportive care per PCCM team, appreciate assistance.  -Continued on Amiodarone infusion at 0.5 mg/min.  -Continue Heparin infusion.  -Cardizem on hold due to soft BP's. Rates elevated but acceptable given comorbidities. Subjective:     Remains intubated, sedated.     Objective:      Patient Vitals for the past 8 hrs:   Temp Pulse Resp BP SpO2   01/19/23 1202 -- (!) 130 16 -- 98 %   01/19/23 1200 98.4 °F (36.9 °C) (!) 113 15 (!) 119/104 --   01/19/23 1130 -- (!) 102 16 91/74 100 %   01/19/23 1100 98.7 °F (37.1 °C) (!) 101 16 (!) 101/56 100 %   01/19/23 1030 -- (!) 115 16 104/70 100 %   01/19/23 1000 -- (!) 117 16 99/86 100 %   01/19/23 0900 98.4 °F (36.9 °C) (!) 133 19 108/88 --   01/19/23 0830 -- (!) 120 17 (!) 139/97 --   01/19/23 0800 98.5 °F (36.9 °C) (!) 105 19 (!) 126/98 --   01/19/23 0730 -- 93 16 (!) 118/56 100 %   01/19/23 0700 -- (!) 106 16 109/85 100 %         Patient Vitals for the past 96 hrs:   Weight   01/19/23 0922 99.6 kg (219 lb 9.3 oz)   01/18/23 1430 99.8 kg (220 lb)   01/18/23 1018 99.8 kg (220 lb)   01/17/23 1954 99.8 kg (220 lb)        Current Facility-Administered Medications   Medication Dose Route Frequency Last Admin    camphor-menthoL (SARNA) 0.5-0.5 % lotion   Topical TID Given at 01/19/23 0900    insulin glargine (LANTUS) injection 5 Units  5 Units SubCUTAneous QHS      VANCOMYCIN INFORMATION NOTE   Other Rx Dosing/Monitoring      hydrOXYzine HCL (ATARAX) tablet 10 mg  10 mg Oral QID PRN 10 mg at 01/19/23 1130    glucose chewable tablet 16 g  16 g Oral PRN      glucagon (GLUCAGEN) injection 1 mg  1 mg IntraMUSCular PRN      dextrose 10% infusion 0-250 mL  0-250 mL IntraVENous PRN      insulin lispro (HUMALOG) injection   SubCUTAneous Q6H 4 Units at 01/19/23 1226    midazolam (VERSED) injection 2 mg  2 mg IntraVENous Q10MIN PRN 2 mg at 01/19/23 0922    fentaNYL citrate (PF) injection 100 mcg  100 mcg IntraVENous Q30MIN  mcg at 01/19/23 0404    chlorhexidine (PERIDEX) 0.12 % mouthwash 10 mL  10 mL Oral Q12H 10 mL at 01/19/23 0921    albuterol-ipratropium (DUO-NEB) 2.5 MG-0.5 MG/3 ML  3 mL Nebulization Q4H PRN      famotidine (PF) (PEPCID) 20 mg in 0.9% sodium chloride 10 mL injection  20 mg IntraVENous DAILY 20 mg at 01/19/23 0922    heparin (porcine) 25,000 units in 0.45% saline 250 ml infusion  18-36 Units/kg/hr IntraVENous TITRATE 12 Units/kg/hr at 01/19/23 0710    fentaNYL (PF) 10 mcg/mL infusion  0-200 mcg/hr IntraVENous TITRATE 125 mcg/hr at 01/19/23 1432    dexamethasone (DECADRON) 4 mg/mL injection 6 mg  6 mg IntraVENous Q24H 6 mg at 01/18/23 1958    piperacillin-tazobactam (ZOSYN) 3.375 g in 0.9% sodium chloride (MBP/ADV) 100 mL MBP  3.375 g IntraVENous Q8H 3.375 g at 01/19/23 0922    metoprolol tartrate (LOPRESSOR) tablet 50 mg  50 mg Oral Q12H 50 mg at 01/19/23 0900    amiodarone (NEXTERONE) 360 mg in dextrose 200 mL (1.8 mg/mL) infusion  0.5-1 mg/min IntraVENous TITRATE 0.5 mg/min at 01/19/23 1429    aspirin chewable tablet 81 mg  81 mg Oral DAILY 81 mg at 01/19/23 0922    atorvastatin (LIPITOR) tablet 80 mg  80 mg Oral DAILY 80 mg at 01/19/23 0932    bumetanide (BUMEX) tablet 1 mg  1 mg Oral DAILY 1 mg at 01/19/23 0932    [Held by provider] dilTIAZem ER (CARDIZEM CD) capsule 180 mg  180 mg Oral DAILY      levothyroxine (SYNTHROID) tablet 150 mcg  150 mcg Oral DAILY 150 mcg at 01/19/23 0932    gabapentin (NEURONTIN) capsule 400 mg  400 mg Oral DAILY 400 mg at 01/19/23 0901    allopurinoL (ZYLOPRIM) tablet 100 mg  100 mg Oral DAILY 100 mg at 01/19/23 0902    sodium chloride (NS) flush 5-40 mL  5-40 mL IntraVENous Q8H 10 mL at 01/19/23 6008    sodium chloride (NS) flush 5-40 mL  5-40 mL IntraVENous PRN      acetaminophen (TYLENOL) tablet 650 mg  650 mg Oral Q6H PRN      Or    acetaminophen (TYLENOL) suppository 650 mg  650 mg Rectal Q6H PRN      polyethylene glycol (MIRALAX) packet 17 g  17 g Oral DAILY PRN      ondansetron (ZOFRAN ODT) tablet 4 mg  4 mg Oral Q8H PRN      Or    ondansetron (ZOFRAN) injection 4 mg  4 mg IntraVENous Q6H PRN           Intake/Output Summary (Last 24 hours) at 1/19/2023 1436  Last data filed at 1/19/2023 1200  Gross per 24 hour   Intake 1851.52 ml   Output 575 ml   Net 1276.52 ml           Visit Vitals  BP (!) 119/104 (BP 1 Location: Left upper arm, BP Patient Position: At rest)   Pulse (!) 130   Temp 98.4 °F (36.9 °C)   Resp 16   Ht 5' 4\" (1.626 m)   Wt 99.6 kg (219 lb 9.3 oz)   SpO2 98%   BMI 37.69 kg/m²     Intubated  Edema+  Coarse BS    Data Review:     Labs: Results:       Chemistry Recent Labs     01/19/23  1010 01/19/23  0508 01/19/23  0247 01/18/23  0308 01/17/23  1859   * 185* 154* 306* 181*    139 134* 136 137   K 4.6 4.2 6.8* 3.9 4.8    105 103 102 102   CO2 21 24 26 27 26   BUN 41* 40* 38* 27* 25*   CREA 1.86* 1.90* 1.90* 1.43* 1.33*   CA 8.2* 8.2* 8.0* 8.3* 8.7   MG  --   --  2.2 1.9 1.4*   PHOS  --   --  4.2  --   --    AGAP 12 10 5 7 9   BUCR 22* 21* 20 19 19 AP  --   --   --  51 56   TP  --   --   --  7.6 8.4*   ALB  --  2.4*  --  2.9* 3.4   GLOB  --   --   --  4.7* 5.0*   AGRAT  --   --   --  0.6* 0.7*      CBC w/Diff Recent Labs     01/19/23  0508 01/19/23  0247 01/18/23  0308   WBC 7.7 10.7 10.1   RBC 3.71* 3.76* 3.60*   HGB 9.6* 10.0* 9.2*   HCT 31.0* 31.6* 30.2*    216 267   GRANS 79* 71 70   LYMPH 12* 18* 14*   EOS 0 1 1      Coagulation Recent Labs     01/19/23  1010 01/19/23  0508   APTT >180.0* >180.0*       Lipid Panel Lab Results   Component Value Date/Time    Cholesterol, total 191 11/09/2021 12:24 PM    HDL Cholesterol 44 11/09/2021 12:24 PM    LDL, calculated 96.8 11/09/2021 12:24 PM    VLDL, calculated 50.2 11/09/2021 12:24 PM    Triglyceride 251 (H) 11/09/2021 12:24 PM    CHOL/HDL Ratio 4.3 11/09/2021 12:24 PM      Liver Enzymes Recent Labs     01/19/23  0508 01/18/23  0308   TP  --  7.6   ALB 2.4* 2.9*   AP  --  51      Thyroid Studies Lab Results   Component Value Date/Time    TSH 4.15 (H) 01/17/2023 06:59 PM          Signed By: Salty Porter PA-C     January 19, 2023

## 2023-01-19 NOTE — PROGRESS NOTES
Problem: Diabetes Self-Management  Goal: *Disease process and treatment process  Description: Define diabetes and identify own type of diabetes; list 3 options for treating diabetes. Outcome: Progressing Towards Goal  Goal: *Incorporating nutritional management into lifestyle  Description: Describe effect of type, amount and timing of food on blood glucose; list 3 methods for planning meals. Outcome: Progressing Towards Goal  Goal: *Incorporating physical activity into lifestyle  Description: State effect of exercise on blood glucose levels. Outcome: Progressing Towards Goal  Goal: *Developing strategies to promote health/change behavior  Description: Define the ABC's of diabetes; identify appropriate screenings, schedule and personal plan for screenings. Outcome: Progressing Towards Goal  Goal: *Using medications safely  Description: State effect of diabetes medications on diabetes; name diabetes medication taking, action and side effects. Outcome: Progressing Towards Goal  Goal: *Monitoring blood glucose, interpreting and using results  Description: Identify recommended blood glucose targets  and personal targets. Outcome: Progressing Towards Goal  Goal: *Prevention, detection, treatment of acute complications  Description: List symptoms of hyper- and hypoglycemia; describe how to treat low blood sugar and actions for lowering  high blood glucose level. Outcome: Progressing Towards Goal     Problem: Diabetes Self-Management  Goal: *Incorporating nutritional management into lifestyle  Description: Describe effect of type, amount and timing of food on blood glucose; list 3 methods for planning meals.   Outcome: Progressing Towards Goal     Problem: Ventilator Management  Goal: *Adequate oxygenation and ventilation  Outcome: Progressing Towards Goal  Goal: *Patient maintains clear airway/free of aspiration  Outcome: Progressing Towards Goal  Goal: *Absence of infection signs and symptoms  Outcome: Progressing Towards Goal  Goal: *Normal spontaneous ventilation  Outcome: Progressing Towards Goal     Problem: Airway Clearance - Ineffective  Goal: Achieve or maintain patent airway  Outcome: Progressing Towards Goal     Problem: Gas Exchange - Impaired  Goal: Absence of hypoxia  Outcome: Progressing Towards Goal  Goal: Promote optimal lung function  Outcome: Progressing Towards Goal     Problem: Breathing Pattern - Ineffective  Goal: Ability to achieve and maintain a regular respiratory rate  Outcome: Progressing Towards Goal     Problem:  Body Temperature -  Risk of, Imbalanced  Goal: Ability to maintain a body temperature within defined limits  Outcome: Progressing Towards Goal  Goal: Will regain or maintain usual level of consciousness  Outcome: Progressing Towards Goal  Goal: Complications related to the disease process, condition or treatment will be avoided or minimized  Outcome: Progressing Towards Goal     Problem: Isolation Precautions - Risk of Spread of Infection  Goal: Prevent transmission of infectious organism to others  Outcome: Progressing Towards Goal     Problem: Nutrition Deficits  Goal: Optimize nutrtional status  Outcome: Progressing Towards Goal     Problem: Risk for Fluid Volume Deficit  Goal: Maintain normal heart rhythm  Outcome: Progressing Towards Goal  Goal: Maintain absence of muscle cramping  Outcome: Progressing Towards Goal  Goal: Maintain normal serum potassium, sodium, calcium, phosphorus, and pH  Outcome: Progressing Towards Goal     Problem: Loneliness or Risk for Loneliness  Goal: Demonstrate positive use of time alone when socialization is not possible  Outcome: Progressing Towards Goal     Problem: Fatigue  Goal: Verbalize increase energy and improved vitality  Outcome: Progressing Towards Goal     Problem: Patient Education: Go to Patient Education Activity  Goal: Patient/Family Education  Outcome: Progressing Towards Goal     Problem: Non-Violent Restraints  Goal: Removal from restraints as soon as assessed to be safe  Outcome: Progressing Towards Goal  Goal: No harm/injury to patient while restraints in use  Outcome: Progressing Towards Goal  Goal: Patient's dignity will be maintained  Outcome: Progressing Towards Goal  Goal: Patient Interventions  Outcome: Progressing Towards Goal     Problem: Falls - Risk of  Goal: *Absence of Falls  Description: Document Karen Chapa Fall Risk and appropriate interventions in the flowsheet.   Outcome: Progressing Towards Goal  Note: Fall Risk Interventions:       Mentation Interventions: Bed/chair exit alarm, Update white board, Room close to nurse's station, More frequent rounding, Reorient patient    Medication Interventions: Bed/chair exit alarm    Elimination Interventions: Bed/chair exit alarm              Problem: Patient Education: Go to Patient Education Activity  Goal: Patient/Family Education  Outcome: Progressing Towards Goal     Problem: Nutrition Deficit  Goal: *Optimize nutritional status  Outcome: Progressing Towards Goal     Problem: Risk for Elopement  Goal: Patient will not exit the unit/facility without proper escort  Outcome: Progressing Towards Goal

## 2023-01-19 NOTE — PROGRESS NOTES
[facility]. Pulmonary, Critical Care, and Sleep Medicine    Name: Sathish Don MRN: [id number]   : 1935 Hospital: [address] [place] [address] Dr   Date: 2023  Admission Date: 2023     Chart and notes reviewed. Data reviewed. I have evaluated all findings. [x]I have reviewed the flowsheet and previous days notes. [x]The patient is unable to give any meaningful history or review of systems because the patient is:  [x]Intubated [x]Sedated   []Unresponsive      [x]The patient is critically ill on      [x]Mechanical ventilation []Pressors   []BiPAP []         Interval HPI:  Patient is a 82 y.o. female with a PMH of HFrEF last EF 20-25%, atrial fibrillation with RVR, T2DM, HTN, HLD, and hypothyroidism who presented to the [facility] ED on  due to shortness of breath. Patient found at home by EMS hypoxic to mid 80's and put on CPAP. Given oral nitroglycerin x3 and nitropaste. She was noted to be tachycardic to nearly 200 bpm in the ED in atrial fibrillation with RVR. In the ED she was initially placed on BiPAP with good respiratory response and started on diltiazem gtt. Following diltiazem gtt patient became hypotensive with MAPs in low 60's but remained persistently tachycardic. Then transitioned to amiodarone gtt. Blood pressure improved and was given lasix x1. Following BiPAP initiation ABG improved and she was transitioned to NC, however this was tolerated poorly and required resuming BiPAP. Despite BiPAP patient continued to be in respiratory distress and required intubation in the ED. Noted to be COVID positive. ICU was then contacted for admission and further management. Of note, patient left inpatient hospitalization AMA on  following admission for cellulitis in setting of lymphedema and atrial fibrillation with RVR.       Subjective 23  Hospital Day: 2   Vent Day: 2  Overnight events: Constantly itching, biting at tube  Mentation/Activity: RASS 0 to -1  Respiratory/ Secretions: Minimal  Hemodynamics: Soft pressures but not requiring pressors  Urine output, bowel:   Diet: NPO  Need for procedures: No              ROS:Review of systems not obtained due to patient factors. Events and notes from last 24 hours reviewed.      Patient Active Problem List   Diagnosis Code    Encounter for palliative care Z51.5    Acquired hypothyroidism E03.9    Dermatitis L30.9    Obesity, morbid ([facility] E66.01    Type 2 diabetes mellitus with diabetic nephropathy, with long-term current use of insulin ([facility]) E11.21, Z79.4    Chronic diastolic congestive heart failure ([facility]) I50.32    Essential hypertension I10    Bilateral lower extremity edema R60.0    Hypercholesteremia E78.00    History of fall Z91.81    Chronic bilateral low back pain without sciatica M54.50, G89.29    OAB (overactive bladder) N32.81    Urinary incontinence R32    Neuropathy G62.9    Septic arthritis of foot ([facility]) M00.9    Diabetic foot ulcer ([facility]) E11.621, L97.509    Septic joint ([facility]) M00.9    Sepsis ([facility]) A41.9    Acute exacerbation of CHF (congestive heart failure) ([facility]) I50.9    Leukocytosis D72.829    Pulmonary edema J81.1    Dyspnea R06.00    Atrial fibrillation with RVR ([facility]) I48.91    Patient's noncompliance with other medical treatment and regimen due to unspecified reason Z91.199    Debility R53.81    Atrial fibrillation ([facility] I48.91    Advanced care planning/counseling discussion Z71.89    Cellulitis L03.90    Atrial fibrillation with rapid ventricular response ([facility]) I48.91    Acute on chronic heart failure ([facility]) I50.9    COVID U07.1    Heart failure ([facility]) I50.9    COVID-19 virus infection U07.1    Acute respiratory failure with hypoxia ([facility]) J96.01    COVID-19 U07.1       Vital Signs:  Visit Vitals  /69   Pulse 96   Temp 99.3 °F (37.4 °C)   Resp 19   Ht 5' 5\" (1.651 m)   Wt 99.8 kg (220 lb)   SpO2 100%   BMI 36.61 kg/m²       O2 Device: Ventilator   O2 Flow Rate (L/min): 6 l/min   Temp (24hrs), Av.3 °F (37.4 °C), Min:99.3 °F (37.4 °C), Max:99.3 °F (37.4 °C)       Intake/Output:   Last shift:      No intake/output data recorded.   Last 3 shifts:  1901 -  0700  In: -   Out: 150 [Urine:150]  No intake or output data in the 24 hours ending 23 0751       Current Facility-Administered Medications   Medication Dose Route Frequency    camphor-menthoL (SARNA) 0.5-0.5 % lotion   Topical TID    insulin lispro (HUMALOG) injection   SubCUTAneous Q6H    chlorhexidine (PERIDEX) 0.12 % mouthwash 10 mL  10 mL Oral Q12H    famotidine (PF) (PEPCID) 20 mg in 0.9% sodium chloride 10 mL injection  20 mg IntraVENous DAILY    heparin (porcine) 25,000 units in 0.45% saline 250 ml infusion  18-36 Units/kg/hr IntraVENous TITRATE    fentaNYL (PF) 10 mcg/mL infusion  0-200 mcg/hr IntraVENous TITRATE    dexamethasone (DECADRON) 4 mg/mL injection 6 mg  6 mg IntraVENous Q24H    piperacillin-tazobactam (ZOSYN) 3.375 g in 0.9% sodium chloride (MBP/ADV) 100 mL MBP  3.375 g IntraVENous Q8H    vancomycin (VANCOCIN) 1,000 mg in 0.9% sodium chloride 250 mL (VIAL-MATE)  1,000 mg IntraVENous Q24H    metoprolol tartrate (LOPRESSOR) tablet 50 mg  50 mg Oral Q12H    amiodarone (NEXTERONE) 360 mg in dextrose 200 mL (1.8 mg/mL) infusion  0.5-1 mg/min IntraVENous TITRATE    aspirin chewable tablet 81 mg  81 mg Oral DAILY    atorvastatin (LIPITOR) tablet 80 mg  80 mg Oral DAILY    bumetanide (BUMEX) tablet 1 mg  1 mg Oral DAILY    [Held by provider] dilTIAZem ER (CARDIZEM CD) capsule 180 mg  180 mg Oral DAILY    levothyroxine (SYNTHROID) tablet 150 mcg  150 mcg Oral DAILY    gabapentin (NEURONTIN) capsule 400 mg  400 mg Oral DAILY    allopurinoL (ZYLOPRIM) tablet 100 mg  100 mg Oral DAILY    sodium chloride (NS) flush 5-40 mL  5-40 mL IntraVENous Q8H         Telemetry: []Sinus []A-flutter []Paced    [x]A-fib []Multiple PVCs                  Physical Exam:      General: Intubated/sedated  HEENT:  Anicteric sclerae; pink palpebral conjunctivae; mucosa moist  Resp:  Symmetrical chest expansion, no accessory muscle use; good air entry at apexes   CV:  S1, S2 present; irregularly irregular  GI:  Abdomen soft, non-tender; (+) active bowel sounds  Extremities:  +2 pulse at left wrist; improving BLE edema, right first toe amputated  Skin:  Warm; dry, linear scabs c/w either bed bug bites or excoriations  Neurologic:  Non-focal, awake and tracking  Devices: Ramirez, ET tube, bilateral PIV      DATA:  MAR reviewed and pertinent medications noted or modified as needed    Labs:  Recent Labs     01/19/23  0508 01/19/23  0247 01/18/23  0308   WBC 7.7 10.7 10.1   HGB 9.6* 10.0* 9.2*   HCT 31.0* 31.6* 30.2*    216 267     Recent Labs     01/19/23  0508 01/19/23  0247 01/18/23  0308 01/17/23  1859    134* 136 137   K 4.2 6.8* 3.9 4.8    103 102 102   CO2 24 26 27 26   * 154* 306* 181*   BUN 40* 38* 27* 25*   CREA 1.90* 1.90* 1.43* 1.33*   CA 8.2* 8.0* 8.3* 8.7   MG  --  2.2 1.9 1.4*   PHOS  --  4.2  --   --    ALB  --   --  2.9* 3.4   ALT  --   --  6* 10*     No results for input(s): PH, PCO2, PO2, HCO3, FIO2 in the last 72 hours. Recent Labs     01/19/23  0355 01/18/23  0720 01/17/23  2114   FIO2I 60 100 100   HCO3I 23.8 24.4 25.5   PCO2I 40.0 49.6* 37.6   PHI 7.38 7.30* 7.44   PO2I 87 98 387*       Imaging:  [x]   I have personally reviewed the patients radiographs and reports  XR Results (most recent):  XR Results (most recent):  Results from Hospital Encounter encounter on 01/17/23    XR ABD (KUB)    Narrative  EXAMINATION: Abdomen single view    INDICATION: OG tube    COMPARISON: CT 8/10/2022    FINDINGS: Single frontal view. Lower most abdomen outside field-of-view. Esophagogastric tube tip at level of gastric fundus. Overall nonobstructive gas  pattern. No evidence of free air. Right upper quadrant surgical clips. Impression  Esophagogastric tube tip at gastric fundus level.      CT Results (most recent):  Results from Hospital Encounter encounter on 10/07/22    CTA CHEST W OR W WO CONT    Narrative  CTA CHEST PULMONARY EMBOLISM PROTOCOL    INDICATION: Acute respiratory difficulty, heart failure, symptoms worsened over  3 days, chest pain, increased work of breathing. Question pulmonary embolism. TECHNIQUE: Thin collimation axial images obtained through the level of the  pulmonary arteries with additional imaging through the chest following the  uneventful administration of intravenous contrast.  Images reconstructed into  three dimensional coronal and sagittal projections for complete evaluation of  the tortuous and overlapping pulmonary vascular structures and to reduce patient  radiation dose. All CT scans at this facility are performed using dose optimization technique as  appropriate to a performed exam, to include automated exposure control,  adjustment of the mA and/or kV according to patient size (including appropriate  matching first site-specific examinations), or use of iterative reconstruction  technique. COMPARISON: 8/10/2022. FINDINGS:    No filling defects are appreciated within the main, left, right, lobar or  visualized segmental pulmonary arteries to suggest embolism. Thyroid: Unremarkable in its visualized aspects. Pericardium/ Heart: No significant effusion. Biatrial cardiac chamber  enlargement. Aorta/ Vessels: Mild to moderate aortic atherosclerosis. There is irregular  noncalcified mural plaque in the descending aorta. Lymph Nodes: Unremarkable. .    Lungs: There is hilar edema. Mild bronchial wall thickening. Mild groundglass  and interstitial thickening. Mild mosaic attenuation. Mild to moderate dependent  atelectasis. Pleura: Moderate bilateral pleural effusions which are new. No pneumothorax. Upper Abdomen: Unremarkable. Bones/soft tissues: Osteopenia. Degenerative disc disease. Impression  1. No evidence for pulmonary emboli.   2.  Heart failure with biatrial cardiac chamber enlargement, moderate new  bilateral pleural effusions and mild pulmonary edema. 3.  Mild to moderate aortic atherosclerosis with irregular noncalcified mural  plaque along the wall of the descending aorta which increases risk of embolic  events. 01/17/23    ECHO ADULT FOLLOW-UP OR LIMITED 01/18/2023 1/18/2023    Interpretation Summary    Contrast used: Definity. Technical qualifiers: Echo study was technically difficult with poor endocardial visualization. Left Ventricle: Severely reduced left ventricular systolic function with a visually estimated EF of 20 - 25%. Left ventricle size is normal. Mildly increased wall thickness. Global hypokinesis present. Aortic Valve: Mild to moderate regurgitation. Mitral Valve: Moderate to severe regurgitation. Tricuspid Valve: Severe regurgitation. Pericardium: Left pleural effusion. Ascites present.     Signed by: Navi Guerra MD on 1/18/2023  3:58 PM       IMPRESSION:   Acute hypoxic respiratory failure - intubated 1/18  Atrial fibrillation with rapid ventricular response - anticoagulated on Eliquis outpatient, diagnosed 10/22  Acute on chronic heart failure - LVEF 20-25% 12/21/22, similar result on repeat echo on 1/18/23(LVEF 55% 8/22), possible tachycardia induced cardiomyopathy vs. Potential precipitating ischemic event, does not appear to have had recent catheterization for further characterization  NSTEMI - likely type II due to demand ischemia in setting of atrial fibrillation with RVR and CHF exacerbation  COVID-19  Pulmonary edema  AQUILES on CKD3  Anemia  Hypertension  Type 2 diabetes mellitus  Hypercholesterolemia  Hypothyroidism  Hx of tobacco use - cessation in 1976  Skin lesions c/w bed bug bites, no bugs seen by staff by hx of bed bugs     Patient Active Problem List   Diagnosis Code    Encounter for palliative care Z51.5    Acquired hypothyroidism E03.9    Dermatitis L30.9    Obesity, morbid ([address] E66.01    Type 2 diabetes mellitus with diabetic nephropathy, with long-term current use of insulin ([facility]) E11.21, Z79.4    Chronic diastolic congestive heart failure ([facility]) I50.32    Essential hypertension I10    Bilateral lower extremity edema R60.0    Hypercholesteremia E78.00    History of fall Z91.81    Chronic bilateral low back pain without sciatica M54.50, G89.29    OAB (overactive bladder) N32.81    Urinary incontinence R32    Neuropathy G62.9    Septic arthritis of foot ([facility]) M00.9    Diabetic foot ulcer ([facility]) E11.621, L97.509    Septic joint ([facility]) M00.9    Sepsis ([facility]) A41.9    Acute exacerbation of CHF (congestive heart failure) ([facility]) I50.9    Leukocytosis D72.829    Pulmonary edema J81.1    Dyspnea R06.00    Atrial fibrillation with RVR ([facility]) I48.91    Patient's noncompliance with other medical treatment and regimen due to unspecified reason Z91.199    Debility R53.81    Atrial fibrillation ([facility] I48.91    Advanced care planning/counseling discussion Z71.89    Cellulitis L03.90    Atrial fibrillation with rapid ventricular response ([facility]) I48.91    Acute on chronic heart failure ([facility]) I50.9    COVID U07.1    Heart failure ([facility]) I50.9    COVID-19 virus infection U07.1    Acute respiratory failure with hypoxia ([facility]) J96.01    COVID-19 U07.1        RECOMMENDATIONS:   Neuro:Titrate sedation for RASS goal 0 to -1, daily sedation holiday. Sedation with fentanyl gtt and midazolam PRN  Pulm: Aspiration precautions, HOB>30'. VAP Bundle, titrate FiO2 for goal SPO2 > 90%, SBT when FiO2 </= 40% and PEEP </= 7, PRN duonebs. IV dexamethasone for COVID-19  CVS: For afib with RVR, metoprolol, amiodarone gtt, & heparin gtt. For demand NSTEMI, aspirin, and atorvastatin. For CHF exacerbation, bumetanide. Continue HD monitoring. Cardiology consulted  GI: NPO. Renal: Trend Cr, daily BMP, UOP, strict I&Os. Suspect cardiorenal. Replace electrolytes PRN, nephrology consulted  Hem/Onc: Trend H/H, monitor for s/o active bleeding. Daily CBC.   I/D:Trend WBCs and temperature curve. COVID-19 positive  Metabolic: Daily BMP, mag, phos. Trend lytes and replace per protocol. Endocrine:Q6 glucoses. SSI. Avoid hypoglycemia. Add 5u glargine QHS. Continue home levothyroxine  Musc/Skin: Wound care, Sarna lotion for rash, PT/OT/SLP    Full Code  Discussed in interdisciplinary rounds     Best practice :    Glycemic control: avoid hypoglycemia  [facility] ICU bundles: Cruz Bundle Followed    [facility] Vent patients-    VAP bundle-Odilia tube to suction at 20-30 cm Hg, Maintain [place] tube with 5-10ml air every 4 hours, Routine oral care every 4 hours, Elevation of head > 45 degree, Daily sedation holiday and SBT evaluation starting at 6.00am.  Stress ulcer prophylaxis. Pepcid  DVT prophylaxis. Heparin gtt  Need for Lines, cruz assessed. Palliative care evaluation. Restraints need. Non-violent Restraint Reevaluation     I have reevaluated the patient one hour after initiation of intervention. The patient is comfortable, uninjured, but continues to pose an imminent risk of injury to self to themselves and/or serious disruption of medical treatment required to keep patient stable. The patient's current medical and behavioral conditions that warrant the use intervention include danger to self and danger to others and Interference with medical equipment or treatment. Restraint or seclusion will be discontinued at the earliest possible time, regardless of the scheduled expiration of the order.     Based on my evaluation, restraints will be continued: YES          Miryam Schaefer MD   01/19/23  Pulmonary, Critical Care Medicine  [facility]

## 2023-01-19 NOTE — PROGRESS NOTES
Pharmacy Note     Zosyn 3.375gm q6h ordered for treatment of HAP. Per Massiel Kwong 10 will be changed to 4.5 gm now then 3.375gm q8h. Estimated Creatinine Clearance: Estimated Creatinine Clearance: 33.6 mL/min (A) (based on SCr of 1.43 mg/dL (H)). Dialysis Status, AQUILES, CKD: n/a    BMI:  Body mass index is 36.61 kg/m². Rationale for Adjustment:  [facility] B-Lactam extended infusion policy    Pharmacy will continue to monitor and adjust dose as necessary. Please call with any questions.     Thank you,  Guevara Gonzalez, PHARMD

## 2023-01-19 NOTE — CONSULTS
[facility]: [phone] ([phone])  [facility]: [phone]     Patient Name: Baljit Urias  YOB: 1935    Date of consult : 1/19/23  Reason for Consult: establish goals of care  Requesting Provider: Kostas Chicas MD    Primary Care Physician: Ji Richardson MD      SUMMARY:   Baljit Urias is a 82 y.o. female with a past history of DM 2, diastolic congestive heart failure, CHF, HTN, obesity, atrial fibrillation with rapid ventricular response who was admitted on 1/17/2023 from home with a diagnosis of acute respiratory failure, COVID-19. Current medical issues leading to Palliative Medicine involvement include: Pt with a long term life limiting chronic disease process that warrants discussions about her goals of care. CHIEF COMPLAINT: Respiratory failure on ventilator and intubated    HPI/SUBJECTIVE:    Patient is a 53-year-old  female that lives with her boyfriend. She came in through the emergency department with complaints of shortness of breath lasting greater than 1 day. She arrived on CPAP by EMS with O2 saturations in the 80s percentile. She also had tachycardia with pulse of 181 bpm.  She rapidly decompensated and was ultimately intubated and placed on mechanical ventilation. Patient does have a durable DO NOT RESUSCITATE. The patient is:   [] Verbal and participatory  [x] Non-participatory due to: Intubated    GOALS OF CARE:  Patient/Health Care Proxy Stated Goals: Prolong life      TREATMENT PREFERENCES:   Code Status: DNR         PALLIATIVE DIAGNOSES:   Goals of care/ACP  Acute respiratory failure  COVID-19  Debility age-related       PLAN:   Goals of care/ACP  This NP along with Nancy See RN observed patient through glass due to strict isolation. She is unable to participate in a goals of care conversation due to being intubated and mechanically ventilated.   She has also been weaned from sedation and minimally interactive. Noted that patient is spontaneously opening her eyes and moving extremities. We have reached out to her son Jona Arndt who is her medical power of . He was unaware that a durable DO NOT RESUSCITATE does not necessarily protect you from being emergently intubated for respiratory distress. We reviewed with him a POST which he wanted to complete. He has chosen DNR/DNI, limited interventions and limited feeding tube. He is unable at this moment to come in to sign we are placing the POST on the patient's chart for him to sign tomorrow evening when he is available after work. In the interim we are excepting a verbal from him for no reintubation. He is okay with attempts at medically liberating his mother from the ventilator with the understanding that she will not be reintubated. Goals of care: DNR/DNI no reintubation post extubation for any reason  2. Acute respiratory failure  Patient intubated and mechanically ventilated with PEEP of 5 and FiO2 45% attempts over the next 24 to 48 hours to liberate her from the ventilator. She is being weaned today from sedation. 3.   COVID-19  PCR for COVID-19 SARS positive on 1/17/2023. Chest x-ray on admission shows low lung volumes with prominent interstitial lung markings. Trace blunting of the bilateral costophrenic angles findings are consistent with fluid overload/CHF exacerbation. 4.   Debility/age-related  Patient with a baseline palliative performance score of around 60% with some reduction in her ambulation she has full self-care full level of consciousness. 5.   Initial consult note routed to primary continuity provider  6. Communicated plan of care with: Palliative IDT      Advance Care Planning:  [] The [facility] Interdisciplinary Team has updated the ACP Navigator with [address] and Patient Capacity    Primary Decision [facility] ([address]):    Supplemental (Other) Decision Maker: Man Ledesma - [phone]    Medical Interventions: Limited additional interventions   Other Instructions:   Artificially Administered Nutrition: Feeding tube for a defined trial period     As far as possible, the palliative care team has discussed with patient / health care proxy about goals of care / treatment preferences for patient. HISTORY:     History obtained from: chart review   Principal Problem:    Acute on chronic heart failure ([address] (1/17/2023)    Active Problems:    Atrial fibrillation with RVR ([address] (12/21/2022)      Atrial fibrillation with rapid ventricular response ([address] (1/17/2023)      COVID (1/17/2023)      Heart failure ([address] (1/17/2023)      COVID-19 virus infection (1/17/2023)      Acute respiratory failure with hypoxia ([address] (1/18/2023)      COVID-19 (1/18/2023)    Past Medical History:   Diagnosis Date    Acquired hypothyroidism 8/1/2016    Arthritis of right shoulder region 4/21/2016    Asthma     Bilateral lower extremity edema 7/7/2021    Chronic bilateral low back pain without sciatica 7/7/2021    Chronic diastolic congestive heart failure ([address] 3/10/2021    Chronic venous insufficiency     Diabetes (HCC)     neuropathy    Essential hypertension 7/7/2021    Gout     History of depression 1/23/2017    History of fall 7/7/2021    Hypercholesteremia 7/7/2021    Hypertension     MI (myocardial infarction) ([address]     OAB (overactive bladder) 7/7/2021    Peripheral neuropathy     Rheumatoid arthritis ([address]     Tear of right rotator cuff 5/16/2016    Thyroid pain     Urinary incontinence 7/7/2021    Vertigo       Past Surgical History:   Procedure Laterality Date    HX AMPUTATION TOE Right 2020    Great toe Dr. 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     HX KNEE REPLACEMENT Bilateral       Family History   Problem Relation Age of Onset    Heart Attack Mother     Heart Attack Father History reviewed, no pertinent family history.   Social History     Tobacco Use    Smoking status: Former     Types: Cigarettes     Quit date:      Years since quittin.0    Smokeless tobacco: Never    Tobacco comments:     quit 45 years ago   Substance Use Topics    Alcohol use: No     Alcohol/week: 0.0 standard drinks     No Known Allergies   Current Facility-Administered Medications   Medication Dose Route Frequency    camphor-menthoL (SARNA) 0.5-0.5 % lotion   Topical TID    insulin glargine (LANTUS) injection 5 Units  5 Units SubCUTAneous QHS    VANCOMYCIN INFORMATION NOTE   Other Rx Dosing/Monitoring    hydrOXYzine HCL (ATARAX) tablet 10 mg  10 mg Oral QID PRN    glucose chewable tablet 16 g  16 g Oral PRN    glucagon (GLUCAGEN) injection 1 mg  1 mg IntraMUSCular PRN    dextrose 10% infusion 0-250 mL  0-250 mL IntraVENous PRN    insulin lispro (HUMALOG) injection   SubCUTAneous Q6H    midazolam (VERSED) injection 2 mg  2 mg IntraVENous Q10MIN PRN    fentaNYL citrate (PF) injection 100 mcg  100 mcg IntraVENous Q30MIN PRN    chlorhexidine (PERIDEX) 0.12 % mouthwash 10 mL  10 mL Oral Q12H    albuterol-ipratropium (DUO-NEB) 2.5 MG-0.5 MG/3 ML  3 mL Nebulization Q4H PRN    famotidine (PF) (PEPCID) 20 mg in 0.9% sodium chloride 10 mL injection  20 mg IntraVENous DAILY    heparin (porcine) 25,000 units in 0.45% saline 250 ml infusion  18-36 Units/kg/hr IntraVENous TITRATE    fentaNYL (PF) 10 mcg/mL infusion  0-200 mcg/hr IntraVENous TITRATE    dexamethasone (DECADRON) 4 mg/mL injection 6 mg  6 mg IntraVENous Q24H    piperacillin-tazobactam (ZOSYN) 3.375 g in 0.9% sodium chloride (MBP/ADV) 100 mL MBP  3.375 g IntraVENous Q8H    metoprolol tartrate (LOPRESSOR) tablet 50 mg  50 mg Oral Q12H    amiodarone (NEXTERONE) 360 mg in dextrose 200 mL (1.8 mg/mL) infusion  0.5-1 mg/min IntraVENous TITRATE    aspirin chewable tablet 81 mg  81 mg Oral DAILY    atorvastatin (LIPITOR) tablet 80 mg  80 mg Oral DAILY bumetanide (BUMEX) tablet 1 mg  1 mg Oral DAILY    [Held by provider] dilTIAZem ER (CARDIZEM CD) capsule 180 mg  180 mg Oral DAILY    levothyroxine (SYNTHROID) tablet 150 mcg  150 mcg Oral DAILY    gabapentin (NEURONTIN) capsule 400 mg  400 mg Oral DAILY    allopurinoL (ZYLOPRIM) tablet 100 mg  100 mg Oral DAILY    sodium chloride (NS) flush 5-40 mL  5-40 mL IntraVENous Q8H    sodium chloride (NS) flush 5-40 mL  5-40 mL IntraVENous PRN    acetaminophen (TYLENOL) tablet 650 mg  650 mg Oral Q6H PRN    Or    acetaminophen (TYLENOL) suppository 650 mg  650 mg Rectal Q6H PRN    polyethylene glycol (MIRALAX) packet 17 g  17 g Oral DAILY PRN    ondansetron (ZOFRAN ODT) tablet 4 mg  4 mg Oral Q8H PRN    Or    ondansetron (ZOFRAN) injection 4 mg  4 mg IntraVENous Q6H PRN          Clinical Pain Assessment (nonverbal scale for nonverbal patients): Activity (Movement): Seeking attention through movement or slow, cautious movement    Duration: for how long has pt been experiencing pain (e.g., 2 days, 1 month, years)  Frequency: how often pain is an issue (e.g., several times per day, once every few days, constant)     FUNCTIONAL ASSESSMENT:     Palliative Performance Scale (PPS):  PPS: 50    ECOG  ECOG Status : Limited self-care     PSYCHOSOCIAL/SPIRITUAL SCREENING:      Any spiritual / [employer] concerns:  [] Yes /  [x] No    Caregiver Burnout:  [] Yes /  [] No /  [x] No Caregiver Present      Anticipatory grief assessment:   [x] Normal  / [] Maladaptive        REVIEW OF SYSTEMS:     Systems: constitutional, ears/nose/mouth/throat, respiratory, gastrointestinal, genitourinary, musculoskeletal, integumentary, neurologic, psychiatric, endocrine. Positive findings noted below. Modified ESAS Completed by: provider                       Dyspnea: 5               Positive and pertinent negative findings in ROS are noted above in HPI.   The following systems were [x] reviewed / [] unable to be reviewed as noted in HPI  Other findings are noted below. PHYSICAL EXAM:     Constitutional: minimally responsive opening eyes spontaneously   Eyes: pupils equal, anicteric  ENMT: no nasal discharge, moist mucous membranes  Cardiovascular: regular rhythm, distal pulses intact  Respiratory: intubated and mechanically ventilated   Gastrointestinal: soft non-tender, +bowel sounds  Musculoskeletal: no deformity, no tenderness to palpation  Skin: warm, dry  Neurologic: beginning to follow commands, moving all extremities    Other: Wt Readings from Last 3 Encounters:   01/19/23 99.6 kg (219 lb 9.3 oz)   01/12/23 101 kg (222 lb 10.6 oz)   12/21/22 101 kg (222 lb 9.6 oz)     Blood pressure (!) 118/91, pulse (!) 114, temperature 98.3 °F (36.8 °C), resp. rate 16, height 5' 4\" (1.626 m), weight 99.6 kg (219 lb 9.3 oz), SpO2 100 %. Pain:  Pain Scale 1: Adult Nonverbal Pain Scale  Pain Intensity 1: 0                      LAB AND IMAGING FINDINGS:     Lab Results   Component Value Date/Time    WBC 7.7 01/19/2023 05:08 AM    HGB 9.6 (L) 01/19/2023 05:08 AM    PLATELET 043 82/24/5854 05:08 AM     Lab Results   Component Value Date/Time    Sodium 138 01/19/2023 10:10 AM    Potassium 4.6 01/19/2023 10:10 AM    Chloride 105 01/19/2023 10:10 AM    CO2 21 01/19/2023 10:10 AM    BUN 41 (H) 01/19/2023 10:10 AM    Creatinine 1.86 (H) 01/19/2023 10:10 AM    Calcium 8.2 (L) 01/19/2023 10:10 AM    Magnesium 2.2 01/19/2023 02:47 AM    Phosphorus 4.2 01/19/2023 02:47 AM      Lab Results   Component Value Date/Time    Alk.  phosphatase 51 01/18/2023 03:08 AM    Protein, total 7.6 01/18/2023 03:08 AM    Albumin 2.4 (L) 01/19/2023 05:08 AM    Globulin 4.7 (H) 01/18/2023 03:08 AM     Lab Results   Component Value Date/Time    INR 2.0 (H) 01/13/2023 04:40 AM    Prothrombin time 22.9 (H) 01/13/2023 04:40 AM    aPTT >180.0 (HH) 01/19/2023 10:10 AM      No results found for: IRON, FE, TIBC, IBCT, PSAT, FERR   No results found for: PH, PCO2, PO2  No components found for: [place] Lab Results   Component Value Date/Time     08/11/2022 02:47 AM    CK - MB 2.2 04/18/2018 03:48 PM              Total time: 55 minutes   Counseling / coordination time, spent as noted above:   > 50% counseling / coordination:  Time spent in direct consultation with the patient, medical team, and family     Prolonged service was provided for  []30 min   []75 min in face to face time in the presence of the patient, spent as noted above. Time Start:   Time End:     Disclaimer: Sections of this note are dictated using utilizing voice recognition software, which may have resulted in some phonetic based errors in grammar and contents. Even though attempts were made to correct all the mistakes, some may have been missed, and remained in the body of the document. If questions arise, please contact our department.

## 2023-01-19 NOTE — PROGRESS NOTES
[facility]  DAILY PROGRESS NOTE      Patient:    Piedad Sunshine , 82 y.o. female   MRN:  [id number]  Room/Bed:  308/01  Admission Date:   1/17/2023  Code status:  Full Code    Reason for Admission: Acute hypoxic respiratory failure, COVID-19 infection, A-fib with RVR, NSTEMI    ASSESSMENT AND PLAN:   Problem List Items Addressed This Visit          Circulatory    Atrial fibrillation ([address]       Respiratory    Acute respiratory failure with hypoxia ([address]       Other    COVID     Other Visit Diagnoses       Acute on chronic heart failure with reduced ejection fraction and diastolic dysfunction ([address]    -  Primary    NSTEMI (non-ST elevated myocardial infarction) ([address]                Following ICU management in preparation for transition of care     Acute hypoxic respiratory failure/COVID19 Infection/HFrEF (20-25%)  -Presented with SOB and HR in 200's.  Acute SOB most likely multifactorial given pt's comorbidities with recent COVID infection that has exacerbated the SOB  -Tested positive for COVID-19.  -Started on BIPAP in the ED and transitioned to NC, poorly tolerated and transitioned back to BIPAP, continued to be in respiratory distress and required intubation and admission to the ICU  -CXR (1/18)- progressive cardiogenic pulmonary edema and pleural effusions   Plan:  -Vent settings per ICU, fentanyl gtt  -PRN fentanyl 100 mcg IV   -Bumex 1 mg daily  -dexamethasone 6 mg IV daily   -Zosyn and Vancomycin IV   -ID consult  -daily CXR        Afib w/ RVR, HTN, NSTEMI  -On admission, pt tachycardic in the 200's  -EKG: Atrial fibrillation with rapid ventricular response   -Started on Cardizem drip in the ED due to HR in 200's, but decreased BP too fast so dc'd and started Amnio gtt  -OP meds: Eliquis 5mg BID for anticoagulation, Diltiazem and Lopressor for rate control and HTN    Plan:  -amiodarone gtt  -Metoprolol 50 mg BID  -aspirin 81 mg  -heparin gtt   -MAP >65 mmHg  -Cardiology consulted      DM with neuoropathy  -most recent A1c 11.6 in Jan 4 2023  -home meds: insulin glargine 20 U qhs, Metformin 1000 mg BID, Gabapentin 800 QHS      Plan:   -Q6h glucoses  -SSI   -hypoglycemia protocol  -gabapentin 400 mg daily         Hypothyroidism - stable  -meds: levo 150 mcg  -pt nonadherent with medications at home   Plan:   -levo 150 mcg        Chronic pain, Gout, stable  -home meds: previously on morphine 15mg q12 PRN  -Recently stated that she stopped taking morphine due to no longer going to pain management provider   Plan:   -continue allopurinol 100mg daily for gout ppx           Global:  Code: Full  IVF/Drips: Amiodarone, fentanyl, heparin  I/O / Wt: 99.8kg  Diet: NPO  Bowel Regimen,   DVT/AC: heparin gtt  Mobility: sedated  Anticipated LOS: 3-4 midnights     Point of Contact (relationship, number): Aurora Don [phone]         SUBJECTIVE:   Events of the last 24 hours:  No acute events overnight. Unable to perform a ROS as patient is currently intubated.       CURRENT INPATIENT MEDICATIONS:  Current Facility-Administered Medications   Medication Dose Route Frequency Provider Last Rate Last Admin    camphor-menthoL (SARNA) 0.5-0.5 % lotion   Topical TID Rene Bishop NP        glucose chewable tablet 16 g  16 g Oral PRN Mary Moreno MD        glucagon (GLUCAGEN) injection 1 mg  1 mg IntraMUSCular PRN Mary Moreno MD        dextrose 10% infusion 0-250 mL  0-250 mL IntraVENous PRN Mary Moreno MD        insulin lispro (HUMALOG) injection   SubCUTAneous Q6H Eva Grant PA-C   2 Units at 01/19/23 0636    midazolam (VERSED) injection 2 mg  2 mg IntraVENous Q10MIN PRN Eva Carreno PA-C   2 mg at 01/19/23 0404    fentaNYL citrate (PF) injection 100 mcg  100 mcg IntraVENous Q30MIN PRN Eva Carreno PA-C   100 mcg at 01/19/23 0404    chlorhexidine (PERIDEX) 0.12 % mouthwash 10 mL  10 mL Oral Q12H Eva Grant PA-C   10 mL at 01/18/23 2121    albuterol-ipratropium (DUO-NEB) 2.5 MG-0.5 MG/3 ML  3 mL Nebulization Q4H PRN Eva Grant PA-C        famotidine (PF) (PEPCID) 20 mg in 0.9% sodium chloride 10 mL injection  20 mg IntraVENous DAILY Eva Grant PA-C   20 mg at 01/18/23 0904    heparin (porcine) 25,000 units in 0.45% saline 250 ml infusion  18-36 Units/kg/hr IntraVENous TITRATE Kathleen Benavides PA-C   Held at 01/19/23 7967    fentaNYL (PF) 10 mcg/mL infusion  0-200 mcg/hr IntraVENous TITRATE Dario Scruggs MD 12.5 mL/hr at 01/19/23 0325 125 mcg/hr at 01/19/23 0325    dexamethasone (DECADRON) 4 mg/mL injection 6 mg  6 mg IntraVENous Q24H Dario Scruggs MD   6 mg at 01/18/23 1958    piperacillin-tazobactam (ZOSYN) 3.375 g in 0.9% sodium chloride (MBP/ADV) 100 mL MBP  3.375 g IntraVENous Q8H Dario Scruggs MD 25 mL/hr at 01/19/23 0115 3.375 g at 01/19/23 0115    vancomycin (VANCOCIN) 1,000 mg in 0.9% sodium chloride 250 mL (VIAL-MATE)  1,000 mg IntraVENous Q24H Dario Scruggs MD        metoprolol tartrate (LOPRESSOR) tablet 50 mg  50 mg Oral Q12H CapiliHaley rubin NP   50 mg at 01/18/23 2121    amiodarone (NEXTERONE) 360 mg in dextrose 200 mL (1.8 mg/mL) infusion  0.5-1 mg/min IntraVENous TITRATE Akin Sosa MD 16.7 mL/hr at 01/19/23 0325 0.5 mg/min at 01/19/23 0325    aspirin chewable tablet 81 mg  81 mg Oral DAILY Akin Sosa MD   81 mg at 01/18/23 0939    atorvastatin (LIPITOR) tablet 80 mg  80 mg Oral DAILY Akin Sosa MD   80 mg at 01/18/23 0940    bumetanide (BUMEX) tablet 1 mg  1 mg Oral DAILY Akin Sosa MD   1 mg at 01/18/23 0940    [Held by provider] dilTIAZem ER (CARDIZEM CD) capsule 180 mg  180 mg Oral DAILY Akin Sosa MD        levothyroxine (SYNTHROID) tablet 150 mcg  150 mcg Oral DAILY Akin Sosa MD   150 mcg at 01/18/23 0940    gabapentin (NEURONTIN) capsule 400 mg  400 mg Oral DAILY Akin Sosa MD   400 mg at 01/18/23 0940    allopurinoL (ZYLOPRIM) tablet 100 mg  100 mg Oral DAILY Akin Sosa MD   100 mg at 01/18/23 0940    sodium chloride (NS) flush 5-40 mL  5-40 mL IntraVENous Q8H Mary Moreno MD   10 mL at 01/19/23 8237    sodium chloride (NS) flush 5-40 mL  5-40 mL IntraVENous PRN Mary Moreno MD        acetaminophen (TYLENOL) tablet 650 mg  650 mg Oral Q6H PRN Mary Moreno MD        Or    acetaminophen (TYLENOL) suppository 650 mg  650 mg Rectal Q6H PRN Mary Moreno MD        polyethylene glycol (MIRALAX) packet 17 g  17 g Oral DAILY PRN Mary Moreno MD        ondansetron (ZOFRAN ODT) tablet 4 mg  4 mg Oral Q8H PRN Mary Moreno MD        Or    ondansetron [facility]) injection 4 mg  4 mg IntraVENous Q6H PRN Mary Moreno MD           Allergies  No Known Allergies    OBJECTIVE:  No intake or output data in the 24 hours ending 01/19/23 0814    Visit Vitals  /69   Pulse 96   Temp 99.3 °F (37.4 °C)   Resp 19   Ht 5' 5\" (1.651 m)   Wt 99.8 kg (220 lb)   SpO2 100%   BMI 36.61 kg/m²       PHYSICAL EXAM  Gen: NAD, comfortable, obese, intubated  HEENT: normocephalic, atraumatic, MMM, no thyromegaly, no JVD  CV: irregularly irregular, S1/S2 present without M/R/G,   Pulm: limited due to immobility secondary to intubation, CTAB  Abd: S/NT/ND, no rebound, no guarding,  MSK: no clubbing, no edema  Skin: warm, dry, excoriations on all parts of body in multiple stages of healing  Neuro: CN II-XII grossly intact, no focal deficits appreciated   Psych: alert, opens eyes to voice and tracks movement    LABWORK (LAST 24 HOURS)  Recent Results (from the past 24 hour(s))   PTT    Collection Time: 01/18/23  9:03 AM   Result Value Ref Range    aPTT 37.5 (H) 23.0 - 36.4 SEC   POC LACTIC ACID    Collection Time: 01/18/23 12:38 PM   Result Value Ref Range    Lactic Acid (POC) 3.06 (HH) 0.40 - 2.00 mmol/L   GLUCOSE, POC    Collection Time: 01/18/23 12:53 PM   Result Value Ref Range    Glucose (POC) 270 (H) 70 - 110 mg/dL   ECHO ADULT FOLLOW-UP OR LIMITED    Collection Time: 01/18/23  3:33 PM   Result Value Ref Range    IVSd 1.1 (A) 0.6 - 0.9 cm    LVIDd 4.6 3.9 - 5.3 cm    LVIDs 4.2 cm    LVOT Diameter 2.2 cm    LVPWd 1.2 (A) 0.6 - 0.9 cm    AR .0 millisecond    AR Max Velocity PISA 3.0 m/s    MV Nyquist Velocity 37 cm/s    MV Regurg Velocity PISA 3.0 m/s    MR .3 cm    TR Peak Gradient 42 mmHg    TR Max Velocity 3.25 m/s    Aortic Root 3.1 cm    Fractional Shortening 2D 9 28 - 44 %    LVIDd Index 2.23 cm/m2    LVIDs Index 2.04 cm/m2    LV RWT Ratio 0.52     LV Mass 2D 192.9 (A) 67 - 162 g    LV Mass 2D Index 93.7 43 - 95 g/m2    LVOT Area 3.8 cm2    Ao Root Index 1.50 cm/m2    PASP 42 mmHg   PTT    Collection Time: 01/18/23  4:35 PM   Result Value Ref Range    aPTT >180.0 (HH) 23.0 - 36.4 SEC   GLUCOSE, POC    Collection Time: 01/18/23  6:10 PM   Result Value Ref Range    Glucose (POC) 168 (H) 70 - 110 mg/dL   TROPONIN-HIGH SENSITIVITY    Collection Time: 01/18/23 10:26 PM   Result Value Ref Range    Troponin-High Sensitivity 2,317 (HH) 0 - 54 ng/L   CBC WITH AUTOMATED DIFF    Collection Time: 01/19/23  2:47 AM   Result Value Ref Range    WBC 10.7 4.6 - 13.2 K/uL    RBC 3.76 (L) 4.20 - 5.30 M/uL    HGB 10.0 (L) 12.0 - 16.0 g/dL    HCT 31.6 (L) 35.0 - 45.0 %    MCV 84.0 78.0 - 100.0 FL    MCH 26.6 24.0 - 34.0 PG    MCHC 31.6 31.0 - 37.0 g/dL    RDW 17.2 (H) 11.6 - 14.5 %    PLATELET 424 339 - 149 K/uL    MPV 12.9 (H) 9.2 - 11.8 FL    NRBC 0.2 (H) 0  WBC    ABSOLUTE NRBC 0.02 (H) 0.00 - 0.01 K/uL    NEUTROPHILS 71 40 - 73 %    LYMPHOCYTES 18 (L) 21 - 52 %    MONOCYTES 6 3 - 10 %    EOSINOPHILS 1 0 - 5 %    BASOPHILS 1 0 - 2 %    IMMATURE GRANULOCYTES 3 (H) 0.0 - 0.5 %    ABS. NEUTROPHILS 7.6 1.8 - 8.0 K/UL    ABS. LYMPHOCYTES 1.9 0.9 - 3.6 K/UL    ABS. MONOCYTES 0.6 0.05 - 1.2 K/UL    ABS. EOSINOPHILS 0.1 0.0 - 0.4 K/UL    ABS. BASOPHILS 0.1 0.0 - 0.1 K/UL    ABS. IMM.  GRANS. 0.3 (H) 0.00 - 0.04 K/UL    DF AUTOMATED     METABOLIC PANEL, BASIC    Collection Time: 01/19/23  2:47 AM   Result Value Ref Range    Sodium 134 (L) 136 - 145 mmol/L    Potassium 6.8 (HH) 3.5 - 5.5 mmol/L    Chloride 103 100 - 111 mmol/L    CO2 26 21 - 32 mmol/L    Anion gap 5 3.0 - 18 mmol/L    Glucose 154 (H) 74 - 99 mg/dL    BUN 38 (H) 7.0 - 18 MG/DL    Creatinine 1.90 (H) 0.6 - 1.3 MG/DL    BUN/Creatinine ratio 20 12 - 20      eGFR 26 (L) >60 ml/min/1.73m2    Calcium 8.0 (L) 8.5 - 10.1 MG/DL   MAGNESIUM    Collection Time: 01/19/23  2:47 AM   Result Value Ref Range    Magnesium 2.2 1.6 - 2.6 mg/dL   PHOSPHORUS    Collection Time: 01/19/23  2:47 AM   Result Value Ref Range    Phosphorus 4.2 2.5 - 4.9 MG/DL   TROPONIN-HIGH SENSITIVITY    Collection Time: 01/19/23  2:47 AM   Result Value Ref Range    Troponin-High Sensitivity 2,343 (HH) 0 - 54 ng/L   LACTIC ACID    Collection Time: 01/19/23  2:47 AM   Result Value Ref Range    Lactic acid 1.6 0.4 - 2.0 MMOL/L   BLOOD GAS, ARTERIAL POC    Collection Time: 01/19/23  3:55 AM   Result Value Ref Range    Device: ADULT VENT      FIO2 (POC) 60 %    pH (POC) 7.38 7.35 - 7.45      pCO2 (POC) 40.0 35.0 - 45.0 MMHG    pO2 (POC) 87 80 - 100 MMHG    HCO3 (POC) 23.8 22 - 26 MMOL/L    sO2 (POC) 96.5 92 - 97 %    Base deficit (POC) 1.2 mmol/L    Mode ASSIST CONTROL      Tidal volume 420 ml    Set Rate 16 bpm    PEEP/CPAP (POC) 8 cmH2O    Allens test (POC) Positive      Total resp.  rate 20      Site LEFT RADIAL      Specimen type (POC) ARTERIAL      Performed by Reece Vega    TROPONIN-HIGH SENSITIVITY    Collection Time: 01/19/23  5:08 AM   Result Value Ref Range    Troponin-High Sensitivity 1,942 (HH) 0 - 54 ng/L   PTT    Collection Time: 01/19/23  5:08 AM   Result Value Ref Range    aPTT >180.0 (HH) 23.0 - 36.4 SEC   CBC WITH AUTOMATED DIFF    Collection Time: 01/19/23  5:08 AM   Result Value Ref Range    WBC 7.7 4.6 - 13.2 K/uL    RBC 3.71 (L) 4.20 - 5.30 M/uL    HGB 9.6 (L) 12.0 - 16.0 g/dL    HCT 31.0 (L) 35.0 - 45.0 %    MCV 83.6 78.0 - 100.0 FL    MCH 25.9 24.0 - 34.0 PG    MCHC 31.0 31.0 - 37.0 g/dL    RDW 16.7 (H) 11.6 - 14.5 %    PLATELET 019 032 - 781 K/uL    MPV 12.4 (H) 9.2 - 11.8 FL    NRBC 0.3 (H) 0  WBC    ABSOLUTE NRBC 0.02 (H) 0.00 - 0.01 K/uL    NEUTROPHILS 79 (H) 40 - 73 %    LYMPHOCYTES 12 (L) 21 - 52 %    MONOCYTES 6 3 - 10 %    EOSINOPHILS 0 0 - 5 %    BASOPHILS 1 0 - 2 %    IMMATURE GRANULOCYTES 2 (H) 0.0 - 0.5 %    ABS. NEUTROPHILS 6.0 1.8 - 8.0 K/UL    ABS. LYMPHOCYTES 0.9 0.9 - 3.6 K/UL    ABS. MONOCYTES 0.5 0.05 - 1.2 K/UL    ABS. EOSINOPHILS 0.0 0.0 - 0.4 K/UL    ABS. BASOPHILS 0.1 0.0 - 0.1 K/UL    ABS. IMM. GRANS. 0.2 (H) 0.00 - 0.04 K/UL    DF AUTOMATED     METABOLIC PANEL, BASIC    Collection Time: 01/19/23  5:08 AM   Result Value Ref Range    Sodium 139 136 - 145 mmol/L    Potassium 4.2 3.5 - 5.5 mmol/L    Chloride 105 100 - 111 mmol/L    CO2 24 21 - 32 mmol/L    Anion gap 10 3.0 - 18 mmol/L    Glucose 185 (H) 74 - 99 mg/dL    BUN 40 (H) 7.0 - 18 MG/DL    Creatinine 1.90 (H) 0.6 - 1.3 MG/DL    BUN/Creatinine ratio 21 (H) 12 - 20      eGFR 26 (L) >60 ml/min/1.73m2    Calcium 8.2 (L) 8.5 - 10.1 MG/DL   GLUCOSE, POC    Collection Time: 01/19/23  5:54 AM   Result Value Ref Range    Glucose (POC) 198 (H) 70 - 110 mg/dL       IMAGING AND PROCEDURES (LAST 36 HOURS)  XR ABD (KUB)    Result Date: 1/19/2023  Esophagogastric tube tip at gastric fundus level.     ================================================================  Further management for Ms. Yun Herd will be discussed on rounds with my attending.       Senait Gandhi MD, PGY-1  [facility]  January 19, 2023 6:39 AM

## 2023-01-19 NOTE — INTERDISCIPLINARY ROUNDS
[address] [place] [address] [facility]  Pulmonary, Critical Care, and Sleep Medicine  Interdisciplinary and Ventilator Weaning Rounds    Patient discussed in morning walking rounds and interdisciplinary rounds. ICU day: 1/17    Overnight events:   No acute overnight events    Assessments and best practice:  Ventilator  Ventilator day 1/17  Vent settings: FiO2 of 60 and PEEP of 8  VAP bundle, aim to keep peak plateau pressure 49-74LJ H2O  Weaning assessed and documented   Patient does not meet criteria for SBT. Patient is not on sedation holiday. Plan to wean with PS n/a. Outcome: remain on mechanical vent   Final plan: remain on mechanical vent  Sedation  Fentanyl   Other pertinent drips  Heparin and Amiodarone  Lines noted  PIVs  Critical labs assessed  Yes  Antibiotics  Zosyn and Vancomycin  Medications reviewed  Yes  Pending imaging  none  Pending send out labs  No  Pending Procedures  None  Glycemic control  Stress ulcer prophylaxis. Pepcid  DVT prophylaxis. Heparin gtt  Need for Lines, cruz assessed. Yes  Restraint Reevaluation     Yes  I have reevaluated the patient one hour after initiation of intervention. The patient is comfortable, uninjured, but continues to pose an imminent risk of injury to self to themselves and/or serious disruption of medical treatment required to keep patient stable. The patient's current medical and behavioral conditions that warrant the use intervention include danger to self and Interference with medical equipment or treatment. Restraint or seclusion will be discontinued at the earliest possible time, regardless of the scheduled expiration of the order. Based on my evaluation, restraints will be continued: YES       Family contact/MPOA: Kyle cardona  Family to be updated today by provider     Palliative consult within 3 days of admission to ICU-  Ethics Guidance: 21 days      Daily Plans:   Will start tube feeding  Start 5 units of lantus    KEN time 10 hernando Melendez  01/19/23  Pulmonary, Critical Care Medicine  [facility]

## 2023-01-19 NOTE — ED NOTES
TRANSFER - OUT REPORT:    Verbal report given to 81 Pat Velasquez RN(name) on [facility]  being transferred to ICu(unit) for routine progression of care       Report consisted of patients Situation, Background, Assessment and   Recommendations(SBAR). Information from the following report(s) SBAR, ED Summary, and MAR was reviewed with the receiving nurse. Lines:   Peripheral IV 15/03/55 Right;Mid Basilic (Active)       Peripheral IV 01/18/23 Left;Upper Arm (Active)   Site Assessment Clean, dry, & intact 01/18/23 2029       Peripheral IV Left Other(comment) (Active)   Site Assessment Clean, dry, & intact 01/18/23 2030        Opportunity for questions and clarification was provided.       Patient transported with:     RT and RN

## 2023-01-19 NOTE — PROGRESS NOTES
[facility]  Pulmonary, Critical Care, and Sleep Medicine  Family Update/ Goals of Care Discussion      RN staff spoke with patient's boyfriend regarding code status and to provide clinical update. He said patient is DNR and was insistent that paperwork should be on record. Found durable DNR from 2019 and outpatient records, placed in chart to be scanned into inpatient record. Also called patient's son Agatha aKy who is mPOA. He reaffirmed that the patient is to be a DNR status. Provided clinical update and answered any questions. He asked if he could visit and was informed that he can so long as he stays out of the isolation room where is mother is.       Jaycob Hennessy, PGY-1  [facility] Resident

## 2023-01-19 NOTE — CONSULTS
Infectious Disease Consultation Note        Reason: [id number] infection, respiratory failure    Current abx Prior abx   Zosyn, vancomycin since 1/18      Lines:       Assessment :  82 yro female with history significant for uncontrolled type 2 diabetes, diabetic neuropathy, hypercholesterolemia, hypertension, on anticoagulation A. fib with anticoagulation who was admitted to [facility] on 1/17 for shortness of breath     Clinical presentation consistent with acute hypoxic respiratory failure evolving since admission due to decompensated CHF superimposed on COVID-19 infection; aspiration pneumonia    Significant thick yellowish sputum noted on ET suction    No definitive clinical evidence to suggest severe COVID-19 pneumonia at this time    Diffuse rash on extremities-could be skin excoriation from scratching. No definitive evidence of acute infection noted on today's exam    Atrial fibrillation with rapid ventricular response, non-STEMI-cardiology follow-up appreciated    decompensated CHF-ejection fraction 20 to 25% on echo 12/21/2022    Recommendations:    Continue Zosyn, vancomycin for now  Obtain sputum cultures  Follow-up cardiology recommendations  Will de-escalate antibiotics based on the results of sputum cultures  Diuresis/steroids per ICU team  Weaning from vent per ICU team  Monitor CBC, temperature, procalcitonin to determine duration of antibiotic therapy    Thank you for consultation request. Above plan was discussed in details with ICU team and dr Rachelle Thorne. Please call me if any further questions or concerns. Will continue to participate in the care of this patient. HPI:  82 yro female with history significant for uncontrolled type 2 diabetes, diabetic neuropathy, hypercholesterolemia, hypertension, on anticoagulation A. fib with anticoagulation who was admitted to [facility] on 1/17 for shortness of breath. Patient found at home by EMS hypoxic to mid 80's and put on CPAP.  Given oral nitroglycerin x3 and nitropaste. She was noted to be tachycardic to nearly 200 bpm in the ED in atrial fibrillation with RVR. In the ED she was initially placed on BiPAP with good respiratory response and started on diltiazem gtt. Following diltiazem gtt patient became hypotensive with MAPs in low 60's but remained persistently tachycardic. Then transitioned to amiodarone gtt. Blood pressure improved and was given lasix x1. Following BiPAP initiation ABG improved and she was transitioned to NC, however this was tolerated poorly and required resuming BiPAP. Despite BiPAP patient continued to be in respiratory distress and required intubation in the ED. Noted to be COVID positive. Admitted to ICU. Of note, patient left inpatient hospitalization AMA on 1/16 following admission for bilateral LE cellulitis in setting of lymphedema and atrial fibrillation with RVR. Past Medical History:   Diagnosis Date    Acquired hypothyroidism 8/1/2016    Arthritis of right shoulder region 4/21/2016    Asthma     Bilateral lower extremity edema 7/7/2021    Chronic bilateral low back pain without sciatica 7/7/2021    Chronic diastolic congestive heart failure ([address] 3/10/2021    Chronic venous insufficiency     Diabetes (HCC)     neuropathy    Essential hypertension 7/7/2021    Gout     History of depression 1/23/2017    History of fall 7/7/2021    Hypercholesteremia 7/7/2021    Hypertension     MI (myocardial infarction) ([address]     OAB (overactive bladder) 7/7/2021    Peripheral neuropathy     Rheumatoid arthritis ([address]     Tear of right rotator cuff 5/16/2016    Thyroid pain     Urinary incontinence 7/7/2021    Vertigo        Past Surgical History:   Procedure Laterality Date    HX AMPUTATION TOE Right 2020    Great toe Dr. 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     HX KNEE REPLACEMENT Bilateral        Current Discharge Medication List        CONTINUE these medications which have NOT CHANGED    Details   bumetanide (BUMEX) 1 mg tablet Take 1 mg by mouth daily. gabapentin (NEURONTIN) 400 mg capsule 1 capsule daily  Qty: 360 Capsule, Refills: 0    Associated Diagnoses: Neuropathy; Type 2 diabetes mellitus with diabetic neuropathy, with long-term current use of insulin ([facility])      insulin glargine (LANTUS) 100 unit/mL injection Inject 20 Units at bedtime. Monitor and record blood sugar daily. Qty: 10 mL, Refills: 1      aspirin 81 mg chewable tablet Take 1 Tablet by mouth daily. Qty: 30 Tablet, Refills: 2      metoprolol tartrate (LOPRESSOR) 100 mg IR tablet Take 1 Tablet by mouth every twelve (12) hours. Qty: 120 Tablet, Refills: 2      dilTIAZem ER (CARDIZEM CD) 180 mg capsule Take 1 Capsule by mouth daily. Qty: 120 Capsule, Refills: 3      BD Insulin Syringe SafetyGlide 0.5 mL 30 gauge x 5/16\" syrg       levothyroxine (SYNTHROID) 150 mcg tablet Take 150 mcg by mouth daily. morphine IR (MS IR) 15 mg tablet Take 15 mg by mouth two (2) times a day. allopurinoL (ZYLOPRIM) 100 mg tablet Take 100 mg by mouth daily. atorvastatin (LIPITOR) 80 mg tablet Take 1 Tablet by mouth daily. Qty: 90 Tablet, Refills: 3    Associated Diagnoses: Hypercholesteremia      Blood-Glucose Meter (FREESTYLE LITE METER) monitoring kit Test twice blood glucose daily; ICD-10 E11.9; Quantity 1  Qty: 1 Kit, Refills: 0    Associated Diagnoses: Type 2 diabetes mellitus with nephropathy ([facility])      insulin syringe,safetyneedle 1 mL 31 gauge x 5/16\" syrg 1 Each by Does Not Apply route daily. Qty: 300 Each, Refills: 3    Comments: Dx e11.9      glucose blood VI test strips (FREESTYLE TEST) strip Use to check blood glucose twice daily DX:E11.9  Qty: 300 Strip, Refills: 3      metFORMIN (GLUCOPHAGE) 500 mg tablet Take 500 mg by mouth daily. acetaminophen (TYLENOL) 500 mg tablet Take 1 Tab by mouth every six (6) hours as needed for Pain.   Qty: 270 Tab, Refills: 3    Associated Diagnoses: Controlled type 2 diabetes mellitus without complication, with long-term current use of insulin (HCC)             Current Facility-Administered Medications   Medication Dose Route Frequency    camphor-menthoL (SARNA) 0.5-0.5 % lotion   Topical TID    glucose chewable tablet 16 g  16 g Oral PRN    glucagon (GLUCAGEN) injection 1 mg  1 mg IntraMUSCular PRN    dextrose 10% infusion 0-250 mL  0-250 mL IntraVENous PRN    insulin lispro (HUMALOG) injection   SubCUTAneous Q6H    midazolam (VERSED) injection 2 mg  2 mg IntraVENous Q10MIN PRN    fentaNYL citrate (PF) injection 100 mcg  100 mcg IntraVENous Q30MIN PRN    chlorhexidine (PERIDEX) 0.12 % mouthwash 10 mL  10 mL Oral Q12H    albuterol-ipratropium (DUO-NEB) 2.5 MG-0.5 MG/3 ML  3 mL Nebulization Q4H PRN    famotidine (PF) (PEPCID) 20 mg in 0.9% sodium chloride 10 mL injection  20 mg IntraVENous DAILY    heparin (porcine) 25,000 units in 0.45% saline 250 ml infusion  18-36 Units/kg/hr IntraVENous TITRATE    fentaNYL (PF) 10 mcg/mL infusion  0-200 mcg/hr IntraVENous TITRATE    dexamethasone (DECADRON) 4 mg/mL injection 6 mg  6 mg IntraVENous Q24H    piperacillin-tazobactam (ZOSYN) 3.375 g in 0.9% sodium chloride (MBP/ADV) 100 mL MBP  3.375 g IntraVENous Q8H    vancomycin (VANCOCIN) 1,000 mg in 0.9% sodium chloride 250 mL (VIAL-MATE)  1,000 mg IntraVENous Q24H    metoprolol tartrate (LOPRESSOR) tablet 50 mg  50 mg Oral Q12H    amiodarone (NEXTERONE) 360 mg in dextrose 200 mL (1.8 mg/mL) infusion  0.5-1 mg/min IntraVENous TITRATE    aspirin chewable tablet 81 mg  81 mg Oral DAILY    atorvastatin (LIPITOR) tablet 80 mg  80 mg Oral DAILY    bumetanide (BUMEX) tablet 1 mg  1 mg Oral DAILY    [Held by provider] dilTIAZem ER (CARDIZEM CD) capsule 180 mg  180 mg Oral DAILY    levothyroxine (SYNTHROID) tablet 150 mcg  150 mcg Oral DAILY    gabapentin (NEURONTIN) capsule 400 mg  400 mg Oral DAILY    allopurinoL (ZYLOPRIM) tablet 100 mg  100 mg Oral DAILY    sodium chloride (NS) flush 5-40 mL  5-40 mL IntraVENous Q8H    sodium chloride (NS) flush 5-40 mL  5-40 mL IntraVENous PRN    acetaminophen (TYLENOL) tablet 650 mg  650 mg Oral Q6H PRN    Or    acetaminophen (TYLENOL) suppository 650 mg  650 mg Rectal Q6H PRN    polyethylene glycol (MIRALAX) packet 17 g  17 g Oral DAILY PRN    ondansetron (ZOFRAN ODT) tablet 4 mg  4 mg Oral Q8H PRN    Or    ondansetron (ZOFRAN) injection 4 mg  4 mg IntraVENous Q6H PRN       Allergies: Patient has no known allergies.     Family History   Problem Relation Age of Onset    Heart Attack Mother     Heart Attack Father      Social History     Socioeconomic History    Marital status:      Spouse name: Not on file    Number of children: Not on file    Years of education: Not on file    Highest education level: Not on file   Occupational History    Not on file   Tobacco Use    Smoking status: Former     Types: Cigarettes     Quit date: 12     Years since quittin.0    Smokeless tobacco: Never    Tobacco comments:     quit 45 years ago   Vaping Use    Vaping Use: Never used   Substance and Sexual Activity    Alcohol use: No     Alcohol/week: 0.0 standard drinks    Drug use: No    Sexual activity: Not Currently   Other Topics Concern    Not on file   Social History Narrative    Not on file     Social Determinants of Health     Financial Resource Strain: Not on file   Food Insecurity: Not on file   Transportation Needs: Not on file   Physical Activity: Not on file   Stress: Not on file   Social Connections: Not on file   Intimate Partner Violence: Not on file   Housing Stability: Not on file     Social History     Tobacco Use   Smoking Status Former    Types: Cigarettes    Quit date:     Years since quittin.0   Smokeless Tobacco Never   Tobacco Comments    quit 45 years ago        Temp (24hrs), Av.3 °F (37.4 °C), Min:99.3 °F (37.4 °C), Max:99.3 °F (37.4 °C)    Visit Vitals  /69   Pulse 96 Temp 99.3 °F (37.4 °C)   Resp 19   Ht 5' 5\" (1.651 m)   Wt 99.8 kg (220 lb)   SpO2 100%   BMI 36.61 kg/m²       ROS: unable to obtain due to patient factors    Physical Exam:    General:  Intubated and sedated    Head:  Normocephalic, without obvious abnormality, atraumatic. Eyes:  Conjunctivae/corneas clear. Nose: Nares normal. Septum midline. Mucosa normal. No drainage. Throat: Lips, mucosa, and tongue normal.    Neck: Trachea midline, no adenopathy,  no JVD. Lungs:   Clear to auscultation bilaterally. Chest wall:  No deformity. Heart:  Irregularly irregular, rate controlled   Abdomen:   Soft, non-tender. Bowel sounds normal.    Extremities: Extremities normal, atraumatic, no cyanosis, mild edema bilateral lower extremities. Darkish erythema right LE, scattered pinpoint erythematous rash on extremities   Pulses: 2+ and symmetric all extremities. Skin: Scattered excoriations in bilateral upper and lower extremities   Lymph nodes:  Cervical and supraclavicular nodes normal   Neurologic: Intubated and sedated          Labs: Results:   Chemistry Recent Labs     01/19/23  0508 01/19/23  0247 01/18/23  0308 01/17/23  1859   * 154* 306* 181*    134* 136 137   K 4.2 6.8* 3.9 4.8    103 102 102   CO2 24 26 27 26   BUN 40* 38* 27* 25*   CREA 1.90* 1.90* 1.43* 1.33*   CA 8.2* 8.0* 8.3* 8.7   AGAP 10 5 7 9   BUCR 21* 20 19 19   AP  --   --  51 56   TP  --   --  7.6 8.4*   ALB  --   --  2.9* 3.4   GLOB  --   --  4.7* 5.0*   AGRAT  --   --  0.6* 0.7*      CBC w/Diff Recent Labs     01/19/23  0508 01/19/23  0247 01/18/23  0308   WBC 7.7 10.7 10.1   RBC 3.71* 3.76* 3.60*   HGB 9.6* 10.0* 9.2*   HCT 31.0* 31.6* 30.2*    216 267   GRANS 79* 71 70   LYMPH 12* 18* 14*   EOS 0 1 1      Microbiology No results for input(s): CULT in the last 72 hours.        RADIOLOGY:    All available imaging studies/reports in [facility] for this admission were reviewed      Disclaimer: Sections of this note are dictated utilizing voice recognition software, which may have resulted in some phonetic based errors in grammar and contents. Even though attempts were made to correct all the mistakes, some may have been missed, and remained in the body of the document. If questions arise, please contact our department.     Dr. Martha Kruger, Infectious Disease Specialist  [phone]  January 19, 2023  7:33 AM

## 2023-01-19 NOTE — ACP (ADVANCE CARE PLANNING)
Advance Care Planning     General Advance Care Planning (ACP) Conversation      Date of Conversation: 1/19/2023  Conducted with: Healthcare Decision Maker: Named in Advance Directive or Healthcare Power of [address]:     Supplemental (Other) Decision Maker: Vanessa Morales - [phone]  Click here to complete [address] including selection of the Healthcare Decision Maker Relationship (ie \"Primary\")    Today we documented Decision Maker(s) consistent with ACP documents on file. Content/Action Overview: Has ACP document(s) NOT on file - requested patient to provide  Reviewed DNR/DNI and patient elects DNR order - completed portable DNR form & placed order  Topics discussed: treatment goals, artificial nutrition, ventilation preferences, and resuscitation preferences      Palliative Medicine team members, Lokesh Powell NP and this writer for consult visit in ICU bed 308 . Ms. Peter Barriga is known to this Palliative Medicaine department from prior hospital admissions. Patient had a DDNR for no CPR for cardiac/resp arrest.   Patient went into respiratory distress and required intubation for airway protection. She is currently intubated with setting of Vent settings: FiO2 of 60 and PEEP of 8. Ms. Peter Barriga is an  82year old with h/o  decompensated HFrEF 20%, A fib RVR, COVID pneumonia, NSTEMI and atrial fibrillation with RVR. She was admitted via the ED with acute hypoxic respiratory failure, AQUILES on CKD, hyperglycemia, hyponatremia. Call was placed to patient's son, Marcos Rivera. The role of Palliative was introduced. Palliative NP explained the course of her care leading to intubation. Explained the DDNR. Palliative Team confirmed goals of care as DNR, do not reintubation if and when she is extubated and OK with feeding tube for a trial period. Introduced the completion of Physician Order For Scope of Treatment.  He agrees to sign the POST form when he is able to visit at the hospital tomorrow or on Saturday. He is aware that current care will continue for his mother and that her code status is updated to DNR. Palliative team will continue to follow.      Evelina Gutierrez BSN, RN, [facility]  Palliative Medicine Inpatient RN  Palliative COPE Line: [phone]

## 2023-01-19 NOTE — PROGRESS NOTES
[facility] [facility] Pharmacokinetic Monitoring Service - Vancomycin     Renee Laura is a 82 y.o. female starting on vancomycin therapy for Pneumonia (HAP). Pharmacy consulted by Candance Barrs for monitoring and adjustment. Target Concentration: Goal AUC/TRINIDAD 400-600 mg*hr/L    Additional Antimicrobials: Piperacillin/Tazobactam    Pertinent Laboratory Values:   Temp: 99.3 °F (37.4 °C)  Weight: 99.8 kg (220 lb)  Recent Labs     01/18/23  0308 01/17/23  1859   CREA 1.43* 1.33*   BUN 27* 25*   WBC 10.1 10.7     Estimated Creatinine Clearance: 33.6 mL/min (A) (based on SCr of 1.43 mg/dL (H)). Pertinent Cultures:  MRSA Nasal Swab: Not ordered. Order placed by pharmacy    Plan:  Dosing recommendations based on Bayesian software  Start vancomycin 2gm IVPB then 1gm Q24h  Anticipated AUC of 485 and trough concentration of 15.6 at steady state  Renal labs as indicated   Vancomycin concentration not ordered yet.   Pharmacy will continue to monitor patient and adjust therapy as indicated    Thank you for the consult,  BARNEY Allison  1/18/2023

## 2023-01-19 NOTE — CONSULTS
Comprehensive Nutrition Assessment    Type and Reason for Visit: Initial, Consult, NPO/clear liquid    Nutrition Recommendations/Plan:   D/C oral supplements. Initiate tube feeding regimen:   Formula: Glucerna 1.5  Start at 20 ml/hr  Advance as tolerated by 10 ml q 6 hours  Goal Rate: 50 ml/hr  Water Flushes: 30 mL q 4 hours (180 mL total)  Goal Regimen Provides (with modulars):  1800 kcal, 99g protein, 1092 ml free water total, 100% RDIs  Continue to monitor tolerance of EN, weight, labs, and plan of care during admission. Malnutrition Assessment:  Malnutrition Status: At risk for malnutrition (specify) (NPO, vent, COVID) (01/19/23 1052)    Context:  Acute illness       Nutrition History and Allergies: PMHx: HFrEF last EF 20-25%, atrial fibrillation with RVR, T2DM, HTN, HLD, and hypothyroidism. Wt hx: 234 lb (5/27/22), 232 lb (10/13/22), wt loss of 5.1% x 3 months per EMR hx. Photo ID 5'4\". NKFA. Nutrition Assessment:    Admitted for shortness of breath, failed BIPAP requiring intubation 1/18. COVID+. Discussed care during interdisciplinary rounds. Plan to adjust insulin. Okay to place consult and initiate TF. Nutrition Related Findings:    Pertinent Meds: lipitor, bumex, decadron, gabapentin, humalog, synthroid, zosyn, vanco, amiodarone gtt, fentanyl gtt  Pertinent Labs: POC Glucose 168-350 mg/dl x 24 hrs, BUN/Cr 40/1.9, GFR 26; 12/22/22: Hgb A1C 11.9 Wound Type: None    Current Nutrition Intake & Therapies:  Average Meal Intake: NPO     ADULT ORAL NUTRITION SUPPLEMENT Breakfast, Lunch, Dinner; Low Calorie/High Protein  DIET NPO    Anthropometric Measures:  Height: 5' 4\" (162.6 cm) (Photo ID)  Ideal Body Weight (IBW): 120 lbs (55 kg)  Admission Body Weight: 220 lb 0.3 oz (99.8 kg)  Current Body Wt:  99.8 kg (220 lb 0.3 oz), 183.3 % IBW.     Current BMI (kg/m2): 37.7    Estimated Daily Nutrient Needs:  Energy Requirements Based On: Formula  Weight Used for Energy Requirements: Admission  Energy (kcal/day): 2921-5821 ([facility] x 0.9-1.1)  Weight Used for Protein Requirements: Ideal  Protein (g/day):  (1.5-2 g/day)  Method Used for Fluid Requirements: 1 ml/kcal  Fluid (ml/day): 6303-0862    Nutrition Diagnosis:   Swallowing difficulty related to impaired respiratory function as evidenced by NPO or clear liquid status due to medical condition, intubation, nutrition support-enteral nutrition    Nutrition Interventions:   Food and/or Nutrient Delivery: Continue NPO, Start tube feeding  Nutrition Education/Counseling: No recommendations at this time  Coordination of Nutrition Care: Interdisciplinary rounds  Plan of Care discussed with: interdisciplinary team    Goals:     Goals: Meet at least 75% of estimated needs, by next RD assessment       Nutrition Monitoring and Evaluation:   Behavioral-Environmental Outcomes: None identified  Food/Nutrient Intake Outcomes: Enteral nutrition intake/tolerance  Physical Signs/Symptoms Outcomes: Biochemical data, GI status, Fluid status or edema, Hemodynamic status, Weight    Discharge Planning:     Too soon to determine    Ally Damon Andry [address], [address]   Contact: [phone]

## 2023-01-19 NOTE — PROGRESS NOTES
2340:  Patient transported to ICU via stretcher on transport vent by RT and ED RN. Placed back on vent with same settings, see below, upon arrival to ICU room. 01/19/23 0012   Patient Observations   Pulse (Heart Rate) 96   Resp Rate 19   O2 Sat (%) 100 %   Airway - Continuous Aspiration of Subglottic Secretions (KHUSHI) Tube 01/18/23 Oral   Placement Date/Time: 01/18/23 3343   Number of Attempts: 1  Inserted By: Talat Harris MD  Present on Admission/Arrival: No  Location: Oral  Placement Verified: Auscultation;BBS;EtCO2  Airway Types: Endotracheal, cuffed  Airway Tube Size: 7.5 mm   Insertion Depth (cm) 23 cm   Line Dominik Lips   Side Secured Device; Centered   Cuff Pressure   (MLT)   Site Assessment Clean, dry, & intact   Suction on No   Respiratory   Respiratory Pattern Regular   Chest/Tracheal Assessment Chest expansion, symmetrical   Breath Sounds Bilateral Coarse;Diminished   Cough Cough with suction   Airway Clearance   Suction ET Tube   Suction Device Inline suction catheter   Sputum Amount Moderate   Sputum Color/Odor White;Yellow;Bloody   Sputum Consistency Thick; Thin   Vent Settings   FIO2 (%) 60 %   SpO2/FIO2 Ratio 166.67   CMV Rate Set 16   Back-Up Rate 16   Vt Set (ml) 420 ml   PEEP/VENT (cm H2O) 8 cm H20   Insp Time (sec) 0.8 sec   Insp Rise Time % 50 %   Flow Trigger 3   Ventilator Measurements   Resp Rate Observed 19   Vt Exhaled (Machine Breath) (ml) 527 ml   Ve Observed (l/min) 8.75 l/min   PIP Observed (cm H2O) 19 cm H2O   MAP (cm H2O) 11   I:E Ratio Actual 1:3.7   Safety & Alarms   Circuit Temperature 96.3 °F (35.7 °C)   Oxygen Therapy   Skin Assessment Clean, dry, & intact   Skin Protection for O2 Device Yes   Orientation Bilateral   Location Cheek   Vent Method/Mode   Ventilation Method Conventional   Ventilator Mode VC+   $$ Ventilator Subsequent Subsequent Vent Day

## 2023-01-20 NOTE — INTERDISCIPLINARY ROUNDS
[facility]  Pulmonary, Critical Care, and Sleep Medicine  Interdisciplinary and Ventilator Weaning Rounds    Patient discussed in morning walking rounds and interdisciplinary rounds. ICU day: 1/17    Overnight events:   No acute overnight events    Assessments and best practice:  Ventilator  Ventilator day 1/17  Vent settings: FiO2 of 30 and PEEP of 5  VAP bundle, aim to keep peak plateau pressure [id number] H2O  Weaning assessed and documented   Patient does meet criteria for SBT. Patient is on sedation holiday. Plan to wean with PS n/a. Outcome: will be SBTed today  Final plan: pending outcome of SBT  Sedation  Fentanyl   Other pertinent drips  Heparin and Amiodarone  Lines noted  PIVs  Critical labs assessed  Yes  Antibiotics  Zosyn and Vancomycin  Medications reviewed  Yes  Pending imaging  none  Pending send out labs  No  Pending Procedures  None  Glycemic control  Stress ulcer prophylaxis. Pepcid  DVT prophylaxis. Heparin gtt  Need for Lines, cruz assessed. Yes  Restraint Reevaluation   Yes  I have reevaluated the patient one hour after initiation of intervention. The patient is comfortable, uninjured, but continues to pose an imminent risk of injury to self to themselves and/or serious disruption of medical treatment required to keep patient stable. The patient's current medical and behavioral conditions that warrant the use intervention include danger to self and Interference with medical equipment or treatment. Restraint or seclusion will be discontinued at the earliest possible time, regardless of the scheduled expiration of the order.  Based on my evaluation, restraints will be continued: YES       Family contact/MPOA: son, Rahat Kaur last updated yesterday by provider, will be updated today by either bedside RN or provider    Palliative consult within 3 days of admission to ICU-  Ethics Guidance: 21 days      Daily Plans:  SBT, extubation pending outcome of SBT    KEN time 10 minutes        Angelica Alexis  01/20/23  Pulmonary, Critical Care Medicine  [facility]

## 2023-01-20 NOTE — PROGRESS NOTES
Infectious Disease Consultation Note        Reason: DLOVO-22 infection, respiratory failure    Current abx Prior abx   Zosyn, vancomycin since 1/18      Lines:       Assessment :  82 yro female with history significant for uncontrolled type 2 diabetes, diabetic neuropathy, hypercholesterolemia, hypertension, on anticoagulation A. fib with anticoagulation who was admitted to [facility] on 1/17 for shortness of breath     Clinical presentation consistent with acute hypoxic respiratory failure evolving since admission due to decompensated CHF superimposed on COVID-19 infection; aspiration pneumonia    Significant thick yellowish sputum noted on ET suction  MRSA nasal swab 1/19- negative    No definitive clinical evidence to suggest severe COVID-19 pneumonia at this time    Diffuse rash on extremities-could be skin excoriation from scratching. No definitive evidence of acute infection noted on today's exam    Atrial fibrillation with rapid ventricular response, non-STEMI-cardiology follow-up appreciated      decompensated CHF-ejection fraction 20 to 25% on echo 12/21/2022    On 30% fio2    Worsening leukocytosis noted today-likely due to steroids, ? Hemoconcentration    Acute kidney injury-likely secondary to volume depletion, diuresis    Improving lactate    Recommendations:    Continue Zosyn, d/c vancomycin  Follow up sputum cultures  Follow-up cardiology recommendations  Diuresis/steroids per ICU team  Weaning from vent per ICU team  Monitor CBC, temperature, procalcitonin to determine duration of antibiotic therapy    Above plan was discussed in details with ICU team and dr Chinedu Jain. Please call me if any further questions or concerns. Will continue to participate in the care of this patient. HPI:   Intubated.  Non  verbal      Past Medical History:   Diagnosis Date    Acquired hypothyroidism 8/1/2016    Arthritis of right shoulder region 4/21/2016    Asthma     Bilateral lower extremity edema 7/7/2021    Chronic bilateral low back pain without sciatica 7/7/2021    Chronic diastolic congestive heart failure ([address] 3/10/2021    Chronic venous insufficiency     Diabetes (HCC)     neuropathy    Essential hypertension 7/7/2021    Gout     History of depression 1/23/2017    History of fall 7/7/2021    Hypercholesteremia 7/7/2021    Hypertension     MI (myocardial infarction) ([facility] [address]     OAB (overactive bladder) 7/7/2021    Peripheral neuropathy     Rheumatoid arthritis ([facility] [address]     Tear of right rotator cuff 5/16/2016    Thyroid pain     Urinary incontinence 7/7/2021    Vertigo        Past Surgical History:   Procedure Laterality Date    HX AMPUTATION TOE Right 2020    Great toe Dr. 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 HX KNEE REPLACEMENT Bilateral        Current Discharge Medication List        CONTINUE these medications which have NOT CHANGED    Details   bumetanide (BUMEX) 1 mg tablet Take 1 mg by mouth daily. gabapentin (NEURONTIN) 400 mg capsule 1 capsule daily  Qty: 360 Capsule, Refills: 0    Associated Diagnoses: Neuropathy; Type 2 diabetes mellitus with diabetic neuropathy, with long-term current use of insulin ([facility])      insulin glargine (LANTUS) 100 unit/mL injection Inject 20 Units at bedtime. Monitor and record blood sugar daily. Qty: 10 mL, Refills: 1      aspirin 81 mg chewable tablet Take 1 Tablet by mouth daily. Qty: 30 Tablet, Refills: 2      metoprolol tartrate (LOPRESSOR) 100 mg IR tablet Take 1 Tablet by mouth every twelve (12) hours. Qty: 120 Tablet, Refills: 2      dilTIAZem ER (CARDIZEM CD) 180 mg capsule Take 1 Capsule by mouth daily. Qty: 120 Capsule, Refills: 3      BD Insulin Syringe SafetyGlide 0.5 mL 30 gauge x 5/16\" syrg       levothyroxine (SYNTHROID) 150 mcg tablet Take 150 mcg by mouth daily. morphine IR (MS IR) 15 mg tablet Take 15 mg by mouth two (2) times a day. allopurinoL (ZYLOPRIM) 100 mg tablet Take 100 mg by mouth daily. atorvastatin (LIPITOR) 80 mg tablet Take 1 Tablet by mouth daily. Qty: 90 Tablet, Refills: 3    Associated Diagnoses: Hypercholesteremia      Blood-Glucose Meter (FREESTYLE LITE METER) monitoring kit Test twice blood glucose daily; ICD-10 E11.9; Quantity 1  Qty: 1 Kit, Refills: 0    Associated Diagnoses: Type 2 diabetes mellitus with nephropathy ([facility])      insulin syringe,safetyneedle 1 mL 31 gauge x 5/16\" syrg 1 Each by Does Not Apply route daily. Qty: 300 Each, Refills: 3    Comments: Dx e11.9      glucose blood VI test strips (FREESTYLE TEST) strip Use to check blood glucose twice daily DX:E11.9  Qty: 300 Strip, Refills: 3      metFORMIN (GLUCOPHAGE) 500 mg tablet Take 500 mg by mouth daily. acetaminophen (TYLENOL) 500 mg tablet Take 1 Tab by mouth every six (6) hours as needed for Pain.   Qty: 270 Tab, Refills: 3    Associated Diagnoses: Controlled type 2 diabetes mellitus without complication, with long-term current use of insulin ([facility])             Current Facility-Administered Medications   Medication Dose Route Frequency    vancomycin (VANCOCIN) 1500 mg in  ml infusion  1,500 mg IntraVENous ONCE    camphor-menthoL (SARNA) 0.5-0.5 % lotion   Topical TID    insulin glargine (LANTUS) injection 5 Units  5 Units SubCUTAneous QHS    VANCOMYCIN INFORMATION NOTE   Other Rx Dosing/Monitoring    hydrOXYzine HCL (ATARAX) tablet 10 mg  10 mg Oral QID PRN    glucose chewable tablet 16 g  16 g Oral PRN    glucagon (GLUCAGEN) injection 1 mg  1 mg IntraMUSCular PRN    dextrose 10% infusion 0-250 mL  0-250 mL IntraVENous PRN    insulin lispro (HUMALOG) injection   SubCUTAneous Q6H    midazolam (VERSED) injection 2 mg  2 mg IntraVENous Q10MIN PRN    fentaNYL citrate (PF) injection 100 mcg  100 mcg IntraVENous Q30MIN PRN    chlorhexidine (PERIDEX) 0.12 % mouthwash 10 mL  10 mL Oral Q12H    albuterol-ipratropium (DUO-NEB) 2.5 MG-0.5 MG/3 ML  3 mL Nebulization Q4H PRN    famotidine (PF) (PEPCID) 20 mg in 0.9% sodium chloride 10 mL injection  20 mg IntraVENous DAILY    heparin (porcine) 25,000 units in 0.45% saline 250 ml infusion  18-36 Units/kg/hr IntraVENous TITRATE    fentaNYL (PF) 10 mcg/mL infusion  0-200 mcg/hr IntraVENous TITRATE    dexamethasone (DECADRON) 4 mg/mL injection 6 mg  6 mg IntraVENous Q24H    piperacillin-tazobactam (ZOSYN) 3.375 g in 0.9% sodium chloride (MBP/ADV) 100 mL MBP  3.375 g IntraVENous Q8H    metoprolol tartrate (LOPRESSOR) tablet 50 mg  50 mg Oral Q12H    amiodarone (NEXTERONE) 360 mg in dextrose 200 mL (1.8 mg/mL) infusion  0.5-1 mg/min IntraVENous TITRATE    aspirin chewable tablet 81 mg  81 mg Oral DAILY    atorvastatin (LIPITOR) tablet 80 mg  80 mg Oral DAILY    bumetanide (BUMEX) tablet 1 mg  1 mg Oral DAILY    [Held by provider] dilTIAZem ER (CARDIZEM CD) capsule 180 mg  180 mg Oral DAILY    levothyroxine (SYNTHROID) tablet 150 mcg  150 mcg Oral DAILY    gabapentin (NEURONTIN) capsule 400 mg  400 mg Oral DAILY    allopurinoL (ZYLOPRIM) tablet 100 mg  100 mg Oral DAILY    sodium chloride (NS) flush 5-40 mL  5-40 mL IntraVENous Q8H    acetaminophen (TYLENOL) tablet 650 mg  650 mg Oral Q6H PRN    Or    acetaminophen (TYLENOL) suppository 650 mg  650 mg Rectal Q6H PRN    polyethylene glycol (MIRALAX) packet 17 g  17 g Oral DAILY PRN    ondansetron (ZOFRAN ODT) tablet 4 mg  4 mg Oral Q8H PRN    Or    ondansetron (ZOFRAN) injection 4 mg  4 mg IntraVENous Q6H PRN       Allergies: Patient has no known allergies.     Family History   Problem Relation Age of Onset    Heart Attack Mother     Heart Attack Father      Social History     Socioeconomic History    Marital status:      Spouse name: Not on file    Number of children: Not on file    Years of education: Not on file    Highest education level: Not on file   Occupational History    Not on file   Tobacco Use    Smoking status: Former     Types: Cigarettes     Quit date: 12     Years since quittin.0    Smokeless tobacco: Never    Tobacco comments:     quit 45 years ago   Vaping Use    Vaping Use: Never used   Substance and Sexual Activity    Alcohol use: No     Alcohol/week: 0.0 standard drinks    Drug use: No    Sexual activity: Not Currently   Other Topics Concern    Not on file   Social History Narrative    Not on file     Social Determinants of Health     Financial Resource Strain: Not on file   Food Insecurity: Not on file   Transportation Needs: Not on file   Physical Activity: Not on file   Stress: Not on file   Social Connections: Not on file   Intimate Partner Violence: Not on file   Housing Stability: Not on file     Social History     Tobacco Use   Smoking Status Former    Types: Cigarettes    Quit date:     Years since quittin.0   Smokeless Tobacco Never   Tobacco Comments    quit 45 years ago        Temp (24hrs), Av °F (36.7 °C), Min:96.8 °F (36 °C), Max:98.7 °F (37.1 °C)    Visit Vitals  BP (!) 120/92   Pulse 99   Temp 98.2 °F (36.8 °C)   Resp 16   Ht 5' 4\" (1.626 m) Comment: Photo ID   Wt 99.6 kg (219 lb 9.3 oz)   SpO2 99%   Breastfeeding No   BMI 37.69 kg/m²       ROS: unable to obtain due to patient factors    Physical Exam:    General:  Intubated and sedated    Head:  Normocephalic, without obvious abnormality, atraumatic. Eyes:  Conjunctivae/corneas clear. Nose: Nares normal. Septum midline. Mucosa normal. No drainage. Throat: Lips, mucosa, and tongue normal.    Neck: Trachea midline, no adenopathy,  no JVD. Lungs:   Clear to auscultation bilaterally. Chest wall:  No deformity. Heart:  Irregularly irregular, rate controlled   Abdomen:   Soft, non-tender. Bowel sounds normal.    Extremities: Extremities normal, atraumatic, no cyanosis, mild edema bilateral lower extremities. Darkish erythema right LE, scattered pinpoint erythematous rash on extremities   Pulses: 2+ and symmetric all extremities.    Skin: Scattered excoriations in bilateral upper and lower extremities   Lymph nodes:  Cervical and supraclavicular nodes normal   Neurologic: Intubated and sedated          Labs: Results:   Chemistry Recent Labs     01/20/23  0420 01/19/23  1915 01/19/23  1010 01/19/23  0508 01/19/23  0247 01/18/23  0308 01/17/23  1859   * 194* 202* 185*   < > 306* 181*    137 138 139   < > 136 137   K 4.2 4.2 4.6 4.2   < > 3.9 4.8    104 105 105   < > 102 102   CO2 24 23 21 24   < > 27 26   BUN 51* 49* 41* 40*   < > 27* 25*   CREA 2.15* 2.04* 1.86* 1.90*   < > 1.43* 1.33*   CA 7.3* 8.1* 8.2* 8.2*   < > 8.3* 8.7   AGAP 9 10 12 10   < > 7 9   BUCR 24* 24* 22* 21*   < > 19 19   AP  --   --   --   --   --  51 56   TP  --   --   --   --   --  7.6 8.4*   ALB  --   --   --  2.4*  --  2.9* 3.4   GLOB  --   --   --   --   --  4.7* 5.0*   AGRAT  --   --   --   --   --  0.6* 0.7*    < > = values in this interval not displayed. CBC w/Diff Recent Labs     01/20/23  0420 01/19/23  0508 01/19/23  0247   WBC 16.7* 7.7 10.7   RBC 3.84* 3.71* 3.76*   HGB 9.9* 9.6* 10.0*   HCT 32.6* 31.0* 31.6*    233 216   GRANS 84* 79* 71   LYMPH 10* 12* 18*   EOS 0 0 1        Microbiology Recent Labs     01/19/23  1220 01/18/23 2005   CULT PENDING MRSA NOT PRESENT  Screening of patient nares for MRSA is for surveillance purposes and, if positive, to facilitate isolation considerations in high risk settings. It is not intended for automatic decolonization interventions per se as regimens are not sufficiently effective to warrant routine use. RADIOLOGY:    All available imaging studies/reports in [facility] for this admission were reviewed    Total time spent >35 minutes.  High complexity decision making was performed during the evaluation of this patient at high risk for decompensation with multiple organ involvement     Above mentioned total time spent on reviewing the case/medical record/data/notes/EMR/patient examination/documentation/coordinating care with nurse/consultants, exclusive of procedures with complex decision making performed and > 50% time spent in face to face evaluation. Disclaimer: Sections of this note are dictated utilizing voice recognition software, which may have resulted in some phonetic based errors in grammar and contents. Even though attempts were made to correct all the mistakes, some may have been missed, and remained in the body of the document. If questions arise, please contact our department.     Dr. Maurilio Barnes, Infectious Disease Specialist  [phone]  January 20, 2023  7:33 AM

## 2023-01-20 NOTE — PROGRESS NOTES
[facility]. Pulmonary, Critical Care, and Sleep Medicine    Name: Atif Ennis MRN: [id number]   : 1935 Hospital: [address] [place] [address] Dr   Date: 2023  Admission Date: 2023     Chart and notes reviewed. Data reviewed. I have evaluated all findings. [x]I have reviewed the flowsheet and previous days notes. [x]The patient is unable to give any meaningful history or review of systems because the patient is:  [x]Intubated [x]Sedated   []Unresponsive      [x]The patient is critically ill on      [x]Mechanical ventilation []Pressors   []BiPAP []         Interval HPI:  Patient is a 82 y.o. female with a PMH of HFrEF last EF 20-25%, atrial fibrillation with RVR, T2DM, HTN, HLD, and hypothyroidism who presented to the [facility] ED on  due to shortness of breath. Patient found at home by EMS hypoxic to mid 80's and put on CPAP. Given oral nitroglycerin x3 and nitropaste. She was noted to be tachycardic to nearly 200 bpm in the ED in atrial fibrillation with RVR. In the ED she was initially placed on BiPAP with good respiratory response and started on diltiazem gtt. Following diltiazem gtt patient became hypotensive with MAPs in low 60's but remained persistently tachycardic. Then transitioned to amiodarone gtt. Blood pressure improved and was given lasix x1. Following BiPAP initiation ABG improved and she was transitioned to NC, however this was tolerated poorly and required resuming BiPAP. Despite BiPAP patient continued to be in respiratory distress and required intubation in the ED. Noted to be COVID positive. ICU was then contacted for admission and further management. Of note, patient left inpatient hospitalization AMA on  following admission for cellulitis in setting of lymphedema and atrial fibrillation with RVR.       Subjective 23  Hospital Day: 3   Vent Day: 3  Overnight events: None  Mentation/Activity: RASS -2  Respiratory/ Secretions: Moderate  Hemodynamics: Soft pressures but not requiring pressors  Urine output, bowel: inadequate, 0.26 mL/kg/hr  Diet: NPO  Need for procedures: No              ROS:Review of systems not obtained due to patient factors. Events and notes from last 24 hours reviewed.      Patient Active Problem List   Diagnosis Code    Encounter for palliative care Z51.5    Acquired hypothyroidism E03.9    Dermatitis L30.9    Obesity, morbid ([facility] E66.01    Type 2 diabetes mellitus with diabetic nephropathy, with long-term current use of insulin ([facility]) E11.21, Z79.4    Chronic diastolic congestive heart failure ([facility]) I50.32    Essential hypertension I10    Bilateral lower extremity edema R60.0    Hypercholesteremia E78.00    History of fall Z91.81    Chronic bilateral low back pain without sciatica M54.50, G89.29    OAB (overactive bladder) N32.81    Urinary incontinence R32    Neuropathy G62.9    Septic arthritis of foot ([facility]) M00.9    Diabetic foot ulcer ([facility]) E11.621, L97.509    Septic joint ([facility]) M00.9    Sepsis ([facility]) A41.9    Acute exacerbation of CHF (congestive heart failure) ([facility]) I50.9    Leukocytosis D72.829    Pulmonary edema J81.1    Dyspnea R06.00    Atrial fibrillation with RVR ([facility]) I48.91    Patient's noncompliance with other medical treatment and regimen due to unspecified reason Z91.199    Debility R53.81    At[facility] fibrillation [facility] I48.91    Advanced care planning/counseling discussion Z71.89    Cellulitis L03.90    Atrial fibrillation with rapid ventricular response ([facility]) I48.91    Acute on chronic heart failure ([facility]) I50.9    COVID U07.1    Heart failure ([facility]) I50.9    COVID-19 virus infection U07.1    Acute respiratory failure with hypoxia ([facility]) J96.01    COVID-19 U07.1       Vital Signs:  Visit Vitals  BP (!) 120/92   Pulse 96   Temp 98.2 °F (36.8 °C)   Resp 16   Ht 5' 4\" (1.626 m) Comment: Photo ID   Wt 99.6 kg (219 lb 9.3 oz)   SpO2 100%   Breastfeeding No   BMI 37.69 kg/m²       O2 Device: Ventilator, Endotracheal tube   O2 Flow Rate (L/min): 6 l/min   Temp (24hrs), Av.9 °F (36.6 °C), Min:96.8 °F (36 °C), Max:98.7 °F (37.1 °C)       Intake/Output:   Last shift:      No intake/output data recorded.   Last 3 shifts:  1901 -  0700  In: 2923.4 [I.V.:2563.4]  Out: 1095 [Urine:1095]    Intake/Output Summary (Last 24 hours) at 2023 0957  Last data filed at 2023 9481  Gross per 24 hour   Intake 1349.36 ml   Output 520 ml   Net 829.36 ml          Current Facility-Administered Medications   Medication Dose Route Frequency    camphor-menthoL (SARNA) 0.5-0.5 % lotion   Topical TID    insulin glargine (LANTUS) injection 5 Units  5 Units SubCUTAneous QHS    insulin lispro (HUMALOG) injection   SubCUTAneous Q6H    chlorhexidine (PERIDEX) 0.12 % mouthwash 10 mL  10 mL Oral Q12H    famotidine (PF) (PEPCID) 20 mg in 0.9% sodium chloride 10 mL injection  20 mg IntraVENous DAILY    heparin (porcine) 25,000 units in 0.45% saline 250 ml infusion  18-36 Units/kg/hr IntraVENous TITRATE    fentaNYL (PF) 10 mcg/mL infusion  0-200 mcg/hr IntraVENous TITRATE    dexamethasone (DECADRON) 4 mg/mL injection 6 mg  6 mg IntraVENous Q24H    piperacillin-tazobactam (ZOSYN) 3.375 g in 0.9% sodium chloride (MBP/ADV) 100 mL MBP  3.375 g IntraVENous Q8H    metoprolol tartrate (LOPRESSOR) tablet 50 mg  50 mg Oral Q12H    amiodarone (NEXTERONE) 360 mg in dextrose 200 mL (1.8 mg/mL) infusion  0.5-1 mg/min IntraVENous TITRATE    aspirin chewable tablet 81 mg  81 mg Oral DAILY    atorvastatin (LIPITOR) tablet 80 mg  80 mg Oral DAILY    bumetanide (BUMEX) tablet 1 mg  1 mg Oral DAILY    [Held by provider] dilTIAZem ER (CARDIZEM CD) capsule 180 mg  180 mg Oral DAILY    levothyroxine (SYNTHROID) tablet 150 mcg  150 mcg Oral DAILY    gabapentin (NEURONTIN) capsule 400 mg  400 mg Oral DAILY    allopurinoL (ZYLOPRIM) tablet 100 mg  100 mg Oral DAILY    sodium chloride (NS) flush 5-40 mL  5-40 mL IntraVENous Q8H         Telemetry: []Sinus []A-flutter []Paced    [x]A-fib []Multiple PVCs                  Physical Exam:      General: Intubated/sedated, eyes open to voice but don't track  HEENT:  Anicteric sclerae; pink palpebral conjunctivae; mucosa moist  Resp:  Symmetrical chest expansion, no accessory muscle use; good air entry at apexes   CV:  S1, S2 present; irregularly irregular  GI:  Abdomen soft, non-tender; (+) active bowel sounds  Extremities:  +2 pulse at left wrist; improving BLE edema but dependent edema elsewhere, right first toe amputated  Skin:  Warm; dry, linear scabs c/w either bed bug bites or excoriations  Neurologic:  Non-focal, awake and tracking  Devices: Armirez, ET tube, bilateral PIV      DATA:  MAR reviewed and pertinent medications noted or modified as needed    Labs:  Recent Labs     01/20/23  0420 01/19/23  0508 01/19/23  0247   WBC 16.7* 7.7 10.7   HGB 9.9* 9.6* 10.0*   HCT 32.6* 31.0* 31.6*    233 216     Recent Labs     01/20/23  0420 01/19/23  1915 01/19/23  1010 01/19/23  0508 01/19/23  0247 01/18/23  0308 01/17/23  1859    137 138 139 134* 136 137   K 4.2 4.2 4.6 4.2 6.8* 3.9 4.8    104 105 105 103 102 102   CO2 24 23 21 24 26 27 26   * 194* 202* 185* 154* 306* 181*   BUN 51* 49* 41* 40* 38* 27* 25*   CREA 2.15* 2.04* 1.86* 1.90* 1.90* 1.43* 1.33*   CA 7.3* 8.1* 8.2* 8.2* 8.0* 8.3* 8.7   MG 2.1  --   --   --  2.2 1.9 1.4*   PHOS 4.9  --   --   --  4.2  --   --    ALB  --   --   --  2.4*  --  2.9* 3.4   ALT  --   --   --   --   --  6* 10*     No results for input(s): PH, PCO2, PO2, HCO3, FIO2 in the last 72 hours.   Recent Labs     01/20/23  0442 01/19/23  0355 01/18/23  0720   FIO2I 40 60 100   HCO3I 22.5 23.8 24.4   PCO2I 34.6* 40.0 49.6*   PHI 7.42 7.38 7.30*   PO2I 107* 87 98       Imaging:  [x]   I have personally reviewed the patients radiographs and reports  XR Results (most recent):  XR Results (most recent):  Results from Hospital Encounter encounter on 01/17/23    XR ABD (KUB)    Narrative  EXAMINATION: Abdomen single view    INDICATION: OG tube    COMPARISON: CT 8/10/2022    FINDINGS: Single frontal view. Lower most abdomen outside field-of-view. Esophagogastric tube tip at level of gastric fundus. Overall nonobstructive gas  pattern. No evidence of free air. Right upper quadrant surgical clips. Impression  Esophagogastric tube tip at gastric fundus level. CT Results (most recent):  Results from Hospital Encounter encounter on 10/07/22    CTA CHEST W OR W WO CONT    Narrative  CTA CHEST PULMONARY EMBOLISM PROTOCOL    INDICATION: Acute respiratory difficulty, heart failure, symptoms worsened over  3 days, chest pain, increased work of breathing. Question pulmonary embolism. TECHNIQUE: Thin collimation axial images obtained through the level of the  pulmonary arteries with additional imaging through the chest following the  uneventful administration of intravenous contrast.  Images reconstructed into  three dimensional coronal and sagittal projections for complete evaluation of  the tortuous and overlapping pulmonary vascular structures and to reduce patient  radiation dose. All CT scans at this facility are performed using dose optimization technique as  appropriate to a performed exam, to include automated exposure control,  adjustment of the mA and/or kV according to patient size (including appropriate  matching first site-specific examinations), or use of iterative reconstruction  technique. COMPARISON: 8/10/2022. FINDINGS:    No filling defects are appreciated within the main, left, right, lobar or  visualized segmental pulmonary arteries to suggest embolism. Thyroid: Unremarkable in its visualized aspects. Pericardium/ Heart: No significant effusion. Biatrial cardiac chamber  enlargement. Aorta/ Vessels: Mild to moderate aortic atherosclerosis. There is irregular  noncalcified mural plaque in the descending aorta. Lymph Nodes: Unremarkable. .    Lungs: There is hilar edema. Mild bronchial wall thickening. Mild groundglass  and interstitial thickening. Mild mosaic attenuation. Mild to moderate dependent  atelectasis. Pleura: Moderate bilateral pleural effusions which are new. No pneumothorax. Upper Abdomen: Unremarkable. Bones/soft tissues: Osteopenia. Degenerative disc disease. Impression  1. No evidence for pulmonary emboli. 2.  Heart failure with biatrial cardiac chamber enlargement, moderate new  bilateral pleural effusions and mild pulmonary edema. 3.  Mild to moderate aortic atherosclerosis with irregular noncalcified mural  plaque along the wall of the descending aorta which increases risk of embolic  events. 01/17/23    ECHO ADULT FOLLOW-UP OR LIMITED 01/18/2023 1/18/2023    Interpretation Summary    Contrast used: Definity. Technical qualifiers: Echo study was technically difficult with poor endocardial visualization. Left Ventricle: Severely reduced left ventricular systolic function with a visually estimated EF of 20 - 25%. Left ventricle size is normal. Mildly increased wall thickness. Global hypokinesis present. Aortic Valve: Mild to moderate regurgitation. Mitral Valve: Moderate to severe regurgitation. Tricuspid Valve: Severe regurgitation. Pericardium: Left pleural effusion. Ascites present.     Signed by: Roberto Ansari MD on 1/18/2023  3:58 PM       IMPRESSION:   Acute hypoxic respiratory failure - intubated 1/18  Atrial fibrillation with rapid ventricular response - anticoagulated on Eliquis outpatient, diagnosed 10/22  Acute on chronic heart failure - LVEF 20-25% 12/21/22, similar result on repeat echo on 1/18/23(LVEF 55% 8/22), possible tachycardia induced cardiomyopathy vs. Potential precipitating ischemic event, does not appear to have had recent catheterization for further characterization  NSTEMI - likely type II due to demand ischemia in setting of atrial fibrillation with RVR and CHF exacerbation  COVID-19  Pulmonary edema  AQUILES on CKD3  Anemia  Hypertension  Type 2 diabetes mellitus  Hypercholesterolemia  Hypothyroidism  Hx of tobacco use - cessation in 1976  Skin lesions c/w bed bug bites vs excoriations     Patient Active Problem List   Diagnosis Code    Encounter for palliative care Z51.5    Acquired hypothyroidism E03.9    Dermatitis L30.9    Obesity, morbid ([facility] E66.01    Type 2 diabetes mellitus with diabetic nephropathy, with long-term current use of insulin ([facility]) E11.21, Z79.4    Chronic diastolic congestive heart failure ([facility]) I50.32    Essential hypertension I10    Bilateral lower extremity edema R60.0    Hypercholesteremia E78.00    History of fall Z91.81    Chronic bilateral low back pain without sciatica M54.50, G89.29    OAB (overactive bladder) N32.81    Urinary incontinence R32    Neuropathy G62.9    Septic arthritis of foot ([facility]) M00.9    Diabetic foot ulcer ([facility]) E11.621, L97.509    Septic joint ([facility]) M00.9    Sepsis ([facility]) A41.9    Acute exacerbation of CHF (congestive heart failure) ([facility]) I50.9    Leukocytosis D72.829    Pulmonary edema J81.1    Dyspnea R06.00    Atrial fibrillation with RVR ([facility]) I48.91    Patient's noncompliance with other medical treatment and regimen due to unspecified reason Z91.199    Debility R53.81    Atrial fibrillation ([facility] I48.91    Advanced care planning/counseling discussion Z71.89    Cellulitis L03.90    Atrial fibrillation with rapid ventricular response ([facility]) I48.91    Acute on chronic heart failure ([facility]) I50.9    COVID U07.1    Heart failure ([facility]) I50.9    COVID-19 virus infection U07.1    Acute respiratory failure with hypoxia ([facility]) J96.01    COVID-19 U07.1        RECOMMENDATIONS:   Neuro:Titrate sedation for RASS goal 0 to -1, daily sedation holiday. Sedation with fentanyl gtt and midazolam PRN  Pulm: Aspiration precautions, HOB>30'. VAP Bundle, titrate FiO2 for goal SPO2 > 90%, SBT when FiO2 </= 40% and PEEP </= 7, PRN duonebs.  IV dexamethasone for COVID-19, SBT today  CVS: For afib with RVR, metoprolol, amiodarone gtt, & heparin gtt. For demand NSTEMI, aspirin, and atorvastatin. For CHF exacerbation, bumetanide. Continue HD monitoring. Cardiology consulted  GI: NPO. Renal: Trend Cr, daily BMP, UOP, strict I&Os. Suspect cardiorenal. Replace electrolytes PRN, nephrology consulted  Hem/Onc: Trend H/H, monitor for s/o active bleeding. Daily CBC. I/D:Trend WBCs and temperature curve. COVID-19 positive  Metabolic: Daily BMP, mag, phos. Trend lytes and replace per protocol. Endocrine:Q6 glucoses. SSI. Avoid hypoglycemia. 5u glargine QHS. Continue home levothyroxine  Musc/Skin: Wound care, Sarna lotion for rash, PT/OT/SLP    DNR  Discussed in interdisciplinary rounds     Best practice :    Glycemic control: avoid hypoglycemia  [facility] ICU bundles: Cruz Bundle Followed    [facility] Vent patients-    VAP bundle-Humphreys tube to suction at 20-30 cm Hg, Maintain Odilia tube with 5-10ml air every 4 hours, Routine oral care every 4 hours, Elevation of head > 45 degree, Daily sedation holiday and SBT evaluation starting at 6.00am.  Stress ulcer prophylaxis. Pepcid  DVT prophylaxis. Heparin gtt  Need for Lines, cruz assessed. Palliative care evaluation. Restraints need. Non-violent Restraint Reevaluation     I have reevaluated the patient one hour after initiation of intervention. The patient is comfortable, uninjured, but continues to pose an imminent risk of injury to self to themselves and/or serious disruption of medical treatment required to keep patient stable. The patient's current medical and behavioral conditions that warrant the use intervention include danger to self and danger to others and Interference with medical equipment or treatment. Restraint or seclusion will be discontinued at the earliest possible time, regardless of the scheduled expiration of the order.     Based on my evaluation, restraints will be continued: YES Babak Matos MD   01/20/23  Pulmonary, Critical Care Medicine  [facility]

## 2023-01-20 NOTE — PROGRESS NOTES
Cardiology Progress Note    Admit Date: 1/17/2023  Attending Cardiologist: Dr. Shila Darling:     -Acute hypoxic respiratory failure associated with COVID with underlying HFrEF and COPD, emergently intubated 1/18/2023 AM.  -Afib RVR, known Hx afib diagnosed 10/2022  -Elevated troponin, peaked at 2343  -HFrEF, recent diagnosis 12/2022  Echo 12/21/2022 with LVEF 20-25% with severe hypokinesis of basal anteroseptal, basal inferoseptal and apical septal walls, moderately dilated RV with reduced systolic function, severe biatrial enlargement  Repeat Echo this admission with LVEF 20-25%, no significant change from prior  -Hx RLE DVT 06/2022  -HTN  -DM  -HLD  -Hx medication noncompliance      Primary cardiologist is Dr. Rhonda Ellison:       I saw, evaluated, examined the patient personally. Patient remains intubated and sedated  Lower extremity edema present  Remain on IV heparin, IV amiodarone  Heart rate his been in acceptable range  Continue supportive care. Further cardiac management depending on progress during hospital course. Will be available over the weekend. Please call with question. We will see patient back again on Monday    Kenny Farmer MD       -Continue supportive care per PCCM team, appreciate assistance.  -Continued on Amiodarone infusion, remains afib with improved HR 70's-80's this afternoon.  -Continued on Heparin infusion.  -Continue ASA, Lipitor, PO Lopressor, PO Bumex.  -Will be available as needed over the weekend, plan to follow-up on Monday. Subjective:     Remains intubated, opens eyes.     Objective:      Patient Vitals for the past 8 hrs:   Pulse Resp BP SpO2   01/20/23 0857 96 16 -- 100 %   01/20/23 0700 99 16 (!) 120/92 99 %   01/20/23 0600 89 16 111/71 99 %         Patient Vitals for the past 96 hrs:   Weight   01/19/23 0922 99.6 kg (219 lb 9.3 oz)   01/18/23 1430 99.8 kg (220 lb)   01/18/23 1018 99.8 kg (220 lb)   01/17/23 1954 99.8 kg (220 lb) Current Facility-Administered Medications   Medication Dose Route Frequency Last Admin    insulin glargine (LANTUS) injection 10 Units  10 Units SubCUTAneous QHS      camphor-menthoL (SARNA) 0.5-0.5 % lotion   Topical TID Given at 01/20/23 0800    hydrOXYzine HCL (ATARAX) tablet 10 mg  10 mg Oral QID PRN 10 mg at 01/19/23 1637    glucose chewable tablet 16 g  16 g Oral PRN      glucagon (GLUCAGEN) injection 1 mg  1 mg IntraMUSCular PRN      dextrose 10% infusion 0-250 mL  0-250 mL IntraVENous PRN      insulin lispro (HUMALOG) injection   SubCUTAneous Q6H 6 Units at 01/20/23 1130    midazolam (VERSED) injection 2 mg  2 mg IntraVENous Q10MIN PRN 2 mg at 01/19/23 0922    fentaNYL citrate (PF) injection 100 mcg  100 mcg IntraVENous Q30MIN  mcg at 01/19/23 0404    chlorhexidine (PERIDEX) 0.12 % mouthwash 10 mL  10 mL Oral Q12H 10 mL at 01/20/23 0900    albuterol-ipratropium (DUO-NEB) 2.5 MG-0.5 MG/3 ML  3 mL Nebulization Q4H PRN      famotidine (PF) (PEPCID) 20 mg in 0.9% sodium chloride 10 mL injection  20 mg IntraVENous DAILY 20 mg at 01/20/23 0900    heparin (porcine) 25,000 units in 0.45% saline 250 ml infusion  18-36 Units/kg/hr IntraVENous TITRATE 6 Units/kg/hr at 01/20/23 1108    fentaNYL (PF) 10 mcg/mL infusion  0-200 mcg/hr IntraVENous TITRATE Stopped at 01/20/23 1200    dexamethasone (DECADRON) 4 mg/mL injection 6 mg  6 mg IntraVENous Q24H 6 mg at 01/19/23 1807    piperacillin-tazobactam (ZOSYN) 3.375 g in 0.9% sodium chloride (MBP/ADV) 100 mL MBP  3.375 g IntraVENous Q8H 3.375 g at 01/20/23 0900    metoprolol tartrate (LOPRESSOR) tablet 50 mg  50 mg Oral Q12H 50 mg at 01/20/23 0900    amiodarone (NEXTERONE) 360 mg in dextrose 200 mL (1.8 mg/mL) infusion  0.5-1 mg/min IntraVENous TITRATE 0.5 mg/min at 01/20/23 0800    aspirin chewable tablet 81 mg  81 mg Oral DAILY 81 mg at 01/20/23 0900    atorvastatin (LIPITOR) tablet 80 mg  80 mg Oral DAILY 80 mg at 01/20/23 0900    bumetanide (BUMEX) tablet 1 mg 1 mg Oral DAILY 1 mg at 01/20/23 0900    [Held by provider] dilTIAZem ER (CARDIZEM CD) capsule 180 mg  180 mg Oral DAILY      levothyroxine (SYNTHROID) tablet 150 mcg  150 mcg Oral DAILY 150 mcg at 01/20/23 0900    gabapentin (NEURONTIN) capsule 400 mg  400 mg Oral DAILY 400 mg at 01/20/23 0900    allopurinoL (ZYLOPRIM) tablet 100 mg  100 mg Oral DAILY 100 mg at 01/20/23 0900    sodium chloride (NS) flush 5-40 mL  5-40 mL IntraVENous Q8H 10 mL at 01/20/23 1130    acetaminophen (TYLENOL) tablet 650 mg  650 mg Oral Q6H PRN      Or    acetaminophen (TYLENOL) suppository 650 mg  650 mg Rectal Q6H PRN      polyethylene glycol (MIRALAX) packet 17 g  17 g Oral DAILY PRN      ondansetron (ZOFRAN ODT) tablet 4 mg  4 mg Oral Q8H PRN      Or    ondansetron (ZOFRAN) injection 4 mg  4 mg IntraVENous Q6H PRN           Intake/Output Summary (Last 24 hours) at 1/20/2023 1253  Last data filed at 1/20/2023 0648  Gross per 24 hour   Intake 1071.91 ml   Output 520 ml   Net 551.91 ml       Physical Exam:  General:  intubated, opens eyes, cooperative, no distress, appears stated age  Neck:  supple  Lungs:  clear to auscultation bilaterally, on ventilator  Heart:  irregularly irregular rhythm  Abdomen:  abdomen is soft without significant tenderness, masses, organomegaly or guarding  Extremities:  atraumatic, no significant pitting edema    Visit Vitals  BP (!) 120/92   Pulse 96   Temp 98.2 °F (36.8 °C)   Resp 16   Ht 5' 4\" (1.626 m)   Wt 99.6 kg (219 lb 9.3 oz)   SpO2 100%   Breastfeeding No   BMI 37.69 kg/m²       Data Review:     Labs: Results:       Chemistry Recent Labs     01/20/23  0420 01/19/23  1915 01/19/23  1010 01/19/23  0508 01/19/23  0247 01/18/23  0308 01/17/23  1859   * 194* 202* 185* 154* 306* 181*    137 138 139 134* 136 137   K 4.2 4.2 4.6 4.2 6.8* 3.9 4.8    104 105 105 103 102 102   CO2 24 23 21 24 26 27 26   BUN 51* 49* 41* 40* 38* 27* 25*   CREA 2.15* 2.04* 1.86* 1.90* 1.90* 1.43* 1.33*   CA 7.3* 8. 1* 8.2* 8.2* 8.0* 8.3* 8.7   MG 2.1  --   --   --  2.2 1.9 1.4*   PHOS 4.9  --   --   --  4.2  --   --    AGAP 9 10 12 10 5 7 9   BUCR 24* 24* 22* 21* 20 19 19   AP  --   --   --   --   --  51 56   TP  --   --   --   --   --  7.6 8.4*   ALB  --   --   --  2.4*  --  2.9* 3.4   GLOB  --   --   --   --   --  4.7* 5.0*   AGRAT  --   --   --   --   --  0.6* 0.7*      CBC w/Diff Recent Labs     01/20/23  0420 01/19/23  0508 01/19/23  0247   WBC 16.7* 7.7 10.7   RBC 3.84* 3.71* 3.76*   HGB 9.9* 9.6* 10.0*   HCT 32.6* 31.0* 31.6*    233 216   GRANS 84* 79* 71   LYMPH 10* 12* 18*   EOS 0 0 1      Coagulation Recent Labs     01/20/23  0600 01/20/23  0420   APTT 166.0* 161.0*       Lipid Panel Lab Results   Component Value Date/Time    Cholesterol, total 191 11/09/2021 12:24 PM    HDL Cholesterol 44 11/09/2021 12:24 PM    LDL, calculated 96.8 11/09/2021 12:24 PM    VLDL, calculated 50.2 11/09/2021 12:24 PM    Triglyceride 251 (H) 11/09/2021 12:24 PM    CHOL/HDL Ratio 4.3 11/09/2021 12:24 PM      Liver Enzymes Recent Labs     01/19/23  0508 01/18/23  0308   TP  --  7.6   ALB 2.4* 2.9*   AP  --  51      Thyroid Studies Lab Results   Component Value Date/Time    TSH 4.15 (H) 01/17/2023 06:59 PM          Signed By: Oneida Velez PA-C     January 20, 2023

## 2023-01-20 NOTE — PROGRESS NOTES
[address] [facility] Pharmacokinetic Monitoring Service - Vancomycin    Consulting Provider: Meli Grimes MD   Indication: Pneumonia (HAP)  Target Concentration: Dosing based on anticipated concentration <15 mg/L due to renal impairment/insufficiency  Day of Therapy: 3 of 7  Additional Antimicrobials: Zosyn    Pertinent Laboratory Values: Wt Readings from Last 1 Encounters:   01/19/23 99.6 kg (219 lb 9.3 oz)     Temp Readings from Last 1 Encounters:   01/19/23 99.7 °F (37.6 °C)     Recent Labs     01/20/23  0420 01/19/23  1915 01/19/23  1010 01/19/23  0508   CREA 2.15* 2.04* 1.86* 1.90*   BUN 51* 49* 41* 40*   ]    Estimated Creatinine Clearance: 25.3 mL/min (A) (based on SCr of 1.9 mg/dL (H)).   Recent Labs     01/19/23  0508 01/19/23  0247   WBC 7.7 10.7     Pertinent Cultures:  Culture Date Source Results   01/19 Soutum Pending   01/12 Blood NO GROWTH 6 DAYS    MRSA Nasal Swab: awaiting result    Assessment:  Date/Time Current Dose Concentration Timing of Concentration (h) AUC   01/18 1958 2000 mg x 1 - - -   01/20 0420 - 15.4 32 -          Note: Serum concentrations collected for AUC dosing may appear elevated if collected in close proximity to the dose administered, this is not necessarily an indication of toxicity    Plan:  Vancomycin 1500 mg IV x 1 ordered  Vancomycin concentration ordered for 01/21 with AM labs  Pharmacy will continue to monitor patient and adjust therapy as indicated

## 2023-01-20 NOTE — PROGRESS NOTES
attended the interdisciplinary rounds for PATRIA SAHU [facility], who is a 82 y.o.,female. Patients Primary Language is: [place].    According to the patients EMR Sikh Affiliation is: [facility].     The reason the Patient came to the hospital is:   Patient Active Problem List    Diagnosis Date Noted    Acute respiratory failure with hypoxia ([address] 01/18/2023    COVID-19 01/18/2023    Atrial fibrillation with rapid ventricular response ([address] 01/17/2023    Acute on chronic heart failure ([address] 01/17/2023    COVID 01/17/2023    Heart failure ([address] 01/17/2023    COVID-19 virus infection 01/17/2023    Cellulitis 01/12/2023    Leukocytosis 12/21/2022    Pulmonary edema 12/21/2022    Dyspnea 12/21/2022    Atrial fibrillation with RVR ([address] 12/21/2022    Patient's noncompliance with other medical treatment and regimen due to unspecified reason 12/21/2022    Debility 12/21/2022    Atrial fibrillation ([address] 12/21/2022    Advanced care planning/counseling discussion 12/21/2022    Acute exacerbation of CHF (congestive heart failure) ([address] 10/07/2022    Sepsis ([address] 08/10/2022    Septic arthritis of foot ([address] 05/26/2022    Diabetic foot ulcer ([address] 05/26/2022    Septic joint ([address] 05/26/2022    Neuropathy 08/10/2021    Essential hypertension 07/07/2021    Bilateral lower extremity edema 07/07/2021    Hypercholesteremia 07/07/2021    History of fall 07/07/2021    Chronic bilateral low back pain without sciatica 07/07/2021    OAB (overactive bladder) 07/07/2021    Urinary incontinence 07/07/2021    Chronic diastolic congestive heart failure ([address] 03/10/2021    Obesity, morbid ([address] 12/15/2017    Type 2 diabetes mellitus with diabetic nephropathy, with long-term current use of insulin ([address] 12/15/2017    Dermatitis 08/23/2017    Acquired hypothyroidism 08/01/2016    Encounter for palliative care 04/21/2016        Plan:  Jessica Longo will continue to follow and will provide pastoral care on an as needed/requested basis.   recommends bedside caregivers page  on duty if patient shows signs of acute spiritual or emotional distress.     [address] [facility] Certified [address] [facility]   [phone]

## 2023-01-20 NOTE — PROGRESS NOTES
[facility]  DAILY PROGRESS NOTE      Patient:    April Casanova , 82 y.o. female   MRN:  [id number]  Room/Bed:  308/01  Admission Date:   1/17/2023  Code status:  DNR    Reason for Admission: Acute hypoxic respiratory failure, COVID-19 infection, A-fib with RVR, NSTEMI    ASSESSMENT AND PLAN:   Problem List Items Addressed This Visit          Circulatory    Atrial fibrillation ([address]       Respiratory    Acute respiratory failure with hypoxia (HCC)       Other    COVID     Other Visit Diagnoses       Acute on chronic heart failure with reduced ejection fraction and diastolic dysfunction (HCC)    -  Primary    NSTEMI (non-ST elevated myocardial infarction) ([address]        Goals of care, counseling/discussion [O28.90 (ICD-10-CM)]        Age-related physical debility Padverito.Alvin (ICD-10-CM)]                Following ICU management in preparation for transition of care     Acute hypoxic respiratory failure/COVID19 Infection/HFrEF (20-25%)  -Presented with SOB and HR in 200's.  Acute SOB most likely multifactorial given pt's comorbidities with recent COVID infection that has exacerbated the SOB  -Tested positive for COVID-19.  -Started on BIPAP in the ED and transitioned to NC, poorly tolerated and transitioned back to BIPAP, continued to be in respiratory distress and required intubation and admission to the ICU  -CXR (1/18)- progressive cardiogenic pulmonary edema and pleural effusions   -Leukocytosis: 16.7 on 1/19  Plan:  -Vent settings per ICU, fentanyl gtt  -PRN fentanyl 100 mcg IV   -Bumex 1 mg daily  -dexamethasone 6 mg IV daily   -Zosyn and Vancomycin IV   -ID consult  -daily CXR  -versed PRN  -atarax PRN  -follow sputum culture         Afib w/ RVR, HTN, NSTEMI  -On admission, pt tachycardic in the 200's  -EKG: Atrial fibrillation with rapid ventricular response   -Started on Cardizem drip in the ED due to HR in 200's, but decreased BP too fast so dc'd and started Amnio gtt  -OP meds: Eliquis 5mg BID for anticoagulation, Diltiazem and Lopressor for rate control and HTN    Plan:  -amiodarone gtt  -Metoprolol 50 mg BID  -aspirin 81 mg  -heparin gtt   -MAP >65 mmHg  -Cardiology consulted      DM with neuoropathy  -most recent A1c 11.6 in Jan 4 2023  -home meds: insulin glargine 20 U qhs, Metformin 1000 mg BID, Gabapentin 800 QHS      Plan:   -Q6h glucoses  -SSI   -hypoglycemia protocol  -gabapentin 400 mg daily         Hypothyroidism - stable  -meds: levo 150 mcg  -pt nonadherent with medications at home   Plan:   -levo 150 mcg        Chronic pain, Gout, stable  -home meds: previously on morphine 15mg q12 PRN  -Recently stated that she stopped taking morphine due to no longer going to pain management provider   Plan:   -continue allopurinol 100mg daily for gout ppx           Global:  Code: Full  IVF/Drips: Amiodarone, fentanyl, heparin  I/O / Wt: 99.8kg  Diet: NPO  Bowel Regimen,   DVT/AC: heparin gtt  Mobility: sedated  Anticipated LOS: 3-4 midnights     Point of Contact (relationship, number): Anurag Isabel [phone]         SUBJECTIVE:   Events of the last 24 hours:  No acute events overnight. Unable to perform a ROS as patient is currently intubated.       CURRENT INPATIENT MEDICATIONS:  Current Facility-Administered Medications   Medication Dose Route Frequency Provider Last Rate Last Admin    camphor-menthoL (SARNA) 0.5-0.5 % lotion   Topical TID Kushal Glass NP   1 Each at 01/19/23 2039    insulin glargine (LANTUS) injection 5 Units  5 Units SubCUTAneous QHS Propst, Lennox Justin, MD   5 Units at 01/19/23 2200    hydrOXYzine HCL (ATARAX) tablet 10 mg  10 mg Oral QID PRN Kunal Peguero MD   10 mg at 01/19/23 1637    glucose chewable tablet 16 g  16 g Oral PRN Beth King MD        glucagon [facility]) injection 1 mg  1 mg IntraMUSCular PRN Beth King MD        dextrose 10% infusion 0-250 mL  0-250 mL IntraVENous PRN Beth King MD        insulin lispro (HUMALOG) injection   SubCUTAneous Q6H Vinita Perkins PA-C   6 Units at 01/20/23 1172    midazolam (VERSED) injection 2 mg  2 mg IntraVENous Q10MIN PRN Eva Covington PA-C   2 mg at 01/19/23 4384    fentaNYL citrate (PF) injection 100 mcg  100 mcg IntraVENous Q30MIN PRN Annamarie RODRIGUEZ PA-C   100 mcg at 01/19/23 0404    chlorhexidine (PERIDEX) 0.12 % mouthwash 10 mL  10 mL Oral Q12H VINNIE'Eva Samuel PA-C   10 mL at 01/19/23 2040    albuterol-ipratropium (DUO-NEB) 2.5 MG-0.5 MG/3 ML  3 mL Nebulization Q4H PRN Eva Grant PA-C        famotidine (PF) (PEPCID) 20 mg in 0.9% sodium chloride 10 mL injection  20 mg IntraVENous DAILY Eva Grant PA-C   20 mg at 01/19/23 0922    heparin (porcine) 25,000 units in 0.45% saline 250 ml infusion  18-36 Units/kg/hr IntraVENous TITRATE [place] PA-C 6 mL/hr at 01/20/23 0745 6 Units/kg/hr at 01/20/23 0745    fentaNYL (PF) 10 mcg/mL infusion  0-200 mcg/hr IntraVENous TITRATE Jeana Meyers MD 12.5 mL/hr at 01/20/23 0730 125 mcg/hr at 01/20/23 0730    dexamethasone (DECADRON) 4 mg/mL injection 6 mg  6 mg IntraVENous Q24H Jeana Meyers MD   6 mg at 01/19/23 1807    piperacillin-tazobactam (ZOSYN) 3.375 g in 0.9% sodium chloride (MBP/ADV) 100 mL MBP  3.375 g IntraVENous Q8H Jeana Meyers MD 25 mL/hr at 01/20/23 0100 3.375 g at 01/20/23 0100    metoprolol tartrate (LOPRESSOR) tablet 50 mg  50 mg Oral Q12H Shyla Figueroa PA-C   50 mg at 01/19/23 0900    amiodarone (NEXTERONE) 360 mg in dextrose 200 mL (1.8 mg/mL) infusion  0.5-1 mg/min IntraVENous TITRATE Shayne Cisneros MD 16.7 mL/hr at 01/19/23 1429 0.5 mg/min at 01/19/23 1429    aspirin chewable tablet 81 mg  81 mg Oral DAILY Shayne Cisneros MD   81 mg at 01/19/23 8743    atorvastatin (LIPITOR) tablet 80 mg  80 mg Oral DAILY Shayne Cisneros MD   80 mg at 01/19/23 0932    bumetanide (BUMEX) tablet 1 mg  1 mg Oral DAILY Shayne Cisneros MD   1 mg at 01/19/23 0932    [Held by provider] dilTIAZem ER (CARDIZEM CD) capsule 180 mg  180 mg Oral DAILY Juan Alberto Adkins MD        levothyroxine (SYNTHROID) tablet 150 mcg  150 mcg Oral DAILY Juan Alberto Adkins MD   150 mcg at 01/19/23 0932    gabapentin (NEURONTIN) capsule 400 mg  400 mg Oral DAILY Juan Alberto Adkins MD   400 mg at 01/19/23 0901    allopurinoL (ZYLOPRIM) tablet 100 mg  100 mg Oral DAILY Juan Alberto Adkins MD   100 mg at 01/19/23 0902    sodium chloride (NS) flush 5-40 mL  5-40 mL IntraVENous Q8H Juan Alberto Adkins MD   10 mL at 01/19/23 1440    acetaminophen (TYLENOL) tablet 650 mg  650 mg Oral Q6H PRN Juan Alberto Adkins MD        Or    acetaminophen (TYLENOL) suppository 650 mg  650 mg Rectal Q6H PRN Juan Alberto Adkins MD        polyethylene glycol (MIRALAX) packet 17 g  17 g Oral DAILY PRN Juan Alberto Adkins MD        ondansetron (ZOFRAN ODT) tablet 4 mg  4 mg Oral Q8H PRN Juan Alberto Adkins MD        Or    ondansetron [facility]) injection 4 mg  4 mg IntraVENous Q6H PRN Juan Alberto Adkins MD           Allergies  No Known Allergies    OBJECTIVE:    Intake/Output Summary (Last 24 hours) at 1/20/2023 0848  Last data filed at 1/20/2023 0648  Gross per 24 hour   Intake 1349.36 ml   Output 520 ml   Net 829.36 ml       Visit Vitals  BP (!) 120/92   Pulse 99   Temp 98.2 °F (36.8 °C)   Resp 16   Ht 5' 4\" (1.626 m) Comment: Photo ID   Wt 99.6 kg (219 lb 9.3 oz)   SpO2 99%   Breastfeeding No   BMI 37.69 kg/m²       PHYSICAL EXAM  Gen: NAD, comfortable, obese, intubated  HEENT: normocephalic, atraumatic, MMM, no thyromegaly, no JVD  CV: irregularly irregular, S1/S2 present without M/R/G,   Pulm: limited due to immobility secondary to intubation, CTAB  Abd: S/NT/ND, no rebound, no guarding,  MSK: no clubbing, no edema  Skin: warm, dry, excoriations on all parts of body in multiple stages of healing  Neuro: CN II-XII grossly intact, no focal deficits appreciated   Psych: alert, opens eyes to voice and tracks movement    LABWORK (LAST 24 HOURS)  Recent Results (from the past 24 hour(s))   LACTIC ACID    Collection Time: 01/19/23 10:10 AM   Result Value Ref Range    Lactic acid 2.6 (HH) 0.4 - 2.0 MMOL/L   PROCALCITONIN    Collection Time: 01/19/23 10:10 AM   Result Value Ref Range    Procalcitonin 0.54 ng/mL   PTT    Collection Time: 01/19/23 10:10 AM   Result Value Ref Range    aPTT >180.0 (HH) 23.0 - 36.4 SEC   TROPONIN-HIGH SENSITIVITY    Collection Time: 01/19/23 10:10 AM   Result Value Ref Range    Troponin-High Sensitivity 1,592 (HH) 0 - 54 ng/L   METABOLIC PANEL, BASIC    Collection Time: 01/19/23 10:10 AM   Result Value Ref Range    Sodium 138 136 - 145 mmol/L    Potassium 4.6 3.5 - 5.5 mmol/L    Chloride 105 100 - 111 mmol/L    CO2 21 21 - 32 mmol/L    Anion gap 12 3.0 - 18 mmol/L    Glucose 202 (H) 74 - 99 mg/dL    BUN 41 (H) 7.0 - 18 MG/DL    Creatinine 1.86 (H) 0.6 - 1.3 MG/DL    BUN/Creatinine ratio 22 (H) 12 - 20      eGFR 26 (L) >60 ml/min/1.73m2    Calcium 8.2 (L) 8.5 - 10.1 MG/DL   GLUCOSE, POC    Collection Time: 01/19/23 11:59 AM   Result Value Ref Range    Glucose (POC) 215 (H) 70 - 110 mg/dL   CULTURE, RESPIRATORY/SPUTUM/BRONCH W GRAM STAIN    Collection Time: 01/19/23 12:20 PM    Specimen: Sputum   Result Value Ref Range    Special Requests: NO SPECIAL REQUESTS      GRAM STAIN 2+ WBCS SEEN      GRAM STAIN OCCASIONAL EPITHELIAL CELLS SEEN      GRAM STAIN NO ORGANISMS SEEN      Culture result: PENDING    GLUCOSE, POC    Collection Time: 01/19/23  5:02 PM   Result Value Ref Range    Glucose (POC) 199 (H) 70 - 110 mg/dL   PTT    Collection Time: 01/19/23  7:15 PM   Result Value Ref Range    aPTT 91.0 (H) 23.0 - 36.4 SEC   LACTIC ACID    Collection Time: 01/19/23  7:15 PM   Result Value Ref Range    Lactic acid 2.4 (HH) 0.4 - 2.0 MMOL/L   TROPONIN-HIGH SENSITIVITY    Collection Time: 01/19/23  7:15 PM   Result Value Ref Range    Troponin-High Sensitivity 1,493 (HH) 0 - 54 ng/L   METABOLIC PANEL, BASIC    Collection Time: 01/19/23  7:15 PM   Result Value Ref Range    Sodium 137 136 - 145 mmol/L    Potassium 4.2 3.5 - 5.5 mmol/L Chloride 104 100 - 111 mmol/L    CO2 23 21 - 32 mmol/L    Anion gap 10 3.0 - 18 mmol/L    Glucose 194 (H) 74 - 99 mg/dL    BUN 49 (H) 7.0 - 18 MG/DL    Creatinine 2.04 (H) 0.6 - 1.3 MG/DL    BUN/Creatinine ratio 24 (H) 12 - 20      eGFR 23 (L) >60 ml/min/1.73m2    Calcium 8.1 (L) 8.5 - 10.1 MG/DL   GLUCOSE, POC    Collection Time: 01/19/23 11:37 PM   Result Value Ref Range    Glucose (POC) 209 (H) 70 - 110 mg/dL   PTT    Collection Time: 01/20/23  4:20 AM   Result Value Ref Range    aPTT 161.0 (HH) 23.0 - 36.4 SEC   CBC WITH AUTOMATED DIFF    Collection Time: 01/20/23  4:20 AM   Result Value Ref Range    WBC 16.7 (H) 4.6 - 13.2 K/uL    RBC 3.84 (L) 4.20 - 5.30 M/uL    HGB 9.9 (L) 12.0 - 16.0 g/dL    HCT 32.6 (L) 35.0 - 45.0 %    MCV 84.9 78.0 - 100.0 FL    MCH 25.8 24.0 - 34.0 PG    MCHC 30.4 (L) 31.0 - 37.0 g/dL    RDW 16.6 (H) 11.6 - 14.5 %    PLATELET 861 869 - 615 K/uL    MPV 12.4 (H) 9.2 - 11.8 FL    NRBC 0.2 (H) 0  WBC    ABSOLUTE NRBC 0.03 (H) 0.00 - 0.01 K/uL    NEUTROPHILS 84 (H) 40 - 73 %    LYMPHOCYTES 10 (L) 21 - 52 %    MONOCYTES 4 3 - 10 %    EOSINOPHILS 0 0 - 5 %    BASOPHILS 0 0 - 2 %    IMMATURE GRANULOCYTES 2 (H) 0.0 - 0.5 %    ABS. NEUTROPHILS 14.0 (H) 1.8 - 8.0 K/UL    ABS. LYMPHOCYTES 1.6 0.9 - 3.6 K/UL    ABS. MONOCYTES 0.7 0.05 - 1.2 K/UL    ABS. EOSINOPHILS 0.0 0.0 - 0.4 K/UL    ABS. BASOPHILS 0.1 0.0 - 0.1 K/UL    ABS. IMM.  GRANS. 0.3 (H) 0.00 - 0.04 K/UL    DF AUTOMATED     METABOLIC PANEL, BASIC    Collection Time: 01/20/23  4:20 AM   Result Value Ref Range    Sodium 137 136 - 145 mmol/L    Potassium 4.2 3.5 - 5.5 mmol/L    Chloride 104 100 - 111 mmol/L    CO2 24 21 - 32 mmol/L    Anion gap 9 3.0 - 18 mmol/L    Glucose 248 (H) 74 - 99 mg/dL    BUN 51 (H) 7.0 - 18 MG/DL    Creatinine 2.15 (H) 0.6 - 1.3 MG/DL    BUN/Creatinine ratio 24 (H) 12 - 20      eGFR 22 (L) >60 ml/min/1.73m2    Calcium 7.3 (L) 8.5 - 10.1 MG/DL   MAGNESIUM    Collection Time: 01/20/23  4:20 AM   Result Value Ref Range    Magnesium 2.1 1.6 - 2.6 mg/dL   PHOSPHORUS    Collection Time: 01/20/23  4:20 AM   Result Value Ref Range    Phosphorus 4.9 2.5 - 4.9 MG/DL   LACTIC ACID    Collection Time: 01/20/23  4:20 AM   Result Value Ref Range    Lactic acid 1.6 0.4 - 2.0 MMOL/L   VANCOMYCIN, RANDOM    Collection Time: 01/20/23  4:20 AM   Result Value Ref Range    Vancomycin, random 15.4 5.0 - 40.0 UG/ML   BLOOD GAS, ARTERIAL POC    Collection Time: 01/20/23  4:42 AM   Result Value Ref Range    Device: ADULT VENT      FIO2 (POC) 40 %    pH (POC) 7.42 7.35 - 7.45      pCO2 (POC) 34.6 (L) 35.0 - 45.0 MMHG    pO2 (POC) 107 (H) 80 - 100 MMHG    HCO3 (POC) 22.5 22 - 26 MMOL/L    sO2 (POC) 98.3 (H) 92 - 97 %    Base deficit (POC) 1.6 mmol/L    Mode ASSIST CONTROL      Tidal volume 420 ml    Set Rate 16 bpm    PEEP/CPAP (POC) 5 cmH2O    Allens test (POC) Positive      Site RIGHT RADIAL      Specimen type (POC) ARTERIAL      Performed by Carolyn Samson    PTT    Collection Time: 01/20/23  6:00 AM   Result Value Ref Range    aPTT 166.0 (HH) 23.0 - 36.4 SEC   GLUCOSE, POC    Collection Time: 01/20/23  6:46 AM   Result Value Ref Range    Glucose (POC) 260 (H) 70 - 110 mg/dL       IMAGING AND PROCEDURES (LAST 36 HOURS)  No results found.    ================================================================  Further management for Ms. Wayman Schaumann will be discussed on rounds with my attending.       Maricarmen Tran MD, PGY-1  [facility]  January 20, 2023 6:39 AM

## 2023-01-20 NOTE — PROGRESS NOTES
Palliative Medicine     Palliative team spoke with Norma Mckeon on 1/19/2023. He confirmed DNR/DNI. He agrees to sign the completed POST form when he comes to visit this evening. Discussed with Bedside RN. POST form was placed in the front pocket of patient's chart. Thank you for this consult.      Gisell GALLEGOSN, RN, [facility]  Palliative Medicine Inpatient RN  Palliative COPE Line: [phone]

## 2023-01-21 NOTE — PROGRESS NOTES
Pt extubated to 4 LPM humidified NC following successful SBT and positive cuff leak test. Suctioned airway pre and post procedure. No complications, no signs of stridor or distress.

## 2023-01-21 NOTE — PROGRESS NOTES
Problem: Diabetes Self-Management  Goal: *Disease process and treatment process  Description: Define diabetes and identify own type of diabetes; list 3 options for treating diabetes. Outcome: Progressing Towards Goal  Goal: *Incorporating nutritional management into lifestyle  Description: Describe effect of type, amount and timing of food on blood glucose; list 3 methods for planning meals. Outcome: Progressing Towards Goal  Goal: *Incorporating physical activity into lifestyle  Description: State effect of exercise on blood glucose levels. Outcome: Progressing Towards Goal     Problem: Airway Clearance - Ineffective  Goal: Achieve or maintain patent airway  Outcome: Progressing Towards Goal     Problem: Gas Exchange - Impaired  Goal: Absence of hypoxia  Outcome: Progressing Towards Goal  Goal: Promote optimal lung function  Outcome: Progressing Towards Goal     Problem: Breathing Pattern - Ineffective  Goal: Ability to achieve and maintain a regular respiratory rate  Outcome: Progressing Towards Goal     Problem:  Body Temperature -  Risk of, Imbalanced  Goal: Ability to maintain a body temperature within defined limits  Outcome: Progressing Towards Goal  Goal: Will regain or maintain usual level of consciousness  Outcome: Progressing Towards Goal  Goal: Complications related to the disease process, condition or treatment will be avoided or minimized  Outcome: Progressing Towards Goal     Problem: Isolation Precautions - Risk of Spread of Infection  Goal: Prevent transmission of infectious organism to others  Outcome: Progressing Towards Goal     Problem: Fatigue  Goal: Verbalize increase energy and improved vitality  Outcome: Progressing Towards Goal     Problem: Non-Violent Restraints  Goal: Removal from restraints as soon as assessed to be safe  Outcome: Progressing Towards Goal  Goal: No harm/injury to patient while restraints in use  Outcome: Progressing Towards Goal  Goal: Patient's dignity will be maintained  Outcome: Progressing Towards Goal  Goal: Patient Interventions  Outcome: Progressing Towards Goal     Problem: Non-Violent Restraints  Goal: Removal from restraints as soon as assessed to be safe  Outcome: Progressing Towards Goal

## 2023-01-21 NOTE — PROGRESS NOTES
83 Y/O isolation Covid     Admitted 01/17/2023 c/o SOB 80% on RA, acute respiratory failure with hypoxia Irregular heart beat, Respiratory distress. Was hypertensive on arrival, hyperglycemic, and AQUILES  Denied chest pain     History: acquired hypothyroidism, asthma, bilateral lower extremity edema. 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, Back Surgery, Tahoophorectomy due to infection, resection of meningioma, bilateral knee replacement        Neuro disoriented, not following commands,     Repiratory RA  Cardiac A Fib amiodarone at 0.5 and heparin at 12 EF 20% inverted T waves on EKG dilated RV with reduced systolic function,     GI Hypothyroidism   cruz  Skin excoriation on upper arms and upper legs, bleeding from her bug bites she has been scratching even with restraints      Skin multiple excoriations on forearms bilaterally and abdomen leg edema bilateral arm edena received bumex oral and a cream for her bug bites placed on her two times              1910 bedside turnover given to me by [employer]. Pt is in bed on the cardiac telemetry monitor. Sbar, mar, ed summary given to me with a chance to ask questions. She is currently on SBT. Bed is in the lowest position with the wheels locked and bed alarm on for safety.

## 2023-01-21 NOTE — PROGRESS NOTES
Problem: Diabetes Self-Management  Goal: *Disease process and treatment process  Description: Define diabetes and identify own type of diabetes; list 3 options for treating diabetes. Outcome: Not Progressing Towards Goal     Problem: Diabetes Self-Management  Goal: *Using medications safely  Description: State effect of diabetes medications on diabetes; name diabetes medication taking, action and side effects. Outcome: Not Progressing Towards Goal     Problem: Ventilator Management  Goal: *Adequate oxygenation and ventilation  Outcome: Not Progressing Towards Goal  Goal: *Patient maintains clear airway/free of aspiration  Outcome: Not Progressing Towards Goal  Goal: *Absence of infection signs and symptoms  Outcome: Not Progressing Towards Goal  Goal: *Normal spontaneous ventilation  Outcome: Not Progressing Towards Goal     Problem: Airway Clearance - Ineffective  Goal: Achieve or maintain patent airway  Outcome: Not Progressing Towards Goal     Problem: Gas Exchange - Impaired  Goal: Absence of hypoxia  Outcome: Not Progressing Towards Goal  Goal: Promote optimal lung function  Outcome: Not Progressing Towards Goal     Problem: Breathing Pattern - Ineffective  Goal: Ability to achieve and maintain a regular respiratory rate  Outcome: Not Progressing Towards Goal     Problem:  Body Temperature -  Risk of, Imbalanced  Goal: Ability to maintain a body temperature within defined limits  Outcome: Not Progressing Towards Goal  Goal: Will regain or maintain usual level of consciousness  Outcome: Not Progressing Towards Goal  Goal: Complications related to the disease process, condition or treatment will be avoided or minimized  Outcome: Not Progressing Towards Goal     Problem: Isolation Precautions - Risk of Spread of Infection  Goal: Prevent transmission of infectious organism to others  Outcome: Not Progressing Towards Goal     Problem: Nutrition Deficits  Goal: Optimize nutrtional status  Outcome: Not Progressing Towards Goal     Problem: Risk for Fluid Volume Deficit  Goal: Maintain normal heart rhythm  Outcome: Not Progressing Towards Goal     Problem: Non-Violent Restraints  Goal: Removal from restraints as soon as assessed to be safe  Outcome: Not Progressing Towards Goal  Goal: No harm/injury to patient while restraints in use  Outcome: Not Progressing Towards Goal  Goal: Patient's dignity will be maintained  Outcome: Not Progressing Towards Goal  Goal: Patient Interventions  Outcome: Not Progressing Towards Goal     Problem: Falls - Risk of  Goal: *Absence of Falls  Description: Document Lisa Skill Fall Risk and appropriate interventions in the flowsheet.   Outcome: Not Progressing Towards Goal    Problem: Patient Education: Go to Patient Education Activity  Goal: Patient/Family Education  Outcome: Not Progressing Towards Goal

## 2023-01-21 NOTE — PROGRESS NOTES
Problem: Diabetes Self-Management  Goal: *Disease process and treatment process  Description: Define diabetes and identify own type of diabetes; list 3 options for treating diabetes. Outcome: Not Progressing Towards Goal     Problem: Diabetes Self-Management  Goal: *Incorporating nutritional management into lifestyle  Description: Describe effect of type, amount and timing of food on blood glucose; list 3 methods for planning meals. Outcome: Not Progressing Towards Goal     Problem: Ventilator Management  Goal: *Adequate oxygenation and ventilation  Outcome: Resolved/Met  Goal: *Patient maintains clear airway/free of aspiration  Outcome: Resolved/Met  Goal: *Absence of infection signs and symptoms  Outcome: Resolved/Met  Goal: *Normal spontaneous ventilation  Outcome: Resolved/Met     Problem: Airway Clearance - Ineffective  Goal: Achieve or maintain patent airway  Outcome: Progressing Towards Goal     Problem: Gas Exchange - Impaired  Goal: Absence of hypoxia  Outcome: Progressing Towards Goal  Goal: Promote optimal lung function  Outcome: Progressing Towards Goal     Problem: Breathing Pattern - Ineffective  Goal: Ability to achieve and maintain a regular respiratory rate  Outcome: Progressing Towards Goal     Problem: Body Temperature -  Risk of, Imbalanced  Goal: Ability to maintain a body temperature within defined limits  Outcome: Progressing Towards Goal  Goal: Will regain or maintain usual level of consciousness  Outcome: Progressing Towards Goal  Goal: Complications related to the disease process, condition or treatment will be avoided or minimized  Outcome: Progressing Towards Goal     Problem:  Body Temperature -  Risk of, Imbalanced  Goal: Ability to maintain a body temperature within defined limits  Outcome: Progressing Towards Goal  Goal: Will regain or maintain usual level of consciousness  Outcome: Progressing Towards Goal  Goal: Complications related to the disease process, condition or treatment will be avoided or minimized  Outcome: Progressing Towards Goal     Problem: Isolation Precautions - Risk of Spread of Infection  Goal: Prevent transmission of infectious organism to others  Outcome: Progressing Towards Goal     Problem: Nutrition Deficits  Goal: Optimize nutrtional status  Outcome: Progressing Towards Goal     Problem: Non-Violent Restraints  Goal: Removal from restraints as soon as assessed to be safe  Outcome: Resolved/Not Met  Goal: No harm/injury to patient while restraints in use  Outcome: Resolved/Not Met  Goal: Patient's dignity will be maintained  Outcome: Resolved/Not Met  Goal: Patient Interventions  Outcome: Resolved/Not Met     Problem: Falls - Risk of  Goal: *Absence of Falls  Description: Document Obdulio Fall Risk and appropriate interventions in the flowsheet. Outcome: Not Progressing Towards Goal    Problem: Nutrition Deficit  Goal: *Optimize nutritional status  Outcome: Not Progressing Towards Goal     Problem: Risk for Elopement  Goal: Patient will not exit the unit/facility without proper escort  Outcome: Not Progressing Towards Goal     Problem: Pressure Injury - Risk of  Goal: *Prevention of pressure injury  Description: Document Ravi Scale and appropriate interventions in the flowsheet.   Outcome: Not Progressing Towards Goal    Problem: Patient Education: Go to Patient Education Activity  Goal: Patient/Family Education  Outcome: Not Progressing Towards Goal

## 2023-01-21 NOTE — PROGRESS NOTES
[facility]  DAILY PROGRESS NOTE      Patient:    Nataliia Moscoso , 82 y.o. female   MRN:  [id number]  Room/Bed:  308/01  Admission Date:   1/17/2023  Code status:  DNR    Reason for Admission: Acute hypoxic respiratory failure, COVID-19 infection, A-fib with RVR, NSTEMI    ASSESSMENT AND PLAN:   Problem List Items Addressed This Visit          Circulatory    Atrial fibrillation ([address]       Respiratory    Acute respiratory failure with hypoxia (HCC)       Other    COVID     Other Visit Diagnoses       Acute on chronic heart failure with reduced ejection fraction and diastolic dysfunction (HCC)    -  Primary    NSTEMI (non-ST elevated myocardial infarction) ([facility]        Goals of care, counseling/discussion [S31.43 (ICD-10-CM)]        Age-related physical debility Ankit (ICD-10-CM)]                Following ICU management in preparation for transition of care     Acute hypoxic respiratory failure/COVID19 Infection/HFrEF (20-25%)  -Presented with SOB and HR in 200's.  Acute SOB most likely multifactorial given pt's comorbidities with recent COVID infection that has exacerbated the SOB  -Tested positive for COVID-19.  -Started on BIPAP in the ED and transitioned to NC, poorly tolerated and transitioned back to BIPAP, continued to be in respiratory distress and required intubation and admission to the ICU  -CXR (1/18)- progressive cardiogenic pulmonary edema and pleural effusions   -Leukocytosis: 16.7 on 1/19  Plan:  -Vent settings per ICU, fentanyl gtt  -PRN fentanyl 100 mcg IV   -Bumex 1 mg daily  -dexamethasone 6 mg IV daily   -Zosyn IV - d/c vancomycin   -ID consulted  -daily CXR  -versed PRN  -atarax PRN  -follow sputum culture         Afib w/ RVR, HTN, NSTEMI  -On admission, pt tachycardic in the 200's  -EKG: Atrial fibrillation with rapid ventricular response   -Started on Cardizem drip in the ED due to HR in 200's, but decreased BP too fast so dc'd and started Amnio gtt  -OP meds: Eliquis 5mg BID for anticoagulation, Diltiazem and Lopressor for rate control and HTN    Plan:  -amiodarone gtt  -Metoprolol 50 mg BID  -aspirin 81 mg  -heparin gtt   -MAP >65 mmHg  -Cardiology consulted      DM with neuoropathy  -most recent A1c 11.6 in Jan 4 2023  -home meds: insulin glargine 20 U qhs, Metformin 1000 mg BID, Gabapentin 800 QHS      Plan:   -Q6h glucoses  -SSI   -hypoglycemia protocol  -gabapentin 400 mg daily         Hypothyroidism - stable  -meds: levo 150 mcg  -pt nonadherent with medications at home   Plan:   -levo 150 mcg        Chronic pain, Gout, stable  -home meds: previously on morphine 15mg q12 PRN  -Recently stated that she stopped taking morphine due to no longer going to pain management provider   Plan:   -continue allopurinol 100mg daily for gout ppx           Global:  Code: DNR  IVF/Drips: Amiodarone, fentanyl, heparin  I/O / Wt: 99.8kg  Diet: Tube feeding   Bowel Regimen,   DVT/AC: heparin gtt  Mobility: sedated/restrained   Anticipated LOS: 3-4 midnights     Point of Contact (relationship, number): Yaa Nathan [phone]         SUBJECTIVE:   Events of the last 24 hours:  No acute events overnight. Unable to perform a ROS as patient is currently intubated.       CURRENT INPATIENT MEDICATIONS:  Current Facility-Administered Medications   Medication Dose Route Frequency Provider Last Rate Last Admin    insulin glargine (LANTUS) injection 10 Units  10 Units SubCUTAneous QHS Ever Garcias PA-C   10 Units at 01/20/23 2112    camphor-menthoL (SARNA) 0.5-0.5 % lotion   Topical TID Ford Kaufman NP   Given at 01/20/23 2200    hydrOXYzine HCL (ATARAX) tablet 10 mg  10 mg Oral QID PRN Binta Nieves MD   10 mg at 01/20/23 2000    glucose chewable tablet 16 g  16 g Oral PRN James Hi MD        glucagon [facility]) injection 1 mg  1 mg IntraMUSCular PRN James Hi MD        dextrose 10% infusion 0-250 mL  0-250 mL IntraVENous PRN James Hi MD        insulin lispro (HUMALOG) injection   SubCUTAneous Q6H Eva Grant PA-C   8 Units at 01/21/23 0602    midazolam (VERSED) injection 2 mg  2 mg IntraVENous Q10MIN PRN Eva Flores PA-C   2 mg at 01/19/23 0953    fentaNYL citrate (PF) injection 100 mcg  100 mcg IntraVENous Q30MIN PRN Eva Flores PA-C   100 mcg at 01/19/23 0404    chlorhexidine (PERIDEX) 0.12 % mouthwash 10 mL  10 mL Oral Q12H Eva Grant PA-C   10 mL at 01/20/23 2000    albuterol-ipratropium (DUO-NEB) 2.5 MG-0.5 MG/3 ML  3 mL Nebulization Q4H PRN Eva Grant PA-C        famotidine (PF) (PEPCID) 20 mg in 0.9% sodium chloride 10 mL injection  20 mg IntraVENous DAILY Eva Grant PA-C   20 mg at 01/20/23 0900    heparin (porcine) 25,000 units in 0.45% saline 250 ml infusion  18-36 Units/kg/hr IntraVENous TITRATE Jose Eduardo Nazario PA-C 7 mL/hr at 01/20/23 2350 7 Units/kg/hr at 01/20/23 2350    fentaNYL (PF) 10 mcg/mL infusion  0-200 mcg/hr IntraVENous TITRATE Daniela Wellington MD   Stopped at 01/20/23 1200    dexamethasone (DECADRON) 4 mg/mL injection 6 mg  6 mg IntraVENous Q24H Mckinley MCKEON MD   6 mg at 01/20/23 1800    piperacillin-tazobactam (ZOSYN) 3.375 g in 0.9% sodium chloride (MBP/ADV) 100 mL MBP  3.375 g IntraVENous Q8H Daniela Wellington MD 25 mL/hr at 01/21/23 0050 3.375 g at 01/21/23 0050    metoprolol tartrate (LOPRESSOR) tablet 50 mg  50 mg Oral Q12H Shyla Figueroa PA-C   50 mg at 01/20/23 2000    amiodarone (NEXTERONE) 360 mg in dextrose 200 mL (1.8 mg/mL) infusion  0.5-1 mg/min IntraVENous TITRATE Julee Colelo MD 16.7 mL/hr at 01/21/23 0410 0.5 mg/min at 01/21/23 0410    aspirin chewable tablet 81 mg  81 mg Oral DAILY Julee Coello MD   81 mg at 01/20/23 0900    atorvastatin (LIPITOR) tablet 80 mg  80 mg Oral DAILY Julee Coello MD   80 mg at 01/20/23 0900    bumetanide (BUMEX) tablet 1 mg  1 mg Oral DAILY Julee Coello MD   1 mg at 01/20/23 0900    [Held by provider] dilTIAZem ER (CARDIZEM CD) capsule 180 mg  180 mg Oral DAILY Manuel Mcdermott MD        levothyroxine (SYNTHROID) tablet 150 mcg  150 mcg Oral DAILY Manuel Mcdermott MD   150 mcg at 01/20/23 0900    gabapentin (NEURONTIN) capsule 400 mg  400 mg Oral DAILY Manuel Mcdermott MD   400 mg at 01/20/23 0900    allopurinoL (ZYLOPRIM) tablet 100 mg  100 mg Oral DAILY Manuel Mcdermott MD   100 mg at 01/20/23 0900    sodium chloride (NS) flush 5-40 mL  5-40 mL IntraVENous Q8H Manuel Mcdermott MD   10 mL at 01/21/23 0602    acetaminophen (TYLENOL) tablet 650 mg  650 mg Oral Q6H PRN Manuel Mcdermott MD        Or    acetaminophen (TYLENOL) suppository 650 mg  650 mg Rectal Q6H PRN Manuel Mcdermott MD        polyethylene glycol (MIRALAX) packet 17 g  17 g Oral DAILY PRN Manuel Mcdermott MD        ondansetron (ZOFRAN ODT) tablet 4 mg  4 mg Oral Q8H PRN Manuel Mcdermott MD        Or    ondansetron [facility]) injection 4 mg  4 mg IntraVENous Q6H PRN Manuel Mcdermott MD           Allergies  No Known Allergies    OBJECTIVE:    Intake/Output Summary (Last 24 hours) at 1/21/2023 0629  Last data filed at 1/21/2023 0608  Gross per 24 hour   Intake 2754.6 ml   Output 810 ml   Net 1944.6 ml         Visit Vitals  BP (!) 122/93   Pulse (!) 115   Temp 97.8 °F (36.6 °C)   Resp 16   Ht 5' 4\" (1.626 m) Comment: Photo ID   Wt 99.5 kg (219 lb 5.7 oz)   SpO2 100%   Breastfeeding No   BMI 37.65 kg/m²       PHYSICAL EXAM  Gen: NAD, comfortable, obese, intubated  HEENT: normocephalic, atraumatic, MMM, no thyromegaly, no JVD  CV: irregularly irregular, S1/S2 present without M/R/G,   Pulm: limited due to immobility secondary to intubation, CTAB  Abd: S/NT/ND, no rebound, no guarding,  MSK: no clubbing, no edema  Skin: warm, dry, excoriations on all parts of body in multiple stages of healing  Neuro: CN II-XII grossly intact, no focal deficits appreciated   Psych: alert, opens eyes to voice and tracks movement    LABWORK (LAST 24 HOURS)  Recent Results (from the past 24 hour(s))   GLUCOSE, POC    Collection Time: 01/20/23 6:46 AM   Result Value Ref Range    Glucose (POC) 260 (H) 70 - 110 mg/dL   GLUCOSE, POC    Collection Time: 01/20/23 11:17 AM   Result Value Ref Range    Glucose (POC) 289 (H) 70 - 110 mg/dL   PTT    Collection Time: 01/20/23  2:20 PM   Result Value Ref Range    aPTT 47.9 (H) 23.0 - 36.4 SEC   LACTIC ACID    Collection Time: 01/20/23  3:45 PM   Result Value Ref Range    Lactic acid 1.9 0.4 - 2.0 MMOL/L   GLUCOSE, POC    Collection Time: 01/20/23  5:15 PM   Result Value Ref Range    Glucose (POC) 313 (H) 70 - 110 mg/dL   PTT    Collection Time: 01/20/23  9:51 PM   Result Value Ref Range    aPTT >180.0 (HH) 23.0 - 36.4 SEC   LACTIC ACID    Collection Time: 01/20/23  9:51 PM   Result Value Ref Range    Lactic acid 1.8 0.4 - 2.0 MMOL/L   GLUCOSE, POC    Collection Time: 01/20/23 10:48 PM   Result Value Ref Range    Glucose (POC) 322 (H) 70 - 110 mg/dL   GLUCOSE, POC    Collection Time: 01/21/23  5:57 AM   Result Value Ref Range    Glucose (POC) 331 (H) 70 - 110 mg/dL       IMAGING AND PROCEDURES (LAST 36 HOURS)  No results found.    ================================================================  Further management for Ms. Baljit Shanks will be discussed on rounds with my attending.       Judie Esquivel MD, PGY-1  [facility]  January 21, 2023 6:39 AM

## 2023-01-21 NOTE — PROGRESS NOTES
[facility]. Pulmonary, Critical Care, and Sleep Medicine    Name: Davy Kaur MRN: [id number]   : 1935 Hospital: [address] [place] [address] Dr   Date: 2023  Admission Date: 2023     Chart and notes reviewed. Data reviewed. I have evaluated all findings. [x]I have reviewed the flowsheet and previous days notes. []The patient is unable to give any meaningful history or review of systems because the patient is:  []Intubated []Sedated   []Unresponsive      []The patient is critically ill on      []Mechanical ventilation []Pressors   []BiPAP []         Interval HPI:  Patient is a 82 y.o. female with a PMH of HFrEF last EF 20-25%, atrial fibrillation with RVR, T2DM, HTN, HLD, and hypothyroidism who presented to the [facility] ED on  due to shortness of breath. Patient found at home by EMS hypoxic to mid 80's and put on CPAP. Given oral nitroglycerin x3 and nitropaste. She was noted to be tachycardic to nearly 200 bpm in the ED in atrial fibrillation with RVR. In the ED she was initially placed on BiPAP with good respiratory response and started on diltiazem gtt. Following diltiazem gtt patient became hypotensive with MAPs in low 60's but remained persistently tachycardic. Then transitioned to amiodarone gtt. Blood pressure improved and was given lasix x1. Following BiPAP initiation ABG improved and she was transitioned to NC, however this was tolerated poorly and required resuming BiPAP. Despite BiPAP patient continued to be in respiratory distress and required intubation in the ED. Noted to be COVID positive. ICU was then contacted for admission and further management. Of note, patient left inpatient hospitalization AMA on  following admission for cellulitis in setting of lymphedema and atrial fibrillation with RVR.   Subjective 23  Hospital Day:4   Vent Day:  Overnight events:No acute events overnight  Mentation/Activity: Fentanyl ggt overnight, currently on sedation vacation  Respiratory/ Secretions:Minimal vent settings  Hemodynamics:stable  Urine output, bowel: UO:0.3 ml/kg/hr  Diet:Tube feeds, currently NPO for SBT  Need for procedures:n/a              ROS:Pertinent items are noted in HPI. Events and notes from last 24 hours reviewed.      Patient Active Problem List   Diagnosis Code    Encounter for palliative care Z51.5    Acquired hypothyroidism E03.9    Dermatitis L30.9    Obesity, morbid ([facility] E66.01    Type 2 diabetes mellitus with diabetic nephropathy, with long-term current use of insulin ([facility]) E11.21, Z79.4    Chronic diastolic congestive heart failure ([facility]) I50.32    Essential hypertension I10    Bilateral lower extremity edema R60.0    Hypercholesteremia E78.00    History of fall Z91.81    Chronic bilateral low back pain without sciatica M54.50, G89.29    OAB (overactive bladder) N32.81    Urinary incontinence R32    Neuropathy G62.9    Septic arthritis of foot ([facility]) M00.9    Diabetic foot ulcer ([facility]) E11.621, L97.509    Septic joint ([facility]) M00.9    Sepsis ([facility]) A41.9    Acute exacerbation of CHF (congestive heart failure) ([facility]) I50.9    Leukocytosis D72.829    Pulmonary edema J81.1    Dyspnea R06.00    Atrial fibrillation with RVR ([facility]) I48.91    Patient's noncompliance with other medical treatment and regimen due to unspecified reason Z91.199    Debility R53.81    Atrial fibrillation ([facility] I48.91    Advanced care planning/counseling discussion Z71.89    Cellulitis L03.90    Atrial fibrillation with rapid ventricular response ([facility]) I48.91    Acute on chronic heart failure ([facility]) I50.9    COVID U07.1    Heart failure ([facility]) I50.9    COVID-19 virus infection U07.1    Acute respiratory failure with hypoxia ([facility]) J96.01    COVID-19 U07.1       Vital Signs:  Visit Vitals  /78   Pulse (!) 103   Temp 97.8 °F (36.6 °C)   Resp 16   Ht 5' 4\" (1.626 m) Comment: Photo ID   Wt 99.5 kg (219 lb 5.7 oz)   SpO2 98%   Breastfeeding No   BMI 37.65 kg/m²       O2 Device: Endotracheal tube, Ventilator   O2 Flow Rate (L/min): 6 l/min   Temp (24hrs), Av.8 °F (36.6 °C), Min:96.8 °F (36 °C), Max:98.3 °F (36.8 °C)       Intake/Output:   Last shift:      No intake/output data recorded.   Last 3 shifts:  1901 -  0700  In: 3676.9 [I.V.:1701.9]  Out: 1010 [Urine:1010]    Intake/Output Summary (Last 24 hours) at 2023 1215  Last data filed at 2023 0608  Gross per 24 hour   Intake 2032.53 ml   Output 475 ml   Net 1557.53 ml          Current Facility-Administered Medications   Medication Dose Route Frequency    norepinephrine (LEVOPHED) 16 mg/250 mL (64 mcg/mL) in  mL infusion        insulin glargine (LANTUS) injection 10 Units  10 Units SubCUTAneous QHS    camphor-menthoL (SARNA) 0.5-0.5 % lotion   Topical TID    insulin lispro (HUMALOG) injection   SubCUTAneous Q6H    chlorhexidine (PERIDEX) 0.12 % mouthwash 10 mL  10 mL Oral Q12H    famotidine (PF) (PEPCID) 20 mg in 0.9% sodium chloride 10 mL injection  20 mg IntraVENous DAILY    heparin (porcine) 25,000 units in 0.45% saline 250 ml infusion  18-36 Units/kg/hr IntraVENous TITRATE    fentaNYL (PF) 10 mcg/mL infusion  0-200 mcg/hr IntraVENous TITRATE    dexamethasone (DECADRON) 4 mg/mL injection 6 mg  6 mg IntraVENous Q24H    piperacillin-tazobactam (ZOSYN) 3.375 g in 0.9% sodium chloride (MBP/ADV) 100 mL MBP  3.375 g IntraVENous Q8H    metoprolol tartrate (LOPRESSOR) tablet 50 mg  50 mg Oral Q12H    amiodarone (NEXTERONE) 360 mg in dextrose 200 mL (1.8 mg/mL) infusion  0.5-1 mg/min IntraVENous TITRATE    aspirin chewable tablet 81 mg  81 mg Oral DAILY    atorvastatin (LIPITOR) tablet 80 mg  80 mg Oral DAILY    bumetanide (BUMEX) tablet 1 mg  1 mg Oral DAILY    [Held by provider] dilTIAZem ER (CARDIZEM CD) capsule 180 mg  180 mg Oral DAILY    levothyroxine (SYNTHROID) tablet 150 mcg  150 mcg Oral DAILY    gabapentin (NEURONTIN) capsule 400 mg  400 mg Oral DAILY    allopurinoL (ZYLOPRIM) tablet 100 mg  100 mg Oral DAILY    sodium chloride (NS) flush 5-40 mL  5-40 mL IntraVENous Q8H         Telemetry: []Sinus []A-flutter []Paced    []A-fib []Multiple PVCs                  Physical Exam:      General: Intubated, no signs of distress  HEENT:  Anicteric sclerae; pink palpebral conjunctivae; mucosa moist  Resp:  Symmetrical chest expansion, no accessory muscle use; good air entry at apexes   CV:  S1, S2 present; irregularly irregular  GI:  Abdomen soft, non-tender; (+) active bowel sounds  Extremities:  +2 pulse, BLE edema,  right first toe amputated  Skin:  Warm; dry, linear scabs c/w either bed bug bites or excoriations  Neurologic:  Non-focal, awake and tracking  Devices:  ETT, OGT, Ramirez            DATA:  MAR reviewed and pertinent medications noted or modified as needed    Labs:  Recent Labs     01/21/23  0330 01/20/23  0420 01/19/23  0508   WBC 23.1* 16.7* 7.7   HGB 9.3* 9.9* 9.6*   HCT 30.7* 32.6* 31.0*    265 233     Recent Labs     01/21/23  0330 01/20/23  0420 01/19/23  1915 01/19/23  1010 01/19/23  0508 01/19/23  0247    137 137   < > 139 134*   K 4.1 4.2 4.2   < > 4.2 6.8*    104 104   < > 105 103   CO2 24 24 23   < > 24 26   * 248* 194*   < > 185* 154*   BUN 70* 51* 49*   < > 40* 38*   CREA 2.69* 2.15* 2.04*   < > 1.90* 1.90*   CA 7.9* 7.3* 8.1*   < > 8.2* 8.0*   MG 2.1 2.1  --   --   --  2.2   PHOS 5.0* 4.9  --   --   --  4.2   ALB  --   --   --   --  2.4*  --     < > = values in this interval not displayed. No results for input(s): PH, PCO2, PO2, HCO3, FIO2 in the last 72 hours.   Recent Labs     01/20/23  0442 01/19/23  0355   FIO2I 40 60   HCO3I 22.5 23.8   PCO2I 34.6* 40.0   PHI 7.42 7.38   PO2I 107* 87       Imaging:  [x]   I have personally reviewed the patients radiographs and reports  XR Results (most recent):  XR Results (most recent):  Results from Hospital Encounter encounter on 01/17/23    XR ABD (KUB)    Narrative  EXAMINATION: Abdomen single view    INDICATION: OG tube    COMPARISON: CT 8/10/2022    FINDINGS: Single frontal view. Lower most abdomen outside field-of-view. Esophagogastric tube tip at level of gastric fundus. Overall nonobstructive gas  pattern. No evidence of free air. Right upper quadrant surgical clips. Impression  Esophagogastric tube tip at gastric fundus level. CT Results (most recent):  Results from Hospital Encounter encounter on 10/07/22    CTA CHEST W OR W WO CONT    Narrative  CTA CHEST PULMONARY EMBOLISM PROTOCOL    INDICATION: Acute respiratory difficulty, heart failure, symptoms worsened over  3 days, chest pain, increased work of breathing. Question pulmonary embolism. TECHNIQUE: Thin collimation axial images obtained through the level of the  pulmonary arteries with additional imaging through the chest following the  uneventful administration of intravenous contrast.  Images reconstructed into  three dimensional coronal and sagittal projections for complete evaluation of  the tortuous and overlapping pulmonary vascular structures and to reduce patient  radiation dose. All CT scans at this facility are performed using dose optimization technique as  appropriate to a performed exam, to include automated exposure control,  adjustment of the mA and/or kV according to patient size (including appropriate  matching first site-specific examinations), or use of iterative reconstruction  technique. COMPARISON: 8/10/2022. FINDINGS:    No filling defects are appreciated within the main, left, right, lobar or  visualized segmental pulmonary arteries to suggest embolism. Thyroid: Unremarkable in its visualized aspects. Pericardium/ Heart: No significant effusion. Biatrial cardiac chamber  enlargement. Aorta/ Vessels: Mild to moderate aortic atherosclerosis. There is irregular  noncalcified mural plaque in the descending aorta. Lymph Nodes: Unremarkable. .    Lungs: There is hilar edema. Mild bronchial wall thickening.  Mild groundglass  and interstitial thickening. Mild mosaic attenuation. Mild to moderate dependent  atelectasis. Pleura: Moderate bilateral pleural effusions which are new. No pneumothorax. Upper Abdomen: Unremarkable. Bones/soft tissues: Osteopenia. Degenerative disc disease. Impression  1. No evidence for pulmonary emboli. 2.  Heart failure with biatrial cardiac chamber enlargement, moderate new  bilateral pleural effusions and mild pulmonary edema. 3.  Mild to moderate aortic atherosclerosis with irregular noncalcified mural  plaque along the wall of the descending aorta which increases risk of embolic  events. 01/17/23    ECHO ADULT FOLLOW-UP OR LIMITED 01/18/2023 1/18/2023    Interpretation Summary    Contrast used: Definity. Technical qualifiers: Echo study was technically difficult with poor endocardial visualization. Left Ventricle: Severely reduced left ventricular systolic function with a visually estimated EF of 20 - 25%. Left ventricle size is normal. Mildly increased wall thickness. Global hypokinesis present. Aortic Valve: Mild to moderate regurgitation. Mitral Valve: Moderate to severe regurgitation. Tricuspid Valve: Severe regurgitation. Pericardium: Left pleural effusion. Ascites present.     Signed by: Wing Carlos MD on 1/18/2023  3:58 PM       IMPRESSION:   Acute hypoxic respiratory failure  requiring mechanical ventilation- intubated 1/18  Atrial fibrillation with rapid ventricular response - anticoagulated on Eliquis outpatient, diagnosed 10/22  Acute on chronic heart failure - LVEF 20-25% 12/21/22, similar result on repeat echo on 1/18/23(LVEF 55% 8/22), possible tachycardia induced cardiomyopathy vs. Potential precipitating ischemic event, does not appear to have had recent catheterization for further characterization  NSTEMI - likely type II due to demand ischemia in setting of atrial fibrillation with RVR and CHF exacerbation  COVID-19  Pulmonary edema  AQUILES on CKD3  Anemia  Hypertension  Type 2 diabetes mellitus  Hypercholesterolemia  Hypothyroidism  Hx of tobacco use - cessation in 1976  Skin lesions c/w bed bug bites vs excoriations           Patient Active Problem List   Diagnosis Code    Encounter for palliative care Z51.5    Acquired hypothyroidism E03.9    Dermatitis L30.9    Obesity, morbid ([facility] E66.01    Type 2 diabetes mellitus with diabetic nephropathy, with long-term current use of insulin ([facility]) E11.21, Z79.4    Chronic diastolic congestive heart failure ([facility]) I50.32    Essential hypertension I10    Bilateral lower extremity edema R60.0    Hypercholesteremia E78.00    History of fall Z91.81    Chronic bilateral low back pain without sciatica M54.50, G89.29    OAB (overactive bladder) N32.81    Urinary incontinence R32    Neuropathy G62.9    Septic arthritis of foot ([facility]) M00.9    Diabetic foot ulcer ([facility]) E11.621, L97.509    Septic joint ([facility]) M00.9    Sepsis ([facility]) A41.9    Acute exacerbation of CHF (congestive heart failure) ([facility]) I50.9    Leukocytosis D72.829    Pulmonary edema J81.1    Dyspnea R06.00    Atrial fibrillation with RVR ([facility]) I48.91    Patient's noncompliance with other medical treatment and regimen due to unspecified reason Z91.199    Debility R53.81    Atrial fibrillation ([facility] I48.91    Advanced care planning/counseling discussion Z71.89    Cellulitis L03.90    Atrial fibrillation with rapid ventricular response ([facility]) I48.91    Acute on chronic heart failure ([facility]) I50.9    COVID U07.1    Heart failure ([facility]) I50.9    COVID-19 virus infection U07.1    Acute respiratory failure with hypoxia ([facility]) J96.01    COVID-19 U07.1        RECOMMENDATIONS:   Neuro:Titrate sedation for RASS goal 0 to -1, daily sedation holiday. Sedation with fentanyl gtt and midazolam PRN  Pulm: Aspiration precautions, HOB>30'. VAP Bundle, titrate FiO2 for goal SPO2 > 90%, PRN duonebs.  IV dexamethasone for COVID-19, SBT today  CVS: Metoprolol for rate control, Amiodarone gtt, & Heparin gtt. Continue Aspirin, and atorvastatin. Continue Bumex. HD monitoring with goal of MAP>65. Cardiology consulted  GI: NPO at this time for SBT  Renal: Trend Cr, daily BMP, UOP, strict I&Os. Suspect cardiorenal. Replace electrolytes PRN  Hem/Onc: Trend H/H, monitor for s/o active bleeding. Daily CBC. I/D:Trend WBCs and temperature curve. COVID-19 positive. Continue Zosyn, s/p Vanc. ID following  Metabolic: Daily BMP, mag, phos. Trend lytes and replace per protocol. Endocrine:Q6 glucoses. SSI. Avoid hypoglycemia. Lantus dose increased. . Continue home levothyroxine  Musc/Skin: Wound care, Sarna lotion for rash, PT/OT/SLP  DNR  Discussed in interdisciplinary rounds     Best practice :    Glycemic control  IHI ICU bundles: Cruz Bundle Followed and Vent Bundle Followed, Vent Day 2     [facility] Vent patients-    VAP bundle-Odilia tube to suction at 20-30 cm Hg, Maintain Jersey tube with 5-10ml air every 4 hours, Routine oral care every 4 hours, Elevation of head > 45 degree, Daily sedation holiday and SBT evaluation starting at 6.00am.  Stress ulcer prophylaxis. Pepcid  DVT prophylaxis. Heparin gtt  Need for Lines, cruz assessed. Palliative care evaluation. Restraints need. Attending Non-violent Restraint Reevaluation     I have reevaluated the patient one hour after initiation of intervention. The patient is comfortable, uninjured, but continues to pose an imminent risk of injury to self to themselves and/or serious disruption of medical treatment required to keep patient stable. The patient's current medical and behavioral conditions that warrant the use intervention include danger to self and Interference with medical equipment or treatment. Restraint or seclusion will be discontinued at the earliest possible time, regardless of the scheduled expiration of the order.     Based on my evaluation, restraints will be continued: Christopher Angel NP   01/21/23  Pulmonary, [facility]

## 2023-01-21 NOTE — PROGRESS NOTES
SBT initiated, PS 7, PEEP 5, FiO2 30%. Pt wakes and follows commands. 01/21/23 0822   Weaning Parameters   Spontaneous Breathing Trial Complete Yes   Resp Rate Observed 21   Ve 8.5      RSBI 53     Tolerating. Will monitor for signs of apnea or distress.

## 2023-01-21 NOTE — PROGRESS NOTES
01/20/23 2114   Patient Observations   Pulse (Heart Rate) (!) 103   Resp Rate 16   O2 Sat (%) 98 %   Airway - Continuous Aspiration of Subglottic Secretions (KHUSHI) Tube 01/18/23 Oral   Placement Date/Time: 01/18/23 0060   Number of Attempts: 1  Inserted By: Delvis Pike MD  Present on Admission/Arrival: No  Location: Oral  Placement Verified: Auscultation;BBS;EtCO2  Airway Types: Endotracheal, cuffed  Airway Tube Size: 7.5 mm   Insertion Depth (cm) 23 cm   Line Dominik Lips   Side Secured Device; Centered   Cuff Pressure   (mlt)   Site Assessment Clean, dry, & intact   Suction on Yes   Respiratory   Respiratory (WDL) X   Patient on Vent Yes - If patient is on vent, add Doc Flowsheet Ventilator (). Respiratory Pattern Regular   Upper Airway Sounds Coarse   Chest/Tracheal Assessment Chest expansion, symmetrical   Breath Sounds Bilateral Coarse   Cough Productive;Cough with suction   Airway Clearance   Suction ET Tube   Suction Device Inline suction catheter   Sputum Method Obtained Endotracheal   Sputum Amount Small   Sputum Color/Odor White; Tan   Sputum Consistency Thick   Ventilator Initiate/Discontinue   [employer] ID # [id number]   Vent Settings   FIO2 (%) 30 %   SpO2/FIO2 Ratio 326.67   CMV Rate Set 16   Back-Up Rate 16   Vt Set (ml) 420 ml   PEEP/VENT (cm H2O) 5 cm H20   Insp Time (sec) 0.8 sec   Insp Rise Time % 50 %   Flow Trigger 3   Ventilator Measurements   Resp Rate Observed 16   Vt Exhaled (Machine Breath) (ml) 380 ml   Ve Observed (l/min) 6.04 l/min   PIP Observed (cm H2O) 21 cm H2O   MAP (cm H2O) 8.9   I:E Ratio Actual 1:3.7   Safety & Alarms   Circuit Temperature 98.8 °F (37.1 °C)   Backup Mode Checked/Apnea Yes   Pressure Max 40 cm H2O   Pressure Min 9 cm H2O   Ve Min 2   Ve Max 20   Vt Min 200 ml   Vt Max 800 ml   RR Max 40   Ambu Bag Yes   Ambu Mask Yes   Age Specific Ventilator Associated Pneumonia Bundle   Patient Age Group Adult   Adult Ventilator Associated Pneumonia Bundle   Elevation of Head to 30-45 Degrees (Unless Contraindicated) Yes   Oxygen Therapy   Skin Assessment Clean, dry, & intact   Vent Method/Mode   Ventilation Method Conventional   Ventilator Mode VC+   Pulmonary Toilet   Pulmonary Toilet Suction;H. O.B elevated

## 2023-01-21 NOTE — ROUTINE PROCESS
Bedside and Verbal shift change report given to Corona Curiel RN (oncoming nurse) by Angel Hein RN (offgoing nurse). Report included the following information SBAR, Kardex, Intake/Output, MAR, Recent Results, Cardiac Rhythm Atrial Fib, and Alarm Parameters . Impression: Unspecified optic neuritis: H46.9. Plan: pt edu. order mri of brain and orbit due to va loss, pain, color changes. possible optic neuritis. pt will call with mri results. consider pcp/neuro eval at that time.

## 2023-01-22 NOTE — PROGRESS NOTES
[facility]   POST-ROUNDING NOTE    Assessment & Plan:    Accepting transfer of care of patient to telemetry bed from ICU. She was extubated on 1/21 and did well overnight.   -Changing Amiodarone to oral. Continue Metoprolol, Asa, Atrvastating, Bumex. Cardiology has been consulted. Consider restarting dilt   -Changing to Lovenox from heparin  -Continuing Dexamethasone for COVID  -Continue Zosyn, s/p Vanc. ID following  -Will monitor mental status and avoid sedating medications  -Adding topical triamcinolone  -Passed swallow study, Puréed diet nectar thickened liquids per speech    The above patient and plan were discussed with my supervising physician. See daily progress note for full assessment/plan.       Ricci Gillespie MD, PGY-1  [facility]  1/22/2023, 12:15 PM

## 2023-01-22 NOTE — PROGRESS NOTES
[facility]. Pulmonary, Critical Care, and Sleep Medicine    Name: Sandip Reilly MRN: [id number]   : 1935 Hospital: [address] [place] [address] Dr   Date: 2023  Admission Date: 2023     Chart and notes reviewed. Data reviewed. I have evaluated all findings. [x]I have reviewed the flowsheet and previous days notes. []The patient is unable to give any meaningful history or review of systems because the patient is:  []Intubated []Sedated   []Unresponsive      []The patient is critically ill on      []Mechanical ventilation []Pressors   []BiPAP []         Interval HPI:  Patient is a 82 y.o. female with a PMH of HFrEF last EF 20-25%, atrial fibrillation with RVR, T2DM, HTN, HLD, and hypothyroidism who presented to the [facility] ED on  due to shortness of breath. Patient found at home by EMS hypoxic to mid 80's and put on CPAP. Given oral nitroglycerin x3 and nitropaste. She was noted to be tachycardic to nearly 200 bpm in the ED in atrial fibrillation with RVR. In the ED she was initially placed on BiPAP with good respiratory response and started on diltiazem gtt. Following diltiazem gtt patient became hypotensive with MAPs in low 60's but remained persistently tachycardic. Then transitioned to amiodarone gtt. Blood pressure improved and was given lasix x1. Following BiPAP initiation ABG improved and she was transitioned to NC, however this was tolerated poorly and required resuming BiPAP. Despite BiPAP patient continued to be in respiratory distress and required intubation in the ED. Noted to be COVID positive. ICU was then contacted for admission and further management. Of note, patient left inpatient hospitalization AMA on  following admission for cellulitis in setting of lymphedema and atrial fibrillation with RVR.   Subjective 23  Hospital Day:5   Vent Day: extubated 2023  Overnight events:No acute events overnight  Mentation/Activity: Awake confused pleasant  Respiratory/ Secretions: Stable on nasal cannula  Hemodynamics:stable  Urine output, bowel: Adequate  Diet: Speech evaluation today puréed diet with nectar thick liquid  Need for procedures:n/a  Stable for transition of care to telemetry bed we will notify family medicine              ROS:Pertinent items are noted in HPI. Events and notes from last 24 hours reviewed.      Patient Active Problem List   Diagnosis Code    Encounter for palliative care Z51.5    Acquired hypothyroidism E03.9    Dermatitis L30.9    Obesity, morbid ([facility] E66.01    Type 2 diabetes mellitus with diabetic nephropathy, with long-term current use of insulin ([facility]) E11.21, Z79.4    Chronic diastolic congestive heart failure ([facility]) I50.32    Essential hypertension I10    Bilateral lower extremity edema R60.0    Hypercholesteremia E78.00    History of fall Z91.81    Chronic bilateral low back pain without sciatica M54.50, G89.29    OAB (overactive bladder) N32.81    Urinary incontinence R32    Neuropathy G62.9    Septic arthritis of foot ([facility]) M00.9    Diabetic foot ulcer ([facility]) E11.621, L97.509    Septic joint ([facility]) M00.9    Sepsis ([facility]) A41.9    Acute exacerbation of CHF (congestive heart failure) ([facility]) I50.9    Leukocytosis D72.829    Pulmonary edema J81.1    Dyspnea R06.00    Atrial fibrillation with RVR ([facility]) I48.91    Patient's noncompliance with other medical treatment and regimen due to unspecified reason Z91.199    Debility R53.81    Atrial fibrillation ([facility] I48.91    Advanced care planning/counseling discussion Z71.89    Cellulitis L03.90    Atrial fibrillation with rapid ventricular response ([facility]) I48.91    Acute on chronic heart failure ([facility]) I50.9    COVID U07.1    Heart failure ([facility]) I50.9    COVID-19 virus infection U07.1    Acute respiratory failure with hypoxia ([facility]) J96.01    COVID-19 U07.1       Vital Signs:  Visit Vitals  BP (!) 135/119   Pulse 92   Temp 98.2 °F (36.8 °C)   Resp 20   Ht 5' 4\" (1.626 m) Comment: Photo ID   Wt 99.5 kg (219 lb 5.7 oz)   SpO2 98%   Breastfeeding No   BMI 37.65 kg/m²       O2 Device: Nasal cannula, Humidifier   O2 Flow Rate (L/min): 3 l/min   Temp (24hrs), Av.9 °F (36.6 °C), Min:97.4 °F (36.3 °C), Max:98.2 °F (36.8 °C)       Intake/Output:   Last shift:      No intake/output data recorded.   Last 3 shifts:  1901 -  0700  In: 2677 [I.V.:1312]  Out: 1310 [Urine:1310]    Intake/Output Summary (Last 24 hours) at 2023 0956  Last data filed at 2023 0323  Gross per 24 hour   Intake 959.4 ml   Output 860 ml   Net 99.4 ml          Current Facility-Administered Medications   Medication Dose Route Frequency    insulin glargine (LANTUS) injection 15 Units  15 Units SubCUTAneous QHS    metoprolol (LOPRESSOR) injection 5 mg  5 mg IntraVENous Q6H    camphor-menthoL (SARNA) 0.5-0.5 % lotion   Topical TID    insulin lispro (HUMALOG) injection   SubCUTAneous Q6H    famotidine (PF) (PEPCID) 20 mg in 0.9% sodium chloride 10 mL injection  20 mg IntraVENous DAILY    heparin (porcine) 25,000 units in 0.45% saline 250 ml infusion  18-36 Units/kg/hr IntraVENous TITRATE    dexamethasone (DECADRON) 4 mg/mL injection 6 mg  6 mg IntraVENous Q24H    piperacillin-tazobactam (ZOSYN) 3.375 g in 0.9% sodium chloride (MBP/ADV) 100 mL MBP  3.375 g IntraVENous Q8H    [Held by provider] metoprolol tartrate (LOPRESSOR) tablet 50 mg  50 mg Oral Q12H    amiodarone (NEXTERONE) 360 mg in dextrose 200 mL (1.8 mg/mL) infusion  0.5-1 mg/min IntraVENous TITRATE    aspirin chewable tablet 81 mg  81 mg Oral DAILY    atorvastatin (LIPITOR) tablet 80 mg  80 mg Oral DAILY    bumetanide (BUMEX) tablet 1 mg  1 mg Oral DAILY    [Held by provider] dilTIAZem ER (CARDIZEM CD) capsule 180 mg  180 mg Oral DAILY    levothyroxine (SYNTHROID) tablet 150 mcg  150 mcg Oral DAILY    gabapentin (NEURONTIN) capsule 400 mg  400 mg Oral DAILY    allopurinoL (ZYLOPRIM) tablet 100 mg  100 mg Oral DAILY    sodium chloride (NS) flush 5-40 mL  5-40 mL IntraVENous Q8H         Telemetry: []Sinus []A-flutter []Paced    [x]A-fib []Multiple PVCs                  Physical Exam:      General: Awake pleasantly confused no distress on nasal cannula  HEENT:  Anicteric sclerae; pink palpebral conjunctivae; mucosa moist  Resp:  Symmetrical chest expansion, no accessory muscle use; good air entry at apexes   CV:  S1, S2 present; irregularly irregular  GI:  Abdomen soft, non-tender; (+) active bowel sounds  Extremities:  +2 pulse, BLE edema,  right first toe amputated  Skin:  Warm; dry, linear scabs c/w either bed bug bites or excoriations  Neurologic:  Non-focal, awake and tracking  Devices: Ramirez            DATA:  MAR reviewed and pertinent medications noted or modified as needed    Labs:  Recent Labs     01/22/23 0300 01/21/23 0330 01/20/23  0420   WBC 20.4* 23.1* 16.7*   HGB 9.3* 9.3* 9.9*   HCT 29.2* 30.7* 32.6*    233 265     Recent Labs     01/22/23 0300 01/21/23  0330 01/20/23  0420    137 137   K 4.3 4.1 4.2    103 104   CO2 26 24 24   * 313* 248*   BUN 78* 70* 51*   CREA 2.86* 2.69* 2.15*   CA 7.9* 7.9* 7.3*   MG 2.4 2.1 2.1   PHOS 5.2* 5.0* 4.9     No results for input(s): PH, PCO2, PO2, HCO3, FIO2 in the last 72 hours. Recent Labs     01/20/23  0442   FIO2I 40   HCO3I 22.5   PCO2I 34.6*   PHI 7.42   PO2I 107*       Imaging:  [x]   I have personally reviewed the patients radiographs and reports  XR Results (most recent):  XR Results (most recent):  Results from Hospital Encounter encounter on 01/17/23    XR ABD (KUB)    Narrative  EXAMINATION: Abdomen single view    INDICATION: OG tube    COMPARISON: CT 8/10/2022    FINDINGS: Single frontal view. Lower most abdomen outside field-of-view. Esophagogastric tube tip at level of gastric fundus. Overall nonobstructive gas  pattern. No evidence of free air. Right upper quadrant surgical clips. Impression  Esophagogastric tube tip at gastric fundus level.      CT Results (most recent):  Results from [place] encounter on 10/07/22    CTA CHEST W OR W WO CONT    Narrative  CTA CHEST PULMONARY EMBOLISM PROTOCOL    INDICATION: Acute respiratory difficulty, heart failure, symptoms worsened over  3 days, chest pain, increased work of breathing. Question pulmonary embolism. TECHNIQUE: Thin collimation axial images obtained through the level of the  pulmonary arteries with additional imaging through the chest following the  uneventful administration of intravenous contrast.  Images reconstructed into  three dimensional coronal and sagittal projections for complete evaluation of  the tortuous and overlapping pulmonary vascular structures and to reduce patient  radiation dose. All CT scans at this facility are performed using dose optimization technique as  appropriate to a performed exam, to include automated exposure control,  adjustment of the mA and/or kV according to patient size (including appropriate  matching first site-specific examinations), or use of iterative reconstruction  technique. COMPARISON: 8/10/2022. FINDINGS:    No filling defects are appreciated within the main, left, right, lobar or  visualized segmental pulmonary arteries to suggest embolism. Thyroid: Unremarkable in its visualized aspects. Pericardium/ Heart: No significant effusion. Biatrial cardiac chamber  enlargement. Aorta/ Vessels: Mild to moderate aortic atherosclerosis. There is irregular  noncalcified mural plaque in the descending aorta. Lymph Nodes: Unremarkable. .    Lungs: There is hilar edema. Mild bronchial wall thickening. Mild groundglass  and interstitial thickening. Mild mosaic attenuation. Mild to moderate dependent  atelectasis. Pleura: Moderate bilateral pleural effusions which are new. No pneumothorax. Upper Abdomen: Unremarkable. Bones/soft tissues: Osteopenia. Degenerative disc disease. Impression  1. No evidence for pulmonary emboli.   2.  Heart failure with biatrial cardiac chamber enlargement, moderate new  bilateral pleural effusions and mild pulmonary edema. 3.  Mild to moderate aortic atherosclerosis with irregular noncalcified mural  plaque along the wall of the descending aorta which increases risk of embolic  events. 01/17/23    ECHO ADULT FOLLOW-UP OR LIMITED 01/18/2023 1/18/2023    Interpretation Summary    Contrast used: Definity. Technical qualifiers: Echo study was technically difficult with poor endocardial visualization. Left Ventricle: Severely reduced left ventricular systolic function with a visually estimated EF of 20 - 25%. Left ventricle size is normal. Mildly increased wall thickness. Global hypokinesis present. Aortic Valve: Mild to moderate regurgitation. Mitral Valve: Moderate to severe regurgitation. Tricuspid Valve: Severe regurgitation. Pericardium: Left pleural effusion. Ascites present.     Signed by: Kymberly Booth MD on 1/18/2023  3:58 PM       IMPRESSION:   Acute hypoxic respiratory failure  requiring mechanical ventilation- intubated 1/18  Atrial fibrillation with rapid ventricular response - anticoagulated on Eliquis outpatient, diagnosed 10/22  Acute on chronic heart failure - LVEF 20-25% 12/21/22, similar result on repeat echo on 1/18/23(LVEF 55% 8/22), possible tachycardia induced cardiomyopathy vs. Potential precipitating ischemic event, does not appear to have had recent catheterization for further characterization  NSTEMI - likely type II due to demand ischemia in setting of atrial fibrillation with RVR and CHF exacerbation  COVID-19  Pulmonary edema  AQUILES on CKD3  Anemia  Hypertension  Type 2 diabetes mellitus  Hypercholesterolemia  Hypothyroidism  Hx of tobacco use - cessation in 1976  Skin lesions c/w bed bug bites vs excoriations           Patient Active Problem List   Diagnosis Code    Encounter for palliative care Z51.5    Acquired hypothyroidism E03.9    Dermatitis L30.9    Obesity, morbid ([address] E66.01    Type 2 diabetes mellitus with diabetic nephropathy, with long-term current use of insulin ([facility]) E11.21, Z79.4    Chronic diastolic congestive heart failure ([facility]) I50.32    Essential hypertension I10    Bilateral lower extremity edema R60.0    Hypercholesteremia E78.00    History of fall Z91.81    Chronic bilateral low back pain without sciatica M54.50, G89.29    OAB (overactive bladder) N32.81    Urinary incontinence R32    Neuropathy G62.9    Septic arthritis of foot ([facility]) M00.9    Diabetic foot ulcer ([facility]) E11.621, L97.509    Septic joint ([facility]) M00.9    Sepsis ([facility]) A41.9    Acute exacerbation of CHF (congestive heart failure) ([facility]) I50.9    Leukocytosis D72.829    Pulmonary edema J81.1    Dyspnea R06.00    Atrial fibrillation with RVR ([facility]) I48.91    Patient's noncompliance with other medical treatment and regimen due to unspecified reason Z91.199    Debility R53.81    Atrial fibrillation ([facility]) I48.91    Advanced care planning/counseling discussion Z71.89    Cellulitis L03.90    Atrial fibrillation with rapid ventricular response ([facility]) I48.91    Acute on chronic heart failure ([facility]) I50.9    COVID U07.1    Heart failure ([facility]) I50.9    COVID-19 virus infection U07.1    Acute respiratory failure with hypoxia ([facility]) J96.01    COVID-19 U07.1        RECOMMENDATIONS:   Neuro: Monitor mental status avoid sedating medications  Pulm: Aspiration precautions, HOB>30'. Supplemental FiO2 for goal SPO2 > 90%, PRN duonebs. IV dexamethasone for COVID-19,  CVS: Metoprolol for rate control, Amiodarone gtt, & Heparin gtt. Continue Aspirin, and atorvastatin. Continue Bumex. HD monitoring with goal of MAP>65. Cardiology consulted  GI: Puréed diet nectar thickened liquids per speech  Renal: Trend Cr, daily BMP, UOP, strict I&Os. Suspect cardiorenal. Replace electrolytes PRN  Hem/Onc: Trend H/H, monitor for s/o active bleeding. Daily CBC. I/D:Trend WBCs and temperature curve. COVID-19 positive. Continue Zosyn, s/p Vanc.  ID following  Metabolic: Daily BMP, mag, phos. Trend lytes and replace per protocol. Endocrine:Q6 glucoses. SSI. Avoid hypoglycemia. Lantus dose increased. . Continue home levothyroxine  Musc/Skin: Wound care, Sarna lotion for rash, PT/OT/SLP  DNR  Discussed in interdisciplinary rounds     Best practice :    Glycemic control  IHI ICU bundles: Cruz Bundle Followed    Stress ulcer prophylaxis. Pepcid  DVT prophylaxis. Heparin gtt  Need for Lines, cruz assessed. Palliative care evaluation. Restraints need.   Attending Non-violent Restraint Reevaluation   No restraints required    Based on my evaluation, restraints will be continued: No              Jeremiah Tsang PA-C   01/22/23  Pulmonary, Critical Care Medicine  [facility]

## 2023-01-22 NOTE — PROGRESS NOTES
Problem: Dysphagia (Adult)  Goal: *Acute Goals and Plan of Care (Insert Text)  Description: Patient will:  1. Tolerate PO trials with 0 s/s overt distress in 4/5 trials  2. Utilize compensatory swallow strategies/maneuvers (decrease bite/sip, size/rate, alt. liq/sol) with min cues in 4/5 trials  3. Perform oral-motor/laryngeal exercises to increase oropharyngeal swallow function with min cues  4. Complete an objective swallow study (i.e., MBSS) to assess swallow integrity, r/o aspiration, and determine of safest LRD, min A as indicated/ordered by MD     Recommend:   Puree,  nectar thick liquids  Meds crushed in puree  Aspiration precautions  HOB >45 degrees during all intake and for at least 30 min after po   Small bites/sips, slow rate of intake, alternating bites/sips  Oral care post meals   Outcome: Progressing Towards Goal       SPEECH LANGUAGE PATHOLOGY BEDSIDE SWALLOW   EVALUATION & TREATMENT     Patient: Tara Calhoun [de-identified]82 y.o. female)  Date: 1/22/2023  Primary Diagnosis: Atrial fibrillation with rapid ventricular response (HCC) [I48.91]  Acute on chronic heart failure (HCC) [I50.9]  COVID [U07.1]  Heart failure ([address] [I50.9]  Atrial fibrillation with RVR ([address] [I48.91]  COVID-19 virus infection [U07.1]  Acute respiratory failure with hypoxia (HCC) [J96.01]  COVID-19 [U07.1]  Precautions: aspiration     PLOF: as per H&P    ASSESSMENT :  Based on the objective data described below, the patient presents with mild-mod oral and suspected mild-mod pharyngeal dysphagia. Clinical bedside swallow eval completed per MD orders following pt extubation 1/21/23. Upon entry pt pleasantly confused, A&Ox self only perseverating on wanting coffee. OME (oral mech examination) revealed generalized weakness, structures adequate for deglutition/mastication. Pt edentulous. Presented with ice, thin liquid +/-straw, nectar thick liquids via cup sip, puree, and solid trials. Pt demonstrated wet cough with thin liquid trials. When given regular solid pt observed to attempt to self feed, however, she removed PO from oral cavity without initiating mastication. With puree trials pt exhibited adequate bolus cohesion, manipulation and A-P transit. Further exhibited functional swallow timing/reflex and hyolaryngeal excursion with positive oral clearance. Recommend puree diet with nectar thick liquids. Meds crushed in puree. Aspiration precautions. D/w Dr. Aakash Ramirez and RN Joana Norman. SLP following. TREATMENT :  Skilled therapy initiated; Educated pt on aspiration precautions and importance of compensatory swallow techniques to decrease aspiration risk (decrease rate of intake & sip/bite size, upright @HOB for all po intake and ~30 minutes after po); suspect limited comprehension. SLP to follow as indicated. Patient will benefit from skilled intervention to address the above impairments. Patient's rehabilitation potential is considered to be Fair  Factors which may influence rehabilitation potential include:   []            None noted  [x]            Mental ability/status  [x]            Medical condition  []            Home/family situation and support systems  [x]            Safety awareness  []            Pain tolerance/management  []            Other:      PLAN :  Recommendations and Planned Interventions:  See above  Frequency/Duration: Patient will be followed by speech-language pathology 1-2 times per day/3-5 days per week to address goals. SUBJECTIVE:   Patient stated Coffee.     OBJECTIVE:     Past Medical History:   Diagnosis Date    Acquired hypothyroidism 8/1/2016    Arthritis of right shoulder region 4/21/2016    Asthma     Bilateral lower extremity edema 7/7/2021    Chronic bilateral low back pain without sciatica 7/7/2021    Chronic diastolic congestive heart failure ([address] 3/10/2021    Chronic venous insufficiency     Diabetes ([address]     neuropathy    Essential hypertension 7/7/2021    Gout     History of depression 1/23/2017    History of fall 7/7/2021    Hypercholesteremia 7/7/2021    Hypertension     MI (myocardial infarction) ([facility]     OAB (overactive bladder) 7/7/2021    Peripheral neuropathy     Rheumatoid arthritis ([facility]     Tear of right rotator cuff 5/16/2016    Thyroid pain     Urinary incontinence 7/7/2021    Vertigo      Past Surgical History:   Procedure Laterality Date    HX AMPUTATION TOE Right 2020    Great toe Dr. 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 HX KNEE REPLACEMENT Bilateral      Diet prior to admission: unknown  Current Diet:  puree with nectar thick liquids     Cognitive and Communication Status:  Neurologic State: Alert, Confused  Orientation Level: Oriented to person  Cognition: Decreased attention/concentration, Decreased command following  Oral Assessment:  Oral Assessment  Labial: No impairment  Dentition: Edentulous  Oral Hygiene: fair  Lingual: No impairment  Velum: No impairment  Mandible: No impairment  P.O. Trials:  Patient Position: [facility] at [facility]  Vocal quality prior to P.O.: Low volume;Hoarse  Consistency Presented: Puree; Solid; Thin liquid; Nectar thick liquid; Ice chips  How Presented: Self-fed/presented;SLP-fed/presented;Cup/sip;Spoon; Successive swallows;Straw  Bolus Acceptance: No impairment  Bolus Formation/Control: Impaired  Type of Impairment: Delayed; Incomplete;Mastication  Propulsion: No impairment  Oral Residue: None  Initiation of Swallow: No impairment  Laryngeal Elevation: Decreased  Aspiration Signs/Symptoms: Change vocal quality;Weak cough  Pharyngeal Phase Characteristics: Altered vocal quality;Poor endurance; Suspected pharyngeal residue;Easily fatigued   Effective Modifications: Alternate liquids/solids;Small sips and bites;Cup/sip  Cues for Modifications:  Moderate  Oral Phase Severity: Mild-moderate  Pharyngeal Phase Severity : Mild-moderate (suspect)    PAIN:  Pain level pre-treatment: non reported/10   Pain level post-treatment: non reported/10   After treatment:   []            Patient left in no apparent distress sitting up in chair  [x]            Patient left in no apparent distress in bed  [x]            Call bell left within reach  [x]            Nursing notified  []            Family present  []            Caregiver present  []            Bed alarm activated    COMMUNICATION/EDUCATION:   [x]            Aspiration precautions; swallow safety; compensatory techniques. []            Patient/family have participated as able in goal setting and plan of care. []            Patient/family agree to work toward stated goals and plan of care. []            Patient understands intent and goals of therapy; neutral about participation. [x]            Patient unable to participate in goal setting/plan of care; educ ongoing with interdisciplinary staff  [x]         Posted safety precautions in patient's room.     Thank you for this referral.  Karyle Masker, M.S., [address] [facility]  Speech-Language Pathologist

## 2023-01-22 NOTE — PROGRESS NOTES
[facility]  DAILY PROGRESS NOTE      Patient:    Ginette Moses , 82 y.o. female   MRN:  [id number]  Room/Bed:  308/01  Admission Date:   1/17/2023  Code status:  DNR    Reason for Admission: Acute hypoxic respiratory failure, COVID-19 infection, A-fib with RVR, NSTEMI    ASSESSMENT AND PLAN:   Problem List Items Addressed This Visit          Circulatory    Atrial fibrillation ([address]       Respiratory    Acute respiratory failure with hypoxia (HCC)       Other    COVID     Other Visit Diagnoses       Acute on chronic heart failure with reduced ejection fraction and diastolic dysfunction (HCC)    -  Primary    NSTEMI (non-ST elevated myocardial infarction) ([address]        Goals of care, counseling/discussion [H61.61 (ICD-10-CM)]        Age-related physical debility [place] (ICD-10-CM)]                Following ICU management in preparation for transition of care     Acute hypoxic respiratory failure/COVID19 Infection/HFrEF (20-25%)  -Presented with SOB and HR in 200's.  Acute SOB most likely multifactorial given pt's comorbidities with recent COVID infection that has exacerbated the SOB  -Tested positive for COVID-19.  -Started on BIPAP in the ED and transitioned to NC, poorly tolerated and transitioned back to BIPAP, continued to be in respiratory distress and required intubation and admission to the ICU  -CXR (1/18)- progressive cardiogenic pulmonary edema and pleural effusions   -Leukocytosis: 16.7 on 1/19>23>20.4 on 1/22  -Extubated on 4L NC  Plan:  -PRN fentanyl 100 mcg IV   -Bumex 1 mg daily  -dexamethasone 6 mg IV daily   -Zosyn IV - d/c vancomycin   -ID consulted  -daily CXR  -versed PRN  -atarax PRN  -follow sputum culture         Afib w/ RVR, HTN, NSTEMI  -On admission, pt tachycardic in the 200's  -EKG: Atrial fibrillation with rapid ventricular response   -Started on Cardizem drip in the ED due to HR in 200's, but decreased BP too fast so dc'd and started Amnio gtt  -OP meds: Eliquis 5mg BID for anticoagulation, Diltiazem and Lopressor for rate control and HTN  Plan:  -amiodarone gtt  -Metoprolol 50 mg BID  -aspirin 81 mg  -heparin gtt   -MAP >65 mmHg  -Cardiology consulted      DM with neuoropathy  -most recent A1c 11.6 in Jan 4 2023  -home meds: insulin glargine 20 U qhs, Metformin 1000 mg BID, Gabapentin 800 QHS   Plan:   -Q6h glucoses  -SSI   -hypoglycemia protocol  -gabapentin 400 mg daily         Hypothyroidism - stable  -meds: levo 150 mcg  -pt nonadherent with medications at home   Plan:   -levothyroxine 150 mcg        Chronic pain, Gout, stable  -home meds: previously on morphine 15mg q12 PRN  -Recently stated that she stopped taking morphine due to no longer going to pain management provider   Plan:   -continue allopurinol 100mg daily for gout ppx           Global:  Code: DNR  IVF/Drips: Amiodarone, fentanyl, heparin  I/O / Wt: 99.8kg  Diet: Tube feeding   Bowel Regimen,   DVT/AC: heparin gtt  Mobility: sedated/restrained   Anticipated LOS: 3-4 midnights     Point of Contact (relationship, number): Dania Ness [phone]         SUBJECTIVE:   Events of the last 24 hours:  No acute events overnight. Requesting coffee and breakfast this morning.   Also feels cold        CURRENT INPATIENT MEDICATIONS:  Current Facility-Administered Medications   Medication Dose Route Frequency Provider Last Rate Last Admin    insulin glargine (LANTUS) injection 15 Units  15 Units SubCUTAneous QHS Rich Fonseca NP   15 Units at 01/21/23 2117    metoprolol (LOPRESSOR) injection 5 mg  5 mg IntraVENous Q6H Eva Grant PA-C   5 mg at 01/22/23 2974    albuterol/ipratropium (DUONEB) neb solution  1 Dose Nebulization Q4H PRN Darryl Kowalski MD        camphor-menthoL (SARNA) 0.5-0.5 % lotion   Topical TID Rich Fonseca NP   Given at 01/21/23 2200    hydrOXYzine HCL (ATARAX) tablet 10 mg  10 mg Oral QID PRN Jesus Alberto Marques MD   10 mg at 01/20/23 2000    glucose chewable tablet 16 g  16 g Oral PRN Brittany Bob MD        glucagon [facility]) injection 1 mg  1 mg IntraMUSCular PRN Brittany Bob MD        dextrose 10% infusion 0-250 mL  0-250 mL IntraVENous PRN Brittany Bob MD        insulin lispro (HUMALOG) injection   SubCUTAneous Q6H Eva Grant PA-C   2 Units at 01/22/23 0516    famotidine (PF) (PEPCID) 20 mg in 0.9% sodium chloride 10 mL injection  20 mg IntraVENous DAILY Eva Grant PA-C   20 mg at 01/21/23 0920    heparin (porcine) 25,000 units in 0.45% saline 250 ml infusion  18-36 Units/kg/hr IntraVENous TITRATE Le Nazario PA-C 9 mL/hr at 01/22/23 0430 9 Units/kg/hr at 01/22/23 0430    dexamethasone (DECADRON) 4 mg/mL injection 6 mg  6 mg IntraVENous Q24H Elissa Bassett MD   6 mg at 01/21/23 1730    piperacillin-tazobactam (ZOSYN) 3.375 g in 0.9% sodium chloride (MBP/ADV) 100 mL MBP  3.375 g IntraVENous Q8H Elissa Bassett MD 25 mL/hr at 01/22/23 0100 3.375 g at 01/22/23 0100    [Held by provider] metoprolol tartrate (LOPRESSOR) tablet 50 mg  50 mg Oral Q12H Shyla Figueroa PA-C   50 mg at 01/21/23 0920    amiodarone (NEXTERONE) 360 mg in dextrose 200 mL (1.8 mg/mL) infusion  0.5-1 mg/min IntraVENous TITRATE Brittany Bob MD 16.7 mL/hr at 01/22/23 0314 0.5 mg/min at 01/22/23 0314    aspirin chewable tablet 81 mg  81 mg Oral DAILY Brittany Bob MD   81 mg at 01/21/23 0920    atorvastatin (LIPITOR) tablet 80 mg  80 mg Oral DAILY Brittany Bob MD   80 mg at 01/21/23 0920    bumetanide (BUMEX) tablet 1 mg  1 mg Oral DAILY Brittany Bob MD   1 mg at 01/21/23 0920    [Held by provider] dilTIAZem ER (CARDIZEM CD) capsule 180 mg  180 mg Oral DAILY Brittany Bob MD        levothyroxine (SYNTHROID) tablet 150 mcg  150 mcg Oral DAILY Brittany Bob MD   150 mcg at 01/21/23 6712    gabapentin (NEURONTIN) capsule 400 mg  400 mg Oral DAILY Brittany Bob MD   400 mg at 01/21/23 8795    allopurinoL (ZYLOPRIM) tablet 100 mg  100 mg Oral DAILY Brittany Bob MD   100 mg at 01/21/23 0920    sodium chloride (NS) flush 5-40 mL  5-40 mL IntraVENous Q8H Mary Moreno MD   10 mL at 01/22/23 0517    acetaminophen (TYLENOL) tablet 650 mg  650 mg Oral Q6H PRN Mary Moreno MD        Or    acetaminophen (TYLENOL) suppository 650 mg  650 mg Rectal Q6H PRN Mary Moreno MD        polyethylene glycol (MIRALAX) packet 17 g  17 g Oral DAILY PRN Mary Moreno MD        ondansetron (ZOFRAN ODT) tablet 4 mg  4 mg Oral Q8H PRN Mary Moreno MD        Or    ondansetron [facility]) injection 4 mg  4 mg IntraVENous Q6H PRN Mary Moreno MD           Allergies  No Known Allergies    OBJECTIVE:    Intake/Output Summary (Last 24 hours) at 1/22/2023 0624  Last data filed at 1/22/2023 0323  Gross per 24 hour   Intake 1333.64 ml   Output 1060 ml   Net 273.64 ml         Visit Vitals  BP (!) 135/119   Pulse 92   Temp 98.2 °F (36.8 °C)   Resp 20   Ht 5' 4\" (1.626 m) Comment: Photo ID   Wt 99.5 kg (219 lb 5.7 oz)   SpO2 98%   Breastfeeding No   BMI 37.65 kg/m²       PHYSICAL EXAM  Gen: NAD, comfortable, obese, on NC. Requesting breakfast.   HEENT: normocephalic, atraumatic, MMM, no thyromegaly, no JVD  CV: irregularly irregular, S1/S2 present without M/R/G,   Pulm: limited due to immobility secondary to intubation, CTAB  Abd: S/NT/ND, no rebound, no guarding,  MSK: no clubbing, no edema  Skin: warm, dry, excoriations on all parts of body in multiple stages of healing  Neuro: CN II-XII grossly intact, no focal deficits appreciated   Psych: alert, awake, oriented to self. Difficult to assess due to hearing deficit.      LABWORK (LAST 24 HOURS)  Recent Results (from the past 24 hour(s))   PTT    Collection Time: 01/21/23  7:15 AM   Result Value Ref Range    aPTT 86.9 (H) 23.0 - 36.4 SEC   GLUCOSE, POC    Collection Time: 01/21/23 11:43 AM   Result Value Ref Range    Glucose (POC) 315 (H) 70 - 110 mg/dL   GLUCOSE, POC    Collection Time: 01/21/23  5:22 PM   Result Value Ref Range    Glucose (POC) 236 (H) 70 - 110 mg/dL   GLUCOSE, POC    Collection Time: 01/21/23 11:17 PM   Result Value Ref Range    Glucose (POC) 177 (H) 70 - 110 mg/dL   PTT    Collection Time: 01/22/23  3:00 AM   Result Value Ref Range    aPTT 64.3 (H) 23.0 - 36.4 SEC   MAGNESIUM    Collection Time: 01/22/23  3:00 AM   Result Value Ref Range    Magnesium 2.4 1.6 - 2.6 mg/dL   PHOSPHORUS    Collection Time: 01/22/23  3:00 AM   Result Value Ref Range    Phosphorus 5.2 (H) 2.5 - 4.9 MG/DL   CBC WITH AUTOMATED DIFF    Collection Time: 01/22/23  3:00 AM   Result Value Ref Range    WBC 20.4 (H) 4.6 - 13.2 K/uL    RBC 3.57 (L) 4.20 - 5.30 M/uL    HGB 9.3 (L) 12.0 - 16.0 g/dL    HCT 29.2 (L) 35.0 - 45.0 %    MCV 81.8 78.0 - 100.0 FL    MCH 26.1 24.0 - 34.0 PG    MCHC 31.8 31.0 - 37.0 g/dL    RDW 17.0 (H) 11.6 - 14.5 %    PLATELET 767 697 - 629 K/uL    MPV 12.7 (H) 9.2 - 11.8 FL    NRBC 0.1 (H) 0  WBC    ABSOLUTE NRBC 0.03 (H) 0.00 - 0.01 K/uL    NEUTROPHILS 96 (H) 40 - 73 %    BAND NEUTROPHILS 1 %    LYMPHOCYTES 2 (L) 21 - 52 %    MONOCYTES 1 (L) 3 - 10 %    EOSINOPHILS 0 0 - 5 %    BASOPHILS 0 0 - 2 %    IMMATURE GRANULOCYTES 0 0.0 - 0.5 %    ABS. NEUTROPHILS 19.8 (H) 1.8 - 8.0 K/UL    ABS. LYMPHOCYTES 0.4 (L) 0.9 - 3.6 K/UL    ABS. MONOCYTES 0.2 0.05 - 1.2 K/UL    ABS. EOSINOPHILS 0.0 0.0 - 0.4 K/UL    ABS. BASOPHILS 0.0 0.0 - 0.1 K/UL    ABS. IMM.  GRANS. 0.0 0.00 - 0.04 K/UL    DF MANUAL      PLATELET COMMENTS ADEQUATE PLATELETS      RBC COMMENTS ANISOCYTOSIS  1+        RBC COMMENTS POLYCHROMASIA  1+        RBC COMMENTS CASSIE CELLS  FEW       METABOLIC PANEL, BASIC    Collection Time: 01/22/23  3:00 AM   Result Value Ref Range    Sodium 138 136 - 145 mmol/L    Potassium 4.3 3.5 - 5.5 mmol/L    Chloride 104 100 - 111 mmol/L    CO2 26 21 - 32 mmol/L    Anion gap 8 3.0 - 18 mmol/L    Glucose 168 (H) 74 - 99 mg/dL    BUN 78 (H) 7.0 - 18 MG/DL    Creatinine 2.86 (H) 0.6 - 1.3 MG/DL    BUN/Creatinine ratio 27 (H) 12 - 20      eGFR 16 (L) >60 ml/min/1.73m2 Calcium 7.9 (L) 8.5 - 10.1 MG/DL   GLUCOSE, POC    Collection Time: 01/22/23  5:14 AM   Result Value Ref Range    Glucose (POC) 157 (H) 70 - 110 mg/dL       IMAGING AND PROCEDURES (LAST 36 HOURS)  No results found.    ================================================================  Further management for Ms. Keon Kiran will be discussed on rounds with my attending.       León Mcbride MD, PGY-1  [facility]  January 22, 2023 6:39 AM

## 2023-01-22 NOTE — ROUTINE PROCESS
Bedside and Verbal shift change report given to Corona Curiel RN (oncoming nurse) by Yudy Barros RN (offgoing nurse). Report included the following information SBAR, Kardex, Intake/Output, MAR, Recent Results, Cardiac Rhythm Atrial Fib RVR(controlling), and Alarm Parameters .

## 2023-01-22 NOTE — PROGRESS NOTES
Pharmacist Review and Automatic Dose Adjustment of Treatment Dose Enoxaparin    *Review reason for admission/hospital problem list*    **Where the below table indicates recommended, the pharmacist will contact the provider directly to discuss any necessary therapy changes. **    The reviewing pharmacist has made an adjustment to the ordered enoxaparin treatment dose per the approved [facility] protocol and table as identified below. Tara Calhoun is a 82 y.o. female. No lab exists for component: CREATININE    Estimated Creatinine Clearance: 16.5 mL/min (A) (based on SCr of 2.86 mg/dL (H)). Recent Labs     01/22/23  0300 01/21/23  0330   HGB 9.3* 9.3*   HCT 29.2* 30.7*    233     No results for input(s): INR, INREXT in the last 72 hours.     Height:   Ht Readings from Last 1 Encounters:   01/19/23 162.6 cm (64\")     Weight:  Wt Readings from Last 1 Encounters:   01/20/23 99.5 kg (219 lb 5.7 oz)       Enoxaparin Indication: AFib        Plan: Based upon the patient's weight and renal function, the ordered enoxaparin dose of 1 mg/kg (100 mg) q12h has been changed to 100 mg daily      Thank you,  Luis Georges, PHARMD  1/22/2023, 11:58 AM

## 2023-01-23 NOTE — ROUTINE PROCESS
TRANSFER - IN REPORT:    Verbal report received from DARBY Rivera(name) on Renee Ledesma  being received from ICU3(unit) for routine progression of care      Report consisted of patients Situation, Background, Assessment and   Recommendations(SBAR). Information from the following report(s) SBAR, Kardex, ED Summary, and MAR was reviewed with the receiving nurse. Opportunity for questions and clarification was provided. Assessment completed upon patients arrival to unit and care assumed.

## 2023-01-23 NOTE — PROGRESS NOTES
Pharmacy Monitoring/Intervention - Renal Dosing    An ordered medication has been automatically dosed per [employer] Renal Dosing Protocol for Renee Davies, 82 y.o., female:    Provider ordered medication: Piperacillin/tazobactam 3.375 gm iV q8h  Changed to: Zosyn 3.375 gm IV q12h    Pharmacy will follow daily and adjust medication as appropriate for renal function and/or serum levels. Thank you,     BARNEY Rockwell  Clinical Pharmacist        Weight Weight: 99.5 kg (219 lb 5.7 oz)   BMI Estimated body mass index is 37.65 kg/m² as calculated from the following:    Height as of this encounter: 162.6 cm (64\"). Weight as of this encounter: 99.5 kg (219 lb 5.7 oz). Temperature Temp: 97.8 °F (36.6 °C)  Temp (72hrs), Av.7 °F (36.5 °C), Min:96.6 °F (35.9 °C), Max:98.4 °F (36.9 °C)     Labs Recent Labs     23  0136 23  0300 23  0330   CREA 3.03* 2.86* 2.69*   BUN 83* 78* 70*   WBC 25.5* 20.4* 23.1*      CrCl Estimated Creatinine Clearance: 15.6 mL/min (A) (based on SCr of 3.03 mg/dL (H)). NOTE: This information is to be used to assist in clinical efficiency but is not a substitute for clinical judgement.

## 2023-01-23 NOTE — PROGRESS NOTES
Palliative Medicine      Palliative team members, Denisa Haddad, VANIA, JULIO Araujo and this writer for follow up visit. Ms Lakshmi Finley was able to the medically extubated and transferred out of the ICU. She is maintained on O2 via NC with good O2 Sats. She is alert and speaking with the nurses, but still with episodes of confusion. Signed POST from was not located in patient's chart. Call was placed to [employer]. He stated he was not able to visit due to work \"I'm still in [employer]". Introduced the use of [employer] for POST form completion. He stated he had access to this work mailed and provided that email [email or web address]. He confirmed DNR/DNI. He agrees to sign the completed POST Thank you for this consult. Naila Hopkins BSN, RN, [facility]  Palliative Medicine Inpatient RN  Palliative COPE Line: [phone]         Advanced Steps [address] (Physician Orders for Scope of Treatment)         UPDATE - 14:30 Signed POST from returned from Mr. [employer] and placed on chart for Scanning into [facility].      Advanced Steps® ACP Facilitator: Sumi Valle RN  Cardiopulmonary Resuscitation      Order Elected for CPR:  []  Attempt Resuscitation [x]  Do Not Attempt Resuscitation    When NOT in Cardiopulmonary Arrest, Order Elected:      [] Comfort Measures  [x] Limited Additional Interventions  [] Full Interventions    Artificially Administered Nutrition, Order Elected:    [x] No Feeding Tube   [] Feeding Tube for a defined trial period  [] Feeding Tube long-term if indicated    The following was provided (check all that apply):     [] Review of existing Advance Directive  [] Assistance with Completion of New Advance Directive   [x] Review of [place] POST Form       Meeting Outcomes:   [x] ACP discussion completed   [x] [facility] form completed  [x] [place] prepared for Provider review and signature   [x] Original placed on Chart, if in facility (form to be sent with patient at discharge)  [x] Copy given to healthcare agent    [x] Copy scanned to electronic medical record    If ACP discussion not completed, last interview topic discussed:       Follow-up plan:     [] Schedule follow-up conversation to continue planning   [] Referred individual to Provider for additional questions/concerns   [x] Advised patient/agent/surrogate to review completed POST form and update if needed with changes in condition, patient preferences or care setting     [] This note routed to one or more involved healthcare providers     Heriberto GALLEGOSN, RN, [facility]  Palliative Medicine Inpatient RN  Palliative COPE Line: [phone]

## 2023-01-23 NOTE — PROGRESS NOTES
Problem: Dysphagia (Adult)  Goal: *Acute Goals and Plan of Care (Insert Text)  Description:     Patient will:  1. Tolerate PO trials with 0 s/s overt distress in 4/5 trials  2. Utilize compensatory swallow strategies/maneuvers (decrease bite/sip, size/rate, alt. liq/sol) with min cues in 4/5 trials    Recommend:   Puree,  honey-thick liquids  Meds crushed in puree  Aspiration precautions  HOB >45 degrees during all intake and for at least 30 min after po   Small bites/sips, slow rate of intake, alternating bites/sips  Oral care post meals       Outcome: Not Progressing Towards Goal   SPEECH LANGUAGE PATHOLOGY DYSPHAGIA TREATMENT    Patient: Carmen Hsieh [de-identified]82 y.o. female)  Date: 1/23/2023  Diagnosis: Atrial fibrillation with rapid ventricular response (HCC) [I48.91]  Acute on chronic heart failure (HCC) [I50.9]  COVID [U07.1]  Heart failure ([address] [I50.9]  Atrial fibrillation with RVR ([address] [I48.91]  COVID-19 virus infection [U07.1]  Acute respiratory failure with hypoxia (HCC) [J96.01]  COVID-19 [U07.1] Acute on chronic heart failure (HCC)      Precautions: aspiration    PLOF: As per H&P      ASSESSMENT:  Pt was seen at bedside for follow up dysphagia management. Pt confused throughout eval and required min cues to redirect to task. Assisted pt with intake of puree and nectar-thick liquids from breakfast tray. Pt demo inconsistent change in RR and O2 sats throughout meal, specifically following nectar-thick intake. Improved tolerance of honey-thick liquids. Pt with labored mastication of puree consistency trials with positive oral clearance during cyclic ingestion. Recommend puree diet with honey-thick liquids, aspiration precautions, oral care TID, an meds crushed. ST will continue to follow for further dysphagia management.      Progression toward goals:  []         Improving appropriately and progressing toward goals  []         Improving slowly and progressing toward goals  [x]         Not making progress toward goals and plan of care will be adjusted     PLAN:  Recommendations and Planned Interventions: See above  Patient continues to benefit from skilled intervention to address the above impairments. Continue treatment per established plan of care. SUBJECTIVE:   Patient stated Jani Brome needed to help me in here.     OBJECTIVE:   Cognitive and Communication Status:  Neurologic State: Alert, Confused  Orientation Level: Oriented to person  Cognition: Decreased attention/concentration     Dysphagia Treatment:  Oral Assessment:  Oral Assessment  Labial: No impairment  Dentition: Edentulous  Oral Hygiene: fair  Lingual: No impairment  Velum: No impairment  Mandible: No impairment  P.O. Trials:   Patient Position: [facility] at [facility]   Vocal quality prior to P.O.: Low volume, Hoarse   Consistency Presented: Puree, Nectar thick liquid, honey-thick   How Presented: Self-fed/presented, SLP-fed/presented, Cup/sip, Spoon, Successive swallows, Straw   Bolus Acceptance: No impairment   Bolus Formation/Control: Impaired   Type of Impairment: Delayed, Incomplete, Mastication   Propulsion: No impairment   Oral Residue: None   Initiation of Swallow: No impairment   Laryngeal Elevation: Decreased   Aspiration Signs/Symptoms: Change vocal quality, Weak cough, decrease in O2   Pharyngeal Phase Characteristics: Altered vocal quality, Poor endurance, Suspected pharyngeal residue, Easily fatigued    Effective Modifications: Alternate liquids/solids, Small sips and bites, Cup/sip   Cues for Modifications:  Moderate       Oral Phase Severity: Mild-moderate   Pharyngeal Phase Severity : Mild-moderate (suspect)    PAIN:  Start of Tx: 0  End of Tx: 0     After treatment:   []              Patient left in no apparent distress sitting up in chair  [x]              Patient left in no apparent distress in bed  [x]              Call bell left within reach  [x]              Nursing notified  []              Family present  []              Caregiver present  []              Bed alarm activated    COMMUNICATION/EDUCATION:   [x] Aspiration precautions; swallow safety; compensatory techniques  [x]        Patient/family able to participate in training and education   []  Patient unable to participate in training and education, education ongoing with staff   [] Patient understands goals and intent of therapy; neutral about participation     Thank you for this referral.    Rory Ansari M.S., CCC-SLP/L  Speech-Language Pathologist

## 2023-01-23 NOTE — ROUTINE PROCESS
Bedside, Verbal, and Written shift change report given to DARBY Rodriguez (oncoming nurse) by Brenda Cisse (offgoing nurse). Report included the following information SBAR, Kardex, ED Summary, and MAR.

## 2023-01-23 NOTE — PROGRESS NOTES
[facility]  DAILY PROGRESS NOTE      Patient:    Nilsa Marcum , 82 y.o. female   MRN:  [id number]  Room/Bed:  368/01  Admission Date:   1/17/2023  Code status:  DNR    Reason for Admission: Acute hypoxic respiratory failure, COVID-19 infection, A-fib with RVR, NSTEMI    ASSESSMENT AND PLAN:   Problem List Items Addressed This Visit          Circulatory    Atrial fibrillation ([address]       Respiratory    Acute respiratory failure with hypoxia ([address]       Other    COVID     Other Visit Diagnoses       Acute on chronic heart failure with reduced ejection fraction and diastolic dysfunction (HCC)    -  Primary    NSTEMI (non-ST elevated myocardial infarction) ([address]        Goals of care, counseling/discussion [P24.76 (ICD-10-CM)]        Age-related physical debility Jared (ICD-10-CM)]                Following ICU management in preparation for transition of care     Acute hypoxic respiratory failure/COVID19 Infection/HFrEF (20-25%)  -Presented with SOB and HR in 200's. Acute SOB most likely multifactorial given pt's comorbidities with recent COVID infection that has exacerbated the SOB  -Tested positive for COVID-19.  -Started on BIPAP in the ED and transitioned to NC, poorly tolerated and transitioned back to BIPAP, continued to be in respiratory distress and required intubation and admission to the ICU  -CXR (1/18)- progressive cardiogenic pulmonary edema and pleural effusions   -Leukocytosis: 16.7 on 1/19>23>20.4 on 1/22  -Extubated on 4L NC>6L>3.5L  Plan:  -PRN fentanyl 100 mcg IV   -Bumex 1 mg daily  -dexamethasone 6 mg IV daily   -Zosyn IV   -ID consulted  -daily CXR  -versed PRN  -atarax PRN  -follow sputum culture   -Evaluate mental status, avoid sedating meds.          Afib w/ RVR, HTN, NSTEMI  -On admission, pt tachycardic in the 200's  -EKG: Atrial fibrillation with rapid ventricular response   -Started on Cardizem drip in the ED due to HR in 200's, but decreased BP too fast so dc'd and started Amnio gtt  -OP meds: Eliquis 5mg BID for anticoagulation, Diltiazem and Lopressor for rate control and HTN  Plan:  -amiodarone - oral  -Metoprolol 50 mg BID  -aspirin 81 mg  -MAP >65 mmHg  -Cardiology consulted   -Lovenox     DM with neuorpathy  -most recent A1c 11.6 in Jan 4 2023  -home meds: insulin glargine 20 U qhs, Metformin 1000 mg BID, Gabapentin 800 QHS   Plan:   -Q6h glucoses  -SSI   -hypoglycemia protocol  -gabapentin 400 mg daily         Hypothyroidism - stable  -meds: levo 150 mcg  -pt nonadherent with medications at home   Plan:   -levothyroxine 150 mcg        Chronic pain, Gout, stable  -home meds: previously on morphine 15mg q12 PRN  -Recently stated that she stopped taking morphine due to no longer going to pain management provider   Plan:   -continue allopurinol 100mg daily for gout ppx           Global:  Code: DNR  IVF/Drips: Amiodarone, fentanyl, heparin  I/O / Wt: 99.8kg  Diet: Tube feeding   Bowel Regimen,   DVT/AC: heparin gtt  Mobility: sedated/restrained   Anticipated LOS: 3-4 midnights     Point of Contact (relationship, number): Kj Yeager [phone]         SUBJECTIVE:   Events of the last 24 hours:  No acute events overnight. Seen at bedside. Believes there is someone in bed with her.    States that she is from [facility] and her  passed away, stating \"I live here now\"  Would not answer question \"where are we\" and 'what kind of building is this\"        CURRENT INPATIENT MEDICATIONS:  Current Facility-Administered Medications   Medication Dose Route Frequency Provider Last Rate Last Admin    dilTIAZem IR (CARDIZEM) tablet 60 mg  60 mg Oral QID Herrera Nash, DO   60 mg at 01/23/23 0459    amiodarone (CORDARONE) tablet 200 mg  200 mg Oral DAILY Rachael Chicas PA-C        enoxaparin (LOVENOX) injection 100 mg  1 mg/kg SubCUTAneous DAILY Krishan Cosme MD   100 mg at 01/22/23 1230    triamcinolone (ARISTOCORT) 0.5 % cream   Topical BID Lakeisha Gordon MD ipratropium-albuterol (COMBIVENT RESPIMAT) 20 mcg-100 mcg inhalation spray  1 Puff Inhalation Q4H PRN Antony Coulter MD   1 Puff at 23 0153    insulin glargine (LANTUS) injection 15 Units  15 Units SubCUTAneous QHS Hanh Shepard NP   15 Units at 23 2106    metoprolol (LOPRESSOR) injection 5 mg  5 mg IntraVENous Q6H Eva Grant PA-C   5 mg at 23 0335    camphor-menthoL (SARNA) 0.5-0.5 % lotion   Topical TID Hanh Shepard NP   Given at 23 0104    glucose chewable tablet 16 g  16 g Oral PRN Angelica Lam MD        glucagon (GLUCAGEN) injection 1 mg  1 mg IntraMUSCular PRN Angelica Lam MD        dextrose 10% infusion 0-250 mL  0-250 mL IntraVENous PRN Angelica Lam MD        insulin lispro (HUMALOG) injection   SubCUTAneous Q6H Eva Grant PA-C   2 Units at 23 0085    famotidine (PF) (PEPCID) 20 mg in 0.9% sodium chloride 10 mL injection  20 mg IntraVENous DAILY Eva Grant PA-C   20 mg at 23 1105    dexamethasone (DECADRON) 4 mg/mL injection 6 mg  6 mg IntraVENous Q24H Ling MCKEON MD   6 mg at 23 1841    piperacillin-tazobactam (ZOSYN) 3.375 g in 0.9% sodium chloride (MBP/ADV) 100 mL MBP  3.375 g IntraVENous Q8H Corey Mcbride MD 25 mL/hr at 23 0101 3.375 g at 23 0101    [Held by provider] metoprolol tartrate (LOPRESSOR) tablet 50 mg  50 mg Oral Q12H Shyla Figueroa PA-C   50 mg at 23 0920    aspirin chewable tablet 81 mg  81 mg Oral DAILY Angelica Lam MD   81 mg at 23 1105    atorvastatin (LIPITOR) tablet 80 mg  80 mg Oral DAILY Angelica Lam MD   80 mg at 23 1105    bumetanide (BUMEX) tablet 1 mg  1 mg Oral DAILY Angelica Lam MD   1 mg at 23 1105    levothyroxine (SYNTHROID) tablet 150 mcg  150 mcg Oral DAILY Angelica Lam MD   150 mcg at 23 110    gabapentin (NEURONTIN) capsule 400 mg  400 mg Oral DAILY Angelica Lam MD   400 mg at 23 110    allopurinoL (Elvera Lick) tablet 100 mg  100 mg Oral DAILY Salome Dang MD   100 mg at 01/22/23 1105    sodium chloride (NS) flush 5-40 mL  5-40 mL IntraVENous Q8H Salome Dang MD   10 mL at 01/22/23 2111    acetaminophen (TYLENOL) tablet 650 mg  650 mg Oral Q6H PRN Salome Dang MD        Or    acetaminophen (TYLENOL) suppository 650 mg  650 mg Rectal Q6H PRN Salome Dang MD        polyethylene glycol (MIRALAX) packet 17 g  17 g Oral DAILY PRN Salome Dang MD        ondansetron (ZOFRAN ODT) tablet 4 mg  4 mg Oral Q8H PRROSEY Dang MD        Or    ondansetron [facility]) injection 4 mg  4 mg IntraVENous Q6H PRROSEY Dang MD           Allergies  No Known Allergies    OBJECTIVE:    Intake/Output Summary (Last 24 hours) at 1/23/2023 0703  Last data filed at 1/23/2023 0547  Gross per 24 hour   Intake --   Output 500 ml   Net -500 ml         Visit Vitals  BP (!) 137/97 (BP 1 Location: Right arm, BP Patient Position: Semi fowlers; At rest)   Pulse (!) 118   Temp 97.8 °F (36.6 °C)   Resp 25   Ht 5' 4\" (1.626 m) Comment: Photo ID   Wt 99.5 kg (219 lb 5.7 oz)   SpO2 100%   Breastfeeding No   BMI 37.65 kg/m²       PHYSICAL EXAM  Gen: NAD, comfortable, obese, on NC.   HEENT: normocephalic, atraumatic, MMM, no thyromegaly, no JVD  CV: irregularly irregular, S1/S2 present without M/R/G,   Pulm: limited due to immobility secondary to intubation, CTAB  Abd: S/NT/ND, no rebound, no guarding,  MSK: no clubbing, no edema  Skin: warm, dry, excoriations on all parts of body in multiple stages of healing  Neuro: CN II-XII grossly intact, no focal deficits appreciated   Psych: alert, awake, oriented to self. Difficult to assess due to hearing deficit. Talking to someone in the room who is not there.      LABWORK (LAST 24 HOURS)  Recent Results (from the past 24 hour(s))   GLUCOSE, POC    Collection Time: 01/22/23 11:50 PM   Result Value Ref Range    Glucose (POC) 228 (H) 70 - 110 mg/dL   PTT    Collection Time: 01/23/23  1:36 AM   Result Value Ref Range    aPTT 32.4 23.0 - 36.4 SEC   MAGNESIUM    Collection Time: 01/23/23  1:36 AM   Result Value Ref Range    Magnesium 2.4 1.6 - 2.6 mg/dL   PHOSPHORUS    Collection Time: 01/23/23  1:36 AM   Result Value Ref Range    Phosphorus 6.7 (H) 2.5 - 4.9 MG/DL   CBC WITH AUTOMATED DIFF    Collection Time: 01/23/23  1:36 AM   Result Value Ref Range    WBC 25.5 (H) 4.6 - 13.2 K/uL    RBC 4.44 4.20 - 5.30 M/uL    HGB 11.2 (L) 12.0 - 16.0 g/dL    HCT 37.5 35.0 - 45.0 %    MCV 84.5 78.0 - 100.0 FL    MCH 25.2 24.0 - 34.0 PG    MCHC 29.9 (L) 31.0 - 37.0 g/dL    RDW 17.3 (H) 11.6 - 14.5 %    PLATELET 109 572 - 109 K/uL    MPV 13.6 (H) 9.2 - 11.8 FL    NRBC 0.5 (H) 0  WBC    ABSOLUTE NRBC 0.13 (H) 0.00 - 0.01 K/uL    NEUTROPHILS 96 (H) 40 - 73 %    LYMPHOCYTES 3 (L) 21 - 52 %    MONOCYTES 1 (L) 3 - 10 %    EOSINOPHILS 0 0 - 5 %    BASOPHILS 0 0 - 2 %    IMMATURE GRANULOCYTES 0 0.0 - 0.5 %    ABS. NEUTROPHILS 24.4 (H) 1.8 - 8.0 K/UL    ABS. LYMPHOCYTES 0.8 (L) 0.9 - 3.6 K/UL    ABS. MONOCYTES 0.3 0.05 - 1.2 K/UL    ABS. EOSINOPHILS 0.0 0.0 - 0.4 K/UL    ABS. BASOPHILS 0.0 0.0 - 0.1 K/UL    ABS. IMM.  GRANS. 0.0 0.00 - 0.04 K/UL    DF MANUAL      PLATELET COMMENTS ADEQUATE PLATELETS      RBC COMMENTS ANISOCYTOSIS  1+        RBC COMMENTS POLYCHROMASIA  1+        RBC COMMENTS CASSIE CELLS  1+        WBC COMMENTS SMUDGE CELLS SEEN     METABOLIC PANEL, BASIC    Collection Time: 01/23/23  1:36 AM   Result Value Ref Range    Sodium 136 136 - 145 mmol/L    Potassium 4.4 3.5 - 5.5 mmol/L    Chloride 102 100 - 111 mmol/L    CO2 21 21 - 32 mmol/L    Anion gap 13 3.0 - 18 mmol/L    Glucose 207 (H) 74 - 99 mg/dL    BUN 83 (H) 7.0 - 18 MG/DL    Creatinine 3.03 (H) 0.6 - 1.3 MG/DL    BUN/Creatinine ratio 27 (H) 12 - 20      eGFR 15 (L) >60 ml/min/1.73m2    Calcium 8.7 8.5 - 10.1 MG/DL   PROCALCITONIN    Collection Time: 01/23/23  1:36 AM   Result Value Ref Range    Procalcitonin 0.23 ng/mL   GLUCOSE, POC    Collection Time: 01/23/23  5:36 AM   Result Value Ref Range    Glucose (POC) 191 (H) 70 - 110 mg/dL       IMAGING AND PROCEDURES (LAST 36 HOURS)  No results found.    ================================================================  Further management for Ms. Sahil Reyes will be discussed on rounds with my attending.       Mike Coronel MD, PGY-1  [facility]  January 23, 2023 6:39 AM

## 2023-01-23 NOTE — PROGRESS NOTES
[facility] - Progress Note  Admit Date: 1/17/2023    Assessment:     -Acute hypoxic respiratory failure associated with COVID with underlying HFrEF and COPD, emergently intubated 1/18/2023 AM.  -Afib RVR, known Hx afib diagnosed 10/2022  -Elevated troponin, peaked at 2343  -HFrEF, recent diagnosis 12/2022  Echo 12/21/2022 with LVEF 20-25% with severe hypokinesis of basal anteroseptal, basal inferoseptal and apical septal walls, moderately dilated RV with reduced systolic function, severe biatrial enlargement  Repeat Echo this admission with LVEF 20-25%, no significant change from prior  -Hx RLE DVT 06/2022  -HTN  -DM  -HLD  -Hx medication noncompliance  -DNR status      Primary cardiologist is Dr. So Certain:     Patient now on oral amiodarone as well as diltiazem, A.fib rates stable. Will try to transition off the diltiazem and increase the metoprolol in it's place. Patient would likely benefit from heart catheterization to identify etiology of cardiomyopathy, however, creatinine still remains at 3, continue medical therapy. Patient is DNR, no need to pursue AICD/Lifevest.  Ongoing supportive care. Subjective:     No new complaints.      Objective:      Patient Vitals for the past 8 hrs:   Temp Pulse Resp BP SpO2   01/23/23 0949 98.6 °F (37 °C) (!) 105 18 119/80 99 %   01/23/23 0800 -- -- -- (!) 132/90 --   01/23/23 0544 -- (!) 118 -- -- --   01/23/23 0455 97.8 °F (36.6 °C) (!) 115 25 (!) 137/97 100 %   01/23/23 0407 97.3 °F (36.3 °C) (!) 105 25 130/85 95 %         Patient Vitals for the past 96 hrs:   Weight   01/20/23 2000 99.5 kg (219 lb 5.7 oz)   01/20/23 0700 99.6 kg (219 lb 9.3 oz)                    Intake/Output Summary (Last 24 hours) at 1/23/2023 1110  Last data filed at 1/23/2023 0547  Gross per 24 hour   Intake --   Output 500 ml   Net -500 ml       Physical Exam:  General:  alert, cooperative, no distress, appears stated age  Neck:  nontender  Lungs:  clear to auscultation bilaterally  Heart:  regular rate and rhythm, S1, S2 normal, no murmur, click, rub or gallop  Abdomen:  abdomen is soft without significant tenderness, masses, organomegaly or guarding  Extremities:  extremities normal, atraumatic, no cyanosis or edema    Data Review:     Labs: Results:       Chemistry Recent Labs     01/23/23  0136 01/22/23  0300 01/21/23  0330   * 168* 313*    138 137   K 4.4 4.3 4.1    104 103   CO2 21 26 24   BUN 83* 78* 70*   CREA 3.03* 2.86* 2.69*   CA 8.7 7.9* 7.9*   MG 2.4 2.4 2.1   PHOS 6.7* 5.2* 5.0*   AGAP 13 8 10   BUCR 27* 27* 26*      CBC w/Diff Recent Labs     01/23/23 0136 01/22/23  0300 01/21/23  0330   WBC 25.5* 20.4* 23.1*   RBC 4.44 3.57* 3.63*   HGB 11.2* 9.3* 9.3*   HCT 37.5 29.2* 30.7*    214 233   GRANS 96* 96* 88*   LYMPH 3* 2* 5*   EOS 0 0 0      Cardiac Enzymes No results found for: CPK, CK, CKMMB, CKMB, RCK3, CKMBT, CKNDX, CKND1, GABRIEL, TROPT, TROIQ, KRUPA, TROPT, TNIPOC, BNP, BNPP   Coagulation Recent Labs     01/23/23 0136 01/22/23  0300   APTT 32.4 64.3*       Lipid Panel Lab Results   Component Value Date/Time    Cholesterol, total 191 11/09/2021 12:24 PM    HDL Cholesterol 44 11/09/2021 12:24 PM    LDL, calculated 96.8 11/09/2021 12:24 PM    VLDL, calculated 50.2 11/09/2021 12:24 PM    Triglyceride 251 (H) 11/09/2021 12:24 PM    CHOL/HDL Ratio 4.3 11/09/2021 12:24 PM      BNP No results found for: BNP, BNPP, XBNPT   Liver Enzymes No results for input(s): TP, ALB, TBIL, AP in the last 72 hours.     No lab exists for component: SGOT, GPT, DBIL   Digoxin    Thyroid Studies Lab Results   Component Value Date/Time    TSH 4.15 (H) 01/17/2023 06:59 PM          Signed By: Uri Riley MD     January 23, 2023

## 2023-01-23 NOTE — ROUTINE PROCESS
2230hrs:  Pt arrived to unit via stretcher with RN and transport personnel x1.      2240hrs:  Pt in bed during Admission assessment, noted labored breathing. Pt came over from ICU on 2L NC, O2 sats at 84-87%. Pt's O2 increased to 4L NC, O2 sats at 87-89%. Pt's O2 increased to 6L NC, O2 sats at %. Wound Prevention Checklist    Patient: Salina Toro [de-identified]82 y.o. female)  Date: 1/23/2023  Diagnosis: Atrial fibrillation with rapid ventricular response (HCC) [I48.91]  Acute on chronic heart failure (HCC) [I50.9]  COVID [U07.1]  Heart failure (HCC) [I50.9]  Atrial fibrillation with RVR ([address] [I48.91]  COVID-19 virus infection [U07.1]  Acute respiratory failure with hypoxia (HCC) [J96.01]  COVID-19 [U07.1] Acute on chronic heart failure (HCC)    Precautions:         [x]  Heel prevention boots placed on patient    [x]  Patient turned q2h during shift    []  Lift team ordered    []  Patient on [facility] bed/Specialty bed    []  Each Wound is documented during shift (Stage, Color, drainage, odor, measurements, and dressings)    [x]  Dual skin check done with DARBY Mcmillan RN       2351hrs:  Pt was found in room with NC off and gown pulled down. Pt stated that she could not get the \"line\" over her ear and she \"does not feel too good\". O2 sats were 86%. 6L NC placed back on pt. O2 sats returned to 99%. Audible wheezing heard with crackles. RT consulted d/t pt condition. RN noted that pt has PRN Duo Neb, but per RT d/t pt's positive COVID/Droplet Plus status PRN Duo Neb should be switched to inhaler. Note sent to Pharmacy. Pt moved closer to [address] in room [address], with window for monitoring. Bret Garcia RN, RT, and Tele aware. Humidification added to O2; Sats at 100%. 0120hrs:  Contacted tele to inquire about pt's HR range. Pt has been 120-130s, sustaining 120-125s. Pt does not currently have PRN/A-FIB meds ordered.     Spoke to Nursing Supervisor to inquire about MD/attending and advised that [facility] Spoke with Dr. Meaghan Carrion regarding pt concerns, HR, and request for PRN AFIB meds. RN advised that pt's chart will be reviewed. No new orders received. 0315hrs:  Spoke with Dr. Meaghan Carrion. Pt's MAR updated to include scheduled PO Cardiezm IR. RN advised to give scheduled IV Lopressor, in addition to scheduled 0400hrs dose of PO Cardiezm.       RN also advised if after PO Lopressor dose, pt's HR is below 100BPM, to hold Cardiezm and contact MD.

## 2023-01-23 NOTE — PROGRESS NOTES
[facility] [place] [facility]: [phone]  [facility]: [phone]     Patient Name: Atif Ennis  YOB: 1935    Date of consult : 1/19/23  Date of follow-up visit:  1/23/2023  Reason for Consult: establish goals of care  Requesting Provider: Kai Ahumada MD    Primary Care Physician: Daysi Coughlin MD      SUMMARY:   Atif Enins is a 82 y.o. female with a past history of DM 2, diastolic congestive heart failure, CHF, HTN, obesity, atrial fibrillation with rapid ventricular response who was admitted on 1/17/2023 from home with a diagnosis of acute respiratory failure, COVID-19. Current medical issues leading to Palliative Medicine involvement include: Pt with a long term life limiting chronic disease process that warrants discussions about her goals of care. CHIEF COMPLAINT: Respiratory failure on ventilator and intubated    HPI/SUBJECTIVE:    Patient is a 63-year-old  female that lives with her boyfriend. She came in through the emergency department with complaints of shortness of breath lasting greater than 1 day. She arrived on CPAP by EMS with O2 saturations in the 80s percentile. She also had tachycardia with pulse of 181 bpm.  She rapidly decompensated and was ultimately intubated and placed on mechanical ventilation. Patient does have a durable DO NOT RESUSCITATE.    1/23/2023: Lying in bed with eyes closed, respirations 21-23, on nasal oxygen at  6 liters    The patient is:   [] Verbal and participatory  [x] Non-participatory due to: Intubated    GOALS OF CARE:  DNR/DNI, limited interventions, no feeding tube  Patient/Health Care Proxy Stated Goals: Prolong life      TREATMENT PREFERENCES:   Code Status: DNR/DNI         PALLIATIVE DIAGNOSES:   Goals of care/ACP  Acute respiratory failure  COVID-19  Debility age-related       PLAN:   1/23/2023:  Seen via window due to COVID-19 isolation along with Ms. Aileen Hurtado and Gary Gomez, MSW. Patient is lying in bed with eyes closed, respiratory rate 21-23 with abdominal muscle used observed. Nasal oxygen at 6 liters. Per chart review, noted patient was medically extubated on 1/21/2023 and transferred out of ICU. Unable to find POST form which was placed on chart for son's signature on 1/20/2023. Telephone call to patient's son, Kyle Herrera regarding POST form. Filled out another POST form for DNR/DNI, limited interventions and no feeding tube and sent to patient's son, Kyle Herrera via HIPAA compliant DocuSign for electronic signature. Received signed POST form back. Copy placed on chart for scanning. Goals of care are now DNR/DNI, limited interventions, no feeding tube. Please see below for previous conversations with the palliative medicine team:    Goals of care/ACP  This NP along with Nancy See RN observed patient through glass due to strict isolation. She is unable to participate in a goals of care conversation due to being intubated and mechanically ventilated. She has also been weaned from sedation and minimally interactive. Noted that patient is spontaneously opening her eyes and moving extremities. We have reached out to her son Kyle Herrera who is her medical power of . He was unaware that a durable DO NOT RESUSCITATE does not necessarily protect you from being emergently intubated for respiratory distress. We reviewed with him a POST which he wanted to complete. He has chosen DNR/DNI, limited interventions and limited feeding tube. He is unable at this moment to come in to sign we are placing the POST on the patient's chart for him to sign tomorrow evening when he is available after work. In the interim we are excepting a verbal from him for no reintubation. He is okay with attempts at medically liberating his mother from the ventilator with the understanding that she will not be reintubated.   Goals of care: DNR/DNI no reintubation post extubation for any reason  2. Acute respiratory failure  Patient intubated and mechanically ventilated with PEEP of 5 and FiO2 45% attempts over the next 24 to 48 hours to liberate her from the ventilator. She is being weaned today from sedation. 3.   COVID-19  PCR for COVID-19 SARS positive on 1/17/2023. Chest x-ray on admission shows low lung volumes with prominent interstitial lung markings. Trace blunting of the bilateral costophrenic angles findings are consistent with fluid overload/CHF exacerbation. 4.   Debility/age-related  Patient with a baseline palliative performance score of around 60% with some reduction in her ambulation she has full self-care full level of consciousness. 5.   Initial consult note routed to primary continuity provider  6. Communicated plan of care with: Palliative IDT      Advance Care Planning:  [] The [facility] Interdisciplinary Team has updated the ACP Navigator with [address] and Patient Capacity    Primary Decision [facility] ([address]): Supplemental (Other) Decision Noreen [facility] - [phone]    Medical Interventions: Limited additional interventions   Other Instructions:   Artificially Administered Nutrition: No feeding tube     As far as possible, the palliative care team has discussed with patient / health care proxy about goals of care / treatment preferences for patient.          HISTORY:     History obtained from: chart review   Principal Problem:    Acute on chronic heart failure ([address] (1/17/2023)    Active Problems:    Atrial fibrillation with RVR ([address] (12/21/2022)      Atrial fibrillation with rapid ventricular response ([address] (1/17/2023)      COVID (1/17/2023)      Heart failure ([address] (1/17/2023)      COVID-19 virus infection (1/17/2023)      Acute respiratory failure with hypoxia ([address] (1/18/2023)      COVID-19 (1/18/2023)    Past Medical History:   Diagnosis Date    Acquired hypothyroidism 8/1/2016 Arthritis of right shoulder region 2016    Asthma     Bilateral lower extremity edema 2021    Chronic bilateral low back pain without sciatica 2021    Chronic diastolic congestive heart failure ([facility] 3/10/2021    Chronic venous insufficiency     Diabetes (HCC)     neuropathy    Essential hypertension 2021    Gout     History of depression 2017    History of fall 2021    Hypercholesteremia 2021    Hypertension     MI (myocardial infarction) ([facility]     OAB (overactive bladder) 2021    Peripheral neuropathy     Rheumatoid arthritis ([facility] [address]     Tear of right rotator cuff 2016    Thyroid pain     Urinary incontinence 2021    Vertigo       Past Surgical History:   Procedure Laterality Date    HX AMPUTATION TOE Right     Great toe Dr. Meli Kelly [address]      HX CHOLECYSTECTOMY      HX GYN      TAHOophorectomy due to infection    HX HEENT      resection of memegioma in the . HX KNEE REPLACEMENT Bilateral       Family History   Problem Relation Age of Onset    Heart Attack Mother     Heart Attack Father      History reviewed, no pertinent family history.   Social History     Tobacco Use    Smoking status: Former     Types: Cigarettes     Quit date:      Years since quittin.0    Smokeless tobacco: Never    Tobacco comments:     quit 45 years ago   Substance Use Topics    Alcohol use: No     Alcohol/week: 0.0 standard drinks     No Known Allergies   Current Facility-Administered Medications   Medication Dose Route Frequency    dilTIAZem IR (CARDIZEM) tablet 60 mg  60 mg Oral QID    piperacillin-tazobactam (ZOSYN) 3.375 g in 0.9% sodium chloride (MBP/ADV) 100 mL MBP  3.375 g IntraVENous Q12H    acetaminophen (TYLENOL) tablet 650 mg  650 mg Oral Q6H    amiodarone (CORDARONE) tablet 200 mg  200 mg Oral DAILY    enoxaparin (LOVENOX) injection 100 mg  1 mg/kg SubCUTAneous DAILY    triamcinolone (ARISTOCORT) 0.5 % cream   Topical BID    ipratropium-albuterol (COMBIVENT RESPIMAT) 20 mcg-100 mcg inhalation spray  1 Puff Inhalation Q4H PRN    insulin glargine (LANTUS) injection 15 Units  15 Units SubCUTAneous QHS    metoprolol (LOPRESSOR) injection 5 mg  5 mg IntraVENous Q6H    camphor-menthoL (SARNA) 0.5-0.5 % lotion   Topical TID    glucose chewable tablet 16 g  16 g Oral PRN    glucagon (GLUCAGEN) injection 1 mg  1 mg IntraMUSCular PRN    dextrose 10% infusion 0-250 mL  0-250 mL IntraVENous PRN    insulin lispro (HUMALOG) injection   SubCUTAneous Q6H    famotidine (PF) (PEPCID) 20 mg in 0.9% sodium chloride 10 mL injection  20 mg IntraVENous DAILY    dexamethasone (DECADRON) 4 mg/mL injection 6 mg  6 mg IntraVENous Q24H    [Held by provider] metoprolol tartrate (LOPRESSOR) tablet 50 mg  50 mg Oral Q12H    aspirin chewable tablet 81 mg  81 mg Oral DAILY    atorvastatin (LIPITOR) tablet 80 mg  80 mg Oral DAILY    [Held by provider] bumetanide (BUMEX) tablet 1 mg  1 mg Oral DAILY    levothyroxine (SYNTHROID) tablet 150 mcg  150 mcg Oral DAILY    gabapentin (NEURONTIN) capsule 400 mg  400 mg Oral DAILY    allopurinoL (ZYLOPRIM) tablet 100 mg  100 mg Oral DAILY    sodium chloride (NS) flush 5-40 mL  5-40 mL IntraVENous Q8H    acetaminophen (TYLENOL) tablet 650 mg  650 mg Oral Q6H PRN    Or    acetaminophen (TYLENOL) suppository 650 mg  650 mg Rectal Q6H PRN    polyethylene glycol (MIRALAX) packet 17 g  17 g Oral DAILY PRN    ondansetron (ZOFRAN ODT) tablet 4 mg  4 mg Oral Q8H PRN    Or    ondansetron (ZOFRAN) injection 4 mg  4 mg IntraVENous Q6H PRN          Clinical Pain Assessment (nonverbal scale for nonverbal patients): Clinical Pain Assessment  Severity: 0     Activity (Movement): Lying quietly, normal position    Duration: for how long has pt been experiencing pain (e.g., 2 days, 1 month, years)  Frequency: how often pain is an issue (e.g., several times per day, once every few days, constant)     FUNCTIONAL ASSESSMENT:     Palliative Performance Scale (PPS):  PPS: 40    ECOG  ECOG Status : Limited self-care     PSYCHOSOCIAL/SPIRITUAL SCREENING:      Any spiritual / Zoroastrian concerns:  unable to assess  [] Yes /  [] No    Caregiver Burnout:  [] Yes /  [] No /  [x] No Caregiver Present      Anticipatory grief assessment: unable to assess  [] Normal  / [] Maladaptive        REVIEW OF SYSTEMS:     Systems: constitutional, ears/nose/mouth/throat, respiratory, gastrointestinal, genitourinary, musculoskeletal, integumentary, neurologic, psychiatric, endocrine. Positive findings noted below. Modified ESAS Completed by: provider           Pain: 0           Dyspnea: 2           Stool Occurrence(s): 1   Positive and pertinent negative findings in ROS are noted above in HPI. The following systems were [x] reviewed / [] unable to be reviewed as noted in HPI  Other findings are noted below. PHYSICAL EXAM:     Constitutional: lying in bed, eyes closed, sleepy, ate some breakfast per bedside RN   Eyes: closed  ENMT: no nasal discharge, moist mucous membranes  Cardiovascular: tachycardic rhythm  Respiratory: abdominal breathing noted, a bit tachypneic 21-23, nasal oxygen 6 liters, pulse ox 98%  Gastrointestinal: soft   Musculoskeletal: no deformity, no tenderness to palpation  Skin: warm, dry  Neurologic: follows simple commands, moving all extremities    Other: Wt Readings from Last 3 Encounters:   01/20/23 99.5 kg (219 lb 5.7 oz)   01/12/23 101 kg (222 lb 10.6 oz)   12/21/22 101 kg (222 lb 9.6 oz)     Blood pressure 134/74, pulse (!) 110, temperature 97 °F (36.1 °C), resp. rate 28, height 5' 4\" (1.626 m), weight 99.5 kg (219 lb 5.7 oz), SpO2 93 %, not currently breastfeeding.   Pain:  Pain Scale 1: Numeric (0 - 10)  Pain Intensity 1: 0              Pain Intervention(s) 1: Medication (see MAR)       LAB AND IMAGING FINDINGS:     Lab Results   Component Value Date/Time    WBC 25.5 (H) 01/23/2023 01:36 AM    HGB 11.2 (L) 01/23/2023 01:36 AM    PLATELET 105 75/41/9312 01:36 AM     Lab Results   Component Value Date/Time    Sodium 136 01/23/2023 01:36 AM    Potassium 4.4 01/23/2023 01:36 AM    Chloride 102 01/23/2023 01:36 AM    CO2 21 01/23/2023 01:36 AM    BUN 83 (H) 01/23/2023 01:36 AM    Creatinine 3.03 (H) 01/23/2023 01:36 AM    Calcium 8.7 01/23/2023 01:36 AM    Magnesium 2.4 01/23/2023 01:36 AM    Phosphorus 6.7 (H) 01/23/2023 01:36 AM      Lab Results   Component Value Date/Time    Alk. phosphatase 51 01/18/2023 03:08 AM    Protein, total 7.6 01/18/2023 03:08 AM    Albumin 2.4 (L) 01/19/2023 05:08 AM    Globulin 4.7 (H) 01/18/2023 03:08 AM     Lab Results   Component Value Date/Time    INR 2.0 (H) 01/13/2023 04:40 AM    Prothrombin time 22.9 (H) 01/13/2023 04:40 AM    aPTT 32.4 01/23/2023 01:36 AM      No results found for: IRON, FE, TIBC, IBCT, PSAT, FERR   No results found for: PH, PCO2, PO2  No components found for: [place]   Lab Results   Component Value Date/Time     08/11/2022 02:47 AM    CK - MB 2.2 04/18/2018 03:48 PM              Total time: 35 minutes     > 50% counseling / coordination:  Time spent in direct consultation with the patient, medical team, and family     Prolonged service was provided for  []30 min   []75 min in face to face time in the presence of the patient, spent as noted above. Time Start:   Time End:     Disclaimer: Sections of this note are dictated using utilizing voice recognition software, which may have resulted in some phonetic based errors in grammar and contents. Even though attempts were made to correct all the mistakes, some may have been missed, and remained in the body of the document. If questions arise, please contact our department.

## 2023-01-23 NOTE — PROGRESS NOTES
Consult Note        Consult requested by: Karina Triana MD    ADMIT DATE: 1/17/2023    CONSULT DATE: January 23, 2023             Admission diagnosis: Acute on chronic heart failure [facility])   Reason for Nephrology Consultation: AQUILES on CKD    Assessment and plan:  #1 acute kidney injury on chronic kidney disease stage III, baseline creatinine around 1.2-1.7, creatinine and BUN is uptrending, secondary to cardiorenal syndrome in the setting of acute on chronic CHF. Volume status and respiratory status both improved, and worsening renal parameters likely secondary to hypoperfusion in the setting of CHF as well as diuresis. Also renal ultrasound and frequent bladder scans need to be done to rule out obstructive uropathy  #2 acute hypoxic respiratory failure secondary to: CHF and COVID-19 infection, aspiration pneumonia  #3 acute on chronic CHF  #4 cardiomyopathy with EF of 25%  #5 A. fib RVR  #6 NSTEMI  #7 hypertension    Plan:  #1 strict intake output charting  #2 with BUN and creatinine rising hold off diuretics today,  Volume status does appear to be dry side, but no need of IV fluids  #3 encourage p.o. intake  #4 antibiotics per ID  #5 A. fib RVR management per cardiology team  #6 intensive spirometry  #7 renally dose medications for EGFR of less than 15  #8 check renal ultrasound and frequent bladder scans for obstructive uropathy  #9 checking urine eos , UA with micro to evaluate for possible AIN , noted no peripheral eos    Discussed plan with patient and primary team    Please call with questions    Ruel Garcia MD [facility]  Cell [phone]  Pager: [phone]     HPI:   Patient is a 73-year-old  female with past medical history of heart failure with reduced EF of 20 to 25%, history of atrial fibrillation with RVR, diabetes type 2, hypertension, dyslipidemia and hypothyroidism presented to [facility] emergency department due to shortness of breath.   On presentation patient was hypoxic needing BiPAP, subsequently also noted to be on A. fib RVR. Was started on diltiazem gtt., became hypotensive, transition to amiodarone gtt. Finally due to progressive hypoxia as patient was intubated. She was also noted to be COVID-positive and was admitted to the ICU. Chest x-ray showed cardiomegaly, central vascular congestion, hazy mid and lower lung opacities likely secondary to effusion or atelectasis. Patient was diuresed with Bumex 1 mg daily, patient was noted to have an AQUILES,, baseline creatinine in 1.2-1.7 range has worsened progressively to 3.03 today with a BUN of 83. Electrolytes and acid-base in good range. Nephrology has been consulted for AQUILES on CKD 3       Past Medical History:   Diagnosis Date    Acquired hypothyroidism 8/1/2016    Arthritis of right shoulder region 4/21/2016    Asthma     Bilateral lower extremity edema 7/7/2021    Chronic bilateral low back pain without sciatica 7/7/2021    Chronic diastolic congestive heart failure ([address] 3/10/2021    Chronic venous insufficiency     Diabetes (HCC)     neuropathy    Essential hypertension 7/7/2021    Gout     History of depression 1/23/2017    History of fall 7/7/2021    Hypercholesteremia 7/7/2021    Hypertension     MI (myocardial infarction) ([address]     OAB (overactive bladder) 7/7/2021    Peripheral neuropathy     Rheumatoid arthritis ([address]     Tear of right rotator cuff 5/16/2016    Thyroid pain     Urinary incontinence 7/7/2021    Vertigo       Past Surgical History:   Procedure Laterality Date    HX AMPUTATION TOE Right 2020    Great toe Dr. [place] [address]      HX CHOLECYSTECTOMY      HX GYN      TAHOophorectomy due to infection    HX HEENT      resection of memegioma in the 1980's.     HX KNEE REPLACEMENT Bilateral        Social History     Socioeconomic History    Marital status:      Spouse name: Not on file    Number of children: Not on file    Years of education: Not on file    Highest education level: Not on file   Occupational History    Not on file   Tobacco Use    Smoking status: Former     Types: Cigarettes     Quit date: 12     Years since quittin.0    Smokeless tobacco: Never    Tobacco comments:     quit 45 years ago   Vaping Use    Vaping Use: Never used   Substance and Sexual Activity    Alcohol use: No     Alcohol/week: 0.0 standard drinks    Drug use: No    Sexual activity: Not Currently   Other Topics Concern    Not on file   Social History Narrative    Not on file     Social Determinants of Health     Financial Resource Strain: Not on file   Food Insecurity: Not on file   Transportation Needs: Not on file   Physical Activity: Not on file   Stress: Not on file   Social Connections: Not on file   Intimate Partner Violence: Not on file   Housing Stability: Not on file       Family History   Problem Relation Age of Onset    Heart Attack Mother     Heart Attack Father      No Known Allergies     Home Medications:     Medications Prior to Admission   Medication Sig    bumetanide (BUMEX) 1 mg tablet Take 1 mg by mouth daily. gabapentin (NEURONTIN) 400 mg capsule 1 capsule daily    insulin glargine (LANTUS) 100 unit/mL injection Inject 20 Units at bedtime. Monitor and record blood sugar daily. aspirin 81 mg chewable tablet Take 1 Tablet by mouth daily. metoprolol tartrate (LOPRESSOR) 100 mg IR tablet Take 1 Tablet by mouth every twelve (12) hours. dilTIAZem ER (CARDIZEM CD) 180 mg capsule Take 1 Capsule by mouth daily. BD Insulin Syringe SafetyGlide 0.5 mL 30 gauge x \" syrg     levothyroxine (SYNTHROID) 150 mcg tablet Take 150 mcg by mouth daily. morphine IR (MS IR) 15 mg tablet Take 15 mg by mouth two (2) times a day. allopurinoL (ZYLOPRIM) 100 mg tablet Take 100 mg by mouth daily. atorvastatin (LIPITOR) 80 mg tablet Take 1 Tablet by mouth daily.     Blood-Glucose Meter (FREESTYLE LITE METER) monitoring kit Test twice blood glucose daily; ICD-10 E11.9; Quantity 1 insulin syringe,safetyneedle 1 mL 31 gauge x 5/16\" syrg 1 Each by Does Not Apply route daily. glucose blood VI test strips (FREESTYLE TEST) strip Use to check blood glucose twice daily DX:E11.9    metFORMIN (GLUCOPHAGE) 500 mg tablet Take 500 mg by mouth daily. (Patient not taking: Reported on 1/17/2023)    acetaminophen (TYLENOL) 500 mg tablet Take 1 Tab by mouth every six (6) hours as needed for Pain.  (Patient not taking: Reported on 1/17/2023)       Current Inpatient Medications:     Current Facility-Administered Medications   Medication Dose Route Frequency    dilTIAZem IR (CARDIZEM) tablet 60 mg  60 mg Oral QID    piperacillin-tazobactam (ZOSYN) 3.375 g in 0.9% sodium chloride (MBP/ADV) 100 mL MBP  3.375 g IntraVENous Q12H    amiodarone (CORDARONE) tablet 200 mg  200 mg Oral DAILY    enoxaparin (LOVENOX) injection 100 mg  1 mg/kg SubCUTAneous DAILY    triamcinolone (ARISTOCORT) 0.5 % cream   Topical BID    ipratropium-albuterol (COMBIVENT RESPIMAT) 20 mcg-100 mcg inhalation spray  1 Puff Inhalation Q4H PRN    insulin glargine (LANTUS) injection 15 Units  15 Units SubCUTAneous QHS    metoprolol (LOPRESSOR) injection 5 mg  5 mg IntraVENous Q6H    camphor-menthoL (SARNA) 0.5-0.5 % lotion   Topical TID    glucose chewable tablet 16 g  16 g Oral PRN    glucagon (GLUCAGEN) injection 1 mg  1 mg IntraMUSCular PRN    dextrose 10% infusion 0-250 mL  0-250 mL IntraVENous PRN    insulin lispro (HUMALOG) injection   SubCUTAneous Q6H    famotidine (PF) (PEPCID) 20 mg in 0.9% sodium chloride 10 mL injection  20 mg IntraVENous DAILY    dexamethasone (DECADRON) 4 mg/mL injection 6 mg  6 mg IntraVENous Q24H    [Held by provider] metoprolol tartrate (LOPRESSOR) tablet 50 mg  50 mg Oral Q12H    aspirin chewable tablet 81 mg  81 mg Oral DAILY    atorvastatin (LIPITOR) tablet 80 mg  80 mg Oral DAILY    bumetanide (BUMEX) tablet 1 mg  1 mg Oral DAILY    levothyroxine (SYNTHROID) tablet 150 mcg  150 mcg Oral DAILY    gabapentin (NEURONTIN) capsule 400 mg  400 mg Oral DAILY    allopurinoL (ZYLOPRIM) tablet 100 mg  100 mg Oral DAILY    sodium chloride (NS) flush 5-40 mL  5-40 mL IntraVENous Q8H    acetaminophen (TYLENOL) tablet 650 mg  650 mg Oral Q6H PRN    Or    acetaminophen (TYLENOL) suppository 650 mg  650 mg Rectal Q6H PRN    polyethylene glycol (MIRALAX) packet 17 g  17 g Oral DAILY PRN    ondansetron (ZOFRAN ODT) tablet 4 mg  4 mg Oral Q8H PRN    Or    ondansetron (ZOFRAN) injection 4 mg  4 mg IntraVENous Q6H PRN       Review of Systems:   No fever or chills. No sore throat. No cough or hemoptysis. No shortness of breath or chest pain. No orthopnea or paroxysmal nocturnal dyspnea. Good appetite. No nausea, vomiting, abdominal pain, melena or hematochezia. No constipation or diarrhea. No dysuria, no gross hematuria of voiding difficulties. No ankle swelling, no joint paints. No muscle aches. No skin changes. No dizziness or lightheadedness. No headaches. Physical Assessment:     Vitals:    01/23/23 0544 01/23/23 0800 01/23/23 0949 01/23/23 1307   BP:  (!) 132/90 119/80 134/74   Pulse: (!) 118  (!) 105 (!) 110   Resp:   18 28   Temp:   98.6 °F (37 °C) 97 °F (36.1 °C)   SpO2:   99% 93%   Weight:       Height:         Last 3 Recorded Weights in this Encounter    01/19/23 0922 01/20/23 0700 01/20/23 2000   Weight: 99.6 kg (219 lb 9.3 oz) 99.6 kg (219 lb 9.3 oz) 99.5 kg (219 lb 5.7 oz)     Admission weight: Weight: 99.8 kg (220 lb) (01/17/23 1954)      Intake/Output Summary (Last 24 hours) at 1/23/2023 1317  Last data filed at 1/23/2023 0547  Gross per 24 hour   Intake --   Output 500 ml   Net -500 ml       Patient is in no apparent distress. HEENT: dry mucoisa  Lungs: good air entry, clear to auscultation bilaterally. Cardiovascular system: S1, S2, regular rate and rhythm. No JVD   Abdomen: soft, non tender, non distended. Extremities: 1+ LE edema   Integumentary: skin is grossly intact.    Neurologic: confused      Data Review:    Labs: Results:       Chemistry Recent Labs     01/23/23  0136 01/22/23  0300 01/21/23  0330   * 168* 313*    138 137   K 4.4 4.3 4.1    104 103   CO2 21 26 24   BUN 83* 78* 70*   CREA 3.03* 2.86* 2.69*   CA 8.7 7.9* 7.9*   AGAP 13 8 10   BUCR 27* 27* 26*   PHOS 6.7* 5.2* 5.0*         CBC w/Diff Recent Labs     01/23/23  0136 01/22/23  0300 01/21/23  0330   WBC 25.5* 20.4* 23.1*   RBC 4.44 3.57* 3.63*   HGB 11.2* 9.3* 9.3*   HCT 37.5 29.2* 30.7*    214 233   GRANS 96* 96* 88*   LYMPH 3* 2* 5*   EOS 0 0 0         Iron/Ferritin No results for input(s): IRON in the last 72 hours. No lab exists for component: TIBCCALC   PTH/VIT D No results for input(s): PTH in the last 72 hours.     No lab exists for component: VITD           Sabine John MD  1/23/2023  1:17 PM      January 23, 2023

## 2023-01-23 NOTE — PROGRESS NOTES
Infectious Disease progress Note        Reason: COVID-19 infection, respiratory failure    Current abx Prior abx   Zosyn since 1/18 vancomycin 1/18-1/20     Lines:       Assessment :  82 yro female with history significant for uncontrolled type 2 diabetes, diabetic neuropathy, hypercholesterolemia, hypertension, on anticoagulation A. fib with anticoagulation who was admitted to [facility] on 1/17 for shortness of breath     Clinical presentation consistent with acute hypoxic respiratory failure evolving since admission due to decompensated CHF superimposed on COVID-19 infection; aspiration pneumonia    Significant thick yellowish sputum noted on ET suction  MRSA nasal swab 1/19- negative    No definitive clinical evidence to suggest severe COVID-19 pneumonia at this time    Diffuse rash on extremities-could be skin excoriation from scratching. No definitive evidence of acute infection noted on today's exam    Atrial fibrillation with rapid ventricular response, non-STEMI-cardiology follow-up appreciated      decompensated CHF-ejection fraction 20 to 25% on echo 12/21/2022    Acute kidney injury-likely secondary to volume depletion, diuresis    Extubated on 1/21  Now with worsening creatinine, altered mentation  Nephrology follow-up appreciated. Concern for AIN    Altered mentation could be metabolic encephalopathy- ? Steroids related  Recommendations:    Discontinue Zosyn. Start ceftriaxone, metronidazole to complete treatment for aspiration pneumonia  Follow-up nephrology recommendations regarding worsening creatinine  Follow-up cardiology recommendations  Ok to d/c decadron after tomorrow's dose  Monitor CBC, temperature, procalcitonin     Above plan was discussed in details with Dr. Casper Sarkar, Rn, primary team. Please call me if any further questions or concerns. Will continue to participate in the care of this patient. HPI:  Confused.  Unable to communicate effectively      Past Medical History:   Diagnosis Date Acquired hypothyroidism 8/1/2016    Arthritis of right shoulder region 4/21/2016    Asthma     Bilateral lower extremity edema 7/7/2021    Chronic bilateral low back pain without sciatica 7/7/2021    Chronic diastolic congestive heart failure ([address] 3/10/2021    Chronic venous insufficiency     Diabetes (HCC)     neuropathy    Essential hypertension 7/7/2021    Gout     History of depression 1/23/2017    History of fall 7/7/2021    Hypercholesteremia 7/7/2021    Hypertension     MI (myocardial infarction) ([address]     OAB (overactive bladder) 7/7/2021    Peripheral neuropathy     Rheumatoid arthritis [address]     Tear of right rotator cuff 5/16/2016    Thyroid pain     Urinary incontinence 7/7/2021    Vertigo        Past Surgical History:   Procedure Laterality Date    HX AMPUTATION TOE Right 2020    Great toe Dr. 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 HX KNEE REPLACEMENT Bilateral        Current Discharge Medication List        CONTINUE these medications which have NOT CHANGED    Details   bumetanide (BUMEX) 1 mg tablet Take 1 mg by mouth daily. gabapentin (NEURONTIN) 400 mg capsule 1 capsule daily  Qty: 360 Capsule, Refills: 0    Associated Diagnoses: Neuropathy; Type 2 diabetes mellitus with diabetic neuropathy, with long-term current use of insulin ([facility])      insulin glargine (LANTUS) 100 unit/mL injection Inject 20 Units at bedtime. Monitor and record blood sugar daily. Qty: 10 mL, Refills: 1      aspirin 81 mg chewable tablet Take 1 Tablet by mouth daily. Qty: 30 Tablet, Refills: 2      metoprolol tartrate (LOPRESSOR) 100 mg IR tablet Take 1 Tablet by mouth every twelve (12) hours. Qty: 120 Tablet, Refills: 2      dilTIAZem ER (CARDIZEM CD) 180 mg capsule Take 1 Capsule by mouth daily.   Qty: 120 Capsule, Refills: 3      BD Insulin Syringe SafetyGlide 0.5 mL 30 gauge x 5/16\" syrg levothyroxine (SYNTHROID) 150 mcg tablet Take 150 mcg by mouth daily. morphine IR (MS IR) 15 mg tablet Take 15 mg by mouth two (2) times a day. allopurinoL (ZYLOPRIM) 100 mg tablet Take 100 mg by mouth daily. atorvastatin (LIPITOR) 80 mg tablet Take 1 Tablet by mouth daily. Qty: 90 Tablet, Refills: 3    Associated Diagnoses: Hypercholesteremia      Blood-Glucose Meter (FREESTYLE LITE METER) monitoring kit Test twice blood glucose daily; ICD-10 E11.9; Quantity 1  Qty: 1 Kit, Refills: 0    Associated Diagnoses: Type 2 diabetes mellitus with nephropathy (HCC)      insulin syringe,safetyneedle 1 mL 31 gauge x 5/16\" syrg 1 Each by Does Not Apply route daily. Qty: 300 Each, Refills: 3    Comments: Dx e11.9      glucose blood VI test strips (FREESTYLE TEST) strip Use to check blood glucose twice daily DX:E11.9  Qty: 300 Strip, Refills: 3      metFORMIN (GLUCOPHAGE) 500 mg tablet Take 500 mg by mouth daily. acetaminophen (TYLENOL) 500 mg tablet Take 1 Tab by mouth every six (6) hours as needed for Pain.   Qty: 270 Tab, Refills: 3    Associated Diagnoses: Controlled type 2 diabetes mellitus without complication, with long-term current use of insulin ([facility])             Current Facility-Administered Medications   Medication Dose Route Frequency    dilTIAZem IR (CARDIZEM) tablet 60 mg  60 mg Oral QID    piperacillin-tazobactam (ZOSYN) 3.375 g in 0.9% sodium chloride (MBP/ADV) 100 mL MBP  3.375 g IntraVENous Q12H    amiodarone (CORDARONE) tablet 200 mg  200 mg Oral DAILY    enoxaparin (LOVENOX) injection 100 mg  1 mg/kg SubCUTAneous DAILY    triamcinolone (ARISTOCORT) 0.5 % cream   Topical BID    ipratropium-albuterol (COMBIVENT RESPIMAT) 20 mcg-100 mcg inhalation spray  1 Puff Inhalation Q4H PRN    insulin glargine (LANTUS) injection 15 Units  15 Units SubCUTAneous QHS    metoprolol (LOPRESSOR) injection 5 mg  5 mg IntraVENous Q6H    camphor-menthoL (SARNA) 0.5-0.5 % lotion   Topical TID    glucose chewable tablet 16 g  16 g Oral PRN    glucagon (GLUCAGEN) injection 1 mg  1 mg IntraMUSCular PRN    dextrose 10% infusion 0-250 mL  0-250 mL IntraVENous PRN    insulin lispro (HUMALOG) injection   SubCUTAneous Q6H    famotidine (PF) (PEPCID) 20 mg in 0.9% sodium chloride 10 mL injection  20 mg IntraVENous DAILY    dexamethasone (DECADRON) 4 mg/mL injection 6 mg  6 mg IntraVENous Q24H    [Held by provider] metoprolol tartrate (LOPRESSOR) tablet 50 mg  50 mg Oral Q12H    aspirin chewable tablet 81 mg  81 mg Oral DAILY    atorvastatin (LIPITOR) tablet 80 mg  80 mg Oral DAILY    bumetanide (BUMEX) tablet 1 mg  1 mg Oral DAILY    levothyroxine (SYNTHROID) tablet 150 mcg  150 mcg Oral DAILY    gabapentin (NEURONTIN) capsule 400 mg  400 mg Oral DAILY    allopurinoL (ZYLOPRIM) tablet 100 mg  100 mg Oral DAILY    sodium chloride (NS) flush 5-40 mL  5-40 mL IntraVENous Q8H    acetaminophen (TYLENOL) tablet 650 mg  650 mg Oral Q6H PRN    Or    acetaminophen (TYLENOL) suppository 650 mg  650 mg Rectal Q6H PRN    polyethylene glycol (MIRALAX) packet 17 g  17 g Oral DAILY PRN    ondansetron (ZOFRAN ODT) tablet 4 mg  4 mg Oral Q8H PRN    Or    ondansetron (ZOFRAN) injection 4 mg  4 mg IntraVENous Q6H PRN       Allergies: Patient has no known allergies.     Family History   Problem Relation Age of Onset    Heart Attack Mother     Heart Attack Father      Social History     Socioeconomic History    Marital status:      Spouse name: Not on file    Number of children: Not on file    Years of education: Not on file    Highest education level: Not on file   Occupational History    Not on file   Tobacco Use    Smoking status: Former     Types: Cigarettes     Quit date: 12     Years since quittin.0    Smokeless tobacco: Never    Tobacco comments:     quit 45 years ago   Vaping Use    Vaping Use: Never used   Substance and Sexual Activity    Alcohol use: No     Alcohol/week: 0.0 standard drinks    Drug use: No    Sexual activity: Not Currently   Other Topics Concern    Not on file   Social History Narrative    Not on file     Social Determinants of Health     Financial Resource Strain: Not on file   Food Insecurity: Not on file   Transportation Needs: Not on file   Physical Activity: Not on file   Stress: Not on file   Social Connections: Not on file   Intimate Partner Violence: Not on file   Housing Stability: Not on file     Social History     Tobacco Use   Smoking Status Former    Types: Cigarettes    Quit date:     Years since quittin.0   Smokeless Tobacco Never   Tobacco Comments    quit 45 years ago        Temp (24hrs), Av.8 °F (36.6 °C), Min:97.3 °F (36.3 °C), Max:98.4 °F (36.9 °C)    Visit Vitals  BP (!) 137/97 (BP 1 Location: Right arm, BP Patient Position: Semi fowlers; At rest)   Pulse (!) 118   Temp 97.8 °F (36.6 °C)   Resp 25   Ht 5' 4\" (1.626 m) Comment: Photo ID   Wt 99.5 kg (219 lb 5.7 oz)   SpO2 100%   Breastfeeding No   BMI 37.65 kg/m²       ROS: unable to obtain due to patient factors    Physical Exam:    General:  Sitting on bed. Alert. In no apparent distress. Confused   Head:  Normocephalic, without obvious abnormality, atraumatic. Eyes:  Conjunctivae/corneas clear. Nose: Nares normal. Septum midline. Mucosa normal. No drainage. Throat: Lips, mucosa, and tongue normal.    Neck: Trachea midline, no adenopathy,  no JVD. Lungs:   Clear to auscultation bilaterally. Chest wall:  No deformity. Heart:  Irregularly irregular, rate controlled   Abdomen:   Soft, non-tender. Bowel sounds normal.    Extremities: Extremities normal, atraumatic, no cyanosis, mild edema bilateral lower extremities. Darkish erythema right LE, scattered pinpoint erythematous rash on extremities   Pulses: 2+ and symmetric all extremities.    Skin: Scattered excoriations in bilateral upper and lower extremities   Lymph nodes:  Cervical and supraclavicular nodes normal   Neurologic: No gross motor or sensory deficits noted       Labs: Results:   Chemistry Recent Labs     01/23/23  0136 01/22/23  0300 01/21/23  0330   * 168* 313*    138 137   K 4.4 4.3 4.1    104 103   CO2 21 26 24   BUN 83* 78* 70*   CREA 3.03* 2.86* 2.69*   CA 8.7 7.9* 7.9*   AGAP 13 8 10   BUCR 27* 27* 26*        CBC w/Diff Recent Labs     01/23/23  0136 01/22/23  0300 01/21/23  0330   WBC 25.5* 20.4* 23.1*   RBC 4.44 3.57* 3.63*   HGB 11.2* 9.3* 9.3*   HCT 37.5 29.2* 30.7*    214 233   GRANS 96* 96* 88*   LYMPH 3* 2* 5*   EOS 0 0 0        Microbiology No results for input(s): CULT in the last 72 hours. RADIOLOGY:    All available imaging studies/reports in [facility] for this admission were reviewed    Total time spent >35 minutes. High complexity decision making was performed during the evaluation of this patient at high risk for decompensation with multiple organ involvement     Above mentioned total time spent on reviewing the case/medical record/data/notes/EMR/patient examination/documentation/coordinating care with nurse/consultants, exclusive of procedures with complex decision making performed and > 50% time spent in face to face evaluation. Disclaimer: Sections of this note are dictated utilizing voice recognition software, which may have resulted in some phonetic based errors in grammar and contents. Even though attempts were made to correct all the mistakes, some may have been missed, and remained in the body of the document. If questions arise, please contact our department.     Dr. Luis Lynch, Infectious Disease Specialist  [phone]  January 23, 2023  7:33 AM

## 2023-01-23 NOTE — PROGRESS NOTES
Patient received from ICU. Some wheezes and crackles noted. MDI Combivent given at this time.           01/23/23 0153   Oxygen Therapy   O2 Sat (%) 99 %   Pulse via Oximetry 134 beats per minute   O2 Device Nasal cannula;Humidifier   O2 Flow Rate (L/min) (S)  3.5 l/min  (decreased from 6)

## 2023-01-23 NOTE — PROGRESS NOTES
2015 bedside report called to [facility] RN, NIXONAR< mAR< ED summary given with a chance to ask questions. After she was bathed, had a huge bowel movement, fought being changed yelling \"You are killing me\", reoriented her, she kept yelling \"I got cellulitis you are going to get it now go away\". Her hands were washed, she had bowel movement all over her body, sheets and gown. All linens changed. 2100 medications given and transport order in for her. She has a nasal cannula on and will be transported with oxygen. She denies pain, is very red in her genital area, several bowel movements in the last 48 hours, barrier cream placed on her.

## 2023-01-24 NOTE — PROGRESS NOTES
Problem: Mobility Impaired (Adult and Pediatric)  Goal: *Acute Goals and Plan of Care (Insert Text)  Description: Physical Therapy Goals  Initiated 1/24/2023 and to be accomplished within 7 day(s)  1. Patient will move from supine to sit and sit to supine , scoot up and down, and roll side to side in bed with minimal assistance/contact guard assist.    2.  Patient will transfer from bed to chair and chair to bed with moderate assistance  using the least restrictive device. 3.  Patient will perform sit to stand with moderate assistance . 4. Patient will ambulate with maximal assistance for 5 feet with the least restrictive device. 5.  Patient will sit on EOB with good balance in prep for OOB mobility. PLOF: Pt unable to provide PLOF, in Dec 2022 PT notes state pt was supervision with RW and lives with significant other. Outcome: Progressing Towards Goal   PHYSICAL THERAPY EVALUATION    Patient: Salina Toro [de-identified]82 y.o. female)  Date: 1/24/2023  Primary Diagnosis: Atrial fibrillation with rapid ventricular response (HCC) [I48.91]  Acute on chronic heart failure ([address] [I50.9]  COVID [U07.1]  Heart failure ([address] [I50.9]  Atrial fibrillation with RVR ([address] [I48.91]  COVID-19 virus infection [U07.1]  Acute respiratory failure with hypoxia (HCC) [J96.01]  COVID-19 [U07.1]       Precautions:  Fall (droplet plus)      ASSESSMENT :  Pt cleared to participate in PT session, pt received semi-reclined in bed and agreeable to therapy session. Completing with OT to maximize safety and mobility. Based on the objective data described below, the patient presents with decreased endurance, decreased strength, decreased balance reactions, gait deviations, confusion, and decreased independence in functional mobility. Pt with limited command following, PROM to BLEs WNL. Pt assisted to long sitting with maxA able to maintain with Ariana and BUE support on bedrails.  Pt HR varied up to 150s even at rest. Pt returned to supine, totalA to roll to R, pt perseverating on writing a phone number on white board, wrote number for pt, pt stating \"this is my and Sarmad's number\". Pt positioned for comfort and educated to call for assist before getting up, pt verbalized understanding. Pt left with all needs met and call bell in reach. RN notified of position and participation. Bed alarm activated      Patient will benefit from skilled intervention to address the above impairments. Patient's rehabilitation potential is considered to be Fair  Factors which may influence rehabilitation potential include:   []         None noted  [x]         Mental ability/status  [x]         Medical condition  [x]         Home/family situation and support systems  []         Safety awareness  []         Pain tolerance/management  []         Other:      PLAN :  Recommendations and Planned Interventions:   [x]           Bed Mobility Training             []    Neuromuscular Re-Education  [x]           Transfer Training                   []    Orthotic/Prosthetic Training  [x]           Gait Training                          []    Modalities  [x]           Therapeutic Exercises           []    Edema Management/Control  [x]           Therapeutic Activities            [x]    Family Training/Education  [x]           Patient Education  []           Other (comment):    Frequency/Duration: Patient will be followed by physical therapy 1-2 times per day/4-7 days per week to address goals. Further Equipment Recommendations for Discharge: rolling walker    AMPAC: Current research shows that an AM-PAC score of 17 or less is not associated with a discharge to the patient's home setting. Based on an AM-PAC score of 8/24 and their current functional mobility deficits, it is recommended that the patient have 3-5 sessions per week of Physical Therapy at d/c to increase the patient's independence.      This AMPAC score should be considered in conjunction with interdisciplinary team recommendations to determine the most appropriate discharge setting. Patient's social support, diagnosis, medical stability, and prior level of function should also be taken into consideration. SUBJECTIVE:   Patient stated you have to write the number.     OBJECTIVE DATA SUMMARY:     Past Medical History:   Diagnosis Date    Acquired hypothyroidism 8/1/2016    Arthritis of right shoulder region 4/21/2016    Asthma     Bilateral lower extremity edema 7/7/2021    Chronic bilateral low back pain without sciatica 7/7/2021    Chronic diastolic congestive heart failure ([address] 3/10/2021    Chronic venous insufficiency     Diabetes (HCC)     neuropathy    Essential hypertension 7/7/2021    Gout     History of depression 1/23/2017    History of fall 7/7/2021    Hypercholesteremia 7/7/2021    Hypertension     MI (myocardial infarction) ([address]     OAB (overactive bladder) 7/7/2021    Peripheral neuropathy     Rheumatoid arthritis ([address]     Tear of right rotator cuff 5/16/2016    Thyroid pain     Urinary incontinence 7/7/2021    Vertigo      Past Surgical History:   Procedure Laterality Date    HX AMPUTATION TOE Right 2020    Great toe Dr. 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     HX KNEE REPLACEMENT Bilateral      Barriers to Learning/Limitations: AMS  Compensate with: Visual Cues, Verbal Cues, and Tactile Cues  Home Situation:  Home Situation  Home Environment:  (pt unable to give PLOF)  Critical Behavior:  Neurologic State: Alert;Confused  Orientation Level: Disoriented X4  Cognition: Decreased command following  Safety/Judgement: Fall prevention  Psychosocial  Patient Behaviors: Confused    Strength:    Strength: Grossly decreased, non-functional    Tone & Sensation:   Tone: Normal    Sensation:  (unable to fully assess)    Range Of Motion:  AROM: Grossly decreased, non-functional    PROM: Within functional limits    Posture:  Posture (WDL): Exceptions to WDL     Functional Mobility:  Bed Mobility:  Rolling: Total assistance  Supine to Sit: Maximum assistance  Sit to Supine: Maximum assistance    Balance:   Sitting: Impaired; With support  Sitting - Static: Fair (occasional) (long sitting with BUE support on bedrails .)    Pain:  Pain level pre-treatment: 0/10   Pain level post-treatment: 0/10     Activity Tolerance:   FLACC   Please refer to the flowsheet for vital signs taken during this treatment. After treatment:   []         Patient left in no apparent distress sitting up in chair  [x]         Patient left in no apparent distress in bed  [x]         Call bell left within reach  [x]         Nursing notified  []         Caregiver present  []         Bed alarm activated  []         SCDs applied    COMMUNICATION/EDUCATION:   [x]         Role of Physical Therapy in the acute care setting. []         Fall prevention education was provided and the patient/caregiver indicated understanding. []         Patient/family have participated as able in goal setting and plan of care. []         Patient/family agree to work toward stated goals and plan of care. []         Patient understands intent and goals of therapy, but is neutral about his/her participation. [x]         Patient is unable to participate in goal setting/plan of care: ongoing with therapy staff.  []         Other:     Thank you for this referral.  Bernabe Giraldo, PT   Time Calculation: 23 mins      Eval Complexity: History: MEDIUM  Complexity : 1-2 comorbidities / personal factors will impact the outcome/ POC Exam:LOW Complexity : 1-2 Standardized tests and measures addressing body structure, function, activity limitation and / or participation in recreation  Presentation: LOW Complexity : Stable, uncomplicated  Clinical Decision Making:Low Complexity low  Overall Complexity:LOW     [employer] AM-PAC® Basic Mobility Inpatient Short Form (6-Clicks) Version 2    How much HELP from another person does the patient currently need    (If the patient hasn't done an activity recently, how much help from another person do you think he/she would need if he/she tried?)   Total (Total A or Dep)   A Lot  (Mod to Max A)   A Little (Sup or Min A)   None (Mod I to I)   Turning from your back to your side while in a flat bed without using bedrails? [] 1 [x] 2 [] 3 [] 4   2. Moving from lying on your back to sitting on the side of a flat bed without using bedrails? [] 1 [x] 2 [] 3 [] 4   3. Moving to and from a bed to a chair (including a wheelchair)? [x] 1 [] 2 [] 3 [] 4   4. Standing up from a chair using your arms (e.g., wheelchair, or bedside chair)? [x] 1 [] 2 [] 3 [] 4   5. Walking in hospital room? [x] 1 [] 2 [] 3 [] 4   6. Climbing 3-5 steps with a railing?+   [x] 1 [] 2 [] 3 [] 4   +If stair climbing cannot be assessed, skip item #6. Sum responses from items 1-5.

## 2023-01-24 NOTE — PROGRESS NOTES
In Patient Progress note    Admit Date: 1/17/2023    Impression:     #1 Acute kidney injury on Chronic kidney disease stage III, baseline creatinine around 1.2-1.7, creatinine and BUN is uptrending, secondary to cardiorenal syndrome in the setting of acute on chronic CHF. #2 acute hypoxic respiratory failure secondary to: CHF and COVID-19 infection, aspiration pneumonia  #3 acute on chronic CHF  #4 cardiomyopathy with EF of 25%  #5 A. fib RVR  #6 NSTEMI  #7 hypertension    Renal parameters improving  Improving urine output  Volume status appears to be okay  Does have poor p.o. intake  Diuretics on hold  Tachycardiac overnight , better this AM     Plan:  #1 strict intake output charting  #2 Hold diuretics  #3 encourage p.o. intake  #4 A. fib RVR management per cardiology team  #5 intensive spirometry  #6 renally dose medications for EGFR of less than 15  #7 f/up on renal ultrasound and frequent bladder scans for obstructive uropathy     Discussed plan with patient and primary team     Please call with questions     Baljit Mckenzie MD FASN  Cell [phone]  Pager: [phone]       Subjective:     - No acute over night events. - respiratory - stable  - hemodynamics - stable, no pressrs  - UOP-improved  - Nutrition -ok    Objective:     Visit Vitals  BP (!) 141/111 (BP 1 Location: Left upper arm, BP Patient Position: At rest)   Pulse 87   Temp 97.1 °F (36.2 °C)   Resp 23   Ht 5' 4\" (1.626 m) Comment: Photo ID   Wt 108.9 kg (240 lb 1.3 oz)   SpO2 93%   Breastfeeding No   BMI 41.21 kg/m²         Intake/Output Summary (Last 24 hours) at 1/24/2023 1042  Last data filed at 1/24/2023 0933  Gross per 24 hour   Intake 120 ml   Output 950 ml   Net -830 ml       Physical Exam:     Patient is in no apparent distress. HEENT: dry mucoisa  Lungs: good air entry, clear to auscultation bilaterally. Cardiovascular system: S1, S2, regular rate and rhythm. No JVD   Abdomen: soft, non tender, non distended.    Extremities: 1+ LE edema   Integumentary: skin is grossly intact.    Neurologic: confused    Data Review:    Recent Labs     01/24/23  0107   WBC 16.3*   RBC 4.08*   HCT 34.7*   MCV 85.0   MCH 25.5   MCHC 30.0*   RDW 17.3*     Recent Labs     01/24/23  0909 01/24/23  0107 01/23/23  0136 01/22/23  0300   BUN 84*  --  83* 78*   CREA 2.65*  --  3.03* 2.86*   CA 9.0  --  8.7 7.9*   ALB 2.7*  --   --   --    K 4.3  --  4.4 4.3     --  136 138     --  102 104   CO2 23  --  21 26   PHOS  --  5.1* 6.7* 5.2*   *  --  207* 168*       Jessica Amezcua MD

## 2023-01-24 NOTE — PROGRESS NOTES
[facility]  DAILY PROGRESS NOTE      Patient:    Keon Kiran , 82 y.o. female   MRN:  [id number]  Room/Bed:  368/01  Admission Date:   1/17/2023  Code status:  DNR    Reason for Admission: Acute hypoxic respiratory failure, COVID-19 infection, A-fib with RVR, NSTEMI    ASSESSMENT AND PLAN:   Problem List Items Addressed This Visit          Circulatory    Atrial fibrillation ([address]       Respiratory    Acute respiratory failure with hypoxia ([address]       Other    COVID     Other Visit Diagnoses       Acute on chronic heart failure with reduced ejection fraction and diastolic dysfunction (HCC)    -  Primary    NSTEMI (non-ST elevated myocardial infarction) ([address]        Goals of care, counseling/discussion [H47.34 (ICD-10-CM)]        Age-related physical debility Rene (ICD-10-CM)]                Following ICU management in preparation for transition of care     Acute hypoxic respiratory failure/COVID19 Infection/HFrEF (20-25%)  -Presented with SOB and HR in 200's. Acute SOB most likely multifactorial given pt's comorbidities with recent COVID infection that has exacerbated the SOB  -Tested positive for COVID-19.  -Started on BIPAP in the ED and transitioned to NC, poorly tolerated and transitioned back to BIPAP, continued to be in respiratory distress and required intubation and admission to the ICU  -CXR (1/18)- progressive cardiogenic pulmonary edema and pleural effusions   -Leukocytosis: 16.7 on 1/19>23>20.4 on 1/22  -Extubated on 4L NC>6L>3.5L  Plan:  -PRN fentanyl 100 mcg IV   -Bumex 1 mg daily - holding for renal function.   -dexamethasone 6 mg IV daily   -ID consulted  -atarax PRN  -follow sputum culture   -Evaluate mental status, avoid sedating meds.   -Per ID rec, dc dexamethasone after today's dose. D/c Zosyn. Start Ceftriaxone and metronidazole.           Afib w/ RVR, HTN, NSTEMI  -On admission, pt tachycardic in the 200's  -EKG: Atrial fibrillation with rapid ventricular response   -Started on Cardizem drip in the ED due to HR in 200's, but decreased BP too fast so dc'd and started Amnio gtt  -OP meds: Eliquis 5mg BID for anticoagulation, Diltiazem and Lopressor for rate control and HTN  Plan:  -amiodarone - oral  -Metoprolol 50 mg BID  -aspirin 81 mg  -MAP >65 mmHg  -Cardiology consulted, will discuss medication changes w/ dilt, amio, and metoprolol; Switching to oral Metoprolol  -Lovenox    AQUILES possible ATN  -Cr trending upward. Plan:   -Nephrology consulted, recommended renal us, urine studies, and frequent bladder scans.   -Daily BMP  -Hold Bumex     DM with neuorpathy  -most recent A1c 11.6 in Jan 4 2023  -home meds: insulin glargine 20 U qhs, Metformin 1000 mg BID, Gabapentin 800 QHS   Plan:   -Q6h glucoses  -SSI   -hypoglycemia protocol  -gabapentin 400 mg daily         Hypothyroidism - stable  -meds: levo 150 mcg  -pt nonadherent with medications at home   Plan:   -levothyroxine 150 mcg        Chronic pain, Gout, stable  -home meds: previously on morphine 15mg q12 PRN  -Recently stated that she stopped taking morphine due to no longer going to pain management provider   Plan:   -continue allopurinol 100mg daily for gout ppx           Global:  Code: DNR  IVF/Drips: Amiodarone, fentanyl, heparin  I/O / Wt: 99.8kg  Diet: Tube feeding   Bowel Regimen,   DVT/AC: heparin gtt  Mobility: sedated/restrained   Anticipated LOS: 3-4 midnights     Point of Contact (relationship, number): Melissa Tanner [phone]         SUBJECTIVE:   Events of the last 24 hours:  No acute events overnight. Seen at bedside. Oriented to self. States that she was in the hospital and that she is doing much better. Pointed out bugs on the wall and asked if I could see them. There were no bugs.         CURRENT INPATIENT MEDICATIONS:  Current Facility-Administered Medications   Medication Dose Route Frequency Provider Last Rate Last Admin    dilTIAZem IR (CARDIZEM) tablet 60 mg  60 mg Oral QID Herrera Nash DO   60 mg at 01/23/23 2130    acetaminophen (TYLENOL) tablet 650 mg  650 mg Oral Q6H Mary Mendosa MD   650 mg at 01/24/23 9890    amiodarone (CORDARONE) tablet 200 mg  200 mg Oral DAILY Christianne Ba PA-C   200 mg at 01/23/23 1050    enoxaparin (LOVENOX) injection 100 mg  1 mg/kg SubCUTAneous DAILY Maria Del Carmen Cosme MD   100 mg at 01/23/23 1049    triamcinolone (ARISTOCORT) 0.5 % cream   Topical BID Mary Mendosa MD   Given at 01/23/23 1706    ipratropium-albuterol (COMBIVENT RESPIMAT) 20 mcg-100 mcg inhalation spray  1 Puff Inhalation Q4H PRN Nestor Mittal MD   1 Puff at 01/23/23 1102    insulin glargine (LANTUS) injection 15 Units  15 Units SubCUTAneous QHS Haylee Samayoa NP   15 Units at 01/23/23 2131    metoprolol (LOPRESSOR) injection 5 mg  5 mg IntraVENous Q6H Eva Grant PA-C   5 mg at 01/24/23 0219    camphor-menthoL (SARNA) 0.5-0.5 % lotion   Topical TID Haylee Samayoa NP   Given at 01/23/23 2132    glucose chewable tablet 16 g  16 g Oral PRN Rigoberto Hazel MD        glucagon (GLUCAGEN) injection 1 mg  1 mg IntraMUSCular PRN Rigoberto Hazel MD        dextrose 10% infusion 0-250 mL  0-250 mL IntraVENous PRN Rigoberto Hazel MD        insulin lispro (HUMALOG) injection   SubCUTAneous Q6H Eva Grant PA-C   2 Units at 01/24/23 0603    famotidine (PF) (PEPCID) 20 mg in 0.9% sodium chloride 10 mL injection  20 mg IntraVENous DAILY Eva Grant PA-C   20 mg at 01/23/23 1049    dexamethasone (DECADRON) 4 mg/mL injection 6 mg  6 mg IntraVENous Q24H Mandy MCKEON MD   6 mg at 01/23/23 1707    [Held by provider] metoprolol tartrate (LOPRESSOR) tablet 50 mg  50 mg Oral Q12H Shyla Figueroa PA-C   50 mg at 01/21/23 0920    aspirin chewable tablet 81 mg  81 mg Oral DAILY Rigoberto Hazel MD   81 mg at 01/23/23 1050    atorvastatin (LIPITOR) tablet 80 mg  80 mg Oral DAILY Rigoberto Hazel MD   80 mg at 01/23/23 1050    [Held by provider] bumetanide (BUMEX) tablet 1 mg  1 mg Oral DAILY Manuel Mcdermott MD   1 mg at 01/23/23 1049    levothyroxine (SYNTHROID) tablet 150 mcg  150 mcg Oral DAILY Manuel Mcdermott MD   150 mcg at 01/23/23 1049    gabapentin (NEURONTIN) capsule 400 mg  400 mg Oral DAILY Manuel Mcdermott MD   400 mg at 01/23/23 1050    allopurinoL (ZYLOPRIM) tablet 100 mg  100 mg Oral DAILY Manuel Mcdermott MD   100 mg at 01/23/23 1050    sodium chloride (NS) flush 5-40 mL  5-40 mL IntraVENous Q8H Manuel Mcdermott MD   10 mL at 01/24/23 0604    acetaminophen (TYLENOL) tablet 650 mg  650 mg Oral Q6H PRN Manuel Mcdermott MD        Or    acetaminophen (TYLENOL) suppository 650 mg  650 mg Rectal Q6H PRN Manuel Mcdermott MD        polyethylene glycol (MIRALAX) packet 17 g  17 g Oral DAILY PRN Manuel Mcdermott MD        ondansetron (ZOFRAN ODT) tablet 4 mg  4 mg Oral Q8H PRN Manuel Mcdermott MD        Or    ondansetron [facility]) injection 4 mg  4 mg IntraVENous Q6H PRN Manuel Mcdermott MD           Allergies  No Known Allergies    OBJECTIVE:    Intake/Output Summary (Last 24 hours) at 1/24/2023 8555  Last data filed at 1/24/2023 0602  Gross per 24 hour   Intake --   Output 950 ml   Net -950 ml         Visit Vitals  BP (!) 164/104   Pulse (!) 135   Temp 97.3 °F (36.3 °C)   Resp 24   Ht 5' 4\" (1.626 m) Comment: Photo ID   Wt 99.5 kg (219 lb 5.7 oz)   SpO2 90%   Breastfeeding No   BMI 37.65 kg/m²       PHYSICAL EXAM  Gen: NAD, comfortable, obese, on NC.   HEENT: normocephalic, atraumatic, MMM, no thyromegaly, no JVD  CV: irregularly irregular, S1/S2 present without M/R/G,   Pulm: limited due to immobility secondary to intubation, CTAB  Abd: S/NT/ND, no rebound, no guarding,  MSK: no clubbing, no edema  Skin: warm, dry, excoriations on all parts of body in multiple stages of healing  Neuro: CN II-XII grossly intact, no focal deficits appreciated   Psych: alert, awake, oriented to self. Difficult to assess due to hearing deficit. Talking to someone in the room who is not there.      LABWORK (LAST 24 HOURS)  Recent Results (from the past 24 hour(s))   GLUCOSE, POC    Collection Time: 01/23/23  1:10 PM   Result Value Ref Range    Glucose (POC) 249 (H) 70 - 110 mg/dL   GLUCOSE, POC    Collection Time: 01/23/23  6:46 PM   Result Value Ref Range    Glucose (POC) 184 (H) 70 - 110 mg/dL   GLUCOSE, POC    Collection Time: 01/24/23 12:16 AM   Result Value Ref Range    Glucose (POC) 169 (H) 70 - 110 mg/dL   PTT    Collection Time: 01/24/23  1:07 AM   Result Value Ref Range    aPTT 36.5 (H) 23.0 - 36.4 SEC   MAGNESIUM    Collection Time: 01/24/23  1:07 AM   Result Value Ref Range    Magnesium 2.4 1.6 - 2.6 mg/dL   PHOSPHORUS    Collection Time: 01/24/23  1:07 AM   Result Value Ref Range    Phosphorus 5.1 (H) 2.5 - 4.9 MG/DL   GLUCOSE, POC    Collection Time: 01/24/23  5:57 AM   Result Value Ref Range    Glucose (POC) 178 (H) 70 - 110 mg/dL       IMAGING AND PROCEDURES (LAST 36 HOURS)  No results found.    ================================================================  Further management for Ms. Gabino Wilder will be discussed on rounds with my attending.       Rosa Maria Siegel MD, PGY-1  [facility]  January 24, 2023 6:39 AM

## 2023-01-24 NOTE — PROGRESS NOTES
Comprehensive Nutrition Assessment    Type and Reason for Visit: Reassess    Nutrition Recommendations/Plan:   Continue current diet as tolerated per SLP recs. Encourage PO intake, provide 1:1 assist w/ meals. Order Ensure Pudding (170 kcal, 4g protein) TID. Monitor PO intake, compliance of oral supplement, weight, labs and plan of care during admission. Malnutrition Assessment:  Malnutrition Status:  Mild malnutrition (01/24/23 9109)    Context:  Acute illness     Findings of the 6 clinical characteristics of malnutrition:   Energy Intake:  50% or less of est energy requirements for 5 or more days  Weight Loss:  No significant weight loss     Body Fat Loss:  Unable to assess,     Muscle Mass Loss:  Unable to assess,    Fluid Accumulation:  Unable to assess,     Strength:  Not performed     Nutrition Assessment:    Admitted for shortness of breath, failed BIPAP requiring intubation 1/18. COVID+. Extubated 1/21. Transferred out of the ICU. PO diet advanced per SLP 1/22, rec'd puree, moderately thick liquids. Pt worsening renal function, AQUILES on CKD per nephrology. DNR/DNI, limited interventions, no feeding tube per palliative care. Observed breakfast outside of room at time of visit. Per flowsheet, pt eating 0% of meals and 26-50% of breakfast this morning (1/24). Nutritional needs are not being met. Will add oral supplements to increase calorie/protein intake opportunity. Nutrition Related Findings:    Pertinent Meds: amiodarone, lipitor, decadron, gabapentin, lantus, humalog, synthroid Pertinent Labs: POC Glucose 169-249 mg/dl x 24 hrs, Phos 5.1 H Wound Type: None     Current Nutrition Intake & Therapies:  Average Meal Intake: 0%  Average Supplement Intake: None ordered  ADULT DIET Dysphagia - Pureed; Moderately Thick (Honey);  No Drinking Straws    Anthropometric Measures:  Height: 5' 4\" (162.6 cm) (Photo ID)  Ideal Body Weight (IBW): 120 lbs (55 kg)  Admission Body Weight: 220 lb 0.3 oz (99.8 kg)  Current Body Wt:  108.9 kg (240 lb 1.3 oz)  Pt +2.7 L overall per I&Os. 183.3 % IBW. Current BMI (kg/m2): 41.2  BMI Category: Obese class 2 (BMI 35.0-39. 9)    Estimated Daily Nutrient Needs:  Energy Requirements Based On: Formula  Weight Used for Energy Requirements: Admission  Energy (kcal/day): 9706-0314 (MSJ 1-1.2)  Weight Used for Protein Requirements: Ideal  Protein (g/day):  (1.5-2 g/day)  Method Used for Fluid Requirements: 1 ml/kcal  Fluid (ml/day): 8210-8502    Nutrition Diagnosis:   Swallowing difficulty related to impaired respiratory function, cognitive or neurological impairment (dysphagia) as evidenced by  (SLP rec'd modified textured diet)  Inadequate oral intake related to cognitive or neurological impairment, impaired respiratory function as evidenced by intake 0-25%    Nutrition Interventions:   Food and/or Nutrient Delivery: Continue current diet, Start oral nutrition supplement  Nutrition Education/Counseling: No recommendations at this time  Coordination of Nutrition Care: Continue to monitor while inpatient  Plan of Care discussed with: .     Goals:  Previous Goal Met: No progress toward goal(s)  Goals: Meet at least 75% of estimated needs, by next RD assessment       Nutrition Monitoring and Evaluation:   Behavioral-Environmental Outcomes: None identified  Food/Nutrient Intake Outcomes: Food and nutrient intake, Supplement intake  Physical Signs/Symptoms Outcomes: Biochemical data, Hemodynamic status, Meal time behavior, Chewing or swallowing, GI status, Weight, Fluid status or edema    Discharge Planning:    Continue current diet    Ally Zuniga [address], [address]  Contact: [phone]

## 2023-01-24 NOTE — PROGRESS NOTES
Palliative Medicine     Palliative team members, Mary Kay Clancy NP, and this writer for follow up visit. NP provider visited patient at bedside with proper PPE. Ms Hoa Alford was only able to her name. Stated she wanted to stay here since she \"knows everyone here\". When questioned, she could not identify if she was at home or the hospital. She is alert and speaking with the staff but not making sense. Palliative team discussed case with nephrologist and attending team.  She is maintained on O2 via NC with good O2 SATs, yet with episodes of tachycardia and elevated BP. Signed POST from was placed on chart for scanning into EMR.         Flori GALLEGOSN, RN, [facility]  Palliative Medicine Inpatient RN  Palliative COPE Line: [phone]

## 2023-01-24 NOTE — PROGRESS NOTES
Infectious Disease progress Note        Reason: COVID-19 infection, respiratory failure    Current abx Prior abx   Zosyn since 1/18-1/23 vancomycin 1/18-1/20     Lines:       Assessment :  82 yro female with history significant for uncontrolled type 2 diabetes, diabetic neuropathy, hypercholesterolemia, hypertension, on anticoagulation A. fib with anticoagulation who was admitted to [facility] on 1/17 for shortness of breath     Clinical presentation consistent with acute hypoxic respiratory failure evolving since admission due to decompensated CHF superimposed on COVID-19 infection; aspiration pneumonia    Significant thick yellowish sputum noted on ET suction  MRSA nasal swab 1/19- negative    No definitive clinical evidence to suggest severe COVID-19 pneumonia at this time    Diffuse rash on extremities-could be skin excoriation from scratching. No definitive evidence of acute infection noted on today's exam    Atrial fibrillation with rapid ventricular response, non-STEMI-cardiology follow-up appreciated      decompensated CHF-ejection fraction 20 to 25% on echo 12/21/2022    Acute kidney injury-likely secondary to volume depletion, diuresis    Extubated on 1/21  Now with worsening creatinine, altered mentation  Nephrology follow-up appreciated. Concern for AIN    Altered mentation could be metabolic encephalopathy- ? Steroids related-     Increasing oxygen requirement noted today, worsening bilateral infiltrates-likely due to fluid overload.   Monitor for recurrent silent aspiration  Recommendations:     continue ceftriaxone, metronidazole till 1/25 to complete treatment for aspiration pneumonia  Follow-up nephrology recommendations regarding worsening creatinine  Follow-up cardiology recommendations   d/c decadron after pm dose since it can contribute to altered mentation/fluid overload  Monitor CBC, temperature, procalcitonin     Above plan was discussed in details with dr Kayleigh Romeo,  primary team. Please call me if any further questions or concerns. Will continue to participate in the care of this patient. HPI:  Confused. Unable to communicate effectively      Past Medical History:   Diagnosis Date    Acquired hypothyroidism 8/1/2016    Arthritis of right shoulder region 4/21/2016    Asthma     Bilateral lower extremity edema 7/7/2021    Chronic bilateral low back pain without sciatica 7/7/2021    Chronic diastolic congestive heart failure ([address] 3/10/2021    Chronic venous insufficiency     Diabetes (HCC)     neuropathy    Essential hypertension 7/7/2021    Gout     History of depression 1/23/2017    History of fall 7/7/2021    Hypercholesteremia 7/7/2021    Hypertension     MI (myocardial infarction) ([address]     OAB (overactive bladder) 7/7/2021    Peripheral neuropathy     Rheumatoid arthritis ([address]     Tear of right rotator cuff 5/16/2016    Thyroid pain     Urinary incontinence 7/7/2021    Vertigo        Past Surgical History:   Procedure Laterality Date    HX AMPUTATION TOE Right 2020    Great toe Dr. 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 HX KNEE REPLACEMENT Bilateral        Current Discharge Medication List        CONTINUE these medications which have NOT CHANGED    Details   bumetanide (BUMEX) 1 mg tablet Take 1 mg by mouth daily. gabapentin (NEURONTIN) 400 mg capsule 1 capsule daily  Qty: 360 Capsule, Refills: 0    Associated Diagnoses: Neuropathy; Type 2 diabetes mellitus with diabetic neuropathy, with long-term current use of insulin ([facility])      insulin glargine (LANTUS) 100 unit/mL injection Inject 20 Units at bedtime. Monitor and record blood sugar daily. Qty: 10 mL, Refills: 1      aspirin 81 mg chewable tablet Take 1 Tablet by mouth daily. Qty: 30 Tablet, Refills: 2      metoprolol tartrate (LOPRESSOR) 100 mg IR tablet Take 1 Tablet by mouth every twelve (12) hours.   Qty: 120 Tablet, Refills: 2      dilTIAZem ER (CARDIZEM CD) 180 mg capsule Take 1 Capsule by mouth daily. Qty: 120 Capsule, Refills: 3      BD Insulin Syringe SafetyGlide 0.5 mL 30 gauge x 5/16\" syrg       levothyroxine (SYNTHROID) 150 mcg tablet Take 150 mcg by mouth daily. morphine IR (MS IR) 15 mg tablet Take 15 mg by mouth two (2) times a day. allopurinoL (ZYLOPRIM) 100 mg tablet Take 100 mg by mouth daily. atorvastatin (LIPITOR) 80 mg tablet Take 1 Tablet by mouth daily. Qty: 90 Tablet, Refills: 3    Associated Diagnoses: Hypercholesteremia      Blood-Glucose Meter (FREESTYLE LITE METER) monitoring kit Test twice blood glucose daily; ICD-10 E11.9; Quantity 1  Qty: 1 Kit, Refills: 0    Associated Diagnoses: Type 2 diabetes mellitus with nephropathy (HCC)      insulin syringe,safetyneedle 1 mL 31 gauge x 5/16\" syrg 1 Each by Does Not Apply route daily. Qty: 300 Each, Refills: 3    Comments: Dx e11.9      glucose blood VI test strips (FREESTYLE TEST) strip Use to check blood glucose twice daily DX:E11.9  Qty: 300 Strip, Refills: 3      metFORMIN (GLUCOPHAGE) 500 mg tablet Take 500 mg by mouth daily. acetaminophen (TYLENOL) 500 mg tablet Take 1 Tab by mouth every six (6) hours as needed for Pain.   Qty: 270 Tab, Refills: 3    Associated Diagnoses: Controlled type 2 diabetes mellitus without complication, with long-term current use of insulin (HCC)             Current Facility-Administered Medications   Medication Dose Route Frequency    metoprolol tartrate (LOPRESSOR) tablet 100 mg  100 mg Oral BID    cefTRIAXone (ROCEPHIN) 2 g in sterile water (preservative free) 20 mL IV syringe  2 g IntraVENous Q24H    metroNIDAZOLE (FLAGYL) IVPB premix 500 mg  500 mg IntraVENous Q12H    insulin glargine (LANTUS) injection 20 Units  20 Units SubCUTAneous QHS    heparin (porcine) injection 5,000 Units  5,000 Units SubCUTAneous Q8H    acetaminophen (TYLENOL) tablet 650 mg  650 mg Oral Q6H    amiodarone (CORDARONE) tablet 200 mg  200 mg Oral DAILY    [Held by provider] enoxaparin (LOVENOX) injection 100 mg  1 mg/kg SubCUTAneous DAILY    triamcinolone (ARISTOCORT) 0.5 % cream   Topical BID    ipratropium-albuterol (COMBIVENT RESPIMAT) 20 mcg-100 mcg inhalation spray  1 Puff Inhalation Q4H PRN    camphor-menthoL (SARNA) 0.5-0.5 % lotion   Topical TID    glucose chewable tablet 16 g  16 g Oral PRN    glucagon (GLUCAGEN) injection 1 mg  1 mg IntraMUSCular PRN    dextrose 10% infusion 0-250 mL  0-250 mL IntraVENous PRN    insulin lispro (HUMALOG) injection   SubCUTAneous Q6H    famotidine (PF) (PEPCID) 20 mg in 0.9% sodium chloride 10 mL injection  20 mg IntraVENous DAILY    dexamethasone (DECADRON) 4 mg/mL injection 6 mg  6 mg IntraVENous Q24H    aspirin chewable tablet 81 mg  81 mg Oral DAILY    atorvastatin (LIPITOR) tablet 80 mg  80 mg Oral DAILY    [Held by provider] bumetanide (BUMEX) tablet 1 mg  1 mg Oral DAILY    levothyroxine (SYNTHROID) tablet 150 mcg  150 mcg Oral DAILY    gabapentin (NEURONTIN) capsule 400 mg  400 mg Oral DAILY    [Held by provider] allopurinoL (ZYLOPRIM) tablet 100 mg  100 mg Oral DAILY    sodium chloride (NS) flush 5-40 mL  5-40 mL IntraVENous Q8H    acetaminophen (TYLENOL) tablet 650 mg  650 mg Oral Q6H PRN    Or    acetaminophen (TYLENOL) suppository 650 mg  650 mg Rectal Q6H PRN    polyethylene glycol (MIRALAX) packet 17 g  17 g Oral DAILY PRN    ondansetron (ZOFRAN ODT) tablet 4 mg  4 mg Oral Q8H PRN    Or    ondansetron (ZOFRAN) injection 4 mg  4 mg IntraVENous Q6H PRN       Allergies: Patient has no known allergies.     Family History   Problem Relation Age of Onset    Heart Attack Mother     Heart Attack Father      Social History     Socioeconomic History    Marital status:      Spouse name: Not on file    Number of children: Not on file    Years of education: Not on file    Highest education level: Not on file   Occupational History    Not on file   Tobacco Use    Smoking status: Former Types: Cigarettes     Quit date: 12     Years since quittin.0    Smokeless tobacco: Never    Tobacco comments:     quit 45 years ago   Vaping Use    Vaping Use: Never used   Substance and Sexual Activity    Alcohol use: No     Alcohol/week: 0.0 standard drinks    Drug use: No    Sexual activity: Not Currently   Other Topics Concern    Not on file   Social History Narrative    Not on file     Social Determinants of Health     Financial Resource Strain: Not on file   Food Insecurity: Not on file   Transportation Needs: Not on file   Physical Activity: Not on file   Stress: Not on file   Social Connections: Not on file   Intimate Partner Violence: Not on file   Housing Stability: Not on file     Social History     Tobacco Use   Smoking Status Former    Types: Cigarettes    Quit date: 1976    Years since quittin.0   Smokeless Tobacco Never   Tobacco Comments    quit 45 years ago        Temp (24hrs), Av.5 °F (36.4 °C), Min:97.1 °F (36.2 °C), Max:98 °F (36.7 °C)    Visit Vitals  BP (!) 137/93 (BP 1 Location: Left lower arm, BP Patient Position: At rest)   Pulse (!) 133   Temp 98 °F (36.7 °C)   Resp (!) 33   Ht 5' 4\" (1.626 m) Comment: Photo ID   Wt 108.9 kg (240 lb 1.3 oz)   SpO2 92%   Breastfeeding No   BMI 41.21 kg/m²       ROS: unable to obtain due to patient factors    Physical Exam:    General:  Sitting on bed. Alert. In no apparent distress. Confused   Head:  Normocephalic, without obvious abnormality, atraumatic. Eyes:  Conjunctivae/corneas clear. Nose: Nares normal. Septum midline. Mucosa normal. No drainage. Throat: Lips, mucosa, and tongue normal.    Neck: Trachea midline, no adenopathy,  no JVD. Lungs:   Clear to auscultation bilaterally. Chest wall:  No deformity. Heart:  Irregularly irregular, rate controlled   Abdomen:   Soft, non-tender. Bowel sounds normal.    Extremities: Extremities normal, atraumatic, no cyanosis, mild edema bilateral lower extremities.  Darkish erythema right LE, scattered pinpoint erythematous rash on extremities   Pulses: 2+ and symmetric all extremities. Skin: Scattered excoriations in bilateral upper and lower extremities   Lymph nodes:  Cervical and supraclavicular nodes normal   Neurologic: No gross motor or sensory deficits noted       Labs: Results:   Chemistry Recent Labs     01/24/23  0909 01/23/23  0136 01/22/23  0300   * 207* 168*    136 138   K 4.3 4.4 4.3    102 104   CO2 23 21 26   BUN 84* 83* 78*   CREA 2.65* 3.03* 2.86*   CA 9.0 8.7 7.9*   AGAP 11 13 8   BUCR 32* 27* 27*   AP 58  --   --    TP 7.6  --   --    ALB 2.7*  --   --    GLOB 4.9*  --   --    AGRAT 0.6*  --   --         CBC w/Diff Recent Labs     01/24/23  0107 01/23/23  0136 01/22/23  0300   WBC 16.3* 25.5* 20.4*   RBC 4.08* 4.44 3.57*   HGB 10.4* 11.2* 9.3*   HCT 34.7* 37.5 29.2*    219 214   GRANS  --  96* 96*   LYMPH  --  3* 2*   EOS  --  0 0        Microbiology No results for input(s): CULT in the last 72 hours. RADIOLOGY:    All available imaging studies/reports in [facility] for this admission were reviewed    Total time spent >35 minutes. High complexity decision making was performed during the evaluation of this patient at high risk for decompensation with multiple organ involvement     Above mentioned total time spent on reviewing the case/medical record/data/notes/EMR/patient examination/documentation/coordinating care with nurse/consultants, exclusive of procedures with complex decision making performed and > 50% time spent in face to face evaluation. Disclaimer: Sections of this note are dictated utilizing voice recognition software, which may have resulted in some phonetic based errors in grammar and contents. Even though attempts were made to correct all the mistakes, some may have been missed, and remained in the body of the document. If questions arise, please contact our department.     Dr. Mary Lou Lopez, Infectious Disease Specialist  [phone]  January 24, 2023  7:33 AM

## 2023-01-24 NOTE — DISCHARGE SUMMARY
Julissa Cardozo SUMMARY      Name:   Vance Lincoln 82 y.o. female  MRN:   [id number]  CSN:   [id number]  Admission Date:  2023  Discharge Date:  23  Attending:             Karina Triana MD   PCP:              Rigoberto Escobar MD   ================================================================  Reason for Admission:  Atrial fibrillation with rapid ventricular response ([address] [I48.91]  Acute on chronic heart failure ([address] [I50.9]  COVID [U07.1]  Heart failure ([address] [I50.9]  Atrial fibrillation with RVR ([address] [I48.91]  COVID-19 virus infection [U07.1]  Acute respiratory failure with hypoxia ([address] [J96.01]  COVID-19 [U07.1]    Discharge Diagnosis:    Acute respiratory failure w/ hypoxia  CHF  COVID-19  Afiib w/ RVR        GOALS OF CARE (including Code Status, Advanced Care Plan):   Comfort Care    Consultants:    ICU, Cardiology, Nephrology, Pulmonology,     Condition at discharge:       Hospital Course:     Vance Lincoln was a 82 y.o. female with a PMHx of HFrEF, afib with RVR, T2DM, hypertension who presented to [facility] ED on 2023 for worsening SOB. History was difficult to obtain as patient was fixated on SOB as well as patient did not have her hearing aids with her. Patient was found hypoxic at home on home oxygen saturating in the 80's. She was placed on CPAP and upon arrival to [facility] ED, was switched to BiPAP. Condition decompensated and required intubation in the ED, thus care was transferred to ICU. Pt was extubated on 23 and transferred to step down from ICU. Pt mental status waxed and waned for remainder of admission. Respiratory status required HFNC intermittently switching to Bipap, based on patient tolerance. Pt followed by ID, Nephrology, pulmonology, cardiology, and palliative. Pt's condition failed to improve, w/ overall poor prognosis. Family decided to proceed w/ comfort care.  Morphine and Lorazepam added PRN and all drips and other medications discontinued. Patient passed after oxygen removed at family's request. Pronounced at 1030 1/30/23. Pertinent Results:      CURRENT ADMISSION IMAGING RESULTS   XR ABD (KUB)    Result Date: 1/19/2023  Esophagogastric tube tip at gastric fundus level. XR CHEST PORT    Result Date: 1/21/2023  1. Low-lying endotracheal tube-1.3 cm above the tigist. 2.  Similar cardiomegaly and central pulmonary vascular congestion. 3.  Similar hazy mid and lower lung opacities which likely represent layering pleural effusions and adjacent atelectasis, infiltrate or edema. XR CHEST PORT    Result Date: 1/20/2023  1. Low-lying endotracheal tube, recommend retraction of 2 to 3 cm. Other medical support devices as above. 2.  Unchanged pulmonary vascular congestion with perihilar opacities, favor pulmonary edema. 3.  Similar appearance of bilateral pleural effusions with adjacent bibasilar opacities, right greater than left. XR CHEST PORT    Result Date: 1/19/2023  Borderline low lying endotracheal tube. Suggest retracting 2 to 3 cm. Right greater than left left bibasilar pleural-parenchymal opacities. Pulmonary vascular congestion. Diffuse interstitial opacities, favor edema, improved from prior. XR CHEST PORT    Result Date: 1/18/2023  Findings consistent with fluid overload/CHF exacerbation. Dictated by Chong Guidry MD, PGY-2 As the attending radiologist, I have assessed the study images and dictated or reviewed/edited the final report as needed. XR CHEST PORT    Result Date: 1/18/2023  Progressive cardiogenic pulmonary edema and pleural effusions. Appropriately positioned endotracheal tube. Enteric tube tip approximately 6 cm proximal to the gastroesophageal junction. Recommend repositioning. Cardiology Procedures/Testing:  MODALITY RESULTS   EKG     ECHO 01/17/23    ECHO ADULT FOLLOW-UP OR LIMITED 01/18/2023 1/18/2023    Interpretation Summary    Contrast used: Definity.     Technical qualifiers: Echo study was technically difficult with poor endocardial visualization. Left Ventricle: Severely reduced left ventricular systolic function with a visually estimated EF of 20 - 25%. Left ventricle size is normal. Mildly increased wall thickness. Global hypokinesis present. Aortic Valve: Mild to moderate regurgitation. Mitral Valve: Moderate to severe regurgitation. Tricuspid Valve: Severe regurgitation. Pericardium: Left pleural effusion. Ascites present. Signed by: Everardo Roche MD on 1/18/2023  3:58 PM     IR No results found for this or any previous visit. CATH         Special Testing/Procedures:  MODALITY RESULTS   MICRO All Micro Results       Procedure Component Value Units Date/Time    CULTURE, RESPIRATORY/SPUTUM/BRONCH Virgel Mcburney [[id number]] Collected: 01/19/23 1220    Order Status: Completed Specimen: Sputum Updated: 01/21/23 1415     Special Requests: NO SPECIAL REQUESTS        GRAM STAIN 2+ WBCS SEEN               OCCASIONAL EPITHELIAL CELLS SEEN            NO ORGANISMS SEEN        Culture result:       SCANT NORMAL RESPIRATORY LANIE          CULTURE, MRSA [[id number]] Collected: 01/18/23 2005    Order Status: Completed Specimen: Nasal from Nares Updated: 01/19/23 2328     Special Requests: NO SPECIAL REQUESTS        Culture result: MRSA NOT PRESENT               Screening of patient nares for MRSA is for surveillance purposes and, if positive, to facilitate isolation considerations in high risk settings. It is not intended for automatic decolonization interventions per se as regimens are not sufficiently effective to warrant routine use.       COVID-19 WITH INFLUENZA A/B [[id number]]  (Abnormal) Collected: 01/17/23 1905    Order Status: Completed Specimen: Nasopharyngeal Updated: 01/17/23 2046     SARS-CoV-2 by PCR Detected        Comment: CALLED TO AND CORRECTLY REPEATED BY:  [facility] AT 2045 BY KDA 1/17/23  Positive results are indicative of the presence of SARS-CoV-2. Clinical correlation with patient history and other diagnostic information is necessary to determine patient infection status. Positive results do not rule out bacterial infection or co-infection with other pathogens. Influenza A by PCR Not detected        Influenza B by PCR Not detected        Comment: NOTE: Influenza A and Influenza B negative results should be considered presumptive in samples that have a positive SARS-CoV-2 result. Consider re-testing with an alternate FDA-approved test if clinically indicated. Testing was performed using matteo Angela SARS-CoV-2 and Influenza A/B nucleic acid assay. This test is a multiplex Real-Time Reverse Transcriptase Polymerase Chain Reaction (RT-PCR) based in vitro diagnostic test intended for the qualitative detection of nucleic acids from SARS-CoV-2, Influenza A, and Influenza B in nasopharyngeal for use under the FDA's Emergency Use Authorization(EAU) only.        Fact sheet for Patients: FindDrives.pl  Fact sheet for Healthcare Providers: FindDrives.pl                ABG Lab Results   Component Value Date/Time    pH (POC) 7.42 01/20/2023 04:42 AM    pCO2 (POC) 34.6 (L) 01/20/2023 04:42 AM    pO2 (POC) 107 (H) 01/20/2023 04:42 AM    HCO3 (POC) 22.5 01/20/2023 04:42 AM    FIO2 (POC) 40 01/20/2023 04:42 AM      UA Results for orders placed or performed in visit on 08/24/22   AMB POC URINALYSIS DIP STICK AUTO W/O MICRO     Status: None   Result Value Ref Range Status    Color (UA POC) Yellow  Final    Clarity (UA POC) Clear  Final    Glucose (UA POC) 3+ Negative Final    Bilirubin (UA POC) Negative Negative Final    Ketones (UA POC) Negative Negative Final    Specific gravity (UA POC) 1.015 1.001 - 1.035 Final    Blood (UA POC) 2+ Negative Final    pH (UA POC) 6.0 4.6 - 8.0 Final    Protein (UA POC) Trace Negative Final    Urobilinogen (UA POC) 0.2 mg/dL 0.2 - 1 Final    Nitrites (UA POC) Negative Negative Final    Leukocyte esterase (UA POC) Negative Negative Final         Laboratory Results:  LABORATORY RESULTS   HEMATOLOGY Lab Results   Component Value Date/Time    WBC 25.5 (H) 01/23/2023 01:36 AM    HGB 11.2 (L) 01/23/2023 01:36 AM    HCT 37.5 01/23/2023 01:36 AM    PLATELET 005 23/27/1210 01:36 AM    MCV 84.5 01/23/2023 01:36 AM       CHEMISTRIES Lab Results   Component Value Date/Time    Sodium 136 01/23/2023 01:36 AM    Potassium 4.4 01/23/2023 01:36 AM    Chloride 102 01/23/2023 01:36 AM    CO2 21 01/23/2023 01:36 AM    Anion gap 13 01/23/2023 01:36 AM    Glucose 207 (H) 01/23/2023 01:36 AM    BUN 83 (H) 01/23/2023 01:36 AM    Creatinine 3.03 (H) 01/23/2023 01:36 AM    BUN/Creatinine ratio 27 (H) 01/23/2023 01:36 AM    GFR est AA 58 (L) 08/13/2022 03:31 AM    GFR est non-AA 48 (L) 08/13/2022 03:31 AM    Calcium 8.7 01/23/2023 01:36 AM      HEPATIC FUNCTION Lab Results   Component Value Date/Time    Albumin 2.4 (L) 01/19/2023 05:08 AM    Bilirubin, total 0.5 01/18/2023 03:08 AM    Protein, total 7.6 01/18/2023 03:08 AM    Globulin 4.7 (H) 01/18/2023 03:08 AM    A-G Ratio 0.6 (L) 01/18/2023 03:08 AM    ALT (SGPT) 6 (L) 01/18/2023 03:08 AM    Alk.  phosphatase 51 01/18/2023 03:08 AM       LACTIC ACID Lab Results   Component Value Date/Time    Lactic acid 1.8 01/20/2023 09:51 PM    Lactic acid 1.9 01/20/2023 03:45 PM    Lactic acid 1.6 01/20/2023 04:20 AM      CARDIAC PANEL Lab Results   Component Value Date/Time     08/11/2022 02:47 AM    CK - MB 2.2 04/18/2018 03:48 PM    CK-MB Index 2.5 04/18/2018 03:48 PM    Troponin-I, QT <0.02 04/18/2018 03:48 PM      NT-proBNP Lab Results   Component Value Date/Time    NT pro-BNP 7,478 (H) 01/17/2023 06:59 PM    NT pro-BNP 4,582 (H) 12/21/2022 12:37 AM    NT pro-BNP 3,967 (H) 10/07/2022 04:53 PM    NT pro-BNP 1,164 08/10/2022 03:40 AM    NT pro- 04/18/2018 03:48 PM      THYROID Lab Results   Component Value Date/Time    TSH 4.15 (H) 01/17/2023 06:59 PM    T4, Free 0.5 (L) 12/21/2022 12:37 AM            Sina Scott MD, PGY-1  [facility]  1/23/2023 7:52 PM

## 2023-01-24 NOTE — PROGRESS NOTES
[facility] - Progress Note  Admit Date: 1/17/2023    Assessment:     -Acute hypoxic respiratory failure associated with COVID with underlying HFrEF and COPD, emergently intubated 1/18/2023 AM.  -Afib RVR, known Hx afib diagnosed 10/2022  -Elevated troponin, peaked at 2343  -HFrEF, recent diagnosis 12/2022  Echo 12/21/2022 with LVEF 20-25% with severe hypokinesis of basal anteroseptal, basal inferoseptal and apical septal walls, moderately dilated RV with reduced systolic function, severe biatrial enlargement  Repeat Echo this admission with LVEF 20-25%, no significant change from prior  -Hx RLE DVT 06/2022  -HTN  -DM  -HLD  -Hx medication noncompliance  -DNR status      Primary cardiologist is Dr. Ronnell Dickerson:     Discussed with primary team this morning, switching to lopressor 100 mg BID and stopping diltiazem given low EF. Cr improving. Given EF of 20-25%, reasonable to consider heart catheterization once more stable. Dr. Joanne Nicholson to see tomorrow to discuss options. Subjective:     No new complaints.      Objective:      Patient Vitals for the past 8 hrs:   Temp Pulse Resp BP SpO2   01/24/23 0929 97.1 °F (36.2 °C) 87 23 (!) 141/111 93 %         Patient Vitals for the past 96 hrs:   Weight   01/24/23 0748 108.9 kg (240 lb 1.3 oz)   01/20/23 2000 99.5 kg (219 lb 5.7 oz)                    Intake/Output Summary (Last 24 hours) at 1/24/2023 1449  Last data filed at 1/24/2023 5481  Gross per 24 hour   Intake 120 ml   Output 950 ml   Net -830 ml       Physical Exam:  General:  alert, cooperative, no distress, appears stated age  Neck:  nontender  Lungs:  clear to auscultation bilaterally  Heart:  regular rate and rhythm, S1, S2 normal, no murmur, click, rub or gallop  Abdomen:  abdomen is soft without significant tenderness, masses, organomegaly or guarding  Extremities:  extremities normal, atraumatic, no cyanosis or edema    Data Review:     Labs: Results:       Chemistry Recent Labs     01/24/23  0909 01/24/23 0107 01/23/23 0136 01/22/23  0300   *  --  207* 168*     --  136 138   K 4.3  --  4.4 4.3     --  102 104   CO2 23  --  21 26   BUN 84*  --  83* 78*   CREA 2.65*  --  3.03* 2.86*   CA 9.0  --  8.7 7.9*   MG  --  2.4 2.4 2.4   PHOS  --  5.1* 6.7* 5.2*   AGAP 11  --  13 8   BUCR 32*  --  27* 27*   AP 58  --   --   --    TP 7.6  --   --   --    ALB 2.7*  --   --   --    GLOB 4.9*  --   --   --    AGRAT 0.6*  --   --   --       CBC w/Diff Recent Labs     01/24/23 0107 01/23/23 0136 01/22/23  0300   WBC 16.3* 25.5* 20.4*   RBC 4.08* 4.44 3.57*   HGB 10.4* 11.2* 9.3*   HCT 34.7* 37.5 29.2*    219 214   GRANS  --  96* 96*   LYMPH  --  3* 2*   EOS  --  0 0      Cardiac Enzymes No results found for: CPK, CK, CKMMB, CKMB, RCK3, CKMBT, CKNDX, CKND1, GABRIEL, TROPT, TROIQ, KRUPA, TROPT, TNIPOC, BNP, BNPP   Coagulation Recent Labs     01/24/23 0107 01/23/23 0136   APTT 36.5* 32.4       Lipid Panel Lab Results   Component Value Date/Time    Cholesterol, total 191 11/09/2021 12:24 PM    HDL Cholesterol 44 11/09/2021 12:24 PM    LDL, calculated 96.8 11/09/2021 12:24 PM    VLDL, calculated 50.2 11/09/2021 12:24 PM    Triglyceride 251 (H) 11/09/2021 12:24 PM    CHOL/HDL Ratio 4.3 11/09/2021 12:24 PM      BNP No results found for: BNP, BNPP, XBNPT   Liver Enzymes Recent Labs     01/24/23  0909   TP 7.6   ALB 2.7*   AP 58      Digoxin    Thyroid Studies Lab Results   Component Value Date/Time    TSH 4.15 (H) 01/17/2023 06:59 PM          Signed By: Christen Miller MD     January 24, 2023

## 2023-01-24 NOTE — PROGRESS NOTES
Problem: Self Care Deficits Care Plan (Adult)  Goal: *Acute Goals and Plan of Care (Insert Text)  Description: Occupational Therapy Goals  Initiated 1/24/2023 within 7 day(s). 1.  Patient will perform self-feeding with supervision/set-up. 2.  Patient will perform upper body dressing with supervision/set-up. 3.  Patient will perform grooming with supervision/set-up. 4.  Patient will perform toilet transfers with moderate assistance . 5. Patient will perform all aspects of toileting with moderate assistance . 6. Patient will participate in upper extremity therapeutic exercise/activities with supervision/set-up for 8 minutes. 7.  Patient will utilize energy conservation techniques during functional activities with verbal cues. Prior Level of Function: Patient a poor historian, confused unable to obtain PLOF from pt. Per past OT admission (1/13/2023), patient reports she was Mod I with ADLs, use of RW for functional mobility, performs cooking and cleaning tasks  Outcome: Progressing Towards Goal       OCCUPATIONAL THERAPY EVALUATION    Patient: Keon Kiran [de-identified]82 y.o. female)  Date: 1/24/2023  Primary Diagnosis: Atrial fibrillation with rapid ventricular response (HCC) [I48.91]  Acute on chronic heart failure (HCC) [I50.9]  COVID [U07.1]  Heart failure ([facility] [address] [I50.9]  Atrial fibrillation with RVR ([facility] [address] [I48.91]  COVID-19 virus infection [U07.1]  Acute respiratory failure with hypoxia (HCC) [J96.01]  COVID-19 [U07.1]  Precautions:  Fall, Contact (droplet +)    ASSESSMENT :  Upon entering the room, the patient was supine in bed, alert, confused. Patient was seen with PT to maximize patient safety, participation, and functional mobility in preparation for self-care tasks. Patient perseverating on different things during evaluation, moderate - maximum redirection needed. Patient moderate assist for washing face with wet wipe than later observed wiping face with VIC DORAN.  Patient max- total assist for bed mobility in preparation for ADLs and would benefit from continued 2nd person assist at this time d/t cognition. Based on the objective data described below, the patient presents with decreased strength, decreased independence, decreased activity tolerance, decreased functional balance, and decreased functional mobility, which impedes pt performance in basic self-care/ADL tasks. Patient would benefit from skilled OT during admission to restore PLOF and maximize function. Patient will benefit from skilled intervention to address the above impairments. Patient's rehabilitation potential is considered to be Fair  Factors which may influence rehabilitation potential include:   []             None noted  [x]             Mental ability/status  [x]             Medical condition  [x]             Home/family situation and support systems  [x]             Safety awareness  []             Pain tolerance/management  []             Other:      PLAN :  Recommendations and Planned Interventions:   [x]               Self Care Training                  [x]      Therapeutic Activities  [x]               Functional Mobility Training   [x]      Cognitive Retraining  [x]               Therapeutic Exercises           [x]      Endurance Activities  [x]               Balance Training                    [x]      Neuromuscular Re-Education  []               Visual/Perceptual Training     [x]      Home Safety Training  [x]               Patient Education                   [x]      Family Training/Education  []               Other (comment):    Frequency/Duration: Patient will be followed by occupational therapy 3 - 5 times a week to address goals. Further Equipment Recommendations for Discharge: hospital bed, mechanical lift, wheelchair    AMPAC: Current research shows that an AM-PAC score of 17 or less is not associated with a discharge to the patient's home setting.   Based on an AM-PAC score of 11/24 and their current ADL deficits; it is recommended that the patient have 3-5 sessions per week of Occupational Therapy at d/c to increase the patient's independence. This [facility] score should be considered in conjunction with interdisciplinary team recommendations to determine the most appropriate discharge setting. Patient's social support, diagnosis, medical stability, and prior level of function should also be taken into consideration. SUBJECTIVE:   Patient stated That's Sarmad's number, the mary I live with -pt has therapists write a number on whiteboard    OBJECTIVE DATA SUMMARY:     Past Medical History:   Diagnosis Date    Acquired hypothyroidism 8/1/2016    Arthritis of right shoulder region 4/21/2016    Asthma     Bilateral lower extremity edema 7/7/2021    Chronic bilateral low back pain without sciatica 7/7/2021    Chronic diastolic congestive heart failure ([address] 3/10/2021    Chronic venous insufficiency     Diabetes (HCC)     neuropathy    Essential hypertension 7/7/2021    Gout     History of depression 1/23/2017    History of fall 7/7/2021    Hypercholesteremia 7/7/2021    Hypertension     MI (myocardial infarction) ([address]     OAB (overactive bladder) 7/7/2021    Peripheral neuropathy     Rheumatoid arthritis ([address]     Tear of right rotator cuff 5/16/2016    Thyroid pain     Urinary incontinence 7/7/2021    Vertigo      Past Surgical History:   Procedure Laterality Date    HX AMPUTATION TOE Right 2020    Great toe Dr. 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     HX KNEE REPLACEMENT Bilateral      Barriers to Learning/Limitations: yes;  altered mental status (i.e.Sedation, Confusion)  Compensate with: visual, verbal, tactile, kinesthetic cues/model    Home Situation:   Home Situation  Home Environment:  (pt unable to give PLOF)  []  Right hand dominant   []  Left hand dominant    Cognitive/Behavioral Status:  Neurologic State: Alert;Confused  Orientation Level: Disoriented X4  Cognition: No command following;Decreased attention/concentration;Poor safety awareness  Safety/Judgement: Fall prevention    Skin: Intact  Edema: None noted    Vision/Perceptual:    Tracking: Able to track stimulus in all quadrants w/o difficulty           Coordination: BUE  Coordination: Grossly decreased, non-functional  Fine Motor Skills-Upper: Left Intact; Right Intact    Gross Motor Skills-Upper: Left Intact; Right Intact    Balance:  Sitting: Impaired; With support  Sitting - Static: Fair (occasional) (long sitting with BUE support on bedrails .)    Strength: BUE  Strength: Generally decreased, functional       Tone & Sensation: BUE  Tone: Normal  Sensation:  (unable to formally assess)       Range of Motion: BUE  AROM: Generally decreased, functional  PROM: Within functional limits       Functional Mobility and Transfers for ADLs:  Bed Mobility:  Rolling: Total assistance  Supine to Sit: Maximum assistance  Sit to Supine: Maximum assistance      ADL Assessment:   Feeding: Minimum assistance    Oral Facial Hygiene/Grooming: Moderate assistance    Bathing: Maximum assistance    Upper Body Dressing: Maximum assistance    Lower Body Dressing: Maximum assistance; Total assistance    Toileting: Maximum assistance; Total assistance       ADL Intervention:  Grooming  Grooming Assistance: Moderate assistance  Washing Face: Moderate assistance (able to bring RUE to face later; SBA to own volition not command)    Cognitive Retraining  Safety/Judgement: Fall prevention    Pain:  Pain level pre-treatment: 0/10   Pain level post-treatment: 0/10   Pain Intervention(s): Medication (see MAR); Rest, Ice, Repositioning   Response to intervention: Nurse notified, See doc flow    Activity Tolerance:   Fair      Please refer to the flowsheet for vital signs taken during this treatment.   After treatment:   [] Patient left in no apparent distress sitting up in chair  [x] Patient left in no apparent distress in bed  [x] Call bell left within reach  [x] Nursing notified  [] Caregiver present  [x] Bed alarm activated    COMMUNICATION/EDUCATION:   [x] Role of Occupational Therapy in the acute care setting  [x] Home safety education was provided and the patient/caregiver indicated understanding. [x] Patient/family have participated as able in goal setting and plan of care. [x] Patient/family agree to work toward stated goals and plan of care. [] Patient understands intent and goals of therapy, but is neutral about his/her participation. [] Patient is unable to participate in goal setting and plan of care. Thank you for this referral.  Sarahi Kendall OTR/TIFFANY  Time Calculation: 19 mins    Eval Complexity: History: MEDIUM Complexity : Expanded review of history including physical, cognitive and psychosocial  history ; Examination: HIGH Complexity : 5 or more performance deficits relating to physical, cognitive , or psychosocial skils that result in activity limitations and / or participation restrictions; Decision Making:HIGH Complexity : Patient presents with comorbidities that affect occupational performance. Signifigant modification of tasks or assistance (eg, physical or verbal) with assessment (s) is necessary to enable patient to complete evaluation     [facility]-PAC® Daily Activity Inpatient Short Form (6-Clicks)*    How much HELP from another person does the patient currently need    (If the patient hasn't done an activity recently, how much help from another person do you think he/she would need if he/she tried?)   Total (Total A or Dep)   A Lot  (Mod to Max A)   A Little (Sup or Min A)   None (Mod I to I)   Putting on and taking off regular lower body clothing? [x] 1 [] 2 [] 3 [] 4   2. Bathing (including washing, rinsing,      drying)? [] 1 [x] 2 [] 3 [] 4   3.  Toileting, which includes using toilet, bedpan or urinal?   [x] 1 [] 2 [] 3 [] 4 4. Putting on and taking off regular upper body clothing? [] 1 [x] 2 [] 3 [] 4   5. Taking care of personal grooming such as brushing teeth? [] 1 [x] 2 [] 3 [] 4   6. Eating meals?    [] 1 [] 2 [x] 3 [] 4

## 2023-01-24 NOTE — PROGRESS NOTES
[facility]– [place]: [phone] ([phone])  [facility]: [phone]     Patient Name: Baljit Shanks  YOB: 1935    Date of consult : 1/19/23  Date of follow-up visit:  1/23/2023; 1/24/2023  Reason for Consult: establish goals of care  Requesting Provider: Vannessa Ma MD    Primary Care Physician: Andrey Emanuel MD      SUMMARY:   Baljit Shanks is a 82 y.o. female with a past history of DM 2, diastolic congestive heart failure, CHF, HTN, obesity, atrial fibrillation with rapid ventricular response who was admitted on 1/17/2023 from home with a diagnosis of acute respiratory failure, COVID-19. Current medical issues leading to Palliative Medicine involvement include: Pt with a long term life limiting chronic disease process that warrants discussions about her goals of care. CHIEF COMPLAINT: Respiratory failure on ventilator and intubated    HPI/SUBJECTIVE:    Patient is a 44-year-old  female that lives with her boyfriend. She came in through the emergency department with complaints of shortness of breath lasting greater than 1 day. She arrived on CPAP by EMS with O2 saturations in the 80s percentile. She also had tachycardia with pulse of 181 bpm.  She rapidly decompensated and was ultimately intubated and placed on mechanical ventilation. Patient does have a durable DO NOT RESUSCITATE.    1/23/2023: Lying in bed with eyes closed, respirations 21-23, on nasal oxygen at  6 liters    1/24/2023:  reclined in bed, awake, alert, oriented to self, speech incomprehensible at times, pulling bedcovers off, remains on nasal oxygen at 6 liters    The patient is:   [] Verbal and participatory  [x] Non-participatory due to:  Intubated    GOALS OF CARE:  DNR/DNI, limited interventions, no feeding tube  Patient/Health Care Proxy Stated Goals: Prolong life      TREATMENT PREFERENCES:   Code Status: DNR/DNI         PALLIATIVE DIAGNOSES: Goals of care/ACP  Acute respiratory failure  COVID-19  Debility age-related       PLAN:   1/24/2023:  Seen at bedside independently in full PPE for droplet plus isolation and COVID-19. Ms. Skyler Beard is reclined in bed, awake, alert, oriented to self. Speech incomprehensible at times. Could not spontaneously state where she was. When asked if she was at home, patient replied \"yes\". When asked if she was in the hospital, patient replied \"yes, I'm in the hospital\". Reports that her breathing is better, remains on nasal oxygen at 6 liters. Denies pain. Per chart review, oral intake poor. Discussed with nephrology. Discussed with Velia Lo. POST form on file. Will continue to follow along with you. Goals of care remain DNR/DNI, limited interventions, no feeding tube. Please see below for previous conversations with the palliative medicine team:    1/23/2023:  Seen via window due to COVID-19 isolation along with Ms. Mary Kay Montemayor and JULIO Caruso. Patient is lying in bed with eyes closed, respiratory rate 21-23 with abdominal muscle used observed. Nasal oxygen at 6 liters. Per chart review, noted patient was medically extubated on 1/21/2023 and transferred out of ICU. Unable to find POST form which was placed on chart for son's signature on 1/20/2023. Telephone call to patient's sonEmerson regarding POST form. Filled out another POST form for DNR/DNI, limited interventions and no feeding tube and sent to patient's son, Emerson Monge via HIPAA compliant DocuSign for electronic signature. Received signed POST form back. Copy placed on chart for scanning. Goals of care are now DNR/DNI, limited interventions, no feeding tube. Please see below for previous conversations with the palliative medicine team:    Goals of care/ACP  This NP along with Gisell Parra RN observed patient through glass due to strict isolation.   She is unable to participate in a goals of care conversation due to being intubated and mechanically ventilated. She has also been weaned from sedation and minimally interactive. Noted that patient is spontaneously opening her eyes and moving extremities. We have reached out to her son Kenny Olivo who is her medical power of . He was unaware that a durable DO NOT RESUSCITATE does not necessarily protect you from being emergently intubated for respiratory distress. We reviewed with him a POST which he wanted to complete. He has chosen DNR/DNI, limited interventions and limited feeding tube. He is unable at this moment to come in to sign we are placing the POST on the patient's chart for him to sign tomorrow evening when he is available after work. In the interim we are excepting a verbal from him for no reintubation. He is okay with attempts at medically liberating his mother from the ventilator with the understanding that she will not be reintubated. Goals of care: DNR/DNI no reintubation post extubation for any reason  2. Acute respiratory failure  Patient intubated and mechanically ventilated with PEEP of 5 and FiO2 45% attempts over the next 24 to 48 hours to liberate her from the ventilator. She is being weaned today from sedation. 3.   COVID-19  PCR for COVID-19 SARS positive on 1/17/2023. Chest x-ray on admission shows low lung volumes with prominent interstitial lung markings. Trace blunting of the bilateral costophrenic angles findings are consistent with fluid overload/CHF exacerbation. 4.   Debility/age-related  Patient with a baseline palliative performance score of around 60% with some reduction in her ambulation she has full self-care full level of consciousness. 5.   Initial consult note routed to primary continuity provider  6.    Communicated plan of care with: Palliative IDT      Advance Care Planning:  [] The [employer] Interdisciplinary Team has updated the ACP Navigator with [address] and Patient Capacity    Primary Decision [facility] Agent): Supplemental (Other) Decision MakerEmmit Paras - Child - [phone]    Medical Interventions: Limited additional interventions   Other Instructions:   Artificially Administered Nutrition: No feeding tube     As far as possible, the palliative care team has discussed with patient / health care proxy about goals of care / treatment preferences for patient. HISTORY:     History obtained from: chart review   Principal Problem:    Acute on chronic heart failure ([address] (1/17/2023)    Active Problems:    Atrial fibrillation with RVR ([address] (12/21/2022)      Atrial fibrillation with rapid ventricular response ([address] (1/17/2023)      COVID (1/17/2023)      Heart failure ([address] (1/17/2023)      COVID-19 virus infection (1/17/2023)      Acute respiratory failure with hypoxia ([address] (1/18/2023)      COVID-19 (1/18/2023)    Past Medical History:   Diagnosis Date    Acquired hypothyroidism 8/1/2016    Arthritis of right shoulder region 4/21/2016    Asthma     Bilateral lower extremity edema 7/7/2021    Chronic bilateral low back pain without sciatica 7/7/2021    Chronic diastolic congestive heart failure ([address] 3/10/2021    Chronic venous insufficiency     Diabetes (HCC)     neuropathy    Essential hypertension 7/7/2021    Gout     History of depression 1/23/2017    History of fall 7/7/2021    Hypercholesteremia 7/7/2021    Hypertension     MI (myocardial infarction) ([address]     OAB (overactive bladder) 7/7/2021    Peripheral neuropathy     Rheumatoid arthritis ([address]     Tear of right rotator cuff 5/16/2016    Thyroid pain     Urinary incontinence 7/7/2021    Vertigo       Past Surgical History:   Procedure Laterality Date    HX AMPUTATION TOE Right 2020    Great toe Dr. 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     HX KNEE REPLACEMENT Bilateral Family History   Problem Relation Age of Onset    Heart Attack Mother     Heart Attack Father      History reviewed, no pertinent family history.   Social History     Tobacco Use    Smoking status: Former     Types: Cigarettes     Quit date:      Years since quittin.0    Smokeless tobacco: Never    Tobacco comments:     quit 45 years ago   Substance Use Topics    Alcohol use: No     Alcohol/week: 0.0 standard drinks     No Known Allergies   Current Facility-Administered Medications   Medication Dose Route Frequency    metoprolol tartrate (LOPRESSOR) tablet 100 mg  100 mg Oral BID    cefTRIAXone (ROCEPHIN) 2 g in sterile water (preservative free) 20 mL IV syringe  2 g IntraVENous Q24H    metroNIDAZOLE (FLAGYL) IVPB premix 500 mg  500 mg IntraVENous Q12H    insulin glargine (LANTUS) injection 20 Units  20 Units SubCUTAneous QHS    heparin (porcine) injection 5,000 Units  5,000 Units SubCUTAneous Q8H    acetaminophen (TYLENOL) tablet 650 mg  650 mg Oral Q6H    amiodarone (CORDARONE) tablet 200 mg  200 mg Oral DAILY    enoxaparin (LOVENOX) injection 100 mg  1 mg/kg SubCUTAneous DAILY    triamcinolone (ARISTOCORT) 0.5 % cream   Topical BID    ipratropium-albuterol (COMBIVENT RESPIMAT) 20 mcg-100 mcg inhalation spray  1 Puff Inhalation Q4H PRN    camphor-menthoL (SARNA) 0.5-0.5 % lotion   Topical TID    glucose chewable tablet 16 g  16 g Oral PRN    glucagon (GLUCAGEN) injection 1 mg  1 mg IntraMUSCular PRN    dextrose 10% infusion 0-250 mL  0-250 mL IntraVENous PRN    insulin lispro (HUMALOG) injection   SubCUTAneous Q6H    famotidine (PF) (PEPCID) 20 mg in 0.9% sodium chloride 10 mL injection  20 mg IntraVENous DAILY    dexamethasone (DECADRON) 4 mg/mL injection 6 mg  6 mg IntraVENous Q24H    aspirin chewable tablet 81 mg  81 mg Oral DAILY    atorvastatin (LIPITOR) tablet 80 mg  80 mg Oral DAILY    [Held by provider] bumetanide (BUMEX) tablet 1 mg  1 mg Oral DAILY    levothyroxine (SYNTHROID) tablet 150 mcg 150 mcg Oral DAILY    gabapentin (NEURONTIN) capsule 400 mg  400 mg Oral DAILY    [Held by provider] allopurinoL (ZYLOPRIM) tablet 100 mg  100 mg Oral DAILY    sodium chloride (NS) flush 5-40 mL  5-40 mL IntraVENous Q8H    acetaminophen (TYLENOL) tablet 650 mg  650 mg Oral Q6H PRN    Or    acetaminophen (TYLENOL) suppository 650 mg  650 mg Rectal Q6H PRN    polyethylene glycol (MIRALAX) packet 17 g  17 g Oral DAILY PRN    ondansetron (ZOFRAN ODT) tablet 4 mg  4 mg Oral Q8H PRN    Or    ondansetron (ZOFRAN) injection 4 mg  4 mg IntraVENous Q6H PRN          Clinical Pain Assessment (nonverbal scale for nonverbal patients): Clinical Pain Assessment  Severity: 0     Activity (Movement): Lying quietly, normal position    Duration: for how long has pt been experiencing pain (e.g., 2 days, 1 month, years)  Frequency: how often pain is an issue (e.g., several times per day, once every few days, constant)     FUNCTIONAL ASSESSMENT:     Palliative Performance Scale (PPS):  PPS: 40    ECOG  ECOG Status : Limited self-care     PSYCHOSOCIAL/SPIRITUAL SCREENING:      Any spiritual / Sabianism concerns:  unable to assess  [] Yes /  [] No    Caregiver Burnout:  [] Yes /  [] No /  [x] No Caregiver Present      Anticipatory grief assessment: unable to assess  [] Normal  / [] Maladaptive        REVIEW OF SYSTEMS:     Systems: constitutional, ears/nose/mouth/throat, respiratory, gastrointestinal, genitourinary, musculoskeletal, integumentary, neurologic, psychiatric, endocrine. Positive findings noted below. Modified ESAS Completed by: provider           Pain: 0           Dyspnea: 1           Stool Occurrence(s): 1   Positive and pertinent negative findings in ROS are noted above in HPI. The following systems were [x] reviewed / [] unable to be reviewed as noted in HPI  Other findings are noted below.      PHYSICAL EXAM:     Constitutional: lying in bed, awake, alert, oriented x1, speech incomprehensible at times  Eyes: pupils equal  ENMT: no nasal discharge, moist mucous membranes, edentulous  Cardiovascular: tachycardic rhythm  Respiratory:  breathing not labored, tachypneic 21-24, nasal oxygen 6 liters, pulse ox 93-94%  Gastrointestinal: soft   Musculoskeletal: no deformity, no tenderness to palpation  Skin: warm, dry, multiple areas of excoriation from scratching  Neurologic: follows simple commands, moving all extremities    Other: Wt Readings from Last 3 Encounters:   01/24/23 108.9 kg (240 lb 1.3 oz)   01/12/23 101 kg (222 lb 10.6 oz)   12/21/22 101 kg (222 lb 9.6 oz)     Blood pressure (!) 141/111, pulse 87, temperature 97.1 °F (36.2 °C), resp. rate 23, height 5' 4\" (1.626 m), weight 108.9 kg (240 lb 1.3 oz), SpO2 93 %, not currently breastfeeding. Pain:  Pain Scale 1: Numeric (0 - 10)  Pain Intensity 1: 0              Pain Intervention(s) 1: Medication (see MAR)       LAB AND IMAGING FINDINGS:     Lab Results   Component Value Date/Time    WBC 25.5 (H) 01/23/2023 01:36 AM    HGB 11.2 (L) 01/23/2023 01:36 AM    PLATELET 529 99/51/1015 01:36 AM     Lab Results   Component Value Date/Time    Sodium 142 01/24/2023 09:09 AM    Potassium 4.3 01/24/2023 09:09 AM    Chloride 108 01/24/2023 09:09 AM    CO2 23 01/24/2023 09:09 AM    BUN 84 (H) 01/24/2023 09:09 AM    Creatinine 2.65 (H) 01/24/2023 09:09 AM    Calcium 9.0 01/24/2023 09:09 AM    Magnesium 2.4 01/24/2023 01:07 AM    Phosphorus 5.1 (H) 01/24/2023 01:07 AM      Lab Results   Component Value Date/Time    Alk.  phosphatase 58 01/24/2023 09:09 AM    Protein, total 7.6 01/24/2023 09:09 AM    Albumin 2.7 (L) 01/24/2023 09:09 AM    Globulin 4.9 (H) 01/24/2023 09:09 AM     Lab Results   Component Value Date/Time    INR 2.0 (H) 01/13/2023 04:40 AM    Prothrombin time 22.9 (H) 01/13/2023 04:40 AM    aPTT 36.5 (H) 01/24/2023 01:07 AM      No results found for: IRON, FE, TIBC, IBCT, PSAT, FERR   No results found for: PH, PCO2, PO2  No components found for: [place]   Lab Results   Component Value Date/Time     08/11/2022 02:47 AM    CK - MB 2.2 04/18/2018 03:48 PM              Total time: 35 minutes     > 50% counseling / coordination:  Time spent in direct consultation with the patient, medical team, and family     Prolonged service was provided for  []30 min   []75 min in face to face time in the presence of the patient, spent as noted above. Time Start:   Time End:     Disclaimer: Sections of this note are dictated using utilizing voice recognition software, which may have resulted in some phonetic based errors in grammar and contents. Even though attempts were made to correct all the mistakes, some may have been missed, and remained in the body of the document. If questions arise, please contact our department.

## 2023-01-25 NOTE — ROUTINE PROCESS
Patient unable to take PO medication; O2 saturation drops to low 80's; MD (CECILE)notified - patient not awake enough to swallow safely;  multiple attempts made to give patient thicken liquids; patient not able to tolerate.

## 2023-01-25 NOTE — PROGRESS NOTES
Cardiology Progress Note    Admit Date: 1/17/2023  Attending Cardiologist: Dr. Marin Grow:     -Acute hypoxic respiratory failure associated with COVID with underlying HFrEF and COPD, emergently intubated 1/18/2023 AM, extubated on 1/21/2023. -Afib RVR, known Hx afib diagnosed 10/2022  -Elevated troponin, peaked at 2343  -HFrEF, recent diagnosis 12/2022  Echo 12/21/2022 with LVEF 20-25% with severe hypokinesis of basal anteroseptal, basal inferoseptal and apical septal walls, moderately dilated RV with reduced systolic function, severe biatrial enlargement  Repeat Echo this admission with LVEF 20-25%, no significant change from prior  -Hx RLE DVT 06/2022  -HTN  -DM  -HLD  -Hx medication noncompliance  -DNR status      Primary cardiologist is Dr. Ofelia Berry:       I saw, evaluated, interviewed and examined the patient personally. Patient with respiratory distress overnight now on BiPAP. She is DNR  Fluctuating elevated heart rate with A. fib noted. Likely secondary to physiologic stress  Not able to take any p.o. medication  Will start IV Lopressor 5 mg every 6 hours with holding parameters  Overall prognosis poor. Continue supportive care from cardiovascular standpoint. Maria Luisa Jay MD       -Will order Lopressor to have alternate dosing form available - PO or IV - given current status. Continue to monitor BP and HR closely, management of underlying conditions per respective teams. Prefer BB > CCB given underlying CMY. -Pt not a candidate for further cardiac evaluation at this point.  -Nephrology following, appreciate assistance, pt given dose of IV Bumex today.  -Antibiotics per ID team, appreciate assistance. Subjective:     Pt noted to have increased confusion, worsened respiratory status overnight. Pt on BiPAP (which is pulled off) at time of evaluation this afternoon, O2 sats low 70's. Discussed with PCCM NP.     Objective:      Patient Vitals for the past 8 hrs: Pulse BP SpO2   01/25/23 1131 -- -- 97 %   01/25/23 1116 (!) 122 (!) 146/95 --         Patient Vitals for the past 96 hrs:   Weight   01/24/23 0748 108.9 kg (240 lb 1.3 oz)       TELE: AFIB               Current Facility-Administered Medications   Medication Dose Route Frequency Last Admin    [Held by provider] furosemide (LASIX) injection 40 mg  40 mg IntraVENous Q12H 40 mg at 01/25/23 1132    metoprolol tartrate (LOPRESSOR) tablet 50 mg  50 mg Oral Q6H      Or    metoprolol (LOPRESSOR) injection 5 mg  5 mg IntraVENous Q6H      warfarin (COUMADIN) tablet 2.5 mg  2.5 mg Oral ONCE      warfarin - pharmacy to dose   Other Rx Dosing/Monitoring      levothyroxine (SYNTHROID) injection 112 mcg  112 mcg IntraVENous Q24H      cefTRIAXone (ROCEPHIN) 2 g in sterile water (preservative free) 20 mL IV syringe  2 g IntraVENous Q24H 2 g at 01/25/23 1118    metroNIDAZOLE (FLAGYL) IVPB premix 500 mg  500 mg IntraVENous Q12H 500 mg at 01/25/23 1115    insulin glargine (LANTUS) injection 20 Units  20 Units SubCUTAneous QHS 20 Units at 01/24/23 2300    [Held by provider] heparin (porcine) injection 5,000 Units  5,000 Units SubCUTAneous Q8H 5,000 Units at 01/25/23 1117    acetaminophen (TYLENOL) tablet 650 mg  650 mg Oral Q6H 650 mg at 01/25/23 1118    amiodarone (CORDARONE) tablet 200 mg  200 mg Oral DAILY 200 mg at 01/23/23 1050    [Held by provider] enoxaparin (LOVENOX) injection 100 mg  1 mg/kg SubCUTAneous DAILY 100 mg at 01/23/23 1049    triamcinolone (ARISTOCORT) 0.5 % cream   Topical BID Given at 01/25/23 1138    ipratropium-albuterol (COMBIVENT RESPIMAT) 20 mcg-100 mcg inhalation spray  1 Puff Inhalation Q4H PRN 1 Puff at 01/23/23 1102    camphor-menthoL (SARNA) 0.5-0.5 % lotion   Topical TID Given at 01/25/23 1137    glucose chewable tablet 16 g  16 g Oral PRN      glucagon (GLUCAGEN) injection 1 mg  1 mg IntraMUSCular PRN      dextrose 10% infusion 0-250 mL  0-250 mL IntraVENous PRN      insulin lispro (HUMALOG) injection SubCUTAneous Q6H 2 Units at 01/25/23 0604    famotidine (PF) (PEPCID) 20 mg in 0.9% sodium chloride 10 mL injection  20 mg IntraVENous DAILY 20 mg at 01/25/23 1116    aspirin chewable tablet 81 mg  81 mg Oral DAILY 81 mg at 01/24/23 1518    atorvastatin (LIPITOR) tablet 80 mg  80 mg Oral DAILY 80 mg at 01/23/23 1050    [Held by provider] levothyroxine (SYNTHROID) tablet 150 mcg  150 mcg Oral DAILY 150 mcg at 01/24/23 1456    [Held by provider] gabapentin (NEURONTIN) capsule 400 mg  400 mg Oral DAILY 400 mg at 01/23/23 1050    [Held by provider] allopurinoL (ZYLOPRIM) tablet 100 mg  100 mg Oral DAILY 100 mg at 01/23/23 1050    sodium chloride (NS) flush 5-40 mL  5-40 mL IntraVENous Q8H 10 mL at 01/25/23 0604    acetaminophen (TYLENOL) tablet 650 mg  650 mg Oral Q6H PRN      Or    acetaminophen (TYLENOL) suppository 650 mg  650 mg Rectal Q6H PRN      polyethylene glycol (MIRALAX) packet 17 g  17 g Oral DAILY PRN      ondansetron (ZOFRAN ODT) tablet 4 mg  4 mg Oral Q8H PRN      Or    ondansetron (ZOFRAN) injection 4 mg  4 mg IntraVENous Q6H PRN 4 mg at 01/24/23 0741         Intake/Output Summary (Last 24 hours) at 1/25/2023 1451  Last data filed at 1/25/2023 0600  Gross per 24 hour   Intake --   Output 550 ml   Net -550 ml       Physical Exam:  General:  elderly female, moderate respiratory distress, BiPAP removed/not in appropriate position  Neck:  supple  Lungs:  coarse  Heart:  tachycardic irregular rhythm  Abdomen:  abdomen is soft without significant tenderness, masses, organomegaly or guarding  Extremities:  atraumatic, no significant pitting edema    Visit Vitals  BP (!) 146/95   Pulse (!) 122   Temp 98.3 °F (36.8 °C)   Resp (!) 31   Ht 5' 4\" (1.626 m)   Wt 108.9 kg (240 lb 1.3 oz)   SpO2 97%   Breastfeeding No   BMI 41.21 kg/m²       Data Review:     Labs: Results:       Chemistry Recent Labs     01/25/23  0055 01/24/23  0909 01/24/23  0107 01/23/23  0136   * 163*  --  207*    142  --  136   K 4.3 4.3  --  4.4    108  --  102   CO2 21 23  --  21   BUN 88* 84*  --  83*   CREA 2.60* 2.65*  --  3.03*   CA 9.3 9.0  --  8.7   MG 2.4  --  2.4 2.4   PHOS 5.5*  --  5.1* 6.7*   AGAP 12 11  --  13   BUCR 34* 32*  --  27*   AP 69 58  --   --    TP 7.9 7.6  --   --    ALB 2.8* 2.7*  --   --    GLOB 5.1* 4.9*  --   --    AGRAT 0.5* 0.6*  --   --       CBC w/Diff Recent Labs     01/25/23  0055 01/24/23  0107 01/23/23  0136   WBC 11.4 16.3* 25.5*   RBC 3.96* 4.08* 4.44   HGB 10.1* 10.4* 11.2*   HCT 33.0* 34.7* 37.5    211 219   GRANS 87*  --  96*   LYMPH 5*  --  3*   EOS 0  --  0      Coagulation Recent Labs     01/25/23  0708 01/24/23  0107   PTP 18.9*  --    INR 1.5*  --    APTT 32.3 36.5*       Lipid Panel Lab Results   Component Value Date/Time    Cholesterol, total 191 11/09/2021 12:24 PM    HDL Cholesterol 44 11/09/2021 12:24 PM    LDL, calculated 96.8 11/09/2021 12:24 PM    VLDL, calculated 50.2 11/09/2021 12:24 PM    Triglyceride 251 (H) 11/09/2021 12:24 PM    CHOL/HDL Ratio 4.3 11/09/2021 12:24 PM      Liver Enzymes Recent Labs     01/25/23  0055   TP 7.9   ALB 2.8*   AP 69      Thyroid Studies Lab Results   Component Value Date/Time    TSH 4.15 (H) 01/17/2023 06:59 PM          Signed By: Camden Pérez PA-C     January 25, 2023

## 2023-01-25 NOTE — PROGRESS NOTES
Infectious Disease progress Note        Reason: COVID-19 infection, respiratory failure    Current abx Prior abx   Zosyn since 1/18-1/23 vancomycin 1/18-1/20     Lines:       Assessment :  82 yro female with history significant for uncontrolled type 2 diabetes, diabetic neuropathy, hypercholesterolemia, hypertension, on anticoagulation A. fib with anticoagulation who was admitted to [facility] on 1/17 for shortness of breath     Clinical presentation consistent with acute hypoxic respiratory failure evolving since admission due to decompensated CHF superimposed on COVID-19 infection; aspiration pneumonia    Significant thick yellowish sputum noted on ET suction  MRSA nasal swab 1/19- negative    No definitive clinical evidence to suggest severe COVID-19 pneumonia at this time    Diffuse rash on extremities-could be skin excoriation from scratching. No definitive evidence of acute infection noted on today's exam    Atrial fibrillation with rapid ventricular response, non-STEMI-cardiology follow-up appreciated      decompensated CHF-ejection fraction 20 to 25% on echo 12/21/2022    Acute kidney injury-likely secondary to volume depletion, diuresis    Extubated on 1/21  Now with worsening creatinine, altered mentation  Nephrology follow-up appreciated. Concern for AIN    Altered mentation could be metabolic encephalopathy- ? Steroids related-     Increasing oxygen requirement noted today, worsening bilateral infiltrates 1/24-likely due to fluid overload. Monitor for recurrent silent aspiration, COVID-19 associated hypercoagulability. No definitive clinical evidence to suggest worsening bacterial pneumonia at this time. Cardiology follow-up appreciated. Patient placed on high flow/BiPAP this a.m. Recommendations:     continue ceftriaxone, metronidazole  Follow-up nephrology, cardiology recommendations regarding diuresis  Follow-up cardiology recommendations   Obtain d-dimer.  CXR  Monitor CBC, temperature, procalcitonin   Obtain pulmonary evaluation  Agree with continued discussion of goals of care with family since patient is at high risk for clinical deterioration    Above plan was discussed in details with RN,  primary team. Please call me if any further questions or concerns. Will continue to participate in the care of this patient. HPI:  Bipap mask on face. Unable to communicate effectively      Past Medical History:   Diagnosis Date    Acquired hypothyroidism 8/1/2016    Arthritis of right shoulder region 4/21/2016    Asthma     Bilateral lower extremity edema 7/7/2021    Chronic bilateral low back pain without sciatica 7/7/2021    Chronic diastolic congestive heart failure ([address] 3/10/2021    Chronic venous insufficiency     Diabetes (HCC)     neuropathy    Essential hypertension 7/7/2021    Gout     History of depression 1/23/2017    History of fall 7/7/2021    Hypercholesteremia 7/7/2021    Hypertension     MI (myocardial infarction) ([address]     OAB (overactive bladder) 7/7/2021    Peripheral neuropathy     Rheumatoid arthritis ([address]     Tear of right rotator cuff 5/16/2016    Thyroid pain     Urinary incontinence 7/7/2021    Vertigo        Past Surgical History:   Procedure Laterality Date    HX AMPUTATION TOE Right 2020    Great toe Dr. 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 HX KNEE REPLACEMENT Bilateral        Current Discharge Medication List        CONTINUE these medications which have NOT CHANGED    Details   bumetanide (BUMEX) 1 mg tablet Take 1 mg by mouth daily. gabapentin (NEURONTIN) 400 mg capsule 1 capsule daily  Qty: 360 Capsule, Refills: 0    Associated Diagnoses: Neuropathy; Type 2 diabetes mellitus with diabetic neuropathy, with long-term current use of insulin ([facility])      insulin glargine (LANTUS) 100 unit/mL injection Inject 20 Units at bedtime. Monitor and record blood sugar daily. Qty: 10 mL, Refills: 1      aspirin 81 mg chewable tablet Take 1 Tablet by mouth daily. Qty: 30 Tablet, Refills: 2      metoprolol tartrate (LOPRESSOR) 100 mg IR tablet Take 1 Tablet by mouth every twelve (12) hours. Qty: 120 Tablet, Refills: 2      dilTIAZem ER (CARDIZEM CD) 180 mg capsule Take 1 Capsule by mouth daily. Qty: 120 Capsule, Refills: 3      BD Insulin Syringe SafetyGlide 0.5 mL 30 gauge x 5/16\" syrg       levothyroxine (SYNTHROID) 150 mcg tablet Take 150 mcg by mouth daily. morphine IR (MS IR) 15 mg tablet Take 15 mg by mouth two (2) times a day. allopurinoL (ZYLOPRIM) 100 mg tablet Take 100 mg by mouth daily. atorvastatin (LIPITOR) 80 mg tablet Take 1 Tablet by mouth daily. Qty: 90 Tablet, Refills: 3    Associated Diagnoses: Hypercholesteremia      Blood-Glucose Meter (FREESTYLE LITE METER) monitoring kit Test twice blood glucose daily; ICD-10 E11.9; Quantity 1  Qty: 1 Kit, Refills: 0    Associated Diagnoses: Type 2 diabetes mellitus with nephropathy (HCC)      insulin syringe,safetyneedle 1 mL 31 gauge x 5/16\" syrg 1 Each by Does Not Apply route daily. Qty: 300 Each, Refills: 3    Comments: Dx e11.9      glucose blood VI test strips (FREESTYLE TEST) strip Use to check blood glucose twice daily DX:E11.9  Qty: 300 Strip, Refills: 3      metFORMIN (GLUCOPHAGE) 500 mg tablet Take 500 mg by mouth daily. acetaminophen (TYLENOL) 500 mg tablet Take 1 Tab by mouth every six (6) hours as needed for Pain.   Qty: 270 Tab, Refills: 3    Associated Diagnoses: Controlled type 2 diabetes mellitus without complication, with long-term current use of insulin (HCC)             Current Facility-Administered Medications   Medication Dose Route Frequency    bumetanide (BUMEX) injection 1 mg  1 mg IntraVENous ONCE    metoprolol tartrate (LOPRESSOR) tablet 100 mg  100 mg Oral BID    cefTRIAXone (ROCEPHIN) 2 g in sterile water (preservative free) 20 mL IV syringe  2 g IntraVENous Q24H    metroNIDAZOLE (FLAGYL) IVPB premix 500 mg  500 mg IntraVENous Q12H    insulin glargine (LANTUS) injection 20 Units  20 Units SubCUTAneous QHS    heparin (porcine) injection 5,000 Units  5,000 Units SubCUTAneous Q8H    acetaminophen (TYLENOL) tablet 650 mg  650 mg Oral Q6H    amiodarone (CORDARONE) tablet 200 mg  200 mg Oral DAILY    [Held by provider] enoxaparin (LOVENOX) injection 100 mg  1 mg/kg SubCUTAneous DAILY    triamcinolone (ARISTOCORT) 0.5 % cream   Topical BID    ipratropium-albuterol (COMBIVENT RESPIMAT) 20 mcg-100 mcg inhalation spray  1 Puff Inhalation Q4H PRN    camphor-menthoL (SARNA) 0.5-0.5 % lotion   Topical TID    glucose chewable tablet 16 g  16 g Oral PRN    glucagon (GLUCAGEN) injection 1 mg  1 mg IntraMUSCular PRN    dextrose 10% infusion 0-250 mL  0-250 mL IntraVENous PRN    insulin lispro (HUMALOG) injection   SubCUTAneous Q6H    famotidine (PF) (PEPCID) 20 mg in 0.9% sodium chloride 10 mL injection  20 mg IntraVENous DAILY    aspirin chewable tablet 81 mg  81 mg Oral DAILY    atorvastatin (LIPITOR) tablet 80 mg  80 mg Oral DAILY    levothyroxine (SYNTHROID) tablet 150 mcg  150 mcg Oral DAILY    gabapentin (NEURONTIN) capsule 400 mg  400 mg Oral DAILY    [Held by provider] allopurinoL (ZYLOPRIM) tablet 100 mg  100 mg Oral DAILY    sodium chloride (NS) flush 5-40 mL  5-40 mL IntraVENous Q8H    acetaminophen (TYLENOL) tablet 650 mg  650 mg Oral Q6H PRN    Or    acetaminophen (TYLENOL) suppository 650 mg  650 mg Rectal Q6H PRN    polyethylene glycol (MIRALAX) packet 17 g  17 g Oral DAILY PRN    ondansetron (ZOFRAN ODT) tablet 4 mg  4 mg Oral Q8H PRN    Or    ondansetron (ZOFRAN) injection 4 mg  4 mg IntraVENous Q6H PRN       Allergies: Patient has no known allergies.     Family History   Problem Relation Age of Onset    Heart Attack Mother     Heart Attack Father      Social History     Socioeconomic History    Marital status:      Spouse name: Not on file    Number of children: Not on file    Years of education: Not on file    Highest education level: Not on file   Occupational History    Not on file   Tobacco Use    Smoking status: Former     Types: Cigarettes     Quit date: 12     Years since quittin.0    Smokeless tobacco: Never    Tobacco comments:     quit 45 years ago   Vaping Use    Vaping Use: Never used   Substance and Sexual Activity    Alcohol use: No     Alcohol/week: 0.0 standard drinks    Drug use: No    Sexual activity: Not Currently   Other Topics Concern    Not on file   Social History Narrative    Not on file     Social Determinants of Health     Financial Resource Strain: Not on file   Food Insecurity: Not on file   Transportation Needs: Not on file   Physical Activity: Not on file   Stress: Not on file   Social Connections: Not on file   Intimate Partner Violence: Not on file   Housing Stability: Not on file     Social History     Tobacco Use   Smoking Status Former    Types: Cigarettes    Quit date: 1976    Years since quittin.0   Smokeless Tobacco Never   Tobacco Comments    quit 45 years ago        Temp (24hrs), Av.5 °F (36.4 °C), Min:96.3 °F (35.7 °C), Max:98.3 °F (36.8 °C)    Visit Vitals  BP (!) 137/99 (BP 1 Location: Left upper arm, BP Patient Position: At rest)   Pulse (!) 142   Temp 98.3 °F (36.8 °C)   Resp (!) 31   Ht 5' 4\" (1.626 m) Comment: Photo ID   Wt 108.9 kg (240 lb 1.3 oz)   SpO2 93%   Breastfeeding No   BMI 41.21 kg/m²       ROS: unable to obtain due to patient factors    Physical Exam:    General:  Sitting on bed. BiPAP mask on face, eyes open, attempts to communicate   Head:  Normocephalic, without obvious abnormality, atraumatic. Eyes:  Conjunctivae/corneas clear. Nose: Nares normal. Septum midline. Mucosa normal. No drainage. Throat: Lips, mucosa, and tongue normal.    Neck: Trachea midline, no adenopathy,  no JVD. Lungs:   Lateral chest movements equal, no audible wheezing   Chest wall:  No deformity.    Heart: Irregularly irregular, rate controlled   Abdomen:   Soft, non-tender. Bowel sounds normal.    Extremities: Extremities normal, atraumatic, no cyanosis, mild edema bilateral lower extremities. Darkish erythema right LE, scattered pinpoint erythematous rash on extremities   Pulses: 2+ and symmetric all extremities. Skin: Scattered excoriations in bilateral upper and lower extremities   Lymph nodes:  Cervical and supraclavicular nodes normal   Neurologic: No gross motor or sensory deficits noted       Labs: Results:   Chemistry Recent Labs     01/25/23  0055 01/24/23  0909 01/23/23  0136   * 163* 207*    142 136   K 4.3 4.3 4.4    108 102   CO2 21 23 21   BUN 88* 84* 83*   CREA 2.60* 2.65* 3.03*   CA 9.3 9.0 8.7   AGAP 12 11 13   BUCR 34* 32* 27*   AP 69 58  --    TP 7.9 7.6  --    ALB 2.8* 2.7*  --    GLOB 5.1* 4.9*  --    AGRAT 0.5* 0.6*  --         CBC w/Diff Recent Labs     01/25/23  0055 01/24/23  0107 01/23/23  0136   WBC 11.4 16.3* 25.5*   RBC 3.96* 4.08* 4.44   HGB 10.1* 10.4* 11.2*   HCT 33.0* 34.7* 37.5    211 219   GRANS 87*  --  96*   LYMPH 5*  --  3*   EOS 0  --  0        Microbiology No results for input(s): CULT in the last 72 hours. RADIOLOGY:    All available imaging studies/reports in [facility] for this admission were reviewed    Total time spent >35 minutes. High complexity decision making was performed during the evaluation of this patient at high risk for decompensation with multiple organ involvement     Above mentioned total time spent on reviewing the case/medical record/data/notes/EMR/patient examination/documentation/coordinating care with nurse/consultants, exclusive of procedures with complex decision making performed and > 50% time spent in face to face evaluation. Disclaimer: Sections of this note are dictated utilizing voice recognition software, which may have resulted in some phonetic based errors in grammar and contents.  Even though attempts were made to correct all the mistakes, some may have been missed, and remained in the body of the document. If questions arise, please contact our department.     Dr. Stephanie Gunderson, Infectious Disease Specialist  [phone]  January 25, 2023  7:33 AM

## 2023-01-25 NOTE — PROGRESS NOTES
Palliative Medicine    Observed patient through window along with Palliative team members NIRMAL Denis LMSW and JANE Prado NP. Patient lying in bed. Respirations are rapid, using accessory muscles. Remains on hi blanca at [address]. Palliative team remains available to provide support to Ms. Ledesma and her family during this hospital stay.     CODE STATUS: DNR/DNI, LIMITED INTERVENTIONS, NO FEEDING TUBE    Barbara Kirkpatrick RN  Palliative Medicine Inpatient RN  Palliative [facility] Line: [phone]

## 2023-01-25 NOTE — ROUTINE PROCESS
Unable to locate bladder scanner at this time - equipment not on unit or ICU per Charge RN. Will look for equipment when able.

## 2023-01-25 NOTE — PROGRESS NOTES
[facility]. Pulmonary, Critical Care, and Sleep Medicine    Name: Raquel Toledo MRN: [id number]   : 1935 Hospital: [address] [place] [address] Dr   Date: 2023  Admission Date: 2023     Chart and notes reviewed. Data reviewed. I have evaluated all findings. [x]I have reviewed the flowsheet and previous days notes. []The patient is unable to give any meaningful history or review of systems because the patient is:  []Intubated []Sedated   []Unresponsive      []The patient is critically ill on      []Mechanical ventilation []Pressors   []BiPAP []         Interval HPI:  Patient is a 82 y.o. female with a PMH of HFrEF last EF 20-25%, atrial fibrillation with RVR, T2DM, HTN, HLD, and hypothyroidism who presented to the [facility] ED on  due to shortness of breath. Patient found at home by EMS hypoxic to mid 80's and put on CPAP. Given oral nitroglycerin x3 and nitropaste. She was noted to be tachycardic to nearly 200 bpm in the ED in atrial fibrillation with RVR. In the ED she was initially placed on BiPAP with good respiratory response and started on diltiazem gtt. Following diltiazem gtt patient became hypotensive with MAPs in low 60's but remained persistently tachycardic. Then transitioned to amiodarone gtt. Blood pressure improved and was given lasix x1. Following BiPAP initiation ABG improved and she was transitioned to NC, however this was tolerated poorly and required resuming BiPAP. Despite BiPAP patient continued to be in respiratory distress and required intubation in the ED. Noted to be COVID positive. ICU was then contacted for admission and further management. Of note, patient left inpatient hospitalization AMA on  following admission for cellulitis in setting of lymphedema and atrial fibrillation with RVR. Subjective 23  LOS:8   Vent Day: extubated 2023    Patient transferred out of ICU 3 days ago.   Pulmonary consulted today due to worsening clinical status  Patient in AFIB-RVR, worsening respiratory failure and mental status. Patient now on continuous BIPAP by primary team earlier today . Patient keeps pulling BIPAP mask. Unable to obtain any history from patient. Primary team gave lasix this morning. Cardiology consulted to help with rate control   CXR reviewed this morning - persistent GGOs - film per my review looks slightly improved  Palliative Care involved - Patient is now DNR/DNI with limited interventions  Patient's HR in the 120-140's AFIB      Patient Vitals for the past 24 hrs:   Temp Pulse Resp BP SpO2   01/25/23 1131 -- -- -- -- 97 %   01/25/23 1116 -- (!) 122 -- (!) 146/95 --   01/25/23 0503 -- -- -- -- 93 %   01/25/23 0342 -- -- -- (!) 137/99 96 %   01/25/23 0145 -- -- -- -- 95 %   01/25/23 0000 98.3 °F (36.8 °C) (!) 142 (!) 31 (!) 142/106 (!) 86 %   01/24/23 2000 (!) 96.3 °F (35.7 °C) (!) 142 -- (!) 150/107 (!) 88 %   01/24/23 1637 98 °F (36.7 °C) (!) 133 (!) 33 (!) 137/93 92 %                      ROS:Review of systems not obtained due to patient factors. Events and notes from last 24 hours reviewed.      Patient Active Problem List   Diagnosis Code    Encounter for palliative care Z51.5    Acquired hypothyroidism E03.9    Dermatitis L30.9    Obesity, morbid ([facility] [address] E66.01    Type 2 diabetes mellitus with diabetic nephropathy, with long-term current use of insulin ([facility]) E11.21, Z79.4    Chronic diastolic congestive heart failure ([facility]) I50.32    Essential hypertension I10    Bilateral lower extremity edema R60.0    Hypercholesteremia E78.00    History of fall Z91.81    Chronic bilateral low back pain without sciatica M54.50, G89.29    OAB (overactive bladder) N32.81    Urinary incontinence R32    Neuropathy G62.9    Septic arthritis of foot ([facility]) M00.9    Diabetic foot ulcer ([facility]) E11.621, L97.509    Septic joint ([facility]) M00.9    Sepsis ([facility]) A41.9    Acute exacerbation of CHF (congestive heart failure) ([facility]) I50.9 Leukocytosis D72.829    Pulmonary edema J81.1    Dyspnea R06.00    Atrial fibrillation with RVR ([facility]) I48.91    Patient's noncompliance with other medical treatment and regimen due to unspecified reason Z91.199    Debility R53.81    Atrial fibrillation ([facility] [address] I48.91    Advanced care planning/counseling discussion Z71.89    Cellulitis L03.90    Atrial fibrillation with rapid ventricular response (HCC) I48.91    Acute on chronic heart failure (HCC) I50.9    COVID U07.1    Heart failure (HCC) I50.9    COVID-19 virus infection U07.1    Acute respiratory failure with hypoxia (HCC) J96.01    COVID-19 U07.1       Vital Signs:  Visit Vitals  BP (!) 146/95   Pulse (!) 122   Temp 98.3 °F (36.8 °C)   Resp (!) 31   Ht 5' 4\" (1.626 m) Comment: Photo ID   Wt 108.9 kg (240 lb 1.3 oz)   SpO2 97%   Breastfeeding No   BMI 41.21 kg/m²       O2 Device: BIPAP   O2 Flow Rate (L/min): 30 l/min   Temp (24hrs), Av.5 °F (36.4 °C), Min:96.3 °F (35.7 °C), Max:98.3 °F (36.8 °C)       Intake/Output:   Last shift:      No intake/output data recorded.   Last 3 shifts:  1901 -  0700  In: 120 [P.O.:120]  Out: 1500 [Urine:1500]    Intake/Output Summary (Last 24 hours) at 2023 1409  Last data filed at 2023 0600  Gross per 24 hour   Intake --   Output 550 ml   Net -550 ml          Current Facility-Administered Medications   Medication Dose Route Frequency    [Held by provider] furosemide (LASIX) injection 40 mg  40 mg IntraVENous Q12H    metoprolol tartrate (LOPRESSOR) tablet 50 mg  50 mg Oral Q6H    Or    metoprolol (LOPRESSOR) injection 5 mg  5 mg IntraVENous Q6H    warfarin (COUMADIN) tablet 2.5 mg  2.5 mg Oral ONCE    warfarin - pharmacy to dose   Other Rx Dosing/Monitoring    cefTRIAXone (ROCEPHIN) 2 g in sterile water (preservative free) 20 mL IV syringe  2 g IntraVENous Q24H    metroNIDAZOLE (FLAGYL) IVPB premix 500 mg  500 mg IntraVENous Q12H    insulin glargine (LANTUS) injection 20 Units  20 Units SubCUTAneous QHS [Held by provider] heparin (porcine) injection 5,000 Units  5,000 Units SubCUTAneous Q8H    acetaminophen (TYLENOL) tablet 650 mg  650 mg Oral Q6H    amiodarone (CORDARONE) tablet 200 mg  200 mg Oral DAILY    [Held by provider] enoxaparin (LOVENOX) injection 100 mg  1 mg/kg SubCUTAneous DAILY    triamcinolone (ARISTOCORT) 0.5 % cream   Topical BID    camphor-menthoL (SARNA) 0.5-0.5 % lotion   Topical TID    insulin lispro (HUMALOG) injection   SubCUTAneous Q6H    famotidine (PF) (PEPCID) 20 mg in 0.9% sodium chloride 10 mL injection  20 mg IntraVENous DAILY    aspirin chewable tablet 81 mg  81 mg Oral DAILY    atorvastatin (LIPITOR) tablet 80 mg  80 mg Oral DAILY    levothyroxine (SYNTHROID) tablet 150 mcg  150 mcg Oral DAILY    gabapentin (NEURONTIN) capsule 400 mg  400 mg Oral DAILY    [Held by provider] allopurinoL (ZYLOPRIM) tablet 100 mg  100 mg Oral DAILY    sodium chloride (NS) flush 5-40 mL  5-40 mL IntraVENous Q8H         Telemetry: []Sinus []A-flutter []Paced    [x]A-fib []Multiple PVCs                  Physical Exam:      General: Arousable, confused mild to moderate distress on nasal BIPAP  HEENT:  Anicteric sclerae; pink palpebral conjunctivae; mucosa moist  Resp:  Symmetrical chest expansion, no accessory muscle use; good air entry at apexes - diffuse crackles  CV:   Irregularly irregular  GI:  Abdomen soft, non-tender; (+) active bowel sounds  Extremities:  +2 pulse, BLE edema,  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  Skin:  Warm; dry,   Neurologic: Non-focal, not direct able, moaning, spontaneous opening eyes            DATA:  MAR reviewed and pertinent medications noted or modified as needed    Labs:  Recent Labs     01/25/23  0055 01/24/23 0107 01/23/23  0136   WBC 11.4 16.3* 25.5*   HGB 10.1* 10.4* 11.2*   HCT 33.0* 34.7* 37.5    211 219     Recent Labs     01/25/23  0708 01/25/23  0055 01/24/23  0909 01/24/23 0107 01/23/23  0136   NA  --  142 142  --  136   K  --  4.3 4.3  --  4.4   CL  -- 109 108  --  102   CO2  --  21 23  --  21   GLU  --  247* 163*  --  207*   BUN  --  88* 84*  --  83*   CREA  --  2.60* 2.65*  --  3.03*   CA  --  9.3 9.0  --  8.7   MG  --  2.4  --  2.4 2.4   PHOS  --  5.5*  --  5.1* 6.7*   ALB  --  2.8* 2.7*  --   --    ALT  --  43 41  --   --    INR 1.5*  --   --   --   --      No results for input(s): PH, PCO2, PO2, HCO3, FIO2 in the last 72 hours. Recent Labs     01/25/23  0130   HCO3I 21.5*   PCO2I 36.7   PHI 7.38   PO2I 57*       Imaging:          [x]   I have personally reviewed the patients radiographs and reports  XR Results (most recent):  XR Results (most recent):  Results from Hospital Encounter encounter on 01/17/23    XR CHEST PORT    Narrative  INDICATION: Evaluate for fluid overload versus worsening pneumonia    TECHNIQUE: Portable chest radiograph    COMPARISON: 24 January 2023    FINDINGS:    Similar pulmonary vascular congestion and diffuse bilateral mixed  interstitial/alveolar infiltrates. Ongoing bilateral pleural effusions with overlying atelectasis versus lung  consolidation. Similar cardiomediastinal silhouette. No pneumothorax. Impression  No significant interval change. CT Results (most recent):  Results from Hospital Encounter encounter on 10/07/22    CTA CHEST W OR W WO CONT    Narrative  CTA CHEST PULMONARY EMBOLISM PROTOCOL    INDICATION: Acute respiratory difficulty, heart failure, symptoms worsened over  3 days, chest pain, increased work of breathing. Question pulmonary embolism. TECHNIQUE: Thin collimation axial images obtained through the level of the  pulmonary arteries with additional imaging through the chest following the  uneventful administration of intravenous contrast.  Images reconstructed into  three dimensional coronal and sagittal projections for complete evaluation of  the tortuous and overlapping pulmonary vascular structures and to reduce patient  radiation dose.     All CT scans at this facility are performed using dose optimization technique as  appropriate to a performed exam, to include automated exposure control,  adjustment of the mA and/or kV according to patient size (including appropriate  matching first site-specific examinations), or use of iterative reconstruction  technique. COMPARISON: 8/10/2022. FINDINGS:    No filling defects are appreciated within the main, left, right, lobar or  visualized segmental pulmonary arteries to suggest embolism. Thyroid: Unremarkable in its visualized aspects. Pericardium/ Heart: No significant effusion. Biatrial cardiac chamber  enlargement. Aorta/ Vessels: Mild to moderate aortic atherosclerosis. There is irregular  noncalcified mural plaque in the descending aorta. Lymph Nodes: Unremarkable. .    Lungs: There is hilar edema. Mild bronchial wall thickening. Mild groundglass  and interstitial thickening. Mild mosaic attenuation. Mild to moderate dependent  atelectasis. Pleura: Moderate bilateral pleural effusions which are new. No pneumothorax. Upper Abdomen: Unremarkable. Bones/soft tissues: Osteopenia. Degenerative disc disease. Impression  1. No evidence for pulmonary emboli. 2.  Heart failure with biatrial cardiac chamber enlargement, moderate new  bilateral pleural effusions and mild pulmonary edema. 3.  Mild to moderate aortic atherosclerosis with irregular noncalcified mural  plaque along the wall of the descending aorta which increases risk of embolic  events. 01/17/23    ECHO ADULT FOLLOW-UP OR LIMITED 01/18/2023 1/18/2023    Interpretation Summary    Contrast used: Definity. Technical qualifiers: Echo study was technically difficult with poor endocardial visualization. Left Ventricle: Severely reduced left ventricular systolic function with a visually estimated EF of 20 - 25%. Left ventricle size is normal. Mildly increased wall thickness. Global hypokinesis present. Aortic Valve: Mild to moderate regurgitation.     Mitral Valve: Moderate to severe regurgitation. Tricuspid Valve: Severe regurgitation. Pericardium: Left pleural effusion. Ascites present.     Signed by: Bobbi Quesada MD on 1/18/2023  3:58 PM       IMPRESSION:   Acute hypoxic respiratory failure  requiring mechanical ventilation- intubated 1/18  Atrial fibrillation with rapid ventricular response - anticoagulated on Eliquis outpatient, diagnosed 10/22  Acute on chronic heart failure - LVEF 20-25% 12/21/22, similar result on repeat echo on 1/18/23(LVEF 55% 8/22), possible tachycardia induced cardiomyopathy vs. Potential precipitating ischemic event, does not appear to have had recent catheterization for further characterization  NSTEMI - likely type II due to demand ischemia in setting of atrial fibrillation with RVR and CHF exacerbation  COVID-19  Pulmonary edema  AQUILES on CKD3  Anemia  Hypertension  Type 2 diabetes mellitus  Hypercholesterolemia  Hypothyroidism  Hx of tobacco use - cessation in 1976  Skin lesions c/w bug bites vs excoriations           Patient Active Problem List   Diagnosis Code    Encounter for palliative care Z51.5    Acquired hypothyroidism E03.9    Dermatitis L30.9    Obesity, morbid ([facility] E66.01    Type 2 diabetes mellitus with diabetic nephropathy, with long-term current use of insulin ([facility]) E11.21, Z79.4    Chronic diastolic congestive heart failure ([facility]) I50.32    Essential hypertension I10    Bilateral lower extremity edema R60.0    Hypercholesteremia E78.00    History of fall Z91.81    Chronic bilateral low back pain without sciatica M54.50, G89.29    OAB (overactive bladder) N32.81    Urinary incontinence R32    Neuropathy G62.9    Septic arthritis of foot ([facility]) M00.9    Diabetic foot ulcer ([facility]) E11.621, L97.509    Septic joint ([facility]) M00.9    Sepsis ([facility]) A41.9    Acute exacerbation of CHF (congestive heart failure) ([facility]) I50.9    Leukocytosis D72.829    Pulmonary edema J81.1    Dyspnea R06.00    Atrial fibrillation with RVR ([facility]) I48.91 Patient's noncompliance with other medical treatment and regimen due to unspecified reason Z91.199    Debility R53.81    Atrial fibrillation ([facility] [address] I48.91    Advanced care planning/counseling discussion Z71.89    Cellulitis L03.90    Atrial fibrillation with rapid ventricular response (HCC) I48.91    Acute on chronic heart failure (HCC) I50.9    COVID U07.1    Heart failure (HCC) I50.9    COVID-19 virus infection U07.1    Acute respiratory failure with hypoxia (HCC) J96.01    COVID-19 U07.1        RECOMMENDATIONS:   Change oral meds to IV as patient not able to take PO meds at this time    Neuro:   Monitor mental status and ability to use BIPAP  hold Neurontin and other sedating medications     Pulm:   Cancel VQ scan- patient not appropriate candidate for this procedure- Can just start heparin bridge for now  Consider stopping doppler studies  Aspiration precautions, HOB>30'. Supplemental FiO2 for goal SPO2 > 90%,   PRN duonebs. V dexamethasone for COVID-19    CVS:     Metoprolol for rate control, Amiodarone gtt, & Heparin gtt recommended- Can probably do this on 3N. If not, transfer to [facility]enez that can accommodate IV therapy per hospital protocol. Will defer to cardiology who has been consulted. Continue Aspirin     GI:  NPO for now  PUD per primary    Renal: Trend Cr, daily BMP, UOP, strict I&Os. Suspect cardiorenal. Replace electrolytes PRN- Nephrology consulted, renal dose medications    Hem/Onc: Trend H/H, monitor for s/o active bleeding. Daily CBC, and PTT per heparin protocol    I/D:Trend WBCs and temperature curve. COVID-19 positive. Continue Zosyn, s/p Vanc. ID following    Metabolic: Daily BMP, mag, phos. Trend lytes and replace as needed. Endocrine:Q6 glucoses. SSI. Avoid hypoglycemia. Lantus dose increased. . Continue home levothyroxine- Defer to primary team    Musc/Skin: Wound care, Sarna lotion for rash, PT/OT/SLP      CODE STATUS: DNR/DNI    Patient's prognosis is extremely poor at this time.   At increased risk for further clinical decline, including death. I have spoken with Primary team and bedside nurse. Critical Care Time:  The services I provided to this patient were to treat and/or prevent clinically significant deterioration that could result in the failure of one or more body systems and/or organ systems due to respiratory distress, hypoxia, cardiac dysrhythmia. Services included the following:  -reviewing nursing notes and old charts  -vital sign assessments   -direct patient care  -medication orders and management  -interpreting and reviewing diagnostic studies/labs  -re-evaluations  -documentation time        Aggregate critical care time was 60 minutes, which includes only time during which I was engaged in work directly related to the patient's care as described above. During this entire length of time I was immediately available to the patient. The reason for providing this level of medical care for this critically ill patient was due a critical illness that impaired one or more vital organ systems such that there was a high probability of imminent or life threatening deterioration in the patients condition. This care involved high complexity decision making to assess, manipulate, and support vital system functions, to treat this degreee vital organ system failure and to prevent further life threatening deterioration of the patients condition      Complex decision making was made in the evaluation and management plans during this consultation. More than 50% of time was spent in counseling and coordination of care including review of data and discussion with other team members.       Christian Charles DO, [facility]    [facility]  Pulmonary, Critical Care, and Sleep Medicine

## 2023-01-25 NOTE — PROGRESS NOTES
~2000: Called and spoke with Renee Ledesma's son Justyn Talley @ [phone]. He had no specific questions or concerns. Her son reports that she has a baseline dementia. Her memory worsens and she becomes more confused each time she is off of her pain medications. He believes that may be the explanation for her current worsening of her confusion. Due to her being in isolation and her son being in [place] for work, he is not able to assess her current mentation in comparison to her baseline. Her son also gave me permission to speak with her partner Kvng Ordonez as they live together. I attempted to call Kvng Ordonez at [phone], with no answer. Received call due to tachycardia (130s-140s) and SpO2 <90% on 6L NC. We ordered ABG and respiratory placed her on 30L HFNC. HR seems to have decreased and SpO2 has improved. No additional calls.      Signed By: Conchita Sahu DO     January 24, 2023

## 2023-01-25 NOTE — ROUTINE PROCESS
Patient Respirations not improving; patient O2 saturation continues to drop into 70's - 80's; MD notified.

## 2023-01-25 NOTE — PROGRESS NOTES
[facility]  DAILY PROGRESS NOTE      Patient:    Benja Reynolds , 82 y.o. female   MRN:  [id number]  Room/Bed:  368/01  Admission Date:   1/17/2023  Code status:  DNR    Reason for Admission: Acute hypoxic respiratory failure, COVID-19 infection, A-fib with RVR, NSTEMI    ASSESSMENT AND PLAN:   Problem List Items Addressed This Visit          Circulatory    Atrial fibrillation ([address]       Respiratory    Acute respiratory failure with hypoxia ([address]       Other    COVID     Other Visit Diagnoses       Acute on chronic heart failure with reduced ejection fraction and diastolic dysfunction (HCC)    -  Primary    NSTEMI (non-ST elevated myocardial infarction) ([address]        Goals of care, counseling/discussion [R19.93 (ICD-10-CM)]        Age-related physical debility  (ICD-10-CM)]                Following ICU management in preparation for transition of care     Acute hypoxic respiratory failure/COVID19 Infection/HFrEF (20-25%)  -Presented with SOB and HR in 200's. Acute SOB most likely multifactorial given pt's comorbidities with recent COVID infection that has exacerbated the SOB  -Tested positive for COVID-19.  -Started on BIPAP in the ED and transitioned to NC, poorly tolerated and transitioned back to BIPAP, continued to be in respiratory distress and required intubation and admission to the ICU  -CXR (1/18)- progressive cardiogenic pulmonary edema and pleural effusions   -Leukocytosis: 16.7 on 1/19>23>20.4>25.4>16.3> today 11.4  -New oxygen requirement overnight, RT placed on 30L HFNC at 100% O2  =Renal US showing BL Pleural effusions  Plan:  -Bumex 1 mg daily - holding for renal function. -ID consulted  -atarax PRN  -follow sputum culture   -Evaluate mental status, avoid sedating meds.   -Per ID rec, Ceftriaxone and metronidazole.     -CXR pending for new O2 requirement.   -Currently 96% on 30L HFNC at 100% O2  -Will change from heparin to oral med     Afib w/ RVR, HTN, NSTEMI  -On admission, pt tachycardic in the 200's  -EKG: Atrial fibrillation with rapid ventricular response   -Started on Cardizem drip in the ED due to HR in 200's, but decreased BP too fast so dc'd and started Amnio gtt  -OP meds: Eliquis 5mg BID for anticoagulation, Diltiazem and Lopressor for rate control and HTN  Plan:  -amiodarone - oral  -Metoprolol 100 mg BID  -aspirin 81 mg  -MAP >65 mmHg  -Cardiology consulted, will discuss tachycardia  -Lovenox    AQUILES possible ATN  -Cr trending down today  -US: No hydronephrosis, bladder volume 679.4mL, Bilateral pleural effusions  -UA: Large blood, moderate LE, 3+ Bact, 1+ yeast  Plan:   -Nephrology consulted  -Daily BMP  -Hold Bumex  -Consider cath based on current bladder scan      DM with neuorpathy  -most recent A1c 11.6 in Jan 4 2023  -home meds: insulin glargine 20 U qhs, Metformin 1000 mg BID, Gabapentin 800 QHS   Plan:   -Q6h glucoses  -SSI   -hypoglycemia protocol  -gabapentin 400 mg daily         Hypothyroidism - stable  -meds: levo 150 mcg  -pt nonadherent with medications at home   Plan:   -levothyroxine 150 mcg     Chronic pain, Gout, stable  -home meds: previously on morphine 15mg q12 PRN  -Recently stated that she stopped taking morphine due to no longer going to pain management provider   Plan:   -continue allopurinol 100mg daily for gout ppx       Global:  Code: DNR  IVF/Drips: Amiodarone, fentanyl, heparin  I/O / Wt: 99.8kg  Diet: Soft  DVT/AC: heparin gtt  Mobility: sedated/restrained   Anticipated LOS: 3-4 midnights     Point of Contact (relationship, number): Dania Ness [phone]         SUBJECTIVE:   Events of the last 24 hours:  No acute events overnight. Seen at bedside. Much less alert today. Attempts to day words.          CURRENT INPATIENT MEDICATIONS:  Current Facility-Administered Medications   Medication Dose Route Frequency Provider Last Rate Last Admin    metoprolol tartrate (LOPRESSOR) tablet 100 mg  100 mg Oral BID Marc CURRIE MD   100 mg at 01/24/23 2302    cefTRIAXone (ROCEPHIN) 2 g in sterile water (preservative free) 20 mL IV syringe  2 g IntraVENous Q24H Saurav OREILLY MD   2 g at 01/24/23 1456    metroNIDAZOLE (FLAGYL) IVPB premix 500 mg  500 mg IntraVENous Q12H Saurav OREILLY  mL/hr at 01/24/23 2302 500 mg at 01/24/23 2302    insulin glargine (LANTUS) injection 20 Units  20 Units SubCUTAneous QHS Carlos Vega MD   20 Units at 01/24/23 2300    heparin (porcine) injection 5,000 Units  5,000 Units SubCUTAneous Q8H Carlos Vega MD   5,000 Units at 01/25/23 3236    acetaminophen (TYLENOL) tablet 650 mg  650 mg Oral Q6H Carlos Vega MD   650 mg at 01/25/23 8874    amiodarone (CORDARONE) tablet 200 mg  200 mg Oral DAILY Addie Fairbanks PA-C   200 mg at 01/23/23 1050    [Held by provider] enoxaparin (LOVENOX) injection 100 mg  1 mg/kg SubCUTAneous DAILY Sabino Cosme MD   100 mg at 01/23/23 1049    triamcinolone (ARISTOCORT) 0.5 % cream   Topical BID Carlos Vega MD   Given at 01/24/23 1800    ipratropium-albuterol (COMBIVENT RESPIMAT) 20 mcg-100 mcg inhalation spray  1 Puff Inhalation Q4H PRN Sayra Gordon MD   1 Puff at 01/23/23 1102    camphor-menthoL (SARNA) 0.5-0.5 % lotion   Topical TID Jun Flores NP   Given at 01/24/23 2258    glucose chewable tablet 16 g  16 g Oral PRN Shayne Cisneros MD        glucagon (GLUCAGEN) injection 1 mg  1 mg IntraMUSCular PRN Shayne Cisneros MD        dextrose 10% infusion 0-250 mL  0-250 mL IntraVENous PRN Shayne Cisneros MD        insulin lispro (HUMALOG) injection   SubCUTAneous Q6H Eva Grant PA-C   2 Units at 01/25/23 0604    famotidine (PF) (PEPCID) 20 mg in 0.9% sodium chloride 10 mL injection  20 mg IntraVENous DAILY Eva Grnat PA-C   20 mg at 01/24/23 1456    aspirin chewable tablet 81 mg  81 mg Oral DAILY Shayne Cisneros MD   81 mg at 01/24/23 1518    atorvastatin (LIPITOR) tablet 80 mg  80 mg Oral DAILY Shayne Cisneros MD   80 mg at 01/23/23 1050    [Held by provider] bumetanide (BUMEX) tablet 1 mg  1 mg Oral DAILY Shayne Cisneros MD   1 mg at 01/23/23 1049    levothyroxine (SYNTHROID) tablet 150 mcg  150 mcg Oral DAILY Shayne Cisneros MD   150 mcg at 01/24/23 1456    gabapentin (NEURONTIN) capsule 400 mg  400 mg Oral DAILY Shayne Cisneros MD   400 mg at 01/23/23 1050    [Held by provider] allopurinoL (ZYLOPRIM) tablet 100 mg  100 mg Oral DAILY Shayne Cisneros MD   100 mg at 01/23/23 1050    sodium chloride (NS) flush 5-40 mL  5-40 mL IntraVENous Q8H Shayne Cisneros MD   10 mL at 01/25/23 0604    acetaminophen (TYLENOL) tablet 650 mg  650 mg Oral Q6H PRN Shayne Cisneros MD        Or    acetaminophen (TYLENOL) suppository 650 mg  650 mg Rectal Q6H PRN Shayne Cisneros MD        polyethylene glycol (MIRALAX) packet 17 g  17 g Oral DAILY PRN Shayne Cisneros MD        ondansetron (ZOFRAN ODT) tablet 4 mg  4 mg Oral Q8H PRN Shayne Cisneros MD        Or    ondansetron [facility] injection 4 mg  4 mg IntraVENous Q6H PRN Shayne Cisneros MD   4 mg at 01/24/23 0741       Allergies  No Known Allergies    OBJECTIVE:    Intake/Output Summary (Last 24 hours) at 1/25/2023 0647  Last data filed at 1/24/2023 0933  Gross per 24 hour   Intake 120 ml   Output --   Net 120 ml         Visit Vitals  BP (!) 137/99 (BP 1 Location: Left upper arm, BP Patient Position: At rest)   Pulse (!) 142   Temp 98.3 °F (36.8 °C)   Resp (!) 31   Ht 5' 4\" (1.626 m) Comment: Photo ID   Wt 108.9 kg (240 lb 1.3 oz)   SpO2 93%   Breastfeeding No   BMI 41.21 kg/m²       PHYSICAL EXAM  Gen: NAD, comfortable, obese, on HFNC. HEENT: normocephalic, atraumatic, MMM  CV: irregularly irregular, S1/S2 present without M/R/G,   Pulm: Decreased breath sounds at bases. Diffuse ronchi.  No wheezing  Abd: S/NT/ND, no rebound, no guarding  MSK: no clubbing, no edema  Skin: warm, dry, excoriations on all parts of body in multiple stages of healing, interspersed ecchymoses   Neuro: CN II-XII grossly intact, no focal deficits appreciated   Psych: Difficult to assess due to hearing deficit. Somnolent, minimally responsive, attempts to speak     LABWORK (LAST 24 HOURS)  Recent Results (from the past 24 hour(s))   METABOLIC PANEL, COMPREHENSIVE    Collection Time: 01/24/23  9:09 AM   Result Value Ref Range    Sodium 142 136 - 145 mmol/L    Potassium 4.3 3.5 - 5.5 mmol/L    Chloride 108 100 - 111 mmol/L    CO2 23 21 - 32 mmol/L    Anion gap 11 3.0 - 18 mmol/L    Glucose 163 (H) 74 - 99 mg/dL    BUN 84 (H) 7.0 - 18 MG/DL    Creatinine 2.65 (H) 0.6 - 1.3 MG/DL    BUN/Creatinine ratio 32 (H) 12 - 20      eGFR 17 (L) >60 ml/min/1.73m2    Calcium 9.0 8.5 - 10.1 MG/DL    Bilirubin, total 0.8 0.2 - 1.0 MG/DL    ALT (SGPT) 41 13 - 56 U/L    AST (SGOT) 72 (H) 10 - 38 U/L    Alk.  phosphatase 58 45 - 117 U/L    Protein, total 7.6 6.4 - 8.2 g/dL    Albumin 2.7 (L) 3.4 - 5.0 g/dL    Globulin 4.9 (H) 2.0 - 4.0 g/dL    A-G Ratio 0.6 (L) 0.8 - 1.7     EOSINOPHILS, URINE    Collection Time: 01/24/23 12:30 PM   Result Value Ref Range    Eosinophils,urine NO URINE EOSINOPHILS SEEN    URINALYSIS W/ RFLX MICROSCOPIC    Collection Time: 01/24/23 12:30 PM   Result Value Ref Range    Color YELLOW      Appearance CLOUDY      Specific gravity 1.019 1.005 - 1.030      pH (UA) 5.0 5.0 - 8.0      Protein 30 (A) NEG mg/dL    Glucose Negative NEG mg/dL    Ketone Negative NEG mg/dL    Bilirubin Negative NEG      Blood LARGE (A) NEG      Urobilinogen 0.2 0.2 - 1.0 EU/dL    Nitrites Negative NEG      Leukocyte Esterase MODERATE (A) NEG     URINE MICROSCOPIC ONLY    Collection Time: 01/24/23 12:30 PM   Result Value Ref Range    WBC 6 to 8 0 - 4 /hpf    RBC 10 to 15 0 - 5 /hpf    Epithelial cells FEW 0 - 5 /lpf    Bacteria 3+ (A) NEG /hpf    Yeast 1+ (A) NEG   GLUCOSE, POC    Collection Time: 01/24/23  5:29 PM   Result Value Ref Range    Glucose (POC) 242 (H) 70 - 110 mg/dL   GLUCOSE, POC    Collection Time: 01/24/23 11:06 PM   Result Value Ref Range Glucose (POC) 220 (H) 70 - 110 mg/dL   MAGNESIUM    Collection Time: 01/25/23 12:55 AM   Result Value Ref Range    Magnesium 2.4 1.6 - 2.6 mg/dL   PHOSPHORUS    Collection Time: 01/25/23 12:55 AM   Result Value Ref Range    Phosphorus 5.5 (H) 2.5 - 4.9 MG/DL   PROCALCITONIN    Collection Time: 01/25/23 12:55 AM   Result Value Ref Range    Procalcitonin 0.18 ng/mL   CBC WITH AUTOMATED DIFF    Collection Time: 01/25/23 12:55 AM   Result Value Ref Range    WBC 11.4 4.6 - 13.2 K/uL    RBC 3.96 (L) 4.20 - 5.30 M/uL    HGB 10.1 (L) 12.0 - 16.0 g/dL    HCT 33.0 (L) 35.0 - 45.0 %    MCV 83.3 78.0 - 100.0 FL    MCH 25.5 24.0 - 34.0 PG    MCHC 30.6 (L) 31.0 - 37.0 g/dL    RDW 17.3 (H) 11.6 - 14.5 %    PLATELET 948 591 - 402 K/uL    MPV 14.0 (H) 9.2 - 11.8 FL    NRBC 2.8 (H) 0  WBC    ABSOLUTE NRBC 0.32 (H) 0.00 - 0.01 K/uL    NEUTROPHILS 87 (H) 40 - 73 %    LYMPHOCYTES 5 (L) 21 - 52 %    MONOCYTES 3 3 - 10 %    EOSINOPHILS 0 0 - 5 %    BASOPHILS 0 0 - 2 %    IMMATURE GRANULOCYTES 4 (H) 0.0 - 0.5 %    ABS. NEUTROPHILS 10.0 (H) 1.8 - 8.0 K/UL    ABS. LYMPHOCYTES 0.6 (L) 0.9 - 3.6 K/UL    ABS. MONOCYTES 0.4 0.05 - 1.2 K/UL    ABS. EOSINOPHILS 0.0 0.0 - 0.4 K/UL    ABS. BASOPHILS 0.0 0.0 - 0.1 K/UL    ABS. IMM. GRANS. 0.5 (H) 0.00 - 0.04 K/UL    DF AUTOMATED     METABOLIC PANEL, COMPREHENSIVE    Collection Time: 01/25/23 12:55 AM   Result Value Ref Range    Sodium 142 136 - 145 mmol/L    Potassium 4.3 3.5 - 5.5 mmol/L    Chloride 109 100 - 111 mmol/L    CO2 21 21 - 32 mmol/L    Anion gap 12 3.0 - 18 mmol/L    Glucose 247 (H) 74 - 99 mg/dL    BUN 88 (H) 7.0 - 18 MG/DL    Creatinine 2.60 (H) 0.6 - 1.3 MG/DL    BUN/Creatinine ratio 34 (H) 12 - 20      eGFR 18 (L) >60 ml/min/1.73m2    Calcium 9.3 8.5 - 10.1 MG/DL    Bilirubin, total 0.6 0.2 - 1.0 MG/DL    ALT (SGPT) 43 13 - 56 U/L    AST (SGOT) 77 (H) 10 - 38 U/L    Alk.  phosphatase 69 45 - 117 U/L    Protein, total 7.9 6.4 - 8.2 g/dL    Albumin 2.8 (L) 3.4 - 5.0 g/dL Globulin 5.1 (H) 2.0 - 4.0 g/dL    A-G Ratio 0.5 (L) 0.8 - 1.7     BLOOD GAS, ARTERIAL POC    Collection Time: 01/25/23  1:30 AM   Result Value Ref Range    Device: NASAL CANNULA      pH (POC) 7.38 7.35 - 7.45      pCO2 (POC) 36.7 35.0 - 45.0 MMHG    pO2 (POC) 57 (L) 80 - 100 MMHG    HCO3 (POC) 21.5 (L) 22 - 26 MMOL/L    sO2 (POC) 88.5 (L) 92 - 97 %    Base deficit (POC) 3.3 mmol/L    Allens test (POC) Positive      Site RIGHT RADIAL      Specimen type (POC) ARTERIAL      Performed by Joseph Mcleod    GLUCOSE, POC    Collection Time: 01/25/23  5:29 AM   Result Value Ref Range    Glucose (POC) 189 (H) 70 - 110 mg/dL       IMAGING AND PROCEDURES (LAST 36 HOURS)  US RETROPERITONEUM COMP    Result Date: 1/24/2023  No hydronephrosis. Bladder volume 679.4 cc. Bilateral pleural effusions. XR CHEST PORT    Result Date: 1/24/2023  Cardiomegaly. Worsening bilateral infiltrates likely related to pulmonary edema. Pneumonia is also possible. Bibasilar consolidation consistent with atelectasis and pleural fluid.      ================================================================  Further management for Ms. Sathish Don will be discussed on rounds with my attending.       Manuelito Galloway MD, PGY-1  [facility]  January 25, 2023 6:39 AM

## 2023-01-25 NOTE — PROGRESS NOTES
In Patient Progress note    Admit Date: 1/17/2023    Impression:   #1 Acute kidney injury on Chronic kidney disease stage III, baseline creatinine around 1.2-1.7, creatinine and BUN is uptrending, secondary to cardiorenal syndrome in the setting of acute on chronic CHF. #2 acute hypoxic respiratory failure secondary to: CHF and COVID-19 infection, aspiration pneumonia  #3 acute on chronic CHF  #4 cardiomyopathy with EF of 25%  #5 A. fib RVR  #6 NSTEMI  #7 hypertension    Worsening hypoxia   CXR noted , does appear to have pulm edema/effusions  Creat stable   Electrolytes stable  Worsening tachycardia     Plan:  #1 strict intake output charting  #2 Lasix 40 mg IV X 1 this AM , will repeat graded doses depending on response  #3 A. fib RVR management per cardiology team  #4 follow pulm and cardiology recs   #5 renally dose medications for EGFR of less than 15    I agree patient not a good dialysis candidate d/t several co morbidities  Prognosis is poor   Palliative on board    Discussed plan with patient and primary team,     Please call with questions,     Carlos Joe MD FASN  Cell [phone]  Pager: [phone]       Subjective:     - No acute over night events. - respiratory - stable  - hemodynamics - stable, no pressrs  - UOP-improved  - Nutrition -ok    Objective:     Visit Vitals  /89 (BP 1 Location: Left upper arm, BP Patient Position: At rest)   Pulse (!) 140   Temp 98.2 °F (36.8 °C)   Resp 28   Ht 5' 4\" (1.626 m) Comment: Photo ID   Wt 108.9 kg (240 lb 1.3 oz)   SpO2 90%   Breastfeeding No   BMI 41.21 kg/m²         Intake/Output Summary (Last 24 hours) at 1/25/2023 1627  Last data filed at 1/25/2023 0600  Gross per 24 hour   Intake --   Output 550 ml   Net -550 ml         Physical Exam:     Patient is in no apparent distress. HEENT: dry mucoisa  Lungs: good air entry, clear to auscultation bilaterally. Cardiovascular system: S1, S2, regular rate and rhythm.  No JVD   Abdomen: soft, non tender, non distended. Extremities: 1+ LE edema   Integumentary: skin is grossly intact.    Neurologic: confused    Data Review:    Recent Labs     01/25/23 0055   WBC 11.4   RBC 3.96*   HCT 33.0*   MCV 83.3   MCH 25.5   MCHC 30.6*   RDW 17.3*       Recent Labs     01/25/23  0055 01/24/23  0909 01/24/23  0107 01/23/23  0136   BUN 88* 84*  --  83*   CREA 2.60* 2.65*  --  3.03*   CA 9.3 9.0  --  8.7   ALB 2.8* 2.7*  --   --    K 4.3 4.3  --  4.4    142  --  136    108  --  102   CO2 21 23  --  21   PHOS 5.5*  --  5.1* 6.7*   * 163*  --  207*         Shalonda Mclean MD

## 2023-01-25 NOTE — ROUTINE PROCESS
Assumed care of pt at shift change, oriented to self only with confusion. Vital signs unstable and telemetry A/fib 142 HR. PIV site clean,dry,intact and patent. Medications tolerated without difficultly. Lungs coarse with diminished basis. SAT 88% when pt keeps O2 on. ABD soft with + BS. LBM 1/24/23 Voiding yellow urine via purewick. Skin warm,dry and excoriate in mell area. 2+ generalized edema to upper/lower extremities. C/O pain with incontinent care. [address] called about elevated B/P and HR.

## 2023-01-25 NOTE — PROGRESS NOTES
Warfarin dosing - Pharmacy consult note    Consulting provider: Debra Mi MD  Indication: Atrial Fibrillation  Warfarin dose prior to admission: N/A  Concurrent anticoagulants/antiplatelets: ASA  Significant drug interactions: amiodarone, allopurinol, metronidazole    Recent Labs     01/25/23  0708 01/25/23  0055 01/24/23  0107 01/23/23  0136   INR 1.5*  --   --   --    HGB  --  10.1* 10.4* 11.2*   PLT  --  225 211 219       Date INR Dose (mg)   1/25 1.5 2.5     Assessment/Plan  Goal INR: 2 - 3  Warfarin 2.5 mg x1    Active problem list reviewed. INR orders are placed. Chart reviewed for pertinent labs, drug/diet interactions, and past doses. Documentation of patient's clinical condition was reviewed. Pharmacy Dosing:  Pharmacy will continue to follow.      Giuseppe Olivo, KIMBERLYD

## 2023-01-25 NOTE — PROGRESS NOTES
RT called to bedside for an ABG due to SATS in mid to high 80's. ABG completed and pt placed on HFNC.

## 2023-01-26 NOTE — ROUTINE PROCESS
Shift Note:    Patient is stable; Increased confusion during shift with isolated moments of clarity; unable to take PO medications this am; able to take sips (teaspoons) this afternoon. Worsening respiratory status: Challenging to keep patient O2 devices in place; patient consistently removing device; O2 saturation decrease to low 80's/high 70's    Several MD assessments at bedside this shift:  Family Medicine x 3; Pulmonology, ID, Cardiology:    Afib with HR in 130's - 160's this shift    Heparin Drip started this shift. MD's in contact with family today.     Urinary: External catheter: 800 U/O measured; soaked pads x3

## 2023-01-26 NOTE — PROGRESS NOTES
OCCUPATIONAL THERAPY NOTE    Patient: Kt Priest [de-identified]82 y.o. female)  Date: 1/26/2023  Diagnosis: Atrial fibrillation with rapid ventricular response (HCC) [I48.91]  Acute on chronic heart failure (HCC) [I50.9]  COVID [U07.1]  Heart failure ([facility] [I50.9]  Atrial fibrillation with RVR ([facility] [address] [I48.91]  COVID-19 virus infection [U07.1]  Acute respiratory failure with hypoxia (HCC) [J96.01]  COVID-19 [U07.1] Acute on chronic heart failure (HCC)      Precautions: Fall, Contact (droplet +)  Chart, occupational therapy assessment, plan of care, and goals were reviewed. Occupational Therapy treatment attempted. Patient is unable to participate due to:  []  Nausea/vomiting  []  Eating  []  Pain  []  Pt lethargic  []  Off Unit  [x] Other:  RN requesting to hold OT for today as pt tachycardic and having difficulty maintaining O2 sats > 90%. Will f/u later as schedule allows. Thank you.     Camden Davis MS, OTR/L

## 2023-01-26 NOTE — PROGRESS NOTES
[facility]: [phone]  [facility]: [phone]     Patient Name: Ginette Moses  YOB: 1935    Date of consult : 1/19/23  Date of follow-up visit:  1/23/2023; 1/24/2023; 1/26/2023  Reason for Consult: establish goals of care  Requesting Provider: Shelby Perez MD    Primary Care Physician: Anival Trujillo MD      SUMMARY:   Ginette Moses is a 82 y.o. female with a past history of DM 2, diastolic congestive heart failure, CHF, HTN, obesity, atrial fibrillation with rapid ventricular response who was admitted on 1/17/2023 from home with a diagnosis of acute respiratory failure, COVID-19. Current medical issues leading to Palliative Medicine involvement include: Pt with a long term life limiting chronic disease process that warrants discussions about her goals of care. CHIEF COMPLAINT: Respiratory failure on ventilator and intubated    HPI/SUBJECTIVE:    Patient is a 82-year-old  female that lives with her boyfriend. She came in through the emergency department with complaints of shortness of breath lasting greater than 1 day. She arrived on CPAP by EMS with O2 saturations in the 80s percentile. She also had tachycardia with pulse of 181 bpm.  She rapidly decompensated and was ultimately intubated and placed on mechanical ventilation. Patient does have a durable DO NOT RESUSCITATE.    1/23/2023: Lying in bed with eyes closed, respirations 21-23, on nasal oxygen at  6 liters    1/24/2023:  reclined in bed, awake, alert, oriented to self, speech incomprehensible at times, pulling bedcovers off, remains on nasal oxygen at 6 liters    1/26/2023  lying in bed, awake and alert, confused, on HFNC at 35L, 80%, appears tachypneic with use of accessory muscles and increased work of breathing    The patient is:   [] Verbal and participatory  [x] Non-participatory due to:  Intubated    GOALS OF CARE:  DNR/DNI, limited interventions, no feeding tube  Patient/Health Care Proxy Stated Goals: Prolong life      TREATMENT PREFERENCES:   Code Status: DNR/DNI         PALLIATIVE DIAGNOSES:   Goals of care/ACP  Acute respiratory failure  COVID-19  Debility age-related       PLAN:   1/26/2023: seen via window along with Ms. Rebecca Funes, NP due to COVID-19 isolation. Patient is lying in bed, awake and alert, moving around in bed, appears a bit restless. HFNC at 35L, 80% in use, tachypneic with respiratory rate 35 and increased work of breathing observed. Discussed with Velia Lo. Palliative medicine will continue to follow closely with you. POST form on file. Goals of care remain DNR/DNI, limited interventions, no feeding tube. Per chart review, appears COVID isolation ends tomorrow, 1/27/2023 and family is planning to visit then. Addendum @ [phone]:  Telephone call placed to patient's son/HEATH Mcmahon. Jamari Lawler shared he is planning to come to the hospital tomorrow, 1/27/2023, to visit his mother. Explained palliative team would like to meet with him if possible to further discuss his mother's condition and options for next steps in her care. Jamari Lawler shared he is in [place], [place] and will be traveling back to this area tomorrow and may not arrive until late afternoon. Asked him to request palliative medicine be called when he gets to the hospital in hopes that our team can meet with him. He expressed agreement. Please see below for previous conversations with the palliative medicine team:    1/24/2023:  Seen at bedside independently in full PPE for droplet plus isolation and COVID-19. Ms. Perry Easley is reclined in bed, awake, alert, oriented to self. Speech incomprehensible at times. Could not spontaneously state where she was. When asked if she was at home, patient replied \"yes\". When asked if she was in the hospital, patient replied \"yes, I'm in the hospital\".   Reports that her breathing is better, remains on nasal oxygen at 6 liters. Denies pain. Per chart review, oral intake poor. Discussed with nephrology. Discussed with Velia Lo. POST form on file. Will continue to follow along with you. Goals of care remain DNR/DNI, limited interventions, no feeding tube. Please see below for previous conversations with the palliative medicine team:    1/23/2023:  Seen via window due to COVID-19 isolation along with Ms. Huang Stout and Gary Gomez MSW. Patient is lying in bed with eyes closed, respiratory rate 21-23 with abdominal muscle used observed. Nasal oxygen at 6 liters. Per chart review, noted patient was medically extubated on 1/21/2023 and transferred out of ICU. Unable to find POST form which was placed on chart for son's signature on 1/20/2023. Telephone call to patient's sonMckenzie regarding POST form. Filled out another POST form for DNR/DNI, limited interventions and no feeding tube and sent to patient's son, Mckenzie Rahman via HIPAA compliant DocuSign for electronic signature. Received signed POST form back. Copy placed on chart for scanning. Goals of care are now DNR/DNI, limited interventions, no feeding tube. Please see below for previous conversations with the palliative medicine team:    Goals of care/ACP  This NP along with Jace Landau, RN observed patient through glass due to strict isolation. She is unable to participate in a goals of care conversation due to being intubated and mechanically ventilated. She has also been weaned from sedation and minimally interactive. Noted that patient is spontaneously opening her eyes and moving extremities. We have reached out to her son Mckenzie Rahman who is her medical power of . He was unaware that a durable DO NOT RESUSCITATE does not necessarily protect you from being emergently intubated for respiratory distress. We reviewed with him a POST which he wanted to complete.   He has chosen DNR/DNI, limited interventions and limited feeding tube. He is unable at this moment to come in to sign we are placing the POST on the patient's chart for him to sign tomorrow evening when he is available after work. In the interim we are excepting a verbal from him for no reintubation. He is okay with attempts at medically liberating his mother from the ventilator with the understanding that she will not be reintubated. Goals of care: DNR/DNI no reintubation post extubation for any reason  2. Acute respiratory failure  Patient intubated and mechanically ventilated with PEEP of 5 and FiO2 45% attempts over the next 24 to 48 hours to liberate her from the ventilator. She is being weaned today from sedation. 3.   COVID-19  PCR for COVID-19 SARS positive on 1/17/2023. Chest x-ray on admission shows low lung volumes with prominent interstitial lung markings. Trace blunting of the bilateral costophrenic angles findings are consistent with fluid overload/CHF exacerbation. 4.   Debility/age-related  Patient with a baseline palliative performance score of around 60% with some reduction in her ambulation she has full self-care full level of consciousness. 5.   Initial consult note routed to primary continuity provider  6. Communicated plan of care with: Palliative IDT      Advance Care Planning:  [] The [facility] Interdisciplinary Team has updated the ACP Navigator with [address] and Patient Capacity    Primary Decision [facility] ([address]): Supplemental (Other) Decision MakeWill Jaleesa - Child - [phone]    Medical Interventions: Limited additional interventions   Other Instructions:   Artificially Administered Nutrition: No feeding tube     As far as possible, the palliative care team has discussed with patient / health care proxy about goals of care / treatment preferences for patient.          HISTORY:     History obtained from: chart review     Principal Problem:    Acute on chronic heart failure ([address] (2023)    Active Problems:    Atrial fibrillation with RVR ([address] (2022)      Atrial fibrillation with rapid ventricular response ([address] (2023)      COVID (2023)      Heart failure ([address] (2023)      COVID-19 virus infection (2023)      Acute respiratory failure with hypoxia ([address] (2023)      COVID-19 (2023)    Past Medical History:   Diagnosis Date    Acquired hypothyroidism 2016    Arthritis of right shoulder region 2016    Asthma     Bilateral lower extremity edema 2021    Chronic bilateral low back pain without sciatica 2021    Chronic diastolic congestive heart failure ([address] 3/10/2021    Chronic venous insufficiency     Diabetes (HCC)     neuropathy    Essential hypertension 2021    Gout     History of depression 2017    History of fall 2021    Hypercholesteremia 2021    Hypertension     MI (myocardial infarction) ([address]     OAB (overactive bladder) 2021    Peripheral neuropathy     Rheumatoid arthritis ([address]     Tear of right rotator cuff 2016    Thyroid pain     Urinary incontinence 2021    Vertigo       Past Surgical History:   Procedure Laterality Date    HX AMPUTATION TOE Right 2020    Great toe Dr. Stephania Riley      HX CHOLECYSTECTOMY      HX GYN      TAHOophorectomy due to infection    HX HEENT      resection of memegioma in the . HX KNEE REPLACEMENT Bilateral       Family History   Problem Relation Age of Onset    Heart Attack Mother     Heart Attack Father      History reviewed, no pertinent family history.   Social History     Tobacco Use    Smoking status: Former     Types: Cigarettes     Quit date:      Years since quittin.1    Smokeless tobacco: Never    Tobacco comments:     quit 45 years ago   Substance Use Topics    Alcohol use: No     Alcohol/week: 0.0 standard drinks     No Known Allergies   Current Facility-Administered Medications   Medication Dose Route Frequency    dilTIAZem (CARDIZEM) 100 mg in 0.9% sodium chloride (MBP/ADV) 100 mL infusion  7.5 mg/hr IntraVENous CONTINUOUS    bumetanide (BUMEX) injection 0.5 mg  0.5 mg IntraVENous BID    metOLazone (ZAROXOLYN) tablet 2.5 mg  2.5 mg Oral BID    levothyroxine (SYNTHROID) injection 112 mcg  112 mcg IntraVENous Q24H    metoprolol tartrate (LOPRESSOR) tablet 50 mg  50 mg Oral Q6H    Or    metoprolol (LOPRESSOR) injection 5 mg  5 mg IntraVENous Q6H    heparin (porcine) 25,000 units in 0.45% saline 250 ml infusion  18-36 Units/kg/hr IntraVENous TITRATE    cefTRIAXone (ROCEPHIN) 2 g in sterile water (preservative free) 20 mL IV syringe  2 g IntraVENous Q24H    metroNIDAZOLE (FLAGYL) IVPB premix 500 mg  500 mg IntraVENous Q12H    insulin glargine (LANTUS) injection 20 Units  20 Units SubCUTAneous QHS    [Held by provider] heparin (porcine) injection 5,000 Units  5,000 Units SubCUTAneous Q8H    [Held by provider] acetaminophen (TYLENOL) tablet 650 mg  650 mg Oral Q6H    [Held by provider] enoxaparin (LOVENOX) injection 100 mg  1 mg/kg SubCUTAneous DAILY    triamcinolone (ARISTOCORT) 0.5 % cream   Topical BID    ipratropium-albuterol (COMBIVENT RESPIMAT) 20 mcg-100 mcg inhalation spray  1 Puff Inhalation Q4H PRN    camphor-menthoL (SARNA) 0.5-0.5 % lotion   Topical TID    glucose chewable tablet 16 g  16 g Oral PRN    glucagon (GLUCAGEN) injection 1 mg  1 mg IntraMUSCular PRN    dextrose 10% infusion 0-250 mL  0-250 mL IntraVENous PRN    insulin lispro (HUMALOG) injection   SubCUTAneous Q6H    famotidine (PF) (PEPCID) 20 mg in 0.9% sodium chloride 10 mL injection  20 mg IntraVENous DAILY    aspirin chewable tablet 81 mg  81 mg Oral DAILY    [Held by provider] atorvastatin (LIPITOR) tablet 80 mg  80 mg Oral DAILY    [Held by provider] levothyroxine (SYNTHROID) tablet 150 mcg  150 mcg Oral DAILY    [Held by provider] gabapentin (NEURONTIN) capsule 400 mg  400 mg Oral DAILY    [Held by provider] allopurinoL (ZYLOPRIM) tablet 100 mg  100 mg Oral DAILY    sodium chloride (NS) flush 5-40 mL  5-40 mL IntraVENous Q8H    acetaminophen (TYLENOL) tablet 650 mg  650 mg Oral Q6H PRN    Or    acetaminophen (TYLENOL) suppository 650 mg  650 mg Rectal Q6H PRN    polyethylene glycol (MIRALAX) packet 17 g  17 g Oral DAILY PRN    ondansetron (ZOFRAN ODT) tablet 4 mg  4 mg Oral Q8H PRN    Or    ondansetron (ZOFRAN) injection 4 mg  4 mg IntraVENous Q6H PRN          Clinical Pain Assessment (nonverbal scale for nonverbal patients): Clinical Pain Assessment  Severity: 0     Activity (Movement): Lying quietly, normal position    Duration: for how long has pt been experiencing pain (e.g., 2 days, 1 month, years)  Frequency: how often pain is an issue (e.g., several times per day, once every few days, constant)     FUNCTIONAL ASSESSMENT:     Palliative Performance Scale (PPS):  PPS: 40    ECOG  ECOG Status : Limited self-care     PSYCHOSOCIAL/SPIRITUAL SCREENING:      Any spiritual / Pentecostalism concerns:  unable to assess  [] Yes /  [] No    Caregiver Burnout:  [] Yes /  [] No /  [x] No Caregiver Present      Anticipatory grief assessment: unable to assess  [] Normal  / [] Maladaptive        REVIEW OF SYSTEMS:     Systems: constitutional, ears/nose/mouth/throat, respiratory, gastrointestinal, genitourinary, musculoskeletal, integumentary, neurologic, psychiatric, endocrine. Positive findings noted below. Modified ESAS Completed by: provider           Pain: 0   Anxiety: 2       Dyspnea: 4           Stool Occurrence(s): 1   Positive and pertinent negative findings in ROS are noted above in HPI. The following systems were [] reviewed / [x] unable to be reviewed as noted in HPI  Other findings are noted below.      PHYSICAL EXAM:     Constitutional: lying in bed, awake, alert, a bit restless, moving in bed  Eyes: pupils equal  ENMT: no nasal discharge, moist mucous membranes, edentulous  Cardiovascular: tachycardic rhythm  Respiratory:  increased work of breathing, tachypneic with RR 35, HFNC 35L, 80%, accessory muscle use  Gastrointestinal: soft   Skin: warm, dry, multiple areas of excoriation from scratching  Neurologic: awake and alert, moving all extremities    Other: Wt Readings from Last 3 Encounters:   01/24/23 108.9 kg (240 lb 1.3 oz)   01/12/23 101 kg (222 lb 10.6 oz)   12/21/22 101 kg (222 lb 9.6 oz)     Blood pressure 138/87, pulse (!) 127, temperature 98.9 °F (37.2 °C), resp. rate (!) 35, height 5' 4\" (1.626 m), weight 108.9 kg (240 lb 1.3 oz), SpO2 94 %, not currently breastfeeding. Pain:  Pain Scale 1: Adult Nonverbal Pain Scale  Pain Intensity 1: 0              Pain Intervention(s) 1: Medication (see MAR)       LAB AND IMAGING FINDINGS:     Lab Results   Component Value Date/Time    WBC 13.3 (H) 01/26/2023 01:30 AM    HGB 10.2 (L) 01/26/2023 01:30 AM    PLATELET 364 17/23/9378 01:30 AM     Lab Results   Component Value Date/Time    Sodium 145 01/26/2023 01:30 AM    Potassium 4.1 01/26/2023 01:30 AM    Chloride 111 01/26/2023 01:30 AM    CO2 26 01/26/2023 01:30 AM    BUN 94 (H) 01/26/2023 01:30 AM    Creatinine 2.36 (H) 01/26/2023 01:30 AM    Calcium 9.4 01/26/2023 01:30 AM    Magnesium 2.2 01/26/2023 01:30 AM    Phosphorus 5.0 (H) 01/26/2023 01:30 AM      Lab Results   Component Value Date/Time    Alk.  phosphatase 69 01/25/2023 12:55 AM    Protein, total 7.9 01/25/2023 12:55 AM    Albumin 2.8 (L) 01/25/2023 12:55 AM    Globulin 5.1 (H) 01/25/2023 12:55 AM     Lab Results   Component Value Date/Time    INR 1.7 (H) 01/26/2023 01:30 AM    Prothrombin time 20.2 (H) 01/26/2023 01:30 AM    aPTT >180.0 (HH) 01/26/2023 09:23 AM      No results found for: IRON, FE, TIBC, IBCT, PSAT, FERR   No results found for: PH, PCO2, PO2  No components found for: [place]   Lab Results   Component Value Date/Time     08/11/2022 02:47 AM    CK - MB 2.2 04/18/2018 03:48 PM              Total time: 50 minutes     > 50% counseling / coordination:  Time spent in direct consultation with the patient, medical team, and family     Prolonged service was provided for  []30 min   []75 min in face to face time in the presence of the patient, spent as noted above. Time Start:   Time End:     Disclaimer: Sections of this note are dictated using utilizing voice recognition software, which may have resulted in some phonetic based errors in grammar and contents. Even though attempts were made to correct all the mistakes, some may have been missed, and remained in the body of the document. If questions arise, please contact our department.

## 2023-01-26 NOTE — PROGRESS NOTES
In Patient Progress note    Admit Date: 1/17/2023    Impression:   #1 Acute kidney injury on Chronic kidney disease stage III, baseline creatinine around 1.2-1.7, creatinine and BUN is uptrending, secondary to cardiorenal syndrome in the setting of acute on chronic CHF. #2 acute hypoxic respiratory failure secondary to: CHF and COVID-19 infection, aspiration pneumonia  #3 acute on chronic CHF  #4 cardiomyopathy with EF of 25%  #5 A. fib RVR  #6 NSTEMI  #7 hypertension    Respiratory distress improving   On HFNC   Creat sightly better    Electrolytes stable  Worsening tachycardia   More awake today     Plan:  #1 strict intake output charting  #2 Bumex 0.5 mg BID IV , metolazone 2.5 mg BID X 4 doses PO  #3 tachycardia management per primary/ cardiology team  #4 follow pulm,ID  and cardiology recs   #5 renally dose medications for EGFR of less than 15    patient not a good dialysis candidate d/t several co morbidities  Overall prognosis is poor   Palliative on board    Discussed plan with patient and Dr Tura Collet,     Please call with questions,     Fausto Brumfield MD FASN  Cell [phone]  Pager: [phone]       Subjective:     - tachy overnight   - respiratory - improved slightly  - hemodynamics - stable, no pressrs  - UOP-improved  - Nutrition -ok    Objective:     Visit Vitals  BP (!) 139/94 (BP 1 Location: Left lower arm, BP Patient Position: Supine)   Pulse (!) 134   Temp 98.9 °F (37.2 °C)   Resp (!) 32   Ht 5' 4\" (1.626 m) Comment: Photo ID   Wt 108.9 kg (240 lb 1.3 oz)   SpO2 93%   Breastfeeding No   BMI 41.21 kg/m²         Intake/Output Summary (Last 24 hours) at 1/26/2023 1646  Last data filed at 1/26/2023 1257  Gross per 24 hour   Intake 120 ml   Output 3100 ml   Net -2980 ml         Physical Exam:   Mild distress  HEENT:mmm  Lungs:gema rales  Cardiovascular system: S1, S2, regular rate and rhythm. No JVD   Abdomen: soft, non tender, non distended.    Extremities: 2+ LE edema   Integumentary: skin is grossly intact.    Neurologic: confused    Data Review:    Recent Labs     01/26/23  0130   WBC 13.3*   RBC 4.08*   HCT 33.9*   MCV 83.1   MCH 25.0   MCHC 30.1*   RDW 18.0*       Recent Labs     01/26/23  0130 01/25/23  0055 01/24/23  0909 01/24/23  0107   BUN 94* 88* 84*  --    CREA 2.36* 2.60* 2.65*  --    CA 9.4 9.3 9.0  --    ALB  --  2.8* 2.7*  --    K 4.1 4.3 4.3  --     142 142  --     109 108  --    CO2 26 21 23  --    PHOS 5.0* 5.5*  --  5.1*   * 247* 163*  --          Arne Hodgkins, MD

## 2023-01-26 NOTE — PROGRESS NOTES
01/26/23 0752   Oxygen Therapy   O2 Sat (%) 93 %   Pulse via Oximetry 128 beats per minute   O2 Device Heated; Hi flow nasal cannula   O2 Flow Rate (L/min) 35 l/min   O2 Temperature 91.4 °F (33 °C)   FIO2 (%) 80 %

## 2023-01-26 NOTE — PROGRESS NOTES
Cardiology Progress Note    Admit Date: 1/17/2023  Attending Cardiologist: Dr. Kaylie Roe:     -Acute hypoxic respiratory failure associated with COVID with underlying HFrEF and COPD, emergently intubated 1/18/2023 AM, extubated on 1/21/2023. -Afib RVR, known Hx afib diagnosed 10/2022  -Elevated troponin, peaked at 2343  -HFrEF, recent diagnosis 12/2022  Echo 12/21/2022 with LVEF 20-25% with severe hypokinesis of basal anteroseptal, basal inferoseptal and apical septal walls, moderately dilated RV with reduced systolic function, severe biatrial enlargement  Repeat Echo this admission with LVEF 20-25%, no significant change from prior  -Hx RLE DVT 06/2022  -HTN  -DM  -HLD  -Hx medication noncompliance  -DNR status      Primary cardiologist is Dr. Montesinos Never:     Respiratory status today is better than yesterday. Blood pressure is stable but heart rate is still elevated. Increase diltiazem per orders to better control the heart rate. Still with DNR status. Saturations are better with high flow nasal cannula. Renal function improving gradually. Pt not a candidate for further cardiac evaluation at this point. Diuresis per nephrology. Will try low-dose IV milrinone. Prognosis is guarded and poor. Subjective:           Pt noted to have increased confusion, worsened respiratory status overnight. Pt on BiPAP (which is pulled off) at time of evaluation this afternoon, O2 sats low 70's. Discussed with PCCM NP.     Objective:      Patient Vitals for the past 8 hrs:   Temp Pulse Resp BP SpO2   01/26/23 1254 98.9 °F (37.2 °C) (!) 134 (!) 32 (!) 139/94 91 %   01/26/23 1046 98.9 °F (37.2 °C) (!) 127 (!) 35 138/87 94 %   01/26/23 1018 -- (!) 130 -- (!) 131/93 --   01/26/23 0834 97.1 °F (36.2 °C) (!) 129 (!) 32 (!) 139/94 92 %   01/26/23 0752 -- -- -- -- 93 %           Patient Vitals for the past 96 hrs:   Weight   01/24/23 0748 108.9 kg (240 lb 1.3 oz)         TELE: AFIB with RVR Current Facility-Administered Medications   Medication Dose Route Frequency Last Admin    dilTIAZem (CARDIZEM) 100 mg in 0.9% sodium chloride (MBP/ADV) 100 mL infusion  7.5 mg/hr IntraVENous CONTINUOUS 7.5 mg/hr at 01/26/23 1022    bumetanide (BUMEX) injection 0.5 mg  0.5 mg IntraVENous BID 0.5 mg at 01/26/23 1123    metOLazone (ZAROXOLYN) tablet 2.5 mg  2.5 mg Oral BID 2.5 mg at 01/26/23 1123    levothyroxine (SYNTHROID) injection 112 mcg  112 mcg IntraVENous Q24H 112 mcg at 01/25/23 1659    metoprolol tartrate (LOPRESSOR) tablet 50 mg  50 mg Oral Q6H      Or    metoprolol (LOPRESSOR) injection 5 mg  5 mg IntraVENous Q6H 5 mg at 01/26/23 1018    heparin (porcine) 25,000 units in 0.45% saline 250 ml infusion  18-36 Units/kg/hr IntraVENous TITRATE 15 Units/kg/hr at 01/26/23 0723    cefTRIAXone (ROCEPHIN) 2 g in sterile water (preservative free) 20 mL IV syringe  2 g IntraVENous Q24H 2 g at 01/26/23 1018    metroNIDAZOLE (FLAGYL) IVPB premix 500 mg  500 mg IntraVENous Q12H 500 mg at 01/26/23 1019    insulin glargine (LANTUS) injection 20 Units  20 Units SubCUTAneous QHS 20 Units at 01/25/23 2101    [Held by provider] heparin (porcine) injection 5,000 Units  5,000 Units SubCUTAneous Q8H 5,000 Units at 01/25/23 1117    [Held by provider] acetaminophen (TYLENOL) tablet 650 mg  650 mg Oral Q6H 650 mg at 01/25/23 1118    [Held by provider] enoxaparin (LOVENOX) injection 100 mg  1 mg/kg SubCUTAneous DAILY 100 mg at 01/23/23 1049    triamcinolone (ARISTOCORT) 0.5 % cream   Topical BID Given at 01/26/23 1021    ipratropium-albuterol (COMBIVENT RESPIMAT) 20 mcg-100 mcg inhalation spray  1 Puff Inhalation Q4H PRN 1 Puff at 01/23/23 1102    camphor-menthoL (SARNA) 0.5-0.5 % lotion   Topical TID Given at 01/26/23 1021    glucose chewable tablet 16 g  16 g Oral PRN      glucagon (GLUCAGEN) injection 1 mg  1 mg IntraMUSCular PRN      dextrose 10% infusion 0-250 mL  0-250 mL IntraVENous PRN      insulin lispro (HUMALOG) injection   SubCUTAneous Q6H 2 Units at 01/25/23 0604    famotidine (PF) (PEPCID) 20 mg in 0.9% sodium chloride 10 mL injection  20 mg IntraVENous DAILY 20 mg at 01/26/23 1018    aspirin chewable tablet 81 mg  81 mg Oral DAILY 81 mg at 01/24/23 1518    [Held by provider] atorvastatin (LIPITOR) tablet 80 mg  80 mg Oral DAILY 80 mg at 01/23/23 1050    [Held by provider] levothyroxine (SYNTHROID) tablet 150 mcg  150 mcg Oral DAILY 150 mcg at 01/24/23 1456    [Held by provider] gabapentin (NEURONTIN) capsule 400 mg  400 mg Oral DAILY 400 mg at 01/23/23 1050    [Held by provider] allopurinoL (ZYLOPRIM) tablet 100 mg  100 mg Oral DAILY 100 mg at 01/23/23 1050    sodium chloride (NS) flush 5-40 mL  5-40 mL IntraVENous Q8H 10 mL at 01/26/23 0500    acetaminophen (TYLENOL) tablet 650 mg  650 mg Oral Q6H PRN      Or    acetaminophen (TYLENOL) suppository 650 mg  650 mg Rectal Q6H PRN      polyethylene glycol (MIRALAX) packet 17 g  17 g Oral DAILY PRN      ondansetron (ZOFRAN ODT) tablet 4 mg  4 mg Oral Q8H PRN      Or    ondansetron (ZOFRAN) injection 4 mg  4 mg IntraVENous Q6H PRN 4 mg at 01/24/23 0741         Intake/Output Summary (Last 24 hours) at 1/26/2023 1300  Last data filed at 1/26/2023 1257  Gross per 24 hour   Intake 120 ml   Output 3100 ml   Net -2980 ml         Physical Exam:  General:  elderly female, minimal respiratory distress, BiPAP removed  Neck:  supple  Lungs:  coarse  Heart:  tachycardic irregular rhythm  Abdomen:  abdomen is soft without significant tenderness, masses, organomegaly or guarding  Extremities:  atraumatic, no significant pitting edema    Visit Vitals  BP (!) 139/94 (BP 1 Location: Left lower arm, BP Patient Position: Supine)   Pulse (!) 134   Temp 98.9 °F (37.2 °C)   Resp (!) 32   Ht 5' 4\" (1.626 m)   Wt 108.9 kg (240 lb 1.3 oz)   SpO2 91%   Breastfeeding No   BMI 41.21 kg/m²       Data Review:     Labs: Results:       Chemistry Recent Labs     01/26/23  0130 01/25/23  0055 01/24/23  4263 01/24/23 0107   * 247* 163*  --     142 142  --    K 4.1 4.3 4.3  --     109 108  --    CO2 26 21 23  --    BUN 94* 88* 84*  --    CREA 2.36* 2.60* 2.65*  --    CA 9.4 9.3 9.0  --    MG 2.2 2.4  --  2.4   PHOS 5.0* 5.5*  --  5.1*   AGAP 8 12 11  --    BUCR 40* 34* 32*  --    AP  --  69 58  --    TP  --  7.9 7.6  --    ALB  --  2.8* 2.7*  --    GLOB  --  5.1* 4.9*  --    AGRAT  --  0.5* 0.6*  --         CBC w/Diff Recent Labs     01/26/23 0130 01/25/23 1850 01/25/23 0055   WBC 13.3* 13.8* 11.4   RBC 4.08* 4.18* 3.96*   HGB 10.2* 10.6* 10.1*   HCT 33.9* 34.9* 33.0*    232 225   GRANS 78* 80* 87*   LYMPH 8* 7* 5*   EOS 0 0 0        Coagulation Recent Labs     01/26/23 0923 01/26/23 0130 01/25/23 1850   PTP  --  20.2* 20.0*   INR  --  1.7* 1.7*   APTT >180.0* >180.0* 94.1*         Lipid Panel Lab Results   Component Value Date/Time    Cholesterol, total 191 11/09/2021 12:24 PM    HDL Cholesterol 44 11/09/2021 12:24 PM    LDL, calculated 96.8 11/09/2021 12:24 PM    VLDL, calculated 50.2 11/09/2021 12:24 PM    Triglyceride 251 (H) 11/09/2021 12:24 PM    CHOL/HDL Ratio 4.3 11/09/2021 12:24 PM      Liver Enzymes Recent Labs     01/25/23 0055   TP 7.9   ALB 2.8*   AP 69        Thyroid Studies Lab Results   Component Value Date/Time    TSH 4.15 (H) 01/17/2023 06:59 PM          Signed By: Kaylee Tovar MD     January 26, 2023

## 2023-01-26 NOTE — PROGRESS NOTES
[facility]. Pulmonary, Critical Care, and Sleep Medicine    Name: Vira Luna MRN: [id number]   : 1935 Hospital: [address] [place] [address] Dr   Date: 2023  Admission Date: 2023     Chart and notes reviewed. Data reviewed. I have evaluated all findings. [x]I have reviewed the flowsheet and previous days notes. []The patient is unable to give any meaningful history or review of systems because the patient is:  []Intubated []Sedated   []Unresponsive      []The patient is critically ill on      []Mechanical ventilation []Pressors   []BiPAP []         Interval HPI:  Patient is a 82 y.o. female with a PMH of HFrEF last EF 20-25%, atrial fibrillation with RVR, T2DM, HTN, HLD, and hypothyroidism who presented to the [facility] ED on  due to shortness of breath. Patient found at home by EMS hypoxic to mid 80's and put on CPAP. Given oral nitroglycerin x3 and nitropaste. She was noted to be tachycardic to nearly 200 bpm in the ED in atrial fibrillation with RVR. In the ED she was initially placed on BiPAP with good respiratory response and started on diltiazem gtt. Following diltiazem gtt patient became hypotensive with MAPs in low 60's but remained persistently tachycardic. Then transitioned to amiodarone gtt. Blood pressure improved and was given lasix x1. Following BiPAP initiation ABG improved and she was transitioned to NC, however this was tolerated poorly and required resuming BiPAP. Despite BiPAP patient continued to be in respiratory distress and required intubation in the ED. Noted to be COVID positive. ICU was then contacted for admission and further management. Of note, patient left inpatient hospitalization AMA on  following admission for cellulitis in setting of lymphedema and atrial fibrillation with RVR.       Interval Follow Up23  LOS:9   Vent Day: extubated 2023    Pulmonary reconsulted 23 due to worsening clinical status    Was able to wean off of continuous BIPAP and now on Vapotherm 35-70%  She is intermittently confused and will pull off Vapotherm and drop SpO2 into the 70's  Remains in AFIB-RVR, 's, BP stable- started on dilt and milrinone  Heparin drip started yesterday  Afebrile  Holding potentially sedating agents  Creat slightly improved  Nursing pursing midline due to poor PIV access  Goals of care being addressed with family by primary team            Patient Vitals for the past 24 hrs:   Temp Pulse Resp BP SpO2   01/26/23 1408 -- -- -- -- 93 %   01/26/23 1254 98.9 °F (37.2 °C) (!) 134 (!) 32 (!) 139/94 91 %   01/26/23 1046 98.9 °F (37.2 °C) (!) 127 (!) 35 138/87 94 %   01/26/23 1018 -- (!) 130 -- (!) 131/93 --   01/26/23 0834 97.1 °F (36.2 °C) (!) 129 (!) 32 (!) 139/94 92 %   01/26/23 0752 -- -- -- -- 93 %   01/26/23 0354 97.9 °F (36.6 °C) (!) 122 28 (!) 154/101 93 %   01/26/23 0301 -- -- -- -- 92 %   01/26/23 0100 -- (!) 101 -- -- --   01/26/23 0000 97.4 °F (36.3 °C) (!) 133 29 (!) 141/84 92 %   01/25/23 2320 -- -- -- -- 95 %   01/25/23 2033 97.2 °F (36.2 °C) (!) 140 27 (!) 128/95 93 %   01/25/23 1933 -- -- -- -- 91 %   01/25/23 1810 -- (!) 136 -- (!) 132/90 --       Inpat Anti-Infectives (From admission, onward)       Start     Ordered Stop    01/24/23 1000  cefTRIAXone (ROCEPHIN) 2 g in sterile water (preservative free) 20 mL IV syringe  2 g,   IntraVENous,   EVERY 24 HOURS         01/24/23 0914 --    01/24/23 1000  metroNIDAZOLE (FLAGYL) IVPB premix 500 mg  500 mg,   IntraVENous,   EVERY 12 HOURS         01/24/23 0914 --                                   ROS:Review of systems not obtained due to patient factors. Events and notes from last 24 hours reviewed.      Patient Active Problem List   Diagnosis Code    Encounter for palliative care Z51.5    Acquired hypothyroidism E03.9    Dermatitis L30.9    Obesity, morbid ([facility] E66.01    Type 2 diabetes mellitus with diabetic nephropathy, with long-term current use of insulin ([facility] E11.21, Z79.4    Chronic diastolic congestive heart failure ([facility] I50.32    Essential hypertension I10    Bilateral lower extremity edema R60.0    Hypercholesteremia E78.00    History of fall Z91.81    Chronic bilateral low back pain without sciatica M54.50, G89.29    OAB (overactive bladder) N32.81    Urinary incontinence R32    Neuropathy G62.9    Septic arthritis of foot ([facility]) M00.9    Diabetic foot ulcer ([facility] E11.621, L97.509    Septic joint ([facility]) M00.9    Sepsis ([facility]) A41.9    Acute exacerbation of CHF (congestive heart failure) ([facility]) I50.9    Leukocytosis D72.829    Pulmonary edema J81.1    Dyspnea R06.00    Atrial fibrillation with RVR ([facility]) I48.91    Patient's noncompliance with other medical treatment and regimen due to unspecified reason Z91.199    Debility R53.81    Atrial fibrillation ([facility] I48.91    Advanced care planning/counseling discussion Z71.89    Cellulitis L03.90    Atrial fibrillation with rapid ventricular response ([facility]) I48.91    Acute on chronic heart failure ([facility]) I50.9    COVID U07.1    Heart failure ([facility]) I50.9    COVID-19 virus infection U07.1    Acute respiratory failure with hypoxia ([facility]) J96.01    COVID-19 U07.1       Vital Signs:  Visit Vitals  BP (!) 139/94 (BP 1 Location: Left lower arm, BP Patient Position: Supine)   Pulse (!) 134   Temp 98.9 °F (37.2 °C)   Resp (!) 32   Ht 5' 4\" (1.626 m) Comment: Photo ID   Wt 108.9 kg (240 lb 1.3 oz)   SpO2 93%   Breastfeeding No   BMI 41.21 kg/m²       O2 Device: Heated, Hi flow nasal cannula   O2 Flow Rate (L/min): 35 l/min   Temp (24hrs), Av.9 °F (36.6 °C), Min:97.1 °F (36.2 °C), Max:98.9 °F (37.2 °C)       Intake/Output:   Last shift:       07 -  1900  In: 120 [P.O.:120]  Out: 400 [Urine:400]  Last 3 shifts:  1901 -  0700  In: -   Out: 2147 [Urine:3250]    Intake/Output Summary (Last 24 hours) at 2023 1449  Last data filed at 2023 1257  Gross per 24 hour   Intake 120 ml   Output 3100 ml   Net -2980 ml            Current Facility-Administered Medications   Medication Dose Route Frequency    dilTIAZem (CARDIZEM) 100 mg in 0.9% sodium chloride (MBP/ADV) 100 mL infusion  10 mg/hr IntraVENous CONTINUOUS    bumetanide (BUMEX) injection 0.5 mg  0.5 mg IntraVENous BID    metOLazone (ZAROXOLYN) tablet 2.5 mg  2.5 mg Oral BID    milrinone (PRIMACOR) 20 MG/100 ML D5W infusion  0.2 mcg/kg/min IntraVENous CONTINUOUS    levothyroxine (SYNTHROID) injection 112 mcg  112 mcg IntraVENous Q24H    metoprolol tartrate (LOPRESSOR) tablet 50 mg  50 mg Oral Q6H    Or    metoprolol (LOPRESSOR) injection 5 mg  5 mg IntraVENous Q6H    heparin (porcine) 25,000 units in 0.45% saline 250 ml infusion  18-36 Units/kg/hr IntraVENous TITRATE    cefTRIAXone (ROCEPHIN) 2 g in sterile water (preservative free) 20 mL IV syringe  2 g IntraVENous Q24H    metroNIDAZOLE (FLAGYL) IVPB premix 500 mg  500 mg IntraVENous Q12H    insulin glargine (LANTUS) injection 20 Units  20 Units SubCUTAneous QHS    [Held by provider] heparin (porcine) injection 5,000 Units  5,000 Units SubCUTAneous Q8H    [Held by provider] acetaminophen (TYLENOL) tablet 650 mg  650 mg Oral Q6H    [Held by provider] enoxaparin (LOVENOX) injection 100 mg  1 mg/kg SubCUTAneous DAILY    triamcinolone (ARISTOCORT) 0.5 % cream   Topical BID    camphor-menthoL (SARNA) 0.5-0.5 % lotion   Topical TID    insulin lispro (HUMALOG) injection   SubCUTAneous Q6H    famotidine (PF) (PEPCID) 20 mg in 0.9% sodium chloride 10 mL injection  20 mg IntraVENous DAILY    aspirin chewable tablet 81 mg  81 mg Oral DAILY    [Held by provider] atorvastatin (LIPITOR) tablet 80 mg  80 mg Oral DAILY    [Held by provider] levothyroxine (SYNTHROID) tablet 150 mcg  150 mcg Oral DAILY    [Held by provider] gabapentin (NEURONTIN) capsule 400 mg  400 mg Oral DAILY    [Held by provider] allopurinoL (ZYLOPRIM) tablet 100 mg  100 mg Oral DAILY    sodium chloride (NS) flush 5-40 mL  5-40 mL IntraVENous Q8H Telemetry: []Sinus []A-flutter []Paced    [x]A-fib []Multiple PVCs                  Physical Exam:      General: Arousable, confused mild to moderate distress on Vapotherm  HEENT:  Anicteric sclerae; pink palpebral conjunctivae; mucosa moist  Resp:  Bilateral breath sounds, symmetric chest rise, + scattered rhonci and coarse breath sounds, no wheezing  CV:   Irregularly irregular- 's AFIB on bedside monitor  GI:  Abdomen soft, non-tender; (+) active bowel sounds  Extremities:  +2 pulse, BLE edema,  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  Skin:  Warm; dry,   Neurologic: Non-focal, not directable, moaning, she did say \"hi\", spontaneous opening eyes            DATA:  MAR reviewed and pertinent medications noted or modified as needed    Labs:  Recent Labs     01/26/23 0130 01/25/23 1850 01/25/23  0055   WBC 13.3* 13.8* 11.4   HGB 10.2* 10.6* 10.1*   HCT 33.9* 34.9* 33.0*    232 225       Recent Labs     01/26/23 0130 01/25/23  1850 01/25/23  0708 01/25/23  0055 01/24/23  0909 01/24/23  0107     --   --  142 142  --    K 4.1  --   --  4.3 4.3  --      --   --  109 108  --    CO2 26  --   --  21 23  --    *  --   --  247* 163*  --    BUN 94*  --   --  88* 84*  --    CREA 2.36*  --   --  2.60* 2.65*  --    CA 9.4  --   --  9.3 9.0  --    MG 2.2  --   --  2.4  --  2.4   PHOS 5.0*  --   --  5.5*  --  5.1*   ALB  --   --   --  2.8* 2.7*  --    ALT  --   --   --  43 41  --    INR 1.7* 1.7* 1.5*  --   --   --        No results for input(s): PH, PCO2, PO2, HCO3, FIO2 in the last 72 hours.   Recent Labs     01/25/23  1555 01/25/23  0130   FIO2I 50  --    HCO3I 22.8 21.5*   PCO2I 31.7* 36.7   PHI 7.47* 7.38   PO2I 61* 57*       Lab Results   Component Value Date/Time    aPTT >180.0 (HH) 01/26/2023 09:23 AM     Lab Results   Component Value Date/Time    TSH 4.15 (H) 01/17/2023 06:59 PM    T4, Free 0.5 (L) 12/21/2022 12:37 AM         MICRO:  Results       Procedure Component Value Units Date/Time    CULTURE, URINE [[id number]]     Order Status: Sent Specimen: Cath Urine     CULTURE, RESPIRATORY/SPUTUM/BRONCH Saray Bragg [[id number]] Collected: 01/19/23 1220    Order Status: Completed Specimen: Sputum Updated: 01/21/23 1415     Special Requests: NO SPECIAL REQUESTS        GRAM STAIN 2+ WBCS SEEN               OCCASIONAL EPITHELIAL CELLS SEEN            NO ORGANISMS SEEN        Culture result:       SCANT NORMAL RESPIRATORY LANIE          CULTURE, MRSA [[id number]] Collected: 01/18/23 2005    Order Status: Completed Specimen: Nasal from Nares Updated: 01/19/23 2328     Special Requests: NO SPECIAL REQUESTS        Culture result: MRSA NOT PRESENT               Screening of patient nares for MRSA is for surveillance purposes and, if positive, to facilitate isolation considerations in high risk settings. It is not intended for automatic decolonization interventions per se as regimens are not sufficiently effective to warrant routine use. COVID-19 WITH INFLUENZA A/B [[id number]]  (Abnormal) Collected: 01/17/23 1905    Order Status: Completed Specimen: Nasopharyngeal Updated: 01/17/23 2046     SARS-CoV-2 by PCR Detected        Comment: CALLED TO AND CORRECTLY REPEATED BY:  [facility] AT 2045 BY KDA 1/17/23  Positive results are indicative of the presence of SARS-CoV-2. Clinical correlation with patient history and other diagnostic information is necessary to determine patient infection status. Positive results do not rule out bacterial infection or co-infection with other pathogens. Influenza A by PCR Not detected        Influenza B by PCR Not detected        Comment: NOTE: Influenza A and Influenza B negative results should be considered presumptive in samples that have a positive SARS-CoV-2 result. Consider re-testing with an alternate FDA-approved test if clinically indicated. Testing was performed using matteo Angela SARS-CoV-2 and Influenza A/B nucleic acid assay.   This test is a multiplex Real-Time Reverse Transcriptase Polymerase Chain Reaction (RT-PCR) based in vitro diagnostic test intended for the qualitative detection of nucleic acids from SARS-CoV-2, Influenza A, and Influenza B in nasopharyngeal for use under the FDA's Emergency Use Authorization(EAU) only. Fact sheet for Patients: Findives.pl  Fact sheet for Healthcare Providers: Richa.beatriz         COVID-19 WITH INFLUENZA A/B [[id number]] Collected: 01/13/23 0258    Order Status: Completed Specimen: Nasopharyngeal Updated: 01/13/23 0336     SARS-CoV-2 by PCR Not detected        Comment: Not Detected results do not preclude SARS-CoV-2 infection and should not be used as the sole basis for patient management decisions. Results must be combined with clinical observations, patient history, and epidemiological information. Influenza A by PCR Not detected        Influenza B by PCR Not detected        Comment: Testing was performed using matteo Angela SARS-CoV-2 and Influenza A/B nucleic acid assay. This test is a multiplex Real-Time Reverse Transcriptase Polymerase Chain Reaction (RT-PCR) based in vitro diagnostic test intended for the qualitative detection of nucleic acids from SARS-CoV-2, Influenza A, and Influenza B in nasopharyngeal for use under the FDA's Emergency Use Authorization(EAU) only.        Fact sheet for Patients: FindDrives.pl  Fact sheet for Healthcare Providers: Richa.pl                   Imaging:          [x]   I have personally reviewed the patients radiographs and reports  XR Results (most recent):  XR Results (most recent):  Results from Hospital Encounter encounter on 01/17/23    XR CHEST PORT    Narrative  INDICATION: Evaluate for fluid overload versus worsening pneumonia    TECHNIQUE: Portable chest radiograph    COMPARISON: 24 January 2023    FINDINGS:    Similar pulmonary vascular congestion and diffuse bilateral mixed  interstitial/alveolar infiltrates. Ongoing bilateral pleural effusions with overlying atelectasis versus lung  consolidation. Similar cardiomediastinal silhouette. No pneumothorax. Impression  No significant interval change. CT Results (most recent):  Results from Hospital Encounter encounter on 10/07/22    CTA CHEST W OR W WO CONT    Narrative  CTA CHEST PULMONARY EMBOLISM PROTOCOL    INDICATION: Acute respiratory difficulty, heart failure, symptoms worsened over  3 days, chest pain, increased work of breathing. Question pulmonary embolism. TECHNIQUE: Thin collimation axial images obtained through the level of the  pulmonary arteries with additional imaging through the chest following the  uneventful administration of intravenous contrast.  Images reconstructed into  three dimensional coronal and sagittal projections for complete evaluation of  the tortuous and overlapping pulmonary vascular structures and to reduce patient  radiation dose. All CT scans at this facility are performed using dose optimization technique as  appropriate to a performed exam, to include automated exposure control,  adjustment of the mA and/or kV according to patient size (including appropriate  matching first site-specific examinations), or use of iterative reconstruction  technique. COMPARISON: 8/10/2022. FINDINGS:    No filling defects are appreciated within the main, left, right, lobar or  visualized segmental pulmonary arteries to suggest embolism. Thyroid: Unremarkable in its visualized aspects. Pericardium/ Heart: No significant effusion. Biatrial cardiac chamber  enlargement. Aorta/ Vessels: Mild to moderate aortic atherosclerosis. There is irregular  noncalcified mural plaque in the descending aorta. Lymph Nodes: Unremarkable. .    Lungs: There is hilar edema. Mild bronchial wall thickening. Mild groundglass  and interstitial thickening.  Mild mosaic attenuation. Mild to moderate dependent  atelectasis. Pleura: Moderate bilateral pleural effusions which are new. No pneumothorax. Upper Abdomen: Unremarkable. Bones/soft tissues: Osteopenia. Degenerative disc disease. Impression  1. No evidence for pulmonary emboli. 2.  Heart failure with biatrial cardiac chamber enlargement, moderate new  bilateral pleural effusions and mild pulmonary edema. 3.  Mild to moderate aortic atherosclerosis with irregular noncalcified mural  plaque along the wall of the descending aorta which increases risk of embolic  events. 01/17/23    ECHO ADULT FOLLOW-UP OR LIMITED 01/18/2023 1/18/2023    Interpretation Summary    Contrast used: Definity. Technical qualifiers: Echo study was technically difficult with poor endocardial visualization. Left Ventricle: Severely reduced left ventricular systolic function with a visually estimated EF of 20 - 25%. Left ventricle size is normal. Mildly increased wall thickness. Global hypokinesis present. Aortic Valve: Mild to moderate regurgitation. Mitral Valve: Moderate to severe regurgitation. Tricuspid Valve: Severe regurgitation. Pericardium: Left pleural effusion. Ascites present.     Signed by: Norma Sue MD on 1/18/2023  3:58 PM       IMPRESSION:   Acute hypoxic respiratory failure  requiring mechanical ventilation- intubated 1/18  Atrial fibrillation with rapid ventricular response - anticoagulated on Eliquis outpatient, diagnosed 10/22  Acute on chronic heart failure - LVEF 20-25% 12/21/22, similar result on repeat echo on 1/18/23(LVEF 55% 8/22), possible tachycardia induced cardiomyopathy vs. Potential precipitating ischemic event, does not appear to have had recent catheterization for further characterization  NSTEMI - likely type II due to demand ischemia in setting of atrial fibrillation with RVR and CHF exacerbation  COVID-19  Pulmonary edema  AQUILES on CKD3  Anemia  Hypertension  Type 2 diabetes mellitus  Hypercholesterolemia  Hypothyroidism  Hx of tobacco use - cessation in 1976  Skin lesions c/w bug bites vs excoriations           Patient Active Problem List   Diagnosis Code    Encounter for palliative care Z51.5    Acquired hypothyroidism E03.9    Dermatitis L30.9    Ob[facility], morbid ([facility] E66.01    Type 2 diabetes mellitus with diabetic nephropathy, with long-term current use of insulin ([facility]) E11.21, Z79.4    Chronic diastolic congestive heart failure ([facility]) I50.32    Essential hypertension I10    Bilateral lower extremity edema R60.0    Hypercholesteremia E78.00    History of fall Z91.81    Chronic bilateral low back pain without sciatica M54.50, G89.29    OAB (overactive bladder) N32.81    Urinary incontinence R32    Neuropathy G62.9    Septic arthritis of foot ([facility]) M00.9    Diabetic foot ulcer ([facility]) E11.621, L97.509    Septic joint ([facility]) M00.9    Sepsis ([facility]) A41.9    Acute exacerbation of CHF (congestive heart failure) ([facility]) I50.9    Leukocytosis D72.829    Pulmonary edema J81.1    Dyspnea R06.00    Atrial fibrillation with RVR ([facility]) I48.91    Patient's noncompliance with other medical treatment and regimen due to unspecified reason Z91.199    Debility R53.81    Atrial fibrillation ([facility] I48.91    Advanced care planning/counseling discussion Z71.89    Cellulitis L03.90    Atrial fibrillation with rapid ventricular response ([facility]) I48.91    Acute on chronic heart failure ([facility]) I50.9    COVID U07.1    Heart failure ([facility]) I50.9    COVID-19 virus infection U07.1    Acute respiratory failure with hypoxia ([facility]) J96.01    COVID-19 U07.1        RECOMMENDATIONS:   Change oral meds to IV as patient not able to take PO meds at this time    Neuro:   Monitor mental status and ability to use BIPAP. Continue to  hold Neurontin and other sedating medications     Pulm:     Aspiration precautions, HOB>30'. Supplemental FiO2 for goal SPO2 > 90%,   PRN duonebs.        CVS:   AFIB management per primary and cardiology- currently on diltiazem, milrinone and heparin drip    GI:  NPO for now  PUD per primary    Renal:   Trend Cr, daily BMP, UOP, strict I&Os. Replace electrolytes PRN- Nephrology consulted, renal dose medications    Hem/Onc:   Trend H/H, monitor for s/o active bleeding. Daily CBC, and PTT per heparin protocol    I/D:  Trend WBCs and temperature curve. COVID-19 positive. Continue Zosyn, s/p Vanc. ID following    Metabolic:   Daily BMP, mag, phos. Trend lytes and replace as needed. Endocrine:  Q6 glucoses. SSI. Avoid hypoglycemia. Lantus dose increased. Continue levothyroxine- Defer to primary team- Consider rechecking TFTs    Musc/Skin:   Wound care, Sarna lotion for rash,        CODE STATUS: DNR/DNI, no feeding tube, limited intervention    Patient's prognosis is extremely poor at this time. At increased risk for further clinical decline, including death. Complex decision making was made in the evaluation and management plans during this consultation. More than 50% of time was spent in counseling and coordination of care including review of data and discussion with other team members.                Ganesh Ochoa DO, [facility]    [facility]  Pulmonary, Critical Care, and Sleep Medicine

## 2023-01-26 NOTE — PROGRESS NOTES
Infectious Disease progress Note        Reason: COVID-19 infection, respiratory failure    Current abx Prior abx   Zosyn since 1/18-1/23  Ceftriaxone, metronidazole since 1/24 vancomycin 1/18-1/20     Lines:       Assessment :  82 yro female with history significant for uncontrolled type 2 diabetes, diabetic neuropathy, hypercholesterolemia, hypertension, on anticoagulation A. fib with anticoagulation who was admitted to [facility] on 1/17 for shortness of breath     Clinical presentation consistent with acute hypoxic respiratory failure evolving since admission due to decompensated CHF superimposed on COVID-19 infection; aspiration pneumonia    Significant thick yellowish sputum noted on ET suction  MRSA nasal swab 1/19- negative    No definitive clinical evidence to suggest severe COVID-19 pneumonia at this time      Diffuse rash on extremities-could be skin excoriation from scratching. No definitive evidence of acute infection noted on today's exam    Atrial fibrillation with rapid ventricular response, non-STEMI-cardiology follow-up appreciated      decompensated CHF-ejection fraction 20 to 25% on echo 12/21/2022    Acute kidney injury-likely secondary to volume depletion, diuresis    Extubated on 1/21  Now with worsening creatinine, altered mentation  Nephrology follow-up appreciated. Concern for AIN    Altered mentation could be metabolic encephalopathy- ? Steroids related-     Increasing oxygen requirement noted today, worsening bilateral infiltrates 1/24-likely due to fluid overload. Monitor for recurrent silent aspiration. No definitive clinical evidence to suggest worsening bacterial pneumonia at this time. pulmonary  follow-up appreciated. Patient switched to high flow oxygen. Currently on high flow oxygen at 35 L    Elevated D-dimer -could be acute phase reactant.   Negative venous duplex bilateral upper extremity/lower extremity 1/26/2023    Atrial fibrillation with rapid ventricular rate-persistent tachycardia. Status post diltiazem. Cardiology follow-up appreciated. Recommendations:     continue ceftriaxone, metronidazole  Follow-up nephrology, cardiology recommendations regarding diuresis  Follow-up cardiology recommendations regarding a.fib  Wean oxygen as tolerated. Follow-up pulmonary recommendations  Monitor CBC, temperature, procalcitonin   Agree with continued discussion of goals of care with family since patient is at high risk for clinical deterioration    Above plan was discussed in details with RN,  primary team. Please call me if any further questions or concerns. Will continue to participate in the care of this patient. HPI:  Not very communicative      Past Medical History:   Diagnosis Date    Acquired hypothyroidism 8/1/2016    Arthritis of right shoulder region 4/21/2016    Asthma     Bilateral lower extremity edema 7/7/2021    Chronic bilateral low back pain without sciatica 7/7/2021    Chronic diastolic congestive heart failure ([address] 3/10/2021    Chronic venous insufficiency     Diabetes (HCC)     neuropathy    Essential hypertension 7/7/2021    Gout     History of depression 1/23/2017    History of fall 7/7/2021    Hypercholesteremia 7/7/2021    Hypertension     MI (myocardial infarction) ([address]     OAB (overactive bladder) 7/7/2021    Peripheral neuropathy     Rheumatoid arthritis ([address]     Tear of right rotator cuff 5/16/2016    Thyroid pain     Urinary incontinence 7/7/2021    Vertigo        Past Surgical History:   Procedure Laterality Date    HX AMPUTATION TOE Right 2020    Great toe Dr. 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 HX KNEE REPLACEMENT Bilateral        Current Discharge Medication List        CONTINUE these medications which have NOT CHANGED    Details   bumetanide (BUMEX) 1 mg tablet Take 1 mg by mouth daily.       gabapentin (NEURONTIN) 400 mg capsule 1 capsule daily  Qty: 360 Capsule, Refills: 0    Associated Diagnoses: Neuropathy; Type 2 diabetes mellitus with diabetic neuropathy, with long-term current use of insulin ([facility])      insulin glargine (LANTUS) 100 unit/mL injection Inject 20 Units at bedtime. Monitor and record blood sugar daily. Qty: 10 mL, Refills: 1      aspirin 81 mg chewable tablet Take 1 Tablet by mouth daily. Qty: 30 Tablet, Refills: 2      metoprolol tartrate (LOPRESSOR) 100 mg IR tablet Take 1 Tablet by mouth every twelve (12) hours. Qty: 120 Tablet, Refills: 2      dilTIAZem ER (CARDIZEM CD) 180 mg capsule Take 1 Capsule by mouth daily. Qty: 120 Capsule, Refills: 3      BD Insulin Syringe SafetyGlide 0.5 mL 30 gauge x 5/16\" syrg       levothyroxine (SYNTHROID) 150 mcg tablet Take 150 mcg by mouth daily. morphine IR (MS IR) 15 mg tablet Take 15 mg by mouth two (2) times a day. allopurinoL (ZYLOPRIM) 100 mg tablet Take 100 mg by mouth daily. atorvastatin (LIPITOR) 80 mg tablet Take 1 Tablet by mouth daily. Qty: 90 Tablet, Refills: 3    Associated Diagnoses: Hypercholesteremia      Blood-Glucose Meter (FREESTYLE LITE METER) monitoring kit Test twice blood glucose daily; ICD-10 E11.9; Quantity 1  Qty: 1 Kit, Refills: 0    Associated Diagnoses: Type 2 diabetes mellitus with nephropathy ([facility])      insulin syringe,safetyneedle 1 mL 31 gauge x 5/16\" syrg 1 Each by Does Not Apply route daily. Qty: 300 Each, Refills: 3    Comments: Dx e11.9      glucose blood VI test strips (FREESTYLE TEST) strip Use to check blood glucose twice daily DX:E11.9  Qty: 300 Strip, Refills: 3      metFORMIN (GLUCOPHAGE) 500 mg tablet Take 500 mg by mouth daily. acetaminophen (TYLENOL) 500 mg tablet Take 1 Tab by mouth every six (6) hours as needed for Pain.   Qty: 270 Tab, Refills: 3    Associated Diagnoses: Controlled type 2 diabetes mellitus without complication, with long-term current use of insulin ([facility])             Current Facility-Administered Medications   Medication Dose Route Frequency    dilTIAZem (CARDIZEM) 100 mg in 0.9% sodium chloride (MBP/ADV) 100 mL infusion  5 mg/hr IntraVENous CONTINUOUS    [Held by provider] furosemide (LASIX) injection 40 mg  40 mg IntraVENous Q12H    warfarin - pharmacy to dose   Other Rx Dosing/Monitoring    levothyroxine (SYNTHROID) injection 112 mcg  112 mcg IntraVENous Q24H    metoprolol tartrate (LOPRESSOR) tablet 50 mg  50 mg Oral Q6H    Or    metoprolol (LOPRESSOR) injection 5 mg  5 mg IntraVENous Q6H    heparin (porcine) 25,000 units in 0.45% saline 250 ml infusion  18-36 Units/kg/hr IntraVENous TITRATE    cefTRIAXone (ROCEPHIN) 2 g in sterile water (preservative free) 20 mL IV syringe  2 g IntraVENous Q24H    metroNIDAZOLE (FLAGYL) IVPB premix 500 mg  500 mg IntraVENous Q12H    insulin glargine (LANTUS) injection 20 Units  20 Units SubCUTAneous QHS    [Held by provider] heparin (porcine) injection 5,000 Units  5,000 Units SubCUTAneous Q8H    [Held by provider] acetaminophen (TYLENOL) tablet 650 mg  650 mg Oral Q6H    [Held by provider] amiodarone (CORDARONE) tablet 200 mg  200 mg Oral DAILY    [Held by provider] enoxaparin (LOVENOX) injection 100 mg  1 mg/kg SubCUTAneous DAILY    triamcinolone (ARISTOCORT) 0.5 % cream   Topical BID    ipratropium-albuterol (COMBIVENT RESPIMAT) 20 mcg-100 mcg inhalation spray  1 Puff Inhalation Q4H PRN    camphor-menthoL (SARNA) 0.5-0.5 % lotion   Topical TID    glucose chewable tablet 16 g  16 g Oral PRN    glucagon (GLUCAGEN) injection 1 mg  1 mg IntraMUSCular PRN    dextrose 10% infusion 0-250 mL  0-250 mL IntraVENous PRN    insulin lispro (HUMALOG) injection   SubCUTAneous Q6H    famotidine (PF) (PEPCID) 20 mg in 0.9% sodium chloride 10 mL injection  20 mg IntraVENous DAILY    aspirin chewable tablet 81 mg  81 mg Oral DAILY    [Held by provider] atorvastatin (LIPITOR) tablet 80 mg  80 mg Oral DAILY    [Held by provider] levothyroxine (SYNTHROID) tablet 150 mcg  150 mcg Oral DAILY    [Held by provider] gabapentin (NEURONTIN) capsule 400 mg  400 mg Oral DAILY    [Held by provider] allopurinoL (ZYLOPRIM) tablet 100 mg  100 mg Oral DAILY    sodium chloride (NS) flush 5-40 mL  5-40 mL IntraVENous Q8H    acetaminophen (TYLENOL) tablet 650 mg  650 mg Oral Q6H PRN    Or    acetaminophen (TYLENOL) suppository 650 mg  650 mg Rectal Q6H PRN    polyethylene glycol (MIRALAX) packet 17 g  17 g Oral DAILY PRN    ondansetron (ZOFRAN ODT) tablet 4 mg  4 mg Oral Q8H PRN    Or    ondansetron (ZOFRAN) injection 4 mg  4 mg IntraVENous Q6H PRN       Allergies: Patient has no known allergies.     Family History   Problem Relation Age of Onset    Heart Attack Mother     Heart Attack Father      Social History     Socioeconomic History    Marital status:      Spouse name: Not on file    Number of children: Not on file    Years of education: Not on file    Highest education level: Not on file   Occupational History    Not on file   Tobacco Use    Smoking status: Former     Types: Cigarettes     Quit date: 12     Years since quittin.1    Smokeless tobacco: Never    Tobacco comments:     quit 45 years ago   Vaping Use    Vaping Use: Never used   Substance and Sexual Activity    Alcohol use: No     Alcohol/week: 0.0 standard drinks    Drug use: No    Sexual activity: Not Currently   Other Topics Concern    Not on file   Social History Narrative    Not on file     Social Determinants of Health     Financial Resource Strain: Not on file   Food Insecurity: Not on file   Transportation Needs: Not on file   Physical Activity: Not on file   Stress: Not on file   Social Connections: Not on file   Intimate Partner Violence: Not on file   Housing Stability: Not on file     Social History     Tobacco Use   Smoking Status Former    Types: Cigarettes    Quit date:     Years since quittin.1   Smokeless Tobacco Never   Tobacco Comments    quit 45 years ago        Temp (24hrs), Av.8 °F (36.6 °C), Min:97.2 °F (36.2 °C), Max:98.2 °F (36.8 °C)    Visit Vitals  BP (!) 154/101   Pulse (!) 122   Temp 97.9 °F (36.6 °C)   Resp 28   Ht 5' 4\" (1.626 m) Comment: Photo ID   Wt 108.9 kg (240 lb 1.3 oz)   SpO2 93%   Breastfeeding No   BMI 41.21 kg/m²       ROS: unable to obtain due to patient factors    Physical Exam:    General:  Sitting on bed. High flow oxygen on face. Not very communicative   Head:  Normocephalic, without obvious abnormality, atraumatic. Eyes:  Conjunctivae/corneas clear. Nose: Nares normal. Septum midline. Mucosa normal. No drainage. Throat: Lips, mucosa, and tongue normal.    Neck: Trachea midline, no adenopathy,  no JVD. Lungs:   Lateral chest movements equal, no audible wheezing   Chest wall:  No deformity. Heart:  Irregularly irregular, rate controlled   Abdomen:   Soft, non-tender. Bowel sounds normal.    Extremities: Extremities normal, atraumatic, no cyanosis, mild edema bilateral lower extremities. Darkish erythema right LE, scattered pinpoint erythematous rash on extremities   Pulses: 2+ and symmetric all extremities.    Skin: Scattered excoriations in bilateral upper and lower extremities   Lymph nodes:  Cervical and supraclavicular nodes normal   Neurologic: No gross motor or sensory deficits noted       Labs: Results:   Chemistry Recent Labs     01/26/23  0130 01/25/23  0055 01/24/23  0909   * 247* 163*    142 142   K 4.1 4.3 4.3    109 108   CO2 26 21 23   BUN 94* 88* 84*   CREA 2.36* 2.60* 2.65*   CA 9.4 9.3 9.0   AGAP 8 12 11   BUCR 40* 34* 32*   AP  --  69 58   TP  --  7.9 7.6   ALB  --  2.8* 2.7*   GLOB  --  5.1* 4.9*   AGRAT  --  0.5* 0.6*        CBC w/Diff Recent Labs     01/26/23  0130 01/25/23  1850 01/25/23  0055   WBC 13.3* 13.8* 11.4   RBC 4.08* 4.18* 3.96*   HGB 10.2* 10.6* 10.1*   HCT 33.9* 34.9* 33.0*    232 225   GRANS 78* 80* 87*   LYMPH 8* 7* 5*   EOS 0 0 0        Microbiology No results for input(s): CULT in the last 72 hours. RADIOLOGY:    All available imaging studies/reports in [facility] for this admission were reviewed    Total time spent >35 minutes. High complexity decision making was performed during the evaluation of this patient at high risk for decompensation with multiple organ involvement     Above mentioned total time spent on reviewing the case/medical record/data/notes/EMR/patient examination/documentation/coordinating care with nurse/consultants, exclusive of procedures with complex decision making performed and > 50% time spent in face to face evaluation. Disclaimer: Sections of this note are dictated utilizing voice recognition software, which may have resulted in some phonetic based errors in grammar and contents. Even though attempts were made to correct all the mistakes, some may have been missed, and remained in the body of the document. If questions arise, please contact our department.     Dr. Corita Gitelman, Infectious Disease Specialist  [phone]  January 26, 2023  7:33 AM

## 2023-01-26 NOTE — PROGRESS NOTES
01/26/23 1408   Oxygen Therapy   O2 Sat (%) 93 %   Pulse via Oximetry 117 beats per minute   O2 Device Heated; Hi flow nasal cannula   O2 Flow Rate (L/min) 35 l/min   O2 Temperature 91.4 °F (33 °C)   FIO2 (%) 70 %     Fio2 titrated to 70%.

## 2023-01-26 NOTE — PROGRESS NOTES
[facility]   BRIEF PROGRESS NOTE    Assessment & Plan:    Acute hypoxic respiratory failure/COVID19 Infection/HFrEF (20-25%)  -Continue HFNC. Currently on 20L at 80% FiO2. Wean as tolerated. -Patient not tolerating Bipap due to her constantly removing it.   -Cardio, pulm, nephro, ID on board. Tachycardia/Afib w/ RVR  -Diltiazem was ordered. Dosing was assisted by pharmacy. -Diltiazem 27mg one time dose followed by Diltiazem 5mg/hr.  -Continue Lopressor 5mg IV q6h. -ASA-81 restarted per pulm note. -Troponin ordered and pending. See daily progress note for full assessment/plan. Subjective:  ~2200: Spoke with RN and she reports that she has been stable since the start of night shift. She is still having tachycardia in the 130s-140s. She is sating in the mid to high 90s when on HFNC. Her HFNC setting is 20L at 80% FiO2. On exam, she is opening her eyes to verbal stimuli, but not verbally responding and not following commands. Due to the tachycardia, Diltiazem was initiated.     ~0200: Patients mentation remains about the same, but she did say a few unrecognizable words. She is still not following commands. After initiating the diltiazem, her HR decreased to the high 90s to low 100s. Her BP also improved and DBP is now <100. Her SpO2 remains in the mid to high 90s on 20L HFNC at 80% FiO2. Objective:  Visit Vitals  BP (!) 128/95 (BP 1 Location: Left lower arm, BP Patient Position: Supine)   Pulse (!) 140   Temp 97.2 °F (36.2 °C)   Resp 27   Ht 5' 4\" (1.626 m)   Wt 108.9 kg (240 lb 1.3 oz)   SpO2 95%   Breastfeeding No   BMI 41.21 kg/m²       Physical Exam:   General: NAD, comfortable, confused, obese  HEENT: EOMI   CV:  RRR, no M/G/R.  RESP: Unlabored breathing. no wheezes or crackles, but diffuse rhonchi appreciated. Equal expansion bilaterally. ABD:  Soft, nontender, nondistended. No hepatosplenomegaly. MS:  No joint deformity or instability. No atrophy.   Neuro: Non-focal, Opening eyes to verbal stimuli, but only minimally verbally responding. Not following commands. Ext:  No edema. 2+ radial and dp pulses bilaterally. Skin: Warm & dry. No rashes, lesions, or ulcers. Good turgor. The above patient and plan were discussed with my supervising physician. See daily progress note for full assessment/plan.       Kirit Khan DO, PGY-1  [facility] Medicine  1/25/2023, 11:28 PM

## 2023-01-26 NOTE — ROUTINE PROCESS
2 attempts for cruz insertion unsuccessful; 2nd attempt made with MD at bedside; patient anatomy - unable to advance catheter. Attempt to obtain straight cath for urinalysis - unsuccessful.

## 2023-01-26 NOTE — PROGRESS NOTES
[facility]  DAILY PROGRESS NOTE      Patient:    Nilsa Marcum , 82 y.o. female   MRN:  [id number]  Room/Bed:  368/01  Admission Date:   1/17/2023  Code status:  DNR    Reason for Admission: Acute hypoxic respiratory failure, COVID-19 infection, A-fib with RVR, NSTEMI    ASSESSMENT AND PLAN:   Problem List Items Addressed This Visit          Circulatory    Atrial fibrillation ([address]       Respiratory    Acute respiratory failure with hypoxia (HCC)       Other    COVID     Other Visit Diagnoses       Acute on chronic heart failure with reduced ejection fraction and diastolic dysfunction (HCC)    -  Primary    NSTEMI (non-ST elevated myocardial infarction) ([facility] [address]        Goals of care, counseling/discussion [X31.88 (ICD-10-CM)]        Age-related physical debility Jared (ICD-10-CM)]        Tachycardia        Encephalopathy                Following ICU management in preparation for transition of care     Acute hypoxic respiratory failure/COVID19 Infection/HFrEF (20-25%)  -Presented with SOB and HR in 200's. Acute SOB most likely multifactorial given pt's comorbidities with recent COVID infection that has exacerbated the SOB  -Tested positive for COVID-19.  -Started on BIPAP in the ED and transitioned to NC, poorly tolerated and transitioned back to BIPAP, continued to be in respiratory distress and required intubation and admission to the ICU  -CXR (1/18)- progressive cardiogenic pulmonary edema and pleural effusions   -Leukocytosis: 16.7 on 1/19>23>20.4>25.4>16.3> today 11.4  -New oxygen requirement overnight, RT placed on 30L HFNC at 100% O2  -Renal US showing BL Pleural effusions  Plan:  -Bumex 1 mg daily   -ID consulted  -atarax PRN  -follow sputum culture   -Evaluate mental status, avoid sedating meds.   -Per ID rec, Ceftriaxone and metronidazole.     -Doppler scans pending  -Currently on 35L HFNC at 80% O2 satting well       Afib w/ RVR, HTN, NSTEMI  -On admission, pt tachycardic in the 200's  -EKG: Atrial fibrillation with rapid ventricular response   -Started on Cardizem drip in the ED due to HR in 200's, but decreased BP too fast so dc'd and started Amnio gtt  -OP meds: Eliquis 5mg BID for anticoagulation, Diltiazem and Lopressor for rate control and HTN  Plan:  -amiodarone stopped as not tolerating oral   -Metoprolol 100 mg BID OR 5mg IV  -aspirin 81 mg  -MAP >65 mmHg  -Cardiology consulted  -Lovenox  -Added dilt overnight    AQUILES possible ATN  -Cr trending down today  -US: No hydronephrosis, bladder volume 679.4mL, Bilateral pleural effusions  -UA: Large blood, moderate LE, 3+ Bact, 1+ yeast  Plan:   -Nephrology consulted  -Daily BMP  -Improving despite restarting diuretics. -Urine output improved  -Urine culture pending     DM with neuorpathy  -most recent A1c 11.6 in Jan 4 2023  -home meds: insulin glargine 20 U qhs, Metformin 1000 mg BID, Gabapentin 800 QHS   Plan:   -Q6h glucoses  -SSI   -hypoglycemia protocol  -gabapentin 400 mg daily - Hold for sedation     Hypothyroidism - stable  -meds: levo 150 mcg  -pt nonadherent with medications at home   Plan:   -levothyroxine 150 mcg - switched to IV     Chronic pain, Gout, stable  -home meds: previously on morphine 15mg q12 PRN  -Recently stated that she stopped taking morphine due to no longer going to pain management provider   Plan:   -continue allopurinol 100mg daily for gout ppx       Global:  Code: DNR  IVF/Drips: Amiodarone, fentanyl, heparin  I/O / Wt: 99.8kg  Diet: Soft  DVT/AC: heparin gtt  Mobility: sedated/restrained   Anticipated LOS: 3-4 midnights     Point of Contact (relationship, number): Suzanne Sees [phone]         SUBJECTIVE:   Events of the last 24 hours:  No acute events overnight. Seen at bedside. States that she feels good this morning. High flow nasal canula in place.          CURRENT INPATIENT MEDICATIONS:  Current Facility-Administered Medications   Medication Dose Route Frequency Provider Last Rate Last Admin    dilTIAZem (CARDIZEM) 100 mg in 0.9% sodium chloride (MBP/ADV) 100 mL infusion  5 mg/hr IntraVENous CONTINUOUS Herrera Nash G DO 5 mL/hr at 01/26/23 0105 5 mg/hr at 01/26/23 0105    [Held by provider] furosemide (LASIX) injection 40 mg  40 mg IntraVENous Q12H Shantanu Mahan MD   40 mg at 01/25/23 1132    warfarin - pharmacy to dose   Other Rx Dosing/Monitoring Paola Hill MD        levothyroxine (SYNTHROID) injection 112 mcg  112 mcg IntraVENous Q24H Shantanu Mahan MD   112 mcg at 01/25/23 1659    metoprolol tartrate (LOPRESSOR) tablet 50 mg  50 mg Oral Q6H Savanna Hugo PA-C        Or    metoprolol (LOPRESSOR) injection 5 mg  5 mg IntraVENous Q6H Savanna Hugo PA-C   5 mg at 01/26/23 0355    heparin (porcine) 25,000 units in 0.45% saline 250 ml infusion  18-36 Units/kg/hr IntraVENous TITRATE Shantanu Mahan MD 16.3 mL/hr at 01/26/23 0723 15 Units/kg/hr at 01/26/23 0723    cefTRIAXone (ROCEPHIN) 2 g in sterile water (preservative free) 20 mL IV syringe  2 g IntraVENous Q24H Lilian OREILLY MD   2 g at 01/25/23 1118    metroNIDAZOLE (FLAGYL) IVPB premix 500 mg  500 mg IntraVENous Q12H Lilian OREILLY  mL/hr at 01/25/23 2100 500 mg at 01/25/23 2100    insulin glargine (LANTUS) injection 20 Units  20 Units SubCUTAneous QHS Shantanu Mahan MD   20 Units at 01/25/23 2101    [Held by provider] heparin (porcine) injection 5,000 Units  5,000 Units SubCUTAneous Q8H Shantanu Mahan MD   5,000 Units at 01/25/23 1117    [Held by provider] acetaminophen (TYLENOL) tablet 650 mg  650 mg Oral Q6H Shantanu Mahan MD   650 mg at 01/25/23 1118    [Held by provider] amiodarone (CORDARONE) tablet 200 mg  200 mg Oral DAILY Tri Hagan PA-C   200 mg at 01/23/23 1050    [Held by provider] enoxaparin (LOVENOX) injection 100 mg  1 mg/kg SubCUTAneous DAILY Alea Cosme MD   100 mg at 01/23/23 1049    triamcinolone (ARISTOCORT) 0.5 % cream   Topical BID Shantanu Mahan MD   Given at 01/25/23 1817    ipratropium-albuterol (COMBIVENT RESPIMAT) 20 mcg-100 mcg inhalation spray  1 Puff Inhalation Q4H PRN Jayme Bradley MD   1 Puff at 01/23/23 1102    camphor-menthoL (SARNA) 0.5-0.5 % lotion   Topical TID Jono Singh NP   Given at 01/25/23 2103    glucose chewable tablet 16 g  16 g Oral PRN Julee Coello MD        glucagon [facility]) injection 1 mg  1 mg IntraMUSCular PRN Julee Coello MD        dextrose 10% infusion 0-250 mL  0-250 mL IntraVENous PRN Julee Coello MD        insulin lispro (HUMALOG) injection   SubCUTAneous Q6H Eva Grant PA-C   2 Units at 01/25/23 0604    famotidine (PF) (PEPCID) 20 mg in 0.9% sodium chloride 10 mL injection  20 mg IntraVENous DAILY Eva Grant PA-C   20 mg at 01/25/23 1116    aspirin chewable tablet 81 mg  81 mg Oral DAILY Julee Coello MD   81 mg at 01/24/23 1518    [Held by provider] atorvastatin (LIPITOR) tablet 80 mg  80 mg Oral DAILY Julee Coello MD   80 mg at 01/23/23 1050    [Held by provider] levothyroxine (SYNTHROID) tablet 150 mcg  150 mcg Oral DAILY Julee Coello MD   150 mcg at 01/24/23 1456    [Held by provider] gabapentin (NEURONTIN) capsule 400 mg  400 mg Oral DAILY Julee Coello MD   400 mg at 01/23/23 1050    [Held by provider] allopurinoL (ZYLOPRIM) tablet 100 mg  100 mg Oral DAILY Julee Coello MD   100 mg at 01/23/23 1050    sodium chloride (NS) flush 5-40 mL  5-40 mL IntraVENous Q8H Julee Coello MD   10 mL at 01/26/23 0500    acetaminophen (TYLENOL) tablet 650 mg  650 mg Oral Q6H PRN Julee Coello MD        Or    acetaminophen (TYLENOL) suppository 650 mg  650 mg Rectal Q6H PRN Julee Coello MD        polyethylene glycol (MIRALAX) packet 17 g  17 g Oral DAILY PRN Julee Coello MD        ondansetron (ZOFRAN ODT) tablet 4 mg  4 mg Oral Q8H PRN Julee Coello MD        Or    ondansetron [facility]) injection 4 mg  4 mg IntraVENous Q6H PRN Julee Coello MD   4 mg at 01/24/23 0741       Allergies  No Known Allergies    OBJECTIVE:    Intake/Output Summary (Last 24 hours) at 1/26/2023 0747  Last data filed at 1/26/2023 0400  Gross per 24 hour   Intake --   Output 2700 ml   Net -2700 ml         Visit Vitals  BP (!) 154/101   Pulse (!) 122   Temp 97.9 °F (36.6 °C)   Resp 28   Ht 5' 4\" (1.626 m) Comment: Photo ID   Wt 108.9 kg (240 lb 1.3 oz)   SpO2 93%   Breastfeeding No   BMI 41.21 kg/m²       PHYSICAL EXAM  Gen: NAD, comfortable, obese, on HFNC. HEENT: normocephalic, atraumatic, MMM  CV: irregularly irregular, S1/S2 present without M/R/G,   Pulm: Decreased breath sounds at bases. Diffuse ronchi. No wheezing  Abd: S/NT/ND, no rebound, no guarding  MSK: no clubbing, no edema  Skin: warm, dry, excoriations on all parts of body in multiple stages of healing, interspersed ecchymoses   Neuro: CN II-XII grossly intact, no focal deficits appreciated   Psych: Difficult to assess due to hearing deficit. Awake, alert, says she feels good, but not oriented.      LABWORK (LAST 24 HOURS)  Recent Results (from the past 24 hour(s))   GLUCOSE, POC    Collection Time: 01/25/23 12:05 PM   Result Value Ref Range    Glucose (POC) 121 (H) 70 - 110 mg/dL   EKG, 12 LEAD, SUBSEQUENT    Collection Time: 01/25/23 12:28 PM   Result Value Ref Range    Ventricular Rate 132 BPM    Atrial Rate 150 BPM    QRS Duration 94 ms    Q-T Interval 318 ms    QTC Calculation (Bezet) 471 ms    Calculated R Axis 11 degrees    Calculated T Axis -152 degrees    Diagnosis       Atrial fibrillation with rapid ventricular response  T wave abnormality, consider inferolateral ischemia or digitalis effect  Abnormal ECG  When compared with ECG of 18-JAN-2023 00:56,  T wave inversion more evident in Lateral leads     PTT    Collection Time: 01/25/23  2:55 PM   Result Value Ref Range    aPTT >180.0 (HH) 23.0 - 36.4 SEC   BLOOD GAS,LACTIC ACID, POC    Collection Time: 01/25/23  3:55 PM   Result Value Ref Range    Device: BIPAP MASK      FIO2 (POC) 50 %    pH (POC) 7.47 (H) 7.35 - 7.45      pCO2 (POC) 31.7 (L) 35.0 - 45.0 MMHG    pO2 (POC) 61 (L) 80 - 100 MMHG    HCO3 (POC) 22.8 22 - 26 MMOL/L    sO2 (POC) 92.5 92 - 97 %    Base deficit (POC) 0.2 mmol/L    Mode BIVENT      PEEP/CPAP (POC) 8 cmH2O    PIP (POC) 18      Pressure support 18 cmH2O    Total resp. rate 28      Site RIGHT BRACHIAL      Patient temp. 99      Specimen type (POC) ARTERIAL      Performed by Kenny Carmona     Lactic Acid (POC) 2.17 (HH) 0.40 - 2.00 mmol/L   GLUCOSE, POC    Collection Time: 01/25/23  5:33 PM   Result Value Ref Range    Glucose (POC) 107 70 - 110 mg/dL   CBC WITH AUTOMATED DIFF    Collection Time: 01/25/23  6:50 PM   Result Value Ref Range    WBC 13.8 (H) 4.6 - 13.2 K/uL    RBC 4.18 (L) 4.20 - 5.30 M/uL    HGB 10.6 (L) 12.0 - 16.0 g/dL    HCT 34.9 (L) 35.0 - 45.0 %    MCV 83.5 78.0 - 100.0 FL    MCH 25.4 24.0 - 34.0 PG    MCHC 30.4 (L) 31.0 - 37.0 g/dL    RDW 17.8 (H) 11.6 - 14.5 %    PLATELET 944 921 - 729 K/uL    MPV 13.8 (H) 9.2 - 11.8 FL    NRBC 6.2 (H) 0  WBC    ABSOLUTE NRBC 0.85 (H) 0.00 - 0.01 K/uL    NEUTROPHILS 80 (H) 40 - 73 %    LYMPHOCYTES 7 (L) 21 - 52 %    MONOCYTES 9 3 - 10 %    EOSINOPHILS 0 0 - 5 %    BASOPHILS 0 0 - 2 %    IMMATURE GRANULOCYTES 4 (H) 0.0 - 0.5 %    ABS. NEUTROPHILS 11.0 (H) 1.8 - 8.0 K/UL    ABS. LYMPHOCYTES 1.0 0.9 - 3.6 K/UL    ABS. MONOCYTES 1.2 0.05 - 1.2 K/UL    ABS. EOSINOPHILS 0.0 0.0 - 0.4 K/UL    ABS. BASOPHILS 0.1 0.0 - 0.1 K/UL    ABS. IMM.  GRANS. 0.5 (H) 0.00 - 0.04 K/UL    DF AUTOMATED     PTT    Collection Time: 01/25/23  6:50 PM   Result Value Ref Range    aPTT 94.1 (H) 23.0 - 36.4 SEC   PROTHROMBIN TIME + INR    Collection Time: 01/25/23  6:50 PM   Result Value Ref Range    Prothrombin time 20.0 (H) 11.5 - 15.2 sec    INR 1.7 (H) 0.8 - 1.2     GLUCOSE, POC    Collection Time: 01/26/23 12:49 AM   Result Value Ref Range    Glucose (POC) 101 70 - 110 mg/dL   MAGNESIUM    Collection Time: 01/26/23  1:30 AM   Result Value Ref Range    Magnesium 2.2 1.6 - 2.6 mg/dL   PHOSPHORUS    Collection Time: 01/26/23  1:30 AM   Result Value Ref Range    Phosphorus 5.0 (H) 2.5 - 4.9 MG/DL   METABOLIC PANEL, BASIC    Collection Time: 01/26/23  1:30 AM   Result Value Ref Range    Sodium 145 136 - 145 mmol/L    Potassium 4.1 3.5 - 5.5 mmol/L    Chloride 111 100 - 111 mmol/L    CO2 26 21 - 32 mmol/L    Anion gap 8 3.0 - 18 mmol/L    Glucose 102 (H) 74 - 99 mg/dL    BUN 94 (H) 7.0 - 18 MG/DL    Creatinine 2.36 (H) 0.6 - 1.3 MG/DL    BUN/Creatinine ratio 40 (H) 12 - 20      eGFR 20 (L) >60 ml/min/1.73m2    Calcium 9.4 8.5 - 10.1 MG/DL   PROTHROMBIN TIME + INR    Collection Time: 01/26/23  1:30 AM   Result Value Ref Range    Prothrombin time 20.2 (H) 11.5 - 15.2 sec    INR 1.7 (H) 0.8 - 1.2     PTT    Collection Time: 01/26/23  1:30 AM   Result Value Ref Range    aPTT >180.0 (HH) 23.0 - 36.4 SEC   CBC WITH AUTOMATED DIFF    Collection Time: 01/26/23  1:30 AM   Result Value Ref Range    WBC 13.3 (H) 4.6 - 13.2 K/uL    RBC 4.08 (L) 4.20 - 5.30 M/uL    HGB 10.2 (L) 12.0 - 16.0 g/dL    HCT 33.9 (L) 35.0 - 45.0 %    MCV 83.1 78.0 - 100.0 FL    MCH 25.0 24.0 - 34.0 PG    MCHC 30.1 (L) 31.0 - 37.0 g/dL    RDW 18.0 (H) 11.6 - 14.5 %    PLATELET 603 696 - 272 K/uL    MPV 14.2 (H) 9.2 - 11.8 FL    NRBC 4.9 (H) 0  WBC    ABSOLUTE NRBC 0.65 (H) 0.00 - 0.01 K/uL    NEUTROPHILS 78 (H) 40 - 73 %    LYMPHOCYTES 8 (L) 21 - 52 %    MONOCYTES 9 3 - 10 %    EOSINOPHILS 0 0 - 5 %    BASOPHILS 0 0 - 2 %    IMMATURE GRANULOCYTES 5 (H) 0.0 - 0.5 %    ABS. NEUTROPHILS 10.4 (H) 1.8 - 8.0 K/UL    ABS. LYMPHOCYTES 1.0 0.9 - 3.6 K/UL    ABS. MONOCYTES 1.2 0.05 - 1.2 K/UL    ABS. EOSINOPHILS 0.0 0.0 - 0.4 K/UL    ABS. BASOPHILS 0.0 0.0 - 0.1 K/UL    ABS. IMM.  GRANS. 0.6 (H) 0.00 - 0.04 K/UL    DF AUTOMATED     TROPONIN-HIGH SENSITIVITY    Collection Time: 01/26/23  1:30 AM   Result Value Ref Range    Troponin-High Sensitivity 174 (HH) 0 - 54 ng/L   GLUCOSE, POC    Collection Time: 01/26/23  4:59 AM Result Value Ref Range    Glucose (POC) 96 70 - 110 mg/dL       IMAGING AND PROCEDURES (LAST 36 HOURS)  No results found.    ================================================================  Further management for MsGary Reilly will be discussed on rounds with my attending.       Desirae Quezada MD, PGY-1  [facility]  January 26, 2023 6:39 AM

## 2023-01-27 NOTE — PROGRESS NOTES
[facility] - Progress Note  Admit Date: 1/17/2023    Assessment:     -Acute hypoxic respiratory failure associated with COVID with underlying HFrEF and COPD, emergently intubated 1/18/2023 AM, extubated on 1/21/2023. -Afib RVR, known Hx afib diagnosed 10/2022  -Elevated troponin, peaked at 2343  -HFrEF, recent diagnosis 12/2022  Echo 12/21/2022 with LVEF 20-25% with severe hypokinesis of basal anteroseptal, basal inferoseptal and apical septal walls, moderately dilated RV with reduced systolic function, severe biatrial enlargement  Repeat Echo this admission with LVEF 20-25%, no significant change from prior  -Hx RLE DVT 06/2022  -HTN  -DM  -HLD  -Hx medication noncompliance  -DNR status      Primary cardiologist is Dr. Charu Schaffer:     Patient's respiratory status still tenuous. She has BiPAP at times. Atrial fibrillation rates still borderline controlled. Trial of milrinone started yesterday. We will continue for today. General prognosis remains very guarded. DNR status noted. Subjective:     Still requiring positive still requiring BiPAP.   Atrial fibrillation rate still borderline 100 120 bpm.    Objective:      Patient Vitals for the past 8 hrs:   Temp Pulse Resp BP SpO2   01/27/23 1324 98.8 °F (37.1 °C) (!) 116 22 105/70 92 %   01/27/23 1011 -- -- -- 120/69 --   01/27/23 0859 98 °F (36.7 °C) (!) 123 26 (!) 140/87 94 %         Patient Vitals for the past 96 hrs:   Weight   01/27/23 0859 106.8 kg (235 lb 7.2 oz)   01/24/23 0748 108.9 kg (240 lb 1.3 oz)                    Intake/Output Summary (Last 24 hours) at 1/27/2023 1525  Last data filed at 1/27/2023 1334  Gross per 24 hour   Intake 417 ml   Output 3100 ml   Net -2683 ml       Physical Exam:  General:  alert, cooperative, no distress, appears stated age  Neck:  nontender  Lungs:  clear to auscultation bilaterally  Heart: Tacky, irregular regular S1, S2 normal, no murmur, click, rub or gallop  Abdomen:  abdomen is soft without significant tenderness, masses, organomegaly or guarding  Extremities:  extremities normal, atraumatic, no cyanosis or edema    Data Review:     Labs: Results:       Chemistry Recent Labs     01/27/23  0304 01/26/23  0130 01/25/23  0055   * 102* 247*   * 145 142   K 3.8 4.1 4.3    111 109   CO2 28 26 21   BUN 90* 94* 88*   CREA 2.31* 2.36* 2.60*   CA 9.1 9.4 9.3   MG 1.9 2.2 2.4   PHOS 4.5 5.0* 5.5*   AGAP 8 8 12   BUCR 39* 40* 34*   AP  --   --  69   TP  --   --  7.9   ALB  --   --  2.8*   GLOB  --   --  5.1*   AGRAT  --   --  0.5*      CBC w/Diff Recent Labs     01/27/23  0304 01/26/23 0130 01/25/23  1850   WBC 16.0* 13.3* 13.8*   RBC 4.28 4.08* 4.18*   HGB 10.9* 10.2* 10.6*   HCT 36.7 33.9* 34.9*    195 232   GRANS 74* 78* 80*   LYMPH 6* 8* 7*   EOS 0 0 0      Cardiac Enzymes No results found for: CPK, CK, CKMMB, CKMB, RCK3, CKMBT, CKNDX, CKND1, GABRIEL, TROPT, TROIQ, KRUPA, TROPT, TNIPOC, BNP, BNPP   Coagulation Recent Labs     01/27/23  1214 01/27/23  0304 01/26/23  0923 01/26/23  0130 01/25/23  1850   PTP  --   --   --  20.2* 20.0*   INR  --   --   --  1.7* 1.7*   APTT 61.4* 141.1*   < > >180.0* 94.1*    < > = values in this interval not displayed.        Lipid Panel Lab Results   Component Value Date/Time    Cholesterol, total 191 11/09/2021 12:24 PM    HDL Cholesterol 44 11/09/2021 12:24 PM    LDL, calculated 96.8 11/09/2021 12:24 PM    VLDL, calculated 50.2 11/09/2021 12:24 PM    Triglyceride 251 (H) 11/09/2021 12:24 PM    CHOL/HDL Ratio 4.3 11/09/2021 12:24 PM      BNP No results found for: BNP, BNPP, XBNPT   Liver Enzymes Recent Labs     01/25/23  0055   TP 7.9   ALB 2.8*   AP 69      Digoxin    Thyroid Studies Lab Results   Component Value Date/Time    TSH 2.88 01/27/2023 03:04 AM          Signed By: Felice Garrett MD     January 27, 2023

## 2023-01-27 NOTE — PROGRESS NOTES
[facility]  DAILY PROGRESS NOTE      Patient:    Ana Choudhury , 82 y.o. female   MRN:  [id number]  Room/Bed:  368/01  Admission Date:   1/17/2023  Code status:  DNR    Reason for Admission: Acute hypoxic respiratory failure, COVID-19 infection, A-fib with RVR, NSTEMI    ASSESSMENT AND PLAN:   Problem List Items Addressed This Visit          Circulatory    Atrial fibrillation ([address]       Respiratory    Acute respiratory failure with hypoxia ([address]       Other    COVID     Other Visit Diagnoses       Acute on chronic heart failure with reduced ejection fraction and diastolic dysfunction (HCC)    -  Primary    NSTEMI (non-ST elevated myocardial infarction) ([address]        Goals of care, counseling/discussion [P07.62 (ICD-10-CM)]        Age-related physical debility Gustabo (ICD-10-CM)]        Tachycardia        Encephalopathy                    Acute hypoxic respiratory failure/COVID19 Infection/HFrEF (20-25%)  -Presented with SOB and HR in 200's. Acute SOB most likely multifactorial given pt's comorbidities with recent COVID infection that has exacerbated the SOB  -Tested positive for COVID-19.  -Started on BIPAP in the ED and transitioned to NC, poorly tolerated and transitioned back to BIPAP, continued to be in respiratory distress and required intubation and admission to the ICU  -CXR (1/18)- progressive cardiogenic pulmonary edema and pleural effusions   -Leukocytosis: 16.7 on 1/19>23>20.4>25.4>16.3> today 11.4  -New oxygen requirement overnight, RT placed on 30L HFNC at 100% O2  -Renal US showing BL Pleural effusions  -Midline placed to facilitate IV medication admin  -Per pulm, thyroid studies ordered showing that pt's T3 quite low at 0.7, w/ T4 and TSG WNL  -T3 low, likely related to illness  Plan:  -ID consulted - appreciate recommendations  -Pulmonology consulted - appreciate recs  -atarax PRN  -Evaluate mental status, avoid sedating meds. -Continue Ceftriaxone and metronidazole.     -Currently on 35L HFNC at 90% O2 satting well  -Palliative following      Afib w/ RVR, HTN, NSTEMI  -On admission, pt tachycardic in the 200's  -EKG: Atrial fibrillation with rapid ventricular response   -Started on Cardizem drip in the ED due to HR in 200's, but decreased BP too fast so dc'd and started Amnio gtt  -OP meds: Eliquis 5mg BID for anticoagulation, Diltiazem and Lopressor for rate control and HTN  Plan:  -amiodarone stopped as not tolerating oral   -Metoprolol 100 mg BID OR 5mg IV  -aspirin 81 mg  -MAP >65 mmHg  -Cardiology consulted - added milrinone ggt  -Continue Dilt ggt, Heparin ggt      AQUILES possible ATN  -Cr trending down today  -US: No hydronephrosis, bladder volume 679.4mL, Bilateral pleural effusions  -UA: Large blood, moderate LE, 3+ Bact, 1+ yeast  Plan:   -Nephrology consulted  -Daily BMP  -Improving despite restarting diuretics. -Urine output improved  -Urine culture pending     DM with neuorpathy  -most recent A1c 11.6 in Jan 4 2023  -home meds: insulin glargine 20 U qhs, Metformin 1000 mg BID, Gabapentin 800 QHS   Plan:   -Q6h glucoses  -SSI   -hypoglycemia protocol  -gabapentin 400 mg daily - Hold for sedation     Hypothyroidism - stable  -meds: levo 150 mcg  -pt nonadherent with medications at home   Plan:   -levothyroxine 150 mcg - switched to IV     Chronic pain, Gout, stable  -home meds: previously on morphine 15mg q12 PRN  -Recently stated that she stopped taking morphine due to no longer going to pain management provider   Plan:   -continue allopurinol 100mg daily for gout ppx       Global:  Code: DNR  IVF/Drips: Amiodarone, fentanyl, heparin  I/O / Wt: 99.8kg  Diet: Soft  DVT/AC: heparin gtt  Mobility: sedated/restrained   Anticipated LOS: 3-4 midnights     Point of Contact (relationship, number): Nataliekwame Sousa [phone]         SUBJECTIVE:   Events of the last 24 hours:  No acute events overnight. Seen at bedside. High flow nasal canula in place. She is alert and awake this morning. Greeted me as I entered the room. Per nursing, she was telling them she is excited that her boyfriend is coming to visit.           CURRENT INPATIENT MEDICATIONS:  Current Facility-Administered Medications   Medication Dose Route Frequency Provider Last Rate Last Admin    dilTIAZem (CARDIZEM) 100 mg in 0.9% sodium chloride (MBP/ADV) 100 mL infusion  10 mg/hr IntraVENous CONTINUOUS Vincent Andrea MD 10 mL/hr at 01/26/23 2241 10 mg/hr at 01/26/23 2241    metOLazone (ZAROXOLYN) tablet 2.5 mg  2.5 mg Oral BID Fallon Huynh MD   2.5 mg at 01/26/23 1720    milrinone (PRIMACOR) 20 MG/100 ML D5W infusion  0.2 mcg/kg/min IntraVENous CONTINUOUS Vincent Andrea MD 6.5 mL/hr at 01/26/23 2232 0.2 mcg/kg/min at 01/26/23 2232    levothyroxine (SYNTHROID) injection 112 mcg  112 mcg IntraVENous Q24H Luis Fernando Adler MD   112 mcg at 01/26/23 2227    metoprolol tartrate (LOPRESSOR) tablet 50 mg  50 mg Oral Q6H Savanna Hugo PA-C        Or    metoprolol (LOPRESSOR) injection 5 mg  5 mg IntraVENous Q6H Savanna Hugo PA-C   5 mg at 01/27/23 0217    heparin (porcine) 25,000 units in 0.45% saline 250 ml infusion  18-36 Units/kg/hr IntraVENous TITRATE Luis Fernando Adler MD 13.1 mL/hr at 01/26/23 2229 12 Units/kg/hr at 01/26/23 2229    cefTRIAXone (ROCEPHIN) 2 g in sterile water (preservative free) 20 mL IV syringe  2 g IntraVENous Q24H Lucero OREILLY MD   2 g at 01/26/23 1018    metroNIDAZOLE (FLAGYL) IVPB premix 500 mg  500 mg IntraVENous Q12H Lucero OREILLY  mL/hr at 01/26/23 2223 500 mg at 01/26/23 2223    insulin glargine (LANTUS) injection 20 Units  20 Units SubCUTAneous QHS Luis Fernando Adler MD   20 Units at 01/26/23 2232    [Held by provider] heparin (porcine) injection 5,000 Units  5,000 Units SubCUTAneous Q8H Luis Fernando Adler MD   5,000 Units at 01/25/23 1117    [Held by provider] acetaminophen (TYLENOL) tablet 650 mg  650 mg Oral Q6H Luis Fernando Adler MD   650 mg at 01/25/23 1118    [Held by provider] enoxaparin (LOVENOX) injection 100 mg  1 mg/kg SubCUTAneous DAILY Johnny Cosme MD   100 mg at 01/23/23 1049    triamcinolone (ARISTOCORT) 0.5 % cream   Topical BID Sydnee Callahan MD   Given at 01/26/23 1800    ipratropium-albuterol (COMBIVENT RESPIMAT) 20 mcg-100 mcg inhalation spray  1 Puff Inhalation Q4H PRN Deidra Valera MD   1 Puff at 01/23/23 1102    camphor-menthoL (SARNA) 0.5-0.5 % lotion   Topical TID Supriya Navarrete NP   Given at 01/26/23 2200    glucose chewable tablet 16 g  16 g Oral PRN Kenia Baeza MD        glucagon (GLUCAGEN) injection 1 mg  1 mg IntraMUSCular PRN Kenia Baeza MD        dextrose 10% infusion 0-250 mL  0-250 mL IntraVENous PRN Kenia Baeza MD        insulin lispro (HUMALOG) injection   SubCUTAneous Q6H Eva Grant PA-C   2 Units at 01/25/23 0604    famotidine (PF) (PEPCID) 20 mg in 0.9% sodium chloride 10 mL injection  20 mg IntraVENous DAILY Eva Gratn PA-C   20 mg at 01/26/23 1018    aspirin chewable tablet 81 mg  81 mg Oral DAILY Kenia Baeza MD   81 mg at 01/24/23 1518    [Held by provider] atorvastatin (LIPITOR) tablet 80 mg  80 mg Oral DAILY Kenia Baeza MD   80 mg at 01/23/23 1050    [Held by provider] levothyroxine (SYNTHROID) tablet 150 mcg  150 mcg Oral DAILY Kenia Baeza MD   150 mcg at 01/24/23 1456    [Held by provider] gabapentin (NEURONTIN) capsule 400 mg  400 mg Oral DAILY Kenia Baeza MD   400 mg at 01/23/23 1050    [Held by provider] allopurinoL (ZYLOPRIM) tablet 100 mg  100 mg Oral DAILY Kenia Baeza MD   100 mg at 01/23/23 1050    sodium chloride (NS) flush 5-40 mL  5-40 mL IntraVENous Q8H Kenia Baeza MD   10 mL at 01/27/23 0643    acetaminophen (TYLENOL) tablet 650 mg  650 mg Oral Q6H PRN Kenia Baeza MD        Or    acetaminophen (TYLENOL) suppository 650 mg  650 mg Rectal Q6H PRN Kenia Baeza MD        polyethylene glycol (MIRALAX) packet 17 g  17 g Oral DAILY PRN Kenia Baeza MD ondansetron (ZOFRAN ODT) tablet 4 mg  4 mg Oral Q8H PRN Liu West MD        Or    ondansetron [facility]) injection 4 mg  4 mg IntraVENous Q6H PRN Liu West MD   4 mg at 01/24/23 0741       Allergies  No Known Allergies    OBJECTIVE:    Intake/Output Summary (Last 24 hours) at 1/27/2023 0731  Last data filed at 1/26/2023 1900  Gross per 24 hour   Intake 120 ml   Output 1600 ml   Net -1480 ml         Visit Vitals  BP (!) 129/100   Pulse (!) 135   Temp 97.8 °F (36.6 °C)   Resp (!) 32   Ht 5' 4\" (1.626 m) Comment: Photo ID   Wt 108.9 kg (240 lb 1.3 oz)   SpO2 93%   Breastfeeding No   BMI 41.21 kg/m²       PHYSICAL EXAM  Gen: NAD, comfortable, obese, on HFNC. HEENT: normocephalic, atraumatic, MMM  CV: irregularly irregular, S1/S2 present without M/R/G   Pulm: Decreased breath sounds at bases. Diffuse ronchi. No wheezing  Abd: S/NT/ND, no rebound, no guarding  MSK: no clubbing, no edema  Skin: warm, dry, excoriations on all parts of body in multiple stages of healing, interspersed ecchymoses   Neuro: CN II-XII grossly intact, no focal deficits appreciated   Psych: Difficult to assess due to hearing deficit. Awake, alert, says she feels good.      LABWORK (LAST 24 HOURS)  Recent Results (from the past 24 hour(s))   PTT    Collection Time: 01/26/23  9:23 AM   Result Value Ref Range    aPTT >180.0 (HH) 23.0 - 36.4 SEC   GLUCOSE, POC    Collection Time: 01/26/23 11:48 AM   Result Value Ref Range    Glucose (POC) 127 (H) 70 - 110 mg/dL   PTT    Collection Time: 01/26/23  3:55 PM   Result Value Ref Range    aPTT 95.2 (H) 23.0 - 36.4 SEC   MAGNESIUM    Collection Time: 01/27/23  3:04 AM   Result Value Ref Range    Magnesium 1.9 1.6 - 2.6 mg/dL   PHOSPHORUS    Collection Time: 01/27/23  3:04 AM   Result Value Ref Range    Phosphorus 4.5 2.5 - 4.9 MG/DL   METABOLIC PANEL, BASIC    Collection Time: 01/27/23  3:04 AM   Result Value Ref Range    Sodium 147 (H) 136 - 145 mmol/L    Potassium 3.8 3.5 - 5.5 mmol/L Chloride 111 100 - 111 mmol/L    CO2 28 21 - 32 mmol/L    Anion gap 8 3.0 - 18 mmol/L    Glucose 223 (H) 74 - 99 mg/dL    BUN 90 (H) 7.0 - 18 MG/DL    Creatinine 2.31 (H) 0.6 - 1.3 MG/DL    BUN/Creatinine ratio 39 (H) 12 - 20      eGFR 20 (L) >60 ml/min/1.73m2    Calcium 9.1 8.5 - 10.1 MG/DL   PTT    Collection Time: 01/27/23  3:04 AM   Result Value Ref Range    aPTT 141.1 (H) 23.0 - 36.4 SEC   CBC WITH AUTOMATED DIFF    Collection Time: 01/27/23  3:04 AM   Result Value Ref Range    WBC 16.0 (H) 4.6 - 13.2 K/uL    RBC 4.28 4.20 - 5.30 M/uL    HGB 10.9 (L) 12.0 - 16.0 g/dL    HCT 36.7 35.0 - 45.0 %    MCV 85.7 78.0 - 100.0 FL    MCH 25.5 24.0 - 34.0 PG    MCHC 29.7 (L) 31.0 - 37.0 g/dL    RDW 18.6 (H) 11.6 - 14.5 %    PLATELET 461 996 - 175 K/uL    NRBC 2.6 (H) 0  WBC    ABSOLUTE NRBC 0.42 (H) 0.00 - 0.01 K/uL    NEUTROPHILS 74 (H) 40 - 73 %    LYMPHOCYTES 6 (L) 21 - 52 %    MONOCYTES 14 (H) 3 - 10 %    EOSINOPHILS 0 0 - 5 %    BASOPHILS 1 0 - 2 %    IMMATURE GRANULOCYTES 5 (H) 0.0 - 0.5 %    ABS. NEUTROPHILS 11.9 (H) 1.8 - 8.0 K/UL    ABS. LYMPHOCYTES 0.9 0.9 - 3.6 K/UL    ABS. MONOCYTES 2.3 (H) 0.05 - 1.2 K/UL    ABS. EOSINOPHILS 0.0 0.0 - 0.4 K/UL    ABS. BASOPHILS 0.1 0.0 - 0.1 K/UL    ABS. IMM. GRANS. 0.8 (H) 0.00 - 0.04 K/UL    DF AUTOMATED     TSH 3RD GENERATION    Collection Time: 01/27/23  3:04 AM   Result Value Ref Range    TSH 2.88 0.36 - 3.74 uIU/mL   T3, FREE    Collection Time: 01/27/23  3:04 AM   Result Value Ref Range    Triiodothyronine (T3), free 0.7 (L) 2.18 - 3.98 PG/ML   T4, FREE    Collection Time: 01/27/23  3:04 AM   Result Value Ref Range    T4, Free 1.4 0.7 - 1.5 NG/DL       IMAGING AND PROCEDURES (LAST 36 HOURS)  No results found.    ================================================================  Further management for Ms. Yun Tejeda will be discussed on rounds with my attending.       Byron Jacobs MD, PGY-1  [facility]  January 27, 2023 6:39 AM

## 2023-01-27 NOTE — PROGRESS NOTES
[facility]: [phone]  [facility]: [phone]     Patient Name: Jimmy Arango  YOB: 1935    Date of consult : 1/19/23  Date of follow-up visit:  1/23/2023; 1/24/2023, 1/27/2023   Reason for Consult: establish goals of care  Requesting Provider: Nilo Osorio MD    Primary Care Physician: Johnny Terry MD      SUMMARY:   Jimmy Arango is a 82 y.o. female with a past history of DM 2, diastolic congestive heart failure, CHF, HTN, obesity, atrial fibrillation with rapid ventricular response who was admitted on 1/17/2023 from home with a diagnosis of acute respiratory failure, COVID-19. Current medical issues leading to Palliative Medicine involvement include: Pt with a long term life limiting chronic disease process that warrants discussions about her goals of care. CHIEF COMPLAINT: Respiratory failure on ventilator and intubated    HPI/SUBJECTIVE:    Patient is a 82-year-old  female that lives with her boyfriend. She came in through the emergency department with complaints of shortness of breath lasting greater than 1 day. She arrived on CPAP by EMS with O2 saturations in the 80s percentile. She also had tachycardia with pulse of 181 bpm.  She rapidly decompensated and was ultimately intubated and placed on mechanical ventilation. Patient does have a durable DO NOT RESUSCITATE.    1/23/2023: Lying in bed with eyes closed, respirations 21-23, on nasal oxygen at  6 liters    1/24/2023:  reclined in bed, awake, alert, oriented to self, speech incomprehensible at times, pulling bedcovers off, remains on nasal oxygen at 6 liters    1/27/2023 Alert on BIPAP confused. Family meeting with her son Alejandra Johnson and PFM. Changing focus to one of comfort discussed. The patient is:   [] Verbal and participatory  [x] Non-participatory due to:  Intubated    GOALS OF CARE:  DNR/DNI, limited interventions, no feeding tube  Patient/Health Care Proxy Stated Goals: Prolong life      TREATMENT PREFERENCES:   Code Status: DNR/DNI         PALLIATIVE DIAGNOSES:   Goals of care/ACP  Acute respiratory failure  COVID-19  Debility age-related       PLAN:   1/27/2023 follow up on Ms Yann Alves along with Ms Naga Hodges, [place]. Patient is alert confused on BiPAP, pulls mask to the side. She is not able to participate in her goals of discussions. Her son Vinnie Valverde was at bedside. Of note recent past admission Vinnie Valverde verbalized he is MPOA. We have requested a copy of AMD for scanning. Discussed care options with Vinnie Valverde who shared he does not believe his mother is doing well despite medical treatment. Option of comfort measures discussed including stopping labs and  x rays, IVF\", IVAB, heparin, no BIPAP ( she is uncomfortable with this modality of oxygen delivery) weaning of high flow to nasal cannula. We also discussed use of Roxanol and Ativan for symptom management. We voiced his understanding of this Option as his father was on comfort measures. We also discussed hospice support. Vinnie Valverde would like to discuss with his siblings prior to making this decision. Symptom management: If son decides to move to comfort measures would recommend Roxanol 5 mg SL q 1 hr PRN and Ativan 0.5 mg q 2 hrs PRN. Goals of care DNR/ DNI limited interventions no feeding tubes. POST on file     ( Please see below for previous notes per palliative team )     1/24/2023:  Seen at bedside independently in full PPE for droplet plus isolation and COVID-19. Ms. Yann Alves is reclined in bed, awake, alert, oriented to self. Speech incomprehensible at times. Could not spontaneously state where she was. When asked if she was at home, patient replied \"yes\". When asked if she was in the hospital, patient replied \"yes, I'm in the hospital\". Reports that her breathing is better, remains on nasal oxygen at 6 liters. Denies pain. Per chart review, oral intake poor.   Discussed with nephrology. Discussed with [address]. POST form on file. Will continue to follow along with you. Goals of care remain DNR/DNI, limited interventions, no feeding tube. Please see below for previous conversations with the palliative medicine team:    1/23/2023:  Seen via window due to COVID-19 isolation along with Ms. Honey Patterson and  Patricia MSW. Patient is lying in bed with eyes closed, respiratory rate 21-23 with abdominal muscle used observed. Nasal oxygen at 6 liters. Per chart review, noted patient was medically extubated on 1/21/2023 and transferred out of ICU. Unable to find POST form which was placed on chart for son's signature on 1/20/2023. Telephone call to patient's son, Nick Guevara regarding POST form. Filled out another POST form for DNR/DNI, limited interventions and no feeding tube and sent to patient's son, Nick Guevara via HIPAA compliant DocuSign for electronic signature. Received signed POST form back. Copy placed on chart for scanning. Goals of care are now DNR/DNI, limited interventions, no feeding tube. Please see below for previous conversations with the palliative medicine team:    Goals of care/ACP  This NP along with Tyron Schuster RN observed patient through glass due to strict isolation. She is unable to participate in a goals of care conversation due to being intubated and mechanically ventilated. She has also been weaned from sedation and minimally interactive. Noted that patient is spontaneously opening her eyes and moving extremities. We have reached out to her son Nick Guevara who is her medical power of . He was unaware that a durable DO NOT RESUSCITATE does not necessarily protect you from being emergently intubated for respiratory distress. We reviewed with him a POST which he wanted to complete. He has chosen DNR/DNI, limited interventions and limited feeding tube.   He is unable at this moment to come in to sign we are placing the POST on the patient's chart for him to sign tomorrow evening when he is available after work. In the interim we are excepting a verbal from him for no reintubation. He is okay with attempts at medically liberating his mother from the ventilator with the understanding that she will not be reintubated. Goals of care: DNR/DNI no reintubation post extubation for any reason  2. Acute respiratory failure  Patient intubated and mechanically ventilated with PEEP of 5 and FiO2 45% attempts over the next 24 to 48 hours to liberate her from the ventilator. She is being weaned today from sedation. 3.   COVID-19  PCR for COVID-19 SARS positive on 1/17/2023. Chest x-ray on admission shows low lung volumes with prominent interstitial lung markings. Trace blunting of the bilateral costophrenic angles findings are consistent with fluid overload/CHF exacerbation. 4.   Debility/age-related  Patient with a baseline palliative performance score of around 60% with some reduction in her ambulation she has full self-care full level of consciousness. 5.   Initial consult note routed to primary continuity provider  6. Communicated plan of care with: Palliative IDT      Advance Care Planning:  [] [facility] [employer] [facility] Interdisciplinary Team has updated the ACP Navigator with [address] and Patient Capacity    Primary Decision [facility] ([address]): Supplemental (Other) Decision MakerNecristhian Candy - Child - [phone]    Medical Interventions: Limited additional interventions   Other Instructions:   Artificially Administered Nutrition: No feeding tube     As far as possible, the palliative care team has discussed with patient / health care proxy about goals of care / treatment preferences for patient.          HISTORY:     History obtained from: chart review   Principal Problem:    Acute on chronic heart failure ([address] (1/17/2023)    Active Problems:    Atrial fibrillation with RVR ([address] (12/21/2022) Atrial fibrillation with rapid ventricular response (HCC) (2023)      COVID (2023)      Heart failure ([address] (2023)      COVID-19 virus infection (2023)      Acute respiratory failure with hypoxia ([address] (2023)      COVID-19 (2023)    Past Medical History:   Diagnosis Date    Acquired hypothyroidism 2016    Arthritis of right shoulder region 2016    Asthma     Bilateral lower extremity edema 2021    Chronic bilateral low back pain without sciatica 2021    Chronic diastolic congestive heart failure ([address] 3/10/2021    Chronic venous insufficiency     Diabetes (HCC)     neuropathy    Essential hypertension 2021    Gout     History of depression 2017    History of fall 2021    Hypercholesteremia 2021    Hypertension     MI (myocardial infarction) ([address]     OAB (overactive bladder) 2021    Peripheral neuropathy     Rheumatoid arthritis ([address]     Tear of right rotator cuff 2016    Thyroid pain     Urinary incontinence 2021    Vertigo       Past Surgical History:   Procedure Laterality Date    HX AMPUTATION TOE Right     Great toe Dr. Jing Valentin      HX CHOLECYSTECTOMY      HX GYN      TAHOophorectomy due to infection    HX HEENT      resection of memegioma in the . HX KNEE REPLACEMENT Bilateral       Family History   Problem Relation Age of Onset    Heart Attack Mother     Heart Attack Father      History reviewed, no pertinent family history.   Social History     Tobacco Use    Smoking status: Former     Types: Cigarettes     Quit date:      Years since quittin.1    Smokeless tobacco: Never    Tobacco comments:     quit 45 years ago   Substance Use Topics    Alcohol use: No     Alcohol/week: 0.0 standard drinks     No Known Allergies   Current Facility-Administered Medications   Medication Dose Route Frequency    bumetanide (BUMEX) injection 0.5 mg  0.5 mg IntraVENous BID    dilTIAZem (CARDIZEM) 100 mg in 0.9% sodium chloride (MBP/ADV) 100 mL infusion  10 mg/hr IntraVENous CONTINUOUS    metOLazone (ZAROXOLYN) tablet 2.5 mg  2.5 mg Oral BID    milrinone (PRIMACOR) 20 MG/100 ML D5W infusion  0.2 mcg/kg/min IntraVENous CONTINUOUS    levothyroxine (SYNTHROID) injection 112 mcg  112 mcg IntraVENous Q24H    metoprolol tartrate (LOPRESSOR) tablet 50 mg  50 mg Oral Q6H    Or    metoprolol (LOPRESSOR) injection 5 mg  5 mg IntraVENous Q6H    heparin (porcine) 25,000 units in 0.45% saline 250 ml infusion  18-36 Units/kg/hr IntraVENous TITRATE    cefTRIAXone (ROCEPHIN) 2 g in sterile water (preservative free) 20 mL IV syringe  2 g IntraVENous Q24H    metroNIDAZOLE (FLAGYL) IVPB premix 500 mg  500 mg IntraVENous Q12H    insulin glargine (LANTUS) injection 20 Units  20 Units SubCUTAneous QHS    [Held by provider] heparin (porcine) injection 5,000 Units  5,000 Units SubCUTAneous Q8H    [Held by provider] acetaminophen (TYLENOL) tablet 650 mg  650 mg Oral Q6H    [Held by provider] enoxaparin (LOVENOX) injection 100 mg  1 mg/kg SubCUTAneous DAILY    triamcinolone (ARISTOCORT) 0.5 % cream   Topical BID    ipratropium-albuterol (COMBIVENT RESPIMAT) 20 mcg-100 mcg inhalation spray  1 Puff Inhalation Q4H PRN    camphor-menthoL (SARNA) 0.5-0.5 % lotion   Topical TID    glucose chewable tablet 16 g  16 g Oral PRN    glucagon (GLUCAGEN) injection 1 mg  1 mg IntraMUSCular PRN    dextrose 10% infusion 0-250 mL  0-250 mL IntraVENous PRN    insulin lispro (HUMALOG) injection   SubCUTAneous Q6H    famotidine (PF) (PEPCID) 20 mg in 0.9% sodium chloride 10 mL injection  20 mg IntraVENous DAILY    aspirin chewable tablet 81 mg  81 mg Oral DAILY    [Held by provider] atorvastatin (LIPITOR) tablet 80 mg  80 mg Oral DAILY    [Held by provider] levothyroxine (SYNTHROID) tablet 150 mcg  150 mcg Oral DAILY    [Held by provider] gabapentin (NEURONTIN) capsule 400 mg  400 mg Oral DAILY    [Held by provider] allopurinoL (ZYLOPRIM) tablet 100 mg  100 mg Oral DAILY    sodium chloride (NS) flush 5-40 mL  5-40 mL IntraVENous Q8H    acetaminophen (TYLENOL) tablet 650 mg  650 mg Oral Q6H PRN    Or    acetaminophen (TYLENOL) suppository 650 mg  650 mg Rectal Q6H PRN    polyethylene glycol (MIRALAX) packet 17 g  17 g Oral DAILY PRN    ondansetron (ZOFRAN ODT) tablet 4 mg  4 mg Oral Q8H PRN    Or    ondansetron (ZOFRAN) injection 4 mg  4 mg IntraVENous Q6H PRN          Clinical Pain Assessment (nonverbal scale for nonverbal patients): Clinical Pain Assessment  Severity: 0     Activity (Movement): Lying quietly, normal position    Duration: for how long has pt been experiencing pain (e.g., 2 days, 1 month, years)  Frequency: how often pain is an issue (e.g., several times per day, once every few days, constant)     FUNCTIONAL ASSESSMENT:     Palliative Performance Scale (PPS):  PPS: 30    ECOG  ECOG Status : Completely disabled     PSYCHOSOCIAL/SPIRITUAL SCREENING:      Any spiritual / Hindu concerns:  unable to assess  [] Yes /  [] No    Caregiver Burnout:  [] Yes /  [] No /  [x] No Caregiver Present      Anticipatory grief assessment: unable to assess  [] Normal  / [] Maladaptive        REVIEW OF SYSTEMS:     Systems: constitutional, ears/nose/mouth/throat, respiratory, gastrointestinal, genitourinary, musculoskeletal, integumentary, neurologic, psychiatric, endocrine. Positive findings noted below. Modified ESAS Completed by: provider           Pain: 0   Anxiety: 2       Dyspnea: 0           Stool Occurrence(s): 1   Positive and pertinent negative findings in ROS are noted above in HPI. The following systems were [x] reviewed / [] unable to be reviewed as noted in HPI  Other findings are noted below.      PHYSICAL EXAM:     Constitutional: reclining in bed alert confused comfortable appearing   Eyes: pupils equal  ENMT: moist MM   Cardiovascular: tachycardia   Respiratory:  on Bipap but takes off mask, high flow this am   Gastrointestinal: soft   Skin: warm, dry, multiple areas of excoriation from scratching  Neurologic: alert did not follow commands     Other: Wt Readings from Last 3 Encounters:   01/27/23 106.8 kg (235 lb 7.2 oz)   01/12/23 101 kg (222 lb 10.6 oz)   12/21/22 101 kg (222 lb 9.6 oz)     Blood pressure 105/70, pulse (!) 116, temperature 98.8 °F (37.1 °C), resp. rate 22, height 5' 4\" (1.626 m), weight 106.8 kg (235 lb 7.2 oz), SpO2 92 %, not currently breastfeeding. Pain:  Pain Scale 1: Numeric (0 - 10)  Pain Intensity 1: 0              Pain Intervention(s) 1: Medication (see MAR)       LAB AND IMAGING FINDINGS:     Lab Results   Component Value Date/Time    WBC 16.0 (H) 01/27/2023 03:04 AM    HGB 10.9 (L) 01/27/2023 03:04 AM    PLATELET 789 76/15/0610 03:04 AM     Lab Results   Component Value Date/Time    Sodium 147 (H) 01/27/2023 03:04 AM    Potassium 3.8 01/27/2023 03:04 AM    Chloride 111 01/27/2023 03:04 AM    CO2 28 01/27/2023 03:04 AM    BUN 90 (H) 01/27/2023 03:04 AM    Creatinine 2.31 (H) 01/27/2023 03:04 AM    Calcium 9.1 01/27/2023 03:04 AM    Magnesium 1.9 01/27/2023 03:04 AM    Phosphorus 4.5 01/27/2023 03:04 AM      Lab Results   Component Value Date/Time    Alk.  phosphatase 69 01/25/2023 12:55 AM    Protein, total 7.9 01/25/2023 12:55 AM    Albumin 2.8 (L) 01/25/2023 12:55 AM    Globulin 5.1 (H) 01/25/2023 12:55 AM     Lab Results   Component Value Date/Time    INR 1.7 (H) 01/26/2023 01:30 AM    Prothrombin time 20.2 (H) 01/26/2023 01:30 AM    aPTT 61.4 (H) 01/27/2023 12:14 PM      No results found for: IRON, FE, TIBC, IBCT, PSAT, FERR   No results found for: PH, PCO2, PO2  No components found for: [place]   Lab Results   Component Value Date/Time     08/11/2022 02:47 AM    CK - MB 2.2 04/18/2018 03:48 PM              Total time: 35 minutes     > 50% counseling / coordination:  Time spent in direct consultation with the patient, medical team, and family     Prolonged service was provided for  []30 min   []75 min in face to face time in the presence of the patient, spent as noted above. Time Start:   Time End:     Disclaimer: Sections of this note are dictated using utilizing voice recognition software, which may have resulted in some phonetic based errors in grammar and contents. Even though attempts were made to correct all the mistakes, some may have been missed, and remained in the body of the document. If questions arise, please contact our department.

## 2023-01-27 NOTE — PROGRESS NOTES
Comprehensive Nutrition Assessment    Type and Reason for Visit: Reassess    Nutrition Recommendations/Plan:   Continue current diet and oral supplement as tolerated. Encourage PO intake. Monitor PO intake, compliance of oral supplement, weight, labs and plan of care during admission. Malnutrition Assessment:  Malnutrition Status:  Mild malnutrition (01/24/23 1417)    Context:  Acute illness     Findings of the 6 clinical characteristics of malnutrition:   Energy Intake:  50% or less of est energy requirements for 5 or more days  Weight Loss:  No significant weight loss     Body Fat Loss:  Unable to assess,     Muscle Mass Loss:  Unable to assess,    Fluid Accumulation:  Unable to assess,     Strength:  Not performed     Nutrition Assessment:    Admitted for shortness of breath, failed BIPAP requiring intubation 1/18. COVID+. Extubated 1/21. Transferred out of the ICU. PO diet advanced per SLP 1/27, rec'd puree, moderately thick liquids. Pt worsening renal function, AQUILES on CKD per nephrology. DNR/DNI, limited interventions, no feeding tube per palliative care. Planning for family meeting today 1/27. Per flowsheet, last filed 1/27, pt ate 1-25% of breakfast this AM, suspect pt is not meeting nutritional needs. Pt was with another discipline at time of visit. Nutrition Related Findings:    Pertinent Meds: lantus, synthroid, heparin gtt, cardizem gtt, milrinone gtt, all oral meds held  Pertinent Labs: Na 147 H (trending up), K 3.8 wnl (trending down), BUN/Cr 90/2.31, GFR 20 Wound Type: None    Current Nutrition Intake & Therapies:  Average Meal Intake: 0%  Average Supplement Intake: None ordered  ADULT DIET Dysphagia - Pureed; Moderately Thick (Honey);  No Drinking Straws  ADULT ORAL NUTRITION SUPPLEMENT Lunch, Dinner, Breakfast; Fortified Pudding    Anthropometric Measures:  Height: 5' 4\" (162.6 cm) (Photo ID)  Ideal Body Weight (IBW): 120 lbs (55 kg)  Admission Body Weight: 220 lb 0.3 oz (99.8 kg)  Current Body Wt:  106.8 kg (235 lb 7.2 oz), 183.3 % IBW. Current BMI (kg/m2): 40.4  BMI Category: Obese class 2 (BMI 35.0-39. 9)    Estimated Daily Nutrient Needs:  Energy Requirements Based On: Formula  Weight Used for Energy Requirements: Admission  Energy (kcal/day): 9429-8670 (MSJ 1-1.2)  Weight Used for Protein Requirements: Ideal  Protein (g/day):  (1.5-2 g/day)  Method Used for Fluid Requirements: 1 ml/kcal  Fluid (ml/day): 2384-8069    Nutrition Diagnosis:   Swallowing difficulty related to impaired respiratory function, cognitive or neurological impairment (dysphagia) as evidenced by  (SLP rec'd modified textured diet)  Inadequate oral intake related to cognitive or neurological impairment, impaired respiratory function as evidenced by intake 0-25%    Nutrition Interventions:   Food and/or Nutrient Delivery: Continue current diet, Continue oral nutrition supplement  Nutrition Education/Counseling: No recommendations at this time  Coordination of Nutrition Care: Continue to monitor while inpatient  Plan of Care discussed with: .     Goals:  Previous Goal Met: No progress toward goal(s)  Goals: Meet at least 75% of estimated needs, by next RD assessment       Nutrition Monitoring and Evaluation:   Behavioral-Environmental Outcomes: None identified  Food/Nutrient Intake Outcomes: Food and nutrient intake, Supplement intake  Physical Signs/Symptoms Outcomes: Biochemical data, Hemodynamic status, Meal time behavior, Chewing or swallowing, GI status, Weight, Fluid status or edema    Discharge Planning:    Continue current diet    Ally Case [address], [address]   Contact: [phone]

## 2023-01-27 NOTE — PROGRESS NOTES
Palliative Medicine     Palliative team members, Bay Bhatti, VANIA, this writer and Dr Ramonita Perez met with patient and her son Yarelis Stallworth at bedside. Per notes from prior hospital admission, Patient had confirmed verbally that an AMD was completed and her son, Kat Reinoso was her primary health agent. A copy was requested at that time 12/2022. Palliative team will follow up with Mr. Rigo Douglas to obtain a copy. She is not able to participate in her goals of discussions. Medical update on the course of care was provide. Palliative team discussed care options . Yarelis Stallworth shared is mother was \"not doing well\". He is aware that she is not progressing despite medical treatment. Comfort measures discussed at length including no further labs, x rays, IVAB, IVF\"s ,  and a transition to NC O2 and NO BiPap. He agrees this is uncomfortable for her as she has been trying to pul it off. Discussed comfort medications; Roxanol and ativan to help alleviate pain and dyspnea. He confirmed with team he understood, as his father was on hospice/comfort care. Team briefly discussed the supportive service of hospice. Yarelis Stallworth would like to discuss with his siblings prior to making this decision. POST form is on file. CODE STATUS - DNR/DNI NO Feeding Tube.      Kailey GALLEGOSN, RN, [facility]  Palliative Medicine Inpatient RN  Palliative COPE Line: [phone]

## 2023-01-27 NOTE — PROGRESS NOTES
Holding PT treatment d/t elevated HR and RR at rest. -135 bpm, RR 30s, 35L O2 on hiflow at rest. Will follow up as able to participate in skilled PT treatment.

## 2023-01-27 NOTE — PROGRESS NOTES
Physician Progress Note      PATIENT:               Reggie Robles  CSN #:                  [id number]  :                       1935  ADMIT DATE:       2023 6:45 PM  [address] [place] DATE:  RESPONDING  PROVIDER #:        Miri Putnam MD          QUERY TEXT:    Pt admitted with acute respiratory failure and Type 2 MI. Pt noted to have developed worsening mental status. If possible, please document in the progress notes and discharge summary if you are evaluating and / or treating any of the following: The medical record reflects the following:  Risk Factors: 83 yo female admitted with acute respiratory failure, acute CHF, COVID 19 infection  Clinical Indicators:  16[address] NN Assumed care of pt at shift change, oriented to self only with confusion   1409 Pulmonary note worsening respiratory failure and mental status; Patient keeps pulling BIPAP mask. Unable to obtain any history from patient.  Family Medicine note Pt with worsening resp status overnight, hypoxic on ABG. Switched to high flow  Treatment: Pulmonary consult, BiPAP, close nursing observation      Thank you for your time,    Chuck GALLEGOSN, RN, [address] [place] [address], [facility] [address]  C: [phone]    [email or web address]  Options provided:  -- Metabolic encephalopathy  -- Septic encephalopathy  -- Toxic encephalopathy  -- Other - I will add my own diagnosis  -- Disagree - Not applicable / Not valid  -- Disagree - Clinically unable to determine / Unknown  -- Refer to Clinical Documentation Reviewer    PROVIDER RESPONSE TEXT:    This patient has metabolic encephalopathy. Query created by: Kvng Benítez on 2023 3:37 PM      QUERY TEXT:    Pt admitted with Acute respiratory failure, Type 2 MI and COVID 19 infection. Pt noted to have elevated WBC, Tachycardia, Tachypnea and low temp develop during admission.  If possible, please document in the progress notes and discharge summary if you are evaluating and /or treating any of the following: The medical record reflects the following:  Risk Factors: 81 yo female with Acute respiratory failure, Type 2 MI and COVID 19 infection  Clinical Indicators: 1/21 WBC increased to 23.1 with 5 bands  1/25 Temp 96.3   RR 32 02 sat 88  1/25 Pulmonary note Patient keeps pulling BIPAP mask. Unable to obtain any history from patient. Treatment: Pulmonary care consult, BIPAP, serial labs      Thank you for your time,    Efraín MUÑIZ, RN, [address] [place] [address], [facility]  C: [phone]    [email or web address]  Options provided:  -- Sepsis, not POA  -- Sepsis was ruled out  -- Other - I will add my own diagnosis  -- Disagree - Not applicable / Not valid  -- Disagree - Clinically unable to determine / Unknown  -- Refer to Clinical Documentation Reviewer    PROVIDER RESPONSE TEXT:    This patient has sepsis which was not present on admission.     Query created by: Jaylyn Mitchell on 1/25/2023 4:06 PM      Electronically signed by:  Ingris Edmonds MD 1/27/2023 4:57 PM

## 2023-01-27 NOTE — PROGRESS NOTES
OT withheld 2/2 pt tachycardic (130s)  and w/increase RR (30s) at rest.    Pt not appropriate for therapy at this time. Will continue to follow.

## 2023-01-27 NOTE — PROGRESS NOTES
Problem: Dysphagia (Adult)  Goal: *Acute Goals and Plan of Care (Insert Text)  Description: Patient will:  1. Tolerate PO trials with 0 s/s overt distress in 4/5 trials  2. Utilize compensatory swallow strategies/maneuvers (decrease bite/sip, size/rate, alt. liq/sol) with min cues in 4/5 trials    Recommend:   Puree,  honey-thick liquids  Meds crushed in puree  Aspiration precautions  HOB >45 degrees during all intake and for at least 30 min after po   Small bites/sips, slow rate of intake, alternating bites/sips  Oral care post meals       Outcome: Progressing Towards Goal   SPEECH LANGUAGE PATHOLOGY DYSPHAGIA TREATMENT    Patient: Herman Chavez [de-identified]82 y.o. female)  Date: 1/27/2023  Diagnosis: Atrial fibrillation with rapid ventricular response (HCC) [I48.91]  Acute on chronic heart failure (HCC) [I50.9]  COVID [U07.1]  Heart failure ([address] [I50.9]  Atrial fibrillation with RVR ([address] [I48.91]  COVID-19 virus infection [U07.1]  Acute respiratory failure with hypoxia (HCC) [J96.01]  COVID-19 [U07.1] Acute on chronic heart failure (HCC)      Precautions: Aspiration; Fall, Contact (droplet +)  PLOF:Per H&P    ASSESSMENT:  Pt seen during breakfast meal for dysphagia follow up. Pt on HiFlow 35 LPM @ 93% RR~26 throughout session. Pt consumed trials of puree with no overt s/sx of aspiration. Pt seen tolerating HTLs without any overt s/sx aspiration with weak laryngeal elevation. Trials of NTLs presented via sip x2 administered with delayed weak cough after 2nd sip. Rec continue current diet, Puree with HTLs. Precautions placed on whiteboard.       Progression toward goals:  []         Improving appropriately and progressing toward goals  [x]         Improving slowly and progressing toward goals  []         Not making progress toward goals and plan of care will be adjusted     PLAN:  Recommendations and Planned Interventions:  See above  Patient continues to benefit from skilled intervention to address the above impairments. Continue treatment per established plan of care. Discharge Recommendations:  TBD     SUBJECTIVE:   Patient stated ok. OBJECTIVE:   Cognitive and Communication Status:  Neurologic State: Confused, Eyes open to stimulus, Lethargic  Orientation Level: Oriented to person  Cognition: Decreased attention/concentration  Perception: Appears intact  Perseveration: Perseverates during ADLS, Perseverates during conversation, Perseverates during mobility  Safety/Judgement: Fall prevention  Dysphagia Treatment:  Oral Assessment:  Oral Assessment  Labial: No impairment  Dentition: Edentulous  Oral Hygiene: fair  Lingual: No impairment  Velum: No impairment  Mandible: No impairment  P.O. Trials:   Patient Position: [facility]   Vocal quality prior to P.O.: Low volume, Hoarse   Consistency Presented: Puree, Solid, Thin liquid, Nectar thick liquid, Ice chips   How Presented: Self-fed/presented, SLP-fed/presented, Cup/sip, Spoon, Successive swallows, Straw       Bolus Acceptance: No impairment   Bolus Formation/Control: Impaired   Type of Impairment: Delayed, Incomplete, Mastication   Propulsion: No impairment   Oral Residue: None   Initiation of Swallow: No impairment   Laryngeal Elevation: Decreased   Aspiration Signs/Symptoms: Change vocal quality, Weak cough   Pharyngeal Phase Characteristics: Altered vocal quality, Poor endurance, Suspected pharyngeal residue, Easily fatigued    Effective Modifications: Alternate liquids/solids, Small sips and bites, Cup/sip   Cues for Modifications: Moderate         Oral Phase Severity: Mild-moderate   Pharyngeal Phase Severity : Mild-moderate (suspect)   Oral Motor Exercises:         Exercises:  Laryngeal Exercises:       PAIN:  Pain level pre-treatment: 0/10   Pain level post-treatment: 0/10   Pain Intervention(s): Medication (see MAR);  Rest, Ice, Repositioning   Response to intervention: Nurse notified, See doc flow    After treatment:   []              Patient left in no apparent distress sitting up in chair  [x]              Patient left in no apparent distress in bed  [x]              Call bell left within reach  []              Nursing notified  []              Family present  []              Caregiver present  []              Bed alarm activated      COMMUNICATION/EDUCATION:   [x] Aspiration precautions; swallow safety; compensatory techniques  []        Patient unable to participate in education; education ongoing with staff  [x]  Posted safety precautions in patient's room.   [] Oral-motor/laryngeal strengthening exercises      Oli Adhikari, SLP  MA, CCC-SLP  Speech-Language Pathologist    Time Calculation: 10 mins

## 2023-01-27 NOTE — PROGRESS NOTES
Palliative Medicine      Palliative team members, Tomi Gaitan NP, and this writer for follow up visit. NP provider visited patient at bedside with proper PPE. Ms Brennon Cohen was non verbal today. Maintained on O2 via high flow at 35 liters 100%. O2 Sat 93 %. HR Tachycardiac at 123. /87. Son, Jean Carlos Galvez is scheduled to return to the local area late afternoon. Palliative team will continue to follow for support and reassessment of goals of care and focus of treatment. Signed POST if scanned into EMR.          CODE STATUS- DNR/DNI     Desiree Brandon BSN, RN, [facility]  Palliative Medicine Inpatient RN  Palliative [facility] Line: [phone]

## 2023-01-27 NOTE — PROGRESS NOTES
In Patient Progress note    Admit Date: 1/17/2023    Impression:   #1 Acute kidney injury on Chronic kidney disease stage III, baseline creatinine around 1.2-1.7, creatinine and BUN is uptrending, secondary to cardiorenal syndrome in the setting of acute on chronic CHF. #2 acute hypoxic respiratory failure secondary to: CHF and COVID-19 infection, aspiration pneumonia  #3 acute on chronic CHF  #4 cardiomyopathy with EF of 25%  #5 A. fib RVR  #6 NSTEMI  #7 hypertension    Respiratory distress improving   On HFNC   Creat sightly better    Electrolytes stable  Worsening tachycardia   More awake today   Mild Hypernatremia     Plan:  #1 strict intake output charting  #2 continue Bumex 0.5 mg BID IV , metolazone 2.5 mg BID   #3 tachycardia management per primary/ cardiology team  #4 follow pulm,ID  and cardiology recs   #5 renally dose medications for EGFR of less than 15    patient not a good dialysis candidate d/t several co morbidities  Overall prognosis is poor   Palliative on board    Discussed plan with patient and Dr Isadora Davenport,     Please call with questions,     Terry Castillo MD FASN  Cell [phone]  Pager: [phone]       Subjective:     - respiratory - improved slightly  - hemodynamics - stable, no pressrs  - UOP-improved  - Nutrition -ok    Objective:     Visit Vitals  /70 (BP 1 Location: Right lower arm)   Pulse (!) 116   Temp 98.8 °F (37.1 °C)   Resp 22   Ht 5' 4\" (1.626 m) Comment: Photo ID   Wt 106.8 kg (235 lb 7.2 oz)   SpO2 92%   Breastfeeding No   BMI 40.42 kg/m²         Intake/Output Summary (Last 24 hours) at 1/27/2023 1438  Last data filed at 1/27/2023 1334  Gross per 24 hour   Intake 417 ml   Output 3100 ml   Net -2683 ml         Physical Exam:   Mild distress  HEENT:mmm  Lungs:gema rales  Cardiovascular system: S1, S2, regular rate and rhythm. No JVD   Abdomen: soft, non tender, non distended. Extremities: 2+ LE edema   Integumentary: skin is grossly intact.    Neurologic: confused    Data Review:    Recent Labs     01/27/23  0304   WBC 16.0*   RBC 4.28   HCT 36.7   MCV 85.7   MCH 25.5   MCHC 29.7*   RDW 18.6*       Recent Labs     01/27/23  0304 01/26/23  0130 01/25/23  0055   BUN 90* 94* 88*   CREA 2.31* 2.36* 2.60*   CA 9.1 9.4 9.3   ALB  --   --  2.8*   K 3.8 4.1 4.3   * 145 142    111 109   CO2 28 26 21   PHOS 4.5 5.0* 5.5*   * 102* 247*         Mark Jones MD

## 2023-01-27 NOTE — PROGRESS NOTES
Infectious Disease progress Note        Reason: COVID-19 infection, respiratory failure    Current abx Prior abx   Zosyn since 1/18-1/23  Ceftriaxone, metronidazole since 1/24 vancomycin 1/18-1/20     Lines:       Assessment :  82 yro female with history significant for uncontrolled type 2 diabetes, diabetic neuropathy, hypercholesterolemia, hypertension, on anticoagulation A. fib with anticoagulation who was admitted to [facility] on 1/17 for shortness of breath     Clinical presentation consistent with acute hypoxic respiratory failure evolving since admission due to decompensated CHF superimposed on COVID-19 infection; aspiration pneumonia    Significant thick yellowish sputum noted on ET suction  MRSA nasal swab 1/19- negative    No definitive clinical evidence to suggest severe COVID-19 pneumonia at this time      Diffuse rash on extremities-could be skin excoriation from scratching. No definitive evidence of acute infection noted on today's exam    Atrial fibrillation with rapid ventricular response, non-STEMI-cardiology follow-up appreciated      decompensated CHF-ejection fraction 20 to 25% on echo 12/21/2022    Acute kidney injury-likely secondary to volume depletion, diuresis    Extubated on 1/21  Now with worsening creatinine, altered mentation  Nephrology follow-up appreciated. Concern for AIN    Altered mentation could be metabolic encephalopathy- ? Steroids related-     Increasing oxygen requirement noted today, worsening bilateral infiltrates 1/24-likely due to fluid overload. Monitor for recurrent silent aspiration. No definitive clinical evidence to suggest worsening bacterial pneumonia at this time. pulmonary  follow-up appreciated. Patient switched to high flow oxygen. Currently on high flow oxygen at 35 L    Elevated D-dimer -could be acute phase reactant.   Negative venous duplex bilateral upper extremity/lower extremity 1/26/2023    Atrial fibrillation with rapid ventricular rate-persistent tachycardia. Status post diltiazem. Cardiology follow-up appreciated. Improved mentation noted on today's exam.  Slightly improved oxygenation  No definite evidence of bacterial infection noted on subsequent exam    Recommendations:    Discontinue ceftriaxone, metronidazole  Follow-up nephrology, cardiology recommendations regarding diuresis  Follow-up cardiology recommendations regarding a.fib  Wean oxygen as tolerated. Follow-up pulmonary recommendations  Agree with continued discussion of goals of care with family since patient is at high risk for clinical deterioration    Will sign off. Please call with any new questions or concerns. Thanks    Above plan was discussed in details with RN,  primary team. Please call me if any further questions or concerns. Will continue to participate in the care of this patient. HPI:  Unable to communicate effectively      Past Medical History:   Diagnosis Date    Acquired hypothyroidism 8/1/2016    Arthritis of right shoulder region 4/21/2016    Asthma     Bilateral lower extremity edema 7/7/2021    Chronic bilateral low back pain without sciatica 7/7/2021    Chronic diastolic congestive heart failure ([address] 3/10/2021    Chronic venous insufficiency     Diabetes (HCC)     neuropathy    Essential hypertension 7/7/2021    Gout     History of depression 1/23/2017    History of fall 7/7/2021    Hypercholesteremia 7/7/2021    Hypertension     MI (myocardial infarction) ([address]     OAB (overactive bladder) 7/7/2021    Peripheral neuropathy     Rheumatoid arthritis ([address]     Tear of right rotator cuff 5/16/2016    Thyroid pain     Urinary incontinence 7/7/2021    Vertigo        Past Surgical History:   Procedure Laterality Date    HX AMPUTATION TOE Right 2020    Great toe Dr. Damian Shells [address]      HX CHOLECYSTECTOMY      HX GYN      TAHOophorectomy due to infection    HX HEENT      resection of memegioma in the 1980's.     HX KNEE REPLACEMENT Bilateral Current Discharge Medication List        CONTINUE these medications which have NOT CHANGED    Details   bumetanide (BUMEX) 1 mg tablet Take 1 mg by mouth daily. gabapentin (NEURONTIN) 400 mg capsule 1 capsule daily  Qty: 360 Capsule, Refills: 0    Associated Diagnoses: Neuropathy; Type 2 diabetes mellitus with diabetic neuropathy, with long-term current use of insulin ([facility])      insulin glargine (LANTUS) 100 unit/mL injection Inject 20 Units at bedtime. Monitor and record blood sugar daily. Qty: 10 mL, Refills: 1      aspirin 81 mg chewable tablet Take 1 Tablet by mouth daily. Qty: 30 Tablet, Refills: 2      metoprolol tartrate (LOPRESSOR) 100 mg IR tablet Take 1 Tablet by mouth every twelve (12) hours. Qty: 120 Tablet, Refills: 2      dilTIAZem ER (CARDIZEM CD) 180 mg capsule Take 1 Capsule by mouth daily. Qty: 120 Capsule, Refills: 3      BD Insulin Syringe SafetyGlide 0.5 mL 30 gauge x 5/16\" syrg       levothyroxine (SYNTHROID) 150 mcg tablet Take 150 mcg by mouth daily. morphine IR (MS IR) 15 mg tablet Take 15 mg by mouth two (2) times a day. allopurinoL (ZYLOPRIM) 100 mg tablet Take 100 mg by mouth daily. atorvastatin (LIPITOR) 80 mg tablet Take 1 Tablet by mouth daily. Qty: 90 Tablet, Refills: 3    Associated Diagnoses: Hypercholesteremia      Blood-Glucose Meter (FREESTYLE LITE METER) monitoring kit Test twice blood glucose daily; ICD-10 E11.9; Quantity 1  Qty: 1 Kit, Refills: 0    Associated Diagnoses: Type 2 diabetes mellitus with nephropathy ([facility])      insulin syringe,safetyneedle 1 mL 31 gauge x 5/16\" syrg 1 Each by Does Not Apply route daily. Qty: 300 Each, Refills: 3    Comments: Dx e11.9      glucose blood VI test strips (FREESTYLE TEST) strip Use to check blood glucose twice daily DX:E11.9  Qty: 300 Strip, Refills: 3      metFORMIN (GLUCOPHAGE) 500 mg tablet Take 500 mg by mouth daily.       acetaminophen (TYLENOL) 500 mg tablet Take 1 Tab by mouth every six (6) hours as needed for Pain.   Qty: 270 Tab, Refills: 3    Associated Diagnoses: Controlled type 2 diabetes mellitus without complication, with long-term current use of insulin (HCC)             Current Facility-Administered Medications   Medication Dose Route Frequency    dilTIAZem (CARDIZEM) 100 mg in 0.9% sodium chloride (MBP/ADV) 100 mL infusion  10 mg/hr IntraVENous CONTINUOUS    metOLazone (ZAROXOLYN) tablet 2.5 mg  2.5 mg Oral BID    milrinone (PRIMACOR) 20 MG/100 ML D5W infusion  0.2 mcg/kg/min IntraVENous CONTINUOUS    levothyroxine (SYNTHROID) injection 112 mcg  112 mcg IntraVENous Q24H    metoprolol tartrate (LOPRESSOR) tablet 50 mg  50 mg Oral Q6H    Or    metoprolol (LOPRESSOR) injection 5 mg  5 mg IntraVENous Q6H    heparin (porcine) 25,000 units in 0.45% saline 250 ml infusion  18-36 Units/kg/hr IntraVENous TITRATE    cefTRIAXone (ROCEPHIN) 2 g in sterile water (preservative free) 20 mL IV syringe  2 g IntraVENous Q24H    metroNIDAZOLE (FLAGYL) IVPB premix 500 mg  500 mg IntraVENous Q12H    insulin glargine (LANTUS) injection 20 Units  20 Units SubCUTAneous QHS    [Held by provider] heparin (porcine) injection 5,000 Units  5,000 Units SubCUTAneous Q8H    [Held by provider] acetaminophen (TYLENOL) tablet 650 mg  650 mg Oral Q6H    [Held by provider] enoxaparin (LOVENOX) injection 100 mg  1 mg/kg SubCUTAneous DAILY    triamcinolone (ARISTOCORT) 0.5 % cream   Topical BID    ipratropium-albuterol (COMBIVENT RESPIMAT) 20 mcg-100 mcg inhalation spray  1 Puff Inhalation Q4H PRN    camphor-menthoL (SARNA) 0.5-0.5 % lotion   Topical TID    glucose chewable tablet 16 g  16 g Oral PRN    glucagon (GLUCAGEN) injection 1 mg  1 mg IntraMUSCular PRN    dextrose 10% infusion 0-250 mL  0-250 mL IntraVENous PRN    insulin lispro (HUMALOG) injection   SubCUTAneous Q6H    famotidine (PF) (PEPCID) 20 mg in 0.9% sodium chloride 10 mL injection  20 mg IntraVENous DAILY    aspirin chewable tablet 81 mg  81 mg Oral DAILY    [Held by provider] atorvastatin (LIPITOR) tablet 80 mg  80 mg Oral DAILY    [Held by provider] levothyroxine (SYNTHROID) tablet 150 mcg  150 mcg Oral DAILY    [Held by provider] gabapentin (NEURONTIN) capsule 400 mg  400 mg Oral DAILY    [Held by provider] allopurinoL (ZYLOPRIM) tablet 100 mg  100 mg Oral DAILY    sodium chloride (NS) flush 5-40 mL  5-40 mL IntraVENous Q8H    acetaminophen (TYLENOL) tablet 650 mg  650 mg Oral Q6H PRN    Or    acetaminophen (TYLENOL) suppository 650 mg  650 mg Rectal Q6H PRN    polyethylene glycol (MIRALAX) packet 17 g  17 g Oral DAILY PRN    ondansetron (ZOFRAN ODT) tablet 4 mg  4 mg Oral Q8H PRN    Or    ondansetron (ZOFRAN) injection 4 mg  4 mg IntraVENous Q6H PRN       Allergies: Patient has no known allergies.     Family History   Problem Relation Age of Onset    Heart Attack Mother     Heart Attack Father      Social History     Socioeconomic History    Marital status:      Spouse name: Not on file    Number of children: Not on file    Years of education: Not on file    Highest education level: Not on file   Occupational History    Not on file   Tobacco Use    Smoking status: Former     Types: Cigarettes     Quit date: 12     Years since quittin.1    Smokeless tobacco: Never    Tobacco comments:     quit 45 years ago   Vaping Use    Vaping Use: Never used   Substance and Sexual Activity    Alcohol use: No     Alcohol/week: 0.0 standard drinks    Drug use: No    Sexual activity: Not Currently   Other Topics Concern    Not on file   Social History Narrative    Not on file     Social Determinants of Health     Financial Resource Strain: Not on file   Food Insecurity: Not on file   Transportation Needs: Not on file   Physical Activity: Not on file   Stress: Not on file   Social Connections: Not on file   Intimate Partner Violence: Not on file   Housing Stability: Not on file     Social History     Tobacco Use   Smoking Status Former    Types: Cigarettes    Quit date:  Years since quittin.1   Smokeless Tobacco Never   Tobacco Comments    quit 45 years ago        Temp (24hrs), Av.2 °F (36.8 °C), Min:97.1 °F (36.2 °C), Max:98.9 °F (37.2 °C)    Visit Vitals  BP (!) 129/100   Pulse (!) 135   Temp 97.8 °F (36.6 °C)   Resp (!) 32   Ht 5' 4\" (1.626 m) Comment: Photo ID   Wt 108.9 kg (240 lb 1.3 oz)   SpO2 93%   Breastfeeding No   BMI 41.21 kg/m²       ROS: unable to obtain due to patient factors    Physical Exam:    General:  Sitting on bed. High flow oxygen on face. More alert. Not very communicative   Head:  Normocephalic, without obvious abnormality, atraumatic. Eyes:  Conjunctivae/corneas clear. Nose: Nares normal. Septum midline. Mucosa normal. No drainage. Throat: Lips, mucosa, and tongue normal.    Neck: Trachea midline, no adenopathy,  no JVD. Lungs:   Lateral chest movements equal, no audible wheezing   Chest wall:  No deformity. Heart:  Irregularly irregular, rate controlled   Abdomen:   Soft, non-tender. Bowel sounds normal.    Extremities: Extremities normal, atraumatic, no cyanosis, mild edema bilateral lower extremities. Darkish erythema right LE, scattered pinpoint erythematous rash on extremities   Pulses: 2+ and symmetric all extremities.    Skin: Scattered excoriations in bilateral upper and lower extremities   Lymph nodes:  Cervical and supraclavicular nodes normal   Neurologic: No gross motor or sensory deficits noted       Labs: Results:   Chemistry Recent Labs     23  0304 23  0130 23  0055 23  0909   * 102* 247* 163*   * 145 142 142   K 3.8 4.1 4.3 4.3    111 109 108   CO2 28 26 21 23   BUN 90* 94* 88* 84*   CREA 2.31* 2.36* 2.60* 2.65*   CA 9.1 9.4 9.3 9.0   AGAP 8 8 12 11   BUCR 39* 40* 34* 32*   AP  --   --  69 58   TP  --   --  7.9 7.6   ALB  --   --  2.8* 2.7*   GLOB  --   --  5.1* 4.9*   AGRAT  --   --  0.5* 0.6*        CBC w/Diff Recent Labs     23  0304 23  0130 23  1443 WBC 16.0* 13.3* 13.8*   RBC 4.28 4.08* 4.18*   HGB 10.9* 10.2* 10.6*   HCT 36.7 33.9* 34.9*    195 232   GRANS 74* 78* 80*   LYMPH 6* 8* 7*   EOS 0 0 0        Microbiology No results for input(s): CULT in the last 72 hours. RADIOLOGY:    All available imaging studies/reports in [facility] for this admission were reviewed          Disclaimer: Sections of this note are dictated utilizing voice recognition software, which may have resulted in some phonetic based errors in grammar and contents. Even though attempts were made to correct all the mistakes, some may have been missed, and remained in the body of the document. If questions arise, please contact our department.     Dr. Corita Gitelman, Infectious Disease Specialist  [phone]  January 27, 2023  7:33 AM

## 2023-01-27 NOTE — PROGRESS NOTES
[facility] Medicine   Post Procedure Check and PM Check Note      Procedure:Midline placement     Subjective:  Pt is s/p midline placement with vasc access service. On interview patient is at baseline. Patient has no acute concerns at this time, but mostly non verbal and not A&Ox0.     ~2300: Patient was on 35L HFNC @ 100% FiO2. She is still tachycardic in the 120s-130s. Patient was a little more alert than she was yesterday, but still difficult to understand and not following commands. She however did not appear in respiratory distress and no increased work of breathing. She was resting comfortably. ~0300: Patient remains stable as above. ROS unable to be obtained due to patients condition (A&Ox0 and mostly non-verbal)    Objective:    Visit Vitals  BP (!) 129/100   Pulse (!) 135   Temp 97.8 °F (36.6 °C)   Resp (!) 32   Ht 5' 4\" (1.626 m)   Wt 108.9 kg (240 lb 1.3 oz)   SpO2 93%   Breastfeeding No   BMI 41.21 kg/m²        PHYSICAL EXAM  General: NAD, comfortable, confused, obese, on HFNC  HEENT: EOMI   CV: Irregularly irregular, no M/G/R.  RESP: Unlabored breathing. no wheezes or crackles, but diffuse rhonchi appreciated. Equal expansion bilaterally. ABD:  Soft, nontender, nondistended. No hepatosplenomegaly. MS:  No joint deformity or instability. No atrophy. Neuro: Non-focal, Opening eyes to verbal stimuli, but only minimally verbally responding. Not following commands. Ext:  No edema. 2+ radial and dp pulses bilaterally, midline present   Skin: Warm & dry. excoriations on all parts of body in multiple stages of healing, interspersed ecchymoses       Assessment/ Plan:    s/p midline placement  - No changes at this time. - Droplet plus precautions downgraded to droplet precaution.  - Refer to daily PN for additional A&P. Rest of plan unless noted here per day team progress note/ other post round notes. The above patient and plan were discussed with my supervising physician.       Marylou Sharron López DO, PGY-1  [facility]  1/27/2023, 4:32 AM

## 2023-01-28 NOTE — PROGRESS NOTES
0700: Bedside and Verbal shift change report given to Jag Stokes RN (oncoming nurse) by Oskar Neal RN (offgoing nurse). Report included the following information SBAR, Kardex, Intake/Output, MAR, Accordion, Recent Results, Med Rec Status, Cardiac Rhythm Afib-Sinus Tach, Alarm Parameters , and Quality Measures. 1200: pt family members visiting with pt, Pt will switch to Comfort Measures on Monday. 1530: PTT therapuetic, no rate change on heparin gtt, 77.9 results, repeat PTT next am 01/29/2023    1900: Bedside and Verbal shift change report given to Abdoulaye Jacobo RN (oncoming nurse) by Jag Stokes RN (offgoing nurse). Report included the following information SBAR, Kardex, Intake/Output, MAR, Accordion, Recent Results, Cardiac Rhythm A fib, Alarm Parameters , and Quality Measures.

## 2023-01-28 NOTE — PROGRESS NOTES
In Patient Progress note    Admit Date: 1/17/2023    Impression:   #1 Acute kidney injury on Chronic kidney disease stage III, baseline creatinine around 1.2-1.7, creatinine and BUN is uptrending, secondary to cardiorenal syndrome in the setting of acute on chronic CHF.--> improving   Excellent diuresis   #2 acute hypoxic respiratory failure secondary to: CHF and COVID-19 infection, aspiration pneumonia  #3 acute on chronic CHF  #4 cardiomyopathy with EF of 25%  #5 A. fib RVR  #6 NSTEMI  #7 hypertension  #8 hypokalemia  #9 hypernatremia     Excellent diuresis with Bumex and metolazone  Respiratory distress improved  Still on high flow this morning  Mental status wise awake but still confused  Ramirez in place and draining light-colored urine  On milrinone per cardiology  Tachycardia improved    Plan:  #1 strict intake output charting  #2 continue Bumex 0.5 mg BID IV , metolazone 2.5 mg BID,   goal to keep negative balance of 1.5 L next 24 hours  #3 tachycardia management per primary/ cardiology team  #4 follow pulm,ID  and cardiology recs   #5 renally dose medications for EGFR of less than 15  #6 replace potassium IV and p.o. discussed with nursing    Renal functions have shown some improvement with diuresis  May be resolving ATN versus cardiorenal  Overall prognosis is poor though  Palliative on board    Discussed plan with patient and Dr Domonique Moreau,     Please call with questions,     Hussain Byrd MD FASN  Cell [phone]  Pager: [phone]       Subjective:     - respiratory - improved slightly  - hemodynamics - stable, no pressrs  - UOP-improved  - Nutrition -ok    Objective:     Visit Vitals  /68 (BP 1 Location: Right lower arm, BP Patient Position: At rest;Semi fowlers)   Pulse (!) 116   Temp 98.8 °F (37.1 °C)   Resp 25   Ht 5' 4\" (1.626 m) Comment: Photo ID   Wt 106.8 kg (235 lb 7.2 oz)   SpO2 (!) 89%   Breastfeeding No   BMI 40.42 kg/m²         Intake/Output Summary (Last 24 hours) at 1/28/2023 0901  Last data filed at 1/28/2023 0717  Gross per 24 hour   Intake 177 ml   Output 2750 ml   Net -2573 ml         Physical Exam:   No distress, on high flow nasal cannula  HEENT: Dry mucosa  Lungs:gema rales  Cardiovascular system: S1, S2, regular rate and rhythm. No JVD   Abdomen: soft, non tender, non distended. Extremities: 2+ LE edema   Integumentary: skin is grossly intact.    Neurologic: confused  Ramirez catheter in place draining light urine    Data Review:    Recent Labs     01/28/23  0448   WBC 17.3*   RBC 4.05*   HCT 33.8*   MCV 83.5   MCH 25.4   MCHC 30.5*   RDW 18.3*       Recent Labs     01/28/23  0448 01/27/23  0304 01/26/23  0130   BUN 75* 90* 94*   CREA 1.76* 2.31* 2.36*   CA 8.7 9.1 9.4   K 3.1* 3.8 4.1   * 147* 145   * 111 111   CO2 31 28 26   PHOS 3.0 4.5 5.0*   * 223* 102*         Roberto James MD

## 2023-01-28 NOTE — PROGRESS NOTES
Problem: Self Care Deficits Care Plan (Adult)  Goal: *Acute Goals and Plan of Care (Insert Text)  Description: Occupational Therapy Goals  Initiated 1/24/2023 within 7 day(s). 1.  Patient will perform self-feeding with supervision/set-up. 2.  Patient will perform upper body dressing with supervision/set-up. 3.  Patient will perform grooming with supervision/set-up. 4.  Patient will perform toilet transfers with moderate assistance . 5. Patient will perform all aspects of toileting with moderate assistance . 6. Patient will participate in upper extremity therapeutic exercise/activities with supervision/set-up for 8 minutes. 7.  Patient will utilize energy conservation techniques during functional activities with verbal cues. Prior Level of Function: Patient a poor historian, confused unable to obtain PLOF from pt. Per past OT admission (1/13/2023), patient reports she was Mod I with ADLs, use of RW for functional mobility, performs cooking and cleaning tasks  Outcome: Resolved/Not Met  OCCUPATIONAL THERAPY TREATMENT/DISCHARGE    Patient: [facility] [de-identified]82 y.o. female)  Date: 1/28/2023  Diagnosis: Atrial fibrillation with rapid ventricular response (HCC) [I48.91]  Acute on chronic heart failure ([facility] [address] [I50.9]  COVID [U07.1]  Heart failure ([address] [I50.9]  Atrial fibrillation with RVR ([address] [I48.91]  COVID-19 virus infection [U07.1]  Acute respiratory failure with hypoxia ([facility] [address] [J96.01]  COVID-19 [U07.1] Acute on chronic heart failure ([facility] [address]      Precautions: Fall, Contact (droplet +)    Chart, occupational therapy assessment, plan of care, and goals were reviewed. ASSESSMENT:  Pt cleared for bed level OT session by RN. Pt with -140s bpm at rest. Pt opens eyes to voice and smiles, however, doesn't answer questions. Pt required max-Total A for simple grooming tasks and repositioning in bed.  Pt minimally follows commands, required Mod A for AAROM of BUE shoulder flexion in preparation for engagement into functional activity. Pt's HR 145bpm with UEs ROM, O2 sats 88% on 30L HFNC. Pt was on 3-day trial for OT and does not currently demonstrate appropriate tolerance of activity and unable to progress towards goals at this time. We will sign off. Please re-order if pt's medical status improves and pt will be able to actively participate in OT sessions. Thank you for this referral.       PLAN:  Patient will be discharged from occupational therapy at this time. Rationale for discharge:  [] Goals Achieved  [] Plateau Reached  [x] Patient not participating in therapy  [] Other:    Further Equipment Recommendations for Discharge:  hospital bed, mechanical lift, and wheelchair    AMPAC: Current research shows that an AM-PAC score of 17 or less is not associated with a discharge to the patient's home setting. Based on an AM-PAC score of 8/24 and their current ADL deficits; it is recommended that the patient have 3-5 sessions per week of Occupational Therapy at d/c to increase the patient's independence. Pt however with minimal commands following and medically unstable. Unable to meaningfully and safely participate in OT at this time. This AMPAC score should be considered in conjunction with interdisciplinary team recommendations to determine the most appropriate discharge setting. Patient's social support, diagnosis, medical stability, and prior level of function should also be taken into consideration. SUBJECTIVE:   Patient stated [address] [facility].     OBJECTIVE DATA SUMMARY:   Cognitive/Behavioral Status:  Neurologic State: Alert, Confused, Drowsy  Orientation Level: Unable to verbalize  Cognition: Decreased command following  Safety/Judgement: Fall prevention    Functional Mobility and Transfers for ADLs:   Bed Mobility:  Rolling: Total assistance   Scooting:  Total assistance   ADL Intervention:       Grooming  Grooming Assistance: Maximum assistance  Washing Face: Maximum assistance  Washing Hands: Maximum assistance       UE Therapeutic Exercises:   KING BLANCHARD shd flexion x5 each with Mod A    Pain:  Pain level pre-treatment: not rated  Pain level post-treatment: not rated    Activity Tolerance:    Poor  Please refer to the flowsheet for vital signs taken during this treatment. After treatment:   []  Patient left in no apparent distress sitting up in chair  [x]  Patient left in no apparent distress in bed  [x]  Call bell left within reach  [x]  Nursing notified  []  Caregiver present  [x]  Bed alarm activated    COMMUNICATION/EDUCATION:   [x]      Role of Occupational Therapy in the acute care setting  []      Home safety education was provided and the patient/caregiver indicated understanding. []      Patient/family have participated as able and agree with findings and recommendations. [x]      Patient is unable to participate in plan of care at this time. Thank you for allowing me to assist in the care of this patient. Wandy Monterroso OTR/TIFFANY  Time Calculation: 8 mins    Hema Taylor AM-PAC® Daily Activity Inpatient Short Form (6-Clicks)*    How much HELP from another person does the patient currently need    (If the patient hasn't done an activity recently, how much help from another person do you think he/she would need if he/she tried?)   Total (Total A or Dep)   A Lot  (Mod to Max A)   A Little (Sup or Min A)   None (Mod I to I)   Putting on and taking off regular lower body clothing? [x] 1 [] 2 [] 3 [] 4   2. Bathing (including washing, rinsing,      drying)? [x] 1 [] 2 [] 3 [] 4   3. Toileting, which includes using toilet, bedpan or urinal?   [x] 1 [] 2 [] 3 [] 4   4. Putting on and taking off regular upper body clothing? [x] 1 [] 2 [] 3 [] 4   5. Taking care of personal grooming such as brushing teeth? [] 1 [x] 2 [] 3 [] 4   6. Eating meals?    [] 1 [x] 2 [] 3 [] 4        Current research shows that an AM-PAC score of 17 or less is not associated with a discharge to the patient's home setting. Based on an AM-PAC score of 8/24 and their current ADL deficits; it is recommended that the patient have 3-5 sessions per week of Occupational Therapy at d/c to increase the patient's independence. Pt however with minimal commands following and medically unstable. Unable to meaningfully and safely participate in OT at this time.

## 2023-01-28 NOTE — PROGRESS NOTES
Bedside and Verbal shift change report given to [facility] (oncoming nurse) by Lionel Henley (offgoing nurse). Report included the following information SBAR, Kardex, Intake/Output, MAR, Recent Results, and Cardiac Rhythm A-Fib .

## 2023-01-28 NOTE — PROGRESS NOTES
01/27/23 2105   Oxygen Therapy   O2 Sat (%) 93 %   Pulse via Oximetry 103 beats per minute   O2 Device Hi flow nasal cannula  (vapotherm)   O2 Flow Rate (L/min) (S)  30 l/min   O2 Temperature 91.4 °F (33 °C)   FIO2 (%) (S)  70 %    Found pt at 86% on 6l NC. RN stated the provider wants to wean her O2 for sats at or >88%. Place pt back on vapotherm and weaned settings down from 35L and 90% to above documentation.

## 2023-01-28 NOTE — PROGRESS NOTES
[address] [place] [address] [facility]. Pulmonary, Critical Care, and Sleep Medicine    Name: Olive Morales MRN: [id number]   : 1935 Hospital: [address] [place] [address] Dr   Date: 2023  Admission Date: 2023     Chart and notes reviewed. Data reviewed. I have evaluated all findings. [x]I have reviewed the flowsheet and previous days notes. []The patient is unable to give any meaningful history or review of systems because the patient is:  []Intubated []Sedated   []Unresponsive      []The patient is critically ill on      []Mechanical ventilation []Pressors   []BiPAP []         Interval HPI:  Patient is a 82 y.o. female with a PMH of HFrEF last EF 20-25%, atrial fibrillation with RVR, T2DM, HTN, HLD, and hypothyroidism who presented to the [facility] ED on  due to shortness of breath. Patient found at home by EMS hypoxic to mid 80's and put on CPAP. Given oral nitroglycerin x3 and nitropaste. She was noted to be tachycardic to nearly 200 bpm in the ED in atrial fibrillation with RVR. In the ED she was initially placed on BiPAP with good respiratory response and started on diltiazem gtt. Following diltiazem gtt patient became hypotensive with MAPs in low 60's but remained persistently tachycardic. Then transitioned to amiodarone gtt. Blood pressure improved and was given lasix x1. Following BiPAP initiation ABG improved and she was transitioned to NC, however this was tolerated poorly and required resuming BiPAP. Despite BiPAP patient continued to be in respiratory distress and required intubation in the ED. Noted to be COVID positive. ICU was then contacted for admission and further management. Of note, patient left inpatient hospitalization AMA on  following admission for cellulitis in setting of lymphedema and atrial fibrillation with RVR.       Interval Follow Up23  LOS:11   Vent Day: extubated 2023  Pulmonary reconsulted 23 due to worsening clinical status      Patient seen and examined at bedside. Patient remains confused. Patient remains on high flow nasal cannula, has pulled off once today, less frequently than pulling off BiPAP yesterday. Patient has had increased urine output with diuretics. Per nursing, family considering comfort measures on Monday. Patient encephalopathic, unable to provide further history. Patient Vitals for the past 24 hrs:   Temp Pulse Resp BP SpO2   01/28/23 1155 99 °F (37.2 °C) (!) 132 (!) 33 122/65 91 %   01/28/23 1105 -- (!) 143 -- -- --   01/28/23 0851 98.7 °F (37.1 °C) (!) 125 23 138/67 91 %   01/28/23 0358 -- -- -- -- (!) 89 %   01/28/23 0337 98.8 °F (37.1 °C) (!) 116 -- 122/68 (!) 88 %   01/28/23 0000 -- -- -- -- (!) 87 %   01/27/23 2310 -- -- -- -- (!) 88 %   01/27/23 2307 97.6 °F (36.4 °C) (!) 109 25 (!) 121/51 (!) 84 %   01/27/23 2105 -- -- -- -- 93 %   01/27/23 2104 -- -- -- -- (!) 86 %   01/27/23 2041 98.3 °F (36.8 °C) (!) 108 25 135/68 (!) 88 %   01/27/23 1724 98 °F (36.7 °C) (!) 115 23 128/78 95 %   01/27/23 1633 -- -- -- (!) 114/55 --       Inpat Anti-Infectives (From admission, onward)       Start     Ordered Stop    01/24/23 1000  cefTRIAXone (ROCEPHIN) 2 g in sterile water (preservative free) 20 mL IV syringe  2 g,   IntraVENous,   EVERY 24 HOURS         01/24/23 0914 --    01/24/23 1000  metroNIDAZOLE (FLAGYL) IVPB premix 500 mg  500 mg,   IntraVENous,   EVERY 12 HOURS         01/24/23 0914 --                                   ROS:Review of systems not obtained due to patient factors. Events and notes from last 24 hours reviewed.      Patient Active Problem List   Diagnosis Code    Encounter for palliative care Z51.5    Acquired hypothyroidism E03.9    Dermatitis L30.9    Obesity, morbid ([facility] [address] E66.01    Type 2 diabetes mellitus with diabetic nephropathy, with long-term current use of insulin (HCC) E11.21, Z79.4    Chronic diastolic congestive heart failure (HCC) I50.32    Essential hypertension I10    Bilateral lower extremity edema R60.0    Hypercholesteremia E78.00    History of fall Z91.81    Chronic bilateral low back pain without sciatica M54.50, G89.29    OAB (overactive bladder) N32.81    Urinary incontinence R32    Neuropathy G62.9    Septic arthritis of foot (HCC) M00.9    Diabetic foot ulcer (HCC) E11.621, L97.509    Septic joint (HCC) M00.9    Sepsis (HCC) A41.9    Acute exacerbation of CHF (congestive heart failure) ([facility]) I50.9    Leukocytosis D72.829    Pulmonary edema J81.1    Dyspnea R06.00    Atrial fibrillation with RVR ([facility]) I48.91    Patient's noncompliance with other medical treatment and regimen due to unspecified reason Z91.199    Debility R53.81    Atrial fibrillation ([facility] [address] I48.91    Advanced care planning/counseling discussion Z71.89    Cellulitis L03.90    Atrial fibrillation with rapid ventricular response ([facility]) I48.91    Acute on chronic heart failure (HCC) I50.9    COVID U07.1    Heart failure (HCC) I50.9    COVID-19 virus infection U07.1    Acute respiratory failure with hypoxia ([facility]) J96.01    COVID-19 U07.1       Vital Signs:  Visit Vitals  /65 (BP 1 Location: Right lower arm, BP Patient Position: At rest)   Pulse (!) 132   Temp 99 °F (37.2 °C)   Resp (!) 33   Ht 5' 4\" (1.626 m) Comment: Photo ID   Wt 106.8 kg (235 lb 7.2 oz)   SpO2 91%   Breastfeeding No   BMI 40.42 kg/m²       O2 Device: Hi flow nasal cannula   O2 Flow Rate (L/min): 30 l/min   Temp (24hrs), Av.4 °F (36.9 °C), Min:97.6 °F (36.4 °C), Max:99 °F (37.2 °C)       Intake/Output:   Last shift:      701 - 1900  In: 240 [P.O.:240]  Out: 1000 [Urine:1000]  Last 3 shifts: 1901 -  0700  In: 417 [P.O.:417]  Out: 3250 [Urine:3250]    Intake/Output Summary (Last 24 hours) at 2023 1528  Last data filed at 2023 1155  Gross per 24 hour   Intake 240 ml   Output 2350 ml   Net -2110 ml            Current Facility-Administered Medications   Medication Dose Route Frequency    metOLazone (ZAROXOLYN) tablet 2.5 mg  2.5 mg Oral BID    bumetanide (BUMEX) injection 1 mg  1 mg IntraVENous BID    dilTIAZem (CARDIZEM) 100 mg in 0.9% sodium chloride (MBP/ADV) 100 mL infusion  15 mg/hr IntraVENous CONTINUOUS    milrinone (PRIMACOR) 20 MG/100 ML D5W infusion  0.2 mcg/kg/min IntraVENous CONTINUOUS    levothyroxine (SYNTHROID) injection 112 mcg  112 mcg IntraVENous Q24H    metoprolol tartrate (LOPRESSOR) tablet 50 mg  50 mg Oral Q6H    Or    metoprolol (LOPRESSOR) injection 5 mg  5 mg IntraVENous Q6H    heparin (porcine) 25,000 units in 0.45% saline 250 ml infusion  18-36 Units/kg/hr IntraVENous TITRATE    insulin glargine (LANTUS) injection 20 Units  20 Units SubCUTAneous QHS    [Held by provider] heparin (porcine) injection 5,000 Units  5,000 Units SubCUTAneous Q8H    [Held by provider] acetaminophen (TYLENOL) tablet 650 mg  650 mg Oral Q6H    [Held by provider] enoxaparin (LOVENOX) injection 100 mg  1 mg/kg SubCUTAneous DAILY    triamcinolone (ARISTOCORT) 0.5 % cream   Topical BID    camphor-menthoL (SARNA) 0.5-0.5 % lotion   Topical TID    insulin lispro (HUMALOG) injection   SubCUTAneous Q6H    famotidine (PF) (PEPCID) 20 mg in 0.9% sodium chloride 10 mL injection  20 mg IntraVENous DAILY    aspirin chewable tablet 81 mg  81 mg Oral DAILY    [Held by provider] atorvastatin (LIPITOR) tablet 80 mg  80 mg Oral DAILY    [Held by provider] levothyroxine (SYNTHROID) tablet 150 mcg  150 mcg Oral DAILY    [Held by provider] gabapentin (NEURONTIN) capsule 400 mg  400 mg Oral DAILY    [Held by provider] allopurinoL (ZYLOPRIM) tablet 100 mg  100 mg Oral DAILY    sodium chloride (NS) flush 5-40 mL  5-40 mL IntraVENous Q8H         Telemetry: []Sinus []A-flutter []Paced    [x]A-fib []Multiple PVCs                  Physical Exam:      General: Confused no acute distress, wearing high flow nasal cannula, appears older than stated age, encephalopathic, sick appearing  HEENT:  Anicteric sclerae; pink palpebral conjunctivae; mucosa moist, unable to assess nose and oral cavities  Resp:  Bilateral breath sounds, symmetric chest rise, + scattered rhonci and coarse breath sounds, no wheezing  CV:   Irregularly ZTWZECLQC-Y2/S2, 4/6 systolic murmur  GI:  Abdomen soft, non-tender; (+) active bowel sounds  Extremities:  +2 pulse, diffuse anasarca,  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  Skin:  Warm; dry,   Neurologic: Non-focal, not directable, agitated            DATA:  MAR reviewed and pertinent medications noted or modified as needed    Labs:  Recent Labs     01/28/23 0448 01/27/23 0304 01/26/23 0130   WBC 17.3* 16.0* 13.3*   HGB 10.3* 10.9* 10.2*   HCT 33.8* 36.7 33.9*    227 195       Recent Labs     01/28/23 0448 01/27/23 0304 01/26/23 0130 01/25/23  1850   * 147* 145  --    K 3.1* 3.8 4.1  --    * 111 111  --    CO2 31 28 26  --    * 223* 102*  --    BUN 75* 90* 94*  --    CREA 1.76* 2.31* 2.36*  --    CA 8.7 9.1 9.4  --    MG 1.8 1.9 2.2  --    PHOS 3.0 4.5 5.0*  --    INR  --   --  1.7* 1.7*       No results for input(s): PH, PCO2, PO2, HCO3, FIO2 in the last 72 hours.   Recent Labs     01/25/23  1555   FIO2I 50   HCO3I 22.8   PCO2I 31.7*   PHI 7.47*   PO2I 61*       Lab Results   Component Value Date/Time    aPTT >180.0 (HH) 01/28/2023 04:48 AM     Lab Results   Component Value Date/Time    TSH 2.88 01/27/2023 03:04 AM    Triiodothyronine (T3), free 0.7 (L) 01/27/2023 03:04 AM    T4, Free 1.4 01/27/2023 03:04 AM         MICRO:  Results       Procedure Component Value Units Date/Time    CULTURE, URINE [[id number]] Collected: 01/27/23 0200    Order Status: Sent Specimen: Cath Urine Updated: 01/27/23 1846    CULTURE, RESPIRATORY/SPUTUM/BRONCH Alberto Matt STAIN [[id number]] Collected: 01/19/23 1220    Order Status: Completed Specimen: Sputum Updated: 01/21/23 1415     Special Requests: NO SPECIAL REQUESTS        GRAM STAIN 2+ WBCS SEEN               OCCASIONAL EPITHELIAL CELLS SEEN            NO ORGANISMS SEEN        Culture result: SCANT NORMAL RESPIRATORY LANIE          CULTURE, MRSA [[id number]] Collected: 01/18/23 2005    Order Status: Completed Specimen: Nasal from Nares Updated: 01/19/23 2328     Special Requests: NO SPECIAL REQUESTS        Culture result: MRSA NOT PRESENT               Screening of patient nares for MRSA is for surveillance purposes and, if positive, to facilitate isolation considerations in high risk settings. It is not intended for automatic decolonization interventions per se as regimens are not sufficiently effective to warrant routine use. COVID-19 WITH INFLUENZA A/B [[id number]]  (Abnormal) Collected: 01/17/23 1905    Order Status: Completed Specimen: Nasopharyngeal Updated: 01/17/23 2046     SARS-CoV-2 by PCR Detected        Comment: CALLED TO AND CORRECTLY REPEATED BY:  [facility] AT 2045 BY KDA 1/17/23  Positive results are indicative of the presence of SARS-CoV-2. Clinical correlation with patient history and other diagnostic information is necessary to determine patient infection status. Positive results do not rule out bacterial infection or co-infection with other pathogens. Influenza A by PCR Not detected        Influenza B by PCR Not detected        Comment: NOTE: Influenza A and Influenza B negative results should be considered presumptive in samples that have a positive SARS-CoV-2 result. Consider re-testing with an alternate FDA-approved test if clinically indicated. Testing was performed using matteo Angela SARS-CoV-2 and Influenza A/B nucleic acid assay. This test is a multiplex Real-Time Reverse Transcriptase Polymerase Chain Reaction (RT-PCR) based in vitro diagnostic test intended for the qualitative detection of nucleic acids from SARS-CoV-2, Influenza A, and Influenza B in nasopharyngeal for use under the FDA's Emergency Use Authorization(EAU) only.        Fact sheet for Patients: FindDrives.pl  Fact sheet for Healthcare Providers: MysteryVoices.com.au                   Imaging:          [x]   I have personally reviewed the patients radiographs and reports  XR Results (most recent):  XR Results (most recent):  Results from Hospital Encounter encounter on 01/17/23    XR CHEST PORT    Narrative  INDICATION: Evaluate for fluid overload versus worsening pneumonia    TECHNIQUE: Portable chest radiograph    COMPARISON: 24 January 2023    FINDINGS:    Similar pulmonary vascular congestion and diffuse bilateral mixed  interstitial/alveolar infiltrates. Ongoing bilateral pleural effusions with overlying atelectasis versus lung  consolidation. Similar cardiomediastinal silhouette. No pneumothorax. Impression  No significant interval change. CT Results (most recent):  Results from Hospital Encounter encounter on 10/07/22    CTA CHEST W OR W WO CONT    Narrative  CTA CHEST PULMONARY EMBOLISM PROTOCOL    INDICATION: Acute respiratory difficulty, heart failure, symptoms worsened over  3 days, chest pain, increased work of breathing. Question pulmonary embolism. TECHNIQUE: Thin collimation axial images obtained through the level of the  pulmonary arteries with additional imaging through the chest following the  uneventful administration of intravenous contrast.  Images reconstructed into  three dimensional coronal and sagittal projections for complete evaluation of  the tortuous and overlapping pulmonary vascular structures and to reduce patient  radiation dose. All CT scans at this facility are performed using dose optimization technique as  appropriate to a performed exam, to include automated exposure control,  adjustment of the mA and/or kV according to patient size (including appropriate  matching first site-specific examinations), or use of iterative reconstruction  technique. COMPARISON: 8/10/2022.     FINDINGS:    No filling defects are appreciated within the main, left, right, lobar or  visualized segmental pulmonary arteries to suggest embolism. Thyroid: Unremarkable in its visualized aspects. Pericardium/ Heart: No significant effusion. Biatrial cardiac chamber  enlargement. Aorta/ Vessels: Mild to moderate aortic atherosclerosis. There is irregular  noncalcified mural plaque in the descending aorta. Lymph Nodes: Unremarkable. .    Lungs: There is hilar edema. Mild bronchial wall thickening. Mild groundglass  and interstitial thickening. Mild mosaic attenuation. Mild to moderate dependent  atelectasis. Pleura: Moderate bilateral pleural effusions which are new. No pneumothorax. Upper Abdomen: Unremarkable. Bones/soft tissues: Osteopenia. Degenerative disc disease. Impression  1. No evidence for pulmonary emboli. 2.  Heart failure with biatrial cardiac chamber enlargement, moderate new  bilateral pleural effusions and mild pulmonary edema. 3.  Mild to moderate aortic atherosclerosis with irregular noncalcified mural  plaque along the wall of the descending aorta which increases risk of embolic  events. 01/17/23    ECHO ADULT FOLLOW-UP OR LIMITED 01/18/2023 1/18/2023    Interpretation Summary    Contrast used: Definity. Technical qualifiers: Echo study was technically difficult with poor endocardial visualization. Left Ventricle: Severely reduced left ventricular systolic function with a visually estimated EF of 20 - 25%. Left ventricle size is normal. Mildly increased wall thickness. Global hypokinesis present. Aortic Valve: Mild to moderate regurgitation. Mitral Valve: Moderate to severe regurgitation. Tricuspid Valve: Severe regurgitation. Pericardium: Left pleural effusion. Ascites present.     Signed by: Elba Minor MD on 1/18/2023  3:58 PM       IMPRESSION:   Acute hypoxic respiratory failure  requiring mechanical ventilation- intubated 1/18, extubated 1/21  Acute pulmonary edema  Atrial fibrillation with rapid ventricular response - anticoagulated on Eliquis outpatient, diagnosed 10/22  Acute on chronic heart failure - LVEF 20-25% 12/21/22, similar result on repeat echo on 1/18/23(LVEF 55% 8/22), possible tachycardia induced cardiomyopathy vs. Potential precipitating ischemic event, does not appear to have had recent catheterization for further characterization  NSTEMI - likely type II due to demand ischemia in setting of atrial fibrillation with RVR and CHF exacerbation  Acute encephalopathy with agitation  COVID-19  AQUILES on CKD3  Anemia  Hypertension  Type 2 diabetes mellitus  Hypercholesterolemia  Hypothyroidism  Hx of tobacco use - cessation in 1976  Skin lesions c/w bug bites vs excoriations  Deconditioning/debility  Frailty, advanced age, adult failure to thrive, severe protein calorie malnutrition  Negative LE and UE duplex from 1/26/23 -- do not suspect PE           Patient Active Problem List   Diagnosis Code    Encounter for palliative care Z51.5    Acquired hypothyroidism E03.9    Dermatitis L30.9    Obesity, morbid ([facility] [address] E66.01    Type 2 diabetes mellitus with diabetic nephropathy, with long-term current use of insulin ([facility]) E11.21, Z79.4    Chronic diastolic congestive heart failure ([facility]) I50.32    Essential hypertension I10    Bilateral lower extremity edema R60.0    Hypercholesteremia E78.00    History of fall Z91.81    Chronic bilateral low back pain without sciatica M54.50, G89.29    OAB (overactive bladder) N32.81    Urinary incontinence R32    Neuropathy G62.9    Septic arthritis of foot ([facility]) M00.9    Diabetic foot ulcer ([facility]) E11.621, L97.509    Septic joint ([facility]) M00.9    Sepsis ([facility]) A41.9    Acute exacerbation of CHF (congestive heart failure) ([facility]) I50.9    Leukocytosis D72.829    Pulmonary edema J81.1    Dyspnea R06.00    Atrial fibrillation with RVR ([address] I48.91    Patient's noncompliance with other medical treatment and regimen due to unspecified reason Z91.199    Debility R53.81    Atrial fibrillation ([address] I48.91    Advanced care planning/counseling discussion Z71.89    Cellulitis L03.90    Atrial fibrillation with rapid ventricular response (HCC) I48.91    Acute on chronic heart failure (HCC) I50.9    COVID U07.1    Heart failure (HCC) I50.9    COVID-19 virus infection U07.1    Acute respiratory failure with hypoxia (HCC) J96.01    COVID-19 U07.1        RECOMMENDATIONS:   Strict NPO, avoid all PO meds  Supplemental oxygen to maintain SpO2 >88% --- only use HFNC given mental status. If patient tachypneic on high flow at max settings, would advise transitioning to comfort measures with morphine and Ativan at that time only, no BiPAP given mental status  Continue diuresis to maintain net negative fluid balance given severe pulmonary edema, strict ins and outs. Diuresis per primary service and nephrology. Discussed with nephrology  Antibiotics per ID service, no indication from a pulmonary standpoint  Milrinone per cardiology  Aggressive pulmonary toileting/bronchial hygiene  Frequent incentive spirometry  Aspiration precautions including elevating HOB >30deg  Delirium precautions  If patient improves, PT/OT, OOB, ambulate with assistance as tolerated  DVT ppx per primary service  Will follow    Recommend goals of care, specifically I recommend comfort measures, home with hospice      Patient's prognosis is VERY POOR      Complex decision making was made in the evaluation and management plans during this consultation. More than 50% of time was spent in counseling and coordination of care including review of data and discussion with other team members.         Candie Lyons MD/MPH     Pulmonary, Critical Care Medicine  [facility]

## 2023-01-28 NOTE — PROGRESS NOTES
[address] [place] [address] [facility]. Pulmonary, Critical Care, and Sleep Medicine    Name: Kt Priest MRN: [id number]   : 1935 Hospital: [address] [place] [address] Dr   Date: 2023  Admission Date: 2023     Chart and notes reviewed. Data reviewed. I have evaluated all findings. [x]I have reviewed the flowsheet and previous days notes. []The patient is unable to give any meaningful history or review of systems because the patient is:  []Intubated []Sedated   []Unresponsive      []The patient is critically ill on      []Mechanical ventilation []Pressors   []BiPAP []         Interval HPI:  Patient is a 82 y.o. female with a PMH of HFrEF last EF 20-25%, atrial fibrillation with RVR, T2DM, HTN, HLD, and hypothyroidism who presented to the [address] [place] [address] ED on  due to shortness of breath. Patient found at home by EMS hypoxic to mid 80's and put on CPAP. Given oral nitroglycerin x3 and nitropaste. She was noted to be tachycardic to nearly 200 bpm in the ED in atrial fibrillation with RVR. In the ED she was initially placed on BiPAP with good respiratory response and started on diltiazem gtt. Following diltiazem gtt patient became hypotensive with MAPs in low 60's but remained persistently tachycardic. Then transitioned to amiodarone gtt. Blood pressure improved and was given lasix x1. Following BiPAP initiation ABG improved and she was transitioned to NC, however this was tolerated poorly and required resuming BiPAP. Despite BiPAP patient continued to be in respiratory distress and required intubation in the ED. Noted to be COVID positive. ICU was then contacted for admission and further management. Of note, patient left inpatient hospitalization AMA on  following admission for cellulitis in setting of lymphedema and atrial fibrillation with RVR.       Interval Follow Up23  LOS:10   Vent Day: extubated 2023    Pulmonary reconsulted 23 due to worsening clinical status      Patient seen and examined at bedside. Patient agitated per nursing, patient was tachypneic on high flow, however placed on BiPAP and patient is pulling off of BiPAP. Patient agitated and pulling out lines earlier in the day. Patient encephalopathic, unable to provide further history. Patient Vitals for the past 24 hrs:   Temp Pulse Resp BP SpO2   01/27/23 2307 97.6 °F (36.4 °C) (!) 109 25 (!) 121/51 (!) 84 %   01/27/23 2105 -- -- -- -- 93 %   01/27/23 2104 -- -- -- -- (!) 86 %   01/27/23 2041 98.3 °F (36.8 °C) (!) 108 25 135/68 (!) 88 %   01/27/23 1724 98 °F (36.7 °C) (!) 115 23 128/78 95 %   01/27/23 1633 -- -- -- (!) 114/55 --   01/27/23 1324 98.8 °F (37.1 °C) (!) 116 22 105/70 92 %   01/27/23 1011 -- -- -- 120/69 --   01/27/23 0859 98 °F (36.7 °C) (!) 123 26 (!) 140/87 94 %   01/27/23 0800 -- -- -- -- 95 %   01/27/23 0400 98 °F (36.7 °C) (!) 101 26 136/85 95 %   01/27/23 0325 -- -- -- -- 93 %   01/27/23 0030 97.4 °F (36.3 °C) (!) 135 30 (!) 144/82 97 %       Inpat Anti-Infectives (From admission, onward)       Start     Ordered Stop    01/24/23 1000  cefTRIAXone (ROCEPHIN) 2 g in sterile water (preservative free) 20 mL IV syringe  2 g,   IntraVENous,   EVERY 24 HOURS         01/24/23 0914 --    01/24/23 1000  metroNIDAZOLE (FLAGYL) IVPB premix 500 mg  500 mg,   IntraVENous,   EVERY 12 HOURS         01/24/23 0914 --                                   ROS:Review of systems not obtained due to patient factors. Events and notes from last 24 hours reviewed.      Patient Active Problem List   Diagnosis Code    Encounter for palliative care Z51.5    Acquired hypothyroidism E03.9    Dermatitis L30.9    Obesity, morbid ([facility] E66.01    Type 2 diabetes mellitus with diabetic nephropathy, with long-term current use of insulin (HCC) E11.21, Z79.4    Chronic diastolic congestive heart failure (HCC) I50.32    Essential hypertension I10    Bilateral lower extremity edema R60.0    Hypercholesteremia E78.00    History of fall Z91.81    Chronic bilateral low back pain without sciatica M54.50, G89.29    OAB (overactive bladder) N32.81    Urinary incontinence R32    Neuropathy G62.9    Septic arthritis of foot (HCC) M00.9    Diabetic foot ulcer (HCC) E11.621, L97.509    Septic joint (HCC) M00.9    Sepsis (HCC) A41.9    Acute exacerbation of CHF (congestive heart failure) ([facility]) I50.9    Leukocytosis D72.829    Pulmonary edema J81.1    Dyspnea R06.00    Atrial fibrillation with RVR ([facility]) I48.91    Patient's noncompliance with other medical treatment and regimen due to unspecified reason Z91.199    Debility R53.81    Atrial fibrillation ([facility] I48.91    Advanced care planning/counseling discussion Z71.89    Cellulitis L03.90    Atrial fibrillation with rapid ventricular response ([facility]) I48.91    Acute on chronic heart failure ([facility]) I50.9    COVID U07.1    Heart failure ([facility]) I50.9    COVID-19 virus infection U07.1    Acute respiratory failure with hypoxia ([facility]) J96.01    COVID-19 U07.1       Vital Signs:  Visit Vitals  BP (!) 121/51 (BP 1 Location: Right lower arm, BP Patient Position: Lying;Supine; At rest)   Pulse (!) 109   Temp 97.6 °F (36.4 °C)   Resp 25   Ht 5' 4\" (1.626 m) Comment: Photo ID   Wt 106.8 kg (235 lb 7.2 oz)   SpO2 (!) 84%   Breastfeeding No   BMI 40.42 kg/m²       O2 Device: Hi flow nasal cannula (vapotherm)   O2 Flow Rate (L/min): (S) 25 l/min   Temp (24hrs), Av °F (36.7 °C), Min:97.4 °F (36.3 °C), Max:98.8 °F (37.1 °C)       Intake/Output:   Last shift:      No intake/output data recorded.   Last 3 shifts:  0701 -  1900  In: 537 [P.O.:537]  Out: 3500 [Urine:3500]    Intake/Output Summary (Last 24 hours) at 2023 2333  Last data filed at 2023 1334  Gross per 24 hour   Intake 177 ml   Output 1900 ml   Net -1723 ml            Current Facility-Administered Medications   Medication Dose Route Frequency    bumetanide (BUMEX) injection 0.5 mg  0.5 mg IntraVENous BID    dilTIAZem (CARDIZEM) 100 mg in 0.9% sodium chloride (MBP/ADV) 100 mL infusion  10 mg/hr IntraVENous CONTINUOUS    milrinone (PRIMACOR) 20 MG/100 ML D5W infusion  0.2 mcg/kg/min IntraVENous CONTINUOUS    levothyroxine (SYNTHROID) injection 112 mcg  112 mcg IntraVENous Q24H    metoprolol tartrate (LOPRESSOR) tablet 50 mg  50 mg Oral Q6H    Or    metoprolol (LOPRESSOR) injection 5 mg  5 mg IntraVENous Q6H    heparin (porcine) 25,000 units in 0.45% saline 250 ml infusion  18-36 Units/kg/hr IntraVENous TITRATE    insulin glargine (LANTUS) injection 20 Units  20 Units SubCUTAneous QHS    [Held by provider] heparin (porcine) injection 5,000 Units  5,000 Units SubCUTAneous Q8H    [Held by provider] acetaminophen (TYLENOL) tablet 650 mg  650 mg Oral Q6H    [Held by provider] enoxaparin (LOVENOX) injection 100 mg  1 mg/kg SubCUTAneous DAILY    triamcinolone (ARISTOCORT) 0.5 % cream   Topical BID    camphor-menthoL (SARNA) 0.5-0.5 % lotion   Topical TID    insulin lispro (HUMALOG) injection   SubCUTAneous Q6H    famotidine (PF) (PEPCID) 20 mg in 0.9% sodium chloride 10 mL injection  20 mg IntraVENous DAILY    aspirin chewable tablet 81 mg  81 mg Oral DAILY    [Held by provider] atorvastatin (LIPITOR) tablet 80 mg  80 mg Oral DAILY    [Held by provider] levothyroxine (SYNTHROID) tablet 150 mcg  150 mcg Oral DAILY    [Held by provider] gabapentin (NEURONTIN) capsule 400 mg  400 mg Oral DAILY    [Held by provider] allopurinoL (ZYLOPRIM) tablet 100 mg  100 mg Oral DAILY    sodium chloride (NS) flush 5-40 mL  5-40 mL IntraVENous Q8H         Telemetry: []Sinus []A-flutter []Paced    [x]A-fib []Multiple PVCs                  Physical Exam:      General: Confused mild to moderate distress, on BiPAP, pulling off mask, encephalopathic  HEENT:  Anicteric sclerae; pink palpebral conjunctivae; mucosa moist, unable to assess nose and oral cavities  Resp:  Bilateral breath sounds, symmetric chest rise, + scattered rhonci and coarse breath sounds, no wheezing  CV: Irregularly irregular- 's AFIB on bedside monitor  GI:  Abdomen soft, non-tender; (+) active bowel sounds  Extremities:  +2 pulse, diffuse anasarca,  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  Skin:  Warm; dry,   Neurologic: Non-focal, not directable, agitated            DATA:  MAR reviewed and pertinent medications noted or modified as needed    Labs:  Recent Labs     01/27/23  0304 01/26/23  0130 01/25/23  1850   WBC 16.0* 13.3* 13.8*   HGB 10.9* 10.2* 10.6*   HCT 36.7 33.9* 34.9*    195 232       Recent Labs     01/27/23  0304 01/26/23  0130 01/25/23  1850 01/25/23  0708 01/25/23  0055   * 145  --   --  142   K 3.8 4.1  --   --  4.3    111  --   --  109   CO2 28 26  --   --  21   * 102*  --   --  247*   BUN 90* 94*  --   --  88*   CREA 2.31* 2.36*  --   --  2.60*   CA 9.1 9.4  --   --  9.3   MG 1.9 2.2  --   --  2.4   PHOS 4.5 5.0*  --   --  5.5*   ALB  --   --   --   --  2.8*   ALT  --   --   --   --  43   INR  --  1.7* 1.7* 1.5*  --        No results for input(s): PH, PCO2, PO2, HCO3, FIO2 in the last 72 hours.   Recent Labs     01/25/23  1555 01/25/23 0130   FIO2I 50  --    HCO3I 22.8 21.5*   PCO2I 31.7* 36.7   PHI 7.47* 7.38   PO2I 61* 57*       Lab Results   Component Value Date/Time    aPTT 37.4 (H) 01/27/2023 04:57 PM     Lab Results   Component Value Date/Time    TSH 2.88 01/27/2023 03:04 AM    Triiodothyronine (T3), free 0.7 (L) 01/27/2023 03:04 AM    T4, Free 1.4 01/27/2023 03:04 AM         MICRO:  Results       Procedure Component Value Units Date/Time    CULTURE, URINE [[id number]] Collected: 01/27/23 0200    Order Status: Sent Specimen: Cath Urine Updated: 01/27/23 1846    CULTURE, RESPIRATORY/SPUTUM/BRONCH Wan Organ STAIN [[id number]] Collected: 01/19/23 1220    Order Status: Completed Specimen: Sputum Updated: 01/21/23 1415     Special Requests: NO SPECIAL REQUESTS        GRAM STAIN 2+ WBCS SEEN               OCCASIONAL EPITHELIAL CELLS SEEN            NO ORGANISMS SEEN Culture result:       SCANT NORMAL RESPIRATORY LANIE          CULTURE, MRSA [[id number]] Collected: 01/18/23 2005    Order Status: Completed Specimen: Nasal from Nares Updated: 01/19/23 5974     Special Requests: NO SPECIAL REQUESTS        Culture result: MRSA NOT PRESENT               Screening of patient nares for MRSA is for surveillance purposes and, if positive, to facilitate isolation considerations in high risk settings. It is not intended for automatic decolonization interventions per se as regimens are not sufficiently effective to warrant routine use. COVID-19 WITH INFLUENZA A/B [[id number]]  (Abnormal) Collected: 01/17/23 1905    Order Status: Completed Specimen: Nasopharyngeal Updated: 01/17/23 2046     SARS-CoV-2 by PCR Detected        Comment: CALLED TO AND CORRECTLY REPEATED BY:  [facility] AT 2045 BY KDA 1/17/23  Positive results are indicative of the presence of SARS-CoV-2. Clinical correlation with patient history and other diagnostic information is necessary to determine patient infection status. Positive results do not rule out bacterial infection or co-infection with other pathogens. Influenza A by PCR Not detected        Influenza B by PCR Not detected        Comment: NOTE: Influenza A and Influenza B negative results should be considered presumptive in samples that have a positive SARS-CoV-2 result. Consider re-testing with an alternate FDA-approved test if clinically indicated. Testing was performed using matteo Angela SARS-CoV-2 and Influenza A/B nucleic acid assay. This test is a multiplex Real-Time Reverse Transcriptase Polymerase Chain Reaction (RT-PCR) based in vitro diagnostic test intended for the qualitative detection of nucleic acids from SARS-CoV-2, Influenza A, and Influenza B in nasopharyngeal for use under the FDA's Emergency Use Authorization(EAU) only.        Fact sheet for Patients: FindDrives.pl  Fact sheet for Healthcare Providers: MysteryVoices.com.au                   Imaging:          [x]   I have personally reviewed the patients radiographs and reports  XR Results (most recent):  XR Results (most recent):  Results from Hospital Encounter encounter on 01/17/23    XR CHEST PORT    Narrative  INDICATION: Evaluate for fluid overload versus worsening pneumonia    TECHNIQUE: Portable chest radiograph    COMPARISON: 24 January 2023    FINDINGS:    Similar pulmonary vascular congestion and diffuse bilateral mixed  interstitial/alveolar infiltrates. Ongoing bilateral pleural effusions with overlying atelectasis versus lung  consolidation. Similar cardiomediastinal silhouette. No pneumothorax. Impression  No significant interval change. CT Results (most recent):  Results from Hospital Encounter encounter on 10/07/22    CTA CHEST W OR W WO CONT    Narrative  CTA CHEST PULMONARY EMBOLISM PROTOCOL    INDICATION: Acute respiratory difficulty, heart failure, symptoms worsened over  3 days, chest pain, increased work of breathing. Question pulmonary embolism. TECHNIQUE: Thin collimation axial images obtained through the level of the  pulmonary arteries with additional imaging through the chest following the  uneventful administration of intravenous contrast.  Images reconstructed into  three dimensional coronal and sagittal projections for complete evaluation of  the tortuous and overlapping pulmonary vascular structures and to reduce patient  radiation dose. All CT scans at this facility are performed using dose optimization technique as  appropriate to a performed exam, to include automated exposure control,  adjustment of the mA and/or kV according to patient size (including appropriate  matching first site-specific examinations), or use of iterative reconstruction  technique. COMPARISON: 8/10/2022.     FINDINGS:    No filling defects are appreciated within the main, left, right, lobar or  visualized segmental pulmonary arteries to suggest embolism. Thyroid: Unremarkable in its visualized aspects. Pericardium/ Heart: No significant effusion. Biatrial cardiac chamber  enlargement. Aorta/ Vessels: Mild to moderate aortic atherosclerosis. There is irregular  noncalcified mural plaque in the descending aorta. Lymph Nodes: Unremarkable. .    Lungs: There is hilar edema. Mild bronchial wall thickening. Mild groundglass  and interstitial thickening. Mild mosaic attenuation. Mild to moderate dependent  atelectasis. Pleura: Moderate bilateral pleural effusions which are new. No pneumothorax. Upper Abdomen: Unremarkable. Bones/soft tissues: Osteopenia. Degenerative disc disease. Impression  1. No evidence for pulmonary emboli. 2.  Heart failure with biatrial cardiac chamber enlargement, moderate new  bilateral pleural effusions and mild pulmonary edema. 3.  Mild to moderate aortic atherosclerosis with irregular noncalcified mural  plaque along the wall of the descending aorta which increases risk of embolic  events. 01/17/23    ECHO ADULT FOLLOW-UP OR LIMITED 01/18/2023 1/18/2023    Interpretation Summary    Contrast used: Definity. Technical qualifiers: Echo study was technically difficult with poor endocardial visualization. Left Ventricle: Severely reduced left ventricular systolic function with a visually estimated EF of 20 - 25%. Left ventricle size is normal. Mildly increased wall thickness. Global hypokinesis present. Aortic Valve: Mild to moderate regurgitation. Mitral Valve: Moderate to severe regurgitation. Tricuspid Valve: Severe regurgitation. Pericardium: Left pleural effusion. Ascites present.     Signed by: Srini Bains MD on 1/18/2023  3:58 PM       IMPRESSION:   Acute hypoxic respiratory failure  requiring mechanical ventilation- intubated 1/18, extubated 1/21  Acute pulmonary edema  Atrial fibrillation with rapid ventricular response - anticoagulated on Eliquis outpatient, diagnosed 10/22  Acute on chronic heart failure - LVEF 20-25% 12/21/22, similar result on repeat echo on 1/18/23(LVEF 55% 8/22), possible tachycardia induced cardiomyopathy vs. Potential precipitating ischemic event, does not appear to have had recent catheterization for further characterization  NSTEMI - likely type II due to demand ischemia in setting of atrial fibrillation with RVR and CHF exacerbation  Acute encephalopathy with agitation  COVID-19  AQUILES on CKD3  Anemia  Hypertension  Type 2 diabetes mellitus  Hypercholesterolemia  Hypothyroidism  Hx of tobacco use - cessation in 1976  Skin lesions c/w bug bites vs excoriations  Deconditioning/debility  Frailty, advanced age, adult failure to thrive, severe protein calorie malnutrition           Patient Active Problem List   Diagnosis Code    Encounter for palliative care Z51.5    Acquired hypothyroidism E03.9    Dermatitis L30.9    Obesity, morbid ([facility] E66.01    Type 2 diabetes mellitus with diabetic nephropathy, with long-term current use of insulin ([facility] [place]) E11.21, Z79.4    Chronic diastolic congestive heart failure ([facility] [place]) I50.32    Essential hypertension I10    Bilateral lower extremity edema R60.0    Hypercholesteremia E78.00    History of fall Z91.81    Chronic bilateral low back pain without sciatica M54.50, G89.29    OAB (overactive bladder) N32.81    Urinary incontinence R32    Neuropathy G62.9    Septic arthritis of foot ([facility] [place]) M00.9    Diabetic foot ulcer ([facility]) E11.621, L97.509    Septic joint ([facility]) M00.9    Sepsis ([facility]) A41.9    Acute exacerbation of CHF (congestive heart failure) ([facility] [place]) I50.9    Leukocytosis D72.829    Pulmonary edema J81.1    Dyspnea R06.00    Atrial fibrillation with RVR ([facility]) I48.91    Patient's noncompliance with other medical treatment and regimen due to unspecified reason Z91.199    Debility R53.81    Atrial fibrillation ([facility] I48.91    Advanced care planning/counseling discussion Z71.89 Cellulitis L03.90    Atrial fibrillation with rapid ventricular response (HCC) I48.91    Acute on chronic heart failure (HCC) I50.9    COVID U07.1    Heart failure (HCC) I50.9    COVID-19 virus infection U07.1    Acute respiratory failure with hypoxia (HCC) J96.01    COVID-19 U07.1        RECOMMENDATIONS:   Strict NPO, avoid all PO meds  Supplemental oxygen to maintain SpO2 >88% --- only use HFNC given mental status. Discontinue BiPAP at this time despite tachypnea. If patient tachypneic on high flow at max settings, would advise transitioning to comfort measures with morphine and Ativan at that time only. Recommend diuresis to maintain net negative fluid balance given severe pulmonary edema, strict ins and outs. Diuresis per primary service and nephrology  Antibiotics per ID service, no indication from a pulmonary standpoint  Milrinone per cardiology  Aggressive pulmonary toileting/bronchial hygiene  Frequent incentive spirometry  Aspiration precautions including elevating HOB >30deg  Delirium precautions  If patient improves, PT/OT, OOB, ambulate with assistance as tolerated  DVT ppx per primary service  Will follow  Recommend goals of care, specifically I recommend comfort measures, home with hospice      Patient's prognosis is VERY POOR      Complex decision making was made in the evaluation and management plans during this consultation. More than 50% of time was spent in counseling and coordination of care including review of data and discussion with other team members.         Khai Sanders MD/MPH     Pulmonary, Critical Care Medicine  [address] [place] [address] [facility]

## 2023-01-28 NOTE — PROGRESS NOTES
Nuzhat Nephrology MD rounding on patient. Verbal order given to replace pts potassium levels with IV potassium 100mg x 4 doses. Per MD run with D5 due to increased sodium levels.

## 2023-01-28 NOTE — PROGRESS NOTES
[facility]   BRIEF PROGRESS NOTE    Assessment & Plan:  Acute hypoxic respiratory failure  -RT on board. After evaluating patient, FiO2 was bumped from 65 to 70%. RT to continue frequent assessments.   -No other changes made at this time. See daily progress note for full assessment/plan. Subjective:  ~2200: Patient was on 25L HFNC @ 65% FiO2 and was saturating in the high 80s with a HR in the low 100s. She is resting comfortably and does not appear in distress and has no increased work of breathing, but she remains only minimally responsive. She opens her eyes to verbal stimuli. She is not verbally responding and not following commands. Objective:  Visit Vitals  BP (!) 121/51 (BP 1 Location: Right lower arm, BP Patient Position: Lying;Supine; At rest)   Pulse (!) 109   Temp 97.6 °F (36.4 °C)   Resp 25   Ht 5' 4\" (1.626 m)   Wt 106.8 kg (235 lb 7.2 oz)   SpO2 (!) 87%   Breastfeeding No   BMI 40.42 kg/m²       Physical Exam:  General: NAD, comfortable, confused, obese, on HFNC  HEENT: EOMI   CV: Irregularly irregular, no M/G/R.  RESP: Unlabored breathing. no wheezes or crackles, but diffuse rhonchi appreciated. Equal expansion bilaterally. ABD:  Soft, nontender, nondistended. No hepatosplenomegaly. MS:  No joint deformity or instability. No atrophy. Neuro: Non-focal, Opening eyes to verbal stimuli, not verbally responding, not following commands. Ext:  No edema. 2+ radial and dp pulses bilaterally, midline present   Skin: Warm & dry. excoriations on all parts of body in multiple stages of healing, interspersed ecchymoses       The above patient and plan were discussed with my supervising physician. See daily progress note for full assessment/plan.       Claudeen Parlor, DO, PGY-1  [facility]  1/28/2023, 12:27 AM

## 2023-01-28 NOTE — PROGRESS NOTES
Progress Note    Patient: Yun Tejeda Age: 82 y.o. Sex: female    YOB: 1935 Admit Date: 1/17/2023 PCP: Darleen Espinosa MD   MRN: [id number]  CSN: [id number]         Chief Complaint     Chief Complaint   Patient presents with    Irregular Heart Beat    Respiratory Distress        Assessment and Diagnosis   -Acute hypoxic respiratory failure associated with COVID with underlying HFrEF and COPD, emergently intubated 1/18/2023 AM, extubated on 1/21/2023. -Afib RVR, known Hx afib diagnosed 10/2022  -Elevated troponin, peaked at 2343  -HFrEF, recent diagnosis 12/2022  Echo 12/21/2022 with LVEF 20-25% with severe hypokinesis of basal anteroseptal, basal inferoseptal and apical septal walls, moderately dilated RV with reduced systolic function, severe biatrial enlargement  Repeat Echo this admission with LVEF 20-25%, no significant change from prior  -Hx RLE DVT 06/2022  -CKD  -Anemia  -HTN  -DM  -HLD  -Hx medication noncompliance  -DNR status    Plan and Recommendations   Still volume up on exam. Increase Bumex to 1 bid with continued metolazone. Replace and monitor K>4, Mg>2. Rate/renal function should improve with diuresis as HF improves. Continue metoprolol, If HR remains an issue ma need amiodarone. Poor overall prognosis.           Documented Problems:    Patient Active Problem List    Diagnosis Date Noted    Acute respiratory failure with hypoxia ([address] 01/18/2023    COVID-19 01/18/2023    Atrial fibrillation with rapid ventricular response ([address] 01/17/2023    Acute on chronic heart failure ([address] 01/17/2023    COVID 01/17/2023    Heart failure ([address] 01/17/2023    COVID-19 virus infection 01/17/2023    Cellulitis 01/12/2023    Leukocytosis 12/21/2022    Pulmonary edema 12/21/2022    Dyspnea 12/21/2022    Atrial fibrillation with RVR ([address] 12/21/2022    Patient's noncompliance with other medical treatment and regimen due to unspecified reason 12/21/2022    Debility 12/21/2022    Atrial fibrillation ([address] 12/21/2022    Advanced care planning/counseling discussion 12/21/2022    Acute exacerbation of CHF (congestive heart failure) ([address] 10/07/2022    Sepsis ([address] 08/10/2022    Septic arthritis of foot ([address] 05/26/2022    Diabetic foot ulcer ([address] 05/26/2022    Septic joint ([address] 05/26/2022    Neuropathy 08/10/2021    Essential hypertension 07/07/2021    Bilateral lower extremity edema 07/07/2021    Hypercholesteremia 07/07/2021    History of fall 07/07/2021    Chronic bilateral low back pain without sciatica 07/07/2021    OAB (overactive bladder) 07/07/2021    Urinary incontinence 07/07/2021    Chronic diastolic congestive heart failure ([address] 03/10/2021    Obesity, morbid ([address] 12/15/2017    Type 2 diabetes mellitus with diabetic nephropathy, with long-term current use of insulin ([address] 12/15/2017    Dermatitis 08/23/2017    Acquired hypothyroidism 08/01/2016    Encounter for palliative care 04/21/2016       Past Medical/Surgical/Social/Family History:  Past Medical History:   Diagnosis Date    Acquired hypothyroidism 8/1/2016    Arthritis of right shoulder region 4/21/2016    Asthma     Bilateral lower extremity edema 7/7/2021    Chronic bilateral low back pain without sciatica 7/7/2021    Chronic diastolic congestive heart failure ([address] 3/10/2021    Chronic venous insufficiency     Diabetes (HCC)     neuropathy    Essential hypertension 7/7/2021    Gout     History of depression 1/23/2017    History of fall 7/7/2021    Hypercholesteremia 7/7/2021    Hypertension     MI (myocardial infarction) ([address]     OAB (overactive bladder) 7/7/2021    Peripheral neuropathy     Rheumatoid arthritis ([address]     Tear of right rotator cuff 5/16/2016    Thyroid pain     Urinary incontinence 7/7/2021    Vertigo      Past Surgical History:   Procedure Laterality Date    HX AMPUTATION TOE Right 2020    Great toe Dr. Jaylin Gandara      HX GYN      TAHOophorectomy due to infection HX HEENT      resection of memegioma in the . HX KNEE REPLACEMENT Bilateral      Social History     Socioeconomic History    Marital status:      Spouse name: Not on file    Number of children: Not on file    Years of education: Not on file    Highest education level: Not on file   Occupational History    Not on file   Tobacco Use    Smoking status: Former     Types: Cigarettes     Quit date: 12     Years since quittin.1    Smokeless tobacco: Never    Tobacco comments:     quit 45 years ago   Vaping Use    Vaping Use: Never used   Substance and Sexual Activity    Alcohol use: No     Alcohol/week: 0.0 standard drinks    Drug use: No    Sexual activity: Not Currently   Other Topics Concern    Not on file   Social History Narrative    Not on file     Social Determinants of Health     Financial Resource Strain: Not on file   Food Insecurity: Not on file   Transportation Needs: Not on file   Physical Activity: Not on file   Stress: Not on file   Social Connections: Not on file   Intimate Partner Violence: Not on file   Housing Stability: Not on file     Family History   Problem Relation Age of Onset    Heart Attack Mother     Heart Attack Father        Allergies:  No Known Allergies    Medications:  Home:   Prior to Admission Medications   Prescriptions Last Dose Informant Patient Reported? Taking? BD Insulin Syringe SafetyGlide 0.5 mL 30 gauge x 516\" syrg 1/10/2023  Yes Yes   Blood-Glucose Meter (FREESTYLE LITE METER) monitoring kit 1/10/2023  No Yes   Sig: Test twice blood glucose daily; ICD-10 E11.9; Quantity 1   acetaminophen (TYLENOL) 500 mg tablet Not Taking  No No   Sig: Take 1 Tab by mouth every six (6) hours as needed for Pain. Patient not taking: Reported on 2023   allopurinoL (ZYLOPRIM) 100 mg tablet 1/10/2023  Yes Yes   Sig: Take 100 mg by mouth daily. aspirin 81 mg chewable tablet 1/10/2023  No Yes   Sig: Take 1 Tablet by mouth daily.    atorvastatin (LIPITOR) 80 mg tablet 1/10/2023  No Yes   Sig: Take 1 Tablet by mouth daily. bumetanide (BUMEX) 1 mg tablet 1/10/2023  Yes Yes   Sig: Take 1 mg by mouth daily. dilTIAZem ER (CARDIZEM CD) 180 mg capsule 1/10/2023  No Yes   Sig: Take 1 Capsule by mouth daily. gabapentin (NEURONTIN) 400 mg capsule 1/10/2023  No Yes   Si capsule daily   glucose blood VI test strips (FREESTYLE TEST) strip 1/10/2023  No Yes   Sig: Use to check blood glucose twice daily DX:E11.9   insulin glargine (LANTUS) 100 unit/mL injection 1/10/2023  No Yes   Sig: Inject 20 Units at bedtime. Monitor and record blood sugar daily. insulin syringe,safetyneedle 1 mL 31 gauge x 5/16\" syrg 1/10/2023  No Yes   Si Each by Does Not Apply route daily. levothyroxine (SYNTHROID) 150 mcg tablet 1/10/2023  Yes Yes   Sig: Take 150 mcg by mouth daily. metFORMIN (GLUCOPHAGE) 500 mg tablet Not Taking  Yes No   Sig: Take 500 mg by mouth daily. Patient not taking: Reported on 2023   metoprolol tartrate (LOPRESSOR) 100 mg IR tablet 1/10/2023  No Yes   Sig: Take 1 Tablet by mouth every twelve (12) hours. morphine IR (MS IR) 15 mg tablet 1/10/2023  Yes Yes   Sig: Take 15 mg by mouth two (2) times a day.       Facility-Administered Medications: None     Current:   Current Facility-Administered Medications:     metOLazone (ZAROXOLYN) tablet 2.5 mg, 2.5 mg, Oral, BID, Jonny Huynh MD, 2.5 mg at 23 1105    bumetanide (BUMEX) injection 0.5 mg, 0.5 mg, IntraVENous, BID, Jonny Huynh MD, 0.5 mg at 23 0851    dilTIAZem (CARDIZEM) 100 mg in 0.9% sodium chloride (MBP/ADV) 100 mL infusion, 15 mg/hr, IntraVENous, CONTINUOUS, Karina Flood MD, Last Rate: 15 mL/hr at 23 1100, 15 mg/hr at 23 1100    milrinone (PRIMACOR) 20 MG/100 ML D5W infusion, 0.2 mcg/kg/min, IntraVENous, CONTINUOUS, Marisa Mcfarland MD, Last Rate: 6.5 mL/hr at 23 0608, 0.2 mcg/kg/min at 23 0608    levothyroxine (SYNTHROID) injection 112 mcg, 112 mcg, IntraVENous, Q24H, Mary Mendosa MD, 112 mcg at 01/26/23 2227    metoprolol tartrate (LOPRESSOR) tablet 50 mg, 50 mg, Oral, Q6H, 50 mg at 01/27/23 1011 **OR** metoprolol (LOPRESSOR) injection 5 mg, 5 mg, IntraVENous, Q6H, Savanna Hugo PA-C, 5 mg at 01/28/23 1105    heparin (porcine) 25,000 units in 0.45% saline 250 ml infusion, 18-36 Units/kg/hr, IntraVENous, TITRATE, Onesimo Kc MD, Last Rate: 11.2 mL/hr at 01/28/23 0754, 10.3 Units/kg/hr at 01/28/23 0754    insulin glargine (LANTUS) injection 20 Units, 20 Units, SubCUTAneous, QHS, Onesimo Kc MD, 20 Units at 01/27/23 2137    [Held by provider] heparin (porcine) injection 5,000 Units, 5,000 Units, SubCUTAneous, Q8H, Onesimo Kc MD, 5,000 Units at 01/25/23 1117    [Held by provider] acetaminophen (TYLENOL) tablet 650 mg, 650 mg, Oral, Q6H, Onesimo Kc MD, 650 mg at 01/25/23 1118    [Held by provider] enoxaparin (LOVENOX) injection 100 mg, 1 mg/kg, SubCUTAneous, DAILY, Anirudh Cosme MD, 100 mg at 01/23/23 1049    triamcinolone (ARISTOCORT) 0.5 % cream, , Topical, BID, Onesimo Kc MD, Given at 01/28/23 0903    ipratropium-albuterol (COMBIVENT RESPIMAT) 20 mcg-100 mcg inhalation spray, 1 Puff, Inhalation, Q4H PRN, Nestor Mittal MD, 1 Puff at 01/23/23 1102    camphor-menthoL (SARNA) 0.5-0.5 % lotion, , Topical, TID, CapilitanKailey NP, Given at 01/28/23 0903    glucose chewable tablet 16 g, 16 g, Oral, PRN, Rigoberto Hazel MD    glucagon [facility]) injection 1 mg, 1 mg, IntraMUSCular, PRN, Rigoberto Hazel MD    dextrose 10% infusion 0-250 mL, 0-250 mL, IntraVENous, PRN, Rigoberto Hazel MD    insulin lispro (HUMALOG) injection, , SubCUTAneous, Q6H, Eva Grant PA-C, 2 Units at 01/28/23 1237    famotidine (PF) (PEPCID) 20 mg in 0.9% sodium chloride 10 mL injection, 20 mg, IntraVENous, DAILY, Eva Grant PA-C, 20 mg at 01/28/23 0851    aspirin chewable tablet 81 mg, 81 mg, Oral, DAILY, Rigoberto Hazel MD, 81 mg at 01/28/23 0851    [Held by provider] atorvastatin (LIPITOR) tablet 80 mg, 80 mg, Oral, DAILY, Akin Sosa MD, 80 mg at 01/23/23 1050    [Held by provider] levothyroxine (SYNTHROID) tablet 150 mcg, 150 mcg, Oral, DAILY, Akin Sosa MD, 150 mcg at 01/24/23 1456    [Held by provider] gabapentin (NEURONTIN) capsule 400 mg, 400 mg, Oral, DAILY, Akin Sosa MD, 400 mg at 01/23/23 1050    [Held by provider] allopurinoL (ZYLOPRIM) tablet 100 mg, 100 mg, Oral, DAILY, Akin Sosa MD, 100 mg at 01/23/23 1050    sodium chloride (NS) flush 5-40 mL, 5-40 mL, IntraVENous, Q8H, Akin Sosa MD, 5 mL at 01/28/23 0600    acetaminophen (TYLENOL) tablet 650 mg, 650 mg, Oral, Q6H PRN, 650 mg at 01/28/23 1314 **OR** acetaminophen (TYLENOL) suppository 650 mg, 650 mg, Rectal, Q6H PRN, Akin Sosa MD    polyethylene glycol (MIRALAX) packet 17 g, 17 g, Oral, DAILY PRN, Akin Sosa MD    ondansetron (ZOFRAN ODT) tablet 4 mg, 4 mg, Oral, Q8H PRN **OR** ondansetron (ZOFRAN) injection 4 mg, 4 mg, IntraVENous, Q6H PRN, Akin Sosa MD, 4 mg at 01/24/23 0741        Physical Examination:     Vital Signs:  Visit Vitals  /65 (BP 1 Location: Right lower arm, BP Patient Position: At rest)   Pulse (!) 132   Temp 99 °F (37.2 °C)   Resp (!) 33   Ht 5' 4\" (1.626 m)   Wt 106.8 kg (235 lb 7.2 oz)   SpO2 91%   Breastfeeding No   BMI 40.42 kg/m²        Gen: NAD, A&Ox3. HEENT: Neck is sepple with no obvious lymphadenopathy  CV: Regular rate and rhythm. Normal S1 and S2, No s3 or s4. No obvious murmurs or rubs. JVP 8  Lungs: CTA Bilaterally with normal respiration  Abdomen: Soft and nontender with normal bowel sounds. Non tender with no guarding  Extremities: No edema. 2+ Pulses. Extremities are warm and well perfused. Neuro: No focal neurological deficits.  Grossly normal neurologic exam  Skin: NO obvious echymosis      Telemetry reviewed:       Lipid Panel   Lab Results   Component Value Date/Time    Cholesterol, total 191 11/09/2021 12:24 PM    HDL Cholesterol 44 11/09/2021 12:24 PM    LDL, calculated 96.8 11/09/2021 12:24 PM    Triglyceride 251 (H) 11/09/2021 12:24 PM        Cardiac Enzymes   Lab Results   Component Value Date/Time     08/11/2022 02:47 AM     (H) 08/10/2022 10:23 AM    CPK 89 04/18/2018 03:48 PM    CKMB 2.2 04/18/2018 03:48 PM    CKMB 2.9 11/04/2009 08:12 AM          Clarita Michaud MD  January 28, 2023  1:58 PM

## 2023-01-28 NOTE — PROGRESS NOTES
[facility]  DAILY PROGRESS NOTE      Patient:    Teresa Adams , 82 y.o. female   MRN:  [id number]  Room/Bed:  368/01  Admission Date:   1/17/2023  Code status:  DNR    Reason for Admission: Acute hypoxic respiratory failure, COVID-19 infection, A-fib with RVR, NSTEMI    ASSESSMENT AND PLAN:   Problem List Items Addressed This Visit          Circulatory    Atrial fibrillation ([facility] [address]       Respiratory    Pulmonary edema    Acute respiratory failure with hypoxia (HCC)       Other    COVID     Other Visit Diagnoses       Acute on chronic heart failure with reduced ejection fraction and diastolic dysfunction (HCC)    -  Primary    NSTEMI (non-ST elevated myocardial infarction) ([facility] [address]        Goals of care, counseling/discussion [I12.69 (ICD-10-CM)]        Age-related physical debility [place] (ICD-10-CM)]        Tachycardia        Encephalopathy        Acute encephalopathy [G93.40 (ICD-10-CM)]        AQUILES (acute kidney injury) ([facility] [address] [N17.9 (ICD-10-CM)]        Physical deconditioning [R53.81 (ICD-10-CM)]        Advanced age Mavis.Corners (ICD-10-CM)]                    Acute hypoxic respiratory failure/COVID19 Infection/HFrEF (20-25%)  -Presented with SOB and HR in 200's.  Acute SOB most likely multifactorial given pt's comorbidities with recent COVID infection that has exacerbated the SOB  -Tested positive for COVID-19.  -Started on BIPAP in the ED and transitioned to NC, poorly tolerated and transitioned back to BIPAP, continued to be in respiratory distress and required intubation and admission to the ICU  -CXR (1/18)- progressive cardiogenic pulmonary edema and pleural effusions   -Leukocytosis: 16.7 on 1/19>23>20.4>25.4>16.3> today 11.4  -New oxygen requirement overnight, RT placed on 30L HFNC at 100% O2  -Renal US showing BL Pleural effusions  -Midline placed to facilitate IV medication admin  -Per pulm, thyroid studies ordered showing that pt's T3 quite low at 0.7, w/ T4 and TSG WNL  -T3 low, likely related to illness  -1/27: cefriaxone and metronidazole discontinued  Plan:  -ID consulted - appreciate recommendations  -Pulmonology consulted - appreciate recs  -Evaluate mental status, avoid sedating meds. -bumex 0.5 mg IV BID  -Palliative following      Afib w/ RVR, HTN, NSTEMI  -On admission, pt tachycardic in the 200's  -EKG: Atrial fibrillation with rapid ventricular response   -Started on Cardizem drip in the ED due to HR in 200's, but decreased BP too fast so dc'd and started Amnio gtt  -OP meds: Eliquis 5mg BID for anticoagulation, Diltiazem and Lopressor for rate control and HTN  Plan:  -amiodarone stopped as not tolerating oral   -Metoprolol 50 mg BID OR 5mg IV  -aspirin 81 mg  -MAP >65 mmHg  -Cardiology consulted - added milrinone ggt  -Continue Dilt ggt, Heparin ggt      AQUILES possible ATN  -Cr trending down today  -US: No hydronephrosis, bladder volume 679.4mL, Bilateral pleural effusions  -UA: Large blood, moderate LE, 3+ Bact, 1+ yeast  Plan:   -Nephrology consulted  -Daily BMP  -Improving despite restarting diuretics. -Urine output improved  -Urine culture pending     DM with neuorpathy  -most recent A1c 11.6 in Jan 4 2023  -home meds: insulin glargine 20 U qhs, Metformin 1000 mg BID, Gabapentin 800 QHS   Plan:   -Q6h glucoses  -SSI   -Lantus 20 units   -hypoglycemia protocol  -gabapentin 400 mg daily - Hold for sedation     Hypothyroidism - stable  -meds: levo 150 mcg  -pt nonadherent with medications at home   Plan:   -levothyroxine 112 mcg IV daily      Chronic pain, Gout, stable  -home meds: previously on morphine 15mg q12 PRN  -Recently stated that she stopped taking morphine due to no longer going to pain management provider   Plan:   -holding allopurinol        Global:  Code: DNR  IVF/Drips: Amiodarone, heparin, milrinone  I/O / Wt: 99.8kg  Diet: Soft  DVT/AC: heparin gtt  Mobility: sedated/restrained   Anticipated LOS: 3-4 midnights     Point of Contact (relationship, number):  Son, [address] Hernandes [phone]         SUBJECTIVE:   Events of the last 24 hours:  No acute events overnight. Seen at bedside. High flow nasal canula in place. She is awake, did not respond to my questions but did greet me.           CURRENT INPATIENT MEDICATIONS:  Current Facility-Administered Medications   Medication Dose Route Frequency Provider Last Rate Last Admin    potassium chloride 10 mEq in 100 ml IVPB  10 mEq IntraVENous Q1H Salvador Huynh  mL/hr at 01/28/23 0655 10 mEq at 01/28/23 0655    bumetanide (BUMEX) injection 0.5 mg  0.5 mg IntraVENous BID Salvador Huynh MD   0.5 mg at 01/27/23 1840    dilTIAZem (CARDIZEM) 100 mg in 0.9% sodium chloride (MBP/ADV) 100 mL infusion  10 mg/hr IntraVENous CONTINUOUS Jazmine Bowling MD 10 mL/hr at 01/28/23 0608 10 mg/hr at 01/28/23 0608    milrinone (PRIMACOR) 20 MG/100 ML D5W infusion  0.2 mcg/kg/min IntraVENous CONTINUOUS Jazmine Bowling MD 6.5 mL/hr at 01/28/23 0608 0.2 mcg/kg/min at 01/28/23 9583    levothyroxine (SYNTHROID) injection 112 mcg  112 mcg IntraVENous Q24H Wil Raman MD   112 mcg at 01/26/23 2227    metoprolol tartrate (LOPRESSOR) tablet 50 mg  50 mg Oral Q6H Savanna Hugo PA-C   50 mg at 01/27/23 1011    Or    metoprolol (LOPRESSOR) injection 5 mg  5 mg IntraVENous Q6H Savanna Hugo PA-C   5 mg at 01/28/23 0353    heparin (porcine) 25,000 units in 0.45% saline 250 ml infusion  18-36 Units/kg/hr IntraVENous TITRATE Wil Raman MD 14.2 mL/hr at 01/28/23 0717 13.03 Units/kg/hr at 01/28/23 0717    insulin glargine (LANTUS) injection 20 Units  20 Units SubCUTAneous QHS Wil Raman MD   20 Units at 01/27/23 2137    [Held by provider] heparin (porcine) injection 5,000 Units  5,000 Units SubCUTAneous Q8H Wil Raman MD   5,000 Units at 01/25/23 1117    [Held by provider] acetaminophen (TYLENOL) tablet 650 mg  650 mg Oral Q6H Wil Raman MD   650 mg at 01/25/23 1118    [Held by provider] enoxaparin (LOVENOX) injection 100 mg  1 mg/kg SubCUTAneous DAILY Devan Cosme MD   100 mg at 01/23/23 1049    triamcinolone (ARISTOCORT) 0.5 % cream   Topical BID Debra Brenner MD   Given at 01/27/23 1851    ipratropium-albuterol (COMBIVENT RESPIMAT) 20 mcg-100 mcg inhalation spray  1 Puff Inhalation Q4H PRN Preston Alston MD   1 Puff at 01/23/23 1102    camphor-menthoL (SARNA) 0.5-0.5 % lotion   Topical TID Grey Barriga NP   Given at 01/28/23 0037    glucose chewable tablet 16 g  16 g Oral PRN Michell Shafer MD        glucagon (GLUCAGEN) injection 1 mg  1 mg IntraMUSCular PRN Michell Shafer MD        dextrose 10% infusion 0-250 mL  0-250 mL IntraVENous PRN Michell Shafer MD        insulin lispro (HUMALOG) injection   SubCUTAneous Q6H Eva Grant PA-C   4 Units at 01/28/23 0009    famotidine (PF) (PEPCID) 20 mg in 0.9% sodium chloride 10 mL injection  20 mg IntraVENous DAILY Eva Grant PA-C   20 mg at 01/27/23 1002    aspirin chewable tablet 81 mg  81 mg Oral DAILY Michell Shafer MD   81 mg at 01/27/23 1002    [Held by provider] atorvastatin (LIPITOR) tablet 80 mg  80 mg Oral DAILY Michell Shafer MD   80 mg at 01/23/23 1050    [Held by provider] levothyroxine (SYNTHROID) tablet 150 mcg  150 mcg Oral DAILY Michell Shafer MD   150 mcg at 01/24/23 1456    [Held by provider] gabapentin (NEURONTIN) capsule 400 mg  400 mg Oral DAILY Michell Shafer MD   400 mg at 01/23/23 1050    [Held by provider] allopurinoL (ZYLOPRIM) tablet 100 mg  100 mg Oral DAILY Michell Shafer MD   100 mg at 01/23/23 1050    sodium chloride (NS) flush 5-40 mL  5-40 mL IntraVENous Q8H Michell Shafer MD   5 mL at 01/28/23 0600    acetaminophen (TYLENOL) tablet 650 mg  650 mg Oral Q6H PRN Michell Shafer MD        Or    acetaminophen (TYLENOL) suppository 650 mg  650 mg Rectal Q6H PRN Michell Shafer MD        polyethylene glycol (MIRALAX) packet 17 g  17 g Oral DAILY PRN Michell Shafer MD        ondansetron (ZOFRAN ODT) tablet 4 mg  4 mg Oral Q8H PRN Yu Romero MD        Or    ondansetron [facility]) injection 4 mg  4 mg IntraVENous Q6H PRN Yu Romero MD   4 mg at 01/24/23 0741       Allergies  No Known Allergies    OBJECTIVE:    Intake/Output Summary (Last 24 hours) at 1/28/2023 0719  Last data filed at 1/28/2023 0406  Gross per 24 hour   Intake 177 ml   Output 2400 ml   Net -2223 ml         Visit Vitals  /68 (BP 1 Location: Right lower arm, BP Patient Position: At rest;Semi fowlers)   Pulse (!) 116   Temp 98.8 °F (37.1 °C)   Resp 25   Ht 5' 4\" (1.626 m) Comment: Photo ID   Wt 106.8 kg (235 lb 7.2 oz)   SpO2 (!) 89%   Breastfeeding No   BMI 40.42 kg/m²       PHYSICAL EXAM  Gen: NAD, comfortable, obese, on HFNC. HEENT: normocephalic, atraumatic, MMM  CV: irregularly irregular, S1/S2 present without M/R/G   Pulm: Decreased breath sounds at bases. Diffuse ronchi. No wheezing  Abd: S/NT/ND, no rebound, no guarding  MSK: no clubbing, no edema  Skin: warm, dry, excoriations on all parts of body in multiple stages of healing, interspersed ecchymoses   Neuro: CN II-XII grossly intact, no focal deficits appreciated   Psych: Difficult to assess due to hearing deficit.  Awake, did not answer questions     LABWORK (LAST 24 HOURS)  Recent Results (from the past 24 hour(s))   PTT    Collection Time: 01/27/23 12:14 PM   Result Value Ref Range    aPTT 61.4 (H) 23.0 - 36.4 SEC   GLUCOSE, POC    Collection Time: 01/27/23 12:34 PM   Result Value Ref Range    Glucose (POC) 275 (H) 70 - 110 mg/dL   PTT    Collection Time: 01/27/23  4:57 PM   Result Value Ref Range    aPTT 37.4 (H) 23.0 - 36.4 SEC   GLUCOSE, POC    Collection Time: 01/27/23  5:23 PM   Result Value Ref Range    Glucose (POC) 274 (H) 70 - 110 mg/dL   GLUCOSE, POC    Collection Time: 01/27/23 11:30 PM   Result Value Ref Range    Glucose (POC) 202 (H) 70 - 110 mg/dL   MAGNESIUM    Collection Time: 01/28/23  4:48 AM   Result Value Ref Range    Magnesium 1.8 1.6 - 2.6 mg/dL PHOSPHORUS    Collection Time: 01/28/23  4:48 AM   Result Value Ref Range    Phosphorus 3.0 2.5 - 4.9 MG/DL   METABOLIC PANEL, BASIC    Collection Time: 01/28/23  4:48 AM   Result Value Ref Range    Sodium 149 (H) 136 - 145 mmol/L    Potassium 3.1 (L) 3.5 - 5.5 mmol/L    Chloride 113 (H) 100 - 111 mmol/L    CO2 31 21 - 32 mmol/L    Anion gap 5 3.0 - 18 mmol/L    Glucose 157 (H) 74 - 99 mg/dL    BUN 75 (H) 7.0 - 18 MG/DL    Creatinine 1.76 (H) 0.6 - 1.3 MG/DL    BUN/Creatinine ratio 43 (H) 12 - 20      eGFR 28 (L) >60 ml/min/1.73m2    Calcium 8.7 8.5 - 10.1 MG/DL   CBC WITH AUTOMATED DIFF    Collection Time: 01/28/23  4:48 AM   Result Value Ref Range    WBC 17.3 (H) 4.6 - 13.2 K/uL    RBC 4.05 (L) 4.20 - 5.30 M/uL    HGB 10.3 (L) 12.0 - 16.0 g/dL    HCT 33.8 (L) 35.0 - 45.0 %    MCV 83.5 78.0 - 100.0 FL    MCH 25.4 24.0 - 34.0 PG    MCHC 30.5 (L) 31.0 - 37.0 g/dL    RDW 18.3 (H) 11.6 - 14.5 %    PLATELET 879 142 - 179 K/uL    MPV 13.1 (H) 9.2 - 11.8 FL    NRBC 1.1 (H) 0  WBC    ABSOLUTE NRBC 0.19 (H) 0.00 - 0.01 K/uL    NEUTROPHILS 94 (H) 40 - 73 %    LYMPHOCYTES 2 (L) 21 - 52 %    MONOCYTES 3 3 - 10 %    EOSINOPHILS 1 0 - 5 %    BASOPHILS 0 0 - 2 %    IMMATURE GRANULOCYTES 0 0.0 - 0.5 %    ABS. NEUTROPHILS 16.3 (H) 1.8 - 8.0 K/UL    ABS. LYMPHOCYTES 0.3 (L) 0.9 - 3.6 K/UL    ABS. MONOCYTES 0.5 0.05 - 1.2 K/UL    ABS. EOSINOPHILS 0.2 0.0 - 0.4 K/UL    ABS. BASOPHILS 0.0 0.0 - 0.1 K/UL    ABS. IMM.  GRANS. 0.0 0.00 - 0.04 K/UL    DF MANUAL      PLATELET COMMENTS ADEQUATE PLATELETS      RBC COMMENTS ANISOCYTOSIS  2+        RBC COMMENTS POLYCHROMASIA  2+        RBC COMMENTS TARGET CELLS  FEW        WBC COMMENTS SMUDGE CELLS SEEN     PTT    Collection Time: 01/28/23  4:48 AM   Result Value Ref Range    aPTT >180.0 (HH) 23.0 - 36.4 SEC   GLUCOSE, POC    Collection Time: 01/28/23  5:25 AM   Result Value Ref Range    Glucose (POC) 157 (H) 70 - 110 mg/dL       IMAGING AND PROCEDURES (LAST 36 HOURS)  No results found.    ================================================================  Further management for Ms. Ginette Moses will be discussed on rounds with my attending.       Eligio Neal MD, PGY-1  [facility]  January 28, 2023 6:39 AM

## 2023-01-29 NOTE — ROUTINE PROCESS
1955 Bedside verbal shift change report received from Bernardino Jimenez [facility]

## 2023-01-29 NOTE — PROGRESS NOTES
Per Palliative Care note, patient's son to discuss with family, about Comfort Measures, and possible Hospice for patient. CM will continue to follow, and monitor for transition of care needs. Patient does not have Medicaid for LTC facility with Hospice if chosen. If Hospice is chosen, will need to be Home with Hospice.              Armand Simon RN  Case Management [phone]

## 2023-01-29 NOTE — PROGRESS NOTES
[facility] Family Medicine  DAILY PROGRESS NOTE      Patient:    Terrance Weathers , 82 y.o. female   MRN:  [id number]  Room/Bed:  368/01  Admission Date:   1/17/2023  Code status:  DNR    Reason for Admission: Acute hypoxic respiratory failure, COVID-19 infection, A-fib with RVR, NSTEMI    ASSESSMENT AND PLAN:   Problem List Items Addressed This Visit          Circulatory    Atrial fibrillation ([facility] [address]       Respiratory    Pulmonary edema    Acute respiratory failure with hypoxia (HCC)       Other    COVID     Other Visit Diagnoses       Acute on chronic heart failure with reduced ejection fraction and diastolic dysfunction (HCC)    -  Primary    NSTEMI (non-ST elevated myocardial infarction) ([facility] [address]        Goals of care, counseling/discussion [N71.90 (ICD-10-CM)]        Age-related physical debility [place] (ICD-10-CM)]        Tachycardia        Encephalopathy        Acute encephalopathy [G93.40 (ICD-10-CM)]        AQUILES (acute kidney injury) ([facility] [address] [N17.9 (ICD-10-CM)]        Physical deconditioning [R53.81 (ICD-10-CM)]        Advanced age Melina (ICD-10-CM)]                    Acute hypoxic respiratory failure/COVID19 Infection/HFrEF (20-25%)  -Presented with SOB and HR in 200's.  Acute SOB most likely multifactorial given pt's comorbidities with recent COVID infection that has exacerbated the SOB  -Tested positive for COVID-19.  -Started on BIPAP in the ED and transitioned to NC, poorly tolerated and transitioned back to BIPAP, continued to be in respiratory distress and required intubation and admission to the ICU  -CXR (1/18)- progressive cardiogenic pulmonary edema and pleural effusions   -Leukocytosis: 16.7 on 1/19>23>20.4>25.4>16.3> today 11.4  -New oxygen requirement overnight, RT placed on 30L HFNC at 100% O2  -Renal US showing BL Pleural effusions  -Midline placed to facilitate IV medication admin  -Per pulm, thyroid studies ordered showing that pt's T3 quite low at 0.7, w/ T4 and TSG WNL  -T3 low, likely related to illness  -1/27: cefriaxone and metronidazole discontinued  Plan:  -ID consulted - signed off  -Pulmonology consulted - appreciate recs  -Evaluate mental status, avoid sedating meds. -bumex 1 mg IV BID    -Palliative following  - Comfort care starting 1/30      Afib w/ RVR, HTN, NSTEMI  -On admission, pt tachycardic in the 200's  -EKG: Atrial fibrillation with rapid ventricular response   -Started on Cardizem drip in the ED due to HR in 200's, but decreased BP too fast so dc'd and started Amnio gtt  -OP meds: Eliquis 5mg BID for anticoagulation, Diltiazem and Lopressor for rate control and HTN  Plan:  -Metoprolol 50 mg BID OR 5mg IV  -aspirin 81 mg  -MAP >65 mmHg  -Cardiology consulted - added milrinone ggt  -Continue Dilt ggt increased to 15mg/hr, Heparin ggt  -Metolazone 2.5mg BID      AQUILES possible ATN  -Cr trending down today  -US: No hydronephrosis, bladder volume 679.4mL, Bilateral pleural effusions  -UA: Large blood, moderate LE, 3+ Bact, 1+ yeast  Plan:   -Nephrology consulted  -Daily BMP  -Urine output improved  -Urine culture pending     DM with neuorpathy  -most recent A1c 11.6 in Jan 4 2023  -home meds: insulin glargine 20 U qhs, Metformin 1000 mg BID, Gabapentin 800 QHS   Plan:   -Q6h glucoses  -SSI   -Lantus 20 units   -hypoglycemia protocol  -gabapentin 400 mg daily - Hold for sedation     Hypothyroidism - stable  -meds: levo 150 mcg  -pt nonadherent with medications at home   Plan:   -levothyroxine 112 mcg IV daily      Chronic pain, Gout, stable  -home meds: previously on morphine 15mg q12 PRN  -Recently stated that she stopped taking morphine due to no longer going to pain management provider   Plan:   -holding allopurinol        Global:  Code: DNR  IVF/Drips: Diltiazem, heparin, milrinone  I/O / Wt: 99.8kg  Diet: Soft  DVT/AC: heparin gtt         Point of Contact (relationship, number): Bere Laguerre [phone]         SUBJECTIVE:   Events of the last 24 hours:  No acute events overnight. Seen at bedside. High flow nasal canula in place. She is minimally awake, did not respond to my questions. Opened one eye.           CURRENT INPATIENT MEDICATIONS:  Current Facility-Administered Medications   Medication Dose Route Frequency Provider Last Rate Last Admin    metOLazone (ZAROXOLYN) tablet 2.5 mg  2.5 mg Oral BID Jonny Huynh MD   2.5 mg at 23 1745    dilTIAZem (CARDIZEM) 100 mg in 0.9% sodium chloride (MBP/ADV) 100 mL infusion  15 mg/hr IntraVENous CONTINUOUS Karina Kam MD 15 mL/hr at 23 0415 15 mg/hr at 23 0415    milrinone (PRIMACOR) 20 MG/100 ML D5W infusion  0.2 mcg/kg/min IntraVENous CONTINUOUS Milagro Frias MD 6.5 mL/hr at 23 2142 0.2 mcg/kg/min at 23 2142    levothyroxine (SYNTHROID) injection 112 mcg  112 mcg IntraVENous Q24H Adonna Apgar, MD   112 mcg at 23 1653    metoprolol tartrate (LOPRESSOR) tablet 50 mg  50 mg Oral Q6H Savanna Hugo PA-C   50 mg at 23 0411    Or    metoprolol (LOPRESSOR) injection 5 mg  5 mg IntraVENous Q6H Savanna Hugo PA-C   5 mg at 23 1653    heparin (porcine) 25,000 units in 0.45% saline 250 ml infusion  18-36 Units/kg/hr IntraVENous TITRATE Adonna Apgar, MD 11.2 mL/hr at 23 2346 10.3 Units/kg/hr at 23 2346    insulin glargine (LANTUS) injection 20 Units  20 Units SubCUTAneous QHS Adonna Apgar, MD   20 Units at 23 2324    [Held by provider] heparin (porcine) injection 5,000 Units  5,000 Units SubCUTAneous Q8H Adonna Apgar, MD   5,000 Units at 23 1117    [Held by provider] acetaminophen (TYLENOL) tablet 650 mg  650 mg Oral Q6H Adonna Apgar, MD   650 mg at 23 1118    [Held by provider] enoxaparin (LOVENOX) injection 100 mg  1 mg/kg SubCUTAneous DAILY Lashonda Cosme MD   100 mg at 23 1049    triamcinolone (ARISTOCORT) 0.5 % cream   Topical BID Adonna Apgar, MD   Given at 23 4462    ipratropium-albuterol (COMBIVENT RESPIMAT) 20 mcg-100 mcg inhalation spray  1 Puff Inhalation Q4H PRN Deisy Rosenberg MD   1 Puff at 01/23/23 1102    camphor-menthoL (SARNA) 0.5-0.5 % lotion   Topical TID Doreen Knight NP   Given at 01/28/23 2149    glucose chewable tablet 16 g  16 g Oral PRN Allyssa Jenkins MD        glucagon [facility]) injection 1 mg  1 mg IntraMUSCular PRN Allyssa Jenkins MD        dextrose 10% infusion 0-250 mL  0-250 mL IntraVENous PRN Allyssa Jenkins MD        insulin lispro (HUMALOG) injection   SubCUTAneous Q6H Eva Grant PA-C   2 Units at 01/29/23 0545    famotidine (PF) (PEPCID) 20 mg in 0.9% sodium chloride 10 mL injection  20 mg IntraVENous DAILY Eva Grant PA-C   20 mg at 01/28/23 3240    aspirin chewable tablet 81 mg  81 mg Oral DAILY Allyssa Jenkins MD   81 mg at 01/28/23 0851    [Held by provider] atorvastatin (LIPITOR) tablet 80 mg  80 mg Oral DAILY Allyssa Jenkins MD   80 mg at 01/23/23 1050    [Held by provider] levothyroxine (SYNTHROID) tablet 150 mcg  150 mcg Oral DAILY Allyssa Jenkins MD   150 mcg at 01/24/23 1456    [Held by provider] gabapentin (NEURONTIN) capsule 400 mg  400 mg Oral DAILY Allyssa Jenkins MD   400 mg at 01/23/23 1050    [Held by provider] allopurinoL (ZYLOPRIM) tablet 100 mg  100 mg Oral DAILY Allyssa Jenkins MD   100 mg at 01/23/23 1050    sodium chloride (NS) flush 5-40 mL  5-40 mL IntraVENous Q8H Allyssa Jenkins MD   10 mL at 01/29/23 0536    acetaminophen (TYLENOL) tablet 650 mg  650 mg Oral Q6H PRN Allyssa Jenkins MD   650 mg at 01/28/23 1314    Or    acetaminophen (TYLENOL) suppository 650 mg  650 mg Rectal Q6H PRN Allyssa Jenkins MD        polyethylene glycol (MIRALAX) packet 17 g  17 g Oral DAILY PRN Allyssa Jenkins MD        ondansetron (ZOFRAN ODT) tablet 4 mg  4 mg Oral Q8H PRN Allyssa Jenkins MD        Or    ondansetron [facility]) injection 4 mg  4 mg IntraVENous Q6H PRN Allyssa Jenkins MD   4 mg at 01/24/23 0741       Allergies  No Known Allergies    OBJECTIVE:    Intake/Output Summary (Last 24 hours) at 1/29/2023 0703  Last data filed at 1/29/2023 0500  Gross per 24 hour   Intake 2520.76 ml   Output 2200 ml   Net 320.76 ml         Visit Vitals  /63   Pulse (!) 106   Temp 98.6 °F (37 °C)   Resp (!) 39   Ht 5' 4\" (1.626 m) Comment: Photo ID   Wt 107 kg (235 lb 14.3 oz)   SpO2 94%   Breastfeeding No   BMI 40.49 kg/m²       PHYSICAL EXAM  Gen: NAD, comfortable, obese, on HFNC. HEENT: normocephalic, atraumatic, MMM  CV: irregularly irregular, S1/S2 present without M/R/G   Pulm: Decreased breath sounds at bases. Diffuse ronchi. No wheezing  Abd: S/NT/ND, no rebound, no guarding  MSK: no clubbing, no edema  Skin: warm, dry, excoriations on all parts of body in multiple stages of healing, interspersed ecchymoses   Neuro: CN II-XII grossly intact, no focal deficits appreciated   Psych: Difficult to assess due to hearing deficit. Awake, did not answer questions. Minimally responsive but did open one eye     LABWORK (LAST 24 HOURS)  Recent Results (from the past 24 hour(s))   GLUCOSE, POC    Collection Time: 01/28/23 11:51 AM   Result Value Ref Range    Glucose (POC) 171 (H) 70 - 110 mg/dL   PTT    Collection Time: 01/28/23  3:30 PM   Result Value Ref Range    aPTT 77.9 (H) 23.0 - 36.4 SEC   GLUCOSE, POC    Collection Time: 01/28/23  5:31 PM   Result Value Ref Range    Glucose (POC) 286 (H) 70 - 110 mg/dL   GLUCOSE, POC    Collection Time: 01/28/23 11:21 PM   Result Value Ref Range    Glucose (POC) 225 (H) 70 - 110 mg/dL   GLUCOSE, POC    Collection Time: 01/29/23  5:36 AM   Result Value Ref Range    Glucose (POC) 151 (H) 70 - 110 mg/dL       IMAGING AND PROCEDURES (LAST 36 HOURS)  No results found.    ================================================================  Further management for Ms. Baljit Urias will be discussed on rounds with my attending.       Dulce Guzmán MD, PGY-1  [facility] Family Medicine  January 29, 2023 6:39 AM

## 2023-01-29 NOTE — PROGRESS NOTES
[facility] Family Medicine   PM CHECK NOTE    Assessment & Plan:  See daily progress note for full assessment/plan. Subjective:  0130: Patient was on 30L HFNC at 100% FiO2 and was saturating between 92-95% with a HR in the 110's. Pt is able to open eyes to verbal stimuli but cannot follow commands. Pt does mumble when asked a question, but is incoherent. Pt resting comfortably in no apparent distress. Objective:  Visit Vitals  /62   Pulse (!) 118   Temp 98.3 °F (36.8 °C)   Resp 26   Ht 5' 4\" (1.626 m)   Wt 107 kg (235 lb 14.3 oz)   SpO2 94%   Breastfeeding No   BMI 40.49 kg/m²       Physical Exam:   General: Comfortable, NAD and drowsy on exam with no increased WOB  HEENT: Conjunctiva pink, sclera anicteric. PERRL. EOMI   CV:  RRR, no M/G/R.  RESP: Unlabored breathing. Lungs CTAB, no wheezes, rales or rhonchi appreciated. Equal expansion bilaterally. ABD:  Soft, nontender, nondistended. No hepatosplenomegaly. MS:  No joint deformity or instability. No atrophy. Neuro: A+Ox0, opening eyes to verbal stimuli, minimally responsive and incoherent with slurring of words  Ext:  No edema. 2+ radial and dp pulses bilaterally. Skin: Warm & dry. No rashes, lesions, or ulcers. Good turgor. Psych: Appropriate mood and affect. The above patient and plan were discussed with my supervising physician. See daily progress note for full assessment/plan.       Kvng Myers MD, PGY-1  [facility]  1/29/2023, 1:33 AM

## 2023-01-29 NOTE — PROGRESS NOTES
In Patient Progress note    Admit Date: 1/17/2023    Impression:   #1 Acute kidney injury on Chronic kidney disease stage III, baseline creatinine around 1.2-1.7, creatinine and BUN is uptrending, secondary to cardiorenal syndrome in the setting of acute on chronic CHF.--> improving   Excellent diuresis   #2 acute hypoxic respiratory failure secondary to: CHF and COVID-19 infection, aspiration pneumonia  #3 acute on chronic CHF  #4 cardiomyopathy with EF of 25%  #5 A. fib RVR  #6 NSTEMI  #7 hypertension  #8 hypokalemia  #9 hypernatremia     diuresis with Bumex and metolazone  Respiratory distress improved  Still on high flow this morning  Mental status wise awake but still confused  Ramirez in place and draining light-colored urine  On milrinone per cardiology  Tachycardia improved    Plan:  #1 strict intake output charting  #2 increase  Bumex to 1  BID IV , metolazone 2.5 mg BID,   goal to keep negative balance of 1.5 L next 24 hours  #3 tachycardia management per primary/ cardiology team  #4 follow pulm,ID  and cardiology recs   #5 renally dose medications for EGFR of less than 15  #6 replace potassium IV and p.o. discussed with nursing    Renal functions have shown some improvement with diuresis  Overall prognosis is poor though  Palliative on board, per nursing family considering comfort care     Discussed plan with patient and Dr Denny Dale,     Please call with questions,     Matthew Montes MD FAS  Cell [phone]  Pager: [phone]       Subjective:     - respiratory - improved slightly  - hemodynamics - stable, no pressrs  - UOP-improved  - Nutrition -ok    Objective:     Visit Vitals  BP (!) 115/58 (BP 1 Location: Right lower arm, BP Patient Position: At rest)   Pulse (!) 115   Temp 99.6 °F (37.6 °C)   Resp (!) 32   Ht 5' 4\" (1.626 m) Comment: Photo ID   Wt 107 kg (235 lb 14.3 oz)   SpO2 90%   Breastfeeding No   BMI 40.49 kg/m²         Intake/Output Summary (Last 24 hours) at 1/29/2023 1532  Last data filed at 1/29/2023 0948  Gross per 24 hour   Intake 2280.76 ml   Output 1450 ml   Net 830.76 ml         Physical Exam:   No distress, on high flow nasal cannula  HEENT: Dry mucosa  Lungs:gema rales  Cardiovascular system: S1, S2, regular rate and rhythm. No JVD   Abdomen: soft, non tender, non distended. Extremities: 2+ LE edema   Integumentary: skin is grossly intact.    Neurologic: confused  Ramirez catheter in place draining light urine    Data Review:    Recent Labs     01/29/23  0930   WBC 15.8*   RBC 4.29   HCT 35.4   MCV 82.5   MCH 24.9   MCHC 30.2*   RDW 18.6*       Recent Labs     01/29/23  0930 01/28/23  0448 01/27/23  0304   BUN 62* 75* 90*   CREA 1.62* 1.76* 2.31*   CA 8.7 8.7 9.1   K 3.2* 3.1* 3.8   * 149* 147*    113* 111   CO2 33* 31 28   PHOS 2.6 3.0 4.5   * 157* 223*         Tin Shaffer MD

## 2023-01-29 NOTE — PROGRESS NOTES
[facility]   POST-ROUNDING NOTE    Assessment & Plan:    Spoke w/ family at bedside. Pt has been agitated for hours. Would like to switch to comfort care at this time. Will order meds for agitation/discomfort. The above patient and plan were discussed with my supervising physician. See daily progress note for full assessment/plan.       Tina Lee MD, PGY-1  [facility]  1/29/2023, 5:42 PM

## 2023-01-29 NOTE — PROGRESS NOTES
0700: Bedside and Verbal shift change report given to Lindsay Velazquez RN (oncoming nurse) by Meli Hinojosa RN (offgoing nurse). Report included the following information SBAR, Kardex, Intake/Output, MAR, Accordion, Cardiac Rhythm A fib, and Alarm Parameters . 1600: Paged [facility] for sedative to calm pt down, pt calling out, presenting differently than yesterday. SonBertha Galeana wants to get palliative on board to switch pt over to comfort measures only. 1900: Bedside and Verbal shift change report given to Aiden Villanueva RN (oncoming nurse) by Lindsay Velazquez RN (offgoing nurse). Report included the following information SBAR, Kardex, Intake/Output, MAR, Accordion, Cardiac Rhythm A fib, and Alarm Parameters . Pt taken off high flow nasal cannula, switched to 2L NC.    SPoke with Aiden Villanueva RN regarding pt O2 saturation on 2 L pt at 53%   DARBY Shahid called residents with [facility] to get pt put on higher oxygen flow for slower decline in oxygenation.    Pt on comfort measures only

## 2023-01-29 NOTE — PROGRESS NOTES
Reason for Admission:   Atrial fibrillation with rapid ventricular response (HCC) [I48.91]  Acute on chronic heart failure (HCC) [I50.9]  COVID [U07.1]  Heart failure ([facility] [I50.9]  Atrial fibrillation with RVR ([facility] [I48.91]  COVID-19 virus infection [U07.1]  Acute respiratory failure with hypoxia ([facility] [J96.01]  COVID-19 [U07.1]    PCP: Renata Red MD               RUR Score:     25%             Resources/supports as identified by patient/family:       Top Challenges facing patient (as identified by patient/family and CM):     Unknown at this time. Per documented notes, Family deciding together on Palliative conversation discussed with patient's son about Comfort Measures, and possible Hospice support for patient. Finances/Medication cost?     Patient has Medicare and . Transportation      Family transports. Support system or lack thereof? Patient has family support. Living arrangements? Patient lives with her boyfriend. Self-care/ADLs/Cognition? Was Self-care. Current Advanced Directive/Advance Care Plan:   no.    Healthcare Decision Maker:   Supplemental (Other) Decision Maker: Robinson Dunn  Carmen - [phone]    Click here to complete [address] including selection of the Healthcare Decision Maker Relationship (ie \"Primary\")                          Plan for utilizing home health:    TBD                      Likelihood of readmission:   HIGH    Transition of Care Plan:                    Initial assessment completed with past medical records. Cognitive status of patient: Unable to assess at this time, per documented notes, patient is not responding to questions. Face sheet information confirmed:  yes. The patient's son Martin Tucker [phone] agrees to participate in her discharge plan and to receive any needed information. This patient lives in a single family home with her boyfriend.         Patient was able to navigate steps as needed. Prior to hospitalization, patient was considered to be independent with ADLs/IADLS : yes . Patient has a current ACP document on file: no.      Transport to be determined closer to time of discharge. The patient already has Salty Veras, Ariana Gillette, W/C, Transfer tub bench, Grab bars, Rolling Walker, Raised Toilet Seat , and  Oxygen with [employer]  medical equipment available in the home. Patient is not currently active with home health. Patient has stayed in a skilled nursing facility or rehab. Was  stay within last 60 days : no. This patient is on dialysis :no.        Currently, the discharge plan is Unable to Determine at this time. The patient states that she can obtain her medications from the pharmacy, and take her medications as directed. Patient's current insurance is:  [address] [place] [address]. Care Management Interventions  PCP Verified by CM: Yes  Mode of Transport at Discharge: Other (see comment) (TBD)  Transition of Care Consult (CM Consult): Discharge Planning  Discharge Durable Medical Equipment: No  Physical Therapy Consult: Yes  Occupational Therapy Consult: Yes  Speech Therapy Consult: Yes  Support Systems: Spouse/Significant Other (Patient lives with her boyfriend.)  Confirm Follow Up Transport: Other (see comment) (TBD)  Discharge Location  Patient Expects to be Discharged to[de-identified] Unable to determine at this time        Teresita Kaur RN  Case Management [phone]        Readmission Assessment  Number of days since last admission?: 1-7 days  Previous disposition: Other (comment) (Patient left Against Medical Advise.)  Who is being interviewed? :  (Perdocumented notes)  What was the patient's/caregiver's perception as to why they think they needed to return back to the hospital?: [place] discharge on prior admission, Other (Comment) (EMS brought patient to ED for Shortness of Breath.)  Did you visit your Primary Care Physician after you left the hospital, before you returned this time?: No  Why weren't you able to visit your PCP?: Other (Comment) (EMS brought patient to ED for Shortness of Breath.)  Did you see a specialist, such as Cardiac, Pulmonary, Orthopedic Physician, etc. after you left the hospital?: No  Who advised the patient to return to the hospital?: Self-referral  Does the patient report anything that got in the way of taking their medications?: No  In our efforts to provide the best possible care to you and others like you, can you think of anything that we could have done to help you after you left the hospital the first time, so that you might not have needed to return so soon?: Other (Comment) (Nothing. Patient left AMA on 01/13/2023.  EMS brought patient to ED for Shortness of Breath.)

## 2023-01-29 NOTE — PROGRESS NOTES
Progress Note    Patient: Flynn Cummings Age: 82 y.o. Sex: female    YOB: 1935 Admit Date: 1/17/2023 PCP: Reji Sales MD   MRN: [id number]  CSN: [id number]         Chief Complaint     Chief Complaint   Patient presents with    Irregular Heart Beat    Respiratory Distress         Assessment and Diagnosis   -Acute hypoxic respiratory failure associated with COVID with underlying HFrEF and COPD, emergently intubated 1/18/2023 AM, extubated on 1/21/2023. -Afib RVR, known Hx afib diagnosed 10/2022  -Elevated troponin, peaked at 2343  -HFrEF, recent diagnosis 12/2022  Echo 12/21/2022 with LVEF 20-25% with severe hypokinesis of basal anteroseptal, basal inferoseptal and apical septal walls, moderately dilated RV with reduced systolic function, severe biatrial enlargement  Repeat Echo this admission with LVEF 20-25%, no significant change from prior  -Hx RLE DVT 06/2022  -CKD  -Anemia  -HTN  -DM  -HLD  -Hx medication noncompliance  -DNR status     Plan and Recommendations   Still volume up on exam. Continue Bumex to 1 bid with continued metolazone. Replace and monitor K>4, Mg>2. Rate/renal function improving with diuresis as HF improves, added K. Hypernatremia, needs free water. Continue metoprolol, If HR remains an issue may need amiodarone. Poor overall prognosis.                    Documented Problems:    Patient Active Problem List    Diagnosis Date Noted    Acute respiratory failure with hypoxia ([address] 01/18/2023    COVID-19 01/18/2023    Atrial fibrillation with rapid ventricular response ([address] 01/17/2023    Acute on chronic heart failure ([address] 01/17/2023    COVID 01/17/2023    Heart failure ([address] 01/17/2023    COVID-19 virus infection 01/17/2023    Cellulitis 01/12/2023    Leukocytosis 12/21/2022    Pulmonary edema 12/21/2022    Dyspnea 12/21/2022    Atrial fibrillation with RVR ([address] 12/21/2022    Patient's noncompliance with other medical treatment and regimen due to unspecified reason 12/21/2022    Debility 12/21/2022    Atrial fibrillation ([address] 12/21/2022    Advanced care planning/counseling discussion 12/21/2022    Acute exacerbation of CHF (congestive heart failure) ([address] 10/07/2022    Sepsis ([address] 08/10/2022    Septic arthritis of foot ([address] 05/26/2022    Diabetic foot ulcer ([address] 05/26/2022    Septic joint ([address] 05/26/2022    Neuropathy 08/10/2021    Essential hypertension 07/07/2021    Bilateral lower extremity edema 07/07/2021    Hypercholesteremia 07/07/2021    History of fall 07/07/2021    Chronic bilateral low back pain without sciatica 07/07/2021    OAB (overactive bladder) 07/07/2021    Urinary incontinence 07/07/2021    Chronic diastolic congestive heart failure ([address] 03/10/2021    Obesity, morbid ([address] 12/15/2017    Type 2 diabetes mellitus with diabetic nephropathy, with long-term current use of insulin ([address] 12/15/2017    Dermatitis 08/23/2017    Acquired hypothyroidism 08/01/2016    Encounter for palliative care 04/21/2016       Past Medical/Surgical/Social/Family History:  Past Medical History:   Diagnosis Date    Acquired hypothyroidism 8/1/2016    Arthritis of right shoulder region 4/21/2016    Asthma     Bilateral lower extremity edema 7/7/2021    Chronic bilateral low back pain without sciatica 7/7/2021    Chronic diastolic congestive heart failure ([address] 3/10/2021    Chronic venous insufficiency     Diabetes (HCC)     neuropathy    Essential hypertension 7/7/2021    Gout     History of depression 1/23/2017    History of fall 7/7/2021    Hypercholesteremia 7/7/2021    Hypertension     MI (myocardial infarction) ([address]     OAB (overactive bladder) 7/7/2021    Peripheral neuropathy     Rheumatoid arthritis ([address]     Tear of right rotator cuff 5/16/2016    Thyroid pain     Urinary incontinence 7/7/2021    Vertigo      Past Surgical History:   Procedure Laterality Date    HX AMPUTATION TOE Right 2020    Great toe Dr. Randy Fabry HX GYN      TAHOophorectomy due to infection    HX HEENT      resection of memegioma in the . HX KNEE REPLACEMENT Bilateral      Social History     Socioeconomic History    Marital status:      Spouse name: Not on file    Number of children: Not on file    Years of education: Not on file    Highest education level: Not on file   Occupational History    Not on file   Tobacco Use    Smoking status: Former     Types: Cigarettes     Quit date: 12     Years since quittin.1    Smokeless tobacco: Never    Tobacco comments:     quit 45 years ago   Vaping Use    Vaping Use: Never used   Substance and Sexual Activity    Alcohol use: No     Alcohol/week: 0.0 standard drinks    Drug use: No    Sexual activity: Not Currently   Other Topics Concern    Not on file   Social History Narrative    Not on file     Social Determinants of Health     Financial Resource Strain: Not on file   Food Insecurity: Not on file   Transportation Needs: Not on file   Physical Activity: Not on file   Stress: Not on file   Social Connections: Not on file   Intimate Partner Violence: Not on file   Housing Stability: Not on file     Family History   Problem Relation Age of Onset    Heart Attack Mother     Heart Attack Father        Allergies:  No Known Allergies    Medications:  Home:   Prior to Admission Medications   Prescriptions Last Dose Informant Patient Reported? Taking? BD Insulin Syringe SafetyGlide 0.5 mL 30 gauge x 516\" syrg 1/10/2023  Yes Yes   Blood-Glucose Meter (FREESTYLE LITE METER) monitoring kit 1/10/2023  No Yes   Sig: Test twice blood glucose daily; ICD-10 E11.9; Quantity 1   acetaminophen (TYLENOL) 500 mg tablet Not Taking  No No   Sig: Take 1 Tab by mouth every six (6) hours as needed for Pain. Patient not taking: Reported on 2023   allopurinoL (ZYLOPRIM) 100 mg tablet 1/10/2023  Yes Yes   Sig: Take 100 mg by mouth daily.    aspirin 81 mg chewable tablet 1/10/2023  No Yes   Sig: Take 1 Tablet by mouth daily. atorvastatin (LIPITOR) 80 mg tablet 1/10/2023  No Yes   Sig: Take 1 Tablet by mouth daily. bumetanide (BUMEX) 1 mg tablet 1/10/2023  Yes Yes   Sig: Take 1 mg by mouth daily. dilTIAZem ER (CARDIZEM CD) 180 mg capsule 1/10/2023  No Yes   Sig: Take 1 Capsule by mouth daily. gabapentin (NEURONTIN) 400 mg capsule 1/10/2023  No Yes   Si capsule daily   glucose blood VI test strips (FREESTYLE TEST) strip 1/10/2023  No Yes   Sig: Use to check blood glucose twice daily DX:E11.9   insulin glargine (LANTUS) 100 unit/mL injection 1/10/2023  No Yes   Sig: Inject 20 Units at bedtime. Monitor and record blood sugar daily. insulin syringe,safetyneedle 1 mL 31 gauge x 5/16\" syrg 1/10/2023  No Yes   Si Each by Does Not Apply route daily. levothyroxine (SYNTHROID) 150 mcg tablet 1/10/2023  Yes Yes   Sig: Take 150 mcg by mouth daily. metFORMIN (GLUCOPHAGE) 500 mg tablet Not Taking  Yes No   Sig: Take 500 mg by mouth daily. Patient not taking: Reported on 2023   metoprolol tartrate (LOPRESSOR) 100 mg IR tablet 1/10/2023  No Yes   Sig: Take 1 Tablet by mouth every twelve (12) hours. morphine IR (MS IR) 15 mg tablet 1/10/2023  Yes Yes   Sig: Take 15 mg by mouth two (2) times a day.       Facility-Administered Medications: None     Current:   Current Facility-Administered Medications:     bumetanide (BUMEX) injection 0.5 mg, 0.5 mg, IntraVENous, BID, Karen Kc MD    metOLazone (ZAROXOLYN) tablet 2.5 mg, 2.5 mg, Oral, BID, Jonny Huynh MD, 2.5 mg at 23 0955    dilTIAZem (CARDIZEM) 100 mg in 0.9% sodium chloride (MBP/ADV) 100 mL infusion, 15 mg/hr, IntraVENous, CONTINUOUS, Abdulkadir Paul MD, Last Rate: 15 mL/hr at 23 0415, 15 mg/hr at 23 0415    milrinone (PRIMACOR) 20 MG/100 ML D5W infusion, 0.2 mcg/kg/min, IntraVENous, CONTINUOUS, Shoaib Iglesias MD, Last Rate: 6.5 mL/hr at 23, 0.2 mcg/kg/min at 23    levothyroxine (SYNTHROID) injection 112 mcg, 112 mcg, IntraVENous, Q24H, Onesimo Kc MD, 112 mcg at 01/28/23 1653    metoprolol tartrate (LOPRESSOR) tablet 50 mg, 50 mg, Oral, Q6H, 50 mg at 01/29/23 0411 **OR** metoprolol (LOPRESSOR) injection 5 mg, 5 mg, IntraVENous, Q6H, Savanna Hugo PA-C, 5 mg at 01/29/23 0958    heparin (porcine) 25,000 units in 0.45% saline 250 ml infusion, 18-36 Units/kg/hr, IntraVENous, TITRATE, Onesimo Kc MD, Last Rate: 11.2 mL/hr at 01/28/23 2346, 10.3 Units/kg/hr at 01/28/23 2346    insulin glargine (LANTUS) injection 20 Units, 20 Units, SubCUTAneous, QHS, Onesimo Kc MD, 20 Units at 01/28/23 2324    [Held by provider] heparin (porcine) injection 5,000 Units, 5,000 Units, SubCUTAneous, Q8H, Onesimo Kc MD, 5,000 Units at 01/25/23 1117    [Held by provider] acetaminophen (TYLENOL) tablet 650 mg, 650 mg, Oral, Q6H, Onesimo Kc MD, 650 mg at 01/25/23 1118    [Held by provider] enoxaparin (LOVENOX) injection 100 mg, 1 mg/kg, SubCUTAneous, DAILY, Anirudh Cosme MD, 100 mg at 01/23/23 1049    triamcinolone (ARISTOCORT) 0.5 % cream, , Topical, BID, Onesimo Kc MD, Given at 01/29/23 0958    ipratropium-albuterol (COMBIVENT RESPIMAT) 20 mcg-100 mcg inhalation spray, 1 Puff, Inhalation, Q4H PRN, Nestor Mittal MD, 1 Puff at 01/23/23 1102    camphor-menthoL (SARNA) 0.5-0.5 % lotion, , Topical, TID, Capilitan, Darrol Drilling, NP, Given at 01/29/23 0957    glucose chewable tablet 16 g, 16 g, Oral, PRN, Rigoberto Hazel MD    glucagon [facility]) injection 1 mg, 1 mg, IntraMUSCular, PRN, Rigoberto Hazel MD    dextrose 10% infusion 0-250 mL, 0-250 mL, IntraVENous, PRN, Rigoberto Hazel MD    insulin lispro (HUMALOG) injection, , SubCUTAneous, Q6H, Eva Grant PA-C, 2 Units at 01/29/23 0545    famotidine (PF) (PEPCID) 20 mg in 0.9% sodium chloride 10 mL injection, 20 mg, IntraVENous, DAILY, Eva Grant PA-C, 20 mg at 01/29/23 0957    aspirin chewable tablet 81 mg, 81 mg, Oral, Dima Perry MD, 81 mg at 01/29/23 0037    [Held by provider] atorvastatin (LIPITOR) tablet 80 mg, 80 mg, Oral, DAILY, Manuel Mcdermott MD, 80 mg at 01/23/23 1050    [Held by provider] levothyroxine (SYNTHROID) tablet 150 mcg, 150 mcg, Oral, DAILY, Manuel Mcdermott MD, 150 mcg at 01/24/23 1456    [Held by provider] gabapentin (NEURONTIN) capsule 400 mg, 400 mg, Oral, DAILY, Manuel Mcdermott MD, 400 mg at 01/23/23 1050    [Held by provider] allopurinoL (ZYLOPRIM) tablet 100 mg, 100 mg, Oral, DAILY, Manuel Mcdermott MD, 100 mg at 01/23/23 1050    sodium chloride (NS) flush 5-40 mL, 5-40 mL, IntraVENous, Q8H, Manuel Mcdermott MD, 10 mL at 01/29/23 0536    acetaminophen (TYLENOL) tablet 650 mg, 650 mg, Oral, Q6H PRN, 650 mg at 01/28/23 1314 **OR** acetaminophen (TYLENOL) suppository 650 mg, 650 mg, Rectal, Q6H PRN, Manuel Mcdermott MD    polyethylene glycol (MIRALAX) packet 17 g, 17 g, Oral, DAILY PRN, Manuel Mcdermott MD    ondansetron (ZOFRAN ODT) tablet 4 mg, 4 mg, Oral, Q8H PRN **OR** ondansetron (ZOFRAN) injection 4 mg, 4 mg, IntraVENous, Q6H PRN, Manuel Mcdermott MD, 4 mg at 01/24/23 0741        Physical Examination:     Vital Signs:  Visit Vitals  /74   Pulse (!) 110   Temp 98.4 °F (36.9 °C)   Resp (!) 34   Ht 5' 4\" (1.626 m)   Wt 107 kg (235 lb 14.3 oz)   SpO2 91%   Breastfeeding No   BMI 40.49 kg/m²        Gen: NAD, A&Ox3. HEENT: Neck is sepple with no obvious lymphadenopathy  CV: Regular rate and rhythm. Normal S1 and S2, No s3 or s4. No obvious murmurs or rubs. JVP 8  Lungs: CTA Bilaterally with normal respiration  Abdomen: Soft and nontender with normal bowel sounds. Non tender with no guarding  Extremities: No edema. 2+ Pulses. Extremities are warm and well perfused. Neuro: No focal neurological deficits.  Grossly normal neurologic exam  Skin: NO obvious echymosis      Telemetry reviewed:       Lipid Panel   Lab Results   Component Value Date/Time    Cholesterol, total 191 11/09/2021 12:24 PM HDL Cholesterol 44 11/09/2021 12:24 PM    LDL, calculated 96.8 11/09/2021 12:24 PM    Triglyceride 251 (H) 11/09/2021 12:24 PM        Cardiac Enzymes   Lab Results   Component Value Date/Time     08/11/2022 02:47 AM     (H) 08/10/2022 10:23 AM    CPK 89 04/18/2018 03:48 PM    CKMB 2.2 04/18/2018 03:48 PM    CKMB 2.9 11/04/2009 08:12 AM          Kyle Palacios MD  January 29, 2023

## 2023-01-30 NOTE — PROGRESS NOTES
Palliative Medicine     Patient seen this am.     Artikris Thomas and other family member were present at bedside. Patient had just recently passed. Support and condolences offered. Packet for [facility])  for body donation given per son request. Bedside RN,  and attending team notified. Thank you for the consult and allowing us to care for this wonderful patient.      Katya Orantes BSN, RN, [facility]  Palliative Medicine Inpatient RN  Palliative [facility] Line: [phone]

## 2023-01-30 NOTE — PROGRESS NOTES
Called to place patient back to [facility]. Arrived to patient bedside. Family just wants patient comfortable, they do not see the point of increasing her 02 if she is transitioning. Family requested to leave patient on NRB as long as she is comfortable. RN notified.

## 2023-01-30 NOTE — PROGRESS NOTES
responded to a nurse call for a death in room 368 today. Family was already present. Kimberly Lozano prayer and support to the family. There is no   chosen yet and family will call back with this information. Escorted family to the door.     Toni Acevedo   Board Certified [address] [place] [address]   [phone]

## 2023-01-30 NOTE — PALLIATIVE CARE
Patient seen this am.    Son at bedside. Patient had just recently passed. Support and condolences offered. Information on body donation given per son request. Neelima Gomes and attending team notified.

## 2023-01-30 NOTE — PROGRESS NOTES
Spoke with Dr. Tayo Bowers regarding family wanting patient to be taken off of O2 all together. No orders were given, doctor will be up to speak with family.

## 2023-01-30 NOTE — PROGRESS NOTES
[facility] - Progress Note  Admit Date: 1/17/2023    Assessment:     -Acute hypoxic respiratory failure associated with COVID with underlying HFrEF and COPD, emergently intubated 1/18/2023 AM, extubated on 1/21/2023. -Afib RVR, known Hx afib diagnosed 10/2022  -Elevated troponin, peaked at 2343  -HFrEF, recent diagnosis 12/2022  Echo 12/21/2022 with LVEF 20-25% with severe hypokinesis of basal anteroseptal, basal inferoseptal and apical septal walls, moderately dilated RV with reduced systolic function, severe biatrial enlargement  Repeat Echo this admission with LVEF 20-25%, no significant change from prior  -Hx RLE DVT 06/2022  -HTN  -DM  -HLD  -Hx medication noncompliance  -DNR status      Primary cardiologist is Dr. Hussein Fong:     Noted switch to comfort measures. Will be available if questions. Subjective:     Weekend events noted. Increasing oxygen requirements noted. A.fib on tele. Objective:      No data found.       Patient Vitals for the past 96 hrs:   Weight   01/28/23 1613 107 kg (235 lb 14.3 oz)   01/27/23 0859 106.8 kg (235 lb 7.2 oz)                    Intake/Output Summary (Last 24 hours) at 1/30/2023 0845  Last data filed at 1/30/2023 5598  Gross per 24 hour   Intake 120 ml   Output 1950 ml   Net -1830 ml       Physical Exam:  General:  alert, cooperative, no distress, appears stated age  Neck:  nontender  Lungs: decreased  Heart:  Irreg, tachy, S1, S2 normal, no murmur, click, rub or gallop  Abdomen:  abdomen is soft without significant tenderness, masses, organomegaly or guarding  Extremities:  extremities normal, atraumatic, no cyanosis or edema    Data Review:     Labs: Results:       Chemistry Recent Labs     01/29/23  0930 01/28/23  0448   * 157*   * 149*   K 3.2* 3.1*    113*   CO2 33* 31   BUN 62* 75*   CREA 1.62* 1.76*   CA 8.7 8.7   MG 1.6 1.8   PHOS 2.6 3.0   AGAP 4 5   BUCR 38* 43*      CBC w/Diff Recent Labs     01/29/23  0930 01/28/23  0448   WBC 15.8* 17.3*   RBC 4.29 4.05*   HGB 10.7* 10.3*   HCT 35.4 33.8*    183   GRANS 71 94*   LYMPH 12* 2*   EOS 2 1      Cardiac Enzymes No results found for: CPK, CK, CKMMB, CKMB, RCK3, CKMBT, CKNDX, CKND1, GABRIEL, TROPT, TROIQ, KRUPA, TROPT, TNIPOC, BNP, BNPP   Coagulation Recent Labs     01/29/23  0930 01/28/23  1530   APTT 79.7* 77.9*       Lipid Panel Lab Results   Component Value Date/Time    Cholesterol, total 191 11/09/2021 12:24 PM    HDL Cholesterol 44 11/09/2021 12:24 PM    LDL, calculated 96.8 11/09/2021 12:24 PM    VLDL, calculated 50.2 11/09/2021 12:24 PM    Triglyceride 251 (H) 11/09/2021 12:24 PM    CHOL/HDL Ratio 4.3 11/09/2021 12:24 PM      BNP No results found for: BNP, BNPP, XBNPT   Liver Enzymes No results for input(s): TP, ALB, TBIL, AP in the last 72 hours.     No lab exists for component: SGOT, GPT, DBIL   Digoxin    Thyroid Studies Lab Results   Component Value Date/Time    TSH 2.88 01/27/2023 03:04 AM          Signed By: Baldev Haddad MD     January 30, 2023

## 2023-01-30 NOTE — PROGRESS NOTES
Received call from [employer], release for both tissue and eye. Family will call with [facility] once they decide on one.

## 2023-01-30 NOTE — PROGRESS NOTES
1030: [employer] called and a message was left with all the required information. Reference number given  by Apolinar Klinefelter was #[id number]. Family, MD, Primary RN, Pastoral Care, Unit Director, and House Supervisor is aware. All documents reviewed and filled out. Report of death faxed to Nursing admins office.

## 2023-01-30 NOTE — PROGRESS NOTES
Patient is now comfort care   Will sign off , available for questions    Please call with questions    Ginette Mckeon MD [facility]  Cell [phone]  Pager: [phone]

## 2023-01-30 NOTE — DEATH NOTE
[facility]  Death Pronouncement Note    Patient: Gabino Wilder MRN: [id number]   SSN: [id number]  YOB: 1935   Age: 82 y.o. Sex: female    Admission Date: 1/17/2023  6:45 PM Time of Death: 1030          Called to patient's bedside for apnea and pulselessness. Patient lying in bed, mouth open, eyes closed. Unresponsive to voice or painful stimuli. No spontaneous respirations and chest rise absent. No palpable carotid pulse bilaterally. No heart sounds or breath sounds. Pupils fixed and dilated bilaterally. Corneal reflexes absent. Death officially pronounced at 21 [phone], 1/30/2023. Attending physician, Dr. Diego Berg, to be notified by nursing.     Rosa Maria Siegel MD, PGY-1  [facility] [address]

## 2023-06-12 NOTE — PATIENT INSTRUCTIONS
Preventing Falls: Care Instructions Your Care Instructions Getting around your home safely can be a challenge if you have injuries or health problems that make it easy for you to fall. Loose rugs and furniture in walkways are among the dangers for many older people who have problems walking or who have poor eyesight. People who have conditions such as arthritis, osteoporosis, or dementia also have to be careful not to fall. You can make your home safer with a few simple measures. Follow-up care is a key part of your treatment and safety. Be sure to make and go to all appointments, and call your doctor if you are having problems. It's also a good idea to know your test results and keep a list of the medicines you take. How can you care for yourself at home? Taking care of yourself · You may get dizzy if you do not drink enough water. To prevent dehydration, drink plenty of fluids, enough so that your urine is light yellow or clear like water. Choose water and other caffeine-free clear liquids. If you have kidney, heart, or liver disease and have to limit fluids, talk with your doctor before you increase the amount of fluids you drink. · Exercise regularly to improve your strength, muscle tone, and balance. Walk if you can. Swimming may be a good choice if you cannot walk easily. · Have your vision and hearing checked each year or any time you notice a change. If you have trouble seeing and hearing, you might not be able to avoid objects and could lose your balance. · Know the side effects of the medicines you take. Ask your doctor or pharmacist whether the medicines you take can affect your balance. Sleeping pills or sedatives can affect your balance. · Limit the amount of alcohol you drink. Alcohol can impair your balance and other senses. · Ask your doctor whether calluses or corns on your feet need to be removed.  If you wear loose-fitting shoes because of calluses or corns, you can lose your balance and fall. · Talk to your doctor if you have numbness in your feet. Preventing falls at home · Remove raised doorway thresholds, throw rugs, and clutter. Repair loose carpet or raised areas in the floor. · Move furniture and electrical cords to keep them out of walking paths. · Use nonskid floor wax, and wipe up spills right away, especially on ceramic tile floors. · If you use a walker or cane, put rubber tips on it. If you use crutches, clean the bottoms of them regularly with an abrasive pad, such as steel wool. · Keep your house well lit, especially [place], and outside walkways. Use night-lights in areas such as hallways and bathrooms. Add extra light switches or use remote switches (such as switches that go on or off when you clap your hands) to make it easier to turn lights on if you have to get up during the night. · Install sturdy handrails on stairways. · Move items in your cabinets so that the things you use a lot are on the lower shelves (about waist level). · Keep a cordless phone and a flashlight with new batteries by your bed. If possible, put a phone in each of the main rooms of your house, or carry a cell phone in case you fall and cannot reach a phone. Or, you can wear a device around your neck or wrist. You push a button that sends a signal for help. · Wear low-heeled shoes that fit well and give your feet good support. Use footwear with nonskid soles. Check the heels and soles of your shoes for wear. Repair or replace worn heels or soles. · Do not wear socks without shoes on wood floors. · Walk on the grass when the sidewalks are slippery. If you live in an area that gets snow and ice in the winter, sprinkle salt on slippery steps and sidewalks. Preventing falls in the bath · Install grab bars and nonskid mats inside and outside your shower or tub and near the toilet and sinks. · Use shower chairs and bath benches. · Use a hand-held shower head that will allow you to sit while showering. · Get into a tub or shower by putting the weaker leg in first. Get out of a tub or shower with your strong side first. 
· Repair loose toilet seats and consider installing a raised toilet seat to make getting on and off the toilet easier. · Keep your bathroom door unlocked while you are in the shower. Where can you learn more? Go to http://braden-estephanie.info/. Enter [id number] [id number] in the search box to learn more about \"Preventing Falls: Care Instructions. \" Current as of: November 7, 2018 Content Version: 12.1 © 4698-4864 [employer]. Care instructions adapted under license by [employer] (which disclaims liability or warranty for this information). If you have questions about a medical condition or this instruction, always ask your healthcare professional. [place] [address] any warranty or liability for your use of this information. Safe Use of Opioid Pain Medicine: Care Instructions Your Care Instructions Pain is your body's way of warning you that something is wrong. Pain feels different for everybody. Only you can describe your pain. A doctor can suggest or prescribe many types of medicines for pain. These range from over-the-counter medicines like acetaminophen (Tylenol) to powerful medicines called opioids. Examples of opioids are fentanyl, hydrocodone, morphine, and oxycodone. Heroin is an illegal opioid Opioids are strong medicines. They can help you manage pain when you use them the right way. But if you misuse them, they can cause serious harm and even death. For these reasons, doctors are very careful about how they prescribe opioids. If you decide to take opioids, here are some things to remember. · Keep your doctor informed. You can develop opioid use disorder. Moderate to severe opioid use disorder is sometimes called addiction.  The risk is higher if you have a history of substance use. Your doctor will monitor you closely for signs of opioid use disorder and to figure out when you no longer need to take opioids. · Make a treatment plan. The goal of your plan is to be able to function and do the things you need to do, even if you still have some pain. You might be able to manage your pain with other non-opioid options like physical therapy, relaxation, or over-the-counter pain medicines. · Be aware of the side effects. Opioids can cause serious side effects, such as constipation, dry mouth, and nausea. And over time, you may need a higher dose to get pain relief. This is called tolerance. Your body also gets used to opioids. This is called physical dependence. If you suddenly stop taking them, you may have withdrawal symptoms. The doctor carefully considered what pain medicine is right for you. You may not have received opioids if your doctor was concerned about drug interactions or your safety, or if he or she had other concerns. It is best to have one doctor or clinic treat your pain. This way you will get the pain medicine that will help you the most. And a doctor will be able to watch for any problems that the medicine might cause. The doctor has checked you carefully, but problems can develop later. If you notice any problems or new symptoms,  get medical treatment right away. Follow-up care is a key part of your treatment and safety. Be sure to make and go to all appointments, and call your doctor if you are having problems. It's also a good idea to know your test results and keep a list of the medicines you take. How can you care for yourself at home? If you need to take opioids to manage your pain, remember these safety tips. · Follow directions carefully. It's easy to misuse opioids if you take a dose other than what's prescribed by your doctor.  This can lead to overdose and even death. Even sharing them with someone they weren't meant for is misuse. · Be cautious. Opioids may affect your judgment and decision making. Do not drive or operate machinery until you can think clearly. Talk with your doctor about when it is safe to drive. · Reduce the risk of drug interactions. Opioids can be dangerous if you take them with alcohol or with certain drugs like sleeping pills and muscle relaxers. Make sure your doctor knows about all the other medicines you take, including over-the-counter medicines. Don't start any new medicines before you talk to your doctor or pharmacist. 
· Safely store and dispose of opioids. Store opioids in a safe and secure place. Make sure that pets, children, friends, and family can't get to them. When you're done using opioids, make sure to dispose of them safely and as quickly as possible. The U.S. Food and Drug Administration (FDA) recommends these disposal options. ? The best option is to take your medicine to a drop-off box or take-back program that is authorized by the [facility] ([facility]). ? If these programs aren't available in your area and your medicine doesn't have specific disposal instructions (such as flushing), you can throw them into your household trash if you follow the FDA's instructions. Visit fda.gov and search for \"unused medicine disposal.\" 
? If you have opioid patches (used or unused), your options are to take them to a [facility]-authorized site or flush them down the toilet. Do not throw them in the trash. ? Only flush your medicine down the toilet if you can't get to a [facility]-approved site or your medicine instructions state clearly to flush them. · Reduce the risk of overdose. Misuse of opioids can be very dangerous. Protect yourself by asking your doctor about a naloxone rescue kit. It can help youand even save your lifeif you take too much of an opioid. Try other ways to reduce pain. · Relax, and reduce stress. Relaxation techniques such as deep breathing or meditation can help. · Keep moving. Gentle, daily exercise can help reduce pain over the long run. Try low- or no-impact exercises such as walking, swimming, and stationary biking. Do stretches to stay flexible. · Try heat, cold packs, and massage. · Get enough sleep. Pain can make you tired and drain your energy. Talk with your doctor if you have trouble sleeping because of pain. · Think positive. Your thoughts can affect your pain level. Do things that you enjoy to distract yourself when you have pain instead of focusing on the pain. See a movie, read a book, listen to music, or spend time with a friend. If you are not taking a prescription pain medicine, ask your doctor if you can take an over-the-counter medicine. When should you call for help? Call your doctor now or seek immediate medical care if: 
  · You have a new kind of pain.  
  · You have new symptoms, such as a fever or rash, along with the pain.  
 Watch closely for changes in your health, and be sure to contact your doctor if: 
  · You think you might be using too much pain medicine, and you need help to use less or stop.  
  · Your pain gets worse.  
  · You would like a referral to a doctor or clinic that specializes in pain management. Where can you learn more? Go to http://braden-estephanie.info/. Enter R108 in the search box to learn more about \"Safe Use of Opioid Pain Medicine: Care Instructions. \" Current as of: March 28, 2019 Content Version: 12.1 © 5033-4279 [employer]. Care instructions adapted under license by [employer] (which disclaims liability or warranty for this information). If you have questions about a medical condition or this instruction, always ask your healthcare professional. [address] any warranty or liability for your use of this information. 185.42
